# Patient Record
Sex: FEMALE | Race: BLACK OR AFRICAN AMERICAN | NOT HISPANIC OR LATINO | Employment: OTHER | URBAN - METROPOLITAN AREA
[De-identification: names, ages, dates, MRNs, and addresses within clinical notes are randomized per-mention and may not be internally consistent; named-entity substitution may affect disease eponyms.]

---

## 2017-01-02 ENCOUNTER — TELEPHONE (OUTPATIENT)
Dept: FAMILY MEDICINE | Facility: CLINIC | Age: 52
End: 2017-01-02

## 2017-01-12 ENCOUNTER — OFFICE VISIT (OUTPATIENT)
Dept: FAMILY MEDICINE | Facility: CLINIC | Age: 52
End: 2017-01-12
Payer: OTHER MISCELLANEOUS

## 2017-01-12 VITALS
WEIGHT: 203 LBS | SYSTOLIC BLOOD PRESSURE: 125 MMHG | DIASTOLIC BLOOD PRESSURE: 87 MMHG | BODY MASS INDEX: 34.03 KG/M2 | OXYGEN SATURATION: 98 % | HEART RATE: 62 BPM | TEMPERATURE: 97 F

## 2017-01-12 DIAGNOSIS — S49.92XA INJURY OF LEFT SHOULDER, INITIAL ENCOUNTER: Primary | ICD-10-CM

## 2017-01-12 DIAGNOSIS — M25.562 ACUTE PAIN OF LEFT KNEE: ICD-10-CM

## 2017-01-12 DIAGNOSIS — S93.492A SPRAIN OF OTHER LIGAMENT OF LEFT ANKLE, INITIAL ENCOUNTER: ICD-10-CM

## 2017-01-12 PROCEDURE — 99214 OFFICE O/P EST MOD 30 MIN: CPT | Performed by: FAMILY MEDICINE

## 2017-01-12 NOTE — PROGRESS NOTES
SUBJECTIVE:                                                    Marleen Mcfadden is a 51 year old female who presents to clinic today for the following health issues:      Pt comes in clinic today with c/o left sided pain, she states that she slipped on a puddle of water and fell at work yesterday, one day prior to presentation.  She needed 3 people to help her up.   She fell onto her back and left shoulder, twisting her left leg/knee and left ankle. The whole left side of body  hurts but the majority of the severe pain is coming from the left knee and the back and shoulder area. Knee pain worst when she's standing or walking. Pt denies hitting her head but does state that the back of her neck is swollen.  She was seen in the ER, prescribed Flexeril for pain. She had foot and knee xrays that were normal. She wasn't told to wrap. She has been icing the areas.  Pain is worse today, has some swelling, walking is painful.    Problem list and histories reviewed & adjusted, as indicated.  Additional history: as documented    Patient Active Problem List   Diagnosis     Autoimmune disease, not elsewhere classified     Primary cardiomyopathy (H)     Leiomyoma of uterus     Allergic rhinitis     Anemia     Sinus bradycardia     Health Care Home     Itching     CHF with cardiomyopathy (H)     TB lung, latent     Knee pain     Swelling of knee joint     Thyroid nodule     Pre-operative cardiovascular examination     Post-op pain     Pain in joint involving ankle and foot     Calcaneal spur     Plantar fasciitis     CARDIOVASCULAR SCREENING; LDL GOAL LESS THAN 160     Peroneus longus tendinitis     Morbid obesity (H)     Shaina's nodes     Familial cardiomyopathy (H)     Acute pain of right knee     Acute bilateral low back pain with right-sided sciatica     Degenerative disc disease at L5-S1 level     Midline low back pain with left-sided sciatica     Chronic bilateral low back pain with right-sided sciatica     Chronic  bilateral low back pain with left-sided sciatica     Elevated blood pressure reading without diagnosis of hypertension     Chronic diastolic congestive heart failure (H)     Injury of left shoulder, initial encounter     Acute pain of left knee     Sprain of other ligament of left ankle, initial encounter     Past Surgical History   Procedure Laterality Date     C excise excess skin tissue,abdomen       C repair cruciate ligament,knee       Right Knee     C breast surgery procedure unlisted       Breast Reduction     C excis uterine fibroid,abd apprch       C  delivery only  10/85,      , Low Cervicalx2     C ligate fallopian tube       Hysterectomy total abdominal       Thyroidectomy  2013     Procedure: THYROIDECTOMY;  LEFT THYROID LOBECTOMY.  (LIGASURE, RECURRENT LARYNGEAL NERVE MONITOR) ;  Surgeon: Uriah Camargo MD;  Location: [facility]       Social History   Substance Use Topics     Smoking status: Never Smoker      Smokeless tobacco: Never Used     Alcohol Use: Yes      Comment: rare, twice a year, she has a drink little wine 2 days ago     Family History   Problem Relation Age of Onset     Hypertension Father      dec     DIABETES Father      dec     HEART DISEASE Father      dec     Alcohol/Drug Father      Cardiovascular Father      HEART DISEASE Mother      dec     Alcohol/Drug Mother      Cardiovascular Mother      DIABETES Paternal Grandmother      dec     Hypertension Paternal Grandmother      dec     CEREBROVASCULAR DISEASE Paternal Grandmother      dec     CANCER Sister      Lupus     Cardiovascular Sister      cardiomyopathy     HEART DISEASE Sister      heart failure, and kidney failure     HEART DISEASE Daughter      Cardiomyopathy     Cardiovascular Daughter      cardiomyopathy     Cardiovascular Son      cardiomyopathy         Allergies   Allergen Reactions     Morphine      EMESIS     Sulfa Drugs Swelling     BP Readings from Last 3  Encounters:   01/12/17 125/87   12/19/16 133/85   10/28/16 126/83    Wt Readings from Last 3 Encounters:   01/12/17 203 lb (92.08 kg)   12/19/16 212 lb 8 oz (96.389 kg)   10/28/16 203 lb 12.8 oz (92.443 kg)             ROS:  Constitutional, HEENT, cardiovascular, pulmonary, gi and gu systems are negative, except as otherwise noted.    OBJECTIVE:                                                    /87 mmHg  Pulse 62  Temp(Src) 97  F (36.1  C) (Tympanic)  Wt 203 lb (92.08 kg)  SpO2 98%  LMP 10/19/2008  Body mass index is 34.03 kg/(m^2).  GENERAL: healthy, alert and no distress  EYES: Eyes grossly normal to inspection, PERRL and conjunctivae and sclerae normal  HENT: ear canals and TM's normal, nose and mouth without ulcers or lesions  NECK: no adenopathy, no asymmetry, masses, or scars and thyroid normal to palpation  MS: normal muscle tone   Shoulder exam - left: tenderness on palpation soft tissue left neck, posterior shoulder, no significant bruising, hematoma or spasm noted. Patient abducts arm to 90 degrees, reports pain and difficulty lifting > 90 degree. Right side normal,  full range of motion, no pain on motion, no tenderness or deformity noted.   Left knee: swelling, tenderness noted along medial aspect, no joint pain tenderness, no laxity noted but exam compromised by pain, normal patella. Patient is walking, very slight antalgic gait.  Left ankle/foot: tenderness on palpation top lateral foot with no specific bony tenderness. Ankle joint stable, no ankle tenderness noted on palpaton or with movement. Capillary refill brisk and equal in all toes.   SKIN: no suspicious lesions or rashes  NEURO: Normal strength and tone, mentation intact and speech normal  PSYCH: mentation appears normal, affect normal/bright    Diagnostic Test Results:  none      ASSESSMENT/PLAN:                                                    1. Injury of left shoulder, initial encounter  Recommend physical therapy to prevent  frozen shoulder, activity as tolerated  - [facility] PT, HAND, AND CHIROPRACTIC REFERRAL    2. Acute pain of left knee  Suspect acute sprain, no obvious joint laxity noted, but exam compromised by limited mobility due pain/swellng  Left knee sleeve fitted and provided to patient today  Refer to ortho for further evaluation, management if symptoms don't improve over next few days  - ORTHO  REFERRAL    3. Sprain of other ligament of left ankle, initial encounter  Please see patient instructions below.         Health Maintenance Due   Topic Date Due     URINE DRUG SCREEN Q1 YR  07/06/1979     ADVANCE DIRECTIVE PLANNING Q5 YRS (NO INBASKET)  07/06/1982     HEPATITIS C SCREENING  07/06/1982     LIPID MONITORING Q1 YEAR( NO INBASKET)  04/09/2015     HF ACTION PLAN Q3 YR (NO INBASKET MSG)  11/01/2015     INFLUENZA VACCINE (SYSTEM ASSIGNED)  09/01/2016      Danae Grimes MD  [facility]

## 2017-01-12 NOTE — Clinical Note
REPORT OF WORK ABILITY    [facility]   [address] [place] [address]  [phone]    PATIENT DATA    Employee Name: Marleen Mcfadden      : 1965     #: [id number]    Work related injury: Yes  Employed elsewhere? No      Today's date: 2017  Date of injury: 2017  Date of first visit: 2017     PROVIDER EVALUATION: Please fill in as needed.  Please give copy to employee for employer.    1. Diagnosis: left shoulder stain, left knee sprain, left ankle sprain    2. Treatment: physical therapy, ice, rest.  3. Medication: tylenol  NOTE: When ordering a medication, [place] Rules require Work Comp or WC on prescriptions.    4. No work from 2017 to Monday, 2017.  5. Return to work date: 2017   ** WITH RESTRICTIONS? No restrictions      RESTRICTIONS: Unlimited unless listed.  Restrictions apply to home and leisure also.  If work restrictions is not available, the employee is totally disabled.    Maximum Medical Improvement (Date): 2016  Any Permanent Partial Disability? 0%    Medical Examiner: Danae Grimes          License or registration: [id number]    Next appointment: 1 week    CC: Employer, Managed Care Plan/Payor, Patient

## 2017-01-12 NOTE — NURSING NOTE
"Chief Complaint   Patient presents with     Work Comp     Fall       Initial /87 mmHg  Pulse 62  Temp(Src) 97  F (36.1  C) (Tympanic)  Wt 203 lb (92.08 kg)  SpO2 98%  LMP 10/19/2008 Estimated body mass index is 34.03 kg/(m^2) as calculated from the following:    Height as of 12/19/16: 5' 4.75\" (1.645 m).    Weight as of this encounter: 203 lb (92.08 kg).  BP completed using cuff size: large.    Soco Choudhury MA    "

## 2017-01-12 NOTE — MR AVS SNAPSHOT
After Visit Summary   1/12/2017    Marleen Mcfadden    MRN: [id number]           Patient Information     Date Of Birth          1965        Visit Information        Provider Department      1/12/2017 1:00 PM Danae Grimes MD [facility]        Today's Diagnoses     Injury of left shoulder, initial encounter    -  1     Acute pain of left knee         Sprain of other ligament of left ankle, initial encounter           Care Instructions    Phase 1: ice, compression (aircast or wrapping), elevation, and antiinflammatories (ibuprofen, naprosyn, ketoprofen, mobic).      Starting 48 hours after injury, contrast baths done three times per day if possible help reduce swelling.    Contrast Bath  1. Soak area in cold water bath (cold water in a bucket with some ice cubes) for 30 seconds.  2. Switch to bucket of as hot as water as can be tolerated without burning (around 104 degrees/40 degrees celcius, the same as a hot hot tub) for 30 sec.  Continue to switch back and forth every 30 seconds for 5 minutes, finishing in the cold water bath.    Phase 2:  When you can bear weight without increasing pain or causing more swelling (generally in 2-4 weeks), continue to wear brace, but start strengthening and stretching exercises (alphabet, achilles stretching exercises).  You may ask for referral to Sports Medicine doctor or Physical Therapy for this phase.    Phase 3:  Conditioning (4-6 weeks after injury). Standing on injured foot with other foot raised and eyes closes: running in tight figure 8's.  Gradually reduce use of brace.  Continue to stretch well.                Follow-ups after your visit        Additional Services     [facility] PT, HAND, AND CHIROPRACTIC REFERRAL       **This order will print in the [facility] Scheduling Office**    Physical Therapy, Hand Therapy and Chiropractic Care are available through:    *[facility]  *[facility]  *[facility]    Call one number to schedule at any of the above locations: [phone].    Your provider has referred you to: As Indicated:     Indication/Reason for Referral: left shoulder injury; fell 1/112017  Onset of Illness: one day  Therapy Orders: Evaluate and Treat  Special Programs:   Special Request:     Dariana Westbrook      Additional Comments for the Therapist or Chiropractor:     Please be aware that coverage of these services is subject to the terms and limitations of your health insurance plan.  Call member services at your health plan with any benefit or coverage questions.      Please bring the following to your appointment:    *Your personal calendar for scheduling future appointments  *Comfortable clothing            ORTHO  REFERRAL       [facility] is referring you to the Orthopedic  Services at [facility].       The  Representative will assist you in the coordination of your Orthopedic and Musculoskeletal Care as prescribed by your physician.    The  Representative will call you within 1 business day to help schedule your appointment, or you may contact the  Representative at:    All areas ~ [phone]     Type of Referral : Non Surgical       Timeframe requested: Within 2 weeks    Coverage of these services is subject to the terms and limitations of your health insurance plan.  Please call member services at your health plan with any benefit or coverage questions.      If X-rays, CT or MRI's have been performed, please contact the facility where they were done to arrange for , prior to your scheduled appointment.  Please bring this referral request to your appointment and present it to your specialist.                  Your next 10 appointments already scheduled     Mar 08, 2017  8:00 AM   Lab with [facility] ([facility])    [address] [place] [address]  "[facility] [id number] [phone]            Mar 08, 2017  8:20 AM   (Arrive by 8:05 AM)   RETURN HEART FAILURE with Cassie Kolb MD   [facility] ([facility])    [address] [facility]  [address] [facility] [id number] [phone]              Who to contact     If you have questions or need follow up information about today's clinic visit or your schedule please contact [facility] directly at [phone].  Normal or non-critical lab and imaging results will be communicated to you by [employer], letter or phone within 4 business days after the clinic has received the results. If you do not hear from us within 7 days, please contact the clinic through [employer] or phone. If you have a critical or abnormal lab result, we will notify you by phone as soon as possible.  Submit refill requests through [employer] or call your pharmacy and they will forward the refill request to us. Please allow 3 business days for your refill to be completed.          Additional Information About Your Visit        MyChart Information     [employer] lets you send messages to your doctor, view your test results, renew your prescriptions, schedule appointments and more. To sign up, go to [email or web address] . Click on \"Log in\" on the left side of the screen, which will take you to the Welcome page. Then click on \"Sign up Now\" on the right side of the page.     You will be asked to enter the access code listed below, as well as some personal information. Please follow the directions to create your username and password.     Your access code is: [email or web address]  Expires: 2017 12:16 PM     Your access code will  in 90 days. If you need help or a new code, please call your [facility] or [phone].        Care EveryWhere ID     This is your Care EveryWhere ID. This could be used by other organizations to access your [facility] medical records  [id number]      "   Your Vitals Were     Pulse Temperature Pulse Oximetry Last Period          62 97  F (36.1  C) (Tympanic) 98% 10/19/2008         Blood Pressure from Last 3 Encounters:   01/12/17 125/87   12/19/16 133/85   10/28/16 126/83    Weight from Last 3 Encounters:   01/12/17 203 lb (92.08 kg)   12/19/16 212 lb 8 oz (96.389 kg)   10/28/16 203 lb 12.8 oz (92.443 kg)              We Performed the Following     [facility] PT, HAND, AND CHIROPRACTIC REFERRAL     ORTHO  REFERRAL        Primary Care Provider Office Phone # Fax #    Danae Grimes [phone]       [facility] [address]  [facility] [address]        Thank you!     Thank you for choosing [facility]  for your care. Our goal is always to provide you with excellent care. Hearing back from our patients is one way we can continue to improve our services. Please take a few minutes to complete the written survey that you may receive in the mail after your visit with us. Thank you!             Your Updated Medication List - Protect others around you: Learn how to safely use, store and throw away your medicines at www.disposemymeds.org.          This list is accurate as of: 1/12/17  1:45 PM.  Always use your most recent med list.                   Brand Name Dispense Instructions for use    cyclobenzaprine 10 MG tablet    FLEXERIL    30 tablet    Take 0.5-1 tablets (5-10 mg) by mouth 3 times daily as needed for muscle spasms       fluticasone 50 MCG/ACT spray    FLONASE    16 g    Spray 2 sprays into both nostrils daily       furosemide 20 MG tablet    LASIX    100 tablet    Take 2 tablets (40 mg) by mouth daily as needed       hydrochlorothiazide 12.5 MG capsule    MICROZIDE    30 capsule    Take 1 capsule (12.5 mg) by mouth daily       losartan 100 MG tablet    COZAAR    30 tablet    Take 1 tablet (100 mg) by mouth daily       TOPROL XL 50 MG 24 hr tablet   Generic drug:  metoprolol     90 tablet    Take 1 tablet  (50 mg) by mouth daily .  Patient will need to be seen in cardiology before any further refills will be authorized.       traMADol 50 MG tablet    ULTRAM    40 tablet    Take 1 tablet (50 mg) by mouth every 8 hours as needed for moderate pain       vitamin D 2000 UNITS tablet     100 tablet    Take 1 tablet by mouth as needed

## 2017-01-12 NOTE — PATIENT INSTRUCTIONS
Phase 1: ice, compression (aircast or wrapping), elevation, and antiinflammatories (ibuprofen, naprosyn, ketoprofen, mobic).      Starting 48 hours after injury, contrast baths done three times per day if possible help reduce swelling.    Contrast Bath  1. Soak area in cold water bath (cold water in a bucket with some ice cubes) for 30 seconds.  2. Switch to bucket of as hot as water as can be tolerated without burning (around 104 degrees/40 degrees celcius, the same as a hot hot tub) for 30 sec.  Continue to switch back and forth every 30 seconds for 5 minutes, finishing in the cold water bath.    Phase 2:  When you can bear weight without increasing pain or causing more swelling (generally in 2-4 weeks), continue to wear brace, but start strengthening and stretching exercises (alphabet, achilles stretching exercises).  You may ask for referral to Sports Medicine doctor or Physical Therapy for this phase.    Phase 3:  Conditioning (4-6 weeks after injury). Standing on injured foot with other foot raised and eyes closes: running in tight figure 8's.  Gradually reduce use of brace.  Continue to stretch well.

## 2017-01-16 ENCOUNTER — THERAPY VISIT (OUTPATIENT)
Dept: PHYSICAL THERAPY | Facility: CLINIC | Age: 52
End: 2017-01-16
Payer: OTHER MISCELLANEOUS

## 2017-01-16 DIAGNOSIS — M25.512 LEFT SHOULDER PAIN: Primary | ICD-10-CM

## 2017-01-16 DIAGNOSIS — S46.009A ROTATOR CUFF INJURY: ICD-10-CM

## 2017-01-16 PROCEDURE — 97035 APP MDLTY 1+ULTRASOUND EA 15: CPT | Mod: GP | Performed by: PHYSICAL THERAPIST

## 2017-01-16 PROCEDURE — 97110 THERAPEUTIC EXERCISES: CPT | Mod: GP | Performed by: PHYSICAL THERAPIST

## 2017-01-16 PROCEDURE — 97161 PT EVAL LOW COMPLEX 20 MIN: CPT | Mod: GP | Performed by: PHYSICAL THERAPIST

## 2017-01-16 NOTE — PROGRESS NOTES
Subjective:    Marleen Mcfadden is a 51 year old female with a left shoulder condition.  Condition occurred with:  A fall.  Condition occurred: at work.  This is a new condition  MD order 1/12/17. The patient slipped and fell on her back at work. She noticed increased pain in her left shoulder the next day. She has been using hot and cold pack. She was given muscle relaxants and was referred her to PT for further management. She has difficulty reaching shoulder and overhead, behind head and back. .    Patient reports pain:  Anterior.  Radiates to:  Shoulder.  Pain is described as aching and is constant and reported as 4/10.  Associated symptoms:  Painful arc, loss of strength, locking and loss of motion/stiffness. Pain is the same all the time.  Symptoms are exacerbated by using arm overhead, using arm at shoulder level, using arm behind back, lifting, carrying and lying on extremity and relieved by rest, muscle relaxants, analgesics and ice.  Since onset symptoms are gradually improving.        General health as reported by patient is good.  Pertinent medical history includes:  Heart problems.        Current occupation is Phlebotmoist.                                  Objective:    Standing Alignment:      Shoulder/UE:  Rounded shoulders                  Flexibility/Screens:     Upper Extremity:    Decreased left upper extremity flexibility at:  Pectoralis Major and Pectoralis Minor      Spine:  Decreased left spine flexibility:  Upper Trap and Levator                         Shoulder Evaluation:  ROM:  AROM:  normal (pain form 90 degree FF and Abd, IR L5 level.)                                  Strength:  : 3+/5 due to pain.                        Stability Testing:  normal        Palpation:    Left shoulder tenderness present at:  Supraspinatus; Infraspinatus; Teres Minor and Subscapularis                                       General     ROS    Assessment/Plan:      Patient is a 51 year old female with left  side shoulder complaints.    Patient has the following significant findings with corresponding treatment plan.                Diagnosis 1:  L shoulder injury- rotator cuff strain  Pain -  hot/cold therapy, US, electric stimulation, manual therapy, STS, splint/taping/bracing/orthotics, self management, education and home program  Decreased ROM/flexibility - manual therapy, therapeutic exercise, therapeutic activity and home program  Decreased strength - therapeutic exercise, therapeutic activities and home program  Inflammation - cold therapy, US, electric stimulation and self management/home program  Decreased function - therapeutic activities and home program  Impaired posture - neuro re-education, therapeutic activities and home program    Therapy Evaluation Codes:   1) History comprised of:   Personal factors that impact the plan of care:      Time since onset of symptoms.    Comorbidity factors that impact the plan of care are:      Rheumatoid arthritis, Sleep disorder/apnea and Smoking.     Medications impacting care: Pain and Steroids.  2) Examination of Body Systems comprised of:   Body structures and functions that impact the plan of care:      Shoulder.   Activity limitations that impact the plan of care are:      Driving, Dressing, Lifting and Sleeping.  3) Clinical presentation characteristics are:   Stable/Uncomplicated.  4) Decision-Making    Low complexity using standardized patient assessment instrument and/or measureable assessment of functional outcome.  Cumulative Therapy Evaluation is: Low complexity.    Previous and current functional limitations:  (See Goal Flow Sheet for this information)    Short term and Long term goals: (See Goal Flow Sheet for this information)     Communication ability:  Patient appears to be able to clearly communicate and understand verbal and written communication and follow directions correctly.  Treatment Explanation - The following has been discussed with the  patient:   RX ordered/plan of care  Anticipated outcomes  Possible risks and side effects  This patient would benefit from PT intervention to resume normal activities.   Rehab potential is good.    Frequency:  1 X week, once daily  Duration:  for 6 weeks  Discharge Plan:  Achieve all LTG.  Independent in home treatment program.  Reach maximal therapeutic benefit.    Please refer to the daily flowsheet for treatment today, total treatment time and time spent performing 1:1 timed codes.

## 2017-01-17 PROBLEM — S46.009A ROTATOR CUFF INJURY: Status: ACTIVE | Noted: 2017-01-17

## 2017-01-17 PROBLEM — M25.512 LEFT SHOULDER PAIN: Status: ACTIVE | Noted: 2017-01-17

## 2017-01-17 NOTE — PROGRESS NOTES
Subjective:                                       Pertinent medical history includes:  Heart problems.  Medical allergies: no.  Other surgeries include:  Orthopedic surgery and other.  Current medications:  Cardiac medication and muscle relaxants.  Current occupation is Phlebotomist  .  Patient is working in normal job without restrictions.  Primary job tasks include:  Prolonged standing and other.                              Objective:    System    Physical Exam    General     ROS    Assessment/Plan:

## 2017-01-19 ENCOUNTER — OFFICE VISIT (OUTPATIENT)
Dept: URGENT CARE | Facility: URGENT CARE | Age: 52
End: 2017-01-19
Payer: OTHER MISCELLANEOUS

## 2017-01-19 VITALS
BODY MASS INDEX: 33.69 KG/M2 | WEIGHT: 201 LBS | HEART RATE: 89 BPM | DIASTOLIC BLOOD PRESSURE: 70 MMHG | SYSTOLIC BLOOD PRESSURE: 126 MMHG | OXYGEN SATURATION: 96 % | TEMPERATURE: 98.7 F

## 2017-01-19 DIAGNOSIS — S86.912A STRAIN OF LEFT KNEE, INITIAL ENCOUNTER: Primary | ICD-10-CM

## 2017-01-19 PROCEDURE — 99213 OFFICE O/P EST LOW 20 MIN: CPT | Performed by: FAMILY MEDICINE

## 2017-01-19 NOTE — MR AVS SNAPSHOT
After Visit Summary   1/19/2017    Marleen Mcfadden    MRN: [id number]           Patient Information     Date Of Birth          1965        Visit Information        Provider Department      1/19/2017 11:35 AM Javier Gomez MD [facility]        Care Instructions      Knee Sprain    A sprain is an injury to the ligaments or capsule that holds a joint together. There are no broken bones. Most sprains take 3 to 6 weeks to heal. If it a severe sprain where the ligament is completely torn, it can take months to recover.  Most knee sprains are treated with a splint, knee immobilizer brace, or elastic wrap for support. Severe sprains may require surgery.  Home care    Stay off the injured leg as much as possible until you can walk on it without pain. If you have a lot of pain with walking, crutches or a walker may be prescribed. (These can be rented or purchased at many pharmacies and surgical or orthopedic supply stores). Follow your healthcare provider's advice about when to begin putting weight on that leg.    Keep your leg elevated to reduce pain and swelling. When sleeping, place a pillow under the injured leg. When sitting, support the injured leg so it is level with your waist. This is very important during the first 48 hours.    Apply an ice pack over the injured area for 15 to 20 minutes every 3 to 6 hours. You should do this for the first 24 to 48 hours. You can make an ice pack by filling a plastic bag that seals at the top with ice cubes and then wrapping it with a thin towel. Continue to use ice packs for relief of pain and swelling as needed. As the ice melts, be careful to avoid getting your wrap, splint, or cast wet. After 48 hours, apply heat (warm shower or warm bath) for 15 to 20 minutes several times a day, or alternate ice and heat. You can place the ice pack directly over the splint. If you have to wear a hook-and-loop knee brace, you can open it to  apply the ice pack, or heat, directly to the knee. Never put ice directly on the skin. Always wrap the ice in a towel or other type of cloth.    You may use over-the-counter pain medicine to control pain, unless another pain medicine was prescribed.If you have chronic liver or kidney disease or ever had a stomach ulcer or GI bleeding, talk with your healthcare provider before using these medicines.    If you were given a splint, keep it completely dry at all times. Bathe with your splint out of the water, protected with 2 large plastic bags, rubber-banded at the top end. If a fiberglass splint gets wet, you can dry it with a hair dryer. If you have a hook-and-loop knee brace, you can remove this to bathe, unless told otherwise.  Follow-up care  Follow up with your doctor as advised. Any X-rays you had today don t show any broken bones, breaks, or fractures. Sometimes fractures don t show up on the first X-ray. Bruises and sprains can sometimes hurt as much as a fracture. These injuries can take time to heal completely. If your symptoms don t improve or they get worse, talk with your doctor. You may need a repeat X-ray. If X-rays were taken, you will be told of any new findings that may affect your care.  Call 911  Call 911 if you have:     Shortness of breath     Chest pain  When to seek medical advice  Call your healthcare provider right away if any of these occur:    The splint or knee immobilizer brace becomes wet or soft    The fiberglass cast or splint remains wet for more than 24 hours    Pain or swelling increases    The injured leg or toes become cold, blue, numb, or tingly    9716-3491 [employer]. [address] [place] [address]122. All rights reserved. This information is not intended as a substitute for professional medical care. Always follow your healthcare professional's instructions.              Follow-ups after your visit        Your next 10 appointments already scheduled      Jan 23, 2017 12:40 PM   New Visit with [facility]rell [facility]    [address] [facility] [address]   [phone]            Jan 23, 2017  2:50 PM   TREVER Extremity with Henny Justinthy, PT   [facility] ([facility]  )    [address] [place] [id number] [phone]            Jan 30, 2017  2:50 PM   TREVER Extremity with Henny Manpreetapathy, PT   [facility] ([facility]  )    [address] [place] [id number] [phone]            Mar 08, 2017  8:00 AM   Lab with  LAB   [facility] ([facility])    [address]  1st Floor  [facility] [id number] [phone]            Mar 08, 2017  8:20 AM   (Arrive by 8:05 AM)   RETURN HEART FAILURE with Cassie Kolb MD   [facility] ([facility])    [address]  3rd Floor  [facility] [id number] [phone]              Who to contact     If you have questions or need follow up information about today's clinic visit or your schedule please contact [facility] directly at [phone].  Normal or non-critical lab and imaging results will be communicated to you by MyChart, letter or phone within 4 business days after the clinic has received the results. If you do not hear from us within 7 days, please contact the clinic through [employer] or phone. If you have a critical or abnormal lab result, we will notify you by phone as soon as possible.  Submit refill requests through [employer] or call your pharmacy and they will forward the refill request to us. Please allow 3 business days for your refill to be completed.          Additional Information About Your Visit        [employer] Information     [employer] lets you send messages to your doctor, view your test results, renew your prescriptions, schedule appointments and  "more. To sign up, go to [email or web address] . Click on \"Log in\" on the left side of the screen, which will take you to the Welcome page. Then click on \"Sign up Now\" on the right side of the page.     You will be asked to enter the access code listed below, as well as some personal information. Please follow the directions to create your username and password.     Your access code is: [email or web address]  Expires: 2017 12:16 PM     Your access code will  in 90 days. If you need help or a new code, please call your [place] clinic or [phone].        Care EveryWhere ID     This is your Care EveryWhere ID. This could be used by other organizations to access your [place] medical records  [id number]        Your Vitals Were     Pulse Temperature Pulse Oximetry Last Period Breastfeeding?       89 98.7  F (37.1  C) (Oral) 96% 10/19/2008 No        Blood Pressure from Last 3 Encounters:   17 126/70   17 125/87   16 133/85    Weight from Last 3 Encounters:   17 201 lb (91.173 kg)   17 203 lb (92.08 kg)   16 212 lb 8 oz (96.389 kg)              Today, you had the following     No orders found for display       Primary Care Provider Office Phone # Fax #    Danae Grimes [phone]       [facility] [address]  [facility] [address]        Thank you!     Thank you for choosing [facility]  for your care. Our goal is always to provide you with excellent care. Hearing back from our patients is one way we can continue to improve our services. Please take a few minutes to complete the written survey that you may receive in the mail after your visit with us. Thank you!             Your Updated Medication List - Protect others around you: Learn how to safely use, store and throw away your medicines at www.disposemymeds.org.          This list is accurate as of: 17 12:26 PM.  Always use your most recent med list.       "             Brand Name Dispense Instructions for use    cyclobenzaprine 10 MG tablet    FLEXERIL    30 tablet    Take 0.5-1 tablets (5-10 mg) by mouth 3 times daily as needed for muscle spasms       fluticasone 50 MCG/ACT spray    FLONASE    16 g    Spray 2 sprays into both nostrils daily       furosemide 20 MG tablet    LASIX    100 tablet    Take 2 tablets (40 mg) by mouth daily as needed       hydrochlorothiazide 12.5 MG capsule    MICROZIDE    30 capsule    Take 1 capsule (12.5 mg) by mouth daily       losartan 100 MG tablet    COZAAR    30 tablet    Take 1 tablet (100 mg) by mouth daily       TOPROL XL 50 MG 24 hr tablet   Generic drug:  metoprolol     90 tablet    Take 1 tablet (50 mg) by mouth daily .  Patient will need to be seen in cardiology before any further refills will be authorized.       traMADol 50 MG tablet    ULTRAM    40 tablet    Take 1 tablet (50 mg) by mouth every 8 hours as needed for moderate pain       vitamin D 2000 UNITS tablet     100 tablet    Take 1 tablet by mouth as needed

## 2017-01-19 NOTE — PATIENT INSTRUCTIONS
Knee Sprain    A sprain is an injury to the ligaments or capsule that holds a joint together. There are no broken bones. Most sprains take 3 to 6 weeks to heal. If it a severe sprain where the ligament is completely torn, it can take months to recover.  Most knee sprains are treated with a splint, knee immobilizer brace, or elastic wrap for support. Severe sprains may require surgery.  Home care    Stay off the injured leg as much as possible until you can walk on it without pain. If you have a lot of pain with walking, crutches or a walker may be prescribed. (These can be rented or purchased at many pharmacies and surgical or orthopedic supply stores). Follow your healthcare provider's advice about when to begin putting weight on that leg.    Keep your leg elevated to reduce pain and swelling. When sleeping, place a pillow under the injured leg. When sitting, support the injured leg so it is level with your waist. This is very important during the first 48 hours.    Apply an ice pack over the injured area for 15 to 20 minutes every 3 to 6 hours. You should do this for the first 24 to 48 hours. You can make an ice pack by filling a plastic bag that seals at the top with ice cubes and then wrapping it with a thin towel. Continue to use ice packs for relief of pain and swelling as needed. As the ice melts, be careful to avoid getting your wrap, splint, or cast wet. After 48 hours, apply heat (warm shower or warm bath) for 15 to 20 minutes several times a day, or alternate ice and heat. You can place the ice pack directly over the splint. If you have to wear a hook-and-loop knee brace, you can open it to apply the ice pack, or heat, directly to the knee. Never put ice directly on the skin. Always wrap the ice in a towel or other type of cloth.    You may use over-the-counter pain medicine to control pain, unless another pain medicine was prescribed.If you have chronic liver or kidney disease or ever had a stomach  ulcer or GI bleeding, talk with your healthcare provider before using these medicines.    If you were given a splint, keep it completely dry at all times. Bathe with your splint out of the water, protected with 2 large plastic bags, rubber-banded at the top end. If a fiberglass splint gets wet, you can dry it with a hair dryer. If you have a hook-and-loop knee brace, you can remove this to bathe, unless told otherwise.  Follow-up care  Follow up with your doctor as advised. Any X-rays you had today don t show any broken bones, breaks, or fractures. Sometimes fractures don t show up on the first X-ray. Bruises and sprains can sometimes hurt as much as a fracture. These injuries can take time to heal completely. If your symptoms don t improve or they get worse, talk with your doctor. You may need a repeat X-ray. If X-rays were taken, you will be told of any new findings that may affect your care.  Call 911  Call 911 if you have:     Shortness of breath     Chest pain  When to seek medical advice  Call your healthcare provider right away if any of these occur:    The splint or knee immobilizer brace becomes wet or soft    The fiberglass cast or splint remains wet for more than 24 hours    Pain or swelling increases    The injured leg or toes become cold, blue, numb, or tingly    1457-7866 [employer]. [address] [place] [address]. All rights reserved. This information is not intended as a substitute for professional medical care. Always follow your healthcare professional's instructions.

## 2017-01-19 NOTE — NURSING NOTE
"Chief Complaint   Patient presents with     Work Comp     Pt c/o left knee pain from slipping on water at work.        Initial /70 mmHg  Pulse 89  Temp(Src) 98.7  F (37.1  C) (Oral)  Wt 201 lb (91.173 kg)  SpO2 96%  LMP 10/19/2008  Breastfeeding? No Estimated body mass index is 33.69 kg/(m^2) as calculated from the following:    Height as of 12/19/16: 5' 4.75\" (1.645 m).    Weight as of this encounter: 201 lb (91.173 kg).  BP completed using cuff size:martine Lozano CMA (AAMA)      "

## 2017-01-19 NOTE — Clinical Note
[facility]  [address]  [facility] [address]  Phone: [phone]    January 19, 2017        Marleen Mcfadden  [address]   [facility] [address]          To whom it may concern:      RE: Marleen Mcfadden      Patient was seen and treated today at our clinic. 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 She is scheduled to follow-up with the orthopedist next week for further assessment.       She may continue to work, however, may need to avoid prolonged standing and walking if having pain. Should avoid all climbing of ladders and stairs when possible. She should limit this activity if not tolerable and until follow-up with the orthopedist and continue to wear her knee brace. Follow-up if symptoms persist or worsen.        Sincerely,          Javier Gomez MD

## 2017-01-19 NOTE — PROGRESS NOTES
"Some of this note was populated by a medical assistant.      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 Apparently when she fell her left knee twisted behind her and she need help getting up,  \"from 5 nurses\"    Was having left shoulder, knee and ankle pain at that time. Has been seeing the physical therapist for her shoulder pain which is improving. He ankle pain has also improved approximately \"70%\" since the fall. She is concerned as her knee continues to be painful with walking and \"buckled on me the other day.   Musculoskeletal problem/pain      Duration: 01/11/2017    Description  Location: left knee    Intensity:  7/10    Accompanying signs and symptoms: pulling pain in left knee, limping on knees    History  Previous similar problem: YES- pt states that she has a similar pain with her right knee when she tore her ACL.   Previous evaluation:  x-ray    Precipitating or alleviating factors:  Trauma or overuse: YES- pt states that she slipped on water and work and her leg went up from underneath her. Pt states that her foot came up by her face during the fall.   Aggravating factors include: sitting, standing, walking and climbing stairs    Therapies tried and outcome: rest/inactivity, ice, heat, compression, tylenol- little relief.     Slight fall at work a year. Steroid injection into the knee. Resolved completely.     Problem list and histories reviewed & adjusted, as indicated.  Additional history: as documented    Patient Active Problem List   Diagnosis     Autoimmune disease, not elsewhere classified     Primary cardiomyopathy (H)     Leiomyoma of uterus     Allergic rhinitis     Anemia     Sinus bradycardia     Health Care Home     Itching     CHF with cardiomyopathy (H)     TB lung, latent     Knee " pain     Swelling of knee joint     Thyroid nodule     Pre-operative cardiovascular examination     Post-op pain     Pain in joint involving ankle and foot     Calcaneal spur     Plantar fasciitis     CARDIOVASCULAR SCREENING; LDL GOAL LESS THAN 160     Peroneus longus tendinitis     Morbid obesity (H)     Shaina's nodes     Familial cardiomyopathy (H)     Acute pain of right knee     Acute bilateral low back pain with right-sided sciatica     Degenerative disc disease at L5-S1 level     Midline low back pain with left-sided sciatica     Chronic bilateral low back pain with right-sided sciatica     Chronic bilateral low back pain with left-sided sciatica     Elevated blood pressure reading without diagnosis of hypertension     Chronic diastolic congestive heart failure (H)     Injury of left shoulder, initial encounter     Acute pain of left knee     Sprain of other ligament of left ankle, initial encounter     Left shoulder pain     Rotator cuff injury     Past Surgical History   Procedure Laterality Date     C excise excess skin tissue,abdomen       C repair cruciate ligament,knee       Right Knee     C breast surgery procedure unlisted       Breast Reduction     C excis uterine fibroid,abd apprch       C  delivery only  10/85,      , Low Cervicalx2     C ligate fallopian tube       Hysterectomy total abdominal       Thyroidectomy  2013     Procedure: THYROIDECTOMY;  LEFT THYROID LOBECTOMY.  (LIGASURE, RECURRENT LARYNGEAL NERVE MONITOR) ;  Surgeon: Uriah Camargo MD;  Location: [facility]       Social History   Substance Use Topics     Smoking status: Never Smoker      Smokeless tobacco: Never Used     Alcohol Use: Yes      Comment: rare, twice a year, she has a drink little wine 2 days ago     Family History   Problem Relation Age of Onset     Hypertension Father      dec     DIABETES Father      dec     HEART DISEASE Father      dec     Alcohol/Drug Father       Cardiovascular Father      HEART DISEASE Mother      dec     Alcohol/Drug Mother      Cardiovascular Mother      DIABETES Paternal Grandmother      dec     Hypertension Paternal Grandmother      dec     CEREBROVASCULAR DISEASE Paternal Grandmother      dec     CANCER Sister      Lupus     Cardiovascular Sister      cardiomyopathy     HEART DISEASE Sister      heart failure, and kidney failure     HEART DISEASE Daughter      Cardiomyopathy     Cardiovascular Daughter      cardiomyopathy     Cardiovascular Son      cardiomyopathy         Current Outpatient Prescriptions   Medication Sig Dispense Refill     fluticasone (FLONASE) 50 MCG/ACT spray Spray 2 sprays into both nostrils daily 16 g 0     losartan (COZAAR) 100 MG tablet Take 1 tablet (100 mg) by mouth daily 30 tablet 3     hydrochlorothiazide (MICROZIDE) 12.5 MG capsule Take 1 capsule (12.5 mg) by mouth daily 30 capsule 3     traMADol (ULTRAM) 50 MG tablet Take 1 tablet (50 mg) by mouth every 8 hours as needed for moderate pain 40 tablet 0     TOPROL XL 50 MG 24 hr tablet Take 1 tablet (50 mg) by mouth daily .  Patient will need to be seen in cardiology before any further refills will be authorized. 90 tablet 0     cyclobenzaprine (FLEXERIL) 10 MG tablet Take 0.5-1 tablets (5-10 mg) by mouth 3 times daily as needed for muscle spasms 30 tablet 0     furosemide (LASIX) 20 MG tablet Take 2 tablets (40 mg) by mouth daily as needed 100 tablet 3     Cholecalciferol (VITAMIN D) 2000 UNIT tablet Take 1 tablet by mouth as needed  100 tablet 12     Allergies   Allergen Reactions     Morphine      EMESIS     Sulfa Drugs Swelling       ROS:  Constitutional, HEENT, cardiovascular, pulmonary, gi and gu systems are negative, except as otherwise noted.    OBJECTIVE:                                                    /70 mmHg  Pulse 89  Temp(Src) 98.7  F (37.1  C) (Oral)  Wt 201 lb (91.173 kg)  SpO2 96%  LMP 10/19/2008  Breastfeeding? No  Body mass index is 33.69  kg/(m^2).  GENERAL: healthy, alert and no distress  NECK: no adenopathy, no asymmetry, masses, or scars and thyroid normal to palpation  RESP: lungs clear to auscultation - no rales, rhonchi or wheezes  CV: regular rate and rhythm, normal S1 S2, no S3 or S4, no murmur, click or rub, no peripheral edema and peripheral pulses strong  ABDOMEN: soft, nontender, no hepatosplenomegaly, no masses and bowel sounds normal  MS:mild swelling noted with tenderness to patellar compression.  noted left knee tenderness along the medial joint line and MCL proximally and distally. ACL testing and meniscus testing noted to be painful. Ambulating fairly well however noted antalgic gait.   Noted flexion of knee actively to 90 degrees with moderate pain.       Diagnostic Test Results:  none      ASSESSMENT/PLAN:                                                        ICD-10-CM    1. Strain of left knee, initial encounter S86.912A     --consider MCL and/or mensicus injury.     PLAN  She does have orthopedic appt. Scheduled for next week.   Did provided rigid knee brace for support.   Work note provided for limitations while at work if painful.   Patient educational/instructional material provided including reasons for follow-up   The patient indicates understanding of these issues and agrees with the plan.  Javier Gomez MD        [facility]

## 2017-01-23 ENCOUNTER — OFFICE VISIT (OUTPATIENT)
Dept: ORTHOPEDICS | Facility: CLINIC | Age: 52
End: 2017-01-23
Payer: OTHER MISCELLANEOUS

## 2017-01-23 ENCOUNTER — THERAPY VISIT (OUTPATIENT)
Dept: PHYSICAL THERAPY | Facility: CLINIC | Age: 52
End: 2017-01-23
Payer: OTHER MISCELLANEOUS

## 2017-01-23 VITALS — HEART RATE: 72 BPM | SYSTOLIC BLOOD PRESSURE: 100 MMHG | DIASTOLIC BLOOD PRESSURE: 64 MMHG

## 2017-01-23 DIAGNOSIS — S89.92XA LEFT KNEE INJURY, INITIAL ENCOUNTER: ICD-10-CM

## 2017-01-23 DIAGNOSIS — S46.009A ROTATOR CUFF INJURY: ICD-10-CM

## 2017-01-23 DIAGNOSIS — M25.512 LEFT SHOULDER PAIN: Primary | ICD-10-CM

## 2017-01-23 DIAGNOSIS — M17.12 PATELLOFEMORAL ARTHRITIS OF LEFT KNEE: Primary | ICD-10-CM

## 2017-01-23 PROCEDURE — 97110 THERAPEUTIC EXERCISES: CPT | Mod: GP | Performed by: PHYSICAL THERAPIST

## 2017-01-23 PROCEDURE — 99213 OFFICE O/P EST LOW 20 MIN: CPT | Performed by: PREVENTIVE MEDICINE

## 2017-01-23 PROCEDURE — 97112 NEUROMUSCULAR REEDUCATION: CPT | Mod: GP | Performed by: PHYSICAL THERAPIST

## 2017-01-23 PROCEDURE — 97035 APP MDLTY 1+ULTRASOUND EA 15: CPT | Mod: GP | Performed by: PHYSICAL THERAPIST

## 2017-01-23 ASSESSMENT — PAIN SCALES - GENERAL: PAINLEVEL: SEVERE PAIN (6)

## 2017-01-23 NOTE — PATIENT INSTRUCTIONS
Thanks for coming today.  Ortho/Sports Medicine Clinic  [address] [place] [address]    To schedule future appointments in Ortho Clinic, you may call [phone].    To schedule ordered imaging by your Provider: Call [facility] at [phone]    [email or web address] available online at:   [email or web address]    Please call if any further questions or concerns [phone] and ask for the Orthopedic Department. Clinic hours 8 am to 5 pm.    Return to clinic if symptoms worsen.

## 2017-01-23 NOTE — NURSING NOTE
"Marleen Mcfadden's goals for this visit include:Evaluate left knee from fall at work on 1-11-17.   She requests these members of her care team be copied on today's visit information: no    PCP: Danae Grimes    Referring Provider:  No referring provider defined for this encounter.    Chief Complaint   Patient presents with     Musculoskeletal Problem     Left knee pain since fall at work on 1/11/17.        Initial /64 mmHg  Pulse 72  LMP 10/19/2008 Estimated body mass index is 33.69 kg/(m^2) as calculated from the following:    Height as of 12/19/16: 1.645 m (5' 4.75\").    Weight as of 1/19/17: 91.173 kg (201 lb).  BP completed using cuff size: regular    "

## 2017-01-23 NOTE — PROGRESS NOTES
HISTORY OF PRESENT ILLNESS  Ms. Mcfadden is a pleasant 51 year old year old female who presents to clinic today with left knee pain and injury.   Marleen explains that she injured her left knee and foot on 1/11 and 'slipped on water' in the surgery center at work and has had taken some tylenol   She was seen that day at work at [employer]? And evaluated and had xrays  Location: left knee and foot.  Quality:  achy pain    Severity: 4/10 at worst    Duration: since 1/11/17  Timing: occurs intermittently  Context: occurs while bending knee and standing  Modifying factors:  resting and non-use and tylenol makes it better, movement and use makes it worse  Associated signs & symptoms: some swelling  Previous similar pain: yes- has had pF pain and arthritis in left knee for some time  Exercise: lifting weights and cardiovascular on machine  Additional history: as documented    MEDICAL HISTORY  Patient Active Problem List   Diagnosis     Autoimmune disease, not elsewhere classified     Primary cardiomyopathy (H)     Leiomyoma of uterus     Allergic rhinitis     Anemia     Sinus bradycardia     Health Care Home     Itching     CHF with cardiomyopathy (H)     TB lung, latent     Knee pain     Swelling of knee joint     Thyroid nodule     Pre-operative cardiovascular examination     Post-op pain     Pain in joint involving ankle and foot     Calcaneal spur     Plantar fasciitis     CARDIOVASCULAR SCREENING; LDL GOAL LESS THAN 160     Peroneus longus tendinitis     Morbid obesity (H)     Shaina's nodes     Familial cardiomyopathy (H)     Acute pain of right knee     Acute bilateral low back pain with right-sided sciatica     Degenerative disc disease at L5-S1 level     Midline low back pain with left-sided sciatica     Chronic bilateral low back pain with right-sided sciatica     Chronic bilateral low back pain with left-sided sciatica     Elevated blood pressure reading without diagnosis of hypertension     Chronic diastolic  congestive heart failure (H)     Injury of left shoulder, initial encounter     Acute pain of left knee     Sprain of other ligament of left ankle, initial encounter     Left shoulder pain     Rotator cuff injury       Current Outpatient Prescriptions   Medication Sig Dispense Refill     order for DME Equipment being ordered:brace 1 Device 0     fluticasone (FLONASE) 50 MCG/ACT spray Spray 2 sprays into both nostrils daily 16 g 0     losartan (COZAAR) 100 MG tablet Take 1 tablet (100 mg) by mouth daily 30 tablet 3     hydrochlorothiazide (MICROZIDE) 12.5 MG capsule Take 1 capsule (12.5 mg) by mouth daily 30 capsule 3     traMADol (ULTRAM) 50 MG tablet Take 1 tablet (50 mg) by mouth every 8 hours as needed for moderate pain 40 tablet 0     TOPROL XL 50 MG 24 hr tablet Take 1 tablet (50 mg) by mouth daily .  Patient will need to be seen in cardiology before any further refills will be authorized. 90 tablet 0     cyclobenzaprine (FLEXERIL) 10 MG tablet Take 0.5-1 tablets (5-10 mg) by mouth 3 times daily as needed for muscle spasms 30 tablet 0     furosemide (LASIX) 20 MG tablet Take 2 tablets (40 mg) by mouth daily as needed 100 tablet 3     Cholecalciferol (VITAMIN D) 2000 UNIT tablet Take 1 tablet by mouth as needed  100 tablet 12       Allergies   Allergen Reactions     Morphine      EMESIS     Sulfa Drugs Swelling       Family History   Problem Relation Age of Onset     Hypertension Father      dec     DIABETES Father      dec     HEART DISEASE Father      dec     Alcohol/Drug Father      Cardiovascular Father      HEART DISEASE Mother      dec     Alcohol/Drug Mother      Cardiovascular Mother      DIABETES Paternal Grandmother      dec     Hypertension Paternal Grandmother      dec     CEREBROVASCULAR DISEASE Paternal Grandmother      dec     CANCER Sister      Lupus     Cardiovascular Sister      cardiomyopathy     HEART DISEASE Sister      heart failure, and kidney failure     HEART DISEASE Daughter       Cardiomyopathy     Cardiovascular Daughter      cardiomyopathy     Cardiovascular Son      cardiomyopathy       Additional medical/Social/Surgical histories reviewed in [facility] and updated as appropriate.     REVIEW OF SYSTEMS (1/23/2017)  10 point ROS of systems including Constitutional, Eyes, Respiratory, Cardiovascular, Gastroenterology, Genitourinary, Integumentary, Musculoskeletal, Psychiatric were all negative except for pertinent positives noted in my HPI.     PHYSICAL EXAM  Filed Vitals:    01/23/17 1247   BP: 100/64   Pulse: 72     Vital Signs: /64 mmHg  Pulse 72  LMP 10/19/2008 Patient declined being weighed. There is no weight on file to calculate BMI.    General  - normal appearance, in no obvious distress  CV  - normal popliteal pulse  Pulm  - normal respiratory pattern, non-labored  Musculoskeletal - left knee  - stance: normal gait without limp, no obvious leg length discrepancy, single-leg squat exhibits knee valgus, internal rotation of the hip, contralateral hip drop  - inspection: mild swelling of left knee joint , normal muscle tone, normal bone and joint alignment, no obvious deformity  - palpation: no joint line tenderness, patellar tendon non-tender, tender medial patellar facet and with patellar compression testing, and some mild pain to palpation over dorsum of foot and mid foot flexor tendons of foot, no bony tenderness of foot  - ROM: 135 degrees flexion, -5 degrees extension, mildly painful, crepitus with weight-bearing flexion  - strength: 5/5 in flexion, 5/5 in extension  - neuro: no sensory or motor deficit  - special tests:  (-) Lachman  (-) anterior drawer  (-) Bacilio  (-) Thessaly  (-) varus at 0 and 30 degrees flexion  (-) valgus at 0 and 30 degrees flexion  (+) Douglas s compression test  (+) patellar grind  (-) patellar apprehension  Neuro  - no sensory or motor deficit, grossly normal coordination, normal muscle tone  Skin  - no ecchymosis, erythema, warmth, or induration,  no obvious rash  Psych  - interactive, appropriate, normal mood and affect      ASSESSMENT & PLAN  52 yo female with left knee contusion and patellofemoral arthritis and left foot sprain, improved  -tylenol PRN  -patient did not want restrictions at work, encouraged to wear knee brace  -reviewed xrays of foot and knee are WNL  - did not want nsaids due to cardiomyopathy, and did not want injection today  -start PT for knee  -ice PRN  -RTC in 3 weeks will consider MRI at that time if not improving    Darrell Villar MD, CAQSM

## 2017-01-30 ENCOUNTER — THERAPY VISIT (OUTPATIENT)
Dept: PHYSICAL THERAPY | Facility: CLINIC | Age: 52
End: 2017-01-30
Payer: OTHER MISCELLANEOUS

## 2017-01-30 DIAGNOSIS — M25.562 LEFT KNEE PAIN: Primary | ICD-10-CM

## 2017-01-30 PROCEDURE — 97161 PT EVAL LOW COMPLEX 20 MIN: CPT | Mod: GP | Performed by: PHYSICAL THERAPIST

## 2017-01-30 PROCEDURE — 97035 APP MDLTY 1+ULTRASOUND EA 15: CPT | Mod: GP | Performed by: PHYSICAL THERAPIST

## 2017-01-30 PROCEDURE — 97110 THERAPEUTIC EXERCISES: CPT | Mod: GP | Performed by: PHYSICAL THERAPIST

## 2017-01-30 NOTE — PROGRESS NOTES
Subjective:    Marleen Mcfadden is a 51 year old female with a left knee condition.  Condition occurred with:  A fall/slip.  Condition occurred: at work.  This is a new condition  MD order 1/23/17. Patient slipped and fell on the floor at work and her knee was bent backwards. She was able to stand up with help of three people and was put in wheelchair and was taken to ER. X rays was taken and was clear of fracture. She was given pain medication to help with pain. MRI was not taken. Her knee is swollen since then. She states that the when she walks too long because the knee dalila. She was not able to WB the next day. She still limps on her L side. She was referred to PT for further management..    Patient reports pain:  Medial and in the joint.    Pain is described as aching (tightness) and is constant and reported as 4/10 and 9/10.  Associated symptoms:  Buckling/giving out, catching, loss of motion/stiffness, loss of strength and edema. Pain is the same all the time.  Symptoms are exacerbated by walking, ascending stairs, descending stairs, standing, weight bearing, bending/squatting, kneeling and transfers   Since onset symptoms are unchanged.  Special tests:  X-ray.      General health as reported by patient is good.  Pertinent medical history includes:  Heart problems.        Current occupation is Nurse.                                  Objective:      Gait:    Gait Type:  Antalgic         Flexibility/Screens:       Lower Extremity:  Decreased left lower extremity flexibility:Hamstrings                                                        Knee Evaluation:  ROM:  AROM: normal (tightness at end range flexion stated.)  Strength wnl knee: 4/5.              Special Tests:   Left knee positive for the following special tests:  Patellar Compression    Palpation:    Left knee tenderness present at:  Medial Joint Line; Patellar Medial and Patellar Lateral    Edema:  Edema of the knee: Swelling present around the  knee.            General     ROS    Assessment/Plan:      Patient is a 51 year old female with left side knee complaints.    Patient has the following significant findings with corresponding treatment plan.                Diagnosis 1:  L knee pain- patellofemoral arthritis  Pain -  hot/cold therapy, US, electric stimulation, manual therapy, STS, splint/taping/bracing/orthotics, self management, education and home program  Decreased strength - therapeutic exercise, therapeutic activities and home program  Impaired balance - neuro re-education, therapeutic activities and home program  Edema - cold therapy and self management/home program  Impaired gait - gait training and home program  Decreased function - therapeutic activities and home program    Therapy Evaluation Codes:   1) History comprised of:   Personal factors that impact the plan of care:      Time since onset of symptoms.    Comorbidity factors that impact the plan of care are:      Osteoarthritis, Overweight and Weakness.     Medications impacting care: Pain and Steroids.  2) Examination of Body Systems comprised of:   Body structures and functions that impact the plan of care:      Knee.   Activity limitations that impact the plan of care are:      Bending, Stairs, Standing and Walking.  3) Clinical presentation characteristics are:   Stable/Uncomplicated.  4) Decision-Making    Low complexity using standardized patient assessment instrument and/or measureable assessment of functional outcome.  Cumulative Therapy Evaluation is: Low complexity.    Previous and current functional limitations:  (See Goal Flow Sheet for this information)    Short term and Long term goals: (See Goal Flow Sheet for this information)     Communication ability:  Patient appears to be able to clearly communicate and understand verbal and written communication and follow directions correctly.  Treatment Explanation - The following has been discussed with the patient:   RX  ordered/plan of care  Anticipated outcomes  Possible risks and side effects  This patient would benefit from PT intervention to resume normal activities.   Rehab potential is good.    Frequency:  1 X week, once daily  Duration:  for 6 weeks  Discharge Plan:  Achieve all LTG.  Independent in home treatment program.  Reach maximal therapeutic benefit.    Please refer to the daily flowsheet for treatment today, total treatment time and time spent performing 1:1 timed codes.

## 2017-01-31 ENCOUNTER — OFFICE VISIT (OUTPATIENT)
Dept: URGENT CARE | Facility: URGENT CARE | Age: 52
End: 2017-01-31
Payer: OTHER MISCELLANEOUS

## 2017-01-31 VITALS
BODY MASS INDEX: 34.53 KG/M2 | TEMPERATURE: 98.3 F | SYSTOLIC BLOOD PRESSURE: 104 MMHG | WEIGHT: 206 LBS | OXYGEN SATURATION: 98 % | DIASTOLIC BLOOD PRESSURE: 66 MMHG | HEART RATE: 77 BPM

## 2017-01-31 DIAGNOSIS — M25.562 LEFT KNEE PAIN, UNSPECIFIED CHRONICITY: Primary | ICD-10-CM

## 2017-01-31 PROCEDURE — 99213 OFFICE O/P EST LOW 20 MIN: CPT | Performed by: PHYSICIAN ASSISTANT

## 2017-01-31 ASSESSMENT — ACTIVITIES OF DAILY LIVING (ADL)
GO UP STAIRS: ACTIVITY IS FAIRLY DIFFICULT
KNEE_ACTIVITY_OF_DAILY_LIVING_SCORE: 38.57
STIFFNESS: THE SYMPTOM AFFECTS MY ACTIVITY SEVERELY
SQUAT: ACTIVITY IS FAIRLY DIFFICULT
PAIN: THE SYMPTOM AFFECTS MY ACTIVITY MODERATELY
HOW_WOULD_YOU_RATE_THE_OVERALL_FUNCTION_OF_YOUR_KNEE_DURING_YOUR_USUAL_DAILY_ACTIVITIES?: ABNORMAL
HOW_WOULD_YOU_RATE_THE_CURRENT_FUNCTION_OF_YOUR_KNEE_DURING_YOUR_USUAL_DAILY_ACTIVITIES_ON_A_SCALE_FROM_0_TO_100_WITH_100_BEING_YOUR_LEVEL_OF_KNEE_FUNCTION_PRIOR_TO_YOUR_INJURY_AND_0_BEING_THE_INABILITY_TO_PERFORM_ANY_OF_YOUR_USUAL_DAILY_ACTIVITIES?: 60
RAW_SCORE: 27
WEAKNESS: THE SYMPTOM AFFECTS MY ACTIVITY MODERATELY
LIMPING: THE SYMPTOM AFFECTS MY ACTIVITY SEVERELY
SIT WITH YOUR KNEE BENT: ACTIVITY IS FAIRLY DIFFICULT
KNEE_ACTIVITY_OF_DAILY_LIVING_SUM: 27
RISE FROM A CHAIR: ACTIVITY IS SOMEWHAT DIFFICULT
KNEEL ON THE FRONT OF YOUR KNEE: ACTIVITY IS FAIRLY DIFFICULT
GO DOWN STAIRS: ACTIVITY IS FAIRLY DIFFICULT
SWELLING: THE SYMPTOM AFFECTS MY ACTIVITY MODERATELY
GIVING WAY, BUCKLING OR SHIFTING OF KNEE: THE SYMPTOM AFFECTS MY ACTIVITY SEVERELY
STAND: ACTIVITY IS SOMEWHAT DIFFICULT
WALK: ACTIVITY IS FAIRLY DIFFICULT
AS_A_RESULT_OF_YOUR_KNEE_INJURY,_HOW_WOULD_YOU_RATE_YOUR_CURRENT_LEVEL_OF_DAILY_ACTIVITY?: ABNORMAL

## 2017-01-31 NOTE — NURSING NOTE
"Chief Complaint   Patient presents with     Musculoskeletal Problem       Initial /66 mmHg  Pulse 77  Temp(Src) 98.3  F (36.8  C) (Oral)  Wt 206 lb (93.441 kg)  SpO2 98%  LMP 10/19/2008 Estimated body mass index is 34.53 kg/(m^2) as calculated from the following:    Height as of 12/19/16: 5' 4.75\" (1.645 m).    Weight as of this encounter: 206 lb (93.441 kg).  BP completed using cuff size: nasra Aguiar CMA      "

## 2017-01-31 NOTE — PROGRESS NOTES
Subjective:                                       Pertinent medical history includes:  Heart problems.  Medical allergies: yes.  Other surgeries include:  Orthopedic surgery and other.  Current medications:  Pain medication and anti-inflammatory.                                    Objective:    System    Physical Exam    General     ROS    Assessment/Plan:

## 2017-01-31 NOTE — PROGRESS NOTES
SUBJECTIVE:                                                    Marleen Mcfadden is a 51 year old female who presents to clinic today for the following health issues:      Joint Pain     Onset: 3 weeks ago      Description:   Location: left knee   Character: Dull ache    Intensity: moderate    Progression of Symptoms: worse    Accompanying Signs & Symptoms:  Other symptoms: numbness, swelling    History:   Previous similar pain: YES      Precipitating factors:   Trauma or overuse: YES- fell at work on the 11th     Alleviating factors:  Improved by: nothing  Needs work note due to missing work on Sunday     Therapies Tried and outcome: tylenol, brace               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      no longer an issue     PONV (postoperative nausea and vomiting)      UTERINE LEIOMYOMA NOS 10/25/2006     Calcaneal spur 10/21/2014     Morbid obesity (H) 5/5/2015     Familial cardiomyopathy (H) 10/21/2015         Current Outpatient Prescriptions on File Prior to Visit:  order for DME Equipment being ordered:brace   fluticasone (FLONASE) 50 MCG/ACT spray Spray 2 sprays into both nostrils daily   losartan (COZAAR) 100 MG tablet Take 1 tablet (100 mg) by mouth daily   hydrochlorothiazide (MICROZIDE) 12.5 MG capsule Take 1 capsule (12.5 mg) by mouth daily   traMADol (ULTRAM) 50 MG tablet Take 1 tablet (50 mg) by mouth every 8 hours as needed for moderate pain   TOPROL XL 50 MG 24 hr tablet Take 1 tablet (50 mg) by mouth daily .  Patient will need to be seen in cardiology before any further  refills will be authorized.   cyclobenzaprine (FLEXERIL) 10 MG tablet Take 0.5-1 tablets (5-10 mg) by mouth 3 times daily as needed for muscle spasms   furosemide (LASIX) 20 MG tablet Take 2 tablets (40 mg) by mouth daily as needed   Cholecalciferol (VITAMIN D) 2000 UNIT tablet Take 1 tablet by mouth as needed      No current facility-administered medications on file prior to visit.    Social History   Substance Use Topics     Smoking status: Never Smoker      Smokeless tobacco: Never Used     Alcohol Use: Yes      Comment: rare, twice a year, she has a drink little wine 2 days ago       ROS:  General: no fevers  Musculoskel: + as above  Skin: No erythema    OBJECTIVE:  /66 mmHg  Pulse 77  Temp(Src) 98.3  F (36.8  C) (Oral)  Wt 206 lb (93.441 kg)  SpO2 98%  LMP 10/19/2008   General:   awake, alert, and cooperative.  NAD.   Head: Normocephalic, atraumatic.  Eyes: Conjunctiva clear,   MS:  lft knee, ROM decreased, is in knee support,  non TTP,  no effusion,  no laxity,   Neuro: Alert and oriented - normal speech.    ASSESSMENT:    ICD-10-CM    1. Left knee pain, unspecified chronicity M25.562            PLAN:   Advised about symptoms which might herald more serious problems.

## 2017-01-31 NOTE — Clinical Note
[facility]  [address]  [facility] [address]  Phone: [phone]    January 31, 2017        Marleen Mcfadden  [address]   [facility] [address]          To whom it may concern:    RE: Marleen Mcfadden    Patient was seen and treated today at our clinic.  Patient excused from work 1/29/17.    Patient may return to work without restrictions after that.          Please contact me for questions or concerns.      Sincerely,        MATTHEW Negron

## 2017-02-07 ENCOUNTER — THERAPY VISIT (OUTPATIENT)
Dept: PHYSICAL THERAPY | Facility: CLINIC | Age: 52
End: 2017-02-07
Payer: OTHER MISCELLANEOUS

## 2017-02-07 DIAGNOSIS — S46.009A ROTATOR CUFF INJURY: ICD-10-CM

## 2017-02-07 DIAGNOSIS — M25.512 LEFT SHOULDER PAIN: Primary | ICD-10-CM

## 2017-02-07 DIAGNOSIS — M25.562 LEFT KNEE PAIN, UNSPECIFIED CHRONICITY: ICD-10-CM

## 2017-02-07 PROCEDURE — 97035 APP MDLTY 1+ULTRASOUND EA 15: CPT | Mod: GP | Performed by: PHYSICAL THERAPIST

## 2017-02-07 PROCEDURE — 97110 THERAPEUTIC EXERCISES: CPT | Mod: GP | Performed by: PHYSICAL THERAPIST

## 2017-02-07 PROCEDURE — 97112 NEUROMUSCULAR REEDUCATION: CPT | Mod: GP | Performed by: PHYSICAL THERAPIST

## 2017-02-09 ENCOUNTER — OFFICE VISIT (OUTPATIENT)
Dept: URGENT CARE | Facility: URGENT CARE | Age: 52
End: 2017-02-09
Payer: OTHER MISCELLANEOUS

## 2017-02-09 VITALS
SYSTOLIC BLOOD PRESSURE: 118 MMHG | TEMPERATURE: 98.3 F | OXYGEN SATURATION: 97 % | BODY MASS INDEX: 33.86 KG/M2 | DIASTOLIC BLOOD PRESSURE: 77 MMHG | HEART RATE: 79 BPM | WEIGHT: 202 LBS

## 2017-02-09 DIAGNOSIS — G89.29 CHRONIC PAIN OF LEFT KNEE: Primary | ICD-10-CM

## 2017-02-09 DIAGNOSIS — M25.562 CHRONIC PAIN OF LEFT KNEE: Primary | ICD-10-CM

## 2017-02-09 PROCEDURE — 99213 OFFICE O/P EST LOW 20 MIN: CPT | Performed by: NURSE PRACTITIONER

## 2017-02-09 RX ORDER — ACETAMINOPHEN 500 MG
500-1000 TABLET ORAL EVERY 6 HOURS PRN
Qty: 21 TABLET | Refills: 0 | Status: SHIPPED | OUTPATIENT
Start: 2017-02-09 | End: 2017-02-16

## 2017-02-09 NOTE — PATIENT INSTRUCTIONS
Reducing Knee Pain and Swelling    Many treatments can help reduce pain and swelling in your knee. Your healthcare provider or physical therapist may suggest one or more of the following treatments:    Icing your knee helps reduce swelling. You may be asked to ice your knee once a day or more. Apply ice for about 15 to 20 minutes at a time, with at least 40 minutes between sessions. Always keep a towel between the ice and your skin.     Keeping your leg raised above your heart helps excess fluid flow out of your knee joint. This reduces swelling.    Compression means wrapping an elastic bandage or neoprene sleeve snugly around your knees. This keeps fluid from collecting in your knee joint.    Electrical stimulation, done by a physical therapist or , can help reduce excess fluid in your knee joint.    Anti-inflammatory medicines may be prescribed by your healthcare provider. You may take pills or receive injections in your knee.    Isometric (austyn) exercises strengthen the muscles that support your knee joint. They also help reduce excess fluid in your knee.    Massage helps fluid drain away from your knee.    [id number] [employer]. [address] [place] [address], [place] [address]. All rights reserved. This information is not intended as a substitute for professional medical care. Always follow your healthcare professional's instructions.

## 2017-02-09 NOTE — PROGRESS NOTES
SUBJECTIVE:                                                    Marleen Mcfadden is a 51 year old female who presents to clinic today for the following health issues:      Musculoskeletal problem/pain      Duration: Ongoing since 1/11/2017 (WC)    Description  Location: left knee    Intensity:  7/10    Accompanying signs and symptoms: tingling, swelling has not stopped, pain is lingering. Pt has been unable to sleep.    History  Previous similar problem: YES  Previous evaluation:  x-ray    Precipitating or alleviating factors:  Trauma or overuse: YES  Aggravating factors include: sitting, standing, walking and climbing stairs    Therapies tried and outcome: ice, physical therapy, tylenol- no relief.          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      no longer an issue     PONV (postoperative nausea and vomiting)      UTERINE LEIOMYOMA NOS 10/25/2006     Calcaneal spur 10/21/2014     Morbid obesity (H) 5/5/2015     Familial cardiomyopathy (H) 10/21/2015         Current Outpatient Prescriptions on File Prior to Visit:  order for DME Equipment being ordered:brace   fluticasone (FLONASE) 50 MCG/ACT spray Spray 2 sprays into both nostrils daily   losartan (COZAAR) 100 MG tablet Take 1 tablet (100 mg) by mouth daily   hydrochlorothiazide (MICROZIDE) 12.5 MG capsule Take 1 capsule (12.5 mg) by mouth daily   traMADol (ULTRAM) 50 MG tablet Take 1 tablet (50 mg) by mouth every 8 hours as needed for moderate pain   TOPROL XL 50 MG 24 hr tablet Take 1 tablet (50 mg) by mouth daily .  Patient will need to  be seen in cardiology before any further refills will be authorized.   cyclobenzaprine (FLEXERIL) 10 MG tablet Take 0.5-1 tablets (5-10 mg) by mouth 3 times daily as needed for muscle spasms   furosemide (LASIX) 20 MG tablet Take 2 tablets (40 mg) by mouth daily as needed   Cholecalciferol (VITAMIN D) 2000 UNIT tablet Take 1 tablet by mouth as needed      No current facility-administered medications on file prior to visit.    Social History   Substance Use Topics     Smoking status: Never Smoker      Smokeless tobacco: Never Used     Alcohol Use: Yes      Comment: rare, twice a year, she has a drink little wine 2 days ago       ROS:  GEN no fevers  SKIN as above  Musculoskel: + as above    OBJECTIVE:  /77 mmHg  Pulse 79  Temp(Src) 98.3  F (36.8  C) (Oral)  Wt 202 lb (91.627 kg)  SpO2 97%  LMP 10/19/2008  Breastfeeding? No   General:   awake, alert, and cooperative.  NAD.   Head: Normocephalic, atraumatic.  Eyes: Conjunctiva clear,   MS:  mild swelling noted with tenderness to patellar compression.  noted left knee tenderness along the medial joint line and MCL proximally and distally. ACL testing and meniscus testing noted to be painful. Ambulating fairly well.  Neuro: Alert and oriented - normal speech.    ASSESSMENT:    ICD-10-CM    1. Chronic pain of left knee M25.562 acetaminophen (TYLENOL) 500 MG tablet    G89.29            PLAN:   Rest knee as much as possible.  Apply ice for 15-20 minutes intermittently as needed and especially after any offending activity. Daily stretching.  As pain recedes, begin normal activities slowly as tolerated.  Consider Physical Therapy if symptoms not better with symptomatic care.   Advised about symptoms which might herald more serious problems.    Teresa Cuello  [facility]  Family Nurse Practitoner

## 2017-02-09 NOTE — Clinical Note
[facility]  [address]  [facility] [address]  Phone: [phone]    February 9, 2017        Marleen Mcfadden  [address]   [facility] [address]          To whom it may concern:    RE: Marleen Mcfadden    Patient was seen and treated today at our clinic and missed work.  Patient may return to work 2/13/2017 with No restrictions    Please contact me for questions or concerns.      Sincerely,        Teresa Cuello NP

## 2017-02-09 NOTE — MR AVS SNAPSHOT
After Visit Summary   2/9/2017    Marleen Mcfadden    MRN: [id number]           Patient Information     Date Of Birth          1965        Visit Information        Provider Department      2/9/2017 11:00 AM Teresa Cuello NP [facility]        Today's Diagnoses     Chronic pain of left knee    -  1       Care Instructions      Reducing Knee Pain and Swelling    Many treatments can help reduce pain and swelling in your knee. Your healthcare provider or physical therapist may suggest one or more of the following treatments:    Icing your knee helps reduce swelling. You may be asked to ice your knee once a day or more. Apply ice for about 15 to 20 minutes at a time, with at least 40 minutes between sessions. Always keep a towel between the ice and your skin.     Keeping your leg raised above your heart helps excess fluid flow out of your knee joint. This reduces swelling.    Compression means wrapping an elastic bandage or neoprene sleeve snugly around your knees. This keeps fluid from collecting in your knee joint.    Electrical stimulation, done by a physical therapist or , can help reduce excess fluid in your knee joint.    Anti-inflammatory medicines may be prescribed by your healthcare provider. You may take pills or receive injections in your knee.    Isometric (austyn) exercises strengthen the muscles that support your knee joint. They also help reduce excess fluid in your knee.    Massage helps fluid drain away from your knee.    8463-4494 [employer]. [address] [place] [address]. All rights reserved. This information is not intended as a substitute for professional medical care. Always follow your healthcare professional's instructions.              Follow-ups after your visit        Your next 10 appointments already scheduled     Feb 09, 2017  2:40 PM   Office Visit with Ana Acosta PA-C   [facility]  [place] ([facility])    [address] [place] [id number] [phone]           Bring a current list of meds and any records pertaining to this visit.  For Physicals, please bring immunization records and any forms needing to be filled out.  Please arrive 10 minutes early to complete paperwork.            Feb 14, 2017  2:40 PM   TREVER Extremity with Henny Bruno, PT   [facility] ([facility]  )    [address] [place] [id number] [phone]            Feb 16, 2017  1:40 PM   Office Visit with Danae Grimes MD   [facility] ([facility])    [address] [place] [address] [phone]           Bring a current list of meds and any records pertaining to this visit.  For Physicals, please bring immunization records and any forms needing to be filled out.  Please arrive 10 minutes early to complete paperwork.            Feb 21, 2017  2:40 PM   TREVER Extremity with Henny Bruno, PT   [facility] ([facility]  )    [address] [place] [id number] [phone]            Mar 08, 2017  8:00 AM   Lab with  LAB   [facility] ([facility])    [address] [id number] [phone]            Mar 08, 2017  8:20 AM   (Arrive by 8:05 AM)   RETURN HEART FAILURE with Cassie Kolb MD   [facility] ([facility])    [address] [facility]  3rd Floor  [facility] [id number] [phone]              Who to contact     If you have questions or need follow up information about today's clinic visit or your schedule please contact [facility] directly at [phone].  Normal or non-critical lab and imaging results will be communicated to you by MyChart, letter or phone within 4 business days after the clinic has received the results.  "If you do not hear from us within 7 days, please contact the clinic through Catmoji or phone. If you have a critical or abnormal lab result, we will notify you by phone as soon as possible.  Submit refill requests through Catmoji or call your pharmacy and they will forward the refill request to us. Please allow 3 business days for your refill to be completed.          Additional Information About Your Visit        [employer] Information     Catmoji lets you send messages to your doctor, view your test results, renew your prescriptions, schedule appointments and more. To sign up, go to [email or web address] . Click on \"Log in\" on the left side of the screen, which will take you to the Welcome page. Then click on \"Sign up Now\" on the right side of the page.     You will be asked to enter the access code listed below, as well as some personal information. Please follow the directions to create your username and password.     Your access code is: [email or web address]  Expires: 5/10/2017 11:53 AM     Your access code will  in 90 days. If you need help or a new code, please call your [facility] clinic or [phone].        Care EveryWhere ID     This is your Care EveryWhere ID. This could be used by other organizations to access your [facility] medical records  [id number]        Your Vitals Were     Pulse Temperature Pulse Oximetry Last Period Breastfeeding?       79 98.3  F (36.8  C) (Oral) 97% 10/19/2008 No        Blood Pressure from Last 3 Encounters:   17 118/77   17 104/66   17 100/64    Weight from Last 3 Encounters:   17 202 lb (91.627 kg)   17 206 lb (93.441 kg)   17 201 lb (91.173 kg)              Today, you had the following     No orders found for display         Today's Medication Changes          These changes are accurate as of: 17 11:53 AM.  If you have any questions, ask your nurse or doctor.               Start taking these medicines.        Dose/Directions    " acetaminophen 500 MG tablet   Commonly known as:  TYLENOL   Used for:  Chronic pain of left knee   Started by:  Teresa Cuello NP        Dose:  500-1000 mg   Take 1-2 tablets (500-1,000 mg) by mouth every 6 hours as needed for mild pain   Quantity:  21 tablet   Refills:  0            Where to get your medicines      These medications were sent to [facility] - [place], [place] - [address] [place] [address]  [address], [facility] [address]     Phone:  [phone]    - acetaminophen 500 MG tablet             Primary Care Provider Office Phone # Fax #    Danae Grimes [phone]       [facility] [address]  [facility] [address]        Thank you!     Thank you for choosing [facility]  for your care. Our goal is always to provide you with excellent care. Hearing back from our patients is one way we can continue to improve our services. Please take a few minutes to complete the written survey that you may receive in the mail after your visit with us. Thank you!             Your Updated Medication List - Protect others around you: Learn how to safely use, store and throw away your medicines at www.disposemymeds.org.          This list is accurate as of: 2/9/17 11:53 AM.  Always use your most recent med list.                   Brand Name Dispense Instructions for use    acetaminophen 500 MG tablet    TYLENOL    21 tablet    Take 1-2 tablets (500-1,000 mg) by mouth every 6 hours as needed for mild pain       cyclobenzaprine 10 MG tablet    FLEXERIL    30 tablet    Take 0.5-1 tablets (5-10 mg) by mouth 3 times daily as needed for muscle spasms       fluticasone 50 MCG/ACT spray    FLONASE    16 g    Spray 2 sprays into both nostrils daily       furosemide 20 MG tablet    LASIX    100 tablet    Take 2 tablets (40 mg) by mouth daily as needed       hydrochlorothiazide 12.5 MG capsule    MICROZIDE    30 capsule    Take 1  capsule (12.5 mg) by mouth daily       losartan 100 MG tablet    COZAAR    30 tablet    Take 1 tablet (100 mg) by mouth daily       order for DME     1 Device    Equipment being ordered:brace       TOPROL XL 50 MG 24 hr tablet   Generic drug:  metoprolol     90 tablet    Take 1 tablet (50 mg) by mouth daily .  Patient will need to be seen in cardiology before any further refills will be authorized.       traMADol 50 MG tablet    ULTRAM    40 tablet    Take 1 tablet (50 mg) by mouth every 8 hours as needed for moderate pain       vitamin D 2000 UNITS tablet     100 tablet    Take 1 tablet by mouth as needed

## 2017-02-09 NOTE — NURSING NOTE
"Chief Complaint   Patient presents with     Knee Pain     Pt c/o knee pain in the left knee.       Initial /77 mmHg  Pulse 79  Temp(Src) 98.3  F (36.8  C) (Oral)  Wt 202 lb (91.627 kg)  SpO2 97%  LMP 10/19/2008  Breastfeeding? No Estimated body mass index is 33.86 kg/(m^2) as calculated from the following:    Height as of 12/19/16: 5' 4.75\" (1.645 m).    Weight as of this encounter: 202 lb (91.627 kg).  Medication Reconciliation: complete     Adri Lozano CMA (AAMA)      "

## 2017-02-14 ENCOUNTER — THERAPY VISIT (OUTPATIENT)
Dept: PHYSICAL THERAPY | Facility: CLINIC | Age: 52
End: 2017-02-14
Payer: OTHER MISCELLANEOUS

## 2017-02-14 DIAGNOSIS — M25.562 LEFT KNEE PAIN, UNSPECIFIED CHRONICITY: ICD-10-CM

## 2017-02-14 PROCEDURE — 97112 NEUROMUSCULAR REEDUCATION: CPT | Mod: GP | Performed by: PHYSICAL THERAPIST

## 2017-02-14 PROCEDURE — 97110 THERAPEUTIC EXERCISES: CPT | Mod: GP | Performed by: PHYSICAL THERAPIST

## 2017-02-14 PROCEDURE — 97035 APP MDLTY 1+ULTRASOUND EA 15: CPT | Mod: GP | Performed by: PHYSICAL THERAPIST

## 2017-02-16 ENCOUNTER — OFFICE VISIT (OUTPATIENT)
Dept: FAMILY MEDICINE | Facility: CLINIC | Age: 52
End: 2017-02-16
Payer: OTHER MISCELLANEOUS

## 2017-02-16 VITALS
SYSTOLIC BLOOD PRESSURE: 120 MMHG | HEIGHT: 64 IN | DIASTOLIC BLOOD PRESSURE: 70 MMHG | OXYGEN SATURATION: 99 % | WEIGHT: 202 LBS | BODY MASS INDEX: 34.49 KG/M2 | RESPIRATION RATE: 20 BRPM | TEMPERATURE: 98.3 F | HEART RATE: 88 BPM

## 2017-02-16 DIAGNOSIS — M25.562 ACUTE PAIN OF LEFT KNEE: Primary | ICD-10-CM

## 2017-02-16 DIAGNOSIS — M25.462 SWELLING OF KNEE JOINT, LEFT: ICD-10-CM

## 2017-02-16 PROCEDURE — 99213 OFFICE O/P EST LOW 20 MIN: CPT | Performed by: FAMILY MEDICINE

## 2017-02-16 NOTE — NURSING NOTE
"Chief Complaint   Patient presents with     Musculoskeletal Problem     left knee pain       Initial /70 (BP Location: Right arm, Cuff Size: Adult Large)  Pulse 88  Temp 98.3  F (36.8  C) (Oral)  Resp 20  Ht 5' 4\" (1.626 m)  Wt 202 lb (91.6 kg)  LMP 10/19/2008  SpO2 99%  Breastfeeding? No  BMI 34.67 kg/m2 Estimated body mass index is 34.67 kg/(m^2) as calculated from the following:    Height as of this encounter: 5' 4\" (1.626 m).    Weight as of this encounter: 202 lb (91.6 kg).  Medication Reconciliation: complete     Bethanie Rodriguez MA      "

## 2017-02-16 NOTE — MR AVS SNAPSHOT
After Visit Summary   2/16/2017    Marleen Mcfadden    MRN: [id number]           Patient Information     Date Of Birth          1965        Visit Information        Provider Department      2/16/2017 1:40 PM Danae Grimes MD [facility]        Today's Diagnoses     Acute pain of left knee    -  1    Swelling of knee joint, left           Follow-ups after your visit        Your next 10 appointments already scheduled     Feb 21, 2017  2:40 PM CST   TREVER Extremity with Henny Bruno, PT   [facility] ([facility]  )    [address] [place] [address]   [phone]            Mar 08, 2017  8:00 AM CST   Lab with  LAB   [facility])    [address] [place] [id number] [phone]            Mar 08, 2017  8:20 AM CST   (Arrive by 8:05 AM)   RETURN HEART FAILURE with Cassie Kolb MD   [facility] ([facility])    [address] [place] [id number] [phone]              Who to contact     If you have questions or need follow up information about today's clinic visit or your schedule please contact [facility] directly at [phone].  Normal or non-critical lab and imaging results will be communicated to you by MyChart, letter or phone within 4 business days after the clinic has received the results. If you do not hear from us within 7 days, please contact the clinic through [email or web address] or phone. If you have a critical or abnormal lab result, we will notify you by phone as soon as possible.  Submit refill requests through M[employer]Kit or call your pharmacy and they will forward the refill request to us. Please allow 3 business days for your refill to be completed.          Additional Information About Your Visit        [employer] Information     [employer] lets you send messages to your doctor, view  "your test results, renew your prescriptions, schedule appointments and more. To sign up, go to [email or web address] . Click on \"Log in\" on the left side of the screen, which will take you to the Welcome page. Then click on \"Sign up Now\" on the right side of the page.     You will be asked to enter the access code listed below, as well as some personal information. Please follow the directions to create your username and password.     Your access code is: [email or web address]  Expires: 5/10/2017 11:53 AM     Your access code will  in 90 days. If you need help or a new code, please call your [facility] clinic or [phone].        Care EveryWhere ID     This is your Care EveryWhere ID. This could be used by other organizations to access your [facility] medical records  [id number]        Your Vitals Were     Pulse Temperature Respirations Height Last Period Pulse Oximetry    88 98.3  F (36.8  C) (Oral) 20 5' 4\" (1.626 m) 10/19/2008 99%    Breastfeeding? BMI (Body Mass Index)                No 34.67 kg/m2           Blood Pressure from Last 3 Encounters:   17 120/70   17 118/77   17 104/66    Weight from Last 3 Encounters:   17 202 lb (91.6 kg)   17 202 lb (91.6 kg)   17 206 lb (93.4 kg)              Today, you had the following     No orders found for display       Primary Care Provider Office Phone # Fax #    Danae Grimes [phone]       [facility] [address]  [facility] [address]        Thank you!     Thank you for choosing [facility]  for your care. Our goal is always to provide you with excellent care. Hearing back from our patients is one way we can continue to improve our services. Please take a few minutes to complete the written survey that you may receive in the mail after your visit with us. Thank you!             Your Updated Medication List - Protect others around you: Learn how to safely use, store and throw " away your medicines at www.disposemymeds.org.          This list is accurate as of: 2/16/17  2:41 PM.  Always use your most recent med list.                   Brand Name Dispense Instructions for use    acetaminophen 500 MG tablet    TYLENOL    21 tablet    Take 1-2 tablets (500-1,000 mg) by mouth every 6 hours as needed for mild pain       cyclobenzaprine 10 MG tablet    FLEXERIL    30 tablet    Take 0.5-1 tablets (5-10 mg) by mouth 3 times daily as needed for muscle spasms       fluticasone 50 MCG/ACT spray    FLONASE    16 g    Spray 2 sprays into both nostrils daily       furosemide 20 MG tablet    LASIX    100 tablet    Take 2 tablets (40 mg) by mouth daily as needed       hydrochlorothiazide 12.5 MG capsule    MICROZIDE    30 capsule    Take 1 capsule (12.5 mg) by mouth daily       losartan 100 MG tablet    COZAAR    30 tablet    Take 1 tablet (100 mg) by mouth daily       order for DME     1 Device    Equipment being ordered:brace       TOPROL XL 50 MG 24 hr tablet   Generic drug:  metoprolol     90 tablet    Take 1 tablet (50 mg) by mouth daily .  Patient will need to be seen in cardiology before any further refills will be authorized.       traMADol 50 MG tablet    ULTRAM    40 tablet    Take 1 tablet (50 mg) by mouth every 8 hours as needed for moderate pain       vitamin D 2000 UNITS tablet     100 tablet    Take 1 tablet by mouth as needed

## 2017-02-16 NOTE — PROGRESS NOTES
SUBJECTIVE:                                                    Marleen Mcfadden is a 51 year old female who presents to clinic today for the following health issues:     Workers comp  Musculoskeletal problem/pain  Ongoing left knee pain      Duration: 01/11/17    Description  Location: Left knee, constant pain since injury, aggravated by walking and standing, associated with swelling    Intensity:  Moderate 6/10    Accompanying signs and symptoms: radiation of pain to foot     History  Previous similar problem: YES  Previous evaluation:  x-ray    Precipitating or alleviating factors:  Trauma or overuse: no, fell  Aggravating factors include: standing and walking    Therapies tried and outcome: ultrasound treatment helped a lot    physical Therapy is helping a lot, she's noticed that her knee is getting a lot stronger; she does wrap it to reduce the swelling for 2-3 hours at a time, helps support the knee     Prior Xray:  COMPARISON: 1/14/2015.         IMPRESSION:   Right knee: Prior anterior cruciate ligament reconstruction again  noted. No significant change in appearance of interference screws. No  acute bony abnormality or joint space effusion. Minimal degenerative  bony spurring in all 3 compartments without significant joint space  narrowing. No definite changes since the prior exam.     Left knee: Progressive medial joint space narrowing which is now at  least moderate. This is associated with some new mild degenerative  bony spurring. No acute bony abnormalities or joint space effusion.      Problem list and histories reviewed & adjusted, as indicated.  Additional history: as documented    Patient Active Problem List   Diagnosis     Autoimmune disease, not elsewhere classified     Primary cardiomyopathy (H)     Leiomyoma of uterus     Allergic rhinitis     Anemia     Sinus bradycardia     Health Care Home     Itching     CHF with cardiomyopathy (H)     TB lung, latent     Knee pain     Swelling of knee  joint     Thyroid nodule     Pre-operative cardiovascular examination     Post-op pain     Pain in joint involving ankle and foot     Calcaneal spur     Plantar fasciitis     CARDIOVASCULAR SCREENING; LDL GOAL LESS THAN 160     Peroneus longus tendinitis     Morbid obesity (H)     Shaina's nodes     Familial cardiomyopathy (H)     Acute pain of right knee     Acute bilateral low back pain with right-sided sciatica     Degenerative disc disease at L5-S1 level     Midline low back pain with left-sided sciatica     Chronic bilateral low back pain with right-sided sciatica     Chronic bilateral low back pain with left-sided sciatica     Elevated blood pressure reading without diagnosis of hypertension     Chronic diastolic congestive heart failure (H)     Injury of left shoulder, initial encounter     Acute pain of left knee     Sprain of other ligament of left ankle, initial encounter     Left shoulder pain     Rotator cuff injury     Left knee pain     Past Surgical History   Procedure Laterality Date     C excise excess skin tissue,abdomen       C repair cruciate ligament,knee       Right Knee     C breast surgery procedure unlisted       Breast Reduction     C excis uterine fibroid,abd apprch       C  delivery only  10/85,      , Low Cervicalx2     C ligate fallopian tube       Hysterectomy total abdominal       Thyroidectomy  2013     Procedure: THYROIDECTOMY;  LEFT THYROID LOBECTOMY.  (LIGASURE, RECURRENT LARYNGEAL NERVE MONITOR) ;  Surgeon: Uriah Camargo MD;  Location: [facility]       Social History   Substance Use Topics     Smoking status: Never Smoker     Smokeless tobacco: Never Used     Alcohol use Yes      Comment: rare, twice a year, she has a drink little wine 2 days ago     Family History   Problem Relation Age of Onset     Hypertension Father      dec     DIABETES Father      dec     HEART DISEASE Father      dec     Alcohol/Drug Father       "Cardiovascular Father      HEART DISEASE Mother      dec     Alcohol/Drug Mother      Cardiovascular Mother      HEART DISEASE Daughter      Cardiomyopathy     Cardiovascular Daughter      cardiomyopathy     Cardiovascular Son      cardiomyopathy     DIABETES Paternal Grandmother      dec     Hypertension Paternal Grandmother      dec     CEREBROVASCULAR DISEASE Paternal Grandmother      dec     CANCER Sister      Lupus     Cardiovascular Sister      cardiomyopathy     HEART DISEASE Sister      heart failure, and kidney failure         Allergies   Allergen Reactions     Morphine      EMESIS     Sulfa Drugs Swelling     BP Readings from Last 3 Encounters:   02/16/17 120/70   02/09/17 118/77   01/31/17 104/66    Wt Readings from Last 3 Encounters:   02/16/17 202 lb (91.6 kg)   02/09/17 202 lb (91.6 kg)   01/31/17 206 lb (93.4 kg)                    ROS:  Constitutional, HEENT, cardiovascular, pulmonary, gi and gu systems are negative, except as otherwise noted.    OBJECTIVE:                                                    /70 (BP Location: Right arm, Cuff Size: Adult Large)  Pulse 88  Temp 98.3  F (36.8  C) (Oral)  Resp 20  Ht 5' 4\" (1.626 m)  Wt 202 lb (91.6 kg)  LMP 10/19/2008  SpO2 99%  Breastfeeding? No  BMI 34.67 kg/m2  Body mass index is 34.67 kg/(m^2).  Gen: Patient is alert, cooperative, pleasant, in no acute distress.  Knee: Knees reveal full range of motion, tenderness with active extension, no masses, effusion noted, mala-patellar, without ligamentous instability.     Diagnostic Test Results:  none      ASSESSMENT/PLAN:                                                    1. Acute pain of left knee  with    2. Swelling of knee joint, left  Disability form completed for Marleen to be off work for 2 appts per week  Follow up with ortho given ongoing pain and symptoms           Danae Grimes MD  [facility]    "

## 2017-02-22 ENCOUNTER — THERAPY VISIT (OUTPATIENT)
Dept: PHYSICAL THERAPY | Facility: CLINIC | Age: 52
End: 2017-02-22
Payer: OTHER MISCELLANEOUS

## 2017-02-22 DIAGNOSIS — M25.562 ACUTE PAIN OF LEFT KNEE: Primary | ICD-10-CM

## 2017-02-22 PROCEDURE — 97112 NEUROMUSCULAR REEDUCATION: CPT | Mod: GP | Performed by: PHYSICAL THERAPIST

## 2017-02-22 PROCEDURE — 97035 APP MDLTY 1+ULTRASOUND EA 15: CPT | Mod: GP | Performed by: PHYSICAL THERAPIST

## 2017-02-22 PROCEDURE — 97110 THERAPEUTIC EXERCISES: CPT | Mod: GP | Performed by: PHYSICAL THERAPIST

## 2017-02-22 NOTE — MR AVS SNAPSHOT
"              After Visit Summary   2/22/2017    Marleen Mcfadden    MRN: [id number]           Patient Information     Date Of Birth          1965        Visit Information        Provider Department      2/22/2017 3:20 PM Henny Bruno, PT [facility]        Today's Diagnoses     Acute pain of left knee    -  1       Follow-ups after your visit        Your next 10 appointments already scheduled     Mar 08, 2017  8:00 AM CST   Lab with  LAB   [facility] ([facility])    [address] [place] [address]  1st Floor  [facility] [address] [phone]            Mar 08, 2017  8:20 AM CST   (Arrive by 8:05 AM)   RETURN HEART FAILURE with Cassie Kolb MD   [facility] ([facility])    [address] [place] [address]  3rd [facility] [address] [phone]              Who to contact     If you have questions or need follow up information about today's clinic visit or your schedule please contact [facility] directly at [phone].  Normal or non-critical lab and imaging results will be communicated to you by ActiveOhart, letter or phone within 4 business days after the clinic has received the results. If you do not hear from us within 7 days, please contact the clinic through Vimtyt or phone. If you have a critical or abnormal lab result, we will notify you by phone as soon as possible.  Submit refill requests through Vidient or call your pharmacy and they will forward the refill request to us. Please allow 3 business days for your refill to be completed.          Additional Information About Your Visit        ActiveOhart Information     Vidient lets you send messages to your doctor, view your test results, renew your prescriptions, schedule appointments and more. To sign up, go to [email or web address] . Click on \"Log in\" on the left side of the screen, which will take you to the Welcome page. Then click on \"Sign up Now\" " on the right side of the page.     You will be asked to enter the access code listed below, as well as some personal information. Please follow the directions to create your username and password.     Your access code is: [email or web address]  Expires: 5/10/2017 11:53 AM     Your access code will  in 90 days. If you need help or a new code, please call your [place] clinic or [phone].        Care EveryWhere ID     This is your Care EveryWhere ID. This could be used by other organizations to access your [place] medical records  [id number]        Your Vitals Were     Last Period                   10/19/2008            Blood Pressure from Last 3 Encounters:   17 136/74   17 120/70   17 118/77    Weight from Last 3 Encounters:   17 90.5 kg (199 lb 8 oz)   17 91.6 kg (202 lb)   17 91.6 kg (202 lb)              We Performed the Following     NEUROMUSCULAR RE-EDUCATION     THERAPEUTIC EXERCISES     ULTRASOUND THERAPY        Primary Care Provider Office Phone # Fax #    Danae Grimes [phone]       [facility] [address]  [facility] [address]        Thank you!     Thank you for choosing [facility]  for your care. Our goal is always to provide you with excellent care. Hearing back from our patients is one way we can continue to improve our services. Please take a few minutes to complete the written survey that you may receive in the mail after your visit with us. Thank you!             Your Updated Medication List - Protect others around you: Learn how to safely use, store and throw away your medicines at www.disposemymeds.org.          This list is accurate as of: 17 11:59 PM.  Always use your most recent med list.                   Brand Name Dispense Instructions for use    cyclobenzaprine 10 MG tablet    FLEXERIL    30 tablet    Take 0.5-1 tablets (5-10 mg) by mouth 3 times daily as needed for muscle spasms        fluticasone 50 MCG/ACT spray    FLONASE    16 g    Spray 2 sprays into both nostrils daily       furosemide 20 MG tablet    LASIX    100 tablet    Take 2 tablets (40 mg) by mouth daily as needed       hydrochlorothiazide 12.5 MG capsule    MICROZIDE    30 capsule    Take 1 capsule (12.5 mg) by mouth daily       losartan 100 MG tablet    COZAAR    30 tablet    Take 1 tablet (100 mg) by mouth daily       order for DME     1 Device    Equipment being ordered:brace       TOPROL XL 50 MG 24 hr tablet   Generic drug:  metoprolol     90 tablet    Take 1 tablet (50 mg) by mouth daily .  Patient will need to be seen in cardiology before any further refills will be authorized.       traMADol 50 MG tablet    ULTRAM    40 tablet    Take 1 tablet (50 mg) by mouth every 8 hours as needed for moderate pain       vitamin D 2000 UNITS tablet     100 tablet    Take 1 tablet by mouth as needed

## 2017-02-23 ENCOUNTER — TELEPHONE (OUTPATIENT)
Dept: PHYSICAL THERAPY | Facility: CLINIC | Age: 52
End: 2017-02-23

## 2017-02-23 ENCOUNTER — OFFICE VISIT (OUTPATIENT)
Dept: FAMILY MEDICINE | Facility: CLINIC | Age: 52
End: 2017-02-23
Payer: COMMERCIAL

## 2017-02-23 VITALS
DIASTOLIC BLOOD PRESSURE: 74 MMHG | BODY MASS INDEX: 34.24 KG/M2 | TEMPERATURE: 98.5 F | HEART RATE: 64 BPM | SYSTOLIC BLOOD PRESSURE: 136 MMHG | WEIGHT: 199.5 LBS | OXYGEN SATURATION: 98 % | RESPIRATION RATE: 19 BRPM

## 2017-02-23 DIAGNOSIS — R11.2 NON-INTRACTABLE VOMITING WITH NAUSEA, UNSPECIFIED VOMITING TYPE: ICD-10-CM

## 2017-02-23 DIAGNOSIS — A08.4 VIRAL GASTROENTERITIS: Primary | ICD-10-CM

## 2017-02-23 PROCEDURE — 99213 OFFICE O/P EST LOW 20 MIN: CPT | Performed by: PHYSICIAN ASSISTANT

## 2017-02-23 RX ORDER — ONDANSETRON 4 MG/1
4-8 TABLET, ORALLY DISINTEGRATING ORAL EVERY 8 HOURS PRN
Qty: 20 TABLET | Refills: 1 | Status: SHIPPED | OUTPATIENT
Start: 2017-02-23 | End: 2018-05-11

## 2017-02-23 NOTE — MR AVS SNAPSHOT
After Visit Summary   2/23/2017    Marleen Mcfadden    MRN: [id number]           Patient Information     Date Of Birth          1965        Visit Information        Provider Department      2/23/2017 9:20 AM Ana Acosta PA-C [facility]        Today's Diagnoses     Viral gastroenteritis    -  1      Care Instructions    The 'BRAT' diet is suggested: Bread, rice, applesauce, toast, ie, bland carbohydrates.  Avoid high protein, high fat (ie, meat, cheese, dairy products) fried foods and spicy foods until you are able to tolerate carbohydrates.  Frequent, small amounts; don't overdo, go slowly! Progress diet as tolerated as symptoms ashlee.   Drink plenty of fluids!!!  Over the counter cold remedies that have zinc and Vitamin C may help you feel better sooner.    Recommend trial of a daily probiotic agent; some common options include: Align, Culturelle, Digestive Advantage, Florastor, Activia yogurt, or Kefir.  Choose one agent (or a comparable generic OTC formulation) that is affordable/palatable and use it daily for at least 3-6 months to determine its baseline effect.  Encouraged 20 billion units per day for probiotic dosage.  May use over the counter peptobismal if needed, don't be alarmed if it turns your stool black.    Trial of zantac (ranitidine), Prilosec (omeprazole), Nexium (esomeprazole) or other acid reflex type medicines encouraged as well.    Call if bloody stools, persistent diarrhea, vomiting, fever or abdominal pain.         Follow-ups after your visit        Your next 10 appointments already scheduled     Mar 08, 2017  8:00 AM CST   Lab with  LAB   [facility])    [address] [place] [address] [phone]            Mar 08, 2017  8:20 AM CST   (Arrive by 8:05 AM)   RETURN HEART FAILURE with Cassie Kolb MD   [facility])    [facility]  "[address] [address] [phone]              Who to contact     If you have questions or need follow up information about today's clinic visit or your schedule please contact [facility] [place] directly at [phone].  Normal or non-critical lab and imaging results will be communicated to you by MyChart, letter or phone within 4 business days after the clinic has received the results. If you do not hear from us within 7 days, please contact the clinic through MyChart or phone. If you have a critical or abnormal lab result, we will notify you by phone as soon as possible.  Submit refill requests through PriceTag or call your pharmacy and they will forward the refill request to us. Please allow 3 business days for your refill to be completed.          Additional Information About Your Visit        MyChart Information     PriceTag lets you send messages to your doctor, view your test results, renew your prescriptions, schedule appointments and more. To sign up, go to [email or web address] . Click on \"Log in\" on the left side of the screen, which will take you to the Welcome page. Then click on \"Sign up Now\" on the right side of the page.     You will be asked to enter the access code listed below, as well as some personal information. Please follow the directions to create your username and password.     Your access code is: [email or web address]  Expires: 5/10/2017 11:53 AM     Your access code will  in 90 days. If you need help or a new code, please call your [place] clinic or [phone].        Care EveryWhere ID     This is your Care EveryWhere ID. This could be used by other organizations to access your [place] medical records  [id number]        Your Vitals Were     Pulse Temperature Respirations Last Period Pulse Oximetry BMI (Body Mass Index)    64 98.5  F (36.9  C) (Oral) 19 10/19/2008 98% 34.24 kg/m2       Blood Pressure from Last 3 Encounters:   17 " 136/74   02/16/17 120/70   02/09/17 118/77    Weight from Last 3 Encounters:   02/23/17 199 lb 8 oz (90.5 kg)   02/16/17 202 lb (91.6 kg)   02/09/17 202 lb (91.6 kg)              Today, you had the following     No orders found for display         Today's Medication Changes          These changes are accurate as of: 2/23/17  9:36 AM.  If you have any questions, ask your nurse or doctor.               Start taking these medicines.        Dose/Directions    ondansetron 4 MG ODT tab   Commonly known as:  ZOFRAN ODT   Used for:  Viral gastroenteritis   Started by:  Ana Acosta PA-C        Dose:  4-8 mg   Take 1-2 tablets (4-8 mg) by mouth every 8 hours as needed for nausea   Quantity:  20 tablet   Refills:  1            Where to get your medicines      These medications were sent to [facility] [address] [place], [place] [address] [facility] [place] [address]  [address] [facility] [address] [address] [facility] [address] [facility] [address]     Phone:  [phone]     ondansetron 4 MG ODT tab                Primary Care Provider Office Phone # Fax #    Danae Grimes [phone]       [facility] [address]  [facility] [address]        Thank you!     Thank you for choosing [facility]  for your care. Our goal is always to provide you with excellent care. Hearing back from our patients is one way we can continue to improve our services. Please take a few minutes to complete the written survey that you may receive in the mail after your visit with us. Thank you!             Your Updated Medication List - Protect others around you: Learn how to safely use, store and throw away your medicines at www.disposemymeds.org.          This list is accurate as of: 2/23/17  9:36 AM.  Always use your most recent med list.                   Brand Name Dispense Instructions for use    cyclobenzaprine 10 MG tablet    FLEXERIL    30 tablet    Take 0.5-1 tablets (5-10 mg) by mouth 3 times  daily as needed for muscle spasms       fluticasone 50 MCG/ACT spray    FLONASE    16 g    Spray 2 sprays into both nostrils daily       furosemide 20 MG tablet    LASIX    100 tablet    Take 2 tablets (40 mg) by mouth daily as needed       hydrochlorothiazide 12.5 MG capsule    MICROZIDE    30 capsule    Take 1 capsule (12.5 mg) by mouth daily       losartan 100 MG tablet    COZAAR    30 tablet    Take 1 tablet (100 mg) by mouth daily       ondansetron 4 MG ODT tab    ZOFRAN ODT    20 tablet    Take 1-2 tablets (4-8 mg) by mouth every 8 hours as needed for nausea       order for DME     1 Device    Equipment being ordered:brace       TOPROL XL 50 MG 24 hr tablet   Generic drug:  metoprolol     90 tablet    Take 1 tablet (50 mg) by mouth daily .  Patient will need to be seen in cardiology before any further refills will be authorized.       traMADol 50 MG tablet    ULTRAM    40 tablet    Take 1 tablet (50 mg) by mouth every 8 hours as needed for moderate pain       vitamin D 2000 UNITS tablet     100 tablet    Take 1 tablet by mouth as needed

## 2017-02-23 NOTE — PATIENT INSTRUCTIONS
The 'BRAT' diet is suggested: Bread, rice, applesauce, toast, ie, bland carbohydrates.  Avoid high protein, high fat (ie, meat, cheese, dairy products) fried foods and spicy foods until you are able to tolerate carbohydrates.  Frequent, small amounts; don't overdo, go slowly! Progress diet as tolerated as symptoms ashlee.   Drink plenty of fluids!!!  Over the counter cold remedies that have zinc and Vitamin C may help you feel better sooner.    Recommend trial of a daily probiotic agent; some common options include: Align, Culturelle, Digestive Advantage, Florastor, Activia yogurt, or Kefir.  Choose one agent (or a comparable generic OTC formulation) that is affordable/palatable and use it daily for at least 3-6 months to determine its baseline effect.  Encouraged 20 billion units per day for probiotic dosage.  May use over the counter peptobismal if needed, don't be alarmed if it turns your stool black.    Trial of zantac (ranitidine), Prilosec (omeprazole), Nexium (esomeprazole) or other acid reflex type medicines encouraged as well.    Call if bloody stools, persistent diarrhea, vomiting, fever or abdominal pain.

## 2017-02-23 NOTE — PROGRESS NOTES
"  SUBJECTIVE:                                                    Marleen Mcfadden is a 51 year old female who presents to clinic today for the following health issues:    RESPIRATORY SYMPTOMS      Duration: 4 days    Description  Fever 100.7, headache, fatigue/malaise and myalgias    Severity: moderate    Accompanying signs and symptoms: diarrhea, vomiting, stomach cramps, chills at night     History (predisposing factors):  none    Therapies tried and outcome:  Acetaminophen-helps        Sick contacts with kids, but they didn't have the diarrhea.  No known \"bad\" food, but ate at work cafeteria for first time on Tuesday.      Problem list and histories reviewed & adjusted, as indicated.  Additional history: as documented    Patient Active Problem List   Diagnosis     Autoimmune disease, not elsewhere classified     Primary cardiomyopathy (H)     Leiomyoma of uterus     Allergic rhinitis     Anemia     Sinus bradycardia     Health Care Home     Itching     CHF with cardiomyopathy (H)     TB lung, latent     Knee pain     Swelling of knee joint     Thyroid nodule     Pre-operative cardiovascular examination     Post-op pain     Pain in joint involving ankle and foot     Calcaneal spur     Plantar fasciitis     CARDIOVASCULAR SCREENING; LDL GOAL LESS THAN 160     Peroneus longus tendinitis     Morbid obesity (H)     Shaina's nodes     Familial cardiomyopathy (H)     Acute pain of right knee     Acute bilateral low back pain with right-sided sciatica     Degenerative disc disease at L5-S1 level     Midline low back pain with left-sided sciatica     Chronic bilateral low back pain with right-sided sciatica     Chronic bilateral low back pain with left-sided sciatica     Elevated blood pressure reading without diagnosis of hypertension     Chronic diastolic congestive heart failure (H)     Injury of left shoulder, initial encounter     Acute pain of left knee     Sprain of other ligament of left ankle, initial " encounter     Left shoulder pain     Rotator cuff injury     Left knee pain     Past Surgical History   Procedure Laterality Date     C excise excess skin tissue,abdomen       C repair cruciate ligament,knee       Right Knee     C breast surgery procedure unlisted       Breast Reduction     C excis uterine fibroid,abd apprch       C  delivery only  10/85,      , Low Cervicalx2     C ligate fallopian tube       Hysterectomy total abdominal  2006     Thyroidectomy  2013     Procedure: THYROIDECTOMY;  LEFT THYROID LOBECTOMY.  (LIGASURE, RECURRENT LARYNGEAL NERVE MONITOR) ;  Surgeon: Uriah Camargo MD;  Location: [facility]       Social History   Substance Use Topics     Smoking status: Never Smoker     Smokeless tobacco: Never Used     Alcohol use Yes      Comment: rare, twice a year, she has a drink little wine 2 days ago     Family History   Problem Relation Age of Onset     Hypertension Father      dec     DIABETES Father      dec     HEART DISEASE Father      dec     Alcohol/Drug Father      Cardiovascular Father      HEART DISEASE Mother      dec     Alcohol/Drug Mother      Cardiovascular Mother      HEART DISEASE Daughter      Cardiomyopathy     Cardiovascular Daughter      cardiomyopathy     Cardiovascular Son      cardiomyopathy     DIABETES Paternal Grandmother      dec     Hypertension Paternal Grandmother      dec     CEREBROVASCULAR DISEASE Paternal Grandmother      dec     CANCER Sister      Lupus     Cardiovascular Sister      cardiomyopathy     HEART DISEASE Sister      heart failure, and kidney failure         Current Outpatient Prescriptions   Medication Sig Dispense Refill     ondansetron (ZOFRAN ODT) 4 MG ODT tab Take 1-2 tablets (4-8 mg) by mouth every 8 hours as needed for nausea 20 tablet 1     order for DME Equipment being ordered:brace 1 Device 0     fluticasone (FLONASE) 50 MCG/ACT spray Spray 2 sprays into both nostrils daily 16 g 0     losartan  (COZAAR) 100 MG tablet Take 1 tablet (100 mg) by mouth daily 30 tablet 3     hydrochlorothiazide (MICROZIDE) 12.5 MG capsule Take 1 capsule (12.5 mg) by mouth daily 30 capsule 3     traMADol (ULTRAM) 50 MG tablet Take 1 tablet (50 mg) by mouth every 8 hours as needed for moderate pain 40 tablet 0     TOPROL XL 50 MG 24 hr tablet Take 1 tablet (50 mg) by mouth daily .  Patient will need to be seen in cardiology before any further refills will be authorized. 90 tablet 0     cyclobenzaprine (FLEXERIL) 10 MG tablet Take 0.5-1 tablets (5-10 mg) by mouth 3 times daily as needed for muscle spasms 30 tablet 0     furosemide (LASIX) 20 MG tablet Take 2 tablets (40 mg) by mouth daily as needed 100 tablet 3     Cholecalciferol (VITAMIN D) 2000 UNIT tablet Take 1 tablet by mouth as needed  100 tablet 12     Allergies   Allergen Reactions     Morphine      EMESIS     Sulfa Drugs Swelling       ROS:  Constitutional, HEENT, cardiovascular, pulmonary, gi and gu systems are negative, except as otherwise noted.    OBJECTIVE:                                                    /74 (BP Location: Left arm, Patient Position: Chair, Cuff Size: Adult Large)  Pulse 64  Temp 98.5  F (36.9  C) (Oral)  Resp 19  Wt 199 lb 8 oz (90.5 kg)  LMP 10/19/2008  SpO2 98%  BMI 34.24 kg/m2  Body mass index is 34.24 kg/(m^2).  GENERAL: healthy, alert and no distress  RESP: lungs clear to auscultation - no rales, rhonchi or wheezes  CV: regular rate and rhythm, normal S1 S2, no S3 or S4, no murmur, click or rub, no peripheral edema and peripheral pulses strong  ABDOMEN: soft, nontender, no hepatosplenomegaly, no masses and bowel sounds normal  MS: no gross musculoskeletal defects noted, no edema  PSYCH: mentation appears normal, affect normal/bright    Diagnostic Test Results:  none      ASSESSMENT/PLAN:                                                        ICD-10-CM    1. Viral gastroenteritis A08.4 ondansetron (ZOFRAN ODT) 4 MG ODT tab   2.  Non-intractable vomiting with nausea, unspecified vomiting type R11.2 ondansetron (ZOFRAN ODT) 4 MG ODT tab       Patient Instructions   The 'BRAT' diet is suggested: Bread, rice, applesauce, toast, ie, bland carbohydrates.  Avoid high protein, high fat (ie, meat, cheese, dairy products) fried foods and spicy foods until you are able to tolerate carbohydrates.  Frequent, small amounts; don't overdo, go slowly! Progress diet as tolerated as symptoms ashlee.   Drink plenty of fluids!!!  Over the counter cold remedies that have zinc and Vitamin C may help you feel better sooner.    Recommend trial of a daily probiotic agent; some common options include: Align, Culturelle, Digestive Advantage, Florastor, Activia yogurt, or Kefir.  Choose one agent (or a comparable generic OTC formulation) that is affordable/palatable and use it daily for at least 3-6 months to determine its baseline effect.  Encouraged 20 billion units per day for probiotic dosage.  May use over the counter peptobismal if needed, don't be alarmed if it turns your stool black.    Trial of zantac (ranitidine), Prilosec (omeprazole), Nexium (esomeprazole) or other acid reflex type medicines encouraged as well.    Call if bloody stools, persistent diarrhea, vomiting, fever or abdominal pain.       Ana Acosta PA-C  [facility]

## 2017-02-23 NOTE — PROGRESS NOTES
Subjective:    HPI                    Objective:    System    Physical Exam    General     ROS    Assessment/Plan:      DISCHARGE  REPORT- Patient did not make it to therapy so discharged from PT    Progress reporting period is from 1/31/17 to 2/23/17.       SUBJECTIVE  Subjective changes noted by patient:   Subjective: Kim met with PCP and was told she was progressing well and does not need MRI. She is a little frustrated and wants to see if she ask her ortho doctor to send her to take  MRI. She feels her knee is very unstable and the pain is worse while standing and walking at work    Current pain level is  Current Pain level: 4/10.     Previous pain level was    .   Changes in function:  Yes (See Goal flowsheet attached for changes in current functional level)  Adverse reaction to treatment or activity: None    OBJECTIVE  Changes noted in objective findings:  Yes,   Objective: Decreased swelling on the lateral side of her knee joint. Decreased tenderness upon palaption- medial and lateral joint line. Improved knee AROM and flexibility demonstrrated. Perfomring the exercises well.      ASSESSMENT/PLAN  Updated problem list and treatment plan: Diagnosis 1:  Left knee pain  Pain -  hot/cold therapy, US, electric stimulation, manual therapy, STS, splint/taping/bracing/orthotics, self management, education and home program  Decreased strength - therapeutic exercise, therapeutic activities and home program  Impaired balance - neuro re-education, therapeutic activities and home program  Inflammation - cold therapy, US and self management/home program  Decreased function - therapeutic activities and home program  STG/LTGs have been met or progress has been made towards goals:  Yes (See Goal flow sheet completed today.)  Assessment of Progress: The patient's condition is improving.  The patient's condition has potential to improve.  Self Management Plans:  Patient has been instructed in a home treatment  program.  Patient  has been instructed in self management of symptoms.  I have re-evaluated this patient and find that the nature, scope, duration and intensity of the therapy is appropriate for the medical condition of the patient.  Marleen continues to require the following intervention to meet STG and LTG's:  PT    Recommendations:  This patient would benefit from continued therapy.     Frequency:  1 X week, once daily  Duration:  for 6 weeks        Please refer to the daily flowsheet for treatment today, total treatment time and time spent performing 1:1 timed codes.

## 2017-02-23 NOTE — LETTER
"[facility]   [address] [place]   [address]  [phone]      February 23, 2017      RE: Marleen Mcfadden  [address]   [facility] [address]       To whom it may concern:    Marleen Mcfadden was seen in our clinic today. She may return to work on or about 2/27/2017.          Sincerely,    Ana \"Norberto\" Karla KU          "

## 2017-02-24 ENCOUNTER — TELEPHONE (OUTPATIENT)
Dept: PHYSICAL THERAPY | Facility: CLINIC | Age: 52
End: 2017-02-24

## 2017-03-01 DIAGNOSIS — M25.562 CHRONIC PAIN OF LEFT KNEE: Primary | ICD-10-CM

## 2017-03-01 DIAGNOSIS — G89.29 CHRONIC PAIN OF LEFT KNEE: Primary | ICD-10-CM

## 2017-03-02 ENCOUNTER — PRE VISIT (OUTPATIENT)
Dept: CARDIOLOGY | Facility: CLINIC | Age: 52
End: 2017-03-02

## 2017-03-02 DIAGNOSIS — I50.30 HEART FAILURE WITH PRESERVED EJECTION FRACTION, NYHA CLASS II (H): Primary | ICD-10-CM

## 2017-03-03 ENCOUNTER — CARE COORDINATION (OUTPATIENT)
Dept: CARDIOLOGY | Facility: CLINIC | Age: 52
End: 2017-03-03

## 2017-03-03 NOTE — PROGRESS NOTES
Pt call to triage line stating that she had a gynecology appointment today and stated that Marielle Solorio NP for gynecology requests cardiology clearance before starting pt on hormone replacement therapy.  Fax number of NP is [phone]    Patient has appointment with Dr. Kolb on 3/8 but stated that she needs this taken care of before then.     Pt best reached at [phone].

## 2017-03-08 ENCOUNTER — OFFICE VISIT (OUTPATIENT)
Dept: CARDIOLOGY | Facility: CLINIC | Age: 52
End: 2017-03-08
Attending: INTERNAL MEDICINE
Payer: COMMERCIAL

## 2017-03-08 VITALS
SYSTOLIC BLOOD PRESSURE: 118 MMHG | WEIGHT: 198 LBS | HEIGHT: 64 IN | HEART RATE: 66 BPM | OXYGEN SATURATION: 99 % | DIASTOLIC BLOOD PRESSURE: 68 MMHG | BODY MASS INDEX: 33.8 KG/M2

## 2017-03-08 DIAGNOSIS — I50.30 HEART FAILURE WITH PRESERVED EJECTION FRACTION, NYHA CLASS II (H): Primary | ICD-10-CM

## 2017-03-08 DIAGNOSIS — I42.9 FAMILIAL CARDIOMYOPATHY (H): ICD-10-CM

## 2017-03-08 DIAGNOSIS — I50.30 HEART FAILURE WITH PRESERVED EJECTION FRACTION, NYHA CLASS II (H): ICD-10-CM

## 2017-03-08 LAB
ANION GAP SERPL CALCULATED.3IONS-SCNC: 7 MMOL/L (ref 3–14)
BUN SERPL-MCNC: 16 MG/DL (ref 7–30)
CALCIUM SERPL-MCNC: 8.7 MG/DL (ref 8.5–10.1)
CHLORIDE SERPL-SCNC: 107 MMOL/L (ref 94–109)
CO2 SERPL-SCNC: 29 MMOL/L (ref 20–32)
CREAT SERPL-MCNC: 0.63 MG/DL (ref 0.52–1.04)
GFR SERPL CREATININE-BSD FRML MDRD: NORMAL ML/MIN/1.7M2
GLUCOSE SERPL-MCNC: 97 MG/DL (ref 70–99)
POTASSIUM SERPL-SCNC: 4 MMOL/L (ref 3.4–5.3)
SODIUM SERPL-SCNC: 143 MMOL/L (ref 133–144)

## 2017-03-08 PROCEDURE — 80048 BASIC METABOLIC PNL TOTAL CA: CPT | Performed by: INTERNAL MEDICINE

## 2017-03-08 PROCEDURE — 99212 OFFICE O/P EST SF 10 MIN: CPT | Mod: ZF

## 2017-03-08 PROCEDURE — 36415 COLL VENOUS BLD VENIPUNCTURE: CPT | Performed by: INTERNAL MEDICINE

## 2017-03-08 PROCEDURE — 99214 OFFICE O/P EST MOD 30 MIN: CPT | Mod: ZP | Performed by: INTERNAL MEDICINE

## 2017-03-08 RX ORDER — ERGOCALCIFEROL 1.25 MG/1
CAPSULE, LIQUID FILLED ORAL
COMMUNITY
Start: 2017-03-04 | End: 2017-03-08

## 2017-03-08 ASSESSMENT — PAIN SCALES - GENERAL: PAINLEVEL: NO PAIN (0)

## 2017-03-08 NOTE — PATIENT INSTRUCTIONS
You were seen today in the Cardiovascular Clinic at the [facility].     Cardiology Providers you saw during your visit: Dr Cassie Kolb    Diagnosis: Heart failure with preserved ejection fraction  Results: Dr Kolb reviewed the results of your labs with you in clinic    Recommendations:   1.  No changes to your medications at this time.  2.  We will fax a clearance letter to your OB today    Follow-up: Follow up with Dr Kolb in 6 months      For emergencies call 911.    For any scheduling needs or nursing related questions, please call [phone].    Thank you for your visit today! If you have questions or concerns about today's visit, please call me.      Aaron Jett, RN  RN Care Coordinator  [facility]  [phone]    Press option 1 for the [place] and then 3 for nursing related questions
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
## 2017-03-08 NOTE — MR AVS SNAPSHOT
After Visit Summary   3/8/2017    Marleen Mcfadden    MRN: [id number]           Patient Information     Date Of Birth          1965        Visit Information        Provider Department      3/8/2017 8:20 AM Cassie Kolb MD M [facility]        Care Instructions    You were seen today in the Cardiovascular Clinic at the [facility].     Cardiology Providers you saw during your visit: Dr Cassie Kolb    Diagnosis: Heart failure with preserved ejection fraction  Results: Dr Kolb reviewed the results of your labs with you in clinic    Recommendations:   1.  No changes to your medications at this time.  2.  We will fax a clearance letter to your OB today    Follow-up: Follow up with Dr Kolb in 6 months      For emergencies call 911.    For any scheduling needs or nursing related questions, please call [phone].    Thank you for your visit today! If you have questions or concerns about today's visit, please call me.      Aaron Jett RN  RN Care Coordinator  [facility]  [phone]    Press option 1 for the University and then 3 for nursing related questions                Follow-ups after your visit        Follow-up notes from your care team     Return in about 6 months (around 9/8/2017) for Dr Kolb dx. Heart failure with preserved EF.      Your next 10 appointments already scheduled     Mar 14, 2017  3:20 PM CDT   TREVER Extremity with Henny Venkatapathy, PT   [facility] ([facility]  )    [address] [place] [id number] [phone]            Mar 21, 2017  2:40 PM CDT   TREVER Extremity with Henny Venkatapathy, PT   [facility] ([facility]  )    [address] [place] [id number]   [phone]            Mar 28, 2017  2:40 PM CDT   TREVER Extremity with Henny Venkatapathy, PT   [facility] ([facility]  )    [address] [place] [address]  "[address]  [facility] [address]   [phone]            Apr 04, 2017  2:40 PM CDT   TREVER Extremity [facility]reetapathy, PT   [facility]    [address] [place] [id number]   [phone]            Apr 11, 2017  2:40 PM CDT   TREVER Extremity with Henny Riazkatapathy, PT   [facility]    [address] [place] [address]   [phone]            Sep 13, 2017  8:20 AM CDT   (Arrive by 8:05 AM)   RETURN HEART FAILURE with Cassie Kolb MD   [facility] ([facility])    [address] [place] [address] [phone]              Who to contact     If you have questions or need follow up information about today's clinic visit or your schedule please contact [facility] directly at [phone].  Normal or non-critical lab and imaging results will be communicated to you by [employer], letter or phone within 4 business days after the clinic has received the results. If you do not hear from us within 7 days, please contact the clinic through [employer] or phone. If you have a critical or abnormal lab result, we will notify you by phone as soon as possible.  Submit refill requests through [employer] or call your pharmacy and they will forward the refill request to us. Please allow 3 business days for your refill to be completed.          Additional Information About Your Visit        [employer] Information     [employer] lets you send messages to your doctor, view your test results, renew your prescriptions, schedule appointments and more. To sign up, go to [email or web address] . Click on \"Log in\" on the left side of the screen, which will take you to the Welcome page. Then click on \"Sign up Now\" on the right side of the page.     You will be asked to enter the access code listed below, as well as some personal information. Please follow the directions to " "create your username and password.     Your access code is: [email or web address]  Expires: 5/10/2017 11:53 AM     Your access code will  in 90 days. If you need help or a new code, please call your [place] clinic or [phone].        Care EveryWhere ID     This is your Care EveryWhere ID. This could be used by other organizations to access your [place] medical records  [id number]        Your Vitals Were     Pulse Height Last Period Pulse Oximetry BMI (Body Mass Index)       66 1.626 m (5' 4\") 10/19/2008 99% 33.99 kg/m2        Blood Pressure from Last 3 Encounters:   17 118/68   17 136/74   17 120/70    Weight from Last 3 Encounters:   17 89.8 kg (198 lb)   17 90.5 kg (199 lb 8 oz)   17 91.6 kg (202 lb)              Today, you had the following     No orders found for display         Today's Medication Changes          These changes are accurate as of: 3/8/17  8:55 AM.  If you have any questions, ask your nurse or doctor.               Stop taking these medicines if you haven't already. Please contact your care team if you have questions.     vitamin D 56483 UNIT capsule   Commonly known as:  ERGOCALCIFEROL   Stopped by:  Cassie Kolb MD                    Primary Care Provider Office Phone # Fax #    Danae Grimes [phone]       [address] [address]        Thank you!     Thank you for choosing [facility]  for your care. Our goal is always to provide you with excellent care. Hearing back from our patients is one way we can continue to improve our services. Please take a few minutes to complete the written survey that you may receive in the mail after your visit with us. Thank you!             Your Updated Medication List - Protect others around you: Learn how to safely use, store and throw away your medicines at www.disposemymeds.org.          This list is accurate as of: 3/8/17  8:55 AM.  " Always use your most recent med list.                   Brand Name Dispense Instructions for use    Collagen 500 MG Caps          cyclobenzaprine 10 MG tablet    FLEXERIL    30 tablet    Take 0.5-1 tablets (5-10 mg) by mouth 3 times daily as needed for muscle spasms       FLAXSEED (LINSEED) PO          fluticasone 50 MCG/ACT spray    FLONASE    16 g    Spray 2 sprays into both nostrils daily       furosemide 20 MG tablet    LASIX    100 tablet    Take 2 tablets (40 mg) by mouth daily as needed       hydrochlorothiazide 12.5 MG capsule    MICROZIDE    30 capsule    Take 1 capsule (12.5 mg) by mouth daily       losartan 100 MG tablet    COZAAR    30 tablet    Take 1 tablet (100 mg) by mouth daily       ondansetron 4 MG ODT tab    ZOFRAN ODT    20 tablet    Take 1-2 tablets (4-8 mg) by mouth every 8 hours as needed for nausea       order for DME     1 Device    Equipment being ordered:brace       progesterone 100 MG capsule    PROMETRIUM     Take 100 mg by mouth Reported on 3/8/2017       TOPROL XL 50 MG 24 hr tablet   Generic drug:  metoprolol     90 tablet    Take 1 tablet (50 mg) by mouth daily .  Patient will need to be seen in cardiology before any further refills will be authorized.       traMADol 50 MG tablet    ULTRAM    40 tablet    Take 1 tablet (50 mg) by mouth every 8 hours as needed for moderate pain       vitamin D 2000 UNITS tablet     100 tablet    Take 5,000 Units by mouth as needed Reported on 3/8/2017

## 2017-03-08 NOTE — NURSING NOTE
Chief Complaint   Patient presents with     Follow Up For     heart problem--51 year old female with history of chest pain, familial cardiomyopathy and heart failure with preserved EF presenting for follow up
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
## 2017-03-08 NOTE — LETTER
2017      RE: Marleen Mcfadden  [address]  [facility] [address]   1965         To Whom It May Concern,    Ms Marleen Mcfadden was recently evaluated in the cardiology clinic at [facility].  Based on my recent evaluation, Marleen is approved from a cardiology standpoint to start hormone replacement therapy.      If you have any further questions, please give my nurse Aaron a call at [phone], Option 1 and then Option 3.        Sincerely,      Cassie Kolb MD  Section Head - Advanced Heart Failure, Transplantation and Mechanical Circulatory Support  Co-Director - Adult Congenital and Cardiovascular Genetics Center  Associate Professor of Medicine, [facility]

## 2017-03-08 NOTE — NURSING NOTE
Med Reconcile: Reviewed and verified all current medications with the patient. The updated medication list was printed and given to the patient.    Return Appointment: Follow up with Dr Kolb in 6 months. Patient given instructions regarding scheduling next clinic visit. Patient demonstrated understanding of this information and agreed to call with further questions or concerns.    Patient stated she understood all health information given and agreed to call with further questions or concerns.    Aaron Jett RN  RN Care Coordinator  [facility]  [phone]
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
## 2017-03-21 ENCOUNTER — OFFICE VISIT (OUTPATIENT)
Dept: FAMILY MEDICINE | Facility: CLINIC | Age: 52
End: 2017-03-21
Payer: OTHER MISCELLANEOUS

## 2017-03-21 VITALS
HEART RATE: 78 BPM | SYSTOLIC BLOOD PRESSURE: 106 MMHG | DIASTOLIC BLOOD PRESSURE: 72 MMHG | TEMPERATURE: 98 F | RESPIRATION RATE: 16 BRPM | OXYGEN SATURATION: 98 % | BODY MASS INDEX: 33.99 KG/M2 | WEIGHT: 198 LBS

## 2017-03-21 DIAGNOSIS — M25.562 ACUTE PAIN OF LEFT KNEE: Primary | ICD-10-CM

## 2017-03-21 DIAGNOSIS — I42.9 FAMILIAL CARDIOMYOPATHY (H): ICD-10-CM

## 2017-03-21 PROCEDURE — 99214 OFFICE O/P EST MOD 30 MIN: CPT | Performed by: FAMILY MEDICINE

## 2017-03-21 RX ORDER — TESTOSTERONE 10 MG/.5G
1 GEL, METERED TOPICAL DAILY
COMMUNITY
Start: 2017-03-13 | End: 2018-05-11

## 2017-03-21 RX ORDER — ESTRADIOL 0.04 MG/D
1 PATCH, EXTENDED RELEASE TRANSDERMAL 3 TIMES DAILY PRN
COMMUNITY
Start: 2017-03-14 | End: 2018-12-12

## 2017-03-21 NOTE — PROGRESS NOTES
SUBJECTIVE:                                                    Marleen Mcfadden is a 51 year old female who presents to clinic today for the following health issues:      Musculoskeletal problem/pain      Duration: 01/01/2017; pt was at work and slipped on water on the floor and since then she has been experiencing left knee pain     Description  Location: Left knee    Intensity:  moderate    Accompanying signs and symptoms: buckling of the knee, knee clicking, swelling of the knee     History  Previous similar problem: previous injury last summer   Previous evaluation:  Ortho     Precipitating or alleviating factors:  Trauma or overuse: yes, see above   Aggravating factors include: overuse     Therapies tried and outcome: ice, tylenol - doesn't work, pt took left over prednisone for 3 days and pain has gotten better       Pt is currently doing physical therapy, once/week.  Pt has gotten back to work.   She works as a phlebotomist.   Pt is interested in MRI for further evaluation.   She wants work clearance note. She still has pain, but wants to be cleared for work.     Problem list and histories reviewed & adjusted, as indicated.  Additional history: as documented      Reviewed and updated as needed this visit by clinical staff       Reviewed and updated as needed this visit by Provider         ROS:  Constitutional, HEENT, cardiovascular, pulmonary, gi and gu systems are negative, except as otherwise noted.    OBJECTIVE:                                                    /72 (BP Location: Right arm, Patient Position: Chair, Cuff Size: Adult Regular)  Pulse 78  Temp 98  F (36.7  C) (Tympanic)  Resp 16  Wt 198 lb (89.8 kg)  LMP 10/19/2008  SpO2 98%  BMI 33.99 kg/m2  Body mass index is 33.99 kg/(m^2).  GENERAL: healthy, alert and no distress  EYES: Eyes grossly normal to inspection  HENT: nose and mouth without ulcers or lesions  MS: left knee: mild edema, limited flexion/extension due to the pain, negative  anterior/posterior drawer     Diagnostic Test Results:  none      ASSESSMENT/PLAN:                                                      ## Acute pain of left knee  - H/o familial cardiomyopathy and avoid NSAIDs, she has been taking left over prednisone which has improved with the pain. She ran out of prednisone. Due to continued pain, she is concerned and requesting an MRI of the knee. She also wants to be cleared for work with no restrictions. I have ordered MRI below. I advised her to use Tylenol PRN for the pain and continue physical therapy. Follow if symptoms worsen or fail to improve.  - MR Knee Left w/o Contrast; Future        Deqa Alix Matias MD  [facility]

## 2017-03-21 NOTE — LETTER
March 21, 2017      Marleen Mcfadden  [address]   [facility] [address]        To whom it may concern,     Marleen Mcfadden was seen in clinic today due to continued left knee pain. She continues to have left knee pain and will need further evaluation with a MRI. For now, she can return to work with no restrictions.     Please also excuse her from 03/20/17 until 03/21/17.    Please call me if you have any questions or concerns.     Sincerely,      Dr. Matias

## 2017-03-21 NOTE — MR AVS SNAPSHOT
After Visit Summary   3/21/2017    Marleen Mcfadden    MRN: [id number]           Patient Information     Date Of Birth          1965        Visit Information        Provider Department      3/21/2017 1:40 PM Yanna [facility]ias MD [facility]        Today's Diagnoses     Acute pain of left knee    -  1      Care Instructions    Please call [phone] to schedule imaging.         Follow-ups after your visit        Your next 10 appointments already scheduled     Mar 21, 2017  2:40 PM CDT   TREVER Extremity with Henny Venkatapathy, PT   [facility] ([facility]  )    [address] [place] [address]   [phone]            Mar 28, 2017  2:40 PM CDT   TREVER Extremity with Henny Venkatapathy, PT   [facility] ([facility]  )    [address] [place] [address]   [phone]            Apr 04, 2017  2:40 PM CDT   TREVER Extremity with Henny Venkatapathy, PT   [facility] ([facility] [place]  )    [address] [place] [id number] [phone]            Apr 11, 2017  2:40 PM CDT   TREVER Extremity with Henny Venkatapathy, PT   [facility] ([facility]  )    [address] [place] [address]   [phone]            Sep 13, 2017  8:20 AM CDT   (Arrive by 8:05 AM)   RETURN HEART FAILURE with Cassie Kolb MD   [facility] ([facility] [place])    [address] [place] [address]  3rd [facility] [address] [phone]              Future tests that were ordered for you today     Open Future Orders        Priority Expected Expires Ordered    MR Knee Left w/o Contrast Routine  3/21/2018 3/21/2017            Who to contact     If you have questions or need follow up information about today's clinic visit or your schedule please contact [facility] directly at [phone].  Normal or non-critical lab and  "imaging results will be communicated to you by MyChart, letter or phone within 4 business days after the clinic has received the results. If you do not hear from us within 7 days, please contact the clinic through [employer] or phone. If you have a critical or abnormal lab result, we will notify you by phone as soon as possible.  Submit refill requests through [facility] or call your pharmacy and they will forward the refill request to us. Please allow 3 business days for your refill to be completed.          Additional Information About Your Visit        [employer] Information     [facility] lets you send messages to your doctor, view your test results, renew your prescriptions, schedule appointments and more. To sign up, go to [email or web address] . Click on \"Log in\" on the left side of the screen, which will take you to the Welcome page. Then click on \"Sign up Now\" on the right side of the page.     You will be asked to enter the access code listed below, as well as some personal information. Please follow the directions to create your username and password.     Your access code is: [email or web address]  Expires: 5/10/2017 12:53 PM     Your access code will  in 90 days. If you need help or a new code, please call your [place] clinic or [phone].        Care EveryWhere ID     This is your Care EveryWhere ID. This could be used by other organizations to access your [place] medical records  [id number]        Your Vitals Were     Pulse Temperature Respirations Last Period Pulse Oximetry BMI (Body Mass Index)    78 98  F (36.7  C) (Tympanic) 16 10/19/2008 98% 33.99 kg/m2       Blood Pressure from Last 3 Encounters:   17 106/72   17 118/68   17 136/74    Weight from Last 3 Encounters:   17 198 lb (89.8 kg)   17 198 lb (89.8 kg)   17 199 lb 8 oz (90.5 kg)               Primary Care Provider Office Phone # Fax #    Danae Grimes [phone]       [facility]" [facility] [address]  [facility] [address]        Thank you!     Thank you for choosing [facility]  for your care. Our goal is always to provide you with excellent care. Hearing back from our patients is one way we can continue to improve our services. Please take a few minutes to complete the written survey that you may receive in the mail after your visit with us. Thank you!             Your Updated Medication List - Protect others around you: Learn how to safely use, store and throw away your medicines at www.disposemymeds.org.          This list is accurate as of: 3/21/17  2:27 PM.  Always use your most recent med list.                   Brand Name Dispense Instructions for use    Collagen 500 MG Caps          cyclobenzaprine 10 MG tablet    FLEXERIL    30 tablet    Take 0.5-1 tablets (5-10 mg) by mouth 3 times daily as needed for muscle spasms       estradiol 0.0375 MG/24HR BIW patch    VIVELLE-DOT     Apply 1 patch topically 3 times daily as needed       FLAXSEED (LINSEED) PO          fluticasone 50 MCG/ACT spray    FLONASE    16 g    Spray 2 sprays into both nostrils daily       furosemide 20 MG tablet    LASIX    100 tablet    Take 2 tablets (40 mg) by mouth daily as needed       hydrochlorothiazide 12.5 MG capsule    MICROZIDE    30 capsule    Take 1 capsule (12.5 mg) by mouth daily       losartan 100 MG tablet    COZAAR    30 tablet    Take 1 tablet (100 mg) by mouth daily       ondansetron 4 MG ODT tab    ZOFRAN ODT    20 tablet    Take 1-2 tablets (4-8 mg) by mouth every 8 hours as needed for nausea       order for DME     1 Device    Equipment being ordered:brace       progesterone 100 MG capsule    PROMETRIUM     Take 100 mg by mouth Reported on 3/8/2017       Testosterone 10 MG/ACT (2%) Gel      Apply 1 Application topically daily       TOPROL XL 50 MG 24 hr tablet   Generic drug:  metoprolol     90 tablet    Take 1 tablet (50 mg) by mouth daily .  Patient will need to be  seen in cardiology before any further refills will be authorized.       traMADol 50 MG tablet    ULTRAM    40 tablet    Take 1 tablet (50 mg) by mouth every 8 hours as needed for moderate pain       vitamin D 2000 UNITS tablet     100 tablet    Take 5,000 Units by mouth as needed Reported on 3/8/2017

## 2017-03-21 NOTE — NURSING NOTE
"Chief Complaint   Patient presents with     Musculoskeletal Problem     left knee pain       Initial /72 (BP Location: Right arm, Patient Position: Chair, Cuff Size: Adult Regular)  Pulse 78  Temp 98  F (36.7  C) (Tympanic)  Resp 16  Wt 198 lb (89.8 kg)  LMP 10/19/2008  SpO2 98%  BMI 33.99 kg/m2 Estimated body mass index is 33.99 kg/(m^2) as calculated from the following:    Height as of 3/8/17: 5' 4\" (1.626 m).    Weight as of this encounter: 198 lb (89.8 kg).  Medication Reconciliation: complete       Irene Caceres MA      "

## 2017-03-22 ASSESSMENT — ACTIVITIES OF DAILY LIVING (ADL)
GIVING WAY, BUCKLING OR SHIFTING OF KNEE: THE SYMPTOM AFFECTS MY ACTIVITY MODERATELY
KNEE_ACTIVITY_OF_DAILY_LIVING_SCORE: 55.71
STIFFNESS: THE SYMPTOM AFFECTS MY ACTIVITY MODERATELY
RISE FROM A CHAIR: ACTIVITY IS SOMEWHAT DIFFICULT
HOW_WOULD_YOU_RATE_THE_OVERALL_FUNCTION_OF_YOUR_KNEE_DURING_YOUR_USUAL_DAILY_ACTIVITIES?: ABNORMAL
PAIN: THE SYMPTOM AFFECTS MY ACTIVITY SLIGHTLY
GO DOWN STAIRS: ACTIVITY IS SOMEWHAT DIFFICULT
LIMPING: THE SYMPTOM AFFECTS MY ACTIVITY MODERATELY
SIT WITH YOUR KNEE BENT: ACTIVITY IS SOMEWHAT DIFFICULT
KNEE_ACTIVITY_OF_DAILY_LIVING_SUM: 39
RAW_SCORE: 39
SWELLING: THE SYMPTOM AFFECTS MY ACTIVITY SLIGHTLY
AS_A_RESULT_OF_YOUR_KNEE_INJURY,_HOW_WOULD_YOU_RATE_YOUR_CURRENT_LEVEL_OF_DAILY_ACTIVITY?: ABNORMAL
WEAKNESS: THE SYMPTOM AFFECTS MY ACTIVITY SLIGHTLY
HOW_WOULD_YOU_RATE_THE_CURRENT_FUNCTION_OF_YOUR_KNEE_DURING_YOUR_USUAL_DAILY_ACTIVITIES_ON_A_SCALE_FROM_0_TO_100_WITH_100_BEING_YOUR_LEVEL_OF_KNEE_FUNCTION_PRIOR_TO_YOUR_INJURY_AND_0_BEING_THE_INABILITY_TO_PERFORM_ANY_OF_YOUR_USUAL_DAILY_ACTIVITIES?: 70
GO UP STAIRS: ACTIVITY IS SOMEWHAT DIFFICULT
WALK: ACTIVITY IS SOMEWHAT DIFFICULT
SQUAT: ACTIVITY IS SOMEWHAT DIFFICULT
STAND: ACTIVITY IS SOMEWHAT DIFFICULT
KNEEL ON THE FRONT OF YOUR KNEE: ACTIVITY IS SOMEWHAT DIFFICULT

## 2017-03-27 ENCOUNTER — RADIANT APPOINTMENT (OUTPATIENT)
Dept: MRI IMAGING | Facility: CLINIC | Age: 52
End: 2017-03-27
Attending: FAMILY MEDICINE
Payer: COMMERCIAL

## 2017-03-27 DIAGNOSIS — M25.562 ACUTE PAIN OF LEFT KNEE: ICD-10-CM

## 2017-03-27 PROCEDURE — 73721 MRI JNT OF LWR EXTRE W/O DYE: CPT | Mod: LT | Performed by: RADIOLOGY

## 2017-03-27 NOTE — LETTER
[facility]   [address] [place]   [address]  [phone]    April 3, 2017      Marleen IRVIN Bogdan  [address]   [facility] [address]              Dear MsSuzie BeardenBogdan,    Here are your results for your recent MRI. Your MRI showed tearing of your left medial meniscus due to arthritis. It also showed overall arthritis in the knee along with fluid collection. Please follow up with the Orthopedic clinic for further evaluation and management. Please call or message me if you have questions or concerns.     Results for orders placed or performed in visit on 03/27/17   MR Knee Left w/o Contrast    Narrative    EXAMINATION: MRI of the left knee without contrast.    DATE:  3/27/2017.    HISTORY: Continued knee pain after fall.    TECHNIQUE: Multiplanar, multisequence MR imaging of the left knee was  obtained using standard sequences in 3 orthogonal planes without the  use of intravenous or intra-articular gadolinium contrast.    Compared:  Comparison radiographs dated 5/5/2016 were reviewed, which  demonstrated medial femorotibial joint space narrowing.    FINDINGS:    In the medial compartment, degenerative tearing of the posterior horn  of the medial meniscus with peripheral extrusion of the body with a  radial component. There are multifocal areas of full-thickness  cartilage loss in the medial femorotibial joint compartment with  subjacent subchondral edema.    In the lateral compartment, the lateral meniscus is intact. There is  no high-grade to full-thickness cartilage loss or subchondral edema.    In the patellofemoral compartment, there are multifocal areas of at  least moderate grade cartilage loss, along both the patellar and  trochlear surfaces with mild subchondral edema.    The anterior and posterior cruciate ligaments are intact.    The tibial collateral ligament is intact, however it is bowed due to  the peripheral extrusion of the medial meniscus. The anterior  iliotibial  band, fibular collateral ligament, biceps femoris tendon,  and popliteus tendons are intact.    There is a large joint effusion. No popliteal cyst. No joint bodies.    The extensor mechanism is intact and normal in appearance.       Impression    IMPRESSION:  1. Degenerative tearing of the body and posterior horn of the left  knee medial meniscus with a radial component as well as peripheral  extrusion of the body of the left knee medial meniscus.  2. Osteoarthrosis in the left knee, marked in the medial femorotibial  joint compartment, where there are multifocal areas of high-grade to  full-thickness cartilage loss with subchondral edema, and moderate  multifocal areas of cartilage loss in the patellofemoral joint  compartment.  3. Large left knee joint effusion.  4. The anterior and posterior cruciate ligaments, medial and lateral  supporting structures, and lateral meniscus are intact.    STEPHON GUZMÁN MD           Sincerely,    Yanna Matias MD/nr

## 2017-04-03 NOTE — PROGRESS NOTES
Please send the letter to the patient with the following.         Here are your results for your recent MRI. Your MRI showed tearing of your left medial meniscus due to arthritis. It also showed overall arthritis in the knee along with fluid collection. Please follow up with the Orthopedic clinic for further evaluation and management. Please call or message me if you have questions or concerns.

## 2017-04-21 ENCOUNTER — TELEPHONE (OUTPATIENT)
Dept: CARDIOLOGY | Facility: CLINIC | Age: 52
End: 2017-04-21

## 2017-04-21 NOTE — TELEPHONE ENCOUNTER
Patient was seen by an Ortho provider today and perscribed her Voltaren.  Patient wants to know if this medication is okay for her to take.  She stated that tylenol is not working and her knee is very swollen.      PH # [phone]

## 2017-04-27 NOTE — TELEPHONE ENCOUNTER
Discussed with Dr Kolb.  Patient should not take the oral form of Voltaren as it is in the same class as Ibuprofen.  She may use the gel form of the medication if she wishes to her knee.  Called patient and left voicemail with recommendations from Dr. Kolb with call back number to call with further questions.    Aaron Jett, RN  RN Care Coordinator  [facility]  [phone]

## 2017-05-29 ENCOUNTER — OFFICE VISIT (OUTPATIENT)
Dept: URGENT CARE | Facility: URGENT CARE | Age: 52
End: 2017-05-29
Payer: OTHER MISCELLANEOUS

## 2017-05-29 VITALS
WEIGHT: 202.2 LBS | HEART RATE: 58 BPM | TEMPERATURE: 97.4 F | SYSTOLIC BLOOD PRESSURE: 117 MMHG | OXYGEN SATURATION: 99 % | DIASTOLIC BLOOD PRESSURE: 85 MMHG | BODY MASS INDEX: 34.71 KG/M2

## 2017-05-29 DIAGNOSIS — M25.569 KNEE MENISCUS PAIN, UNSPECIFIED LATERALITY: Primary | ICD-10-CM

## 2017-05-29 PROCEDURE — 99213 OFFICE O/P EST LOW 20 MIN: CPT | Performed by: FAMILY MEDICINE

## 2017-05-29 RX ORDER — DICLOFENAC POTASSIUM 50 MG/1
50 TABLET, FILM COATED ORAL DAILY
COMMUNITY
End: 2018-05-11

## 2017-05-29 NOTE — NURSING NOTE
"Chief Complaint   Patient presents with     Work Comp     knee pain       Initial /85 (BP Location: Left arm, Patient Position: Chair, Cuff Size: Adult Large)  Pulse 58  Temp 97.4  F (36.3  C) (Oral)  Wt 202 lb 3.2 oz (91.7 kg)  LMP 10/19/2008  SpO2 99%  BMI 34.71 kg/m2 Estimated body mass index is 34.71 kg/(m^2) as calculated from the following:    Height as of 3/8/17: 5' 4\" (1.626 m).    Weight as of this encounter: 202 lb 3.2 oz (91.7 kg).  Medication Reconciliation: complete   Mariama Pardo CMA      "

## 2017-05-29 NOTE — PROGRESS NOTES
Some of this note was populated by a medical assistant.      SUBJECTIVE:                                                    Marleen Mcfadden is a 51 year old female who presents to clinic today for the following health issues:    Musculoskeletal problem/pain      Duration: 5/22/17    Description  Location: left Knee injury 1/11/17 and told she had a meniscus injury at that time.     Intensity:  moderate    Accompanying signs and symptoms: tingling and burning    History  Previous similar problem: YES- a long time ago  Previous evaluation:  none    Precipitating or alleviating factors:  Trauma or overuse: YES- Got injection on 05/22/17 and right after started feeling these symptoms was told it is normal  Aggravating factors include: putting knee brace on, turning, or standing for prolonged periods- makes it worse    Therapies tried and outcome: diclofenac- helped but not sure if she can take it because it has side effect of heart failure and she already has heart failure.   Had injection in knee and now has tingling and burning feeling    Problem list and histories reviewed & adjusted, as indicated.  Additional history: as documented    Patient Active Problem List   Diagnosis     Autoimmune disease, not elsewhere classified     Primary cardiomyopathy (H)     Leiomyoma of uterus     Allergic rhinitis     Anemia     Sinus bradycardia     Health Care Home     Itching     CHF with cardiomyopathy (H)     TB lung, latent     Knee pain     Swelling of knee joint     Thyroid nodule     Pre-operative cardiovascular examination     Post-op pain     Pain in joint involving ankle and foot     Calcaneal spur     Plantar fasciitis     CARDIOVASCULAR SCREENING; LDL GOAL LESS THAN 160     Peroneus longus tendinitis     Morbid obesity (H)     Shaina's nodes     Familial cardiomyopathy (H)     Acute pain of right knee     Acute bilateral low back pain with right-sided sciatica     Degenerative disc disease at L5-S1 level      Chronic bilateral low back pain with right-sided sciatica     Chronic bilateral low back pain with left-sided sciatica     Elevated blood pressure reading without diagnosis of hypertension     Chronic diastolic congestive heart failure (H)     Injury of left shoulder, initial encounter     Acute pain of left knee     Sprain of other ligament of left ankle, initial encounter     Left shoulder pain     Rotator cuff injury     Left knee pain     Past Surgical History:   Procedure Laterality Date     C BREAST SURGERY PROCEDURE UNLISTED      Breast Reduction     C  DELIVERY ONLY  10/85,     , Low Cervicalx2     C EXCIS UTERINE FIBROID,ABD APPRCH       C EXCISE EXCESS SKIN TISSUE,ABDOMEN       C LIGATE FALLOPIAN TUBE       C REPAIR CRUCIATE LIGAMENT,KNEE      Right Knee     HYSTERECTOMY TOTAL ABDOMINAL  2006     THYROIDECTOMY  2013    Procedure: THYROIDECTOMY;  LEFT THYROID LOBECTOMY.  (LIGASURE, RECURRENT LARYNGEAL NERVE MONITOR) ;  Surgeon: Uriah Camargo MD;  Location: [facility]       Social History   Substance Use Topics     Smoking status: Never Smoker     Smokeless tobacco: Never Used     Alcohol use Yes      Comment: rare, twice a year, she has a drink little wine 2 days ago     Family History   Problem Relation Age of Onset     Hypertension Father      dec     DIABETES Father      dec     HEART DISEASE Father      dec     Alcohol/Drug Father      Cardiovascular Father      HEART DISEASE Mother      dec     Alcohol/Drug Mother      Cardiovascular Mother      HEART DISEASE Daughter      Cardiomyopathy     Cardiovascular Daughter      cardiomyopathy     Cardiovascular Son      cardiomyopathy     DIABETES Paternal Grandmother      dec     Hypertension Paternal Grandmother      dec     CEREBROVASCULAR DISEASE Paternal Grandmother      dec     CANCER Sister      Lupus     Cardiovascular Sister      cardiomyopathy     HEART DISEASE Sister      heart failure, and kidney  failure         Current Outpatient Prescriptions   Medication Sig Dispense Refill     Diclofenac Potassium 50 MG TABS Take 50 mg by mouth daily       estradiol (VIVELLE-DOT) 0.0375 MG/24HR BIW patch Apply 1 patch topically 3 times daily as needed       Testosterone 10 MG/ACT (2%) GEL Apply 1 Application topically daily       FLAXSEED, LINSEED, PO        progesterone (PROMETRIUM) 100 MG capsule Take 100 mg by mouth Reported on 3/8/2017       losartan (COZAAR) 100 MG tablet Take 1 tablet (100 mg) by mouth daily 30 tablet 3     hydrochlorothiazide (MICROZIDE) 12.5 MG capsule Take 1 capsule (12.5 mg) by mouth daily 30 capsule 3     TOPROL XL 50 MG 24 hr tablet Take 1 tablet (50 mg) by mouth daily .  Patient will need to be seen in cardiology before any further refills will be authorized. 90 tablet 0     furosemide (LASIX) 20 MG tablet Take 2 tablets (40 mg) by mouth daily as needed 100 tablet 3     Collagen 500 MG CAPS        ondansetron (ZOFRAN ODT) 4 MG ODT tab Take 1-2 tablets (4-8 mg) by mouth every 8 hours as needed for nausea (Patient not taking: Reported on 5/29/2017) 20 tablet 1     order for DME Equipment being ordered:brace 1 Device 0     traMADol (ULTRAM) 50 MG tablet Take 1 tablet (50 mg) by mouth every 8 hours as needed for moderate pain (Patient not taking: Reported on 5/29/2017) 40 tablet 0     cyclobenzaprine (FLEXERIL) 10 MG tablet Take 0.5-1 tablets (5-10 mg) by mouth 3 times daily as needed for muscle spasms (Patient not taking: Reported on 5/29/2017) 30 tablet 0     Cholecalciferol (VITAMIN D) 2000 UNIT tablet Take 5,000 Units by mouth as needed Reported on 3/8/2017 100 tablet 12     Allergies   Allergen Reactions     Morphine      EMESIS     Sulfa Drugs Swelling       Reviewed and updated as needed this visit by clinical staff       Reviewed and updated as needed this visit by Provider         ROS:  Constitutional, HEENT, cardiovascular, pulmonary, gi and gu systems are negative, except as otherwise  noted.    OBJECTIVE:                                                    /85 (BP Location: Left arm, Patient Position: Chair, Cuff Size: Adult Large)  Pulse 58  Temp 97.4  F (36.3  C) (Oral)  Wt 202 lb 3.2 oz (91.7 kg)  LMP 10/19/2008  SpO2 99%  BMI 34.71 kg/m2  Body mass index is 34.71 kg/(m^2).  GENERAL: healthy, alert and no distress  NECK: no adenopathy, no asymmetry, masses, or scars and thyroid normal to palpation  RESP: lungs clear to auscultation - no rales, rhonchi or wheezes  CV: regular rate and rhythm, normal S1 S2, no S3 or S4, no murmur, click or rub, no peripheral edema and peripheral pulses strong  ABDOMEN: soft, nontender, no hepatosplenomegaly, no masses and bowel sounds normal  MS:  Skin overlying knee normal temperature, no gross musculoskeletal defects noted, minimal edema, gait largely unaffected   Special testing unremarkable.     KNEE AP STANDING BILATERAL, KNEE BILATERAL ONE/TWO VIEW(S) 5/5/2016  1:29 PM      HISTORY: Pain in right knee. Pain in left knee. Unspecified fall,  initial encounter.     COMPARISON: 1/14/2015.         IMPRESSION:   Right knee: Prior anterior cruciate ligament reconstruction again  noted. No significant change in appearance of interference screws. No  acute bony abnormality or joint space effusion. Minimal degenerative  bony spurring in all 3 compartments without significant joint space  narrowing. No definite changes since the prior exam.     Left knee: Progressive medial joint space narrowing which is now at  least moderate. This is associated with some new mild degenerative  bony spurring. No acute bony abnormalities or joint space effusion.      ASSESSMENT/PLAN:                                                        ICD-10-CM    1. Knee meniscus pain, unspecified laterality M25.569     hx of prior DJD as noted above on historical xray       PLAN  Continue pain medicine as prescribed.   Close follow-up orthopedics as recommended.   Noted hx of possible  post injection flare.   Patient educational/instructional material provided including reasons for follow-up   The patient indicates understanding of these issues and agrees with the plan.  Javier Gomez MD        [facility]

## 2017-05-29 NOTE — MR AVS SNAPSHOT
"              After Visit Summary   5/29/2017    Marleen Mcfadden    MRN: [id number]           Patient Information     Date Of Birth          1965        Visit Information        Provider Department      5/29/2017 10:45 AM Javier Gomez MD [facility]         Follow-ups after your visit        Your next 10 appointments already scheduled     Sep 13, 2017  8:20 AM CDT   (Arrive by 8:05 AM)   RETURN HEART FAILURE with Cassie Kolb MD   [facility])    [address] [place] [id number] [phone]              Who to contact     If you have questions or need follow up information about today's clinic visit or your schedule please contact [facility] directly at [phone].  Normal or non-critical lab and imaging results will be communicated to you by MyChart, letter or phone within 4 business days after the clinic has received the results. If you do not hear from us within 7 days, please contact the clinic through MyChart or phone. If you have a critical or abnormal lab result, we will notify you by phone as soon as possible.  Submit refill requests through [employer] or call your pharmacy and they will forward the refill request to us. Please allow 3 business days for your refill to be completed.          Additional Information About Your Visit        MyChart Information     [employer] lets you send messages to your doctor, view your test results, renew your prescriptions, schedule appointments and more. To sign up, go to [email or web address] . Click on \"Log in\" on the left side of the screen, which will take you to the Welcome page. Then click on \"Sign up Now\" on the right side of the page.     You will be asked to enter the access code listed below, as well as some personal information. Please follow the directions to create your username and password.     Your access code is: " [email or web address]  Expires: 2017 12:03 PM     Your access code will  in 90 days. If you need help or a new code, please call your [place] clinic or [phone].        Care EveryWhere ID     This is your Care EveryWhere ID. This could be used by other organizations to access your [place] medical records  [id number]        Your Vitals Were     Pulse Temperature Last Period Pulse Oximetry BMI (Body Mass Index)       58 97.4  F (36.3  C) (Oral) 10/19/2008 99% 34.71 kg/m2        Blood Pressure from Last 3 Encounters:   17 117/85   17 106/72   17 118/68    Weight from Last 3 Encounters:   17 202 lb 3.2 oz (91.7 kg)   17 198 lb (89.8 kg)   17 198 lb (89.8 kg)              Today, you had the following     No orders found for display       Primary Care Provider Office Phone # Fax #    Danae Grimes [phone]       [facility] [address]  [facility] [address]        Thank you!     Thank you for choosing [facility]  for your care. Our goal is always to provide you with excellent care. Hearing back from our patients is one way we can continue to improve our services. Please take a few minutes to complete the written survey that you may receive in the mail after your visit with us. Thank you!             Your Updated Medication List - Protect others around you: Learn how to safely use, store and throw away your medicines at www.disposemymeds.org.          This list is accurate as of: 17 12:03 PM.  Always use your most recent med list.                   Brand Name Dispense Instructions for use    Collagen 500 MG Caps          cyclobenzaprine 10 MG tablet    FLEXERIL    30 tablet    Take 0.5-1 tablets (5-10 mg) by mouth 3 times daily as needed for muscle spasms       Diclofenac Potassium 50 MG Tabs      Take 50 mg by mouth daily       estradiol 0.0375 MG/24HR BIW patch    VIVELLE-DOT     Apply 1 patch topically 3  times daily as needed       FLAXSEED (LINSEED) PO          furosemide 20 MG tablet    LASIX    100 tablet    Take 2 tablets (40 mg) by mouth daily as needed       hydrochlorothiazide 12.5 MG capsule    MICROZIDE    30 capsule    Take 1 capsule (12.5 mg) by mouth daily       losartan 100 MG tablet    COZAAR    30 tablet    Take 1 tablet (100 mg) by mouth daily       ondansetron 4 MG ODT tab    ZOFRAN ODT    20 tablet    Take 1-2 tablets (4-8 mg) by mouth every 8 hours as needed for nausea       order for DME     1 Device    Equipment being ordered:brace       progesterone 100 MG capsule    PROMETRIUM     Take 100 mg by mouth Reported on 3/8/2017       Testosterone 10 MG/ACT (2%) Gel      Apply 1 Application topically daily       TOPROL XL 50 MG 24 hr tablet   Generic drug:  metoprolol     90 tablet    Take 1 tablet (50 mg) by mouth daily .  Patient will need to be seen in cardiology before any further refills will be authorized.       traMADol 50 MG tablet    ULTRAM    40 tablet    Take 1 tablet (50 mg) by mouth every 8 hours as needed for moderate pain       vitamin D 2000 UNITS tablet     100 tablet    Take 5,000 Units by mouth as needed Reported on 3/8/2017

## 2017-06-19 PROBLEM — M25.562 LEFT KNEE PAIN: Status: RESOLVED | Noted: 2017-01-31 | Resolved: 2017-06-19

## 2017-07-31 ENCOUNTER — CARE COORDINATION (OUTPATIENT)
Dept: CARDIOLOGY | Facility: CLINIC | Age: 52
End: 2017-07-31

## 2017-07-31 DIAGNOSIS — Z79.899 ENCOUNTER FOR MEDICATION REVIEW: ICD-10-CM

## 2017-07-31 DIAGNOSIS — I42.9 CHF WITH CARDIOMYOPATHY (H): ICD-10-CM

## 2017-07-31 DIAGNOSIS — I50.9 CHF WITH CARDIOMYOPATHY (H): ICD-10-CM

## 2017-07-31 RX ORDER — METOPROLOL SUCCINATE 50 MG
50 TABLET, EXTENDED RELEASE 24 HR ORAL DAILY
Qty: 90 TABLET | Refills: 3 | Status: SHIPPED | OUTPATIENT
Start: 2017-07-31 | End: 2017-08-14

## 2017-07-31 RX ORDER — FUROSEMIDE 20 MG
40 TABLET ORAL DAILY PRN
Qty: 100 TABLET | Refills: 3 | Status: SHIPPED | OUTPATIENT
Start: 2017-07-31 | End: 2017-10-19

## 2017-08-14 DIAGNOSIS — I50.9 CHF WITH CARDIOMYOPATHY (H): ICD-10-CM

## 2017-08-14 DIAGNOSIS — Z79.899 ENCOUNTER FOR MEDICATION REVIEW: ICD-10-CM

## 2017-08-14 DIAGNOSIS — I42.9 CHF WITH CARDIOMYOPATHY (H): ICD-10-CM

## 2017-08-14 RX ORDER — METOPROLOL SUCCINATE 50 MG/1
50 TABLET, EXTENDED RELEASE ORAL DAILY
Qty: 90 TABLET | Refills: 3 | Status: SHIPPED | OUTPATIENT
Start: 2017-08-14 | End: 2017-09-13

## 2017-08-14 NOTE — TELEPHONE ENCOUNTER
LAST VISIT   NEXT VISIT  PLAN    Labs:Assessment and Plan:  51y female with familial cardiomyopathy presents for ongoing evaluation and management..  1. Chronic systolic heart failure secondary to familial cardiomyopathy.    Stage B  NYHA Class II  ACEi/ARB yes, continue losartan   BB yes, continue toprol   Aldosterone antagonist not indicated  SCD prophylaxis does not meet criteria for implant  % BiV pacing: N/A  Fluid status euvolemic  NSAID use: encouraged limited to no naproxen use and/or any nsaids  Encouraged patient to increase regular aerobic exercise to at least 20-30 minutes a day, 4-5 times per week and follow low-salt, heart healthy diet.     2. Chest pain  - atypical chest pain with stress MRI without evidence of ischemic and normal troponins during ER w/u for chest pain  - no further cardiac w/u indicated  - patient working on stress reduction  - no cardiac contraindications to upcoming procedure        Follow-up 6 months

## 2017-08-16 ENCOUNTER — CARE COORDINATION (OUTPATIENT)
Dept: CARDIOLOGY | Facility: CLINIC | Age: 52
End: 2017-08-16

## 2017-08-16 NOTE — PROGRESS NOTES
Received message below from Central Scheduling/Triage:      PT is scheduled for knee surgery on 10/3/17 at [facility].  She is scheduled for a pre-op on 9/15/17 and has an appt with Dr. Kolb on 9/13/17.  PT wants to have cardiac clearance with Dr. Kolb at that time.  Can she do that or does she need to schedule with gen/interventional?  Please call PT at [phone]     Thank You,   Kate     Called and left message for patient that Dr. Kolb can do her cardiac clearance for her knee surgery at her appt in September, but she may still need to see her primary as they (surgeon) often requires a complete pre-op, not just cardiac.

## 2017-09-11 ENCOUNTER — PRE VISIT (OUTPATIENT)
Dept: CARDIOLOGY | Facility: CLINIC | Age: 52
End: 2017-09-11

## 2017-09-11 ENCOUNTER — CARE COORDINATION (OUTPATIENT)
Dept: CARDIOLOGY | Facility: CLINIC | Age: 52
End: 2017-09-11

## 2017-09-11 DIAGNOSIS — I42.9 FAMILIAL CARDIOMYOPATHY (H): Primary | ICD-10-CM

## 2017-09-11 NOTE — PROGRESS NOTES
Received message on Friday that patient had called with questions re: her upcoming office visit with Dr. Kolb.  She was under the impression she needed to have another echocardiogram, but there was no mention of it in Dr. Kolb's note form 3/2017.  Left message with this information on patient's answering machine.

## 2017-09-13 ENCOUNTER — OFFICE VISIT (OUTPATIENT)
Dept: CARDIOLOGY | Facility: CLINIC | Age: 52
End: 2017-09-13
Attending: INTERNAL MEDICINE
Payer: COMMERCIAL

## 2017-09-13 VITALS
WEIGHT: 217.3 LBS | HEIGHT: 64 IN | HEART RATE: 53 BPM | DIASTOLIC BLOOD PRESSURE: 83 MMHG | OXYGEN SATURATION: 99 % | BODY MASS INDEX: 37.1 KG/M2 | SYSTOLIC BLOOD PRESSURE: 121 MMHG

## 2017-09-13 DIAGNOSIS — I50.9 CHF WITH CARDIOMYOPATHY (H): ICD-10-CM

## 2017-09-13 DIAGNOSIS — I42.9 FAMILIAL CARDIOMYOPATHY (H): ICD-10-CM

## 2017-09-13 DIAGNOSIS — I42.9 CHF WITH CARDIOMYOPATHY (H): ICD-10-CM

## 2017-09-13 DIAGNOSIS — Z79.899 ENCOUNTER FOR MEDICATION REVIEW: ICD-10-CM

## 2017-09-13 DIAGNOSIS — I42.9 FAMILIAL CARDIOMYOPATHY (H): Primary | ICD-10-CM

## 2017-09-13 LAB
ANION GAP SERPL CALCULATED.3IONS-SCNC: 5 MMOL/L (ref 3–14)
BUN SERPL-MCNC: 17 MG/DL (ref 7–30)
CALCIUM SERPL-MCNC: 8.3 MG/DL (ref 8.5–10.1)
CHLORIDE SERPL-SCNC: 107 MMOL/L (ref 94–109)
CO2 SERPL-SCNC: 30 MMOL/L (ref 20–32)
CREAT SERPL-MCNC: 0.71 MG/DL (ref 0.52–1.04)
GFR SERPL CREATININE-BSD FRML MDRD: 86 ML/MIN/1.7M2
GLUCOSE SERPL-MCNC: 104 MG/DL (ref 70–99)
POTASSIUM SERPL-SCNC: 3.9 MMOL/L (ref 3.4–5.3)
SODIUM SERPL-SCNC: 141 MMOL/L (ref 133–144)

## 2017-09-13 PROCEDURE — 36415 COLL VENOUS BLD VENIPUNCTURE: CPT | Performed by: INTERNAL MEDICINE

## 2017-09-13 PROCEDURE — 99212 OFFICE O/P EST SF 10 MIN: CPT | Mod: ZF

## 2017-09-13 PROCEDURE — 80048 BASIC METABOLIC PNL TOTAL CA: CPT | Performed by: INTERNAL MEDICINE

## 2017-09-13 PROCEDURE — 99214 OFFICE O/P EST MOD 30 MIN: CPT | Mod: GC | Performed by: INTERNAL MEDICINE

## 2017-09-13 RX ORDER — METOPROLOL SUCCINATE 50 MG/1
50 TABLET, EXTENDED RELEASE ORAL DAILY
Qty: 30 TABLET | Refills: 11 | Status: SHIPPED | OUTPATIENT
Start: 2017-09-13 | End: 2018-10-26

## 2017-09-13 ASSESSMENT — PAIN SCALES - GENERAL: PAINLEVEL: NO PAIN (0)
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
## 2017-09-13 NOTE — NURSING NOTE
Chief Complaint   Patient presents with     Follow Up For     heart problem--52 year old female with history of chest pain, familial cardiomyopathy and heart failure with preserved EF presenting for follow up     Vitals were taken and medications were reconciled.     HARRISON Jackson  8:26 AM
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
## 2017-09-13 NOTE — MR AVS SNAPSHOT
After Visit Summary   9/13/2017    Marleen Mcfadden    MRN: [id number]           Patient Information     Date Of Birth          1965        Visit Information        Provider Department      9/13/2017 8:20 AM Cassie Kolb MD [facility]        Today's Diagnoses     Familial cardiomyopathy (H)    -  1    Encounter for medication review        CHF with cardiomyopathy (H)          Care Instructions    You were seen today in the Cardiovascular Clinic at the [facility].     Cardiology Providers you saw during your visit: Dr Cassie Kolb    Diagnosis: History of familial cardiomyopathy    Results: Dr Kolb reviewed the results of your labs with you in clinic today    Recommendations:   1.  We will change your prescription for your Metoprolol to a genetic form  2.  We will schedule an echo  3.  Please take your Diclofenac rarely if possible to help protect your kidney function    Follow-up: Follow up with Dr Kolb in 5 months with labs.      For emergencies call 911.    For any scheduling needs or nursing related questions, please call [phone].    Thank you for your visit today! If you have questions or concerns about today's visit, please call me.      Aaron Jett RN  RN Care Coordinator  [facility]  [phone]    Press option 1 for the [place] and then 3 for nursing related questions                  Follow-ups after your visit        Follow-up notes from your care team     Return in about 5 months (around 2/13/2018) for Dr Kolb Follow up Dx: familial cardiomyopathy.      Your next 10 appointments already scheduled     Sep 15, 2017 10:40 AM CDT   Pre-Op physical with Danae Grimes MD   [facility]    [address] [place] [address]   [phone]            Sep 21, 2017 10:00 AM CDT   Ech Complete with UCECHCR2    Health Echo ([facility]  "[facility])    [address] [place] [id number] [phone]           1. Please bring or wear a comfortable two-piece outfit. 2. You may eat, drink and take your normal medicines. 3. For any questions that cannot be answered, please contact the ordering physician            Feb 14, 2018  8:00 AM CST   Lab with UC LAB   [facility] ([facility])    [address] [place] [id number] [phone]            Feb 14, 2018  8:20 AM CST   (Arrive by 8:05 AM)   RETURN HEART FAILURE with Cassie Kolb MD   [facility] ([facility])    [address] [place] [id number] [phone]              Future tests that were ordered for you today     Open Future Orders        Priority Expected Expires Ordered    Echocardiogram Routine  9/13/2019 9/13/2017            Who to contact     If you have questions or need follow up information about today's clinic visit or your schedule please contact [facility] directly at [phone].  Normal or non-critical lab and imaging results will be communicated to you by [employer], letter or phone within 4 business days after the clinic has received the results. If you do not hear from us within 7 days, please contact the clinic through [employer] or phone. If you have a critical or abnormal lab result, we will notify you by phone as soon as possible.  Submit refill requests through [employer] or call your pharmacy and they will forward the refill request to us. Please allow 3 business days for your refill to be completed.          Additional Information About Your Visit        [employer] Information     [employer] lets you send messages to your doctor, view your test results, renew your prescriptions, schedule appointments and more. To sign up, go to [email or web address] . Click on \"Log in\" on the left side of the screen, which will take you to the Welcome " "page. Then click on \"Sign up Now\" on the right side of the page.     You will be asked to enter the access code listed below, as well as some personal information. Please follow the directions to create your username and password.     Your access code is: [email or web address]  Expires: 2017  6:30 AM     Your access code will  in 90 days. If you need help or a new code, please call your [place] clinic or [phone].        Care EveryWhere ID     This is your Care EveryWhere ID. This could be used by other organizations to access your [facility] medical records  [id number]        Your Vitals Were     Pulse Height Last Period Pulse Oximetry BMI (Body Mass Index)       53 1.626 m (5' 4\") 10/19/2008 99% 37.3 kg/m2        Blood Pressure from Last 3 Encounters:   17 121/83   17 117/85   17 106/72    Weight from Last 3 Encounters:   17 98.6 kg (217 lb 4.8 oz)   17 91.7 kg (202 lb 3.2 oz)   17 89.8 kg (198 lb)                 Where to get your medicines      [facility] [id number] [place], [place] [address] [facility]  [address] [facility] [address]     Phone:  [phone]     metoprolol 50 MG 24 hr tablet          Primary Care Provider Office Phone # Fax #    Danae Grimes [phone] [address] [facility] [address]        Equal Access to Services     [facility] Center[facility]ATHANIEL : Hadii sage Vickers, wanidhida luqadaha, qaybta kaalmada stone abbasi. So [facility] [phone].    ATENCIÓN: Si habla español, tiene a escalera disposición servicios gratuitos de asistencia lingüística. Llame al [phone].    We comply with applicable federal civil rights laws and [place] laws. We do not discriminate on the basis of race, color, national origin, age, disability sex, sexual orientation or gender identity.            Thank you!     Thank you for " [facility]  for your care. Our goal is always to provide you with excellent care. Hearing back from our patients is one way we can continue to improve our services. Please take a few minutes to complete the written survey that you may receive in the mail after your visit with us. Thank you!             Your Updated Medication List - Protect others around you: Learn how to safely use, store and throw away your medicines at www.disposemymeds.org.          This list is accurate as of: 9/13/17  9:42 AM.  Always use your most recent med list.                   Brand Name Dispense Instructions for use Diagnosis    Collagen 500 MG Caps           cyclobenzaprine 10 MG tablet    FLEXERIL    30 tablet    Take 0.5-1 tablets (5-10 mg) by mouth 3 times daily as needed for muscle spasms    Neck tightness       Diclofenac Potassium 50 MG Tabs      Take 50 mg by mouth daily        estradiol 0.0375 MG/24HR BIW patch    VIVELLE-DOT     Apply 1 patch topically 3 times daily as needed        FLAXSEED (LINSEED) PO           furosemide 20 MG tablet    LASIX    100 tablet    Take 2 tablets (40 mg) by mouth daily as needed    CHF with cardiomyopathy (H)       hydrochlorothiazide 12.5 MG capsule    MICROZIDE    30 capsule    Take 1 capsule (12.5 mg) by mouth daily    Familial cardiomyopathy (H)       losartan 100 MG tablet    COZAAR    30 tablet    Take 1 tablet (100 mg) by mouth daily    Familial cardiomyopathy (H)       metoprolol 50 MG 24 hr tablet    TOPROL-XL    30 tablet    Take 1 tablet (50 mg) by mouth daily    Familial cardiomyopathy (H)       ondansetron 4 MG ODT tab    ZOFRAN ODT    20 tablet    Take 1-2 tablets (4-8 mg) by mouth every 8 hours as needed for nausea    Viral gastroenteritis, Non-intractable vomiting with nausea, unspecified vomiting type       order for DME     1 Device    Equipment being ordered:brace    Strain of left knee, initial encounter       progesterone 100 MG capsule    PROMETRIUM      Take 100 mg by mouth Reported on 3/8/2017        Testosterone 10 MG/ACT (2%) Gel      Apply 1 Application topically daily        traMADol 50 MG tablet    ULTRAM    40 tablet    Take 1 tablet (50 mg) by mouth every 8 hours as needed for moderate pain    Exacerbation of chronic back pain, Pain in both lower extremities       vitamin D 2000 UNITS tablet     100 tablet    Take 5,000 Units by mouth as needed Reported on 3/8/2017

## 2017-09-13 NOTE — PATIENT INSTRUCTIONS
You were seen today in the Cardiovascular Clinic at the [facility].     Cardiology Providers you saw during your visit: Dr Cassie Kolb    Diagnosis: History of familial cardiomyopathy    Results: Dr Kolb reviewed the results of your labs with you in clinic today    Recommendations:   1.  We will change your prescription for your Metoprolol to a genetic form  2.  We will schedule an echo  3.  Please take your Diclofenac rarely if possible to help protect your kidney function    Follow-up: Follow up with Dr Kolb in 5 months with labs.      For emergencies call 911.    For any scheduling needs or nursing related questions, please call [phone].    Thank you for your visit today! If you have questions or concerns about today's visit, please call me.      Aaron Jett RN  RN Care Coordinator  [facility]  [phone]    Press option 1 for the [place] and then 3 for nursing related questions

## 2017-09-13 NOTE — NURSING NOTE
Cardiac Testing: Patient given instructions regarding  echocardiogram . Discussed purpose, preparation, procedure and when to expect results reported back to the patient. Patient demonstrated understanding of this information and agreed to call with further questions or concerns.    Labs: Patient was given results of the laboratory testing obtained today. Patient was instructed to return for the next laboratory testing in 5 months . Patient demonstrated understanding of this information and agreed to call with further questions or concerns.     Med Reconcile: Reviewed and verified all current medications with the patient. The updated medication list was printed and given to the patient.    Return Appointment: Follow up with Dr Kolb in 5 months with labs. Patient given instructions regarding scheduling next clinic visit. Patient demonstrated understanding of this information and agreed to call with further questions or concerns.    Patient stated she understood all health information given and agreed to call with further questions or concerns.    Aaron Jett, RN  RN Care Coordinator  [facility]  [phone]

## 2017-09-15 ENCOUNTER — OFFICE VISIT (OUTPATIENT)
Dept: FAMILY MEDICINE | Facility: CLINIC | Age: 52
End: 2017-09-15
Payer: OTHER MISCELLANEOUS

## 2017-09-15 VITALS
DIASTOLIC BLOOD PRESSURE: 63 MMHG | HEART RATE: 55 BPM | WEIGHT: 216 LBS | SYSTOLIC BLOOD PRESSURE: 118 MMHG | OXYGEN SATURATION: 97 % | TEMPERATURE: 97.4 F | BODY MASS INDEX: 37.08 KG/M2

## 2017-09-15 DIAGNOSIS — Z01.818 PRE-OPERATIVE EXAMINATION: Primary | ICD-10-CM

## 2017-09-15 DIAGNOSIS — Z01.818 PREOP GENERAL PHYSICAL EXAM: ICD-10-CM

## 2017-09-15 DIAGNOSIS — M25.562 ACUTE PAIN OF LEFT KNEE: ICD-10-CM

## 2017-09-15 DIAGNOSIS — M17.12 PRIMARY OSTEOARTHRITIS OF LEFT KNEE: ICD-10-CM

## 2017-09-15 LAB
BASOPHILS # BLD AUTO: 0 10E9/L (ref 0–0.2)
BASOPHILS NFR BLD AUTO: 0.3 %
DIFFERENTIAL METHOD BLD: NORMAL
EOSINOPHIL # BLD AUTO: 0.2 10E9/L (ref 0–0.7)
EOSINOPHIL NFR BLD AUTO: 3.8 %
ERYTHROCYTE [DISTWIDTH] IN BLOOD BY AUTOMATED COUNT: 12.9 % (ref 10–15)
HCT VFR BLD AUTO: 37.8 % (ref 35–47)
HGB BLD-MCNC: 12.6 G/DL (ref 11.7–15.7)
LYMPHOCYTES # BLD AUTO: 2.5 10E9/L (ref 0.8–5.3)
LYMPHOCYTES NFR BLD AUTO: 43.4 %
MCH RBC QN AUTO: 31.2 PG (ref 26.5–33)
MCHC RBC AUTO-ENTMCNC: 33.3 G/DL (ref 31.5–36.5)
MCV RBC AUTO: 94 FL (ref 78–100)
MONOCYTES # BLD AUTO: 0.5 10E9/L (ref 0–1.3)
MONOCYTES NFR BLD AUTO: 7.7 %
NEUTROPHILS # BLD AUTO: 2.6 10E9/L (ref 1.6–8.3)
NEUTROPHILS NFR BLD AUTO: 44.8 %
PLATELET # BLD AUTO: 271 10E9/L (ref 150–450)
RBC # BLD AUTO: 4.04 10E12/L (ref 3.8–5.2)
WBC # BLD AUTO: 5.8 10E9/L (ref 4–11)

## 2017-09-15 PROCEDURE — 99214 OFFICE O/P EST MOD 30 MIN: CPT | Performed by: FAMILY MEDICINE

## 2017-09-15 PROCEDURE — 85025 COMPLETE CBC W/AUTO DIFF WBC: CPT | Performed by: FAMILY MEDICINE

## 2017-09-15 PROCEDURE — 93000 ELECTROCARDIOGRAM COMPLETE: CPT | Performed by: FAMILY MEDICINE

## 2017-09-15 PROCEDURE — 36415 COLL VENOUS BLD VENIPUNCTURE: CPT | Performed by: FAMILY MEDICINE

## 2017-09-15 NOTE — NURSING NOTE
"Chief Complaint   Patient presents with     Pre-Op Exam       Initial /63  Pulse 55  Temp 97.4  F (36.3  C) (Tympanic)  Wt 216 lb (98 kg)  LMP 10/19/2008  SpO2 97%  BMI 37.08 kg/m2 Estimated body mass index is 37.08 kg/(m^2) as calculated from the following:    Height as of 9/13/17: 5' 4\" (1.626 m).    Weight as of this encounter: 216 lb (98 kg).  Medication Reconciliation: complete     Soco Choudhury MA    "

## 2017-09-15 NOTE — PROGRESS NOTES
[facility]  [address] [place] [id number] [phone]  Dept: [phone]    PRE-OP EVALUATION:  Today's date: 9/15/2017    Marleen Mcfadden (: 1965) presents for pre-operative evaluation assessment as requested by Dr. Ravi Alva.  She requires evaluation and anesthesia risk assessment prior to undergoing surgery/procedure for treatment of left knee .  Proposed procedure: Total knee replacement     Date of Surgery/ Procedure: 10/03/2017  Time of Surgery/ Procedure: 9:20am  Hospital/Surgical Facility: [facility]  Fax number for surgical facility: [phone]  Primary Physician: Danae Grimes  Type of Anesthesia Anticipated: to be determined    Patient has a Health Care Directive or Living Will:  NO    Preop Questions 9/15/2017   1.  Do you have a history of heart attack, stroke, stent, bypass or surgery on an artery in the head, neck, heart or legs? No   2.  Do you ever have any pain or discomfort in your chest? No   3.  Do you have a history of  Heart Failure? YES - systolic heart failure, well controlled   4.   Are you troubled by shortness of breath when:  walking on a level surface, or up a slight hill, or at night? No   5.  Do you currently have a cold, bronchitis or other respiratory infection? No   6.  Do you have a cough, shortness of breath, or wheezing? No   7.  Do you sometimes get pains in the calves of your legs when you walk? NO   8. Do you or anyone in your family have previous history of blood clots? No   9.  Do you or does anyone in your family have a serious bleeding problem such as prolonged bleeding following surgeries or cuts? No   10. Have you ever had problems with anemia or been told to take iron pills? No   11. Have you had any abnormal blood loss such as black, tarry or bloody stools, or abnormal vaginal bleeding? No   12. Have you ever had a blood transfusion? YES -    13. Have you or any of your relatives ever had  problems with anesthesia? No   14. Do you have sleep apnea, excessive snoring or daytime drowsiness? No   15. Do you have any prosthetic heart valves? No   16. Do you have prosthetic joints? No   17. Is there any chance that you may be pregnant? No           HPI:                                                      Brief HPI related to upcoming procedure: chronic pain in both knees, worse on the left, not responding to home management, or knee injections.      CHF - Patient has a longstanding history of CHF of mild severity.. Current treatment regimen includes beta-blocker. The patient denies chest pain, edema, orthopnea, SOB or recent weight gain. Last 2D Echo scheduled for 9/21/2017, EKG bradycardia consistent with beta-blocker use.                                                                                                                                                                                                            .    MEDICAL HISTORY:                                                    Patient Active Problem List    Diagnosis Date Noted     Left shoulder pain 01/17/2017     Priority: Medium     Rotator cuff injury 01/17/2017     Priority: Medium     Injury of left shoulder, initial encounter 01/12/2017     Priority: Medium     Acute pain of left knee 01/12/2017     Priority: Medium     Sprain of other ligament of left ankle, initial encounter 01/12/2017     Priority: Medium     Elevated blood pressure reading without diagnosis of hypertension 10/27/2016     Priority: Medium     Chronic diastolic congestive heart failure (H) 10/27/2016     Priority: Medium     Chronic bilateral low back pain with right-sided sciatica 07/07/2016     Priority: Medium     Chronic bilateral low back pain with left-sided sciatica 07/07/2016     Priority: Medium     Acute bilateral low back pain with right-sided sciatica 06/02/2016     Priority: Medium     Degenerative disc disease at L5-S1 level 06/02/2016      Priority: Medium     Acute pain of right knee 05/17/2016     Priority: Medium     Familial cardiomyopathy (H) 10/21/2015     Priority: Medium     Shaina's nodes 06/16/2015     Priority: Medium     Morbid obesity (H) 05/05/2015     Priority: Medium     Peroneus longus tendinitis 01/02/2015     Priority: Medium     Plantar fasciitis 11/11/2014     Priority: Medium     CARDIOVASCULAR SCREENING; LDL GOAL LESS THAN 160 11/11/2014     Priority: Medium     Calcaneal spur 10/21/2014     Priority: Medium     Xray 10/17/14       Pain in joint involving ankle and foot 06/16/2014     Priority: Medium     Post-op pain 09/09/2013     Priority: Medium     Pre-operative cardiovascular examination 09/04/2013     Priority: Medium     Thyroid nodule 09/03/2013     Priority: Medium     Knee pain 12/20/2012     Priority: Medium     Swelling of knee joint 12/20/2012     Priority: Medium     TB lung, latent 06/18/2012     Priority: Medium     Negative quantiferon gold test 11/5/2012       CHF with cardiomyopathy (H)      Priority: Medium     Itching 08/12/2011     Priority: Medium     Health Care Home 05/24/2011     Priority: Medium     EMERGENCY CARE PLAN  Presenting Problem Signs and Symptoms Treatment Plan    Questions or conerns during clinic hours    I will call the clinic directly     Questions or conerns outside clinic hours    I will call the 24 hour nurse line at [phone]    Patient needs to schedule an appointment    I will call the 24 hour scheduling team at [phone] or clinic directly    Same day treatment     I will call the clinic first, nurse line if after hours, urgent care and express care if needed                            DX V65.8 REPLACED WITH 03274 HEALTH CARE HOME (04/08/2013)       Sinus bradycardia 03/28/2011     Priority: Medium     Anemia      Priority: Medium     Allergic rhinitis      Priority: Medium     Problem list name updated by automated process. Provider to review       Leiomyoma of uterus  10/25/2006     Priority: Medium     Problem list name updated by automated process. Provider to review       Autoimmune disease, not elsewhere classified 2004     Priority: Medium     Autoimmune disease- unknown/poss SLE  Problem list name updated by automated process. Provider to review and confirm       Primary cardiomyopathy (H) 2004     Priority: Medium     Cardiomyopathy- dx'd  famial idiopathic. Followed regularly by cardiologist Mayi.  Problem list name updated by automated process. Provider to review        Past Medical History:   Diagnosis Date     Allergic rhinitis, cause unspecified      Anemia      Autoimmune disease NEC     Autoimmune disease- unknown/poss SLE     Calcaneal spur 10/21/2014     CHF with cardiomyopathy (H)      Familial cardiomyopathy (H) 10/21/2015     Morbid obesity (H) 2015     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     no longer an issue     Other primary cardiomyopathies     Cardiomyopathy- dx'd  idiopathic     PONV (postoperative nausea and vomiting)      UTERINE LEIOMYOMA NOS 10/25/2006     Past Surgical History:   Procedure Laterality Date     C BREAST SURGERY PROCEDURE UNLISTED      Breast Reduction     C  DELIVERY ONLY  10/85,     , Low Cervicalx2     C EXCIS UTERINE FIBROID,ABD APPRCH       C EXCISE EXCESS SKIN TISSUE,ABDOMEN       C LIGATE FALLOPIAN TUBE       C REPAIR CRUCIATE LIGAMENT,KNEE      Right Knee     HYSTERECTOMY TOTAL ABDOMINAL  2006     THYROIDECTOMY  2013    Procedure: THYROIDECTOMY;  LEFT THYROID LOBECTOMY.  (LIGASURE, RECURRENT LARYNGEAL NERVE MONITOR) ;  Surgeon: Uriah Camargo MD;  Location: [facility]     Current Outpatient Prescriptions   Medication Sig Dispense Refill     metoprolol (TOPROL-XL) 50 MG 24 hr tablet Take 1 tablet (50 mg) by mouth daily 30 tablet 11     furosemide (LASIX) 20 MG tablet Take 2 tablets (40 mg) by mouth daily as  needed 100 tablet 3     Diclofenac Potassium 50 MG TABS Take 50 mg by mouth daily       estradiol (VIVELLE-DOT) 0.0375 MG/24HR BIW patch Apply 1 patch topically 3 times daily as needed       Testosterone 10 MG/ACT (2%) GEL Apply 1 Application topically daily       Collagen 500 MG CAPS        FLAXSEED, LINSEED, PO        progesterone (PROMETRIUM) 100 MG capsule Take 100 mg by mouth Reported on 3/8/2017       ondansetron (ZOFRAN ODT) 4 MG ODT tab Take 1-2 tablets (4-8 mg) by mouth every 8 hours as needed for nausea 20 tablet 1     order for DME Equipment being ordered:brace 1 Device 0     losartan (COZAAR) 100 MG tablet Take 1 tablet (100 mg) by mouth daily 30 tablet 3     hydrochlorothiazide (MICROZIDE) 12.5 MG capsule Take 1 capsule (12.5 mg) by mouth daily 30 capsule 3     traMADol (ULTRAM) 50 MG tablet Take 1 tablet (50 mg) by mouth every 8 hours as needed for moderate pain 40 tablet 0     cyclobenzaprine (FLEXERIL) 10 MG tablet Take 0.5-1 tablets (5-10 mg) by mouth 3 times daily as needed for muscle spasms 30 tablet 0     Cholecalciferol (VITAMIN D) 2000 UNIT tablet Take 5,000 Units by mouth as needed Reported on 3/8/2017 100 tablet 12     OTC products: diclofenac daily    Allergies   Allergen Reactions     Morphine      EMESIS     Sulfa Drugs Swelling      Latex Allergy: NO    Social History   Substance Use Topics     Smoking status: Never Smoker     Smokeless tobacco: Never Used     Alcohol use Yes      Comment: rare, twice a year, she has a drink little wine 2 days ago     History   Drug Use No       REVIEW OF SYSTEMS:                                                    C: NEGATIVE for fever, chills, change in weight  I: NEGATIVE for worrisome rashes, moles or lesions  E: NEGATIVE for vision changes or irritation  E/M: NEGATIVE for ear, mouth and throat problems  R: NEGATIVE for significant cough or SOB  B: NEGATIVE for masses, tenderness or discharge  CV: NEGATIVE for chest pain, palpitations or peripheral  edema  GI: NEGATIVE for nausea, abdominal pain, heartburn, or change in bowel habits  : NEGATIVE for frequency, dysuria, or hematuria  M: NEGATIVE for significant arthralgias or myalgia  N: NEGATIVE for weakness, dizziness or paresthesias  E: NEGATIVE for temperature intolerance, skin/hair changes  H: NEGATIVE for bleeding problems  P: NEGATIVE for changes in mood or affect    EXAM:                                                    /63  Pulse 55  Temp 97.4  F (36.3  C) (Tympanic)  Wt 216 lb (98 kg)  LMP 10/19/2008  SpO2 97%  BMI 37.08 kg/m2   /63  Pulse 55  Temp 97.4  F (36.3  C) (Tympanic)  Wt 216 lb (98 kg)  LMP 10/19/2008  SpO2 97%  BMI 37.08 kg/m2     GENERAL APPEARANCE: healthy, alert and no distress     EYES: EOMI, PERRL     NECK: no adenopathy, no asymmetry, masses, or scars and thyroid normal to palpation     RESP: lungs clear to auscultation - no rales, rhonchi or wheezes     CV: regular rates and rhythm, normal S1 S2, no S3 or S4 and no murmur, click or rub     ABDOMEN:  soft, nontender, no HSM or masses and bowel sounds normal     MS: extremities normal- no gross deformities noted, no evidence of inflammation in joints, FROM in all extremities.     SKIN: no suspicious lesions or rashes     NEURO: Normal strength and tone, sensory exam grossly normal, mentation intact and speech normal     PSYCH: mentation appears normal. and affect normal/bright     LYMPHATICS: No axillary, cervical, or supraclavicular nodes    DIAGNOSTICS:                                                      EKG: sinus bradycardia, t wave abnormality, unchanged from previous tracings (10/26/2016)  Labs Resulted Today:   Results for orders placed or performed in visit on 09/15/17   CBC with platelets and differential   Result Value Ref Range    WBC 5.8 4.0 - 11.0 10e9/L    RBC Count 4.04 3.8 - 5.2 10e12/L    Hemoglobin 12.6 11.7 - 15.7 g/dL    Hematocrit 37.8 35.0 - 47.0 %    MCV 94 78 - 100 fl    MCH 31.2 26.5  - 33.0 pg    MCHC 33.3 31.5 - 36.5 g/dL    RDW 12.9 10.0 - 15.0 %    Platelet Count 271 150 - 450 10e9/L    Diff Method Automated Method     % Neutrophils 44.8 %    % Lymphocytes 43.4 %    % Monocytes 7.7 %    % Eosinophils 3.8 %    % Basophils 0.3 %    Absolute Neutrophil 2.6 1.6 - 8.3 10e9/L    Absolute Lymphocytes 2.5 0.8 - 5.3 10e9/L    Absolute Monocytes 0.5 0.0 - 1.3 10e9/L    Absolute Eosinophils 0.2 0.0 - 0.7 10e9/L    Absolute Basophils 0.0 0.0 - 0.2 10e9/L       Recent Labs   Lab Test  09/13/17   0811  03/08/17   0806  10/26/16   1044  08/31/16   0816   12/02/14   1012   04/19/13   1016   HGB   --    --   12.1  11.8   < >  12.5   < >  12.3   PLT   --    --   228  238   < >  277   < >  265   INR   --    --    --    --    --   1.03   --   1.05   NA  141  143  145*  142   < >  140   < >  141   POTASSIUM  3.9  4.0  3.8  4.0   < >  3.9   < >  3.3*   CR  0.71  0.63  0.64  0.70   < >  0.67   < >  0.70    < > = values in this interval not displayed.        IMPRESSION:                                                    Reason for surgery/procedure: left knee OA  Diagnosis/reason for consult: pre-op    The proposed surgical procedure is considered INTERMEDIATE risk.    REVISED CARDIAC RISK INDEX  The patient has the following serious cardiovascular risks for perioperative complications such as (MI, PE, VFib and 3  AV Block):  No serious cardiac risks  INTERPRETATION: 0 risks: Class I (very low risk - 0.4% complication rate)    The patient has the following additional risks for perioperative complications:  No identified additional risks      ICD-10-CM    1. Pre-operative examination Z01.818 CBC with platelets and differential     EKG 12-lead complete w/read - Clinics   2. Acute pain of left knee M25.562 CBC with platelets and differential     EKG 12-lead complete w/read - Clinics   3. Primary osteoarthritis of left knee M17.12 CBC with platelets and differential     EKG 12-lead complete w/read - Clinics   4. Preop  general physical exam Z01.818        RECOMMENDATIONS:                                                      --Patient is to take all scheduled medications on the day of surgery.    APPROVAL GIVEN to proceed with proposed procedure, without further diagnostic evaluation       Signed Electronically by: Danae Grimes MD    Copy of this evaluation report is provided to requesting physician.    [place] Preop Guidelines

## 2017-09-15 NOTE — MR AVS SNAPSHOT
After Visit Summary   9/15/2017    Marleen Mcfadden    MRN: [id number]           Patient Information     Date Of Birth          1965        Visit Information        Provider Department      9/15/2017 10:40 AM Danae Grimes MD [facility]        Today's Diagnoses     Pre-operative examination    -  1    Acute pain of left knee        Primary osteoarthritis of left knee        Preop general physical exam          Care Instructions      Before Your Surgery      Call your surgeon if there is any change in your health. This includes signs of a cold or flu (such as a sore throat, runny nose, cough, rash or fever).    Do not smoke, drink alcohol or take over the counter medicine (unless your surgeon or primary care doctor tells you to) for the 24 hours before and after surgery.    If you take prescribed drugs: Follow your doctor s orders about which medicines to take and which to stop until after surgery.    Eating and drinking prior to surgery: follow the instructions from your surgeon    Take a shower or bath the night before surgery. Use the soap your surgeon gave you to gently clean your skin. If you do not have soap from your surgeon, use your regular soap. Do not shave or scrub the surgery site.  Wear clean pajamas and have clean sheets on your bed.           Follow-ups after your visit        Your next 10 appointments already scheduled     Sep 21, 2017 10:00 AM CDT   Ech Complete with [id number]    Health Echo ([facility])    [address] [place] [id number] [phone]           1. Please bring or wear a comfortable two-piece outfit. 2. You may eat, drink and take your normal medicines. 3. For any questions that cannot be answered, please contact the ordering physician            Feb 14, 2018  8:00 AM CST   Lab with  LAB    Health Lab ([facility])    [address] [place] [address]  "[facility] [address]   [phone]            2018  8:20 AM CST   (Arrive by 8:05 AM)   RETURN HEART FAILURE with Cassie Klob MD   [facility] ([facility])    [address] [facility]  [address] [facility] [address] [phone]              Who to contact     If you have questions or need follow up information about today's clinic visit or your schedule please contact [facility] directly at [phone].  Normal or non-critical lab and imaging results will be communicated to you by [employer], letter or phone within 4 business days after the clinic has received the results. If you do not hear from us within 7 days, please contact the clinic through [employer] or phone. If you have a critical or abnormal lab result, we will notify you by phone as soon as possible.  Submit refill requests through [employer] or call your pharmacy and they will forward the refill request to us. Please allow 3 business days for your refill to be completed.          Additional Information About Your Visit        MyChart Information     [employer] lets you send messages to your doctor, view your test results, renew your prescriptions, schedule appointments and more. To sign up, go to [email or web address] . Click on \"Log in\" on the left side of the screen, which will take you to the Welcome page. Then click on \"Sign up Now\" on the right side of the page.     You will be asked to enter the access code listed below, as well as some personal information. Please follow the directions to create your username and password.     Your access code is: [email or web address]  Expires: 2017  6:30 AM     Your access code will  in 90 days. If you need help or a new code, please call your [facility] or [phone].        Care EveryWhere ID     This is your Care EveryWhere ID. This could be used by other organizations to access your [place] medical records  [id number]   "      Your Vitals Were     Pulse Temperature Last Period Pulse Oximetry BMI (Body Mass Index)       55 97.4  F (36.3  C) (Tympanic) 10/19/2008 97% 37.08 kg/m2        Blood Pressure from Last 3 Encounters:   09/15/17 118/63   09/13/17 121/83   05/29/17 117/85    Weight from Last 3 Encounters:   09/15/17 216 lb (98 kg)   09/13/17 217 lb 4.8 oz (98.6 kg)   05/29/17 202 lb 3.2 oz (91.7 kg)              We Performed the Following     CBC with platelets and differential     EKG 12-lead complete w/read - Clinics        Primary Care Provider Office Phone # Fax #    Danae Grimes [phone] [address]  [facility] [address]        Equal Access to Services     PAOLA SAUNDERS : Hadii sage bernalo Sofalguni, waaxda luqadaha, qaybta kaalmada elroy, stone clarke . So [facility] [phone].    ATENCIÓN: Si habla español, tiene a escalera disposición servicios gratuitos de asistencia lingüística. Erma al [phone].    We comply with applicable federal civil rights laws and [place] laws. We do not discriminate on the basis of race, color, national origin, age, disability sex, sexual orientation or gender identity.            Thank you!     Thank you for choosing [facility]  for your care. Our goal is always to provide you with excellent care. Hearing back from our patients is one way we can continue to improve our services. Please take a few minutes to complete the written survey that you may receive in the mail after your visit with us. Thank you!             Your Updated Medication List - Protect others around you: Learn how to safely use, store and throw away your medicines at www.disposemymeds.org.          This list is accurate as of: 9/15/17 12:01 PM.  Always use your most recent med list.                   Brand Name Dispense Instructions for use Diagnosis    Collagen 500 MG Caps           cyclobenzaprine 10 MG tablet    FLEXERIL    30 tablet     Take 0.5-1 tablets (5-10 mg) by mouth 3 times daily as needed for muscle spasms    Neck tightness       Diclofenac Potassium 50 MG Tabs      Take 50 mg by mouth daily        estradiol 0.0375 MG/24HR BIW patch    VIVELLE-DOT     Apply 1 patch topically 3 times daily as needed        FLAXSEED (LINSEED) PO           furosemide 20 MG tablet    LASIX    100 tablet    Take 2 tablets (40 mg) by mouth daily as needed    CHF with cardiomyopathy (H)       hydrochlorothiazide 12.5 MG capsule    MICROZIDE    30 capsule    Take 1 capsule (12.5 mg) by mouth daily    Familial cardiomyopathy (H)       losartan 100 MG tablet    COZAAR    30 tablet    Take 1 tablet (100 mg) by mouth daily    Familial cardiomyopathy (H)       metoprolol 50 MG 24 hr tablet    TOPROL-XL    30 tablet    Take 1 tablet (50 mg) by mouth daily    Familial cardiomyopathy (H)       ondansetron 4 MG ODT tab    ZOFRAN ODT    20 tablet    Take 1-2 tablets (4-8 mg) by mouth every 8 hours as needed for nausea    Viral gastroenteritis, Non-intractable vomiting with nausea, unspecified vomiting type       order for DME     1 Device    Equipment being ordered:brace    Strain of left knee, initial encounter       progesterone 100 MG capsule    PROMETRIUM     Take 100 mg by mouth Reported on 3/8/2017        Testosterone 10 MG/ACT (2%) Gel      Apply 1 Application topically daily        traMADol 50 MG tablet    ULTRAM    40 tablet    Take 1 tablet (50 mg) by mouth every 8 hours as needed for moderate pain    Exacerbation of chronic back pain, Pain in both lower extremities       vitamin D 2000 UNITS tablet     100 tablet    Take 5,000 Units by mouth as needed Reported on 3/8/2017

## 2017-09-15 NOTE — PROGRESS NOTES
Patient was seen today in clinic.  I discussed results in clinic, please see clinic progress note.    Danae Grimes 9/15/2017

## 2017-09-21 ENCOUNTER — RADIANT APPOINTMENT (OUTPATIENT)
Dept: CARDIOLOGY | Facility: CLINIC | Age: 52
End: 2017-09-21

## 2017-09-21 DIAGNOSIS — I42.9 FAMILIAL CARDIOMYOPATHY (H): ICD-10-CM

## 2017-09-21 RX ADMIN — Medication 3 ML: at 10:45

## 2017-09-21 NOTE — NURSING NOTE
Patient presents today for resting echo ordered by MD.     Echo Tech provided patient education about resting echo. IV started in [facility].   IV start documented in  [employer].  Echo technician completed resting echo. Patient monitored according to Optison protocol. Data transferred to [facility] for final interpretation through [employer].     Optison medication:  Amount used 3ml Optison mixed with 6ml Saline - [id number].  6ml Wasted.   After completion of resting echo, IV removed.    Patient education provided about cardiology interpretation and primary provider will be notified of results.    Alla Banuelos RDCS

## 2017-09-25 ENCOUNTER — CARE COORDINATION (OUTPATIENT)
Dept: CARDIOLOGY | Facility: CLINIC | Age: 52
End: 2017-09-25

## 2017-09-28 ENCOUNTER — TELEPHONE (OUTPATIENT)
Dept: CARDIOLOGY | Facility: CLINIC | Age: 52
End: 2017-09-28

## 2017-09-28 NOTE — TELEPHONE ENCOUNTER
Patient calling for Aaron.  She would like to know if her pre op cardiac clearance paperwork was faxed to [facility]. Please call to discuss.  [phone].

## 2017-10-07 ENCOUNTER — TRANSFERRED RECORDS (OUTPATIENT)
Dept: HEALTH INFORMATION MANAGEMENT | Facility: CLINIC | Age: 52
End: 2017-10-07

## 2017-10-19 DIAGNOSIS — I50.9 CHF WITH CARDIOMYOPATHY (H): ICD-10-CM

## 2017-10-19 DIAGNOSIS — I42.9 CHF WITH CARDIOMYOPATHY (H): ICD-10-CM

## 2017-10-19 DIAGNOSIS — I42.9 FAMILIAL CARDIOMYOPATHY (H): ICD-10-CM

## 2017-10-19 RX ORDER — LOSARTAN POTASSIUM 100 MG/1
100 TABLET ORAL DAILY
Qty: 90 TABLET | Refills: 3 | Status: SHIPPED | OUTPATIENT
Start: 2017-10-19 | End: 2018-10-26

## 2017-10-19 RX ORDER — FUROSEMIDE 20 MG
40 TABLET ORAL DAILY PRN
Qty: 60 TABLET | Refills: 7 | Status: SHIPPED | OUTPATIENT
Start: 2017-10-19 | End: 2018-11-28

## 2017-10-19 RX ORDER — HYDROCHLOROTHIAZIDE 12.5 MG/1
12.5 CAPSULE ORAL DAILY
Qty: 90 CAPSULE | Refills: 3 | Status: SHIPPED | OUTPATIENT
Start: 2017-10-19 | End: 2018-10-26

## 2018-02-07 ENCOUNTER — TELEPHONE (OUTPATIENT)
Dept: FAMILY MEDICINE | Facility: CLINIC | Age: 53
End: 2018-02-07

## 2018-02-07 ENCOUNTER — RADIANT APPOINTMENT (OUTPATIENT)
Dept: GENERAL RADIOLOGY | Facility: CLINIC | Age: 53
End: 2018-02-07
Attending: FAMILY MEDICINE
Payer: COMMERCIAL

## 2018-02-07 ENCOUNTER — CARE COORDINATION (OUTPATIENT)
Dept: CARDIOLOGY | Facility: CLINIC | Age: 53
End: 2018-02-07

## 2018-02-07 ENCOUNTER — OFFICE VISIT (OUTPATIENT)
Dept: FAMILY MEDICINE | Facility: CLINIC | Age: 53
End: 2018-02-07
Payer: OTHER MISCELLANEOUS

## 2018-02-07 VITALS
RESPIRATION RATE: 16 BRPM | TEMPERATURE: 98 F | SYSTOLIC BLOOD PRESSURE: 123 MMHG | HEART RATE: 85 BPM | BODY MASS INDEX: 38.28 KG/M2 | WEIGHT: 223 LBS | OXYGEN SATURATION: 99 % | DIASTOLIC BLOOD PRESSURE: 89 MMHG

## 2018-02-07 DIAGNOSIS — Z96.652 STATUS POST TOTAL LEFT KNEE REPLACEMENT: ICD-10-CM

## 2018-02-07 DIAGNOSIS — M25.362 KNEE BUCKLING, LEFT: Primary | ICD-10-CM

## 2018-02-07 DIAGNOSIS — M25.462 SWOLLEN L KNEE: ICD-10-CM

## 2018-02-07 DIAGNOSIS — I50.32 CHRONIC DIASTOLIC CONGESTIVE HEART FAILURE (H): Primary | ICD-10-CM

## 2018-02-07 DIAGNOSIS — M25.362 KNEE BUCKLING, LEFT: ICD-10-CM

## 2018-02-07 PROCEDURE — 73562 X-RAY EXAM OF KNEE 3: CPT | Mod: LT

## 2018-02-07 PROCEDURE — 99214 OFFICE O/P EST MOD 30 MIN: CPT | Performed by: FAMILY MEDICINE

## 2018-02-07 NOTE — TELEPHONE ENCOUNTER
Per pt , letter needs to say she can be excused from work today as she was at appt for knee pain. It is just for today. No other additional information - just the reason for the visit.     I have started new letter, Please sign and have TC fax to number below.    Mishel Aguiar, RN, BSN

## 2018-02-07 NOTE — TELEPHONE ENCOUNTER
Patient called explaining she received a note for work today however, her work will not accept it    Patient is requesting the letter to state patient was off work and seen in clinic for her knees    Please fax to [phone]

## 2018-02-07 NOTE — LETTER
February 7, 2018      Marleen Mcfadden  [address]   [facility] [address]        To Whom It May Concern,        Marleen was seen in clinic.           Sincerely,        Yanna Matias MD

## 2018-02-07 NOTE — PROGRESS NOTES
SUBJECTIVE:   Marleen Mcfadden is a 52 year old female who presents to clinic today for the following health issues:      Since she had the surgery (total knee replacement) the knee buckling has decreased. On Saturday her knee buckled and she ended up falling backward. This occurred again on Sunday. She endorses swelling of the knee. She has been back to work for three weeks. She is phelobist. She uses cane for support. She goes to PT three times/week and goes to gym twice/week. She had a fever of 104 F, three weeks ago, with no other symptoms.     Problem list and histories reviewed & adjusted, as indicated.  Additional history: as documented      Reviewed and updated as needed this visit by clinical staff       Reviewed and updated as needed this visit by Provider         ROS:  Constitutional, HEENT, cardiovascular, pulmonary, gi and gu systems are negative, except as otherwise noted.    OBJECTIVE:     /89  Pulse 85  Temp 98  F (36.7  C) (Oral)  Resp 16  Wt 223 lb (101.2 kg)  LMP 10/19/2008  SpO2 99%  BMI 38.28 kg/m2  Body mass index is 38.28 kg/(m^2).  GENERAL: healthy, alert and no distress  EYES: Eyes grossly normal to inspection  HENT: nose and mouth without ulcers or lesions  MS/SKIN: left knee with healed scar, + mild edema and tenderness, normal ROM    Diagnostic Test Results:  none     ASSESSMENT/PLAN:     ## Knee buckling, left & swelling: status post total left knee replacement  - XR Knee Left 3 Views; Future  - US Extremity Non Vascular Left; Future  - likely due to deconditioning and pt needs continued strength training, continue PT  - ordered xray to ensure hardware is intact, patient and PT concerned about baker's cyst - ordered US for further evaluation   - recommended to f/u orthopedic surgeon     Yanna Matias MD  [facility]

## 2018-02-07 NOTE — LETTER
[place] [id number] [place] [address]  Phone: [phone]    February 7, 2018        Marleen Mcfadden  [address]   [facility] [address]          To whom it may concern:    RE: Marleen Mcfadden    Ms. Mcfadden needs to be excused from work today. She had an appointment at this clinic to be assessed for knee pain.     Please contact me for questions or concerns.      Sincerely,        Yanna Matias MD

## 2018-02-07 NOTE — LETTER
February 8, 2018      Marleen Mcfadden  [address]   [facility] [address]        Dear ,    We are writing to inform you of your test results.    Here are your results for your recent xray which was normal. Please call or message me if you have questions or concerns.     Resulted Orders   XR Knee Left 3 Views    Narrative    KNEE THREE VIEWS LEFT  2/7/2018 2:26 PM     HISTORY:  Knee buckling, left. Swollen left knee.    COMPARISON: 5/5/2016.    FINDINGS: Unremarkable left knee arthroplasty. No suprapatellar  effusion. There is no acute fracture. No dislocation. Sunrise views  appear aligned.  There are no worrisome bony lesions.      Impression    IMPRESSION:  No acute osseous abnormality demonstrated.    YARELI BERTRAND MD       If you have any questions or concerns, please call the clinic at the number listed above.       Sincerely,    Yanna Matias MD/nr

## 2018-02-07 NOTE — MR AVS SNAPSHOT
"              After Visit Summary   2/7/2018    Marleen Mcfadden    MRN: [id number]           Patient Information     Date Of Birth          1965        Visit Information        Provider Department      2/7/2018 12:40 PM Yanna Matias MD [facility]        Care Instructions    Please call [phone] to schedule imaging.           Follow-ups after your visit        Your next 10 appointments already scheduled     Feb 14, 2018  8:00 AM CST   Lab with [facility] LAB   [facility])    [address]  1st Floor  [facility] [id number] [phone]            Feb 14, 2018  8:20 AM CST   (Arrive by 8:05 AM)   RETURN HEART FAILURE with Cassie Kolb MD   [facility])    [address] [place] [address]  [address]  [facility] [address] [phone]              Who to contact     If you have questions or need follow up information about today's clinic visit or your schedule please contact [facility] directly at [phone].  Normal or non-critical lab and imaging results will be communicated to you by MyChart, letter or phone within 4 business days after the clinic has received the results. If you do not hear from us within 7 days, please contact the clinic through [facility] or phone. If you have a critical or abnormal lab result, we will notify you by phone as soon as possible.  Submit refill requests through [employer] or call your pharmacy and they will forward the refill request to us. Please allow 3 business days for your refill to be completed.          Additional Information About Your Visit        MyChart Information     [employer] lets you send messages to your doctor, view your test results, renew your prescriptions, schedule appointments and more. To sign up, go to [email or web address] . Click on \"Log in\" on the left side of the screen, which will take you to the Welcome page. Then click " "on \"Sign up Now\" on the right side of the page.     You will be asked to enter the access code listed below, as well as some personal information. Please follow the directions to create your username and password.     Your access code is: [email or web address]  Expires: 2018  6:31 AM     Your access code will  in 90 days. If you need help or a new code, please call your [place] clinic or [phone].        Care EveryWhere ID     This is your Care EveryWhere ID. This could be used by other organizations to access your [place] medical records  [id number]        Your Vitals Were     Pulse Temperature Respirations Last Period Pulse Oximetry BMI (Body Mass Index)    85 98  F (36.7  C) (Oral) 16 10/19/2008 99% 38.28 kg/m2       Blood Pressure from Last 3 Encounters:   18 123/89   09/15/17 118/63   17 121/83    Weight from Last 3 Encounters:   18 223 lb (101.2 kg)   09/15/17 216 lb (98 kg)   17 217 lb 4.8 oz (98.6 kg)              Today, you had the following     No orders found for display       Primary Care Provider Office Phone # Fax #    Danae Grimes [phone] [address] [facility] [address]        Equal Access to Services     DOREEN SAUNDERS : Hadii sage Vickers, waaxda luqadaha, qaybta kaaljose abbasi, stone clarke . So [facility] [phone].    ATENCIÓN: Si habla español, tiene a escalera disposición servicios gratuitos de asistencia lingüística. Erma al [phone].    We comply with applicable federal civil rights laws and [place] laws. We do not discriminate on the basis of race, color, national origin, age, disability, sex, sexual orientation, or gender identity.            Thank you!     Thank you for choosing [facility]  for your care. Our goal is always to provide you with excellent care. Hearing back from our patients is one way we can continue to improve our services. Please take a few " minutes to complete the written survey that you may receive in the mail after your visit with us. Thank you!             Your Updated Medication List - Protect others around you: Learn how to safely use, store and throw away your medicines at www.disposemymeds.org.          This list is accurate as of 2/7/18  1:09 PM.  Always use your most recent med list.                   Brand Name Dispense Instructions for use Diagnosis    Collagen 500 MG Caps           cyclobenzaprine 10 MG tablet    FLEXERIL    30 tablet    Take 0.5-1 tablets (5-10 mg) by mouth 3 times daily as needed for muscle spasms    Neck tightness       Diclofenac Potassium 50 MG Tabs      Take 50 mg by mouth daily        estradiol 0.0375 MG/24HR BIW patch    VIVELLE-DOT     Apply 1 patch topically 3 times daily as needed        FLAXSEED (LINSEED) PO           furosemide 20 MG tablet    LASIX    60 tablet    Take 2 tablets (40 mg) by mouth daily as needed    CHF with cardiomyopathy (H)       hydrochlorothiazide 12.5 MG capsule    MICROZIDE    90 capsule    Take 1 capsule (12.5 mg) by mouth daily    Familial cardiomyopathy (H)       losartan 100 MG tablet    COZAAR    90 tablet    Take 1 tablet (100 mg) by mouth daily    Familial cardiomyopathy (H)       metoprolol succinate 50 MG 24 hr tablet    TOPROL-XL    30 tablet    Take 1 tablet (50 mg) by mouth daily    Familial cardiomyopathy (H)       ondansetron 4 MG ODT tab    ZOFRAN ODT    20 tablet    Take 1-2 tablets (4-8 mg) by mouth every 8 hours as needed for nausea    Viral gastroenteritis, Non-intractable vomiting with nausea, unspecified vomiting type       order for DME     1 Device    Equipment being ordered:brace    Strain of left knee, initial encounter       progesterone 100 MG capsule    PROMETRIUM     Take 100 mg by mouth Reported on 3/8/2017        Testosterone 10 MG/ACT (2%) Gel      Apply 1 Application topically daily        traMADol 50 MG tablet    ULTRAM    40 tablet    Take 1 tablet (50  mg) by mouth every 8 hours as needed for moderate pain    Exacerbation of chronic back pain, Pain in both lower extremities       vitamin D 2000 UNITS tablet     100 tablet    Take 5,000 Units by mouth as needed Reported on 3/8/2017

## 2018-02-08 NOTE — TELEPHONE ENCOUNTER
Patient calling this morning regarding letter she was told that it was faxed but it looks like this has not been done yet, could you sign letter and then I can fax it for patient.   Thank you  Divine Brantley

## 2018-02-08 NOTE — TELEPHONE ENCOUNTER
Faxed letter to number [phone] and successfully went through called and let patient know that this has been done

## 2018-02-10 ENCOUNTER — RADIANT APPOINTMENT (OUTPATIENT)
Dept: ULTRASOUND IMAGING | Facility: CLINIC | Age: 53
End: 2018-02-10
Attending: FAMILY MEDICINE
Payer: OTHER MISCELLANEOUS

## 2018-02-10 DIAGNOSIS — I50.32 CHRONIC DIASTOLIC CONGESTIVE HEART FAILURE (H): ICD-10-CM

## 2018-02-10 DIAGNOSIS — M25.362 KNEE BUCKLING, LEFT: ICD-10-CM

## 2018-02-10 DIAGNOSIS — M25.462 SWOLLEN L KNEE: ICD-10-CM

## 2018-02-10 LAB
ANION GAP SERPL CALCULATED.3IONS-SCNC: 6 MMOL/L (ref 3–14)
BUN SERPL-MCNC: 15 MG/DL (ref 7–30)
CALCIUM SERPL-MCNC: 8.6 MG/DL (ref 8.5–10.1)
CHLORIDE SERPL-SCNC: 106 MMOL/L (ref 94–109)
CO2 SERPL-SCNC: 28 MMOL/L (ref 20–32)
CREAT SERPL-MCNC: 0.69 MG/DL (ref 0.52–1.04)
GFR SERPL CREATININE-BSD FRML MDRD: 89 ML/MIN/1.7M2
GLUCOSE SERPL-MCNC: 93 MG/DL (ref 70–99)
POTASSIUM SERPL-SCNC: 4.2 MMOL/L (ref 3.4–5.3)
SODIUM SERPL-SCNC: 140 MMOL/L (ref 133–144)

## 2018-02-10 NOTE — LETTER
February 13, 2018      Marleen Mcfadden  [address]   [facility] [address]        Dear ,    We are writing to inform you of your test results.    Here are your results for your recent knee ultrasound which was normal. Please call or message me if you have questions or concerns.     Resulted Orders   US Extremity Non Vascular Left    Narrative    Exam: Limited soft tissue ultrasound of the left lower extremity   2/10/2018 10:59 AM      History: Assess for Baker's cyst    Comparison: None    Findings: No Baker's cyst identified within the left popliteal fossa.  No soft tissue abnormalities within the patient's area of pain in the  left knee.      Impression    Impression: No Baker's cyst identified. No abnormal mass or lesion  within the soft tissues around the left knee.    I have personally reviewed the examination and initial interpretation  and I agree with the findings.    OLIVER JOHNSON MD       If you have any questions or concerns, please call the clinic at the number listed above.     Sincerely,  Yanna Matias MD/nr

## 2018-02-12 NOTE — PROGRESS NOTES
Please send the letter to the patient with the following.         Here are your results for your recent knee ultrasound which was normal. Please call or message me if you have questions or concerns.

## 2018-02-14 ENCOUNTER — OFFICE VISIT (OUTPATIENT)
Dept: CARDIOLOGY | Facility: CLINIC | Age: 53
End: 2018-02-14
Attending: INTERNAL MEDICINE
Payer: COMMERCIAL

## 2018-02-14 VITALS
DIASTOLIC BLOOD PRESSURE: 90 MMHG | OXYGEN SATURATION: 97 % | WEIGHT: 223 LBS | SYSTOLIC BLOOD PRESSURE: 131 MMHG | BODY MASS INDEX: 38.07 KG/M2 | HEART RATE: 77 BPM | RESPIRATION RATE: 18 BRPM | HEIGHT: 64 IN

## 2018-02-14 DIAGNOSIS — I50.9 CHF WITH CARDIOMYOPATHY (H): Primary | ICD-10-CM

## 2018-02-14 DIAGNOSIS — I42.9 CHF WITH CARDIOMYOPATHY (H): Primary | ICD-10-CM

## 2018-02-14 PROCEDURE — 99214 OFFICE O/P EST MOD 30 MIN: CPT | Mod: GC | Performed by: INTERNAL MEDICINE

## 2018-02-14 PROCEDURE — G0463 HOSPITAL OUTPT CLINIC VISIT: HCPCS | Mod: ZF

## 2018-02-14 ASSESSMENT — PAIN SCALES - GENERAL: PAINLEVEL: NO PAIN (0)

## 2018-02-14 NOTE — NURSING NOTE
Chief Complaint   Patient presents with     RECHECK     5 month follow up on Marleen and her heart failure     Mj Espino CMA at 8:04 AM on 2/14/2018
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
## 2018-02-14 NOTE — MR AVS SNAPSHOT
After Visit Summary   2/14/2018    Marleen Mcfadden    MRN: [id number]           Patient Information     Date Of Birth          1965        Visit Information        Provider Department      2/14/2018 8:20 AM Cassie Kolb MD [facility]        Today's Diagnoses     CHF with cardiomyopathy (H)    -  1      Care Instructions    Cardiology Providers you saw during your visit:  Dr. Kolb    Medication changes:    1-Please take your medications as prescribed. Please call if you are having issues with filling your medications.    Follow up:    1-Please return to clinic for follow-up:  With Dr. Kolb in 3 months with blood work prior      Please call if you have any questions or concerns.    Follow the American Heart Association Diet and Lifestyle recommendations:  Limit saturated fat, trans fat, sodium, red meat, sweets and sugar-sweetened beverages. If you choose to eat red meat, compare labels and select the leanest cuts available.  Aim for at least 150 minutes of moderate physical activity or 75 minutes of vigorous physical activity - or an equal combination of both - each week.    During business hours: [phone], press option # 1 to be routed to the [facility] then option # 3 for medical questions to speak with a nurse      After hours, weekends or holidays: On Call Cardiologist- [phone]   option #4 and ask to speak to the on-call Cardiologist. Inform them you are a CORE/heart failure patient at the [facility].      Stephanie Plummer, RN  Cardiology Nurse Care Coordinator    Keep up the good work!    Take Care!            Follow-ups after your visit        Additional Services     Follow-Up with Advanced Heart Failure Clinic       With labs prior                  Your next 10 appointments already scheduled     May 16, 2018  3:30 PM CDT   Lab with  LAB   [facility] ([facility])    [address] [place] [id number]  "  [phone]            May 16, 2018  4:00 PM CDT   (Arrive by 3:45 PM)   RETURN HEART FAILURE with Cassie Kolb MD   [facility] ([facility])    [address] [place] [id number] [phone]              Future tests that were ordered for you today     Open Future Orders        Priority Expected Expires Ordered    Basic metabolic panel Routine 2018    Follow-Up with Advanced Heart Failure Clinic Routine 2018            Who to contact     If you have questions or need follow up information about today's clinic visit or your schedule please contact [facility] directly at [phone].  Normal or non-critical lab and imaging results will be communicated to you by MyChart, letter or phone within 4 business days after the clinic has received the results. If you do not hear from us within 7 days, please contact the clinic through MyChart or phone. If you have a critical or abnormal lab result, we will notify you by phone as soon as possible.  Submit refill requests through [employer] or call your pharmacy and they will forward the refill request to us. Please allow 3 business days for your refill to be completed.          Additional Information About Your Visit        [employer] Information     [employer] lets you send messages to your doctor, view your test results, renew your prescriptions, schedule appointments and more. To sign up, go to [email or web address] . Click on \"Log in\" on the left side of the screen, which will take you to the Welcome page. Then click on \"Sign up Now\" on the right side of the page.     You will be asked to enter the access code listed below, as well as some personal information. Please follow the directions to create your username and password.     Your access code is: [email or web address]  Expires: 2018  6:31 AM     Your access code will  in 90 days. If you need help " "or a new code, please call your [facility] clinic or [phone].        Care EveryWhere ID     This is your Care EveryWhere ID. This could be used by other organizations to access your [facility] medical records  [id number]        Your Vitals Were     Pulse Respirations Height Last Period Pulse Oximetry BMI (Body Mass Index)    77 18 1.626 m (5' 4\") 10/19/2008 97% 38.28 kg/m2       Blood Pressure from Last 3 Encounters:   02/14/18 131/90   02/07/18 123/89   09/15/17 118/63    Weight from Last 3 Encounters:   02/14/18 101.2 kg (223 lb)   02/07/18 101.2 kg (223 lb)   09/15/17 98 kg (216 lb)               Primary Care Provider Office Phone # Fax #    Danae Grimes [phone] [address] [facility] [address]        Equal Access to Services     PAOLA SAUNDERS : Hadii aad ku hadasho Soomaali, waaxda luqadaha, qaybta kaalmada adeegyada, waxay mary annein haytrippn tahir clarke . So [facility] [phone].    ATENCIÓN: Si habla español, tiene a escalera disposición servicios gratuitos de asistencia lingüística. Llame al [phone].    We comply with applicable federal civil rights laws and [place] laws. We do not discriminate on the basis of race, color, national origin, age, disability, sex, sexual orientation, or gender identity.            Thank you!     Thank you for choosing [facility]  for your care. Our goal is always to provide you with excellent care. Hearing back from our patients is one way we can continue to improve our services. Please take a few minutes to complete the written survey that you may receive in the mail after your visit with us. Thank you!             Your Updated Medication List - Protect others around you: Learn how to safely use, store and throw away your medicines at www.disposemymeds.org.          This list is accurate as of 2/14/18  9:25 AM.  Always use your most recent med list.                   Brand Name Dispense Instructions for use Diagnosis    " Collagen 500 MG Caps           cyclobenzaprine 10 MG tablet    FLEXERIL    30 tablet    Take 0.5-1 tablets (5-10 mg) by mouth 3 times daily as needed for muscle spasms    Neck tightness       Diclofenac Potassium 50 MG Tabs      Take 50 mg by mouth daily        estradiol 0.0375 MG/24HR BIW patch    VIVELLE-DOT     Apply 1 patch topically 3 times daily as needed        FLAXSEED (LINSEED) PO           furosemide 20 MG tablet    LASIX    60 tablet    Take 2 tablets (40 mg) by mouth daily as needed    CHF with cardiomyopathy (H)       hydrochlorothiazide 12.5 MG capsule    MICROZIDE    90 capsule    Take 1 capsule (12.5 mg) by mouth daily    Familial cardiomyopathy (H)       losartan 100 MG tablet    COZAAR    90 tablet    Take 1 tablet (100 mg) by mouth daily    Familial cardiomyopathy (H)       metoprolol succinate 50 MG 24 hr tablet    TOPROL-XL    30 tablet    Take 1 tablet (50 mg) by mouth daily    Familial cardiomyopathy (H)       ondansetron 4 MG ODT tab    ZOFRAN ODT    20 tablet    Take 1-2 tablets (4-8 mg) by mouth every 8 hours as needed for nausea    Viral gastroenteritis, Non-intractable vomiting with nausea, unspecified vomiting type       order for DME     1 Device    Equipment being ordered:brace    Strain of left knee, initial encounter       progesterone 100 MG capsule    PROMETRIUM     Take 100 mg by mouth Reported on 3/8/2017        Testosterone 10 MG/ACT (2%) Gel      Apply 1 Application topically daily        traMADol 50 MG tablet    ULTRAM    40 tablet    Take 1 tablet (50 mg) by mouth every 8 hours as needed for moderate pain    Exacerbation of chronic back pain, Pain in both lower extremities       vitamin D 2000 UNITS tablet     100 tablet    Take 5,000 Units by mouth as needed Reported on 3/8/2017

## 2018-02-14 NOTE — PATIENT INSTRUCTIONS
Cardiology Providers you saw during your visit:  Dr. Kolb    Medication changes:    1-Please take your medications as prescribed. Please call if you are having issues with filling your medications.    Follow up:    1-Please return to clinic for follow-up:  With Dr. Kolb in 3 months with blood work prior      Please call if you have any questions or concerns.    Follow the American Heart Association Diet and Lifestyle recommendations:  Limit saturated fat, trans fat, sodium, red meat, sweets and sugar-sweetened beverages. If you choose to eat red meat, compare labels and select the leanest cuts available.  Aim for at least 150 minutes of moderate physical activity or 75 minutes of vigorous physical activity - or an equal combination of both - each week.    During business hours: [phone], press option # 1 to be routed to the [facility] then option # 3 for medical questions to speak with a nurse      After hours, weekends or holidays: On Call Cardiologist- [phone]   option #4 and ask to speak to the on-call Cardiologist. Inform them you are a CORE/heart failure patient at the [facility].      Stephanie Plummer RN  Cardiology Nurse Care Coordinator    Keep up the good work!    Take Care!

## 2018-02-14 NOTE — NURSING NOTE
Diet: Patient instructed regarding a heart healthy diet, including discussion of reduced fat and sodium intake. Patient demonstrated understanding of this information and agreed to call with further questions or concerns.  Labs: Patient was given results of the laboratory testing obtained today. Patient was instructed to return for the next laboratory testing in at next clinic visit . Patient demonstrated understanding of this information and agreed to call with further questions or concerns.   Return Appointment: Patient given instructions regarding scheduling next clinic visit. Patient demonstrated understanding of this information and agreed to call with further questions or concerns.  Patient stated she understood all health information given and agreed to call with further questions or concerns.

## 2018-02-15 ENCOUNTER — OFFICE VISIT (OUTPATIENT)
Dept: FAMILY MEDICINE | Facility: CLINIC | Age: 53
End: 2018-02-15
Payer: COMMERCIAL

## 2018-02-15 VITALS
HEART RATE: 72 BPM | SYSTOLIC BLOOD PRESSURE: 137 MMHG | WEIGHT: 223 LBS | OXYGEN SATURATION: 100 % | DIASTOLIC BLOOD PRESSURE: 89 MMHG | BODY MASS INDEX: 38.28 KG/M2 | RESPIRATION RATE: 16 BRPM | TEMPERATURE: 97.8 F

## 2018-02-15 DIAGNOSIS — Z13.6 CARDIOVASCULAR SCREENING; LDL GOAL LESS THAN 160: ICD-10-CM

## 2018-02-15 DIAGNOSIS — E04.1 THYROID NODULE: ICD-10-CM

## 2018-02-15 DIAGNOSIS — R07.0 THROAT PAIN: Primary | ICD-10-CM

## 2018-02-15 PROCEDURE — 84443 ASSAY THYROID STIM HORMONE: CPT | Performed by: FAMILY MEDICINE

## 2018-02-15 PROCEDURE — 84439 ASSAY OF FREE THYROXINE: CPT | Performed by: FAMILY MEDICINE

## 2018-02-15 PROCEDURE — 99214 OFFICE O/P EST MOD 30 MIN: CPT | Performed by: FAMILY MEDICINE

## 2018-02-15 PROCEDURE — 36415 COLL VENOUS BLD VENIPUNCTURE: CPT | Performed by: FAMILY MEDICINE

## 2018-02-15 PROCEDURE — 84460 ALANINE AMINO (ALT) (SGPT): CPT | Performed by: FAMILY MEDICINE

## 2018-02-15 ASSESSMENT — PATIENT HEALTH QUESTIONNAIRE - PHQ9
SUM OF ALL RESPONSES TO PHQ QUESTIONS 1-9: 0

## 2018-02-15 NOTE — PROGRESS NOTES
SUBJECTIVE:   Marleen Mcfadden is a 52 year old female who presents to clinic today for the following health issues:      Pain; neck/throat      Duration: x 3 months, off/on, Marleen describes sharp pain like a pencil in her her throat, first noticed when she was swallowing her pills, had pain with swallowing a large pill but was able to swallow it. She's been afraid to take any large pills since then.    Description (location/character/radiation): pain in neck/thyroid area    Intensity:  5/10    Accompanying signs and symptoms: pain with swallowing medication (a few days ago)    History (similar episodes/previous evaluation): hx of thyroid surgery    Precipitating or alleviating factors: pain with swallowing    Therapies tried and outcome: None        Family history of thyroid nodules       Problem list and histories reviewed & adjusted, as indicated.  Additional history: as documented    Patient Active Problem List   Diagnosis     Autoimmune disease, not elsewhere classified     Primary cardiomyopathy (H)     Leiomyoma of uterus     Allergic rhinitis     Anemia     Sinus bradycardia     Health Care Home     Itching     CHF with cardiomyopathy (H)     TB lung, latent     Knee pain     Swelling of knee joint     Thyroid nodule     Pre-operative cardiovascular examination     Post-op pain     Pain in joint involving ankle and foot     Calcaneal spur     Plantar fasciitis     CARDIOVASCULAR SCREENING; LDL GOAL LESS THAN 160     Peroneus longus tendinitis     Morbid obesity (H)     Shaina's nodes     Familial cardiomyopathy (H)     Acute pain of right knee     Acute bilateral low back pain with right-sided sciatica     Degenerative disc disease at L5-S1 level     Chronic bilateral low back pain with right-sided sciatica     Chronic bilateral low back pain with left-sided sciatica     Elevated blood pressure reading without diagnosis of hypertension     Chronic diastolic congestive heart failure (H)     Injury  of left shoulder, initial encounter     Acute pain of left knee     Sprain of other ligament of left ankle, initial encounter     Left shoulder pain     Rotator cuff injury     Past Surgical History:   Procedure Laterality Date     C BREAST SURGERY PROCEDURE UNLISTED      Breast Reduction     C  DELIVERY ONLY  10/85,     , Low Cervicalx2     C EXCIS UTERINE FIBROID,ABD APPRCH       C EXCISE EXCESS SKIN TISSUE,ABDOMEN       C LIGATE FALLOPIAN TUBE       C REPAIR CRUCIATE LIGAMENT,KNEE      Right Knee     HYSTERECTOMY TOTAL ABDOMINAL  2006     THYROIDECTOMY  2013    Procedure: THYROIDECTOMY;  LEFT THYROID LOBECTOMY.  (LIGASURE, RECURRENT LARYNGEAL NERVE MONITOR) ;  Surgeon: Uriah Camargo MD;  Location: [facility]       Social History   Substance Use Topics     Smoking status: Never Smoker     Smokeless tobacco: Never Used     Alcohol use Yes      Comment: rare, twice a year, she has a drink little wine 2 days ago     Family History   Problem Relation Age of Onset     Hypertension Father      dec     DIABETES Father      dec     HEART DISEASE Father      dec     Alcohol/Drug Father      Cardiovascular Father      HEART DISEASE Mother      dec     Alcohol/Drug Mother      Cardiovascular Mother      HEART DISEASE Daughter      Cardiomyopathy     Cardiovascular Daughter      cardiomyopathy     Colon Cancer Sister      Cardiovascular Son      cardiomyopathy     DIABETES Paternal Grandmother      dec     Hypertension Paternal Grandmother      dec     CEREBROVASCULAR DISEASE Paternal Grandmother      dec     CANCER Sister      Lupus     Cardiovascular Sister      cardiomyopathy     HEART DISEASE Sister      heart failure, and kidney failure         Current Outpatient Prescriptions   Medication Sig Dispense Refill     furosemide (LASIX) 20 MG tablet Take 2 tablets (40 mg) by mouth daily as needed 60 tablet 7     hydrochlorothiazide (MICROZIDE) 12.5 MG capsule Take 1 capsule  (12.5 mg) by mouth daily 90 capsule 3     losartan (COZAAR) 100 MG tablet Take 1 tablet (100 mg) by mouth daily 90 tablet 3     metoprolol (TOPROL-XL) 50 MG 24 hr tablet Take 1 tablet (50 mg) by mouth daily 30 tablet 11     Diclofenac Potassium 50 MG TABS Take 50 mg by mouth daily       estradiol (VIVELLE-DOT) 0.0375 MG/24HR BIW patch Apply 1 patch topically 3 times daily as needed       Testosterone 10 MG/ACT (2%) GEL Apply 1 Application topically daily       Collagen 500 MG CAPS        FLAXSEED, LINSEED, PO        progesterone (PROMETRIUM) 100 MG capsule Take 100 mg by mouth Reported on 3/8/2017       ondansetron (ZOFRAN ODT) 4 MG ODT tab Take 1-2 tablets (4-8 mg) by mouth every 8 hours as needed for nausea 20 tablet 1     order for DME Equipment being ordered:brace 1 Device 0     traMADol (ULTRAM) 50 MG tablet Take 1 tablet (50 mg) by mouth every 8 hours as needed for moderate pain 40 tablet 0     cyclobenzaprine (FLEXERIL) 10 MG tablet Take 0.5-1 tablets (5-10 mg) by mouth 3 times daily as needed for muscle spasms 30 tablet 0     Cholecalciferol (VITAMIN D) 2000 UNIT tablet Take 5,000 Units by mouth as needed Reported on 3/8/2017 100 tablet 12     Allergies   Allergen Reactions     Morphine      EMESIS     Sulfa Drugs Swelling     BP Readings from Last 3 Encounters:   02/15/18 137/89   02/14/18 131/90   02/07/18 123/89    Wt Readings from Last 3 Encounters:   02/15/18 223 lb (101.2 kg)   02/14/18 223 lb (101.2 kg)   02/07/18 223 lb (101.2 kg)                    Reviewed and updated as needed this visit by clinical staff  Tobacco  Allergies  Meds  Med Hx  Surg Hx  Fam Hx  Soc Hx      Reviewed and updated as needed this visit by Provider         ROS:  Constitutional, HEENT, cardiovascular, pulmonary, GI, , musculoskeletal, neuro, skin, endocrine and psych systems are negative, except as otherwise noted.    OBJECTIVE:     /89  Pulse 72  Temp 97.8  F (36.6  C) (Oral)  Resp 16  Wt 223 lb (101.2 kg)   LMP 10/19/2008  SpO2 100%  BMI 38.28 kg/m2  Body mass index is 38.28 kg/(m^2).  GENERAL: healthy, alert and no distress  NECK: no adenopathy, no asymmetry, masses, or scars and no thyroid nodules palpated, perhaps minor enlargement noted, normal swallow noted  RESP: lungs clear to auscultation - no rales, rhonchi or wheezes  CV: regular rate and rhythm, normal S1 S2, no S3 or S4, no murmur, click or rub, no peripheral edema and peripheral pulses strong  MS: uses cane for ambulation, wide based gait  PSYCH: mentation appears normal, affect normal/bright    ASSESSMENT/PLAN:       1. Throat pain  With hx of   2. Thyroid nodule  S/p partial thyroidectomy 9/9/2013  TSH   Date Value Ref Range Status   06/16/2015 2.82 0.40 - 4.00 mU/L Final   06/16/2014 2.17 0.4 - 5.0 mU/L Final   04/09/2014 2.65 0.4 - 5.0 mU/L Final   04/19/2013 1.20 0.4 - 5.0 mU/L Final   01/26/2009 1.98 0.4 - 5.0 mU/L Final   08/11/2006 1.49 0.4 - 5.0 mU/L Final   ]   Acute symptoms, etiology unclear, concerning for reoccurence of thyroid nodules  Will obtain thyroid labs and patient sent for thyroid ultrasound  Will fu as indicated.   - US Thyroid; Future  - TSH  - T4 free    3. CARDIOVASCULAR SCREENING; LDL GOAL LESS THAN 160  LDL Cholesterol Calculated   Date Value Ref Range Status   04/09/2014 96 0 - 129 mg/dL Final     Comment:     LDL Cholesterol is the primary guide to therapy: LDL-cholesterol goal in high   risk patients is <100 mg/dL and in very high risk patients is <70 mg/dL.   ] at goal, due for LFT testing  - ALT    Return to clinic for CPE, she does get PAP smear at outside clinic.    I discussed and recommend hep c testing, she declined today.    Reminded to schedule colonoscopy.    Danae Grimes MD  [facility]    Answers for HPI/ROS submitted by the patient on 2/15/2018   PHQ9 TOTAL SCORE: 0

## 2018-02-15 NOTE — PATIENT INSTRUCTIONS
Health Maintenance Due   Topic Date Due     URINE DRUG SCREEN Q1 YR  07/06/1979     HEPATITIS C SCREENING  07/06/1982     LIPID MONITORING Q1 YEAR  04/09/2015     HF ACTION PLAN Q3 YR  11/01/2015     ALT Q1 YR  08/31/2017     INFLUENZA VACCINE (SYSTEM ASSIGNED)  09/01/2017     COLON CANCER SCREEN (SYSTEM ASSIGNED)  11/07/2017     GREGORIO QUESTIONNAIRE 1 YEAR  12/19/2017     PHQ-9 Q1YR  12/19/2017     PAP Q3 YR  07/01/2018        Please return to clinic for a physical with PAP smear and fasting labs, thanks!    [facility]   [phone]: main number: let them know you want to schedule a radiological exam (ultrasound neck).    Or call [facility] at [phone] .    Locations:   [facility], [address] [place] [address]            Sincerely,  Dr. Danae Grimes MD  2/15/2018

## 2018-02-16 ENCOUNTER — HOSPITAL ENCOUNTER (OUTPATIENT)
Dept: ULTRASOUND IMAGING | Facility: CLINIC | Age: 53
Discharge: HOME | End: 2018-02-16
Attending: FAMILY MEDICINE | Admitting: FAMILY MEDICINE
Payer: COMMERCIAL

## 2018-02-16 DIAGNOSIS — E04.1 THYROID NODULE: ICD-10-CM

## 2018-02-16 DIAGNOSIS — R07.0 THROAT PAIN: ICD-10-CM

## 2018-02-16 LAB
ALT SERPL W P-5'-P-CCNC: 27 U/L (ref 0–50)
T4 FREE SERPL-MCNC: 0.94 NG/DL (ref 0.76–1.46)
TSH SERPL DL<=0.005 MIU/L-ACNC: 2.15 MU/L (ref 0.4–4)

## 2018-02-16 PROCEDURE — 76536 US EXAM OF HEAD AND NECK: CPT

## 2018-02-16 ASSESSMENT — PATIENT HEALTH QUESTIONNAIRE - PHQ9: SUM OF ALL RESPONSES TO PHQ QUESTIONS 1-9: 0

## 2018-02-16 NOTE — PROGRESS NOTES
That was fast!  Great news, the ultrasound looks normal, you have a small part of your thyroid remaining since the surgery with no cysts or goiters.    Sincerely,  Dr. Danae Grimes MD  2/16/2018

## 2018-02-16 NOTE — PROGRESS NOTES
Hello!  It was a pleasure to see you in clinic!  Thank you for getting labs done. Everything looks normal, which is good news.     Your thyroid labs were all normal and your liver function test was normal as well.      Sincerely,  Dr. Danae Grimes MD  2/16/2018

## 2018-03-07 ENCOUNTER — TELEPHONE (OUTPATIENT)
Dept: FAMILY MEDICINE | Facility: CLINIC | Age: 53
End: 2018-03-07

## 2018-03-07 DIAGNOSIS — R07.0 THROAT PAIN: Primary | ICD-10-CM

## 2018-03-07 NOTE — TELEPHONE ENCOUNTER
Reason for Call: Request for an order or referral:    Order or referral being requested: ENT Referral    Date needed: as soon as possible    Has the patient been seen by the PCP for this problem? YES    Additional comments: Gave pt US results & she still has pain in the throat. Patient would like a referral to see ENT. Please advise, thank you!    Phone number Patient can be reached at:  Home number on file [phone] (home)    Best Time:  anytime    Can we leave a detailed message on this number?  YES    Call taken on 3/7/2018 at 12:20 PM by Nery Oliver

## 2018-03-26 ENCOUNTER — OFFICE VISIT (OUTPATIENT)
Dept: FAMILY MEDICINE | Facility: CLINIC | Age: 53
End: 2018-03-26
Payer: OTHER MISCELLANEOUS

## 2018-03-26 VITALS
WEIGHT: 218 LBS | SYSTOLIC BLOOD PRESSURE: 136 MMHG | OXYGEN SATURATION: 99 % | DIASTOLIC BLOOD PRESSURE: 85 MMHG | TEMPERATURE: 98.1 F | HEIGHT: 64 IN | BODY MASS INDEX: 37.22 KG/M2 | HEART RATE: 74 BPM

## 2018-03-26 DIAGNOSIS — Z96.652 STATUS POST LEFT KNEE REPLACEMENT: Primary | ICD-10-CM

## 2018-03-26 DIAGNOSIS — S83.92XA SPRAIN OF LEFT KNEE/LEG, INITIAL ENCOUNTER: ICD-10-CM

## 2018-03-26 PROCEDURE — 99213 OFFICE O/P EST LOW 20 MIN: CPT | Performed by: PHYSICIAN ASSISTANT

## 2018-03-26 RX ORDER — CYCLOBENZAPRINE HCL 10 MG
5-10 TABLET ORAL 3 TIMES DAILY PRN
Qty: 60 TABLET | Refills: 1 | Status: SHIPPED | OUTPATIENT
Start: 2018-03-26 | End: 2018-10-22

## 2018-03-26 RX ORDER — DICLOFENAC SODIUM 75 MG/1
75 TABLET, DELAYED RELEASE ORAL 2 TIMES DAILY PRN
Qty: 40 TABLET | Refills: 1 | Status: SHIPPED | OUTPATIENT
Start: 2018-03-26 | End: 2018-10-22

## 2018-03-26 ASSESSMENT — ANXIETY QUESTIONNAIRES
5. BEING SO RESTLESS THAT IT IS HARD TO SIT STILL: NOT AT ALL
1. FEELING NERVOUS, ANXIOUS, OR ON EDGE: NOT AT ALL
2. NOT BEING ABLE TO STOP OR CONTROL WORRYING: NOT AT ALL
6. BECOMING EASILY ANNOYED OR IRRITABLE: NOT AT ALL
3. WORRYING TOO MUCH ABOUT DIFFERENT THINGS: NOT AT ALL
7. FEELING AFRAID AS IF SOMETHING AWFUL MIGHT HAPPEN: NOT AT ALL
GAD7 TOTAL SCORE: 0

## 2018-03-26 ASSESSMENT — PAIN SCALES - GENERAL: PAINLEVEL: SEVERE PAIN (6)

## 2018-03-26 ASSESSMENT — PATIENT HEALTH QUESTIONNAIRE - PHQ9: 5. POOR APPETITE OR OVEREATING: NOT AT ALL

## 2018-03-26 NOTE — LETTER
[address] [address]  Phone: [phone]    March 26, 2018        Marleen Mcfadden  [address]   [facility] [address]          To whom it may concern:    RE: Marleen Mcfadden    Patient was seen and treated today at our clinic.  Patient may return to work 3/26/18 with the following:  Limited walking, stair use for 1 week 3/26/18-4/2/18. Allow patient to sit and rest left knee for 15 min  every hour.     Please contact me for questions or concerns.      Sincerely,        Kellie Cohen PAC

## 2018-03-26 NOTE — MR AVS SNAPSHOT
"              After Visit Summary   3/26/2018    Marleen Mcfadden    MRN: [id number]           Patient Information     Date Of Birth          1965        Visit Information        Provider Department      3/26/2018 9:20 AM Katherine Cohen PA-C [facility]        Today's Diagnoses     Status post left knee replacement    -  1    Screen for colon cancer        Neck tightness           Follow-ups after your visit        Your next 10 appointments already scheduled     May 16, 2018  3:30 PM CDT   Lab with  LAB   [facility])    [address]  1st Floor  [facility] [id number] [phone]            May 16, 2018  4:00 PM CDT   (Arrive by 3:45 PM)   RETURN HEART FAILURE with Cassie Kolb MD   [facility])    [address] [place] [address]  [address]  [facility] [address] [phone]              Who to contact     If you have questions or need follow up information about today's clinic visit or your schedule please contact [facility] directly at [phone].  Normal or non-critical lab and imaging results will be communicated to you by MyChart, letter or phone within 4 business days after the clinic has received the results. If you do not hear from us within 7 days, please contact the clinic through MyChart or phone. If you have a critical or abnormal lab result, we will notify you by phone as soon as possible.  Submit refill requests through [employer] or call your pharmacy and they will forward the refill request to us. Please allow 3 business days for your refill to be completed.          Additional Information About Your Visit        MyChart Information     [employer] lets you send messages to your doctor, view your test results, renew your prescriptions, schedule appointments and more. To sign up, go to [email or web address] . Click on \"Log in\" on the left " "side of the screen, which will take you to the Welcome page. Then click on \"Sign up Now\" on the right side of the page.     You will be asked to enter the access code listed below, as well as some personal information. Please follow the directions to create your username and password.     Your access code is: [email or web address]  Expires: 2018  7:31 AM     Your access code will  in 90 days. If you need help or a new code, please call your [facility] or [phone].        Care EveryWhere ID     This is your Care EveryWhere ID. This could be used by other organizations to access your [facility] medical records  [id number]        Your Vitals Were     Pulse Temperature Height Last Period Pulse Oximetry BMI (Body Mass Index)    74 98.1  F (36.7  C) (Oral) 5' 4\" (1.626 m) 10/19/2008 99% 37.42 kg/m2       Blood Pressure from Last 3 Encounters:   18 136/85   02/15/18 137/89   18 131/90    Weight from Last 3 Encounters:   18 218 lb (98.9 kg)   02/15/18 223 lb (101.2 kg)   18 223 lb (101.2 kg)              Today, you had the following     No orders found for display         Today's Medication Changes          These changes are accurate as of 3/26/18  9:21 AM.  If you have any questions, ask your nurse or doctor.               Start taking these medicines.        Dose/Directions    diclofenac 75 MG EC tablet   Commonly known as:  VOLTAREN   Used for:  Status post left knee replacement   Started by:  Katherine Cohen PA-C        Dose:  75 mg   Take 1 tablet (75 mg) by mouth 2 times daily as needed for moderate pain   Quantity:  40 tablet   Refills:  1            Where to get your medicines      These medications were sent to [facility] [address] [facility] [address] AT [facility] [address]  [address], [facility] [address]     Phone:  [phone]     cyclobenzaprine 10 MG tablet    diclofenac 75 MG EC tablet                Primary Care " Provider Office Phone # Fax #    Danae Grimes [phone] [address]  [facility] [address]        Equal Access to Services     PAOLA SAUNDERS : Hadii sage sharma gabeo Soauryali, waaxda luqadaha, qaybta kaalmada adebrodyyada, stone wren laHomerojohnna berger. So [facility] [phone].    ATENCIÓN: Si habla español, tiene a escalera disposición servicios gratuitos de asistencia lingüística. Llame al [phone].    We comply with applicable federal civil rights laws and [place] laws. We do not discriminate on the basis of race, color, national origin, age, disability, sex, sexual orientation, or gender identity.            Thank you!     Thank you for choosing [facility]  for your care. Our goal is always to provide you with excellent care. Hearing back from our patients is one way we can continue to improve our services. Please take a few minutes to complete the written survey that you may receive in the mail after your visit with us. Thank you!             Your Updated Medication List - Protect others around you: Learn how to safely use, store and throw away your medicines at www.disposemymeds.org.          This list is accurate as of 3/26/18  9:21 AM.  Always use your most recent med list.                   Brand Name Dispense Instructions for use Diagnosis    Collagen 500 MG Caps           cyclobenzaprine 10 MG tablet    FLEXERIL    60 tablet    Take 0.5-1 tablets (5-10 mg) by mouth 3 times daily as needed for muscle spasms    Neck tightness       diclofenac 75 MG EC tablet    VOLTAREN    40 tablet    Take 1 tablet (75 mg) by mouth 2 times daily as needed for moderate pain    Status post left knee replacement       Diclofenac Potassium 50 MG Tabs      Take 50 mg by mouth daily        estradiol 0.0375 MG/24HR BIW patch    VIVELLE-DOT     Apply 1 patch topically 3 times daily as needed        FLAXSEED (LINSEED) PO           furosemide 20 MG tablet    LASIX    60  tablet    Take 2 tablets (40 mg) by mouth daily as needed    CHF with cardiomyopathy (H)       hydrochlorothiazide 12.5 MG capsule    MICROZIDE    90 capsule    Take 1 capsule (12.5 mg) by mouth daily    Familial cardiomyopathy (H)       losartan 100 MG tablet    COZAAR    90 tablet    Take 1 tablet (100 mg) by mouth daily    Familial cardiomyopathy (H)       metoprolol succinate 50 MG 24 hr tablet    TOPROL-XL    30 tablet    Take 1 tablet (50 mg) by mouth daily    Familial cardiomyopathy (H)       ondansetron 4 MG ODT tab    ZOFRAN ODT    20 tablet    Take 1-2 tablets (4-8 mg) by mouth every 8 hours as needed for nausea    Viral gastroenteritis, Non-intractable vomiting with nausea, unspecified vomiting type       order for DME     1 Device    Equipment being ordered:brace    Strain of left knee, initial encounter       progesterone 100 MG capsule    PROMETRIUM     Take 100 mg by mouth Reported on 3/8/2017        Testosterone 10 MG/ACT (2%) Gel      Apply 1 Application topically daily        traMADol 50 MG tablet    ULTRAM    40 tablet    Take 1 tablet (50 mg) by mouth every 8 hours as needed for moderate pain    Exacerbation of chronic back pain, Pain in both lower extremities       vitamin D 2000 UNITS tablet     100 tablet    Take 5,000 Units by mouth as needed Reported on 3/8/2017

## 2018-03-26 NOTE — PROGRESS NOTES
SUBJECTIVE:   Marleen Mcfadden is a 52 year old female who presents to clinic today for the following health issues:  Joint Pain    Onset: today    Description:   Location: whole leg muscles cramping and tightening  Character: muscle cramping    Intensity: 6/10, in the morning was 10/10    Progression of Symptoms: better since morning    Accompanying Signs & Symptoms:  Other symptoms: swelling, numbness since had surgery    History:   Previous similar pain: YES- had localized but not in whole leg      Precipitating factors:   Trauma or overuse: yes, patient was walking her dog yesterday and dog pulled on leash and caused mild twisting of the left knee. Patient also had knee surgery in October 2017    Alleviating factors:  Improved by: ice  Therapies Tried and outcome: ice, tylenol        Problem list and histories reviewed & adjusted, as indicated.  Additional history: patient reports that she had a recent ortho follow up and had xrays done that showed normal position of hardware. Patient does not want to get any xrays today.     Patient Active Problem List   Diagnosis     Autoimmune disease, not elsewhere classified     Primary cardiomyopathy (H)     Leiomyoma of uterus     Allergic rhinitis     Anemia     Sinus bradycardia     Health Care Home     Itching     CHF with cardiomyopathy (H)     TB lung, latent     Knee pain     Swelling of knee joint     Thyroid nodule     Pre-operative cardiovascular examination     Post-op pain     Pain in joint involving ankle and foot     Calcaneal spur     Plantar fasciitis     CARDIOVASCULAR SCREENING; LDL GOAL LESS THAN 160     Peroneus longus tendinitis     Morbid obesity (H)     Shaina's nodes     Familial cardiomyopathy (H)     Acute pain of right knee     Acute bilateral low back pain with right-sided sciatica     Degenerative disc disease at L5-S1 level     Chronic bilateral low back pain with right-sided sciatica     Chronic bilateral low back pain with left-sided  sciatica     Elevated blood pressure reading without diagnosis of hypertension     Chronic diastolic congestive heart failure (H)     Injury of left shoulder, initial encounter     Acute pain of left knee     Sprain of other ligament of left ankle, initial encounter     Left shoulder pain     Rotator cuff injury     Past Surgical History:   Procedure Laterality Date     C BREAST SURGERY PROCEDURE UNLISTED      Breast Reduction     C  DELIVERY ONLY  10/85,     , Low Cervicalx2     C EXCIS UTERINE FIBROID,ABD APPRCH       C EXCISE EXCESS SKIN TISSUE,ABDOMEN       C LIGATE FALLOPIAN TUBE       C REPAIR CRUCIATE LIGAMENT,KNEE      Right Knee     HYSTERECTOMY TOTAL ABDOMINAL  2006     THYROIDECTOMY  2013    Procedure: THYROIDECTOMY;  LEFT THYROID LOBECTOMY.  (LIGASURE, RECURRENT LARYNGEAL NERVE MONITOR) ;  Surgeon: Uriah Camargo MD;  Location: [facility]       Social History   Substance Use Topics     Smoking status: Never Smoker     Smokeless tobacco: Never Used     Alcohol use Yes      Comment: rare, twice a year, she has a drink little wine 2 days ago     Family History   Problem Relation Age of Onset     Hypertension Father      dec     DIABETES Father      dec     HEART DISEASE Father      dec     Alcohol/Drug Father      Cardiovascular Father      HEART DISEASE Mother      dec     Alcohol/Drug Mother      Cardiovascular Mother      HEART DISEASE Daughter      Cardiomyopathy     Cardiovascular Daughter      cardiomyopathy     Colon Cancer Sister      Cardiovascular Son      cardiomyopathy     DIABETES Paternal Grandmother      dec     Hypertension Paternal Grandmother      dec     CEREBROVASCULAR DISEASE Paternal Grandmother      dec     CANCER Sister      Lupus     Cardiovascular Sister      cardiomyopathy     HEART DISEASE Sister      heart failure, and kidney failure         Current Outpatient Prescriptions   Medication Sig Dispense Refill     diclofenac (VOLTAREN)  "75 MG EC tablet Take 1 tablet (75 mg) by mouth 2 times daily as needed for moderate pain 40 tablet 1     cyclobenzaprine (FLEXERIL) 10 MG tablet Take 0.5-1 tablets (5-10 mg) by mouth 3 times daily as needed for muscle spasms 60 tablet 1     furosemide (LASIX) 20 MG tablet Take 2 tablets (40 mg) by mouth daily as needed 60 tablet 7     hydrochlorothiazide (MICROZIDE) 12.5 MG capsule Take 1 capsule (12.5 mg) by mouth daily 90 capsule 3     losartan (COZAAR) 100 MG tablet Take 1 tablet (100 mg) by mouth daily 90 tablet 3     metoprolol (TOPROL-XL) 50 MG 24 hr tablet Take 1 tablet (50 mg) by mouth daily 30 tablet 11     Testosterone 10 MG/ACT (2%) GEL Apply 1 Application topically daily       Collagen 500 MG CAPS        FLAXSEED, LINSEED, PO        progesterone (PROMETRIUM) 100 MG capsule Take 100 mg by mouth Reported on 3/8/2017       order for DME Equipment being ordered:brace 1 Device 0     Diclofenac Potassium 50 MG TABS Take 50 mg by mouth daily       estradiol (VIVELLE-DOT) 0.0375 MG/24HR BIW patch Apply 1 patch topically 3 times daily as needed       ondansetron (ZOFRAN ODT) 4 MG ODT tab Take 1-2 tablets (4-8 mg) by mouth every 8 hours as needed for nausea (Patient not taking: Reported on 3/26/2018) 20 tablet 1     traMADol (ULTRAM) 50 MG tablet Take 1 tablet (50 mg) by mouth every 8 hours as needed for moderate pain (Patient not taking: Reported on 3/26/2018) 40 tablet 0     Cholecalciferol (VITAMIN D) 2000 UNIT tablet Take 5,000 Units by mouth as needed Reported on 3/8/2017 100 tablet 12     Allergies   Allergen Reactions     Morphine      EMESIS     Sulfa Drugs Swelling            ROS:  Constitutional, HEENT, cardiovascular, pulmonary, GI, , musculoskeletal, neuro, skin, endocrine and psych systems are negative, except as otherwise noted.    OBJECTIVE:     /85 (BP Location: Right arm, Patient Position: Chair, Cuff Size: Adult Large)  Pulse 74  Temp 98.1  F (36.7  C) (Oral)  Ht 5' 4\" (1.626 m)  Wt " 218 lb (98.9 kg)  [facility] 10/19/2008  SpO2 99%  BMI 37.42 kg/m2  Body mass index is 37.42 kg/(m^2).  GENERAL: healthy, alert and no distress  MS:   GAIT: antalgic  Dorsalis Pedis pulses intact bilaterally.  MUSCULOSKELETAL:  LEFT KNEE   Inspection: AP/lateral alignment normal, No quad atrophy, mild swelling of the joint  Tender: none  Non-tender: Jigar's test is negative   Active Range of Motion: pain with flexion, pain with extension  Strength: unable to test  Special tests: none  No warmth noted over bilateral knees.       Diagnostic Test Results:  Patient declined xrays today    ASSESSMENT/PLAN:       ICD-10-CM    1. Status post left knee replacement Z96.652 diclofenac (VOLTAREN) 75 MG EC tablet     cyclobenzaprine (FLEXERIL) 10 MG tablet   2. Sprain of left knee/leg, initial encounter S83.92XA diclofenac (VOLTAREN) 75 MG EC tablet     cyclobenzaprine (FLEXERIL) 10 MG tablet     Rest  Elevate  Ice  Diclofenac 75 mg twice a day as needed for pain  Flexeril 10 mg three times a day as needed for muscle tightness  Follow up in 1-2 weeks if not better       Katherine Cohen PA-C  [facility]

## 2018-03-26 NOTE — LETTER
[address] [address]  Phone: [phone]    March 26, 2018        Marleen Mcfadden  [address]   [facility] [address]          To whom it may concern:    Patient was seen and treated today at our clinic.  Patient may return to work 3/27/18 with the following:  Limited walking, stair use for 1 week 3/27/18-4/3/18. Please allow patient to sit and rest left knee for 15 min  every hour.      Please contact me for questions or concerns.        Sincerely,    Kellie Cohen PAC

## 2018-03-27 ASSESSMENT — ANXIETY QUESTIONNAIRES: GAD7 TOTAL SCORE: 0

## 2018-04-12 ENCOUNTER — OFFICE VISIT (OUTPATIENT)
Dept: FAMILY MEDICINE | Facility: CLINIC | Age: 53
End: 2018-04-12
Payer: COMMERCIAL

## 2018-04-12 VITALS
WEIGHT: 219 LBS | SYSTOLIC BLOOD PRESSURE: 131 MMHG | BODY MASS INDEX: 37.59 KG/M2 | OXYGEN SATURATION: 98 % | DIASTOLIC BLOOD PRESSURE: 83 MMHG | HEART RATE: 89 BPM

## 2018-04-12 DIAGNOSIS — R19.7 VOMITING AND DIARRHEA: Primary | ICD-10-CM

## 2018-04-12 DIAGNOSIS — R11.10 VOMITING AND DIARRHEA: Primary | ICD-10-CM

## 2018-04-12 LAB
BASOPHILS # BLD AUTO: 0 10E9/L (ref 0–0.2)
BASOPHILS NFR BLD AUTO: 0.5 %
DIFFERENTIAL METHOD BLD: NORMAL
EOSINOPHIL # BLD AUTO: 0.1 10E9/L (ref 0–0.7)
EOSINOPHIL NFR BLD AUTO: 2.4 %
ERYTHROCYTE [DISTWIDTH] IN BLOOD BY AUTOMATED COUNT: 13.9 % (ref 10–15)
HCT VFR BLD AUTO: 37.9 % (ref 35–47)
HGB BLD-MCNC: 12.5 G/DL (ref 11.7–15.7)
LYMPHOCYTES # BLD AUTO: 2.7 10E9/L (ref 0.8–5.3)
LYMPHOCYTES NFR BLD AUTO: 49.7 %
MCH RBC QN AUTO: 30.2 PG (ref 26.5–33)
MCHC RBC AUTO-ENTMCNC: 33 G/DL (ref 31.5–36.5)
MCV RBC AUTO: 92 FL (ref 78–100)
MONOCYTES # BLD AUTO: 0.4 10E9/L (ref 0–1.3)
MONOCYTES NFR BLD AUTO: 7.7 %
NEUTROPHILS # BLD AUTO: 2.2 10E9/L (ref 1.6–8.3)
NEUTROPHILS NFR BLD AUTO: 39.7 %
PLATELET # BLD AUTO: 298 10E9/L (ref 150–450)
RBC # BLD AUTO: 4.14 10E12/L (ref 3.8–5.2)
WBC # BLD AUTO: 5.5 10E9/L (ref 4–11)

## 2018-04-12 PROCEDURE — 80053 COMPREHEN METABOLIC PANEL: CPT | Performed by: FAMILY MEDICINE

## 2018-04-12 PROCEDURE — 87506 IADNA-DNA/RNA PROBE TQ 6-11: CPT | Performed by: FAMILY MEDICINE

## 2018-04-12 PROCEDURE — 36415 COLL VENOUS BLD VENIPUNCTURE: CPT | Performed by: FAMILY MEDICINE

## 2018-04-12 PROCEDURE — 99214 OFFICE O/P EST MOD 30 MIN: CPT | Performed by: FAMILY MEDICINE

## 2018-04-12 PROCEDURE — 85025 COMPLETE CBC W/AUTO DIFF WBC: CPT | Performed by: FAMILY MEDICINE

## 2018-04-12 NOTE — LETTER
April 16, 2018      Marleen Mcfadden  [address]   [facility] [address]        Dear ,    We are writing to inform you of your test results.    Hello!   Thank you for getting labs done.     Your cbc, or complete blood count, which measures red blood cells (to check for anemia and other vitamin deficiencies) and white blood cells (to check for infection and leukemia) is normal, which is great!  Your platelets, which reflect liver function and ability to clot, are normal as well.     If you have any questions, please contact the clinic or schedule an appointment with me, thank you!    Resulted Orders   CBC with platelets differential   Result Value Ref Range    WBC 5.5 4.0 - 11.0 10e9/L    RBC Count 4.14 3.8 - 5.2 10e12/L    Hemoglobin 12.5 11.7 - 15.7 g/dL    Hematocrit 37.9 35.0 - 47.0 %    MCV 92 78 - 100 fl    MCH 30.2 26.5 - 33.0 pg    MCHC 33.0 31.5 - 36.5 g/dL    RDW 13.9 10.0 - 15.0 %    Platelet Count 298 150 - 450 10e9/L    Diff Method Automated Method     % Neutrophils 39.7 %    % Lymphocytes 49.7 %    % Monocytes 7.7 %    % Eosinophils 2.4 %    % Basophils 0.5 %    Absolute Neutrophil 2.2 1.6 - 8.3 10e9/L    Absolute Lymphocytes 2.7 0.8 - 5.3 10e9/L    Absolute Monocytes 0.4 0.0 - 1.3 10e9/L    Absolute Eosinophils 0.1 0.0 - 0.7 10e9/L    Absolute Basophils 0.0 0.0 - 0.2 10e9/L       If you have any questions or concerns, please call the clinic at the number listed above.       Sincerely,        Danae Grimes MD/nr

## 2018-04-12 NOTE — LETTER
[facility]  [address] [place] [id number]  Phone: [phone]    April 12, 2018        Marleen Mcfadden  [address]   [place] [facility] [address]          To whom it may concern:    RE: Marleen Mcafdden    Patient was seen and treated today at our clinic and missed work.  Patient may return to work Monday 4/16/18  with the following:  No working or lifting restrictions    Please contact me for questions or concerns.      Sincerely,        Danae Grimes MD

## 2018-04-12 NOTE — PROGRESS NOTES
SUBJECTIVE:   Marleen Mcfadden is a 52 year old female who presents to clinic today for the following health issues:      Diarrhea      Duration: Started Tuesday night , 2 days ago, then started vomiting yesterday    She vomits and gets diarrhea if she eats or drinks anything    Description:       Consistency of stool: loose       Blood in stool: no        Number of loose stools past 24 hours: 7    Intensity:  moderate    Accompanying signs and symptoms:       Fever: YES- Tuesday evening       Nausea/vomitting: YES has vomited 15-20 times in the past 24 hours       Abdominal pain: YES       Weight loss: no     History (recent antibiotics or travel/ill contacts/med changes/testing done):                 her grandson has been sick in the hospital with a viral illness                 She had a buffet dinner Tuesday evening at a friend's house, wonders if a pork/beef jack was spoiled    Precipitating or alleviating factors: None    Therapies tried and outcome: None      Problem list and histories reviewed & adjusted, as indicated.  Additional history: as documented    Patient Active Problem List   Diagnosis     Autoimmune disease, not elsewhere classified     Primary cardiomyopathy (H)     Leiomyoma of uterus     Allergic rhinitis     Anemia     Sinus bradycardia     Health Care Home     Itching     CHF with cardiomyopathy (H)     TB lung, latent     Knee pain     Swelling of knee joint     Thyroid nodule     Pre-operative cardiovascular examination     Post-op pain     Pain in joint involving ankle and foot     Calcaneal spur     Plantar fasciitis     CARDIOVASCULAR SCREENING; LDL GOAL LESS THAN 160     Peroneus longus tendinitis     Morbid obesity (H)     Shaina's nodes     Familial cardiomyopathy (H)     Acute pain of right knee     Acute bilateral low back pain with right-sided sciatica     Degenerative disc disease at L5-S1 level     Chronic bilateral low back pain with right-sided sciatica     Chronic  bilateral low back pain with left-sided sciatica     Elevated blood pressure reading without diagnosis of hypertension     Chronic diastolic congestive heart failure (H)     Injury of left shoulder, initial encounter     Acute pain of left knee     Sprain of other ligament of left ankle, initial encounter     Left shoulder pain     Rotator cuff injury     Vomiting and diarrhea     Past Surgical History:   Procedure Laterality Date     C BREAST SURGERY PROCEDURE UNLISTED      Breast Reduction     C  DELIVERY ONLY  10/85,     , Low Cervicalx2     C EXCIS UTERINE FIBROID,ABD APPRCH       C EXCISE EXCESS SKIN TISSUE,ABDOMEN       C LIGATE FALLOPIAN TUBE       C REPAIR CRUCIATE LIGAMENT,KNEE      Right Knee     HYSTERECTOMY TOTAL ABDOMINAL  2006     THYROIDECTOMY  2013    Procedure: THYROIDECTOMY;  LEFT THYROID LOBECTOMY.  (LIGASURE, RECURRENT LARYNGEAL NERVE MONITOR) ;  Surgeon: Uriah Camargo MD;  Location: [facility]       Social History   Substance Use Topics     Smoking status: Never Smoker     Smokeless tobacco: Never Used     Alcohol use Yes      Comment: rare, twice a year, she has a drink little wine 2 days ago     Family History   Problem Relation Age of Onset     Hypertension Father      dec     DIABETES Father      dec     HEART DISEASE Father      dec     Alcohol/Drug Father      Cardiovascular Father      HEART DISEASE Mother      dec     Alcohol/Drug Mother      Cardiovascular Mother      HEART DISEASE Daughter      Cardiomyopathy     Cardiovascular Daughter      cardiomyopathy     Colon Cancer Sister      Cardiovascular Son      cardiomyopathy     DIABETES Paternal Grandmother      dec     Hypertension Paternal Grandmother      dec     CEREBROVASCULAR DISEASE Paternal Grandmother      dec     CANCER Sister      Lupus     Cardiovascular Sister      cardiomyopathy     HEART DISEASE Sister      heart failure, and kidney failure         Allergies   Allergen  Reactions     Morphine      EMESIS     Sulfa Drugs Swelling     BP Readings from Last 3 Encounters:   04/12/18 131/83   03/26/18 136/85   02/15/18 137/89    Wt Readings from Last 3 Encounters:   04/12/18 219 lb (99.3 kg)   03/26/18 218 lb (98.9 kg)   02/15/18 223 lb (101.2 kg)                    Reviewed and updated as needed this visit by clinical staff       Reviewed and updated as needed this visit by Provider         ROS:  Constitutional, HEENT, cardiovascular, pulmonary, GI, , musculoskeletal, neuro, skin, endocrine and psych systems are negative, except as otherwise noted.    OBJECTIVE:     /83  Pulse 89  Wt 219 lb (99.3 kg)  LMP 10/19/2008  SpO2 98%  BMI 37.59 kg/m2  Body mass index is 37.59 kg/(m^2).   Vitals are stable  GENERAL: alert, pale and tired appearing, nontoxic  NECK: no adenopathy, no asymmetry, masses, or scars and thyroid normal to palpation  RESP: lungs clear to auscultation - no rales, rhonchi or wheezes  CV: regular rate and rhythm, normal S1 S2, no S3 or S4, no murmur, click or rub, no peripheral edema and peripheral pulses strong  ABDOMEN: soft, nontender, no hepatosplenomegaly, no masses and bowel sounds normal  MS: no gross musculoskeletal defects noted, no edema    ASSESSMENT/PLAN:     1. Vomiting and diarrhea  New, previously undiagnosed problem with uncertain prognosis and additional work-up planned.  Food poisoning vs viral GE, will check labs  Concerned that she's dehydrated, will check cmp  If unable to start consuming fluids, may need to go to ER  - Enteric Bacteria and Virus Panel by QUIRINO Stool; Future  - CBC with platelets differential  - Comprehensive metabolic panel    Danae Grimes MD  [facility]

## 2018-04-12 NOTE — LETTER
April 18, 2018      Marleen Mcfadden  [address]   [facility] [address]        Dear ,    We are writing to inform you of your test results.    Hello!  It was a pleasure to see you in clinic!  Thank you for getting labs done.     The testing of your blood sugar, kidney function, liver function and electrolytes was normal.     If you have any questions, please contact the clinic or schedule an appointment with me, thank you!    Resulted Orders   CBC with platelets differential   Result Value Ref Range    WBC 5.5 4.0 - 11.0 10e9/L    RBC Count 4.14 3.8 - 5.2 10e12/L    Hemoglobin 12.5 11.7 - 15.7 g/dL    Hematocrit 37.9 35.0 - 47.0 %    MCV 92 78 - 100 fl    MCH 30.2 26.5 - 33.0 pg    MCHC 33.0 31.5 - 36.5 g/dL    RDW 13.9 10.0 - 15.0 %    Platelet Count 298 150 - 450 10e9/L    Diff Method Automated Method     % Neutrophils 39.7 %    % Lymphocytes 49.7 %    % Monocytes 7.7 %    % Eosinophils 2.4 %    % Basophils 0.5 %    Absolute Neutrophil 2.2 1.6 - 8.3 10e9/L    Absolute Lymphocytes 2.7 0.8 - 5.3 10e9/L    Absolute Monocytes 0.4 0.0 - 1.3 10e9/L    Absolute Eosinophils 0.1 0.0 - 0.7 10e9/L    Absolute Basophils 0.0 0.0 - 0.2 10e9/L   Comprehensive metabolic panel   Result Value Ref Range    Sodium 143 133 - 144 mmol/L    Potassium 3.8 3.4 - 5.3 mmol/L    Chloride 109 94 - 109 mmol/L    Carbon Dioxide 25 20 - 32 mmol/L    Anion Gap 9 3 - 14 mmol/L    Glucose 121 (H) 70 - 99 mg/dL      Comment:      Non Fasting    Urea Nitrogen 18 7 - 30 mg/dL    Creatinine 0.67 0.52 - 1.04 mg/dL    GFR Estimate >90 >60 mL/min/1.7m2      Comment:      Non  GFR Calc    GFR Estimate If Black >90 >60 mL/min/1.7m2      Comment:       GFR Calc    Calcium 8.9 8.5 - 10.1 mg/dL    Bilirubin Total 0.3 0.2 - 1.3 mg/dL    Albumin 3.7 3.4 - 5.0 g/dL    Protein Total 7.8 6.8 - 8.8 g/dL    Alkaline Phosphatase 80 40 - 150 U/L    ALT 28 0 - 50 U/L    AST 19 0 - 45 U/L       If you have any  questions or concerns, please call the clinic at the number listed above.       Sincerely,        Danae Grimes MD/nr

## 2018-04-12 NOTE — MR AVS SNAPSHOT
After Visit Summary   4/12/2018    Marleen Mcfadden    MRN: [id number]           Patient Information     Date Of Birth          1965        Visit Information        Provider Department      4/12/2018 3:20 PM Danae Grimes MD [facility]        Today's Diagnoses     Vomiting and diarrhea    -  1      Care Instructions    Plain water, flat ginger ale, pedialyte, broth, soup.    The 'BRAT' diet is suggested;   Bananas rice, applesauce, toast, ie, bland carbohydrates.  Avoid high protein, high fat (ie, meat, cheese, dairy products) until you are able to tolerate carbohydrates.  Frequent, small amounts; don't overdo, go slowly!    Drink plenty of fluids.  Over the counter cold remedies that have zinc and Vitamin C may help you feel better sooner.    Then progress to diet as tolerated as symptoms ashlee.     Call if bloody stools, persistent diarrhea, vomiting, fever or abdominal pain.             Follow-ups after your visit        Your next 10 appointments already scheduled     Apr 24, 2018  3:00 PM CDT   New Visit with Wilson Cam MD   [facility] ([facility])    [address] [place] [id number] [phone]            May 16, 2018  3:30 PM CDT   Lab with  LAB   [facility])    [address] [place] [address] [phone]            May 16, 2018  4:00 PM CDT   (Arrive by 3:45 PM)   RETURN HEART FAILURE with Cassie Kolb MD   [facility] ([facility])    [address] [place] [address]  [address] [place] [address] [phone]              Future tests that were ordered for you today     Open Future Orders        Priority Expected Expires Ordered    Enteric Bacteria and Virus Panel by QUIRINO Stool Routine  4/12/2019 4/12/2018            Who to contact     If you have questions or need follow up information  "about today's clinic visit or your schedule please contact [facility] directly at [phone].  Normal or non-critical lab and imaging results will be communicated to you by MyChart, letter or phone within 4 business days after the clinic has received the results. If you do not hear from us within 7 days, please contact the clinic through Lucid Colloidshart or phone. If you have a critical or abnormal lab result, we will notify you by phone as soon as possible.  Submit refill requests through Elecar or call your pharmacy and they will forward the refill request to us. Please allow 3 business days for your refill to be completed.          Additional Information About Your Visit        Lucid ColloidsharTruClinic Information     Elecar lets you send messages to your doctor, view your test results, renew your prescriptions, schedule appointments and more. To sign up, go to [email or web address] . Click on \"Log in\" on the left side of the screen, which will take you to the Welcome page. Then click on \"Sign up Now\" on the right side of the page.     You will be asked to enter the access code listed below, as well as some personal information. Please follow the directions to create your username and password.     Your access code is: [email or web address]  Expires: 2018  7:31 AM     Your access code will  in 90 days. If you need help or a new code, please call your [place] clinic or [phone].        Care EveryWhere ID     This is your Care EveryWhere ID. This could be used by other organizations to access your [place] medical records  [id number]        Your Vitals Were     Pulse Last Period Pulse Oximetry BMI (Body Mass Index)          89 10/19/2008 98% 37.59 kg/m2         Blood Pressure from Last 3 Encounters:   18 131/83   18 136/85   02/15/18 137/89    Weight from Last 3 Encounters:   18 219 lb (99.3 kg)   18 218 lb (98.9 kg)   02/15/18 223 lb (101.2 kg)              We Performed the Following     " CBC with platelets differential     Comprehensive metabolic panel        Primary Care Provider Office Phone # Fax #    Danae Grimes [phone] [address]  [facility] [address]        Equal Access to Services     PAOLA SAUNDERS : Hadmiryam sage sharma gabeo Soauyrali, waaxda luqadaha, qaybta kaalmada adeegyada, stone gatesn tahir wren tricia berger. So [facility] [phone].    ATENCIÓN: Si habla español, tiene a escalera disposición servicios gratuitos de asistencia lingüística. Llame al [phone].    We comply with applicable federal civil rights laws and [place] laws. We do not discriminate on the basis of race, color, national origin, age, disability, sex, sexual orientation, or gender identity.            Thank you!     Thank you for choosing [facility]  for your care. Our goal is always to provide you with excellent care. Hearing back from our patients is one way we can continue to improve our services. Please take a few minutes to complete the written survey that you may receive in the mail after your visit with us. Thank you!             Your Updated Medication List - Protect others around you: Learn how to safely use, store and throw away your medicines at www.disposemymeds.org.          This list is accurate as of 4/12/18  3:55 PM.  Always use your most recent med list.                   Brand Name Dispense Instructions for use Diagnosis    Collagen 500 MG Caps           cyclobenzaprine 10 MG tablet    FLEXERIL    60 tablet    Take 0.5-1 tablets (5-10 mg) by mouth 3 times daily as needed for muscle spasms    Status post left knee replacement, Sprain of left knee/leg, initial encounter       diclofenac 75 MG EC tablet    VOLTAREN    40 tablet    Take 1 tablet (75 mg) by mouth 2 times daily as needed for moderate pain    Status post left knee replacement, Sprain of left knee/leg, initial encounter       Diclofenac Potassium 50 MG Tabs      Take 50 mg by mouth daily         estradiol 0.0375 MG/24HR BIW patch    VIVELLE-DOT     Apply 1 patch topically 3 times daily as needed        FLAXSEED (LINSEED) PO           furosemide 20 MG tablet    LASIX    60 tablet    Take 2 tablets (40 mg) by mouth daily as needed    CHF with cardiomyopathy (H)       hydrochlorothiazide 12.5 MG capsule    MICROZIDE    90 capsule    Take 1 capsule (12.5 mg) by mouth daily    Familial cardiomyopathy (H)       losartan 100 MG tablet    COZAAR    90 tablet    Take 1 tablet (100 mg) by mouth daily    Familial cardiomyopathy (H)       metoprolol succinate 50 MG 24 hr tablet    TOPROL-XL    30 tablet    Take 1 tablet (50 mg) by mouth daily    Familial cardiomyopathy (H)       ondansetron 4 MG ODT tab    ZOFRAN ODT    20 tablet    Take 1-2 tablets (4-8 mg) by mouth every 8 hours as needed for nausea    Viral gastroenteritis, Non-intractable vomiting with nausea, unspecified vomiting type       order for DME     1 Device    Equipment being ordered:brace    Strain of left knee, initial encounter       progesterone 100 MG capsule    PROMETRIUM     Take 100 mg by mouth Reported on 3/8/2017        Testosterone 10 MG/ACT (2%) Gel      Apply 1 Application topically daily        traMADol 50 MG tablet    ULTRAM    40 tablet    Take 1 tablet (50 mg) by mouth every 8 hours as needed for moderate pain    Exacerbation of chronic back pain, Pain in both lower extremities       vitamin D 2000 units tablet     100 tablet    Take 5,000 Units by mouth as needed Reported on 3/8/2017

## 2018-04-12 NOTE — PATIENT INSTRUCTIONS
Plain water, flat ginger ale, pedialyte, broth, soup.    The 'BRAT' diet is suggested;   Bananas rice, applesauce, toast, ie, bland carbohydrates.  Avoid high protein, high fat (ie, meat, cheese, dairy products) until you are able to tolerate carbohydrates.  Frequent, small amounts; don't overdo, go slowly!    Drink plenty of fluids.  Over the counter cold remedies that have zinc and Vitamin C may help you feel better sooner.    Then progress to diet as tolerated as symptoms ashlee.     Call if bloody stools, persistent diarrhea, vomiting, fever or abdominal pain.

## 2018-04-13 DIAGNOSIS — R19.7 VOMITING AND DIARRHEA: ICD-10-CM

## 2018-04-13 DIAGNOSIS — R11.10 VOMITING AND DIARRHEA: ICD-10-CM

## 2018-04-13 LAB
ALBUMIN SERPL-MCNC: 3.7 G/DL (ref 3.4–5)
ALP SERPL-CCNC: 80 U/L (ref 40–150)
ALT SERPL W P-5'-P-CCNC: 28 U/L (ref 0–50)
ANION GAP SERPL CALCULATED.3IONS-SCNC: 9 MMOL/L (ref 3–14)
AST SERPL W P-5'-P-CCNC: 19 U/L (ref 0–45)
BILIRUB SERPL-MCNC: 0.3 MG/DL (ref 0.2–1.3)
BUN SERPL-MCNC: 18 MG/DL (ref 7–30)
C COLI+JEJUNI+LARI FUSA STL QL NAA+PROBE: NOT DETECTED
CALCIUM SERPL-MCNC: 8.9 MG/DL (ref 8.5–10.1)
CHLORIDE SERPL-SCNC: 109 MMOL/L (ref 94–109)
CO2 SERPL-SCNC: 25 MMOL/L (ref 20–32)
CREAT SERPL-MCNC: 0.67 MG/DL (ref 0.52–1.04)
EC STX1 GENE STL QL NAA+PROBE: NOT DETECTED
EC STX2 GENE STL QL NAA+PROBE: NOT DETECTED
ENTERIC PATHOGEN COMMENT: NORMAL
GFR SERPL CREATININE-BSD FRML MDRD: >90 ML/MIN/1.7M2
GLUCOSE SERPL-MCNC: 121 MG/DL (ref 70–99)
NOROV GI+II ORF1-ORF2 JNC STL QL NAA+PR: NOT DETECTED
POTASSIUM SERPL-SCNC: 3.8 MMOL/L (ref 3.4–5.3)
PROT SERPL-MCNC: 7.8 G/DL (ref 6.8–8.8)
RVA NSP5 STL QL NAA+PROBE: NOT DETECTED
SALMONELLA SP RPOD STL QL NAA+PROBE: NOT DETECTED
SHIGELLA SP+EIEC IPAH STL QL NAA+PROBE: NOT DETECTED
SODIUM SERPL-SCNC: 143 MMOL/L (ref 133–144)
V CHOL+PARA RFBL+TRKH+TNAA STL QL NAA+PR: NOT DETECTED
Y ENTERO RECN STL QL NAA+PROBE: NOT DETECTED

## 2018-04-13 NOTE — LETTER
April 18, 2018      Marleen Mcfadden  [address]   [facility] [address]        Dear ,    We are writing to inform you of your test results.    I hope you're feeling better!  You do NOT have food poisoning, which is good news, but your symptoms could still be related to a virus.  Slowly try to add food back to your diet and let me know if you're not improving!    Resulted Orders   Enteric Bacteria and Virus Panel by QUIRINO Stool   Result Value Ref Range    Campylobacter group by QUIRINO Not Detected NDET^Not Detected    Salmonella species by QUIRINO Not Detected NDET^Not Detected    Shigella species by QUIRINO Not Detected NDET^Not Detected    Vibrio group by QUIRINO Not Detected NDET^Not Detected    Rotavirus A by QUIRINO Not Detected NDET^Not Detected    Shiga toxin 1 gene by QUIRINO Not Detected NDET^Not Detected    Shiga toxin 2 gene by QUIRINO Not Detected NDET^Not Detected    Norovirus I and II by QUIRINO Not Detected NDET^Not Detected    Yersinia enterocolitica by QUIRINO Not Detected NDET^Not Detected    Enteric pathogen comment       Testing performed by multiplexed, qualitative PCR using the Nanosphere Nusirtigene Enteric   Pathogens Nucleic Acid Test. Results should not be used as the sole basis for diagnosis,   treatment, or other patient management decisions.        Comment:      Positive results do not rule out co-infection with other organisms that are   not detected by this test, and may not be the sole or definitive cause of   patient illness.   Negative results in the setting of clinical illness compatible with   gastroenteritis may be due to infection by pathogens that are not detected by   this test or non-infectious causes such as ulcerative colitis, irritable bowel   syndrome, or Crohn's disease.   Note: Shiga toxin producing E. coli (STEC) typically harbor one or both genes   that encode for Shiga toxins 1 and 2.         If you have any questions or concerns, please call the clinic at the number listed above.        Sincerely,    Danae Grimes MD/nr

## 2018-04-17 NOTE — PROGRESS NOTES
I hope you're feeling better!  You do NOT have food poisoning, which is good news, but your symptoms could still be related to a virus.  Slowly try to add food back to your diet and let me know if you're not improving!    Sincerely,  Dr. Danae Grimes MD  4/17/2018

## 2018-04-17 NOTE — PROGRESS NOTES
--->  Hello!  Please send letter with result note above, and copy of results, thank you! Please let me know if you have any questions!  NATE COVARRUBIAS MD 4/10/2013      Hello!  It was a pleasure to see you in clinic!  Thank you for getting labs done.     The testing of your blood sugar, kidney function, liver function and electrolytes was normal.     If you have any questions, please contact the clinic or schedule an appointment with me, thank you!    Sincerely,    NATE COVARRUBIAS MD   4/10/2013

## 2018-04-24 ENCOUNTER — OFFICE VISIT (OUTPATIENT)
Dept: OTOLARYNGOLOGY | Facility: CLINIC | Age: 53
End: 2018-04-24
Payer: COMMERCIAL

## 2018-04-24 VITALS
HEIGHT: 64 IN | SYSTOLIC BLOOD PRESSURE: 126 MMHG | HEART RATE: 74 BPM | DIASTOLIC BLOOD PRESSURE: 87 MMHG | WEIGHT: 220 LBS | OXYGEN SATURATION: 97 % | BODY MASS INDEX: 37.56 KG/M2 | RESPIRATION RATE: 12 BRPM

## 2018-04-24 DIAGNOSIS — K21.9 LPRD (LARYNGOPHARYNGEAL REFLUX DISEASE): ICD-10-CM

## 2018-04-24 DIAGNOSIS — J31.2 CHRONIC PHARYNGITIS: Primary | ICD-10-CM

## 2018-04-24 DIAGNOSIS — R13.12 OROPHARYNGEAL DYSPHAGIA: ICD-10-CM

## 2018-04-24 DIAGNOSIS — J01.41 ACUTE RECURRENT PANSINUSITIS: ICD-10-CM

## 2018-04-24 PROCEDURE — 31575 DIAGNOSTIC LARYNGOSCOPY: CPT | Performed by: OTOLARYNGOLOGY

## 2018-04-24 PROCEDURE — 99204 OFFICE O/P NEW MOD 45 MIN: CPT | Mod: 25 | Performed by: OTOLARYNGOLOGY

## 2018-04-24 RX ORDER — OMEPRAZOLE 40 MG/1
40 CAPSULE, DELAYED RELEASE ORAL DAILY
Qty: 90 CAPSULE | Refills: 1 | Status: SHIPPED | OUTPATIENT
Start: 2018-04-24 | End: 2019-12-04

## 2018-04-24 NOTE — MR AVS SNAPSHOT
After Visit Summary   4/24/2018    Marleen Mcfadden    MRN: [id number]           Patient Information     Date Of Birth          1965        Visit Information        Provider Department      4/24/2018 3:00 PM Wilson Cam MD [facility]        Today's Diagnoses     Chronic pharyngitis    -  1    Acute recurrent pansinusitis        Oropharyngeal dysphagia        LPRD (laryngopharyngeal reflux disease)          Care Instructions    Scheduling Information  To schedule your CT/MRI scan, please contact [facility] at [phone] OR [facility] at [phone]    To schedule your Surgery, please contact our Specialty Schedulers at [phone]      ENT Clinic Locations Clinic Hours Telephone Number     [place]  [address] [place] [address]   Monday:           1:00pm -- 5:00pm    Friday:              8:00am - 12:00pm   To schedule/reschedule an appointment with   Dr. Cam,   please contact our   Specialty Scheduling Department at:     [phone]       [facility]  [address] [place] [address] Tuesday:          8:00am -- 2:00pm         Urgent Care Locations Clinic Hours Telephone Numbers     [facility]  [address] [place] [address]     Monday-Friday:     11:00am - 9:00pm    Saturday-Sunday:  9:00am - 5:00pm   [phone]     [facility]  [address] [place] [address]     Monday-Friday:      5:00pm - 9:00pm     Saturday-Sunday:  9:00am - 5:00pm   [phone]       The remainder of today's visit was spent discussing non-medical measures that can help in the management of acid reflux.  Specifically, avoidance of eating before bed, elevating the head of the bed, avoiding large meals, minimizing fatty foods, minimal wine/beer/soda, and eating smaller meals spread out through the day.    Reflux medication will be started today with omeprazole 40mg daily, and I will see the  "patient back in 6 weeks for follow up.  I have instructed the patient that if the symptoms are not significantly improved, we may also need to refer for an upper endoscopy, as well as changing the reflux medication, or adding a secondary medication such as sucralfate.            Follow-ups after your visit        Your next 10 appointments already scheduled     May 16, 2018  3:30 PM CDT   Lab with UC LAB   [facility] ([facility])    [address]  1st Floor  [facility] [id number] [phone]            May 16, 2018  4:00 PM CDT   (Arrive by 3:45 PM)   RETURN HEART FAILURE with Cassie Kolb MD   [facility] ([facility])    [address]  [address]  [facility] [address] [phone]              Who to contact     If you have questions or need follow up information about today's clinic visit or your schedule please contact [facility] directly at [phone].  Normal or non-critical lab and imaging results will be communicated to you by EduRisehart, letter or phone within 4 business days after the clinic has received the results. If you do not hear from us within 7 days, please contact the clinic through EduRisehart or phone. If you have a critical or abnormal lab result, we will notify you by phone as soon as possible.  Submit refill requests through [email or web address] or call your pharmacy and they will forward the refill request to us. Please allow 3 business days for your refill to be completed.          Additional Information About Your Visit        [email or web address] Information     [email or web address] lets you send messages to your doctor, view your test results, renew your prescriptions, schedule appointments and more. To sign up, go to [email or web address] . Click on \"Log in\" on the left side of the screen, which will take you to the Welcome page. Then click on \"Sign up Now\" on the right side of the page.     You will be asked to " "enter the access code listed below, as well as some personal information. Please follow the directions to create your username and password.     Your access code is: [email or web address]  Expires: 2018  7:31 AM     Your access code will  in 90 days. If you need help or a new code, please call your [place] clinic or [phone].        Care EveryWhere ID     This is your Care EveryWhere ID. This could be used by other organizations to access your [place] medical records  [id number]        Your Vitals Were     Pulse Respirations Height Last Period Pulse Oximetry BMI (Body Mass Index)    74 12 1.626 m (5' 4\") 10/19/2008 97% 37.76 kg/m2       Blood Pressure from Last 3 Encounters:   18 126/87   18 131/83   18 136/85    Weight from Last 3 Encounters:   18 99.8 kg (220 lb)   18 99.3 kg (219 lb)   18 98.9 kg (218 lb)              We Performed the Following     Laryngoscopy, Fiber          Today's Medication Changes          These changes are accurate as of 18  3:16 PM.  If you have any questions, ask your nurse or doctor.               Start taking these medicines.        Dose/Directions    omeprazole 40 MG capsule   Commonly known as:  priLOSEC   Used for:  LPRD (laryngopharyngeal reflux disease), Oropharyngeal dysphagia, Chronic pharyngitis   Started by:  Wilson Cam MD        Dose:  40 mg   Take 1 capsule (40 mg) by mouth daily Take 30-60 minutes before a meal.   Quantity:  90 capsule   Refills:  1            Where to get your medicines      These medications were sent to [facility] [address] [place], [place] - [address] [facility] [address]  [address], [facility] [address]     Phone:  [phone]     omeprazole 40 MG capsule                Primary Care Provider Office Phone # Fax #    Danae Grimes [phone] [address]  [facility] [address]        Equal Access to Services     PAOLA SAUNDERS AH: " Hadii aad ku hadelidiao Soomaali, waaxda luqadaha, qaybta kaalmada adebon, stone mary annein hayaan denabrody elderelizabeth ab. So [facility] [phone].    ATENCIÓN: Si greyson jamison, tiene a escalera disposición servicios gratuitos de asistencia lingüística. Ezekielame al [phone].    We comply with applicable federal civil rights laws and [place] laws. We do not discriminate on the basis of race, color, national origin, age, disability, sex, sexual orientation, or gender identity.            Thank you!     Thank you for choosing [facility]  for your care. Our goal is always to provide you with excellent care. Hearing back from our patients is one way we can continue to improve our services. Please take a few minutes to complete the written survey that you may receive in the mail after your visit with us. Thank you!             Your Updated Medication List - Protect others around you: Learn how to safely use, store and throw away your medicines at www.disposemymeds.org.          This list is accurate as of 4/24/18  3:16 PM.  Always use your most recent med list.                   Brand Name Dispense Instructions for use Diagnosis    Collagen 500 MG Caps           cyclobenzaprine 10 MG tablet    FLEXERIL    60 tablet    Take 0.5-1 tablets (5-10 mg) by mouth 3 times daily as needed for muscle spasms    Status post left knee replacement, Sprain of left knee/leg, initial encounter       diclofenac 75 MG EC tablet    VOLTAREN    40 tablet    Take 1 tablet (75 mg) by mouth 2 times daily as needed for moderate pain    Status post left knee replacement, Sprain of left knee/leg, initial encounter       Diclofenac Potassium 50 MG Tabs      Take 50 mg by mouth daily        estradiol 0.0375 MG/24HR BIW patch    VIVELLE-DOT     Apply 1 patch topically 3 times daily as needed        FLAXSEED (LINSEED) PO           furosemide 20 MG tablet    LASIX    60 tablet    Take 2 tablets (40 mg) by mouth daily as needed    CHF with  cardiomyopathy (H)       hydrochlorothiazide 12.5 MG capsule    MICROZIDE    90 capsule    Take 1 capsule (12.5 mg) by mouth daily    Familial cardiomyopathy (H)       losartan 100 MG tablet    COZAAR    90 tablet    Take 1 tablet (100 mg) by mouth daily    Familial cardiomyopathy (H)       metoprolol succinate 50 MG 24 hr tablet    TOPROL-XL    30 tablet    Take 1 tablet (50 mg) by mouth daily    Familial cardiomyopathy (H)       omeprazole 40 MG capsule    priLOSEC    90 capsule    Take 1 capsule (40 mg) by mouth daily Take 30-60 minutes before a meal.    LPRD (laryngopharyngeal reflux disease), Oropharyngeal dysphagia, Chronic pharyngitis       ondansetron 4 MG ODT tab    ZOFRAN ODT    20 tablet    Take 1-2 tablets (4-8 mg) by mouth every 8 hours as needed for nausea    Viral gastroenteritis, Non-intractable vomiting with nausea, unspecified vomiting type       order for DME     1 Device    Equipment being ordered:brace    Strain of left knee, initial encounter       progesterone 100 MG capsule    PROMETRIUM     Take 100 mg by mouth Reported on 3/8/2017        Testosterone 10 MG/ACT (2%) Gel      Apply 1 Application topically daily        traMADol 50 MG tablet    ULTRAM    40 tablet    Take 1 tablet (50 mg) by mouth every 8 hours as needed for moderate pain    Exacerbation of chronic back pain, Pain in both lower extremities       vitamin D 2000 units tablet     100 tablet    Take 5,000 Units by mouth as needed Reported on 3/8/2017
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
## 2018-04-24 NOTE — PATIENT INSTRUCTIONS
Scheduling Information  To schedule your CT/MRI scan, please contact [facility] at [phone] OR [facility] at [phone]    To schedule your Surgery, please contact our Specialty Schedulers at [phone]      ENT Clinic Locations Clinic Hours Telephone Number     [facility]  [address] [facility] [address] [facility] [place] [address]   Monday:           1:00pm -- 5:00pm    Friday:              8:00am - 12:00pm   To schedule/reschedule an appointment with   Dr. Cam,   please contact our   Specialty Scheduling Department at:     [phone]       [facility]  [address] [place]  [place] [address] Tuesday:          8:00am -- 2:00pm         Urgent Care Locations Clinic Hours Telephone Numbers     [facility]  [address] [address]     Monday-Friday:     11:00am - 9:00pm    Saturday-Sunday:  9:00am - 5:00pm   [phone]     [facility]  [address] [place] [address]     Monday-Friday:      5:00pm - 9:00pm     Saturday-Sunday:  9:00am - 5:00pm   [phone]       The remainder of today's visit was spent discussing non-medical measures that can help in the management of acid reflux.  Specifically, avoidance of eating before bed, elevating the head of the bed, avoiding large meals, minimizing fatty foods, minimal wine/beer/soda, and eating smaller meals spread out through the day.    Reflux medication will be started today with omeprazole 40mg daily, and I will see the patient back in 6 weeks for follow up.  I have instructed the patient that if the symptoms are not significantly improved, we may also need to refer for an upper endoscopy, as well as changing the reflux medication, or adding a secondary medication such as sucralfate.
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
## 2018-05-02 ENCOUNTER — TRANSFERRED RECORDS (OUTPATIENT)
Dept: HEALTH INFORMATION MANAGEMENT | Facility: CLINIC | Age: 53
End: 2018-05-02

## 2018-05-02 DIAGNOSIS — Z96.659 KNEE JOINT REPLACEMENT STATUS: Primary | ICD-10-CM

## 2018-05-02 LAB
CRP SERPL-MCNC: <2.9 MG/L (ref 0–8)
ERYTHROCYTE [DISTWIDTH] IN BLOOD BY AUTOMATED COUNT: 13.3 % (ref 10–15)
ERYTHROCYTE [SEDIMENTATION RATE] IN BLOOD BY WESTERGREN METHOD: 26 MM/H (ref 0–30)
HCT VFR BLD AUTO: 36.2 % (ref 35–47)
HGB BLD-MCNC: 12 G/DL (ref 11.7–15.7)
MCH RBC QN AUTO: 30.2 PG (ref 26.5–33)
MCHC RBC AUTO-ENTMCNC: 33.1 G/DL (ref 31.5–36.5)
MCV RBC AUTO: 91 FL (ref 78–100)
PLATELET # BLD AUTO: 272 10E9/L (ref 150–450)
RBC # BLD AUTO: 3.98 10E12/L (ref 3.8–5.2)
WBC # BLD AUTO: 6.6 10E9/L (ref 4–11)

## 2018-05-02 PROCEDURE — 85027 COMPLETE CBC AUTOMATED: CPT | Performed by: ORTHOPAEDIC SURGERY

## 2018-05-02 PROCEDURE — 36415 COLL VENOUS BLD VENIPUNCTURE: CPT | Performed by: ORTHOPAEDIC SURGERY

## 2018-05-02 PROCEDURE — 85652 RBC SED RATE AUTOMATED: CPT | Performed by: ORTHOPAEDIC SURGERY

## 2018-05-02 PROCEDURE — 86140 C-REACTIVE PROTEIN: CPT | Performed by: ORTHOPAEDIC SURGERY

## 2018-05-04 ENCOUNTER — TELEPHONE (OUTPATIENT)
Dept: FAMILY MEDICINE | Facility: CLINIC | Age: 53
End: 2018-05-04

## 2018-05-04 NOTE — TELEPHONE ENCOUNTER
I have a disability form sent 4/13/2018 by [employer] requesting I complete a short term disability form. However, Marleen needs to sign if before I complete it and I'm not sure what it's referring to, so she needs to make an appointment for me to complete this form. Please call her and ask her to make an appointment with me if she still needs this disability form completed, thanks!    Sincerely,  Dr. Danae Grimes MD  5/4/2018

## 2018-05-07 NOTE — TELEPHONE ENCOUNTER
Spoke to patient and she said she does not need that form filled out so she said to disregard the form

## 2018-05-11 ENCOUNTER — OFFICE VISIT (OUTPATIENT)
Dept: FAMILY MEDICINE | Facility: CLINIC | Age: 53
End: 2018-05-11
Payer: OTHER MISCELLANEOUS

## 2018-05-11 VITALS
DIASTOLIC BLOOD PRESSURE: 75 MMHG | HEART RATE: 57 BPM | SYSTOLIC BLOOD PRESSURE: 113 MMHG | OXYGEN SATURATION: 98 % | TEMPERATURE: 98.4 F | BODY MASS INDEX: 36.73 KG/M2 | WEIGHT: 214 LBS

## 2018-05-11 DIAGNOSIS — M25.562 LEFT KNEE PAIN, UNSPECIFIED CHRONICITY: Primary | ICD-10-CM

## 2018-05-11 PROCEDURE — 99213 OFFICE O/P EST LOW 20 MIN: CPT | Performed by: NURSE PRACTITIONER

## 2018-05-11 ASSESSMENT — PAIN SCALES - GENERAL: PAINLEVEL: SEVERE PAIN (6)

## 2018-05-11 NOTE — PATIENT INSTRUCTIONS
At [facility], we strive to deliver an exceptional experience to you, every time we see you.  If you receive a survey in the mail, please send us back your thoughts. We really do value your feedback.    Based on your medical history, these are the current health maintenance/preventive care services that you are due for (some may have been done at this visit.)  Health Maintenance Due   Topic Date Due     URINE DRUG SCREEN Q1 YR  07/06/1979     HIV SCREEN (SYSTEM ASSIGNED)  07/06/1982     LIPID MONITORING Q1 YEAR  04/09/2015     HF ACTION PLAN Q3 YR  11/01/2015     COLON CANCER SCREEN (SYSTEM ASSIGNED)  11/07/2017         Suggested websites for health information:  Www.GlobeIn.org : Up to date and easily searchable information on multiple topics.  Www.medlineplus.gov : medication info, interactive tutorials, watch real surgeries online  Www.familydoctor.org : good info from the Academy of Family Physicians  Www.cdc.gov : public health info, travel advisories, epidemics (H1N1)  Www.aap.org : children's health info, normal development, vaccinations  Www.health.[facility].[place].us : [place] dept of health, public health issues in [place], N1N1    Your care team:                            Family Medicine Internal Medicine   MD Devin Mahmood MD Shantel Branch-Fleming, MD Katya Georgiev PA-C Nam Ho, MD Pediatrics   KINGS Keys, MD Jodi Amador CNP, MD Deborah Mielke, MD Kim Thein, APRN CNP      Clinic hours: Monday - Thursday 7 am-7 pm; Fridays 7 am-5 pm.   Urgent care: Monday - Friday 11 am-9 pm; Saturday and Sunday 9 am-5 pm.  Pharmacy : Monday -Thursday 8 am-8 pm; Friday 8 am-6 pm; Saturday and Sunday 9 am-5 pm.     Clinic: [phone]   Pharmacy: [phone]

## 2018-05-11 NOTE — PROGRESS NOTES
SUBJECTIVE:   Marleen Mcfadden is a 52 year old female who presents to clinic today for the following health issues:      Joint Pain-Work Comp    Onset: 01/11/17 when she fell while working    Description:   Location: left knee  Character: Dull ache    Intensity: moderate, severe    Progression of Symptoms: intermittent    Accompanying Signs & Symptoms:  Other symptoms: swelling    History:   Previous similar pain: no       Precipitating factors:   Trauma or overuse: YES- fall    Alleviating factors:  Improved by: ice and prescribed meds    Therapies Tried and outcome: Ice and prescribed meds      52 year old female presents with above concerns. She has been working with [facility]. Has been going to PT 3 days per week. Graduated from therapy. Knee still dalila. Waiting for approval from work comp for MRI. Works as phlebotomist. Pain has really been bothering her all week. Doesn't think she can safely walk around work due to pain. Here today because needs note for work because she called in. She saw Dr. Hare with [facility] on 5/2. She states she has the appropriate things she needs including medications, she just doesn't think she can work today.    This is a workmans comp visit.    Problem list and histories reviewed & adjusted, as indicated.  Additional history: as documented        Reviewed and updated as needed this visit by clinical staff  Tobacco  Allergies  Meds  Problems  Med Hx  Surg Hx  Fam Hx  Soc Hx        Reviewed and updated as needed this visit by Provider  Allergies  Meds  Problems         ROS:  Constitutional, HEENT, cardiovascular, pulmonary, gi and gu systems are negative, except as otherwise noted.    OBJECTIVE:     /75 (BP Location: Right arm, Patient Position: Chair, Cuff Size: Adult Large)  Pulse 57  Temp 98.4  F (36.9  C) (Oral)  Wt 214 lb (97.1 kg)  LMP 10/19/2008  SpO2 98%  Breastfeeding? No  BMI 36.73 kg/m2  Body mass index is 36.73  kg/(m^2).  GENERAL: healthy, alert and no distress  MS: no gross musculoskeletal defects noted, no edema. She is unable to extend left knee - can only get to approx 120 deg from 90 of flexion  SKIN: no suspicious lesions or rashes  PSYCH: mentation appears normal, affect normal/bright    Diagnostic Test Results:  none     ASSESSMENT/PLAN:     1. Left knee pain, unspecified chronicity  Note provided for work today. Further ortho care per Dr. Hare.       See Patient Instructions    Patient verbalizes understanding and agrees with plan of care. Patient stable for discharge.      PITO Lopez [facility]

## 2018-05-11 NOTE — MR AVS SNAPSHOT
After Visit Summary   5/11/2018    Marleen Mcfadden    MRN: [id number]           Patient Information     Date Of Birth          1965        Visit Information        Provider Department      5/11/2018 8:40 AM Erica Segal APRN CNP [facility]        Today's Diagnoses     Left knee pain, unspecified chronicity    -  1      Care Instructions    At [facility], we strive to deliver an exceptional experience to you, every time we see you.  If you receive a survey in the mail, please send us back your thoughts. We really do value your feedback.    Based on your medical history, these are the current health maintenance/preventive care services that you are due for (some may have been done at this visit.)  Health Maintenance Due   Topic Date Due     URINE DRUG SCREEN Q1 YR  07/06/1979     HIV SCREEN (SYSTEM ASSIGNED)  07/06/1982     LIPID MONITORING Q1 YEAR  04/09/2015     HF ACTION PLAN Q3 YR  11/01/2015     COLON CANCER SCREEN (SYSTEM ASSIGNED)  11/07/2017         Suggested websites for health information:  Www.[employer] : Up to date and easily searchable information on multiple topics.  Www.medlineplus.gov : medication info, interactive tutorials, watch real surgeries online  Www.familydoctor.org : good info from the Academy of Family Physicians  Www.cdc.gov : public health info, travel advisories, epidemics (H1N1)  Www.aap.org : children's health info, normal development, vaccinations  Www.health.state.[place].us : [place] dept of health, public health issues in [place], N1N1    Your care team:                            Family Medicine Internal Medicine   MD Devin Mahmood MD Shantel Branch-Fleming, MD Katya Georgiev PA-C Nam Ho, MD Pediatrics   KINGS Kesy, MD Jodi Amador CNP, MD Deborah Mielke, MD Kim Thein, APRN CNP      Clinic hours: Monday - Thursday 7 am-7 pm;  "Fridays 7 am-5 pm.   Urgent care: Monday - Friday 11 am-9 pm; Saturday and Sunday 9 am-5 pm.  Pharmacy : Monday -Thursday 8 am-8 pm; Friday 8 am-6 pm; Saturday and Sunday 9 am-5 pm.     Clinic: [phone]   Pharmacy: [phone]            Follow-ups after your visit        Your next 10 appointments already scheduled     May 16, 2018  3:30 PM CDT   Lab with  LAB   [facility] ([facility])    [address]  1st [address]  [facility] [place] [address] [phone]            May 16, 2018  4:00 PM CDT   (Arrive by 3:45 PM)   RETURN HEART FAILURE with Cassie Kolb MD   [facility] ([facility])    [address] [place] [address]  [address]  [facility] [place] [address] [phone]              Who to contact     If you have questions or need follow up information about today's clinic visit or your schedule please contact [facility] directly at [phone].  Normal or non-critical lab and imaging results will be communicated to you by MyChart, letter or phone within 4 business days after the clinic has received the results. If you do not hear from us within 7 days, please contact the clinic through MyChart or phone. If you have a critical or abnormal lab result, we will notify you by phone as soon as possible.  Submit refill requests through directworx or call your pharmacy and they will forward the refill request to us. Please allow 3 business days for your refill to be completed.          Additional Information About Your Visit        Adama Materialshart Information     directworx lets you send messages to your doctor, view your test results, renew your prescriptions, schedule appointments and more. To sign up, go to [email or web address] . Click on \"Log in\" on the left side of the screen, which will take you to the Welcome page. Then click on \"Sign up Now\" on the right side of the page.     You will be asked to enter the access code listed below, " as well as some personal information. Please follow the directions to create your username and password.     Your access code is: [email or web address]  Expires: 2018  6:30 AM     Your access code will  in 90 days. If you need help or a new code, please call your [place] clinic or [phone].        Care EveryWhere ID     This is your Care EveryWhere ID. This could be used by other organizations to access your [place] medical records  [id number]        Your Vitals Were     Pulse Temperature Last Period Pulse Oximetry Breastfeeding? BMI (Body Mass Index)    57 98.4  F (36.9  C) (Oral) 10/19/2008 98% No 36.73 kg/m2       Blood Pressure from Last 3 Encounters:   18 113/75   18 126/87   18 131/83    Weight from Last 3 Encounters:   18 214 lb (97.1 kg)   18 220 lb (99.8 kg)   18 219 lb (99.3 kg)              Today, you had the following     No orders found for display       Primary Care Provider Office Phone # Fax #    Danae Grimes [phone] [address] [place] [address]        Equal Access to Services     PAOLA SAUNDERS : Hadii sage bernalo Soauryali, waaxda luqadaha, qaybta kaalmada adeegyada, stone berger. So [facility] [phone].    ATENCIÓN: Si habla español, tiene a escalera disposición servicios gratuitos de asistencia lingüística. Llame al [phone].    We comply with applicable federal civil rights laws and [place] laws. We do not discriminate on the basis of race, color, national origin, age, disability, sex, sexual orientation, or gender identity.            Thank you!     Thank you for choosing [facility]  for your care. Our goal is always to provide you with excellent care. Hearing back from our patients is one way we can continue to improve our services. Please take a few minutes to complete the written survey that you may receive in the mail after your visit with us. Thank  you!             Your Updated Medication List - Protect others around you: Learn how to safely use, store and throw away your medicines at www.disposemymeds.org.          This list is accurate as of 5/11/18  9:29 AM.  Always use your most recent med list.                   Brand Name Dispense Instructions for use Diagnosis    Collagen 500 MG Caps           cyclobenzaprine 10 MG tablet    FLEXERIL    60 tablet    Take 0.5-1 tablets (5-10 mg) by mouth 3 times daily as needed for muscle spasms    Status post left knee replacement, Sprain of left knee/leg, initial encounter       diclofenac 75 MG EC tablet    VOLTAREN    40 tablet    Take 1 tablet (75 mg) by mouth 2 times daily as needed for moderate pain    Status post left knee replacement, Sprain of left knee/leg, initial encounter       estradiol 0.0375 MG/24HR BIW patch    VIVELLE-DOT     Apply 1 patch topically 3 times daily as needed        FLAXSEED (LINSEED) PO           furosemide 20 MG tablet    LASIX    60 tablet    Take 2 tablets (40 mg) by mouth daily as needed    CHF with cardiomyopathy (H)       hydrochlorothiazide 12.5 MG capsule    MICROZIDE    90 capsule    Take 1 capsule (12.5 mg) by mouth daily    Familial cardiomyopathy (H)       losartan 100 MG tablet    COZAAR    90 tablet    Take 1 tablet (100 mg) by mouth daily    Familial cardiomyopathy (H)       metoprolol succinate 50 MG 24 hr tablet    TOPROL-XL    30 tablet    Take 1 tablet (50 mg) by mouth daily    Familial cardiomyopathy (H)       omeprazole 40 MG capsule    priLOSEC    90 capsule    Take 1 capsule (40 mg) by mouth daily Take 30-60 minutes before a meal.    LPRD (laryngopharyngeal reflux disease), Oropharyngeal dysphagia, Chronic pharyngitis       order for DME     1 Device    Equipment being ordered:brace    Strain of left knee, initial encounter       progesterone 100 MG capsule    PROMETRIUM     Take 100 mg by mouth Reported on 3/8/2017        vitamin D 2000 units tablet     100 tablet     Take 5,000 Units by mouth as needed Reported on 3/8/2017

## 2018-05-14 ENCOUNTER — TRANSFERRED RECORDS (OUTPATIENT)
Dept: HEALTH INFORMATION MANAGEMENT | Facility: CLINIC | Age: 53
End: 2018-05-14

## 2018-05-14 ENCOUNTER — PRE VISIT (OUTPATIENT)
Dept: CARDIOLOGY | Facility: CLINIC | Age: 53
End: 2018-05-14

## 2018-05-24 ENCOUNTER — TRANSFERRED RECORDS (OUTPATIENT)
Dept: HEALTH INFORMATION MANAGEMENT | Facility: CLINIC | Age: 53
End: 2018-05-24

## 2018-06-05 ENCOUNTER — TRANSFERRED RECORDS (OUTPATIENT)
Dept: HEALTH INFORMATION MANAGEMENT | Facility: CLINIC | Age: 53
End: 2018-06-05

## 2018-06-13 ENCOUNTER — OFFICE VISIT (OUTPATIENT)
Dept: URGENT CARE | Facility: URGENT CARE | Age: 53
End: 2018-06-13
Payer: OTHER MISCELLANEOUS

## 2018-06-13 VITALS
WEIGHT: 219.7 LBS | DIASTOLIC BLOOD PRESSURE: 84 MMHG | HEART RATE: 73 BPM | SYSTOLIC BLOOD PRESSURE: 119 MMHG | BODY MASS INDEX: 37.71 KG/M2 | OXYGEN SATURATION: 98 % | TEMPERATURE: 98.3 F

## 2018-06-13 DIAGNOSIS — M25.562 CHRONIC PAIN OF LEFT KNEE: Primary | ICD-10-CM

## 2018-06-13 DIAGNOSIS — G89.29 CHRONIC PAIN OF LEFT KNEE: Primary | ICD-10-CM

## 2018-06-13 PROCEDURE — 99213 OFFICE O/P EST LOW 20 MIN: CPT | Performed by: NURSE PRACTITIONER

## 2018-06-13 ASSESSMENT — ENCOUNTER SYMPTOMS
VOMITING: 0
FEVER: 0
NAUSEA: 0
SORE THROAT: 0
DIARRHEA: 0
CHILLS: 0
SHORTNESS OF BREATH: 0
HEADACHES: 0
RHINORRHEA: 0
COUGH: 0

## 2018-06-13 NOTE — PROGRESS NOTES
SUBJECTIVE:   Marleen Mcfadden is a 52 year old female presenting with a chief complaint of   Chief Complaint   Patient presents with     Knee Pain     Patient complains of left knee pain        She is an established patient of [facility].    MS Injury/Pain    Onset of symptoms was more than 3 months  Location: left knee  Context: Ongoing knee pain after total knee replacement in 10/3/2017.      Course of symptoms is worsening.    Severity moderate  Current and Associated symptoms: Pain, Swelling and Decreased range of motion  Denies  Bruising, Warmth, Redness and Stiffness  Aggravating Factors: walking, weight-bearing, repetitive motion and flexion/extension  Therapies to improve symptoms include: prescription medication: NSAIDs, physical therapy  This is not the first time this type of problem has occurred for this patient.     Review of Systems   Constitutional: Negative for chills and fever.   HENT: Negative for congestion, ear pain, rhinorrhea and sore throat.    Respiratory: Negative for cough and shortness of breath.    Gastrointestinal: Negative for diarrhea, nausea and vomiting.   Musculoskeletal:        Positive for knee pain on the left   Neurological: Negative for headaches.       Past Medical History:   Diagnosis Date     Allergic rhinitis, cause unspecified      Anemia      Autoimmune disease NEC     Autoimmune disease- unknown/poss SLE     Calcaneal spur 10/21/2014     CHF with cardiomyopathy (H)      Familial cardiomyopathy (H) 10/21/2015     Morbid obesity (H) 5/5/2015     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     no longer an issue     Other primary cardiomyopathies     Cardiomyopathy- dx'd 1999 idiopathic     PONV (postoperative nausea and vomiting)      UTERINE LEIOMYOMA NOS 10/25/2006     Family History   Problem Relation Age of Onset     Hypertension Father      dec     DIABETES Father      dec     HEART DISEASE Father      dec     Alcohol/Drug Father       Cardiovascular Father      HEART DISEASE Mother      dec     Alcohol/Drug Mother      Cardiovascular Mother      HEART DISEASE Daughter      Cardiomyopathy     Cardiovascular Daughter      cardiomyopathy     Colon Cancer Sister      Cardiovascular Son      cardiomyopathy     DIABETES Paternal Grandmother      dec     Hypertension Paternal Grandmother      dec     CEREBROVASCULAR DISEASE Paternal Grandmother      dec     CANCER Sister      Lupus     Cardiovascular Sister      cardiomyopathy     HEART DISEASE Sister      heart failure, and kidney failure     Current Outpatient Prescriptions   Medication Sig Dispense Refill     Cholecalciferol (VITAMIN D) 2000 UNIT tablet Take 5,000 Units by mouth as needed Reported on 3/8/2017 100 tablet 12     Collagen 500 MG CAPS        cyclobenzaprine (FLEXERIL) 10 MG tablet Take 0.5-1 tablets (5-10 mg) by mouth 3 times daily as needed for muscle spasms 60 tablet 1     diclofenac (VOLTAREN) 75 MG EC tablet Take 1 tablet (75 mg) by mouth 2 times daily as needed for moderate pain 40 tablet 1     FLAXSEED, LINSEED, PO        furosemide (LASIX) 20 MG tablet Take 2 tablets (40 mg) by mouth daily as needed 60 tablet 7     hydrochlorothiazide (MICROZIDE) 12.5 MG capsule Take 1 capsule (12.5 mg) by mouth daily 90 capsule 3     losartan (COZAAR) 100 MG tablet Take 1 tablet (100 mg) by mouth daily 90 tablet 3     metoprolol (TOPROL-XL) 50 MG 24 hr tablet Take 1 tablet (50 mg) by mouth daily 30 tablet 11     omeprazole (PRILOSEC) 40 MG capsule Take 1 capsule (40 mg) by mouth daily Take 30-60 minutes before a meal. 90 capsule 1     order for DME Equipment being ordered:brace 1 Device 0     progesterone (PROMETRIUM) 100 MG capsule Take 100 mg by mouth Reported on 3/8/2017       estradiol (VIVELLE-DOT) 0.0375 MG/24HR BIW patch Apply 1 patch topically 3 times daily as needed       Social History   Substance Use Topics     Smoking status: Never Smoker     Smokeless tobacco: Never Used      Alcohol use Yes      Comment: rare, twice a year, she has a drink little wine 2 days ago       OBJECTIVE  /84 (BP Location: Left arm, Patient Position: Chair, Cuff Size: Adult Regular)  Pulse 73  Temp 98.3  F (36.8  C) (Oral)  Wt 219 lb 11.2 oz (99.7 kg)  LMP 10/19/2008  SpO2 98%  BMI 37.71 kg/m2    Physical Exam   Constitutional: She appears well-developed and well-nourished. No distress.   HENT:   Head: Atraumatic.   Right Ear: Tympanic membrane normal.   Left Ear: Tympanic membrane normal.   Pulmonary/Chest: Effort normal and breath sounds normal. No respiratory distress.   Musculoskeletal:   Tenderness and slight swelling on the left anterior knee. Unable to perform any tests tried in the exam room due to pain. Able to extend to 120 degrees and no further. Pulses and sensation on foot intact. .    Lymphadenopathy:     She has no cervical adenopathy.   Neurological: She is alert. No cranial nerve deficit.   Skin: She is not diaphoretic.   Nursing note and vitals reviewed.    ASSESSMENT:      ICD-10-CM    1. Chronic pain of left knee M25.562     G89.29         PLAN:  Patient is requesting a work restrictions note. A work note was also written on 5/11/2018 to excuse her from work. The patient has a letter written by Dr. Hare stating restrictions for work. She is requesting to stay home for 2 days. I explained to her that the letter written with  Restrictions can be used to allow her to work less. She has insisted to have a note to stay home for 2 days.   I strongly recommend follow up with her orthopedic doctor to discuss work restrictions further.     Patient Instructions     Reducing Knee Pain and Swelling    Many treatments can help reduce pain and swelling in your knee. Your healthcare provider or physical therapist may suggest one or more of the following treatments:    Icing your knee helps reduce swelling. You may be asked to ice your knee once a day or more. Apply ice for about 15 to 20  minutes at a time, with at least 40 minutes between sessions. Always keep a towel between the ice and your skin.     Keeping your leg raised above your heart helps excess fluid flow out of your knee joint. This reduces swelling.    Compression means wrapping an elastic bandage or neoprene sleeve snugly around your knees. This keeps fluid from collecting in your knee joint.    Electrical stimulation, done by a physical therapist or , can help reduce excess fluid in your knee joint.    Anti-inflammatory medicines may be prescribed by your healthcare provider. You may take pills or receive injections in your knee.    Isometric (austyn) exercises strengthen the muscles that support your knee joint. They also help reduce excess fluid in your knee.    Massage helps fluid drain away from your knee.  Date Last Reviewed: 10/13/2015    0637-6239 The [employer]. [address] [place] [address], [place] [address]. All rights reserved. This information is not intended as a substitute for professional medical care. Always follow your healthcare professional's instructions.

## 2018-06-13 NOTE — PATIENT INSTRUCTIONS
Reducing Knee Pain and Swelling    Many treatments can help reduce pain and swelling in your knee. Your healthcare provider or physical therapist may suggest one or more of the following treatments:    Icing your knee helps reduce swelling. You may be asked to ice your knee once a day or more. Apply ice for about 15 to 20 minutes at a time, with at least 40 minutes between sessions. Always keep a towel between the ice and your skin.     Keeping your leg raised above your heart helps excess fluid flow out of your knee joint. This reduces swelling.    Compression means wrapping an elastic bandage or neoprene sleeve snugly around your knees. This keeps fluid from collecting in your knee joint.    Electrical stimulation, done by a physical therapist or , can help reduce excess fluid in your knee joint.    Anti-inflammatory medicines may be prescribed by your healthcare provider. You may take pills or receive injections in your knee.    Isometric (austyn) exercises strengthen the muscles that support your knee joint. They also help reduce excess fluid in your knee.    Massage helps fluid drain away from your knee.  Date Last Reviewed: 10/13/2015    7173-0256 The [employer]. [address] [place] [address], [place] [address]. All rights reserved. This information is not intended as a substitute for professional medical care. Always follow your healthcare professional's instructions.

## 2018-06-13 NOTE — MR AVS SNAPSHOT
After Visit Summary   6/13/2018    Marleen Mcfadden    MRN: [id number]           Patient Information     Date Of Birth          1965        Visit Information        Provider Department      6/13/2018 11:55 AM [facility]        Care Instructions      Reducing Knee Pain and Swelling    Many treatments can help reduce pain and swelling in your knee. Your healthcare provider or physical therapist may suggest one or more of the following treatments:    Icing your knee helps reduce swelling. You may be asked to ice your knee once a day or more. Apply ice for about 15 to 20 minutes at a time, with at least 40 minutes between sessions. Always keep a towel between the ice and your skin.     Keeping your leg raised above your heart helps excess fluid flow out of your knee joint. This reduces swelling.    Compression means wrapping an elastic bandage or neoprene sleeve snugly around your knees. This keeps fluid from collecting in your knee joint.    Electrical stimulation, done by a physical therapist or , can help reduce excess fluid in your knee joint.    Anti-inflammatory medicines may be prescribed by your healthcare provider. You may take pills or receive injections in your knee.    Isometric (austyn) exercises strengthen the muscles that support your knee joint. They also help reduce excess fluid in your knee.    Massage helps fluid drain away from your knee.  Date Last Reviewed: 10/13/2015    9523-8038 [employer]. [address] [place] [address], [place] [address]. All rights reserved. This information is not intended as a substitute for professional medical care. Always follow your healthcare professional's instructions.                Follow-ups after your visit        Who to contact     If you have questions or need follow up information about today's clinic visit or your schedule please contact [facility] directly  "at [phone].  Normal or non-critical lab and imaging results will be communicated to you by MyChart, letter or phone within 4 business days after the clinic has received the results. If you do not hear from us within 7 days, please contact the clinic through [employer] or phone. If you have a critical or abnormal lab result, we will notify you by phone as soon as possible.  Submit refill requests through [employer] or call your pharmacy and they will forward the refill request to us. Please allow 3 business days for your refill to be completed.          Additional Information About Your Visit        [employer] Information     [employer] lets you send messages to your doctor, view your test results, renew your prescriptions, schedule appointments and more. To sign up, go to [email or web address] . Click on \"Log in\" on the left side of the screen, which will take you to the Welcome page. Then click on \"Sign up Now\" on the right side of the page.     You will be asked to enter the access code listed below, as well as some personal information. Please follow the directions to create your username and password.     Your access code is: [email or web address]  Expires: 2018  6:30 AM     Your access code will  in 90 days. If you need help or a new code, please call your [facility] clinic or [phone].        Care EveryWhere ID     This is your Care EveryWhere ID. This could be used by other organizations to access your [facility] medical records  [id number]        Your Vitals Were     Pulse Temperature Last Period Pulse Oximetry BMI (Body Mass Index)       73 98.3  F (36.8  C) (Oral) 10/19/2008 98% 37.71 kg/m2        Blood Pressure from Last 3 Encounters:   18 119/84   18 113/75   18 126/87    Weight from Last 3 Encounters:   18 219 lb 11.2 oz (99.7 kg)   18 214 lb (97.1 kg)   18 220 lb (99.8 kg)              Today, you had the following     No orders found for display       Primary Care " Provider Office Phone # Fax #    Danae Grimes [phone] [address]  [facility] [address]        Equal Access to Services     PAOLA SAUNDERS : Hadii sage ku hadelidiao Soauryali, waaxda luqadaha, qaybta kaalmada adeegyada, stone wren laHomerojohnna berger. So [facility] [phone].    ATENCIÓN: Si habla español, tiene a escalera disposición servicios gratuitos de asistencia lingüística. Llame al [phone].    We comply with applicable federal civil rights laws and [place] laws. We do not discriminate on the basis of race, color, national origin, age, disability, sex, sexual orientation, or gender identity.            Thank you!     Thank you for choosing [facility]  for your care. Our goal is always to provide you with excellent care. Hearing back from our patients is one way we can continue to improve our services. Please take a few minutes to complete the written survey that you may receive in the mail after your visit with us. Thank you!             Your Updated Medication List - Protect others around you: Learn how to safely use, store and throw away your medicines at www.disposemymeds.org.          This list is accurate as of 6/13/18 12:32 PM.  Always use your most recent med list.                   Brand Name Dispense Instructions for use Diagnosis    Collagen 500 MG Caps           cyclobenzaprine 10 MG tablet    FLEXERIL    60 tablet    Take 0.5-1 tablets (5-10 mg) by mouth 3 times daily as needed for muscle spasms    Status post left knee replacement, Sprain of left knee/leg, initial encounter       diclofenac 75 MG EC tablet    VOLTAREN    40 tablet    Take 1 tablet (75 mg) by mouth 2 times daily as needed for moderate pain    Status post left knee replacement, Sprain of left knee/leg, initial encounter       estradiol 0.0375 MG/24HR BIW patch    VIVELLE-DOT     Apply 1 patch topically 3 times daily as needed        FLAXSEED (LINSEED) PO            furosemide 20 MG tablet    LASIX    60 tablet    Take 2 tablets (40 mg) by mouth daily as needed    CHF with cardiomyopathy (H)       hydrochlorothiazide 12.5 MG capsule    MICROZIDE    90 capsule    Take 1 capsule (12.5 mg) by mouth daily    Familial cardiomyopathy (H)       losartan 100 MG tablet    COZAAR    90 tablet    Take 1 tablet (100 mg) by mouth daily    Familial cardiomyopathy (H)       metoprolol succinate 50 MG 24 hr tablet    TOPROL-XL    30 tablet    Take 1 tablet (50 mg) by mouth daily    Familial cardiomyopathy (H)       omeprazole 40 MG capsule    priLOSEC    90 capsule    Take 1 capsule (40 mg) by mouth daily Take 30-60 minutes before a meal.    LPRD (laryngopharyngeal reflux disease), Oropharyngeal dysphagia, Chronic pharyngitis       order for DME     1 Device    Equipment being ordered:brace    Strain of left knee, initial encounter       progesterone 100 MG capsule    PROMETRIUM     Take 100 mg by mouth Reported on 3/8/2017        vitamin D 2000 units tablet     100 tablet    Take 5,000 Units by mouth as needed Reported on 3/8/2017

## 2018-06-13 NOTE — LETTER
[facility]  [address]  [facility] [address]  Phone: [phone]    June 13, 2018        Marleen Mcfadden  [address]   [facility] [address]          To whom it may concern:    RE: Marleen Mcfadden    Patient was seen and treated today at our clinic and missed work. Marleen is on work restrictions given by Orthopedic doctor. Please allow her to rest home 6/14/2018 and 6/15/2018.  Patient may return to work with already placed restriction.    Please contact me for questions or concerns.      Sincerely,        Teresa Cuello NP

## 2018-06-21 ENCOUNTER — OFFICE VISIT (OUTPATIENT)
Dept: FAMILY MEDICINE | Facility: CLINIC | Age: 53
End: 2018-06-21
Payer: OTHER MISCELLANEOUS

## 2018-06-21 VITALS
TEMPERATURE: 98.4 F | SYSTOLIC BLOOD PRESSURE: 136 MMHG | DIASTOLIC BLOOD PRESSURE: 87 MMHG | RESPIRATION RATE: 14 BRPM | HEART RATE: 67 BPM | BODY MASS INDEX: 37.46 KG/M2 | OXYGEN SATURATION: 97 % | HEIGHT: 64 IN | WEIGHT: 219.4 LBS

## 2018-06-21 DIAGNOSIS — G89.18 POSTOPERATIVE PAIN: Primary | ICD-10-CM

## 2018-06-21 DIAGNOSIS — M66.252: ICD-10-CM

## 2018-06-21 DIAGNOSIS — Z96.652 STATUS POST LEFT KNEE REPLACEMENT: ICD-10-CM

## 2018-06-21 PROBLEM — M25.562 ACUTE PAIN OF LEFT KNEE: Status: RESOLVED | Noted: 2017-01-12 | Resolved: 2018-06-21

## 2018-06-21 PROCEDURE — 99214 OFFICE O/P EST MOD 30 MIN: CPT | Performed by: PHYSICIAN ASSISTANT

## 2018-06-21 RX ORDER — TRAMADOL HYDROCHLORIDE 50 MG/1
50 TABLET ORAL EVERY 8 HOURS PRN
Qty: 45 TABLET | Refills: 0 | Status: SHIPPED | OUTPATIENT
Start: 2018-06-21 | End: 2018-10-22

## 2018-06-21 RX ORDER — DIAZEPAM 10 MG
TABLET ORAL
COMMUNITY
Start: 2018-06-19 | End: 2019-02-01

## 2018-06-21 RX ORDER — ESTRADIOL 0.04 MG/D
PATCH, EXTENDED RELEASE TRANSDERMAL
Refills: 11 | COMMUNITY
Start: 2018-05-01 | End: 2021-06-17

## 2018-06-21 ASSESSMENT — PAIN SCALES - GENERAL: PAINLEVEL: SEVERE PAIN (7)

## 2018-06-21 NOTE — LETTER
[address] [address]  Phone: [phone]    June 21, 2018        Marleen Mcfadden  [address]   [facility] [address]          To whom it may concern:    RE: Marleen Mcfadden    Patient was seen and treated today at our clinic and missed work 6/21/18-6-7/2/18 due to severe postoperative pain exacerbation of the left knee. Patient will work light duty after that for 1 month until her orthopedist clears her for full duty with no restrictions.   Patient may return to work 7/2/18 with the following: seated duty and light duty-unable to lift more than 10 pounds, no kneeling, no bending, no stairs, no pushing weight more than 10 lbs. No walking greater than 10 min per hour- starting 7/2/18-8/2/18 until orthopedist clears patient for full time regular duty work. Patient is to follow up with Dr. Hare as soon as possible.     Please contact me for questions or concerns.      Sincerely,        Kellie Cohen PAC

## 2018-06-21 NOTE — PROGRESS NOTES
SUBJECTIVE:   Marleen Mcfadden is a 52 year old female who presents to clinic today for the following health issues:    Chronic Pain Follow-Up       Type / Location of Pain: Left knee pain post TLKR  Analgesia/pain control:       Recent changes:  worse      Overall control: Inadequate pain control  Activity level/function:      Daily activities:  Unable to perform most daily activities - chores, hobbies, social activities, driving    Work:  Unable to work  Adverse effects:  No  Adherance    Taking medication as directed?  Yes    Participating in other treatments: no - ENG  Risk Factors:    Sleep:  Poor    Mood/anxiety:  Same    Recent family or social stressors:  Job    Other aggravating factors: Walking  PHQ-9 SCORE 12/19/2016 2/14/2018 2/15/2018   Total Score MyChart - - 0   Total Score 2 0 0     GREGORIO-7 SCORE 12/19/2016 3/26/2018   Total Score 0 0     Encounter-Level CSA:     There are no encounter-level csa.          Amount of exercise or physical activity: Walking at work and the dog as much as possible     Problems taking medications regularly: No    Medication side effects: stomach ache     Diet: regular (no restrictions)      Patient has records of MRi that was done postoperatively that show partial quadriceps tendon tear, which was not present preoperatively.   Patient needs new limitations.     Problem list and histories reviewed & adjusted, as indicated.  Additional history: as documented    Patient Active Problem List   Diagnosis     Autoimmune disease, not elsewhere classified     Primary cardiomyopathy (H)     Leiomyoma of uterus     Allergic rhinitis     Anemia     Sinus bradycardia     Health Care Home     Itching     CHF with cardiomyopathy (H)     TB lung, latent     Knee pain     Swelling of knee joint     Thyroid nodule     Pre-operative cardiovascular examination     Postoperative pain     Pain in joint involving ankle and foot     Calcaneal spur     Plantar fasciitis     CARDIOVASCULAR  SCREENING; LDL GOAL LESS THAN 160     Peroneus longus tendinitis     Morbid obesity (H)     Shaina's nodes     Familial cardiomyopathy (H)     Acute pain of right knee     Acute bilateral low back pain with right-sided sciatica     Degenerative disc disease at L5-S1 level     Chronic bilateral low back pain with right-sided sciatica     Chronic bilateral low back pain with left-sided sciatica     Elevated blood pressure reading without diagnosis of hypertension     Chronic diastolic congestive heart failure (H)     Injury of left shoulder, initial encounter     Sprain of other ligament of left ankle, initial encounter     Left shoulder pain     Rotator cuff injury     Vomiting and diarrhea     Status post left knee replacement     Nontraumatic rupture of quadriceps tendon, left     Past Surgical History:   Procedure Laterality Date     C BREAST SURGERY PROCEDURE UNLISTED      Breast Reduction     C  DELIVERY ONLY  10/85,     , Low Cervicalx2     C EXCIS UTERINE FIBROID,ABD APPRCH       C EXCISE EXCESS SKIN TISSUE,ABDOMEN       C LIGATE FALLOPIAN TUBE       C REPAIR CRUCIATE LIGAMENT,KNEE      Right Knee     HYSTERECTOMY TOTAL ABDOMINAL  2006     THYROIDECTOMY  2013    Procedure: THYROIDECTOMY;  LEFT THYROID LOBECTOMY.  (LIGASURE, RECURRENT LARYNGEAL NERVE MONITOR) ;  Surgeon: Uriah Camargo MD;  Location: [facility]       Social History   Substance Use Topics     Smoking status: Never Smoker     Smokeless tobacco: Never Used     Alcohol use Yes      Comment: rare, twice a year, she has a drink little wine 2 days ago     Family History   Problem Relation Age of Onset     Hypertension Father      dec     Diabetes Father      dec     HEART DISEASE Father      dec     Alcohol/Drug Father      Cardiovascular Father      HEART DISEASE Mother      dec     Alcohol/Drug Mother      Cardiovascular Mother      HEART DISEASE Daughter      Cardiomyopathy     Cardiovascular  Daughter      cardiomyopathy     Colon Cancer Sister      Cardiovascular Son      cardiomyopathy     Diabetes Paternal Grandmother      dec     Hypertension Paternal Grandmother      dec     Cerebrovascular Disease Paternal Grandmother      dec     Cancer Sister      Lupus     Cardiovascular Sister      cardiomyopathy     HEART DISEASE Sister      heart failure, and kidney failure         Current Outpatient Prescriptions   Medication Sig Dispense Refill     Cholecalciferol (VITAMIN D) 2000 UNIT tablet Take 5,000 Units by mouth as needed Reported on 3/8/2017 100 tablet 12     Collagen 500 MG CAPS        cyclobenzaprine (FLEXERIL) 10 MG tablet Take 0.5-1 tablets (5-10 mg) by mouth 3 times daily as needed for muscle spasms 60 tablet 1     diazepam (VALIUM) 10 MG tablet        diclofenac (VOLTAREN) 75 MG EC tablet Take 1 tablet (75 mg) by mouth 2 times daily as needed for moderate pain 40 tablet 1     estradiol (VIVELLE-DOT) 0.0375 MG/24HR BIW patch IRISH 1 PATCH ON SKIN TWICE PER WEEK  11     FLAXSEED, LINSEED, PO        furosemide (LASIX) 20 MG tablet Take 2 tablets (40 mg) by mouth daily as needed 60 tablet 7     GENTLE LAXATIVE 5 MG EC tablet TK 2 TS PO PER COLONOSCOPY PREP INSTRUCTIONS.  0     hydrochlorothiazide (MICROZIDE) 12.5 MG capsule Take 1 capsule (12.5 mg) by mouth daily 90 capsule 3     losartan (COZAAR) 100 MG tablet Take 1 tablet (100 mg) by mouth daily 90 tablet 3     magnesium citrate solution DRINK A 10 OUNCE BOTTLE PER COLONOSCOPY PREP INSTRUCTIONS  0     metoprolol (TOPROL-XL) 50 MG 24 hr tablet Take 1 tablet (50 mg) by mouth daily 30 tablet 11     omeprazole (PRILOSEC) 40 MG capsule Take 1 capsule (40 mg) by mouth daily Take 30-60 minutes before a meal. 90 capsule 1     order for DME Equipment being ordered:brace 1 Device 0     progesterone (PROMETRIUM) 100 MG capsule Take 100 mg by mouth Reported on 3/8/2017       traMADol (ULTRAM) 50 MG tablet Take 1 tablet (50 mg) by mouth every 8 hours as needed  "for severe pain 45 tablet 0     Allergies   Allergen Reactions     Morphine      EMESIS     Sulfa Drugs Swelling       Reviewed and updated as needed this visit by clinical staff  Tobacco  Allergies  Meds  Problems  Med Hx  Surg Hx  Fam Hx  Soc Hx        Reviewed and updated as needed this visit by Provider  Allergies  Meds  Problems         ROS:  Constitutional, HEENT, cardiovascular, pulmonary, GI, , musculoskeletal, neuro, skin, endocrine and psych systems are negative, except as otherwise noted.    OBJECTIVE:     /87 (BP Location: Right arm, Patient Position: Sitting, Cuff Size: Adult Large)  Pulse 67  Temp 98.4  F (36.9  C) (Oral)  Resp 14  Ht 5' 4\" (1.626 m)  Wt 219 lb 6.4 oz (99.5 kg)  LMP 10/19/2008  SpO2 97%  BMI 37.66 kg/m2  Body mass index is 37.66 kg/(m^2).  GENERAL: healthy, alert and no distress  MS: LLE exam shows moderate swelling arounf knee, limited extension to 100-120 degrees     Diagnostic Test Results:  none     ASSESSMENT/PLAN:         ICD-10-CM    1. Postoperative pain G89.18 traMADol (ULTRAM) 50 MG tablet   2. Status post left knee replacement Z96.652 traMADol (ULTRAM) 50 MG tablet   3. Nontraumatic rupture of quadriceps tendon, left M66.252 traMADol (ULTRAM) 50 MG tablet     Tramadol for pain  Ice  Rest  Limitations were provided  Follow up with ortho as planned.       Katherine Cohen PA-C  [facility]  "

## 2018-06-21 NOTE — MR AVS SNAPSHOT
"              After Visit Summary   2018    Marleen Mcfadden    MRN: [id number]           Patient Information     Date Of Birth          1965        Visit Information        Provider Department      2018 10:20 AM Katherine Cohen PA-C [facility]        Today's Diagnoses     Postoperative pain    -  1    Status post left knee replacement        Nontraumatic rupture of quadriceps tendon, left           Follow-ups after your visit        Who to contact     If you have questions or need follow up information about today's clinic visit or your schedule please contact [facility] directly at [phone].  Normal or non-critical lab and imaging results will be communicated to you by Sribuhart, letter or phone within 4 business days after the clinic has received the results. If you do not hear from us within 7 days, please contact the clinic through MyChart or phone. If you have a critical or abnormal lab result, we will notify you by phone as soon as possible.  Submit refill requests through [employer] or call your pharmacy and they will forward the refill request to us. Please allow 3 business days for your refill to be completed.          Additional Information About Your Visit        MyChart Information     [employer] lets you send messages to your doctor, view your test results, renew your prescriptions, schedule appointments and more. To sign up, go to [email or web address] . Click on \"Log in\" on the left side of the screen, which will take you to the Welcome page. Then click on \"Sign up Now\" on the right side of the page.     You will be asked to enter the access code listed below, as well as some personal information. Please follow the directions to create your username and password.     Your access code is: [email or web address]  Expires: 2018  6:30 AM     Your access code will  in 90 days. If you need help or a new code, please call your [place] " "clinic or [phone].        Care EveryWhere ID     This is your Care EveryWhere ID. This could be used by other organizations to access your [place] medical records  [id number]        Your Vitals Were     Pulse Temperature Respirations Height Last Period Pulse Oximetry    67 98.4  F (36.9  C) (Oral) 14 5' 4\" (1.626 m) 10/19/2008 97%    BMI (Body Mass Index)                   37.66 kg/m2            Blood Pressure from Last 3 Encounters:   06/21/18 136/87   06/13/18 119/84   05/11/18 113/75    Weight from Last 3 Encounters:   06/21/18 219 lb 6.4 oz (99.5 kg)   06/13/18 219 lb 11.2 oz (99.7 kg)   05/11/18 214 lb (97.1 kg)              Today, you had the following     No orders found for display         Today's Medication Changes          These changes are accurate as of 6/21/18 11:59 PM.  If you have any questions, ask your nurse or doctor.               Start taking these medicines.        Dose/Directions    traMADol 50 MG tablet   Commonly known as:  ULTRAM   Used for:  Status post left knee replacement, Postoperative pain, Nontraumatic rupture of quadriceps tendon, left   Started by:  Katherine Cohen PA-C        Dose:  50 mg   Take 1 tablet (50 mg) by mouth every 8 hours as needed for severe pain   Quantity:  45 tablet   Refills:  0            Where to get your medicines      Some of these will need a paper prescription and others can be bought over the counter.  Ask your nurse if you have questions.     Bring a paper prescription for each of these medications     traMADol 50 MG tablet               Information about OPIOIDS     PRESCRIPTION OPIOIDS: WHAT YOU NEED TO KNOW   We gave you an opioid (narcotic) pain medicine. It is important to manage your pain, but opioids are not always the best choice. You should first try all the other options your care team gave you. Take this medicine for as short a time (and as few doses) as possible.     These medicines have risks:    DO NOT drive when on new " or higher doses of pain medicine. These medicines can affect your alertness and reaction times, and you could be arrested for driving under the influence (DUI). If you need to use opioids long-term, talk to your care team about driving.    DO NOT operate heave machinery    DO NOT do any other dangerous activities while taking these medicines.     DO NOT drink any alcohol while taking these medicines.      If the opioid prescribed includes acetaminophen, DO NOT take with any other medicines that contain acetaminophen. Read all labels carefully. Look for the word  acetaminophen  or  Tylenol.  Ask your pharmacist if you have questions or are unsure.    You can get addicted to pain medicines, especially if you have a history of addiction (chemical, alcohol or substance dependence). Talk to your care team about ways to reduce this risk.    Store your pills in a secure place, locked if possible. We will not replace any lost or stolen medicine. If you don t finish your medicine, please throw away (dispose) as directed by your pharmacist. The [place] [employer]ution [employer] has more information about safe disposal: https://www.pca.[facility].[place].us/living-green/managing-unwanted-medications.     All opioids tend to cause constipation. Drink plenty of water and eat foods that have a lot of fiber, such as fruits, vegetables, prune juice, apple juice and high-fiber cereal. Take a laxative (Miralax, milk of magnesia, Colace, Senna) if you don t move your bowels at least every other day.          Primary Care Provider Office Phone # Fax #    Danae Grimes [phone] [address]  [facility] [address]        Equal Access to Services     [facility] : Hadii sage bernalo Sofalguni, waaxda luqadaha, qaybta kaalmada adebon, stone clarke . So [facility] [phone].    ATENCIÓN: Si habla español, tiene a escalera disposición servicios gratuitos de asistencia lingüística. Llame al  [phone].    We comply with applicable federal civil rights laws and [place] laws. We do not discriminate on the basis of race, color, national origin, age, disability, sex, sexual orientation, or gender identity.            Thank you!     Thank you for choosing [facility]  for your care. Our goal is always to provide you with excellent care. Hearing back from our patients is one way we can continue to improve our services. Please take a few minutes to complete the written survey that you may receive in the mail after your visit with us. Thank you!             Your Updated Medication List - Protect others around you: Learn how to safely use, store and throw away your medicines at www.disposemymeds.org.          This list is accurate as of 6/21/18 11:59 PM.  Always use your most recent med list.                   Brand Name Dispense Instructions for use Diagnosis    Collagen 500 MG Caps           cyclobenzaprine 10 MG tablet    FLEXERIL    60 tablet    Take 0.5-1 tablets (5-10 mg) by mouth 3 times daily as needed for muscle spasms    Status post left knee replacement, Sprain of left knee/leg, initial encounter       diazepam 10 MG tablet    VALIUM          diclofenac 75 MG EC tablet    VOLTAREN    40 tablet    Take 1 tablet (75 mg) by mouth 2 times daily as needed for moderate pain    Status post left knee replacement, Sprain of left knee/leg, initial encounter       * estradiol 0.0375 MG/24HR BIW patch    VIVELLE-DOT     Apply 1 patch topically 3 times daily as needed        * estradiol 0.0375 MG/24HR BIW patch    VIVELLE-DOT     IRISH 1 PATCH ON SKIN TWICE PER WEEK        FLAXSEED (LINSEED) PO           furosemide 20 MG tablet    LASIX    60 tablet    Take 2 tablets (40 mg) by mouth daily as needed    CHF with cardiomyopathy (H)       GENTLE LAXATIVE 5 MG EC tablet   Generic drug:  bisacodyl      TK 2 TS PO PER COLONOSCOPY PREP INSTRUCTIONS.        hydrochlorothiazide 12.5 MG capsule     MICROZIDE    90 capsule    Take 1 capsule (12.5 mg) by mouth daily    Familial cardiomyopathy (H)       losartan 100 MG tablet    COZAAR    90 tablet    Take 1 tablet (100 mg) by mouth daily    Familial cardiomyopathy (H)       magnesium citrate solution      DRINK A 10 OUNCE BOTTLE PER COLONOSCOPY PREP INSTRUCTIONS        metoprolol succinate 50 MG 24 hr tablet    TOPROL-XL    30 tablet    Take 1 tablet (50 mg) by mouth daily    Familial cardiomyopathy (H)       omeprazole 40 MG capsule    priLOSEC    90 capsule    Take 1 capsule (40 mg) by mouth daily Take 30-60 minutes before a meal.    LPRD (laryngopharyngeal reflux disease), Oropharyngeal dysphagia, Chronic pharyngitis       order for DME     1 Device    Equipment being ordered:brace    Strain of left knee, initial encounter       progesterone 100 MG capsule    PROMETRIUM     Take 100 mg by mouth Reported on 3/8/2017        traMADol 50 MG tablet    ULTRAM    45 tablet    Take 1 tablet (50 mg) by mouth every 8 hours as needed for severe pain    Status post left knee replacement, Postoperative pain, Nontraumatic rupture of quadriceps tendon, left       vitamin D 2000 units tablet     100 tablet    Take 5,000 Units by mouth as needed Reported on 3/8/2017        * Notice:  This list has 2 medication(s) that are the same as other medications prescribed for you. Read the directions carefully, and ask your doctor or other care provider to review them with you.

## 2018-06-28 ENCOUNTER — TRANSFERRED RECORDS (OUTPATIENT)
Dept: HEALTH INFORMATION MANAGEMENT | Facility: CLINIC | Age: 53
End: 2018-06-28

## 2018-07-23 ENCOUNTER — TRANSFERRED RECORDS (OUTPATIENT)
Dept: HEALTH INFORMATION MANAGEMENT | Facility: CLINIC | Age: 53
End: 2018-07-23

## 2018-08-10 ENCOUNTER — TRANSFERRED RECORDS (OUTPATIENT)
Dept: HEALTH INFORMATION MANAGEMENT | Facility: CLINIC | Age: 53
End: 2018-08-10

## 2018-08-15 ENCOUNTER — TRANSFERRED RECORDS (OUTPATIENT)
Dept: HEALTH INFORMATION MANAGEMENT | Facility: CLINIC | Age: 53
End: 2018-08-15

## 2018-08-19 ENCOUNTER — TRANSFERRED RECORDS (OUTPATIENT)
Dept: HEALTH INFORMATION MANAGEMENT | Facility: CLINIC | Age: 53
End: 2018-08-19

## 2018-08-19 LAB
CREAT SERPL-MCNC: 0.71 MG/DL (ref 0.57–1.11)
GFR SERPL CREATININE-BSD FRML MDRD: >60 ML/MIN/1.73M2
GLUCOSE SERPL-MCNC: 97 MG/DL (ref 65–100)
POTASSIUM SERPL-SCNC: 3.7 MMOL/L (ref 3.5–5)

## 2018-08-22 ENCOUNTER — TRANSFERRED RECORDS (OUTPATIENT)
Dept: HEALTH INFORMATION MANAGEMENT | Facility: CLINIC | Age: 53
End: 2018-08-22

## 2018-08-30 ENCOUNTER — OFFICE VISIT (OUTPATIENT)
Dept: URGENT CARE | Facility: URGENT CARE | Age: 53
End: 2018-08-30
Payer: COMMERCIAL

## 2018-08-30 VITALS
HEART RATE: 60 BPM | SYSTOLIC BLOOD PRESSURE: 118 MMHG | OXYGEN SATURATION: 99 % | RESPIRATION RATE: 16 BRPM | BODY MASS INDEX: 38.48 KG/M2 | TEMPERATURE: 98.2 F | DIASTOLIC BLOOD PRESSURE: 78 MMHG | WEIGHT: 224.2 LBS

## 2018-08-30 DIAGNOSIS — R09.81 NASAL CONGESTION: ICD-10-CM

## 2018-08-30 DIAGNOSIS — J30.2 ACUTE SEASONAL ALLERGIC RHINITIS DUE TO OTHER ALLERGEN: Primary | ICD-10-CM

## 2018-08-30 PROCEDURE — 99213 OFFICE O/P EST LOW 20 MIN: CPT | Performed by: NURSE PRACTITIONER

## 2018-08-30 RX ORDER — CETIRIZINE HYDROCHLORIDE 10 MG/1
10 TABLET ORAL EVERY EVENING
Qty: 14 TABLET | Refills: 0 | Status: SHIPPED | OUTPATIENT
Start: 2018-08-30 | End: 2018-09-13

## 2018-08-30 RX ORDER — PSEUDOEPHEDRINE HCL 30 MG
30 TABLET ORAL EVERY 6 HOURS PRN
Qty: 35 TABLET | Refills: 0 | Status: SHIPPED | OUTPATIENT
Start: 2018-08-30 | End: 2019-02-01

## 2018-08-30 ASSESSMENT — ENCOUNTER SYMPTOMS
HEADACHES: 0
VOMITING: 0
FEVER: 0
SHORTNESS OF BREATH: 0
RHINORRHEA: 1
SORE THROAT: 0
CHILLS: 0
SINUS PAIN: 1
NAUSEA: 0
COUGH: 1
DIARRHEA: 0

## 2018-08-30 NOTE — MR AVS SNAPSHOT
After Visit Summary   8/30/2018    Marleen Mcfadden    MRN: [id number]           Patient Information     Date Of Birth          1965        Visit Information        Provider Department      8/30/2018 11:05 AM Teresa Cuello NP [facility]        Today's Diagnoses     Acute seasonal allergic rhinitis due to other allergen    -  1    Nasal congestion          Care Instructions      Allergic Rhinitis  Allergic rhinitis is an allergic reaction that affects the nose, and often the eyes. It s often known as nasal allergies. Nasal allergies are often due to things in the environment that are breathed in. Depending what you are sensitive to, nasal allergies may occur only during certain seasons. Or they may occur year round. Common indoor allergens include house dust mites, mold, cockroaches, and pet dander. Outdoor allergens include pollen from trees, grasses, and weeds.   Symptoms include a drippy, stuffy, and itchy nose. They also include sneezing and red and itchy eyes. You may feel tired more often. Severe allergies may also affect your breathing and trigger a condition called asthma.   Tests can be done to see what allergens are affecting you. You may be referred to an allergy specialist for testing and further evaluation.  Home care  Your healthcare provider may prescribe medicines to help relieve allergy symptoms. These may include oral medicines, nasal sprays, or eye drops.  Ask your provider for advice on how to avoid substances that you are allergic to. Below are a few tips for each type of allergen.  Pet dander:    Do not have pets with fur and feathers.    If you can't avoid having a pet, keep it out of your bedroom and off upholstered furniture.  Pollen:    When pollen counts are high, keep windows of your car and home closed. If possible, use an air conditioner instead.    Wear a filter mask when mowing or doing yard work.  House dust mites:    Wash bedding every week in  warm water and detergent and dry on a hot setting.    Cover the mattress, box spring, and pillows with allergy covers.     If possible, sleep in a room with no carpet, curtains, or upholstered furniture.  Cockroaches:    Store food in sealed containers.    Remove garbage from the home promptly.    Fix water leaks  Mold:    Keep humidity low by using a dehumidifier or air conditioner. Keep the dehumidifier and air conditioner clean and free of mold.    Clean moldy areas with bleach and water.  In general:    Vacuum once or twice a week. If possible, use a vacuum with a high-efficiency particulate air (HEPA) filter.    Do not smoke. Avoid cigarette smoke. Cigarette smoke is an irritant that can make symptoms worse.  Follow-up care  Follow up as advised by the healthcare provider or our staff. If you were referred to an allergy specialist, make this appointment promptly.  When to seek medical advice  Call your healthcare provider right away if the following occur:    Coughing or wheezing    Fever of 100.4 F (38 C) or higher, or as directed by your healthcare provider    Raised red bumps (hives)    Continuing symptoms, new symptoms, or worsening symptoms  Call 911 if you have:    Trouble breathing    Severe swelling of the face or severe itching of the eyes or mouth  Date Last Reviewed: 3/1/2017    2814-4276 [employer]. [address] [place], [place] [address]. All rights reserved. This information is not intended as a substitute for professional medical care. Always follow your healthcare professional's instructions.                Follow-ups after your visit        Who to contact     If you have questions or need follow up information about today's clinic visit or your schedule please contact [facility] directly at [phone].  Normal or non-critical lab and imaging results will be communicated to you by MyChart, letter or phone within 4 business days after the clinic has received  "the results. If you do not hear from us within 7 days, please contact the clinic through [employer] or phone. If you have a critical or abnormal lab result, we will notify you by phone as soon as possible.  Submit refill requests through [employer] or call your pharmacy and they will forward the refill request to us. Please allow 3 business days for your refill to be completed.          Additional Information About Your Visit        [employer] Information     [employer] lets you send messages to your doctor, view your test results, renew your prescriptions, schedule appointments and more. To sign up, go to [email or web address] . Click on \"Log in\" on the left side of the screen, which will take you to the Welcome page. Then click on \"Sign up Now\" on the right side of the page.     You will be asked to enter the access code listed below, as well as some personal information. Please follow the directions to create your username and password.     Your access code is: [email or web address]  Expires: 2018 12:08 PM     Your access code will  in 90 days. If you need help or a new code, please call your [place] clinic or [phone].        Care EveryWhere ID     This is your Care EveryWhere ID. This could be used by other organizations to access your [place] medical records  [id number]        Your Vitals Were     Pulse Temperature Respirations Last Period Pulse Oximetry BMI (Body Mass Index)    60 98.2  F (36.8  C) (Oral) 16 10/19/2008 99% 38.48 kg/m2       Blood Pressure from Last 3 Encounters:   18 118/78   18 136/87   18 119/84    Weight from Last 3 Encounters:   18 224 lb 3.2 oz (101.7 kg)   18 219 lb 6.4 oz (99.5 kg)   18 219 lb 11.2 oz (99.7 kg)              Today, you had the following     No orders found for display         Today's Medication Changes          These changes are accurate as of 18 12:08 PM.  If you have any questions, ask your nurse or doctor.               Start " taking these medicines.        Dose/Directions    cetirizine 10 MG tablet   Commonly known as:  zyrTEC   Used for:  Acute seasonal allergic rhinitis due to other allergen   Started by:  Teresa Cuello NP        Dose:  10 mg   Take 1 tablet (10 mg) by mouth every evening for 14 days   Quantity:  14 tablet   Refills:  0       fluticasone 50 MCG/BLIST Aepb   Commonly known as:  FLOVENT DISKUS   Used for:  Acute seasonal allergic rhinitis due to other allergen   Started by:  Teresa Cuello NP        Dose:  1 puff   Inhale 1 puff into the lungs 2 times daily for 7 days   Quantity:  1 Inhaler   Refills:  0       pseudoePHEDrine 30 MG tablet   Commonly known as:  SUDAFED   Used for:  Nasal congestion   Started by:  Teresa Cuello NP        Dose:  30 mg   Take 1 tablet (30 mg) by mouth every 6 hours as needed for congestion   Quantity:  35 tablet   Refills:  0            Where to get your medicines      These medications were sent to [facility] [address] [place], [place] [address] [facility] [place] [address]  [address] [facility] [address]     Phone:  [phone]     cetirizine 10 MG tablet    fluticasone 50 MCG/BLIST Aepb         Some of these will need a paper prescription and others can be bought over the counter.  Ask your nurse if you have questions.     Bring a paper prescription for each of these medications     pseudoePHEDrine 30 MG tablet                Primary Care Provider Office Phone # Fax #    Danae Grimes [phone] [address] [facility] [address]        Equal Access to Services     [facility] AH: Hadii aad ku hadasho Soomaali, waaxda luqadaha, qaybta kaalmada adeegyalen, stone berger. So [facility] [phone].    ATENCIÓN: Si habla español, tiene a escalera disposición servicios gratuitos de asistencia lingüística. Llame al [phone].    We comply with applicable federal civil rights laws and [place] laws. We do not  discriminate on the basis of race, color, national origin, age, disability, sex, sexual orientation, or gender identity.            Thank you!     Thank you for choosing [facility]  for your care. Our goal is always to provide you with excellent care. Hearing back from our patients is one way we can continue to improve our services. Please take a few minutes to complete the written survey that you may receive in the mail after your visit with us. Thank you!             Your Updated Medication List - Protect others around you: Learn how to safely use, store and throw away your medicines at www.disposemymeds.org.          This list is accurate as of 8/30/18 12:08 PM.  Always use your most recent med list.                   Brand Name Dispense Instructions for use Diagnosis    cetirizine 10 MG tablet    zyrTEC    14 tablet    Take 1 tablet (10 mg) by mouth every evening for 14 days    Acute seasonal allergic rhinitis due to other allergen       Collagen 500 MG Caps           cyclobenzaprine 10 MG tablet    FLEXERIL    60 tablet    Take 0.5-1 tablets (5-10 mg) by mouth 3 times daily as needed for muscle spasms    Status post left knee replacement, Sprain of left knee/leg, initial encounter       diazepam 10 MG tablet    VALIUM          diclofenac 75 MG EC tablet    VOLTAREN    40 tablet    Take 1 tablet (75 mg) by mouth 2 times daily as needed for moderate pain    Status post left knee replacement, Sprain of left knee/leg, initial encounter       estradiol 0.0375 MG/24HR BIW patch    VIVELLE-DOT     IRISH 1 PATCH ON SKIN TWICE PER WEEK        FLAXSEED (LINSEED) PO           fluticasone 50 MCG/BLIST Aepb    FLOVENT DISKUS    1 Inhaler    Inhale 1 puff into the lungs 2 times daily for 7 days    Acute seasonal allergic rhinitis due to other allergen       furosemide 20 MG tablet    LASIX    60 tablet    Take 2 tablets (40 mg) by mouth daily as needed    CHF with cardiomyopathy (H)       GENTLE LAXATIVE 5  MG EC tablet   Generic drug:  bisacodyl      TK 2 TS PO PER COLONOSCOPY PREP INSTRUCTIONS.        hydrochlorothiazide 12.5 MG capsule    MICROZIDE    90 capsule    Take 1 capsule (12.5 mg) by mouth daily    Familial cardiomyopathy (H)       losartan 100 MG tablet    COZAAR    90 tablet    Take 1 tablet (100 mg) by mouth daily    Familial cardiomyopathy (H)       magnesium citrate solution      DRINK A 10 OUNCE BOTTLE PER COLONOSCOPY PREP INSTRUCTIONS        metoprolol succinate 50 MG 24 hr tablet    TOPROL-XL    30 tablet    Take 1 tablet (50 mg) by mouth daily    Familial cardiomyopathy (H)       omeprazole 40 MG capsule    priLOSEC    90 capsule    Take 1 capsule (40 mg) by mouth daily Take 30-60 minutes before a meal.    LPRD (laryngopharyngeal reflux disease), Oropharyngeal dysphagia, Chronic pharyngitis       order for DME     1 Device    Equipment being ordered:brace    Strain of left knee, initial encounter       progesterone 100 MG capsule    PROMETRIUM     Take 100 mg by mouth Reported on 3/8/2017        pseudoePHEDrine 30 MG tablet    SUDAFED    35 tablet    Take 1 tablet (30 mg) by mouth every 6 hours as needed for congestion    Nasal congestion       traMADol 50 MG tablet    ULTRAM    45 tablet    Take 1 tablet (50 mg) by mouth every 8 hours as needed for severe pain    Status post left knee replacement, Postoperative pain, Nontraumatic rupture of quadriceps tendon, left       vitamin D 2000 units tablet     100 tablet    Take 5,000 Units by mouth as needed Reported on 3/8/2017

## 2018-08-30 NOTE — PROGRESS NOTES
SUBJECTIVE:   Marleen Mcfadden is a 53 year old female presenting with a chief complaint of   Chief Complaint   Patient presents with     Sinus Problem     Congestion and facial pain. x1 day       She is an established patient of [facility].    URI Adult    Onset of symptoms was 1 day(s) ago.  Course of illness is worsening.    Severity moderate  Current and Associated symptoms: runny nose, stuffy nose and cough - productive, facial pain  Treatment measures tried include None tried.  Predisposing factors include None.        Review of Systems   Constitutional: Negative for chills and fever.   HENT: Positive for congestion, rhinorrhea and sinus pain. Negative for ear pain and sore throat.    Respiratory: Positive for cough. Negative for shortness of breath.    Gastrointestinal: Negative for diarrhea, nausea and vomiting.   Neurological: Negative for headaches.   All other systems reviewed and are negative.      Past Medical History:   Diagnosis Date     Allergic rhinitis, cause unspecified      Anemia      Autoimmune disease NEC     Autoimmune disease- unknown/poss SLE     Calcaneal spur 10/21/2014     CHF with cardiomyopathy (H)      Familial cardiomyopathy (H) 10/21/2015     Morbid obesity (H) 5/5/2015     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     no longer an issue     Other primary cardiomyopathies     Cardiomyopathy- dx'd 1999 idiopathic     PONV (postoperative nausea and vomiting)      UTERINE LEIOMYOMA NOS 10/25/2006     Family History   Problem Relation Age of Onset     Hypertension Father      dec     Diabetes Father      dec     HEART DISEASE Father      dec     Alcohol/Drug Father      Cardiovascular Father      HEART DISEASE Mother      dec     Alcohol/Drug Mother      Cardiovascular Mother      HEART DISEASE Daughter      Cardiomyopathy     Cardiovascular Daughter      cardiomyopathy     Colon Cancer Sister      Cardiovascular Son      cardiomyopathy      Diabetes Paternal Grandmother      dec     Hypertension Paternal Grandmother      dec     Cerebrovascular Disease Paternal Grandmother      dec     Cancer Sister      Lupus     Cardiovascular Sister      cardiomyopathy     HEART DISEASE Sister      heart failure, and kidney failure     Current Outpatient Prescriptions   Medication Sig Dispense Refill     cetirizine (ZYRTEC) 10 MG tablet Take 1 tablet (10 mg) by mouth every evening for 14 days 14 tablet 0     Cholecalciferol (VITAMIN D) 2000 UNIT tablet Take 5,000 Units by mouth as needed Reported on 3/8/2017 100 tablet 12     Collagen 500 MG CAPS        cyclobenzaprine (FLEXERIL) 10 MG tablet Take 0.5-1 tablets (5-10 mg) by mouth 3 times daily as needed for muscle spasms 60 tablet 1     diazepam (VALIUM) 10 MG tablet        diclofenac (VOLTAREN) 75 MG EC tablet Take 1 tablet (75 mg) by mouth 2 times daily as needed for moderate pain 40 tablet 1     estradiol (VIVELLE-DOT) 0.0375 MG/24HR BIW patch IRISH 1 PATCH ON SKIN TWICE PER WEEK  11     FLAXSEED, LINSEED, PO        fluticasone (FLOVENT DISKUS) 50 MCG/BLIST AEPB Inhale 1 puff into the lungs 2 times daily for 7 days 1 Inhaler 0     furosemide (LASIX) 20 MG tablet Take 2 tablets (40 mg) by mouth daily as needed 60 tablet 7     GENTLE LAXATIVE 5 MG EC tablet TK 2 TS PO PER COLONOSCOPY PREP INSTRUCTIONS.  0     hydrochlorothiazide (MICROZIDE) 12.5 MG capsule Take 1 capsule (12.5 mg) by mouth daily 90 capsule 3     losartan (COZAAR) 100 MG tablet Take 1 tablet (100 mg) by mouth daily 90 tablet 3     magnesium citrate solution DRINK A 10 OUNCE BOTTLE PER COLONOSCOPY PREP INSTRUCTIONS  0     metoprolol (TOPROL-XL) 50 MG 24 hr tablet Take 1 tablet (50 mg) by mouth daily 30 tablet 11     omeprazole (PRILOSEC) 40 MG capsule Take 1 capsule (40 mg) by mouth daily Take 30-60 minutes before a meal. 90 capsule 1     order for DME Equipment being ordered:brace 1 Device 0     progesterone (PROMETRIUM) 100 MG capsule Take 100 mg by  mouth Reported on 3/8/2017       pseudoePHEDrine (SUDAFED) 30 MG tablet Take 1 tablet (30 mg) by mouth every 6 hours as needed for congestion 35 tablet 0     traMADol (ULTRAM) 50 MG tablet Take 1 tablet (50 mg) by mouth every 8 hours as needed for severe pain 45 tablet 0     Social History   Substance Use Topics     Smoking status: Never Smoker     Smokeless tobacco: Never Used     Alcohol use Yes      Comment: rare, twice a year, she has a drink little wine 2 days ago       OBJECTIVE  /78  Pulse 60  Temp 98.2  F (36.8  C) (Oral)  Resp 16  Wt 224 lb 3.2 oz (101.7 kg)  LMP 10/19/2008  SpO2 99%  BMI 38.48 kg/m2    Physical Exam   HENT:   Head: Normocephalic.   Right Ear: External ear normal.   Left Ear: External ear normal.   Nose: Mucosal edema and rhinorrhea present.   Mouth/Throat: Oropharynx is clear and moist.   Cardiovascular: Normal rate and normal heart sounds.    Pulmonary/Chest: Effort normal and breath sounds normal.     ASSESSMENT:      ICD-10-CM    1. Acute seasonal allergic rhinitis due to other allergen J30.2 fluticasone (FLOVENT DISKUS) 50 MCG/BLIST AEPB     cetirizine (ZYRTEC) 10 MG tablet   2. Nasal congestion R09.81 pseudoePHEDrine (SUDAFED) 30 MG tablet      PLAN:  Discussed symptoms due to allergies  Advised to avoid allergens if known  Increase hydration, rest  Antihistamine as advised  Side effects of medications discussed  OTC medication discussed  Follow up with PCP if symptoms are persisting in 3 days or sooner if getting worse.   Questions are answered and patient is in agreement with treatment plan.      There are no Patient Instructions on file for this visit.

## 2018-08-31 ENCOUNTER — TRANSFERRED RECORDS (OUTPATIENT)
Dept: HEALTH INFORMATION MANAGEMENT | Facility: CLINIC | Age: 53
End: 2018-08-31

## 2018-09-28 ENCOUNTER — TRANSFERRED RECORDS (OUTPATIENT)
Dept: HEALTH INFORMATION MANAGEMENT | Facility: CLINIC | Age: 53
End: 2018-09-28

## 2018-10-10 ENCOUNTER — OFFICE VISIT (OUTPATIENT)
Dept: FAMILY MEDICINE | Facility: CLINIC | Age: 53
End: 2018-10-10
Payer: COMMERCIAL

## 2018-10-10 VITALS
OXYGEN SATURATION: 100 % | WEIGHT: 233 LBS | DIASTOLIC BLOOD PRESSURE: 79 MMHG | TEMPERATURE: 98.3 F | SYSTOLIC BLOOD PRESSURE: 134 MMHG | BODY MASS INDEX: 39.99 KG/M2 | HEART RATE: 62 BPM

## 2018-10-10 DIAGNOSIS — G47.00 INSOMNIA, UNSPECIFIED TYPE: ICD-10-CM

## 2018-10-10 DIAGNOSIS — F43.22 ADJUSTMENT DISORDER WITH ANXIOUS MOOD: Primary | ICD-10-CM

## 2018-10-10 PROCEDURE — 99214 OFFICE O/P EST MOD 30 MIN: CPT | Performed by: FAMILY MEDICINE

## 2018-10-10 RX ORDER — HYDROXYZINE HYDROCHLORIDE 25 MG/1
50-100 TABLET, FILM COATED ORAL
Qty: 30 TABLET | Refills: 0 | Status: SHIPPED | OUTPATIENT
Start: 2018-10-10 | End: 2020-07-13

## 2018-10-10 ASSESSMENT — ANXIETY QUESTIONNAIRES
5. BEING SO RESTLESS THAT IT IS HARD TO SIT STILL: NOT AT ALL
GAD7 TOTAL SCORE: 5
1. FEELING NERVOUS, ANXIOUS, OR ON EDGE: SEVERAL DAYS
IF YOU CHECKED OFF ANY PROBLEMS ON THIS QUESTIONNAIRE, HOW DIFFICULT HAVE THESE PROBLEMS MADE IT FOR YOU TO DO YOUR WORK, TAKE CARE OF THINGS AT HOME, OR GET ALONG WITH OTHER PEOPLE: NOT DIFFICULT AT ALL
7. FEELING AFRAID AS IF SOMETHING AWFUL MIGHT HAPPEN: NOT AT ALL
2. NOT BEING ABLE TO STOP OR CONTROL WORRYING: SEVERAL DAYS
3. WORRYING TOO MUCH ABOUT DIFFERENT THINGS: SEVERAL DAYS
6. BECOMING EASILY ANNOYED OR IRRITABLE: NOT AT ALL

## 2018-10-10 ASSESSMENT — PATIENT HEALTH QUESTIONNAIRE - PHQ9: 5. POOR APPETITE OR OVEREATING: MORE THAN HALF THE DAYS

## 2018-10-10 NOTE — LETTER
October 10, 2018      Marleen Mcfadden  [address]   [facility] [facility] [address]        To Whom It May Concern,      Please excuse Marleen from work as she was seen in clinic.           Sincerely,        Yanna Matias MD

## 2018-10-10 NOTE — PROGRESS NOTES
SUBJECTIVE:   Marleen Mcfadden is a 53 year old female who presents to clinic today for the following health issues:  Insomnia  Onset: 4 days     Description:   Time to fall asleep (sleep latency): hours   Middle of night awakening:  no   Early morning awakening:  no     Progression of Symptoms:  worsening    Accompanying Signs & Symptoms:  Daytime sleepiness/napping: no   Excessive snoring/apnea: no   Restless legs: no   Frequent urination: no   Chronic pain:  YES legs    History:  Prior Insomnia: no     Precipitating factors:   New stressful situation: YES  Caffeine intake: YES  OTC decongestants: no   Any new medications: no     Alleviating factors:  Self medicating (alcohol, etc.):  No, increased Anxiety       She's had around 1 hour of sleep over the last three days.   She started a new job on Monday. She doesn't feel qualified for this position. She is afraid of making a mistake and going through that at night.   She has tried benadryl and flexeril - not successful.   She also feels claustrophobic due to being in a small space.   She has talked to her boss and was reassured that she'll be qualified in two weeks.   She is in the process of suing her company 2/2 fall.     Problem list and histories reviewed & adjusted, as indicated.  Additional history: as documented    Labs reviewed in EPIC    Reviewed and updated as needed this visit by clinical staff       Reviewed and updated as needed this visit by Provider         ROS:  Constitutional, HEENT, cardiovascular, pulmonary, gi and gu systems are negative, except as otherwise noted.    OBJECTIVE:     /79 (BP Location: Right arm, Patient Position: Sitting, Cuff Size: Adult Large)  Pulse 62  Temp 98.3  F (36.8  C) (Oral)  Wt 233 lb (105.7 kg)  LMP 10/19/2008  SpO2 100%  BMI 39.99 kg/m2  Body mass index is 39.99 kg/(m^2).  GENERAL: healthy, alert and no distress  EYES: Eyes grossly normal to inspection  HENT: nose and mouth without ulcers or  lesions  PSYCH: + anxious     Diagnostic Test Results:  none     ASSESSMENT/PLAN:     1. Adjustment disorder with anxious mood  - hydrOXYzine (ATARAX) 25 MG tablet; Take 2-4 tablets ( mg) by mouth nightly as needed for anxiety or other (sleep)  Dispense: 30 tablet; Refill: 0  - I would recommend talking to your boss or HR about changing positions   - hydrOXYzine (ATARAX) 25 MG tablet; Take 2-4 tablets ( mg) by mouth nightly as needed for anxiety or other (sleep)   - follow up in 2-3 days if symptoms are not improving     2. Insomnia, unspecified type  - hydrOXYzine (ATARAX) 25 MG tablet; Take 2-4 tablets ( mg) by mouth nightly as needed for anxiety or other (sleep)  Dispense: 30 tablet; Refill: 0      Yanna Matias MD  [facility]

## 2018-10-10 NOTE — PATIENT INSTRUCTIONS
- I would recommend talking to your boss or HR about changing positions   - hydrOXYzine (ATARAX) 25 MG tablet; Take 2-4 tablets ( mg) by mouth nightly as needed for anxiety or other (sleep)   - follow up in 2-3 days if symptoms are not improving

## 2018-10-10 NOTE — MR AVS SNAPSHOT
After Visit Summary   10/10/2018    Marleen Mcfadden    MRN: [id number]           Patient Information     Date Of Birth          1965        Visit Information        Provider Department      10/10/2018 11:40 AM Yanna Matias MD [facility]        Today's Diagnoses     Adjustment disorder with anxious mood    -  1    Insomnia, unspecified type          Care Instructions    - I would recommend talking to your boss or HR about changing positions   - hydrOXYzine (ATARAX) 25 MG tablet; Take 2-4 tablets ( mg) by mouth nightly as needed for anxiety or other (sleep)   - follow up in 2-3 days if symptoms are not improving           Follow-ups after your visit        Follow-up notes from your care team     Return in about 2 days (around 10/12/2018) for Routine Visit.      Who to contact     If you have questions or need follow up information about today's clinic visit or your schedule please contact [facility] directly at [phone].  Normal or non-critical lab and imaging results will be communicated to you by MyChart, letter or phone within 4 business days after the clinic has received the results. If you do not hear from us within 7 days, please contact the clinic through [employer] or phone. If you have a critical or abnormal lab result, we will notify you by phone as soon as possible.  Submit refill requests through [employer] or call your pharmacy and they will forward the refill request to us. Please allow 3 business days for your refill to be completed.          Additional Information About Your Visit        Care EveryWhere ID     This is your Care EveryWhere ID. This could be used by other organizations to access your [facility] medical records  [id number]        Your Vitals Were     Pulse Temperature Last Period Pulse Oximetry BMI (Body Mass Index)       62 98.3  F (36.8  C) (Oral) 10/19/2008 100% 39.99 kg/m2        Blood Pressure from Last 3 Encounters:    10/10/18 134/79   08/30/18 118/78   06/21/18 136/87    Weight from Last 3 Encounters:   10/10/18 233 lb (105.7 kg)   08/30/18 224 lb 3.2 oz (101.7 kg)   06/21/18 219 lb 6.4 oz (99.5 kg)              Today, you had the following     No orders found for display         Today's Medication Changes          These changes are accurate as of 10/10/18  1:10 PM.  If you have any questions, ask your nurse or doctor.               Start taking these medicines.        Dose/Directions    hydrOXYzine 25 MG tablet   Commonly known as:  ATARAX   Used for:  Insomnia, unspecified type, Adjustment disorder with anxious mood   Started by:  aYnna Matias MD        Dose:   mg   Take 2-4 tablets ( mg) by mouth nightly as needed for anxiety or other (sleep)   Quantity:  30 tablet   Refills:  0            Where to get your medicines      [facility] [address] [place] [address] [employer] [address] [place] [address]  [address] [facility] [address]     Phone:  [phone]     hydrOXYzine 25 MG tablet                Primary Care Provider Office Phone # Fax #    Danae Grimes [phone] [address]  [facility] [address]        Equal Access to Services     [facility] AH: Hadii sage sharma hadelidiao Sofalguni, waaxda luqadaha, qaybta kaalmada adebrodyyada, stone berger. So [facility] [phone].    ATENCIÓN: Si habla español, tiene a escalera disposición servicios gratuitos de asistencia lingüística. Erma rodrigez [phone].    We comply with applicable federal civil rights laws and [place] laws. We do not discriminate on the basis of race, color, national origin, age, disability, sex, sexual orientation, or gender identity.            Thank you!     Thank you for choosing [facility]  for your care. Our goal is always to provide you with excellent care. Hearing back from our patients is one way we can  continue to improve our services. Please take a few minutes to complete the written survey that you may receive in the mail after your visit with us. Thank you!             Your Updated Medication List - Protect others around you: Learn how to safely use, store and throw away your medicines at www.disposemymeds.org.          This list is accurate as of 10/10/18  1:10 PM.  Always use your most recent med list.                   Brand Name Dispense Instructions for use Diagnosis    Collagen 500 MG Caps           cyclobenzaprine 10 MG tablet    FLEXERIL    60 tablet    Take 0.5-1 tablets (5-10 mg) by mouth 3 times daily as needed for muscle spasms    Status post left knee replacement, Sprain of left knee/leg, initial encounter       diazepam 10 MG tablet    VALIUM          diclofenac 75 MG EC tablet    VOLTAREN    40 tablet    Take 1 tablet (75 mg) by mouth 2 times daily as needed for moderate pain    Status post left knee replacement, Sprain of left knee/leg, initial encounter       estradiol 0.0375 MG/24HR BIW patch    VIVELLE-DOT     IRISH 1 PATCH ON SKIN TWICE PER WEEK        FLAXSEED (LINSEED) PO           fluticasone 50 MCG/BLIST Aepb    FLOVENT DISKUS    1 Inhaler    Inhale 1 puff into the lungs 2 times daily for 7 days    Acute seasonal allergic rhinitis due to other allergen       furosemide 20 MG tablet    LASIX    60 tablet    Take 2 tablets (40 mg) by mouth daily as needed    CHF with cardiomyopathy (H)       GENTLE LAXATIVE 5 MG EC tablet   Generic drug:  bisacodyl      TK 2 TS PO PER COLONOSCOPY PREP INSTRUCTIONS.        hydrochlorothiazide 12.5 MG capsule    MICROZIDE    90 capsule    Take 1 capsule (12.5 mg) by mouth daily    Familial cardiomyopathy (H)       hydrOXYzine 25 MG tablet    ATARAX    30 tablet    Take 2-4 tablets ( mg) by mouth nightly as needed for anxiety or other (sleep)    Insomnia, unspecified type, Adjustment disorder with anxious mood       losartan 100 MG tablet    COZAAR    90  tablet    Take 1 tablet (100 mg) by mouth daily    Familial cardiomyopathy (H)       magnesium citrate solution      DRINK A 10 OUNCE BOTTLE PER COLONOSCOPY PREP INSTRUCTIONS        metoprolol succinate 50 MG 24 hr tablet    TOPROL-XL    30 tablet    Take 1 tablet (50 mg) by mouth daily    Familial cardiomyopathy (H)       omeprazole 40 MG capsule    priLOSEC    90 capsule    Take 1 capsule (40 mg) by mouth daily Take 30-60 minutes before a meal.    LPRD (laryngopharyngeal reflux disease), Oropharyngeal dysphagia, Chronic pharyngitis       order for DME     1 Device    Equipment being ordered:brace    Strain of left knee, initial encounter       progesterone 100 MG capsule    PROMETRIUM     Take 100 mg by mouth Reported on 3/8/2017        traMADol 50 MG tablet    ULTRAM    45 tablet    Take 1 tablet (50 mg) by mouth every 8 hours as needed for severe pain    Status post left knee replacement, Postoperative pain, Nontraumatic rupture of quadriceps tendon, left       vitamin D 2000 units tablet     100 tablet    Take 5,000 Units by mouth as needed Reported on 3/8/2017

## 2018-10-11 ASSESSMENT — ANXIETY QUESTIONNAIRES: GAD7 TOTAL SCORE: 5

## 2018-10-22 ENCOUNTER — OFFICE VISIT (OUTPATIENT)
Dept: FAMILY MEDICINE | Facility: CLINIC | Age: 53
End: 2018-10-22
Payer: OTHER MISCELLANEOUS

## 2018-10-22 ENCOUNTER — TELEPHONE (OUTPATIENT)
Dept: FAMILY MEDICINE | Facility: CLINIC | Age: 53
End: 2018-10-22

## 2018-10-22 VITALS
DIASTOLIC BLOOD PRESSURE: 84 MMHG | HEART RATE: 83 BPM | TEMPERATURE: 98.5 F | WEIGHT: 230.7 LBS | OXYGEN SATURATION: 99 % | SYSTOLIC BLOOD PRESSURE: 139 MMHG | BODY MASS INDEX: 39.38 KG/M2 | RESPIRATION RATE: 18 BRPM | HEIGHT: 64 IN

## 2018-10-22 DIAGNOSIS — Z96.652 STATUS POST LEFT KNEE REPLACEMENT: ICD-10-CM

## 2018-10-22 DIAGNOSIS — G89.29 CHRONIC PAIN OF RIGHT KNEE: ICD-10-CM

## 2018-10-22 DIAGNOSIS — Z02.6 ENCOUNTER RELATED TO WORKER'S COMPENSATION CLAIM: Primary | ICD-10-CM

## 2018-10-22 DIAGNOSIS — M25.561 CHRONIC PAIN OF RIGHT KNEE: ICD-10-CM

## 2018-10-22 PROCEDURE — 99214 OFFICE O/P EST MOD 30 MIN: CPT | Performed by: PHYSICIAN ASSISTANT

## 2018-10-22 RX ORDER — CYCLOBENZAPRINE HCL 10 MG
5-10 TABLET ORAL 3 TIMES DAILY PRN
Qty: 90 TABLET | Refills: 1 | Status: SHIPPED | OUTPATIENT
Start: 2018-10-22 | End: 2020-07-13

## 2018-10-22 RX ORDER — TRAMADOL HYDROCHLORIDE 50 MG/1
50 TABLET ORAL EVERY 8 HOURS PRN
Qty: 60 TABLET | Refills: 0 | Status: SHIPPED | OUTPATIENT
Start: 2018-10-22 | End: 2020-07-13

## 2018-10-22 RX ORDER — DICLOFENAC SODIUM 75 MG/1
75 TABLET, DELAYED RELEASE ORAL 2 TIMES DAILY PRN
Qty: 40 TABLET | Refills: 1 | Status: SHIPPED | OUTPATIENT
Start: 2018-10-22 | End: 2020-07-13

## 2018-10-22 ASSESSMENT — PAIN SCALES - GENERAL: PAINLEVEL: EXTREME PAIN (8)

## 2018-10-22 NOTE — LETTER
[address] [address]  Phone: [phone]    October 22, 2018        Marleen Mcfadden  [address]   [place] [address]          To whom it may concern:    RE: Marleen Mcfadden    Patient was seen and treated today at our clinic.     Patient was seen and treated today at our clinic and missed work 10/22/18-10/29/18 due to severe right knee pain. Patient will work light duty after that for 1 month until her orthopedist clears her for full duty with no restrictions.   Patient may return to work 10/29/18 with the following: seated duty and light duty-unable to lift more than 10 pounds, no kneeling, no bending, no stairs, no pushing weight more than 10 lbs, no repetitive getting up from a chair. No walking greater than 10 min per hour- starting 10/29/18 until orthopedist clears patient for full time regular duty work. Patient is to follow up with Dr. Hare as soon as possible.     Please contact me for questions or concerns.      Sincerely,        Kellie Cohen PAC

## 2018-10-22 NOTE — TELEPHONE ENCOUNTER
Patient was seen in walk in clinic at [facility] today  Provider gave her a prescription for a 20 day supply of Tramadol.  Patient insurance will only allow a 7 day supply without a PA.  Would you be willing to either do the PA or write for a 7 day supply?  Please advise.  Pharmacy loaded.  Dina Shaver RN

## 2018-10-22 NOTE — PROGRESS NOTES
"  SUBJECTIVE:   Marleen Mcfadden is a 53 year old female who presents to clinic today for the following health issues:    Chronic Pain Follow-Up       Type / Location of Pain: Left and right knee pain   Analgesia/pain control:       Recent changes:  worse      Overall control: Inadequate pain control  Activity level/function:      Daily activities:  Unable to perform most daily activities - chores, hobbies, social activities, driving    Work:  Able to work full time with limitations but doesn't honor them   Adverse effects:  No  Adherance    Taking medication as directed?  Yes    Participating in other treatments: no - got injections but not working   Risk Factors:    Sleep:  Poor been waking her up     Mood/anxiety:  Just got on anxiety medication but no changes     Recent family or social stressors:  job change/loss    Other aggravating factors: repetitive activities - going up and down from a seated postion to standing and walking   Patient has had WC injury in 2016 of right knee. She was still recovering from total left  knee replacement since 2011 . Patient has tried injections of the right knee which did not help. Patient was seen by ortho group at [place] and was advised to have right knee replacement and was fitted for left knee brace.   PHQ-9 SCORE 12/19/2016 2/14/2018 2/15/2018   Total Score MyChart - - 0   Total Score 2 0 0     GREGORIO-7 SCORE 12/19/2016 3/26/2018 10/10/2018   Total Score 0 0 5     Encounter-Level CSA:     There are no encounter-level csa.        Patient reports that current work limitations do not work for her because she gets up and down \"more than 50 times a day\".          Problem list and histories reviewed & adjusted, as indicated.  Additional history: as documented    Patient Active Problem List   Diagnosis     Autoimmune disease, not elsewhere classified     Primary cardiomyopathy (H)     Leiomyoma of uterus     Allergic rhinitis     Anemia     Sinus bradycardia     Health Care Home "     Itching     CHF with cardiomyopathy (H)     TB lung, latent     Knee pain     Swelling of knee joint     Thyroid nodule     Pre-operative cardiovascular examination     Postoperative pain     Pain in joint involving ankle and foot     Calcaneal spur     Plantar fasciitis     CARDIOVASCULAR SCREENING; LDL GOAL LESS THAN 160     Peroneus longus tendinitis     Morbid obesity (H)     Shaina's nodes     Familial cardiomyopathy (H)     Acute pain of right knee     Acute bilateral low back pain with right-sided sciatica     Degenerative disc disease at L5-S1 level     Chronic bilateral low back pain with right-sided sciatica     Chronic bilateral low back pain with left-sided sciatica     Elevated blood pressure reading without diagnosis of hypertension     Chronic diastolic congestive heart failure (H)     Injury of left shoulder, initial encounter     Sprain of other ligament of left ankle, initial encounter     Left shoulder pain     Rotator cuff injury     Vomiting and diarrhea     Status post left knee replacement     Nontraumatic rupture of quadriceps tendon, left     Past Surgical History:   Procedure Laterality Date     C BREAST SURGERY PROCEDURE UNLISTED      Breast Reduction     C  DELIVERY ONLY  10/85,     , Low Cervicalx2     C EXCIS UTERINE FIBROID,ABD APPRCH       C EXCISE EXCESS SKIN TISSUE,ABDOMEN       C LIGATE FALLOPIAN TUBE       C REPAIR CRUCIATE LIGAMENT,KNEE      Right Knee     HYSTERECTOMY TOTAL ABDOMINAL  2006     THYROIDECTOMY  2013    Procedure: THYROIDECTOMY;  LEFT THYROID LOBECTOMY.  (LIGASURE, RECURRENT LARYNGEAL NERVE MONITOR) ;  Surgeon: Uriah Camargo MD;  Location: [facility]       Social History   Substance Use Topics     Smoking status: Never Smoker     Smokeless tobacco: Never Used     Alcohol use Yes      Comment: rare, twice a year, she has a drink little wine 2 days ago     Family History   Problem Relation Age of Onset      Hypertension Father      dec     Diabetes Father      dec     HEART DISEASE Father      dec     Alcohol/Drug Father      Cardiovascular Father      HEART DISEASE Mother      dec     Alcohol/Drug Mother      Cardiovascular Mother      HEART DISEASE Daughter      Cardiomyopathy     Cardiovascular Daughter      cardiomyopathy     Colon Cancer Sister      Cardiovascular Son      cardiomyopathy     Diabetes Paternal Grandmother      dec     Hypertension Paternal Grandmother      dec     Cerebrovascular Disease Paternal Grandmother      dec     Cancer Sister      Lupus     Cardiovascular Sister      cardiomyopathy     HEART DISEASE Sister      heart failure, and kidney failure         Current Outpatient Prescriptions   Medication Sig Dispense Refill     Collagen 500 MG CAPS        cyclobenzaprine (FLEXERIL) 10 MG tablet Take 0.5-1 tablets (5-10 mg) by mouth 3 times daily as needed for muscle spasms 90 tablet 1     diazepam (VALIUM) 10 MG tablet        diclofenac (VOLTAREN) 75 MG EC tablet Take 1 tablet (75 mg) by mouth 2 times daily as needed for moderate pain 40 tablet 1     estradiol (VIVELLE-DOT) 0.0375 MG/24HR BIW patch IRISH 1 PATCH ON SKIN TWICE PER WEEK  11     FLAXSEED, LINSEED, PO        furosemide (LASIX) 20 MG tablet Take 2 tablets (40 mg) by mouth daily as needed 60 tablet 7     GENTLE LAXATIVE 5 MG EC tablet TK 2 TS PO PER COLONOSCOPY PREP INSTRUCTIONS.  0     hydrochlorothiazide (MICROZIDE) 12.5 MG capsule Take 1 capsule (12.5 mg) by mouth daily 90 capsule 3     hydrOXYzine (ATARAX) 25 MG tablet Take 2-4 tablets ( mg) by mouth nightly as needed for anxiety or other (sleep) 30 tablet 0     losartan (COZAAR) 100 MG tablet Take 1 tablet (100 mg) by mouth daily 90 tablet 3     magnesium citrate solution DRINK A 10 OUNCE BOTTLE PER COLONOSCOPY PREP INSTRUCTIONS  0     metoprolol (TOPROL-XL) 50 MG 24 hr tablet Take 1 tablet (50 mg) by mouth daily 30 tablet 11     order for DME Equipment being ordered:brace 1  "Device 0     progesterone (PROMETRIUM) 100 MG capsule Take 100 mg by mouth Reported on 3/8/2017       traMADol (ULTRAM) 50 MG tablet Take 1 tablet (50 mg) by mouth every 8 hours as needed for severe pain 60 tablet 0     Cholecalciferol (VITAMIN D) 2000 UNIT tablet Take 5,000 Units by mouth as needed Reported on 3/8/2017 100 tablet 12     omeprazole (PRILOSEC) 40 MG capsule Take 1 capsule (40 mg) by mouth daily Take 30-60 minutes before a meal. (Patient not taking: Reported on 10/10/2018) 90 capsule 1     [DISCONTINUED] diclofenac (VOLTAREN) 75 MG EC tablet Take 1 tablet (75 mg) by mouth 2 times daily as needed for moderate pain 40 tablet 1     Allergies   Allergen Reactions     Morphine      EMESIS     Sulfa Drugs Swelling       Reviewed and updated as needed this visit by clinical staff  Tobacco  Allergies  Meds  Problems  Med Hx  Surg Hx  Fam Hx  Soc Hx        Reviewed and updated as needed this visit by Provider  Allergies  Meds  Problems         ROS:  Constitutional, HEENT, cardiovascular, pulmonary, GI, , musculoskeletal, neuro, skin, endocrine and psych systems are negative, except as otherwise noted.    OBJECTIVE:     /84 (BP Location: Right arm, Patient Position: Sitting, Cuff Size: Adult Large)  Pulse 83  Temp 98.5  F (36.9  C) (Oral)  Resp 18  Ht 5' 4\" (1.626 m)  Wt 230 lb 11.2 oz (104.6 kg)  LMP 10/19/2008  SpO2 99%  BMI 39.6 kg/m2  Body mass index is 39.6 kg/(m^2).  GENERAL: healthy, alert and no distress  MS:   GAIT: antalgic  Dorsalis Pedis pulses intact bilaterally.  MUSCULOSKELETAL:  RIGHT KNEE   Inspection: small effusion  Tender: popliteal region  Non-tender: lateral patellar facet, medial patellar facet, quadriceps insertion, MCL, LCL, lateral joint line, medial joint line  Active Range of Motion: pain with flexion, pain with extension  Special tests: unable to perform due to discomfort  No warmth noted over bilateral knees.         Diagnostic Test Results:  none "     ASSESSMENT/PLAN:               ICD-10-CM    1. Encounter related to worker's compensation claim [email or web address]    2. Status post left knee replacement Z96.652 diclofenac (VOLTAREN) 75 MG EC tablet     cyclobenzaprine (FLEXERIL) 10 MG tablet   3. Chronic pain of right knee M25.561 diclofenac (VOLTAREN) 75 MG EC tablet    G89.29 traMADol (ULTRAM) 50 MG tablet     Diclofenac 75 mg twice a day  Flexeril 10 mg three times a day as needed  Tramadol 50 mg every 6 hours as needed for mor severe pain  Continue treatment with orthopedist   Work limitations were adjusted.       Katherine Cohen PA-C  [facility]

## 2018-10-22 NOTE — TELEPHONE ENCOUNTER
Reason for Call:  Other appointment and call back    Detailed comments: pt states she was seen un urgent care today in [place], she got a prescription for traMADol (ULTRAM) 50 MG tablet, and a couple others , but the pharmacist said she needs a prior authorization from her primary care provider to cover it, she was confused as she never had issues previously and would like to speak to someone. Please advise. Thanks    Phone Number Patient can be reached at: Cell number on file:    Telephone Information:   Mobile [phone]       Best Time: asap    Can we leave a detailed message on this number? NO    Call taken on 10/22/2018 at 3:28 PM by Mckenzie Copeland

## 2018-10-22 NOTE — MR AVS SNAPSHOT
"              After Visit Summary   10/22/2018    Marleen Mcfadden    MRN: [id number]           Patient Information     Date Of Birth          1965        Visit Information        Provider Department      10/22/2018 10:20 AM Katherine Cohen PA-C [facility]        Today's Diagnoses     Encounter related to worker's compensation claim    -  1    Status post left knee replacement        Chronic pain of right knee           Follow-ups after your visit        Who to contact     If you have questions or need follow up information about today's clinic visit or your schedule please contact [facility] directly at [phone].  Normal or non-critical lab and imaging results will be communicated to you by MyChart, letter or phone within 4 business days after the clinic has received the results. If you do not hear from us within 7 days, please contact the clinic through MyChart or phone. If you have a critical or abnormal lab result, we will notify you by phone as soon as possible.  Submit refill requests through [email or web address] or call your pharmacy and they will forward the refill request to us. Please allow 3 business days for your refill to be completed.          Additional Information About Your Visit        Care EveryWhere ID     This is your Care EveryWhere ID. This could be used by other organizations to access your [facility] medical records  [id number]        Your Vitals Were     Pulse Temperature Respirations Height Last Period Pulse Oximetry    83 98.5  F (36.9  C) (Oral) 18 5' 4\" (1.626 m) 10/19/2008 99%    BMI (Body Mass Index)                   39.6 kg/m2            Blood Pressure from Last 3 Encounters:   10/22/18 139/84   10/10/18 134/79   08/30/18 118/78    Weight from Last 3 Encounters:   10/22/18 230 lb 11.2 oz (104.6 kg)   10/10/18 233 lb (105.7 kg)   08/30/18 224 lb 3.2 oz (101.7 kg)              Today, you had the following     No orders found for " display         Where to get your medicines      These medications were sent to [facility] [address] [facility] [address] [facility] [address]   [address], [facility] [address]     Phone:  [phone]     cyclobenzaprine 10 MG tablet    diclofenac 75 MG EC tablet         Some of these will need a paper prescription and others can be bought over the counter.  Ask your nurse if you have questions.     Bring a paper prescription for each of these medications     traMADol 50 MG tablet         Information about OPIOIDS     PRESCRIPTION OPIOIDS: WHAT YOU NEED TO KNOW   We gave you an opioid (narcotic) pain medicine. It is important to manage your pain, but opioids are not always the best choice. You should first try all the other options your care team gave you. Take this medicine for as short a time (and as few doses) as possible.    Some activities can increase your pain, such as bandage changes or therapy sessions. It may help to take your pain medicine 30 to 60 minutes before these activities. Reduce your stress by getting enough sleep, working on hobbies you enjoy and practicing relaxation or meditation. Talk to your care team about ways to manage your pain beyond prescription opioids.    These medicines have risks:    DO NOT drive when on new or higher doses of pain medicine. These medicines can affect your alertness and reaction times, and you could be arrested for driving under the influence (DUI). If you need to use opioids long-term, talk to your care team about driving.    DO NOT operate heavy machinery    DO NOT do any other dangerous activities while taking these medicines.    DO NOT drink any alcohol while taking these medicines.     If the opioid prescribed includes acetaminophen, DO NOT take with any other medicines that contain acetaminophen. Read all labels carefully. Look for the word  acetaminophen  or  Tylenol.  Ask your pharmacist if you have questions or are  unsure.    You can get addicted to pain medicines, especially if you have a history of addiction (chemical, alcohol or substance dependence). Talk to your care team about ways to reduce this risk.    All opioids tend to cause constipation. Drink plenty of water and eat foods that have a lot of fiber, such as fruits, vegetables, prune juice, apple juice and high-fiber cereal. Take a laxative (Miralax, milk of magnesia, Colace, Senna) if you don t move your bowels at least every other day. Other side effects include upset stomach, sleepiness, dizziness, throwing up, tolerance (needing more of the medicine to have the same effect), physical dependence and slowed breathing.    Store your pills in a secure place, locked if possible. We will not replace any lost or stolen medicine. If you don t finish your medicine, please throw away (dispose) as directed by your pharmacist. The [place] Pollution Control Agency has more information about safe disposal: https://www.pca.[facility].[place]/living-green/managing-unwanted-medications         Primary Care Provider Office Phone # Fax #    Danae Grimes [phone] [address]  [facility] [address]        Equal Access to Services     PAOLA SAUNDERS : Anisha Vickers, gerardo ash, zora abbasi, stone berger. So [facility] [phone].    ATENCIÓN: Si habla español, tiene a escalera disposición servicios gratuitos de asistencia lingüística. Erma al [phone].    We comply with applicable federal civil rights laws and [place] laws. We do not discriminate on the basis of race, color, national origin, age, disability, sex, sexual orientation, or gender identity.            Thank you!     Thank you for choosing [facility]  for your care. Our goal is always to provide you with excellent care. Hearing back from our patients is one way we can continue to improve our services. Please  take a few minutes to complete the written survey that you may receive in the mail after your visit with us. Thank you!             Your Updated Medication List - Protect others around you: Learn how to safely use, store and throw away your medicines at www.disposemymeds.org.          This list is accurate as of 10/22/18  3:07 PM.  Always use your most recent med list.                   Brand Name Dispense Instructions for use Diagnosis    Collagen 500 MG Caps           cyclobenzaprine 10 MG tablet    FLEXERIL    90 tablet    Take 0.5-1 tablets (5-10 mg) by mouth 3 times daily as needed for muscle spasms    Status post left knee replacement       diazepam 10 MG tablet    VALIUM          diclofenac 75 MG EC tablet    VOLTAREN    40 tablet    Take 1 tablet (75 mg) by mouth 2 times daily as needed for moderate pain    Status post left knee replacement, Chronic pain of right knee       estradiol 0.0375 MG/24HR BIW patch    VIVELLE-DOT     IRISH 1 PATCH ON SKIN TWICE PER WEEK        FLAXSEED (LINSEED) PO           furosemide 20 MG tablet    LASIX    60 tablet    Take 2 tablets (40 mg) by mouth daily as needed    CHF with cardiomyopathy (H)       GENTLE LAXATIVE 5 MG EC tablet   Generic drug:  bisacodyl      TK 2 TS PO PER COLONOSCOPY PREP INSTRUCTIONS.        hydrochlorothiazide 12.5 MG capsule    MICROZIDE    90 capsule    Take 1 capsule (12.5 mg) by mouth daily    Familial cardiomyopathy (H)       hydrOXYzine 25 MG tablet    ATARAX    30 tablet    Take 2-4 tablets ( mg) by mouth nightly as needed for anxiety or other (sleep)    Insomnia, unspecified type, Adjustment disorder with anxious mood       losartan 100 MG tablet    COZAAR    90 tablet    Take 1 tablet (100 mg) by mouth daily    Familial cardiomyopathy (H)       magnesium citrate solution      DRINK A 10 OUNCE BOTTLE PER COLONOSCOPY PREP INSTRUCTIONS        metoprolol succinate 50 MG 24 hr tablet    TOPROL-XL    30 tablet    Take 1 tablet (50 mg) by mouth  daily    Familial cardiomyopathy (H)       omeprazole 40 MG capsule    priLOSEC    90 capsule    Take 1 capsule (40 mg) by mouth daily Take 30-60 minutes before a meal.    LPRD (laryngopharyngeal reflux disease), Oropharyngeal dysphagia, Chronic pharyngitis       order for DME     1 Device    Equipment being ordered:brace    Strain of left knee, initial encounter       progesterone 100 MG capsule    PROMETRIUM     Take 100 mg by mouth Reported on 3/8/2017        traMADol 50 MG tablet    ULTRAM    60 tablet    Take 1 tablet (50 mg) by mouth every 8 hours as needed for severe pain    Chronic pain of right knee       vitamin D 2000 units tablet     100 tablet    Take 5,000 Units by mouth as needed Reported on 3/8/2017

## 2018-10-23 NOTE — TELEPHONE ENCOUNTER
Writer called patient and reviewed message per Dr. Grimes.    Patient verbalized understanding and in agreement with plan.    Patient stated she has orthopedic appt on 10/25/18.    SADA BraswellN, RN

## 2018-10-23 NOTE — TELEPHONE ENCOUNTER
HI, I haven't seen her for this and I didn't prescribe the tramadol, I don't feel I should pursue the PA. I actually recommend she follow up with her orthopedic specialist so they can evaluate her and determine what treatment would be best, please ask her to follow up in the orthopedic office, thanks!  Sincerely,  Dr. Danae Grimes MD  10/23/2018

## 2018-10-25 ENCOUNTER — TRANSFERRED RECORDS (OUTPATIENT)
Dept: HEALTH INFORMATION MANAGEMENT | Facility: CLINIC | Age: 53
End: 2018-10-25

## 2018-10-26 ENCOUNTER — TELEPHONE (OUTPATIENT)
Dept: CARDIOLOGY | Facility: CLINIC | Age: 53
End: 2018-10-26

## 2018-10-26 DIAGNOSIS — I42.9 FAMILIAL CARDIOMYOPATHY (H): ICD-10-CM

## 2018-10-26 NOTE — TELEPHONE ENCOUNTER
Health Call Center    Phone Message    May a detailed message be left on voicemail: yes    Reason for Call: Other: Pt called in to let Dr. Kolb and her care team know to look out for her refill request coming from the [facility].     Action Taken: Message routed to:  [facility] ([facility]

## 2018-10-26 NOTE — TELEPHONE ENCOUNTER
Left a voicemail for patient that we will be looking for the refill request, will only authorize 30 days with no refills until she is seen in clinic.

## 2018-10-29 RX ORDER — METOPROLOL SUCCINATE 50 MG/1
50 TABLET, EXTENDED RELEASE ORAL DAILY
Qty: 30 TABLET | Refills: 0 | Status: SHIPPED | OUTPATIENT
Start: 2018-10-29 | End: 2018-11-28

## 2018-10-29 RX ORDER — LOSARTAN POTASSIUM 100 MG/1
100 TABLET ORAL DAILY
Qty: 30 TABLET | Refills: 0 | Status: SHIPPED | OUTPATIENT
Start: 2018-10-29 | End: 2018-11-28

## 2018-10-29 RX ORDER — HYDROCHLOROTHIAZIDE 12.5 MG/1
12.5 CAPSULE ORAL DAILY
Qty: 30 CAPSULE | Refills: 0 | Status: SHIPPED | OUTPATIENT
Start: 2018-10-29 | End: 2018-11-28

## 2018-11-29 ENCOUNTER — TRANSFERRED RECORDS (OUTPATIENT)
Dept: HEALTH INFORMATION MANAGEMENT | Facility: CLINIC | Age: 53
End: 2018-11-29

## 2018-12-06 ENCOUNTER — TRANSFERRED RECORDS (OUTPATIENT)
Dept: HEALTH INFORMATION MANAGEMENT | Facility: CLINIC | Age: 53
End: 2018-12-06

## 2018-12-10 ENCOUNTER — TRANSFERRED RECORDS (OUTPATIENT)
Dept: HEALTH INFORMATION MANAGEMENT | Facility: CLINIC | Age: 53
End: 2018-12-10

## 2018-12-12 ENCOUNTER — OFFICE VISIT (OUTPATIENT)
Dept: FAMILY MEDICINE | Facility: CLINIC | Age: 53
End: 2018-12-12
Payer: OTHER MISCELLANEOUS

## 2018-12-12 VITALS
DIASTOLIC BLOOD PRESSURE: 75 MMHG | WEIGHT: 237 LBS | HEART RATE: 59 BPM | HEIGHT: 64 IN | RESPIRATION RATE: 16 BRPM | TEMPERATURE: 98.4 F | BODY MASS INDEX: 40.46 KG/M2 | OXYGEN SATURATION: 98 % | SYSTOLIC BLOOD PRESSURE: 119 MMHG

## 2018-12-12 DIAGNOSIS — G89.29 CHRONIC PAIN OF LEFT KNEE: Primary | ICD-10-CM

## 2018-12-12 DIAGNOSIS — M25.562 CHRONIC PAIN OF LEFT KNEE: Primary | ICD-10-CM

## 2018-12-12 DIAGNOSIS — Z02.6 ENCOUNTER RELATED TO WORKER'S COMPENSATION CLAIM: ICD-10-CM

## 2018-12-12 PROCEDURE — 99213 OFFICE O/P EST LOW 20 MIN: CPT | Performed by: NURSE PRACTITIONER

## 2018-12-12 ASSESSMENT — MIFFLIN-ST. JEOR: SCORE: 1660.02

## 2018-12-12 ASSESSMENT — PAIN SCALES - GENERAL: PAINLEVEL: SEVERE PAIN (6)

## 2018-12-12 NOTE — LETTER
December 12, 2018      Marleen Mcfadden  [address]   [facility] [address]        To Whom It May Concern:    Marleen Mcfadden  was seen on 12/12/2018.  She may return at the time of her next regularly scheduled shift (Monday, December 17).      Sincerely,        PITO Lopez CNP

## 2018-12-12 NOTE — PROGRESS NOTES
"  SUBJECTIVE:   Marleen Mcfadden is a 53 year old female who presents to clinic today for the following health issues:      Joint Pain: Work Comp    Onset: ongoing    Description:   Location: left knee  Character: Unable to describe    Intensity: moderate, severe    Progression of Symptoms: same    Accompanying Signs & Symptoms:  Other symptoms: numbness on the side of left leg and tingling    History:   Previous similar pain: YES      Precipitating factors:   Trauma or overuse: YES- Work related    Alleviating factors:  Improved by: heat and ice    Therapies Tried and outcome: Heat, Ice and Voltaren: temporary relieves pain      Had total knee 10/3/2018. It's grossly unstable. Found out needs revision of total knee.  Went to the restroom last night and knee completely gave out. Fell backwards and ran into wall. No loss of consciousness. Didn't hit head. Here because she needs note for missing work today.    Problem list and histories reviewed & adjusted, as indicated.  Additional history: as documented    Reviewed and updated as needed this visit by clinical staff  Tobacco  Allergies  Meds  Problems  Med Hx  Surg Hx  Fam Hx  Soc Hx        Reviewed and updated as needed this visit by Provider  Tobacco  Allergies  Meds  Problems  Med Hx  Surg Hx  Fam Hx         ROS:  Constitutional, HEENT, cardiovascular, pulmonary, gi and gu systems are negative, except as otherwise noted.    OBJECTIVE:     /75 (BP Location: Right arm, Patient Position: Chair, Cuff Size: Adult Large)   Pulse 59   Temp 98.4  F (36.9  C) (Oral)   Resp 16   Ht 1.626 m (5' 4\")   Wt 107.5 kg (237 lb)   LMP 10/19/2008 (Approximate)   SpO2 98%   BMI 40.68 kg/m    Body mass index is 40.68 kg/m .  GENERAL: healthy, alert and no distress  MS: no gross musculoskeletal defects noted, no edema. Wearing left knee brace.   SKIN: no suspicious lesions or rashes  PSYCH: mentation appears normal, affect normal/bright    Diagnostic Test " Results:  none     ASSESSMENT/PLAN:       ICD-10-CM    1. Chronic pain of left knee M25.562     G89.29    2. Encounter related to worker's compensation claim Z02.6      Note written for work. Continue current plan of care regarding chronic knee pain. Follow up with orthopedics.    See Patient Instructions    The benefits, risks and potential side effects were discussed in detail. Black box warnings discussed as relevant. All patient questions were answered. The patient was instructed to follow up immediately if any adverse reactions develop.    Patient verbalizes understanding and agrees with plan of care. Patient stable for discharge.      PITO Lopez [facility]

## 2018-12-12 NOTE — PATIENT INSTRUCTIONS
At [facility], we strive to deliver an exceptional experience to you, every time we see you.  If you receive a survey in the mail, please send us back your thoughts. We really do value your feedback.    Based on your medical history, these are the current health maintenance/preventive care services that you are due for (some may have been done at this visit.)  Health Maintenance Due   Topic Date Due     URINE DRUG SCREEN Q1 YR  07/06/1979     HIV SCREEN (SYSTEM ASSIGNED)  07/06/1982     PNEUMOVAX IMMUNIZATION 19-64 HIGHEST RISK (1 of 3 - PCV13) 07/06/1983     DTAP/TDAP/TD IMMUNIZATION (2 - Td) 10/09/2009     ZOSTER IMMUNIZATION (1 of 2) 07/06/2014     LIPID MONITORING Q1 YEAR  04/09/2015     HF ACTION PLAN Q3 YR  11/01/2015     MAMMO SCREEN Q2 YR (SYSTEM ASSIGNED)  08/22/2018     INFLUENZA VACCINE (1) 09/01/2018     BMP Q6 MOS  10/12/2018         Suggested websites for health information:  Www.[facility].org : Up to date and easily searchable information on multiple topics.  Www.medlineplus.gov : medication info, interactive tutorials, watch real surgeries online  Www.familydoctor.org : good info from the Academy of Family Physicians  Www.cdc.gov : public health info, travel advisories, epidemics (H1N1)  Www.aap.org : children's health info, normal development, vaccinations  Www.health.state.[place].us : [place] dept of health, public health issues in [place], N1N1    Your care team:                            Family Medicine Internal Medicine   MD Devin Mahmood MD Shantel Branch-Fleming, MD Katya Georgiev PA-C Nam Ho, MD Pediatrics   KINGS Keys CNP Amelia Massimini APRN CNP Shaista Malik, MD Bethany Templen, MD Deborah Mielke, MD Kim Thein, APRN CNP      Clinic hours: Monday - Thursday 7 am-7 pm; Fridays 7 am-5 pm.   Urgent care: Monday - Friday 11 am-9 pm; Saturday and Sunday 9 am-5 pm.  Pharmacy : Monday -Thursday 8 am-8 pm; Friday 8 am-6 pm; Saturday  and Sunday 9 am-5 pm.     Clinic: [phone]   Pharmacy: [phone]

## 2018-12-12 NOTE — LETTER
December 12, 2018       Marleen Mcfadden  [address]   [facility] [address]        To Whom It May Concern:    Marleen Mcfadden was seen on 12/12/2018.  Please excuse her until 12/13/2018 due to injury. She may return 12/13/2018 as long as symptoms improving.        Sincerely,        PITO Lopez CNP

## 2018-12-17 ENCOUNTER — TELEPHONE (OUTPATIENT)
Dept: CARDIOLOGY | Facility: CLINIC | Age: 53
End: 2018-12-17

## 2018-12-17 NOTE — TELEPHONE ENCOUNTER
[facility] Call Center    Phone Message    May a detailed message be left on voicemail: yes    Reason for Call: Other: Patient called in to talk to Dr. Kolb's nurse. She is looking to get an appointment for cardiac clearance before her surgery at [facility] on [address]. Please call pt to schedule appt. Thank you!     Action Taken: Message routed to:  [facility] ([facility] Cardiology

## 2018-12-18 ENCOUNTER — TRANSFERRED RECORDS (OUTPATIENT)
Dept: HEALTH INFORMATION MANAGEMENT | Facility: CLINIC | Age: 53
End: 2018-12-18

## 2018-12-18 NOTE — TELEPHONE ENCOUNTER
[facility] Call Center    Phone Message    May a detailed message be left on voicemail: yes    Reason for Call: Other: Pt called to f/u on message sent over yesterday 12/17 in regards to a cardiac clearance appt. Pt is having surgery on both legs on 1/17     Action Taken: Message routed to:  [facility] ([facility] cardio

## 2018-12-18 NOTE — TELEPHONE ENCOUNTER
Called and spoke with patient. She is having bilateral knee surgery on January 17th and will need cardiac clearance. She has annual f/u appts with Dr. Kolb in March which we will move up to Jan 9th. Message sent to  to reschedule appts.
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
## 2019-01-03 ENCOUNTER — TRANSFERRED RECORDS (OUTPATIENT)
Dept: HEALTH INFORMATION MANAGEMENT | Facility: CLINIC | Age: 54
End: 2019-01-03

## 2019-01-03 ENCOUNTER — PATIENT OUTREACH (OUTPATIENT)
Dept: CARE COORDINATION | Facility: CLINIC | Age: 54
End: 2019-01-03

## 2019-01-03 DIAGNOSIS — I50.32 CHRONIC DIASTOLIC CONGESTIVE HEART FAILURE (H): Primary | ICD-10-CM

## 2019-01-08 ENCOUNTER — OFFICE VISIT (OUTPATIENT)
Dept: FAMILY MEDICINE | Facility: CLINIC | Age: 54
End: 2019-01-08
Payer: OTHER MISCELLANEOUS

## 2019-01-08 VITALS
TEMPERATURE: 97.2 F | DIASTOLIC BLOOD PRESSURE: 88 MMHG | BODY MASS INDEX: 40.68 KG/M2 | OXYGEN SATURATION: 97 % | HEART RATE: 88 BPM | WEIGHT: 237 LBS | SYSTOLIC BLOOD PRESSURE: 133 MMHG

## 2019-01-08 DIAGNOSIS — M25.562 CHRONIC PAIN OF BOTH KNEES: ICD-10-CM

## 2019-01-08 DIAGNOSIS — I50.32 CHRONIC DIASTOLIC CONGESTIVE HEART FAILURE (H): ICD-10-CM

## 2019-01-08 DIAGNOSIS — E66.01 MORBID OBESITY (H): ICD-10-CM

## 2019-01-08 DIAGNOSIS — Z01.818 PRE-OPERATIVE EXAMINATION: Primary | ICD-10-CM

## 2019-01-08 DIAGNOSIS — J06.9 VIRAL UPPER RESPIRATORY TRACT INFECTION: ICD-10-CM

## 2019-01-08 DIAGNOSIS — R05.9 COUGH: ICD-10-CM

## 2019-01-08 DIAGNOSIS — G89.29 CHRONIC PAIN OF BOTH KNEES: ICD-10-CM

## 2019-01-08 DIAGNOSIS — I42.9 PRIMARY CARDIOMYOPATHY (H): ICD-10-CM

## 2019-01-08 DIAGNOSIS — M25.561 CHRONIC PAIN OF BOTH KNEES: ICD-10-CM

## 2019-01-08 DIAGNOSIS — I42.9 CHF WITH CARDIOMYOPATHY (H): ICD-10-CM

## 2019-01-08 DIAGNOSIS — I50.9 CHF WITH CARDIOMYOPATHY (H): ICD-10-CM

## 2019-01-08 DIAGNOSIS — M17.0 PRIMARY OSTEOARTHRITIS OF BOTH KNEES: ICD-10-CM

## 2019-01-08 DIAGNOSIS — Z96.652 STATUS POST LEFT KNEE REPLACEMENT: ICD-10-CM

## 2019-01-08 PROBLEM — R19.7 VOMITING AND DIARRHEA: Status: RESOLVED | Noted: 2018-04-12 | Resolved: 2019-01-08

## 2019-01-08 PROBLEM — R11.10 VOMITING AND DIARRHEA: Status: RESOLVED | Noted: 2018-04-12 | Resolved: 2019-01-08

## 2019-01-08 LAB
BASOPHILS # BLD AUTO: 0 10E9/L (ref 0–0.2)
BASOPHILS NFR BLD AUTO: 0.4 %
DIFFERENTIAL METHOD BLD: NORMAL
EOSINOPHIL # BLD AUTO: 0.3 10E9/L (ref 0–0.7)
EOSINOPHIL NFR BLD AUTO: 5 %
ERYTHROCYTE [DISTWIDTH] IN BLOOD BY AUTOMATED COUNT: 13.2 % (ref 10–15)
HBA1C MFR BLD: 5.5 % (ref 0–5.6)
HCT VFR BLD AUTO: 37.7 % (ref 35–47)
HGB BLD-MCNC: 12.3 G/DL (ref 11.7–15.7)
LYMPHOCYTES # BLD AUTO: 2.5 10E9/L (ref 0.8–5.3)
LYMPHOCYTES NFR BLD AUTO: 48.4 %
MCH RBC QN AUTO: 30.4 PG (ref 26.5–33)
MCHC RBC AUTO-ENTMCNC: 32.6 G/DL (ref 31.5–36.5)
MCV RBC AUTO: 93 FL (ref 78–100)
MONOCYTES # BLD AUTO: 0.4 10E9/L (ref 0–1.3)
MONOCYTES NFR BLD AUTO: 8.3 %
NEUTROPHILS # BLD AUTO: 2 10E9/L (ref 1.6–8.3)
NEUTROPHILS NFR BLD AUTO: 37.9 %
PLATELET # BLD AUTO: 261 10E9/L (ref 150–450)
RBC # BLD AUTO: 4.04 10E12/L (ref 3.8–5.2)
WBC # BLD AUTO: 5.2 10E9/L (ref 4–11)

## 2019-01-08 PROCEDURE — 80061 LIPID PANEL: CPT | Performed by: FAMILY MEDICINE

## 2019-01-08 PROCEDURE — 36415 COLL VENOUS BLD VENIPUNCTURE: CPT | Performed by: FAMILY MEDICINE

## 2019-01-08 PROCEDURE — 80053 COMPREHEN METABOLIC PANEL: CPT | Performed by: FAMILY MEDICINE

## 2019-01-08 PROCEDURE — 85025 COMPLETE CBC W/AUTO DIFF WBC: CPT | Performed by: FAMILY MEDICINE

## 2019-01-08 PROCEDURE — 87389 HIV-1 AG W/HIV-1&-2 AB AG IA: CPT | Performed by: FAMILY MEDICINE

## 2019-01-08 PROCEDURE — 99215 OFFICE O/P EST HI 40 MIN: CPT | Performed by: FAMILY MEDICINE

## 2019-01-08 PROCEDURE — 93000 ELECTROCARDIOGRAM COMPLETE: CPT | Performed by: FAMILY MEDICINE

## 2019-01-08 PROCEDURE — 83036 HEMOGLOBIN GLYCOSYLATED A1C: CPT | Performed by: FAMILY MEDICINE

## 2019-01-08 RX ORDER — BENZONATATE 100 MG/1
100 CAPSULE ORAL 3 TIMES DAILY PRN
Qty: 30 CAPSULE | Refills: 1 | Status: SHIPPED | OUTPATIENT
Start: 2019-01-08 | End: 2019-02-27

## 2019-01-08 NOTE — PROGRESS NOTES
[facility]  [address] [place] [id number] [phone]  Dept: [phone]    PRE-OP EVALUATION:  Today's date: 2019    Marleen Mcfadden (: 1965) presents for pre-operative evaluation assessment as requested by Dr. Dr Dev Rocha.  She requires evaluation and anesthesia risk assessment prior to undergoing surgery/procedure for treatment of osteoarthritis with history of left TKR 10/2017, scheduled for left knee revision 2019 and total knee replacement 2019 .    Fax number for surgical facility: [facility]  Primary Physician: Danae Grimes  Type of Anesthesia Anticipated: unknown    Patient has a Health Care Directive or Living Will:  NO    Preop Questions 2019   Who is doing your surgery? Dr Dev Rocha   What are you having done? Knee revision   Date of Surgery/Procedure: 19 and second surgery 19   Facility or Hospital where procedure/surgery will be performed: [facility]   1.  Do you have a history of Heart attack, stroke, stent, coronary bypass surgery, or other heart surgery? No   2.  Do you ever have any pain or discomfort in your chest? YES - hx of cardiomyopathy   3.  Do you have a history of  Heart Failure? YES - cardiomyopathy with moderate diastolic HF   4.   Are you troubled by shortness of breath when:  walking on a level surface, or up a slight hill, or at night? No   5.  Do you currently have a cold, bronchitis or other respiratory infection? YES - URI symptoms right now   6.  Do you have a cough, shortness of breath, or wheezing? YES - runny nose, cough, wheezing   7.  Do you sometimes get pains in the calves of your legs when you walk? No   8. Do you or anyone in your family have previous history of blood clots? No   9.  Do you or does anyone in your family have a serious bleeding problem such as prolonged bleeding following surgeries or cuts? No   10. Have you ever had problems with anemia  or been told to take iron pills? YES -    11. Have you had any abnormal blood loss such as black, tarry or bloody stools, or abnormal vaginal bleeding? No   12. Have you ever had a blood transfusion? YES - many years ago ago after hysterectomy   13. Have you or any of your relatives ever had problems with anesthesia? No   14. Do you have sleep apnea, excessive snoring or daytime drowsiness? YES - excessive snoring with reported apnea   15. Do you have any prosthetic heart valves? No   16. Do you have prosthetic joints? YES - see HPI   17. Is there any chance that you may be pregnant? No       HPI:     HPI related to upcoming procedure: Marleen has known bilateral OA with long history of bilateral knee pain, prior hx included right ACL repair 1998 and left total knee replacement 10/3/2017 with chronic bilateral knee pain limiting walking, she requires cane to walk.    CHF - Patient has a longstanding history of moderate-severe CHF. Exacerbating conditions include hypertension and dystolic dysfunction. Currently the patient's condition is same. Current treatment regimen includes Angiotensin 2 receptor blocker, beta blocker and diuretic, with intermittent compliance with medication regime. The patient denies chest pain, edema, orthopnea, SOB or recent weight gain. Last Echocardiogram 9/21/2017 demonstrated mild systolic dysfunction with EF 40 - 45% and moderate (grade II) diastolic dysfunction, EKG NSR, no acute ischemic changes.                                                                                                                                                                               .  HYPERTENSION - Patient has longstanding history of HTN , currently denies any symptoms referable to elevated blood pressure. Specifically denies chest pain, palpitations, dyspnea, orthopnea, PND or peripheral edema. Blood pressure readings have been in normal range. Current medication regimen is as listed below.  Patient denies any side effects of medication.       BP Readings from Last 3 Encounters:   01/08/19 133/88   12/12/18 119/75   10/22/18 139/84                                                                                                                                                                                         .    MEDICAL HISTORY:     Patient Active Problem List    Diagnosis Date Noted     Primary osteoarthritis of both knees 01/08/2019     Priority: Medium     Status post left knee replacement 06/21/2018     Priority: Medium     Nontraumatic rupture of quadriceps tendon, left 06/21/2018     Priority: Medium     Left shoulder pain 01/17/2017     Priority: Medium     Rotator cuff injury 01/17/2017     Priority: Medium     Injury of left shoulder, initial encounter 01/12/2017     Priority: Medium     Sprain of other ligament of left ankle, initial encounter 01/12/2017     Priority: Medium     Elevated blood pressure reading without diagnosis of hypertension 10/27/2016     Priority: Medium     Chronic diastolic congestive heart failure (H) 10/27/2016     Priority: Medium     Chronic bilateral low back pain with right-sided sciatica 07/07/2016     Priority: Medium     Chronic bilateral low back pain with left-sided sciatica 07/07/2016     Priority: Medium     Acute bilateral low back pain with right-sided sciatica 06/02/2016     Priority: Medium     Degenerative disc disease at L5-S1 level 06/02/2016     Priority: Medium     Acute pain of right knee 05/17/2016     Priority: Medium     Familial cardiomyopathy (H) 10/21/2015     Priority: Medium     Shaina's nodes 06/16/2015     Priority: Medium     Morbid obesity (H) 05/05/2015     Priority: Medium     Peroneus longus tendinitis 01/02/2015     Priority: Medium     Plantar fasciitis 11/11/2014     Priority: Medium     CARDIOVASCULAR SCREENING; LDL GOAL LESS THAN 160 11/11/2014     Priority: Medium     Calcaneal spur 10/21/2014     Priority: Medium      Xray 10/17/14       Pain in joint involving ankle and foot 06/16/2014     Priority: Medium     Thyroid nodule 09/03/2013     Priority: Medium     Knee pain 12/20/2012     Priority: Medium     Swelling of knee joint 12/20/2012     Priority: Medium     TB lung, latent 06/18/2012     Priority: Medium     Negative quantiferon gold test 11/5/2012       CHF with cardiomyopathy (H)      Priority: Medium     Itching 08/12/2011     Priority: Medium     Health Care Home 05/24/2011     Priority: Medium     EMERGENCY CARE PLAN  Presenting Problem Signs and Symptoms Treatment Plan    Questions or conerns during clinic hours    I will call the clinic directly     Questions or conerns outside clinic hours    I will call the 24 hour nurse line at [phone]    Patient needs to schedule an appointment    I will call the 24 hour scheduling team at [phone] or clinic directly    Same day treatment     I will call the clinic first, nurse line if after hours, urgent care and express care if needed                            DX V65.8 REPLACED WITH [id number] HEALTH CARE HOME (04/08/2013)       Sinus bradycardia 03/28/2011     Priority: Medium     Anemia      Priority: Medium     Allergic rhinitis      Priority: Medium     Problem list name updated by automated process. Provider to review       Leiomyoma of uterus 10/25/2006     Priority: Medium     Problem list name updated by automated process. Provider to review       Autoimmune disease, not elsewhere classified 11/08/2004     Priority: Medium     Autoimmune disease- unknown/poss SLE  Problem list name updated by automated process. Provider to review and confirm       Primary cardiomyopathy (H) 11/08/2004     Priority: Medium     Cardiomyopathy- dx'd 1999 famial idiopathic. Followed regularly by cardiologist Mayi.  Problem list name updated by automated process. Provider to review        Past Medical History:   Diagnosis Date     Allergic rhinitis, cause unspecified      Anemia       Autoimmune disease NEC     Autoimmune disease- unknown/poss SLE     Calcaneal spur 10/21/2014     CHF with cardiomyopathy (H)      Familial cardiomyopathy (H) 10/21/2015     Morbid obesity (H) 2015     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     no longer an issue     Other primary cardiomyopathies     Cardiomyopathy- dx'd  idiopathic     PONV (postoperative nausea and vomiting)      UTERINE LEIOMYOMA NOS 10/25/2006     Past Surgical History:   Procedure Laterality Date     C BREAST SURGERY PROCEDURE UNLISTED      Breast Reduction     C  DELIVERY ONLY  10/85,     , Low Cervicalx2     C EXCIS UTERINE FIBROID,ABD APPRCH       C EXCISE EXCESS SKIN TISSUE,ABDOMEN       C LIGATE FALLOPIAN TUBE       C REPAIR CRUCIATE LIGAMENT,KNEE      Right Knee     C TOTAL KNEE ARTHROPLASTY Left 10/03/2017     HYSTERECTOMY TOTAL ABDOMINAL      for fibroids; reports having blood transfusion after surgery     THYROIDECTOMY  2013    Procedure: THYROIDECTOMY;  LEFT THYROID LOBECTOMY.  (LIGASURE, RECURRENT LARYNGEAL NERVE MONITOR) ;  Surgeon: Uriah Camargo MD;  Location: [facility]     Current Outpatient Medications   Medication Sig Dispense Refill     benzonatate (TESSALON) 100 MG capsule Take 1 capsule (100 mg) by mouth 3 times daily as needed for cough 30 capsule 1     Collagen 500 MG CAPS        cyclobenzaprine (FLEXERIL) 10 MG tablet Take 0.5-1 tablets (5-10 mg) by mouth 3 times daily as needed for muscle spasms 90 tablet 1     diclofenac (VOLTAREN) 75 MG EC tablet Take 1 tablet (75 mg) by mouth 2 times daily as needed for moderate pain 40 tablet 1     estradiol (VIVELLE-DOT) 0.0375 MG/24HR BIW patch IRISH 1 PATCH ON SKIN TWICE PER WEEK  11     FLAXSEED, LINSEED, PO        furosemide (LASIX) 20 MG tablet Take 2 tablets (40 mg) by mouth daily as needed 60 tablet 1     hydrochlorothiazide (MICROZIDE) 12.5 MG capsule Take 1 capsule (12.5  mg) by mouth daily 30 capsule 3     hydrOXYzine (ATARAX) 25 MG tablet Take 2-4 tablets ( mg) by mouth nightly as needed for anxiety or other (sleep) 30 tablet 0     losartan (COZAAR) 100 MG tablet Take 1 tablet (100 mg) by mouth daily 30 tablet 3     metoprolol succinate (TOPROL-XL) 50 MG 24 hr tablet Take 1 tablet (50 mg) by mouth daily 30 tablet 3     order for DME Equipment being ordered:brace 1 Device 0     progesterone (PROMETRIUM) 100 MG capsule Take 100 mg by mouth Reported on 3/8/2017       Cholecalciferol (VITAMIN D) 2000 UNIT tablet Take 5,000 Units by mouth as needed Reported on 3/8/2017 100 tablet 12     diazepam (VALIUM) 10 MG tablet        magnesium citrate solution DRINK A 10 OUNCE BOTTLE PER COLONOSCOPY PREP INSTRUCTIONS  0     omeprazole (PRILOSEC) 40 MG capsule Take 1 capsule (40 mg) by mouth daily Take 30-60 minutes before a meal. (Patient not taking: Reported on 10/10/2018) 90 capsule 1     traMADol (ULTRAM) 50 MG tablet Take 1 tablet (50 mg) by mouth every 8 hours as needed for severe pain (Patient not taking: Reported on 1/8/2019) 60 tablet 0     OTC products: None, except as noted above    Allergies   Allergen Reactions     Morphine      EMESIS     Sulfa Drugs Swelling      Latex Allergy: NO    Social History     Tobacco Use     Smoking status: Never Smoker     Smokeless tobacco: Never Used   Substance Use Topics     Alcohol use: Yes     Comment: rare, twice a year, she has a drink little wine 2 days ago     History   Drug Use No       REVIEW OF SYSTEMS:   CONSTITUTIONAL: NEGATIVE for fever, chills, change in weight  INTEGUMENTARY/SKIN: NEGATIVE for worrisome rashes, moles or lesions  EYES: NEGATIVE for vision changes or irritation  ENT/MOUTH: NEGATIVE for ear, mouth and throat problems  RESP: NEGATIVE for significant cough or SOB  BREAST: NEGATIVE for masses, tenderness or discharge  CV: NEGATIVE for chest pain, palpitations or peripheral edema  GI: NEGATIVE for nausea, abdominal pain,  heartburn, or change in bowel habits  : NEGATIVE for frequency, dysuria, or hematuria  MUSCULOSKELETAL: NEGATIVE for significant arthralgias or myalgia  NEURO: NEGATIVE for weakness, dizziness or paresthesias  ENDOCRINE: NEGATIVE for temperature intolerance, skin/hair changes  HEME: NEGATIVE for bleeding problems  PSYCHIATRIC: NEGATIVE for changes in mood or affect    EXAM:   /88   Pulse 88   Temp 97.2  F (36.2  C) (Oral)   Wt 107.5 kg (237 lb)   LMP 10/19/2008 (Approximate)   SpO2 97%   BMI 40.68 kg/m      GENERAL APPEARANCE: healthy, alert and no distress     EYES: EOMI, PERRL     HENT: ear canals and TM's normal and nose and mouth without ulcers or lesions     NECK: no adenopathy, no asymmetry, masses, or scars and thyroid normal to palpation     RESP: lungs clear to auscultation - no rales, rhonchi or wheezes     CV: regular rates and rhythm, normal S1 S2, no S3 or S4 and no murmur, click or rub     ABDOMEN:  soft, nontender, no HSM or masses and bowel sounds normal     MS: extremities normal- no gross deformities noted, no evidence of inflammation in joints, FROM in all extremities.  Scant nonpitting edema both LOWER EXTREMITY, stable from prior exams. Uses cane for ambulation, antalgic gait with genuvalgus noted bilaterally.     SKIN: no suspicious lesions or rashes     NEURO: Normal strength and tone, sensory exam grossly normal, mentation intact and speech normal     PSYCH: mentation appears normal. and affect normal/bright     LYMPHATICS: No cervical adenopathy    DIAGNOSTICS:     EKG: Normal Sinus Rhythm, no ischemic changes    Labs Resulted Today:   Results for orders placed or performed in visit on 01/08/19   CBC with platelets differential   Result Value Ref Range    WBC 5.2 4.0 - 11.0 10e9/L    RBC Count 4.04 3.8 - 5.2 10e12/L    Hemoglobin 12.3 11.7 - 15.7 g/dL    Hematocrit 37.7 35.0 - 47.0 %    MCV 93 78 - 100 fl    MCH 30.4 26.5 - 33.0 pg    MCHC 32.6 31.5 - 36.5 g/dL    RDW 13.2 10.0  - 15.0 %    Platelet Count 261 150 - 450 10e9/L    % Neutrophils 37.9 %    % Lymphocytes 48.4 %    % Monocytes 8.3 %    % Eosinophils 5.0 %    % Basophils 0.4 %    Absolute Neutrophil 2.0 1.6 - 8.3 10e9/L    Absolute Lymphocytes 2.5 0.8 - 5.3 10e9/L    Absolute Monocytes 0.4 0.0 - 1.3 10e9/L    Absolute Eosinophils 0.3 0.0 - 0.7 10e9/L    Absolute Basophils 0.0 0.0 - 0.2 10e9/L    Diff Method Automated Method    Hemoglobin A1c   Result Value Ref Range    Hemoglobin A1C 5.5 0 - 5.6 %       Recent Labs   Lab Test 08/19/18 05/02/18  1439 04/12/18  1600 02/10/18  1109  12/02/14  1012  04/19/13  1016   HGB  --  12.0 12.5  --    < > 12.5   < > 12.3   PLT  --  272 298  --    < > 277   < > 265   INR  --   --   --   --   --  1.03  --  1.05   NA  --   --  143 140   < > 140   < > 141   POTASSIUM 3.7  --  3.8 4.2   < > 3.9   < > 3.3*   CR 0.71  --  0.67 0.69   < > 0.67   < > 0.70    < > = values in this interval not displayed.        IMPRESSION:   Reason for surgery/procedure: left knee revision and right total knee replacement for known bilateral osteoarthritis with hx of left knee replacement with ongoing pain; right knee OA hx of ACL repair, has had multiple steroid injections no longer responding to outpatient treatments.  Diagnosis/reason for consult: pre-op  She does have URI symptoms today, minor, home cares, fluticasone inhaler as needed. Should resolve by time of surgery.    The proposed surgical procedure is considered INTERMEDIATE risk.    REVISED CARDIAC RISK INDEX  The patient has the following serious cardiovascular risks for perioperative complications such as (MI, PE, VFib and 3  AV Block):  No serious cardiac risks  INTERPRETATION: 0 risks: Class I (very low risk - 0.4% complication rate)    The patient has the following additional risks for perioperative complications:  History of snoring without apnea, at risk for possible KATIA  Morbid obesity      ICD-10-CM    1. Pre-operative examination Z01.818 CBC with  platelets differential     Comprehensive metabolic panel     EKG 12-lead complete w/read - Clinics     Lipid panel reflex to direct LDL Fasting     HIV Antigen Antibody Combo     Hemoglobin A1c   2. Primary osteoarthritis of both knees M17.0    3. Chronic pain of both knees M25.561 CBC with platelets differential    M25.562 Comprehensive metabolic panel    G89.29 EKG 12-lead complete w/read - Clinics     Lipid panel reflex to direct LDL Fasting     HIV Antigen Antibody Combo     Hemoglobin A1c   4. Status post left knee replacement Z96.652    5. Primary cardiomyopathy (H) I42.8    6. CHF with cardiomyopathy (H) I50.9     I42.9    7. Chronic diastolic congestive heart failure (H) I50.32    8. Morbid obesity (H) E66.01    9. Viral upper respiratory tract infection J06.9 benzonatate (TESSALON) 100 MG capsule   10. Cough R05 benzonatate (TESSALON) 100 MG capsule       RECOMMENDATIONS:       Cardiovascular Risk  Patient is already on a Beta Blocker. Continue Betablocker therapy after surgery, using Beta blocker order set as necessary for NPO status.      Pulmonary Risk  Incentive spirometry post op  Respiratory Therapy (Respiratory Care IP Consult)  post op  NG tube decompression if abdominal distension or significant vomiting       Obstructive Sleep Apnea (or suspected sleep apnea)  Hospital staff are advised to monitor for sleep related oxygen desaturations due to suspicion of KATIA      --Patient is to take all scheduled medications on the day of surgery EXCEPT for modifications listed below.    APPROVAL GIVEN to proceed with proposed procedure, without further diagnostic evaluation       Signed Electronically by: Danae Grimes MD    Copy of this evaluation report is provided to requesting physician.    [facility] Preop Guidelines    Revised Cardiac Risk Index

## 2019-01-08 NOTE — PATIENT INSTRUCTIONS
Results for orders placed or performed in visit on 08/19/18   Potassium   Result Value Ref Range    Potassium 3.7 3.5 - 5.0 mmol/L    Narrative    [facility]-ED Note  See Care Everywhere [facility] for full report    Creatinine   Result Value Ref Range    Creatinine 0.71 0.57 - 1.11 mg/dL    GFR Estimate >60 >60 ml/min/1.73m2    GFR Estimate If Black >60 >60 ml/min/1.73m2    Narrative    [facility]-ED Note  See Care Everywhere [facility] for full report    Glucose   Result Value Ref Range    Glucose 97 65 - 100 mg/dL    Narrative    [facility]-ED Note  See Care Everywhere [facility] for full report       Before Your Surgery      Call your surgeon if there is any change in your health. This includes signs of a cold or flu (such as a sore throat, runny nose, cough, rash or fever).    Do not smoke, drink alcohol or take over the counter medicine (unless your surgeon or primary care doctor tells you to) for the 24 hours before and after surgery.    If you take prescribed drugs: Follow your doctor s orders about which medicines to take and which to stop until after surgery.    Eating and drinking prior to surgery: follow the instructions from your surgeon    Take a shower or bath the night before surgery. Use the soap your surgeon gave you to gently clean your skin. If you do not have soap from your surgeon, use your regular soap. Do not shave or scrub the surgery site.  Wear clean pajamas and have clean sheets on your bed.

## 2019-01-08 NOTE — RESULT ENCOUNTER NOTE
Patient was seen today in clinic.  I discussed results in clinic, please see clinic progress note.    Danae Grimes 1/8/2019

## 2019-01-09 ENCOUNTER — OFFICE VISIT (OUTPATIENT)
Dept: CARDIOLOGY | Facility: CLINIC | Age: 54
End: 2019-01-09
Attending: INTERNAL MEDICINE
Payer: COMMERCIAL

## 2019-01-09 VITALS
WEIGHT: 237 LBS | DIASTOLIC BLOOD PRESSURE: 84 MMHG | BODY MASS INDEX: 40.46 KG/M2 | OXYGEN SATURATION: 98 % | SYSTOLIC BLOOD PRESSURE: 125 MMHG | HEART RATE: 69 BPM | HEIGHT: 64 IN

## 2019-01-09 DIAGNOSIS — I50.9 CHF WITH CARDIOMYOPATHY (H): ICD-10-CM

## 2019-01-09 DIAGNOSIS — I42.9 CHF WITH CARDIOMYOPATHY (H): ICD-10-CM

## 2019-01-09 DIAGNOSIS — I50.32 CHRONIC DIASTOLIC CONGESTIVE HEART FAILURE (H): Primary | ICD-10-CM

## 2019-01-09 DIAGNOSIS — I50.32 CHRONIC DIASTOLIC CONGESTIVE HEART FAILURE (H): ICD-10-CM

## 2019-01-09 LAB
ALBUMIN SERPL-MCNC: 3.2 G/DL (ref 3.4–5)
ALBUMIN SERPL-MCNC: 3.5 G/DL (ref 3.4–5)
ALP SERPL-CCNC: 83 U/L (ref 40–150)
ALP SERPL-CCNC: 92 U/L (ref 40–150)
ALT SERPL W P-5'-P-CCNC: 34 U/L (ref 0–50)
ALT SERPL W P-5'-P-CCNC: 35 U/L (ref 0–50)
ANION GAP SERPL CALCULATED.3IONS-SCNC: 6 MMOL/L (ref 3–14)
ANION GAP SERPL CALCULATED.3IONS-SCNC: 9 MMOL/L (ref 3–14)
AST SERPL W P-5'-P-CCNC: 18 U/L (ref 0–45)
AST SERPL W P-5'-P-CCNC: 19 U/L (ref 0–45)
BILIRUB SERPL-MCNC: 0.2 MG/DL (ref 0.2–1.3)
BILIRUB SERPL-MCNC: 0.2 MG/DL (ref 0.2–1.3)
BUN SERPL-MCNC: 14 MG/DL (ref 7–30)
BUN SERPL-MCNC: 14 MG/DL (ref 7–30)
CALCIUM SERPL-MCNC: 8.2 MG/DL (ref 8.5–10.1)
CALCIUM SERPL-MCNC: 8.7 MG/DL (ref 8.5–10.1)
CHLORIDE SERPL-SCNC: 107 MMOL/L (ref 94–109)
CHLORIDE SERPL-SCNC: 108 MMOL/L (ref 94–109)
CHOLEST SERPL-MCNC: 162 MG/DL
CHOLEST SERPL-MCNC: 172 MG/DL
CO2 SERPL-SCNC: 24 MMOL/L (ref 20–32)
CO2 SERPL-SCNC: 27 MMOL/L (ref 20–32)
CREAT SERPL-MCNC: 0.69 MG/DL (ref 0.52–1.04)
CREAT SERPL-MCNC: 0.77 MG/DL (ref 0.52–1.04)
GFR SERPL CREATININE-BSD FRML MDRD: 87 ML/MIN/{1.73_M2}
GFR SERPL CREATININE-BSD FRML MDRD: >90 ML/MIN/{1.73_M2}
GLUCOSE SERPL-MCNC: 86 MG/DL (ref 70–99)
GLUCOSE SERPL-MCNC: 92 MG/DL (ref 70–99)
HDLC SERPL-MCNC: 44 MG/DL
HDLC SERPL-MCNC: 44 MG/DL
HIV 1+2 AB+HIV1 P24 AG SERPL QL IA: NONREACTIVE
LDLC SERPL CALC-MCNC: 83 MG/DL
LDLC SERPL CALC-MCNC: 92 MG/DL
NONHDLC SERPL-MCNC: 118 MG/DL
NONHDLC SERPL-MCNC: 128 MG/DL
POTASSIUM SERPL-SCNC: 3.6 MMOL/L (ref 3.4–5.3)
POTASSIUM SERPL-SCNC: 3.9 MMOL/L (ref 3.4–5.3)
PROT SERPL-MCNC: 7.3 G/DL (ref 6.8–8.8)
PROT SERPL-MCNC: 7.5 G/DL (ref 6.8–8.8)
SODIUM SERPL-SCNC: 140 MMOL/L (ref 133–144)
SODIUM SERPL-SCNC: 140 MMOL/L (ref 133–144)
TRIGL SERPL-MCNC: 175 MG/DL
TRIGL SERPL-MCNC: 179 MG/DL

## 2019-01-09 PROCEDURE — 80061 LIPID PANEL: CPT | Performed by: INTERNAL MEDICINE

## 2019-01-09 PROCEDURE — G0463 HOSPITAL OUTPT CLINIC VISIT: HCPCS

## 2019-01-09 PROCEDURE — 80053 COMPREHEN METABOLIC PANEL: CPT | Performed by: INTERNAL MEDICINE

## 2019-01-09 PROCEDURE — 36415 COLL VENOUS BLD VENIPUNCTURE: CPT | Performed by: INTERNAL MEDICINE

## 2019-01-09 PROCEDURE — 99214 OFFICE O/P EST MOD 30 MIN: CPT | Mod: GC | Performed by: INTERNAL MEDICINE

## 2019-01-09 RX ADMIN — Medication 5 ML: at 08:30

## 2019-01-09 ASSESSMENT — PAIN SCALES - GENERAL: PAINLEVEL: MILD PAIN (3)

## 2019-01-09 ASSESSMENT — MIFFLIN-ST. JEOR: SCORE: 1660.02
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
## 2019-01-09 NOTE — NURSING NOTE
Chief Complaint   Patient presents with     Follow Up     Familial cardiomyopathy     Medications reviewed and vitals performed.  Sapna King CMA

## 2019-01-09 NOTE — NURSING NOTE
Diet: Patient instructed regarding a heart healthy diet, including discussion of reduced fat and sodium intake. Patient demonstrated understanding of this information and agreed to call with further questions or concerns.    Labs: Patient was given results of the laboratory testing obtained today. Patient demonstrated understanding of this information and agreed to call with further questions or concerns.     Med Reconcile: Reviewed and verified all current medications with the patient. The updated medication list was printed and given to the patient.    Return Appointment: Patient given instructions regarding scheduling next clinic visit. Patient demonstrated understanding of this information and agreed to call with further questions or concerns.    Medication Change: Patient was educated regarding prescribed medication change, including discussion of the indication, administration, side effects, and when to report to MD or RN. Patient demonstrated understanding of this information and agreed to call with further questions or concerns.    Patient stated she understood all health information given and agreed to call with further questions or concerns.

## 2019-01-09 NOTE — PATIENT INSTRUCTIONS
Cardiology Providers you saw during your visit:  Dr. Kolb    Medication changes:  Take Lasix daily from now up until surgery and for 5 days after surgery.    No contraindication for surgery from cardiac standpoint. Judicious fluid use in the post operative period.    Follow up:      Please return to clinic for follow-up:  With Dr. Kolb on January 30th at 8 am with labs prior    Called patient with ECHO results, per Dr. Kolb continue with plan as outlined above, lower EF is likely related to volume overload.  All questions answered.      Please call if you have:  1. Weight gain of more than 2 pounds in a day or 5 pounds in a week  2. Increased shortness of breath, swelling or bloating  3. Dizziness, lightheadedness   4. Any questions or concerns.     Follow the American Heart Association Diet and Lifestyle recommendations:  Limit saturated fat, trans fat, sodium, red meat, sweets and sugar-sweetened beverages. If you choose to eat red meat, compare labels and select the leanest cuts available.  Aim for at least 150 minutes of moderate physical activity or 75 minutes of vigorous physical activity - or an equal combination of both - each week.    During business hours: [phone], press option # 1 to be routed to the [facility] then option # 3 for medical questions to speak with a nurse     After hours, weekends or holidays: On Call Cardiologist- [phone]   option #4 and ask to speak to the on-call Cardiologist. Inform them you are a CORE/heart failure patient at the [facility].      Stephanie Plummer RN  Cardiology Nurse Care Coordinator    Keep up the good work!    Take Care!

## 2019-01-11 PROBLEM — E78.2 ELEVATED TRIGLYCERIDES WITH HIGH CHOLESTEROL: Status: ACTIVE | Noted: 2019-01-11

## 2019-01-11 NOTE — RESULT ENCOUNTER NOTE
Hello!  It was a pleasure to see you in clinic!  Thank you for getting labs done. Everything looks normal, which is good news.     Your cbc, or complete blood count, which measures red blood cells (to check for anemia and other vitamin deficiencies) and white blood cells (to check for infection and leukemia) is normal, which is great!  Your platelets, which reflect liver function and ability to clot, are normal as well.     The testing of your blood sugar, kidney function, liver function and electrolytes was normal.     Your hiv test was negative for infection, you do NOT have this disease.     Your cholesterol panel is good over all except the triglycerides are high. Lowering  the amount of sugar ,alcohol and sweets in the diet helps to control this, especially reducing sweets, starches, and other carbohydrates in your diet. .Exercise and weight loss helps.  They are not high enough to consider therapy with medicine.     If you have any questions, please contact the clinic or schedule an appointment with me, thank you!    Sincerely,  Dr. Danae Grimes MD  1/11/2019

## 2019-01-17 ENCOUNTER — SURGERY - HEALTHEAST (OUTPATIENT)
Dept: SURGERY | Facility: CLINIC | Age: 54
End: 2019-01-17

## 2019-01-17 ENCOUNTER — ANESTHESIA - HEALTHEAST (OUTPATIENT)
Dept: SURGERY | Facility: CLINIC | Age: 54
End: 2019-01-17

## 2019-01-17 ENCOUNTER — TRANSFERRED RECORDS (OUTPATIENT)
Dept: HEALTH INFORMATION MANAGEMENT | Facility: CLINIC | Age: 54
End: 2019-01-17

## 2019-01-17 ASSESSMENT — MIFFLIN-ST. JEOR: SCORE: 1624.51

## 2019-01-20 ENCOUNTER — HOME CARE/HOSPICE - HEALTHEAST (OUTPATIENT)
Dept: HOME HEALTH SERVICES | Facility: HOME HEALTH | Age: 54
End: 2019-01-20

## 2019-01-22 ENCOUNTER — HOME CARE/HOSPICE - HEALTHEAST (OUTPATIENT)
Dept: HOME HEALTH SERVICES | Facility: HOME HEALTH | Age: 54
End: 2019-01-22

## 2019-01-22 ENCOUNTER — TELEPHONE (OUTPATIENT)
Dept: FAMILY MEDICINE | Facility: CLINIC | Age: 54
End: 2019-01-22

## 2019-01-22 NOTE — TELEPHONE ENCOUNTER
Reason for Call:  Home Health Care    Isabel with [facility] called regarding (reason for call): orders    Orders are needed for this patient.     PT: Continued PT for 2 more times this week  - 3x a week for 1 week  - 2x a week for 1 week    OT: Eval    Home health aid: 1 more time this week  - 2x for 2 weeks      Phone Number Homecare Nurse can be reached at: [phone]    Can we leave a detailed message on this number? YES    Phone number patient can be reached at: Home number on file [phone] (home)    Best Time: anytime    Call taken on 1/22/2019 at 9:13 AM by Nery Oliver

## 2019-01-24 ENCOUNTER — HOME CARE/HOSPICE - HEALTHEAST (OUTPATIENT)
Dept: HOME HEALTH SERVICES | Facility: HOME HEALTH | Age: 54
End: 2019-01-24

## 2019-01-25 ENCOUNTER — HOME CARE/HOSPICE - HEALTHEAST (OUTPATIENT)
Dept: HOME HEALTH SERVICES | Facility: HOME HEALTH | Age: 54
End: 2019-01-25

## 2019-01-28 ENCOUNTER — HOME CARE/HOSPICE - HEALTHEAST (OUTPATIENT)
Dept: HOME HEALTH SERVICES | Facility: HOME HEALTH | Age: 54
End: 2019-01-28

## 2019-01-29 ENCOUNTER — HOME CARE/HOSPICE - HEALTHEAST (OUTPATIENT)
Dept: HOME HEALTH SERVICES | Facility: HOME HEALTH | Age: 54
End: 2019-01-29
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
## 2019-01-29 NOTE — PROGRESS NOTES
[facility]  [address] [place] [id number] [phone]  Dept: [phone]    PRE-OP EVALUATION:  Today's date: 2019    Marleen Mcfadden (: 1965) presents for pre-operative evaluation assessment as requested by Dr. Rocha.  She requires evaluation and anesthesia risk assessment prior to undergoing surgery/procedure for treatment of right knee arthritis .    Proposed Surgery/ Procedure: total right knee arthroplasty  Date of Surgery/ Procedure: 19  Time of Surgery/ Procedure: [facility]  Hospital/Surgical Facility: [facility]  Fax for hospital/ surgical facility: [phone]  Primary Physician: Danae Grimes  Type of Anesthesia Anticipated: to be determined    Patient has a Health Care Directive or Living Will:  NO    1. NO - Do you have a history of heart attack, stroke, stent, bypass or surgery on an artery in the head, neck, heart or legs?  2. NO - Do you ever have any pain or discomfort in your chest?  3. YES - DO YOU HAVE A HISTORY OF HEART FAILURE familial cardiomyopathy, see below   4. NO - Are you troubled by shortness of breath when: walking on the level, up a slight hill or at night?  5. NO - Do you currently have a cold, bronchitis or other respiratory infection?  6. YES - DO YOU HAVE A COUGH, SHORTNESS OF BREATH OR WHEEZING? Does have coughing and shortness of breath at baseline which she attributes to her HF.  Improved since increasing lasix  7. NO - Do you sometimes get pains in the calves of your legs when you walk?  8. NO - Do you or anyone in your family have previous history of blood clots?  9. NO - Do you or does anyone in your family have a serious bleeding problem such as prolonged bleeding following surgeries or cuts?  10. YES - HAVE YOU EVER HAD PROBLEMS WITH ANEMIA OR BEEN TOLD TO TAKE IRON PILLS? Remote hx of anemia, required blood transfusions related to that, resolved since hysterectomy   11. NO - Have you had any abnormal blood  "loss such as black, tarry or bloody stools, or abnormal vaginal bleeding?  12. YES - HAVE YOU EVER HAD A BLOOD TRANSFUSION? Related to anemia as above   13. NO - Have you or any of your relatives ever had problems with anesthesia?  14. YES - DO YOU HAVE SLEEP APNEA, EXCESSIVE SNORING OR DAYTIME DROWSINESS? No dx of KATIA, does snore excessively, has never been tested for sleep apnea.  Denies any issues with O2 desaturation following recent left TKA  15. NO - Do you have any prosthetic heart valves?  16. YES - DO YOU HAVE PROSTHETIC JOINTS? Yes left TKA as below  17. NO - Is there any chance that you may be pregnant?      HPI:     HPI related to upcoming procedure: Marleen has known bilateral OA with long history of bilateral knee pain, prior hx included right ACL repair 1998 and left total knee replacement 10/3/2017 with chronic bilateral knee pain limiting walking, she requires cane to walk.Pt has been dealing with ongoing persistent pain and quadriceps instability  since this surgery, had neg workup including spine workup and EMG.  Ultimately underwent revision of left TKA 1/17/19 by Dr. Leda gtz.  Acute blood loss anemia with hgb down to 9.9. No planing for right TKA.      familial cardiomyopathy- Last echo earlier this month with EF of 30-35%, diastolic dysfunction.  Follows with cardiology, last visit 2 days ago.  Plan: \"Fluid status slightly hypervolemic will have her take her lasix 20mg starting today to the day of surgery and at least 4 days post surgery and we will see her in clinic in between her left and right knee surgeries.\"  Swelling in legs is chronic and stable.  Shortness of breath and coughing is improved.  She is on losartan, hydrochlorothiazide, metoprolol, and lasix.  Did have EKG done at recent cardiology appointment 1/30/19 which she brings in with her today.    Patient clarifies today she does not have a history of hypertension and is on medication just for the cardiomyopathy.  "     During ROS patient notes several episodes of presyncope occurring since TKA revision.  These episodes have occurred with her going to the bathroom, and while laughing and walking.  She stops and take several breaths and symptoms resolve.  Symptoms describe as feeling like she is going to faint.  She has not fainted.  No associated chest pain or shortness of breath.  She mentioned this to her cardiologist who felt episodes were likely vasovagal and recommended monitoring, I do see this confirmed in cardiology note from 1/30/19.    Left arm pain intermittent x 1 month.  Feels like AC area tightens up, she is worried about a blood clot.  No swelling or erythema of arm.      GERD- on PPI, she takes it as needed, it is effective.      Chronic back pain, DDD    Morbid obesity      MEDICAL HISTORY:     Patient Active Problem List    Diagnosis Date Noted     Gastroesophageal reflux disease with esophagitis 01/30/2019     Priority: Medium     Elevated triglycerides with high cholesterol 01/11/2019     Priority: Medium     Primary osteoarthritis of both knees 01/08/2019     Priority: Medium     Status post left knee replacement 06/21/2018     Priority: Medium     Nontraumatic rupture of quadriceps tendon, left 06/21/2018     Priority: Medium     Left shoulder pain 01/17/2017     Priority: Medium     Rotator cuff injury 01/17/2017     Priority: Medium     Injury of left shoulder, initial encounter 01/12/2017     Priority: Medium     Sprain of other ligament of left ankle, initial encounter 01/12/2017     Priority: Medium     Elevated blood pressure reading without diagnosis of hypertension 10/27/2016     Priority: Medium     Chronic diastolic congestive heart failure (H) 10/27/2016     Priority: Medium     Chronic bilateral low back pain with right-sided sciatica 07/07/2016     Priority: Medium     Chronic bilateral low back pain with left-sided sciatica 07/07/2016     Priority: Medium     Acute bilateral low back pain  with right-sided sciatica 06/02/2016     Priority: Medium     Degenerative disc disease at L5-S1 level 06/02/2016     Priority: Medium     Acute pain of right knee 05/17/2016     Priority: Medium     Familial cardiomyopathy (H) 10/21/2015     Priority: Medium     Shaina's nodes 06/16/2015     Priority: Medium     Morbid obesity (H) 05/05/2015     Priority: Medium     Peroneus longus tendinitis 01/02/2015     Priority: Medium     Plantar fasciitis 11/11/2014     Priority: Medium     CARDIOVASCULAR SCREENING; LDL GOAL LESS THAN 160 11/11/2014     Priority: Medium     Calcaneal spur 10/21/2014     Priority: Medium     Xray 10/17/14       Pain in joint involving ankle and foot 06/16/2014     Priority: Medium     Thyroid nodule 09/03/2013     Priority: Medium     Knee pain 12/20/2012     Priority: Medium     Swelling of knee joint 12/20/2012     Priority: Medium     TB lung, latent 06/18/2012     Priority: Medium     Negative quantiferon gold test 11/5/2012       CHF with cardiomyopathy (H)      Priority: Medium     Itching 08/12/2011     Priority: Medium     Health Care Home 05/24/2011     Priority: Medium     EMERGENCY CARE PLAN  Presenting Problem Signs and Symptoms Treatment Plan    Questions or conerns during clinic hours    I will call the clinic directly     Questions or conerns outside clinic hours    I will call the 24 hour nurse line at [phone]    Patient needs to schedule an appointment    I will call the 24 hour scheduling team at [phone] or clinic directly    Same day treatment     I will call the clinic first, nurse line if after hours, urgent care and express care if needed                            DX V65.8 REPLACED WITH [id number] HEALTH CARE HOME (04/08/2013)       Sinus bradycardia 03/28/2011     Priority: Medium     Anemia      Priority: Medium     Allergic rhinitis      Priority: Medium     Problem list name updated by automated process. Provider to review       Leiomyoma of uterus 10/25/2006      Priority: Medium     Problem list name updated by automated process. Provider to review       Autoimmune disease, not elsewhere classified 2004     Priority: Medium     Autoimmune disease- unknown/poss SLE  Problem list name updated by automated process. Provider to review and confirm       Primary cardiomyopathy (H) 2004     Priority: Medium     Cardiomyopathy- dx'd  famial idiopathic. Followed regularly by cardiologist Mayi.  Problem list name updated by automated process. Provider to review        Past Medical History:   Diagnosis Date     Allergic rhinitis, cause unspecified      Anemia      Autoimmune disease NEC     Autoimmune disease- unknown/poss SLE     Calcaneal spur 10/21/2014     CHF with cardiomyopathy (H)      Familial cardiomyopathy (H) 10/21/2015     Morbid obesity (H) 2015     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     no longer an issue     Other primary cardiomyopathies     Cardiomyopathy- dx'd  idiopathic     PONV (postoperative nausea and vomiting)      UTERINE LEIOMYOMA NOS 10/25/2006     Past Surgical History:   Procedure Laterality Date     C BREAST SURGERY PROCEDURE UNLISTED      Breast Reduction     C  DELIVERY ONLY  10/85,     , Low Cervicalx2     C EXCIS UTERINE FIBROID,ABD APPRCH       C EXCISE EXCESS SKIN TISSUE,ABDOMEN       C LIGATE FALLOPIAN TUBE       C REPAIR CRUCIATE LIGAMENT,KNEE      Right Knee     C TOTAL KNEE ARTHROPLASTY Left 10/03/2017     HYSTERECTOMY TOTAL ABDOMINAL      for fibroids; reports having blood transfusion after surgery     THYROIDECTOMY  2013    Procedure: THYROIDECTOMY;  LEFT THYROID LOBECTOMY.  (LIGASURE, RECURRENT LARYNGEAL NERVE MONITOR) ;  Surgeon: Uriah Camargo MD;  Location: [facility]     Current Outpatient Medications   Medication Sig Dispense Refill     Cholecalciferol (VITAMIN D) 2000 UNIT tablet Take 5,000 Units by mouth as  needed Reported on 3/8/2017 100 tablet 12     Collagen 500 MG CAPS        cyclobenzaprine (FLEXERIL) 10 MG tablet Take 0.5-1 tablets (5-10 mg) by mouth 3 times daily as needed for muscle spasms 90 tablet 1     diclofenac (VOLTAREN) 75 MG EC tablet Take 1 tablet (75 mg) by mouth 2 times daily as needed for moderate pain 40 tablet 1     estradiol (VIVELLE-DOT) 0.0375 MG/24HR BIW patch IRISH 1 PATCH ON SKIN TWICE PER WEEK  11     FLAXSEED, LINSEED, PO        furosemide (LASIX) 20 MG tablet Take 2 tablets (40 mg) by mouth daily as needed 60 tablet 1     hydrOXYzine (ATARAX) 25 MG tablet Take 2-4 tablets ( mg) by mouth nightly as needed for anxiety or other (sleep) 30 tablet 0     losartan (COZAAR) 100 MG tablet Take 1 tablet (100 mg) by mouth daily 30 tablet 3     metoprolol succinate (TOPROL-XL) 50 MG 24 hr tablet Take 1 tablet (50 mg) by mouth daily 30 tablet 3     omeprazole (PRILOSEC) 40 MG capsule Take 1 capsule (40 mg) by mouth daily Take 30-60 minutes before a meal. 90 capsule 1     order for DME Equipment being ordered:brace 1 Device 0     progesterone (PROMETRIUM) 100 MG capsule Take 100 mg by mouth Reported on 3/8/2017       spironolactone (ALDACTONE) 25 MG tablet Take 1 tablet (25 mg) by mouth daily 90 tablet 3     traMADol (ULTRAM) 50 MG tablet Take 1 tablet (50 mg) by mouth every 8 hours as needed for severe pain 60 tablet 0     OTC products: no recent use of OTC ASA, NSAIDS or Steroids    Allergies   Allergen Reactions     Morphine      EMESIS     Sulfa Drugs Swelling      Latex Allergy: NO    Social History     Tobacco Use     Smoking status: Never Smoker     Smokeless tobacco: Never Used   Substance Use Topics     Alcohol use: Yes     Comment: rare, twice a year, she has a drink little wine 2 days ago     History   Drug Use No       REVIEW OF SYSTEMS:   Constitutional, HEENT, cardiovascular, pulmonary, gi and gu systems are negative, except as otherwise noted.    EXAM:   /64 (BP Location: Right  arm, Patient Position: Chair, Cuff Size: Adult Large)   Pulse 54   Temp 98.4  F (36.9  C) (Oral)   Resp 12   Wt 108 kg (238 lb)   LMP 10/19/2008 (Approximate)   SpO2 100%   BMI 40.85 kg/m      GENERAL APPEARANCE: healthy, alert and no distress     EYES: EOMI, PERRL     HENT: ear canals and TM's normal and nose and mouth without ulcers or lesions     NECK: no adenopathy, no asymmetry, masses, or scars and thyroid normal to palpation     RESP: lungs clear to auscultation - no rales, rhonchi or wheezes     CV: regular rates and rhythm, normal S1 S2, no S3 or S4 and no murmur, click or rub     ABDOMEN:  soft, nontender, no HSM or masses and bowel sounds normal     SKIN: no suspicious lesions or rashes     MSK: left AC area without any edema, or erythema.  No mass or induration with palpation.  Full ROM of left elbow.     NEURO: Normal strength and tone, sensory exam grossly normal, mentation intact and speech normal     PSYCH: mentation appears normal. and affect normal/bright     LYMPHATICS: No cervical adenopathy    DIAGNOSTICS:     EKG: patients brings EKG from cardiology visit 1/30/19 with her.  appears NSR, nonspecific ST and T wave abnormalities, unchanged from previous tracings  Labs Drawn and in Process:     Component      Latest Ref Rng & Units 1/30/2019   Sodium      133 - 144 mmol/L 141   Potassium      3.4 - 5.3 mmol/L 3.6   Chloride      94 - 109 mmol/L 104   Carbon Dioxide      20 - 32 mmol/L 30   Anion Gap      3 - 14 mmol/L 6   Glucose      70 - 99 mg/dL 106 (H)   Urea Nitrogen      7 - 30 mg/dL 23   Creatinine      0.52 - 1.04 mg/dL 0.75   GFR Estimate      >60 mL/min/1.73:m2 >90   GFR Estimate If Black      >60 mL/min/1.73:m2 >90   Calcium      8.5 - 10.1 mg/dL 8.8     Recent Labs   Lab Test 01/09/19  0740 01/08/19  1121  05/02/18  1439  12/02/14  1012  04/19/13  1016   HGB  --  12.3  --  12.0   < > 12.5   < > 12.3   PLT  --  261  --  272   < > 277   < > 265   INR  --   --   --   --   --  1.03   --  1.05    140  --   --    < > 140   < > 141   POTASSIUM 3.6 3.9   < >  --    < > 3.9   < > 3.3*   CR 0.69 0.77   < >  --    < > 0.67   < > 0.70   A1C  --  5.5  --   --   --   --   --   --     < > = values in this interval not displayed.        IMPRESSION:   Reason for surgery/procedure: right knee osteoarthritis    The proposed surgical procedure is considered INTERMEDIATE risk.    REVISED CARDIAC RISK INDEX  The patient has the following serious cardiovascular risks for perioperative complications such as (MI, PE, VFib and 3  AV Block):  No serious cardiac risks  INTERPRETATION: 0 risks: Class I (very low risk - 0.4% complication rate)    The patient has the following additional risks for perioperative complications:  Possible undiagnosed sleep apnea      ICD-10-CM    1. Preop general physical exam Z01.818    2. Primary osteoarthritis of right knee M17.11    3. Pre-syncope R55    4. Pain of left upper arm M79.622 US Extremity Non Vascular Left   5. Primary cardiomyopathy (H) I42.8    6. CHF with cardiomyopathy (H) I50.9     I42.9    7. Gastroesophageal reflux disease with esophagitis K21.0    8. Morbid obesity (H) E66.01    9. Acute bilateral low back pain with right-sided sciatica M54.41        RECOMMENDATIONS:     --Consult hospital rounder / IM to assist post-op medical management    Cardiovascular Risk  Patient is already on a Beta Blocker. Continue Betablocker therapy after surgery, using Beta blocker order set as necessary for NPO status.      Obstructive Sleep Apnea (or suspected sleep apnea)  Hospital staff are advised to monitor for sleep related oxygen desaturations due to suspicion of KATIA      --Patient is to take all scheduled medications on the day of surgery     APPROVAL GIVEN to proceed with proposed procedure, without further diagnostic evaluation     (Z01.818) Preop general physical exam  (primary encounter diagnosis)  (M17.11) Primary osteoarthritis of right knee  Comment:   Plan: approved  for surgery as above     (R55) Pre-syncope  Comment: evaluated by cards, felt to be vasovagal, recommended monitoring  Plan: pt will inform me if not resolving, discussed if she does suffer syncopal event she needs to be evaluated in ER, she agrees    (M79.092) Pain of left upper arm  Comment: no edema or erythema on exam to suggest VTE.  No palpable mass or abscess  Plan: US Extremity Non Vascular Left        Pt is requesting US to r/o VTE, discussed this is not a common place for DVT to occur and she does not have typical symptoms.  She would still like to pursue US and this was scheduled today    (I42.8) Primary cardiomyopathy (H)  (I50.9,  I42.9) CHF with cardiomyopathy (H)  Comment: was having some shortness of breath and coughing, this has improved with increasing lasix as recommended by cardiology  Plan: stable, ok to proceed with surgery     (K21.0) Gastroesophageal reflux disease with esophagitis  Comment: controlled  Plan: cont PPI    (E66.01) Morbid obesity (H)  Comment:   Plan:     (M54.41) Acute bilateral low back pain with right-sided sciatica  Comment:   Plan:         Signed Electronically by: PITO Kim CNP    Copy of this evaluation report is provided to requesting physician.    [facility] Preop Guidelines    Revised Cardiac Risk Index

## 2019-01-29 NOTE — PATIENT INSTRUCTIONS
1.  ultrasound to evaluate arm pain  2.  Monitor syncope closely, please let me know if not improving.  If you pass out you need to be seen in the ER  3.  Cleared for surgery    Before Your Surgery      Call your surgeon if there is any change in your health. This includes signs of a cold or flu (such as a sore throat, runny nose, cough, rash or fever).    Do not smoke, drink alcohol or take over the counter medicine (unless your surgeon or primary care doctor tells you to) for the 24 hours before and after surgery.    If you take prescribed drugs: Follow your doctor s orders about which medicines to take and which to stop until after surgery.    Eating and drinking prior to surgery: follow the instructions from your surgeon    Take a shower or bath the night before surgery. Use the soap your surgeon gave you to gently clean your skin. If you do not have soap from your surgeon, use your regular soap. Do not shave or scrub the surgery site.  Wear clean pajamas and have clean sheets on your bed.

## 2019-01-30 ENCOUNTER — OFFICE VISIT (OUTPATIENT)
Dept: CARDIOLOGY | Facility: CLINIC | Age: 54
End: 2019-01-30
Attending: INTERNAL MEDICINE
Payer: COMMERCIAL

## 2019-01-30 ENCOUNTER — RECORDS - HEALTHEAST (OUTPATIENT)
Dept: ADMINISTRATIVE | Facility: OTHER | Age: 54
End: 2019-01-30

## 2019-01-30 VITALS
SYSTOLIC BLOOD PRESSURE: 109 MMHG | WEIGHT: 238.4 LBS | DIASTOLIC BLOOD PRESSURE: 63 MMHG | HEIGHT: 64 IN | BODY MASS INDEX: 40.7 KG/M2 | HEART RATE: 65 BPM | OXYGEN SATURATION: 98 %

## 2019-01-30 DIAGNOSIS — I50.32 CHRONIC DIASTOLIC CONGESTIVE HEART FAILURE (H): ICD-10-CM

## 2019-01-30 PROBLEM — K21.00 GASTROESOPHAGEAL REFLUX DISEASE WITH ESOPHAGITIS: Status: ACTIVE | Noted: 2019-01-30

## 2019-01-30 LAB
ANION GAP SERPL CALCULATED.3IONS-SCNC: 6 MMOL/L (ref 3–14)
BUN SERPL-MCNC: 23 MG/DL (ref 7–30)
CALCIUM SERPL-MCNC: 8.8 MG/DL (ref 8.5–10.1)
CHLORIDE SERPL-SCNC: 104 MMOL/L (ref 94–109)
CO2 SERPL-SCNC: 30 MMOL/L (ref 20–32)
CREAT SERPL-MCNC: 0.75 MG/DL (ref 0.52–1.04)
GFR SERPL CREATININE-BSD FRML MDRD: >90 ML/MIN/{1.73_M2}
GLUCOSE SERPL-MCNC: 106 MG/DL (ref 70–99)
POTASSIUM SERPL-SCNC: 3.6 MMOL/L (ref 3.4–5.3)
SODIUM SERPL-SCNC: 141 MMOL/L (ref 133–144)

## 2019-01-30 PROCEDURE — G0463 HOSPITAL OUTPT CLINIC VISIT: HCPCS

## 2019-01-30 PROCEDURE — 80048 BASIC METABOLIC PNL TOTAL CA: CPT | Performed by: INTERNAL MEDICINE

## 2019-01-30 PROCEDURE — 99214 OFFICE O/P EST MOD 30 MIN: CPT | Mod: GC | Performed by: INTERNAL MEDICINE

## 2019-01-30 PROCEDURE — 93005 ELECTROCARDIOGRAM TRACING: CPT | Mod: ZF

## 2019-01-30 PROCEDURE — 36415 COLL VENOUS BLD VENIPUNCTURE: CPT | Performed by: INTERNAL MEDICINE

## 2019-01-30 PROCEDURE — 93010 ELECTROCARDIOGRAM REPORT: CPT | Mod: ZP | Performed by: INTERNAL MEDICINE

## 2019-01-30 RX ORDER — SPIRONOLACTONE 25 MG/1
25 TABLET ORAL DAILY
Qty: 90 TABLET | Refills: 3 | Status: SHIPPED | OUTPATIENT
Start: 2019-01-30 | End: 2019-05-22

## 2019-01-30 ASSESSMENT — PAIN SCALES - GENERAL: PAINLEVEL: NO PAIN (0)

## 2019-01-30 ASSESSMENT — MIFFLIN-ST. JEOR: SCORE: 1666.38

## 2019-01-30 NOTE — NURSING NOTE
Diet: Patient instructed regarding a heart healthy diet, including discussion of reduced fat and sodium intake. Patient demonstrated understanding of this information and agreed to call with further questions or concerns.    Labs: Patient was given results of the laboratory testing obtained today. Patient was instructed to return for the next laboratory testing in 2/7/19 . Patient demonstrated understanding of this information and agreed to call with further questions or concerns.     Med Reconcile: Reviewed and verified all current medications with the patient. The updated medication list was printed and given to the patient.    New Medication: Patient was educated regarding newly prescribed medication, including discussion of  the indication, administration, side effects, and when to report to MD or RN. Patient demonstrated understanding of this information and agreed to call with further questions or concerns.    Return Appointment: Patient given instructions regarding scheduling next clinic visit. Patient demonstrated understanding of this information and agreed to call with further questions or concerns.    Patient stated she understood all health information given and agreed to call with further questions or concerns.

## 2019-01-30 NOTE — PATIENT INSTRUCTIONS
Cardiology Providers you saw during your visit:  Dr. Kolb    Medication changes:  Please STOP taking Hydrochlorothiazide  Please START taking Spironolactone 25 mg by mouth once daily    Follow up:  1- Blood work on 2/7/19 the same day you are having your knee procedure.  Please return to clinic for follow-up:  With Dr. Kolb on 2/27/19 with labs prior      Please call if you have:  1. Weight gain of more than 2 pounds in a day or 5 pounds in a week  2. Increased shortness of breath, swelling or bloating  3. Dizziness, lightheadedness   4. Any questions or concerns.     Follow the American Heart Association Diet and Lifestyle recommendations:  Limit saturated fat, trans fat, sodium, red meat, sweets and sugar-sweetened beverages. If you choose to eat red meat, compare labels and select the leanest cuts available.  Aim for at least 150 minutes of moderate physical activity or 75 minutes of vigorous physical activity - or an equal combination of both - each week.    During business hours: [phone], press option # 1 to be routed to the [facility] then option # 3 for medical questions to speak with a nurse     After hours, weekends or holidays: On Call Cardiologist- [phone]   option #4 and ask to speak to the on-call Cardiologist. Inform them you are a CORE/heart failure patient at the [facility].      Stephanie Plummer RN  Cardiology Nurse Care Coordinator    Keep up the good work!    Take Care!
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
## 2019-01-30 NOTE — NURSING NOTE
Chief Complaint   Patient presents with     Follow Up     Familial cardiomyopathy     Vitals were taken and medications were reconciled.     Esther Landeros CMA    8:09 AM

## 2019-01-31 ENCOUNTER — HOME CARE/HOSPICE - HEALTHEAST (OUTPATIENT)
Dept: HOME HEALTH SERVICES | Facility: HOME HEALTH | Age: 54
End: 2019-01-31

## 2019-02-01 ENCOUNTER — HOME CARE/HOSPICE - HEALTHEAST (OUTPATIENT)
Dept: HOME HEALTH SERVICES | Facility: HOME HEALTH | Age: 54
End: 2019-02-01

## 2019-02-01 ENCOUNTER — TRANSFERRED RECORDS (OUTPATIENT)
Dept: HEALTH INFORMATION MANAGEMENT | Facility: CLINIC | Age: 54
End: 2019-02-01

## 2019-02-01 ENCOUNTER — OFFICE VISIT (OUTPATIENT)
Dept: FAMILY MEDICINE | Facility: CLINIC | Age: 54
End: 2019-02-01
Payer: COMMERCIAL

## 2019-02-01 VITALS
RESPIRATION RATE: 12 BRPM | OXYGEN SATURATION: 100 % | BODY MASS INDEX: 40.85 KG/M2 | HEART RATE: 54 BPM | TEMPERATURE: 98.4 F | DIASTOLIC BLOOD PRESSURE: 64 MMHG | WEIGHT: 238 LBS | SYSTOLIC BLOOD PRESSURE: 112 MMHG

## 2019-02-01 DIAGNOSIS — I42.9 PRIMARY CARDIOMYOPATHY (H): ICD-10-CM

## 2019-02-01 DIAGNOSIS — Z01.818 PREOP GENERAL PHYSICAL EXAM: Primary | ICD-10-CM

## 2019-02-01 DIAGNOSIS — M54.41 ACUTE BILATERAL LOW BACK PAIN WITH RIGHT-SIDED SCIATICA: ICD-10-CM

## 2019-02-01 DIAGNOSIS — K21.00 GASTROESOPHAGEAL REFLUX DISEASE WITH ESOPHAGITIS: ICD-10-CM

## 2019-02-01 DIAGNOSIS — E66.01 MORBID OBESITY (H): ICD-10-CM

## 2019-02-01 DIAGNOSIS — R55 PRE-SYNCOPE: ICD-10-CM

## 2019-02-01 DIAGNOSIS — I50.9 CHF WITH CARDIOMYOPATHY (H): ICD-10-CM

## 2019-02-01 DIAGNOSIS — M79.622 PAIN OF LEFT UPPER ARM: ICD-10-CM

## 2019-02-01 DIAGNOSIS — M17.11 PRIMARY OSTEOARTHRITIS OF RIGHT KNEE: ICD-10-CM

## 2019-02-01 DIAGNOSIS — I42.9 CHF WITH CARDIOMYOPATHY (H): ICD-10-CM

## 2019-02-01 PROCEDURE — 99214 OFFICE O/P EST MOD 30 MIN: CPT | Performed by: NURSE PRACTITIONER

## 2019-02-02 ENCOUNTER — HOME CARE/HOSPICE - HEALTHEAST (OUTPATIENT)
Dept: HOME HEALTH SERVICES | Facility: HOME HEALTH | Age: 54
End: 2019-02-02

## 2019-02-04 ENCOUNTER — HOME CARE/HOSPICE - HEALTHEAST (OUTPATIENT)
Dept: HOME HEALTH SERVICES | Facility: HOME HEALTH | Age: 54
End: 2019-02-04

## 2019-02-04 ASSESSMENT — MIFFLIN-ST. JEOR: SCORE: 1654.56

## 2019-02-06 ENCOUNTER — HOME CARE/HOSPICE - HEALTHEAST (OUTPATIENT)
Dept: HOME HEALTH SERVICES | Facility: HOME HEALTH | Age: 54
End: 2019-02-06

## 2019-02-07 ENCOUNTER — SURGERY - HEALTHEAST (OUTPATIENT)
Dept: SURGERY | Facility: CLINIC | Age: 54
End: 2019-02-07

## 2019-02-07 ENCOUNTER — TRANSFERRED RECORDS (OUTPATIENT)
Dept: HEALTH INFORMATION MANAGEMENT | Facility: CLINIC | Age: 54
End: 2019-02-07

## 2019-02-07 ENCOUNTER — ANESTHESIA - HEALTHEAST (OUTPATIENT)
Dept: SURGERY | Facility: CLINIC | Age: 54
End: 2019-02-07

## 2019-02-08 ENCOUNTER — TELEPHONE (OUTPATIENT)
Dept: FAMILY MEDICINE | Facility: CLINIC | Age: 54
End: 2019-02-08

## 2019-02-08 ENCOUNTER — TRANSFERRED RECORDS (OUTPATIENT)
Dept: HEALTH INFORMATION MANAGEMENT | Facility: CLINIC | Age: 54
End: 2019-02-08

## 2019-02-08 ENCOUNTER — TELEPHONE (OUTPATIENT)
Dept: CARDIOLOGY | Facility: CLINIC | Age: 54
End: 2019-02-08

## 2019-02-08 LAB
CREAT SERPL-MCNC: 1 MG/DL (ref 0.6–1.1)
GFR SERPL CREATININE-BSD FRML MDRD: 58 ML/MIN/1.73M2
GLUCOSE SERPL-MCNC: 173 MG/DL (ref 70–125)
POTASSIUM SERPL-SCNC: 4.2 MMOL/L (ref 3.5–5)

## 2019-02-08 ASSESSMENT — MIFFLIN-ST. JEOR: SCORE: 1663.63

## 2019-02-13 ENCOUNTER — CARE COORDINATION (OUTPATIENT)
Dept: CARDIOLOGY | Facility: CLINIC | Age: 54
End: 2019-02-13

## 2019-02-13 ENCOUNTER — TELEPHONE (OUTPATIENT)
Dept: CARDIOLOGY | Facility: CLINIC | Age: 54
End: 2019-02-13

## 2019-02-13 NOTE — PROGRESS NOTES
Called and spoke with patient. Patient had recent surgery, knee replacement, at outside facility. Post op patient was noted to have creatinine of 1.37. Her lasix was changed from 40 MG daily to 20 MG daily with 20 MG prn increased weight/swelling. Patient states on 2/7/19 she weighed 238 lbs. Yesterday she weighed 244 lbs, and today she weighs 251 lbs. She notes increase lower extremity and pedal edema. She denies shortness of breath. Called and spoke to [facility] where she is staying. Blood pressure today was 154/95 with a pulse of 78. Patient was given the extra dose of Lasix today and the near future till patient is back to her baseline weight. BMP will be drawn tomorrow and results faxed to Dr. Kolb. Will notify Dr. Kolb and patient and facility will be notified of any recommendations.

## 2019-02-13 NOTE — TELEPHONE ENCOUNTER
Asad Brown waiting on response 12:35p. Tried calling cell [employer] but no answer.Lync inactive 18hrs

## 2019-02-13 NOTE — TELEPHONE ENCOUNTER
[facility] Call Center    Phone Message    May a detailed message be left on voicemail: yes    Reason for Call: Other: Per call from PT is reporting weight gained from 238 to 251 from 2/7 to today. Per PT is concerned and is requesting for a call back to discuss plan ASAP     Action Taken: Message routed to:  [facility] ([facility]): Cardiology

## 2019-02-14 ENCOUNTER — MEDICAL CORRESPONDENCE (OUTPATIENT)
Dept: HEALTH INFORMATION MANAGEMENT | Facility: CLINIC | Age: 54
End: 2019-02-14

## 2019-02-14 NOTE — TELEPHONE ENCOUNTER
Patient has been weighed on the same scale, IV fluids were used judiciously in the post op period. Her Diuretic was decreased due a bump in Creatinine.  She has continued to follow a sodium restricted diet.  Discussed with Dr. Kolb, she recommends Furosemide 40 mg daily, BMP on 2/14 and weight check on 2/15.  Patient verbalized understanding and is agreeable with plan.  Spoke with Gregory Ni, she verbalized understanding. She will fax BMP results as soon as they are available.

## 2019-02-15 ENCOUNTER — TRANSFERRED RECORDS (OUTPATIENT)
Dept: HEALTH INFORMATION MANAGEMENT | Facility: CLINIC | Age: 54
End: 2019-02-15

## 2019-02-18 ENCOUNTER — TELEPHONE (OUTPATIENT)
Dept: CARDIOLOGY | Facility: CLINIC | Age: 54
End: 2019-02-18

## 2019-02-18 ENCOUNTER — MEDICAL CORRESPONDENCE (OUTPATIENT)
Dept: HEALTH INFORMATION MANAGEMENT | Facility: CLINIC | Age: 54
End: 2019-02-18

## 2019-02-18 ENCOUNTER — HOME CARE/HOSPICE - HEALTHEAST (OUTPATIENT)
Dept: HOME HEALTH SERVICES | Facility: HOME HEALTH | Age: 54
End: 2019-02-18

## 2019-02-18 DIAGNOSIS — I42.9 CHF WITH CARDIOMYOPATHY (H): ICD-10-CM

## 2019-02-18 DIAGNOSIS — I50.9 CHF WITH CARDIOMYOPATHY (H): ICD-10-CM

## 2019-02-18 LAB
ANION GAP SERPL CALCULATED.3IONS-SCNC: 7 MMOL/L
BUN SERPL-MCNC: 15 MG/DL
CALCIUM SERPL-MCNC: 8.8 MG/DL
CHLORIDE SERPLBLD-SCNC: 100 MMOL/L
CO2 SERPL-SCNC: 30 MMOL/L
CREAT SERPL-MCNC: 0.8 MG/DL
ERYTHROCYTE [DISTWIDTH] IN BLOOD BY AUTOMATED COUNT: 13.4 %
GFR SERPL CREATININE-BSD FRML MDRD: >60 ML/MIN/1.73M2
GLUCOSE SERPL-MCNC: 103 MG/DL (ref 70–99)
HCT VFR BLD AUTO: 30.6 %
HEMOGLOBIN: 9.7 G/DL (ref 11.7–15.7)
MCH RBC QN AUTO: 30 PG
MCHC RBC AUTO-ENTMCNC: 32 G/DL
MCV RBC AUTO: 93 FL
PLATELET # BLD AUTO: 376 10^9/L
POTASSIUM SERPL-SCNC: 4.1 MMOL/L
RBC # BLD AUTO: 3.28 10^12/L
SODIUM SERPL-SCNC: 137 MMOL/L
WBC # BLD AUTO: 8 10^9/L

## 2019-02-18 RX ORDER — FUROSEMIDE 20 MG
40 TABLET ORAL DAILY PRN
Qty: 60 TABLET | Refills: 1 | Status: SHIPPED | OUTPATIENT
Start: 2019-02-18 | End: 2019-08-21

## 2019-02-18 NOTE — TELEPHONE ENCOUNTER
[facility] Call Center    Phone Message    May a detailed message be left on voicemail: yes    Reason for Call: Medication Refill Request    Has the patient contacted the pharmacy for the refill? Yes   Name of medication being requested: furosemide (LASIX) 20 MG tablet  Provider who prescribed the medication: Dr Kolb  Pharmacy: [facility] [address] [place] [address] N AT [facility] & [address]  Date medication is needed: as soon as possible         Action Taken: Message routed to:  [facility] ([facility]): RONIT CArdiology

## 2019-02-21 ENCOUNTER — HOME CARE/HOSPICE - HEALTHEAST (OUTPATIENT)
Dept: HOME HEALTH SERVICES | Facility: HOME HEALTH | Age: 54
End: 2019-02-21

## 2019-02-21 ENCOUNTER — MEDICAL CORRESPONDENCE (OUTPATIENT)
Dept: HEALTH INFORMATION MANAGEMENT | Facility: CLINIC | Age: 54
End: 2019-02-21

## 2019-02-21 ENCOUNTER — TRANSFERRED RECORDS (OUTPATIENT)
Dept: HEALTH INFORMATION MANAGEMENT | Facility: CLINIC | Age: 54
End: 2019-02-21

## 2019-02-22 ENCOUNTER — HOME CARE/HOSPICE - HEALTHEAST (OUTPATIENT)
Dept: HOME HEALTH SERVICES | Facility: HOME HEALTH | Age: 54
End: 2019-02-22

## 2019-02-23 ENCOUNTER — HOME CARE/HOSPICE - HEALTHEAST (OUTPATIENT)
Dept: HOME HEALTH SERVICES | Facility: HOME HEALTH | Age: 54
End: 2019-02-23

## 2019-02-25 ENCOUNTER — HOME CARE/HOSPICE - HEALTHEAST (OUTPATIENT)
Dept: HOME HEALTH SERVICES | Facility: HOME HEALTH | Age: 54
End: 2019-02-25
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
## 2019-02-27 ENCOUNTER — OFFICE VISIT (OUTPATIENT)
Dept: CARDIOLOGY | Facility: CLINIC | Age: 54
End: 2019-02-27
Attending: INTERNAL MEDICINE
Payer: COMMERCIAL

## 2019-02-27 ENCOUNTER — TRANSFERRED RECORDS (OUTPATIENT)
Dept: HEALTH INFORMATION MANAGEMENT | Facility: CLINIC | Age: 54
End: 2019-02-27

## 2019-02-27 ENCOUNTER — HOME CARE/HOSPICE - HEALTHEAST (OUTPATIENT)
Dept: HOME HEALTH SERVICES | Facility: HOME HEALTH | Age: 54
End: 2019-02-27

## 2019-02-27 ENCOUNTER — ANCILLARY PROCEDURE (OUTPATIENT)
Dept: ULTRASOUND IMAGING | Facility: CLINIC | Age: 54
End: 2019-02-27
Attending: INTERNAL MEDICINE
Payer: COMMERCIAL

## 2019-02-27 VITALS
OXYGEN SATURATION: 99 % | SYSTOLIC BLOOD PRESSURE: 137 MMHG | BODY MASS INDEX: 40.29 KG/M2 | HEIGHT: 64 IN | WEIGHT: 236 LBS | HEART RATE: 83 BPM | DIASTOLIC BLOOD PRESSURE: 79 MMHG

## 2019-02-27 DIAGNOSIS — I50.32 CHRONIC DIASTOLIC CONGESTIVE HEART FAILURE (H): ICD-10-CM

## 2019-02-27 LAB
ANION GAP SERPL CALCULATED.3IONS-SCNC: 6 MMOL/L (ref 3–14)
BUN SERPL-MCNC: 17 MG/DL (ref 7–30)
CALCIUM SERPL-MCNC: 9.2 MG/DL (ref 8.5–10.1)
CHLORIDE SERPL-SCNC: 103 MMOL/L (ref 94–109)
CO2 SERPL-SCNC: 28 MMOL/L (ref 20–32)
CREAT SERPL-MCNC: 0.84 MG/DL (ref 0.52–1.04)
GFR SERPL CREATININE-BSD FRML MDRD: 78 ML/MIN/{1.73_M2}
GLUCOSE SERPL-MCNC: 105 MG/DL (ref 70–99)
POTASSIUM SERPL-SCNC: 3.9 MMOL/L (ref 3.4–5.3)
SODIUM SERPL-SCNC: 137 MMOL/L (ref 133–144)

## 2019-02-27 PROCEDURE — 99214 OFFICE O/P EST MOD 30 MIN: CPT | Mod: GC | Performed by: INTERNAL MEDICINE

## 2019-02-27 PROCEDURE — 80048 BASIC METABOLIC PNL TOTAL CA: CPT | Performed by: INTERNAL MEDICINE

## 2019-02-27 PROCEDURE — 36415 COLL VENOUS BLD VENIPUNCTURE: CPT | Performed by: INTERNAL MEDICINE

## 2019-02-27 PROCEDURE — G0463 HOSPITAL OUTPT CLINIC VISIT: HCPCS | Mod: ZF

## 2019-02-27 RX ORDER — OXYCODONE HYDROCHLORIDE 5 MG/1
5-10 TABLET ORAL
COMMUNITY
Start: 2019-02-11 | End: 2019-07-08

## 2019-02-27 ASSESSMENT — MIFFLIN-ST. JEOR: SCORE: 1655.49

## 2019-02-27 ASSESSMENT — PAIN SCALES - GENERAL: PAINLEVEL: NO PAIN (0)

## 2019-02-27 NOTE — PATIENT INSTRUCTIONS
Cardiology Providers you saw during your visit:  Dr. Kolb    Medication changes:  No medication changes.    Follow up:    Ultrasound today.  Please return to clinic for follow-up:  With Dr. Kolb in 3 months with labs prior      Please call if you have:  1. Weight gain of more than 2 pounds in a day or 5 pounds in a week  2. Increased shortness of breath, swelling or bloating  3. Dizziness, lightheadedness   4. Any questions or concerns.     Follow the American Heart Association Diet and Lifestyle recommendations:  Limit saturated fat, trans fat, sodium, red meat, sweets and sugar-sweetened beverages. If you choose to eat red meat, compare labels and select the leanest cuts available.  Aim for at least 150 minutes of moderate physical activity or 75 minutes of vigorous physical activity - or an equal combination of both - each week.    During business hours: [phone], press option # 1 to be routed to the [facility] then option # 3 for medical questions to speak with a nurse     After hours, weekends or holidays: On Call Cardiologist- [phone]   option #4 and ask to speak to the on-call Cardiologist. Inform them you are a CORE/heart failure patient at the [facility].      Stephanie Plummer, RN  Cardiology Nurse Care Coordinator    Keep up the good work!    Take Care!

## 2019-02-27 NOTE — NURSING NOTE
Chief Complaint   Patient presents with     Follow Up     Familial cardiomyopathy     Vitals were taken and medications were reconciled.     Maine Landeros RMA  9:38 AM

## 2019-02-28 ENCOUNTER — CARE COORDINATION (OUTPATIENT)
Dept: CARDIOLOGY | Facility: CLINIC | Age: 54
End: 2019-02-28

## 2019-02-28 ENCOUNTER — HOME CARE/HOSPICE - HEALTHEAST (OUTPATIENT)
Dept: HOME HEALTH SERVICES | Facility: HOME HEALTH | Age: 54
End: 2019-02-28

## 2019-03-01 ENCOUNTER — HOME CARE/HOSPICE - HEALTHEAST (OUTPATIENT)
Dept: HOME HEALTH SERVICES | Facility: HOME HEALTH | Age: 54
End: 2019-03-01

## 2019-03-01 ENCOUNTER — AMBULATORY - HEALTHEAST (OUTPATIENT)
Dept: SURGERY | Facility: CLINIC | Age: 54
End: 2019-03-01

## 2019-03-01 NOTE — PROGRESS NOTES
Called patient to communicate that US was negative. All questions answered.  Patient requested a copy of the results get mailed out to her. Copy mailed out to patient.

## 2019-03-04 ENCOUNTER — HOME CARE/HOSPICE - HEALTHEAST (OUTPATIENT)
Dept: HOME HEALTH SERVICES | Facility: HOME HEALTH | Age: 54
End: 2019-03-04

## 2019-03-06 ENCOUNTER — HOME CARE/HOSPICE - HEALTHEAST (OUTPATIENT)
Dept: HOME HEALTH SERVICES | Facility: HOME HEALTH | Age: 54
End: 2019-03-06

## 2019-03-07 ENCOUNTER — HOME CARE/HOSPICE - HEALTHEAST (OUTPATIENT)
Dept: HOME HEALTH SERVICES | Facility: HOME HEALTH | Age: 54
End: 2019-03-07

## 2019-03-08 ENCOUNTER — HOME CARE/HOSPICE - HEALTHEAST (OUTPATIENT)
Dept: HOME HEALTH SERVICES | Facility: HOME HEALTH | Age: 54
End: 2019-03-08

## 2019-03-09 ENCOUNTER — HOME CARE/HOSPICE - HEALTHEAST (OUTPATIENT)
Dept: HOME HEALTH SERVICES | Facility: HOME HEALTH | Age: 54
End: 2019-03-09

## 2019-03-21 ENCOUNTER — TRANSFERRED RECORDS (OUTPATIENT)
Dept: HEALTH INFORMATION MANAGEMENT | Facility: CLINIC | Age: 54
End: 2019-03-21

## 2019-03-24 ENCOUNTER — TELEPHONE (OUTPATIENT)
Dept: FAMILY MEDICINE | Facility: CLINIC | Age: 54
End: 2019-03-24

## 2019-03-24 NOTE — TELEPHONE ENCOUNTER
Panel Management Review   One phone call and send letter if unable to reach them or ID.met message and send letter if not read after 2 weeks (You will get a message to your inbasket)      12/12/2018    Fail List measure: Breast Cancer        Patient is due/failing the following:   MAMMOGRAM    Action needed:   Patient needs office visit for Mammogram.    Type of outreach:    Sent letter.    Questions for provider review:    None                                                                                                                                    Sruthi Oliver

## 2019-03-24 NOTE — LETTER
[address] [address]  [phone]  Dept: [phone]      March 24, 2019      Marleen Mcfadden  [address]   [facility] [address]    Dear Marleen Mcfadden,     At [facility] we care about your health and are committed to providing quality patient care.    Which includes staying current on preventive cancer screenings.  You can increase your chances of finding and treating cancers through regular screenings.      Our records indicate you may be due for the following preventive screening(s):    Mammogram    Mammogram for breast cancer   Recommended every 1-2 years for women age 50 and older  Mammograms help detect breast cancer, which is the most common cancer among women in the [place].  You may need to start having mammograms earlier and more often if you have had breast cancer, breast problems, or a family history of breast cancer.     To schedule an appointment or discuss this screening further, you may contact us by phone at the [facility] at [phone] or online through the patient portal/[employer] @ [email or web address]    If you have had any of the screenings listed above at another facility, please call us so that we may update your chart.      Your partners in health,      Quality Committee at [facility]

## 2019-03-31 NOTE — TELEPHONE ENCOUNTER
BRONCHOSPASM/BRONCHOCONSTRICTION     [x]         IMPROVE AERATION/BREATH SOUNDS  [x]   ADMINISTER BRONCHODILATOR THERAPY AS APPROPRIATE  [x]   ASSESS BREATH SOUNDS  [x]   IMPLEMENT AEROSOL/MDI PROTOCOL  [x]   PATIENT EDUCATION AS NEEDED      PROVIDE ADEQUATE OXYGENATION WITH ACCEPTABLE SP02/ABG'S    [x]  IDENTIFY APPROPRIATE OXYGEN THERAPY  [x]   MONITOR SP02/ABG'S AS NEEDED   [x]   PATIENT EDUCATION AS NEEDED Team coordinators-Please contact patient to review ENT referral.    Thank you!  SADA BraswellN, RN

## 2019-04-11 ENCOUNTER — MEDICAL CORRESPONDENCE (OUTPATIENT)
Dept: HEALTH INFORMATION MANAGEMENT | Facility: CLINIC | Age: 54
End: 2019-04-11

## 2019-04-22 ENCOUNTER — OFFICE VISIT (OUTPATIENT)
Dept: URGENT CARE | Facility: URGENT CARE | Age: 54
End: 2019-04-22
Payer: COMMERCIAL

## 2019-04-22 ENCOUNTER — ANCILLARY PROCEDURE (OUTPATIENT)
Dept: GENERAL RADIOLOGY | Facility: CLINIC | Age: 54
End: 2019-04-22
Payer: COMMERCIAL

## 2019-04-22 VITALS
TEMPERATURE: 98.2 F | OXYGEN SATURATION: 99 % | RESPIRATION RATE: 20 BRPM | HEART RATE: 71 BPM | DIASTOLIC BLOOD PRESSURE: 81 MMHG | SYSTOLIC BLOOD PRESSURE: 120 MMHG

## 2019-04-22 DIAGNOSIS — R10.32 ABDOMINAL DISCOMFORT IN LEFT LOWER QUADRANT: ICD-10-CM

## 2019-04-22 DIAGNOSIS — K59.00 CONSTIPATION, UNSPECIFIED CONSTIPATION TYPE: ICD-10-CM

## 2019-04-22 DIAGNOSIS — R10.32 ABDOMINAL DISCOMFORT IN LEFT LOWER QUADRANT: Primary | ICD-10-CM

## 2019-04-22 PROCEDURE — 74019 RADEX ABDOMEN 2 VIEWS: CPT

## 2019-04-22 PROCEDURE — 99214 OFFICE O/P EST MOD 30 MIN: CPT | Performed by: FAMILY MEDICINE

## 2019-04-22 RX ORDER — DICYCLOMINE HYDROCHLORIDE 10 MG/1
10 CAPSULE ORAL
Qty: 20 CAPSULE | Refills: 0 | Status: SHIPPED | OUTPATIENT
Start: 2019-04-22 | End: 2020-07-13

## 2019-04-22 RX ORDER — BISACODYL 10 MG
10 SUPPOSITORY, RECTAL RECTAL DAILY PRN
Qty: 5 SUPPOSITORY | Refills: 0 | Status: SHIPPED | OUTPATIENT
Start: 2019-04-22 | End: 2020-08-14

## 2019-04-22 NOTE — PATIENT INSTRUCTIONS
Drink 4-6 glasses of water a day    For cramping discomfort try bentyl every 6 hours as prescribed    Try suppository Dulcolax to stimulate a bowel movement     If pain gets worse or if you develop fevers then come in immediately to be seen      If no improvement over next 5 days make appointment to see your doctor

## 2019-04-22 NOTE — PROGRESS NOTES
Subjective:   Marleen Mcfadden is a 53 year old female who presents for   Chief Complaint   Patient presents with     Abdominal Pain     Left side squeezing pain comes and goes- started yesterday about an hour after having a large bowel movement. Felt better temporarily after eating      Patient had a bowel movement yesterday but still had tight/wringing motion of lower left abdomen that went on for 7 hours - she then ate some food later and the pain went away. In the middle of night the discomfort came back. She went to breakfast today and the pain went away but is now back.   She has not felt any abnormalities of her abdomen. NO bowel movements today but is passing gas. No vomiting/diarrhea. She denies fevers.  Previous hx of reflux which went away with dietary changes (intermittent fasting). Typical meal each day are 2 smoothies (typically spinach/flak seed/water) and one meal.   No chronic NSAID use.   Hx of 2 sections, fibroid removal, hysterectomy with one ovary removed, tummy tuck    She is accompanied by her son.     Patient Active Problem List    Diagnosis Date Noted     Gastroesophageal reflux disease with esophagitis 01/30/2019     Priority: Medium     Elevated triglycerides with high cholesterol 01/11/2019     Priority: Medium     Primary osteoarthritis of both knees 01/08/2019     Priority: Medium     Status post left knee replacement 06/21/2018     Priority: Medium     Nontraumatic rupture of quadriceps tendon, left 06/21/2018     Priority: Medium     Left shoulder pain 01/17/2017     Priority: Medium     Rotator cuff injury 01/17/2017     Priority: Medium     Injury of left shoulder, initial encounter 01/12/2017     Priority: Medium     Sprain of other ligament of left ankle, initial encounter 01/12/2017     Priority: Medium     Elevated blood pressure reading without diagnosis of hypertension 10/27/2016     Priority: Medium     Chronic diastolic congestive heart failure (H) 10/27/2016     Priority:  Medium     Chronic bilateral low back pain with right-sided sciatica 07/07/2016     Priority: Medium     Chronic bilateral low back pain with left-sided sciatica 07/07/2016     Priority: Medium     Acute bilateral low back pain with right-sided sciatica 06/02/2016     Priority: Medium     Degenerative disc disease at L5-S1 level 06/02/2016     Priority: Medium     Acute pain of right knee 05/17/2016     Priority: Medium     Familial cardiomyopathy (H) 10/21/2015     Priority: Medium     Shaina's nodes 06/16/2015     Priority: Medium     Morbid obesity (H) 05/05/2015     Priority: Medium     Peroneus longus tendinitis 01/02/2015     Priority: Medium     Plantar fasciitis 11/11/2014     Priority: Medium     CARDIOVASCULAR SCREENING; LDL GOAL LESS THAN 160 11/11/2014     Priority: Medium     Calcaneal spur 10/21/2014     Priority: Medium     Xray 10/17/14       Pain in joint involving ankle and foot 06/16/2014     Priority: Medium     Thyroid nodule 09/03/2013     Priority: Medium     Knee pain 12/20/2012     Priority: Medium     Swelling of knee joint 12/20/2012     Priority: Medium     TB lung, latent 06/18/2012     Priority: Medium     Negative quantiferon gold test 11/5/2012       CHF with cardiomyopathy (H)      Priority: Medium     Itching 08/12/2011     Priority: Medium     Health Care Home 05/24/2011     Priority: Medium     EMERGENCY CARE PLAN  Presenting Problem Signs and Symptoms Treatment Plan    Questions or conerns during clinic hours    I will call the clinic directly     Questions or conerns outside clinic hours    I will call the 24 hour nurse line at [phone]    Patient needs to schedule an appointment    I will call the 24 hour scheduling team at [phone] or clinic directly    Same day treatment     I will call the clinic first, nurse line if after hours, urgent care and express care if needed                            DX V65.8 REPLACED WITH [id number] HEALTH CARE HOME (04/08/2013)       Sinus  bradycardia 03/28/2011     Priority: Medium     Anemia      Priority: Medium     Allergic rhinitis      Priority: Medium     Problem list name updated by automated process. Provider to review       Leiomyoma of uterus 10/25/2006     Priority: Medium     Problem list name updated by automated process. Provider to review       Autoimmune disease, not elsewhere classified 11/08/2004     Priority: Medium     Autoimmune disease- unknown/poss SLE  Problem list name updated by automated process. Provider to review and confirm       Primary cardiomyopathy (H) 11/08/2004     Priority: Medium     Cardiomyopathy- dx'd 1999 famial idiopathic. Followed regularly by cardiologist Mayi.  Problem list name updated by automated process. Provider to review         Current Outpatient Medications   Medication     bisacodyl (DULCOLAX) 10 MG suppository     Cholecalciferol (VITAMIN D) 2000 UNIT tablet     Collagen 500 MG CAPS     diclofenac (VOLTAREN) 75 MG EC tablet     dicyclomine (BENTYL) 10 MG capsule     estradiol (VIVELLE-DOT) 0.0375 MG/24HR BIW patch     FLAXSEED, LINSEED, PO     furosemide (LASIX) 20 MG tablet     losartan (COZAAR) 100 MG tablet     metoprolol succinate (TOPROL-XL) 50 MG 24 hr tablet     progesterone (PROMETRIUM) 100 MG capsule     spironolactone (ALDACTONE) 25 MG tablet     cyclobenzaprine (FLEXERIL) 10 MG tablet     hydrOXYzine (ATARAX) 25 MG tablet     omeprazole (PRILOSEC) 40 MG capsule     order for DME     oxyCODONE (ROXICODONE) 5 MG tablet     traMADol (ULTRAM) 50 MG tablet     No current facility-administered medications for this visit.      ROS:  As above per HPI    Objective:   /81   Pulse 71   Temp 98.2  F (36.8  C) (Oral)   Resp 20   LMP 10/19/2008 (Approximate)   SpO2 99% , There is no height or weight on file to calculate BMI.  Gen:  NAD, well-nourished, sitting in chair comfortably  HEENT: EOMI, sclera anicteric, Head normocephalic, ; nares patent; moist mucous membranes, normal  oropharynx  Neck: trachea midline, no thyromegaly  CV:  Hemodynamically stable  Pulm:  no increased work of breathing , CTAB  ABD: soft, non-distended, no palpable masses, normoactive bowel sounds  Extrem: no cyanosis, edema or clubbing  Skin: no obvious rashes or abnormalities  Psych: Euthymic, linear thoughts, normal rate of speech    XR ABDOMEN 2 VW 4/22/2019 2:10 PM     COMPARISON: None.     HISTORY: LEFT lower quadrant abdominal pain for one day.                                                                      IMPRESSION: Nonobstructive bowel gas pattern with a moderate amount of  stool in the colon. No evidence of free air.     YUE FULLER MD      Assessment & Plan:   Marleen Mcfadden, 53 year old female who presents with:    Abdominal discomfort in left lower quadrant  Constipation, unspecified constipation type  Obstruction unlikely given hx and imaging. Ischemic colitis uncommon in this age. Acute presentation thus IBS unlikely. Pain atypical of appendicitis.  Diverticulitis possible given position of discomfort is left sided but would require CT imaging but will attempt other measures and consider this if no improvement in symptoms. Has moderate amount of stool as seen imaging. Treatment for constipation recommended. To help with intermittent discomfort of cramping I've provided bentyl. Close follow-up as needed .   - XR Abdomen 2 Views  - dicyclomine (BENTYL) 10 MG capsule  Dispense: 20 capsule; Refill: 0    Constipation, unspecified constipation type  - bisacodyl (DULCOLAX) 10 MG suppository  Dispense: 5 suppository; Refill: 0      Ryan Arriaga MD   [facility]    The use of Dragon/Fleecs dictation services may have been used to construct the content in this note; any grammatical or spelling errors are non-intentional. Please contact the author of this note directly if you are in need of any clarification.

## 2019-05-03 ENCOUNTER — TELEPHONE (OUTPATIENT)
Dept: FAMILY MEDICINE | Facility: CLINIC | Age: 54
End: 2019-05-03

## 2019-05-16 ENCOUNTER — TRANSFERRED RECORDS (OUTPATIENT)
Dept: HEALTH INFORMATION MANAGEMENT | Facility: CLINIC | Age: 54
End: 2019-05-16
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
## 2019-05-22 ENCOUNTER — OFFICE VISIT (OUTPATIENT)
Dept: CARDIOLOGY | Facility: CLINIC | Age: 54
End: 2019-05-22
Attending: INTERNAL MEDICINE
Payer: COMMERCIAL

## 2019-05-22 VITALS
OXYGEN SATURATION: 96 % | HEIGHT: 64 IN | HEART RATE: 64 BPM | BODY MASS INDEX: 38.87 KG/M2 | WEIGHT: 227.7 LBS | DIASTOLIC BLOOD PRESSURE: 68 MMHG | SYSTOLIC BLOOD PRESSURE: 111 MMHG

## 2019-05-22 DIAGNOSIS — I50.32 CHRONIC DIASTOLIC CONGESTIVE HEART FAILURE (H): ICD-10-CM

## 2019-05-22 LAB
ANION GAP SERPL CALCULATED.3IONS-SCNC: 9 MMOL/L (ref 3–14)
BUN SERPL-MCNC: 23 MG/DL (ref 7–30)
CALCIUM SERPL-MCNC: 9 MG/DL (ref 8.5–10.1)
CHLORIDE SERPL-SCNC: 108 MMOL/L (ref 94–109)
CO2 SERPL-SCNC: 25 MMOL/L (ref 20–32)
CREAT SERPL-MCNC: 0.75 MG/DL (ref 0.52–1.04)
GFR SERPL CREATININE-BSD FRML MDRD: 89 ML/MIN/{1.73_M2}
GLUCOSE SERPL-MCNC: 96 MG/DL (ref 70–99)
POTASSIUM SERPL-SCNC: 4 MMOL/L (ref 3.4–5.3)
SODIUM SERPL-SCNC: 141 MMOL/L (ref 133–144)

## 2019-05-22 PROCEDURE — 36415 COLL VENOUS BLD VENIPUNCTURE: CPT | Performed by: INTERNAL MEDICINE

## 2019-05-22 PROCEDURE — 80048 BASIC METABOLIC PNL TOTAL CA: CPT | Performed by: INTERNAL MEDICINE

## 2019-05-22 PROCEDURE — G0463 HOSPITAL OUTPT CLINIC VISIT: HCPCS | Mod: ZF

## 2019-05-22 PROCEDURE — 99214 OFFICE O/P EST MOD 30 MIN: CPT | Mod: GC | Performed by: INTERNAL MEDICINE

## 2019-05-22 RX ORDER — SPIRONOLACTONE 25 MG/1
50 TABLET ORAL DAILY
Qty: 180 TABLET | Refills: 3 | Status: SHIPPED | OUTPATIENT
Start: 2019-05-22 | End: 2019-08-21

## 2019-05-22 ASSESSMENT — PAIN SCALES - GENERAL: PAINLEVEL: NO PAIN (0)

## 2019-05-22 ASSESSMENT — MIFFLIN-ST. JEOR: SCORE: 1617.84

## 2019-05-22 NOTE — NURSING NOTE
Chief Complaint   Patient presents with     Follow Up     RTN HF: 53 year old female presents with history of Familial cardiomyopathy, systolic HF, EF 35% for follow up with labs prior     Vitals were taken and medications were reconciled.     Esther Landeros CMA    8:44 AM

## 2019-05-22 NOTE — PATIENT INSTRUCTIONS
Cardiology Providers you saw during your visit:  Dr. Kolb     Medication changes:  1- Increase spironolactone to 50mg daily.  We will need to recheck your labs in 10-14 days after this change. You can do this at any [facility]        Follow up:  1- Return to clinic in 3 months with an echocardiogram and labs prior.  2 - Continue working with your .       Results for BALTAZAR OREILLY (MRN [id number]) as of 5/22/2019 09:03   Ref. Range 5/22/2019 08:20   Sodium Latest Ref Range: 133 - 144 mmol/L 141   Potassium Latest Ref Range: 3.4 - 5.3 mmol/L 4.0   Chloride Latest Ref Range: 94 - 109 mmol/L 108   Carbon Dioxide Latest Ref Range: 20 - 32 mmol/L 25   Urea Nitrogen Latest Ref Range: 7 - 30 mg/dL 23   Creatinine Latest Ref Range: 0.52 - 1.04 mg/dL 0.75   GFR Estimate Latest Ref Range: >60 mL/min/1.73_m2 89   GFR Estimate If Black Latest Ref Range: >60 mL/min/1.73_m2 >90   Calcium Latest Ref Range: 8.5 - 10.1 mg/dL 9.0   Anion Gap Latest Ref Range: 3 - 14 mmol/L 9   Glucose Latest Ref Range: 70 - 99 mg/dL 96       Please call if you have:  1. Weight gain of more than 2 pounds in a day or 5 pounds in a week  2. Increased shortness of breath, swelling or bloating  3. Dizziness, lightheadedness   4. Any questions or concerns.      Follow the American Heart Association Diet and Lifestyle recommendations:  Limit saturated fat, trans fat, sodium, red meat, sweets and sugar-sweetened beverages. If you choose to eat red meat, compare labels and select the leanest cuts available.  Aim for at least 150 minutes of moderate physical activity or 75 minutes of vigorous physical activity - or an equal combination of both - each week.     During business hours: [phone], press option # 1 to be routed to the [facility] then option # 4 to send a message to your care team     After hours, weekends or holidays: On Call Cardiologist- [phone]   option #4 and ask to speak to the on-call Cardiologist. Inform them you  are a CORE/heart failure patient at the [facility].     Gwen Lowry RN BSN  Cardiology Nurse Care Coordinator     Keep up the good work!     Take Care!

## 2019-05-22 NOTE — NURSING NOTE
Diet: Patient instructed regarding a heart failure healthy diet, including discussion of reduced fat and 2000 mg daily sodium restriction, daily weights, medication purpose and compliance, fluid restrictions and resources for patient and family to access for assistance with heart failure management.       Labs: Patient was given results of the laboratory testing obtained today and patient was instructed about when to return for the next laboratory testing. Repeat BMP 10-14 days    Med Reconcile: Reviewed and verified all current medications with the patient. The updated medication list was printed and given to the patient. INCREASE spironolactone to 50mg daily.    Return Appointment: Patient given instructions regarding scheduling next clinic visit. RTC in 3 months with labs and echo prior    Patient stated she understood all health information given and agreed to call with further questions or concerns.       Gwen Lowry RN
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
## 2019-06-13 DIAGNOSIS — I50.32 CHRONIC DIASTOLIC CONGESTIVE HEART FAILURE (H): ICD-10-CM

## 2019-06-13 PROCEDURE — 36415 COLL VENOUS BLD VENIPUNCTURE: CPT | Performed by: INTERNAL MEDICINE

## 2019-06-13 PROCEDURE — 80048 BASIC METABOLIC PNL TOTAL CA: CPT | Performed by: INTERNAL MEDICINE

## 2019-06-14 LAB
ANION GAP SERPL CALCULATED.3IONS-SCNC: 9 MMOL/L (ref 3–14)
BUN SERPL-MCNC: 13 MG/DL (ref 7–30)
CALCIUM SERPL-MCNC: 8.9 MG/DL (ref 8.5–10.1)
CHLORIDE SERPL-SCNC: 108 MMOL/L (ref 94–109)
CO2 SERPL-SCNC: 25 MMOL/L (ref 20–32)
CREAT SERPL-MCNC: 0.78 MG/DL (ref 0.52–1.04)
GFR SERPL CREATININE-BSD FRML MDRD: 85 ML/MIN/{1.73_M2}
GLUCOSE SERPL-MCNC: 97 MG/DL (ref 70–99)
POTASSIUM SERPL-SCNC: 4 MMOL/L (ref 3.4–5.3)
SODIUM SERPL-SCNC: 142 MMOL/L (ref 133–144)

## 2019-06-25 ENCOUNTER — TRANSFERRED RECORDS (OUTPATIENT)
Dept: HEALTH INFORMATION MANAGEMENT | Facility: CLINIC | Age: 54
End: 2019-06-25

## 2019-07-08 ENCOUNTER — APPOINTMENT (OUTPATIENT)
Dept: CARDIOLOGY | Facility: CLINIC | Age: 54
End: 2019-07-08
Attending: EMERGENCY MEDICINE
Payer: MEDICAID

## 2019-07-08 ENCOUNTER — HOSPITAL ENCOUNTER (EMERGENCY)
Facility: CLINIC | Age: 54
Discharge: HOME | End: 2019-07-08
Attending: EMERGENCY MEDICINE | Admitting: EMERGENCY MEDICINE
Payer: MEDICAID

## 2019-07-08 ENCOUNTER — APPOINTMENT (OUTPATIENT)
Dept: GENERAL RADIOLOGY | Facility: CLINIC | Age: 54
End: 2019-07-08
Attending: EMERGENCY MEDICINE
Payer: MEDICAID

## 2019-07-08 ENCOUNTER — TELEPHONE (OUTPATIENT)
Dept: CARDIOLOGY | Facility: CLINIC | Age: 54
End: 2019-07-08

## 2019-07-08 VITALS
TEMPERATURE: 98.3 F | WEIGHT: 232.8 LBS | OXYGEN SATURATION: 99 % | RESPIRATION RATE: 17 BRPM | SYSTOLIC BLOOD PRESSURE: 120 MMHG | HEIGHT: 64 IN | BODY MASS INDEX: 39.75 KG/M2 | HEART RATE: 62 BPM | DIASTOLIC BLOOD PRESSURE: 64 MMHG

## 2019-07-08 DIAGNOSIS — I50.22 CHRONIC SYSTOLIC HEART FAILURE (H): ICD-10-CM

## 2019-07-08 LAB
ALBUMIN UR-MCNC: NEGATIVE MG/DL
ANION GAP SERPL CALCULATED.3IONS-SCNC: 3 MMOL/L (ref 3–14)
APPEARANCE UR: CLEAR
BACTERIA #/AREA URNS HPF: ABNORMAL /HPF
BASOPHILS # BLD AUTO: 0.1 10E9/L (ref 0–0.2)
BASOPHILS NFR BLD AUTO: 0.8 %
BILIRUB UR QL STRIP: NEGATIVE
BUN SERPL-MCNC: 17 MG/DL (ref 7–30)
CALCIUM SERPL-MCNC: 9 MG/DL (ref 8.5–10.1)
CHLORIDE SERPL-SCNC: 107 MMOL/L (ref 94–109)
CO2 SERPL-SCNC: 28 MMOL/L (ref 20–32)
COLOR UR AUTO: ABNORMAL
CREAT SERPL-MCNC: 0.63 MG/DL (ref 0.52–1.04)
DIFFERENTIAL METHOD BLD: ABNORMAL
EOSINOPHIL # BLD AUTO: 0.2 10E9/L (ref 0–0.7)
EOSINOPHIL NFR BLD AUTO: 2.7 %
ERYTHROCYTE [DISTWIDTH] IN BLOOD BY AUTOMATED COUNT: 14.4 % (ref 10–15)
GFR SERPL CREATININE-BSD FRML MDRD: >90 ML/MIN/{1.73_M2}
GLUCOSE SERPL-MCNC: 93 MG/DL (ref 70–99)
GLUCOSE UR STRIP-MCNC: NEGATIVE MG/DL
HCT VFR BLD AUTO: 42.2 % (ref 35–47)
HGB BLD-MCNC: 12.7 G/DL (ref 11.7–15.7)
HGB UR QL STRIP: NEGATIVE
IMM GRANULOCYTES # BLD: 0 10E9/L (ref 0–0.4)
IMM GRANULOCYTES NFR BLD: 0.2 %
INTERPRETATION ECG - MUSE: NORMAL
KETONES UR STRIP-MCNC: NEGATIVE MG/DL
LEUKOCYTE ESTERASE UR QL STRIP: NEGATIVE
LYMPHOCYTES # BLD AUTO: 2.5 10E9/L (ref 0.8–5.3)
LYMPHOCYTES NFR BLD AUTO: 41.9 %
MCH RBC QN AUTO: 28.5 PG (ref 26.5–33)
MCHC RBC AUTO-ENTMCNC: 30.1 G/DL (ref 31.5–36.5)
MCV RBC AUTO: 95 FL (ref 78–100)
MONOCYTES # BLD AUTO: 0.5 10E9/L (ref 0–1.3)
MONOCYTES NFR BLD AUTO: 8.2 %
MUCOUS THREADS #/AREA URNS LPF: PRESENT /LPF
NEUTROPHILS # BLD AUTO: 2.8 10E9/L (ref 1.6–8.3)
NEUTROPHILS NFR BLD AUTO: 46.2 %
NITRATE UR QL: NEGATIVE
NRBC # BLD AUTO: 0 10*3/UL
NRBC BLD AUTO-RTO: 0 /100
NT-PROBNP SERPL-MCNC: 284 PG/ML (ref 0–900)
PH UR STRIP: 5 PH (ref 5–7)
PLATELET # BLD AUTO: 299 10E9/L (ref 150–450)
POTASSIUM SERPL-SCNC: 3.9 MMOL/L (ref 3.4–5.3)
RBC # BLD AUTO: 4.46 10E12/L (ref 3.8–5.2)
RBC #/AREA URNS AUTO: <1 /HPF (ref 0–2)
SODIUM SERPL-SCNC: 138 MMOL/L (ref 133–144)
SOURCE: ABNORMAL
SP GR UR STRIP: 1.02 (ref 1–1.03)
SQUAMOUS #/AREA URNS AUTO: 3 /HPF (ref 0–1)
TROPONIN I SERPL-MCNC: <0.015 UG/L (ref 0–0.04)
UROBILINOGEN UR STRIP-MCNC: NORMAL MG/DL (ref 0–2)
WBC # BLD AUTO: 6 10E9/L (ref 4–11)
WBC #/AREA URNS AUTO: 0 /HPF (ref 0–5)

## 2019-07-08 PROCEDURE — 71046 X-RAY EXAM CHEST 2 VIEWS: CPT

## 2019-07-08 PROCEDURE — 93010 ELECTROCARDIOGRAM REPORT: CPT | Mod: Z6 | Performed by: EMERGENCY MEDICINE

## 2019-07-08 PROCEDURE — 85025 COMPLETE CBC W/AUTO DIFF WBC: CPT | Performed by: EMERGENCY MEDICINE

## 2019-07-08 PROCEDURE — 99285 EMERGENCY DEPT VISIT HI MDM: CPT | Mod: 25 | Performed by: EMERGENCY MEDICINE

## 2019-07-08 PROCEDURE — 40000264 ECHOCARDIOGRAM COMPLETE

## 2019-07-08 PROCEDURE — 83880 ASSAY OF NATRIURETIC PEPTIDE: CPT | Performed by: EMERGENCY MEDICINE

## 2019-07-08 PROCEDURE — 93005 ELECTROCARDIOGRAM TRACING: CPT | Performed by: EMERGENCY MEDICINE

## 2019-07-08 PROCEDURE — 93306 TTE W/DOPPLER COMPLETE: CPT | Mod: 26 | Performed by: INTERNAL MEDICINE

## 2019-07-08 PROCEDURE — 99284 EMERGENCY DEPT VISIT MOD MDM: CPT | Mod: 25 | Performed by: EMERGENCY MEDICINE

## 2019-07-08 PROCEDURE — 81001 URINALYSIS AUTO W/SCOPE: CPT | Performed by: EMERGENCY MEDICINE

## 2019-07-08 PROCEDURE — 84484 ASSAY OF TROPONIN QUANT: CPT | Performed by: EMERGENCY MEDICINE

## 2019-07-08 PROCEDURE — 80048 BASIC METABOLIC PNL TOTAL CA: CPT | Performed by: EMERGENCY MEDICINE

## 2019-07-08 ASSESSMENT — ENCOUNTER SYMPTOMS
COUGH: 1
SHORTNESS OF BREATH: 1
UNEXPECTED WEIGHT CHANGE: 1

## 2019-07-08 ASSESSMENT — MIFFLIN-ST. JEOR: SCORE: 1635.97

## 2019-07-08 NOTE — ED AVS SNAPSHOT
[facility], [place], Emergency Department  [address] [place] [id number]  Phone:  [phone]                                    Marleen Mcfadden   MRN: [id number]    Department:  [facility], Emergency Department   Date of Visit:  7/8/2019           After Visit Summary Signature Page    I have received my discharge instructions, and my questions have been answered. I have discussed any challenges I see with this plan with the nurse or doctor.    ..........................................................................................................................................  Patient/Patient Representative Signature      ..........................................................................................................................................  Patient Representative Print Name and Relationship to Patient    ..................................................               ................................................  Date                                   Time    ..........................................................................................................................................  Reviewed by Signature/Title    ...................................................              ..............................................  Date                                               Time          22EPIC Rev 08/18

## 2019-07-08 NOTE — TELEPHONE ENCOUNTER
Returned call to patient as she requested call back from nurse in her message. No answer. Left voicemail letting Marleen know we would follow along with her care at the hospital and I will call her when she goes home. Left phone number if she has questions in the interim.

## 2019-07-08 NOTE — ED TRIAGE NOTES
"Triage Assessment & Note:    /73   Pulse 69   Temp 98.3  F (36.8  C) (Oral)   Resp 12   Ht 1.626 m (5' 4\")   Wt 105.6 kg (232 lb 12.8 oz)   LMP 10/19/2008 (Approximate)   SpO2 99%   BMI 39.96 kg/m        Patient presents with: Cardiology pt comes to triage with reports of SOB, CP, and cough.  In addition, pt notes that she has a decrease EF, increase weight and swelling. No reports of fever.    Home Treatments/Remedies: Home medication    Febrile / Afebrile: afebrile    Duration of C/o: 2 weeks +    Faiza Ferguson RN  July 8, 2019        "

## 2019-07-08 NOTE — LETTER
July 8, 2019      To Whom It May Concern:      Marleen Mcfadden was seen in our Emergency Department today, 07/08/19.  I expect her condition to improve over the next few weeks. She may return to work/school on 7/11/19. Please excuse her from work today and the next 2 days.     Sincerely,        Diana Magana MD FACEP

## 2019-07-08 NOTE — TELEPHONE ENCOUNTER
Patient called reporting red-flag symptom: Shortness of breath that is feeling different, Pain in chest, and pockets of fluid. Pt was directed to the [place] ED. Pt agreed and asked for a call back from Nurse.     Per the [facility] Red-Flag symptom list, patient was: instructed to hang up and dial 911, due to report of life-threatening symptoms.

## 2019-07-08 NOTE — DISCHARGE INSTRUCTIONS
Thank you for coming to the [facility] Emergency Department.     Continue your usual activities as much as possible.     Return to the ER for any signs of fever or cough productive of mucus.     Expect a call from Dr. Kolb's nurse, Gwen, to check on you and discuss medication adjustment and follow up testing, like stress testing.

## 2019-07-08 NOTE — ED PROVIDER NOTES
[place] EMERGENCY DEPARTMENT ([facility])  7/08/19  11:28 AM  History     Chief Complaint   Patient presents with     Shortness of Breath     Chest Pain     The history is provided by the patient and medical records.     Marleen Mcfadden is a 54 year old female who presents with cough ongoing for months followed by chest pain and shortness of breath for the past 2 weeks.  She has a history of cardiomyopathy, chronic diastolic CHF with EF of 30% on spironolactone. Cough has been ongoing for months and worsens with laying down at night. Family states she has had chest pain and shortness of breath for some time but worsened over past 2 weeks. She has had increased leg swelling. Her dry weight is 225 lbs but she has had increased weight gain. She contacted clinic and was told to come to the ER directly for evaluation. She has change in exertional tolerance, is sleeping on 2 pillows at night. No improvement to her 2 pillow orthopnea, but no worse. She isn't used to being so tired with walking and has been more lethargic as well. Cough is frothy and white, especially at night.  She does not have any inhalers for this. She developed chest pain 2 weeks ago. No travel, no sick contacts. No change in allergies. No falls. She did take her regular medications including losartan, metoprolol, spironolactone, progesterone, estradiol patch and testosterone, last night. She notes increase in spironolactone a few months ago with no change in her CHF symptoms. She doesn't feel at her usual state of health. She also notes that sometimes her fingertips turn blue.      Epic records reviewed, last echo was on 1/9/19 with moderately reduced global LVEF of 30%.     I have reviewed the Medications, Allergies, Past Medical and Surgical History, and Social History in the Epic system.    Past Medical History:   Diagnosis Date     Allergic rhinitis, cause unspecified      Anemia      Autoimmune disease NEC     Autoimmune disease-  unknown/poss SLE     Calcaneal spur 10/21/2014     CHF with cardiomyopathy (H)      Familial cardiomyopathy (H) 10/21/2015     Morbid obesity (H) 2015     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     no longer an issue     Other primary cardiomyopathies     Cardiomyopathy- dx'd  idiopathic     PONV (postoperative nausea and vomiting)      UTERINE LEIOMYOMA NOS 10/25/2006       Past Surgical History:   Procedure Laterality Date     C BREAST SURGERY PROCEDURE UNLISTED      Breast Reduction     C  DELIVERY ONLY  10/85,     , Low Cervicalx2     C EXCIS UTERINE FIBROID,ABD APPRCH       C EXCISE EXCESS SKIN TISSUE,ABDOMEN       C LIGATE FALLOPIAN TUBE       C REPAIR CRUCIATE LIGAMENT,KNEE      Right Knee     C TOTAL KNEE ARTHROPLASTY Left 10/03/2017     HYSTERECTOMY TOTAL ABDOMINAL      for fibroids; reports having blood transfusion after surgery     THYROIDECTOMY  2013    Procedure: THYROIDECTOMY;  LEFT THYROID LOBECTOMY.  (LIGASURE, RECURRENT LARYNGEAL NERVE MONITOR) ;  Surgeon: Uriah Camargo MD;  Location: [facility]       Family History   Problem Relation Age of Onset     Hypertension Father         dec     Diabetes Father         dec     Heart Disease Father         dec     Alcohol/Drug Father      Cardiovascular Father      Heart Disease Mother         dec     Alcohol/Drug Mother      Cardiovascular Mother      Heart Disease Daughter         Cardiomyopathy     Cardiovascular Daughter         cardiomyopathy     Colon Cancer Sister      Cardiovascular Son         cardiomyopathy     Diabetes Paternal Grandmother         dec     Hypertension Paternal Grandmother         dec     Cerebrovascular Disease Paternal Grandmother         dec     Cancer Sister         Lupus     Cardiovascular Sister         cardiomyopathy     Heart Disease Sister         heart failure, and kidney failure       Social History     Tobacco Use      "Smoking status: Never Smoker     Smokeless tobacco: Never Used   Substance Use Topics     Alcohol use: Yes     Comment: rare, twice a year, she has a drink little wine 2 days ago         Review of Systems   Constitutional: Positive for unexpected weight change.   Respiratory: Positive for cough and shortness of breath.    Cardiovascular: Positive for chest pain.       Physical Exam   BP: 145/73  Pulse: 69  Temp: 98.3  F (36.8  C)  Resp: 12  Height: 162.6 cm (5' 4\")  Weight: 105.6 kg (232 lb 12.8 oz)  SpO2: 99 %      Physical Exam   Gen:A&Ox3, no acute distress. anxious  HEENT:PERRL, no facial tenderness or wounds, head atraumatic, oropharynx clear, mucous membranes moist, TMs clear bilaterally  Neck:no bony tenderness or step offs, + JVD, trachea midline  Back: no CVA tenderness, no midline bony tenderness  CV:RRR without murmurs  PULM:Clear to auscultation bilaterally. No wheezing.   Abd:soft, nontender, nondistended. Bowel sounds present and normal  UE:No traumatic injuries, skin normal  LE:no traumatic injuries, skin normal, no LE edema.   Neuro:CN II-XII intact, strength 5/5 throughout, gait stable, no SOB with walking noted while in the ED  Skin: no rashes or ecchymoses      ED Course        Procedures             EKG Interpretation:      Interpreted by Diana Magana  Time reviewed: 1158  Symptoms at time of EKG: dyspnea and chest pain   Rhythm: sinus bradycardia  Rate: 59  Axis: normal  Ectopy: none  Conduction: incomplete LBBB  ST Segments/ T Waves: No ST-T wave changes  Q Waves: none  Comparison to prior: Unchanged from 1/2019    Clinical Impression: sinus bradycardia with incomplete LBBB        Critical Care time:  none             Labs Ordered and Resulted from Time of ED Arrival Up to the Time of Departure from the ED   CBC WITH PLATELETS DIFFERENTIAL - Abnormal; Notable for the following components:       Result Value    MCHC 30.1 (*)     All other components within normal limits   ROUTINE UA WITH " MICROSCOPIC REFLEX TO CULTURE - Abnormal; Notable for the following components:    Bacteria Urine Few (*)     Squamous Epithelial /HPF Urine 3 (*)     Mucous Urine Present (*)     All other components within normal limits   BASIC METABOLIC PANEL   TROPONIN I   NT PROBNP INPATIENT   PERIPHERAL IV CATHETER            Assessments & Plan (with Medical Decision Making)   55 yo F with a history of non-ischemic cardiomyopathy presenting with several weeks of chest discomfort, fatigue, dyspnea and dry cough.   Vitals notable for bradycardia. O2 sat, RR and work of breathing normal.   EKG performed and unchanged from baseline.   IV access obtained and lab testing done.   DDx included pneumonia, ACS, worsening heart failure. PE considered and pt low risk. Did not pursue work up at this time.   CXR with cardiomegaly and minimal cephalization suggestive of mild edema.   Troponin negative.   BNP low.   Echocardiogram with minimal change from baseline EF 30%.   Pt's case was discussed with Dr. Cassie Kolb who will direct outpatient re-assessment and follow up with the heart failure clinic.   Discharged.       I have reviewed the nursing notes.    I have reviewed the findings, diagnosis, plan and need for follow up with the patient.    Final diagnoses:   Chronic systolic heart failure (H)   I, Hazel Mcdonnell, am serving as a trained medical scribe to document services personally performed by Diana Magana MD based on the provider's statements to me on July 8, 2019.  This document has been checked and approved by the attending provider.    IDiana MD, was physically present and have reviewed and verified the accuracy of this note documented by Hazel Mcdonnell, medical scribe.       7/8/2019   [facility], [place], EMERGENCY DEPARTMENT    MD MICHELA Keita Katrina Anne, MD  07/08/19 9924

## 2019-08-02 ENCOUNTER — TRANSFERRED RECORDS (OUTPATIENT)
Dept: HEALTH INFORMATION MANAGEMENT | Facility: CLINIC | Age: 54
End: 2019-08-02

## 2019-08-15 ENCOUNTER — TRANSFERRED RECORDS (OUTPATIENT)
Dept: HEALTH INFORMATION MANAGEMENT | Facility: CLINIC | Age: 54
End: 2019-08-15

## 2019-08-15 DIAGNOSIS — I42.9 FAMILIAL CARDIOMYOPATHY (H): Primary | ICD-10-CM

## 2019-08-21 ENCOUNTER — OFFICE VISIT (OUTPATIENT)
Dept: CARDIOLOGY | Facility: CLINIC | Age: 54
End: 2019-08-21
Attending: INTERNAL MEDICINE
Payer: MEDICAID

## 2019-08-21 ENCOUNTER — ANCILLARY PROCEDURE (OUTPATIENT)
Dept: CARDIOLOGY | Facility: CLINIC | Age: 54
End: 2019-08-21
Attending: INTERNAL MEDICINE

## 2019-08-21 VITALS
HEIGHT: 64 IN | OXYGEN SATURATION: 99 % | HEART RATE: 60 BPM | DIASTOLIC BLOOD PRESSURE: 78 MMHG | WEIGHT: 227 LBS | SYSTOLIC BLOOD PRESSURE: 108 MMHG | BODY MASS INDEX: 38.76 KG/M2

## 2019-08-21 DIAGNOSIS — I42.9 CHF WITH CARDIOMYOPATHY (H): ICD-10-CM

## 2019-08-21 DIAGNOSIS — I50.32 CHRONIC DIASTOLIC CONGESTIVE HEART FAILURE (H): ICD-10-CM

## 2019-08-21 DIAGNOSIS — I42.9 FAMILIAL CARDIOMYOPATHY (H): ICD-10-CM

## 2019-08-21 DIAGNOSIS — I50.9 CHF WITH CARDIOMYOPATHY (H): ICD-10-CM

## 2019-08-21 DIAGNOSIS — I42.9 FAMILIAL CARDIOMYOPATHY (H): Primary | ICD-10-CM

## 2019-08-21 LAB
ANION GAP SERPL CALCULATED.3IONS-SCNC: 5 MMOL/L (ref 3–14)
BUN SERPL-MCNC: 24 MG/DL (ref 7–30)
CALCIUM SERPL-MCNC: 8.5 MG/DL (ref 8.5–10.1)
CHLORIDE SERPL-SCNC: 109 MMOL/L (ref 94–109)
CO2 SERPL-SCNC: 27 MMOL/L (ref 20–32)
CREAT SERPL-MCNC: 0.72 MG/DL (ref 0.52–1.04)
GFR SERPL CREATININE-BSD FRML MDRD: >90 ML/MIN/{1.73_M2}
GLUCOSE SERPL-MCNC: 97 MG/DL (ref 70–99)
POTASSIUM SERPL-SCNC: 4.1 MMOL/L (ref 3.4–5.3)
SODIUM SERPL-SCNC: 142 MMOL/L (ref 133–144)

## 2019-08-21 PROCEDURE — 99214 OFFICE O/P EST MOD 30 MIN: CPT | Mod: GC | Performed by: INTERNAL MEDICINE

## 2019-08-21 PROCEDURE — G0463 HOSPITAL OUTPT CLINIC VISIT: HCPCS | Mod: ZF

## 2019-08-21 PROCEDURE — 80048 BASIC METABOLIC PNL TOTAL CA: CPT | Performed by: INTERNAL MEDICINE

## 2019-08-21 PROCEDURE — 36415 COLL VENOUS BLD VENIPUNCTURE: CPT | Performed by: INTERNAL MEDICINE

## 2019-08-21 RX ORDER — METOPROLOL SUCCINATE 50 MG/1
50 TABLET, EXTENDED RELEASE ORAL DAILY
Qty: 30 TABLET | Refills: 11 | Status: SHIPPED | OUTPATIENT
Start: 2019-08-21 | End: 2020-10-20

## 2019-08-21 RX ORDER — SPIRONOLACTONE 25 MG/1
50 TABLET ORAL DAILY
Qty: 60 TABLET | Refills: 11 | Status: SHIPPED | OUTPATIENT
Start: 2019-08-21 | End: 2020-09-11

## 2019-08-21 RX ORDER — FUROSEMIDE 20 MG
40 TABLET ORAL DAILY PRN
Qty: 60 TABLET | Refills: 1 | Status: SHIPPED | OUTPATIENT
Start: 2019-08-21 | End: 2019-12-10

## 2019-08-21 RX ORDER — LOSARTAN POTASSIUM 100 MG/1
100 TABLET ORAL DAILY
Qty: 30 TABLET | Refills: 11 | Status: SHIPPED | OUTPATIENT
Start: 2019-08-21 | End: 2020-01-16

## 2019-08-21 RX ADMIN — Medication 6 ML: at 11:30

## 2019-08-21 ASSESSMENT — PAIN SCALES - GENERAL: PAINLEVEL: NO PAIN (0)

## 2019-08-21 ASSESSMENT — MIFFLIN-ST. JEOR: SCORE: 1609.67

## 2019-08-21 NOTE — PROGRESS NOTES
[facility]       HPI:   Marleen Mcfadden is a 54 year old female with a history of familial cardiomyopathy, left total knee arthroplasty on 10/3/2017 and right knee replacement on 2/20/2019, and morbid obesity who presents for follow-up.  She was last seen in clinic on 5/22/2019.    During the last visit, spironolactone was increased since she was slightly hypovolemic and was having issues with urinary incontinence with Lasix.  Patient has not been using her PRN Lasix.  Since the last visit, she was seen in the ED on 7/8/19 with chest pain and shortness of breath for 2 weeks.  EKG, chest x-ray, and labs at that time were benign.  Plan was for patient to be evaluated as an outpatient.     Patient notes that 3 months ago, she started feeling more fatigued.  She had weeks of chest discomfort prior to presenting to the ED with the chest discomfort is now resolved.  The chest discomfort did not worsen with activity.  She notes chronic orthopnea and sleeps on 2-3 pillows.  Has had orthopnea over the last year.  Notes occasional swelling in her ankles.  Has dyspnea on exertion that is chronic.  Denies abdominal pain, diarrhea, constipation, hematochezia, melena, hematuria, and dysuria.  10 point review of systems otherwise negative.  She later noted during the visit to Dr. Kolb that she has had difficulty affording her medications and has not been taking all of her medications.       Past Medical History:   Diagnosis Date     Allergic rhinitis, cause unspecified      Anemia      Autoimmune disease NEC     Autoimmune disease- unknown/poss SLE     Calcaneal spur 10/21/2014     CHF with cardiomyopathy (H)      Familial cardiomyopathy (H) 10/21/2015     Morbid obesity (H) 5/5/2015     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     no longer an issue     Other primary cardiomyopathies     Cardiomyopathy- dx'd 1999 idiopathic     PONV  (postoperative nausea and vomiting)      UTERINE LEIOMYOMA NOS 10/25/2006       Past Surgical History:   Procedure Laterality Date     C BREAST SURGERY PROCEDURE UNLISTED      Breast Reduction     C  DELIVERY ONLY  10/85,     , Low Cervicalx2     C EXCIS UTERINE FIBROID,ABD APPRCH       C EXCISE EXCESS SKIN TISSUE,ABDOMEN       C LIGATE FALLOPIAN TUBE       C REPAIR CRUCIATE LIGAMENT,KNEE      Right Knee     C TOTAL KNEE ARTHROPLASTY Left 10/03/2017     HYSTERECTOMY TOTAL ABDOMINAL      for fibroids; reports having blood transfusion after surgery     THYROIDECTOMY  2013    Procedure: THYROIDECTOMY;  LEFT THYROID LOBECTOMY.  (LIGASURE, RECURRENT LARYNGEAL NERVE MONITOR) ;  Surgeon: Uriah Camargo MD;  Location: [facility]       Family History   Problem Relation Age of Onset     Hypertension Father         dec     Diabetes Father         dec     Heart Disease Father         dec     Alcohol/Drug Father      Cardiovascular Father      Heart Disease Mother         dec     Alcohol/Drug Mother      Cardiovascular Mother      Heart Disease Daughter         Cardiomyopathy     Cardiovascular Daughter         cardiomyopathy     Colon Cancer Sister      Cardiovascular Son         cardiomyopathy     Diabetes Paternal Grandmother         dec     Hypertension Paternal Grandmother         dec     Cerebrovascular Disease Paternal Grandmother         dec     Cancer Sister         Lupus     Cardiovascular Sister         cardiomyopathy     Heart Disease Sister         heart failure, and kidney failure       Social History     Tobacco Use     Smoking status: Never Smoker     Smokeless tobacco: Never Used   Substance Use Topics     Alcohol use: Yes     Comment: rare, twice a year, she has a drink little wine 2 days ago         Current Outpatient Medications   Medication Sig     Cholecalciferol (VITAMIN D) 2000 UNIT tablet Take 5,000 Units by mouth as needed Reported on 3/8/2017      "Collagen 500 MG CAPS      cyclobenzaprine (FLEXERIL) 10 MG tablet Take 0.5-1 tablets (5-10 mg) by mouth 3 times daily as needed for muscle spasms     diclofenac (VOLTAREN) 75 MG EC tablet Take 1 tablet (75 mg) by mouth 2 times daily as needed for moderate pain     estradiol (VIVELLE-DOT) 0.0375 MG/24HR BIW patch IRISH 1 PATCH ON SKIN TWICE PER WEEK     FLAXSEED, LINSEED, PO      furosemide (LASIX) 20 MG tablet Take 2 tablets (40 mg) by mouth daily as needed     hydrOXYzine (ATARAX) 25 MG tablet Take 2-4 tablets ( mg) by mouth nightly as needed for anxiety or other (sleep)     losartan (COZAAR) 100 MG tablet Take 1 tablet (100 mg) by mouth daily     metoprolol succinate (TOPROL-XL) 50 MG 24 hr tablet Take 1 tablet (50 mg) by mouth daily     progesterone (PROMETRIUM) 100 MG capsule Take 100 mg by mouth Reported on 3/8/2017     spironolactone (ALDACTONE) 25 MG tablet Take 2 tablets (50 mg) by mouth daily     traMADol (ULTRAM) 50 MG tablet Take 1 tablet (50 mg) by mouth every 8 hours as needed for severe pain     bisacodyl (DULCOLAX) 10 MG suppository Place 1 suppository (10 mg) rectally daily as needed for constipation (Patient not taking: Reported on 5/22/2019)     dicyclomine (BENTYL) 10 MG capsule Take 1 capsule (10 mg) by mouth 4 times daily (before meals and nightly) (Patient not taking: Reported on 8/21/2019)     omeprazole (PRILOSEC) 40 MG capsule Take 1 capsule (40 mg) by mouth daily Take 30-60 minutes before a meal. (Patient not taking: Reported on 4/22/2019)     order for DME Equipment being ordered:brace (Patient not taking: Reported on 8/21/2019)     No current facility-administered medications for this visit.          ROS:   10 point ROS negative other than what is discussed above    EXAM:   /78 (BP Location: Left arm, Patient Position: Chair, Cuff Size: Adult Large)   Pulse 60   Ht 1.626 m (5' 4\")   Wt 103 kg (227 lb)   LMP 10/19/2008 (Approximate)   SpO2 99%   BMI 38.96 kg/m     GENERAL: " Alert, oriented, NAD  HEENT:  NC/AT. EOMI.  Sclerae white.  MMM, no oral lesions  NECK: No adenopathy. JVP ~5 cm at 30 degrees  HEART: RRR, normal S1 and S2, no murmurs. 2+ bilateral peripheral pulses.  LUNGS:  Clear to auscultation bilaterally, no wheezes, rales, or rhonchi  ABDOMEN: Soft, obese, nontender, nondistended, bowel sounds present, no hepatojugular reflux, no ascites.  EXTREMITIES: No lower extremity edema.  PSYCH: Flat affect    Labs:    Recent Results (from the past 24 hour(s))   Basic metabolic panel    Collection Time: 08/21/19  8:57 AM   Result Value Ref Range    Sodium 142 133 - 144 mmol/L    Potassium 4.1 3.4 - 5.3 mmol/L    Chloride 109 94 - 109 mmol/L    Carbon Dioxide 27 20 - 32 mmol/L    Anion Gap 5 3 - 14 mmol/L    Glucose 97 70 - 99 mg/dL    Urea Nitrogen 24 7 - 30 mg/dL    Creatinine 0.72 0.52 - 1.04 mg/dL    GFR Estimate >90 >60 mL/min/[1.73_m2]    GFR Estimate If Black >90 >60 mL/min/[1.73_m2]    Calcium 8.5 8.5 - 10.1 mg/dL         Echocardiogram 8/21/19:  Interpretation Summary  Moderately (EF 30-35%) reduced left ventricular function is present.  Global right ventricular function is mildly reduced.  No significant valvular dysfunction present.  No pericardial effusion present.     This study was compared with the study from 7/8/2019 . There has been no  change.        Assessment and Plan:   Marleen Mcfadden is a 54 year old female with a history of familial cardiomyopathy, left total knee arthroplasty on 10/3/2017 and right knee replacement on 2/20/2019, and morbid obesity who presents for ongoing evaluation and management.     Chronic systolic heart failure secondary to familial cardiomyopathy.    TTE on 8/21/19 showed an EF of 30-35% with mildly reduced RV function.  Stage B, NYHA Class II. Patient is not currently taking her medications regularly due to cost. Will contact pharmacy to review medication costs with them and try to help patient find a pharmacy that is more cost  affordable.     ACEi/ARB yes, continue losartan 100mg daily  BB yes, continue metoprolol XL 50mg daily  Aldosterone antagonist - continue spironolactone to 50mg daily (has issues with incontinence with lasix)  SCD prophylaxis: Will plan for repeat echo during next visit. If EF is still reduced, will need to discuss ICD implantation  % BiV pacing: N/A  Fluid status euvolemic  NSAID use: advised to avoid NSAIDs  Encouraged patient to begin regular aerobic exercise aiming for at least 150 minutes of moderate physical activity or 75 minutes of vigorous physical activity - or an equal combination of both - each week. and follow low-salt, heart healthy diet.     RTC in 3 months with labs and TTE prior.    Patient seen and discussed with Dr. Kolb.      Karly Rao MD, PhD  Cardiology Fellow  [phone]      I have reviewed today's vital signs, notes, medications, labs and imaging. I have also seen and examined the patient and agree with the findings and plan as outlined above.    Cassie Kolb MD  Section Head - Advanced Heart Failure, Transplantation and Mechanical Circulatory Support  Director - Adult Congenital and Cardiovascular [facility]etics [facility]  Associate Professor of Medicine, [facility]  Patient Care Team:  Nate Grimes MD as PCP - General (Family Practice)  Danny Grace MD as MD (OB/Gyn)  Cassie Kolb MD as MD (Cardiology)  Dev Rocha MD as MD (Orthopaedic Surgery)  Gwen Swenson, CLARITZA as Specialty Care Coordinator (Cardiology)  Nate Grimes MD as Assigned PCP  NATE GRIMES       Yes

## 2019-08-21 NOTE — LETTER
8/21/2019      RE: Marleen Mcfadden  [address]  [facility] [address]       Dear Colleague,    Thank you for the opportunity to participate in the care of your patient, Marleen Mcfadden, at the [facility] HEART [facility]. Please see a copy of my visit note below.     Advanced Heart Failure and Transplant Clinic       HPI:   Marleen Mcfadden is a 54 year old female with a history of familial cardiomyopathy, left total knee arthroplasty on 10/3/2017 and right knee replacement on 2/20/2019, and morbid obesity who presents for follow-up.  She was last seen in clinic on 5/22/2019.    During the last visit, spironolactone was increased since she was slightly hypovolemic and was having issues with urinary incontinence with Lasix.  Patient has not been using her PRN Lasix.  Since the last visit, she was seen in the ED on 7/8/19 with chest pain and shortness of breath for 2 weeks.  EKG, chest x-ray, and labs at that time were benign.  Plan was for patient to be evaluated as an outpatient.     Patient notes that 3 months ago, she started feeling more fatigued.  She had weeks of chest discomfort prior to presenting to the ED with the chest discomfort is now resolved.  The chest discomfort did not worsen with activity.  She notes chronic orthopnea and sleeps on 2-3 pillows.  Has had orthopnea over the last year.  Notes occasional swelling in her ankles.  Has dyspnea on exertion that is chronic.  Denies abdominal pain, diarrhea, constipation, hematochezia, melena, hematuria, and dysuria.  10 point review of systems otherwise negative.  She later noted during the visit to Dr. Kolb that she has had difficulty affording her medications and has not been taking all of her medications.       Past Medical History:   Diagnosis Date     Allergic rhinitis, cause unspecified      Anemia      Autoimmune disease NEC     Autoimmune disease- unknown/poss SLE     Calcaneal spur  10/21/2014     CHF with cardiomyopathy (H)      Familial cardiomyopathy (H) 10/21/2015     Morbid obesity (H) 2015     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     no longer an issue     Other primary cardiomyopathies     Cardiomyopathy- dx'd  idiopathic     PONV (postoperative nausea and vomiting)      UTERINE LEIOMYOMA NOS 10/25/2006       Past Surgical History:   Procedure Laterality Date     C BREAST SURGERY PROCEDURE UNLISTED      Breast Reduction     C  DELIVERY ONLY  10/85,     , Low Cervicalx2     C EXCIS UTERINE FIBROID,ABD APPRCH       C EXCISE EXCESS SKIN TISSUE,ABDOMEN       C LIGATE FALLOPIAN TUBE       C REPAIR CRUCIATE LIGAMENT,KNEE      Right Knee     C TOTAL KNEE ARTHROPLASTY Left 10/03/2017     HYSTERECTOMY TOTAL ABDOMINAL      for fibroids; reports having blood transfusion after surgery     THYROIDECTOMY  2013    Procedure: THYROIDECTOMY;  LEFT THYROID LOBECTOMY.  (LIGASURE, RECURRENT LARYNGEAL NERVE MONITOR) ;  Surgeon: Uriah Camargo MD;  Location: [facility]       Family History   Problem Relation Age of Onset     Hypertension Father         dec     Diabetes Father         dec     Heart Disease Father         dec     Alcohol/Drug Father      Cardiovascular Father      Heart Disease Mother         dec     Alcohol/Drug Mother      Cardiovascular Mother      Heart Disease Daughter         Cardiomyopathy     Cardiovascular Daughter         cardiomyopathy     Colon Cancer Sister      Cardiovascular Son         cardiomyopathy     Diabetes Paternal Grandmother         dec     Hypertension Paternal Grandmother         dec     Cerebrovascular Disease Paternal Grandmother         dec     Cancer Sister         Lupus     Cardiovascular Sister         cardiomyopathy     Heart Disease Sister         heart failure, and kidney failure       Social History     Tobacco Use     Smoking status: Never Smoker      Smokeless tobacco: Never Used   Substance Use Topics     Alcohol use: Yes     Comment: rare, twice a year, she has a drink little wine 2 days ago         Current Outpatient Medications   Medication Sig     Cholecalciferol (VITAMIN D) 2000 UNIT tablet Take 5,000 Units by mouth as needed Reported on 3/8/2017     Collagen 500 MG CAPS      cyclobenzaprine (FLEXERIL) 10 MG tablet Take 0.5-1 tablets (5-10 mg) by mouth 3 times daily as needed for muscle spasms     diclofenac (VOLTAREN) 75 MG EC tablet Take 1 tablet (75 mg) by mouth 2 times daily as needed for moderate pain     estradiol (VIVELLE-DOT) 0.0375 MG/24HR BIW patch IRISH 1 PATCH ON SKIN TWICE PER WEEK     FLAXSEED, LINSEED, PO      furosemide (LASIX) 20 MG tablet Take 2 tablets (40 mg) by mouth daily as needed     hydrOXYzine (ATARAX) 25 MG tablet Take 2-4 tablets ( mg) by mouth nightly as needed for anxiety or other (sleep)     losartan (COZAAR) 100 MG tablet Take 1 tablet (100 mg) by mouth daily     metoprolol succinate (TOPROL-XL) 50 MG 24 hr tablet Take 1 tablet (50 mg) by mouth daily     progesterone (PROMETRIUM) 100 MG capsule Take 100 mg by mouth Reported on 3/8/2017     spironolactone (ALDACTONE) 25 MG tablet Take 2 tablets (50 mg) by mouth daily     traMADol (ULTRAM) 50 MG tablet Take 1 tablet (50 mg) by mouth every 8 hours as needed for severe pain     bisacodyl (DULCOLAX) 10 MG suppository Place 1 suppository (10 mg) rectally daily as needed for constipation (Patient not taking: Reported on 5/22/2019)     dicyclomine (BENTYL) 10 MG capsule Take 1 capsule (10 mg) by mouth 4 times daily (before meals and nightly) (Patient not taking: Reported on 8/21/2019)     omeprazole (PRILOSEC) 40 MG capsule Take 1 capsule (40 mg) by mouth daily Take 30-60 minutes before a meal. (Patient not taking: Reported on 4/22/2019)     order for DME Equipment being ordered:brace (Patient not taking: Reported on 8/21/2019)     No current facility-administered medications  "for this visit.          ROS:   10 point ROS negative other than what is discussed above    EXAM:   /78 (BP Location: Left arm, Patient Position: Chair, Cuff Size: Adult Large)   Pulse 60   Ht 1.626 m (5' 4\")   Wt 103 kg (227 lb)   LMP 10/19/2008 (Approximate)   SpO2 99%   BMI 38.96 kg/m      GENERAL: Alert, oriented, NAD  HEENT:  NC/AT. EOMI.  Sclerae white.  MMM, no oral lesions  NECK: No adenopathy. JVP ~5 cm at 30 degrees  HEART: RRR, normal S1 and S2, no murmurs. 2+ bilateral peripheral pulses.  LUNGS:  Clear to auscultation bilaterally, no wheezes, rales, or rhonchi  ABDOMEN: Soft, obese, nontender, nondistended, bowel sounds present, no hepatojugular reflux, no ascites.  EXTREMITIES: No lower extremity edema.  PSYCH: Flat affect    Labs:    Recent Results (from the past 24 hour(s))   Basic metabolic panel    Collection Time: 08/21/19  8:57 AM   Result Value Ref Range    Sodium 142 133 - 144 mmol/L    Potassium 4.1 3.4 - 5.3 mmol/L    Chloride 109 94 - 109 mmol/L    Carbon Dioxide 27 20 - 32 mmol/L    Anion Gap 5 3 - 14 mmol/L    Glucose 97 70 - 99 mg/dL    Urea Nitrogen 24 7 - 30 mg/dL    Creatinine 0.72 0.52 - 1.04 mg/dL    GFR Estimate >90 >60 mL/min/[1.73_m2]    GFR Estimate If Black >90 >60 mL/min/[1.73_m2]    Calcium 8.5 8.5 - 10.1 mg/dL     Echocardiogram 8/21/19:  Interpretation Summary  Moderately (EF 30-35%) reduced left ventricular function is present.  Global right ventricular function is mildly reduced.  No significant valvular dysfunction present.  No pericardial effusion present.     This study was compared with the study from 7/8/2019 . There has been no  change.    Assessment and Plan:   Marleen Mcfadden is a 54 year old female with a history of familial cardiomyopathy, left total knee arthroplasty on 10/3/2017 and right knee replacement on 2/20/2019, and morbid obesity who presents for ongoing evaluation and management.     Chronic systolic heart failure secondary to familial " cardiomyopathy.    TTE on 8/21/19 showed an EF of 30-35% with mildly reduced RV function.  Stage B, NYHA Class II. Patient is not currently taking her medications regularly due to cost. Will contact pharmacy to review medication costs with them and try to help patient find a pharmacy that is more cost affordable.     ACEi/ARB yes, continue losartan 100mg daily  BB yes, continue metoprolol XL 50mg daily  Aldosterone antagonist -  continue spironolactone to 50mg daily (has issues with incontinence with lasix)  SCD prophylaxis: Will plan for repeat echo during next visit. If EF is still reduced, will need to discuss ICD implantation  % BiV pacing: N/A  Fluid status euvolemi c  NSAID use: advised to avoid NSAIDs  Encouraged patient to begin regular aerobic exercise aiming for at least 150 minutes of moderate physical activity or 75 minutes of vigorous physical activity - or an equal combination of both - each week. and follow low-salt, heart healthy diet.     RTC in 3 months with labs and TTE prior.    Patient seen and discussed with Dr. Kolb.      Karly Rao MD, PhD  Cardiology Fellow  [phone]      I have reviewed today's vital signs, notes, medications, labs and imaging. I have also seen and examined the patient and agree with the findings and plan as outlined above.    Cassie Kolb MD  Section Head - Advanced Heart Failure, Transplantation and Mechanical Circulatory Support  Director - Adult Congenital and Cardiovascular Genetics Center  Associate Professor of Medicine, [facility]  Patient Care Team:  Nate Grimes MD as PCP - General (Family Practice)  Danny Grace MD as MD (OB/Gyn)  Cassie Kolb MD as MD (Cardiology)  Dev Rocha MD as MD (Orthopaedic Surgery)  Gwen Swenson RN as Specialty Care Coordinator (Cardiology)  Nate Grimes MD as Assigned PCP  NATE GRIMES

## 2019-08-21 NOTE — NURSING NOTE
Diet: Patient instructed regarding a heart failure healthy diet, including discussion of reduced fat and 2000 mg daily sodium restriction, daily weights, medication purpose and compliance, fluid restrictions and resources for patient and family to access for assistance with heart failure management.       Labs: Patient was given results of the laboratory testing obtained today and patient was instructed about when to return for the next laboratory testing.     Med Reconcile: Reviewed and verified all current medications with the patient. The updated medication list was printed and given to the patient.     Return Appointment: Patient given instructions regarding scheduling next clinic visit. RTC in 3 months with echo and labs prior    Patient stated she understood all health information given and agreed to call with further questions or concerns.     Gwen Swenson RN

## 2019-08-21 NOTE — PATIENT INSTRUCTIONS
Cardiology Providers you saw during your visit:  Dr. Kolb     Medication changes:  1- I will call your pharmacy to review medication costs with them and see what we can do to help make your medications affordable.        Follow up:  1- Return to clinic in 3 months to see Dr. Kolb with labs and echocardiogram prior.     Results for BALTAZAR OREILLY (MRN [id number]) as of 8/21/2019 10:13   Ref. Range 8/21/2019 08:57   Sodium Latest Ref Range: 133 - 144 mmol/L 142   Potassium Latest Ref Range: 3.4 - 5.3 mmol/L 4.1   Chloride Latest Ref Range: 94 - 109 mmol/L 109   Carbon Dioxide Latest Ref Range: 20 - 32 mmol/L 27   Urea Nitrogen Latest Ref Range: 7 - 30 mg/dL 24   Creatinine Latest Ref Range: 0.52 - 1.04 mg/dL 0.72   GFR Estimate Latest Ref Range: >60 mL/min/1.73_m2 >90   GFR Estimate If Black Latest Ref Range: >60 mL/min/1.73_m2 >90   Calcium Latest Ref Range: 8.5 - 10.1 mg/dL 8.5   Anion Gap Latest Ref Range: 3 - 14 mmol/L 5   Glucose Latest Ref Range: 70 - 99 mg/dL 97       Please call if you have:  1. Weight gain of more than 2 pounds in a day or 5 pounds in a week  2. Increased shortness of breath, swelling or bloating  3. Dizziness, lightheadedness   4. Any questions or concerns.      Follow the American Heart Association Diet and Lifestyle recommendations:  Limit saturated fat, trans fat, sodium, red meat, sweets and sugar-sweetened beverages. If you choose to eat red meat, compare labels and select the leanest cuts available.  Aim for at least 150 minutes of moderate physical activity or 75 minutes of vigorous physical activity - or an equal combination of both - each week.     During business hours: [phone], press option # 1 to be routed to the [employer] then option # 4 to send a message to your care team     After hours, weekends or holidays: On Call Cardiologist- [phone]   option #4 and ask to speak to the on-call Cardiologist. Inform them you are a CORE/heart failure patient at the  [facility].     Gwen Swenson, RN BSN  Cardiology Nurse Care Coordinator     Keep up the good work!     Take Care!

## 2019-08-21 NOTE — NURSING NOTE
Chief Complaint   Patient presents with     Follow Up      RTN HF: 54 year old female presents with history of Familial cardiomyopathy, systolic HF, EF 35% for follow up with labs and echo prior

## 2019-08-22 ENCOUNTER — PATIENT OUTREACH (OUTPATIENT)
Dept: CARE COORDINATION | Facility: CLINIC | Age: 54
End: 2019-08-22

## 2019-08-22 NOTE — PROGRESS NOTES
Social Work Intervention  [facility]    Data/Intervention:    Patient Name:  Marleen Mcfadden  /Age:  1965 (54 year old)    Visit Type: telephone  Referral Source: Gwen Swenson RN cardiology  Reason for Referral:  No insurance, needs medications    Collaborated With:    -Marleen    Patient Concerns/Issues:   Spoke with Pt re her insurance issues. Her hours at work were reduced so she lost insurance. She is aware she needs to apply for [place] and was going to go to the mnsure office to do a paper application with someone helping her.      Intervention/Education/Resources Provided:  Reviewed an option to call [employer] and go to their office in [facility] to do an online mnsure application. She would likely find out at that appt if [place]  [place] or if she has to sign up for a regular health plan with tax credits. Discussed that if it's a [place] that I can likely arrange for her to get her meds at the pharmacy and they can bill when it's active.     Assessment/Plan:  She plans to contact [employer] and contact me with results.     Provided patient/family with contact information and availability.    JAYESH Leon, [facility]    [facility]  [facility]  [phone]

## 2019-08-30 ENCOUNTER — PATIENT OUTREACH (OUTPATIENT)
Dept: CARE COORDINATION | Facility: CLINIC | Age: 54
End: 2019-08-30

## 2019-08-30 NOTE — PROGRESS NOTES
Social Work Follow-Up  [facility] [place]    Data/Intervention:  Patient Name:  Marleen Mcfadden  /Age:  1965 (54 year old)    Reason for Follow-Up:  Whether she was able to apply for mnsure and outcome    Collaborated With:    -Marleen    Intervention/Education/Resources Provided:  Marleen was able to apply for mnsure and was approved for [facility]. Her monthly premium is $44 and she is planning to go an apply for that today. She wondered if it would cover her visit this month because her premium she indicated was for Sept coverage. I advised her to contact [facility] to inquire about whether it will go back to August. She plans to call them today. She stated she would be able to pay that premium as well. Let her know that if she cannot get them to go back to August, she can contact me about the program we have that she can apply for to help with her medical bills.    Assessment/Plan:  Pt making progress on obtaining insurance. She will be covered starting .    Previously provided patient/family with writer's contact information and availability.       Yasmeen Carty, JAYESH, [facility]    [facility]  [phone]

## 2019-09-09 ENCOUNTER — TELEPHONE (OUTPATIENT)
Dept: CARDIOLOGY | Facility: CLINIC | Age: 54
End: 2019-09-09

## 2019-09-09 NOTE — LETTER
"  2019      TO: Marleen Mcfadden  [address]  [facility] [place] [address]         To whom it may concern:    Marleen is under my care at the [facility]. She has been diagnosed with cardiomyopathy and has a reduced ejection fraction of 30-35%. Her condition results in episodes with symptoms of fatigue, weakness and shortness of breath. Please allow her to rest as needed at work. Feel free to contact my nurse at the number below with any questions.      Thank you,      Cassie Kolb MD  Section Head - Advanced Heart Failure, Transplantation and Mechanical Circulatory Support  Director - Adult Congenital and Cardiovascular Genetics Center  Associate Professor of Medicine, [facility]        Gwen Swenson RN BSN   Cardiology Care Coordinator - [facility]   Questions and [phone]   First press #1 for the [employer] and then press # 4 to \"send a message to your care team\"     "

## 2019-09-09 NOTE — TELEPHONE ENCOUNTER
[facility] Call Center    Phone Message    May a detailed message be left on voicemail: yes    Reason for Call: Other: Pt wants to know if she can fly on an airplain. She plans to fly later this month and her ES? is at 30. Please call pt back to discuss.     Action Taken: Message routed to:  [facility] ([facility]): [facility] Cardiology

## 2019-09-10 NOTE — TELEPHONE ENCOUNTER
Discussed with Dr. Kolb. No concerns with patient flying.  Called Bora to discuss. She stated understanding.    She reports that she is 'slowing down 'at work and that her management is 'getting on her' about it. She reports she is feeling more fatigued and having more trouble keeping up. 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 She reports that she is limited by her fatigue and is not able to get to as many patients as she used to. She tells me she used to get to about 80 patients per day but is now only getting to about 30. She asks if we can write her a letter for her management to help better explain what is going on with her. Will route to provider and call her back if/when letter is completed.

## 2019-09-12 ENCOUNTER — OFFICE VISIT (OUTPATIENT)
Dept: FAMILY MEDICINE | Facility: CLINIC | Age: 54
End: 2019-09-12
Payer: MEDICAID

## 2019-09-12 VITALS
HEART RATE: 61 BPM | SYSTOLIC BLOOD PRESSURE: 120 MMHG | DIASTOLIC BLOOD PRESSURE: 80 MMHG | TEMPERATURE: 98.7 F | OXYGEN SATURATION: 98 %

## 2019-09-12 DIAGNOSIS — R25.2 MUSCLE CRAMP: ICD-10-CM

## 2019-09-12 DIAGNOSIS — M62.838 MUSCLE SPASM: Primary | ICD-10-CM

## 2019-09-12 LAB
BASOPHILS # BLD AUTO: 0 10E9/L (ref 0–0.2)
BASOPHILS NFR BLD AUTO: 0.7 %
DIFFERENTIAL METHOD BLD: NORMAL
EOSINOPHIL # BLD AUTO: 0.2 10E9/L (ref 0–0.7)
EOSINOPHIL NFR BLD AUTO: 2.7 %
ERYTHROCYTE [DISTWIDTH] IN BLOOD BY AUTOMATED COUNT: 14.1 % (ref 10–15)
HCT VFR BLD AUTO: 39.3 % (ref 35–47)
HGB BLD-MCNC: 12.7 G/DL (ref 11.7–15.7)
LYMPHOCYTES # BLD AUTO: 2.4 10E9/L (ref 0.8–5.3)
LYMPHOCYTES NFR BLD AUTO: 40.9 %
MCH RBC QN AUTO: 30.2 PG (ref 26.5–33)
MCHC RBC AUTO-ENTMCNC: 32.3 G/DL (ref 31.5–36.5)
MCV RBC AUTO: 94 FL (ref 78–100)
MONOCYTES # BLD AUTO: 0.5 10E9/L (ref 0–1.3)
MONOCYTES NFR BLD AUTO: 7.9 %
NEUTROPHILS # BLD AUTO: 2.9 10E9/L (ref 1.6–8.3)
NEUTROPHILS NFR BLD AUTO: 47.8 %
PLATELET # BLD AUTO: 292 10E9/L (ref 150–450)
RBC # BLD AUTO: 4.2 10E12/L (ref 3.8–5.2)
WBC # BLD AUTO: 6 10E9/L (ref 4–11)

## 2019-09-12 PROCEDURE — 85025 COMPLETE CBC W/AUTO DIFF WBC: CPT | Performed by: FAMILY MEDICINE

## 2019-09-12 PROCEDURE — 99213 OFFICE O/P EST LOW 20 MIN: CPT | Performed by: FAMILY MEDICINE

## 2019-09-12 PROCEDURE — 36415 COLL VENOUS BLD VENIPUNCTURE: CPT | Performed by: FAMILY MEDICINE

## 2019-09-12 PROCEDURE — 83735 ASSAY OF MAGNESIUM: CPT | Performed by: FAMILY MEDICINE

## 2019-09-12 PROCEDURE — 80053 COMPREHEN METABOLIC PANEL: CPT | Performed by: FAMILY MEDICINE

## 2019-09-12 NOTE — PROGRESS NOTES
Subjective     Marleen Mcfadden is a 54 year old female who presents to clinic today for the following health issues:    HPI   Body cramping, Headache, Diarrhea     Onset: x 1 day     Description: pt stated that she is having pain and cramps on her left side.   Location: left side    Character: Cramping    Intensity: severe    Progression of Symptoms: worse    Accompanying Signs & Symptoms: headache and diarrhea   Other symptoms: radiation of pain to back    History:   Previous similar pain: YES      Precipitating factors:   Trauma or overuse: no     Alleviating factors:  Improved by: nothing    Therapies Tried and outcome: None     Health Maintenance Due   Topic Date Due     URINE DRUG SCREEN  1965     PREVENTIVE CARE VISIT  09/15/2009     ZOSTER IMMUNIZATION (1 of 2) 07/06/2014     HF ACTION PLAN  11/01/2015     MAMMO SCREENING  08/22/2018     PHQ-9  02/15/2019     PAP  07/01/2019     INFLUENZA VACCINE (1) 09/01/2019     DTAP/TDAP/TD IMMUNIZATION (2 - Td) 09/11/2019        Patient Active Problem List   Diagnosis     Autoimmune disease, not elsewhere classified     Primary cardiomyopathy (H)     Leiomyoma of uterus     Allergic rhinitis     Anemia     Sinus bradycardia     Health Care Home     Itching     CHF with cardiomyopathy (H)     TB lung, latent     Knee pain     Swelling of knee joint     Thyroid nodule     Pain in joint involving ankle and foot     Calcaneal spur     Plantar fasciitis     CARDIOVASCULAR SCREENING; LDL GOAL LESS THAN 160     Peroneus longus tendinitis     Morbid obesity (H)     Shaina's nodes     Familial cardiomyopathy (H)     Acute pain of right knee     Acute bilateral low back pain with right-sided sciatica     Degenerative disc disease at L5-S1 level     Chronic bilateral low back pain with right-sided sciatica     Chronic bilateral low back pain with left-sided sciatica     Elevated blood pressure reading without diagnosis of hypertension     Chronic diastolic congestive  heart failure (H)     Injury of left shoulder, initial encounter     Sprain of other ligament of left ankle, initial encounter     Left shoulder pain     Rotator cuff injury     Status post left knee replacement     Nontraumatic rupture of quadriceps tendon, left     Primary osteoarthritis of both knees     Elevated triglycerides with high cholesterol     Gastroesophageal reflux disease with esophagitis     Past Surgical History:   Procedure Laterality Date     C BREAST SURGERY PROCEDURE UNLISTED      Breast Reduction     C  DELIVERY ONLY  10/85,     , Low Cervicalx2     C EXCIS UTERINE FIBROID,ABD APPRCH       C EXCISE EXCESS SKIN TISSUE,ABDOMEN       C LIGATE FALLOPIAN TUBE       C REPAIR CRUCIATE LIGAMENT,KNEE      Right Knee     C TOTAL KNEE ARTHROPLASTY Left 10/03/2017     HYSTERECTOMY TOTAL ABDOMINAL      for fibroids; reports having blood transfusion after surgery     THYROIDECTOMY  2013    Procedure: THYROIDECTOMY;  LEFT THYROID LOBECTOMY.  (LIGASURE, RECURRENT LARYNGEAL NERVE MONITOR) ;  Surgeon: Uriah Camargo MD;  Location: [facility]       Social History     Tobacco Use     Smoking status: Never Smoker     Smokeless tobacco: Never Used   Substance Use Topics     Alcohol use: Yes     Comment: rare, twice a year, she has a drink little wine 2 days ago     Family History   Problem Relation Age of Onset     Hypertension Father         dec     Diabetes Father         dec     Heart Disease Father         dec     Alcohol/Drug Father      Cardiovascular Father      Heart Disease Mother         dec     Alcohol/Drug Mother      Cardiovascular Mother      Heart Disease Daughter         Cardiomyopathy     Cardiovascular Daughter         cardiomyopathy     Colon Cancer Sister      Cardiovascular Son         cardiomyopathy     Diabetes Paternal Grandmother         dec     Hypertension Paternal Grandmother         dec     Cerebrovascular Disease Paternal Grandmother          dec     Cancer Sister         Lupus     Cardiovascular Sister         cardiomyopathy     Heart Disease Sister         heart failure, and kidney failure         Allergies   Allergen Reactions     Morphine      EMESIS     Nickel      Sulfa Drugs Swelling     Recent Labs   Lab Test 09/12/19  1619 08/21/19  0857  01/09/19  0740 01/08/19  1121  02/15/18  1450  06/16/15  1359  04/09/14  1033   A1C  --   --   --   --  5.5  --   --   --   --   --   --    LDL  --   --   --  83 92  --   --   --   --   --  96   HDL  --   --   --  44* 44*  --   --   --   --   --  60   TRIG  --   --   --  175* 179*  --   --   --   --   --  63   ALT 35  --   --  35 34   < > 27   < > 22   < > 39   CR 0.81 0.72   < > 0.69 0.77   < >  --    < > 0.67   < > 0.77   GFRESTIMATED 81 >90   < > >90 87   < >  --    < > >90  Non  GFR Calc     < > 80   GFRESTBLACK >90 >90   < > >90 >90   < >  --    < > >90   GFR Calc     < > >90   POTASSIUM 4.1 4.1   < > 3.6 3.9   < >  --    < > 4.1   < > 4.0   TSH  --   --   --   --   --   --  2.15  --  2.82   < > 2.65    < > = values in this interval not displayed.      BP Readings from Last 3 Encounters:   09/12/19 120/80   08/21/19 108/78   07/08/19 120/64    Wt Readings from Last 3 Encounters:   08/21/19 103 kg (227 lb)   07/08/19 105.6 kg (232 lb 12.8 oz)   05/22/19 103.3 kg (227 lb 11.2 oz)            Review of Systems   ROS COMP: Constitutional, HEENT, cardiovascular, pulmonary, GI, , musculoskeletal, neuro, skin, endocrine and psych systems are negative, except as otherwise noted.      Objective    /80   Pulse 61   Temp 98.7  F (37.1  C) (Oral)   LMP 10/19/2008 (Approximate)   SpO2 98%   There is no height or weight on file to calculate BMI.   Wt Readings from Last 4 Encounters:   08/21/19 103 kg (227 lb)   07/08/19 105.6 kg (232 lb 12.8 oz)   05/22/19 103.3 kg (227 lb 11.2 oz)   02/27/19 107 kg (236 lb)       Physical Exam   GENERAL: healthy, alert and no  distress  NECK: no adenopathy, no asymmetry, masses, or scars and thyroid normal to palpation  RESP: lungs clear to auscultation - no rales, rhonchi or wheezes  CV: regular rate and rhythm, normal S1 S2, no S3 or S4, no murmur, click or rub, no peripheral edema and peripheral pulses strong  ABDOMEN: soft, nontender, no hepatosplenomegaly, no masses and bowel sounds normal  MS: no gross musculoskeletal defects noted, no edema  SKIN: no suspicious lesions or rashes  NEURO: Normal strength and tone, mentation intact and speech normal  BACK: no CVA tenderness, no paralumbar tenderness    Diagnostic Test Results:  Labs reviewed in Epic  No results found for this or any previous visit (from the past 24 hour(s)).  Results for orders placed or performed in visit on 09/12/19   CBC with platelets differential   Result Value Ref Range    WBC 6.0 4.0 - 11.0 10e9/L    RBC Count 4.20 3.8 - 5.2 10e12/L    Hemoglobin 12.7 11.7 - 15.7 g/dL    Hematocrit 39.3 35.0 - 47.0 %    MCV 94 78 - 100 fl    MCH 30.2 26.5 - 33.0 pg    MCHC 32.3 31.5 - 36.5 g/dL    RDW 14.1 10.0 - 15.0 %    Platelet Count 292 150 - 450 10e9/L    % Neutrophils 47.8 %    % Lymphocytes 40.9 %    % Monocytes 7.9 %    % Eosinophils 2.7 %    % Basophils 0.7 %    Absolute Neutrophil 2.9 1.6 - 8.3 10e9/L    Absolute Lymphocytes 2.4 0.8 - 5.3 10e9/L    Absolute Monocytes 0.5 0.0 - 1.3 10e9/L    Absolute Eosinophils 0.2 0.0 - 0.7 10e9/L    Absolute Basophils 0.0 0.0 - 0.2 10e9/L    Diff Method Automated Method    Comprehensive metabolic panel   Result Value Ref Range    Sodium 139 133 - 144 mmol/L    Potassium 4.1 3.4 - 5.3 mmol/L    Chloride 106 94 - 109 mmol/L    Carbon Dioxide 25 20 - 32 mmol/L    Anion Gap 8 3 - 14 mmol/L    Glucose 95 70 - 99 mg/dL    Urea Nitrogen 19 7 - 30 mg/dL    Creatinine 0.81 0.52 - 1.04 mg/dL    GFR Estimate 81 >60 mL/min/[1.73_m2]    GFR Estimate If Black >90 >60 mL/min/[1.73_m2]    Calcium 9.2 8.5 - 10.1 mg/dL    Bilirubin Total 0.4 0.2 -  1.3 mg/dL    Albumin 4.0 3.4 - 5.0 g/dL    Protein Total 8.3 6.8 - 8.8 g/dL    Alkaline Phosphatase 88 40 - 150 U/L    ALT 35 0 - 50 U/L    AST 24 0 - 45 U/L   Magnesium   Result Value Ref Range    Magnesium 2.2 1.6 - 2.3 mg/dL           Assessment & Plan     1. Muscle spasm  And  2. Muscle cramp  New, previously undiagnosed problem with uncertain prognosis and additional work-up planned.   Evaluate for electrolyte abnormality, supportive cares  Note all normal labs  - CBC with platelets differential  - Comprehensive metabolic panel  - Magnesium     Return in about 2 weeks (around 9/26/2019) for Physical Exam.    Danae Grimes MD  [facility]

## 2019-09-12 NOTE — LETTER
[place] [id number] [place] [id number] [phone]      September 12, 2019    RE:  Marleen Mcfadden                                                                                                                                                       [id number]   [facility] [address]            To whom it may concern:    Marleen Mcfadden was seen in clinic today.   She  may return to work when symptoms have resolved.        Sincerely,        Danae Grimes MD

## 2019-09-13 ENCOUNTER — TELEPHONE (OUTPATIENT)
Dept: FAMILY MEDICINE | Facility: CLINIC | Age: 54
End: 2019-09-13

## 2019-09-13 LAB
ALBUMIN SERPL-MCNC: 4 G/DL (ref 3.4–5)
ALP SERPL-CCNC: 88 U/L (ref 40–150)
ALT SERPL W P-5'-P-CCNC: 35 U/L (ref 0–50)
ANION GAP SERPL CALCULATED.3IONS-SCNC: 8 MMOL/L (ref 3–14)
AST SERPL W P-5'-P-CCNC: 24 U/L (ref 0–45)
BILIRUB SERPL-MCNC: 0.4 MG/DL (ref 0.2–1.3)
BUN SERPL-MCNC: 19 MG/DL (ref 7–30)
CALCIUM SERPL-MCNC: 9.2 MG/DL (ref 8.5–10.1)
CHLORIDE SERPL-SCNC: 106 MMOL/L (ref 94–109)
CO2 SERPL-SCNC: 25 MMOL/L (ref 20–32)
CREAT SERPL-MCNC: 0.81 MG/DL (ref 0.52–1.04)
GFR SERPL CREATININE-BSD FRML MDRD: 81 ML/MIN/{1.73_M2}
GLUCOSE SERPL-MCNC: 95 MG/DL (ref 70–99)
MAGNESIUM SERPL-MCNC: 2.2 MG/DL (ref 1.6–2.3)
POTASSIUM SERPL-SCNC: 4.1 MMOL/L (ref 3.4–5.3)
PROT SERPL-MCNC: 8.3 G/DL (ref 6.8–8.8)
SODIUM SERPL-SCNC: 139 MMOL/L (ref 133–144)

## 2019-09-13 NOTE — LETTER
[facility]  [address] [place] [id number] [phone]          September 17, 2019    RE:  Marleen Mcfadden                                                                    [address]   [facility] [id number]                 To whom it may concern:     Marleen Mcfadden was seen in clinic on 09/12/2019.   She  may return to work on 09/17/2019.           Sincerely,           Danae Grimes MD

## 2019-09-13 NOTE — LETTER
[facility]  [address] [place] [id number] [phone]          September 17, 2019    RE:  Marleen Mcfadden                                                                    [address]   [facility] [address]                 To whom it may concern:     Marleen Mcfadden was seen in clinic on 09/12/2019.   She  may return to work on 09/19/2019.           Sincerely,           Danae Grimes MD

## 2019-09-13 NOTE — TELEPHONE ENCOUNTER
Reason for Call:  Other call back    Detailed comments: pt states she got a letter but would like one dated saying she can return Tuesday as that's when she plans to go and her work prefers a dated note. Please advise.    Phone Number Patient can be reached at: Home number on file [phone] (home)    Best Time: asap    Can we leave a detailed message on this number? YES    Call taken on 9/13/2019 at 12:43 PM by Mckenzie Copeland

## 2019-09-17 NOTE — TELEPHONE ENCOUNTER
Patient called to check on the status of this & writer advised patient that note was completed with a date of 9/17/19 to return to work.    Per patient, she was expecting this note by Monday the latest so she could pick it up & bring it to work on Tuesday morning.     Patient states she is unable to come in today to  the letter and can do it tomorrow but the date will need to say ok to return to work on 9/19/19 because her shift starts at 6 am. Please advise, thank you!    Pt requesting a call back asap so she can let her job know if PCP decides that 9/19 is ok for a return date. Thanks!    Nery Velazquez

## 2019-09-17 NOTE — TELEPHONE ENCOUNTER
Dr. Grimes-Please review and sign if agree.    Updated work excuse letter pended.    Thank you!  SADA BraswellN, RN

## 2019-09-17 NOTE — TELEPHONE ENCOUNTER
Signed forms placed in MA work box at [facility] 9/17/2019   Note routed to MA pool.  Danae Grimes MD   9/17/2019

## 2019-09-17 NOTE — TELEPHONE ENCOUNTER
Dr. Grimes-Please review and sign if agree.    Letter pended with return to work date of 9/19/19.    Thank you!  SADA BraswellN, RN

## 2019-09-18 ENCOUNTER — APPOINTMENT (OUTPATIENT)
Dept: GENERAL RADIOLOGY | Facility: CLINIC | Age: 54
End: 2019-09-18
Attending: EMERGENCY MEDICINE
Payer: OTHER MISCELLANEOUS

## 2019-09-18 ENCOUNTER — APPOINTMENT (OUTPATIENT)
Dept: CT IMAGING | Facility: CLINIC | Age: 54
End: 2019-09-18
Attending: EMERGENCY MEDICINE
Payer: OTHER MISCELLANEOUS

## 2019-09-18 ENCOUNTER — TELEPHONE (OUTPATIENT)
Dept: CARDIOLOGY | Facility: CLINIC | Age: 54
End: 2019-09-18

## 2019-09-18 ENCOUNTER — HOSPITAL ENCOUNTER (EMERGENCY)
Facility: CLINIC | Age: 54
Discharge: HOME | End: 2019-09-18
Attending: EMERGENCY MEDICINE | Admitting: EMERGENCY MEDICINE
Payer: OTHER MISCELLANEOUS

## 2019-09-18 VITALS
SYSTOLIC BLOOD PRESSURE: 115 MMHG | BODY MASS INDEX: 39.27 KG/M2 | DIASTOLIC BLOOD PRESSURE: 98 MMHG | TEMPERATURE: 98.5 F | OXYGEN SATURATION: 98 % | RESPIRATION RATE: 18 BRPM | HEIGHT: 64 IN | HEART RATE: 59 BPM | WEIGHT: 230 LBS

## 2019-09-18 DIAGNOSIS — M62.838 MUSCLE SPASM: ICD-10-CM

## 2019-09-18 LAB
ALBUMIN SERPL-MCNC: 3.7 G/DL (ref 3.4–5)
ALBUMIN UR-MCNC: 10 MG/DL
ALP SERPL-CCNC: 93 U/L (ref 40–150)
ALT SERPL W P-5'-P-CCNC: 28 U/L (ref 0–50)
ANION GAP SERPL CALCULATED.3IONS-SCNC: 5 MMOL/L (ref 3–14)
APPEARANCE UR: ABNORMAL
AST SERPL W P-5'-P-CCNC: 15 U/L (ref 0–45)
BACTERIA #/AREA URNS HPF: ABNORMAL /HPF
BASOPHILS # BLD AUTO: 0 10E9/L (ref 0–0.2)
BASOPHILS NFR BLD AUTO: 0.7 %
BILIRUB SERPL-MCNC: 0.3 MG/DL (ref 0.2–1.3)
BILIRUB UR QL STRIP: NEGATIVE
BUN SERPL-MCNC: 14 MG/DL (ref 7–30)
CALCIUM SERPL-MCNC: 8.6 MG/DL (ref 8.5–10.1)
CHLORIDE SERPL-SCNC: 106 MMOL/L (ref 94–109)
CO2 BLDCOV-SCNC: 24 MMOL/L (ref 21–28)
CO2 SERPL-SCNC: 28 MMOL/L (ref 20–32)
COLOR UR AUTO: YELLOW
CREAT SERPL-MCNC: 0.7 MG/DL (ref 0.52–1.04)
DIFFERENTIAL METHOD BLD: ABNORMAL
EOSINOPHIL # BLD AUTO: 0.2 10E9/L (ref 0–0.7)
EOSINOPHIL NFR BLD AUTO: 3.3 %
ERYTHROCYTE [DISTWIDTH] IN BLOOD BY AUTOMATED COUNT: 13.3 % (ref 10–15)
GFR SERPL CREATININE-BSD FRML MDRD: >90 ML/MIN/{1.73_M2}
GLUCOSE SERPL-MCNC: 109 MG/DL (ref 70–99)
GLUCOSE UR STRIP-MCNC: NEGATIVE MG/DL
HCT VFR BLD AUTO: 40.8 % (ref 35–47)
HGB BLD-MCNC: 12.6 G/DL (ref 11.7–15.7)
HGB UR QL STRIP: NEGATIVE
IMM GRANULOCYTES # BLD: 0 10E9/L (ref 0–0.4)
IMM GRANULOCYTES NFR BLD: 0.2 %
INR PPP: 0.97 (ref 0.86–1.14)
INTERPRETATION ECG - MUSE: NORMAL
KETONES UR STRIP-MCNC: NEGATIVE MG/DL
LACTATE BLD-SCNC: 1.2 MMOL/L (ref 0.7–2.1)
LEUKOCYTE ESTERASE UR QL STRIP: NEGATIVE
LYMPHOCYTES # BLD AUTO: 2.3 10E9/L (ref 0.8–5.3)
LYMPHOCYTES NFR BLD AUTO: 43.2 %
MAGNESIUM SERPL-MCNC: 1.8 MG/DL (ref 1.6–2.3)
MCH RBC QN AUTO: 28.8 PG (ref 26.5–33)
MCHC RBC AUTO-ENTMCNC: 30.9 G/DL (ref 31.5–36.5)
MCV RBC AUTO: 93 FL (ref 78–100)
MONOCYTES # BLD AUTO: 0.4 10E9/L (ref 0–1.3)
MONOCYTES NFR BLD AUTO: 6.8 %
MUCOUS THREADS #/AREA URNS LPF: PRESENT /LPF
NEUTROPHILS # BLD AUTO: 2.5 10E9/L (ref 1.6–8.3)
NEUTROPHILS NFR BLD AUTO: 45.8 %
NITRATE UR QL: NEGATIVE
NRBC # BLD AUTO: 0 10*3/UL
NRBC BLD AUTO-RTO: 0 /100
NT-PROBNP SERPL-MCNC: 212 PG/ML (ref 0–900)
PCO2 BLDV: 42 MM HG (ref 40–50)
PH BLDV: 7.37 PH (ref 7.32–7.43)
PH UR STRIP: 5.5 PH (ref 5–7)
PLATELET # BLD AUTO: 294 10E9/L (ref 150–450)
PO2 BLDV: 54 MM HG (ref 25–47)
POTASSIUM SERPL-SCNC: 3.9 MMOL/L (ref 3.4–5.3)
PROT SERPL-MCNC: 8.2 G/DL (ref 6.8–8.8)
RBC # BLD AUTO: 4.37 10E12/L (ref 3.8–5.2)
RBC #/AREA URNS AUTO: 1 /HPF (ref 0–2)
SAO2 % BLDV FROM PO2: 87 %
SODIUM SERPL-SCNC: 139 MMOL/L (ref 133–144)
SOURCE: ABNORMAL
SP GR UR STRIP: 1.03 (ref 1–1.03)
SQUAMOUS #/AREA URNS AUTO: 6 /HPF (ref 0–1)
TROPONIN I SERPL-MCNC: <0.015 UG/L (ref 0–0.04)
TSH SERPL DL<=0.005 MIU/L-ACNC: 2.84 MU/L (ref 0.4–4)
UROBILINOGEN UR STRIP-MCNC: NORMAL MG/DL (ref 0–2)
WBC # BLD AUTO: 5.4 10E9/L (ref 4–11)
WBC #/AREA URNS AUTO: 1 /HPF (ref 0–5)

## 2019-09-18 PROCEDURE — 83605 ASSAY OF LACTIC ACID: CPT

## 2019-09-18 PROCEDURE — 81001 URINALYSIS AUTO W/SCOPE: CPT | Performed by: EMERGENCY MEDICINE

## 2019-09-18 PROCEDURE — 83735 ASSAY OF MAGNESIUM: CPT | Performed by: EMERGENCY MEDICINE

## 2019-09-18 PROCEDURE — 84443 ASSAY THYROID STIM HORMONE: CPT | Performed by: EMERGENCY MEDICINE

## 2019-09-18 PROCEDURE — 83880 ASSAY OF NATRIURETIC PEPTIDE: CPT | Performed by: EMERGENCY MEDICINE

## 2019-09-18 PROCEDURE — 99285 EMERGENCY DEPT VISIT HI MDM: CPT | Mod: 25 | Performed by: EMERGENCY MEDICINE

## 2019-09-18 PROCEDURE — 93005 ELECTROCARDIOGRAM TRACING: CPT | Performed by: EMERGENCY MEDICINE

## 2019-09-18 PROCEDURE — 84484 ASSAY OF TROPONIN QUANT: CPT | Performed by: EMERGENCY MEDICINE

## 2019-09-18 PROCEDURE — 70450 CT HEAD/BRAIN W/O DYE: CPT

## 2019-09-18 PROCEDURE — 85610 PROTHROMBIN TIME: CPT | Performed by: EMERGENCY MEDICINE

## 2019-09-18 PROCEDURE — 93010 ELECTROCARDIOGRAM REPORT: CPT | Mod: Z6 | Performed by: EMERGENCY MEDICINE

## 2019-09-18 PROCEDURE — 85025 COMPLETE CBC W/AUTO DIFF WBC: CPT | Performed by: EMERGENCY MEDICINE

## 2019-09-18 PROCEDURE — 71046 X-RAY EXAM CHEST 2 VIEWS: CPT

## 2019-09-18 PROCEDURE — 82803 BLOOD GASES ANY COMBINATION: CPT

## 2019-09-18 PROCEDURE — 80053 COMPREHEN METABOLIC PANEL: CPT | Performed by: EMERGENCY MEDICINE

## 2019-09-18 ASSESSMENT — ENCOUNTER SYMPTOMS
CONFUSION: 1
COUGH: 1
SHORTNESS OF BREATH: 1

## 2019-09-18 ASSESSMENT — MIFFLIN-ST. JEOR: SCORE: 1623.27

## 2019-09-18 NOTE — TELEPHONE ENCOUNTER
Patient needs the letter today to return back to work by Friday. Can DOD sign? Please advise, thank you!    Nery Velazquez

## 2019-09-18 NOTE — TELEPHONE ENCOUNTER
[facility] Call Center    Phone Message    May a detailed message be left on voicemail: yes    Reason for Call: Symptoms or Concerns     If patient has red-flag symptoms, warm transfer to triage line    Current symptom or concern: Pt expirencing full body cramping starts at feet and moves up the body. Per pt Cramps so bad in feet and calfs that she can barely walk and swelling in legs is getting worse because of it. Per pt the cramps are causing pain. Per pt its mostly starts at night and continues into the day. This is making it difficult to be a work. Pt is very concerned that this is happening and is asking for a nurse call back as soon as possible.     Symptoms have been present for:  2 week(s)    Has patient previously been seen for this? Yes    By : Dr. Kolb    Date: last apt    Are there any new or worsening symptoms? Yes: Pain and cramping getting worse by the hour. Please return call.       Action Taken: Message routed to:  [facility]

## 2019-09-18 NOTE — TELEPHONE ENCOUNTER
[facility] Call Center    Phone Message    May a detailed message be left on voicemail: yes    Reason for Call: Other: Pt called to follow up with Mayte regarding the cramping that she has been experiencing. She said that she did go in to the ED as was recommended, but they were unable to figure out what the cause of the cramping was despire the fact that she was actively experiencing it while she was there. Please give her a call back to discuss what her next steps should be.     Action Taken: Message routed to:  [facility] Cardiology

## 2019-09-18 NOTE — TELEPHONE ENCOUNTER
HW Reception: please call patient and inform her letter has been signed and determine what she would like done with letter    Thank You!  Brianna Swift, CLARITZA  Triage Nurse

## 2019-09-18 NOTE — ED PROVIDER NOTES
[facility] DE[facility]MENT ([facility])  9/18/19    History     Chief Complaint   Patient presents with     Spasms     History was provided by the patient and medical records.       Marleen Mcfadden is a 54 year old female with a history of familial cardiomyopathy who presents to the Emergency Department for an evaluation of muscle spasms. Patient reports muscle cramps primarily in her legs as well as her right chest, finger and arms that has been occurring for approximately one month. She reports increased confusion as she has been getting lost while driving. She also complains of an increased productive, foamy cough over the past week. Patient reports one episode of diarrhea last week. Additionally, she reports dyspnea with exertion. Patient was seen by her PCP on 9/12/2019, 6 days ago that showed unremarkable labs.  Patient called her nurse line because she was somewhat concerned about the symptoms and wanted further advice.  The nurse line recommended she come to the emergency department for evaluation.  Patient does note that she has talked to her primary care provider about these various symptoms.  They have been unable to ascertain what is causing them.    This part of the document was transcribed by Simone Hugginsibcarlos alberto.    Past Medical History:   Diagnosis Date     Allergic rhinitis, cause unspecified      Anemia      Autoimmune disease NEC     Autoimmune disease- unknown/poss SLE     Calcaneal spur 10/21/2014     CHF with cardiomyopathy (H)      Familial cardiomyopathy (H) 10/21/2015     Morbid obesity (H) 5/5/2015     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     no longer an issue     Other primary cardiomyopathies     Cardiomyopathy- dx'd 1999 idiopathic     PONV (postoperative nausea and vomiting)      UTERINE LEIOMYOMA NOS 10/25/2006       Past Surgical History:   Procedure Laterality Date     C BREAST SURGERY PROCEDURE UNLISTED  2001     Breast Reduction     C  DELIVERY ONLY  10/85,     , Low Cervicalx2     C EXCIS UTERINE FIBROID,ABD APPRCH       C EXCISE EXCESS SKIN TISSUE,ABDOMEN       C LIGATE FALLOPIAN TUBE       C REPAIR CRUCIATE LIGAMENT,KNEE      Right Knee     C TOTAL KNEE ARTHROPLASTY Left 10/03/2017     HYSTERECTOMY TOTAL ABDOMINAL      for fibroids; reports having blood transfusion after surgery     THYROIDECTOMY  2013    Procedure: THYROIDECTOMY;  LEFT THYROID LOBECTOMY.  (LIGASURE, RECURRENT LARYNGEAL NERVE MONITOR) ;  Surgeon: Uriah Camargo MD;  Location: [facility]       Family History   Problem Relation Age of Onset     Hypertension Father         dec     Diabetes Father         dec     Heart Disease Father         dec     Alcohol/Drug Father      Cardiovascular Father      Heart Disease Mother         dec     Alcohol/Drug Mother      Cardiovascular Mother      Heart Disease Daughter         Cardiomyopathy     Cardiovascular Daughter         cardiomyopathy     Colon Cancer Sister      Cardiovascular Son         cardiomyopathy     Diabetes Paternal Grandmother         dec     Hypertension Paternal Grandmother         dec     Cerebrovascular Disease Paternal Grandmother         dec     Cancer Sister         Lupus     Cardiovascular Sister         cardiomyopathy     Heart Disease Sister         heart failure, and kidney failure       Social History     Tobacco Use     Smoking status: Never Smoker     Smokeless tobacco: Never Used   Substance Use Topics     Alcohol use: Yes     Comment: rare, twice a year, she has a drink little wine 2 days ago        Allergies   Allergen Reactions     Morphine      EMESIS     Nickel      Sulfa Drugs Swelling     No current facility-administered medications for this encounter.      Current Outpatient Medications   Medication     bisacodyl (DULCOLAX) 10 MG suppository     Cholecalciferol (VITAMIN D) 2000 UNIT tablet     Collagen 500 MG CAPS      "cyclobenzaprine (FLEXERIL) 10 MG tablet     diclofenac (VOLTAREN) 75 MG EC tablet     dicyclomine (BENTYL) 10 MG capsule     estradiol (VIVELLE-DOT) 0.0375 MG/24HR BIW patch     FLAXSEED, LINSEED, PO     furosemide (LASIX) 20 MG tablet     hydrOXYzine (ATARAX) 25 MG tablet     losartan (COZAAR) 100 MG tablet     metoprolol succinate ER (TOPROL-XL) 50 MG 24 hr tablet     omeprazole (PRILOSEC) 40 MG capsule     order for DME     progesterone (PROMETRIUM) 100 MG capsule     spironolactone (ALDACTONE) 25 MG tablet     traMADol (ULTRAM) 50 MG tablet     Facility-Administered Medications Ordered in Other Encounters   Medication     sodium chloride (PF) 0.9% PF flush 20 mL     I have reviewed the Medications, Allergies, Past Medical and Surgical History, and Social History in the Epic system.    Review of Systems   Respiratory: Positive for cough and shortness of breath.    Psychiatric/Behavioral: Positive for confusion.   All other systems reviewed and are negative.      Physical Exam   BP: 104/72  Pulse: 70  Temp: 98.5  F (36.9  C)  Resp: 18  Height: 162.6 cm (5' 4\")  Weight: 104.3 kg (230 lb)  SpO2: 97 %      Physical Exam   Constitutional: No distress.   HENT:   Head: Atraumatic.   Mouth/Throat: Oropharynx is clear and moist. No oropharyngeal exudate.   Eyes: EOM are normal. No scleral icterus.   Neck: Neck supple.   Cardiovascular: Normal rate, regular rhythm and normal heart sounds.   Pulmonary/Chest: Breath sounds normal. No respiratory distress.   Abdominal: Soft. She exhibits no distension. There is no tenderness.   Musculoskeletal: She exhibits no edema or deformity.   Neurological: She is alert.   Skin: Skin is warm and dry. She is not diaphoretic.   Psychiatric: She has a normal mood and affect. Her behavior is normal.       ED Course        Procedures             EKG Interpretation:      Interpreted by Monioc Jones DO  Time reviewed: 1240  Symptoms at time of EKG: Confusion rhythm: norm   Rate: 49 " Axis: normal  Ectopy: none  Conduction: normal  ST Segments/ T Waves: No ST-T wave changes  Q Waves: none  Comparison to prior: Unchanged    Clinical Impression: normal EKG           Labs Ordered and Resulted from Time of ED Arrival Up to the Time of Departure from the ED   CBC WITH PLATELETS DIFFERENTIAL - Abnormal; Notable for the following components:       Result Value    MCHC 30.9 (*)     All other components within normal limits   COMPREHENSIVE METABOLIC PANEL - Abnormal; Notable for the following components:    Glucose 109 (*)     All other components within normal limits   UA MACROSCOPIC WITH REFLEX TO MICRO AND CULTURE - Abnormal; Notable for the following components:    Protein Albumin Urine 10 (*)     Bacteria Urine Few (*)     Squamous Epithelial /HPF Urine 6 (*)     Mucous Urine Present (*)     All other components within normal limits   ISTAT  GASES LACTATE ANASTASIIA POCT - Abnormal; Notable for the following components:    PO2 Venous 54 (*)     All other components within normal limits   INR   NT PROBNP INPATIENT   TROPONIN I   MAGNESIUM   TSH WITH FREE T4 REFLEX   PERIPHERAL IV CATHETER   ISTAT CG4 GASES LACTATE ANASTASIIA NURSING POCT           Results for orders placed or performed during the hospital encounter of 09/18/19   CT Head w/o Contrast    Narrative    CT HEAD W/O CONTRAST 9/18/2019 1:06 PM    Provided History: confusion  ICD-10:    Comparison: MR 12/2/2014.    Technique: Using multidetector thin collimation helical acquisition  technique, axial, coronal and sagittal CT images from the skull base  to the vertex were obtained without intravenous contrast.     Findings:    No intracranial hemorrhage, mass effect, or midline shift. The  ventricles are proportionate to the cerebral sulci. The gray to white  matter differentiation of the cerebral hemispheres is preserved. The  basal cisterns are patent.    The visualized paranasal sinuses are clear. The mastoid air cells are  clear.       Impression     Impression: No acute intracranial pathology.    I have personally reviewed the examination and initial interpretation  and I agree with the findings.    GARY GONZALEZ MD   XR Chest 2 Views    Narrative    XR CHEST 2 VW  9/18/2019 12:48 PM      HISTORY: cough, orthopnea    COMPARISON: 7/8/2019    FINDINGS: PA and lateral chest. Trach is midline, heart size is  enlarged. Questionably increased right basilar streaky opacities. No  pneumothorax or pleural effusion. Mild emphysematous changes of lungs.  No acute osseous or upper abdominal abnormality.      Impression    IMPRESSION:  1. Questionable right basilar streaky opacity, favored to represent  atelectasis over developing infection in the setting of normal WBC.  2. Cardiomegaly   CBC with platelets differential   Result Value Ref Range    WBC 5.4 4.0 - 11.0 10e9/L    RBC Count 4.37 3.8 - 5.2 10e12/L    Hemoglobin 12.6 11.7 - 15.7 g/dL    Hematocrit 40.8 35.0 - 47.0 %    MCV 93 78 - 100 fl    MCH 28.8 26.5 - 33.0 pg    MCHC 30.9 (L) 31.5 - 36.5 g/dL    RDW 13.3 10.0 - 15.0 %    Platelet Count 294 150 - 450 10e9/L    Diff Method Automated Method     % Neutrophils 45.8 %    % Lymphocytes 43.2 %    % Monocytes 6.8 %    % Eosinophils 3.3 %    % Basophils 0.7 %    % Immature Granulocytes 0.2 %    Nucleated RBCs 0 0 /100    Absolute Neutrophil 2.5 1.6 - 8.3 10e9/L    Absolute Lymphocytes 2.3 0.8 - 5.3 10e9/L    Absolute Monocytes 0.4 0.0 - 1.3 10e9/L    Absolute Eosinophils 0.2 0.0 - 0.7 10e9/L    Absolute Basophils 0.0 0.0 - 0.2 10e9/L    Abs Immature Granulocytes 0.0 0 - 0.4 10e9/L    Absolute Nucleated RBC 0.0    Comprehensive metabolic panel   Result Value Ref Range    Sodium 139 133 - 144 mmol/L    Potassium 3.9 3.4 - 5.3 mmol/L    Chloride 106 94 - 109 mmol/L    Carbon Dioxide 28 20 - 32 mmol/L    Anion Gap 5 3 - 14 mmol/L    Glucose 109 (H) 70 - 99 mg/dL    Urea Nitrogen 14 7 - 30 mg/dL    Creatinine 0.70 0.52 - 1.04 mg/dL    GFR Estimate >90 >60 mL/min/[1.73_m2]     GFR Estimate If Black >90 >60 mL/min/[1.73_m2]    Calcium 8.6 8.5 - 10.1 mg/dL    Bilirubin Total 0.3 0.2 - 1.3 mg/dL    Albumin 3.7 3.4 - 5.0 g/dL    Protein Total 8.2 6.8 - 8.8 g/dL    Alkaline Phosphatase 93 40 - 150 U/L    ALT 28 0 - 50 U/L    AST 15 0 - 45 U/L   INR   Result Value Ref Range    INR 0.97 0.86 - 1.14   Nt probnp inpatient (BNP)   Result Value Ref Range    N-Terminal Pro BNP Inpatient 212 0 - 900 pg/mL   Troponin I   Result Value Ref Range    Troponin I ES <0.015 0.000 - 0.045 ug/L   UA reflex to Microscopic and Culture   Result Value Ref Range    Color Urine Yellow     Appearance Urine Slightly Cloudy     Glucose Urine Negative NEG^Negative mg/dL    Bilirubin Urine Negative NEG^Negative    Ketones Urine Negative NEG^Negative mg/dL    Specific Gravity Urine 1.028 1.003 - 1.035    Blood Urine Negative NEG^Negative    pH Urine 5.5 5.0 - 7.0 pH    Protein Albumin Urine 10 (A) NEG^Negative mg/dL    Urobilinogen mg/dL Normal 0.0 - 2.0 mg/dL    Nitrite Urine Negative NEG^Negative    Leukocyte Esterase Urine Negative NEG^Negative    Source Clean catch urine     RBC Urine 1 0 - 2 /HPF    WBC Urine 1 0 - 5 /HPF    Bacteria Urine Few (A) NEG^Negative /HPF    Squamous Epithelial /HPF Urine 6 (H) 0 - 1 /HPF    Mucous Urine Present (A) NEG^Negative /LPF   Magnesium   Result Value Ref Range    Magnesium 1.8 1.6 - 2.3 mg/dL   TSH with free T4 reflex   Result Value Ref Range    TSH 2.84 0.40 - 4.00 mU/L   EKG 12-lead, tracing only   Result Value Ref Range    Interpretation ECG Click View Image link to view waveform and result    ISTAT gases lactate shashi POCT   Result Value Ref Range    Ph Venous 7.37 7.32 - 7.43 pH    PCO2 Venous 42 40 - 50 mm Hg    PO2 Venous 54 (H) 25 - 47 mm Hg    Bicarbonate Venous 24 21 - 28 mmol/L    O2 Sat Venous 87 %    Lactic Acid 1.2 0.7 - 2.1 mmol/L         Assessments & Plan (with Medical Decision Making)   54-year-old female presents to us with a chief complaint of intermittent muscle  spasms as well as intermittent confusion.  Differential includes but not limited to electrolyte imbalance, fluid retention, anemia, hypothyroid.  Patient's electrolytes are normal today.  She has normal kidney function as well.  Chest x-ray is clear.  Troponin and BNP and EKG are all normal.  TSH is normal as well.  Patient is not anemic.  Urine is clear.  CT head as well as chest x-ray were unremarkable.  Do not have an obvious cause for the patient's intermittent muscle spasms.  She states they have been moving initially the order feet now they are in the hands she is also felt numb up into her chest.  I do not feel there is a likely ischemic cardiac source of this based on her symptoms and work-up.  At this point she is not experiencing any muscle spasms and has no chest pain shortness of breath or other concerning signs.  She is eager to be discharged and will follow up with her primary care provider.    I have reviewed the nursing notes.    I have reviewed the findings, diagnosis, plan and need for follow up with the patient.    New Prescriptions    No medications on file       Final diagnoses:   Muscle spasm       9/18/2019   [facility], [place], EMERGENCY DEPARTMENT     Monico Jones,   09/18/19 [id number]

## 2019-09-18 NOTE — DISCHARGE INSTRUCTIONS
Follow-up with your primary care provider.  Return to the emergency department as needed for any new or worsening symptoms.

## 2019-09-18 NOTE — ED AVS SNAPSHOT
[facility], [place], Emergency Department  [address] [place] [id number]  Phone:  [phone]                                    Marleen Mcfadden   MRN: [id number]    Department:  [facility], Emergency Department   Date of Visit:  9/18/2019           After Visit Summary Signature Page    I have received my discharge instructions, and my questions have been answered. I have discussed any challenges I see with this plan with the nurse or doctor.    ..........................................................................................................................................  Patient/Patient Representative Signature      ..........................................................................................................................................  Patient Representative Print Name and Relationship to Patient    ..................................................               ................................................  Date                                   Time    ..........................................................................................................................................  Reviewed by Signature/Title    ...................................................              ..............................................  Date                                               Time          22EPIC Rev 08/18

## 2019-09-18 NOTE — LETTER
September 18, 2019      To Whom It May Concern:      Marleen Mcfdaden was seen in our Emergency Department today, 09/18/19.  I expect her condition to improve over the next 1 days.  She may return to work/school when improved.    Sincerely,        Monico Jones, DO

## 2019-09-18 NOTE — TELEPHONE ENCOUNTER
Returned call to pt. States she is still havign significant pain and cramping in her legs. Pt's potassium normal 6 days ago. However pt has been having significant diarrhea for the past 3 days. Advised pt to go to ED for evaluation. Mayte Fu RN CORE Care Coordinator

## 2019-09-19 ENCOUNTER — PATIENT OUTREACH (OUTPATIENT)
Dept: CARE COORDINATION | Facility: CLINIC | Age: 54
End: 2019-09-19

## 2019-09-19 DIAGNOSIS — Z71.89 OTHER SPECIFIED COUNSELING: ICD-10-CM

## 2019-09-19 ASSESSMENT — ACTIVITIES OF DAILY LIVING (ADL)
DEPENDENT_IADLS:: INDEPENDENT
DEPENDENT_IADLS:: INDEPENDENT

## 2019-09-19 NOTE — TELEPHONE ENCOUNTER
Called Marleen. She reports she is still having cramping in her leges, pain in her neck and a numb sensation in her face. She reports that her arms will even cramp up from her holding the phone. She tells me the cramps are so severe that they will involuntarily turn her fingers and toes up and down. She tells me that the cramps also wrap around her chest into her arm.She tells me she has 'constant heart burn' but then when asked if she has taken anything to help with the heart burn she tells me 'it feels different than heart burn' and she doesn't know why she would have heart burn since her 'diet is mostly clean.' She now tells me that these symptoms have been going on for 'about a year.' She is reminded of before she was diagnosed with cardiomyopathy when she knew something was wrong with her body and she had to advocate very hard for it to be discovered. She is in a lot of pain from the cramping. I asked if she has tried anything to help with the pain and she reports that she 'tried the sauna' at her local gym but it didn't help. She reports no new medications. Only change she notes is that she is now taking lasix 40mg daily (she reports she is trying to take it daily) and spironolactone 50mg daily (this was increased by Dr. Kolb). She tells me that even with taking her lasix daily, she has gained 5lb since she last saw cardiology. She doesn't feel she is peeing as much and reports her pee is yellow and darker than it used to be. She proceeds to tell me that the 'confusion is a bit much.' I asked her to tell me more about this and she reports that she loses her train of thought and that this has been going on 'for a while.' She reports one episode where yesterday she drove to a store, went into the store and couldn't find her keys. She returned to her car to find that she had left the car running without even putting the car into park. She is concerned by this.   We discussed that I don't have an answer or  solution to her symptoms at this time. I encouraged her to schedule an appointment with her primary care for follow up in the near future. I let her know I would work on getting her in with Dr. Kolb but that I couldn't say whether her symptoms are related to her heart; reviewed that her testing from emergency room yesterday is reassuring although frustrating to not have an answer or clear cause. Recommended that if she experiences chest pain she should return to the emergency room for evaluation.   Marleen stated understanding. I provided reassurance and support. She expressed being frustrated and exhausted from dealing with this pain and not having any answers. She was agreeable to plan, stating 'it's the only plan we have.' She will call for PCP appointment. Will route to provider for any further recommendations.

## 2019-09-19 NOTE — PROGRESS NOTES
Clinic Care Coordination Contact    Clinic Care Coordination Contact  OUTREACH    Referral Information:  Referral Source: ED Follow-Up    Primary Diagnosis: CHF    Chief Complaint   Patient presents with     Clinic Care Coordination - Initial     SW        Universal Utilization: Clinic Utilization  Difficulty keeping appointments:: No  Compliance Concerns: No  No-Show Concerns: No  No PCP office visit in Past Year: No  Utilization    Last refreshed: 9/19/2019  2:35 PM:  Hospital Admissions 0           Last refreshed: 9/19/2019  2:35 PM:  ED Visits 2           Last refreshed: 9/19/2019  2:35 PM:  No Show Count (past year) 2              Current as of: 9/19/2019  2:35 PM            Clinical Concerns:  Current Medical/Behavioral Concerns:    Patient Active Problem List   Diagnosis     Autoimmune disease, not elsewhere classified     Primary cardiomyopathy (H)     Leiomyoma of uterus     Allergic rhinitis     Anemia     Sinus bradycardia     Health Care Home     Itching     CHF with cardiomyopathy (H)     TB lung, latent     Knee pain     Swelling of knee joint     Thyroid nodule     Pain in joint involving ankle and foot     Calcaneal spur     Plantar fasciitis     CARDIOVASCULAR SCREENING; LDL GOAL LESS THAN 160     Peroneus longus tendinitis     Morbid obesity (H)     Shaina's nodes     Familial cardiomyopathy (H)     Acute pain of right knee     Acute bilateral low back pain with right-sided sciatica     Degenerative disc disease at L5-S1 level     Chronic bilateral low back pain with right-sided sciatica     Chronic bilateral low back pain with left-sided sciatica     Elevated blood pressure reading without diagnosis of hypertension     Chronic diastolic congestive heart failure (H)     Injury of left shoulder, initial encounter     Sprain of other ligament of left ankle, initial encounter     Left shoulder pain     Rotator cuff injury     Status post left knee replacement     Nontraumatic rupture of quadriceps  "tendon, left     Primary osteoarthritis of both knees     Elevated triglycerides with high cholesterol     Gastroesophageal reflux disease with esophagitis     Education Provided to patient: RNCC requesting Covering SW to outreach to patient for questions regarding FMLA and SSD. Per chart review of conversation with RNCC: Patient does not believe she can go to work tomorrow. She has concerns about being fired from her job.  Patient believes she has used all her FMLA- Patient thinks she has used 12 weeks this year due to knee surgeries I n January 2019. Patient was denied for SSD; patient is working with a  on an appeal. SW outreaches to patient to follow up. Patient confirms she believes she may have used up all of her FMLA. SW provided brief overview of FMLA however advised she contact her absent management department at her employer to confirm and assess eligibility as this is considered on a case by case basis under the Americans with Disabilities Act. SW indicates she \"may\" qualify but would need to confirm with her employers absent management department. Patient will contact her employer. Patient had questions about [employer] as she has paid monthly premiums but has been informed its not active. DUSTIN advised contacting [facility] or [facility] on back of insurance card; patient verbalized understanding. Patient reports being denied from SSD; receiving denial letter on 9/6/19. SW provided education on SSD denials and appeals process and patient has a call out to a  to move forward with an appeal; no additional resources needed/provided. Patient requested contact information for RNCC; number provided.     Pain  Pain (GOAL):: No  Health Maintenance Reviewed: - To be reviewed with patient at next PCP office visit.   Clinical Pathway: None    Functional Status:  Dependent ADLs:: Independent  Dependent IADLs:: Independent  Bed or wheelchair confined:: No  Mobility Status: Independent    Living " Situation:  Current living arrangement:: I live in a private home  Type of residence:: Apartment(home)    Diet/Exercise/Sleep:  Diet:: No added salt  Inadequate nutrition (GOAL):: No  Food Insecurity: No  Tube Feeding: No  Exercise:: Yes  Inadequate activity/exercise (GOAL):: No  Significant changes in sleep pattern (GOAL): No    Transportation:  Transportation concerns (GOAL):: No     Psychosocial:  Sikhism or spiritual beliefs that impact treatment:: No  Mental health DX:: No  Mental health management concern (GOAL):: No  Informal Support system:: Significant other     Financial/Insurance:   Financial/Insurance concerns (GOAL):: Yes     Resources and Interventions:  Current Resources:   Community Resources: None  Supplies used at home:: None  Equipment Currently Used at Home: none    Advance Care Plan/Directive  Advanced Care Plans/Directives on file:: No  Advanced Care Plan/Directive Status: Not Applicable    Referrals Placed: Other     Goals:   Goals        General    Medication 1 (pt-stated)     Notes - Note created  9/19/2019 12:45 PM by Lakisha Deutsch RN    Goal Statement: I will take my medications as directed.   Measure of Success: completed  Supportive Steps to Achieve: patient verbalizes understanding  Barriers: na  Strengths: desire for good health  Date to Achieve By: 1/1/20  Patient expressed understanding of goal: yes          Monitoring (pt-stated)     Notes - Note created  9/19/2019 12:53 PM by Lakisha Deutsch RN    Goal Statement: I will weight myself daily. I will report a weight gain of 2 pounds in one day or 5 pounds in a week to the [facility] clinic.   Measure of Success: completed  Supportive Steps to Achieve: patient verbalizes understanding  Barriers: na  Strengths: desire for good health  Date to Achieve By: 1/1/20  Patient expressed understanding of goal: yes               Patient/Caregiver understanding: Patient had a good understanding of our conversation related to next steps.        Future Appointments              In 2 months  LAB  Health Lab, [facility]    In 2 months [id number] Cardiac Services, [facility]    In 2 months Cassie Kolb MD [facility] Heart Care, [facility]          Plan:   Patient will contact employer re: [place].  Patient will contact [facility] re: [employer] insurance questions.   Patient will work on appeal with disability .   RNCC to continue to follow. No further outreaches by  at this time.     MANINDER Coleman  Covering   [facility]   Care Coordination  [phone]

## 2019-09-19 NOTE — TELEPHONE ENCOUNTER
Health Call Center    Phone Message    May a detailed message be left on voicemail: yes    Reason for Call: Other: Pt called to follow up regarding her previous conversation with Gwen regarding her symptoms. She wanted to let Gwen know that Dr. Grimes is not able to get her in until October 1st. Pt is wondering if Dr. Kolb would be able to have her take time off work. She said that she has long term disability coverage, so she thinks that she would be able to take time off without problem. Please give her a call back.     Action Taken: Message routed to:  [facility] ([facility] Cardiology

## 2019-09-19 NOTE — PROGRESS NOTES
Clinic Care Coordination Contact    Clinic Care Coordination Contact  OUTREACH    Referral Information:  Referral Source: ED Follow-Up    Primary Diagnosis: CHF       [place] Utilization:   Clinic Utilization  Difficulty keeping appointments:: No  Compliance Concerns: No  No-Show Concerns: No  No PCP office visit in Past Year: No  Utilization    Last refreshed: 9/19/2019 12:48 PM:  Hospital Admissions 0           Last refreshed: 9/19/2019 12:48 PM:  ED Visits 2           Last refreshed: 9/19/2019 12:48 PM:  No Show Count (past year) 2              Current as of: 9/19/2019 12:48 PM              Clinical Concerns:  Current Medical Concerns:  Patient was seen at [facility] Emergency Department 9/18/19 for muscle cramps.   Per Emergency Department note:   Patient's electrolytes are normal today.  She has normal kidney function as well.  Chest x-ray is clear.  Troponin and BNP and EKG are all normal.  TSH is normal as well.  Patient is not anemic.  Urine is clear.  CT head as well as chest x-ray were unremarkable.  Do not have an obvious cause for the patient's intermittent muscle spasms.     Clinic Care Coordinator RN spoke with patient today.  Patient reports muscle cramps are unchanged. Patient reports cramps start in her legs, patient then has pain and numbness in her toes, fingers, in her chest and neck.   Patient reports she has been taking her Lasix as directed.  Patient drinks an electrolyte drink.   Patient reports her weight is up 6 pounds in the last 3 weeks.   Current wt 230.  Patient reports wt was 224 3 weeks ago. Patient denies sob,  Patient has edema in feet, ankles and legs bilaterally.     Clinic Care Coordinator RN called CORE clinic at [facility].  Clinic Care Coordinator RN spoke with Gwen JERRY for Dr. Kolb.  She agreed to notify Dr. Kolb.   Clinic Care Coordinator RN will route to PCP to advise.     Patient reports she was given a letter from the Emergency Department excusing her from work for  one day (today) .  Patient does not believe she can go to work tomorrow.    Clinic Care Coordinator RN will route to PCP.   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  She has concerns about being fired from her job.  Patient believes she has used all her FMLA- Patient thinks she has used 12 weeks this year due to knee surgeries   In 2019 Patient had left total knee revision . In 2019 patient had right knee revision   Patient was denies SSD.  Patient is working with a  on an [employer].   Clinic Care Coordinator RN will ask Clinic Care Coordinator  to call patient         Current Behavioral Concerns:  Na   Education Provided to patient: CHF teaching    Pain  Pain (GOAL):: No  Health Maintenance Reviewed: Not assessed  Clinical Pathway: Clinic Care Coordination CHF Assessment    Discharge:    CHF:    Home scale available:  Yes  Home scale weight this mornin    Symptom Review:   Heart Failure Symptoms  Shortness of breath:: No  Wheezing or noisy breathing?: No  Cough: No  Increased sputum: No  Fever: No  Chest pain: : Yes  Patient complains of: : chest pain  Frequency:: intermittent  Associated symptoms: : none  Relieved by:: none  Checking weight daily? : Yes  Weight?: Gain  Today's Weight?: 104.3 kg (230 lb)  Weight increase more than 2 lbs in 24 hours?: No  Weight Increase more than 5 lbs in 1 week? : Yes  Diet:: No added salt  Appetite:: Normal  Swelling: : Feet, Ankles, Lower legs  Weakness (Heaviness in limbs):: Yes  What Heart Failure zone are you currently in?: Yellow      Functional Status:  Dependent ADLs:: Independent  Dependent IADLs:: Independent  Bed or wheelchair confined:: No  Mobility Status: Independent  Fallen 2 or more times in the past year?: No  Any fall with injury in the past year?: Yes    Living Situation:  Current living arrangement:: I live in a private home  Type of residence:: (home)    Diet/Exercise/Sleep:  Diet:: No  added salt  Inadequate nutrition (GOAL):: No  Food Insecurity: No  Tube Feeding: No  Exercise:: Yes  How intense was your typical exercise? : Light (like stretching or slow walking)  Inadequate activity/exercise (GOAL):: No  Significant changes in sleep pattern (GOAL): No    Transportation:  Transportation concerns (GOAL):: No        Psychosocial:  Gnosticism or spiritual beliefs that impact treatment:: No  Mental health DX:: No  Mental health management concern (GOAL):: No  Informal Support system:: Significant other     Financial/Insurance:   Financial/Insurance concerns (GOAL):: Yes       Resources and Interventions:  Current Resources:    ;   Community Resources: None  Supplies used at home:: None  Equipment Currently Used at Home: none    Advance Care Plan/Directive  Advanced Care Plans/Directives on file:: No          Goals:   Goals        General    Medication 1 (pt-stated)     Notes - Note created  9/19/2019 12:45 PM by Lakisha Deutsch RN    Goal Statement: I will take my medications as directed.   Measure of Success: completed  Supportive Steps to Achieve: patient verbalizes understanding  Barriers: na  Strengths: desire for good health  Date to Achieve By: 1/1/20  Patient expressed understanding of goal: yes          Monitoring (pt-stated)     Notes - Note created  9/19/2019 12:53 PM by Lakisha Deutsch RN    Goal Statement: I will weight myself daily. I will report a weight gain of 2 pounds in one day or 5 pounds in a week to the [facility] clinic.   Measure of Success: completed  Supportive Steps to Achieve: patient verbalizes understanding  Barriers: na  Strengths: desire for good health  Date to Achieve By: 1/1/20  Patient expressed understanding of goal: yes                   Patient/Caregiver understanding: yes       Future Appointments              In 2 months  LAB  Health Lab, [facility]    In 2 months [facility] Cardiac Services, [facility]    In 2 months Cassie Kolb MD [facility] Heart  Care, [facility]          Plan: Clinic Care Coordinator RN will route to PCP  Clinic Care Coordinator RN will ask Clinic Care Coordinator  to call patient.   Clinic Care Coordinator RN will follow up in 5-7 days.

## 2019-09-19 NOTE — TELEPHONE ENCOUNTER
[facility] Call [facility]    Phone Message    May a detailed message be left on voicemail: yes    Reason for Call: Other: Patient is calling back, states they are still experiencing the same symptoms today. Patient reports they have not had a call back yet. Please call patient to discuss symptoms.     Action Taken: Message routed to:  [facility] uc cardio

## 2019-09-19 NOTE — LETTER
[facility]  Complex Care Plan  About Me:    Patient Name:  Marleen Mcfadden    YOB: 1965  Age:         54 year old   Yogi MRN:    [id number] Telephone Information:  Home Phone [phone]   Mobile [phone]       Address:  [address]  [place] [address] Email address:  [email or web address]      Emergency Contact(s)    Name Relationship Lgl Grd Work Phone Home Phone Mobile Phone   1. RADHA MCFADDEN Daughter No   [phone]   2. NICKI CORTES Friend    [phone]   3. SCHUYLER MCFADDEN Son    [phone]           Primary language:  English     needed? No   [employer]:  [phone] op. 1  Other communication barriers: None  Preferred Method of Communication:  Mail  Current living arrangement: I live in a private home  Mobility Status/ Medical Equipment: Independent    Health Maintenance  Health Maintenance Reviewed: Not assessed    My Access Plan  Medical Emergency 911   Primary Clinic Line [facility] [phone]   24 Hour Appointment Line [phone] or  [phone] ([phone]) (toll-free)   24 Hour Nurse Line [phone] (toll-free)   Preferred Urgent Care     [facility]  [phone]   Preferred Pharmacy [id number] [address] AT [facility] [address]     Behavioral Health Crisis Line The National Suicide Prevention Lifeline at 1-658.367.3882 or 911             My Care Team Members  Patient Care Team       Relationship Specialty Notifications Start End    Danae Grimes MD PCP - General Family Practice  10/27/16     Phone: [phone] Fax: [phone] [address] [facility] [address]    Danny Grace MD MD OB/Gyn  4/9/14     Phone: [phone] Fax: [phone]         [facility] [address] [address]    Cassie Kolb  MD [facility]  9/19/14     Phone: [phone] Fax: [phone]         [address]  [facility] [address]    Dev Rocha MD MD Orthopaedic Surgery  1/9/19     Josefina team fax [phone]    Phone: [phone] Fax: [phone]         [facility] [address] [place] [address] [facility] [address]    Gwen Swenson, RN Specialty Care Coordinator Cardiology Admissions 5/16/19     Phone: [phone]         Danae Grimes MD Assigned PCP   6/23/19     Phone: [phone] Fax: [phone]         [address] [facility] [address]    Lakisha Deutsch, RN Lead Care Coordinator Primary Care - CC Admissions 9/19/19     Phone: [phone] Fax: [phone]                My Care Plans  Self Management and Treatment Plan  Goals and (Comments)  Goals        General    Medication 1 (pt-stated)     Notes - Note created  9/19/2019 12:45 PM by Lakisha Deutsch RN    Goal Statement: I will take my medications as directed.   Measure of Success: completed  Supportive Steps to Achieve: patient verbalizes understanding  Barriers: na  Strengths: desire for good health  Date to Achieve By: 1/1/20  Patient expressed understanding of goal: yes          Monitoring (pt-stated)     Notes - Note created  9/19/2019 12:53 PM by Lakisha Deutsch RN    Goal Statement: I will weight myself daily. I will report a weight gain of 2 pounds in one day or 5 pounds in a week to the [facility] clinic.   Measure of Success: completed  Supportive Steps to Achieve: patient verbalizes understanding  Barriers: na  Strengths: desire for good health  Date to Achieve By: 1/1/20  Patient expressed understanding of goal: yes                  Action Plans on File:                       Advance Care Plans/Directives Type:        My Medical and Care Information  Problem List   Patient Active Problem List   Diagnosis     Autoimmune disease, not elsewhere classified     Primary cardiomyopathy (H)     Leiomyoma of uterus      Allergic rhinitis     Anemia     Sinus bradycardia     Health Care Home     Itching     CHF with cardiomyopathy (H)     TB lung, latent     Knee pain     Swelling of knee joint     Thyroid nodule     Pain in joint involving ankle and foot     Calcaneal spur     Plantar fasciitis     CARDIOVASCULAR SCREENING; LDL GOAL LESS THAN 160     Peroneus longus tendinitis     Morbid obesity (H)     Shaina's nodes     Familial cardiomyopathy (H)     Acute pain of right knee     Acute bilateral low back pain with right-sided sciatica     Degenerative disc disease at L5-S1 level     Chronic bilateral low back pain with right-sided sciatica     Chronic bilateral low back pain with left-sided sciatica     Elevated blood pressure reading without diagnosis of hypertension     Chronic diastolic congestive heart failure (H)     Injury of left shoulder, initial encounter     Sprain of other ligament of left ankle, initial encounter     Left shoulder pain     Rotator cuff injury     Status post left knee replacement     Nontraumatic rupture of quadriceps tendon, left     Primary osteoarthritis of both knees     Elevated triglycerides with high cholesterol     Gastroesophageal reflux disease with esophagitis      Current Medications and Allergies:  See printed Medication Report.    Care Coordination Start Date: 9/19/2019   Frequency of Care Coordination:     Form Last Updated: 09/19/2019

## 2019-09-20 ENCOUNTER — OFFICE VISIT (OUTPATIENT)
Dept: FAMILY MEDICINE | Facility: CLINIC | Age: 54
End: 2019-09-20
Payer: OTHER MISCELLANEOUS

## 2019-09-20 VITALS
SYSTOLIC BLOOD PRESSURE: 124 MMHG | RESPIRATION RATE: 16 BRPM | DIASTOLIC BLOOD PRESSURE: 80 MMHG | OXYGEN SATURATION: 100 % | BODY MASS INDEX: 38.79 KG/M2 | WEIGHT: 226 LBS | TEMPERATURE: 98.6 F | HEART RATE: 58 BPM

## 2019-09-20 DIAGNOSIS — Z78.9 ODD DETRIMENTAL HEALTH BELIEFS: ICD-10-CM

## 2019-09-20 DIAGNOSIS — Z96.652 STATUS POST LEFT KNEE REPLACEMENT: ICD-10-CM

## 2019-09-20 DIAGNOSIS — M79.2 NERVE PAIN: ICD-10-CM

## 2019-09-20 DIAGNOSIS — M62.838 MUSCLE SPASM: Primary | ICD-10-CM

## 2019-09-20 DIAGNOSIS — F32.1 MODERATE MAJOR DEPRESSION (H): ICD-10-CM

## 2019-09-20 PROCEDURE — 99215 OFFICE O/P EST HI 40 MIN: CPT | Performed by: FAMILY MEDICINE

## 2019-09-20 RX ORDER — GABAPENTIN 300 MG/1
300 CAPSULE ORAL 3 TIMES DAILY
Qty: 90 CAPSULE | Refills: 1 | Status: SHIPPED | OUTPATIENT
Start: 2019-09-20 | End: 2019-09-20

## 2019-09-20 RX ORDER — SERTRALINE HYDROCHLORIDE 25 MG/1
50 TABLET, FILM COATED ORAL DAILY
Qty: 30 TABLET | Refills: 1 | Status: SHIPPED | OUTPATIENT
Start: 2019-09-20 | End: 2019-09-20 | Stop reason: DRUGHIGH

## 2019-09-20 RX ORDER — GABAPENTIN 300 MG/1
300 CAPSULE ORAL 3 TIMES DAILY
Qty: 90 CAPSULE | Refills: 1 | Status: SHIPPED | OUTPATIENT
Start: 2019-09-20 | End: 2019-10-01

## 2019-09-20 ASSESSMENT — ANXIETY QUESTIONNAIRES
3. WORRYING TOO MUCH ABOUT DIFFERENT THINGS: MORE THAN HALF THE DAYS
4. TROUBLE RELAXING: SEVERAL DAYS
7. FEELING AFRAID AS IF SOMETHING AWFUL MIGHT HAPPEN: SEVERAL DAYS
5. BEING SO RESTLESS THAT IT IS HARD TO SIT STILL: NOT AT ALL
GAD7 TOTAL SCORE: 9
7. FEELING AFRAID AS IF SOMETHING AWFUL MIGHT HAPPEN: SEVERAL DAYS
GAD7 TOTAL SCORE: 9
GAD7 TOTAL SCORE: 9
2. NOT BEING ABLE TO STOP OR CONTROL WORRYING: MORE THAN HALF THE DAYS
1. FEELING NERVOUS, ANXIOUS, OR ON EDGE: MORE THAN HALF THE DAYS
6. BECOMING EASILY ANNOYED OR IRRITABLE: SEVERAL DAYS

## 2019-09-20 ASSESSMENT — PATIENT HEALTH QUESTIONNAIRE - PHQ9
10. IF YOU CHECKED OFF ANY PROBLEMS, HOW DIFFICULT HAVE THESE PROBLEMS MADE IT FOR YOU TO DO YOUR WORK, TAKE CARE OF THINGS AT HOME, OR GET ALONG WITH OTHER PEOPLE: SOMEWHAT DIFFICULT
SUM OF ALL RESPONSES TO PHQ QUESTIONS 1-9: 12
SUM OF ALL RESPONSES TO PHQ QUESTIONS 1-9: 12

## 2019-09-20 NOTE — TELEPHONE ENCOUNTER
Returned call to Marleen. She states she will now see Dr. Grimes later today. She again asks about time off work; encouraged her to discuss this at her appointment today. She stated she might not need FMLA since she has LTD through her employer.

## 2019-09-20 NOTE — PROGRESS NOTES
Subjective     Marleen Mcfadden is a 54 year old female who presents to clinic today for the following health issues:    Depression and Anxiety Follow-Up    New diagnosis; Marleen states she has been crying all the time, feels hopeless and depressed, aggravated by prolonged healing time and multiple surgeries for left knee OA. She is wondering about starting mediation. She doesn't want to be on medication but does want to feel better.    Are you having other symptoms that might be associated with depression or anxiety? Yes:  poor sleep    Have you had a significant life event? OTHER: ongong medical issues     Do you have any concerns with your use of alcohol or other drugs? No    Social History     Tobacco Use     Smoking status: Never Smoker     Smokeless tobacco: Never Used   Substance Use Topics     Alcohol use: Yes     Comment: rare, twice a year, she has a drink little wine 2 days ago     Drug use: No     PHQ 2/14/2018 2/15/2018 9/20/2019   PHQ-9 Total Score 0 0 12   Q9: Thoughts of better off dead/self-harm past 2 weeks Not at all Not at all Not at all     GREGORIO-7 SCORE 3/26/2018 10/10/2018 9/20/2019   Total Score - - 9 (mild anxiety)   Total Score 0 5 9       PHQ-9 SCORE 2/14/2018 2/15/2018 9/20/2019   PHQ-9 Total Score MyChart - 0 12 (Moderate depression)   PHQ-9 Total Score 0 0 12         HPI   ED/UC Followup: muscle spasms    Facility:  [facility]  Date of visit: 09/18/2019  Reason for visit: nerve pain of left upper lateral calf that  Is associated with spasms of left leg, calf, then other leg starts to spasm and spreads to right leg,  right chest, fingers and arms  Aggravated by weight bearing (walking), relieved with self massage  Current Status: still experiencing tremors   She's had similar symptoms in the past which she reports was treated with steroid burst and steroid injection into her leg.  She feels symptoms started after her first TKR left knee 10/2017; she's had 2  "surgeries since for knee revision, most recently 2019, she report that her thigh muscle \"never kicked in\".  She's had an EMG  A year ago and was told that her leg nerves were normal  She reports that she's had standing MRI and at least 2 other MRIs that have been normal, per patient     MRI lumbar spine 2016: DDD L5-S1 with mild bilateral neural foraminal narrowing      Allergies   Allergen Reactions     Morphine      EMESIS     Nickel      Sulfa Drugs Swelling     Recent Labs   Lab Test 19  1201 19  1619  19  0740 19  1121  02/15/18  1450  14  1033   A1C  --   --   --   --  5.5  --   --   --   --    LDL  --   --   --  83 92  --   --   --  96   HDL  --   --   --  44* 44*  --   --   --  60   TRIG  --   --   --  175* 179*  --   --   --  63   ALT 28 35  --  35 34   < > 27   < > 39   CR 0.70 0.81   < > 0.69 0.77   < >  --    < > 0.77   GFRESTIMATED >90 81   < > >90 87   < >  --    < > 80   GFRESTBLACK >90 >90   < > >90 >90   < >  --    < > >90   POTASSIUM 3.9 4.1   < > 3.6 3.9   < >  --    < > 4.0   TSH 2.84  --   --   --   --   --  2.15   < > 2.65    < > = values in this interval not displayed.      BP Readings from Last 3 Encounters:   19 124/80   19 (!) 115/98   19 120/80    Wt Readings from Last 3 Encounters:   19 102.5 kg (226 lb)   19 104.3 kg (230 lb)   19 103 kg (227 lb)            Past Surgical History:   Procedure Laterality Date     C BREAST SURGERY PROCEDURE UNLISTED      Breast Reduction     C  DELIVERY ONLY  10/85,     , Low Cervicalx2     C EXCIS UTERINE FIBROID,ABD APPRCH       C EXCISE EXCESS SKIN TISSUE,ABDOMEN       C LIGATE FALLOPIAN TUBE       C REPAIR CRUCIATE LIGAMENT,KNEE      Right Knee     C TOTAL KNEE ARTHROPLASTY Left 10/03/2017     HYSTERECTOMY TOTAL ABDOMINAL      for fibroids; reports having blood transfusion after surgery     THYROIDECTOMY  2013    Procedure: THYROIDECTOMY; "  LEFT THYROID LOBECTOMY.  (LIGASURE, RECURRENT LARYNGEAL NERVE MONITOR) ;  Surgeon: Uriah Camargo MD;  Location: [facility]        Past Medical History:   Diagnosis Date     Allergic rhinitis, cause unspecified      Anemia      Autoimmune disease NEC     Autoimmune disease- unknown/poss SLE     Calcaneal spur 10/21/2014     CHF with cardiomyopathy (H)      Familial cardiomyopathy (H) 10/21/2015     Morbid obesity (H) 5/5/2015     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     no longer an issue     Other primary cardiomyopathies     Cardiomyopathy- dx'd 1999 idiopathic     PONV (postoperative nausea and vomiting)      UTERINE LEIOMYOMA NOS 10/25/2006      Reviewed and updated as needed this visit by Provider         Review of Systems   ROS COMP: Constitutional, HEENT, cardiovascular, pulmonary, GI, , musculoskeletal, neuro, skin, endocrine and psych systems are negative, except as otherwise noted.      Objective    /80 (BP Location: Left arm, Patient Position: Sitting, Cuff Size: Adult Large)   Pulse 58   Temp 98.6  F (37  C) (Tympanic)   Resp 16   Wt 102.5 kg (226 lb)   LMP 10/19/2008 (Approximate)   SpO2 100%   Breastfeeding? No   BMI 38.79 kg/m    Body mass index is 38.79 kg/m .  Physical Exam   GENERAL: healthy, alert and obese  RESP: lungs clear to auscultation - no rales, rhonchi or wheezes  CV: regular rate and rhythm, normal S1 S2, no S3 or S4, no murmur, click or rub, no peripheral edema and peripheral pulses strong  MS: well healed surgical incision right knee. Left knee; she reports decreased light touch sensation of left lateral upper leg. No skin changes noted. No edema noted. Full rom noted.   Foot exam - both sides normal; no swelling, tenderness or skin or vascular lesions. Color and temperature is normal. Sensation is intact. Peripheral pulses are palpable and Symmetrical bilaterally.  Comprehensive back pain exam:  No tenderness, Range of  motion not limited by pain, Lower extremity strength functional and equal on both sides, Lower extremity reflexes within normal limits bilaterally, Lower extremity sensation normal and equal on both sides and sensation diminished left leg upper lateral lower leg and Straight leg raise negative bilaterally  PSYCH: tearful and anxious  Significant ambivalence noted with difficult decision making; she tells me she's very depressed and want to feel better but does not want to take medications, takes some going back and forth to decide on medication, and then she has some difficulty deciding on dosage, making multiple changes, conflicted about starting on lowest dose possible vs, starting at what is most likely to be an effective dose.  LYMPH: normal ant/post cervical, supraclavicular nodes    Assessment & Plan     1. Muscle spasm  2. Nerve pain  With some numbness noted, consistent with neuropathic pain.  She feels strongly that all symptoms are related to complications of knee surgery, and that initial knee problems probably related to fall at work. On reviewing symptoms, prior hx, prior imaging and prior treatments (steroid burst) I do feel that DDD could be contributing, but she gets very upset when I discuss this, refuses imaging or work up for this etiology.   I do worry about secondary gain for her, she's been on short term disability for work related knee injury due to fall, and is currently on long term disability, although she does continue to work part time. I'm not comfortable prescribing a steroid burst unless I have imaging to back up diagnosis (ie, sciatica related to DDD), but she feels she's responded best to medrol dose pack and no other treatments are working.  It took considerable discussion, but we finally arrived at a mutually satisfying decision to start gabapentin as noted below.  - gabapentin (NEURONTIN) 300 MG capsule; Take 1 capsule (300 mg) by mouth 3 times daily  Dispense: 90 capsule; Refill:  1    3. Status post left knee replacement  See above. I reviewed orthopedic notes, which document as expected healing and recovery.    4. Moderate major depression (H)  New diagnosis today, moderate depression, start dosage as below, return to clinic 2 weeks for follow up.  - sertraline (ZOLOFT) 50 MG tablet; Take 1 tablet (50 mg) by mouth daily  Dispense: 30 tablet; Refill: 1     5. Fixed beliefs impacting medical care  Conversations are prolonged, confusing, ambivalent and contradictory (see above). Possible Axis II vs acute exaccerbation MMD with significant stress re: health.    Return in about 2 weeks (around 10/4/2019) for follow up if not improving.    Danae Grimes MD  [facility] [place]    More than 50% of this 45 minute visit was spent counseling patient and coordinating care as noted above.        Answers for HPI/ROS submitted by the patient on 9/20/2019   If you checked off any problems, how difficult have these problems made it for you to do your work, take care of things at home, or get along with other people?: Somewhat difficult  PHQ9 TOTAL SCORE: 12  GREGORIO 7 TOTAL SCORE: 9

## 2019-09-20 NOTE — LETTER
9/20/2019     RE: Marleen Mcfadden  [address]   [facility] [address]      September 12, 2019     RE:  Marleen Mcfadden                                                                    [address]   [facility] [place] [address]                 To whom it may concern:     Marleen Mcfadden was seen in clinic on 09/12/2019.   She  may return to work on 09/19/2019.  She was seen for:  1. Muscle spasm  And  2. Muscle cramp  New, previously undiagnosed problem with uncertain prognosis and additional work-up planned.   Evaluate for electrolyte abnormality, supportive cares           Sincerely,         Danae Grimes MD    [address] [address]  Phone: [phone]

## 2019-09-20 NOTE — LETTER
[address] [address]    [phone]- Telephone  [phone]- Fax  2019    Marleen Mcfadden  [address]   [facility] [address]  [phone] (home)     : 1965          To Whom it May Concern:    Marleen Mcfadden was seen in our clinic today, 2019 for nerve pain and muscle spasm. She may return to work when symptoms have improved.     Please contact me for questions or concerns.    Sincerely,      Sincerely,  Dr. Danae Grimes MD  2019

## 2019-09-21 ASSESSMENT — ANXIETY QUESTIONNAIRES: GAD7 TOTAL SCORE: 9

## 2019-09-23 ENCOUNTER — TELEPHONE (OUTPATIENT)
Dept: FAMILY MEDICINE | Facility: CLINIC | Age: 54
End: 2019-09-23

## 2019-09-23 ENCOUNTER — DOCUMENTATION ONLY (OUTPATIENT)
Dept: FAMILY MEDICINE | Facility: CLINIC | Age: 54
End: 2019-09-23

## 2019-09-23 NOTE — RESULT ENCOUNTER NOTE
Patient was seen today in clinic.  I discussed results in clinic, please see clinic progress note.    Danae Grimes MD 9/23/2019

## 2019-09-25 ENCOUNTER — TELEPHONE (OUTPATIENT)
Dept: FAMILY MEDICINE | Facility: CLINIC | Age: 54
End: 2019-09-25

## 2019-09-25 NOTE — TELEPHONE ENCOUNTER
"Dr. Grimes-Please review and sign if agree.    Letter pended.    Writer called patient who stated:  1. Letter from 9/20/19 is not sufficient for patient's employer  2. Addition needs to be made to that letter that patient will be seen by Dr. Grimes on 10/1/19 for follow up and expected return to work date will be after that visit.   A. Letter to be faxed to [employer]: [phone]   B. Patient's employer is currently considering her a No call/No show for work  3. Has not been able to start taking Sertraline nor Gabapentin medications because they are \"tied up\" in work comp    Patient informed Dr. Grimes not in clinic today but will be in clinic tomorrow and Friday.    Clarified with patient which letter from 9/20/19 needs revision.    Thank you!  LESA White, SADAN, RN      "

## 2019-09-25 NOTE — LETTER
[place] [id number] [place] [address]     [phone]- Telephone  [phone]- Fax  2019     Marleen Mcfadden  [address]   [facility] [address]  [phone] (home)      : 1965              To Whom it May Concern:     Marleen Mcfadden was seen in our clinic on 2019 for nerve pain and muscle spasm. She has been off work for those symptoms and reports not being able to work due to her symptoms. She has a follow up visit with me on 10/2/19 and her return to work date will be determine at that visit.     Please contact me for questions or concerns.        Sincerely,      Dr. Danae Grimes MD  2019

## 2019-09-25 NOTE — TELEPHONE ENCOUNTER
Patient requesting a call from Dr Grimes or her nurse.  Needs to discuss note for work comp that they are needing.  Please call today.  OK to BRODERICK on VM.

## 2019-09-26 NOTE — TELEPHONE ENCOUNTER
Signed forms placed in MA work box at [employer] 9/26/2019   Note routed to MA pool.  Danae Grimes MD   9/26/2019

## 2019-10-01 ENCOUNTER — OFFICE VISIT (OUTPATIENT)
Dept: FAMILY MEDICINE | Facility: CLINIC | Age: 54
End: 2019-10-01
Payer: OTHER MISCELLANEOUS

## 2019-10-01 ENCOUNTER — PATIENT OUTREACH (OUTPATIENT)
Dept: CARE COORDINATION | Facility: CLINIC | Age: 54
End: 2019-10-01

## 2019-10-01 VITALS
OXYGEN SATURATION: 99 % | RESPIRATION RATE: 20 BRPM | HEART RATE: 53 BPM | TEMPERATURE: 98.1 F | DIASTOLIC BLOOD PRESSURE: 74 MMHG | BODY MASS INDEX: 39.65 KG/M2 | WEIGHT: 231 LBS | SYSTOLIC BLOOD PRESSURE: 118 MMHG

## 2019-10-01 DIAGNOSIS — M79.2 NERVE PAIN: ICD-10-CM

## 2019-10-01 DIAGNOSIS — M62.838 MUSCLE SPASM: ICD-10-CM

## 2019-10-01 DIAGNOSIS — F32.1 MODERATE MAJOR DEPRESSION (H): ICD-10-CM

## 2019-10-01 PROCEDURE — 99213 OFFICE O/P EST LOW 20 MIN: CPT | Performed by: FAMILY MEDICINE

## 2019-10-01 RX ORDER — GABAPENTIN 300 MG/1
300 CAPSULE ORAL 3 TIMES DAILY
Qty: 90 CAPSULE | Refills: 1 | Status: SHIPPED | OUTPATIENT
Start: 2019-10-01 | End: 2019-10-10 | Stop reason: SINTOL

## 2019-10-01 NOTE — PROGRESS NOTES
"Subjective     Marleen Mcfadden is a 54 year old female who presents to clinic today for the following health issues:    HPI   Musculoskeletal problem/pain; left knee pain with spasm    Marleen continues to have ongoing nerve pain of left upper lateral calf that  Is associated with spasms of left leg, and calf. Marleen reports that once the left leg starts to spasm then her right leg will too, and the spasms spread to right chest, both hands, fingers and arms.  Aggravated by weight bearing (walking), relieved with self massage  Current Status: still experiencing tremors   She's had similar symptoms in the past which she reports was treated with steroid burst and steroid injection into her leg.  She feels symptoms started after her first TKR left knee 10/2017; she's had 2 surgeries since for knee revision, most recently 2/2019, she report that her thigh muscle \"never kicked in\".  She reports that she had an EMG a year ago and was told that her leg nerves were normal. I don't have access to this report.    She's had trouble with her worker's compensation paying for the medications I prescribed at her last visit (gabapentin and sertraline) and she is here with her worker's comp advocate, Francesca to review notes and work notes.    Patient Active Problem List   Diagnosis     Autoimmune disease, not elsewhere classified     Primary cardiomyopathy (H)     Leiomyoma of uterus     Allergic rhinitis     Anemia     Sinus bradycardia     Health Care Home     Itching     CHF with cardiomyopathy (H)     TB lung, latent     Knee pain     Swelling of knee joint     Thyroid nodule     Pain in joint involving ankle and foot     Calcaneal spur     Plantar fasciitis     CARDIOVASCULAR SCREENING; LDL GOAL LESS THAN 160     Peroneus longus tendinitis     Morbid obesity (H)     Shaina's nodes     Familial cardiomyopathy (H)     Acute pain of right knee     Acute bilateral low back pain with right-sided sciatica     Degenerative " disc disease at L5-S1 level     Chronic bilateral low back pain with right-sided sciatica     Chronic bilateral low back pain with left-sided sciatica     Elevated blood pressure reading without diagnosis of hypertension     Chronic diastolic congestive heart failure (H)     Injury of left shoulder, initial encounter     Sprain of other ligament of left ankle, initial encounter     Left shoulder pain     Rotator cuff injury     Status post left knee replacement     Nontraumatic rupture of quadriceps tendon, left     Primary osteoarthritis of both knees     Elevated triglycerides with high cholesterol     Gastroesophageal reflux disease with esophagitis     Muscle spasm     Odd detrimental health beliefs     Moderate major depression (H)     Past Surgical History:   Procedure Laterality Date     C BREAST SURGERY PROCEDURE UNLISTED      Breast Reduction     C  DELIVERY ONLY  10/85,     , Low Cervicalx2     C EXCIS UTERINE FIBROID,ABD APPRCH       C EXCISE EXCESS SKIN TISSUE,ABDOMEN       C LIGATE FALLOPIAN TUBE       C REPAIR CRUCIATE LIGAMENT,KNEE      Right Knee     C TOTAL KNEE ARTHROPLASTY Left 10/03/2017     HYSTERECTOMY TOTAL ABDOMINAL      for fibroids; reports having blood transfusion after surgery     THYROIDECTOMY  2013    Procedure: THYROIDECTOMY;  LEFT THYROID LOBECTOMY.  (LIGASURE, RECURRENT LARYNGEAL NERVE MONITOR) ;  Surgeon: Uriah Camargo MD;  Location: [facility]       Social History     Tobacco Use     Smoking status: Never Smoker     Smokeless tobacco: Never Used   Substance Use Topics     Alcohol use: Yes     Comment: rare, twice a year, she has a drink little wine 2 days ago     Family History   Problem Relation Age of Onset     Hypertension Father         dec     Diabetes Father         dec     Heart Disease Father         dec     Alcohol/Drug Father      Cardiovascular Father      Heart Disease Mother         dec     Alcohol/Drug Mother       Cardiovascular Mother      Heart Disease Daughter         Cardiomyopathy     Cardiovascular Daughter         cardiomyopathy     Colon Cancer Sister      Cardiovascular Son         cardiomyopathy     Diabetes Paternal Grandmother         dec     Hypertension Paternal Grandmother         dec     Cerebrovascular Disease Paternal Grandmother         dec     Cancer Sister         Lupus     Cardiovascular Sister         cardiomyopathy     Heart Disease Sister         heart failure, and kidney failure         Allergies   Allergen Reactions     Morphine      EMESIS     Nickel      Sulfa Drugs Swelling     BP Readings from Last 3 Encounters:   10/01/19 118/74   09/20/19 124/80   09/18/19 (!) 115/98    Wt Readings from Last 3 Encounters:   10/01/19 104.8 kg (231 lb)   09/20/19 102.5 kg (226 lb)   09/18/19 104.3 kg (230 lb)                    Reviewed and updated as needed this visit by Provider         Review of Systems   ROS COMP: Constitutional, HEENT, cardiovascular, pulmonary, GI, , musculoskeletal, neuro, skin, endocrine and psych systems are negative, except as otherwise noted.      Objective    /74 (BP Location: Right arm, Patient Position: Sitting, Cuff Size: Adult Large)   Pulse 53   Temp 98.1  F (36.7  C)   Resp 20   Wt 104.8 kg (231 lb)   LMP 10/19/2008 (Approximate)   SpO2 99%   BMI 39.65 kg/m    Body mass index is 39.65 kg/m .  Physical Exam   GENERAL: healthy, alert and no distress  PSYCH: anxious        Assessment & Plan     1. Moderate major depression (H)  PHQ-9 score:    PHQ-9 SCORE 9/20/2019   PHQ-9 Total Score MyChart 12 (Moderate depression)   PHQ-9 Total Score 12       Prescription re-written, possible side effects discussed, need to take daily for months for effect discussed.  The patient indicates understanding of these issues and agrees with the plan.   - sertraline (ZOLOFT) 50 MG tablet; Take 1 tablet (50 mg) by mouth daily  Dispense: 30 tablet; Refill: 1    2. Muscle spasm  Patient  reports this is related to chronic knee pain since knee replacement, see HPI  See discussion from 9/20/19 visit.  See orders.  Return to clinic 1 week for follow up to see if medication is helping symptoms.  - gabapentin (NEURONTIN) 300 MG capsule; Take 1 capsule (300 mg) by mouth 3 times daily  Dispense: 90 capsule; Refill: 1    3. Nerve pain  See above  - gabapentin (NEURONTIN) 300 MG capsule; Take 1 capsule (300 mg) by mouth 3 times daily  Dispense: 90 capsule; Refill: 1    Work letters completed with specific language as per worker's compensation advocate Francesca today, see letter.    More than 50% of this 20 minute visit was spent counseling pt and coordinating care as noted above.         Return in about 1 week (around 10/8/2019) for follow up.    Danae Grimes MD  [facility]

## 2019-10-01 NOTE — LETTER
"        [place] [id number] [place] [address] [phone]- Telephone  [phone]- Fax  2019    Marleen Mcfadden  [address]   [facility] [address]  [phone] (home)     : 1965          To Whom it May Concern:    I\"m writing to clarify work note form . Marleen Mcfadden was seen in our clinic on 2019  for left leg nerve pain and left calf muscle spasm. She was advised she could return to work when symptoms have improved.      Please contact me for questions or concerns.     Sincerely,        Sincerely,  Dr. Danae Grimes MD  10/1/2019   "

## 2019-10-01 NOTE — LETTER
[place] [address] [place] [address]    [phone]- Telephone  [phone]- Fax    2019    Marleen Mcfadden  [address]   [place] [address]  [phone] (home)     : 1965          To Whom it May Concern:    Marleen Mcfadden was seen in our clinic today, 2019 for left leg nerve pain and depression related to work injury. I expect her condition to improve over the next 14 days.  She may return to work October 15, 2019 with restrictions as per her orthopedic specialist Dr. Rocha.     If not improved during the above expected time period,  then I have encouraged her to be rechecked by her orthopedic specialist.    Please contact me for questions or concerns.    Sincerely,  Dr. Danae Grimes MD  10/1/2019

## 2019-10-01 NOTE — LETTER
[address] [address]  Phone: [phone]      REPORT OF WORK ABILITY      Date: 10/1/2019                     Employee Name: Marleen Mcfadden         YOB: 1965  Medical Record Number: [id number]   Soc.Sec.No: [id number]    To whom it may concern:    Marleen Mcfadden was seen in our clinic today, 10/1/2019 for left leg nerve pain and depression related to work injury. I expect her condition to improve over the next 14 days. She may return to work October 15, 2019 with work restrictions as per her orthopedic specialist Dr. Rocha.    If not improved during the above expected time period, then I have encouraged her to be rechecked by her orthopedic specialist.    Please contact me for questions or concerns.    Sincerely,  Dr. Danae Grimes MD  10/1/2019

## 2019-10-01 NOTE — PROGRESS NOTES
Clinic Care Coordination Contact  Care Team Conversations    Patient is seeing PCP today.   Clinic Care Coordinator RN will review chart and will attempt to follow up with patient later this week.

## 2019-10-02 ENCOUNTER — TELEPHONE (OUTPATIENT)
Dept: FAMILY MEDICINE | Facility: CLINIC | Age: 54
End: 2019-10-02

## 2019-10-02 NOTE — TELEPHONE ENCOUNTER
Prior Authorization Retail Medication Request    Medication/Dose: sertraline (ZOLOFT) 50 MG tablet  ICD code (if different than what is on RX):  Previously Tried and Failed:  Rationale:    Insurance Name: unknown  Insurance ID: [id number]    Pharmacy Information (if different than what is on RX)  Name: [facility]  Phone: [phone]    Please include previous medications tried and failed.  Please ask insurance for medications on formulary.

## 2019-10-04 NOTE — TELEPHONE ENCOUNTER
PA Initiation    Medication: sertraline (ZOLOFT) 50 MG tablet  Insurance Company: [employer] - Phone [phone] Fax [phone]  Pharmacy Filling the Rx: [id number] [place] [address] [address] [facility] [address]  Filling Pharmacy Phone: [phone]  Filling Pharmacy Fax:    Start Date: 10/4/2019    Central Prior Authorization Team   Phone: [phone]        Awaiting additional questions via CMM

## 2019-10-07 NOTE — TELEPHONE ENCOUNTER
Prior Authorization Not Needed per Insurance    Medication: sertraline (ZOLOFT) 50 MG tablet  Insurance Company: [employer] - Phone [phone] Fax [phone]  Pharmacy Filling the Rx: [id number] [place] [address] AT [facility] & [address]   Pharmacy Notified: Yes Called pharmacy and relayed response back from insurance that pa is not needed, and that if after calling their help desk number they are still having problems to call me back

## 2019-10-09 ENCOUNTER — PATIENT OUTREACH (OUTPATIENT)
Dept: CARE COORDINATION | Facility: CLINIC | Age: 54
End: 2019-10-09

## 2019-10-09 NOTE — PROGRESS NOTES
Clinic Care Coordination Contact  [facility]/Voicemail       Clinical Data: Care Coordinator Outreach  Outreach attempted x 1.  Left message on patient's voicemail with call back information and requested return call.  Plan:  Care Coordinator will try to reach patient again in 10 business days.

## 2019-10-10 ENCOUNTER — OFFICE VISIT (OUTPATIENT)
Dept: FAMILY MEDICINE | Facility: CLINIC | Age: 54
End: 2019-10-10
Payer: COMMERCIAL

## 2019-10-10 VITALS
HEIGHT: 64 IN | WEIGHT: 236 LBS | TEMPERATURE: 98.6 F | DIASTOLIC BLOOD PRESSURE: 80 MMHG | SYSTOLIC BLOOD PRESSURE: 120 MMHG | HEART RATE: 51 BPM | OXYGEN SATURATION: 99 % | BODY MASS INDEX: 40.29 KG/M2

## 2019-10-10 DIAGNOSIS — Z96.652 STATUS POST LEFT KNEE REPLACEMENT: Primary | ICD-10-CM

## 2019-10-10 DIAGNOSIS — R25.2 MUSCLE CRAMP: ICD-10-CM

## 2019-10-10 DIAGNOSIS — M25.562 LEFT KNEE PAIN, UNSPECIFIED CHRONICITY: ICD-10-CM

## 2019-10-10 DIAGNOSIS — M79.2 NERVE PAIN: ICD-10-CM

## 2019-10-10 PROCEDURE — 99214 OFFICE O/P EST MOD 30 MIN: CPT | Performed by: FAMILY MEDICINE

## 2019-10-10 ASSESSMENT — MIFFLIN-ST. JEOR: SCORE: 1650.49

## 2019-10-10 NOTE — PROGRESS NOTES
"Subjective     Marleen Mcfadden is a 54 year old female who presents to clinic today for the following health issues:    HPI       Musculoskeletal problem/pain; left knee pain with spasm    Marleen continues to have ongoing nerve pain of left upper lateral calf that  Is associated with spasms of left leg, and calf. Marleen reports that once the left leg starts to spasm then her right leg will too, and the spasms spread to right chest, both hands, fingers and arms.  She does feel that gabapentin helped reduce the nerve pain but has caused feeling \"not herself\", diarrhea, fatigue reporting sleeping up to 13 hours per day and weight gain. She still has tingling.   She feels depressed as well.  Aggravated by weight bearing (walking), relieved with self massage  Current Status: still experiencing tremors   She's had similar symptoms in the past which she reports was treated with steroid burst and steroid injection into her leg.  She feels symptoms started after her first TKR left knee 10/2017; she's had 2 surgeries since for knee revision, most recently 1/2019, she report that her thigh muscle \"never kicked in\" after the first TKR 10/2017 because the spacer was wrong.    She is here with her worker's comp advocate, Francesca to review notes and work notes.    Patient Active Problem List   Diagnosis     Autoimmune disease, not elsewhere classified     Primary cardiomyopathy (H)     Leiomyoma of uterus     Allergic rhinitis     Anemia     Sinus bradycardia     Health Care Home     Itching     CHF with cardiomyopathy (H)     TB lung, latent     Knee pain     Swelling of knee joint     Thyroid nodule     Pain in joint involving ankle and foot     Calcaneal spur     Plantar fasciitis     CARDIOVASCULAR SCREENING; LDL GOAL LESS THAN 160     Peroneus longus tendinitis     Morbid obesity (H)     Shaina's nodes     Familial cardiomyopathy (H)     Acute pain of right knee     Acute bilateral low back pain with right-sided " sciatica     Degenerative disc disease at L5-S1 level     Chronic bilateral low back pain with right-sided sciatica     Chronic bilateral low back pain with left-sided sciatica     Elevated blood pressure reading without diagnosis of hypertension     Chronic diastolic congestive heart failure (H)     Injury of left shoulder, initial encounter     Sprain of other ligament of left ankle, initial encounter     Left shoulder pain     Rotator cuff injury     Status post left knee replacement     Nontraumatic rupture of quadriceps tendon, left     Primary osteoarthritis of both knees     Elevated triglycerides with high cholesterol     Gastroesophageal reflux disease with esophagitis     Muscle spasm     Odd detrimental health beliefs     Moderate major depression (H)     Past Surgical History:   Procedure Laterality Date     C BREAST SURGERY PROCEDURE UNLISTED      Breast Reduction     C  DELIVERY ONLY  10/85,     , Low Cervicalx2     C EXCIS UTERINE FIBROID,ABD APPRCH       C EXCISE EXCESS SKIN TISSUE,ABDOMEN       C LIGATE FALLOPIAN TUBE       C REPAIR CRUCIATE LIGAMENT,KNEE      Right Knee     C TOTAL KNEE ARTHROPLASTY Left 10/03/2017     HYSTERECTOMY TOTAL ABDOMINAL      for fibroids; reports having blood transfusion after surgery     THYROIDECTOMY  2013    Procedure: THYROIDECTOMY;  LEFT THYROID LOBECTOMY.  (LIGASURE, RECURRENT LARYNGEAL NERVE MONITOR) ;  Surgeon: Uriah Camargo MD;  Location: [facility]       Social History     Tobacco Use     Smoking status: Never Smoker     Smokeless tobacco: Never Used   Substance Use Topics     Alcohol use: Yes     Comment: rare, twice a year, she has a drink little wine 2 days ago     Family History   Problem Relation Age of Onset     Hypertension Father         dec     Diabetes Father         dec     Heart Disease Father         dec     Alcohol/Drug Father      Cardiovascular Father      Heart Disease Mother         dec      Alcohol/Drug Mother      Cardiovascular Mother      Heart Disease Daughter         Cardiomyopathy     Cardiovascular Daughter         cardiomyopathy     Colon Cancer Sister      Cardiovascular Son         cardiomyopathy     Diabetes Paternal Grandmother         dec     Hypertension Paternal Grandmother         dec     Cerebrovascular Disease Paternal Grandmother         dec     Cancer Sister         Lupus     Cardiovascular Sister         cardiomyopathy     Heart Disease Sister         heart failure, and kidney failure         Allergies   Allergen Reactions     Morphine      EMESIS     Nickel      Sulfa Drugs Swelling     BP Readings from Last 3 Encounters:   10/01/19 118/74   09/20/19 124/80   09/18/19 (!) 115/98    Wt Readings from Last 3 Encounters:   10/01/19 104.8 kg (231 lb)   09/20/19 102.5 kg (226 lb)   09/18/19 104.3 kg (230 lb)                    Reviewed and updated as needed this visit by Provider         Review of Systems   ROS COMP: Constitutional, HEENT, cardiovascular, pulmonary, GI, , musculoskeletal, neuro, skin, endocrine and psych systems are negative, except as otherwise noted.      Objective    /74 (BP Location: Right arm, Patient Position: Sitting, Cuff Size: Adult Large)   Pulse 53   Temp 98.1  F (36.7  C)   Resp 20   Wt 104.8 kg (231 lb)   LMP 10/19/2008 (Approximate)   SpO2 99%   BMI 39.65 kg/m    Body mass index is 39.65 kg/m .  Physical Exam   GENERAL: healthy, alert and no distress  KNEES: Knees reveal full range of motion, well healed surgical incisions bilaterally, tenderness on palpation left upper thigh just below knee, without masses, effusion or ligamentous instability.   No edema noted of ankles. Normal foot and ankle exam.  PSYCH: anxious        Assessment & Plan     ASSESSMENT / PLAN:  (Z96.652) Status post left knee replacement  (primary encounter diagnosis)  With ongoing  (M25.562) Left knee pain, unspecified chronicity  Associated with   (M79.2) Nerve  pain  And  (R25.2) Muscle cramp  Comment: side effects on gabapentin, stop this medication.  Start amitriptyline as below, and referred to pain clinic, she may elect to go to [facility] for their pain service.  Plan: amitriptyline (ELAVIL) 25 MG tablet,   [facility]        Continue sertraline.      There are no Patient Instructions on file for this visit.

## 2019-10-10 NOTE — LETTER
[place] [id number] [place] [address]  Phone: [phone]    October 10, 2019        Marleen Mcfadden  [address]   [facility] [address]          To whom it may concern:    RE: Marleen Mcfadden    REPORT OF WORK ABILITY        Date: 10/1/2019                     Employee Name: Marleen Mcfadden         YOB: 1965  Medical Record Number: [id number]   Soc.Sec.No: [id number]     To whom it may concern:     Marleen Mcfadden was seen in our clinic today, 10/10/2019  for left leg nerve pain and depression related to work injury. We are continuing to adjust medications for related nerve pain. She is to remain off work for additional 3 weeks until 10/31/2019 or as per evaluation at [facility] pain clinic (appointment is still pending).     She was referred to pain clinic today.      Please contact me for questions or concerns.     Sincerely,  Dr. Danae Grimes MD  10/10/2019

## 2019-10-11 ENCOUNTER — PATIENT OUTREACH (OUTPATIENT)
Dept: CARDIOLOGY | Facility: CLINIC | Age: 54
End: 2019-10-11

## 2019-10-11 NOTE — PROGRESS NOTES
Called Marleen to check in. She reports she is doing 'okay.' She is working with her primary care physician to find new medications to help with her pain and cramping. She had tried gabapentin but it started to make her retain fluid so she has now switched to a new medication.  She confirms that her issues do not seem to be related to her heart and she does not think she needs to be seen by dr. Kolb at this time. COnfirmed her follow up appointments for 12/4/19. Encouraged Marleen to call us if she needs anything in the meantime.  Marleen and her family are planning a vacation to [facility] [place]. She is wondering if she can go on this trip as it is a much longer flight than her last one to [place]. She reports she would be traveling about 24 hours to get there. Will check with Dr. Kolb and call her back with any recommendations.

## 2019-10-14 NOTE — PROGRESS NOTES
Called Marleen back with travel recommendations. Patient ok to travel to [place] per DR. Kolb. Encouraged patient to avoid high sodium food on airplane. Bring back up medication supply and keep them in two separate bags so that if one bag is lost, still has supply of medication. Encouraged to get up and move during airplane ride.   Marleen stated understanding and reports no further questions at this time.

## 2019-10-23 ENCOUNTER — PATIENT OUTREACH (OUTPATIENT)
Dept: CARE COORDINATION | Facility: CLINIC | Age: 54
End: 2019-10-23

## 2019-10-23 NOTE — PROGRESS NOTES
"Clinic Care Coordination Contact    Follow Up Progress Note      Assessment: Clinic Care Coordinator RN spoke with patient today.    Patient states \"I'm the same\"   Patient reports she has re applied for SSD with a .  Patient has a note from PCP excusing her from work through 10/31/19. Patient is on long term disability but is concerned it will end.   Patient reports she lives in a home where the rent is paid for. She may need assistance with other living expenses. Clinic Care Coordinator RN will ask Clinic Care Coordinator  to contact patient.     Patient is scheduled to follow up with Cardiology in December 2019.  Patient will have an echo at visit.  Patient is concerned if her EF has decreased she will need a heart transplant.       Goals addressed this encounter:   Goals Addressed                 This Visit's Progress      Medication 1 (pt-stated)   On track     Goal Statement: I will take my medications as directed.   Measure of Success: completed  Supportive Steps to Achieve: patient verbalizes understanding  Barriers: na  Strengths: desire for good health  Date to Achieve By: 1/1/20  Patient expressed understanding of goal: yes            Monitoring (pt-stated)   On track     Goal Statement: I will weight myself daily. I will report a weight gain of 2 pounds in one day or 5 pounds in a week to the [facility] clinic.   Measure of Success: completed  Supportive Steps to Achieve: patient verbalizes understanding  Barriers: na  Strengths: desire for good health  Date to Achieve By: 1/1/20  Patient expressed understanding of goal: yes                  Intervention/Education provided during outreach: Patient will continue to wt herself daily.  CHF is managed by cardiology.           Plan:   Clinic Care Coordinator RN will ask Clinic Care Coordinator  to contact patient.   Care Coordinator will follow up next month.   "

## 2019-10-25 ENCOUNTER — PATIENT OUTREACH (OUTPATIENT)
Dept: CARE COORDINATION | Facility: CLINIC | Age: 54
End: 2019-10-25

## 2019-10-25 NOTE — PROGRESS NOTES
Clinic Care Coordination Contact    Follow Up Progress Note      CC DUSTIN contacted pt on request of CC RN.  Pt currently on long-term disability through employer.  She has doctor's visit on 10/31/19 and she is hoping her leave will be extended.  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  Pt says she is working with [employer] [facility] [employer] on SSDI appeal.  Pt utilizes [employer].  SW discussed pt applying for Energy Assistance.  Pt says she has done this in the past and will contact [employer] in [place] re: Energy Assistance.          Intervention/Education provided during outreach: Energy Assistance- [employer]          Plan:   Care Coordinator will follow up in 2 weeks on status of pts work and disability status

## 2019-10-31 ENCOUNTER — TRANSFERRED RECORDS (OUTPATIENT)
Dept: HEALTH INFORMATION MANAGEMENT | Facility: CLINIC | Age: 54
End: 2019-10-31

## 2019-11-11 ENCOUNTER — PATIENT OUTREACH (OUTPATIENT)
Dept: CARE COORDINATION | Facility: CLINIC | Age: 54
End: 2019-11-11

## 2019-11-11 NOTE — PROGRESS NOTES
Clinic Care Coordination Contact    Clinic Care Coordination Contact  OUTREACH    SW followed up with pt on status of her disability.  Pt says disability through her employer was not continued.  She is supposed to be working 6 hours a day, but she has only been able to work 2 hours.  She is continuing to work with her  on SSDI application.  She does not need any additional resources from SW at this time.         Primary Diagnosis: CHF    Chief Complaint   Patient presents with     Clinic Care Coordination - Follow-up        [place] Utilization:   Clinic Utilization  Difficulty keeping appointments:: No  Compliance Concerns: No  No-Show Concerns: No  No PCP office visit in Past Year: No  Utilization    Last refreshed: 10/25/2019 12:03 PM:  Hospital Admissions 0           Last refreshed: 10/25/2019 12:03 PM:  ED Visits 2           Last refreshed: 10/25/2019 12:03 PM:  No Show Count (past year) 2              Current as of: 10/25/2019 12:03 PM                  Goals:   Goals        General    Medication 1 (pt-stated)     Notes - Note created  9/19/2019 12:45 PM by Lakisha Deutsch RN    Goal Statement: I will take my medications as directed.   Measure of Success: completed  Supportive Steps to Achieve: patient verbalizes understanding  Barriers: na  Strengths: desire for good health  Date to Achieve By: 1/1/20  Patient expressed understanding of goal: yes          Monitoring (pt-stated)     Notes - Note created  9/19/2019 12:53 PM by Lakisha Deutsch RN    Goal Statement: I will weight myself daily. I will report a weight gain of 2 pounds in one day or 5 pounds in a week to the [facility] clinic.   Measure of Success: completed  Supportive Steps to Achieve: patient verbalizes understanding  Barriers: na  Strengths: desire for good health  Date to Achieve By: 1/1/20  Patient expressed understanding of goal: yes                   Patient/Caregiver understanding: Yes       Future Appointments              In 3 weeks   LAB  Health Lab, [facility]    In 3 weeks [id number], [facility]    In 3 weeks Cassie Kolb MD [facility], [facility]          Plan: CC SW to transfer future outreach to CC RN as pt remains currently enrolled in care coordination and has medical goals.

## 2019-11-12 NOTE — PROGRESS NOTES
Called pt and she does not need this anymore, she received return to work form from her orthopedic.       Sarai Howard, CMA

## 2019-11-27 DIAGNOSIS — I42.9 FAMILIAL CARDIOMYOPATHY (H): Primary | ICD-10-CM

## 2019-12-04 ENCOUNTER — ANCILLARY PROCEDURE (OUTPATIENT)
Dept: CARDIOLOGY | Facility: CLINIC | Age: 54
End: 2019-12-04
Attending: INTERNAL MEDICINE
Payer: COMMERCIAL

## 2019-12-04 VITALS
SYSTOLIC BLOOD PRESSURE: 114 MMHG | OXYGEN SATURATION: 97 % | DIASTOLIC BLOOD PRESSURE: 71 MMHG | BODY MASS INDEX: 40.2 KG/M2 | HEART RATE: 60 BPM | WEIGHT: 235.5 LBS | HEIGHT: 64 IN

## 2019-12-04 DIAGNOSIS — I42.9 FAMILIAL CARDIOMYOPATHY (H): ICD-10-CM

## 2019-12-04 DIAGNOSIS — F40.240 CLAUSTROPHOBIA: ICD-10-CM

## 2019-12-04 DIAGNOSIS — I50.32 CHRONIC DIASTOLIC CONGESTIVE HEART FAILURE (H): ICD-10-CM

## 2019-12-04 DIAGNOSIS — I42.9 FAMILIAL CARDIOMYOPATHY (H): Primary | ICD-10-CM

## 2019-12-04 LAB
ANION GAP SERPL CALCULATED.3IONS-SCNC: 5 MMOL/L (ref 3–14)
BUN SERPL-MCNC: 16 MG/DL (ref 7–30)
CALCIUM SERPL-MCNC: 8.6 MG/DL (ref 8.5–10.1)
CHLORIDE SERPL-SCNC: 106 MMOL/L (ref 94–109)
CO2 SERPL-SCNC: 27 MMOL/L (ref 20–32)
CREAT SERPL-MCNC: 0.71 MG/DL (ref 0.52–1.04)
GFR SERPL CREATININE-BSD FRML MDRD: >90 ML/MIN/{1.73_M2}
GLUCOSE SERPL-MCNC: 102 MG/DL (ref 70–99)
POTASSIUM SERPL-SCNC: 3.9 MMOL/L (ref 3.4–5.3)
SODIUM SERPL-SCNC: 138 MMOL/L (ref 133–144)

## 2019-12-04 PROCEDURE — 80048 BASIC METABOLIC PNL TOTAL CA: CPT | Performed by: INTERNAL MEDICINE

## 2019-12-04 PROCEDURE — G0463 HOSPITAL OUTPT CLINIC VISIT: HCPCS

## 2019-12-04 PROCEDURE — 36415 COLL VENOUS BLD VENIPUNCTURE: CPT | Performed by: INTERNAL MEDICINE

## 2019-12-04 PROCEDURE — 99213 OFFICE O/P EST LOW 20 MIN: CPT | Mod: ZP | Performed by: INTERNAL MEDICINE

## 2019-12-04 RX ORDER — DIAZEPAM 5 MG
5 TABLET ORAL ONCE
Qty: 1 TABLET | Refills: 0 | Status: SHIPPED | OUTPATIENT
Start: 2019-12-04 | End: 2020-05-21

## 2019-12-04 RX ADMIN — Medication 5 ML: at 09:30

## 2019-12-04 ASSESSMENT — PAIN SCALES - GENERAL: PAINLEVEL: NO PAIN (0)

## 2019-12-04 ASSESSMENT — MIFFLIN-ST. JEOR: SCORE: 1648.22

## 2019-12-04 NOTE — LETTER
12/4/2019    RE: Marleen BRYAN Bogdan  [address]  [place] [address]     Dear Colleague,    Thank you for the opportunity to participate in the care of your patient, Marleen Mcfadden, at the [facility]. Please see a copy of my visit note below.    No notes on file    Please do not hesitate to contact me if you have any questions/concerns.     Sincerely,     Cassie Kolb MD

## 2019-12-04 NOTE — PROGRESS NOTES
Advanced Heart Failure and Transplant Clinic       HPI:   Marleen Mcfadden is a 54 year old female with a history of familial cardiomyopathy, left total knee arthroplasty on 10/3/2017 and right knee replacement on 2019, and morbid obesity who presents for follow-up.     Was last seen in clinic on 19. During this visit, she noted that she had difficulty affording her medications. ***    ***    Past Medical History:   Diagnosis Date     Allergic rhinitis, cause unspecified      Anemia      Autoimmune disease NEC     Autoimmune disease- unknown/poss SLE     Calcaneal spur 10/21/2014     CHF with cardiomyopathy (H)      Familial cardiomyopathy (H) 10/21/2015     Morbid obesity (H) 2015     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     no longer an issue     Other primary cardiomyopathies     Cardiomyopathy- dx'd  idiopathic     PONV (postoperative nausea and vomiting)      UTERINE LEIOMYOMA NOS 10/25/2006       Past Surgical History:   Procedure Laterality Date     C BREAST SURGERY PROCEDURE UNLISTED      Breast Reduction     C  DELIVERY ONLY  10/85,     , Low Cervicalx2     C EXCIS UTERINE FIBROID,ABD APPRCH       C EXCISE EXCESS SKIN TISSUE,ABDOMEN       C LIGATE FALLOPIAN TUBE       C REPAIR CRUCIATE LIGAMENT,KNEE      Right Knee     C TOTAL KNEE ARTHROPLASTY Left 10/03/2017     HYSTERECTOMY TOTAL ABDOMINAL      for fibroids; reports having blood transfusion after surgery     THYROIDECTOMY  2013    Procedure: THYROIDECTOMY;  LEFT THYROID LOBECTOMY.  (LIGASURE, RECURRENT LARYNGEAL NERVE MONITOR) ;  Surgeon: Uriah Camargo MD;  Location: [facility]       Family History   Problem Relation Age of Onset     Hypertension Father         dec     Diabetes Father         dec     Heart Disease Father         dec     Alcohol/Drug Father      Cardiovascular Father      Heart Disease Mother         dec      Alcohol/Drug Mother      Cardiovascular Mother      Heart Disease Daughter         Cardiomyopathy     Cardiovascular Daughter         cardiomyopathy     Colon Cancer Sister      Cardiovascular Son         cardiomyopathy     Diabetes Paternal Grandmother         dec     Hypertension Paternal Grandmother         dec     Cerebrovascular Disease Paternal Grandmother         dec     Cancer Sister         Lupus     Cardiovascular Sister         cardiomyopathy     Heart Disease Sister         heart failure, and kidney failure       Social History     Tobacco Use     Smoking status: Never Smoker     Smokeless tobacco: Never Used   Substance Use Topics     Alcohol use: Yes     Comment: rare, twice a year, she has a drink little wine 2 days ago         Current Outpatient Medications   Medication Sig     amitriptyline (ELAVIL) 25 MG tablet Take 1 tablet (25 mg) by mouth At Bedtime     bisacodyl (DULCOLAX) 10 MG suppository Place 1 suppository (10 mg) rectally daily as needed for constipation     Cholecalciferol (VITAMIN D) 2000 UNIT tablet Take 5,000 Units by mouth as needed Reported on 3/8/2017     Collagen 500 MG CAPS      cyclobenzaprine (FLEXERIL) 10 MG tablet Take 0.5-1 tablets (5-10 mg) by mouth 3 times daily as needed for muscle spasms     diclofenac (VOLTAREN) 75 MG EC tablet Take 1 tablet (75 mg) by mouth 2 times daily as needed for moderate pain     dicyclomine (BENTYL) 10 MG capsule Take 1 capsule (10 mg) by mouth 4 times daily (before meals and nightly)     estradiol (VIVELLE-DOT) 0.0375 MG/24HR BIW patch IRISH 1 PATCH ON SKIN TWICE PER WEEK     FLAXSEED, LINSEED, PO      furosemide (LASIX) 20 MG tablet Take 2 tablets (40 mg) by mouth daily as needed (as needed for weight gain/edema)     hydrOXYzine (ATARAX) 25 MG tablet Take 2-4 tablets ( mg) by mouth nightly as needed for anxiety or other (sleep)     losartan (COZAAR) 100 MG tablet Take 1 tablet (100 mg) by mouth daily     metoprolol succinate ER (TOPROL-XL)  "50 MG 24 hr tablet Take 1 tablet (50 mg) by mouth daily     progesterone (PROMETRIUM) 100 MG capsule Take 100 mg by mouth Reported on 3/8/2017     sertraline (ZOLOFT) 50 MG tablet Take 1 tablet (50 mg) by mouth daily     spironolactone (ALDACTONE) 25 MG tablet Take 2 tablets (50 mg) by mouth daily     traMADol (ULTRAM) 50 MG tablet Take 1 tablet (50 mg) by mouth every 8 hours as needed for severe pain     No current facility-administered medications for this visit.      Facility-Administered Medications Ordered in Other Visits   Medication     sodium chloride (PF) 0.9% PF flush 10 mL     sodium chloride (PF) 0.9% PF flush 20 mL         ROS:   10 point ROS negative other than what is discussed above.    EXAM:   /71   Pulse 60   Ht 1.626 m (5' 4\")   Wt 106.8 kg (235 lb 8 oz)   LMP 10/19/2008 (Approximate)   SpO2 97%   BMI 40.42 kg/m     GENERAL: Alert, oriented, NAD  HEENT:  NC/AT. PERRLA.  EOMI.  Sclerae white.  MMM, no oral lesions  NECK: No adenopathy. No JVD   HEART: RRR, normal S1 and S2, no murmurs. 2+ bilateral peripheral pulses.  LUNGS:  Clear to auscultation bilaterally, no wheezes, rales, or rhonchi  ABDOMEN: Soft, nontender, nondistended, bowel sounds present, no hepatojugular reflux, no ascites.  EXTREMITIES: No lower extremity edema.    Labs:    Recent Results (from the past 24 hour(s))   Basic metabolic panel    Collection Time: 12/04/19  8:32 AM   Result Value Ref Range    Sodium 138 133 - 144 mmol/L    Potassium 3.9 3.4 - 5.3 mmol/L    Chloride 106 94 - 109 mmol/L    Carbon Dioxide 27 20 - 32 mmol/L    Anion Gap 5 3 - 14 mmol/L    Glucose 102 (H) 70 - 99 mg/dL    Urea Nitrogen 16 7 - 30 mg/dL    Creatinine 0.71 0.52 - 1.04 mg/dL    GFR Estimate >90 >60 mL/min/[1.73_m2]    GFR Estimate If Black >90 >60 mL/min/[1.73_m2]    Calcium 8.6 8.5 - 10.1 mg/dL         Electrocardiogram:          Echocardiogram:                Assessment and Plan: ***        Patient seen and discussed with  " [place].      Karly Rao MD, PhD  Cardiology Fellow  [phone]      Patient Care Team:  Nate Grimes MD as PCP - General (Family Practice)  Danny Grace MD as MD (OB/Gyn)  Cassie Kolb MD as MD (Cardiology)  Dev Rocha MD as MD (Orthopaedic Surgery)  Gwen Swenson RN as Specialty Care Coordinator (Cardiology)  Nate Grimes MD as Assigned PCP  Mala Soni RN as Lead Care Coordinator  NATE GRIMES

## 2019-12-04 NOTE — PATIENT INSTRUCTIONS
Cardiology Providers you saw during your visit:  Dr. Kolb     Medication changes:  1- No medication changes today.        Follow up:  1- We will schedule you for a cardiac MRI. We may make changes pending the results of this and we will follow up with you once they are received.  2 - Return to see Dr. Kolb in 4 months.      Results for BALTAZAR OREILLY (MRN [id number]) as of 12/4/2019 10:22   Ref. Range 12/4/2019 08:32   Sodium Latest Ref Range: 133 - 144 mmol/L 138   Potassium Latest Ref Range: 3.4 - 5.3 mmol/L 3.9   Chloride Latest Ref Range: 94 - 109 mmol/L 106   Carbon Dioxide Latest Ref Range: 20 - 32 mmol/L 27   Urea Nitrogen Latest Ref Range: 7 - 30 mg/dL 16   Creatinine Latest Ref Range: 0.52 - 1.04 mg/dL 0.71   GFR Estimate Latest Ref Range: >60 mL/min/1.73_m2 >90   GFR Estimate If Black Latest Ref Range: >60 mL/min/1.73_m2 >90   Calcium Latest Ref Range: 8.5 - 10.1 mg/dL 8.6   Anion Gap Latest Ref Range: 3 - 14 mmol/L 5   Glucose Latest Ref Range: 70 - 99 mg/dL 102 (H)       Please call if you have:  1. Weight gain of more than 2 pounds in a day or 5 pounds in a week  2. Increased shortness of breath, swelling or bloating  3. Dizziness, lightheadedness   4. Any questions or concerns.      Follow the American Heart Association Diet and Lifestyle recommendations:  Limit saturated fat, trans fat, sodium, red meat, sweets and sugar-sweetened beverages. If you choose to eat red meat, compare labels and select the leanest cuts available.  Aim for at least 150 minutes of moderate physical activity or 75 minutes of vigorous physical activity - or an equal combination of both - each week.     During business hours: [phone], press option # 1 to be routed to the [employer] then option # 4 to send a message to your care team     After hours, weekends or holidays: On Call Cardiologist- [phone]   option #4 and ask to speak to the on-call Cardiologist. Inform them you are a CORE/heart failure patient at  the [facility].     Gwen Swenson RN BSN  Cardiology Nurse Care Coordinator     Keep up the good work!     Take Care!

## 2019-12-04 NOTE — LETTER
Date:December 30, 2019      Patient was self referred, no letter generated. Do not send.        [facility] Health Information

## 2019-12-04 NOTE — NURSING NOTE
Chief Complaint   Patient presents with     Follow Up     3mo HF     Vitals were taken and medications were reconciled.     HARRISON Desouza

## 2019-12-04 NOTE — NURSING NOTE
Diet: Patient instructed regarding a heart failure healthy diet, including discussion of reduced fat and 2000 mg daily sodium restriction, daily weights, medication purpose and compliance, fluid restrictions and resources for patient and family to access for assistance with heart failure management.       Labs: Patient was given results of the laboratory testing obtained today and patient was instructed about when to return for the next laboratory testing.     Med Reconcile: Reviewed and verified all current medications with the patient. The updated medication list was printed and given to the patient. NO CHANGES.    Return Appointment: Patient given instructions regarding scheduling next clinic visit. RTC to see DR. Kolb in 4 months.  *Cardiac MRI with contrast ordered.     *Additional follow up pending MRI results. If EF less than 35%, will refer to EP to discuss ICD placement. Plan to switch to entresto if EF in 30s. If EF 40 or over, no changes continue current plan.    Patient stated she understood all health information given and agreed to call with further questions or concerns.     Gwen Swenson RN

## 2019-12-09 DIAGNOSIS — I42.9 CHF WITH CARDIOMYOPATHY (H): ICD-10-CM

## 2019-12-09 DIAGNOSIS — I50.9 CHF WITH CARDIOMYOPATHY (H): ICD-10-CM

## 2019-12-10 RX ORDER — FUROSEMIDE 20 MG
40 TABLET ORAL DAILY PRN
Qty: 60 TABLET | Refills: 11 | Status: SHIPPED | OUTPATIENT
Start: 2019-12-10 | End: 2021-01-07

## 2019-12-13 ENCOUNTER — PATIENT OUTREACH (OUTPATIENT)
Dept: CARE COORDINATION | Facility: CLINIC | Age: 54
End: 2019-12-13

## 2019-12-13 NOTE — PROGRESS NOTES
Clinic Care Coordination Contact    Situation: Patient chart reviewed by care coordinator.    Background: Patient enrolled to [facility] 9/19/2019.      Assessment: Writer spoke with patient on the phone regarding 2 remaining goals.  Patient stating she takes her medications daily.  -Goal completed.  She was not aware of the goal related to daily weights.  She stated she was not aware of 'supposed to weighing herself daily'.  Writer instructed for her to weigh herself daily.  If her weight were to increase 3 lbs in a day or 5 lbs in a week for her to notify her Cardiology office for next steps.  Patient stated an understanding.    Plan/Recommendations:  Marleen agreed to CCC RN to telephone outreach in 1-2 months.    Goals       Monitoring (pt-stated)      Goal Statement: I will weight myself daily. I will report a weight gain of 2 pounds in one day or 5 pounds in a week to the [facility]. -CONTINUOUS  Measure of Success: completed  Supportive Steps to Achieve: patient verbalizes understanding  Barriers: na  Strengths: desire for good health  Date to Achieve By: 1/1/20  Patient expressed understanding of goal: yes     GOAL UPDATED-12/13

## 2019-12-19 ENCOUNTER — HOSPITAL ENCOUNTER (OUTPATIENT)
Dept: MAMMOGRAPHY | Facility: CLINIC | Age: 54
Discharge: HOME | End: 2019-12-19
Attending: FAMILY MEDICINE | Admitting: FAMILY MEDICINE
Payer: COMMERCIAL

## 2019-12-19 DIAGNOSIS — Z00.00 PREVENTATIVE HEALTH CARE: ICD-10-CM

## 2019-12-19 PROCEDURE — 77063 BREAST TOMOSYNTHESIS BI: CPT

## 2020-01-03 ENCOUNTER — HOSPITAL ENCOUNTER (OUTPATIENT)
Dept: CARDIOLOGY | Facility: CLINIC | Age: 55
Discharge: HOME | End: 2020-01-03
Attending: INTERNAL MEDICINE | Admitting: INTERNAL MEDICINE
Payer: COMMERCIAL

## 2020-01-03 DIAGNOSIS — I42.9 FAMILIAL CARDIOMYOPATHY (H): ICD-10-CM

## 2020-01-03 PROCEDURE — 25500064 ZZH RX 255 OP 636: Performed by: INTERNAL MEDICINE

## 2020-01-03 PROCEDURE — 25000132 ZZH RX MED GY IP 250 OP 250 PS 637: Performed by: INTERNAL MEDICINE

## 2020-01-03 PROCEDURE — A9585 GADOBUTROL INJECTION: HCPCS | Performed by: INTERNAL MEDICINE

## 2020-01-03 PROCEDURE — 25800030 ZZH RX IP 258 OP 636: Performed by: INTERNAL MEDICINE

## 2020-01-03 PROCEDURE — 75561 CARDIAC MRI FOR MORPH W/DYE: CPT | Mod: 26 | Performed by: INTERNAL MEDICINE

## 2020-01-03 PROCEDURE — 75561 CARDIAC MRI FOR MORPH W/DYE: CPT

## 2020-01-03 RX ORDER — AMINOPHYLLINE 25 MG/ML
100 INJECTION, SOLUTION INTRAVENOUS ONCE
Status: DISCONTINUED | OUTPATIENT
Start: 2020-01-03 | End: 2020-01-04 | Stop reason: HOSPADM

## 2020-01-03 RX ORDER — METHYLPREDNISOLONE SODIUM SUCCINATE 125 MG/2ML
125 INJECTION, POWDER, LYOPHILIZED, FOR SOLUTION INTRAMUSCULAR; INTRAVENOUS
Status: DISCONTINUED | OUTPATIENT
Start: 2020-01-03 | End: 2020-01-04 | Stop reason: HOSPADM

## 2020-01-03 RX ORDER — DIAZEPAM 5 MG
5 TABLET ORAL EVERY 30 MIN PRN
Status: DISCONTINUED | OUTPATIENT
Start: 2020-01-03 | End: 2020-01-04 | Stop reason: HOSPADM

## 2020-01-03 RX ORDER — ACYCLOVIR 200 MG/1
0-1 CAPSULE ORAL
Status: DISCONTINUED | OUTPATIENT
Start: 2020-01-03 | End: 2020-01-04 | Stop reason: HOSPADM

## 2020-01-03 RX ORDER — ONDANSETRON 2 MG/ML
4 INJECTION INTRAMUSCULAR; INTRAVENOUS
Status: DISCONTINUED | OUTPATIENT
Start: 2020-01-03 | End: 2020-01-04 | Stop reason: HOSPADM

## 2020-01-03 RX ORDER — DIPHENHYDRAMINE HYDROCHLORIDE 50 MG/ML
25-50 INJECTION INTRAMUSCULAR; INTRAVENOUS
Status: DISCONTINUED | OUTPATIENT
Start: 2020-01-03 | End: 2020-01-04 | Stop reason: HOSPADM

## 2020-01-03 RX ORDER — GADOBUTROL 604.72 MG/ML
5-65 INJECTION INTRAVENOUS ONCE
Status: COMPLETED | OUTPATIENT
Start: 2020-01-03 | End: 2020-01-03

## 2020-01-03 RX ORDER — REGADENOSON 0.08 MG/ML
0.4 INJECTION, SOLUTION INTRAVENOUS ONCE
Status: DISCONTINUED | OUTPATIENT
Start: 2020-01-03 | End: 2020-01-04 | Stop reason: HOSPADM

## 2020-01-03 RX ORDER — DIPHENHYDRAMINE HCL 25 MG
25 CAPSULE ORAL
Status: DISCONTINUED | OUTPATIENT
Start: 2020-01-03 | End: 2020-01-04 | Stop reason: HOSPADM

## 2020-01-03 RX ORDER — ALBUTEROL SULFATE 90 UG/1
2 AEROSOL, METERED RESPIRATORY (INHALATION) EVERY 5 MIN PRN
Status: DISCONTINUED | OUTPATIENT
Start: 2020-01-03 | End: 2020-01-04 | Stop reason: HOSPADM

## 2020-01-03 RX ADMIN — SODIUM CHLORIDE 40 ML: 9 INJECTION, SOLUTION INTRAVENOUS at 09:56

## 2020-01-03 RX ADMIN — GADOBUTROL 14 ML: 604.72 INJECTION INTRAVENOUS at 09:55

## 2020-01-03 RX ADMIN — DIAZEPAM 5 MG: 5 TABLET ORAL at 08:10

## 2020-01-06 ENCOUNTER — TELEPHONE (OUTPATIENT)
Dept: CARDIOLOGY | Facility: CLINIC | Age: 55
End: 2020-01-06

## 2020-01-06 ENCOUNTER — PATIENT OUTREACH (OUTPATIENT)
Dept: CARDIOLOGY | Facility: CLINIC | Age: 55
End: 2020-01-06

## 2020-01-06 DIAGNOSIS — I42.9 FAMILIAL CARDIOMYOPATHY (H): Primary | ICD-10-CM

## 2020-01-06 DIAGNOSIS — I50.22 CHRONIC SYSTOLIC HEART FAILURE (H): ICD-10-CM

## 2020-01-06 NOTE — PROGRESS NOTES
Called Marleen to review results of cardiac MRI.     Date: 1/6/2020    Time of Call: 10:58 AM     Diagnosis:  Heart failure     [ VORB ] Ordering provider: Cassie ruffin MD    Order: STOP losartan. START entresto 49-51mg BID. After 2 weeks on this dose, increase to 1.5 tablets BID. After 2 weeks on this dose, check BMP. If stable, increase to 97-103mg BID dose.      Order received by: Gwen Swenson RN     Follow-up/additional notes: orders placed. Discussed with Marleen. Discussed medication and potential side effects. Asked marleen to continue losartan until she receives entresto. Once she calls, will discuss starting entresto, stopping losartan and schedule her for lab appointment. Can also discuss uptitration schedule.

## 2020-01-06 NOTE — PROGRESS NOTES
Advanced Heart Failure [facility] Transplant Clinic       HPI:   Marleen Mcfadden is a 55 year old female with a history of familial cardiomyopathy, left total knee arthroplasty on 10/3/2017 and right knee replacement on 2019 presents for ongoing evaluation and management.  Pt reports that since last clinic visit she has been feeling fairly well.  She feels her exercise capacity and issues with volume retention have improved.  She denies any problems with her medications and reports compliance with all meds except her diuretics which she admits she is still not taking consistently as prescribed.         Past Medical History:   Diagnosis Date     Allergic rhinitis, cause unspecified      Anemia      Autoimmune disease NEC     Autoimmune disease- unknown/poss SLE     Calcaneal spur 10/21/2014     CHF with cardiomyopathy (H)      Familial cardiomyopathy (H) 10/21/2015     Morbid obesity (H) 2015     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     no longer an issue     Other primary cardiomyopathies     Cardiomyopathy- dx'd  idiopathic     PONV (postoperative nausea and vomiting)      UTERINE LEIOMYOMA NOS 10/25/2006       Past Surgical History:   Procedure Laterality Date     C BREAST SURGERY PROCEDURE UNLISTED      Breast Reduction     C  DELIVERY ONLY  10/85,     , Low Cervicalx2     C EXCIS UTERINE FIBROID,ABD APPRCH       C EXCISE EXCESS SKIN TISSUE,ABDOMEN       C LIGATE FALLOPIAN TUBE       C REPAIR CRUCIATE LIGAMENT,KNEE      Right Knee     C TOTAL KNEE ARTHROPLASTY Left 10/03/2017     HYSTERECTOMY TOTAL ABDOMINAL      for fibroids; reports having blood transfusion after surgery     THYROIDECTOMY  2013    Procedure: THYROIDECTOMY;  LEFT THYROID LOBECTOMY.  (LIGASURE, RECURRENT LARYNGEAL NERVE MONITOR) ;  Surgeon: Uriah Camargo MD;  Location: [facility]       Family History   Problem Relation Age of Onset      Hypertension Father         dec     Diabetes Father         dec     Heart Disease Father         dec     Alcohol/Drug Father      Cardiovascular Father      Heart Disease Mother         dec     Alcohol/Drug Mother      Cardiovascular Mother      Heart Disease Daughter         Cardiomyopathy     Cardiovascular Daughter         cardiomyopathy     Colon Cancer Sister      Cardiovascular Son         cardiomyopathy     Diabetes Paternal Grandmother         dec     Hypertension Paternal Grandmother         dec     Cerebrovascular Disease Paternal Grandmother         dec     Cancer Sister         Lupus     Cardiovascular Sister         cardiomyopathy     Heart Disease Sister         heart failure, and kidney failure       Social History     Tobacco Use     Smoking status: Never Smoker     Smokeless tobacco: Never Used   Substance Use Topics     Alcohol use: Yes     Comment: rare, twice a year, she has a drink little wine 2 days ago         Current Outpatient Medications   Medication Sig     amitriptyline (ELAVIL) 25 MG tablet Take 1 tablet (25 mg) by mouth At Bedtime     bisacodyl (DULCOLAX) 10 MG suppository Place 1 suppository (10 mg) rectally daily as needed for constipation     Cholecalciferol (VITAMIN D) 2000 UNIT tablet Take 5,000 Units by mouth as needed Reported on 3/8/2017     Collagen 500 MG CAPS      cyclobenzaprine (FLEXERIL) 10 MG tablet Take 0.5-1 tablets (5-10 mg) by mouth 3 times daily as needed for muscle spasms     diclofenac (VOLTAREN) 75 MG EC tablet Take 1 tablet (75 mg) by mouth 2 times daily as needed for moderate pain     dicyclomine (BENTYL) 10 MG capsule Take 1 capsule (10 mg) by mouth 4 times daily (before meals and nightly)     estradiol (VIVELLE-DOT) 0.0375 MG/24HR BIW patch IRISH 1 PATCH ON SKIN TWICE PER WEEK     FLAXSEED, LINSEED, PO      hydrOXYzine (ATARAX) 25 MG tablet Take 2-4 tablets ( mg) by mouth nightly as needed for anxiety or other (sleep)     losartan (COZAAR) 100 MG tablet  "Take 1 tablet (100 mg) by mouth daily     metoprolol succinate ER (TOPROL-XL) 50 MG 24 hr tablet Take 1 tablet (50 mg) by mouth daily     progesterone (PROMETRIUM) 100 MG capsule Take 100 mg by mouth Reported on 3/8/2017     sertraline (ZOLOFT) 50 MG tablet Take 1 tablet (50 mg) by mouth daily     spironolactone (ALDACTONE) 25 MG tablet Take 2 tablets (50 mg) by mouth daily     traMADol (ULTRAM) 50 MG tablet Take 1 tablet (50 mg) by mouth every 8 hours as needed for severe pain     furosemide (LASIX) 20 MG tablet Take 2 tablets (40 mg) by mouth daily as needed (as needed for weight gain/edema)     No current facility-administered medications for this visit.      Facility-Administered Medications Ordered in Other Visits   Medication     sodium chloride (PF) 0.9% PF flush 10 mL     sodium chloride (PF) 0.9% PF flush 20 mL         ROS:   10 point ROS negative other than what is discussed above    EXAM:   /71   Pulse 60   Ht 1.626 m (5' 4\")   Wt 106.8 kg (235 lb 8 oz)   LMP 10/19/2008 (Approximate)   SpO2 97%   BMI 40.42 kg/m     GENERAL: Alert, oriented, NAD  HEENT:  NC/AT. EOMI.  Sclerae white.  MMM, no oral lesions  NECK: No adenopathy. JVP 7-8cm  HEART: RRR, normal S1 and S2, no murmurs. 2+ bilateral peripheral pulses.  LUNGS:  Clear to auscultation bilaterally, no wheezes, rales, or rhonchi  ABDOMEN: Soft, obese, nontender, nondistended, bowel sounds present, no hepatojugular reflux, no ascites.  EXTREMITIES: No lower extremity edema.  PSYCH: Flat affect    Labs:  Orders Only on 12/04/2019   Component Date Value Ref Range Status     Sodium 12/04/2019 138  133 - 144 mmol/L Final     Potassium 12/04/2019 3.9  3.4 - 5.3 mmol/L Final     Chloride 12/04/2019 106  94 - 109 mmol/L Final     Carbon Dioxide 12/04/2019 27  20 - 32 mmol/L Final     Anion Gap 12/04/2019 5  3 - 14 mmol/L Final     Glucose 12/04/2019 102* 70 - 99 mg/dL Final     Urea Nitrogen 12/04/2019 16  7 - 30 mg/dL Final     Creatinine " 12/04/2019 0.71  0.52 - 1.04 mg/dL Final     GFR Estimate 12/04/2019 >90  >60 mL/min/[1.73_m2] Final    Comment: Non  GFR Calc  Starting 12/18/2018, serum creatinine based estimated GFR (eGFR) will be   calculated using the Chronic Kidney Disease Epidemiology Collaboration   (CKD-EPI) equation.       GFR Estimate If Black 12/04/2019 >90  >60 mL/min/[1.73_m2] Final    Comment:  GFR Calc  Starting 12/18/2018, serum creatinine based estimated GFR (eGFR) will be   calculated using the Chronic Kidney Disease Epidemiology Collaboration   (CKD-EPI) equation.       Calcium 12/04/2019 8.6  8.5 - 10.1 mg/dL Final         Echocardiogram 8/21/19:  Interpretation Summary  Moderately (EF 30-35%) reduced left ventricular function is present.  Global right ventricular function is mildly reduced.  No significant valvular dysfunction present.  No pericardial effusion present.  This study was compared with the study from 7/8/2019 . There has been no  change.    Echo today:  Interpretation Summary  Moderately (EF 35-40%) reduced left ventricular function is present. Moderate  diffuse hypokinesis  Mildly reduced right ventricular function.  No hemodynamically significatn valve abnormalities.  This study was compared with the study from 8/21/19. LV function is similar to  very mildly improved.      Assessment and Plan: 55 year old female with a history of familial cardiomyopathy, left total knee arthroplasty on 10/3/2017 and right knee replacement on 2/20/2019 presents for ongoing evaluation and management.     Chronic systolic heart failure secondary to familial cardiomyopathy.    Stage B, NYHA Class II.  ACEi/ARB yes, continue losartan 100mg daily  BB yes, continue metoprolol XL 50mg daily  Aldosterone antagonist - continue spironolactone to 50mg daily (has issues with incontinence with lasix)  SCD prophylaxis: Echo today reveals improved LVEF.  Will obtain cardiac MRI to confirm LVEF > 35%, if not will  discuss ICD implantation  % BiV pacing: N/A  Fluid status euvolemic  NSAID use: advised to avoid NSAIDs  Encouraged patient to begin regular aerobic exercise aiming for at least 150 minutes of moderate physical activity or 75 minutes of vigorous physical activity - or an equal combination of both - each week. and follow low-salt, heart healthy diet.     RTC:  Cardiac MRI in next few weeks and RTC in 4 months.  Will be happy to see sooner if change in clinical status or new questions/concerns arise.    Cassie Kolb MD  Section Head - Advanced Heart Failure, Transplantation and Mechanical Circulatory Support  Director - Adult Congenital and Cardiovascular Genetics Center  Associate Professor of Medicine, [facility]

## 2020-01-06 NOTE — TELEPHONE ENCOUNTER
Health Call Center    Phone Message    May a detailed message be left on voicemail: yes    Reason for Call: Medication Question or concern regarding medication   Prescription Clarification  Name of Medication: sacubitril-valsartan (ENTRESTO) 49-51 MG per tablet  Prescribing Provider: Dr. Kolb   Pharmacy: [facility] #[id number] [address] AT [facility] [address]   What on the order needs clarification? Pt called to speak with Dr. Kolb's nurse regarding this medication. She was reading about it online, and found out that Entresto may not be effective for -American patients. Please give her a call back to discuss.          Action Taken: Message routed to:  [facility] ([facility] Cardiology

## 2020-01-08 ENCOUNTER — TELEPHONE (OUTPATIENT)
Dept: CARDIOLOGY | Facility: CLINIC | Age: 55
End: 2020-01-08

## 2020-01-08 NOTE — TELEPHONE ENCOUNTER
Discussed with Dr. Kolb. There are some questions raised if  americans benefit from entresto as much as  people. The effect may be similar to ACe or ARB but would not be worse than patient's current therapy. However, we do not have all the data on how race effects effectiveness of medication.  Called Marleen back to discuss. Discussed Dr. Kolb's recommendations. Discussed risk of angioedema and hyperkalemia. Discussed that we would monitor her closely. Marleen stated understanding and reported she would be willing to try entresto. She wants to make sure that if she does not tolerate entresto or feel well on entresto, that she can go back to losartan. Discussed that this is a possibility. She wonders if entresto would cause issues in relation to surgery as she is planning to have her breasts lifted later this year. Discussed that she should still be able to have that surgery even on entresto. NO further questions at this time.

## 2020-01-08 NOTE — TELEPHONE ENCOUNTER
Prior Authorization Retail Medication Request    Medication/Dose: Entresto 49-51 mg Tablets  ICD code (if different than what is on RX):    Previously Tried and Failed:    Rationale:      Insurance Name:  [facility]  Insurance ID:  [id number]      Pharmacy Information (if different than what is on RX)  Name:  [facility]  Phone:  [phone] Fax: [phone]

## 2020-01-09 NOTE — TELEPHONE ENCOUNTER
PA Initiation    Medication: Entresto 49-51 mg Tablets -   Insurance Company: [employer] - Phone [phone] Fax [phone]  Pharmacy Filling the Rx: [id number] #[id number] - [place] [address] [facility] [address]  Filling Pharmacy Phone: [phone]  Filling Pharmacy Fax: [phone]  Start Date: 1/9/2020

## 2020-01-09 NOTE — TELEPHONE ENCOUNTER
Prior Authorization Approval    Medication: Entresto 49-51 mg Tablets - APPROVED was approved on 1/9/2020  Effective: 12/9/2019 to 1/9/2021  Reference #:    Approved Dose/Quantity:   Insurance Company: [employer] - Phone [phone] Fax [phone]  Expected CoPay:    Pharmacy Filling the Rx: [facility] [id number] [address] [facility]  Pharmacy Notified: Yes  Patient Notified: Comment:  **Instructed pharmacy to notify patient when script is ready to /ship.**

## 2020-01-16 ENCOUNTER — TRANSFERRED RECORDS (OUTPATIENT)
Dept: HEALTH INFORMATION MANAGEMENT | Facility: CLINIC | Age: 55
End: 2020-01-16

## 2020-01-16 ENCOUNTER — PATIENT OUTREACH (OUTPATIENT)
Dept: CARDIOLOGY | Facility: CLINIC | Age: 55
End: 2020-01-16

## 2020-01-16 NOTE — TELEPHONE ENCOUNTER
Called Marleen to see if she had obtained her entresto yet. SHe reports she did pick it up and started it on Monday. Confirmed she has stopped her losartan, will update med list.   Marleen reports that her stomach feels bloated since she has been taking entresto. She reports she has been constipated and she is going to take a laxative to see if that helps. She asks about her lasix which is ordered as PRN and she hasn't been taking. Confirmed she can take a dose of lasix 20mg if laxative does not help.   Confirmed labs next week, she will have them drawn at her [place] clinic. Will follow up on results once received. Encouraged her to call with further questions or concerns.   Will route to provider as well to keep team updated.

## 2020-01-24 ENCOUNTER — PATIENT OUTREACH (OUTPATIENT)
Dept: CARE COORDINATION | Facility: CLINIC | Age: 55
End: 2020-01-24

## 2020-01-27 DIAGNOSIS — I50.22 CHRONIC SYSTOLIC HEART FAILURE (H): ICD-10-CM

## 2020-01-27 PROCEDURE — 80048 BASIC METABOLIC PNL TOTAL CA: CPT | Performed by: INTERNAL MEDICINE

## 2020-01-27 PROCEDURE — 36415 COLL VENOUS BLD VENIPUNCTURE: CPT | Performed by: INTERNAL MEDICINE

## 2020-01-28 ENCOUNTER — TELEPHONE (OUTPATIENT)
Dept: FAMILY MEDICINE | Facility: CLINIC | Age: 55
End: 2020-01-28

## 2020-01-28 LAB
ANION GAP SERPL CALCULATED.3IONS-SCNC: 5 MMOL/L (ref 3–14)
BUN SERPL-MCNC: 16 MG/DL (ref 7–30)
CALCIUM SERPL-MCNC: 9.3 MG/DL (ref 8.5–10.1)
CHLORIDE SERPL-SCNC: 107 MMOL/L (ref 94–109)
CO2 SERPL-SCNC: 29 MMOL/L (ref 20–32)
CREAT SERPL-MCNC: 1.18 MG/DL (ref 0.52–1.04)
GFR SERPL CREATININE-BSD FRML MDRD: 52 ML/MIN/{1.73_M2}
GLUCOSE SERPL-MCNC: 93 MG/DL (ref 70–99)
POTASSIUM SERPL-SCNC: 4.6 MMOL/L (ref 3.4–5.3)
SODIUM SERPL-SCNC: 141 MMOL/L (ref 133–144)

## 2020-01-28 NOTE — TELEPHONE ENCOUNTER
Panel Management Review      Patient has the following on her problem list:     Depression / Dysthymia review    Measure:  Needs PHQ-9 score of 4 or less during index window.  Administer PHQ-9 and if score is 5 or more, send encounter to provider for next steps.    5 - 7 month window range:     PHQ-9 SCORE 2/14/2018 2/15/2018 9/20/2019   PHQ-9 Total Score MyChart - 0 12 (Moderate depression)   PHQ-9 Total Score 0 0 12       If PHQ-9 recheck is 5 or more, route to provider for next steps.    Patient is due for:  PHQ9      Composite cancer screening  Chart review shows that this patient is due/due soon for the following None  Summary:    Patient is due/failing the following:   PAP, PHQ9 and PHYSICAL    Action needed:   Patient needs to do PHQ9.    Type of outreach:    Phone, left message for patient to call back.     Questions for provider review:    None                                                                                                                                    Irene Caceres MA       Chart routed to Care Team .

## 2020-02-03 ENCOUNTER — PATIENT OUTREACH (OUTPATIENT)
Dept: CARDIOLOGY | Facility: CLINIC | Age: 55
End: 2020-02-03

## 2020-02-03 DIAGNOSIS — I50.32 CHRONIC DIASTOLIC CONGESTIVE HEART FAILURE (H): Primary | ICD-10-CM

## 2020-02-03 NOTE — PROGRESS NOTES
Called Marleen to review labs. She reports she has been feeling well on the entresto. She is back to doing her kickboxing and is in a 10 week challenge with this. She reports she has not been weighing herself since she started working out but that she has more energy and that her clothes are fitting better. She reports no bloating or swelling and states she hasn't needed to use lasix recently.  Date: 2/3/2020    Time of Call: 10:58 AM     Diagnosis:  Heart failure     [ VORB ] Ordering provider: Cassie ruffin MD    Order: Repeat BMP in 2 weeks.      Order received by: Gwen Swenson Rn     Follow-up/additional notes: Called Marleen and discussed plan. She is in agreement. She will get labs done in 2 weeks. WIll follow up with her once these results received.

## 2020-02-18 ENCOUNTER — PATIENT OUTREACH (OUTPATIENT)
Dept: CARDIOLOGY | Facility: CLINIC | Age: 55
End: 2020-02-18

## 2020-02-18 NOTE — PROGRESS NOTES
Called Bora to remind her to please get her labs drawn. Left message. Will follow up once results received.

## 2020-02-19 DIAGNOSIS — I50.22 CHRONIC SYSTOLIC HEART FAILURE (H): ICD-10-CM

## 2020-02-19 DIAGNOSIS — I50.32 CHRONIC DIASTOLIC CONGESTIVE HEART FAILURE (H): Primary | ICD-10-CM

## 2020-02-19 NOTE — PROGRESS NOTES
Called Marleen. She reports she hasn't been able to get her labs drawn yet but she is currently at [facility] with a family member and will see if she can get them drawn there since she will be there through the night.   Marleen reports that yesterday she felt that 'her chest was full of congestion' and that she coughed up a lot of phlegm. She felt very fatigued and was concerned by this episode. She reports she has no other cold symptoms and doesn't feel that she is sick. She denies feeling that she is holding onto fluid and reports her baseline swelling in her legs. She reports some swelling in her right wrist and wonders if it could be angioedema. Discussed that angioedema would usually present around her mouth (lips, tongue, etc). She wonders why she was feeling so poorly yesterday. She has been continuing her kickboxing and work out program and wonders if this could be bad for her heart. Discussed that we recommend exercise and that as long as she is able to rest as needed, she can continue to exercise.   Marleen is wondering if she should have an echo at her follow up appointment in April. This would be about 3 months since her cardiac MRI.  Discussed that we received her disability paperwork and that we would like her to complete the cardiopulmonary stress test to better assess her limitations from her heart. She is in agreement to complete this and will get this scheduled in the near future.

## 2020-02-20 ENCOUNTER — TELEPHONE (OUTPATIENT)
Dept: CARDIOLOGY | Facility: CLINIC | Age: 55
End: 2020-02-20

## 2020-02-20 DIAGNOSIS — D64.9 ANEMIA: Primary | ICD-10-CM

## 2020-02-20 NOTE — TELEPHONE ENCOUNTER
[facility] Call Center    Phone Message    May a detailed message be left on voicemail: yes     Reason for Call: Other: Pt calling in wondering if it is possible when she has labs scheduled if it is possible to add in iron and hemoglobin tests please call back or place orders  pt stated her arms and legs are very cold     Action Taken: Message routed to:  [facility] cardio     Travel Screening: Not Applicable

## 2020-02-21 ENCOUNTER — TELEPHONE (OUTPATIENT)
Dept: FAMILY MEDICINE | Facility: CLINIC | Age: 55
End: 2020-02-21

## 2020-02-21 ASSESSMENT — PATIENT HEALTH QUESTIONNAIRE - PHQ9
SUM OF ALL RESPONSES TO PHQ QUESTIONS 1-9: 10
SUM OF ALL RESPONSES TO PHQ QUESTIONS 1-9: 12

## 2020-02-21 NOTE — TELEPHONE ENCOUNTER
Date: 2/21/2020    Time of Call: 2:47 PM     Diagnosis:  Systolic heart failure     [ VORB ] Ordering provider: Cassie Kolb MD    Order: hemoglobin. Iron studies.      Order received by: Gwen Pozo RN     Follow-up/additional notes: Orders placed. Will call Marleen back and let her know.     Additionally, received notification from dr. Kolb that patient does not need another echocardiogram at her follow up appointment in April. Will notify Marleen.    Left voicemail for Marleen with the above information. Asked for her to please get labs drawn and we will follow up with the results. Will also follow along for CPX results late next week as well. Let phone number for her to call back with any questions.

## 2020-02-21 NOTE — TELEPHONE ENCOUNTER
Panel Management Review      Patient has the following on her problem list:     Depression / Dysthymia review    Measure:  Needs PHQ-9 score of 4 or less during index window.  Administer PHQ-9 and if score is 5 or more, send encounter to provider for next steps.    5 - 7 month window range:     PHQ-9 SCORE 2/14/2018 2/15/2018 9/20/2019   PHQ-9 Total Score MyChart - 0 12 (Moderate depression)   PHQ-9 Total Score 0 0 12       If PHQ-9 recheck is 5 or more, route to provider for next steps.    Patient is due for:  PHQ9      Composite cancer screening  Chart review shows that this patient is due/due soon for the following Pap Smear  Summary:    Patient is due/failing the following:   PAP, PHQ9 and PHYSICAL    Action needed:   Patient needs to do PHQ9.    Type of outreach:    Phone, spoke to patient.  Completed PHQ9 and score 10    Questions for provider review:    None                                                                                                                                    Irene Caceres MA       Chart routed to Provider .

## 2020-02-21 NOTE — TELEPHONE ENCOUNTER
Health Maintenance Due   Topic Date Due     URINE DRUG SCREEN  1965     PNEUMOCOCCAL IMMUNIZATION 19-64 MEDIUM RISK (1 of 1 - PPSV23) 07/06/1983     PREVENTIVE CARE VISIT  09/15/2009     ZOSTER IMMUNIZATION (1 of 2) 07/06/2014     HF ACTION PLAN  11/01/2015     PAP  07/01/2019     INFLUENZA VACCINE (1) 09/01/2019     DTAP/TDAP/TD IMMUNIZATION (2 - Td) 09/11/2019     LIPID  01/09/2020      Yes, please ask her to schedule a physical with pap smear and fasting labs, and I can go over depression as well, thanks!  Sincerely,  Dr. Danae Grimes MD  2/21/2020

## 2020-02-27 ENCOUNTER — HOSPITAL ENCOUNTER (OUTPATIENT)
Dept: CARDIOLOGY | Facility: CLINIC | Age: 55
Discharge: HOME | End: 2020-02-27
Attending: INTERNAL MEDICINE | Admitting: INTERNAL MEDICINE
Payer: COMMERCIAL

## 2020-02-27 DIAGNOSIS — I50.22 CHRONIC SYSTOLIC HEART FAILURE (H): ICD-10-CM

## 2020-02-27 DIAGNOSIS — D64.9 ANEMIA: ICD-10-CM

## 2020-02-27 DIAGNOSIS — I50.32 CHRONIC DIASTOLIC CONGESTIVE HEART FAILURE (H): ICD-10-CM

## 2020-02-27 LAB
ANION GAP SERPL CALCULATED.3IONS-SCNC: 3 MMOL/L (ref 3–14)
BUN SERPL-MCNC: 18 MG/DL (ref 7–30)
CALCIUM SERPL-MCNC: 8.7 MG/DL (ref 8.5–10.1)
CHLORIDE SERPL-SCNC: 111 MMOL/L (ref 94–109)
CO2 SERPL-SCNC: 28 MMOL/L (ref 20–32)
CREAT SERPL-MCNC: 0.74 MG/DL (ref 0.52–1.04)
GFR SERPL CREATININE-BSD FRML MDRD: >90 ML/MIN/{1.73_M2}
GLUCOSE SERPL-MCNC: 92 MG/DL (ref 70–99)
HGB BLD-MCNC: 11 G/DL (ref 11.7–15.7)
IRON SATN MFR SERPL: 26 % (ref 15–46)
IRON SERPL-MCNC: 82 UG/DL (ref 35–180)
POTASSIUM SERPL-SCNC: 3.7 MMOL/L (ref 3.4–5.3)
SODIUM SERPL-SCNC: 142 MMOL/L (ref 133–144)
TIBC SERPL-MCNC: 314 UG/DL (ref 240–430)

## 2020-02-27 PROCEDURE — 94621 CARDIOPULM EXERCISE TESTING: CPT | Mod: 26 | Performed by: INTERNAL MEDICINE

## 2020-02-27 PROCEDURE — 85018 HEMOGLOBIN: CPT | Performed by: INTERNAL MEDICINE

## 2020-02-27 PROCEDURE — 80048 BASIC METABOLIC PNL TOTAL CA: CPT | Performed by: INTERNAL MEDICINE

## 2020-02-27 PROCEDURE — 36415 COLL VENOUS BLD VENIPUNCTURE: CPT | Performed by: INTERNAL MEDICINE

## 2020-02-27 PROCEDURE — 83550 IRON BINDING TEST: CPT | Performed by: INTERNAL MEDICINE

## 2020-02-27 PROCEDURE — 94621 CARDIOPULM EXERCISE TESTING: CPT

## 2020-02-27 PROCEDURE — 83540 ASSAY OF IRON: CPT | Performed by: INTERNAL MEDICINE

## 2020-02-28 ENCOUNTER — PATIENT OUTREACH (OUTPATIENT)
Dept: CARDIOLOGY | Facility: CLINIC | Age: 55
End: 2020-02-28

## 2020-02-28 NOTE — PROGRESS NOTES
Called Marleen to review her lab results which are stable. Discussed slightly low hemoglobin of  11 which she states 'is not low for her.'  Discussed our plan to increase entresto. She is adamant she does not want to increase at this time as she feels she is taking too many medications already. We discussed that we may see more benefit from higher doses, she wants to wait until we see full results of her CPX and have evidence that we would need to increase.   Marleen reports that since she has started taking entresto around 1/13, she has felt cold. She reports she feels like she 'is outside in the winter' and cannot get warm. When people touch her, she feels cold to the touch. She also reports she has been getting headaches for the last two weeks, almost every day. She is continuing to exercise and work on her diet. She reports that she did an experiment and stopped the entresto for 3 days and her symptoms resolved. SHe has since resumed the entresto and symptoms have returned. She is worried that this means that she is not getting enough blood throughout her body.   Will route to provider for further review.

## 2020-03-05 NOTE — PROGRESS NOTES
Discussed with Dr. Kolb. Discussed that increased heat loss may be sign of increased vasodilation and may be evidence that entresto is doing what we want it to by reducing stress on the heart.  Called Marleen to discuss. No answer. Left voicemail with this information and asked for call back if she has further questions.  LEt her know I would call her once full results of her CPX are available as well.

## 2020-03-12 ENCOUNTER — TRANSFERRED RECORDS (OUTPATIENT)
Dept: HEALTH INFORMATION MANAGEMENT | Facility: CLINIC | Age: 55
End: 2020-03-12

## 2020-03-13 DIAGNOSIS — M25.561 RIGHT KNEE PAIN: Primary | ICD-10-CM

## 2020-03-13 LAB
CRP SERPL-MCNC: <2.9 MG/L (ref 0–8)
ERYTHROCYTE [DISTWIDTH] IN BLOOD BY AUTOMATED COUNT: 13.4 % (ref 10–15)
ERYTHROCYTE [SEDIMENTATION RATE] IN BLOOD BY WESTERGREN METHOD: 30 MM/H (ref 0–30)
HCT VFR BLD AUTO: 36.4 % (ref 35–47)
HGB BLD-MCNC: 11.9 G/DL (ref 11.7–15.7)
MCH RBC QN AUTO: 30.3 PG (ref 26.5–33)
MCHC RBC AUTO-ENTMCNC: 32.7 G/DL (ref 31.5–36.5)
MCV RBC AUTO: 93 FL (ref 78–100)
PLATELET # BLD AUTO: 287 10E9/L (ref 150–450)
RBC # BLD AUTO: 3.93 10E12/L (ref 3.8–5.2)
WBC # BLD AUTO: 5.4 10E9/L (ref 4–11)

## 2020-03-13 PROCEDURE — 36415 COLL VENOUS BLD VENIPUNCTURE: CPT | Performed by: FAMILY MEDICINE

## 2020-03-13 PROCEDURE — 86431 RHEUMATOID FACTOR QUANT: CPT | Performed by: FAMILY MEDICINE

## 2020-03-13 PROCEDURE — 86039 ANTINUCLEAR ANTIBODIES (ANA): CPT | Performed by: FAMILY MEDICINE

## 2020-03-13 PROCEDURE — 86038 ANTINUCLEAR ANTIBODIES: CPT | Performed by: FAMILY MEDICINE

## 2020-03-13 PROCEDURE — 85652 RBC SED RATE AUTOMATED: CPT | Performed by: FAMILY MEDICINE

## 2020-03-13 PROCEDURE — 86140 C-REACTIVE PROTEIN: CPT | Performed by: FAMILY MEDICINE

## 2020-03-13 PROCEDURE — 85027 COMPLETE CBC AUTOMATED: CPT | Performed by: FAMILY MEDICINE

## 2020-03-16 LAB
ANA PAT SER IF-IMP: ABNORMAL
ANA SER QL IF: ABNORMAL
ANA TITR SER IF: ABNORMAL {TITER}
RHEUMATOID FACT SER NEPH-ACNC: <7 IU/ML (ref 0–20)

## 2020-03-17 ENCOUNTER — RECORDS - HEALTHEAST (OUTPATIENT)
Dept: LAB | Facility: CLINIC | Age: 55
End: 2020-03-17

## 2020-03-17 ENCOUNTER — TRANSFERRED RECORDS (OUTPATIENT)
Dept: HEALTH INFORMATION MANAGEMENT | Facility: CLINIC | Age: 55
End: 2020-03-17

## 2020-03-17 LAB
APPEARANCE FLD: ABNORMAL
COLOR FLD: ABNORMAL
CRYSTALS SNV MICRO: NORMAL
EOSINOPHIL NFR FLD MANUAL: 1 %
LYMPHOCYTES NFR FLD MANUAL: 41 %
MACROPHAGE % - HISTORICAL: 37 %
MESOTHELIALS, FLUID: ABNORMAL
MONOCYTE % - HISTORICAL: 14 %
NEUTS BAND NFR FLD MANUAL: 4 %
OTHER CELLS FLD MANUAL: 4 %
RBC FLUID - HISTORICAL: ABNORMAL /UL
WBC # FLD AUTO: 376 /UL (ref 0–99)

## 2020-03-20 LAB
B BURGDOR DNA SPEC QL NAA+PROBE: NOT DETECTED
SPECIMEN SOURCE: NORMAL

## 2020-03-24 ENCOUNTER — TRANSFERRED RECORDS (OUTPATIENT)
Dept: HEALTH INFORMATION MANAGEMENT | Facility: CLINIC | Age: 55
End: 2020-03-24

## 2020-04-01 LAB
BACTERIA SPEC CULT: NO GROWTH
BACTERIA SPEC CULT: NORMAL
GRAM STAIN RESULT: NORMAL
GRAM STAIN RESULT: NORMAL

## 2020-04-15 ENCOUNTER — VIRTUAL VISIT (OUTPATIENT)
Dept: CARDIOLOGY | Facility: CLINIC | Age: 55
End: 2020-04-15
Attending: INTERNAL MEDICINE
Payer: COMMERCIAL

## 2020-04-15 DIAGNOSIS — I42.9 FAMILIAL CARDIOMYOPATHY (H): ICD-10-CM

## 2020-04-15 PROCEDURE — 99214 OFFICE O/P EST MOD 30 MIN: CPT | Mod: GT | Performed by: INTERNAL MEDICINE

## 2020-04-15 NOTE — NURSING NOTE
Chief Complaint   Patient presents with     Follow Up     4 months follow up     Allergies and medications reconciled.     Michael Deleon CMA  8:27 AM

## 2020-04-15 NOTE — PROGRESS NOTES
"Marleen Mcfadden is a 54 year old female who is being evaluated via a billable video visit.      The patient has been notified of following:     \"This video visit will be conducted via a call between you and your physician/provider. We have found that certain health care needs can be provided without the need for an in-person physical exam.  This service lets us provide the care you need with a video conversation.  If a prescription is necessary we can send it directly to your pharmacy.  If lab work is needed we can place an order for that and you can then stop by our lab to have the test done at a later time.    Video visits are billed at different rates depending on your insurance coverage.  Please reach out to your insurance provider with any questions.    If during the course of the call the physician/provider feels a video visit is not appropriate, you will not be charged for this service.\"    Patient has given verbal consent for Video visit? Yes    How would you like to obtain your AVS? Denisse    Patient would like the video invitation sent by: Send to e-mail at: [email or web address]               HPI: 54 year old female with a history of familial cardiomyopathy, left total knee arthroplasty on 10/3/2017 and right knee replacement on 2/20/2019 presents for ongoing evaluation and management.  Pt reports that since last clinic visit she has been feeling well.  She denies any chest pain or pressure, orthopnea, pnd, palpitations, syncope/presyncope or change in sob/lea or yamilet.  She denies any problems with her medications and reports compliance with all meds except her diuretics which she admits she is only taking as needed.         Past Medical History:   Diagnosis Date     Allergic rhinitis, cause unspecified      Anemia      Autoimmune disease NEC     Autoimmune disease- unknown/poss SLE     Calcaneal spur 10/21/2014     CHF with cardiomyopathy (H)      Familial cardiomyopathy (H) 10/21/2015     " Morbid obesity (H) 2015     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     no longer an issue     Other primary cardiomyopathies     Cardiomyopathy- dx'd  idiopathic     PONV (postoperative nausea and vomiting)      UTERINE LEIOMYOMA NOS 10/25/2006       Past Surgical History:   Procedure Laterality Date     C BREAST SURGERY PROCEDURE UNLISTED      Breast Reduction     C  DELIVERY ONLY  10/85,     , Low Cervicalx2     C EXCIS UTERINE FIBROID,ABD APPRCH       C EXCISE EXCESS SKIN TISSUE,ABDOMEN       C LIGATE FALLOPIAN TUBE       C REPAIR CRUCIATE LIGAMENT,KNEE      Right Knee     C TOTAL KNEE ARTHROPLASTY Left 10/03/2017     HYSTERECTOMY TOTAL ABDOMINAL      for fibroids; reports having blood transfusion after surgery     THYROIDECTOMY  2013    Procedure: THYROIDECTOMY;  LEFT THYROID LOBECTOMY.  (LIGASURE, RECURRENT LARYNGEAL NERVE MONITOR) ;  Surgeon: Uriah Camargo MD;  Location: [facility]       Family History   Problem Relation Age of Onset     Hypertension Father         dec     Diabetes Father         dec     Heart Disease Father         dec     Alcohol/Drug Father      Cardiovascular Father      Heart Disease Mother         dec     Alcohol/Drug Mother      Cardiovascular Mother      Heart Disease Daughter         Cardiomyopathy     Cardiovascular Daughter         cardiomyopathy     Colon Cancer Sister      Cardiovascular Son         cardiomyopathy     Diabetes Paternal Grandmother         dec     Hypertension Paternal Grandmother         dec     Cerebrovascular Disease Paternal Grandmother         dec     Cancer Sister         Lupus     Cardiovascular Sister         cardiomyopathy     Heart Disease Sister         heart failure, and kidney failure       Social History     Tobacco Use     Smoking status: Never Smoker     Smokeless tobacco: Never Used   Substance Use Topics     Alcohol use: Yes     Comment: rare,  twice a year, she has a drink little wine 2 days ago         Current Outpatient Medications   Medication Sig     amitriptyline (ELAVIL) 25 MG tablet Take 1 tablet (25 mg) by mouth At Bedtime     Collagen 500 MG CAPS      diclofenac (VOLTAREN) 75 MG EC tablet Take 1 tablet (75 mg) by mouth 2 times daily as needed for moderate pain     estradiol (VIVELLE-DOT) 0.0375 MG/24HR BIW patch IRISH 1 PATCH ON SKIN TWICE PER WEEK     FLAXSEED, LINSEED, PO      furosemide (LASIX) 20 MG tablet Take 2 tablets (40 mg) by mouth daily as needed (as needed for weight gain/edema)     hydrOXYzine (ATARAX) 25 MG tablet Take 2-4 tablets ( mg) by mouth nightly as needed for anxiety or other (sleep)     metoprolol succinate ER (TOPROL-XL) 50 MG 24 hr tablet Take 1 tablet (50 mg) by mouth daily     progesterone (PROMETRIUM) 100 MG capsule Take 100 mg by mouth Reported on 3/8/2017     sacubitril-valsartan (ENTRESTO) 49-51 MG per tablet Take 1 tablet by mouth 2 times daily     sertraline (ZOLOFT) 50 MG tablet Take 1 tablet (50 mg) by mouth daily     spironolactone (ALDACTONE) 25 MG tablet Take 2 tablets (50 mg) by mouth daily     traMADol (ULTRAM) 50 MG tablet Take 1 tablet (50 mg) by mouth every 8 hours as needed for severe pain     bisacodyl (DULCOLAX) 10 MG suppository Place 1 suppository (10 mg) rectally daily as needed for constipation (Patient not taking: Reported on 4/15/2020)     Cholecalciferol (VITAMIN D) 2000 UNIT tablet Take 5,000 Units by mouth as needed Reported on 3/8/2017     cyclobenzaprine (FLEXERIL) 10 MG tablet Take 0.5-1 tablets (5-10 mg) by mouth 3 times daily as needed for muscle spasms (Patient not taking: Reported on 4/15/2020)     dicyclomine (BENTYL) 10 MG capsule Take 1 capsule (10 mg) by mouth 4 times daily (before meals and nightly) (Patient not taking: Reported on 4/15/2020)     No current facility-administered medications for this visit.      Facility-Administered Medications Ordered in Other Visits    Medication     sodium chloride (PF) 0.9% PF flush 10 mL     sodium chloride (PF) 0.9% PF flush 20 mL         ROS:   10 point ROS negative other than what is discussed above    EXAM:   There were no vitals taken for this visit. given virtual visit  Constitutional - WDWN, no acute distress   Eyes - no redness or discharge, nonicteric sclera  Respiratory - nonlabored breathing, able to speak in full sentences without difficulty  CV: no visible edema of visualized upper extremities.  Neurological - alert and oriented, speech fluent/appropriate  PSYCH: cooperative, affect appropriate  DERM: no rashes on visualized face/neck/upper extremities     The rest of a comprehensive physical examination is deferred due to PHE (public health emergency) video visit restrictions      CMR Report  01--  Clinical history: 53 yo woman with a familial cardiomyopathy to have repeat CMRI.  Comparison CMR: 10/6/2016.  1.  The global systolic function is moderately decreased and the LVEF is 38%. The RV is moderately dilated while wall thickness is normal. There is moderate global hypokinesis.  2. The RV is the upper limit of normal in cavity size. The global systolic function is mildly decreased and  the RVEF is 52%.   3. Both atria are mildly dilated.  4. There is no significant valvular disease.   5. Late gadolinium enhancement imaging shows no MI, fibrosis or infiltrative disease.   CONCLUSIONS:   The global systolic function is moderately decreased and the LVEF is 38%. The RV is moderately dilated  while wall thickness is normal. There is moderate global hypokinesis.  The RV is the upper limit of normal in cavity size. The global systolic function is mildly decreased and  the RVEF is 52%.   Compared to the prior study of 2016, LV systolic performance is minimally decreased and RV systolic  performance is similar.  Both LV and RV dilation are mildly increased.             Assessment and Plan: 55 year old female with a history of  familial cardiomyopathy, left total knee arthroplasty on 10/3/2017 and right knee replacement on 2/20/2019 presents for ongoing evaluation and management.     Chronic systolic heart failure secondary to familial cardiomyopathy.    Stage B, NYHA Class IIb.  ACEi/ARB?ARNI yes, continue sacubitril-valsartan 49/51mg bid.  Will consider uptitrating in future in symptoms/cardiac function worsen but at present patient unwilling to increase  BB yes, continue metoprolol XL 50mg daily  Aldosterone antagonist - continue spironolactone to 50mg daily   SCD prophylaxis: Recent MRI confirms LVEF > 35%, thus ICD not indicated at present  % BiV pacing: N/A  Fluid status euvolemic  NSAID use: advised to avoid NSAIDs  Encouraged patient to begin regular aerobic exercise aiming for at least 150 minutes of moderate physical activity or 75 minutes of vigorous physical activity - or an equal combination of both - each week. and follow low-salt, heart healthy diet.     RTC:  Return visit in July/August with labs including TSH/FT4 and echo.  Will be happy to see sooner if change in clinical status or new questions/concerns arise.    Cassie Kolb MD  Section Head - Advanced Heart Failure, Transplantation and Mechanical Circulatory Support  Director - Adult [facility]  Associate Professor of Medicine, [facility]            Video-Visit Details  Type of service:  Video Visit  Video Start Time: 8:35  Video End Time (time video stopped): 8:54  Originating Location (pt. Location): Home  Distant Location (provider location):  [facility]   Mode of Communication:  Video Conference via [place]

## 2020-04-15 NOTE — NURSING NOTE
Diet: Patient instructed regarding a heart failure healthy diet, including discussion of reduced fat and 2000 mg daily sodium restriction, daily weights, medication purpose and compliance, fluid restrictions and resources for patient and family to access for assistance with heart failure management.       Med Reconcile: Reviewed and verified all current medications with the patient. The updated medication list was printed and given to the patient. NO CHANGES.    Return Appointment: Patient given instructions regarding scheduling next clinic visit. Return to clinic in July or August to see Dr. Kolb with echocardiogram and labs (including TSH and free t4) prior.    Patient stated she understood all health information given and agreed to call with further questions or concerns.     Gwen Swenson RN

## 2020-04-15 NOTE — PATIENT INSTRUCTIONS
Cardiology Providers you saw during your visit:  Dr. Kolb     Medication changes:  1- No medication changes today.     Follow up:  1- Return to clinic in July or August to see Dr. Kolb with an echocardiogram and labs prior.     Please call if you have:  1. Weight gain of more than 2 pounds in a day or 5 pounds in a week  2. Increased shortness of breath, swelling or bloating  3. Dizziness, lightheadedness   4. Any questions or concerns.      Follow the American Heart Association Diet and Lifestyle recommendations:  Limit saturated fat, trans fat, sodium, red meat, sweets and sugar-sweetened beverages. If you choose to eat red meat, compare labels and select the leanest cuts available.  Aim for at least 150 minutes of moderate physical activity or 75 minutes of vigorous physical activity - or an equal combination of both - each week.     During business hours: [phone], press option # 1 to be routed to the [facility] then option # 4 to send a message to your care team     After hours, weekends or holidays: On Call Cardiologist- [phone]   option #4 and ask to speak to the on-call Cardiologist. Inform them you are a CORE/heart failure patient at the [place] [facility].     Gwen Swenson, RN BSN  Cardiology Nurse Care Coordinator     Keep up the good work!     Take Care!

## 2020-05-21 ENCOUNTER — VIRTUAL VISIT (OUTPATIENT)
Dept: FAMILY MEDICINE | Facility: CLINIC | Age: 55
End: 2020-05-21
Payer: COMMERCIAL

## 2020-05-21 VITALS — HEIGHT: 64 IN | WEIGHT: 238 LBS | BODY MASS INDEX: 40.63 KG/M2

## 2020-05-21 DIAGNOSIS — Z13.6 CARDIOVASCULAR SCREENING; LDL GOAL LESS THAN 160: ICD-10-CM

## 2020-05-21 DIAGNOSIS — R31.21 ASYMPTOMATIC MICROSCOPIC HEMATURIA: Primary | ICD-10-CM

## 2020-05-21 PROCEDURE — 99214 OFFICE O/P EST MOD 30 MIN: CPT | Mod: GT | Performed by: FAMILY MEDICINE

## 2020-05-21 ASSESSMENT — MIFFLIN-ST. JEOR: SCORE: 1651.62

## 2020-05-21 NOTE — PATIENT INSTRUCTIONS
I recommend getting a tetanus booster, pneumonia vaccine PCV 23) and a shingles vaccine.    Shingles vaccine  I strongly recommend getting the shingles vaccine (Shingrix) if you are over age 50, but please check with your insurance to see how much you might have to pay for this vaccine! If you are on most insurance plans including Medicare, it's covered if you get it at a pharmacy but not in a clinic.    This shot will prevent shingles, a painful skin rash that you are at risk for getting if you have ever had chicken pox. Sometimes the pain will last the rest of your life, even after the rash has healed, and there is not much that we can do to relieve the pain. The vaccine is 98% effective at preventing shingles.    Please consider getting this vaccine! You'll need #2 vaccines 2-4 months apart to be protected.      Please schedule a lab appointment at [facility], thank you!    Address: [address], [place] [address]    Phone: [phone]      Patient Education     Blood in the Urine    Blood in the urine (hematuria) has many possible causes. If it occurs after an injury (such as a car accident or fall), it is most often a sign of bruising to the kidney or bladder. Common causes of blood in the urine include urinary tract infections, kidney stones, inflammation, tumors, or certain other diseases of the kidney or bladder. Menstruation can cause blood to appear in the urine sample, although it is not coming from the urinary tract.  If only a trace amount of blood is present, it will show up on the urine test, even though the urine may be yellow and not pink or red. This may occur with any of the above conditions, as well as heavy exercise or high fever. In this case, your doctor may want to repeat the urine test on another day. This will show if the blood is still present. If it is, then other tests can be done to find out the cause.  Home care  Follow these home care guidelines:    If your urine  does not appear bloody (pink, brown or red) then you do not need to restrict your activity in any way.    If you can see blood in your urine, rest and avoid heavy exertion until your next exam. Do not use aspirin, blood thinners, or anti-platelet or anti-inflammatory medicines. These include ibuprofen and naproxen. These thin the blood and may increase bleeding.  Follow-up care  Follow up with your healthcare provider, or as advised. If you were injured and had blood in your urine, you should have a repeat urine test in 1 to 2 days. Contact your doctor for this test.  A radiologist will review any X-rays that were taken. You will be told of any new findings that may affect your care.  When to seek medical advice  Call your healthcare provider right away if any of these occur:    Bright red blood or blood clots in the urine (if you did not have this before)    Weakness, dizziness or fainting    New groin, abdominal, or back pain    Fever of 100.4 F (38 C) or higher, or as directed by your healthcare provider    Repeated vomiting    Bleeding from the nose or gums or easy bruising  Date Last Reviewed: 9/1/2016 2000-2019 [employer]. [address] [place] [address], [place] [address]. All rights reserved. This information is not intended as a substitute for professional medical care. Always follow your healthcare professional's instructions.

## 2020-05-21 NOTE — PROGRESS NOTES
"Marleen Mcfadden is a 54 year old female who is being evaluated via a billable video visit.      The patient has been notified of following:     \"This video visit will be conducted via a call between you and your physician/provider. We have found that certain health care needs can be provided without the need for an in-person physical exam.  This service lets us provide the care you need with a video conversation.  If a prescription is necessary we can send it directly to your pharmacy.  If lab work is needed we can place an order for that and you can then stop by our lab to have the test done at a later time.    Video visits are billed at different rates depending on your insurance coverage.  Please reach out to your insurance provider with any questions.    If during the course of the call the physician/provider feels a video visit is not appropriate, you will not be charged for this service.\"    Patient has given verbal consent for Video visit? Yes    How would you like to obtain your AVS? Mail a copy    Patient would like the video invitation sent by: Send to e-mail at: [email or web address]    Will anyone else be joining your video visit? No    Subjective     Marleen Mcfadden is a 54 year old female who presents today via video visit for the following health issues:    HPI  URINARY TRACT SYMPTOMS      Duration: x 1 day; chronic urinary incontinence    She was seen by the urologist today, Dr. Gabriel, [place] [facility] and was told she had blood in her urine, she was being evaluated for incontinence    She's not having any urinary symptoms     Description  Hematuria    Intensity:  moderate    Accompanying signs and symptoms:  Fever/chills: no   Flank pain no   Nausea and vomiting: no   Vaginal symptoms: itching  Abdominal/Pelvic Pain: no     History  History of frequent UTI's: no   History of kidney stones: no   Sexually Active: no   Possibility of pregnancy: No  She had  Prior hx of hematuria work up " several years ago    Precipitating or alleviating factors: None    Therapies tried and outcome: none   Outcome: n/a    Video Start Time: 1:45 PM    Health Maintenance Due   Topic Date Due     URINE DRUG SCREEN  1965     PNEUMOCOCCAL IMMUNIZATION 19-64 MEDIUM RISK (1 of 1 - PPSV23) 07/06/1983     PREVENTIVE CARE VISIT  09/15/2009     ZOSTER IMMUNIZATION (1 of 2) 07/06/2014     HF ACTION PLAN  11/01/2015     PAP  07/01/2019     DTAP/TDAP/TD IMMUNIZATION (2 - Td) 09/11/2019     LIPID  01/09/2020      Patient Active Problem List   Diagnosis     Autoimmune disease, not elsewhere classified     Primary cardiomyopathy (H)     Leiomyoma of uterus     Allergic rhinitis     Anemia     Sinus bradycardia     Health Care Home     Itching     CHF with cardiomyopathy (H)     TB lung, latent     Knee pain     Swelling of knee joint     Thyroid nodule     Pain in joint involving ankle and foot     Calcaneal spur     Plantar fasciitis     CARDIOVASCULAR SCREENING; LDL GOAL LESS THAN 160     Peroneus longus tendinitis     Morbid obesity (H)     Shaina's nodes     Familial cardiomyopathy (H)     Acute pain of right knee     Acute bilateral low back pain with right-sided sciatica     Degenerative disc disease at L5-S1 level     Chronic bilateral low back pain with right-sided sciatica     Chronic bilateral low back pain with left-sided sciatica     Elevated blood pressure reading without diagnosis of hypertension     Chronic diastolic congestive heart failure (H)     Injury of left shoulder, initial encounter     Sprain of other ligament of left ankle, initial encounter     Left shoulder pain     Rotator cuff injury     Status post left knee replacement     Nontraumatic rupture of quadriceps tendon, left     Primary osteoarthritis of both knees     Elevated triglycerides with high cholesterol     Gastroesophageal reflux disease with esophagitis     Muscle spasm     Odd detrimental health beliefs     Moderate major depression (H)      Past Surgical History:   Procedure Laterality Date     C BREAST SURGERY PROCEDURE UNLISTED      Breast Reduction     C  DELIVERY ONLY  10/85,     , Low Cervicalx2     C EXCIS UTERINE FIBROID,ABD APPRCH       C EXCISE EXCESS SKIN TISSUE,ABDOMEN       C LIGATE FALLOPIAN TUBE       C REPAIR CRUCIATE LIGAMENT,KNEE      Right Knee     C TOTAL KNEE ARTHROPLASTY Left 10/03/2017     HYSTERECTOMY TOTAL ABDOMINAL      for fibroids; reports having blood transfusion after surgery     THYROIDECTOMY  2013    Procedure: THYROIDECTOMY;  LEFT THYROID LOBECTOMY.  (LIGASURE, RECURRENT LARYNGEAL NERVE MONITOR) ;  Surgeon: Uriah Camargo MD;  Location: [facility]       Social History     Tobacco Use     Smoking status: Never Smoker     Smokeless tobacco: Never Used   Substance Use Topics     Alcohol use: Yes     Comment: rare, twice a year, she has a drink little wine 2 days ago     Family History   Problem Relation Age of Onset     Hypertension Father         dec     Diabetes Father         dec     Heart Disease Father         dec     Alcohol/Drug Father      Cardiovascular Father      Heart Disease Mother         dec     Alcohol/Drug Mother      Cardiovascular Mother      Heart Disease Daughter         Cardiomyopathy     Cardiovascular Daughter         cardiomyopathy     Colon Cancer Sister      Cardiovascular Son         cardiomyopathy     Diabetes Paternal Grandmother         dec     Hypertension Paternal Grandmother         dec     Cerebrovascular Disease Paternal Grandmother         dec     Cancer Sister         Lupus     Cardiovascular Sister         cardiomyopathy     Heart Disease Sister         heart failure, and kidney failure         Current Outpatient Medications   Medication Sig Dispense Refill     amitriptyline (ELAVIL) 25 MG tablet Take 1 tablet (25 mg) by mouth At Bedtime 90 tablet 1     Cholecalciferol (VITAMIN D) 2000 UNIT tablet Take 5,000 Units by mouth as  needed Reported on 3/8/2017 100 tablet 12     Collagen 500 MG CAPS        diclofenac (VOLTAREN) 75 MG EC tablet Take 1 tablet (75 mg) by mouth 2 times daily as needed for moderate pain 40 tablet 1     dicyclomine (BENTYL) 10 MG capsule Take 1 capsule (10 mg) by mouth 4 times daily (before meals and nightly) 20 capsule 0     estradiol (VIVELLE-DOT) 0.0375 MG/24HR BIW patch IRISH 1 PATCH ON SKIN TWICE PER WEEK  11     FLAXSEED, LINSEED, PO        furosemide (LASIX) 20 MG tablet Take 2 tablets (40 mg) by mouth daily as needed (as needed for weight gain/edema) 60 tablet 11     hydrOXYzine (ATARAX) 25 MG tablet Take 2-4 tablets ( mg) by mouth nightly as needed for anxiety or other (sleep) 30 tablet 0     metoprolol succinate ER (TOPROL-XL) 50 MG 24 hr tablet Take 1 tablet (50 mg) by mouth daily 30 tablet 11     progesterone (PROMETRIUM) 100 MG capsule Take 100 mg by mouth Reported on 3/8/2017       sacubitril-valsartan (ENTRESTO) 49-51 MG per tablet Take 1 tablet by mouth 2 times daily 90 tablet 0     sertraline (ZOLOFT) 50 MG tablet Take 1 tablet (50 mg) by mouth daily 30 tablet 1     spironolactone (ALDACTONE) 25 MG tablet Take 2 tablets (50 mg) by mouth daily 60 tablet 11     traMADol (ULTRAM) 50 MG tablet Take 1 tablet (50 mg) by mouth every 8 hours as needed for severe pain 60 tablet 0     bisacodyl (DULCOLAX) 10 MG suppository Place 1 suppository (10 mg) rectally daily as needed for constipation (Patient not taking: Reported on 4/15/2020) 5 suppository 0     cyclobenzaprine (FLEXERIL) 10 MG tablet Take 0.5-1 tablets (5-10 mg) by mouth 3 times daily as needed for muscle spasms (Patient not taking: Reported on 4/15/2020) 90 tablet 1     Allergies   Allergen Reactions     Morphine      EMESIS     Nickel      Sulfa Drugs Swelling     Recent Labs   Lab Test 02/27/20  1033 01/27/20  1508  09/18/19  1201 09/12/19  1619  01/09/19  0740 01/08/19  1121  02/15/18  1450  04/09/14  1033   A1C  --   --   --   --   --   --    "--  5.5  --   --   --   --    LDL  --   --   --   --   --   --  83 92  --   --   --  96   HDL  --   --   --   --   --   --  44* 44*  --   --   --  60   TRIG  --   --   --   --   --   --  175* 179*  --   --   --  63   ALT  --   --   --  28 35  --  35 34   < > 27   < > 39   CR 0.74 1.18*   < > 0.70 0.81   < > 0.69 0.77   < >  --    < > 0.77   GFRESTIMATED >90 52*   < > >90 81   < > >90 87   < >  --    < > 80   GFRESTBLACK >90 60*   < > >90 >90   < > >90 >90   < >  --    < > >90   POTASSIUM 3.7 4.6   < > 3.9 4.1   < > 3.6 3.9   < >  --    < > 4.0   TSH  --   --   --  2.84  --   --   --   --   --  2.15   < > 2.65    < > = values in this interval not displayed.      BP Readings from Last 3 Encounters:   12/04/19 114/71   10/10/19 120/80   10/01/19 118/74    Wt Readings from Last 3 Encounters:   05/21/20 108 kg (238 lb)   12/04/19 106.8 kg (235 lb 8 oz)   10/10/19 107 kg (236 lb)                    Reviewed and updated as needed this visit by Provider  Problems         Review of Systems   Constitutional, HEENT, cardiovascular, pulmonary, GI, , musculoskeletal, neuro, skin, endocrine and psych systems are negative, except as otherwise noted.      Objective    Ht 1.613 m (5' 3.5\")   Wt 108 kg (238 lb)   LMP 10/19/2008 (Approximate)   Breastfeeding No   BMI 41.50 kg/m    Estimated body mass index is 41.5 kg/m  as calculated from the following:    Height as of this encounter: 1.613 m (5' 3.5\").    Weight as of this encounter: 108 kg (238 lb).  Physical Exam     GENERAL: Healthy, alert and no distress  EYES: Eyes grossly normal to inspection.  No discharge or erythema, or obvious scleral/conjunctival abnormalities.  RESP: No audible wheeze, cough, or visible cyanosis.  No visible retractions or increased work of breathing.    SKIN: Visible skin clear. No significant rash, abnormal pigmentation or lesions.  NEURO: Cranial nerves grossly intact.  Mentation and speech appropriate for age.  PSYCH: Mentation appears normal, " affect normal/bright, judgement and insight intact, normal speech and appearance well-groomed.      Diagnostic Test Results:  Labs reviewed in Epic  Results from specialists are not available on epic or care everywhere        Assessment & Plan     1. Asymptomatic microscopic hematuria  New problem, although per patient may have had prior franklin for similar findings. Unfortunately I don't have the urine results, and she can't remember for instance how many rbcs were seen, and also reports that she had 2 separate samples, one voided and one cath, doesn't know which one had the blood.  I discussed diagnosis of hematuria (2 of 3 + ), and she doesn't have risk factors for bladder cancer  Will recheck UA and Will fu as indicated.   - UA with Microscopic reflex to Culture; Future  - **CBC with platelets FUTURE anytime; Future  - **Basic metabolic panel FUTURE anytime; Future  - Albumin Random Urine Quantitative with Creat Ratio; Future    2. CARDIOVASCULAR SCREENING; LDL GOAL LESS THAN 160  LDL Cholesterol Calculated   Date Value Ref Range Status   01/09/2019 83 <100 mg/dL Final     Comment:     Desirable:       <100 mg/dl      Return to clinic for fasting labs  - Lipid panel reflex to direct LDL Fasting; Future       Return in about 1 day (around 5/22/2020) for fasting labs.    Danae Grimes MD  [facility]      Video-Visit Details    Type of service:  Video Visit    Video End Time:2:01 PM    Originating Location (pt. Location): Home    Distant Location (provider location):  [facility]     Platform used for Video Visit: AmWell    Return in about 1 day (around 5/22/2020) for fasting labs.       Danae Grimes MD

## 2020-06-03 ENCOUNTER — OFFICE VISIT (OUTPATIENT)
Dept: URGENT CARE | Facility: URGENT CARE | Age: 55
End: 2020-06-03
Payer: MEDICAID

## 2020-06-03 VITALS
WEIGHT: 237.2 LBS | RESPIRATION RATE: 16 BRPM | SYSTOLIC BLOOD PRESSURE: 121 MMHG | HEART RATE: 69 BPM | BODY MASS INDEX: 41.36 KG/M2 | DIASTOLIC BLOOD PRESSURE: 73 MMHG | TEMPERATURE: 98.9 F | OXYGEN SATURATION: 97 %

## 2020-06-03 DIAGNOSIS — R31.21 ASYMPTOMATIC MICROSCOPIC HEMATURIA: ICD-10-CM

## 2020-06-03 DIAGNOSIS — H66.001 NON-RECURRENT ACUTE SUPPURATIVE OTITIS MEDIA OF RIGHT EAR WITHOUT SPONTANEOUS RUPTURE OF TYMPANIC MEMBRANE: Primary | ICD-10-CM

## 2020-06-03 DIAGNOSIS — I42.9 FAMILIAL CARDIOMYOPATHY (H): ICD-10-CM

## 2020-06-03 DIAGNOSIS — Z13.6 CARDIOVASCULAR SCREENING; LDL GOAL LESS THAN 160: ICD-10-CM

## 2020-06-03 DIAGNOSIS — B37.31 YEAST INFECTION OF THE VAGINA: ICD-10-CM

## 2020-06-03 LAB
ALBUMIN SERPL-MCNC: 3.7 G/DL (ref 3.4–5)
ALBUMIN UR-MCNC: ABNORMAL MG/DL
ALP SERPL-CCNC: 85 U/L (ref 40–150)
ALT SERPL W P-5'-P-CCNC: 27 U/L (ref 0–50)
AMORPH CRY #/AREA URNS HPF: ABNORMAL /HPF
ANION GAP SERPL CALCULATED.3IONS-SCNC: 5 MMOL/L (ref 3–14)
APPEARANCE UR: ABNORMAL
AST SERPL W P-5'-P-CCNC: 20 U/L (ref 0–45)
BACTERIA #/AREA URNS HPF: ABNORMAL /HPF
BILIRUB SERPL-MCNC: 0.2 MG/DL (ref 0.2–1.3)
BILIRUB UR QL STRIP: NEGATIVE
BUN SERPL-MCNC: 17 MG/DL (ref 7–30)
CALCIUM SERPL-MCNC: 8.6 MG/DL (ref 8.5–10.1)
CHLORIDE SERPL-SCNC: 106 MMOL/L (ref 94–109)
CO2 SERPL-SCNC: 28 MMOL/L (ref 20–32)
COLOR UR AUTO: YELLOW
CREAT SERPL-MCNC: 0.87 MG/DL (ref 0.52–1.04)
ERYTHROCYTE [DISTWIDTH] IN BLOOD BY AUTOMATED COUNT: 13 % (ref 10–15)
GFR SERPL CREATININE-BSD FRML MDRD: 74 ML/MIN/{1.73_M2}
GLUCOSE SERPL-MCNC: 92 MG/DL (ref 70–99)
GLUCOSE UR STRIP-MCNC: NEGATIVE MG/DL
HCT VFR BLD AUTO: 39.1 % (ref 35–47)
HGB BLD-MCNC: 12.7 G/DL (ref 11.7–15.7)
HGB UR QL STRIP: ABNORMAL
KETONES UR STRIP-MCNC: ABNORMAL MG/DL
LEUKOCYTE ESTERASE UR QL STRIP: NEGATIVE
MCH RBC QN AUTO: 30.2 PG (ref 26.5–33)
MCHC RBC AUTO-ENTMCNC: 32.5 G/DL (ref 31.5–36.5)
MCV RBC AUTO: 93 FL (ref 78–100)
NITRATE UR QL: NEGATIVE
NON-SQ EPI CELLS #/AREA URNS LPF: ABNORMAL /LPF
PH UR STRIP: 5.5 PH (ref 5–7)
PLATELET # BLD AUTO: 310 10E9/L (ref 150–450)
POTASSIUM SERPL-SCNC: 3.8 MMOL/L (ref 3.4–5.3)
PROT SERPL-MCNC: 7.9 G/DL (ref 6.8–8.8)
RBC # BLD AUTO: 4.21 10E12/L (ref 3.8–5.2)
RBC #/AREA URNS AUTO: ABNORMAL /HPF
SODIUM SERPL-SCNC: 139 MMOL/L (ref 133–144)
SOURCE: ABNORMAL
SP GR UR STRIP: >1.03 (ref 1–1.03)
T4 FREE SERPL-MCNC: 1.04 NG/DL (ref 0.76–1.46)
TSH SERPL DL<=0.005 MIU/L-ACNC: 2.1 MU/L (ref 0.4–4)
UROBILINOGEN UR STRIP-ACNC: 0.2 EU/DL (ref 0.2–1)
WBC # BLD AUTO: 6.4 10E9/L (ref 4–11)
WBC #/AREA URNS AUTO: ABNORMAL /HPF

## 2020-06-03 PROCEDURE — 99213 OFFICE O/P EST LOW 20 MIN: CPT | Performed by: FAMILY MEDICINE

## 2020-06-03 PROCEDURE — 84443 ASSAY THYROID STIM HORMONE: CPT | Performed by: INTERNAL MEDICINE

## 2020-06-03 PROCEDURE — 36415 COLL VENOUS BLD VENIPUNCTURE: CPT | Performed by: FAMILY MEDICINE

## 2020-06-03 PROCEDURE — 85027 COMPLETE CBC AUTOMATED: CPT | Performed by: FAMILY MEDICINE

## 2020-06-03 PROCEDURE — 81001 URINALYSIS AUTO W/SCOPE: CPT | Performed by: FAMILY MEDICINE

## 2020-06-03 PROCEDURE — 82043 UR ALBUMIN QUANTITATIVE: CPT | Performed by: FAMILY MEDICINE

## 2020-06-03 PROCEDURE — 80053 COMPREHEN METABOLIC PANEL: CPT | Performed by: INTERNAL MEDICINE

## 2020-06-03 PROCEDURE — 84439 ASSAY OF FREE THYROXINE: CPT | Performed by: INTERNAL MEDICINE

## 2020-06-03 RX ORDER — AMOXICILLIN 875 MG
875 TABLET ORAL 2 TIMES DAILY
Qty: 14 TABLET | Refills: 0 | Status: SHIPPED | OUTPATIENT
Start: 2020-06-03 | End: 2020-07-13

## 2020-06-03 RX ORDER — FLUCONAZOLE 150 MG/1
150 TABLET ORAL ONCE
Qty: 1 TABLET | Refills: 0 | Status: SHIPPED | OUTPATIENT
Start: 2020-06-03 | End: 2020-07-13

## 2020-06-03 ASSESSMENT — ENCOUNTER SYMPTOMS
CHILLS: 0
SHORTNESS OF BREATH: 0
TROUBLE SWALLOWING: 0
SORE THROAT: 0
HEADACHES: 0
SINUS PRESSURE: 0
VOICE CHANGE: 0
WOUND: 0
VOMITING: 0
NAUSEA: 0
COUGH: 0
RHINORRHEA: 0
DIARRHEA: 0
FEVER: 0

## 2020-06-03 ASSESSMENT — PAIN SCALES - GENERAL: PAINLEVEL: SEVERE PAIN (7)

## 2020-06-03 NOTE — PROGRESS NOTES
"SUBJECTIVE:   Marleen Mcfadden is a 54 year old female presenting with a chief complaint of   Chief Complaint   Patient presents with     Ear Problem     both ears pain x a month, mainly right ear but left ear start to bothering, it started with a clog in the ear and feel better when turn head to the side-did order Debrox drop from [employer] and did self ear irrigation but nothing came out       She is an established patient of [facility].      Toyin is a 54-year-old female presenting today with concern of right ear fullness or fluid sensation in her right ear over the past 2 to 3 days she is been having ear pain.  She is noticed decreased hearing acuity over the past couple months and most recently her pain is upwards of 7/10 she does seem to have decreased hearing acuity but denies dizziness or vertigo.  She denies any previous ear nose or throat procedures or ear infections as an adult.     Past medical history is significant for cardiomyopathy she sees a cardiologist every 6 months.        Thyroid nodule removed in 2014 -- \"benign\"  Knee arthroplasty bilaterally last year.     Phlebotomy work       Review of Systems   Constitutional: Negative for chills and fever.   HENT: Positive for ear pain. Negative for congestion, rhinorrhea, sinus pressure, sore throat, tinnitus, trouble swallowing and voice change.    Respiratory: Negative for cough and shortness of breath.    Gastrointestinal: Negative for diarrhea, nausea and vomiting.   Skin: Negative for rash and wound.   Neurological: Negative for headaches.       Past Medical History:   Diagnosis Date     Allergic rhinitis, cause unspecified      Anemia      Autoimmune disease NEC     Autoimmune disease- unknown/poss SLE     Calcaneal spur 10/21/2014     CHF with cardiomyopathy (H)      Familial cardiomyopathy (H) 10/21/2015     Morbid obesity (H) 5/5/2015     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     no longer an issue     " Other primary cardiomyopathies     Cardiomyopathy- dx'd 1999 idiopathic     PONV (postoperative nausea and vomiting)      UTERINE LEIOMYOMA NOS 10/25/2006     Family History   Problem Relation Age of Onset     Hypertension Father         dec     Diabetes Father         dec     Heart Disease Father         dec     Alcohol/Drug Father      Cardiovascular Father      Heart Disease Mother         dec     Alcohol/Drug Mother      Cardiovascular Mother      Heart Disease Daughter         Cardiomyopathy     Cardiovascular Daughter         cardiomyopathy     Colon Cancer Sister      Cardiovascular Son         cardiomyopathy     Diabetes Paternal Grandmother         dec     Hypertension Paternal Grandmother         dec     Cerebrovascular Disease Paternal Grandmother         dec     Cancer Sister         Lupus     Cardiovascular Sister         cardiomyopathy     Heart Disease Sister         heart failure, and kidney failure     Current Outpatient Medications   Medication Sig Dispense Refill     amitriptyline (ELAVIL) 25 MG tablet Take 1 tablet (25 mg) by mouth At Bedtime 90 tablet 1     Cholecalciferol (VITAMIN D) 2000 UNIT tablet Take 5,000 Units by mouth as needed Reported on 3/8/2017 100 tablet 12     diclofenac (VOLTAREN) 75 MG EC tablet Take 1 tablet (75 mg) by mouth 2 times daily as needed for moderate pain 40 tablet 1     dicyclomine (BENTYL) 10 MG capsule Take 1 capsule (10 mg) by mouth 4 times daily (before meals and nightly) 20 capsule 0     estradiol (VIVELLE-DOT) 0.0375 MG/24HR BIW patch IRISH 1 PATCH ON SKIN TWICE PER WEEK  11     FLAXSEED, LINSEED, PO        furosemide (LASIX) 20 MG tablet Take 2 tablets (40 mg) by mouth daily as needed (as needed for weight gain/edema) 60 tablet 11     hydrOXYzine (ATARAX) 25 MG tablet Take 2-4 tablets ( mg) by mouth nightly as needed for anxiety or other (sleep) 30 tablet 0     metoprolol succinate ER (TOPROL-XL) 50 MG 24 hr tablet Take 1 tablet (50 mg) by mouth daily 30  tablet 11     progesterone (PROMETRIUM) 100 MG capsule Take 100 mg by mouth Reported on 3/8/2017       sacubitril-valsartan (ENTRESTO) 49-51 MG per tablet Take 1 tablet by mouth 2 times daily 90 tablet 0     sertraline (ZOLOFT) 50 MG tablet Take 1 tablet (50 mg) by mouth daily 30 tablet 1     spironolactone (ALDACTONE) 25 MG tablet Take 2 tablets (50 mg) by mouth daily 60 tablet 11     bisacodyl (DULCOLAX) 10 MG suppository Place 1 suppository (10 mg) rectally daily as needed for constipation (Patient not taking: Reported on 4/15/2020) 5 suppository 0     Collagen 500 MG CAPS        cyclobenzaprine (FLEXERIL) 10 MG tablet Take 0.5-1 tablets (5-10 mg) by mouth 3 times daily as needed for muscle spasms (Patient not taking: Reported on 4/15/2020) 90 tablet 1     traMADol (ULTRAM) 50 MG tablet Take 1 tablet (50 mg) by mouth every 8 hours as needed for severe pain (Patient not taking: Reported on 6/3/2020) 60 tablet 0     Social History     Tobacco Use     Smoking status: Never Smoker     Smokeless tobacco: Never Used   Substance Use Topics     Alcohol use: Yes     Comment: rare, twice a year, she has a drink little wine 2 days ago       OBJECTIVE  /73 (BP Location: Left arm, Patient Position: Sitting, Cuff Size: Adult Large)   Pulse 69   Temp 98.9  F (37.2  C) (Oral)   Resp 16   Wt 107.6 kg (237 lb 3.2 oz)   LMP 10/19/2008 (Approximate)   SpO2 97%   BMI 41.36 kg/m      Physical Exam  HENT:      Right Ear: No swelling or tenderness. A middle ear effusion is present. There is no impacted cerumen. Tympanic membrane is bulging. Tympanic membrane is not retracted. Tympanic membrane has normal mobility.      Left Ear: Tympanic membrane normal. No swelling or tenderness.  No middle ear effusion. Tympanic membrane is not bulging.   Neck:      Musculoskeletal: Normal range of motion.   Cardiovascular:      Rate and Rhythm: Normal rate.      Pulses: Normal pulses.   Pulmonary:      Effort: Pulmonary effort is normal.    Musculoskeletal: Normal range of motion.   Neurological:      Mental Status: She is alert.             ASSESSMENT:    ICD-10-CM    1. Non-recurrent acute suppurative otitis media of right ear without spontaneous rupture of tympanic membrane  H66.001 amoxicillin (AMOXIL) 875 MG tablet   2. Yeast infection of the vagina  B37.3 fluconazole (DIFLUCAN) 150 MG tablet   3. Asymptomatic microscopic hematuria  R31.21 UA with Microscopic reflex to Culture     **CBC with platelets FUTURE anytime     **Basic metabolic panel FUTURE anytime     Albumin Random Urine Quantitative with Creat Ratio   4. CARDIOVASCULAR SCREENING; LDL GOAL LESS THAN 160  Z13.6 Lipid panel reflex to direct LDL Fasting      She requested diflucan and historically has vaginal yeast infections after abx.   I did release future lab orders as ordered per primary care and she will obtain these today.  Described the mechanism of ear pressure as it pertains to her otitis media also encouraged her to try antihistamine such as Claritin or Zyrtec.  She will avoid the decongestant with her history of cardiomyopathy per primary care.  Pain control including Tylenol or ibuprofen were described  The patient indicates understanding of these issues and agrees with the plan.   Javier Gomez MD

## 2020-06-04 LAB
CREAT UR-MCNC: 430 MG/DL
MICROALBUMIN UR-MCNC: 36 MG/L
MICROALBUMIN/CREAT UR: 8.47 MG/G CR (ref 0–25)

## 2020-06-04 NOTE — RESULT ENCOUNTER NOTE
"Rick Hawley  Your attached labs are overall within normal limits, BUT there is still blood in your urine.  I would encourage you to follow up with urology (again?) regarding this.  Please let me know if you need an updated referral.    Please contact the office with any questions or concerns.    Ana Barrera \"Norberto\" KINGS Acosta for Dr. Grimes while she is out of the office.    "

## 2020-06-05 DIAGNOSIS — F32.1 MODERATE MAJOR DEPRESSION (H): ICD-10-CM

## 2020-06-05 NOTE — TELEPHONE ENCOUNTER
Routing refill request to provider for review/approval because:  PHQ-9 score:    PHQ 2/21/2020   PHQ-9 Total Score 10   Q9: Thoughts of better off dead/self-harm past 2 weeks Not at all

## 2020-06-11 ENCOUNTER — TELEPHONE (OUTPATIENT)
Dept: CARDIOLOGY | Facility: CLINIC | Age: 55
End: 2020-06-11

## 2020-06-11 NOTE — TELEPHONE ENCOUNTER
PA Initiation    Medication: Entresto 49-51 MG tablet -   Insurance Company: [place] ([facility] [place] - Phone [phone] Fax [phone]  Pharmacy Filling the Rx: [id number] [place] [address] [address] [facility] [address]  Filling Pharmacy Phone: [phone]  Filling Pharmacy Fax: [phone]  Start Date: 6/11/2020

## 2020-06-12 NOTE — TELEPHONE ENCOUNTER
Prior Authorization Approval    Medication: Entresto 49-51 MG tablet - APPROVED was approved on 6/11/2020  Effective: 6/5/2020 to 5/30/2021  Reference #: [id number]  Approved Dose/Quantity:   Insurance Company: [place] Medicaid ([facility]) - Phone [phone] Fax [phone]  Expected CoPay:    Pharmacy Filling the Rx: [facility] [id number] [place] [address] [address] [facility] [address]   Pharmacy Notified: Yes  Patient Notified: Comment:  **Instructed pharmacy to notify patient when script is ready to /ship.**

## 2020-06-19 ENCOUNTER — OFFICE VISIT (OUTPATIENT)
Dept: AUDIOLOGY | Facility: CLINIC | Age: 55
End: 2020-06-19
Payer: MEDICAID

## 2020-06-19 ENCOUNTER — OFFICE VISIT (OUTPATIENT)
Dept: OTOLARYNGOLOGY | Facility: CLINIC | Age: 55
End: 2020-06-19
Payer: MEDICAID

## 2020-06-19 VITALS
DIASTOLIC BLOOD PRESSURE: 86 MMHG | SYSTOLIC BLOOD PRESSURE: 127 MMHG | HEART RATE: 60 BPM | TEMPERATURE: 97.5 F | BODY MASS INDEX: 41.85 KG/M2 | WEIGHT: 240 LBS

## 2020-06-19 DIAGNOSIS — H69.93 DYSFUNCTION OF BOTH EUSTACHIAN TUBES: ICD-10-CM

## 2020-06-19 DIAGNOSIS — H92.13 OTORRHEA, BILATERAL: ICD-10-CM

## 2020-06-19 DIAGNOSIS — H92.09 OTALGIA, UNSPECIFIED LATERALITY: Primary | ICD-10-CM

## 2020-06-19 DIAGNOSIS — J31.0 CHRONIC RHINITIS: ICD-10-CM

## 2020-06-19 DIAGNOSIS — H69.91 DYSFUNCTION OF RIGHT EUSTACHIAN TUBE: Primary | ICD-10-CM

## 2020-06-19 PROCEDURE — 92550 TYMPANOMETRY & REFLEX THRESH: CPT | Performed by: AUDIOLOGIST

## 2020-06-19 PROCEDURE — 99207 ZZC NO CHARGE LOS: CPT | Performed by: AUDIOLOGIST

## 2020-06-19 PROCEDURE — 92557 COMPREHENSIVE HEARING TEST: CPT | Performed by: AUDIOLOGIST

## 2020-06-19 PROCEDURE — 99213 OFFICE O/P EST LOW 20 MIN: CPT | Performed by: OTOLARYNGOLOGY

## 2020-06-19 RX ORDER — FLUTICASONE PROPIONATE 50 MCG
2 SPRAY, SUSPENSION (ML) NASAL AT BEDTIME
Qty: 15.8 ML | Refills: 4 | Status: SHIPPED | OUTPATIENT
Start: 2020-06-19 | End: 2023-03-14

## 2020-06-19 RX ORDER — LORATADINE 10 MG/1
10 TABLET ORAL DAILY
Qty: 90 TABLET | Refills: 3 | Status: SHIPPED | OUTPATIENT
Start: 2020-06-19

## 2020-06-19 RX ORDER — PREDNISONE 10 MG/1
10 TABLET ORAL 2 TIMES DAILY
Qty: 20 TABLET | Refills: 2 | Status: SHIPPED | OUTPATIENT
Start: 2020-06-19 | End: 2020-07-13

## 2020-06-19 NOTE — PROGRESS NOTES
AUDIOLOGY REPORT     SUMMARY: Audiology visit completed. See audiogram for results.     RECOMMENDATIONS: Follow-up with ENT    Devon Sykes Licensed Audiologist #[id number]
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
## 2020-06-25 ENCOUNTER — TELEPHONE (OUTPATIENT)
Dept: OTOLARYNGOLOGY | Facility: CLINIC | Age: 55
End: 2020-06-25

## 2020-06-25 NOTE — TELEPHONE ENCOUNTER
Reason for call:  Symptom   Symptom or request: Plugged ears (both)    Duration (how long have symptoms been present):  3 months  Have you been treated for this before? Yes    Additional comments: Says the medication is not working    Phone number to reach patient:  Home number on file [phone] (home)    Best Time:  any    Can we leave a detailed message on this number?  YES    Travel screening: Not Applicable

## 2020-06-26 NOTE — TELEPHONE ENCOUNTER
Returned call to pt, for clarification and documentation purposes I had her confirm it was still her R ear she has c/o and it indeed is. I scheduled her an appt and let her know she can take the extra tablets. She will plan on coming in next Friday, July 3rd for possible tube placement.    Yasmeen Ndiaye RN Specialty Triage 6/26/2020 10:16 AM

## 2020-06-26 NOTE — TELEPHONE ENCOUNTER
Returned call to pt. Her therapy was initiated on the 19th. She calls with c/o ear still being plugged with no improvement. She was given 20tabs of prednisone vs 14tabs as the directions read. She is wondering if she can take the extra tabs. I let her know I would ask the Md and call her back. I did reassure her that the medications will take time to work.    Yasmeen Ndiaye RN Specialty Triage 6/26/2020 9:17 AM

## 2020-07-01 ENCOUNTER — AMBULATORY - HEALTHEAST (OUTPATIENT)
Dept: SURGERY | Facility: CLINIC | Age: 55
End: 2020-07-01

## 2020-07-01 DIAGNOSIS — Z11.59 ENCOUNTER FOR SCREENING FOR OTHER VIRAL DISEASES: ICD-10-CM

## 2020-07-02 ENCOUNTER — TRANSFERRED RECORDS (OUTPATIENT)
Dept: HEALTH INFORMATION MANAGEMENT | Facility: CLINIC | Age: 55
End: 2020-07-02

## 2020-07-03 ENCOUNTER — OFFICE VISIT (OUTPATIENT)
Dept: OTOLARYNGOLOGY | Facility: CLINIC | Age: 55
End: 2020-07-03
Payer: MEDICAID

## 2020-07-03 VITALS
HEART RATE: 72 BPM | BODY MASS INDEX: 42.75 KG/M2 | SYSTOLIC BLOOD PRESSURE: 128 MMHG | DIASTOLIC BLOOD PRESSURE: 82 MMHG | TEMPERATURE: 98 F | OXYGEN SATURATION: 94 % | WEIGHT: 245.2 LBS

## 2020-07-03 DIAGNOSIS — H69.93 DYSFUNCTION OF BOTH EUSTACHIAN TUBES: Primary | ICD-10-CM

## 2020-07-03 DIAGNOSIS — H65.21 RIGHT CHRONIC SEROUS OTITIS MEDIA: ICD-10-CM

## 2020-07-03 DIAGNOSIS — J31.0 CHRONIC RHINITIS: ICD-10-CM

## 2020-07-03 PROCEDURE — 99213 OFFICE O/P EST LOW 20 MIN: CPT | Mod: 25 | Performed by: OTOLARYNGOLOGY

## 2020-07-03 PROCEDURE — 69420 INCISION OF EARDRUM: CPT | Mod: RT | Performed by: OTOLARYNGOLOGY
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
## 2020-07-10 ENCOUNTER — AMBULATORY - HEALTHEAST (OUTPATIENT)
Dept: SURGERY | Facility: AMBULATORY SURGERY CENTER | Age: 55
End: 2020-07-10

## 2020-07-10 DIAGNOSIS — Z11.59 ENCOUNTER FOR SCREENING FOR OTHER VIRAL DISEASES: ICD-10-CM

## 2020-07-30 DIAGNOSIS — I50.32 CHRONIC DIASTOLIC CONGESTIVE HEART FAILURE (H): Primary | ICD-10-CM

## 2020-07-30 DIAGNOSIS — I50.22 CHRONIC SYSTOLIC HEART FAILURE (H): ICD-10-CM

## 2020-08-04 ENCOUNTER — TELEPHONE (OUTPATIENT)
Dept: CARDIOLOGY | Facility: CLINIC | Age: 55
End: 2020-08-04

## 2020-08-04 NOTE — TELEPHONE ENCOUNTER
[facility] Call Center    Phone Message    May a detailed message be left on voicemail: yes     Reason for Call: Other: Kim calling to ask Dr. Kolb's care team about her upcoming appointment on 8/12/20. Marleen has surgeries coming up on 8/18/20 and 9/11/20, and she needs cardiac clearances for both of these. Marleen would like to know if it is possible to get the clearance at this 8/12/20 appointment. Please give Marleen a call back at your earliest convenience to discuss.     Action Taken: Message routed to:  [facility] ([facility]):  Cardio    Travel Screening: Not Applicable

## 2020-08-04 NOTE — TELEPHONE ENCOUNTER
Returned call to Marleen. She is scheduled for labs, echo and to see dr. Kolb next week. She reports she will be having surgery to get a bladder sling and then she will also be having her right knee redone.     Discussed that should be able to provide cardiac clearance (if cleared by physician) based on her appointments next week.    Marleen also reports she is coughing up a 'lot of phlegm' while on the entresto. She reports she just coughs and coughs and coughs up 'mucous.' She states she did an experiment where she stopped the entresto for a week and only took the losartan for that week. She felt better on the losartan where she didn't cough. She is now back on the entresto and the cough has returned. REports cough is so severe she will cough until she throws up. Wants to know what other options there are aside from entresto. Discussed that there are other options, but that I would discuss with Dr. Kolb and call her back.

## 2020-08-11 DIAGNOSIS — F32.1 MODERATE MAJOR DEPRESSION (H): ICD-10-CM

## 2020-08-11 DIAGNOSIS — R25.2 MUSCLE CRAMP: ICD-10-CM

## 2020-08-11 DIAGNOSIS — M79.2 NERVE PAIN: ICD-10-CM

## 2020-08-11 DIAGNOSIS — Z96.652 STATUS POST LEFT KNEE REPLACEMENT: ICD-10-CM

## 2020-08-11 DIAGNOSIS — M25.562 LEFT KNEE PAIN, UNSPECIFIED CHRONICITY: ICD-10-CM

## 2020-08-12 ENCOUNTER — ANCILLARY PROCEDURE (OUTPATIENT)
Dept: CARDIOLOGY | Facility: CLINIC | Age: 55
End: 2020-08-12
Attending: INTERNAL MEDICINE
Payer: MEDICAID

## 2020-08-12 VITALS
SYSTOLIC BLOOD PRESSURE: 115 MMHG | HEIGHT: 64 IN | WEIGHT: 251 LBS | DIASTOLIC BLOOD PRESSURE: 79 MMHG | BODY MASS INDEX: 42.85 KG/M2 | HEART RATE: 59 BPM | OXYGEN SATURATION: 98 %

## 2020-08-12 DIAGNOSIS — R05.9 COUGH: Primary | ICD-10-CM

## 2020-08-12 DIAGNOSIS — I50.32 CHRONIC DIASTOLIC CONGESTIVE HEART FAILURE (H): ICD-10-CM

## 2020-08-12 DIAGNOSIS — I42.9 FAMILIAL CARDIOMYOPATHY (H): ICD-10-CM

## 2020-08-12 DIAGNOSIS — K21.9 GASTROESOPHAGEAL REFLUX DISEASE WITHOUT ESOPHAGITIS: ICD-10-CM

## 2020-08-12 LAB
ALBUMIN SERPL-MCNC: 3.4 G/DL (ref 3.4–5)
ALP SERPL-CCNC: 70 U/L (ref 40–150)
ALT SERPL W P-5'-P-CCNC: 35 U/L (ref 0–50)
ANION GAP SERPL CALCULATED.3IONS-SCNC: 7 MMOL/L (ref 3–14)
AST SERPL W P-5'-P-CCNC: 20 U/L (ref 0–45)
BILIRUB SERPL-MCNC: 0.3 MG/DL (ref 0.2–1.3)
BUN SERPL-MCNC: 15 MG/DL (ref 7–30)
CALCIUM SERPL-MCNC: 8.3 MG/DL (ref 8.5–10.1)
CHLORIDE SERPL-SCNC: 108 MMOL/L (ref 94–109)
CO2 SERPL-SCNC: 25 MMOL/L (ref 20–32)
CREAT SERPL-MCNC: 0.83 MG/DL (ref 0.52–1.04)
GFR SERPL CREATININE-BSD FRML MDRD: 78 ML/MIN/{1.73_M2}
GLUCOSE SERPL-MCNC: 112 MG/DL (ref 70–99)
HBA1C MFR BLD: 5.7 % (ref 0–5.6)
POTASSIUM SERPL-SCNC: 3.9 MMOL/L (ref 3.4–5.3)
PROT SERPL-MCNC: 7.5 G/DL (ref 6.8–8.8)
SODIUM SERPL-SCNC: 140 MMOL/L (ref 133–144)

## 2020-08-12 PROCEDURE — 99214 OFFICE O/P EST MOD 30 MIN: CPT | Mod: 25 | Performed by: INTERNAL MEDICINE

## 2020-08-12 PROCEDURE — 93010 ELECTROCARDIOGRAM REPORT: CPT | Mod: ZP | Performed by: INTERNAL MEDICINE

## 2020-08-12 PROCEDURE — 80053 COMPREHEN METABOLIC PANEL: CPT | Performed by: INTERNAL MEDICINE

## 2020-08-12 PROCEDURE — 36415 COLL VENOUS BLD VENIPUNCTURE: CPT | Performed by: INTERNAL MEDICINE

## 2020-08-12 PROCEDURE — 93005 ELECTROCARDIOGRAM TRACING: CPT | Mod: ZF

## 2020-08-12 PROCEDURE — G0463 HOSPITAL OUTPT CLINIC VISIT: HCPCS | Mod: 25,ZF

## 2020-08-12 PROCEDURE — 83036 HEMOGLOBIN GLYCOSYLATED A1C: CPT

## 2020-08-12 RX ORDER — OMEPRAZOLE 20 MG/1
20 TABLET, DELAYED RELEASE ORAL DAILY
Qty: 90 TABLET | Refills: 1 | Status: SHIPPED | OUTPATIENT
Start: 2020-08-12 | End: 2020-12-14 | Stop reason: DRUGHIGH

## 2020-08-12 RX ADMIN — Medication 9 ML: at 10:00

## 2020-08-12 ASSESSMENT — MIFFLIN-ST. JEOR: SCORE: 1713.53

## 2020-08-12 ASSESSMENT — PAIN SCALES - GENERAL: PAINLEVEL: NO PAIN (0)

## 2020-08-12 NOTE — PROGRESS NOTES
HPI: 55 year old female with a history of familial cardiomyopathy, left total knee arthroplasty on 10/3/2017 and right knee replacement on 2019 presents for ongoing evaluation and management.  Pt reports that since last clinic visit from a cardiac standpoint she has been feeling well.  She denies any chest pain or pressure, orthopnea, pnd, palpitations, syncope/presyncope or change in sob/lea or yamilet.  She does report ongoing cough.  At first she felt the cough was 2/2 her entresto but now she thinks it may be related to GERD.  She started OTC GERD medication a few days ago and feels as though the cough is somewhat better.  She is scheduled to have bladder sling surgery next week.       Past Medical History:   Diagnosis Date     Allergic rhinitis, cause unspecified      Anemia      Autoimmune disease NEC     Autoimmune disease- unknown/poss SLE     Calcaneal spur 10/21/2014     CHF with cardiomyopathy (H)      Familial cardiomyopathy (H) 10/21/2015     Morbid obesity (H) 2015     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     no longer an issue     Other primary cardiomyopathies     Cardiomyopathy- dx'd  idiopathic     PONV (postoperative nausea and vomiting)      UTERINE LEIOMYOMA NOS 10/25/2006       Past Surgical History:   Procedure Laterality Date     C BREAST SURGERY PROCEDURE UNLISTED      Breast Reduction     C  DELIVERY ONLY  10/85,     , Low Cervicalx2     C EXCIS UTERINE FIBROID,ABD APPRCH       C EXCISE EXCESS SKIN TISSUE,ABDOMEN       C LIGATE FALLOPIAN TUBE       C REPAIR CRUCIATE LIGAMENT,KNEE      Right Knee     C TOTAL KNEE ARTHROPLASTY Left 10/03/2017     HYSTERECTOMY TOTAL ABDOMINAL      for fibroids; reports having blood transfusion after surgery     THYROIDECTOMY  2013    Procedure: THYROIDECTOMY;  LEFT THYROID LOBECTOMY.  (LIGASURE, RECURRENT LARYNGEAL NERVE MONITOR) ;  Surgeon: Uriah Camargo  MD Rajendra;  Location: [facility]       Family History   Problem Relation Age of Onset     Hypertension Father         dec     Diabetes Father         dec     Heart Disease Father         dec     Alcohol/Drug Father      Cardiovascular Father      Heart Disease Mother         dec     Alcohol/Drug Mother      Cardiovascular Mother      Heart Disease Daughter         Cardiomyopathy     Cardiovascular Daughter         cardiomyopathy     Colon Cancer Sister      Cardiovascular Son         cardiomyopathy     Diabetes Paternal Grandmother         dec     Hypertension Paternal Grandmother         dec     Cerebrovascular Disease Paternal Grandmother         dec     Cancer Sister         Lupus     Cardiovascular Sister         cardiomyopathy     Heart Disease Sister         heart failure, and kidney failure       Social History     Tobacco Use     Smoking status: Never Smoker     Smokeless tobacco: Never Used   Substance Use Topics     Alcohol use: Yes     Comment: rare, twice a year, she has a drink little wine 2 days ago         Current Outpatient Medications   Medication Sig     Cholecalciferol (VITAMIN D) 2000 UNIT tablet Take 5,000 Units by mouth as needed Reported on 3/8/2017     Collagen 500 MG CAPS      estradiol (VIVELLE-DOT) 0.0375 MG/24HR BIW patch IRISH 1 PATCH ON SKIN TWICE PER WEEK     FLAXSEED, LINSEED, PO      fluticasone (FLONASE) 50 MCG/ACT nasal spray Spray 2 sprays into both nostrils At Bedtime     furosemide (LASIX) 20 MG tablet Take 2 tablets (40 mg) by mouth daily as needed (as needed for weight gain/edema)     loratadine (CLARITIN) 10 MG tablet Take 1 tablet (10 mg) by mouth daily     metoprolol succinate ER (TOPROL-XL) 50 MG 24 hr tablet Take 1 tablet (50 mg) by mouth daily     progesterone (PROMETRIUM) 100 MG capsule Take 100 mg by mouth Reported on 3/8/2017     sacubitril-valsartan (ENTRESTO) 49-51 MG per tablet Take 1 tablet by mouth 2 times daily     spironolactone (ALDACTONE) 25 MG tablet Take 2  "tablets (50 mg) by mouth daily     amitriptyline (ELAVIL) 25 MG tablet Take 1 tablet (25 mg) by mouth At Bedtime (Patient not taking: Reported on 8/12/2020)     bisacodyl (DULCOLAX) 10 MG suppository Place 1 suppository (10 mg) rectally daily as needed for constipation (Patient not taking: Reported on 8/12/2020)     sertraline (ZOLOFT) 50 MG tablet TAKE 1 TABLET BY MOUTH ONCE DAILY (Patient not taking: Reported on 8/12/2020)     No current facility-administered medications for this visit.          ROS:   10 point ROS negative other than what is discussed above    EXAM:   /79 (BP Location: Left arm, Patient Position: Chair, Cuff Size: Adult Large)   Pulse 59   Ht 1.626 m (5' 4\")   Wt 113.9 kg (251 lb)   LMP 10/19/2008 (Approximate)   SpO2 98%   BMI 43.08 kg/m    GENERAL: Alert, oriented, NAD  HEENT:  NC/AT.  Sclerae white.  MMM,   NECK: No adenopathy. 2+ carotids,   HEART: RRR,+ S1 and S2, no murmurs or gallops.  JVP 6-7 cm without HJR  LUNGS:  Clear to auscultation bilaterally, no wheezes, rales, or rhonchi  ABDOMEN: Soft, obese, nontender, nondistended, bowel sounds present, no hepatomeagly appreciated although exam limited by body habitus  EXTREMITIES: No lower extremity edema.  2+ bilateral peripheral pulses.  PSYCH: Normal affect     LABS  Orders Only on 08/12/2020   Component Date Value Ref Range Status     Sodium 08/12/2020 140  133 - 144 mmol/L Final     Potassium 08/12/2020 3.9  3.4 - 5.3 mmol/L Final     Chloride 08/12/2020 108  94 - 109 mmol/L Final     Carbon Dioxide 08/12/2020 25  20 - 32 mmol/L Final     Anion Gap 08/12/2020 7  3 - 14 mmol/L Final     Glucose 08/12/2020 112* 70 - 99 mg/dL Final     Urea Nitrogen 08/12/2020 15  7 - 30 mg/dL Final     Creatinine 08/12/2020 0.83  0.52 - 1.04 mg/dL Final     GFR Estimate 08/12/2020 78  >60 mL/min/[1.73_m2] Final    Comment: Non  GFR Calc  Starting 12/18/2018, serum creatinine based estimated GFR (eGFR) will be   calculated using " the Chronic Kidney Disease Epidemiology Collaboration   (CKD-EPI) equation.       GFR Estimate If Black 08/12/2020 >90  >60 mL/min/[1.73_m2] Final    Comment:  GFR Calc  Starting 12/18/2018, serum creatinine based estimated GFR (eGFR) will be   calculated using the Chronic Kidney Disease Epidemiology Collaboration   (CKD-EPI) equation.       Calcium 08/12/2020 8.3* 8.5 - 10.1 mg/dL Final     Bilirubin Total 08/12/2020 0.3  0.2 - 1.3 mg/dL Final     Albumin 08/12/2020 3.4  3.4 - 5.0 g/dL Final     Protein Total 08/12/2020 7.5  6.8 - 8.8 g/dL Final     Alkaline Phosphatase 08/12/2020 70  40 - 150 U/L Final     ALT 08/12/2020 35  0 - 50 U/L Final     AST 08/12/2020 20  0 - 45 U/L Final         CMR Report  01-  Clinical history: 55 yo woman with a familial cardiomyopathy to have repeat CMRI.  Comparison CMR: 10/6/2016.  1.  The global systolic function is moderately decreased and the LVEF is 38%. The RV is moderately dilated while wall thickness is normal. There is moderate global hypokinesis.  2. The RV is the upper limit of normal in cavity size. The global systolic function is mildly decreased and  the RVEF is 52%.   3. Both atria are mildly dilated.  4. There is no significant valvular disease.   5. Late gadolinium enhancement imaging shows no MI, fibrosis or infiltrative disease.   CONCLUSIONS:   The global systolic function is moderately decreased and the LVEF is 38%. The RV is moderately dilated  while wall thickness is normal. There is moderate global hypokinesis.  The RV is the upper limit of normal in cavity size. The global systolic function is mildly decreased and  the RVEF is 52%.   Compared to the prior study of 2016, LV systolic performance is minimally decreased and RV systolic  performance is similar.  Both LV and RV dilation are mildly increased.             Echo 8/12/2020  Interpretation Summary  LVIDd 68mm.  The Ejection Fraction is estimated at 40-45%.  Right ventricular  function, chamber size, wall motion, and thickness are normal.  Pulmonary artery systolic pressure cannot be assessed.  The inferior vena cava is normal.  No pericardial effusion is present.  Mild improvement in LV function since previous study.        Assessment and Plan: 55 year old female with a history of familial cardiomyopathy, left total knee arthroplasty on 10/3/2017 and right knee replacement on 2/20/2019 presents for ongoing evaluation and management.       Chronic systolic heart failure secondary to familial cardiomyopathy.    Stage B, NYHA Class IIb.  MVO2 reduced on cardiopulmonary stress test earlier this year however patient did not meet anerobic threshold therefore MVO2 not a accurate measurement of cardiac exercise capacity.  VE/VCO2 slope normal and good BP response thus no evidence of significant cardiac limitation to exercise.  Echo today reveals slight improvement in LV function  ACEi/ARB/ARNI yes, continue sacubitril-valsartan 49/51mg bid.  Could consider uptitrating in future if symptoms/cardiac function worsen but at present no indication given LVEF > 40%  BB yes, continue metoprolol XL 50mg daily  Aldosterone antagonist - continue spironolactone to 50mg daily   SCD prophylaxis: Recent echo confirms LVEF > 40%, thus ICD not indicated at present  % BiV pacing: N/A  Fluid status euvolemic  NSAID use: advised to avoid NSAIDs  Encouraged patient to begin regular aerobic exercise aiming for at least 150 minutes of moderate physical activity or 75 minutes of vigorous physical activity - or an equal combination of both - each week. and follow low-salt, heart healthy diet.    2.  Chronic cough:  Feel unlikely to be cardiac in etiology as patient appears euvolemic on exam which is supported by today's echo findings.  Seems to be improving with GERD treatment.  Will have patient begin omeprazole 20mg daily.  If cough persists, will refer to pulmonary for further evaluation.    3.  Upcoming surgery:   There is no cardiac contraindication for her upcoming surgery; she is euvolemic and well compensated. Would recommend judicious perioperative fluid management, good BP control and continuation of heart failure medications.  Would have patient hold entresto on morning of surgery to reduce risk of hypotension but would have patient continue regular metoprolol xl dosing..  .         RTC: In 6 months with labs and EKG.  Will be happy to see sooner if change in clinical status or new questions/concerns arise.    Cassie Kolb MD  Section Head - Advanced Heart Failure, Transplantation and Mechanical Circulatory Support  Director - [facility]  Associate Professor of Medicine, [facility]

## 2020-08-12 NOTE — NURSING NOTE
Diet: Patient instructed regarding a heart failure healthy diet, including discussion of reduced fat and 2000 mg daily sodium restriction, daily weights, medication purpose and compliance, fluid restrictions and resources for patient and family to access for assistance with heart failure management.       Labs: Patient was given results of the laboratory testing obtained today and patient was instructed about when to return for the next laboratory testing. *patient will return to lab for hga1c    Med Reconcile: Reviewed and verified all current medications with the patient. The updated medication list was printed and given to the patient. Start omeprazole 20mg once daily.    Return Appointment: Patient given instructions regarding scheduling next clinic visit. RTC in 6 months with labs prior.  *Referral to pulmonary for cough.    Patient stated she understood all health information given and agreed to call with further questions or concerns.     Gwen Swenson RN

## 2020-08-12 NOTE — NURSING NOTE
Chief Complaint   Patient presents with     Follow Up      RTN HF: 55 year old female presents with history of Familial cardiomyopathy, systolic HF, EF 35% for follow up with labs and echo prior     Vitals were taken and medications were reconciled. EKG was performed.    Manisha De Luna  10:03 AM

## 2020-08-12 NOTE — PATIENT INSTRUCTIONS
Cardiology Providers you saw during your visit:  Dr. Kolb     Medication changes:  1- Start omeprazole 20mg once per day.        Follow up:  1- return to lab to check your HgbA1c.  2 - Referral to pulmonary clinic for your cough.   3 - Return to clinic to see Dr. Kolb in 6 months with labs prior.      Please call if you have:  1. Weight gain of more than 2 pounds in a day or 5 pounds in a week  2. Increased shortness of breath, swelling or bloating  3. Dizziness, lightheadedness   4. Any questions or concerns.      Follow the American Heart Association Diet and Lifestyle recommendations:  Limit saturated fat, trans fat, sodium, red meat, sweets and sugar-sweetened beverages. If you choose to eat red meat, compare labels and select the leanest cuts available.  Aim for at least 150 minutes of moderate physical activity or 75 minutes of vigorous physical activity - or an equal combination of both - each week.     During business hours: [phone], press option # 1 to be routed to the [facility] then option # 4 to send a message to your care team     After hours, weekends or holidays: On Call Cardiologist- [phone]   option #4 and ask to speak to the on-call Cardiologist. Inform them you are a CORE/heart failure patient at the [facility].     Gwen Swenson, RN BSN  Cardiology Nurse Care Coordinator     Keep up the good work!     Take Care!

## 2020-08-12 NOTE — LETTER
2020      RE: Marleen Mcfadden  [address]  [facility] [address]       Dear Colleague,    Thank you for the opportunity to participate in the care of your patient, Marleen Mcfadden, at the [facility] at [facility]. Please see a copy of my visit note below.    HPI: 55 year old female with a history of familial cardiomyopathy, left total knee arthroplasty on 10/3/2017 and right knee replacement on 2019 presents for ongoing evaluation and management.  Pt reports that since last clinic visit from a cardiac standpoint she has been feeling well.  She denies any chest pain or pressure, orthopnea, pnd, palpitations, syncope/presyncope or change in sob/lea or yamilet.  She does report ongoing cough.  At first she felt the cough was 2/2 her entresto but now she thinks it may be related to GERD.  She started OTC GERD medication a few days ago and feels as though the cough is somewhat better.  She is scheduled to have bladder sling surgery next week.       Past Medical History:   Diagnosis Date     Allergic rhinitis, cause unspecified      Anemia      Autoimmune disease NEC     Autoimmune disease- unknown/poss SLE     Calcaneal spur 10/21/2014     CHF with cardiomyopathy (H)      Familial cardiomyopathy (H) 10/21/2015     Morbid obesity (H) 2015     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     no longer an issue     Other primary cardiomyopathies     Cardiomyopathy- dx'd  idiopathic     PONV (postoperative nausea and vomiting)      UTERINE LEIOMYOMA NOS 10/25/2006       Past Surgical History:   Procedure Laterality Date     C BREAST SURGERY PROCEDURE UNLISTED      Breast Reduction     C  DELIVERY ONLY  10/85,     , Low Cervicalx2     C EXCIS UTERINE FIBROID,ABD APPRCH       C EXCISE EXCESS SKIN TISSUE,ABDOMEN       C LIGATE FALLOPIAN TUBE       C REPAIR CRUCIATE LIGAMENT,KNEE   1998    Right Knee     C TOTAL KNEE ARTHROPLASTY Left 10/03/2017     HYSTERECTOMY TOTAL ABDOMINAL  2006    for fibroids; reports having blood transfusion after surgery     THYROIDECTOMY  9/9/2013    Procedure: THYROIDECTOMY;  LEFT THYROID LOBECTOMY.  (LIGASURE, RECURRENT LARYNGEAL NERVE MONITOR) ;  Surgeon: Uriah Camargo MD;  Location: [facility]       Family History   Problem Relation Age of Onset     Hypertension Father         dec     Diabetes Father         dec     Heart Disease Father         dec     Alcohol/Drug Father      Cardiovascular Father      Heart Disease Mother         dec     Alcohol/Drug Mother      Cardiovascular Mother      Heart Disease Daughter         Cardiomyopathy     Cardiovascular Daughter         cardiomyopathy     Colon Cancer Sister      Cardiovascular Son         cardiomyopathy     Diabetes Paternal Grandmother         dec     Hypertension Paternal Grandmother         dec     Cerebrovascular Disease Paternal Grandmother         dec     Cancer Sister         Lupus     Cardiovascular Sister         cardiomyopathy     Heart Disease Sister         heart failure, and kidney failure       Social History     Tobacco Use     Smoking status: Never Smoker     Smokeless tobacco: Never Used   Substance Use Topics     Alcohol use: Yes     Comment: rare, twice a year, she has a drink little wine 2 days ago         Current Outpatient Medications   Medication Sig     Cholecalciferol (VITAMIN D) 2000 UNIT tablet Take 5,000 Units by mouth as needed Reported on 3/8/2017     Collagen 500 MG CAPS      estradiol (VIVELLE-DOT) 0.0375 MG/24HR BIW patch IRISH 1 PATCH ON SKIN TWICE PER WEEK     FLAXSEED, LINSEED, PO      fluticasone (FLONASE) 50 MCG/ACT nasal spray Spray 2 sprays into both nostrils At Bedtime     furosemide (LASIX) 20 MG tablet Take 2 tablets (40 mg) by mouth daily as needed (as needed for weight gain/edema)     loratadine (CLARITIN) 10 MG tablet Take 1 tablet (10 mg) by mouth daily      "metoprolol succinate ER (TOPROL-XL) 50 MG 24 hr tablet Take 1 tablet (50 mg) by mouth daily     progesterone (PROMETRIUM) 100 MG capsule Take 100 mg by mouth Reported on 3/8/2017     sacubitril-valsartan (ENTRESTO) 49-51 MG per tablet Take 1 tablet by mouth 2 times daily     spironolactone (ALDACTONE) 25 MG tablet Take 2 tablets (50 mg) by mouth daily     amitriptyline (ELAVIL) 25 MG tablet Take 1 tablet (25 mg) by mouth At Bedtime (Patient not taking: Reported on 8/12/2020)     bisacodyl (DULCOLAX) 10 MG suppository Place 1 suppository (10 mg) rectally daily as needed for constipation (Patient not taking: Reported on 8/12/2020)     sertraline (ZOLOFT) 50 MG tablet TAKE 1 TABLET BY MOUTH ONCE DAILY (Patient not taking: Reported on 8/12/2020)     No current facility-administered medications for this visit.          ROS:   10 point ROS negative other than what is discussed above    EXAM:   /79 (BP Location: Left arm, Patient Position: Chair, Cuff Size: Adult Large)   Pulse 59   Ht 1.626 m (5' 4\")   Wt 113.9 kg (251 lb)   LMP 10/19/2008 (Approximate)   SpO2 98%   BMI 43.08 kg/m    GENERAL: Alert, oriented, NAD  HEENT:  NC/AT.  Sclerae white.  MMM,   NECK: No adenopathy. 2+ carotids,   HEART: RRR,+ S1 and S2, no murmurs or gallops.  JVP 6-7 cm without HJR  LUNGS:  Clear to auscultation bilaterally, no wheezes, rales, or rhonchi  ABDOMEN: Soft, obese, nontender, nondistended, bowel sounds present, no hepatomeagly appreciated although exam limited by body habitus  EXTREMITIES: No lower extremity edema.  2+ bilateral peripheral pulses.  PSYCH: Normal affect     LABS  Orders Only on 08/12/2020   Component Date Value Ref Range Status     Sodium 08/12/2020 140  133 - 144 mmol/L Final     Potassium 08/12/2020 3.9  3.4 - 5.3 mmol/L Final     Chloride 08/12/2020 108  94 - 109 mmol/L Final     Carbon Dioxide 08/12/2020 25  20 - 32 mmol/L Final     Anion Gap 08/12/2020 7  3 - 14 mmol/L Final     Glucose 08/12/2020 112* " 70 - 99 mg/dL Final     Urea Nitrogen 08/12/2020 15  7 - 30 mg/dL Final     Creatinine 08/12/2020 0.83  0.52 - 1.04 mg/dL Final     GFR Estimate 08/12/2020 78  >60 mL/min/[1.73_m2] Final    Comment: Non  GFR Calc  Starting 12/18/2018, serum creatinine based estimated GFR (eGFR) will be   calculated using the Chronic Kidney Disease Epidemiology Collaboration   (CKD-EPI) equation.       GFR Estimate If Black 08/12/2020 >90  >60 mL/min/[1.73_m2] Final    Comment:  GFR Calc  Starting 12/18/2018, serum creatinine based estimated GFR (eGFR) will be   calculated using the Chronic Kidney Disease Epidemiology Collaboration   (CKD-EPI) equation.       Calcium 08/12/2020 8.3* 8.5 - 10.1 mg/dL Final     Bilirubin Total 08/12/2020 0.3  0.2 - 1.3 mg/dL Final     Albumin 08/12/2020 3.4  3.4 - 5.0 g/dL Final     Protein Total 08/12/2020 7.5  6.8 - 8.8 g/dL Final     Alkaline Phosphatase 08/12/2020 70  40 - 150 U/L Final     ALT 08/12/2020 35  0 - 50 U/L Final     AST 08/12/2020 20  0 - 45 U/L Final         CMR Report  01-  Clinical history: 55 yo woman with a familial cardiomyopathy to have repeat CMRI.  Comparison CMR: 10/6/2016.  1.  The global systolic function is moderately decreased and the LVEF is 38%. The RV is moderately dilated while wall thickness is normal. There is moderate global hypokinesis.  2. The RV is the upper limit of normal in cavity size. The global systolic function is mildly decreased and  the RVEF is 52%.   3. Both atria are mildly dilated.  4. There is no significant valvular disease.   5. Late gadolinium enhancement imaging shows no MI, fibrosis or infiltrative disease.   CONCLUSIONS:   The global systolic function is moderately decreased and the LVEF is 38%. The RV is moderately dilated  while wall thickness is normal. There is moderate global hypokinesis.  The RV is the upper limit of normal in cavity size. The global systolic function is mildly decreased and  the  RVEF is 52%.   Compared to the prior study of 2016, LV systolic performance is minimally decreased and RV systolic  performance is similar.  Both LV and RV dilation are mildly increased.             Echo 8/12/2020  Interpretation Summary  LVIDd 68mm.  The Ejection Fraction is estimated at 40-45%.  Right ventricular function, chamber size, wall motion, and thickness are normal.  Pulmonary artery systolic pressure cannot be assessed.  The inferior vena cava is normal.  No pericardial effusion is present.  Mild improvement in LV function since previous study.        Assessment and Plan: 55 year old female with a history of familial cardiomyopathy, left total knee arthroplasty on 10/3/2017 and right knee replacement on 2/20/2019 presents for ongoing evaluation and management.       Chronic systolic heart failure secondary to familial cardiomyopathy.    Stage B, NYHA Class IIb.  MVO2 reduced on cardiopulmonary stress test earlier this year however patient did not meet anerobic threshold therefore MVO2 not a accurate measurement of cardiac exercise capacity.  VE/VCO2 slope normal and good BP response thus no evidence of significant cardiac limitation to exercise.  Echo today reveals slight improvement in LV function  ACEi/ARB/ARNI yes, continue sacubitril-valsartan 49/51mg bid.  Could consider uptitrating in future if symptoms/cardiac function worsen but at present no indication given LVEF > 40%  BB yes, continue metoprolol XL 50mg daily  Aldosterone antagonist - continue spironolactone to 50mg daily   SCD prophylaxis: Recent echo confirms LVEF > 40%, thus ICD not indicated at present  % BiV pacing: N/A  Fluid status euvolemic  NSAID use: advised to avoid NSAIDs  Encouraged patient to begin regular aerobic exercise aiming for at least 150 minutes of moderate physical activity or 75 minutes of vigorous physical activity - or an equal combination of both - each week. and follow low-salt, heart healthy diet.    2.  Chronic  cough:  Feel unlikely to be cardiac in etiology as patient appears euvolemic on exam which is supported by today's echo findings.  Seems to be improving with GERD treatment.  Will have patient begin omeprazole 20mg daily.  If cough persists, will refer to pulmonary for further evaluation.    3.  Upcoming surgery:  There is no cardiac contraindication for her upcoming surgery; she is euvolemic and well compensated. Would recommend judicious perioperative fluid management, good BP control and continuation of heart failure medications.  Would have patient hold entresto on morning of surgery to reduce risk of hypotension but would have patient continue regular metoprolol xl dosing..  .         RTC: In 6 months with labs and EKG.  Will be happy to see sooner if change in clinical status or new questions/concerns arise.    Cassie Kolb MD  Section Head - Advanced Heart Failure, Transplantation and Mechanical Circulatory Support  Director - Adult Congenital and Cardiovascular [facility]etics Center  Associate Professor of Medicine, [facility]        Please do not hesitate to contact me if you have any questions/concerns.     Sincerely,     Cassie Kolb MD

## 2020-08-13 ENCOUNTER — TELEPHONE (OUTPATIENT)
Dept: CARDIOLOGY | Facility: CLINIC | Age: 55
End: 2020-08-13

## 2020-08-13 DIAGNOSIS — R05.9 COUGH: Primary | ICD-10-CM

## 2020-08-13 ASSESSMENT — MIFFLIN-ST. JEOR: SCORE: 1713.53

## 2020-08-13 NOTE — TELEPHONE ENCOUNTER
Routing refill request to provider for review/approval because:  --Last order sent 7/13/2020 by  appears to be a mirtha refill with reminder to make a virtual  appointment with  for further refills.    --Last PHQ-9 >4 and due soon.      --Last visit was virtual with Sydney: 5/21/2020     --Future Office Visit:    Next 5 appointments (look out 90 days)    Aug 14, 2020 11:00 AM CDT  [facility] [address] [place] [address]  [phone]            PHQ 2/15/2018 9/20/2019 2/21/2020   PHQ-9 Total Score 0 12 10   Q9: Thoughts of better off dead/self-harm past 2 weeks Not at all Not at all Not at all

## 2020-08-13 NOTE — TELEPHONE ENCOUNTER
RECORDS RECEIVED FROM: internal    DATE RECEIVED: 8.25.20    NOTES STATUS DETAILS   OFFICE NOTE from referring provider Eben valdovinos    OFFICE NOTE from other specialist na    DISCHARGE SUMMARY from hospital na    DISCHARGE REPORT from the ER na    MEDICATION LIST Internal     IMAGING  (NEED IMAGES AND REPORTS)     CT SCAN na na   CHEST XRAY (CXR) In process / internal  Scheduled 8.24.20   Int- 9.18.19. 7.8.19,    TESTS     PULMONARY FUNCTION TESTING (PFT) In process  Scheduled 8.24.20

## 2020-08-13 NOTE — TELEPHONE ENCOUNTER
[facility] Call Center    Phone Message    May a detailed message be left on voicemail: no     Reason for Call: Other: Pt called to say that the nurse at [facility] does not have access to the pts cardiac clearance information from her appt 8/12 with Dr. Kolb. Pt said that the information needs to be released into EPIC for them to see it. Pt appears to have Care Everywhere. Please finalize notes and release as requested. Otherwise [facility] may need to be called to send the information. Please call pt back for any questions or concerns.     Action Taken: Message routed to:  [facility]    Travel Screening: Not Applicable

## 2020-08-14 ENCOUNTER — OFFICE VISIT (OUTPATIENT)
Dept: FAMILY MEDICINE | Facility: CLINIC | Age: 55
End: 2020-08-14
Payer: MEDICAID

## 2020-08-14 ENCOUNTER — ANCILLARY PROCEDURE (OUTPATIENT)
Dept: GENERAL RADIOLOGY | Facility: CLINIC | Age: 55
End: 2020-08-14
Attending: FAMILY MEDICINE
Payer: MEDICAID

## 2020-08-14 VITALS
SYSTOLIC BLOOD PRESSURE: 128 MMHG | RESPIRATION RATE: 16 BRPM | TEMPERATURE: 97.5 F | HEIGHT: 64 IN | WEIGHT: 248 LBS | DIASTOLIC BLOOD PRESSURE: 70 MMHG | OXYGEN SATURATION: 99 % | HEART RATE: 63 BPM | BODY MASS INDEX: 42.34 KG/M2

## 2020-08-14 DIAGNOSIS — Z01.818 PREOP GENERAL PHYSICAL EXAM: Primary | ICD-10-CM

## 2020-08-14 DIAGNOSIS — I42.9 CHF WITH CARDIOMYOPATHY (H): ICD-10-CM

## 2020-08-14 DIAGNOSIS — I50.32 CHRONIC DIASTOLIC CONGESTIVE HEART FAILURE (H): ICD-10-CM

## 2020-08-14 DIAGNOSIS — K21.9 GASTROESOPHAGEAL REFLUX DISEASE WITHOUT ESOPHAGITIS: ICD-10-CM

## 2020-08-14 DIAGNOSIS — I50.9 CHF WITH CARDIOMYOPATHY (H): ICD-10-CM

## 2020-08-14 DIAGNOSIS — Z11.59 ENCOUNTER FOR SCREENING FOR OTHER VIRAL DISEASES: Primary | ICD-10-CM

## 2020-08-14 DIAGNOSIS — R32 URINARY INCONTINENCE, UNSPECIFIED TYPE: ICD-10-CM

## 2020-08-14 DIAGNOSIS — Z23 NEED FOR VACCINATION: ICD-10-CM

## 2020-08-14 DIAGNOSIS — R05.9 COUGH: ICD-10-CM

## 2020-08-14 DIAGNOSIS — R06.83 SNORING: ICD-10-CM

## 2020-08-14 DIAGNOSIS — Z11.59 ENCOUNTER FOR SCREENING FOR OTHER VIRAL DISEASES: ICD-10-CM

## 2020-08-14 DIAGNOSIS — F32.1 MODERATE MAJOR DEPRESSION (H): ICD-10-CM

## 2020-08-14 LAB
SARS-COV-2 RNA SPEC QL NAA+PROBE: NOT DETECTED
SPECIMEN SOURCE: NORMAL

## 2020-08-14 PROCEDURE — U0003 INFECTIOUS AGENT DETECTION BY NUCLEIC ACID (DNA OR RNA); SEVERE ACUTE RESPIRATORY SYNDROME CORONAVIRUS 2 (SARS-COV-2) (CORONAVIRUS DISEASE [COVID-19]), AMPLIFIED PROBE TECHNIQUE, MAKING USE OF HIGH THROUGHPUT TECHNOLOGIES AS DESCRIBED BY CMS-2020-01-R: HCPCS | Performed by: OBSTETRICS & GYNECOLOGY

## 2020-08-14 PROCEDURE — 90471 IMMUNIZATION ADMIN: CPT | Performed by: FAMILY MEDICINE

## 2020-08-14 PROCEDURE — 90715 TDAP VACCINE 7 YRS/> IM: CPT | Performed by: FAMILY MEDICINE

## 2020-08-14 PROCEDURE — 99215 OFFICE O/P EST HI 40 MIN: CPT | Mod: 25 | Performed by: FAMILY MEDICINE

## 2020-08-14 PROCEDURE — 71046 X-RAY EXAM CHEST 2 VIEWS: CPT

## 2020-08-14 ASSESSMENT — MIFFLIN-ST. JEOR: SCORE: 1699.92

## 2020-08-14 NOTE — PROGRESS NOTES
[facility] [address] [place] [address]  [phone]  Dept: [phone]    PRE-OP EVALUATION:  Today's date: 2020    Marleen Mcfadden (: 1965) presents for pre-operative evaluation assessment as requested by Dr. Gabriel.  She requires evaluation and anesthesia risk assessment prior to undergoing surgery/procedure for treatment of Bladder sling .    Fax number for surgical facility: able to see in [facility]   Primary Physician: Danae Grimes  Type of Anesthesia Anticipated: to be determined    Preop Questionnnaire:  Pre-op Questionnaire 2019   Surgery Location: [facility]   Surgeon: Cherelle Gabriel   Surgery/Procedure: Bladder Sling   Surgery Date: 2020   Time of Surgery: 8am   Have you ever had a heart attack or stroke? No   Do you have chest pain with activity? No   Do you have a history of  heart failure? YES - congested heart failure   Do you currently have a cold, bronchitis or symptoms of other infection? No   Do you have a cough, shortness of breath, or wheezing? YES - cough - Intermittent for one year. She thought it was due to CHF and took extra dose of lasix which is not helpful. She recently started PPI, which hasn't helped. She feels that she has tickle in throat which causes her to have cough. She is taking claritin and flonase for 1.5 months. No fever, chills, rhinorrhea, nasal congestion, shortness of breath or chest pain. She is going to change lifestyle to help with GERD.   Do you or anyone in your family have previous history of blood clots? Yes- grandma and cousin   Do you or does anyone in your family have a serious bleeding problem such as prolonged bleeding following surgeries or cuts? No   Have you ever had problems with anemia or been told to take iron pills? YES - history anemia    Have you had any abnormal blood loss such as black, tarry or bloody stools, or abnormal vaginal bleeding? No   Have you ever had a blood  transfusion? YES - anemia   Have you or any of your relatives ever had problems with anesthesia? No   Do you have sleep apnea, excessive snoring or daytime drowsiness? YES - excessive snoring with possible apnea   Do you have any artifical heart valves or other implanted medical devices like a pacemaker, defibrillator, or continuous glucose monitor? No   Do you have artificial joints? YES - both knees   Is there any chance that you may be pregnant? No         HPI:     HPI related to upcoming procedure:   Urinary incontinence - due to coughing and laughing   MDD - stable  GERD - working on controlling symptoms   CHF - stable     See problem list for active medical problems.  Problems all longstanding and stable, except as noted/documented.  See ROS for pertinent symptoms related to these conditions.      MEDICAL HISTORY:     Patient Active Problem List    Diagnosis Date Noted     Dysfunction of both eustachian tubes 06/19/2020     Priority: Medium     Chronic rhinitis 06/19/2020     Priority: Medium     Muscle spasm 09/20/2019     Priority: Medium     Odd detrimental health beliefs 09/20/2019     Priority: Medium     Moderate major depression (H) 09/20/2019     Priority: Medium     Gastroesophageal reflux disease with esophagitis 01/30/2019     Priority: Medium     Elevated triglycerides with high cholesterol 01/11/2019     Priority: Medium     Primary osteoarthritis of both knees 01/08/2019     Priority: Medium     Status post left knee replacement 06/21/2018     Priority: Medium     Nontraumatic rupture of quadriceps tendon, left 06/21/2018     Priority: Medium     Left shoulder pain 01/17/2017     Priority: Medium     Rotator cuff injury 01/17/2017     Priority: Medium     Injury of left shoulder, initial encounter 01/12/2017     Priority: Medium     Sprain of other ligament of left ankle, initial encounter 01/12/2017     Priority: Medium     Elevated blood pressure reading without diagnosis of hypertension  10/27/2016     Priority: Medium     Chronic diastolic congestive heart failure (H) 10/27/2016     Priority: Medium     Chronic bilateral low back pain with right-sided sciatica 07/07/2016     Priority: Medium     Chronic bilateral low back pain with left-sided sciatica 07/07/2016     Priority: Medium     Acute bilateral low back pain with right-sided sciatica 06/02/2016     Priority: Medium     Degenerative disc disease at L5-S1 level 06/02/2016     Priority: Medium     Acute pain of right knee 05/17/2016     Priority: Medium     Familial cardiomyopathy (H) 10/21/2015     Priority: Medium     Shaina's nodes 06/16/2015     Priority: Medium     Morbid obesity (H) 05/05/2015     Priority: Medium     Peroneus longus tendinitis 01/02/2015     Priority: Medium     Plantar fasciitis 11/11/2014     Priority: Medium     CARDIOVASCULAR SCREENING; LDL GOAL LESS THAN 160 11/11/2014     Priority: Medium     Calcaneal spur 10/21/2014     Priority: Medium     Xray 10/17/14       Pain in joint involving ankle and foot 06/16/2014     Priority: Medium     Thyroid nodule 09/03/2013     Priority: Medium     Knee pain 12/20/2012     Priority: Medium     Swelling of knee joint 12/20/2012     Priority: Medium     TB lung, latent 06/18/2012     Priority: Medium     Negative quantiferon gold test 11/5/2012       CHF with cardiomyopathy (H)      Priority: Medium     Itching 08/12/2011     Priority: Medium     Health Care Home 05/24/2011     Priority: Medium     EMERGENCY CARE PLAN  Presenting Problem Signs and Symptoms Treatment Plan    Questions or conerns during clinic hours    I will call the clinic directly     Questions or conerns outside clinic hours    I will call the 24 hour nurse line at [phone]    Patient needs to schedule an appointment    I will call the 24 hour scheduling team at [phone] or clinic directly    Same day treatment     I will call the clinic first, nurse line if after hours, urgent care and express care  if needed                            DX V65.8 REPLACED WITH [id number] HEALTH CARE HOME (2013)       Sinus bradycardia 2011     Priority: Medium     Anemia      Priority: Medium     Allergic rhinitis      Priority: Medium     Problem list name updated by automated process. Provider to review       Leiomyoma of uterus 10/25/2006     Priority: Medium     Problem list name updated by automated process. Provider to review       Autoimmune disease, not elsewhere classified 2004     Priority: Medium     Autoimmune disease- unknown/poss SLE  Problem list name updated by automated process. Provider to review and confirm       Primary cardiomyopathy (H) 2004     Priority: Medium     Cardiomyopathy- dx'd  famial idiopathic. Followed regularly by cardiologist aMyi.  Problem list name updated by automated process. Provider to review        Past Medical History:   Diagnosis Date     Allergic rhinitis, cause unspecified      Anemia      Autoimmune disease NEC     Autoimmune disease- unknown/poss SLE     Calcaneal spur 10/21/2014     CHF with cardiomyopathy (H)      Familial cardiomyopathy (H) 10/21/2015     Morbid obesity (H) 2015     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     no longer an issue     Other primary cardiomyopathies     Cardiomyopathy- dx'd  idiopathic     PONV (postoperative nausea and vomiting)      UTERINE LEIOMYOMA NOS 10/25/2006     Past Surgical History:   Procedure Laterality Date     C BREAST SURGERY PROCEDURE UNLISTED      Breast Reduction     C  DELIVERY ONLY  10/85,     , Low Cervicalx2     C EXCIS UTERINE FIBROID,ABD APPRCH       C EXCISE EXCESS SKIN TISSUE,ABDOMEN       C LIGATE FALLOPIAN TUBE       C REPAIR CRUCIATE LIGAMENT,KNEE      Right Knee     C TOTAL KNEE ARTHROPLASTY Left 10/03/2017     HYSTERECTOMY TOTAL ABDOMINAL      for fibroids; reports having blood transfusion after  surgery     THYROIDECTOMY  9/9/2013    Procedure: THYROIDECTOMY;  LEFT THYROID LOBECTOMY.  (LIGASURE, RECURRENT LARYNGEAL NERVE MONITOR) ;  Surgeon: Uriah Camargo MD;  Location: [facility]     Current Outpatient Medications   Medication     amitriptyline (ELAVIL) 25 MG tablet     Cholecalciferol (VITAMIN D) 2000 UNIT tablet     Collagen 500 MG CAPS     estradiol (VIVELLE-DOT) 0.0375 MG/24HR BIW patch     FLAXSEED, LINSEED, PO     fluticasone (FLONASE) 50 MCG/ACT nasal spray     furosemide (LASIX) 20 MG tablet     loratadine (CLARITIN) 10 MG tablet     metoprolol succinate ER (TOPROL-XL) 50 MG 24 hr tablet     omeprazole (PRILOSEC OTC) 20 MG EC tablet     progesterone (PROMETRIUM) 100 MG capsule     sacubitril-valsartan (ENTRESTO) 49-51 MG per tablet     sertraline (ZOLOFT) 50 MG tablet     spironolactone (ALDACTONE) 25 MG tablet     No current facility-administered medications for this visit.      OTC products: collagen     Allergies   Allergen Reactions     Morphine      EMESIS     Nickel      Sulfa Drugs Swelling      Latex Allergy: NO    Social History     Tobacco Use     Smoking status: Never Smoker     Smokeless tobacco: Never Used   Substance Use Topics     Alcohol use: Yes     Comment: rare, twice a year, she has a drink little wine 2 days ago     History   Drug Use No       REVIEW OF SYSTEMS:   CONSTITUTIONAL: NEGATIVE for fever, chills, change in weight  INTEGUMENTARY/SKIN: NEGATIVE for worrisome rashes, moles or lesions  EYES: NEGATIVE for vision changes or irritation  ENT/MOUTH: NEGATIVE for ear, mouth and throat problems  RESP: see above   BREAST: NEGATIVE for masses, tenderness or discharge  CV: NEGATIVE for chest pain, palpitations or peripheral edema  GI: NEGATIVE for nausea, abdominal pain, heartburn, or change in bowel habits  : see above   MUSCULOSKELETAL: NEGATIVE for significant arthralgias or myalgia  NEURO: NEGATIVE for weakness, dizziness or paresthesias  ENDOCRINE: NEGATIVE for  "temperature intolerance, skin/hair changes  HEME: NEGATIVE for bleeding problems  PSYCHIATRIC: NEGATIVE for changes in mood or affect    EXAM:   /70 (BP Location: Left arm, Patient Position: Sitting, Cuff Size: Adult Large)   Pulse 63   Temp 97.5  F (36.4  C) (Tympanic)   Resp 16   Ht 1.626 m (5' 4\")   Wt 112.5 kg (248 lb)   LMP 10/19/2008 (Approximate)   SpO2 99%   BMI 42.57 kg/m      GENERAL APPEARANCE: healthy, alert and no distress     EYES:PERRL     HENT: ear canals and TM's normal and nose and mouth without ulcers or lesions     NECK: no adenopathy, no asymmetry, masses, or scars and thyroid normal to palpation     RESP: lungs clear to auscultation - no rales, rhonchi or wheezes     CV: regular rates and rhythm, normal S1 S2     ABDOMEN:  soft, nontender, no HSM or masses and bowel sounds normal     MS: extremities normal- no gross deformities noted     SKIN: no suspicious lesions or rashes     NEURO:  mentation intact and speech normal     PSYCH: mentation appears normal. and affect normal/bright     LYMPHATICS: No cervical adenopathy    DIAGNOSTICS:       Echo 08/12/2020  Interpretation Summary  LVIDd 68mm.  The Ejection Fraction is estimated at 40-45%.  Right ventricular function, chamber size, wall motion, and thickness are  normal.  Pulmonary artery systolic pressure cannot be assessed.  The inferior vena cava is normal.  No pericardial effusion is present.  Mild improvement in LV function since previous study.    XR CHEST 2 VW  8/14/2020 12:42 PM       INDICATION: Cough  COMPARISON: 9/18/2019                                                                       IMPRESSION: Curvature of the thoracic spine. Otherwise negative chest.  The lungs are clear.    Recent Labs   Lab Test 08/12/20  1102 08/12/20  0847 06/03/20  1759 06/03/20  1757 03/13/20  1406  09/18/19  1201  01/08/19  1121  12/02/14  1012   HGB  --   --   --  12.7 11.9   < > 12.6   < > 12.3   < > 12.5   PLT  --   --   --  310 287  " --  294   < > 261   < > 277   INR  --   --   --   --   --   --  0.97  --   --   --  1.03   NA  --  140 139  --   --    < > 139   < > 140   < > 140   POTASSIUM  --  3.9 3.8  --   --    < > 3.9   < > 3.9   < > 3.9   CR  --  0.83 0.87  --   --    < > 0.70   < > 0.77   < > 0.67   A1C 5.7*  --   --   --   --   --   --   --  5.5  --   --     < > = values in this interval not displayed.        IMPRESSION:   Reason for surgery/procedure: urinary incontinence    Diagnosis/reason for consult: pre-op     The proposed surgical procedure is considered INTERMEDIATE risk.    REVISED CARDIAC RISK INDEX  The patient has the following serious cardiovascular risks for perioperative complications such as (MI, PE, VFib and 3  AV Block):  Congestive Heart Failure   INTERPRETATION: 1 risks: Class II (low risk - 0.9% complication rate)    The patient has the following additional risks for perioperative complications:  Morbid obesity      ICD-10-CM    1. Preop general physical exam  Z01.818      2. Urinary incontinence, unspecified type  3. BMI 40.0-44.9, adult (H)  4. Moderate major depression (H)  5. Cough  - ordered CXR for further evaluation  - likely due to PND and uncontrolled GERD  - advised below  Flonase increase to maximum dose: 2 sprays per nostril twice daily   Limit fatty foods, spicy foods, citrus foods, caffeinated beverages, chocolate and not eating three hours before going to bed.   - XR Chest 2 Views    6. CHF with cardiomyopathy (H)  - cleared by cardiology, refer to cardiology note  - pt will discuss with cardiology if any medication needs to be held on the morning of the surgery.   7. Chronic diastolic congestive heart failure (H)  8. Snoring  - SLEEP EVALUATION & MANAGEMENT REFERRAL - ADULT -[facility] [phone]  (Age 18 and up), [facility] [phone] (Age 15 and up); Future    9. Gastroesophageal reflux disease without esophagitis  - see above     RECOMMENDATIONS:      --Consult hospital rounder / IM to assist post-op medical management if needed     Obstructive Sleep Apnea (or suspected sleep apnea)  Hospital staff are advised to monitor for sleep related oxygen desaturations due to suspicion of KATIA      --Patient is to take all scheduled medications on the day of surgery EXCEPT for modifications listed below.  Will discuss with cardiologist about holding Entresto on the morning of the surgery.   No Advil or Aleve 3 days before surgery.  No Aspirin 7 days before surgery.    APPROVAL GIVEN to proceed with proposed procedure, without further diagnostic evaluation       Signed Electronically by: Yanna Matias MD    Copy of this evaluation report is provided to requesting physician.    [place] Preop Guidelines    Revised Cardiac Risk Index

## 2020-08-14 NOTE — PATIENT INSTRUCTIONS
"Discuss with cardiology if any medication needs to be held on the morning of the surgery.   Flonase increase to maximum dose: 2 sprays per nostril twice daily   Limit fatty foods, spicy foods, citrus foods, caffeinated beverages, chocolate and not eating three hours before going to bed.   Ok to take rest of the medications with small sip of water on the morning of the surgery.   No Advil or Aleve 3 days before surgery.  No Aspirin 7 days before surgery.          Preparing for Your Surgery  Getting started  A surgery nurse will call you to review your health history and instructions. They will give you an arrival time based on your scheduled surgery time.  Please be ready to share the following:    Your doctor's clinic name and phone number    Your medical, surgical and anesthesia history    A list of allergies and sensitivities    A list of medicines, including herbal treatments and over-the-counter drugs    Whether the patient has a legal guardian (ask how to send us the papers in advance)  If your child is having surgery, please ask for a copy of Preparing for Your Child's Surgery.    Preparing for surgery    Within 30 days of surgery: Have an exam at your family clinic (primary care clinic), or go to a pre-operative clinic. This exam is called a \"History and Physical,\" or H&P.    At your H&P exam, talk to your care team about all medicines you take. If you need to stop any medicines before surgery, ask when to start taking them again.  ? We do this for your safety. Many medicines can make you bleed too much during surgery. Some change how well surgery (anesthesia) drugs work.    Call your insurance company to see what it will and won't pay for. Ask if they need to pre-approve the surgery. (If no insurance, call [phone].)    Call your surgeon's clinic if there's any change in your health. This includes signs of a cold or flu (sore throat, runny nose, cough, rash, fever). It also includes a scrape or scratch " near the surgery site.    If you have questions on the day of surgery, call your surgery center.  Eating and drinking guidelines  For your safety: Unless your surgeon tells you otherwise, follow the guidelines below.    Eat and drink as usual until 8 hours before surgery. After that, no food or milk.    Drink clear liquids until 2 hours before surgery. These are liquids you can see through, like water, Gatorade and Propel Water. You may also have black coffee and tea (no cream or milk).    Nothing by mouth within 2 hours of surgery. This includes gum, candy and breath mints.    Stop alcohol the midnight before surgery.    If your family clinic tells you to take medicine on the morning of surgery, it's okay to take it with a sip of water.  Preventing infection    Shower or bathe the night before and morning of your surgery. Follow the instructions your clinic gave you. (If no instructions, use regular soap.)    Don't shave or clip hair near your surgery site. This can lead to skin infection.    Don't smoke the morning of surgery. Smoking increases the risk of infection. You may chew nicotine gum up to 2 hours before surgery. A nicotine patch is okay.  ? Note: Some surgeries require you to completely quit smoking and nicotine. Check with your surgeon.    Your care team will make every effort to keep you safe from infection. We will:  ? Clean our hands often with soap and water (or an alcohol-based hand rub).  ? Clean the skin at your surgery site with a special soap that kills germs. We'll also remove hair from the site as needed.  ? Wear special hair covers, masks, gowns and gloves during surgery.  ? Give antibiotic medicine, if prescribed. Not all surgeries need antibiotics.  What to bring on the day of surgery    Photo ID and insurance card    Copy of your health care directive, if you have one    Glasses and hearing aides (bring cases)  ? You can't wear contacts during surgery    Inhaler and eye drops, if you use  them (tell us about these when you arrive)    CPAP machine or breathing device, if you use them    A few personal items, if spending the night    If you have . . .  ? A pacemaker or ICD (cardiac defibrillator): Bring the ID card.  ? An implanted stimulator: Bring the remote control.  ? A legal guardian: Bring a copy of the certified (court-stamped) guardianship papers.  Please remove any jewelry, including body piercings. Leave jewelry and other valuables at home.  If you're going home the day of surgery  Important: If you don't follow the rules below, we must cancel your surgery.     Arrange for someone to drive you home after surgery. You may not drive, take a taxi or take public transportation by yourself (unless you'll have local anesthesia only).    Arrange for a responsible adult to stay with you overnight. If you don't, we may keep you in the hospital overnight, and you may need to pay the costs yourself.  Questions?   If you have any questions for your care team, list them here: _________________________________________________________________________________________________________________________________________________________________________________________________________________________________________________________________________________________________________________________  For informational purposes only. Not to replace the advice of your health care provider. Copyright   7494-9609 [employer] [facility]. All rights reserved. Clinically reviewed by Angie Whitfield MD. [employer] [id number] - REV 07/19.    Preparing for Your Surgery  Getting started  A surgery nurse will call you to review your health history and instructions. They will give you an arrival time based on your scheduled surgery time.  Please be ready to share the following:    Your doctor's clinic name and phone number    Your medical, surgical and anesthesia history    A list of allergies and sensitivities    A list of medicines,  "including herbal treatments and over-the-counter drugs    Whether the patient has a legal guardian (ask how to send us the papers in advance)  If your child is having surgery, please ask for a copy of Preparing for Your Child's Surgery.    Preparing for surgery    Within 30 days of surgery: Have an exam at your family clinic (primary care clinic), or go to a pre-operative clinic. This exam is called a \"History and Physical,\" or H&P.    At your H&P exam, talk to your care team about all medicines you take. If you need to stop any medicines before surgery, ask when to start taking them again.  ? We do this for your safety. Many medicines can make you bleed too much during surgery. Some change how well surgery (anesthesia) drugs work.    Call your insurance company to see what it will and won't pay for. Ask if they need to pre-approve the surgery. (If no insurance, call [phone].)    Call your surgeon's clinic if there's any change in your health. This includes signs of a cold or flu (sore throat, runny nose, cough, rash, fever). It also includes a scrape or scratch near the surgery site.    If you have questions on the day of surgery, call your surgery center.  Eating and drinking guidelines  For your safety: Unless your surgeon tells you otherwise, follow the guidelines below.    Eat and drink as usual until 8 hours before surgery. After that, no food or milk.    Drink clear liquids until 2 hours before surgery. These are liquids you can see through, like water, Gatorade and Propel Water. You may also have black coffee and tea (no cream or milk).    Nothing by mouth within 2 hours of surgery. This includes gum, candy and breath mints.    Stop alcohol the midnight before surgery.    If your family clinic tells you to take medicine on the morning of surgery, it's okay to take it with a sip of water.  Preventing infection    Shower or bathe the night before and morning of your surgery. Follow the instructions your " clinic gave you. (If no instructions, use regular soap.)    Don't shave or clip hair near your surgery site. This can lead to skin infection.    Don't smoke the morning of surgery. Smoking increases the risk of infection. You may chew nicotine gum up to 2 hours before surgery. A nicotine patch is okay.  ? Note: Some surgeries require you to completely quit smoking and nicotine. Check with your surgeon.    Your care team will make every effort to keep you safe from infection. We will:  ? Clean our hands often with soap and water (or an alcohol-based hand rub).  ? Clean the skin at your surgery site with a special soap that kills germs. We'll also remove hair from the site as needed.  ? Wear special hair covers, masks, gowns and gloves during surgery.  ? Give antibiotic medicine, if prescribed. Not all surgeries need antibiotics.  What to bring on the day of surgery    Photo ID and insurance card    Copy of your health care directive, if you have one    Glasses and hearing aides (bring cases)  ? You can't wear contacts during surgery    Inhaler and eye drops, if you use them (tell us about these when you arrive)    CPAP machine or breathing device, if you use them    A few personal items, if spending the night    If you have . . .  ? A pacemaker or ICD (cardiac defibrillator): Bring the ID card.  ? An implanted stimulator: Bring the remote control.  ? A legal guardian: Bring a copy of the certified (court-stamped) guardianship papers.  Please remove any jewelry, including body piercings. Leave jewelry and other valuables at home.  If you're going home the day of surgery  Important: If you don't follow the rules below, we must cancel your surgery.     Arrange for someone to drive you home after surgery. You may not drive, take a taxi or take public transportation by yourself (unless you'll have local anesthesia only).    Arrange for a responsible adult to stay with you overnight. If you don't, we may keep you in the  hospital overnight, and you may need to pay the costs yourself.  Questions?   If you have any questions for your care team, list them here: _________________________________________________________________________________________________________________________________________________________________________________________________________________________________________________________________________________________________________________________  For informational purposes only. Not to replace the advice of your health care provider. Copyright   3834-5069 [facility]. All rights reserved. Clinically reviewed by Angie Whitfield MD. [employer] [id number] - REV 07/19.

## 2020-08-17 ENCOUNTER — ANESTHESIA - HEALTHEAST (OUTPATIENT)
Dept: SURGERY | Facility: AMBULATORY SURGERY CENTER | Age: 55
End: 2020-08-17

## 2020-08-18 ENCOUNTER — SURGERY - HEALTHEAST (OUTPATIENT)
Dept: SURGERY | Facility: AMBULATORY SURGERY CENTER | Age: 55
End: 2020-08-18

## 2020-08-18 ENCOUNTER — TRANSFERRED RECORDS (OUTPATIENT)
Dept: HEALTH INFORMATION MANAGEMENT | Facility: CLINIC | Age: 55
End: 2020-08-18

## 2020-08-18 ASSESSMENT — MIFFLIN-ST. JEOR: SCORE: 1713.53

## 2020-08-19 ENCOUNTER — TELEPHONE (OUTPATIENT)
Dept: FAMILY MEDICINE | Facility: CLINIC | Age: 55
End: 2020-08-19

## 2020-08-19 DIAGNOSIS — I50.22 CHRONIC SYSTOLIC HEART FAILURE (H): ICD-10-CM

## 2020-08-19 LAB — INTERPRETATION ECG - MUSE: NORMAL

## 2020-08-19 NOTE — TELEPHONE ENCOUNTER
Reason for Call:  Other call back    Detailed comments: Virgie from [facility] is requesting a call back. States she is faxing over some information for the patient for PCP and wants to know if there's anything additional / specific Dr. Grimes wants to receive about mutual patient. Please assist. Thanks!    Phone Number Patient can be reached at: [phone]    Best Time: Any    Can we leave a detailed message on this number? YES    Call taken on 8/19/2020 at 11:13 AM by Carina Tellez

## 2020-08-19 NOTE — TELEPHONE ENCOUNTER
Will route to Dr Grimes as FYI and she can add if she needs anything else once she receives fax  and reviews info.     Mishel Aguiar RN, BSN

## 2020-08-24 ENCOUNTER — ANCILLARY PROCEDURE (OUTPATIENT)
Dept: GENERAL RADIOLOGY | Facility: CLINIC | Age: 55
End: 2020-08-24
Payer: MEDICAID

## 2020-08-24 DIAGNOSIS — R05.9 COUGH: ICD-10-CM

## 2020-08-25 ENCOUNTER — PRE VISIT (OUTPATIENT)
Dept: PULMONOLOGY | Facility: CLINIC | Age: 55
End: 2020-08-25

## 2020-08-25 ENCOUNTER — OFFICE VISIT (OUTPATIENT)
Dept: PULMONOLOGY | Facility: CLINIC | Age: 55
End: 2020-08-25
Attending: INTERNAL MEDICINE
Payer: MEDICAID

## 2020-08-25 VITALS
DIASTOLIC BLOOD PRESSURE: 87 MMHG | HEART RATE: 71 BPM | OXYGEN SATURATION: 98 % | SYSTOLIC BLOOD PRESSURE: 143 MMHG | BODY MASS INDEX: 42.34 KG/M2 | HEIGHT: 64 IN | RESPIRATION RATE: 17 BRPM | WEIGHT: 248 LBS

## 2020-08-25 DIAGNOSIS — K21.9 GASTROESOPHAGEAL REFLUX DISEASE WITHOUT ESOPHAGITIS: ICD-10-CM

## 2020-08-25 DIAGNOSIS — J45.991 COUGH VARIANT ASTHMA: ICD-10-CM

## 2020-08-25 DIAGNOSIS — J32.8 OTHER CHRONIC SINUSITIS: ICD-10-CM

## 2020-08-25 DIAGNOSIS — B37.31 YEAST INFECTION OF THE VAGINA: ICD-10-CM

## 2020-08-25 DIAGNOSIS — J45.991 COUGH VARIANT ASTHMA: Primary | ICD-10-CM

## 2020-08-25 DIAGNOSIS — R05.9 COUGH: ICD-10-CM

## 2020-08-25 DIAGNOSIS — Z13.6 CARDIOVASCULAR SCREENING; LDL GOAL LESS THAN 160: ICD-10-CM

## 2020-08-25 LAB
ANION GAP SERPL CALCULATED.3IONS-SCNC: 4 MMOL/L (ref 3–14)
BUN SERPL-MCNC: 15 MG/DL (ref 7–30)
CALCIUM SERPL-MCNC: 9 MG/DL (ref 8.5–10.1)
CHLORIDE SERPL-SCNC: 104 MMOL/L (ref 94–109)
CHOLEST SERPL-MCNC: 172 MG/DL
CO2 SERPL-SCNC: 29 MMOL/L (ref 20–32)
CREAT SERPL-MCNC: 0.73 MG/DL (ref 0.52–1.04)
DLCOUNC-%PRED-PRE: 86 %
DLCOUNC-PRE: 17.49 ML/MIN/MMHG
DLCOUNC-PRED: 20.2 ML/MIN/MMHG
ERV-%PRED-PRE: 52 %
ERV-PRE: 0.13 L
ERV-PRED: 0.25 L
EXPTIME-PRE: 9.03 SEC
FEF2575-%PRED-PRE: 49 %
FEF2575-PRE: 1.13 L/SEC
FEF2575-PRED: 2.3 L/SEC
FEFMAX-%PRED-PRE: 93 %
FEFMAX-PRE: 6 L/SEC
FEFMAX-PRED: 6.42 L/SEC
FEV1-%PRED-PRE: 72 %
FEV1-PRE: 1.73 L
FEV1FEV6-PRE: 76 %
FEV1FEV6-PRED: 81 %
FEV1FVC-PRE: 76 %
FEV1FVC-PRED: 81 %
FEV1SVC-PRE: 77 %
FEV1SVC-PRED: 73 %
FIFMAX-PRE: 3.94 L/SEC
FRCPLETH-%PRED-PRE: 60 %
FRCPLETH-PRE: 1.6 L
FRCPLETH-PRED: 2.67 L
FVC-%PRED-PRE: 76 %
FVC-PRE: 2.27 L
FVC-PRED: 2.97 L
GFR SERPL CREATININE-BSD FRML MDRD: >90 ML/MIN/{1.73_M2}
GLUCOSE SERPL-MCNC: 106 MG/DL (ref 70–99)
HDLC SERPL-MCNC: 54 MG/DL
IC-%PRED-PRE: 69 %
IC-PRE: 2.12 L
IC-PRED: 3.04 L
LDLC SERPL CALC-MCNC: 95 MG/DL
NONHDLC SERPL-MCNC: 118 MG/DL
POTASSIUM SERPL-SCNC: 4 MMOL/L (ref 3.4–5.3)
RVPLETH-%PRED-PRE: 81 %
RVPLETH-PRE: 1.47 L
RVPLETH-PRED: 1.82 L
SODIUM SERPL-SCNC: 136 MMOL/L (ref 133–144)
TLCPLETH-%PRED-PRE: 76 %
TLCPLETH-PRE: 3.72 L
TLCPLETH-PRED: 4.86 L
TRIGL SERPL-MCNC: 113 MG/DL
VA-%PRED-PRE: 75 %
VA-PRE: 3.59 L
VC-%PRED-PRE: 68 %
VC-PRE: 2.25 L
VC-PRED: 3.29 L

## 2020-08-25 PROCEDURE — 80048 BASIC METABOLIC PNL TOTAL CA: CPT | Performed by: INTERNAL MEDICINE

## 2020-08-25 PROCEDURE — 36415 COLL VENOUS BLD VENIPUNCTURE: CPT | Performed by: INTERNAL MEDICINE

## 2020-08-25 PROCEDURE — 86003 ALLG SPEC IGE CRUDE XTRC EA: CPT | Performed by: INTERNAL MEDICINE

## 2020-08-25 PROCEDURE — 82785 ASSAY OF IGE: CPT | Mod: 59 | Performed by: INTERNAL MEDICINE

## 2020-08-25 PROCEDURE — G0463 HOSPITAL OUTPT CLINIC VISIT: HCPCS | Mod: ZF

## 2020-08-25 PROCEDURE — 80061 LIPID PANEL: CPT | Performed by: INTERNAL MEDICINE

## 2020-08-25 PROCEDURE — 82785 ASSAY OF IGE: CPT | Performed by: INTERNAL MEDICINE

## 2020-08-25 RX ORDER — PREDNISONE 50 MG/1
50 TABLET ORAL DAILY
Qty: 5 TABLET | Refills: 0 | Status: SHIPPED | OUTPATIENT
Start: 2020-08-25 | End: 2020-09-08

## 2020-08-25 RX ORDER — AMOXICILLIN AND CLAVULANATE POTASSIUM 500; 125 MG/1; MG/1
1 TABLET, FILM COATED ORAL 3 TIMES DAILY
Qty: 21 TABLET | Refills: 0 | Status: SHIPPED | OUTPATIENT
Start: 2020-08-25 | End: 2020-09-08

## 2020-08-25 ASSESSMENT — PAIN SCALES - GENERAL: PAINLEVEL: NO PAIN (0)

## 2020-08-25 ASSESSMENT — MIFFLIN-ST. JEOR: SCORE: 1699.92
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
## 2020-08-25 NOTE — NURSING NOTE
Chief Complaint   Patient presents with     Consult     Cough    Medications reviewed and vital signs taken.   Dariel Ho CMA
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
## 2020-08-26 ENCOUNTER — MYC MEDICAL ADVICE (OUTPATIENT)
Dept: FAMILY MEDICINE | Facility: CLINIC | Age: 55
End: 2020-08-26

## 2020-08-26 NOTE — TELEPHONE ENCOUNTER
Pt call back and we clarified that she got the TDAp. MA is entering the shot.      Irene Caceres MA

## 2020-08-26 NOTE — NURSING NOTE
Prior to immunization administration, verified patients identity using patient s name and date of birth. Please see Immunization Activity for additional information.     Screening Questionnaire for Adult Immunization    Are you sick today?   No   Do you have allergies to medications, food, a vaccine component or latex?   No   Have you ever had a serious reaction after receiving a vaccination?   No   Do you have a long-term health problem with heart, lung, kidney, or metabolic disease (e.g., diabetes), asthma, a blood disorder, no spleen, complement component deficiency, a cochlear implant, or a spinal fluid leak?  Are you on long-term aspirin therapy?   No   Do you have cancer, leukemia, HIV/AIDS, or any other immune system problem?   No   Do you have a parent, brother, or sister with an immune system problem?   No   In the past 3 months, have you taken medications that affect  your immune system, such as prednisone, other steroids, or anticancer drugs; drugs for the treatment of rheumatoid arthritis, Crohn s disease, or psoriasis; or have you had radiation treatments?   No   Have you had a seizure, or a brain or other nervous system problem?   No   During the past year, have you received a transfusion of blood or blood    products, or been given immune (gamma) globulin or antiviral drug?   No   For women: Are you pregnant or is there a chance you could become       pregnant during the next month?   No   Have you received any vaccinations in the past 4 weeks?   No     Immunization questionnaire answers were all negative.        Per orders of Dr. Matias, injection of Adacel given by Mariann Jimenez MA. Patient instructed to remain in clinic for 15 minutes afterwards, and to report any adverse reaction to me immediately.       Screening performed by Mariann Jimenez MA on 8/14/2020 at 3:09 PM.

## 2020-08-26 NOTE — TELEPHONE ENCOUNTER
LVM  that we did not give her the flu shot. Does not look like we have anything on record that she got the flu shot this year.    rIene Caceres MA'

## 2020-08-27 ENCOUNTER — MYC MEDICAL ADVICE (OUTPATIENT)
Dept: FAMILY MEDICINE | Facility: CLINIC | Age: 55
End: 2020-08-27

## 2020-08-27 PROBLEM — T84.018A FAILED TOTAL KNEE ARTHROPLASTY, INITIAL ENCOUNTER (H): Status: ACTIVE | Noted: 2019-01-17

## 2020-08-27 PROBLEM — I10 ESSENTIAL HYPERTENSION: Status: ACTIVE | Noted: 2020-08-27

## 2020-08-27 PROBLEM — Z96.652 STATUS POST LEFT KNEE REPLACEMENT: Status: RESOLVED | Noted: 2018-06-21 | Resolved: 2020-08-27

## 2020-08-27 PROBLEM — M62.838 MUSCLE SPASM: Status: RESOLVED | Noted: 2019-09-20 | Resolved: 2020-08-27

## 2020-08-27 PROBLEM — F32.1 MODERATE MAJOR DEPRESSION (H): Chronic | Status: ACTIVE | Noted: 2019-09-20

## 2020-08-27 PROBLEM — M66.252: Status: RESOLVED | Noted: 2018-06-21 | Resolved: 2020-08-27

## 2020-08-27 PROBLEM — Z96.659 FAILED TOTAL KNEE ARTHROPLASTY, INITIAL ENCOUNTER (H): Status: ACTIVE | Noted: 2019-01-17

## 2020-08-27 LAB
A ALTERNATA IGE QN: <0.1 KU(A)/L
A FUMIGATUS IGE QN: <0.1 KU(A)/L
BERMUDA GRASS IGE QN: <0.1 KU(A)/L
C HERBARUM IGE QN: <0.1 KU(A)/L
CAT DANDER IGG QN: 0.93 KU(A)/L
CEDAR IGE QN: 0.43 KU(A)/L
COMMON RAGWEED IGE QN: 5.79 KU(A)/L
COTTONWOOD IGE QN: <0.1 KU(A)/L
D FARINAE IGE QN: <0.1 KU(A)/L
D PTERONYSS IGE QN: <0.1 KU(A)/L
DOG DANDER+EPITH IGE QN: 0.29 KU(A)/L
IGE SERPL-ACNC: 68 KIU/L (ref 0–114)
IGE SERPL-ACNC: 72 KIU/L (ref 0–114)
MAPLE IGE QN: 0.33 KU(A)/L
MARSH ELDER IGE QN: 0.67 KU(A)/L
MOUSE URINE PROT IGE QN: <0.1 KU(A)/L
NETTLE IGE QN: <0.1 KU(A)/L
P NOTATUM IGE QN: <0.1 KU(A)/L
ROACH IGE QN: <0.1 KU(A)/L
SALTWORT IGE QN: <0.1 KU(A)/L
SILVER BIRCH IGE QN: <0.1 KU(A)/L
TIMOTHY IGE QN: 0.21 KU(A)/L
WHITE ASH IGE QN: <0.1 KU(A)/L
WHITE ELM IGE QN: <0.1 KU(A)/L
WHITE MULBERRY IGE QN: <0.1 KU(A)/L
WHITE OAK IGE QN: <0.1 KU(A)/L
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
## 2020-08-28 ENCOUNTER — VIRTUAL VISIT (OUTPATIENT)
Dept: SLEEP MEDICINE | Facility: CLINIC | Age: 55
End: 2020-08-28
Payer: MEDICAID

## 2020-08-28 VITALS — WEIGHT: 248 LBS | HEIGHT: 64 IN | BODY MASS INDEX: 42.34 KG/M2

## 2020-08-28 DIAGNOSIS — I10 ESSENTIAL HYPERTENSION: ICD-10-CM

## 2020-08-28 DIAGNOSIS — R53.83 MALAISE AND FATIGUE: ICD-10-CM

## 2020-08-28 DIAGNOSIS — R06.89 DYSPNEA AND RESPIRATORY ABNORMALITY: Primary | ICD-10-CM

## 2020-08-28 DIAGNOSIS — E66.01 CLASS 3 SEVERE OBESITY DUE TO EXCESS CALORIES WITH SERIOUS COMORBIDITY AND BODY MASS INDEX (BMI) OF 40.0 TO 44.9 IN ADULT (H): ICD-10-CM

## 2020-08-28 DIAGNOSIS — Z72.820 LACK OF ADEQUATE SLEEP: ICD-10-CM

## 2020-08-28 DIAGNOSIS — G47.30 SLEEP APNEA, UNSPECIFIED TYPE: ICD-10-CM

## 2020-08-28 DIAGNOSIS — E66.813 CLASS 3 SEVERE OBESITY DUE TO EXCESS CALORIES WITH SERIOUS COMORBIDITY AND BODY MASS INDEX (BMI) OF 40.0 TO 44.9 IN ADULT (H): ICD-10-CM

## 2020-08-28 DIAGNOSIS — R06.00 DYSPNEA AND RESPIRATORY ABNORMALITY: Primary | ICD-10-CM

## 2020-08-28 DIAGNOSIS — R53.81 MALAISE AND FATIGUE: ICD-10-CM

## 2020-08-28 DIAGNOSIS — R06.83 SNORING: ICD-10-CM

## 2020-08-28 PROCEDURE — 99203 OFFICE O/P NEW LOW 30 MIN: CPT | Mod: GT | Performed by: PHYSICIAN ASSISTANT

## 2020-08-28 RX ORDER — ZOLPIDEM TARTRATE 5 MG/1
TABLET ORAL
Qty: 1 TABLET | Refills: 0 | Status: SHIPPED | OUTPATIENT
Start: 2020-08-28 | End: 2022-04-22

## 2020-08-28 ASSESSMENT — MIFFLIN-ST. JEOR: SCORE: 1699.92

## 2020-08-28 NOTE — PATIENT INSTRUCTIONS

## 2020-09-01 ENCOUNTER — MYC MEDICAL ADVICE (OUTPATIENT)
Dept: FAMILY MEDICINE | Facility: CLINIC | Age: 55
End: 2020-09-01

## 2020-09-03 ASSESSMENT — MIFFLIN-ST. JEOR: SCORE: 1663.63

## 2020-09-04 DIAGNOSIS — Z11.59 ENCOUNTER FOR SCREENING FOR OTHER VIRAL DISEASES: Primary | ICD-10-CM

## 2020-09-04 NOTE — TELEPHONE ENCOUNTER
See 9/1/2020 [employer] message.    HI, I'm really sorry but I don't have any forms for you. Could your psychologist resend the forms? Or you could drop off blank copies of you have any at the [place] location, since that's where I'm physically located when I'm doing face to face office visits. Once I've seen the form, I can let you know if I can complete them or if I'll need to have you come in for a visit.    [facility]  Address: [address], [place] [address]  Phone: [phone]      Sincerely,  Dr. Danae Grimes MD  9/3/2020

## 2020-09-04 NOTE — TELEPHONE ENCOUNTER
I sent the following my chart message. Nothing further to do. Note closed.  Sincerely,    Danae Grimes MD   9/3/2020      HI, I'm really sorry but I don't have any forms for you. Could your psychologist resend the forms? Or you could drop off blank copies of you have any at the [place] location, since that's where I'm physically located when I'm doing face to face office visits. Once I've seen the form, I can let you know if I can complete them or if I'll need to have you come in for a visit.    [facility]  Address: [address], [place] [address]  Phone: [phone]

## 2020-09-06 DIAGNOSIS — F32.1 MODERATE MAJOR DEPRESSION (H): ICD-10-CM

## 2020-09-08 ENCOUNTER — OFFICE VISIT (OUTPATIENT)
Dept: FAMILY MEDICINE | Facility: CLINIC | Age: 55
End: 2020-09-08
Payer: MEDICAID

## 2020-09-08 VITALS
HEIGHT: 64 IN | WEIGHT: 248 LBS | TEMPERATURE: 96.8 F | DIASTOLIC BLOOD PRESSURE: 85 MMHG | SYSTOLIC BLOOD PRESSURE: 128 MMHG | HEART RATE: 88 BPM | BODY MASS INDEX: 42.34 KG/M2 | OXYGEN SATURATION: 99 %

## 2020-09-08 DIAGNOSIS — M17.11 OSTEOARTHRITIS OF RIGHT KNEE, UNSPECIFIED OSTEOARTHRITIS TYPE: ICD-10-CM

## 2020-09-08 DIAGNOSIS — I50.32 CHRONIC DIASTOLIC CONGESTIVE HEART FAILURE (H): ICD-10-CM

## 2020-09-08 DIAGNOSIS — G89.29 CHRONIC PAIN OF RIGHT KNEE: ICD-10-CM

## 2020-09-08 DIAGNOSIS — M25.561 CHRONIC PAIN OF RIGHT KNEE: ICD-10-CM

## 2020-09-08 DIAGNOSIS — Z11.59 ENCOUNTER FOR SCREENING FOR OTHER VIRAL DISEASES: ICD-10-CM

## 2020-09-08 DIAGNOSIS — Z01.818 PREOP GENERAL PHYSICAL EXAM: Primary | ICD-10-CM

## 2020-09-08 DIAGNOSIS — I42.9 FAMILIAL CARDIOMYOPATHY (H): ICD-10-CM

## 2020-09-08 DIAGNOSIS — Z11.59 SCREENING FOR VIRAL DISEASE: Primary | ICD-10-CM

## 2020-09-08 LAB
BASOPHILS # BLD AUTO: 0.1 10E9/L (ref 0–0.2)
BASOPHILS NFR BLD AUTO: 0.8 %
DIFFERENTIAL METHOD BLD: NORMAL
EOSINOPHIL # BLD AUTO: 0.2 10E9/L (ref 0–0.7)
EOSINOPHIL NFR BLD AUTO: 3.7 %
ERYTHROCYTE [DISTWIDTH] IN BLOOD BY AUTOMATED COUNT: 13.2 % (ref 10–15)
HCT VFR BLD AUTO: 38.8 % (ref 35–47)
HGB BLD-MCNC: 12.5 G/DL (ref 11.7–15.7)
LYMPHOCYTES # BLD AUTO: 2.7 10E9/L (ref 0.8–5.3)
LYMPHOCYTES NFR BLD AUTO: 40.7 %
MCH RBC QN AUTO: 30.3 PG (ref 26.5–33)
MCHC RBC AUTO-ENTMCNC: 32.2 G/DL (ref 31.5–36.5)
MCV RBC AUTO: 94 FL (ref 78–100)
MONOCYTES # BLD AUTO: 0.5 10E9/L (ref 0–1.3)
MONOCYTES NFR BLD AUTO: 7.2 %
NEUTROPHILS # BLD AUTO: 3.1 10E9/L (ref 1.6–8.3)
NEUTROPHILS NFR BLD AUTO: 47.6 %
PLATELET # BLD AUTO: 276 10E9/L (ref 150–450)
RBC # BLD AUTO: 4.13 10E12/L (ref 3.8–5.2)
SARS-COV-2 RNA SPEC QL NAA+PROBE: NOT DETECTED
SPECIMEN SOURCE: NORMAL
WBC # BLD AUTO: 6.5 10E9/L (ref 4–11)

## 2020-09-08 PROCEDURE — 85025 COMPLETE CBC W/AUTO DIFF WBC: CPT | Performed by: PHYSICIAN ASSISTANT

## 2020-09-08 PROCEDURE — U0003 INFECTIOUS AGENT DETECTION BY NUCLEIC ACID (DNA OR RNA); SEVERE ACUTE RESPIRATORY SYNDROME CORONAVIRUS 2 (SARS-COV-2) (CORONAVIRUS DISEASE [COVID-19]), AMPLIFIED PROBE TECHNIQUE, MAKING USE OF HIGH THROUGHPUT TECHNOLOGIES AS DESCRIBED BY CMS-2020-01-R: HCPCS | Performed by: ORTHOPAEDIC SURGERY

## 2020-09-08 PROCEDURE — 99214 OFFICE O/P EST MOD 30 MIN: CPT | Performed by: PHYSICIAN ASSISTANT

## 2020-09-08 PROCEDURE — 36415 COLL VENOUS BLD VENIPUNCTURE: CPT | Performed by: PHYSICIAN ASSISTANT

## 2020-09-08 ASSESSMENT — MIFFLIN-ST. JEOR: SCORE: 1699.92

## 2020-09-08 ASSESSMENT — PAIN SCALES - GENERAL: PAINLEVEL: NO PAIN (0)

## 2020-09-08 NOTE — RESULT ENCOUNTER NOTE
Rick Hawley  Your hemoglobin and blood counts were normal.   Please call or MyChart my office with any questions or concerns.    Divine Thompson, PAC

## 2020-09-08 NOTE — PATIENT INSTRUCTIONS
Hold entresto 24 hours before your surgery  I will notify you of lab results via MyChart    Schedule mammogram 12/20/2020     Patient Education    At [facility], we strive to deliver an exceptional experience to you, every time we see you. If you receive a survey, please complete it as we do value your feedback.  If you have MyChart, you can expect to receive results automatically within 24 hours of their completion.  Your provider will send a note interpreting your results as well.   If you do not have MyChart, you should receive your results in about a week by mail.    Your care team:                            Family Medicine Internal Medicine   MD Devin Mahmood, MD Lucille Schultz, MD Darius Hassan, MD Kellie Cohen PA-C  Erica Segal, APRN CNP    Dallin Dickerson, MD Pediatrics   Antwan Julian, PA-C  Mikayla Barrientos, CNP Clary Galo, MD Belle Lentz APRN CNP   MD Jodi Hammer, MD Columba Streeter, MD Irais Monroy, APRN CNP  Divine Sheldon, PA-C  Cherelle Diez, CNP  Matt Wilkerson, MD Jojo Ge MD      Clinic hours: Monday - Thursday 7 am-7 pm; Fridays 7 am-5 pm.   Urgent care: Monday - Friday 11 am-9 pm; Saturday and Sunday 9 am-5 pm.    Clinic: [phone]       [facility]: Monday - Thursday 8 am - 7 pm; Friday 8 am - 6 pm  [facility]: [phone]     Use [email or web address] for 24/7 diagnosis and treatment of dozens of conditions.    Preparing for Your Surgery  Getting started  A surgery nurse will call you to review your health history and instructions. They will give you an arrival time based on your scheduled surgery time.  Please be ready to share the following:    Your doctor's clinic name and phone number    Your medical, surgical and anesthesia history    A list of allergies and sensitivities    A list of medicines, including herbal  "treatments and over-the-counter drugs    Whether the patient has a legal guardian (ask how to send us the papers in advance)  If your child is having surgery, please ask for a copy of Preparing for Your Child's Surgery.    Preparing for surgery    Within 30 days of surgery: Have an exam at your family clinic (primary care clinic), or go to a pre-operative clinic. This exam is called a \"History and Physical,\" or H&P.    At your H&P exam, talk to your care team about all medicines you take. If you need to stop any medicines before surgery, ask when to start taking them again.  ? We do this for your safety. Many medicines can make you bleed too much during surgery. Some change how well surgery (anesthesia) drugs work.    Call your insurance company to see what it will and won't pay for. Ask if they need to pre-approve the surgery. (If no insurance, call [phone].)    Call your surgeon's clinic if there's any change in your health. This includes signs of a cold or flu (sore throat, runny nose, cough, rash, fever). It also includes a scrape or scratch near the surgery site.    If you have questions on the day of surgery, call your surgery center.  Eating and drinking guidelines  For your safety: Unless your surgeon tells you otherwise, follow the guidelines below.    Eat and drink as usual until 8 hours before surgery. After that, no food or milk.    Drink clear liquids until 2 hours before surgery. These are liquids you can see through, like water, Gatorade and Propel Water. You may also have black coffee and tea (no cream or milk).    Nothing by mouth within 2 hours of surgery. This includes gum, candy and breath mints.    Stop alcohol the midnight before surgery.    If your family clinic tells you to take medicine on the morning of surgery, it's okay to take it with a sip of water.  Preventing infection    Shower or bathe the night before and morning of your surgery. Follow the instructions your clinic gave you. " (If no instructions, use regular soap.)    Don't shave or clip hair near your surgery site. This can lead to skin infection.    Don't smoke the morning of surgery. Smoking increases the risk of infection. You may chew nicotine gum up to 2 hours before surgery. A nicotine patch is okay.  ? Note: Some surgeries require you to completely quit smoking and nicotine. Check with your surgeon.    Your care team will make every effort to keep you safe from infection. We will:  ? Clean our hands often with soap and water (or an alcohol-based hand rub).  ? Clean the skin at your surgery site with a special soap that kills germs. We'll also remove hair from the site as needed.  ? Wear special hair covers, masks, gowns and gloves during surgery.  ? Give antibiotic medicine, if prescribed. Not all surgeries need antibiotics.  What to bring on the day of surgery    Photo ID and insurance card    Copy of your health care directive, if you have one    Glasses and hearing aides (bring cases)  ? You can't wear contacts during surgery    Inhaler and eye drops, if you use them (tell us about these when you arrive)    CPAP machine or breathing device, if you use them    A few personal items, if spending the night    If you have . . .  ? A pacemaker or ICD (cardiac defibrillator): Bring the ID card.  ? An implanted stimulator: Bring the remote control.  ? A legal guardian: Bring a copy of the certified (court-stamped) guardianship papers.  Please remove any jewelry, including body piercings. Leave jewelry and other valuables at home.  If you're going home the day of surgery  Important: If you don't follow the rules below, we must cancel your surgery.     Arrange for someone to drive you home after surgery. You may not drive, take a taxi or take public transportation by yourself (unless you'll have local anesthesia only).    Arrange for a responsible adult to stay with you overnight. If you don't, we may keep you in the hospital overnight,  and you may need to pay the costs yourself.  Questions?   If you have any questions for your care team, list them here: _________________________________________________________________________________________________________________________________________________________________________________________________________________________________________________________________________________________________________________________  For informational purposes only. Not to replace the advice of your health care provider. Copyright   4465-6831 [facility]. All rights reserved. Clinically reviewed by Angie Whitfield MD. SMARTworks 461190 - REV 07/19.

## 2020-09-08 NOTE — PROGRESS NOTES
[address] [address]  Phone: [phone]  Primary Provider: Tj Thompson  Pre-op Performing Provider: TJ THOMPSON    PREOPERATIVE EVALUATION:  Today's date: 9/8/2020    Marleen Mcfadden is a 55 year old female who presents for a preoperative evaluation.    Surgical Information:  Surgery Details 9/8/2020   Surgery/Procedure: Knee Revision   Surgery Location: [facility]   Surgeon: Dr. Dev Mota   Surgery Date: 9/11/20   Time of Surgery: 8am   Where patient plans to recover: At home with family     Fax number for surgical facility: [facility]  Type of Anesthesia Anticipated: to be determined    Subjective     HPI related to upcoming procedure:   Preop Questions 9/8/2020   1. Have you ever had a heart attack or stroke? No   2. Have you ever had surgery on your heart or blood vessels, such as a stent placement, a coronary artery bypass, or surgery on an artery in your head, neck, heart, or legs? No   3. Do you have chest pain with activity? No   4. Do you have a history of  heart failure? YES - asymptomatic - cardiac clearance on the 8/12/2020 and ekg at that time    5. Do you currently have a cold, bronchitis or symptoms of other infection? No   6. Do you have a cough, shortness of breath, or wheezing? No   7. Do you or anyone in your family have previous history of blood clots? No   8. Do you or does anyone in your family have a serious bleeding problem such as prolonged bleeding following surgeries or cuts? No   9. Have you ever had problems with anemia or been told to take iron pills? YES - had hysterectomy to correct it   Had hemoglobin 6.0   10. Have you had any abnormal blood loss such as black, tarry or bloody stools, or abnormal vaginal bleeding? No   11. Have you ever had a blood transfusion? YES - 2 transfusions prior to hysterectomy    11a. Have you ever had a transfusion reaction? No   Are you willing to have a  blood transfusion if it is medically needed before, during, or after your surgery? Yes   13. Have you or any of your relatives ever had problems with anesthesia? No   14. Do you have sleep apnea, excessive snoring or daytime drowsiness? No sleep apnea but excessive snoring   15. Do you have any artifical heart valves or other implanted medical devices like a pacemaker, defibrillator, or continuous glucose monitor? No   16. Do you have artificial joints? YES - both knees   17. Are you allergic to latex? No   18. Is there any chance that you may be pregnant? No     Patient does not have a Health Care Directive or Living Will: Discussed advance care planning with patient; information given to patient to review.  Right knee pain since may- history of left knee replacement with revision   Plans to get on rowing machine and work on exercise for weight loss   RX monitoring program ([place] reviewed:  not reviewed/not due     CHF - Patient has a longstanding history of moderate-severe CHF. Exacerbating conditions include none. Currently the patient's condition is asymptomatic. Current treatment regimen includes Angiotensin 2 receptor blocker, beta blocker, diuretic and spirolactone. The patient denies chest pain, edema, orthopnea, SOB or recent weight gain. Last Echocardiogram 8/12/2020, EKG 8/12/2020.     DEPRESSION - Patient has a long history of Depression of moderate severity requiring medication for control with recent symptoms being stable..Current symptoms of depression include none.     HYPERTENSION - Patient has longstanding history of HTN , currently denies any symptoms referable to elevated blood pressure. Specifically denies chest pain, palpitations, dyspnea, orthopnea, PND or peripheral edema. Blood pressure readings have been in normal range. Current medication regimen is as listed below. Patient denies any side effects of medication.     Right knee pain for 3 months - history of total knee replacement  2/20/2019 - having revision  Snap shot states history of latent tb- has had reactive mantoux but (quanterferon) blood test negative and negative chest xray- never treated    Review of Systems  CONSTITUTIONAL:has had weight gain with pandemic and lack of exercise   INTEGUMENTARY/SKIN: NEGATIVE for worrisome rashes, moles or lesions  EYES: NEGATIVE for vision changes or irritation  ENT/MOUTH: NEGATIVE for ear, mouth and throat problems  RESP: NEGATIVE for significant cough or SOB  BREAST: NEGATIVE for masses, tenderness or discharge  CV: NEGATIVE for chest pain, palpitations or peripheral edema  GI: NEGATIVE for nausea, abdominal pain, heartburn, or change in bowel habits  : NEGATIVE for frequency, dysuria, or hematuria  MUSCULOSKELETAL:right knee pain requiring replacement- history of left knee rplacement  NEURO: NEGATIVE for weakness, dizziness or paresthesias  ENDOCRINE: NEGATIVE for temperature intolerance, skin/hair changes  HEME: NEGATIVE for bleeding problems  PSYCHIATRIC: NEGATIVE for changes in mood or affect    Patient Active Problem List    Diagnosis Date Noted     Essential hypertension 08/27/2020     Priority: Medium     Dysfunction of both eustachian tubes 06/19/2020     Priority: Medium     Chronic rhinitis 06/19/2020     Priority: Medium     Odd detrimental health beliefs 09/20/2019     Priority: Medium     Moderate major depression (H) 09/20/2019     Priority: Medium     Gastroesophageal reflux disease with esophagitis 01/30/2019     Priority: Medium     Failed total knee arthroplasty, 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     Priority: Medium     Elevated triglycerides with high cholesterol 01/11/2019     Priority: Medium     Primary osteoarthritis of both knees 01/08/2019     Priority: Medium     Left shoulder pain 01/17/2017     Priority: Medium     Injury of left shoulder, initial encounter 01/12/2017     Priority: Medium     Elevated blood pressure reading without diagnosis of hypertension  10/27/2016     Priority: Medium     Chronic systolic congestive heart failure (H) 10/27/2016     Priority: Medium     ECHO 4/2020:  Interpretation Summary  LVIDd 68mm.  The Ejection Fraction is estimated at 40-45%.  Right ventricular function, chamber size, wall motion, and thickness are  normal.  Pulmonary artery systolic pressure cannot be assessed.  The inferior vena cava is normal.  No pericardial effusion is present.  Mild improvement in LV function since previous study.       Chronic bilateral low back pain with right-sided sciatica 07/07/2016     Priority: Medium     Chronic bilateral low back pain with left-sided sciatica 07/07/2016     Priority: Medium     Acute bilateral low back pain with right-sided sciatica 06/02/2016     Priority: Medium     Degenerative disc disease at L5-S1 level 06/02/2016     Priority: Medium     Familial cardiomyopathy (H) 10/21/2015     Priority: Medium     Shaina's nodes 06/16/2015     Priority: Medium     Morbid obesity (H) 05/05/2015     Priority: Medium     Peroneus longus tendinitis 01/02/2015     Priority: Medium     Plantar fasciitis 11/11/2014     Priority: Medium     CARDIOVASCULAR SCREENING; LDL GOAL LESS THAN 160 11/11/2014     Priority: Medium     Pain in joint involving ankle and foot 06/16/2014     Priority: Medium     Thyroid nodule 09/03/2013     Priority: Medium     Knee pain 12/20/2012     Priority: Medium     TB lung, latent 06/18/2012     Priority: Medium     Negative quantiferon gold test 11/5/2012       Itching 08/12/2011     Priority: Medium     Health Care Home 05/24/2011     Priority: Medium     EMERGENCY CARE PLAN  Presenting Problem Signs and Symptoms Treatment Plan    Questions or conerns during clinic hours    I will call the clinic directly     Questions or conerns outside clinic hours    I will call the 24 hour nurse line at [phone]    Patient needs to schedule an appointment    I will call the 24 hour scheduling team at [phone] or  clinic directly    Same day treatment     I will call the clinic first, nurse line if after hours, urgent care and express care if needed                            DX V65.8 REPLACED WITH 01305 [facility] CARE HOME (2013)       Sinus bradycardia 2011     Priority: Medium     Allergic rhinitis      Priority: Medium     Problem list name updated by automated process. Provider to review       Leiomyoma of uterus 10/25/2006     Priority: Medium     Problem list name updated by automated process. Provider to review       Autoimmune disease, not elsewhere classified 2004     Priority: Medium     Autoimmune disease- unknown/poss SLE  Problem list name updated by automated process. 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 Followed regularly by cardiologist Mayi.  Problem list name updated by automated process. Provider to review     Rotator cuff injury 2017     Sprain of other ligament of left ankle, initial encounter 2017     Status post left knee replacement 2018     UTERINE LEIOMYOMA NOS 10/25/2006     Past Surgical History:   Procedure Laterality Date     C BREAST SURGERY PROCEDURE UNLISTED      Breast Reduction     C  DELIVERY ONLY   10/85,     , Low Cervicalx2     [facility]       [facility] EXCISE EXCESS SKIN TISSUE,ABDOMEN       C LIGATE FALLOPIAN TUBE       C REPAIR CRUCIATE LIGAMENT,KNEE      Right Knee     C TOTAL KNEE ARTHROPLASTY Left 10/03/2017     HYSTERECTOMY TOTAL ABDOMINAL  2006    for fibroids; reports having blood transfusion after surgery     THYROIDECTOMY  2013    Procedure: THYROIDECTOMY;  LEFT THYROID LOBECTOMY.  (LIGASURE, RECURRENT LARYNGEAL NERVE MONITOR) ;  Surgeon: Uriah Camargo MD;  Location: [facility]     Current Outpatient Medications   Medication Sig Dispense Refill     amitriptyline (ELAVIL) 25 MG tablet TAKE 1 TABLET(25 MG) BY MOUTH AT BEDTIME 90 tablet 1     amoxicillin-clavulanate (AUGMENTIN) 500-125 MG tablet Take 1 tablet by mouth 3 times daily 21 tablet 0     Cholecalciferol (VITAMIN D) 2000 UNIT tablet Take 5,000 Units by mouth as needed Reported on 3/8/2017 100 tablet 12     Collagen 500 MG CAPS        estradiol (VIVELLE-DOT) 0.0375 MG/24HR BIW patch IRISH 1 PATCH ON SKIN TWICE PER WEEK  11     FLAXSEED, LINSEED, PO        fluticasone (FLONASE) 50 MCG/ACT nasal spray Spray 2 sprays into both nostrils At Bedtime 15.8 mL 4     fluticasone-salmeterol (ADVAIR) 500-50 MCG/DOSE inhaler Inhale 1 puff into the lungs every 12 hours 1 Inhaler 3     furosemide (LASIX) 20 MG tablet Take 2 tablets (40 mg) by mouth daily as needed (as needed for weight gain/edema) 60 tablet 11     loratadine (CLARITIN) 10 MG tablet Take 1 tablet (10 mg) by mouth daily 90 tablet 3     metoprolol succinate ER (TOPROL-XL) 50 MG 24 hr tablet Take 1 tablet (50 mg) by mouth daily 30 tablet 11     omeprazole (PRILOSEC OTC) 20 MG EC tablet Take 1 tablet (20 mg) by mouth daily 90 tablet 1     predniSONE (DELTASONE) 50 MG tablet Take 1 tablet (50 mg) by mouth daily 5 tablet 0     progesterone (PROMETRIUM) 100 MG capsule Take 100 mg by mouth Reported on 3/8/2017       sacubitril-valsartan (ENTRESTO)  "49-51 MG per tablet Take 1 tablet by mouth 2 times daily 180 tablet 1     sertraline (ZOLOFT) 50 MG tablet TAKE 1 TABLET BY MOUTH EVERY DAY 30 tablet 0     spironolactone (ALDACTONE) 25 MG tablet Take 2 tablets (50 mg) by mouth daily 60 tablet 11     zolpidem (AMBIEN) 5 MG tablet Take tablet by mouth 15 minutes prior to sleep, for Sleep Study 1 tablet 0       Allergies   Allergen Reactions     Morphine      EMESIS     Nickel      Sulfa Drugs Swelling        Social History     Tobacco Use     Smoking status: Never Smoker     Smokeless tobacco: Never Used   Substance Use Topics     Alcohol use: Yes     Comment: rare, twice a year, she has a drink little wine 2 days ago     Family History   Problem Relation Age of Onset     Hypertension Father         dec     Diabetes Father         dec     Heart Disease Father         dec     Alcohol/Drug Father      Cardiovascular Father      Heart Disease Mother         dec     Alcohol/Drug Mother      Cardiovascular Mother      Heart Disease Daughter         Cardiomyopathy     Cardiovascular Daughter         cardiomyopathy     Colon Cancer Sister      Cardiovascular Son         cardiomyopathy     Diabetes Paternal Grandmother         dec     Hypertension Paternal Grandmother         dec     Cerebrovascular Disease Paternal Grandmother         dec     Cancer Sister         Lupus     Cardiovascular Sister         cardiomyopathy     Heart Disease Sister         heart failure, and kidney failure     History   Drug Use No            Objective   /85   Pulse 88   Temp 96.8  F (36  C) (Oral)   Ht 1.626 m (5' 4\")   Wt 112.5 kg (248 lb)   LMP 10/19/2008 (Approximate)   SpO2 99%   BMI 42.57 kg/m    Physical Exam    GENERAL APPEARANCE: alert, no distress and obese     EYES: EOMI, PERRL     HENT: ear canals and TM's normal and nose and mouth without ulcers or lesions     NECK: no adenopathy, no asymmetry, masses, or scars and thyroid normal to palpation     RESP: lungs clear to " auscultation - no rales, rhonchi or wheezes     CV: regular rates and rhythm, normal S1 S2, no S3 or S4 and no murmur, click or rub     ABDOMEN:  soft, nontender, no HSM or masses and bowel sounds normal     MS: deferred to ortho, no peripheral edema     SKIN: no suspicious lesions or rashes     NEURO: Normal strength and tone, sensory exam grossly normal, mentation intact and speech normal     PSYCH: mentation appears normal. and affect normal/bright     LYMPHATICS: No cervical adenopathy    Recent Labs   Lab Test 08/25/20  0942 08/12/20  1102 08/12/20  0847  06/03/20  1757 03/13/20  1406  09/18/19  1201  01/08/19  1121   HGB  --   --   --   --  12.7 11.9   < > 12.6   < > 12.3   PLT  --   --   --   --  310 287  --  294   < > 261   INR  --   --   --   --   --   --   --  0.97  --   --      --  140   < >  --   --    < > 139   < > 140   POTASSIUM 4.0  --  3.9   < >  --   --    < > 3.9   < > 3.9   CR 0.73  --  0.83   < >  --   --    < > 0.70   < > 0.77   A1C  --  5.7*  --   --   --   --   --   --   --  5.5    < > = values in this interval not displayed.        PRE-OP Diagnostics:  Recent Results (from the past 24 hour(s))   CBC with platelets and differential    Collection Time: 09/08/20  3:06 PM   Result Value Ref Range    WBC 6.5 4.0 - 11.0 10e9/L    RBC Count 4.13 3.8 - 5.2 10e12/L    Hemoglobin 12.5 11.7 - 15.7 g/dL    Hematocrit 38.8 35.0 - 47.0 %    MCV 94 78 - 100 fl    MCH 30.3 26.5 - 33.0 pg    MCHC 32.2 31.5 - 36.5 g/dL    RDW 13.2 10.0 - 15.0 %    Platelet Count 276 150 - 450 10e9/L    % Neutrophils 47.6 %    % Lymphocytes 40.7 %    % Monocytes 7.2 %    % Eosinophils 3.7 %    % Basophils 0.8 %    Absolute Neutrophil 3.1 1.6 - 8.3 10e9/L    Absolute Lymphocytes 2.7 0.8 - 5.3 10e9/L    Absolute Monocytes 0.5 0.0 - 1.3 10e9/L    Absolute Eosinophils 0.2 0.0 - 0.7 10e9/L    Absolute Basophils 0.1 0.0 - 0.2 10e9/L    Diff Method Automated Method      No EKG this visit, completed in the last 90 days.          Assessment & Plan   The proposed surgical procedure is considered INTERMEDIATE risk.    REVISED CARDIAC RISK INDEX  The patient has the following serious cardiovascular risks for perioperative complications:  Congestive Heart Failure (pulmonary edema, PND, s3 franki, CXR with pulmonary congestion, basilar rales) = 1 point    INTERPRETATION: 1 point: Class II (low risk - 0.9% complication rate)       1. Preop general physical exam  Normal hemoglobin and blood counts- ok to proceed with surgery as scheduled   Cardiac clearance with echo and ekg 8/12/2020   - CBC with platelets and differential    2. Chronic pain of right knee      3. Osteoarthritis of right knee, unspecified osteoarthritis type        The patient has the following additional risks and recommendations for perioperative complications:     - Morbid obesity (BMI >40)    CARDIOVASCULAR:   - Cardiology consulted 8/12/2020     MEDICATION INSTRUCTIONS:    CARDIAC Medications:   - ACE/ARB: HOLD due to exceptional risk of hypotension during surgery.     RECOMMENDATION:  APPROVAL GIVEN to proceed with proposed procedure, without further diagnostic evaluation.    No follow-ups on file.    Signed Electronically by: Divine Thompson PA-C    Copy of this evaluation report is provided to requesting physician.    Preop [facility] Preop Guidelines    Revised Cardiac Risk Index    Reviewed chart  Agree with above assessment and plan.   Jojo Hu MD

## 2020-09-09 ENCOUNTER — TELEPHONE (OUTPATIENT)
Dept: FAMILY MEDICINE | Facility: CLINIC | Age: 55
End: 2020-09-09

## 2020-09-09 NOTE — TELEPHONE ENCOUNTER
.Reason for call:  Other   Patient called regarding (reason for call): fax  Additional comments: hospital called to get pt's pre-op information faxed to the facility for surgery tomorrow @ [phone]    Phone number to reach patient:  Home number on file [phone] (home)    Best Time:  anytime    Can we leave a detailed message on this number?  YES    Travel screening: Negative

## 2020-09-10 ASSESSMENT — ASTHMA QUESTIONNAIRES: ACT_TOTALSCORE: 24

## 2020-09-11 ENCOUNTER — ANESTHESIA - HEALTHEAST (OUTPATIENT)
Dept: SURGERY | Facility: CLINIC | Age: 55
End: 2020-09-11

## 2020-09-11 ENCOUNTER — SURGERY - HEALTHEAST (OUTPATIENT)
Dept: SURGERY | Facility: CLINIC | Age: 55
End: 2020-09-11

## 2020-09-11 ENCOUNTER — TRANSFERRED RECORDS (OUTPATIENT)
Dept: HEALTH INFORMATION MANAGEMENT | Facility: CLINIC | Age: 55
End: 2020-09-11

## 2020-09-11 RX ORDER — LOSARTAN POTASSIUM 100 MG/1
100 TABLET ORAL DAILY
Status: CANCELLED | OUTPATIENT
Start: 2020-09-11

## 2020-09-11 RX ORDER — SPIRONOLACTONE 25 MG/1
50 TABLET ORAL DAILY
Qty: 180 TABLET | Refills: 3 | Status: SHIPPED | OUTPATIENT
Start: 2020-09-11 | End: 2021-10-22

## 2020-09-11 ASSESSMENT — MIFFLIN-ST. JEOR: SCORE: 1691.3

## 2020-09-11 NOTE — TELEPHONE ENCOUNTER
losartan (COZAAR) 100 MG tablet   Last Written Prescription Date:  ?  Last Fill Quantity: ?,   # refills: ?  Last Office Visit : 8/12/2020  Future Office visit: 2/17/2021  Routing refill request to provider for review/approval because:  Drug not active on patient's medication list    spironolactone (ALDACTONE) 25 MG tablet      Last Written Prescription Date: 8/21/2019  Last Fill Quantity: 60,   # refills: 11  Last Office Visit : 8/12/2020  Future Office visit:  2/17/2021  Routing refill request to provider for review/approval because:  High Drug Alert    Spironolactone-Regina Barajas RN  Central Triage Red Flags/Med Refills

## 2020-09-19 ENCOUNTER — TRANSFERRED RECORDS (OUTPATIENT)
Dept: HEALTH INFORMATION MANAGEMENT | Facility: CLINIC | Age: 55
End: 2020-09-19

## 2020-09-22 ENCOUNTER — OFFICE VISIT (OUTPATIENT)
Dept: FAMILY MEDICINE | Facility: CLINIC | Age: 55
End: 2020-09-22
Payer: MEDICAID

## 2020-09-22 VITALS
DIASTOLIC BLOOD PRESSURE: 79 MMHG | TEMPERATURE: 98 F | OXYGEN SATURATION: 97 % | SYSTOLIC BLOOD PRESSURE: 120 MMHG | BODY MASS INDEX: 42.47 KG/M2 | WEIGHT: 248.8 LBS | RESPIRATION RATE: 18 BRPM | HEART RATE: 69 BPM | HEIGHT: 64 IN

## 2020-09-22 DIAGNOSIS — Z53.9 NO SHOW: Primary | ICD-10-CM

## 2020-09-22 ASSESSMENT — MIFFLIN-ST. JEOR: SCORE: 1703.55

## 2020-09-24 ENCOUNTER — TRANSFERRED RECORDS (OUTPATIENT)
Dept: HEALTH INFORMATION MANAGEMENT | Facility: CLINIC | Age: 55
End: 2020-09-24

## 2020-09-27 ENCOUNTER — OFFICE VISIT (OUTPATIENT)
Dept: URGENT CARE | Facility: URGENT CARE | Age: 55
End: 2020-09-27
Payer: MEDICAID

## 2020-09-27 VITALS
HEART RATE: 87 BPM | OXYGEN SATURATION: 96 % | BODY MASS INDEX: 42.08 KG/M2 | DIASTOLIC BLOOD PRESSURE: 88 MMHG | TEMPERATURE: 98.5 F | SYSTOLIC BLOOD PRESSURE: 132 MMHG | WEIGHT: 245.13 LBS | RESPIRATION RATE: 16 BRPM

## 2020-09-27 DIAGNOSIS — L03.115 CELLULITIS OF KNEE, RIGHT: Primary | ICD-10-CM

## 2020-09-27 DIAGNOSIS — L76.82 OTHER POSTOPERATIVE COMPLICATION OF SKIN: ICD-10-CM

## 2020-09-27 PROCEDURE — 99214 OFFICE O/P EST MOD 30 MIN: CPT | Performed by: PHYSICIAN ASSISTANT

## 2020-09-27 RX ORDER — DOXYCYCLINE 100 MG/1
100 TABLET ORAL DAILY
Qty: 10 TABLET | Refills: 0 | Status: SHIPPED | OUTPATIENT
Start: 2020-09-27 | End: 2020-10-07

## 2020-09-27 ASSESSMENT — ENCOUNTER SYMPTOMS: COLOR CHANGE: 1

## 2020-09-27 NOTE — PROGRESS NOTES
SUBJECTIVE:   Marleen Mcfadden is a 55 year old female presenting with a chief complaint of   Chief Complaint   Patient presents with     Derm Problem     Had right knee surgery on 9/11, now area has fluid-filled blisters and burning around the incision, has had previous knee surgeries without these same problems               She is an established patient of [facility].  Right RTK on 9/11.  Patient is presenting for blisters that are oozing.  Patient called ortho, but no answer.  No Fever.  States burning sensation x 3 days.  Not taking any pain medication for pain.   Patient states POD#4, 101.4 temp.  None since then.   Next appointment not yet set up but should be 1 month PO.  No hx of MRSA infection. Borderline DM    Review of Systems   Skin: Positive for color change.        Blisters around incision   All other systems reviewed and are negative.      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 Followed regularly by cardiologist Mayi.  Problem list name updated by automated process. Provider to review     Rotator cuff injury 1/17/2017     Sprain of other ligament of left ankle, initial encounter 1/12/2017     Status post left knee replacement 6/21/2018     UTERINE LEIOMYOMA NOS 10/25/2006     Family History    Problem Relation Age of Onset     Hypertension Father         dec     Diabetes Father         dec     Heart Disease Father         dec     Alcohol/Drug Father      Cardiovascular Father      Heart Disease Mother         dec     Alcohol/Drug Mother      Cardiovascular Mother      Heart Disease Daughter         Cardiomyopathy     Cardiovascular Daughter         cardiomyopathy     Colon Cancer Sister      Cardiovascular Son         cardiomyopathy     Diabetes Paternal Grandmother         dec     Hypertension Paternal Grandmother         dec     Cerebrovascular Disease Paternal Grandmother         dec     Cancer Sister         Lupus     Cardiovascular Sister         cardiomyopathy     Heart Disease Sister         heart failure, and kidney failure     Current Outpatient Medications   Medication Sig Dispense Refill     fluticasone (FLONASE) 50 MCG/ACT nasal spray Spray 2 sprays into both nostrils At Bedtime 15.8 mL 4     fluticasone-salmeterol (ADVAIR) 500-50 MCG/DOSE inhaler Inhale 1 puff into the lungs every 12 hours 1 Inhaler 3     furosemide (LASIX) 20 MG tablet Take 2 tablets (40 mg) by mouth daily as needed (as needed for weight gain/edema) 60 tablet 11     loratadine (CLARITIN) 10 MG tablet Take 1 tablet (10 mg) by mouth daily 90 tablet 3     metoprolol succinate ER (TOPROL-XL) 50 MG 24 hr tablet Take 1 tablet (50 mg) by mouth daily 30 tablet 11     omeprazole (PRILOSEC OTC) 20 MG EC tablet Take 1 tablet (20 mg) by mouth daily 90 tablet 1     sacubitril-valsartan (ENTRESTO) 49-51 MG per tablet Take 1 tablet by mouth 2 times daily 180 tablet 1     spironolactone (ALDACTONE) 25 MG tablet Take 2 tablets (50 mg) by mouth daily 180 tablet 3     amitriptyline (ELAVIL) 25 MG tablet TAKE 1 TABLET(25 MG) BY MOUTH AT BEDTIME (Patient not taking: Reported on 9/27/2020) 90 tablet 1     Cholecalciferol (VITAMIN D) 2000 UNIT tablet Take 5,000 Units by mouth as needed Reported on 3/8/2017 100 tablet 12     Collagen 500 MG CAPS         estradiol (VIVELLE-DOT) 0.0375 MG/24HR BIW patch IRISH 1 PATCH ON SKIN TWICE PER WEEK  11     FLAXSEED, LINSEED, PO        progesterone (PROMETRIUM) 100 MG capsule Take 100 mg by mouth Reported on 3/8/2017       sertraline (ZOLOFT) 50 MG tablet TAKE 1 TABLET BY MOUTH EVERY DAY (Patient not taking: Reported on 9/27/2020) 90 tablet 0     zolpidem (AMBIEN) 5 MG tablet Take tablet by mouth 15 minutes prior to sleep, for Sleep Study (Patient not taking: Reported on 9/27/2020) 1 tablet 0     Social History     Tobacco Use     Smoking status: Never Smoker     Smokeless tobacco: Never Used   Substance Use Topics     Alcohol use: Yes     Comment: rare, twice a year, she has a drink little wine 2 days ago       OBJECTIVE  /88 (BP Location: Left arm, Patient Position: Chair, Cuff Size: Adult Large)   Pulse 87   Temp 98.5  F (36.9  C) (Tympanic)   Resp 16   Wt 111.2 kg (245 lb 2 oz)   LMP 10/19/2008 (Approximate)   SpO2 96%   Breastfeeding No   BMI 42.08 kg/m      Physical Exam  Vitals signs and nursing note reviewed.   Constitutional:       Appearance: Normal appearance. She is obese.   Eyes:      Extraocular Movements: Extraocular movements intact.      Conjunctiva/sclera: Conjunctivae normal.   Cardiovascular:      Rate and Rhythm: Normal rate.   Skin:     Capillary Refill: Capillary refill takes less than 2 seconds.      Comments: Right knee:  Post surgical blisters noted.  Some erythema and warmth to most distal portion of incision.  Small amount of clear drainage (from a recently opened blister).   Neurological:      General: No focal deficit present.      Mental Status: She is alert.   Psychiatric:         Mood and Affect: Mood normal.         Labs:  No results found for this or any previous visit (from the past 24 hour(s)).    X-Ray was not done.    ASSESSMENT:      ICD-10-CM    1. Other postoperative complication of skin  L76.82         Medical Decision Making:    Differential Diagnosis:  Cellulitis,  post surgical blisters    Serious Comorbid Conditions:  Adult:  as above    PLAN:    Doxycycline.  Discussed reasons to seek immediate medical attention.  Explained that post surgical blisters were a complication of surgery.      Followup:    If not improving or if condition worsens, follow up with your Primary Care Provider, If not improving or if conditions worsens over the next 12-24 hours, go to the Emergency Department    There are no Patient Instructions on file for this visit.

## 2020-09-27 NOTE — PATIENT INSTRUCTIONS
Patient Education     Cellulitis  Cellulitis is an infection of the deep layers of skin. A break in the skin, such as a cut or scratch, can let bacteria under the skin. If the bacteria get to deep layers of the skin, it can be serious. If not treated, cellulitis can get into the bloodstream and lymph nodes. The infection can then spread throughout the body. This causes serious illness.  Cellulitis causes the affected skin to become red, swollen, warm, and sore. The reddened areas have a visible border. An open sore may leak fluid (pus). You may have a fever, chills, and pain.  Cellulitis is treated with antibiotics taken for 7 to 10 days. An open sore may be cleaned and covered with cool wet gauze. Symptoms should get better 1 to 2 days after treatment is started. Make sure to take all the antibiotics for the full number of days until they are gone. Keep taking the medicine even if your symptoms go away.  Home care  Follow these tips:    Limit the use of the part of your body with cellulitis.     If the infection is on your leg, keep your leg raised while sitting. This will help to reduce swelling.    Take all of the antibiotic medicine exactly as directed until it is gone. Do not miss any doses, especially during the first 7 days. Don t stop taking the medicine when your symptoms get better.    Keep the affected area clean and dry.    Wash your hands with soap and warm water before and after touching your skin. Anyone else who touches your skin should also wash his or her hands. Don't share towels.  Follow-up care  Follow up with your healthcare provider, or as advised. If your infection does not go away on the first antibiotic, your healthcare provider will prescribe a different one.  When to seek medical advice  Call your healthcare provider right away if any of these occur:    Red areas that spread    Swelling or pain that gets worse    Fluid leaking from the skin (pus)    Fever higher of 100.4  F (38.0  C) or  higher after 2 days on antibiotics  Date Last Reviewed: 9/1/2016 2000-2019 [employer]. [address] [place] [address], [place] [address]. All rights reserved. This information is not intended as a substitute for professional medical care. Always follow your healthcare professional's instructions.

## 2020-09-30 ENCOUNTER — TRANSFERRED RECORDS (OUTPATIENT)
Dept: HEALTH INFORMATION MANAGEMENT | Facility: CLINIC | Age: 55
End: 2020-09-30

## 2020-10-08 ENCOUNTER — TRANSFERRED RECORDS (OUTPATIENT)
Dept: HEALTH INFORMATION MANAGEMENT | Facility: CLINIC | Age: 55
End: 2020-10-08

## 2020-10-19 ENCOUNTER — MYC MEDICAL ADVICE (OUTPATIENT)
Dept: CARDIOLOGY | Facility: CLINIC | Age: 55
End: 2020-10-19

## 2020-10-19 ENCOUNTER — MYC MEDICAL ADVICE (OUTPATIENT)
Dept: SLEEP MEDICINE | Facility: CLINIC | Age: 55
End: 2020-10-19

## 2020-10-19 DIAGNOSIS — I42.9 FAMILIAL CARDIOMYOPATHY (H): ICD-10-CM

## 2020-10-20 RX ORDER — METOPROLOL SUCCINATE 50 MG/1
50 TABLET, EXTENDED RELEASE ORAL DAILY
Qty: 90 TABLET | Refills: 3 | Status: SHIPPED | OUTPATIENT
Start: 2020-10-20 | End: 2021-10-17
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
## 2020-12-14 ENCOUNTER — OFFICE VISIT (OUTPATIENT)
Dept: OTOLARYNGOLOGY | Facility: CLINIC | Age: 55
End: 2020-12-14
Payer: MEDICAID

## 2020-12-14 VITALS
SYSTOLIC BLOOD PRESSURE: 127 MMHG | RESPIRATION RATE: 16 BRPM | OXYGEN SATURATION: 98 % | HEART RATE: 82 BPM | DIASTOLIC BLOOD PRESSURE: 62 MMHG

## 2020-12-14 DIAGNOSIS — H69.93 DYSFUNCTION OF BOTH EUSTACHIAN TUBES: Primary | ICD-10-CM

## 2020-12-14 DIAGNOSIS — K21.9 GASTROESOPHAGEAL REFLUX DISEASE, UNSPECIFIED WHETHER ESOPHAGITIS PRESENT: ICD-10-CM

## 2020-12-14 DIAGNOSIS — Z98.890 HISTORY OF MYRINGOTOMY: ICD-10-CM

## 2020-12-14 DIAGNOSIS — H93.8X3 SENSATION OF FULLNESS IN BOTH EARS: ICD-10-CM

## 2020-12-14 PROCEDURE — 99214 OFFICE O/P EST MOD 30 MIN: CPT | Performed by: OTOLARYNGOLOGY

## 2020-12-14 RX ORDER — FAMOTIDINE 40 MG/1
40 TABLET, FILM COATED ORAL DAILY
Qty: 90 TABLET | Refills: 3 | Status: SHIPPED | OUTPATIENT
Start: 2020-12-14 | End: 2022-08-03

## 2020-12-14 RX ORDER — OMEPRAZOLE 40 MG/1
40 CAPSULE, DELAYED RELEASE ORAL DAILY
Qty: 90 CAPSULE | Refills: 3 | Status: SHIPPED | OUTPATIENT
Start: 2020-12-14 | End: 2022-08-03

## 2020-12-14 ASSESSMENT — PAIN SCALES - GENERAL: PAINLEVEL: NO PAIN (0)

## 2020-12-14 NOTE — PATIENT INSTRUCTIONS
Scheduling Information  To schedule your CT/MRI scan, please contact [facility] at [phone] OR [facility] at [phone]    To schedule your Surgery, please contact our Specialty Schedulers at [phone]      ENT Clinic Locations Clinic Hours Telephone Number     [facility]  [address] [place] [address]   Monday:           1:00pm -- 5:00pm    Friday:              8:00am - 12:00pm   To schedule/reschedule an appointment with   Dr. Cam,   please contact our   Specialty Scheduling Department at:     [phone]       [facility]  [address] [address] Tuesday:          8:00am -- 2:00pm         Urgent Care Locations Clinic Hours Telephone Numbers     [facility]  [address] [place] [address]     Monday-Friday:     11:00am - 9:00pm    Saturday-Sunday:  9:00am - 5:00pm   [phone]     [facility]  [address] [place] [address]     Monday-Friday:      5:00pm - 9:00pm     Saturday-Sunday:  9:00am - 5:00pm   [phone]
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
## 2021-01-04 DIAGNOSIS — I42.9 CHF WITH CARDIOMYOPATHY (H): ICD-10-CM

## 2021-01-04 DIAGNOSIS — I50.9 CHF WITH CARDIOMYOPATHY (H): ICD-10-CM

## 2021-01-07 RX ORDER — FUROSEMIDE 20 MG
40 TABLET ORAL DAILY PRN
Qty: 180 TABLET | Refills: 3 | Status: SHIPPED | OUTPATIENT
Start: 2021-01-07 | End: 2022-01-14

## 2021-01-13 ENCOUNTER — OFFICE VISIT (OUTPATIENT)
Dept: FAMILY MEDICINE | Facility: CLINIC | Age: 56
End: 2021-01-13
Payer: MEDICAID

## 2021-01-13 VITALS
DIASTOLIC BLOOD PRESSURE: 75 MMHG | RESPIRATION RATE: 16 BRPM | HEIGHT: 64 IN | OXYGEN SATURATION: 97 % | TEMPERATURE: 97.5 F | WEIGHT: 248 LBS | SYSTOLIC BLOOD PRESSURE: 120 MMHG | HEART RATE: 68 BPM | BODY MASS INDEX: 42.34 KG/M2

## 2021-01-13 DIAGNOSIS — R15.9 INCONTINENCE OF FECES WITH FECAL URGENCY: Primary | ICD-10-CM

## 2021-01-13 DIAGNOSIS — R15.2 INCONTINENCE OF FECES WITH FECAL URGENCY: Primary | ICD-10-CM

## 2021-01-13 DIAGNOSIS — K21.00 GASTROESOPHAGEAL REFLUX DISEASE WITH ESOPHAGITIS WITHOUT HEMORRHAGE: Chronic | ICD-10-CM

## 2021-01-13 DIAGNOSIS — I10 ESSENTIAL HYPERTENSION: Chronic | ICD-10-CM

## 2021-01-13 PROCEDURE — 99214 OFFICE O/P EST MOD 30 MIN: CPT | Performed by: PHYSICIAN ASSISTANT

## 2021-01-13 ASSESSMENT — MIFFLIN-ST. JEOR: SCORE: 1699.92

## 2021-01-13 NOTE — PROGRESS NOTES
"  Assessment & Plan   Problem List Items Addressed This Visit        Digestive    Gastroesophageal reflux disease with esophagitis (Chronic)       Circulatory    Essential hypertension (Chronic)      Other Visit Diagnoses     Incontinence of feces with fecal urgency    -  Primary    Relevant Orders    Helicobacter pylori Antigen Stool    Enteric Bacteria and Virus Panel by QUIRINO Stool    Ova and Parasite Exam Routine    Pinworm exam    GASTROENTEROLOGY ADULT REF CONSULT ONLY         infection stool test are ordered  Patient with make appointment with GI to have sphincter tested and scope if needed     BP is stable  Continue current medications  Patient declined refill today      25 minutes spent on the date of the encounter doing patient visit and documentation          Return in about 1 week (around 1/20/2021) for GI.    Alix Gardner PA-C  [facility] FLIP Hawley is a 55 year old who presents to clinic today for the following health issues     HPI       Concern - stool leakage  Onset: persistent for 1 month, occasional since last September  Description: every time patient coughs or sneezes she has stool leakage  Intensity: moderate  Progression of Symptoms:  same  Accompanying Signs & Symptoms: none  Previous history of similar problem: no  Precipitating factors:        Worsened by: none  Alleviating factors:        Improved by: none  Therapies tried and outcome:  none     Patient reports that she has normal formed BM three times a day. Denies diarrhea or constipation.  Last week she had an incident of fecal urgency and was not able to keep it in and had an accident of soiling herself.  no anal itching, no rectal bleeding or melena.  Patient  reports sometimes after BM when she wipes she feels \"something \" in the anal are that she though was a hemorrhoid.  Patient also has GERD and she is on Pepcid and Omeprazole, but she only takes Pepcid at bedtime     Review of " "Systems   Constitutional, HEENT, cardiovascular, pulmonary, gi and gu systems are negative, except as otherwise noted.      Objective    /75 (BP Location: Left arm, Patient Position: Sitting, Cuff Size: Adult Large)   Pulse 68   Temp 97.5  F (36.4  C) (Tympanic)   Resp 16   Ht 1.626 m (5' 4\")   Wt 112.5 kg (248 lb)   LMP 10/19/2008 (Approximate)   SpO2 97%   BMI 42.57 kg/m    Body mass index is 42.57 kg/m .     Physical Exam   GENERAL: healthy, alert and no distress  NECK: no adenopathy, no asymmetry, masses, or scars and thyroid normal to palpation  RESP: lungs clear to auscultation - no rales, rhonchi or wheezes  CV: regular rate and rhythm, normal S1 S2, no S3 or S4, no murmur, click or rub, no peripheral edema and peripheral pulses strong  ABDOMEN: soft, nontender, no hepatosplenomegaly, no masses and bowel sounds normal  RECTAL (female): normal sphincter tone, no rectal masses  MS: no gross musculoskeletal defects noted, no edema         "

## 2021-01-13 NOTE — PATIENT INSTRUCTIONS
At [facility], we strive to deliver an exceptional experience to you, every time we see you. If you receive a survey, please complete it as we do value your feedback.  If you have MyChart, you can expect to receive results automatically within 24 hours of their completion.  Your provider will send a note interpreting your results as well.   If you do not have MyChart, you should receive your results in about a week by mail.    Your care team:                            Family Medicine Internal Medicine   MD Devin Mahmood MD Shantel Branch-Fleming, MD Srinivasa Vaka, MD Katya Georgiev PA-C Megan Hill, APRN CNP    Dallin Dickerson, MD Pediatrics   Antwan Julian, PADorisC  Mikayla Barrientos, CNP MD Belle Christianson APRN CNP   MD Jodi Hammer MD Deborah Mielke, MD Irais Monroy, APRN CNP  Divine Sheldon, PADorisC  Cherelle Diez, CNP  MD Marianela Lemus MD Angela Wermerskirchen, MD      Clinic hours: Monday - Thursday 7 am-7 pm; Fridays 7 am-5 pm.   Urgent care: Monday - Friday 11 am-9 pm; Saturday and Sunday 9 am-5 pm.    Clinic: [phone]       [facility]: Monday - Thursday 8 am - 7 pm; Friday 8 am - 6 pm  [facility]: [phone]     Use [email or web address] for 24/7 diagnosis and treatment of dozens of conditions.

## 2021-01-15 ENCOUNTER — HEALTH MAINTENANCE LETTER (OUTPATIENT)
Age: 56
End: 2021-01-15

## 2021-01-21 DIAGNOSIS — R15.9 INCONTINENCE OF FECES WITH FECAL URGENCY: ICD-10-CM

## 2021-01-21 DIAGNOSIS — R15.2 INCONTINENCE OF FECES WITH FECAL URGENCY: ICD-10-CM

## 2021-01-21 LAB
C COLI+JEJUNI+LARI FUSA STL QL NAA+PROBE: NOT DETECTED
E VERMICULARIS SPEC QL PINWORM EXAM: NORMAL
EC STX1 GENE STL QL NAA+PROBE: NOT DETECTED
EC STX2 GENE STL QL NAA+PROBE: NOT DETECTED
ENTERIC PATHOGEN COMMENT: NORMAL
NOROV GI+II ORF1-ORF2 JNC STL QL NAA+PR: NOT DETECTED
RVA NSP5 STL QL NAA+PROBE: NOT DETECTED
SALMONELLA SP RPOD STL QL NAA+PROBE: NOT DETECTED
SHIGELLA SP+EIEC IPAH STL QL NAA+PROBE: NOT DETECTED
SPECIMEN SOURCE: NORMAL
V CHOL+PARA RFBL+TRKH+TNAA STL QL NAA+PR: NOT DETECTED
Y ENTERO RECN STL QL NAA+PROBE: NOT DETECTED

## 2021-01-21 PROCEDURE — 87338 HPYLORI STOOL AG IA: CPT | Performed by: PHYSICIAN ASSISTANT

## 2021-01-21 PROCEDURE — 87172 PINWORM EXAM: CPT | Performed by: PHYSICIAN ASSISTANT

## 2021-01-21 PROCEDURE — 87209 SMEAR COMPLEX STAIN: CPT | Performed by: PHYSICIAN ASSISTANT

## 2021-01-21 PROCEDURE — 87506 IADNA-DNA/RNA PROBE TQ 6-11: CPT | Performed by: PHYSICIAN ASSISTANT

## 2021-01-21 PROCEDURE — 87177 OVA AND PARASITES SMEARS: CPT | Performed by: PHYSICIAN ASSISTANT

## 2021-01-22 LAB
H PYLORI AG STL QL IA: NEGATIVE
O+P STL MICRO: NORMAL
O+P STL MICRO: NORMAL
SPECIMEN SOURCE: NORMAL

## 2021-01-22 NOTE — PATIENT INSTRUCTIONS
Scheduling Information  To schedule your CT/MRI scan, please contact [facility] at [phone] OR [facility] at [phone]    To schedule your Surgery, please contact our Specialty Schedulers at [phone]      ENT Clinic Locations Clinic Hours Telephone Number     [facility]  [address] [place] [address]   Monday:           1:00pm -- 5:00pm    Friday:              8:00am - 12:00pm   To schedule/reschedule an appointment with   Dr. Cam,   please contact our   Specialty Scheduling Department at:     [phone]       [facility]  [address] [address] Tuesday:          8:00am -- 2:00pm         Urgent Care Locations Clinic Hours Telephone Numbers     [facility]  [address] [place] [address]     Monday-Friday:     11:00am - 9:00pm    Saturday-Sunday:  9:00am - 5:00pm   [phone]     [facility]  [address] [place] [address]     Monday-Friday:      5:00pm - 9:00pm     Saturday-Sunday:  9:00am - 5:00pm   [phone]
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
## 2021-01-25 ENCOUNTER — OFFICE VISIT (OUTPATIENT)
Dept: OTOLARYNGOLOGY | Facility: CLINIC | Age: 56
End: 2021-01-25
Payer: MEDICAID

## 2021-01-25 VITALS
RESPIRATION RATE: 17 BRPM | SYSTOLIC BLOOD PRESSURE: 120 MMHG | DIASTOLIC BLOOD PRESSURE: 67 MMHG | HEIGHT: 64 IN | OXYGEN SATURATION: 97 % | WEIGHT: 248 LBS | BODY MASS INDEX: 42.34 KG/M2

## 2021-01-25 DIAGNOSIS — H69.93 DYSFUNCTION OF BOTH EUSTACHIAN TUBES: Primary | ICD-10-CM

## 2021-01-25 DIAGNOSIS — K21.9 GASTROESOPHAGEAL REFLUX DISEASE, UNSPECIFIED WHETHER ESOPHAGITIS PRESENT: ICD-10-CM

## 2021-01-25 DIAGNOSIS — H65.21 RIGHT CHRONIC SEROUS OTITIS MEDIA: ICD-10-CM

## 2021-01-25 PROCEDURE — 99214 OFFICE O/P EST MOD 30 MIN: CPT | Mod: 25 | Performed by: OTOLARYNGOLOGY

## 2021-01-25 PROCEDURE — 69433 CREATE EARDRUM OPENING: CPT | Mod: RT | Performed by: OTOLARYNGOLOGY

## 2021-01-25 ASSESSMENT — PAIN SCALES - GENERAL: PAINLEVEL: MODERATE PAIN (4)

## 2021-01-25 ASSESSMENT — MIFFLIN-ST. JEOR: SCORE: 1699.92
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
## 2021-02-06 ENCOUNTER — ANCILLARY PROCEDURE (OUTPATIENT)
Dept: MAMMOGRAPHY | Facility: CLINIC | Age: 56
End: 2021-02-06
Payer: MEDICAID

## 2021-02-06 DIAGNOSIS — Z12.31 VISIT FOR SCREENING MAMMOGRAM: ICD-10-CM

## 2021-02-06 PROCEDURE — 77067 SCR MAMMO BI INCL CAD: CPT | Mod: TC | Performed by: RADIOLOGY

## 2021-02-06 PROCEDURE — 77063 BREAST TOMOSYNTHESIS BI: CPT | Mod: TC | Performed by: RADIOLOGY

## 2021-02-09 ENCOUNTER — ANCILLARY PROCEDURE (OUTPATIENT)
Dept: CT IMAGING | Facility: CLINIC | Age: 56
End: 2021-02-09
Attending: OTOLARYNGOLOGY
Payer: MEDICAID

## 2021-02-09 ENCOUNTER — VIRTUAL VISIT (OUTPATIENT)
Dept: GASTROENTEROLOGY | Facility: CLINIC | Age: 56
End: 2021-02-09
Attending: PHYSICIAN ASSISTANT
Payer: MEDICAID

## 2021-02-09 DIAGNOSIS — H69.93 DYSFUNCTION OF BOTH EUSTACHIAN TUBES: ICD-10-CM

## 2021-02-09 DIAGNOSIS — R15.2 INCONTINENCE OF FECES WITH FECAL URGENCY: ICD-10-CM

## 2021-02-09 DIAGNOSIS — R15.9 INCONTINENCE OF FECES WITH FECAL URGENCY: ICD-10-CM

## 2021-02-09 DIAGNOSIS — H65.21 RIGHT CHRONIC SEROUS OTITIS MEDIA: ICD-10-CM

## 2021-02-09 DIAGNOSIS — Z80.0 FAMILY HISTORY OF COLON CANCER: Primary | ICD-10-CM

## 2021-02-09 PROCEDURE — 99204 OFFICE O/P NEW MOD 45 MIN: CPT | Mod: GT | Performed by: PHYSICIAN ASSISTANT

## 2021-02-09 PROCEDURE — 70486 CT MAXILLOFACIAL W/O DYE: CPT | Performed by: RADIOLOGY

## 2021-02-09 PROCEDURE — 70480 CT ORBIT/EAR/FOSSA W/O DYE: CPT | Performed by: RADIOLOGY

## 2021-02-09 NOTE — PROGRESS NOTES
Marleen Mcfadden is a 55 year old who is being evaluated via a billable video visit.      How would you like to obtain your AVS? MyChart  If the video visit is dropped, the invitation should be resent by: Text to cell phone: [phone]  Will anyone else be joining your video visit? No     Marry Jackson LPN on 2/9/21 at 8:39 AM        Video Start Time: 9:18 AM        Dinorah Hawley is a 55 year old who presents via video visit today for concerns with fecal incontinence.     She started having fecal incontinence last summer about 8 months ago now. She mostly notes this when she coughs or sneezes. There is no need to defecate when this occurs. She will later note stool near perianal area with wiping. She denies any diarrhea or constipation. She has 2-3 well formed BM's a day. There is no straining. No rectal bleeding. She eats well and has a good amount of fiber in her diet.     She did feel a small bump previously with wiping which she thought could be a hemorrhoid. However during a recent office visit with PCP no external hemorrhoid was found.     She does have history of urinary incontinence which was treated with a bladder sling. She has gained weight over the past 3 years and wonders if this could play a role. She is working on weight loss and notes her current weight to be ~242 lbs.     She had a normal colonoscopy in 2018. At that time advised for repeat in 5 years.     She does have a family hx of colon cancer in her sister which was diagnosed in the last 1-2 years. Her sister does have hx of kidney transplant and is on immunosuppressant (anti rejection) medication.         Objective           Vitals:  No vitals were obtained today due to virtual visit.    Physical Exam   GENERAL: Healthy, alert and no distress  EYES: Eyes grossly normal to inspection.  No discharge or erythema, or obvious scleral/conjunctival abnormalities.  RESP: No audible wheeze, cough, or visible cyanosis.  No visible  retractions or increased work of breathing.    SKIN: Visible skin clear. No significant rash, abnormal pigmentation or lesions.  NEURO: Cranial nerves grossly intact.  Mentation and speech appropriate for age.  PSYCH: Mentation appears normal, affect normal/bright, judgement and insight intact, normal speech and appearance well-groomed.    Assessment & Plan     Incontinence of feces with fecal urgency  Family history of colon cancer    Orders Placed This Encounter   Procedures     GASTROENTEROLOGY ADULT REF PROCEDURE ONLY       Marleen Mcfadden is a pleasant 55 year old female who presents for concerns with fecal incontinence onset several months ago. Mainly occurs with coughing or sneezing which suggests a weakness in her anal sphincter. She does however feel a small bump with wiping. Although nothing was found on rectal exam with PCP, this could possibly represent a protruding internal hemorrhoid or polyp. Recommended colonoscopy for further evaluation. Should also pursue colonoscopy given her recent family hx of colon cancer.     If colonoscopy is unremarkable, will refer to pelvic floor center for further testing such as anorectal manometry.       50 minutes spent on the date of the encounter doing chart review, history and exam, documentation and further activities as noted above      Alex Sanchez PA-C  [facility]    Video-Visit Details    Type of service:  Video Visit    Video End Time:9:38 AM    Originating Location (pt. Location): Home    Distant Location (provider location):  [facility]     Platform used for Video Visit: Jess

## 2021-02-09 NOTE — PATIENT INSTRUCTIONS
Please schedule your colonoscopy. If you have not heard from the scheduling office within 2 business days, please call [phone].

## 2021-02-10 DIAGNOSIS — Z11.59 ENCOUNTER FOR SCREENING FOR OTHER VIRAL DISEASES: ICD-10-CM

## 2021-02-10 DIAGNOSIS — I50.22 CHRONIC SYSTOLIC CONGESTIVE HEART FAILURE (H): Primary | Chronic | ICD-10-CM

## 2021-02-11 DIAGNOSIS — Z98.890 HISTORY OF MYRINGOTOMY: ICD-10-CM

## 2021-02-11 DIAGNOSIS — H92.11 CHRONIC OTORRHEA OF RIGHT EAR: Primary | ICD-10-CM

## 2021-02-11 DIAGNOSIS — Z98.890 HISTORY OF MYRINGOPLASTY: ICD-10-CM

## 2021-02-11 RX ORDER — CIPROFLOXACIN AND DEXAMETHASONE 3; 1 MG/ML; MG/ML
6 SUSPENSION/ DROPS AURICULAR (OTIC) 2 TIMES DAILY
Qty: 7.5 ML | Refills: 3 | Status: SHIPPED | OUTPATIENT
Start: 2021-02-11 | End: 2021-02-21

## 2021-02-11 NOTE — PROGRESS NOTES
Called and spoke with Marleen.  The CT scan of the sinuses is totally clear.  The CT of the temporal bone shows the tube in good position, middle ear and otic capsule structures are normal.  There is still some fluid in the mastoid air cells.  No convincing or large tegmen defect on the RIGHT side.    She reports to me that even now over a week later the RIGHT ear is still draining.    I am going to place her on ciprodex for 10 days and then see her back in follow up.    If still draining, I will try to get a sample for Beta 2 Transferrin assay, and culture the fluid.

## 2021-02-16 RX ORDER — BISACODYL 5 MG/1
15 TABLET, DELAYED RELEASE ORAL SEE ADMIN INSTRUCTIONS
Qty: 3 TABLET | Refills: 0 | Status: SHIPPED | OUTPATIENT
Start: 2021-02-16 | End: 2021-06-17

## 2021-02-16 RX ORDER — SODIUM, POTASSIUM,MAG SULFATES 17.5-3.13G
1 SOLUTION, RECONSTITUTED, ORAL ORAL SEE ADMIN INSTRUCTIONS
Qty: 2 BOTTLE | Refills: 0 | Status: SHIPPED | OUTPATIENT
Start: 2021-02-16 | End: 2021-06-17

## 2021-02-17 ENCOUNTER — OFFICE VISIT (OUTPATIENT)
Dept: CARDIOLOGY | Facility: CLINIC | Age: 56
End: 2021-02-17
Attending: INTERNAL MEDICINE
Payer: MEDICAID

## 2021-02-17 VITALS
WEIGHT: 236.6 LBS | SYSTOLIC BLOOD PRESSURE: 114 MMHG | BODY MASS INDEX: 40.39 KG/M2 | DIASTOLIC BLOOD PRESSURE: 72 MMHG | OXYGEN SATURATION: 98 % | HEART RATE: 57 BPM | HEIGHT: 64 IN

## 2021-02-17 DIAGNOSIS — I50.22 CHRONIC SYSTOLIC CONGESTIVE HEART FAILURE (H): Chronic | ICD-10-CM

## 2021-02-17 DIAGNOSIS — I42.9 FAMILIAL CARDIOMYOPATHY (H): ICD-10-CM

## 2021-02-17 LAB
ANION GAP SERPL CALCULATED.3IONS-SCNC: 5 MMOL/L (ref 3–14)
BUN SERPL-MCNC: 19 MG/DL (ref 7–30)
CALCIUM SERPL-MCNC: 9.1 MG/DL (ref 8.5–10.1)
CHLORIDE SERPL-SCNC: 108 MMOL/L (ref 94–109)
CO2 SERPL-SCNC: 27 MMOL/L (ref 20–32)
CREAT SERPL-MCNC: 0.88 MG/DL (ref 0.52–1.04)
GFR SERPL CREATININE-BSD FRML MDRD: 73 ML/MIN/{1.73_M2}
GLUCOSE SERPL-MCNC: 54 MG/DL (ref 70–99)
POTASSIUM SERPL-SCNC: 4 MMOL/L (ref 3.4–5.3)
SODIUM SERPL-SCNC: 140 MMOL/L (ref 133–144)

## 2021-02-17 PROCEDURE — 80048 BASIC METABOLIC PNL TOTAL CA: CPT | Performed by: PATHOLOGY

## 2021-02-17 PROCEDURE — 99214 OFFICE O/P EST MOD 30 MIN: CPT | Performed by: INTERNAL MEDICINE

## 2021-02-17 PROCEDURE — 36415 COLL VENOUS BLD VENIPUNCTURE: CPT | Performed by: PATHOLOGY

## 2021-02-17 PROCEDURE — G0463 HOSPITAL OUTPT CLINIC VISIT: HCPCS

## 2021-02-17 RX ORDER — LOSARTAN POTASSIUM 100 MG/1
100 TABLET ORAL DAILY
Qty: 90 TABLET | Refills: 1 | Status: SHIPPED | OUTPATIENT
Start: 2021-02-17 | End: 2021-07-19

## 2021-02-17 ASSESSMENT — MIFFLIN-ST. JEOR: SCORE: 1648.21

## 2021-02-17 ASSESSMENT — PAIN SCALES - GENERAL: PAINLEVEL: NO PAIN (0)

## 2021-02-17 NOTE — NURSING NOTE
Chief Complaint   Patient presents with     Follow Up     RTN HF: 55 year old female presents with history of Familial cardiomyopathy, systolic HF, EF 35% for follow up with labs prior      Vitals were taken and medications reconciled.     Michael Deleon CMA  10:58 AM

## 2021-02-17 NOTE — NURSING NOTE
Chief Complaint   Patient presents with     Follow Up     RTN HF: 55 year old female presents with history of Familial cardiomyopathy, systolic HF, EF 35% for follow up with labs prior      Vitals were taken and medications reconciled.     Michael Deleon CMA  10:55 AM

## 2021-02-17 NOTE — LETTER
2/17/2021      RE: Marleen Mcfadden  [address]  [facility] [address]       Dear Colleague,    Thank you for the opportunity to participate in the care of your patient, Malreen Mcfadden, at the [facility] [place] [facility]. Please see a copy of my visit note below.    Heart Failure Clinic Follow up:  February 17, 2021    HPI: 55 year old female with a history of familial cardiomyopathy, left total knee arthroplasty on 10/3/2017 and right knee replacement on 2/20/2019 presents for ongoing evaluation and management.  Pt reports that overall she is feeling ok.    She denies any chest pain or pressure, orthopnea, pnd, palpitations, syncope/presyncope or change in sob/lea.  Pt is requesting to change back to losartan from entresto.       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 Followed regularly by cardiologist Mayi.  Problem list name updated by automated process. Provider to review     Rotator cuff injury 1/17/2017     Sprain of other ligament of left ankle, initial encounter 1/12/2017     Status post left knee replacement 6/21/2018     UTERINE LEIOMYOMA NOS 10/25/2006       Past Surgical History:   Procedure Laterality Date     C  BREAST SURGERY PROCEDURE UNLISTED      Breast Reduction     C  DELIVERY ONLY  10/85,     , Low Cervicalx2     C EXCIS UTERINE FIBROID,ABD APPRCH       C EXCISE EXCESS SKIN TISSUE,ABDOMEN       C LIGATE FALLOPIAN TUBE       C REPAIR CRUCIATE LIGAMENT,KNEE      Right Knee     C TOTAL KNEE ARTHROPLASTY Left 10/03/2017     DAVINCI SACROCOLPOPEXY, MIDURETHRAL SLING, CYSTOSCOPY       HYSTERECTOMY TOTAL ABDOMINAL      for fibroids; reports having blood transfusion after surgery     THYROIDECTOMY  2013    Procedure: THYROIDECTOMY;  LEFT THYROID LOBECTOMY.  (LIGASURE, RECURRENT LARYNGEAL NERVE MONITOR) ;  Surgeon: Uriah Camargo MD;  Location: [facility]       Family History   Problem Relation Age of Onset     Hypertension Father         dec     Diabetes Father         dec     Heart Disease Father         dec     Alcohol/Drug Father      Cardiovascular Father      Heart Disease Mother         dec     Alcohol/Drug Mother      Cardiovascular Mother      Heart Disease Daughter         Cardiomyopathy     Cardiovascular Daughter         cardiomyopathy     Colon Cancer Sister      Cardiovascular Son         cardiomyopathy     Diabetes Paternal Grandmother         dec     Hypertension Paternal Grandmother         dec     Cerebrovascular Disease Paternal Grandmother         dec     Cancer Sister         Lupus     Cardiovascular Sister         cardiomyopathy     Heart Disease Sister         heart failure, and kidney failure       Social History     Tobacco Use     Smoking status: Never Smoker     Smokeless tobacco: Never Used   Substance Use Topics     Alcohol use: Yes     Comment: rare, twice a year, she has a drink little wine 2 days ago         Current Outpatient Medications   Medication Sig     amitriptyline (ELAVIL) 25 MG tablet TAKE 1 TABLET(25 MG) BY MOUTH AT BEDTIME     bisacodyl (DULCOLAX) 5 MG EC tablet Take 3 tablets (15 mg) by mouth See Admin Instructions --Take at 5 PM  day prior to procedure     Cholecalciferol (VITAMIN D) 2000 UNIT tablet Take 5,000 Units by mouth as needed Reported on 3/8/2017     ciprofloxacin-dexamethasone (CIPRODEX) 0.3-0.1 % otic suspension Place 6 drops into the right ear 2 times daily for 10 days     Collagen 500 MG CAPS      estradiol (VIVELLE-DOT) 0.0375 MG/24HR BIW patch IRISH 1 PATCH ON SKIN TWICE PER WEEK     famotidine (PEPCID) 40 MG tablet Take 1 tablet (40 mg) by mouth daily     FLAXSEED, LINSEED, PO      fluticasone (FLONASE) 50 MCG/ACT nasal spray Spray 2 sprays into both nostrils At Bedtime     fluticasone-salmeterol (ADVAIR) 500-50 MCG/DOSE inhaler Inhale 1 puff into the lungs every 12 hours     furosemide (LASIX) 20 MG tablet Take 2 tablets (40 mg) by mouth daily as needed (as needed for weight gain/edema)     loratadine (CLARITIN) 10 MG tablet Take 1 tablet (10 mg) by mouth daily     metoprolol succinate ER (TOPROL-XL) 50 MG 24 hr tablet Take 1 tablet (50 mg) by mouth daily     Na Sulfate-K Sulfate-Mg Sulf (SUPREP BOWEL PREP KIT) solution Take 177 mLs (1 Bottle) by mouth See Admin Instructions -Split dose 2 day Regimen: The evening before your procedure: dilute one bottle with water to a total volume of 16oz (up to the fill line).  At 6pm,  drink the entire amount.  Drink 32 oz of water over the next hour. The morning of your procedure repeat both steps above using the second bottle.  Start 5 hours before your procedure and complete the prep at least 3 hours before you arrive.     omeprazole (PRILOSEC) 40 MG DR capsule Take 1 capsule (40 mg) by mouth daily     polyethylene glycol (GOLYTELY) 236 g suspension Take 4,000 mLs by mouth See Admin Instructions --Drink half gallon (64oz) at 6pm on evening prior to procedure and second half on the morning of procedure (4 hours prior to procedure time)     progesterone (PROMETRIUM) 100 MG capsule Take 100 mg by mouth Reported on 3/8/2017     sacubitril-valsartan (ENTRESTO) 49-51 MG per tablet Take 1  "tablet by mouth 2 times daily     sertraline (ZOLOFT) 50 MG tablet TAKE 1 TABLET BY MOUTH EVERY DAY     Simethicone 125 MG TABS Take 125 mg by mouth See Admin Instructions --Take tablet after finishing second half of Golytely with half a glass of water.     Simethicone 125 MG TABS Take 125 mg by mouth See Admin Instructions --Take tablet after finishing second half of Suprep with half a glass of water.     spironolactone (ALDACTONE) 25 MG tablet Take 2 tablets (50 mg) by mouth daily     zolpidem (AMBIEN) 5 MG tablet Take tablet by mouth 15 minutes prior to sleep, for Sleep Study     No current facility-administered medications for this visit.          ROS:   10 point ROS negative other than what is discussed above    EXAM:   /72 (BP Location: Right arm, Patient Position: Chair, Cuff Size: Adult Large)   Pulse 57   Ht 1.626 m (5' 4\")   Wt 107.3 kg (236 lb 9.6 oz)   LMP 10/19/2008 (Approximate)   SpO2 98%   BMI 40.61 kg/m    GENERAL: Alert, oriented, NAD  HEENT:  NC/AT.  Sclerae white.  MMM,   NECK: No adenopathy. 2+ carotids,   HEART: RRR,+ S1 and S2, no murmurs or gallops.  JVP 6-7 cm without HJR  LUNGS:  Clear to auscultation bilaterally, no wheezes, rales, or rhonchi  ABDOMEN: Soft, obese, nontender, nondistended, bowel sounds present, no hepatomeagly appreciated although exam limited by body habitus  EXTREMITIES: No lower extremity edema.  2+ bilateral peripheral pulses.  PSYCH: Normal affect     LABS  Orders Only on 02/17/2021   Component Date Value Ref Range Status     Sodium 02/17/2021 140  133 - 144 mmol/L Final     Potassium 02/17/2021 4.0  3.4 - 5.3 mmol/L Final     Chloride 02/17/2021 108  94 - 109 mmol/L Final     Carbon Dioxide 02/17/2021 27  20 - 32 mmol/L Final     Anion Gap 02/17/2021 5  3 - 14 mmol/L Final     Glucose 02/17/2021 54* 70 - 99 mg/dL Final     Urea Nitrogen 02/17/2021 19  7 - 30 mg/dL Final     Creatinine 02/17/2021 0.88  0.52 - 1.04 mg/dL Final     GFR Estimate 02/17/2021 73 "  >60 mL/min/[1.73_m2] Final    Comment: Non  GFR Calc  Starting 12/18/2018, serum creatinine based estimated GFR (eGFR) will be   calculated using the Chronic Kidney Disease Epidemiology Collaboration   (CKD-EPI) equation.       GFR Estimate If Black 02/17/2021 85  >60 mL/min/[1.73_m2] Final    Comment:  GFR Calc  Starting 12/18/2018, serum creatinine based estimated GFR (eGFR) will be   calculated using the Chronic Kidney Disease Epidemiology Collaboration   (CKD-EPI) equation.       Calcium 02/17/2021 9.1  8.5 - 10.1 mg/dL Final       CMR Report  01-  Clinical history: 55 yo woman with a familial cardiomyopathy to have repeat CMRI.  Comparison CMR: 10/6/2016.  1.  The global systolic function is moderately decreased and the LVEF is 38%. The RV is moderately dilated while wall thickness is normal. There is moderate global hypokinesis.  2. The RV is the upper limit of normal in cavity size. The global systolic function is mildly decreased and  the RVEF is 52%.   3. Both atria are mildly dilated.  4. There is no significant valvular disease.   5. Late gadolinium enhancement imaging shows no MI, fibrosis or infiltrative disease.   CONCLUSIONS:   The global systolic function is moderately decreased and the LVEF is 38%. The RV is moderately dilated  while wall thickness is normal. There is moderate global hypokinesis.  The RV is the upper limit of normal in cavity size. The global systolic function is mildly decreased and  the RVEF is 52%.   Compared to the prior study of 2016, LV systolic performance is minimally decreased and RV systolic  performance is similar.  Both LV and RV dilation are mildly increased.             Echo 8/12/2020  Interpretation Summary  LVIDd 68mm.   The Ejection Fraction is estimated at 40-45%.  Right ventricular function, chamber size, wall motion, and thickness are normal.  Pulmonary artery systolic pressure cannot be assessed.  The inferior vena cava is  normal.  No pericardial effusion is present.  Mild improvement in LV function since previous study.      Assessment and Plan: 55 year old female with a history of familial cardiomyopathy, left total knee arthroplasty on 10/3/2017 and right knee replacement on 2/20/2019 presents for ongoing evaluation and management.      # Chronic systolic heart failure secondary to familial cardiomyopathy.    Stage B, NYHA Class IIb.  MVO2 reduced on cardiopulmonary stress test in 2020 however patient did not meet anerobic threshold therefore MVO2 not a accurate measurement of cardiac exercise capacity.  VE/VCO2 slope normal and good BP response thus no evidence of significant cardiac limitation to exercise.    Echo Aug 2020 revealed slight improvement in LV function  ACEi/ARB/ARNI yes, due to patient concerns with tolerance have not been able to increase sacubitril-valsartan more than 49/51mg bid.  Per patient request will change entresto back to losartan 100mg daily.  Could consider resuming entresto in future if symptoms/cardiac function worsen   BB yes, continue metoprolol XL 50mg daily  Aldosterone antagonist - continue spironolactone to 50mg daily   SCD prophylaxis: Recent echo confirms LVEF > 40%, thus ICD not indicated at present  % BiV pacing: N/A  Fluid status euvolemic  NSAID use: advised to avoid NSAIDs  Encouraged patient to begin regular aerobic exercise aiming for at least 150 minutes of moderate physical activity or 75 minutes of vigorous physical activity - or an equal combination of both - each week. and follow low-salt, heart healthy diet.      RTC: In August 2021 with labs, echo and EKG .  Will be happy to see sooner if change in clinical status or new questions/concerns arise.    Cassie Kolb MD  Section Head - Advanced Heart Failure, Transplantation and Mechanical Circulatory Support  Director - Adult Congenital and Cardiovascular Genetics Center  Associate Professor of Medicine, [facility]  [place]    I spent 30 minutes in care of the patient today including reviewing today's labs, examination and discussion of testing results and care recommendations with patient and documentation.

## 2021-02-17 NOTE — PROGRESS NOTES
Heart Failure Clinic Follow up:  2021    HPI: 55 year old female with a history of familial cardiomyopathy, left total knee arthroplasty on 10/3/2017 and right knee replacement on 2019 presents for ongoing evaluation and management.  Pt reports that overall she is feeling ok.    She denies any chest pain or pressure, orthopnea, pnd, palpitations, syncope/presyncope or change in sob/lea.  Pt is requesting to change back to losartan from entresto.       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 Followed regularly by cardiologist Mayi.  Problem list name updated by automated process. Provider to review     Rotator cuff injury 2017     Sprain of other ligament of left ankle, initial encounter 2017     Status post left knee replacement 2018     UTERINE LEIOMYOMA NOS 10/25/2006       Past Surgical History:   Procedure Laterality Date     C BREAST SURGERY PROCEDURE UNLISTED      Breast Reduction     C  DELIVERY ONLY  10/85,     , Low Cervicalx2     C EXCIS UTERINE FIBROID,ABD APPRCH       C EXCISE EXCESS SKIN TISSUE,ABDOMEN       C LIGATE FALLOPIAN TUBE       C REPAIR CRUCIATE LIGAMENT,KNEE      Right Knee     C TOTAL KNEE ARTHROPLASTY Left  10/03/2017     DAVINCI SACROCOLPOPEXY, MIDURETHRAL SLING, CYSTOSCOPY       HYSTERECTOMY TOTAL ABDOMINAL  2006    for fibroids; reports having blood transfusion after surgery     THYROIDECTOMY  9/9/2013    Procedure: THYROIDECTOMY;  LEFT THYROID LOBECTOMY.  (LIGASURE, RECURRENT LARYNGEAL NERVE MONITOR) ;  Surgeon: Uriah Camargo MD;  Location: [facility]       Family History   Problem Relation Age of Onset     Hypertension Father         dec     Diabetes Father         dec     Heart Disease Father         dec     Alcohol/Drug Father      Cardiovascular Father      Heart Disease Mother         dec     Alcohol/Drug Mother      Cardiovascular Mother      Heart Disease Daughter         Cardiomyopathy     Cardiovascular Daughter         cardiomyopathy     Colon Cancer Sister      Cardiovascular Son         cardiomyopathy     Diabetes Paternal Grandmother         dec     Hypertension Paternal Grandmother         dec     Cerebrovascular Disease Paternal Grandmother         dec     Cancer Sister         Lupus     Cardiovascular Sister         cardiomyopathy     Heart Disease Sister         heart failure, and kidney failure       Social History     Tobacco Use     Smoking status: Never Smoker     Smokeless tobacco: Never Used   Substance Use Topics     Alcohol use: Yes     Comment: rare, twice a year, she has a drink little wine 2 days ago         Current Outpatient Medications   Medication Sig     amitriptyline (ELAVIL) 25 MG tablet TAKE 1 TABLET(25 MG) BY MOUTH AT BEDTIME     bisacodyl (DULCOLAX) 5 MG EC tablet Take 3 tablets (15 mg) by mouth See Admin Instructions --Take at 5 PM day prior to procedure     Cholecalciferol (VITAMIN D) 2000 UNIT tablet Take 5,000 Units by mouth as needed Reported on 3/8/2017     ciprofloxacin-dexamethasone (CIPRODEX) 0.3-0.1 % otic suspension Place 6 drops into the right ear 2 times daily for 10 days     Collagen 500 MG CAPS      estradiol (VIVELLE-DOT) 0.0375 MG/24HR BIW patch IRISH 1 PATCH  ON SKIN TWICE PER WEEK     famotidine (PEPCID) 40 MG tablet Take 1 tablet (40 mg) by mouth daily     FLAXSEED, LINSEED, PO      fluticasone (FLONASE) 50 MCG/ACT nasal spray Spray 2 sprays into both nostrils At Bedtime     fluticasone-salmeterol (ADVAIR) 500-50 MCG/DOSE inhaler Inhale 1 puff into the lungs every 12 hours     furosemide (LASIX) 20 MG tablet Take 2 tablets (40 mg) by mouth daily as needed (as needed for weight gain/edema)     loratadine (CLARITIN) 10 MG tablet Take 1 tablet (10 mg) by mouth daily     metoprolol succinate ER (TOPROL-XL) 50 MG 24 hr tablet Take 1 tablet (50 mg) by mouth daily     Na Sulfate-K Sulfate-Mg Sulf (SUPREP BOWEL PREP KIT) solution Take 177 mLs (1 Bottle) by mouth See Admin Instructions -Split dose 2 day Regimen: The evening before your procedure: dilute one bottle with water to a total volume of 16oz (up to the fill line).  At 6pm,  drink the entire amount.  Drink 32 oz of water over the next hour. The morning of your procedure repeat both steps above using the second bottle.  Start 5 hours before your procedure and complete the prep at least 3 hours before you arrive.     omeprazole (PRILOSEC) 40 MG DR capsule Take 1 capsule (40 mg) by mouth daily     polyethylene glycol (GOLYTELY) 236 g suspension Take 4,000 mLs by mouth See Admin Instructions --Drink half gallon (64oz) at 6pm on evening prior to procedure and second half on the morning of procedure (4 hours prior to procedure time)     progesterone (PROMETRIUM) 100 MG capsule Take 100 mg by mouth Reported on 3/8/2017     sacubitril-valsartan (ENTRESTO) 49-51 MG per tablet Take 1 tablet by mouth 2 times daily     sertraline (ZOLOFT) 50 MG tablet TAKE 1 TABLET BY MOUTH EVERY DAY     Simethicone 125 MG TABS Take 125 mg by mouth See Admin Instructions --Take tablet after finishing second half of Golytely with half a glass of water.     Simethicone 125 MG TABS Take 125 mg by mouth See Admin Instructions --Take tablet after  "finishing second half of Suprep with half a glass of water.     spironolactone (ALDACTONE) 25 MG tablet Take 2 tablets (50 mg) by mouth daily     zolpidem (AMBIEN) 5 MG tablet Take tablet by mouth 15 minutes prior to sleep, for Sleep Study     No current facility-administered medications for this visit.          ROS:   10 point ROS negative other than what is discussed above    EXAM:   /72 (BP Location: Right arm, Patient Position: Chair, Cuff Size: Adult Large)   Pulse 57   Ht 1.626 m (5' 4\")   Wt 107.3 kg (236 lb 9.6 oz)   LMP 10/19/2008 (Approximate)   SpO2 98%   BMI 40.61 kg/m    GENERAL: Alert, oriented, NAD  HEENT:  NC/AT.  Sclerae white.  MMM,   NECK: No adenopathy. 2+ carotids,   HEART: RRR,+ S1 and S2, no murmurs or gallops.  JVP 6-7 cm without HJR  LUNGS:  Clear to auscultation bilaterally, no wheezes, rales, or rhonchi  ABDOMEN: Soft, obese, nontender, nondistended, bowel sounds present, no hepatomeagly appreciated although exam limited by body habitus  EXTREMITIES: No lower extremity edema.  2+ bilateral peripheral pulses.  PSYCH: Normal affect     LABS  Orders Only on 02/17/2021   Component Date Value Ref Range Status     Sodium 02/17/2021 140  133 - 144 mmol/L Final     Potassium 02/17/2021 4.0  3.4 - 5.3 mmol/L Final     Chloride 02/17/2021 108  94 - 109 mmol/L Final     Carbon Dioxide 02/17/2021 27  20 - 32 mmol/L Final     Anion Gap 02/17/2021 5  3 - 14 mmol/L Final     Glucose 02/17/2021 54* 70 - 99 mg/dL Final     Urea Nitrogen 02/17/2021 19  7 - 30 mg/dL Final     Creatinine 02/17/2021 0.88  0.52 - 1.04 mg/dL Final     GFR Estimate 02/17/2021 73  >60 mL/min/[1.73_m2] Final    Comment: Non  GFR Calc  Starting 12/18/2018, serum creatinine based estimated GFR (eGFR) will be   calculated using the Chronic Kidney Disease Epidemiology Collaboration   (CKD-EPI) equation.       GFR Estimate If Black 02/17/2021 85  >60 mL/min/[1.73_m2] Final    Comment:  GFR " Calc  Starting 12/18/2018, serum creatinine based estimated GFR (eGFR) will be   calculated using the Chronic Kidney Disease Epidemiology Collaboration   (CKD-EPI) equation.       Calcium 02/17/2021 9.1  8.5 - 10.1 mg/dL Final       CMR Report  01-  Clinical history: 55 yo woman with a familial cardiomyopathy to have repeat CMRI.  Comparison CMR: 10/6/2016.  1.  The global systolic function is moderately decreased and the LVEF is 38%. The RV is moderately dilated while wall thickness is normal. There is moderate global hypokinesis.  2. The RV is the upper limit of normal in cavity size. The global systolic function is mildly decreased and  the RVEF is 52%.   3. Both atria are mildly dilated.  4. There is no significant valvular disease.   5. Late gadolinium enhancement imaging shows no MI, fibrosis or infiltrative disease.   CONCLUSIONS:   The global systolic function is moderately decreased and the LVEF is 38%. The RV is moderately dilated  while wall thickness is normal. There is moderate global hypokinesis.  The RV is the upper limit of normal in cavity size. The global systolic function is mildly decreased and  the RVEF is 52%.   Compared to the prior study of 2016, LV systolic performance is minimally decreased and RV systolic  performance is similar.  Both LV and RV dilation are mildly increased.             Echo 8/12/2020  Interpretation Summary  LVIDd 68mm.   The Ejection Fraction is estimated at 40-45%.  Right ventricular function, chamber size, wall motion, and thickness are normal.  Pulmonary artery systolic pressure cannot be assessed.  The inferior vena cava is normal.  No pericardial effusion is present.  Mild improvement in LV function since previous study.      Assessment and Plan: 55 year old female with a history of familial cardiomyopathy, left total knee arthroplasty on 10/3/2017 and right knee replacement on 2/20/2019 presents for ongoing evaluation and management.      # Chronic systolic  heart failure secondary to familial cardiomyopathy.    Stage B, NYHA Class IIb.  MVO2 reduced on cardiopulmonary stress test in 2020 however patient did not meet anerobic threshold therefore MVO2 not a accurate measurement of cardiac exercise capacity.  VE/VCO2 slope normal and good BP response thus no evidence of significant cardiac limitation to exercise.    Echo Aug 2020 revealed slight improvement in LV function  ACEi/ARB/ARNI yes, due to patient concerns with tolerance have not been able to increase sacubitril-valsartan more than 49/51mg bid.  Per patient request will change entresto back to losartan 100mg daily.  Could consider resuming entresto in future if symptoms/cardiac function worsen   BB yes, continue metoprolol XL 50mg daily  Aldosterone antagonist - continue spironolactone to 50mg daily   SCD prophylaxis: Recent echo confirms LVEF > 40%, thus ICD not indicated at present  % BiV pacing: N/A  Fluid status euvolemic  NSAID use: advised to avoid NSAIDs  Encouraged patient to begin regular aerobic exercise aiming for at least 150 minutes of moderate physical activity or 75 minutes of vigorous physical activity - or an equal combination of both - each week. and follow low-salt, heart healthy diet.      RTC: In August 2021 with labs, echo and EKG .  Will be happy to see sooner if change in clinical status or new questions/concerns arise.    Cassie Kolb MD  Section Head - Advanced Heart Failure, Transplantation and Mechanical Circulatory Support  Director - Adult Congenital and Cardiovascular Genetics Center  Associate Professor of Medicine, [employer] [facility]    I spent 30 minutes in care of the patient today including reviewing today's labs, examination and discussion of testing results and care recommendations with patient and documentation.

## 2021-02-17 NOTE — NURSING NOTE
Diet: Patient instructed regarding a heart failure healthy diet, including discussion of reduced fat and 2000 mg daily sodium restriction, daily weights, medication purpose and compliance, fluid restrictions and resources for patient and family to access for assistance with heart failure management.       Labs: Patient was given results of the laboratory testing obtained today and patient was instructed about when to return for the next laboratory testing.     Med Reconcile: Reviewed and verified all current medications with the patient. The updated medication list was printed and given to the patient. STOP entresto. START losartan 100mg daily.    Return Appointment: Patient given instructions regarding scheduling next clinic visit. RTC in August 2021 with labs, ekg and echo prior to Dr. Kolb.    Patient stated she understood all health information given and agreed to call with further questions or concerns.     Gwen Swenson RN

## 2021-02-17 NOTE — PATIENT INSTRUCTIONS
Cardiology Providers you saw during your visit:  Dr. Kolb     Medication changes:  1-  STOP entresto.  2 - START losartan 100mg once daily.     Follow up:  1- Return to clinic in August 2021 with labs, echo and EKG prior to seeing Dr. Kolb     Results for BALTAZAR OREILLY (MRN [id number]) as of 2/17/2021 11:17   Ref. Range 2/17/2021 10:24   Sodium Latest Ref Range: 133 - 144 mmol/L 140   Potassium Latest Ref Range: 3.4 - 5.3 mmol/L 4.0   Chloride Latest Ref Range: 94 - 109 mmol/L 108   Carbon Dioxide Latest Ref Range: 20 - 32 mmol/L 27   Urea Nitrogen Latest Ref Range: 7 - 30 mg/dL 19   Creatinine Latest Ref Range: 0.52 - 1.04 mg/dL 0.88   GFR Estimate Latest Ref Range: >60 mL/min/1.73_m2 73   GFR Estimate If Black Latest Ref Range: >60 mL/min/1.73_m2 85   Calcium Latest Ref Range: 8.5 - 10.1 mg/dL 9.1   Anion Gap Latest Ref Range: 3 - 14 mmol/L 5   Glucose Latest Ref Range: 70 - 99 mg/dL 54 (L)       Please call if you have:  1. Weight gain of more than 2 pounds in a day or 5 pounds in a week  2. Increased shortness of breath, swelling or bloating  3. Dizziness, lightheadedness   4. Any questions or concerns.      Follow the American Heart Association Diet and Lifestyle recommendations:  Limit saturated fat, trans fat, sodium, red meat, sweets and sugar-sweetened beverages. If you choose to eat red meat, compare labels and select the leanest cuts available.  Aim for at least 150 minutes of moderate physical activity or 75 minutes of vigorous physical activity - or an equal combination of both - each week.     During business hours: [phone], press option # 1 to be routed to the [facility] then option # 4 to send a message to your care team     After hours, weekends or holidays: On Call Cardiologist- [phone]   option #4 and ask to speak to the on-call Cardiologist. Inform them you are a CORE/heart failure patient at the [facility].     Gwen Swenson RN BSN  Cardiology Nurse Care Coordinator     Keep  up the good work!     Take Care!

## 2021-02-19 DIAGNOSIS — Z11.59 ENCOUNTER FOR SCREENING FOR OTHER VIRAL DISEASES: ICD-10-CM

## 2021-02-19 LAB
SARS-COV-2 RNA RESP QL NAA+PROBE: NORMAL
SPECIMEN SOURCE: NORMAL

## 2021-02-19 PROCEDURE — U0005 INFEC AGEN DETEC AMPLI PROBE: HCPCS | Performed by: INTERNAL MEDICINE

## 2021-02-19 PROCEDURE — U0003 INFECTIOUS AGENT DETECTION BY NUCLEIC ACID (DNA OR RNA); SEVERE ACUTE RESPIRATORY SYNDROME CORONAVIRUS 2 (SARS-COV-2) (CORONAVIRUS DISEASE [COVID-19]), AMPLIFIED PROBE TECHNIQUE, MAKING USE OF HIGH THROUGHPUT TECHNOLOGIES AS DESCRIBED BY CMS-2020-01-R: HCPCS | Performed by: INTERNAL MEDICINE

## 2021-02-20 LAB
LABORATORY COMMENT REPORT: NORMAL
SARS-COV-2 RNA RESP QL NAA+PROBE: NEGATIVE
SPECIMEN SOURCE: NORMAL

## 2021-02-23 ENCOUNTER — HOSPITAL ENCOUNTER (OUTPATIENT)
Facility: AMBULATORY SURGERY CENTER | Age: 56
Discharge: HOME | End: 2021-02-23
Attending: INTERNAL MEDICINE | Admitting: INTERNAL MEDICINE
Payer: MEDICAID

## 2021-02-23 VITALS
RESPIRATION RATE: 16 BRPM | TEMPERATURE: 96.9 F | SYSTOLIC BLOOD PRESSURE: 138 MMHG | HEART RATE: 53 BPM | DIASTOLIC BLOOD PRESSURE: 81 MMHG | OXYGEN SATURATION: 100 %

## 2021-02-23 DIAGNOSIS — Z12.11 SCREEN FOR COLON CANCER: Primary | ICD-10-CM

## 2021-02-23 LAB — COLONOSCOPY: NORMAL

## 2021-02-23 PROCEDURE — G8918 PT W/O PREOP ORDER IV AB PRO: HCPCS

## 2021-02-23 PROCEDURE — 45378 DIAGNOSTIC COLONOSCOPY: CPT

## 2021-02-23 PROCEDURE — G8907 PT DOC NO EVENTS ON DISCHARG: HCPCS

## 2021-02-23 RX ORDER — NALOXONE HYDROCHLORIDE 0.4 MG/ML
0.2 INJECTION, SOLUTION INTRAMUSCULAR; INTRAVENOUS; SUBCUTANEOUS
Status: DISCONTINUED | OUTPATIENT
Start: 2021-02-23 | End: 2021-02-24 | Stop reason: HOSPADM

## 2021-02-23 RX ORDER — PROCHLORPERAZINE MALEATE 10 MG
10 TABLET ORAL EVERY 6 HOURS PRN
Status: DISCONTINUED | OUTPATIENT
Start: 2021-02-23 | End: 2021-02-24 | Stop reason: HOSPADM

## 2021-02-23 RX ORDER — ONDANSETRON 4 MG/1
4 TABLET, ORALLY DISINTEGRATING ORAL EVERY 6 HOURS PRN
Status: DISCONTINUED | OUTPATIENT
Start: 2021-02-23 | End: 2021-02-24 | Stop reason: HOSPADM

## 2021-02-23 RX ORDER — FLUMAZENIL 0.1 MG/ML
0.2 INJECTION, SOLUTION INTRAVENOUS
Status: DISCONTINUED | OUTPATIENT
Start: 2021-02-23 | End: 2021-02-24 | Stop reason: HOSPADM

## 2021-02-23 RX ORDER — DIPHENHYDRAMINE HYDROCHLORIDE 50 MG/ML
INJECTION INTRAMUSCULAR; INTRAVENOUS PRN
Status: DISCONTINUED | OUTPATIENT
Start: 2021-02-23 | End: 2021-02-23 | Stop reason: HOSPADM

## 2021-02-23 RX ORDER — ONDANSETRON 2 MG/ML
4 INJECTION INTRAMUSCULAR; INTRAVENOUS
Status: DISCONTINUED | OUTPATIENT
Start: 2021-02-23 | End: 2021-02-24 | Stop reason: HOSPADM

## 2021-02-23 RX ORDER — ONDANSETRON 2 MG/ML
4 INJECTION INTRAMUSCULAR; INTRAVENOUS EVERY 6 HOURS PRN
Status: DISCONTINUED | OUTPATIENT
Start: 2021-02-23 | End: 2021-02-24 | Stop reason: HOSPADM

## 2021-02-23 RX ORDER — LIDOCAINE 40 MG/G
CREAM TOPICAL
Status: DISCONTINUED | OUTPATIENT
Start: 2021-02-23 | End: 2021-02-24 | Stop reason: HOSPADM

## 2021-02-23 RX ORDER — NALOXONE HYDROCHLORIDE 0.4 MG/ML
0.4 INJECTION, SOLUTION INTRAMUSCULAR; INTRAVENOUS; SUBCUTANEOUS
Status: DISCONTINUED | OUTPATIENT
Start: 2021-02-23 | End: 2021-02-24 | Stop reason: HOSPADM

## 2021-02-23 RX ORDER — FENTANYL CITRATE 50 UG/ML
INJECTION, SOLUTION INTRAMUSCULAR; INTRAVENOUS PRN
Status: DISCONTINUED | OUTPATIENT
Start: 2021-02-23 | End: 2021-02-23 | Stop reason: HOSPADM

## 2021-02-25 ENCOUNTER — TELEPHONE (OUTPATIENT)
Dept: GASTROENTEROLOGY | Facility: CLINIC | Age: 56
End: 2021-02-25

## 2021-02-25 NOTE — TELEPHONE ENCOUNTER
Please let the patient know that I have reviewed her colonoscopy. Hemorrhoids were noted but otherwise unremarkable colonoscopy. I would like to refer her to the pelvic floor center for the fecal incontinence.     Alex Sanchez PA-C  Gastroenterology  [facility]

## 2021-02-26 NOTE — TELEPHONE ENCOUNTER
"LPN returned call to patient and informed her of colonoscopy results and recommendations. Patient stated \"I went to [facility] for physical therapy in [place] for urinary symptoms. Can I go back there for this?\" LPN informed her that a message would be sent to Alex Sanchez PA-C to advise on this and place a referral order if in agreement. Patient verbalized understanding and had no further questions.    Marry Jackson LPN    "

## 2021-02-26 NOTE — TELEPHONE ENCOUNTER
LPN left message for patient requesting a return call to discuss message below from provider.     Marry Jackson LPN

## 2021-02-26 NOTE — TELEPHONE ENCOUNTER
Recommend starting with pelvic floor center as they have specific testing of the pelvic floor. Thereafter if physical therapy is recommended she could go to [facility] for physical therapy.     Alex Sanchez PA-C  Gastroenterology  [facility]

## 2021-02-26 NOTE — TELEPHONE ENCOUNTER
Patient contacted, agrees to provider advice.  Advised that referral will be sent to [facility] Floor Center.      Charo Garcia RN

## 2021-03-01 ENCOUNTER — TELEPHONE (OUTPATIENT)
Dept: OTOLARYNGOLOGY | Facility: CLINIC | Age: 56
End: 2021-03-01

## 2021-03-01 NOTE — TELEPHONE ENCOUNTER
Reason for Call:  Other call back    Detailed comments: pt has appt on 3-15.  Pt would like to get in sooner.  Can we accommodate this request.  Call and discuss.  Caller informed that calls received after 3pm may not be returned same day.      Phone Number Patient can be reached at: Home number on file [phone] (home)    Best Time: any    Can we leave a detailed message on this number? YES    Call taken on 3/1/2021 at 3:08 PM by Clarita Elizalde
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
## 2021-03-08 ENCOUNTER — OFFICE VISIT (OUTPATIENT)
Dept: OTOLARYNGOLOGY | Facility: CLINIC | Age: 56
End: 2021-03-08
Payer: MEDICAID

## 2021-03-08 VITALS
HEIGHT: 64 IN | OXYGEN SATURATION: 97 % | WEIGHT: 242 LBS | HEART RATE: 69 BPM | SYSTOLIC BLOOD PRESSURE: 121 MMHG | BODY MASS INDEX: 41.32 KG/M2 | DIASTOLIC BLOOD PRESSURE: 78 MMHG | RESPIRATION RATE: 16 BRPM

## 2021-03-08 DIAGNOSIS — H92.11 CHRONIC OTORRHEA OF RIGHT EAR: Primary | ICD-10-CM

## 2021-03-08 PROCEDURE — 99213 OFFICE O/P EST LOW 20 MIN: CPT | Performed by: OTOLARYNGOLOGY

## 2021-03-08 ASSESSMENT — MIFFLIN-ST. JEOR: SCORE: 1672.7

## 2021-03-08 ASSESSMENT — PAIN SCALES - GENERAL: PAINLEVEL: NO PAIN (1)
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
## 2021-03-11 ENCOUNTER — TELEPHONE (OUTPATIENT)
Dept: OTOLARYNGOLOGY | Facility: CLINIC | Age: 56
End: 2021-03-11

## 2021-03-11 NOTE — TELEPHONE ENCOUNTER
Reason for call:  Other   Patient called regarding (reason for call): call back  Additional comments: patient ear is draining and she would like a call to advise  When she can come back in    Phone number to reach patient:  Home number on file [phone] (home)    Best Time:  any    Can we leave a detailed message on this number?  YES    Travel screening: Not Applicable

## 2021-03-11 NOTE — TELEPHONE ENCOUNTER
Called to and she will come in at 845 on Monday.    Yasmeen OLSEN RN Specialty Triage 3/11/2021 1:18 PM

## 2021-03-15 ENCOUNTER — OFFICE VISIT (OUTPATIENT)
Dept: OTOLARYNGOLOGY | Facility: CLINIC | Age: 56
End: 2021-03-15
Payer: MEDICAID

## 2021-03-15 VITALS
DIASTOLIC BLOOD PRESSURE: 82 MMHG | BODY MASS INDEX: 41.18 KG/M2 | SYSTOLIC BLOOD PRESSURE: 124 MMHG | HEART RATE: 83 BPM | OXYGEN SATURATION: 98 % | HEIGHT: 64 IN | WEIGHT: 241.2 LBS

## 2021-03-15 DIAGNOSIS — Z98.890 HISTORY OF MYRINGOTOMY: ICD-10-CM

## 2021-03-15 DIAGNOSIS — H92.11 CHRONIC OTORRHEA OF RIGHT EAR: Primary | ICD-10-CM

## 2021-03-15 DIAGNOSIS — Z98.890 HISTORY OF MYRINGOPLASTY: ICD-10-CM

## 2021-03-15 PROCEDURE — 99213 OFFICE O/P EST LOW 20 MIN: CPT | Performed by: OTOLARYNGOLOGY

## 2021-03-15 ASSESSMENT — MIFFLIN-ST. JEOR: SCORE: 1669.08
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
## 2021-03-21 ENCOUNTER — MYC MEDICAL ADVICE (OUTPATIENT)
Dept: GASTROENTEROLOGY | Facility: CLINIC | Age: 56
End: 2021-03-21

## 2021-03-30 ENCOUNTER — IMMUNIZATION (OUTPATIENT)
Dept: NURSING | Facility: CLINIC | Age: 56
End: 2021-03-30
Payer: MEDICAID

## 2021-03-30 PROCEDURE — 0001A PR COVID VAC PFIZER DIL RECON 30 MCG/0.3 ML IM: CPT

## 2021-03-30 PROCEDURE — 91300 PR COVID VAC PFIZER DIL RECON 30 MCG/0.3 ML IM: CPT

## 2021-04-15 ENCOUNTER — TELEPHONE (OUTPATIENT)
Dept: OTOLARYNGOLOGY | Facility: CLINIC | Age: 56
End: 2021-04-15

## 2021-04-15 NOTE — TELEPHONE ENCOUNTER
Reason for Call:  Other appointment    Detailed comments: Patient wants to know if she can get an appointment for today due to right ear drainage and difficulty hearing?     Phone Number Patient can be reached at: Home number on file [phone] (home)    Best Time: any    Can we leave a detailed message on this number? YES    Call taken on 4/15/2021 at 9:15 AM by Val Camargo

## 2021-04-15 NOTE — TELEPHONE ENCOUNTER
Pt will come in tomorrow AM. Put pt in slot that was put on hold for her.    Yasmeen OLSEN RN Specialty Triage 4/15/2021 3:09 PM

## 2021-04-15 NOTE — TELEPHONE ENCOUNTER
Called pt and left vm to return call. Slot tomorrow on hold for her with [facility].    Yasmeen OLSEN RN Specialty Triage 4/15/2021 10:15 AM

## 2021-04-16 ENCOUNTER — OFFICE VISIT (OUTPATIENT)
Dept: OTOLARYNGOLOGY | Facility: CLINIC | Age: 56
End: 2021-04-16
Payer: MEDICARE

## 2021-04-16 VITALS
HEART RATE: 64 BPM | WEIGHT: 241 LBS | DIASTOLIC BLOOD PRESSURE: 84 MMHG | OXYGEN SATURATION: 97 % | BODY MASS INDEX: 41.37 KG/M2 | RESPIRATION RATE: 16 BRPM | SYSTOLIC BLOOD PRESSURE: 131 MMHG

## 2021-04-16 DIAGNOSIS — Z98.890 HISTORY OF MYRINGOPLASTY: ICD-10-CM

## 2021-04-16 DIAGNOSIS — H92.11 CHRONIC OTORRHEA OF RIGHT EAR: Primary | ICD-10-CM

## 2021-04-16 DIAGNOSIS — Z98.890 HISTORY OF MYRINGOTOMY: ICD-10-CM

## 2021-04-16 PROCEDURE — 99213 OFFICE O/P EST LOW 20 MIN: CPT | Performed by: OTOLARYNGOLOGY

## 2021-04-16 PROCEDURE — 86334 IMMUNOFIX E-PHORESIS SERUM: CPT | Performed by: OTOLARYNGOLOGY

## 2021-04-16 PROCEDURE — 87075 CULTR BACTERIA EXCEPT BLOOD: CPT | Performed by: OTOLARYNGOLOGY

## 2021-04-16 PROCEDURE — 87070 CULTURE OTHR SPECIMN AEROBIC: CPT | Performed by: OTOLARYNGOLOGY

## 2021-04-16 PROCEDURE — 86334 IMMUNOFIX E-PHORESIS SERUM: CPT | Performed by: PATHOLOGY

## 2021-04-16 PROCEDURE — 36415 COLL VENOUS BLD VENIPUNCTURE: CPT | Performed by: OTOLARYNGOLOGY

## 2021-04-16 ASSESSMENT — PAIN SCALES - GENERAL: PAINLEVEL: NO PAIN (0)

## 2021-04-16 NOTE — PATIENT INSTRUCTIONS
Scheduling Information  To schedule your CT/MRI scan, please contact [facility] at [phone] OR [facility] at [phone]    To schedule your Surgery, please contact our Specialty Schedulers at [phone]      ENT Clinic Locations Clinic Hours Telephone Number     [facility]  [address] [place] [address]   Monday:           1:00pm -- 5:00pm    Friday:              8:00am - 12:00pm   To schedule/reschedule an appointment with   Dr. Cam,   please contact our   Specialty Scheduling Department at:     [phone]       [facility]  [address] [place] [address] Tuesday:          8:00am -- 2:00pm         Urgent Care Locations Clinic Hours Telephone Numbers     [facility]  [address] [place] [address]     Monday-Friday:     11:00am - 9:00pm    Saturday-Sunday:  9:00am - 5:00pm   [phone]     [facility]  [address] [place] [address]     Monday-Friday:      5:00pm - 9:00pm     Saturday-Sunday:  9:00am - 5:00pm   [phone]
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
## 2021-04-18 LAB
BACTERIA SPEC CULT: NORMAL
BACTERIA SPEC CULT: NORMAL
Lab: NORMAL
SPECIMEN SOURCE: NORMAL

## 2021-04-19 DIAGNOSIS — Q16.5 TEGMEN DEFECT OF BASE OF SKULL: ICD-10-CM

## 2021-04-19 DIAGNOSIS — H92.11 CHRONIC OTORRHEA OF RIGHT EAR: Primary | ICD-10-CM

## 2021-04-19 LAB — B2 TRANSFERRIN FLD QL: NORMAL

## 2021-04-19 RX ORDER — CIPROFLOXACIN 500 MG/1
500 TABLET, FILM COATED ORAL 2 TIMES DAILY
Qty: 42 TABLET | Refills: 1 | Status: SHIPPED | OUTPATIENT
Start: 2021-04-19 | End: 2021-05-10

## 2021-04-20 ENCOUNTER — TELEPHONE (OUTPATIENT)
Dept: OTOLARYNGOLOGY | Facility: CLINIC | Age: 56
End: 2021-04-20

## 2021-04-20 ENCOUNTER — IMMUNIZATION (OUTPATIENT)
Dept: NURSING | Facility: CLINIC | Age: 56
End: 2021-04-20
Attending: FAMILY MEDICINE
Payer: MEDICAID

## 2021-04-20 PROCEDURE — 91300 PR COVID VAC PFIZER DIL RECON 30 MCG/0.3 ML IM: CPT

## 2021-04-20 PROCEDURE — 0002A PR COVID VAC PFIZER DIL RECON 30 MCG/0.3 ML IM: CPT

## 2021-04-20 NOTE — TELEPHONE ENCOUNTER
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

## 2021-04-21 NOTE — TELEPHONE ENCOUNTER
FUTURE VISIT INFORMATION      FUTURE VISIT INFORMATION:    Date: 4/22/2021    Time: 10:30AM    Location: [facility] [place]  REFERRAL INFORMATION:    Referring provider:  Wilson Cam MD    Referring providers clinic:  [facility]     Reason for visit/diagnosis  Tegmen defect of base of skull - Referred by Dr. Cam. CT scans in PACs.    RECORDS REQUESTED FROM:       Clinic name Comments Records Status Imaging Status   [facility]  4/16/2021 note from Wilson Cam MD  6/19/2020 audiogram  EPIC    Imaging 2/9/2021 CT Temporal Bone and CT Sinus  9/18/2019 CT Head EPIC PACS

## 2021-04-22 ENCOUNTER — OFFICE VISIT (OUTPATIENT)
Dept: OTOLARYNGOLOGY | Facility: CLINIC | Age: 56
End: 2021-04-22
Payer: MEDICARE

## 2021-04-22 ENCOUNTER — APPOINTMENT (OUTPATIENT)
Dept: OTOLARYNGOLOGY | Facility: CLINIC | Age: 56
End: 2021-04-22
Payer: MEDICARE

## 2021-04-22 ENCOUNTER — PRE VISIT (OUTPATIENT)
Dept: OTOLARYNGOLOGY | Facility: CLINIC | Age: 56
End: 2021-04-22

## 2021-04-22 VITALS — HEIGHT: 64 IN | WEIGHT: 241 LBS | BODY MASS INDEX: 41.15 KG/M2

## 2021-04-22 DIAGNOSIS — G96.01 CSF OTORRHEA: Primary | ICD-10-CM

## 2021-04-22 DIAGNOSIS — E66.813 CLASS 3 SEVERE OBESITY DUE TO EXCESS CALORIES WITH SERIOUS COMORBIDITY AND BODY MASS INDEX (BMI) OF 40.0 TO 44.9 IN ADULT (H): ICD-10-CM

## 2021-04-22 DIAGNOSIS — I42.8 OTHER CARDIOMYOPATHY (H): ICD-10-CM

## 2021-04-22 DIAGNOSIS — F32.1 MODERATE MAJOR DEPRESSION (H): ICD-10-CM

## 2021-04-22 DIAGNOSIS — E66.01 CLASS 3 SEVERE OBESITY DUE TO EXCESS CALORIES WITH SERIOUS COMORBIDITY AND BODY MASS INDEX (BMI) OF 40.0 TO 44.9 IN ADULT (H): ICD-10-CM

## 2021-04-22 PROCEDURE — 92504 EAR MICROSCOPY EXAMINATION: CPT | Mod: GC | Performed by: OTOLARYNGOLOGY

## 2021-04-22 PROCEDURE — 99214 OFFICE O/P EST MOD 30 MIN: CPT | Mod: 25 | Performed by: OTOLARYNGOLOGY

## 2021-04-22 RX ORDER — CEFAZOLIN SODIUM 2 G/50ML
2 SOLUTION INTRAVENOUS
Status: CANCELLED | OUTPATIENT
Start: 2021-04-22

## 2021-04-22 RX ORDER — CEFAZOLIN SODIUM 2 G/50ML
2 SOLUTION INTRAVENOUS SEE ADMIN INSTRUCTIONS
Status: CANCELLED | OUTPATIENT
Start: 2021-04-22

## 2021-04-22 RX ORDER — DEXAMETHASONE SODIUM PHOSPHATE 10 MG/ML
10 INJECTION, SOLUTION INTRAMUSCULAR; INTRAVENOUS ONCE
Status: CANCELLED | OUTPATIENT
Start: 2021-04-22 | End: 2021-04-22

## 2021-04-22 ASSESSMENT — PAIN SCALES - GENERAL: PAINLEVEL: EXTREME PAIN (8)

## 2021-04-22 ASSESSMENT — MIFFLIN-ST. JEOR: SCORE: 1668.17

## 2021-04-22 NOTE — LETTER
"4/22/2021       RE: Marleen Mcfadden  [address]  [place] [address]     Dear Colleague,    Thank you for referring your patient, Marleen Mcfadden, to the [facility] [place] [facility]. Please see a copy of my visit note below.    [facility] LATERAL SKULLBASE - NEUROTOLOGY CLINIC    Marleen Mcfadden is seen in consultation from Dr. Cam.  She is a 55 year old female being seen for right ear otorrhea with positive beta-2 transferrin consistent with CSF leak.    Pt says that she has had headaches for a very long time. She says that her headaches would continue to worsen throughout the day until something in her head would \"pop\" and she would taste something salty/metallic, and she would get some relief from her headache.    Progress note from Dr. Mateo Cam from was independently reviewed. From this review I learned that Dr. Cam began seeing the patient approximately 3 years prior for right otalgia and aural pressure. As the time of this evaluation a right middle ear effusion was noted. The patient failed to completely resolve this effusion with medical therapy, and she underwent a right myringotomy on 7/3/2020 with recurrence of symptoms. She ultimately underwent right myringotomy with PE tube placement on 1/25/2021. The patient experienced persistent right sided otorrhea following PE tube placement. Middle ear fluid was obtained during a follow up visit on 3/15/2021 which was subsequently found to be Beta 2 transferrin positive.    She says that since the PE tube was placed she has been having constant drainage. She uses a cotton ball for this and says that it has been itchy. However, since the tube was placed, the severe headaches have resolved. She says she has a different kind of headache which is more frontal and not as severe.     She denies any symptoms of meningitis. She does endorse some blurry vision and " diplopia. She says that her glasses prescription was adjusted about 9 months ago and is already out of date.    A culture of the right EAC was positive for normal reynaldo. She says she started an antibiotic yesterday as prescribed by Dr. Cam.    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 Followed regularly by cardiologist Mayi.  Problem list name updated by automated process. Provider to review     Rotator cuff injury 2017     Sprain of other ligament of left ankle, initial encounter 2017     Status post left knee replacement 2018     UTERINE LEIOMYOMA NOS 10/25/2006       Past Surgical History:   Procedure Laterality Date     C BREAST SURGERY PROCEDURE UNLISTED      Breast Reduction     C  DELIVERY ONLY  10/85,     , Low Cervicalx2     C EXCIS UTERINE FIBROID,ABD APPRCH       C EXCISE EXCESS SKIN TISSUE,ABDOMEN       C LIGATE FALLOPIAN TUBE       C REPAIR CRUCIATE LIGAMENT,KNEE      Right Knee     C TOTAL KNEE ARTHROPLASTY Left 10/03/2017     COLONOSCOPY WITH CO2 INSUFFLATION N/A 2021    Procedure: COLONOSCOPY, WITH CO2 INSUFFLATION;  Surgeon: Angela Harrington DO;  Location: [facility] OR     DAVINCI SACROCOLPOPEXY, MIDURETHRAL SLING, CYSTOSCOPY        HYSTERECTOMY TOTAL ABDOMINAL  2006    for fibroids; reports having blood transfusion after surgery     THYROIDECTOMY  9/9/2013    Procedure: THYROIDECTOMY;  LEFT THYROID LOBECTOMY.  (LIGASURE, RECURRENT LARYNGEAL NERVE MONITOR) ;  Surgeon: Uriah Camargo MD;  Location: [facility]       Family History   Problem Relation Age of Onset     Hypertension Father         dec     Diabetes Father         dec     Heart Disease Father         dec     Alcohol/Drug Father      Cardiovascular Father      Heart Disease Mother         dec     Alcohol/Drug Mother      Cardiovascular Mother      Heart Disease Daughter         Cardiomyopathy     Cardiovascular Daughter         cardiomyopathy     Colon Cancer Sister      Cardiovascular Son         cardiomyopathy     Diabetes Paternal Grandmother         dec     Hypertension Paternal Grandmother         dec     Cerebrovascular Disease Paternal Grandmother         dec     Cancer Sister         Lupus     Cardiovascular Sister         cardiomyopathy     Heart Disease Sister         heart failure, and kidney failure       Social History     Tobacco Use     Smoking status: Never Smoker     Smokeless tobacco: Never Used   Substance Use Topics     Alcohol use: Yes     Comment: rare, twice a year, she has a drink little wine 2 days ago     Drug use: No     Physical examination:  Constitutional:  In no acute distress, appears stated age  Eyes:  Extraocular movements intact, no spontaneous nystagmus  Respiratory:  No increased work of breathing, wheezing or stridor  Musculoskeletal:  Good upper extremity strength  Skin:  No rashes on the head and neck  Neurologic:  House Brackman 1/6 bilaterally, ambulating normally  Psychiatric:  Alert, normal affect, answering questions appropriately  Ears:  Auricles are normal bilaterally. Tenderness in the preauricular regions as well as along the temporalis muscles.  Oral cavity: Tenderness with bimanual palpation against the glenoid fossae  bilaterally    Otomicroscopic Exam:  Right Ear: squamous wet debris in canal, suctioned away. The TM is thickened and PE tube is in place. There is no pulsatile otorrhea coming through the PE tube. The PE tube was removed today.  Left Ear: EAC clear, middle ear aerated    Audiograms, imaging, and lab data independently reviewed and interpreted.  Audiogram: Outside audiogram 6/19/2020     Right ear: Normal sloping to severe high frequency SNHL.   Left ear: Normal sloping to moderate high frequency SNHL.   SRT right: 15 dB left: 10 dB   WR right: 100% at 55 dB left: 100% at 50 dB   Acoustic Reflexes: Right ipsi and contra, left ipsi present. Left contra absent.  Tympanograms: type As right, type A left     Imaging:  CT TEMPORAL WO CONTRAST 2/9/2021  Impression:    1. Opacification of the right mastoid air cells and mild debris within  the epitympanum and tympanic cavity are suggestive of otomastoiditis.  2. Thinning and questionable dehiscence of the right tegmen  mastoideum.  3. Normal left temporal bone.    Independently reviewed. There is a right tegmen defect over the tegmen mastoideum but there are some other areas of the tegmen that appear thin, some opacification of the mastoid and middle ear.    Lab Data  Component 6d ago   Beta-2-Transferrin Beta-2-transferrin observed on immunoblotting.  This result suggests CSF is present in the    fluid submitted.    Comment: DUNIA Becker M.D., Ph.D   Pathologist ([phone])    Resulting Agency [facility]         Specimen Collected: 04/16/21  1:28 PM        IMPRESSION AND PLAN:  1.  right tegmen defect with CSF otorrhea  2.  High frequency HL in both ears    Marleen Mcfadden is a 55F with right CSF otorrhea from likely tegmen defect possibly related to idiopathic intracranial hypertension. Her headaches are suspicious for pseudotumor. She does endorse some blurry vision and diplopia. We would like her to see ophthalmology to assess for  papilledema. We discussed that it is hard to test for IH with a lumbar puncture because she already has a leak that is decreasing the pressure. We discussed that she should complete the course of antibiotics prescribed with Dr. Cam, as well as the ear drops he had prescribed. We discussed that keeping her ear dry is important and that she should no longer use a cotton ball and that she should use a vaseline coated cotton ball when showering. We also discussed that, once her TM heals, which hopefully it will, she will likely experience right aural fullness and hearing loss again and her severe headaches may return.    Her facial pain appears to be TMJ-related which we discussed with her.    - MRI  - Ophthalmology evaluation  - Pneumococcal vaccine  - MCF repair    We had a discussion regarding middle fossa approach for repair. Risks include continued CSF leak with need for further surgery, seizures, hearing loss, bleeding and stroke. All risks are rare. Postoperative restrictions were discussed. Need for pneumococcal vaccination emphasized. The main risk is the risk of continued leak if she does have pseudotumor. The potential for  shunt placement was discussed although that would be necessary only if she were to fail repair.    The patient asked insightful questions and demonstrated understanding. She has multiple trips planned and I informed her that she will need to cancel at least one or multiple of them as the leak should be repaired in a timely fashion. The patient is in agreement with this treatment plan. All questions were answered. We will have her see PAC due to her cardiomyopathy. We will also have her talk to Dr. Noe to answer any questions regarding his portion of the procedure.    Thank you for allowing me to participate in the care of your patient.    Tayo Castellanos MD PGY2 ENT Resident    Pt seen with Dr. Julio Cesar AMAYA, Jocelyne Harrell MD, saw this patient with the resident/fellow and agree with  the resident/fellow s findings and plan of care as documented in the resident s/fellow s note. I was present for the entire procedure.    Jocelyne Harrell MD

## 2021-04-22 NOTE — NURSING NOTE
"Chief Complaint   Patient presents with     Consult     Tegmen defect of skull base         Height 1.626 m (5' 4\"), weight 109.3 kg (241 lb), last menstrual period 10/19/2008, not currently breastfeeding.    Cherelle Jett, EMT    "

## 2021-04-22 NOTE — PATIENT INSTRUCTIONS
1. You were seen in the ENT Clinic today by Dr. Harrell.  If you have any questions or concerns after your appointment, please call   - Option 1: ENT Clinic: [phone]   - Option 2: Marlene (Dr. Harrell's Nurse): [phone]    1. Prevnar 13 and Pneumovax 23 vaccinations    2. TMJ dentist    3. Opthalmology appt- pressure in heard/eyes          Surgery Teaching      1.You must have a physical exam (called  history and physical ) within 30 days of surgery. You can do this at the PAC clinic or your family clinic.     2.For same-day surgery, you must arrange for an adult to take you home from the Center. An adult must stay with you for the first 24 hours after surgery. You cannot drive for 24 hours.     3. Ask your doctor what medicines are safe before surgery.     4. Stop drinking alcohol at least 24 hours before surgery.     5. Stop or at least cut down on smoking 24 hours before surgery.    6.Take a bath or shower the night before and the morning of surgery (as told by your surgeon). Use an antiseptic soap. If your doctor does not give you special soap, buy Hibiclens or Darby-Stat at the drug store or ask the pharmacist to suggest a brand.  Do not put on lotion, powder, perfume, deodorant or make-up after bathing.    7. You can eat a normal meal the night before surgery. Do not eat any solid foods or drink any milk products for 8 hours before surgery.     8. You may drink clear liquids until 2 hours before surgery. Clear liquids include water, Gatorade, apple juice and liquids you can read through.    9. NO MOTRIN, IBUPROFEN, ASPIRIN, ALEVE, GARLIC SUPPLEMENTS or FISH OIL x 7 days prior to surgery ( to prevent excess bleeding and bruising at time of surgery)      Marlene Funez LPN  [facility] - Otolaryngology

## 2021-04-22 NOTE — PROGRESS NOTES
[facility]    Marleen Mcfadden is seen in consultation from Dr. Cam.  She is a 55 year old female being seen for right ear otorrhea with positive beta-2 transferrin consistent with CSF leak.    Pt says that she has had headaches for a very long time. She says that her headaches would continue to worsen throughout the day until something in her head would \"pop\" and she would taste something salty/metallic, and she would get some relief from her headache.    Progress note from Dr. Mateo Cam from was independently reviewed. From this review I learned that Dr. Cam began seeing the patient approximately 3 years prior for right otalgia and aural pressure. As the time of this evaluation a right middle ear effusion was noted. The patient failed to completely resolve this effusion with medical therapy, and she underwent a right myringotomy on 7/3/2020 with recurrence of symptoms. She ultimately underwent right myringotomy with PE tube placement on 1/25/2021. The patient experienced persistent right sided otorrhea following PE tube placement. Middle ear fluid was obtained during a follow up visit on 3/15/2021 which was subsequently found to be Beta 2 transferrin positive.    She says that since the PE tube was placed she has been having constant drainage. She uses a cotton ball for this and says that it has been itchy. However, since the tube was placed, the severe headaches have resolved. She says she has a different kind of headache which is more frontal and not as severe.     She denies any symptoms of meningitis. She does endorse some blurry vision and diplopia. She says that her glasses prescription was adjusted about 9 months ago and is already out of date.    A culture of the right EAC was positive for normal reynaldo. She says she started an antibiotic yesterday as prescribed by Dr. Cam.    Past Medical History:   Diagnosis Date     Allergic rhinitis, cause unspecified      Anemia      " Autoimmune disease NEC     Autoimmune disease- unknown/poss SLE     Calcaneal spur 10/21/2014     Calcaneal spur 10/21/2014    Xray 10/17/14      CHF with cardiomyopathy (H)      Familial cardiomyopathy (H) 10/21/2015     Morbid obesity (H) 2015     Muscle spasm 2019     Nontraumatic rupture of quadriceps tendon, left 2018     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     no longer an issue     Other primary cardiomyopathies     Cardiomyopathy- dx'd  idiopathic     PONV (postoperative nausea and vomiting)      Primary cardiomyopathy (H) 2004    Cardiomyopathy- dx'd  famial idiopathic. Followed regularly by cardiologist Mayi.  Problem list name updated by automated process. Provider to review     Rotator cuff injury 2017     Sprain of other ligament of left ankle, initial encounter 2017     Status post left knee replacement 2018     UTERINE LEIOMYOMA NOS 10/25/2006       Past Surgical History:   Procedure Laterality Date     C BREAST SURGERY PROCEDURE UNLISTED      Breast Reduction     C  DELIVERY ONLY  10/85,     , Low Cervicalx2     C EXCIS UTERINE FIBROID,ABD APPRCH       C EXCISE EXCESS SKIN TISSUE,ABDOMEN       C LIGATE FALLOPIAN TUBE       C REPAIR CRUCIATE LIGAMENT,KNEE      Right Knee     C TOTAL KNEE ARTHROPLASTY Left 10/03/2017     COLONOSCOPY WITH CO2 INSUFFLATION N/A 2021    Procedure: COLONOSCOPY, WITH CO2 INSUFFLATION;  Surgeon: Angela Harrington DO;  Location:  OR     DAVINCI SACROCOLPOPEXY, MIDURETHRAL SLING, CYSTOSCOPY       HYSTERECTOMY TOTAL ABDOMINAL      for fibroids; reports having blood transfusion after surgery     THYROIDECTOMY  2013    Procedure: THYROIDECTOMY;  LEFT THYROID LOBECTOMY.  (LIGASURE, RECURRENT LARYNGEAL NERVE MONITOR) ;  Surgeon: Uriah Camargo MD;  Location: [facility]       Family History   Problem Relation Age of Onset      Hypertension Father         dec     Diabetes Father         dec     Heart Disease Father         dec     Alcohol/Drug Father      Cardiovascular Father      Heart Disease Mother         dec     Alcohol/Drug Mother      Cardiovascular Mother      Heart Disease Daughter         Cardiomyopathy     Cardiovascular Daughter         cardiomyopathy     Colon Cancer Sister      Cardiovascular Son         cardiomyopathy     Diabetes Paternal Grandmother         dec     Hypertension Paternal Grandmother         dec     Cerebrovascular Disease Paternal Grandmother         dec     Cancer Sister         Lupus     Cardiovascular Sister         cardiomyopathy     Heart Disease Sister         heart failure, and kidney failure       Social History     Tobacco Use     Smoking status: Never Smoker     Smokeless tobacco: Never Used   Substance Use Topics     Alcohol use: Yes     Comment: rare, twice a year, she has a drink little wine 2 days ago     Drug use: No     Physical examination:  Constitutional:  In no acute distress, appears stated age  Eyes:  Extraocular movements intact, no spontaneous nystagmus  Respiratory:  No increased work of breathing, wheezing or stridor  Musculoskeletal:  Good upper extremity strength  Skin:  No rashes on the head and neck  Neurologic:  House Brackman 1/6 bilaterally, ambulating normally  Psychiatric:  Alert, normal affect, answering questions appropriately  Ears:  Auricles are normal bilaterally. Tenderness in the preauricular regions as well as along the temporalis muscles.  Oral cavity: Tenderness with bimanual palpation against the glenoid fossae bilaterally    Otomicroscopic Exam:  Right Ear: squamous wet debris in canal, suctioned away. The TM is thickened and PE tube is in place. There is no pulsatile otorrhea coming through the PE tube. The PE tube was removed today.  Left Ear: EAC clear, middle ear aerated    Audiograms, imaging, and lab data independently reviewed and  interpreted.  Audiogram: Outside audiogram 6/19/2020     Right ear: Normal sloping to severe high frequency SNHL.   Left ear: Normal sloping to moderate high frequency SNHL.   SRT right: 15 dB left: 10 dB   WR right: 100% at 55 dB left: 100% at 50 dB   Acoustic Reflexes: Right ipsi and contra, left ipsi present. Left contra absent.  Tympanograms: type As right, type A left     Imaging:  CT TEMPORAL WO CONTRAST 2/9/2021  Impression:    1. Opacification of the right mastoid air cells and mild debris within  the epitympanum and tympanic cavity are suggestive of otomastoiditis.  2. Thinning and questionable dehiscence of the right tegmen  mastoideum.  3. Normal left temporal bone.    Independently reviewed. There is a right tegmen defect over the tegmen mastoideum but there are some other areas of the tegmen that appear thin, some opacification of the mastoid and middle ear.    Lab Data  Component 6d ago   Beta-2-Transferrin Beta-2-transferrin observed on immunoblotting.  This result suggests CSF is present in the    fluid submitted.    Comment: DUNIA Becker M.D., Ph.D   Pathologist ([phone])    Resulting [facility]         Specimen Collected: 04/16/21  1:28 PM        IMPRESSION AND PLAN:  1.  right tegmen defect with CSF otorrhea  2.  High frequency HL in both ears    Marleen Mcfadden is a 55F with right CSF otorrhea from likely tegmen defect possibly related to idiopathic intracranial hypertension. Her headaches are suspicious for pseudotumor. She does endorse some blurry vision and diplopia. We would like her to see ophthalmology to assess for papilledema. We discussed that it is hard to test for IH with a lumbar puncture because she already has a leak that is decreasing the pressure. We discussed that she should complete the course of antibiotics prescribed with Dr. Cam, as well as the ear drops he had prescribed. We discussed that keeping her ear dry is important and  that she should no longer use a cotton ball and that she should use a vaseline coated cotton ball when showering. We also discussed that, once her TM heals, which hopefully it will, she will likely experience right aural fullness and hearing loss again and her severe headaches may return.    Her facial pain appears to be TMJ-related which we discussed with her.    - MRI  - Ophthalmology evaluation  - Pneumococcal vaccine  - MCF repair    We had a discussion regarding middle fossa approach for repair. Risks include continued CSF leak with need for further surgery, seizures, hearing loss, bleeding and stroke. All risks are rare. Postoperative restrictions were discussed. Need for pneumococcal vaccination emphasized. The main risk is the risk of continued leak if she does have pseudotumor. The potential for  shunt placement was discussed although that would be necessary only if she were to fail repair.    The patient asked insightful questions and demonstrated understanding. She has multiple trips planned and I informed her that she will need to cancel at least one or multiple of them as the leak should be repaired in a timely fashion. The patient is in agreement with this treatment plan. All questions were answered. We will have her see PAC due to her cardiomyopathy. We will also have her talk to Dr. Noe to answer any questions regarding his portion of the procedure.    Thank you for allowing me to participate in the care of your patient.    Tayo Castellanos MD PGY2 ENT Resident    Pt seen with Dr. Harrell    I, Jocelyne Harrell MD, saw this patient with the resident/fellow and agree with the resident/fellow s findings and plan of care as documented in the resident s/fellow s note. I was present for the entire procedure.    Jocelyne Harrell MD

## 2021-04-23 LAB
BACTERIA SPEC CULT: NORMAL
Lab: NORMAL
SPECIMEN SOURCE: NORMAL

## 2021-04-26 PROBLEM — G96.01 CSF OTORRHEA: Status: ACTIVE | Noted: 2021-04-26

## 2021-04-27 ENCOUNTER — TELEPHONE (OUTPATIENT)
Dept: CARDIOLOGY | Facility: CLINIC | Age: 56
End: 2021-04-27

## 2021-04-27 NOTE — TELEPHONE ENCOUNTER
Returned call to Marleen. She states she needs pre op clearance prior to her surgery on 6/7. Scheduled for 5/19 at 4pm.

## 2021-04-27 NOTE — TELEPHONE ENCOUNTER
[facility] Call Center    Phone Message    May a detailed message be left on voicemail: yes     Reason for Call: Other: Patient submitted mychart request for History and physical -  having Surgery June 7 2021 for CSF LEAK  - Dr. Kolb is unavailable in clinic until 6/9. Can patient be seen sooner?     Action Taken: Message routed to:  [facility] ([facility] Cardio    Travel Screening: Not Applicable

## 2021-04-28 ENCOUNTER — TELEPHONE (OUTPATIENT)
Dept: OTOLARYNGOLOGY | Facility: CLINIC | Age: 56
End: 2021-04-28

## 2021-04-28 NOTE — TELEPHONE ENCOUNTER
Pt has questions about her surgery and wants to talk to Dr. Van before she moves forward. She questions the shunt. She also asked about her ear not draining anymore. Dr. Harrell says the hole is closed up and it is probably draining inside her instead. Will forward with surgery once we hear back from Dr. Rivera or Dr. Harrell. Also asked about putting it off to go to [place].    Marlene Funez LPN

## 2021-04-29 ENCOUNTER — TELEPHONE (OUTPATIENT)
Dept: OTOLARYNGOLOGY | Facility: CLINIC | Age: 56
End: 2021-04-29

## 2021-04-29 NOTE — TELEPHONE ENCOUNTER
Called patient to confirm surgery date of July 5th or July 19th.   Patient would desire 7/5/2021    Surgeon: Dr. Harrell and Dr. Noe   Location of surgery: [place] OR- approximate arrival time given.   Pre-Op H&P: PAC appt scheduled for patient. Address given   Post-Op Appt Date: 10 days    Imaging needed:  No   Discussed COVID-19 testing:  Yes- scheduled for patient in  at her request.   Pre-cert/Authorization completed:  Not Applicable  Packet sent out: Yes     Patient would like to see Dr. Harrell and Dr. Noe and discuss surgery. When scheduling, pt declined video visit and would like to review in clinic. Patient was scheduled for 5/27/2021 at her request in clinic appt.  Informed patient to call with any questions or concerns in the meantime.

## 2021-04-29 NOTE — TELEPHONE ENCOUNTER
Called patient to offer her a sooner surgery date with Dr. Harrell and Dr. Noe.   Patient was offered surgery date of 5/17/2021. Patient states she has a trip to [place] on 5/20/2021. Patient would cancel that trip, but would like to go to [place] on 6/24-6/28.   Informed patient writer will talk to Dr. Harrell for recommendations on flying after surgery.   Per Dr. Harrell, ok to fly to [place] 5.5 weeks after surgery.   Informed patient, per Dr. Harrell patient can wait until she returns from [place] as well for surgery. Pt would like to have surgery sooner rather than later due to her headaches.     Patient has not received a call from Dr. Noe to discuss surgery.     Patient is planning on calling her aunt to discuss and will call writer back at direct number.       Yamilet Jett on 4/29/2021 at 10:29 AM

## 2021-04-29 NOTE — TELEPHONE ENCOUNTER
Patient called back wanting to know the length of stay for surgery. Informed pt she should expect a hospital stay of 4-7 days.   Pt has no further questions.

## 2021-04-29 NOTE — TELEPHONE ENCOUNTER
Patient called back regarding scheduling surgery after she returns from her vacations. Informed patient that per Dr. Harrell she is okay to wait until after she returns from vacation. Informed patient that Dr. Harrell and Dr. Noe operate together on Monday's.     She would like to proceed after she comes back from vacations. Informed patient writer will check with Dr. Noe's team to see availability in July. And will contact patient back with confirmation.   Patient is okay with this plan.       Yamilet Jett on 4/29/2021 at 10:41 AM

## 2021-04-30 ENCOUNTER — TELEPHONE (OUTPATIENT)
Dept: FAMILY MEDICINE | Facility: CLINIC | Age: 56
End: 2021-04-30
Payer: MEDICARE

## 2021-04-30 NOTE — TELEPHONE ENCOUNTER
FUTURE VISIT INFORMATION      SURGERY INFORMATION:    Date: 21    Location: [facility] or    Surgeon:  Jocelyne Harrell MD Tummala, Ramachandra Prasad, MD    Anesthesia Type:  general    Procedure: Right CRANIOTOMY, MIDDLE FOSSA APPROACH, FOR REPAIR OF ENCEPHALOCELE    Consult: ov     RECORDS REQUESTED FROM:       Primary Care Provider: Danae Grimes MD- [facility]    Pertinent Medical History: Familial Cardiomyopathy, congestive heart failure, hypertension    Most recent EKG+ Tracin21    Most recent ECHO: 21    Most recent Cardiac Stress Test: 20    Most recent PFT's: 20

## 2021-04-30 NOTE — TELEPHONE ENCOUNTER
Marleen needs vaccines ordered for upcoming procedure on 07/05/2021 per Dr. Harrell to be ordered by primary Dr. Alix Gardner   1. Provera #13  2. Pneumovax #23     Call Marleen back at [phone] to confirm she can schedule    Cristin Jacinto RN, BSN, CMSRN  [facility]

## 2021-05-05 PROCEDURE — 90670 PCV13 VACCINE IM: CPT | Performed by: PHYSICIAN ASSISTANT

## 2021-05-05 PROCEDURE — 90471 IMMUNIZATION ADMIN: CPT | Performed by: PHYSICIAN ASSISTANT

## 2021-05-05 NOTE — TELEPHONE ENCOUNTER
Called patient informed message below. Ancillary appointments scheduled for 5/6/21 at 9:30am and 7/2/21 at 11am.    TRAE Dickson MA

## 2021-05-06 ENCOUNTER — TELEPHONE (OUTPATIENT)
Dept: OTOLARYNGOLOGY | Facility: CLINIC | Age: 56
End: 2021-05-06

## 2021-05-06 ENCOUNTER — ALLIED HEALTH/NURSE VISIT (OUTPATIENT)
Dept: NURSING | Facility: CLINIC | Age: 56
End: 2021-05-06
Payer: MEDICARE

## 2021-05-06 DIAGNOSIS — Z23 ENCOUNTER FOR IMMUNIZATION: Primary | ICD-10-CM

## 2021-05-06 PROCEDURE — 90471 IMMUNIZATION ADMIN: CPT

## 2021-05-06 PROCEDURE — 90670 PCV13 VACCINE IM: CPT

## 2021-05-06 PROCEDURE — 99207 PR NO CHARGE NURSE ONLY: CPT

## 2021-05-06 NOTE — TELEPHONE ENCOUNTER
Patient came in for her first injection of Prevnar 13, scheduled to get to pneumococcal 7/2/2021. Patient is concern that the injection is going to be given too close to her surgery date, and wondering if this will be ok. Patient is requesting for Surgeon's team to give her a call regarding this.    Send to provider to advise.    Chen Bailey MA

## 2021-05-27 ENCOUNTER — OFFICE VISIT (OUTPATIENT)
Dept: OTOLARYNGOLOGY | Facility: CLINIC | Age: 56
End: 2021-05-27
Payer: MEDICAID

## 2021-05-27 VITALS
OXYGEN SATURATION: 99 % | BODY MASS INDEX: 41.4 KG/M2 | HEIGHT: 64 IN | TEMPERATURE: 96.5 F | WEIGHT: 242.51 LBS | HEART RATE: 55 BPM

## 2021-05-27 DIAGNOSIS — G47.30 SLEEP APNEA, UNSPECIFIED TYPE: Primary | ICD-10-CM

## 2021-05-27 DIAGNOSIS — Q01.8 TEMPORAL ENCEPHALOCELE (H): ICD-10-CM

## 2021-05-27 PROCEDURE — 99207 PR NO DOCUMENTATION ON VISIT: CPT | Performed by: NEUROLOGICAL SURGERY

## 2021-05-27 ASSESSMENT — PAIN SCALES - GENERAL: PAINLEVEL: NO PAIN (0)

## 2021-05-27 ASSESSMENT — MIFFLIN-ST. JEOR: SCORE: 1675

## 2021-05-27 NOTE — NURSING NOTE
"Chief Complaint   Patient presents with     RECHECK     surgery discussion       Pulse 55, temperature 96.5  F (35.8  C), temperature source Temporal, height 1.626 m (5' 4\"), weight 110 kg (242 lb 8.1 oz), last menstrual period 10/19/2008, SpO2 99 %, not currently breastfeeding.    Carina Be, EMT    "

## 2021-05-27 NOTE — LETTER
5/27/2021       RE: Marleen Mcfadden  [address]  [place] [address]     Dear Colleague,    Thank you for referring your patient, Marleen Mcfadden, to the [facility] EAR NOSE AND THROAT CLINIC [place] [facility]. Please see a copy of my visit note below.    See dictated note.  GILMER Noe MD      Again, thank you for allowing me to participate in the care of your patient.      Sincerely,    Jocelyn Noe MD

## 2021-05-27 NOTE — LETTER
5/27/2021       RE: Marleen Mcfadden  [address]  [place] [address]     Dear Colleague,    Thank you for referring your patient, Marleen Mcfadden, to the [facility] EAR NOSE AND THROAT CLINIC [place] [facility]. Please see a copy of my visit note below.    See dictated note. Clinic visit 45 minutes.  GILMER Noe MD      Again, thank you for allowing me to participate in the care of your patient.      Sincerely,    Jocelyn Noe MD

## 2021-05-28 DIAGNOSIS — M79.2 NERVE PAIN: ICD-10-CM

## 2021-05-28 DIAGNOSIS — R25.2 MUSCLE CRAMP: ICD-10-CM

## 2021-05-28 DIAGNOSIS — M25.562 LEFT KNEE PAIN, UNSPECIFIED CHRONICITY: ICD-10-CM

## 2021-05-28 DIAGNOSIS — Z96.652 STATUS POST LEFT KNEE REPLACEMENT: ICD-10-CM

## 2021-06-01 DIAGNOSIS — Z11.59 ENCOUNTER FOR SCREENING FOR OTHER VIRAL DISEASES: ICD-10-CM

## 2021-06-02 VITALS — WEIGHT: 231.38 LBS | BODY MASS INDEX: 39.5 KG/M2 | HEIGHT: 64 IN

## 2021-06-02 VITALS — WEIGHT: 240 LBS | HEIGHT: 64 IN | BODY MASS INDEX: 40.97 KG/M2

## 2021-06-04 VITALS — HEIGHT: 64 IN | BODY MASS INDEX: 42.85 KG/M2 | WEIGHT: 251 LBS

## 2021-06-05 VITALS — BODY MASS INDEX: 42.02 KG/M2 | HEIGHT: 64 IN | WEIGHT: 246.1 LBS

## 2021-06-07 ENCOUNTER — MYC MEDICAL ADVICE (OUTPATIENT)
Dept: FAMILY MEDICINE | Facility: CLINIC | Age: 56
End: 2021-06-07

## 2021-06-08 NOTE — TELEPHONE ENCOUNTER
Patient needs a COVID test and a written lette once results are in to go to [place].  Leaves 6/20/21.  Dina Shaver RN  [facility]

## 2021-06-10 NOTE — ANESTHESIA CARE TRANSFER NOTE
Last vitals:   Vitals:    08/18/20 0912   BP: (P) 100/56   Pulse: (P) 96   Resp: (P) 20   Temp: (P) 35.9  C (96.6  F)   SpO2: (P) 99%     Patient's level of consciousness is drowsy  Spontaneous respirations: yes  Maintains airway independently: yes  Dentition unchanged: yes  Oropharynx: oropharynx clear of all foreign objects    Spontaneous respirations prior to LMA removal, airway patent and spont respirations after LMA removed.   QCDR Measures:  ASA# 20 - Surgical Safety Checklist: WHO surgical safety checklist completed prior to induction    PQRS# 430 - Adult PONV Prevention: 4558F - Pt received => 2 anti-emetic agents (different classes) preop & intraop  ASA# 8 - Peds PONV Prevention: NA - Not pediatric patient, not GA or 2 or more risk factors NOT present  PQRS# 424 - Jayne-op Temp Management: 4559F - At least one body temp DOCUMENTED => 35.5C or 95.9F within required timeframe  PQRS# 426 - PACU Transfer Protocol: - Transfer of care checklist used  ASA# 14 - Acute Post-op Pain: ASA14B - Patient did NOT experience pain >= 7 out of 10

## 2021-06-10 NOTE — ANESTHESIA POSTPROCEDURE EVALUATION
Patient: Marleen Mcfadden  Procedure(s):  MIDURETHRAL SLING, CYSTOSCOPY  CYSTOSCOPY  Anesthesia type: general    Patient location: Phase II Recovery  Last vitals:   Vitals Value Taken Time   /64 8/18/2020  9:30 AM   Temp 35.9  C (96.6  F) 8/18/2020  9:12 AM   Pulse 86 8/18/2020  9:41 AM   Resp 20 8/18/2020  9:30 AM   SpO2 96 % 8/18/2020  9:41 AM   Vitals shown include unvalidated device data.  Post vital signs: stable  Level of consciousness: awake and responds to simple questions  Post-anesthesia pain: pain controlled  Post-anesthesia nausea and vomiting: no  Pulmonary: unassisted, return to baseline  Cardiovascular: stable and blood pressure at baseline  Hydration: adequate  Anesthetic events: no    QCDR Measures:  ASA# 11 - Jayne-op Cardiac Arrest: ASA11B - Patient did NOT experience unanticipated cardiac arrest  ASA# 12 - Jayne-op Mortality Rate: ASA12B - Patient did NOT die  ASA# 13 - PACU Re-Intubation Rate: ASA13B - Patient did NOT require a new airway mgmt  ASA# 10 - Composite Anes Safety: ASA10A - No serious adverse event    Additional Notes: doing well. Needed general with LMA due to coughing while sedated. 600ml IVF total throughout periop period.

## 2021-06-11 NOTE — ANESTHESIA POSTPROCEDURE EVALUATION
Patient: Marleen Mcfadden  Procedure(s):  RIGHT REVISION TOTAL KNEE ARTHROPLASTY (Right)  Anesthesia type: spinal    Patient location: PACU  Last vitals:   Vitals Value Taken Time   /59 9/11/2020  1:30 PM   Temp 36.1  C (97  F) 9/11/2020  1:10 PM   Pulse 68 9/11/2020  1:34 PM   Resp 29 9/11/2020  1:34 PM   SpO2 100 % 9/11/2020  1:32 PM   Vitals shown include unvalidated device data.  Post vital signs: stable  Level of consciousness: awake and responds to simple questions  Post-anesthesia pain: pain controlled  Post-anesthesia nausea and vomiting: no  Pulmonary: unassisted, return to baseline  Cardiovascular: stable and blood pressure at baseline  Hydration: adequate  Anesthetic events: no    QCDR Measures:  ASA# 11 - Jayne-op Cardiac Arrest: ASA11B - Patient did NOT experience unanticipated cardiac arrest  ASA# 12 - Jayne-op Mortality Rate: ASA12B - Patient did NOT die  ASA# 13 - PACU Re-Intubation Rate: NA - No ETT / LMA used for case  ASA# 10 - Composite Anes Safety: ASA10A - No serious adverse event    Additional Notes:

## 2021-06-11 NOTE — ANESTHESIA CARE TRANSFER NOTE
Last vitals:   Vitals:    09/11/20 1252   BP: (P) 94/54   Pulse: (P) 68   Resp: (P) 16   Temp: (P) 36.9  C (98.5  F)   SpO2: (P) 100%     Patient's level of consciousness is drowsy  Spontaneous respirations: yes  Maintains airway independently: yes  Dentition unchanged: yes  Oropharynx: oropharynx clear of all foreign objects    QCDR Measures:  ASA# 20 - Surgical Safety Checklist: WHO surgical safety checklist completed prior to induction    PQRS# 430 - Adult PONV Prevention: 4558F - Pt received => 2 anti-emetic agents (different classes) preop & intraop  ASA# 8 - Peds PONV Prevention: 4558F - Pt received => 2 anti-emetic agents (different classes) preop & intraop  PQRS# 424 - Jayne-op Temp Management: 4559F - At least one body temp DOCUMENTED => 35.5C or 95.9F within required timeframe  PQRS# 426 - PACU Transfer Protocol: - Transfer of care checklist used  ASA# 14 - Acute Post-op Pain: ASA14B - Patient did NOT experience pain >= 7 out of 10

## 2021-06-11 NOTE — ANESTHESIA PROCEDURE NOTES
Peripheral Block    Patient location during procedure: pre-op  Start time: 9/11/2020 11:00 AM  End time: 9/11/2020 11:05 AM  post-op analgesia per surgeon order as noted in medical record  Staffing:  Performing  Anesthesiologist: Kem Pryor MD  Preanesthetic Checklist  Completed: patient identified, site marked, risks, benefits, and alternatives discussed, timeout performed, consent obtained, at patient's request, airway assessed, oxygen available, suction available, emergency drugs available and hand hygiene performed  Peripheral Block  Block type: saphenous, adductor canal block  Prep: ChloraPrep  Patient position: prone  Patient monitoring: blood pressure, heart rate, continuous pulse oximetry and cardiac monitor  Laterality: right  Injection technique: ultrasound guided    Ultrasound used to visualize needle placement in proximity to nerve being blocked: yes   US used to visualize anesthetic spread  Visualized anatomic structures normal  No Pathological Findings  Permanent ultrasound image captured for medical record  Sterile gel and probe cover used for ultrasound.  Needle  Needle type: Stimuplex   Needle gauge: 20G  Needle length: 4 in  no peripheral nerve catheter placed  Assessment  Injection assessment: no difficulty with injection, negative aspiration for heme, no paresthesia on injection and incremental injection

## 2021-06-11 NOTE — ANESTHESIA PROCEDURE NOTES
Spinal Block    Patient location during procedure: OR  Start time: 9/11/2020 11:53 AM  End time: 9/11/2020 11:54 AM  Reason for block: primary anesthetic    Staffing:  Performing  Anesthesiologist: Kem Pryor MD    Preanesthetic Checklist  Completed: patient identified, risks, benefits, and alternatives discussed, timeout performed, consent obtained, at patient's request, airway assessed, oxygen available, suction available, emergency drugs available and hand hygiene performed  Spinal Block  Patient position: sitting  Prep: ChloraPrep  Patient monitoring: heart rate, cardiac monitor, continuous pulse ox and blood pressure  Location: L3-4  Injection technique: single-shot  Needle type: pencil-tip   Needle gauge: 24 G

## 2021-06-14 DIAGNOSIS — I50.22 CHRONIC SYSTOLIC CONGESTIVE HEART FAILURE (H): Primary | Chronic | ICD-10-CM

## 2021-06-15 NOTE — PROGRESS NOTES
[facility] Follow up:  June 16, 2021    HPI:   Ms Mcfadden is a 55 year old female with a history of familial cardiomyopathy (LVEF 40-45%) and bilateral total knee arthroplasties (2017 & 2019) who presents for ongoing evaluation and management of her cardiomyopathy.     The patient was last seen in clinic in Feb 2021. Since then, she reports no ER visits or hospitalizations. During today's visit, the patient reports feeling largely well. She denies any chest pain or pressure, orthopnea, PND, palpitations, syncope/presyncope or change in SOB or CHAVARRIA. She did complain of mild fatigue and slightly poorer exercise capacity after being transitioned from Entresto to Losartan last visit.      Her current cardiac medication regimen includes Losartan 100 mg daily (switched from Entresto to Losartan last visit per patient request), Toprol XL 50 mg daily and Aldactone 50 mg daily. She reports good compliance with all her medications.     EKG in office today revealed sinus bradycardia, no concerning, ST segment, T wave changes.       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 Followed regularly by cardiologist Mayi.  Problem list name updated by automated process. Provider to review     Rotator cuff injury  2017     Sprain of other ligament of left ankle, initial encounter 2017     Status post left knee replacement 2018     UTERINE LEIOMYOMA NOS 10/25/2006       Past Surgical History:   Procedure Laterality Date     C BREAST SURGERY PROCEDURE UNLISTED      Breast Reduction     C  DELIVERY ONLY  10/85,     , Low Cervicalx2     C EXCIS UTERINE FIBROID,ABD APPRCH       C EXCISE EXCESS SKIN TISSUE,ABDOMEN       C LIGATE FALLOPIAN TUBE       C REPAIR CRUCIATE LIGAMENT,KNEE      Right Knee     C TOTAL KNEE ARTHROPLASTY Left 10/03/2017     COLONOSCOPY WITH CO2 INSUFFLATION N/A 2021    Procedure: COLONOSCOPY, WITH CO2 INSUFFLATION;  Surgeon: Angela Harrington DO;  Location: [facility] OR     DAVINCI SACROCOLPOPEXY, MIDURETHRAL SLING, CYSTOSCOPY       HYSTERECTOMY TOTAL ABDOMINAL      for fibroids; reports having blood transfusion after surgery     THYROIDECTOMY  2013    Procedure: THYROIDECTOMY;  LEFT THYROID LOBECTOMY.  (LIGASURE, RECURRENT LARYNGEAL NERVE MONITOR) ;  Surgeon: Uriah Camargo MD;  Location: [facility]       Family History   Problem Relation Age of Onset     Hypertension Father         dec     Diabetes Father         dec     Heart Disease Father         dec     Alcohol/Drug Father      Cardiovascular Father      Heart Disease Mother         dec     Alcohol/Drug Mother      Cardiovascular Mother      Heart Disease Daughter         Cardiomyopathy     Cardiovascular Daughter         cardiomyopathy     Colon Cancer Sister      Cardiovascular Son         cardiomyopathy     Diabetes Paternal Grandmother         dec     Hypertension Paternal Grandmother         dec     Cerebrovascular Disease Paternal Grandmother         dec     Cancer Sister         Lupus     Cardiovascular Sister         cardiomyopathy     Heart Disease Sister         heart failure, and kidney failure       Social History     Tobacco Use     Smoking status: Never Smoker     Smokeless  tobacco: Never Used   Substance Use Topics     Alcohol use: Yes     Comment: rare, twice a year, she has a drink little wine 2 days ago         Current Outpatient Medications   Medication Sig     amitriptyline (ELAVIL) 25 MG tablet TAKE 1 TABLET(25 MG) BY MOUTH AT BEDTIME     Cholecalciferol (VITAMIN D) 2000 UNIT tablet Take 5,000 Units by mouth as needed Reported on 3/8/2017     Collagen 500 MG CAPS      estradiol (VIVELLE-DOT) 0.0375 MG/24HR BIW patch IRISH 1 PATCH ON SKIN TWICE PER WEEK     famotidine (PEPCID) 40 MG tablet Take 1 tablet (40 mg) by mouth daily     FLAXSEED, LINSEED, PO      fluticasone (FLONASE) 50 MCG/ACT nasal spray Spray 2 sprays into both nostrils At Bedtime     furosemide (LASIX) 20 MG tablet Take 2 tablets (40 mg) by mouth daily as needed (as needed for weight gain/edema)     loratadine (CLARITIN) 10 MG tablet Take 1 tablet (10 mg) by mouth daily     losartan (COZAAR) 100 MG tablet Take 1 tablet (100 mg) by mouth daily     metoprolol succinate ER (TOPROL-XL) 50 MG 24 hr tablet Take 1 tablet (50 mg) by mouth daily     omeprazole (PRILOSEC) 40 MG DR capsule Take 1 capsule (40 mg) by mouth daily     progesterone (PROMETRIUM) 100 MG capsule Take 100 mg by mouth Reported on 3/8/2017     spironolactone (ALDACTONE) 25 MG tablet Take 2 tablets (50 mg) by mouth daily     zolpidem (AMBIEN) 5 MG tablet Take tablet by mouth 15 minutes prior to sleep, for Sleep Study     bisacodyl (DULCOLAX) 5 MG EC tablet Take 3 tablets (15 mg) by mouth See Admin Instructions --Take at 5 PM day prior to procedure (Patient not taking: Reported on 6/16/2021)     fluticasone-salmeterol (ADVAIR) 500-50 MCG/DOSE inhaler Inhale 1 puff into the lungs every 12 hours (Patient not taking: Reported on 6/16/2021)     Na Sulfate-K Sulfate-Mg Sulf (SUPREP BOWEL PREP KIT) solution Take 177 mLs (1 Bottle) by mouth See Admin Instructions -Split dose 2 day Regimen: The evening before your procedure: dilute one bottle with water to a total  volume of 16oz (up to the fill line).  At 6pm,  drink the entire amount.  Drink 32 oz of water over the next hour. The morning of your procedure repeat both steps above using the second bottle.  Start 5 hours before your procedure and complete the prep at least 3 hours before you arrive. (Patient not taking: Reported on 6/16/2021)     polyethylene glycol (GOLYTELY) 236 g suspension Take 4,000 mLs by mouth See Admin Instructions --Drink half gallon (64oz) at 6pm on evening prior to procedure and second half on the morning of procedure (4 hours prior to procedure time) (Patient not taking: Reported on 6/16/2021)     sertraline (ZOLOFT) 50 MG tablet TAKE 1 TABLET BY MOUTH EVERY DAY (Patient not taking: Reported on 6/16/2021)     Simethicone 125 MG TABS Take 125 mg by mouth See Admin Instructions --Take tablet after finishing second half of Golytely with half a glass of water. (Patient not taking: Reported on 6/16/2021)     Simethicone 125 MG TABS Take 125 mg by mouth See Admin Instructions --Take tablet after finishing second half of Suprep with half a glass of water. (Patient not taking: Reported on 6/16/2021)     No current facility-administered medications for this visit.      Facility-Administered Medications Ordered in Other Visits   Medication     sodium chloride (PF) 0.9% PF flush 10 mL         ROS:   10 point ROS negative other than what is discussed above    EXAM:   /72 (BP Location: Right arm, Patient Position: Chair, Cuff Size: Adult Large)   Pulse (!) 45   Wt 110.7 kg (244 lb)   LMP 10/19/2008 (Approximate)   SpO2 95%   BMI 41.88 kg/m       GENERAL: Alert, oriented, NAD  HEENT:  NC/AT.  Sclerae white.  MMM,   NECK: No adenopathy. 2+ carotids,   HEART: RRR,+ S1 and S2, no murmurs or gallops.  JVP 6-7 cm without HJR  LUNGS:  Clear to auscultation bilaterally, no wheezes, rales, or rhonchi  ABDOMEN: Soft, obese, nontender, nondistended, bowel sounds present, no hepatomeagly appreciated although exam  limited by body habitus  EXTREMITIES: No lower extremity edema.  2+ bilateral peripheral pulses.  PSYCH: Normal affect     LABS  Orders Only on 02/17/2021   Component Date Value Ref Range Status     Sodium 02/17/2021 140  133 - 144 mmol/L Final     Potassium 02/17/2021 4.0  3.4 - 5.3 mmol/L Final     Chloride 02/17/2021 108  94 - 109 mmol/L Final     Carbon Dioxide 02/17/2021 27  20 - 32 mmol/L Final     Anion Gap 02/17/2021 5  3 - 14 mmol/L Final     Glucose 02/17/2021 54* 70 - 99 mg/dL Final     Urea Nitrogen 02/17/2021 19  7 - 30 mg/dL Final     Creatinine 02/17/2021 0.88  0.52 - 1.04 mg/dL Final     GFR Estimate 02/17/2021 73  >60 mL/min/[1.73_m2] Final    Comment: Non  GFR Calc  Starting 12/18/2018, serum creatinine based estimated GFR (eGFR) will be   calculated using the Chronic Kidney Disease Epidemiology Collaboration   (CKD-EPI) equation.       GFR Estimate If Black 02/17/2021 85  >60 mL/min/[1.73_m2] Final    Comment:  GFR Calc  Starting 12/18/2018, serum creatinine based estimated GFR (eGFR) will be   calculated using the Chronic Kidney Disease Epidemiology Collaboration   (CKD-EPI) equation.       Calcium 02/17/2021 9.1  8.5 - 10.1 mg/dL Final       CMR Report  01-  Clinical history: 53 yo woman with a familial cardiomyopathy to have repeat CMRI.  Comparison CMR: 10/6/2016.  1.  The global systolic function is moderately decreased and the LVEF is 38%. The RV is moderately dilated while wall thickness is normal. There is moderate global hypokinesis.  2. The RV is the upper limit of normal in cavity size. The global systolic function is mildly decreased and  the RVEF is 52%.   3. Both atria are mildly dilated.  4. There is no significant valvular disease.   5. Late gadolinium enhancement imaging shows no MI, fibrosis or infiltrative disease.   CONCLUSIONS:   The global systolic function is moderately decreased and the LVEF is 38%. The RV is moderately dilated  while  wall thickness is normal. There is moderate global hypokinesis.  The RV is the upper limit of normal in cavity size. The global systolic function is mildly decreased and  the RVEF is 52%.   Compared to the prior study of 2016, LV systolic performance is minimally decreased and RV systolic  performance is similar.  Both LV and RV dilation are mildly increased.             Echo 8/12/2020  Interpretation Summary  LVIDd 68mm.   The Ejection Fraction is estimated at 40-45%.  Right ventricular function, chamber size, wall motion, and thickness are normal.  Pulmonary artery systolic pressure cannot be assessed.  The inferior vena cava is normal.  No pericardial effusion is present.  Mild improvement in LV function since previous study.      Assessment and Plan: 55 year old female with a history of familial cardiomyopathy, left total knee arthroplasty on 10/3/2017 and right knee replacement on 2/20/2019 presents for ongoing evaluation and management.      # Chronic systolic heart failure secondary to familial cardiomyopathy.    LVEF 40-45%  Stage B, NYHA Class IIb.  MVO2 reduced on cardiopulmonary stress test in 2020 however patient did not meet anerobic threshold therefore MVO2 not a accurate measurement of cardiac exercise capacity.  VE/VCO2 slope normal and good BP response thus no evidence of significant cardiac limitation to exercise.    On assessment, patient compensated from a volume perspective, dry and warm profile. Echo today revealed stable LV function. Labs revealed stable renal function, normal lytes.     ACEi/ARB/ARNI: yes, changed Entresto to Losartan 100mg daily last visit. However, patient requesting transition back to Entresto due to experiencing fatigue/malaise on Losartan. Will switch ARB to ARNI after surgery, and check BMP 7-10 days after transition.      BB: Yes, continue metoprolol XL 50mg daily  Aldosterone antagonist - Continue spironolactone 50mg daily   SCD prophylaxis: Recent echo confirms LVEF  > 40%, thus ICD not indicated at present  % BiV pacing: N/A  Fluid status euvolemic  NSAID use: advised to avoid NSAIDs  Encouraged patient to begin regular aerobic exercise aiming for at least 150 minutes of moderate physical activity or 75 minutes of vigorous physical activity - or an equal combination of both - each week. and follow low-salt, heart healthy diet.    # Pre-operative clearance:   The patient has right CSF otorrhea from likely tegmen defect possibly related to idiopathic intracranial hypertension. She's scheduled to undergo a repair for the CSF leak on 5th July 2020. Cardiology requested for preoperative clearance for the same.    As mentioned above, patient symptomatically and hemodynamically well compensated, euvolemic profile. EKG w/ sinus bradycardia, no ST segment changes. Echo with stable biventricular function.     - Revised cardiac risk index 6%, class II risk of mala-operative MACE events.   - Crow's risk 0.9% risk of MACE in mala-operative period.  - ASA class III, systemic disease with functional limitations.    - No further cardiac work up required before proceeding for surgery.     RTC: In 6 months with labs. Will be happy to see sooner if change in clinical status or new questions/concerns arise.    Assessment and plan discussed with Dr Kolb, who's in agreement with the plan outlined above.     Maliha Barnard MD,   Cardiovascular Disease Fellow  Pager [phone]      I have reviewed today's vital signs, notes, medications, labs and imaging. I have also seen and examined the patient and agree with the findings and plan as outlined above    Cassie Kolb MD  Section Head - Advanced Heart Failure, Transplantation and Mechanical Circulatory Support  Director - [facility]  Associate Professor of Medicine, [facility] [facility]    I spent 30 minutes in care of the patient today including reviewing patient's history, test  results, today's examination, discussion of testing results and care recommendations with patient and documentation

## 2021-06-16 ENCOUNTER — ANCILLARY PROCEDURE (OUTPATIENT)
Dept: CARDIOLOGY | Facility: CLINIC | Age: 56
End: 2021-06-16
Attending: INTERNAL MEDICINE
Payer: MEDICARE

## 2021-06-16 VITALS
OXYGEN SATURATION: 95 % | HEART RATE: 45 BPM | DIASTOLIC BLOOD PRESSURE: 72 MMHG | SYSTOLIC BLOOD PRESSURE: 116 MMHG | BODY MASS INDEX: 41.88 KG/M2 | WEIGHT: 244 LBS

## 2021-06-16 DIAGNOSIS — I42.9 FAMILIAL CARDIOMYOPATHY (H): ICD-10-CM

## 2021-06-16 DIAGNOSIS — R00.1 BRADYCARDIA: Primary | ICD-10-CM

## 2021-06-16 DIAGNOSIS — I50.22 CHRONIC SYSTOLIC CONGESTIVE HEART FAILURE (H): Chronic | ICD-10-CM

## 2021-06-16 LAB
ANION GAP SERPL CALCULATED.3IONS-SCNC: 3 MMOL/L (ref 3–14)
BUN SERPL-MCNC: 14 MG/DL (ref 7–30)
CALCIUM SERPL-MCNC: 9 MG/DL (ref 8.5–10.1)
CHLORIDE SERPL-SCNC: 104 MMOL/L (ref 94–109)
CO2 SERPL-SCNC: 28 MMOL/L (ref 20–32)
CREAT SERPL-MCNC: 0.86 MG/DL (ref 0.52–1.04)
GFR SERPL CREATININE-BSD FRML MDRD: 75 ML/MIN/{1.73_M2}
GLUCOSE SERPL-MCNC: 107 MG/DL (ref 70–99)
INTERPRETATION ECG - MUSE: NORMAL
POTASSIUM SERPL-SCNC: 4.1 MMOL/L (ref 3.4–5.3)
SODIUM SERPL-SCNC: 134 MMOL/L (ref 133–144)

## 2021-06-16 PROCEDURE — 93005 ELECTROCARDIOGRAM TRACING: CPT

## 2021-06-16 PROCEDURE — 93306 TTE W/DOPPLER COMPLETE: CPT | Mod: GC | Performed by: INTERNAL MEDICINE

## 2021-06-16 PROCEDURE — G0463 HOSPITAL OUTPT CLINIC VISIT: HCPCS | Mod: 25

## 2021-06-16 PROCEDURE — 99207 PR STATISTIC IV PUSH SINGLE INITIAL SUBSTANCE: CPT | Performed by: INTERNAL MEDICINE

## 2021-06-16 PROCEDURE — 99214 OFFICE O/P EST MOD 30 MIN: CPT | Mod: GC | Performed by: INTERNAL MEDICINE

## 2021-06-16 PROCEDURE — 80048 BASIC METABOLIC PNL TOTAL CA: CPT | Performed by: PATHOLOGY

## 2021-06-16 PROCEDURE — 36415 COLL VENOUS BLD VENIPUNCTURE: CPT | Performed by: PATHOLOGY

## 2021-06-16 RX ADMIN — Medication 4 ML: at 08:49

## 2021-06-16 ASSESSMENT — PAIN SCALES - GENERAL: PAINLEVEL: NO PAIN (0)

## 2021-06-16 NOTE — NURSING NOTE
Diet: Patient instructed regarding a heart failure healthy diet, including discussion of reduced fat and 2000 mg daily sodium restriction, daily weights, medication purpose and compliance, fluid restrictions and resources for patient and family to access for assistance with heart failure management.       Labs: Patient was given results of the laboratory testing obtained today and patient was instructed about when to return for the next laboratory testing.     Med Reconcile: Reviewed and verified all current medications with the patient. The updated medication list was printed and given to the patient. NO CHANGES.    *patient advised to call us after she recovers from surgery to discuss switching back to entresto.    Return Appointment: Patient given instructions regarding scheduling next clinic visit. RTC to see Dr. Kolb in 6 months    Patient stated she understood all health information given and agreed to call with further questions or concerns.     Gwen Swenson RN

## 2021-06-16 NOTE — LETTER
6/16/2021      RE: Marleen Mcfadden  [address]  [facility] [address]       Dear Colleague,    Thank you for the opportunity to participate in the care of your patient, Marleen Mcfadden, at the [facility] [place] [facility]. Please see a copy of my visit note below.    Advanced Heart Failure Clinic Follow up:  June 16, 2021    HPI:   Ms Mcfadden is a 55 year old female with a history of familial cardiomyopathy (LVEF 40-45%) and bilateral total knee arthroplasties (2017 & 2019) who presents for ongoing evaluation and management of her cardiomyopathy.     The patient was last seen in clinic in Feb 2021. Since then, she reports no ER visits or hospitalizations. During today's visit, the patient reports feeling largely well. She denies any chest pain or pressure, orthopnea, PND, palpitations, syncope/presyncope or change in SOB or CHAVARRIA. She did complain of mild fatigue and slightly poorer exercise capacity after being transitioned from Entresto to Losartan last visit.      Her current cardiac medication regimen includes Losartan 100 mg daily (switched from Entresto to Losartan last visit per patient request), Toprol XL 50 mg daily and Aldactone 50 mg daily. She reports good compliance with all her medications.     EKG in office today revealed sinus bradycardia, no concerning, ST segment, T wave changes.       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 Followed regularly by cardiologist Mayi.  Problem list name updated by automated process. Provider to review     Rotator cuff injury 2017     Sprain of other ligament of left ankle, initial encounter 2017     Status post left knee replacement 2018     UTERINE LEIOMYOMA NOS 10/25/2006       Past Surgical History:   Procedure Laterality Date     C BREAST SURGERY PROCEDURE UNLISTED      Breast Reduction     C  DELIVERY ONLY  10/85,     , Low Cervicalx2     C EXCIS UTERINE FIBROID,ABD APPRCH       C EXCISE EXCESS SKIN TISSUE,ABDOMEN       C LIGATE FALLOPIAN TUBE       C REPAIR CRUCIATE LIGAMENT,KNEE      Right Knee     C TOTAL KNEE ARTHROPLASTY Left 10/03/2017     COLONOSCOPY WITH CO2 INSUFFLATION N/A 2021    Procedure: COLONOSCOPY, WITH CO2 INSUFFLATION;  Surgeon: Angela Harrington DO;  Location:  OR     DAVINCI SACROCOLPOPEXY, MIDURETHRAL SLING, CYSTOSCOPY       HYSTERECTOMY TOTAL ABDOMINAL      for fibroids; reports having blood transfusion after surgery     THYROIDECTOMY  2013    Procedure: THYROIDECTOMY;  LEFT THYROID LOBECTOMY.  (LIGASURE, RECURRENT LARYNGEAL NERVE MONITOR) ;  Surgeon: Uriah Camargo MD;  Location: [facility]       Family History   Problem Relation Age of Onset     Hypertension Father         dec     Diabetes Father         dec     Heart Disease Father         dec     Alcohol/Drug Father      Cardiovascular Father      Heart Disease Mother         dec     Alcohol/Drug Mother      Cardiovascular Mother      Heart Disease Daughter         Cardiomyopathy     Cardiovascular Daughter         cardiomyopathy     Colon Cancer Sister      Cardiovascular Son         cardiomyopathy     Diabetes Paternal Grandmother          dec     Hypertension Paternal Grandmother         dec     Cerebrovascular Disease Paternal Grandmother         dec     Cancer Sister         Lupus     Cardiovascular Sister         cardiomyopathy     Heart Disease Sister         heart failure, and kidney failure       Social History     Tobacco Use     Smoking status: Never Smoker     Smokeless tobacco: Never Used   Substance Use Topics     Alcohol use: Yes     Comment: rare, twice a year, she has a drink little wine 2 days ago         Current Outpatient Medications   Medication Sig     amitriptyline (ELAVIL) 25 MG tablet TAKE 1 TABLET(25 MG) BY MOUTH AT BEDTIME     Cholecalciferol (VITAMIN D) 2000 UNIT tablet Take 5,000 Units by mouth as needed Reported on 3/8/2017     Collagen 500 MG CAPS      estradiol (VIVELLE-DOT) 0.0375 MG/24HR BIW patch IRISH 1 PATCH ON SKIN TWICE PER WEEK     famotidine (PEPCID) 40 MG tablet Take 1 tablet (40 mg) by mouth daily     FLAXSEED, LINSEED, PO      fluticasone (FLONASE) 50 MCG/ACT nasal spray Spray 2 sprays into both nostrils At Bedtime     furosemide (LASIX) 20 MG tablet Take 2 tablets (40 mg) by mouth daily as needed (as needed for weight gain/edema)     loratadine (CLARITIN) 10 MG tablet Take 1 tablet (10 mg) by mouth daily     losartan (COZAAR) 100 MG tablet Take 1 tablet (100 mg) by mouth daily     metoprolol succinate ER (TOPROL-XL) 50 MG 24 hr tablet Take 1 tablet (50 mg) by mouth daily     omeprazole (PRILOSEC) 40 MG DR capsule Take 1 capsule (40 mg) by mouth daily     progesterone (PROMETRIUM) 100 MG capsule Take 100 mg by mouth Reported on 3/8/2017     spironolactone (ALDACTONE) 25 MG tablet Take 2 tablets (50 mg) by mouth daily     zolpidem (AMBIEN) 5 MG tablet Take tablet by mouth 15 minutes prior to sleep, for Sleep Study     bisacodyl (DULCOLAX) 5 MG EC tablet Take 3 tablets (15 mg) by mouth See Admin Instructions --Take at 5 PM day prior to procedure (Patient not taking: Reported on 6/16/2021)      fluticasone-salmeterol (ADVAIR) 500-50 MCG/DOSE inhaler Inhale 1 puff into the lungs every 12 hours (Patient not taking: Reported on 6/16/2021)     Na Sulfate-K Sulfate-Mg Sulf (SUPREP BOWEL PREP KIT) solution Take 177 mLs (1 Bottle) by mouth See Admin Instructions -Split dose 2 day Regimen: The evening before your procedure: dilute one bottle with water to a total volume of 16oz (up to the fill line).  At 6pm,  drink the entire amount.  Drink 32 oz of water over the next hour. The morning of your procedure repeat both steps above using the second bottle.  Start 5 hours before your procedure and complete the prep at least 3 hours before you arrive. (Patient not taking: Reported on 6/16/2021)     polyethylene glycol (GOLYTELY) 236 g suspension Take 4,000 mLs by mouth See Admin Instructions --Drink half gallon (64oz) at 6pm on evening prior to procedure and second half on the morning of procedure (4 hours prior to procedure time) (Patient not taking: Reported on 6/16/2021)     sertraline (ZOLOFT) 50 MG tablet TAKE 1 TABLET BY MOUTH EVERY DAY (Patient not taking: Reported on 6/16/2021)     Simethicone 125 MG TABS Take 125 mg by mouth See Admin Instructions --Take tablet after finishing second half of Golytely with half a glass of water. (Patient not taking: Reported on 6/16/2021)     Simethicone 125 MG TABS Take 125 mg by mouth See Admin Instructions --Take tablet after finishing second half of Suprep with half a glass of water. (Patient not taking: Reported on 6/16/2021)     No current facility-administered medications for this visit.      Facility-Administered Medications Ordered in Other Visits   Medication     sodium chloride (PF) 0.9% PF flush 10 mL         ROS:   10 point ROS negative other than what is discussed above    EXAM:   /72 (BP Location: Right arm, Patient Position: Chair, Cuff Size: Adult Large)   Pulse (!) 45   Wt 110.7 kg (244 lb)   LMP 10/19/2008 (Approximate)   SpO2 95%   BMI 41.88 kg/m        GENERAL: Alert, oriented, NAD  HEENT:  NC/AT.  Sclerae white.  MMM,   NECK: No adenopathy. 2+ carotids,   HEART: RRR,+ S1 and S2, no murmurs or gallops.  JVP 6-7 cm without HJR  LUNGS:  Clear to auscultation bilaterally, no wheezes, rales, or rhonchi  ABDOMEN: Soft, obese, nontender, nondistended, bowel sounds present, no hepatomeagly appreciated although exam limited by body habitus  EXTREMITIES: No lower extremity edema.  2+ bilateral peripheral pulses.  PSYCH: Normal affect     LABS  Orders Only on 02/17/2021   Component Date Value Ref Range Status     Sodium 02/17/2021 140  133 - 144 mmol/L Final     Potassium 02/17/2021 4.0  3.4 - 5.3 mmol/L Final     Chloride 02/17/2021 108  94 - 109 mmol/L Final     Carbon Dioxide 02/17/2021 27  20 - 32 mmol/L Final     Anion Gap 02/17/2021 5  3 - 14 mmol/L Final     Glucose 02/17/2021 54* 70 - 99 mg/dL Final     Urea Nitrogen 02/17/2021 19  7 - 30 mg/dL Final     Creatinine 02/17/2021 0.88  0.52 - 1.04 mg/dL Final     GFR Estimate 02/17/2021 73  >60 mL/min/[1.73_m2] Final    Comment: Non  GFR Calc  Starting 12/18/2018, serum creatinine based estimated GFR (eGFR) will be   calculated using the Chronic Kidney Disease Epidemiology Collaboration   (CKD-EPI) equation.       GFR Estimate If Black 02/17/2021 85  >60 mL/min/[1.73_m2] Final    Comment:  GFR Calc  Starting 12/18/2018, serum creatinine based estimated GFR (eGFR) will be   calculated using the Chronic Kidney Disease Epidemiology Collaboration   (CKD-EPI) equation.       Calcium 02/17/2021 9.1  8.5 - 10.1 mg/dL Final       CMR Report  01-  Clinical history: 53 yo woman with a familial cardiomyopathy to have repeat CMRI.  Comparison CMR: 10/6/2016.  1.  The global systolic function is moderately decreased and the LVEF is 38%. The RV is moderately dilated while wall thickness is normal. There is moderate global hypokinesis.  2. The RV is the upper limit of normal in cavity size.  The global systolic function is mildly decreased and  the RVEF is 52%.   3. Both atria are mildly dilated.  4. There is no significant valvular disease.   5. Late gadolinium enhancement imaging shows no MI, fibrosis or infiltrative disease.   CONCLUSIONS:   The global systolic function is moderately decreased and the LVEF is 38%. The RV is moderately dilated  while wall thickness is normal. There is moderate global hypokinesis.  The RV is the upper limit of normal in cavity size. The global systolic function is mildly decreased and  the RVEF is 52%.   Compared to the prior study of 2016, LV systolic performance is minimally decreased and RV systolic  performance is similar.  Both LV and RV dilation are mildly increased.             Echo 8/12/2020  Interpretation Summary  LVIDd 68mm.   The Ejection Fraction is estimated at 40-45%.  Right ventricular function, chamber size, wall motion, and thickness are normal.  Pulmonary artery systolic pressure cannot be assessed.  The inferior vena cava is normal.  No pericardial effusion is present.  Mild improvement in LV function since previous study.      Assessment and Plan: 55 year old female with a history of familial cardiomyopathy, left total knee arthroplasty on 10/3/2017 and right knee replacement on 2/20/2019 presents for ongoing evaluation and management.      # Chronic systolic heart failure secondary to familial cardiomyopathy.    LVEF 40-45%  Stage B, NYHA Class IIb.  MVO2 reduced on cardiopulmonary stress test in 2020 however patient did not meet anerobic threshold therefore MVO2 not a accurate measurement of cardiac exercise capacity.  VE/VCO2 slope normal and good BP response thus no evidence of significant cardiac limitation to exercise.    On assessment, patient compensated from a volume perspective, dry and warm profile. Echo today revealed stable LV function. Labs revealed stable renal function, normal lytes.     ACEi/ARB/ARNI: yes, changed Entresto to  Losartan 100mg daily last visit. However, patient requesting transition back to Entresto due to experiencing fatigue/malaise on Losartan. Will switch ARB to ARNI after surgery, and check BMP 7-10 days after transition.      BB: Yes, continue metoprolol XL 50mg daily  Aldosterone antagonist - Continue spironolactone 50mg daily   SCD prophylaxis: Recent echo confirms LVEF > 40%, thus ICD not indicated at present  % BiV pacing: N/A  Fluid status euvolemic  NSAID use: advised to avoid NSAIDs  Encouraged patient to begin regular aerobic exercise aiming for at least 150 minutes of moderate physical activity or 75 minutes of vigorous physical activity - or an equal combination of both - each week. and follow low-salt, heart healthy diet.    # Pre-operative clearance:   The patient has right CSF otorrhea from likely tegmen defect possibly related to idiopathic intracranial hypertension. She's scheduled to undergo a repair for the CSF leak on 5th July 2020. Cardiology requested for preoperative clearance for the same.    As mentioned above, patient symptomatically and hemodynamically well compensated, euvolemic profile. EKG w/ sinus bradycardia, no ST segment changes. Echo with stable biventricular function.     - Revised cardiac risk index 6%, class II risk of mala-operative MACE events.   - Crow's risk 0.9% risk of MACE in mala-operative period.  - ASA class III, systemic disease with functional limitations.    - No further cardiac work up required before proceeding for surgery.     RTC: In 6 months with labs. Will be happy to see sooner if change in clinical status or new questions/concerns arise.    Assessment and plan discussed with Dr Kolb, who's in agreement with the plan outlined above.     Maliha Barnard MD,   Cardiovascular Disease Fellow  Pager [phone]      I have reviewed today's vital signs, notes, medications, labs and imaging. I have also seen and examined the patient and agree with the findings and  plan as outlined above    Cassie Kolb MD  Section Head - Advanced Heart Failure, Transplantation and Mechanical Circulatory Support  Director - Adult Congenital [facility] Cardiovascular [facility]etics [facility]  Associate Professor of Medicine, [facility]    I spent 30 minutes in care of the patient today including reviewing patient's history, test results, today's examination, discussion of testing results and care recommendations with patient and documentation            Please do not hesitate to contact me if you have any questions/concerns.     Sincerely,     Cassie Kolb MD

## 2021-06-16 NOTE — NURSING NOTE
Chief Complaint   Patient presents with     Follow Up     RTN HF: 55 year old female presents with history of Familial cardiomyopathy, systolic HF, EF 35% for follow up and cardiac clearance with labs and echo prior      Vitals were taken and medications reconciled.    Moy Lemon, EMT  9:00 AM

## 2021-06-16 NOTE — PATIENT INSTRUCTIONS
Cardiology Providers you saw during your visit:  Dr. Kolb     Medication changes:  1- No changes.    *Hold losartan the night before surgery.  *Call us after you are recovered from surgery so we can work on getting you back on the entresto.        Follow up:  1- Return to clinic in 6 months     Please call if you have:  1. Weight gain of more than 2 pounds in a day or 5 pounds in a week  2. Increased shortness of breath, swelling or bloating  3. Dizziness, lightheadedness   4. Any questions or concerns.      Follow the American Heart Association Diet and Lifestyle recommendations:  Limit saturated fat, trans fat, sodium, red meat, sweets and sugar-sweetened beverages. If you choose to eat red meat, compare labels and select the leanest cuts available.  Aim for at least 150 minutes of moderate physical activity or 75 minutes of vigorous physical activity - or an equal combination of both - each week.     During business hours: [phone], press option # 1 to be routed to the [facility] then option # 4 to send a message to your care team     After hours, weekends or holidays: On Call Cardiologist- [phone]   option #4 and ask to speak to the on-call Cardiologist. Inform them you are a CORE/heart failure patient at the [facility].     Gwen Swenson, RN BSN  Cardiology Nurse Care Coordinator     Keep up the good work!     Take Care!

## 2021-06-17 ENCOUNTER — OFFICE VISIT (OUTPATIENT)
Dept: FAMILY MEDICINE | Facility: CLINIC | Age: 56
End: 2021-06-17
Payer: MEDICARE

## 2021-06-17 VITALS
HEART RATE: 72 BPM | OXYGEN SATURATION: 97 % | BODY MASS INDEX: 42.05 KG/M2 | SYSTOLIC BLOOD PRESSURE: 115 MMHG | TEMPERATURE: 98.3 F | DIASTOLIC BLOOD PRESSURE: 77 MMHG | WEIGHT: 245 LBS

## 2021-06-17 DIAGNOSIS — Z20.822 ENCOUNTER FOR LABORATORY TESTING FOR COVID-19 VIRUS: Primary | ICD-10-CM

## 2021-06-17 PROCEDURE — 99212 OFFICE O/P EST SF 10 MIN: CPT | Performed by: FAMILY MEDICINE

## 2021-06-17 RX ORDER — SOLIFENACIN SUCCINATE 10 MG/1
10 TABLET, FILM COATED ORAL DAILY
COMMUNITY
End: 2022-08-03

## 2021-06-17 NOTE — PHARMACY - PREOPERATIVE ASSESSMENT CENTER
Preoperative Assessment Center Medication History Note    Medication history completed on June 17, 2021 by this writer. See Epic admission navigator for prior to admission medications. Operating room staff will still need to confirm medications and last dose information on day of surgery.     Medication history interview sources:  Patient Interview    Changes made to PTA medication list (reason)  Added:   -- estradiol injection - patient states she received an injection in her hip back in May    Deleted:   -- bisacodyl = for colonoscopy prep  -- golytely = colonoscopy prep  -- simethicone = colonoscopy prep    Changed:   -- famotidine changed to as needed     Additional medication history information (including reliability of information, actions taken by pharmacist):    -- No recent (within 30 days) course of antibiotics  -- No recent (within 30 days) course of systemic steroids  -- Patient declines being on any other prescription or over-the-counter medications  -- patient reports not taking amitriptyline currently   -- Patient finds herself taking lasix about twice a week  -- Ambien is a one time dose for her sleep study coming up   -- patient currently on losartan but will be transitioning back to entresto after surgery     Prior to Admission medications    Medication Sig Last Dose Taking? Auth Provider   Cholecalciferol (VITAMIN D) 2000 UNIT tablet Take 5,000 Units by mouth as needed Reported on 3/8/2017 Taking Yes Jojo Maurer MD   Collagen 500 MG CAPS Take 1 capsule by mouth daily  Taking Yes Reported, Patient   estradiol cypionate (DEPO-ESTRADIOL) 5 MG/ML injection Inject into the muscle every 28 days Taking Yes Unknown, Entered By History   famotidine (PEPCID) 40 MG tablet Take 1 tablet (40 mg) by mouth daily  Patient taking differently: Take 40 mg by mouth daily as needed  Taking Yes Wilson Cam MD   FLAXSEED, LINSEED, PO Take 1 capsule by mouth daily  Taking Yes Reported, Patient    fluticasone (FLONASE) 50 MCG/ACT nasal spray Spray 2 sprays into both nostrils At Bedtime Taking Yes Wilson Cam MD   furosemide (LASIX) 20 MG tablet Take 2 tablets (40 mg) by mouth daily as needed (as needed for weight gain/edema) Taking Yes Cassie Kolb MD   loratadine (CLARITIN) 10 MG tablet Take 1 tablet (10 mg) by mouth daily Taking Yes Wilson Cam MD   losartan (COZAAR) 100 MG tablet Take 1 tablet (100 mg) by mouth daily Taking Yes Cassie Kolb MD   metoprolol succinate ER (TOPROL-XL) 50 MG 24 hr tablet Take 1 tablet (50 mg) by mouth daily Taking Yes Cassie Kolb MD   omeprazole (PRILOSEC) 40 MG DR capsule Take 1 capsule (40 mg) by mouth daily Taking Yes Wilson Cam MD   progesterone (PROMETRIUM) 100 MG capsule Take 100 mg by mouth At Bedtime  Taking Yes Reported, Patient   sertraline (ZOLOFT) 50 MG tablet TAKE 1 TABLET BY MOUTH EVERY DAY Taking Yes Danae Grimes MD   solifenacin (VESICARE) 10 MG tablet Take 10 mg by mouth daily Taking Yes Unknown, Entered By History   spironolactone (ALDACTONE) 25 MG tablet Take 2 tablets (50 mg) by mouth daily Taking Yes Cassie Kolb MD   amitriptyline (ELAVIL) 25 MG tablet TAKE 1 TABLET(25 MG) BY MOUTH AT BEDTIME  Patient not taking: Reported on 6/17/2021 Not Taking  Alix Gardner PA-C   zolpidem (AMBIEN) 5 MG tablet Take tablet by mouth 15 minutes prior to sleep, for Sleep Study  Patient not taking: Reported on 6/17/2021 Not Taking  Candace Ingram PA       Medication history completed by: Osman Banegas RPH

## 2021-06-17 NOTE — PROGRESS NOTES
Assessment & Plan     1. Encounter for laboratory testing for COVID-19 virus  - Asymptomatic COVID-19 Virus (Coronavirus) by PCR; Future        Deqa Alix Matias MD  [facility]    Dinorah Hawley is a 55 year old who presents for the following health issues     HPI     Pt stated need order Covid-19 test  for travel. Travelling 6/24-28.   No current symptoms.     Review of Systems         Objective    LMP 10/19/2008 (Approximate)   There is no height or weight on file to calculate BMI.  Physical Exam

## 2021-06-21 ENCOUNTER — PRE VISIT (OUTPATIENT)
Dept: SURGERY | Facility: CLINIC | Age: 56
End: 2021-06-21

## 2021-06-21 ENCOUNTER — ANESTHESIA EVENT (OUTPATIENT)
Dept: SURGERY | Facility: CLINIC | Age: 56
End: 2021-06-21

## 2021-06-21 ENCOUNTER — OFFICE VISIT (OUTPATIENT)
Dept: SURGERY | Facility: CLINIC | Age: 56
End: 2021-06-21
Payer: MEDICARE

## 2021-06-21 VITALS
WEIGHT: 253 LBS | BODY MASS INDEX: 43.19 KG/M2 | HEIGHT: 64 IN | HEART RATE: 72 BPM | SYSTOLIC BLOOD PRESSURE: 132 MMHG | DIASTOLIC BLOOD PRESSURE: 79 MMHG | OXYGEN SATURATION: 98 % | RESPIRATION RATE: 20 BRPM | TEMPERATURE: 97.9 F

## 2021-06-21 DIAGNOSIS — Z01.818 PREOP EXAMINATION: Primary | ICD-10-CM

## 2021-06-21 DIAGNOSIS — Z01.818 PREOP EXAMINATION: ICD-10-CM

## 2021-06-21 DIAGNOSIS — Z20.822 ENCOUNTER FOR LABORATORY TESTING FOR COVID-19 VIRUS: ICD-10-CM

## 2021-06-21 LAB
ERYTHROCYTE [DISTWIDTH] IN BLOOD BY AUTOMATED COUNT: 13.4 % (ref 10–15)
HBA1C MFR BLD: 5.5 % (ref 0–5.6)
HCT VFR BLD AUTO: 37.6 % (ref 35–47)
HGB BLD-MCNC: 12.4 G/DL (ref 11.7–15.7)
LABORATORY COMMENT REPORT: NORMAL
MCH RBC QN AUTO: 31.4 PG (ref 26.5–33)
MCHC RBC AUTO-ENTMCNC: 33 G/DL (ref 31.5–36.5)
MCV RBC AUTO: 95 FL (ref 78–100)
PLATELET # BLD AUTO: 253 10E9/L (ref 150–450)
RBC # BLD AUTO: 3.95 10E12/L (ref 3.8–5.2)
SARS-COV-2 RNA RESP QL NAA+PROBE: NEGATIVE
SARS-COV-2 RNA RESP QL NAA+PROBE: NORMAL
SPECIMEN SOURCE: NORMAL
SPECIMEN SOURCE: NORMAL
WBC # BLD AUTO: 7.5 10E9/L (ref 4–11)

## 2021-06-21 PROCEDURE — 86901 BLOOD TYPING SEROLOGIC RH(D): CPT | Performed by: PATHOLOGY

## 2021-06-21 PROCEDURE — U0003 INFECTIOUS AGENT DETECTION BY NUCLEIC ACID (DNA OR RNA); SEVERE ACUTE RESPIRATORY SYNDROME CORONAVIRUS 2 (SARS-COV-2) (CORONAVIRUS DISEASE [COVID-19]), AMPLIFIED PROBE TECHNIQUE, MAKING USE OF HIGH THROUGHPUT TECHNOLOGIES AS DESCRIBED BY CMS-2020-01-R: HCPCS | Performed by: FAMILY MEDICINE

## 2021-06-21 PROCEDURE — 85027 COMPLETE CBC AUTOMATED: CPT | Performed by: PATHOLOGY

## 2021-06-21 PROCEDURE — 83036 HEMOGLOBIN GLYCOSYLATED A1C: CPT | Performed by: PATHOLOGY

## 2021-06-21 PROCEDURE — 86900 BLOOD TYPING SEROLOGIC ABO: CPT | Performed by: PATHOLOGY

## 2021-06-21 PROCEDURE — U0005 INFEC AGEN DETEC AMPLI PROBE: HCPCS | Performed by: FAMILY MEDICINE

## 2021-06-21 PROCEDURE — 86850 RBC ANTIBODY SCREEN: CPT | Performed by: PATHOLOGY

## 2021-06-21 PROCEDURE — 99204 OFFICE O/P NEW MOD 45 MIN: CPT | Performed by: PHYSICIAN ASSISTANT

## 2021-06-21 PROCEDURE — 36415 COLL VENOUS BLD VENIPUNCTURE: CPT | Performed by: PATHOLOGY

## 2021-06-21 ASSESSMENT — PAIN SCALES - GENERAL: PAINLEVEL: NO PAIN (0)

## 2021-06-21 ASSESSMENT — MIFFLIN-ST. JEOR: SCORE: 1722.6

## 2021-06-21 ASSESSMENT — LIFESTYLE VARIABLES: TOBACCO_USE: 0

## 2021-06-21 NOTE — ANESTHESIA PREPROCEDURE EVALUATION
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 Followed regularly by cardiologist Mayi.  Problem list name updated by automated process. Provider to review     Rotator cuff injury 2017     Sprain of other ligament of left ankle, initial encounter 2017     Status post left knee replacement 2018     UTERINE LEIOMYOMA NOS 10/25/2006      Past Surgical History:   Procedure Laterality Date     C BREAST SURGERY PROCEDURE UNLISTED      Breast Reduction     C  DELIVERY ONLY  10/85,     , Low Cervicalx2     C EXCIS UTERINE FIBROID,ABD APPRCH       C EXCISE EXCESS SKIN TISSUE,ABDOMEN       C LIGATE FALLOPIAN TUBE       C REPAIR CRUCIATE LIGAMENT,KNEE      Right Knee     C TOTAL KNEE ARTHROPLASTY Left 10/03/2017     COLONOSCOPY WITH CO2 INSUFFLATION N/A 2021    Procedure: COLONOSCOPY, WITH CO2 INSUFFLATION;  Surgeon: Angela Harrington DO;  Location: [facility] OR     DAVINCI SACROCOLPOPEXY, MIDURETHRAL SLING, CYSTOSCOPY       HYSTERECTOMY TOTAL ABDOMINAL      for fibroids; reports having blood transfusion  after surgery     THYROIDECTOMY  9/9/2013    Procedure: THYROIDECTOMY;  LEFT THYROID LOBECTOMY.  (LIGASURE, RECURRENT LARYNGEAL NERVE MONITOR) ;  Surgeon: Uriah Camargo MD;  Location: [facility]      Allergies   Allergen Reactions     Morphine      EMESIS     Nickel      Sulfa Drugs Swelling      Social History     Tobacco Use     Smoking status: Never Smoker     Smokeless tobacco: Never Used   Substance Use Topics     Alcohol use: Yes     Comment: rare, twice a year, she has a drink little wine 2 days ago      Wt Readings from Last 1 Encounters:   06/17/21 111.1 kg (245 lb)        Anesthesia Evaluation   Pt has had prior anesthetic.     History of anesthetic complications  - PONV.  coughed while sedated with MAC, converted to GA with LMA 8/18/2020.    ROS/MED HX  ENT/Pulmonary: Comment: S/p right myringotomy with PE tube 7/3/2020    (+) KATIA risk factors, snores loudly, hypertension, obese, observed stopped breathing,  (-) tobacco use   Neurologic:  - neg neurologic ROS     Cardiovascular:     (+) hypertension-----CHF etiology: familial cardiomyopathy Last EF: 40% date: 6/16/21 Previous cardiac testing   Echo: Date: 6/16/21 Results:    Stress Test: Date: Results:    ECG Reviewed: Date: 6/16/21 Results:    Cath: Date: Results:      METS/Exercise Tolerance: >4 METS    Hematologic:     (+) history of blood transfusion, no previous transfusion reaction,  (-) history of blood clots   Musculoskeletal: Comment: S/p Right TKA with revision 9/2020  Left TKA and revision      GI/Hepatic: Comment: Bowel incontinence, in pelvic floor therapy    (+) GERD, Symptomatic,  (-) liver disease   Renal/Genitourinary: Comment: S/p midurethral sling 8/18/20      Endo: Comment: H/o thyroid nodule, s/p partial thyroidectomy 2013    (+) Obesity,     Psychiatric/Substance Use:     (+) psychiatric history anxiety and depression     Infectious Disease: Comment: Latent TB, s/p treatment 2012      Malignancy:  - neg malignancy ROS     Other:             Physical Exam    Airway        Mallampati: III   TM distance: > 3 FB   Neck ROM: full   Mouth opening: > 3 cm    Respiratory Devices and Support         Dental  no notable dental history         Cardiovascular   cardiovascular exam normal       Rhythm and rate: regular and normal     Pulmonary   pulmonary exam normal        breath sounds clear to auscultation           OUTSIDE LABS:  CBC:   Lab Results   Component Value Date    WBC 6.5 09/08/2020    WBC 6.4 06/03/2020    HGB 12.5 09/08/2020    HGB 12.7 06/03/2020    HCT 38.8 09/08/2020    HCT 39.1 06/03/2020     09/08/2020     06/03/2020     BMP:   Lab Results   Component Value Date     06/16/2021     02/17/2021    POTASSIUM 4.1 06/16/2021    POTASSIUM 4.0 02/17/2021    CHLORIDE 104 06/16/2021    CHLORIDE 108 02/17/2021    CO2 28 06/16/2021    CO2 27 02/17/2021    BUN 14 06/16/2021    BUN 19 02/17/2021    CR 0.86 06/16/2021    CR 0.88 02/17/2021     (H) 06/16/2021    GLC 54 (L) 02/17/2021     COAGS:   Lab Results   Component Value Date    PTT 29 04/19/2013    INR 0.97 09/18/2019     POC:   Lab Results   Component Value Date     (H) 09/10/2013    HCG Negative 06/15/2009     HEPATIC:   Lab Results   Component Value Date    ALBUMIN 3.4 08/12/2020    PROTTOTAL 7.5 08/12/2020    ALT 35 08/12/2020    AST 20 08/12/2020    GGT <10 08/04/2011    ALKPHOS 70 08/12/2020    BILITOTAL 0.3 08/12/2020    CHARLIE 2 (L) 11/30/2005     OTHER:   Lab Results   Component Value Date    LACT 1.2 09/18/2019    A1C 5.7 (H) 08/12/2020    CARMEN 9.0 06/16/2021    MAG 1.8 09/18/2019    LIPASE 173 10/14/2006    AMYLASE 82 10/14/2006    TSH 2.10 06/03/2020    T4 1.04 06/03/2020    CRP <2.9 03/13/2020    SED 30 03/13/2020             PAC Discussion and Assessment    ASA Classification: 3  Case is suitable for: Dille  Anesthetic techniques and relevant risks discussed: GA  Invasive monitoring and risk discussed: No    Possibility and Risk of blood  "transfusion discussed: Yes            PAC Resident/NP Anesthesia Assessment: Marleen Mcfadden is a 55 year old female scheduled for Right CRANIOTOMY, MIDDLE FOSSA APPROACH, FOR REPAIR OF ENCEPHALOCELE on 7/5/21 by Dr. Harrell and Dr. Noe in treatment of CSF otorrhea.  PAC referral for risk assessment and optimization for anesthesia:    Pre-operative considerations:  1.  Cardiac:  Functional status- METS >4.  Intermediate risk surgery with 0.9% (RCRI #) risk of major adverse cardiac event.  Pt denies chest pain, SOB, CHAVARRIA, palpitations.  Endorses LE edema.  --familial cardiomyopathy, followed by Dr. Kolb.  Echo 6/16/21: EF 40%, severe LV dilation, severe diffuse hypokinesis.  EKG 6/16/21: SB ~ 49bpm  --per cardiology note 6/16/21 \"No further cardiac work up required before proceeding for surgery\"  --HTN, 132/81 today  --hold losartan DOS, continue metoprolol, lasix, spironolactone    2.  Pulm:  Airway feasible.  KATIA risk: High, has upcoming sleep study for evaluation.  States she has been told she has stopped breathing briefly when she sleeps.  --never smoker  --pt has chronic cough, told it was due to GERD  --latent TB treated 2012  --PFTs 8/24/2020: FEV1 1.73L, 72%, DLCO 86%  --pt does not use any inhaler therapies.  Continue Flonase and Claritin.    3.  GI:  Risk of PONV score = 4.  If > 2, anti-emetic intervention recommended.  Pt has hx of PONV  --GERD, continue Pepcid and omeprazole  --some fecal incontinence, participating in pelvic floor PT    4.  Endo:  --hx of thyroid nodules s/p partial thyroidectomy 2013    5.  Psych:  --anxiety and depression, continue zoloft  --Elavil and Ambien at HS    6.  :  --h/o stress incontinence, s/p miduretheral sling 8/18/2020  --continue Vesicare    7.  Anesthesia:  --PONV  --pt converted from MAC to GA with LMA during mid-urethral sling procedure 8/18/2020 for coughing while sedated  --pt reports 'slow to wake'  --pt reports she woke during a knee surgery    VTE risk: " 0.5%    Patient is optimized and is acceptable candidate for the proposed procedure.  No further diagnostic evaluation is needed.         **For further details of assessment, testing, and physical exam please see H and P completed on same date.          Lisa Lunsford PA-C, [facility]    Reviewed and Signed by PAC Mid-Level Provider/Resident  Mid-Level Provider/Resident: Lisa Lunsford  Date: 6/21/21      Attending Anesthesiologist Anesthesia Assessment: Agree  Reviewed and Signed by PAC Anesthesiologist  Anesthesiologist: davi  Date: 6/21/21                     Lisa Lunsford PA-C

## 2021-06-21 NOTE — PATIENT INSTRUCTIONS
Preparing for Your Surgery      Name:  Marleen Mcfadden   MRN:  [id number]   :  1965   Today's Date:  2021       Arriving for surgery:  Surgery date:  21  Arrival time:  5:30AM    Restrictions due to COVID 19:       One visitor is allowed in the Pre Op area. When you go into surgery, one visitor is allowed to wait in the Surgery Waiting Room       (provided there is enough space to social distance).   After surgery- Two visitors are allowed at a time if you have a private room and one visitor is allowed for those in a semi-private room.   Every 4 days the visitor(s) can rotate. During the 4 day period, the visitor(s) must be consistent. No visitors under the age of 18 years old.   Visiting Hours: 8 am - 8:30 pm   No ill visitors.   All visitors must wear face mask.    [employer] parking is available for anyone with mobility limitations or disabilities.  ([place]  24 hours/ 7 days a week; [facility]  7 am- 3:30 pm, Mon- Fri)    Please come to:     [facility] [address]  [address] [place] [address]    When entering the hospital you will be asked COVID screening questions, you will then be directed to Security and registration.  Registration will direct you to the correct lobby to wait until escorted to the preop area. Preop number- [phone]?     What can I eat or drink?  -  You may eat and drink normally for up to 8 hours before your surgery. (Until 21, 11:30PM)  -  You may have clear liquids until 2 hours before surgery. (Until 21, 5:30AM)    Examples of clear liquids:  Water  Clear broth  Juices (apple, white grape, white cranberry  and cider) without pulp  Noncarbonated, powder based beverages  (lemonade and Niels-Aid)  Sodas (Sprite, 7-Up, ginger ale and seltzer)  Coffee or tea (without milk or cream)  Gatorade    -  No Alcohol for at least 24 hours before surgery     Which medicines can I take?    Hold Aspirin for 7  days before surgery.   Hold Multivitamins for 7 days before surgery.  Hold Supplements, Collagen and Flax seed for 7 days before surgery.  Hold Ibuprofen (Advil, Motrin) for 1 day before surgery--unless otherwise directed by surgeon.  Hold Naproxen (Aleve) for 4 days before surgery.    -  DO NOT take these medications the day of surgery:    Vitamin D       Losartan(Cozaar)       -  PLEASE TAKE these medications the day of surgery:    Loratadine(Claritin)   Metoprolol    Omeprazole(Prilosec)  Sertraline(Zoloft)    Solifenacin(Vesicare)   Famotidine(Pepcid) as needed    Spironolactone(Aldactone)  Furosemide(Lasix)    How do I prepare myself?  - Please take 2 showers before surgery using Scrubcare or Hibiclens soap.    Use this soap only from the neck to your toes.     Leave the soap on your skin for one minute--then rinse thoroughly.      You may use your own shampoo and conditioner; no other hair products.   - Please remove all jewelry and body piercings.  - No lotions, deodorants or fragrance.  - No makeup or fingernail polish.   - Bring your ID and insurance card.    - All patients are required to have a Covid-19 test within 4 days of surgery/procedure.      -Patients will be contacted by the [facility] scheduling team within 1 week of surgery to make an appointment.      - Patients may call the Scheduling team at [phone] if they have not been scheduled within 4 days of  surgery.        Questions or Concerns:    - For any questions regarding the day of surgery or your hospital stay, please contact the Pre Admission Nursing Office at [phone].       - If you have health changes between today and your surgery please call your surgeon.       For questions after surgery please call your surgeons office.
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
## 2021-06-23 ENCOUNTER — NURSE TRIAGE (OUTPATIENT)
Dept: NURSING | Facility: CLINIC | Age: 56
End: 2021-06-23

## 2021-06-23 NOTE — ANESTHESIA PROCEDURE NOTES
Peripheral Block    Patient location during procedure: pre-op  Start time: 1/17/2019 12:53 PM  End time: 1/17/2019 12:55 PM  post-op analgesia per surgeon order as noted in medical record  Staffing:  Performing  Anesthesiologist: Mac Hung MD  Preanesthetic Checklist  Completed: patient identified, site marked, risks, benefits, and alternatives discussed, timeout performed, consent obtained, airway assessed, oxygen available, suction available, emergency drugs available and hand hygiene performed  Peripheral Block  Block type: saphenous, adductor canal block  Prep: ChloraPrep  Patient position: supine  Patient monitoring: cardiac monitor, continuous pulse oximetry, heart rate and blood pressure  Laterality: left  Injection technique: ultrasound guided    Ultrasound used to visualize needle placement in proximity to nerve being blocked: yes   Permanent ultrasound image captured for medical record  Sterile gel and probe cover used for ultrasound.    Needle  Needle type: Stimuplex   Needle gauge: 20G  Needle length: 6 in  no peripheral nerve catheter placed  Assessment  Injection assessment: no difficulty with injection, negative aspiration for heme, no paresthesia on injection and incremental injection

## 2021-06-23 NOTE — ANESTHESIA POSTPROCEDURE EVALUATION
Patient: Marleen Mcfadden  RIGHT TOTAL KNEE ARTHROPLASTY  Anesthesia type: spinal    Patient location: PACU  Last vitals:   Vitals:    02/07/19 1920   BP: 111/71   Pulse: 61   Resp: 16   Temp: 36.7  C (98  F)   SpO2: 95%     Post vital signs: stable  Level of consciousness: awake and responds to simple questions  Post-anesthesia pain: pain controlled  Post-anesthesia nausea and vomiting: no  Pulmonary: unassisted, return to baseline  Cardiovascular: stable and blood pressure at baseline  Hydration: adequate  Anesthetic events: no    QCDR Measures:  ASA# 11 - Jayne-op Cardiac Arrest: ASA11B - Patient did NOT experience unanticipated cardiac arrest  ASA# 12 - Ajyne-op Mortality Rate: ASA12B - Patient did NOT die  ASA# 13 - PACU Re-Intubation Rate: ASA13B - Patient did NOT require a new airway mgmt  ASA# 10 - Composite Anes Safety: ASA10A - No serious adverse event    Additional Notes:

## 2021-06-23 NOTE — ANESTHESIA POSTPROCEDURE EVALUATION
Patient: Marleen BRYAN Bogdan  REVISION, LEFT TOTAL KNEE  ARTHROPLASTY  Anesthesia type: spinal    Patient location: PACU  Last vitals:   Vitals:    01/17/19 1610   BP: 113/57   Pulse: 82   Resp: 11   Temp: 36.6  C (97.9  F)   SpO2: 99%     Post vital signs: stable  Level of consciousness: awake and responds to simple questions  Post-anesthesia pain: pain controlled  Post-anesthesia nausea and vomiting: no  Pulmonary: unassisted, return to baseline  Cardiovascular: stable and blood pressure at baseline  Hydration: adequate  Anesthetic events: no    QCDR Measures:  ASA# 11 - Jayne-op Cardiac Arrest: ASA11B - Patient did NOT experience unanticipated cardiac arrest  ASA# 12 - Jayne-op Mortality Rate: ASA12B - Patient did NOT die  ASA# 13 - PACU Re-Intubation Rate: NA - No ETT / LMA used for case  ASA# 10 - Composite Anes Safety: ASA10A - No serious adverse event    Additional Notes: doing well, comfortable, no nausea

## 2021-06-23 NOTE — ANESTHESIA CARE TRANSFER NOTE
Last vitals:   Vitals:    01/17/19 1537   BP: 98/51   Pulse: (!) 102   Resp: 16   Temp: 36.8  C (98.2  F)   SpO2: 98%     Patient's level of consciousness is awake  Spontaneous respirations: yes  Maintains airway independently: yes  Dentition unchanged: yes  Oropharynx: oropharynx clear of all foreign objects    QCDR Measures:  ASA# 20 - Surgical Safety Checklist: WHO surgical safety checklist completed prior to induction    PQRS# 430 - Adult PONV Prevention: 4558F - Pt received => 2 anti-emetic agents (different classes) preop & intraop  ASA# 8 - Peds PONV Prevention: NA - Not pediatric patient, not GA or 2 or more risk factors NOT present  PQRS# 424 - Jayne-op Temp Management: 4559F - At least one body temp DOCUMENTED => 35.5C or 95.9F within required timeframe  PQRS# 426 - PACU Transfer Protocol: - Transfer of care checklist used  ASA# 14 - Acute Post-op Pain: ASA14B - Patient did NOT experience pain >= 7 out of 10

## 2021-06-23 NOTE — TELEPHONE ENCOUNTER
Patient asking if writer could send her negative COVID-19 letter via email. Advised cannot do that. Advised she can print it if she uses [employer] on a laptop.    Patient verbalized understanding.    Additional Information    Negative: RN needs further essential information from caller in order to complete triage    Negative: Requesting regular office appointment    Negative: [1] Caller requesting NON-URGENT health information AND [2] PCP's office is the best resource    General information question, no triage required and triager able to answer question    Negative: Health Information question, no triage required and triager able to answer question    Protocols used: INFORMATION ONLY CALL-A-

## 2021-06-23 NOTE — ANESTHESIA PROCEDURE NOTES
Spinal Block    Patient location during procedure: OR  Start time: 2/7/2019 3:43 PM  End time: 2/7/2019 3:48 PM  Reason for block: primary anesthetic    Staffing:  Performing  Anesthesiologist: Erica Rice MD    Preanesthetic Checklist  Completed: patient identified, risks, benefits, and alternatives discussed, timeout performed, consent obtained, airway assessed, oxygen available, suction available, emergency drugs available and hand hygiene performed  Spinal Block  Patient position: sitting  Prep: ChloraPrep and site prepped and draped  Patient monitoring: heart rate, continuous pulse ox and blood pressure  Approach: midline  Location: L2-3  Injection technique: single-shot  Needle gauge: 24 G

## 2021-06-23 NOTE — ANESTHESIA PROCEDURE NOTES
Peripheral Block    Patient location during procedure: pre-op  Start time: 1/17/2019 12:56 PM  End time: 1/17/2019 1:59 PM  post-op analgesia per surgeon order as noted in medical record  Staffing:  Performing  Anesthesiologist: Mac Hung MD  Preanesthetic Checklist  Completed: patient identified, site marked, risks, benefits, and alternatives discussed, timeout performed, consent obtained, airway assessed, oxygen available, suction available, emergency drugs available and hand hygiene performed  Peripheral Block  Block type: other, tibial  Prep: ChloraPrep  Patient position: supine  Patient monitoring: cardiac monitor, continuous pulse oximetry, heart rate and blood pressure  Laterality: left  Injection technique: ultrasound guided    Ultrasound used to visualize needle placement in proximity to nerve being blocked: yes   Permanent ultrasound image captured for medical record  Sterile gel and probe cover used for ultrasound.    Needle  Needle type: Stimuplex   Needle gauge: 20G  Needle length: 6 in  no peripheral nerve catheter placed  Assessment  Injection assessment: no difficulty with injection, negative aspiration for heme, no paresthesia on injection and incremental injection

## 2021-06-23 NOTE — ANESTHESIA PREPROCEDURE EVALUATION
Anesthesia Evaluation      Patient summary reviewed   History of anesthetic complications (ponv)     Airway   Mallampati: II  Neck ROM: full   Pulmonary - normal exam   (+) shortness of breath (at baseline),     ROS comment: Latent TB - negative quantiferon gold test 2012                         Cardiovascular - normal exam  Exercise tolerance: > or = 4 METS  (+) hypertension, CHF (currently well compensated), cardiomyopathy (familial CM - recent EF 30-35%),     ECG reviewed        Neuro/Psych      Endo/Other    (+) arthritis, obesity (bmi 41),      GI/Hepatic/Renal    (+) GERD well controlled,             Dental - normal exam                        Anesthesia Plan  Planned anesthetic: spinal and peripheral nerve block  SAB with pop/tibial blocks  Scop/decadron/zofran  Limit IV fluids, run phenylephrine infusion as needed to maintain BP.   ASA 3     Anesthetic plan and risks discussed with: patient  Anesthesia plan special considerations: antiemetics,   Post-op plan: routine recovery

## 2021-06-23 NOTE — ANESTHESIA PREPROCEDURE EVALUATION
Anesthesia Evaluation      Patient summary reviewed   History of anesthetic complications (PONV)     Airway   Mallampati: II  Neck ROM: full   Pulmonary - negative ROS and normal exam                          Cardiovascular - negative ROS and normal exam  (+) hypertension well controlled, CHF (EF 30%), ,      Neuro/Psych - negative ROS   (+) chronic pain    Endo/Other - negative ROS   (+) obesity,      GI/Hepatic/Renal - negative ROS           Dental - normal exam                        Anesthesia Plan  Planned anesthetic: spinal and peripheral nerve block  Peripheral nerve block for post-op analgesia as ordered by surgeon.  Saphenous nerve block.  ASA 3     Anesthetic plan and risks discussed with: patient    Post-op plan: routine recovery

## 2021-06-23 NOTE — ANESTHESIA PROCEDURE NOTES
Peripheral Block    Patient location during procedure: pre-op  Start time: 2/7/2019 2:52 PM  End time: 2/7/2019 2:57 PM  post-op analgesia per surgeon order as noted in medical record  Staffing:  Performing  Anesthesiologist: Juan C Hurst MD  Preanesthetic Checklist  Completed: patient identified, site marked, risks, benefits, and alternatives discussed, timeout performed, consent obtained, airway assessed, oxygen available, suction available, emergency drugs available and hand hygiene performed  Peripheral Block  Block type: saphenous, adductor canal block  Prep: ChloraPrep  Patient position: supine  Patient monitoring: blood pressure, heart rate, continuous pulse oximetry and cardiac monitor  Laterality: right  Injection technique: ultrasound guided    Ultrasound used to visualize needle placement in proximity to nerve being blocked: yes   Permanent ultrasound image captured for medical record  Sterile gel and probe cover used for ultrasound.    Needle  Needle type: Stimuplex   Needle gauge: 20G  Needle length: 6 in  no peripheral nerve catheter placed  Assessment  Injection assessment: negative aspiration for heme, no paresthesia on injection, incremental injection and no difficulty with injection  Additional Notes  15ml 0.5% bupivacaine w/ epi 1:200K injected for nerve block.

## 2021-06-23 NOTE — ANESTHESIA CARE TRANSFER NOTE
Last vitals:   Vitals:    02/07/19 1716   BP: 126/56   Pulse: 75   Resp: 14   Temp: 36.6  C (97.8  F)   SpO2: 98%     Patient's level of consciousness is awake  Spontaneous respirations: yes  Maintains airway independently: yes  Dentition unchanged: yes  Oropharynx: oropharynx clear of all foreign objects    QCDR Measures:  ASA# 20 - Surgical Safety Checklist: WHO surgical safety checklist completed prior to induction    PQRS# 430 - Adult PONV Prevention: 4558F - Pt received => 2 anti-emetic agents (different classes) preop & intraop  ASA# 8 - Peds PONV Prevention: NA - Not pediatric patient, not GA or 2 or more risk factors NOT present  PQRS# 424 - Jayne-op Temp Management: 4559F - At least one body temp DOCUMENTED => 35.5C or 95.9F within required timeframe  PQRS# 426 - PACU Transfer Protocol: - Transfer of care checklist used  ASA# 14 - Acute Post-op Pain: ASA14B - Patient did NOT experience pain >= 7 out of 10

## 2021-07-02 ENCOUNTER — ANESTHESIA EVENT (OUTPATIENT)
Dept: SURGERY | Facility: CLINIC | Age: 56
DRG: 026 | End: 2021-07-02
Payer: MEDICARE

## 2021-07-02 ENCOUNTER — ALLIED HEALTH/NURSE VISIT (OUTPATIENT)
Dept: NURSING | Facility: CLINIC | Age: 56
End: 2021-07-02
Payer: MEDICARE

## 2021-07-02 DIAGNOSIS — Z11.59 ENCOUNTER FOR SCREENING FOR OTHER VIRAL DISEASES: ICD-10-CM

## 2021-07-02 DIAGNOSIS — Z23 IMMUNIZATION DUE: Primary | ICD-10-CM

## 2021-07-02 LAB
LABORATORY COMMENT REPORT: NORMAL
SARS-COV-2 RNA RESP QL NAA+PROBE: NEGATIVE
SARS-COV-2 RNA RESP QL NAA+PROBE: NORMAL
SPECIMEN SOURCE: NORMAL
SPECIMEN SOURCE: NORMAL

## 2021-07-02 PROCEDURE — U0005 INFEC AGEN DETEC AMPLI PROBE: HCPCS | Performed by: OTOLARYNGOLOGY

## 2021-07-02 PROCEDURE — U0003 INFECTIOUS AGENT DETECTION BY NUCLEIC ACID (DNA OR RNA); SEVERE ACUTE RESPIRATORY SYNDROME CORONAVIRUS 2 (SARS-COV-2) (CORONAVIRUS DISEASE [COVID-19]), AMPLIFIED PROBE TECHNIQUE, MAKING USE OF HIGH THROUGHPUT TECHNOLOGIES AS DESCRIBED BY CMS-2020-01-R: HCPCS | Performed by: OTOLARYNGOLOGY

## 2021-07-02 PROCEDURE — 90732 PPSV23 VACC 2 YRS+ SUBQ/IM: CPT

## 2021-07-02 PROCEDURE — 90471 IMMUNIZATION ADMIN: CPT

## 2021-07-02 ASSESSMENT — LIFESTYLE VARIABLES: TOBACCO_USE: 0

## 2021-07-02 NOTE — ANESTHESIA PREPROCEDURE EVALUATION
Anesthesia Pre-Procedure Evaluation    Patient: Marleen Mcfadden   MRN: [id number] : 1965        Preoperative Diagnosis: CSF otorrhea [G96.01]   Procedure : Procedure(s):  Right CRANIOTOMY, MIDDLE FOSSA APPROACH, FOR REPAIR OF ENCEPHALOCELE     Past Medical History:   Diagnosis Date     Allergic rhinitis, cause unspecified      Anemia      Anemia      Arthritis      Autoimmune disease (H)      Autoimmune disease NEC     Autoimmune disease- unknown/poss SLE     Shaina's node      Calcaneal spur 10/21/2014     Calcaneal spur 10/21/2014    Xray 10/17/14      CHF (congestive heart failure) (H)      CHF with cardiomyopathy (H)      Chronic low back pain      DDD (degenerative disc disease), lumbar      Depression      Familial cardiomyopathy (H) 10/21/2015     Familial cardiomyopathy (H)      GERD without esophagitis 2019     History of transfusion      Hormone replacement therapy 2019     Hypertension      Morbid obesity (H) 2015     Muscle spasm 2019     Nontraumatic rupture of quadriceps tendon, left 2018     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 Followed regularly by cardiologist Mayi.  Problem list name updated by automated process. Provider to review     Rotator cuff injury 2017     Sinus bradycardia      Sprain of other ligament of left ankle, initial encounter 2017     Status post left knee replacement 2018     TB lung, latent     negative quantiferon gold test  12     UTERINE LEIOMYOMA NOS 10/25/2006      Past Surgical History:   Procedure Laterality Date     ARTHROPLASTY KNEE Left      ARTHROPLASTY REVISION KNEE Left  2019    Procedure: REVISION, LEFT TOTAL KNEE  ARTHROPLASTY;  Surgeon: Dev Rocha MD;  Location: [facility] OR;  Service: Orthopedics     ARTHROPLASTY REVISION KNEE Right 2020    Procedure: RIGHT REVISION TOTAL KNEE ARTHROPLASTY;  Surgeon: Dev Rocha MD;  Location: [facility];  Service: Orthopedics     ARTHROSCOPIC REPAIR ACL       BREAST SURGERY       C BREAST SURGERY PROCEDURE UNLISTED      Breast Reduction     C  DELIVERY ONLY  10/85,     , Low Cervicalx2     C EXCIS UTERINE FIBROID,ABD APPRCH       C EXCISE EXCESS SKIN TISSUE,ABDOMEN       C LIGATE FALLOPIAN TUBE       C REPAIR CRUCIATE LIGAMENT,KNEE      Right Knee     C TOTAL KNEE ARTHROPLASTY Left 10/03/2017     C TOTAL KNEE ARTHROPLASTY Right 2019    Procedure: RIGHT TOTAL KNEE ARTHROPLASTY;  Surgeon: Dev Rocha MD;  Location: [facility];  Service: Orthopedics      SECTION        SECTION       COLONOSCOPY WITH CO2 INSUFFLATION N/A 2021    Procedure: COLONOSCOPY, WITH CO2 INSUFFLATION;  Surgeon: Angela Harrington DO;  Location:  OR     CYSTOURETHROSCOPY N/A 2020    Procedure: CYSTOSCOPY;  Surgeon: Cherelle Willams MD;  Location: [facility];  Service: Gynecology     DAVINCI SACROCOLPOPEXY, MIDURETHRAL SLING, CYSTOSCOPY       HC SLING OPERATION FOR STRESS INCONTINENCE N/A 2020    Procedure: MIDURETHRAL SLING, CYSTOSCOPY;  Surgeon: Cherelle Willams MD;  Location: [facility];  Service: Gynecology     HYSTERECTOMY       HYSTERECTOMY TOTAL ABDOMINAL      for fibroids; reports having blood transfusion after surgery     JOINT REPLACEMENT Left     knee     THYROIDECTOMY  2013    Procedure: THYROIDECTOMY;  LEFT THYROID LOBECTOMY.  (LIGASURE, RECURRENT LARYNGEAL NERVE MONITOR) ;  Surgeon: Uriah Camargo MD;  Location: [facility]     THYROIDECTOMY       TUBAL LIGATION        Allergies    Allergen Reactions     Morphine      EMESIS     Nickel      Sulfa Drugs Swelling      Social History     Tobacco Use     Smoking status: Never Smoker     Smokeless tobacco: Never Used   Substance Use Topics     Alcohol use: Yes     Comment: rare, twice a year, she has a drink little wine 2 days ago      Wt Readings from Last 1 Encounters:   06/21/21 114.8 kg (253 lb)        Anesthesia Evaluation   Pt has had prior anesthetic.     History of anesthetic complications  - PONV.  coughed while sedated with MAC, converted to GA with LMA 8/18/2020.    ROS/MED HX  ENT/Pulmonary: Comment: S/p right myringotomy with PE tube 7/3/2020    (+) KATIA risk factors, snores loudly, hypertension, obese, observed stopped breathing,  (-) tobacco use   Neurologic:  - neg neurologic ROS     Cardiovascular:     (+) hypertension-----CHF etiology: familial cardiomyopathy Last EF: 40% date: 6/16/21 Previous cardiac testing   Echo: Date: 6/16/21 Results:    Stress Test: Date: Results:    ECG Reviewed: Date: 6/16/21 Results:    Cath: Date: Results:      METS/Exercise Tolerance: >4 METS    Hematologic:     (+) history of blood transfusion, no previous transfusion reaction,  (-) history of blood clots   Musculoskeletal: Comment: S/p Right TKA with revision 9/2020  Left TKA and revision      GI/Hepatic: Comment: Bowel incontinence, in pelvic floor therapy    (+) GERD, Symptomatic,  (-) liver disease   Renal/Genitourinary: Comment: S/p midurethral sling 8/18/20      Endo: Comment: H/o thyroid nodule, s/p partial thyroidectomy 2013    (+) Obesity,     Psychiatric/Substance Use:     (+) psychiatric history anxiety and depression     Infectious Disease: Comment: Latent TB, s/p treatment 2012      Malignancy:  - neg malignancy ROS     Other:            Physical Exam    Airway  airway exam normal      Mallampati: II   TM distance: > 3 FB   Neck ROM: full   Mouth opening: > 3 cm    Respiratory Devices and Support         Dental  no notable dental history          Cardiovascular          Rhythm and rate: regular and normal     Pulmonary   pulmonary exam normal                OUTSIDE LABS:  CBC:   Lab Results   Component Value Date    WBC 7.5 06/21/2021    WBC 6.5 09/08/2020    HGB 12.4 06/21/2021    HGB 12.5 09/08/2020    HCT 37.6 06/21/2021    HCT 38.8 09/08/2020     06/21/2021     09/08/2020     BMP:   Lab Results   Component Value Date     06/16/2021     02/17/2021    POTASSIUM 4.1 06/16/2021    POTASSIUM 4.0 02/17/2021    CHLORIDE 104 06/16/2021    CHLORIDE 108 02/17/2021    CO2 28 06/16/2021    CO2 27 02/17/2021    BUN 14 06/16/2021    BUN 19 02/17/2021    CR 0.86 06/16/2021    CR 0.88 02/17/2021     (H) 06/16/2021    GLC 54 (L) 02/17/2021     COAGS:   Lab Results   Component Value Date    PTT 29 04/19/2013    INR 0.97 09/18/2019     POC:   Lab Results   Component Value Date     (H) 09/10/2013    HCG Negative 06/15/2009     HEPATIC:   Lab Results   Component Value Date    ALBUMIN 3.4 08/12/2020    PROTTOTAL 7.5 08/12/2020    ALT 35 08/12/2020    AST 20 08/12/2020    GGT <10 08/04/2011    ALKPHOS 70 08/12/2020    BILITOTAL 0.3 08/12/2020    CHARLIE 2 (L) 11/30/2005     OTHER:   Lab Results   Component Value Date    LACT 1.2 09/18/2019    A1C 5.5 06/21/2021    CARMEN 9.0 06/16/2021    MAG 1.8 09/18/2019    LIPASE 173 10/14/2006    AMYLASE 82 10/14/2006    TSH 2.10 06/03/2020    T4 1.04 06/03/2020    CRP <2.9 03/13/2020    SED 30 03/13/2020       Anesthesia Plan    ASA Status:  3      Anesthesia Type: General.     - Airway: ETT   Induction: RSI.   Maintenance: Balanced.   Techniques and Equipment:     - Lines/Monitors: 2nd IV, Arterial Line     Consents    Anesthesia Plan(s) and associated risks, benefits, and realistic alternatives discussed. Questions answered and patient/representative(s) expressed understanding.     - Discussed with:  Patient      - Extended Intubation/Ventilatory Support Discussed: No.      - Patient is  DNR/DNI Status: No    Use of blood products discussed: No .     Postoperative Care    Pain management: IV analgesics.   PONV prophylaxis: Ondansetron (or other 5HT-3), Dexamethasone or Solumedrol     Comments:                Justen Lundberg MD

## 2021-07-02 NOTE — NURSING NOTE
Prior to immunization administration, verified patients identity using patient s name and date of birth. Please see Immunization Activity for additional information.     Screening Questionnaire for Adult Immunization    Are you sick today?   No   Do you have allergies to medications, food, a vaccine component or latex?   No   Have you ever had a serious reaction after receiving a vaccination?   No   Do you have a long-term health problem with heart, lung, kidney, or metabolic disease (e.g., diabetes), asthma, a blood disorder, no spleen, complement component deficiency, a cochlear implant, or a spinal fluid leak?  Are you on long-term aspirin therapy?   No   Do you have cancer, leukemia, HIV/AIDS, or any other immune system problem?   No   Do you have a parent, brother, or sister with an immune system problem?   No   In the past 3 months, have you taken medications that affect  your immune system, such as prednisone, other steroids, or anticancer drugs; drugs for the treatment of rheumatoid arthritis, Crohn s disease, or psoriasis; or have you had radiation treatments?   No   Have you had a seizure, or a brain or other nervous system problem?   No   During the past year, have you received a transfusion of blood or blood    products, or been given immune (gamma) globulin or antiviral drug?   No   For women: Are you pregnant or is there a chance you could become       pregnant during the next month?   No   Have you received any vaccinations in the past 4 weeks?   No     Immunization questionnaire answers were all negative.        Per orders of Leticia Gardner, injection of PPSV23 given by Virgie Francis. Patient instructed to remain in clinic for 15 minutes afterwards, and to report any adverse reaction to me immediately.       Screening performed by Virgie Francis on 7/2/2021 at 11:18 AM.

## 2021-07-03 NOTE — ANESTHESIA PREPROCEDURE EVALUATION
Anesthesia Preprocedure Evaluation by Mac Hung MD at 8/18/2020  7:44 AM     Author: Mac Hung MD Service: -- Author Type: Physician    Filed: 8/18/2020  7:48 AM Date of Service: 8/18/2020  7:44 AM Status: Signed    : Mac Hung MD (Physician)       Anesthesia Evaluation      Patient summary reviewed   History of anesthetic complications (ponv)     Airway   Mallampati: III  Neck ROM: full   Pulmonary      ROS comment: Chronic cough  Latent TB negative quantiferon gold 2012  PE comment: distant                         Cardiovascular - normal exam  Exercise tolerance: > or = 4 METS  (+) hypertension well controlled, CHF, cardiomyopathy (familial CM),     ECG reviewed        Neuro/Psych - negative ROS     Endo/Other    (+) arthritis, obesity (bmi 43),      Comments: Autoimmune dz ?SLE?    GI/Hepatic/Renal    (+) GERD well controlled,             Dental    (+) chipped                           Anesthesia Plan  Planned anesthetic: MAC  Versed/fentanyl/propofol  Ketamine PRN  Decadron/zofran    ASA 3   Induction: intravenous   Anesthetic plan and risks discussed with: patient  Anesthesia plan special considerations: antiemetics,   Post-op plan: routine recovery    covid pcr negative 8/14/2020      Echo 08/12/2020  Interpretation Summary  LVIDd 68mm.  The Ejection Fraction is estimated at 40-45%.  Right ventricular function, chamber size, wall motion, and thickness are  normal.  Pulmonary artery systolic pressure cannot be assessed.  The inferior vena cava is normal.  No pericardial effusion is present.  Mild improvement in LV function since previous study.

## 2021-07-03 NOTE — ANESTHESIA PREPROCEDURE EVALUATION
Anesthesia Preprocedure Evaluation by Kem Pryor MD at 9/11/2020  9:10 AM     Author: Kem Pryor MD Service: -- Author Type: Physician    Filed: 9/11/2020 10:16 AM Date of Service: 9/11/2020  9:10 AM Status: Addendum    : Kem Pryor MD (Physician)    Related Notes: Original Note by Kem Pryor MD (Physician) filed at 9/11/2020  9:12 AM       Anesthesia Evaluation      Patient summary reviewed   History of anesthetic complications (ponv)     Airway   Mallampati: III  Neck ROM: full   Pulmonary      ROS comment: Chronic cough  Latent TB negative quantiferon gold 2012  PE comment: distant                         Cardiovascular - normal exam  Exercise tolerance: > or = 4 METS  (+) hypertension, CHF, cardiomyopathy (familial CM),     ECG reviewed     ROS comment: EF 40%     Neuro/Psych - negative ROS   (+) chronic pain    Endo/Other    (+) arthritis, obesity (bmi 43),      Comments: Autoimmune dz ?SLE?    GI/Hepatic/Renal    (+) GERD,             Dental    (+) chipped                             Anesthesia Plan  Planned anesthetic: spinal  SAB with adductor canal block/ pt states she coughs in her sleep. If that occurs we will increase the propofol rate and place an LMA    Discussed rare risks of bleeding, infection, nerve damage, failure and LAST. We discussed risks of headache, failure and low blood pressure. Pt wishes to proceed  Plan for right AC nerve block PSR for POPC. Pt understands risks benefits and alternatives and wishes to proceed.   ASA 3   Induction: intravenous   Anesthetic plan and risks discussed with: patient  Anesthesia plan special considerations: antiemetics,   Post-op plan: routine recovery    covid pcr negative 8/14/2020      Echo 08/12/2020  Interpretation Summary  LVIDd 68mm.  The Ejection Fraction is estimated at 40-45%.  Right ventricular function, chamber size, wall motion, and thickness are  normal.  Pulmonary artery systolic pressure cannot be  assessed.  The inferior vena cava is normal.  No pericardial effusion is present.  Mild improvement in LV function since previous study.

## 2021-07-05 ENCOUNTER — HOSPITAL ENCOUNTER (INPATIENT)
Facility: CLINIC | Age: 56
LOS: 9 days | Discharge: HOME | DRG: 026 | End: 2021-07-14
Attending: OTOLARYNGOLOGY | Admitting: NEUROLOGICAL SURGERY
Payer: MEDICARE

## 2021-07-05 ENCOUNTER — ANESTHESIA (OUTPATIENT)
Dept: SURGERY | Facility: CLINIC | Age: 56
DRG: 026 | End: 2021-07-05
Payer: MEDICARE

## 2021-07-05 ENCOUNTER — APPOINTMENT (OUTPATIENT)
Dept: GENERAL RADIOLOGY | Facility: CLINIC | Age: 56
DRG: 026 | End: 2021-07-05
Attending: NEUROLOGICAL SURGERY
Payer: MEDICARE

## 2021-07-05 DIAGNOSIS — T84.018A FAILED TOTAL KNEE ARTHROPLASTY, INITIAL ENCOUNTER (H): Primary | ICD-10-CM

## 2021-07-05 DIAGNOSIS — G96.01 CSF OTORRHEA: ICD-10-CM

## 2021-07-05 DIAGNOSIS — Z96.659 FAILED TOTAL KNEE ARTHROPLASTY, INITIAL ENCOUNTER (H): Primary | ICD-10-CM

## 2021-07-05 LAB
ABO + RH BLD: NORMAL
ABO + RH BLD: NORMAL
BASE DEFICIT BLDA-SCNC: 1.5 MMOL/L
BLD GP AB SCN SERPL QL: NORMAL
BLOOD BANK CMNT PATIENT-IMP: NORMAL
BLOOD BANK CMNT PATIENT-IMP: NORMAL
CA-I BLD-MCNC: 4.4 MG/DL (ref 4.4–5.2)
GLUCOSE BLD-MCNC: 122 MG/DL (ref 70–99)
GLUCOSE BLDC GLUCOMTR-MCNC: 110 MG/DL (ref 70–99)
GLUCOSE BLDC GLUCOMTR-MCNC: 114 MG/DL (ref 70–99)
HCO3 BLD-SCNC: 23 MMOL/L (ref 21–28)
HGB BLD-MCNC: 12.1 G/DL (ref 11.7–15.7)
LACTATE BLD-SCNC: 1.8 MMOL/L (ref 0.7–2)
MAGNESIUM SERPL-MCNC: 1.8 MG/DL (ref 1.6–2.3)
O2/TOTAL GAS SETTING VFR VENT: 50 %
OXYHGB MFR BLD: 98 % (ref 92–100)
PCO2 BLD: 35 MM HG (ref 35–45)
PH BLD: 7.42 PH (ref 7.35–7.45)
PHOSPHATE SERPL-MCNC: 3.2 MG/DL (ref 2.5–4.5)
PO2 BLD: 144 MM HG (ref 80–105)
POTASSIUM BLD-SCNC: 4.4 MMOL/L (ref 3.4–5.3)
POTASSIUM SERPL-SCNC: 4.4 MMOL/L (ref 3.4–5.3)
SODIUM BLD-SCNC: 132 MMOL/L (ref 133–144)
SPECIMEN EXP DATE BLD: NORMAL

## 2021-07-05 PROCEDURE — 00B20ZZ EXCISION OF DURA MATER, OPEN APPROACH: ICD-10-PCS | Performed by: NEUROLOGICAL SURGERY

## 2021-07-05 PROCEDURE — 83605 ASSAY OF LACTIC ACID: CPT

## 2021-07-05 PROCEDURE — 009Y30Z DRAINAGE OF LUMBAR SPINAL CORD WITH DRAINAGE DEVICE, PERCUTANEOUS APPROACH: ICD-10-PCS | Performed by: NEUROLOGICAL SURGERY

## 2021-07-05 PROCEDURE — 250N000009 HC RX 250: Performed by: NURSE ANESTHETIST, CERTIFIED REGISTERED

## 2021-07-05 PROCEDURE — 83735 ASSAY OF MAGNESIUM: CPT | Performed by: STUDENT IN AN ORGANIZED HEALTH CARE EDUCATION/TRAINING PROGRAM

## 2021-07-05 PROCEDURE — 710N000010 HC RECOVERY PHASE 1, LEVEL 2, PER MIN: Performed by: OTOLARYNGOLOGY

## 2021-07-05 PROCEDURE — 258N000003 HC RX IP 258 OP 636: Performed by: NURSE ANESTHETIST, CERTIFIED REGISTERED

## 2021-07-05 PROCEDURE — 999N000141 HC STATISTIC PRE-PROCEDURE NURSING ASSESSMENT: Performed by: OTOLARYNGOLOGY

## 2021-07-05 PROCEDURE — 84132 ASSAY OF SERUM POTASSIUM: CPT

## 2021-07-05 PROCEDURE — 0KB00ZZ EXCISION OF HEAD MUSCLE, OPEN APPROACH: ICD-10-PCS | Performed by: NEUROLOGICAL SURGERY

## 2021-07-05 PROCEDURE — 250N000013 HC RX MED GY IP 250 OP 250 PS 637: Performed by: STUDENT IN AN ORGANIZED HEALTH CARE EDUCATION/TRAINING PROGRAM

## 2021-07-05 PROCEDURE — 258N000003 HC RX IP 258 OP 636: Performed by: STUDENT IN AN ORGANIZED HEALTH CARE EDUCATION/TRAINING PROGRAM

## 2021-07-05 PROCEDURE — 00U207Z SUPPLEMENT DURA MATER WITH AUTOLOGOUS TISSUE SUBSTITUTE, OPEN APPROACH: ICD-10-PCS | Performed by: NEUROLOGICAL SURGERY

## 2021-07-05 PROCEDURE — 250N000024 HC ISOFLURANE, PER MIN: Performed by: OTOLARYNGOLOGY

## 2021-07-05 PROCEDURE — 250N000011 HC RX IP 250 OP 636: Performed by: STUDENT IN AN ORGANIZED HEALTH CARE EDUCATION/TRAINING PROGRAM

## 2021-07-05 PROCEDURE — 370N000017 HC ANESTHESIA TECHNICAL FEE, PER MIN: Performed by: OTOLARYNGOLOGY

## 2021-07-05 PROCEDURE — 82810 BLOOD GASES O2 SAT ONLY: CPT

## 2021-07-05 PROCEDURE — 84132 ASSAY OF SERUM POTASSIUM: CPT | Performed by: STUDENT IN AN ORGANIZED HEALTH CARE EDUCATION/TRAINING PROGRAM

## 2021-07-05 PROCEDURE — 250N000013 HC RX MED GY IP 250 OP 250 PS 637: Performed by: ANESTHESIOLOGY

## 2021-07-05 PROCEDURE — 82947 ASSAY GLUCOSE BLOOD QUANT: CPT

## 2021-07-05 PROCEDURE — 250N000011 HC RX IP 250 OP 636: Performed by: OTOLARYNGOLOGY

## 2021-07-05 PROCEDURE — 999N000015 HC STATISTIC ARTERIAL MONITORING DAILY

## 2021-07-05 PROCEDURE — 999N000157 HC STATISTIC RCP TIME EA 10 MIN

## 2021-07-05 PROCEDURE — 360N000079 HC SURGERY LEVEL 6, PER MIN: Performed by: OTOLARYNGOLOGY

## 2021-07-05 PROCEDURE — 84100 ASSAY OF PHOSPHORUS: CPT | Performed by: STUDENT IN AN ORGANIZED HEALTH CARE EDUCATION/TRAINING PROGRAM

## 2021-07-05 PROCEDURE — 84295 ASSAY OF SERUM SODIUM: CPT

## 2021-07-05 PROCEDURE — 272N000001 HC OR GENERAL SUPPLY STERILE: Performed by: OTOLARYNGOLOGY

## 2021-07-05 PROCEDURE — 999N000179 XR SURGERY CARM FLUORO LESS THAN 5 MIN W STILLS: Mod: TC

## 2021-07-05 PROCEDURE — C1763 CONN TISS, NON-HUMAN: HCPCS | Performed by: OTOLARYNGOLOGY

## 2021-07-05 PROCEDURE — 62272 THER SPI PNXR DRG CSF: CPT | Mod: 51 | Performed by: NEUROLOGICAL SURGERY

## 2021-07-05 PROCEDURE — 00U20JZ SUPPLEMENT DURA MATER WITH SYNTHETIC SUBSTITUTE, OPEN APPROACH: ICD-10-PCS | Performed by: NEUROLOGICAL SURGERY

## 2021-07-05 PROCEDURE — 250N000011 HC RX IP 250 OP 636: Performed by: NURSE ANESTHETIST, CERTIFIED REGISTERED

## 2021-07-05 PROCEDURE — 4A10X2Z MONITORING OF CENTRAL NERVOUS CONDUCTIVITY, EXTERNAL APPROACH: ICD-10-PCS | Performed by: OTOLARYNGOLOGY

## 2021-07-05 PROCEDURE — 82803 BLOOD GASES ANY COMBINATION: CPT

## 2021-07-05 PROCEDURE — 250N000009 HC RX 250: Performed by: OTOLARYNGOLOGY

## 2021-07-05 PROCEDURE — 250N000009 HC RX 250: Performed by: STUDENT IN AN ORGANIZED HEALTH CARE EDUCATION/TRAINING PROGRAM

## 2021-07-05 PROCEDURE — 999N001017 HC STATISTIC GLUCOSE BY METER IP

## 2021-07-05 PROCEDURE — 200N000002 HC R&B ICU UMMC

## 2021-07-05 PROCEDURE — 278N000051 HC OR IMPLANT GENERAL: Performed by: OTOLARYNGOLOGY

## 2021-07-05 PROCEDURE — 62121 INCISE SKULL REPAIR: CPT | Mod: 62 | Performed by: NEUROLOGICAL SURGERY

## 2021-07-05 PROCEDURE — 00Q20ZZ REPAIR DURA MATER, OPEN APPROACH: ICD-10-PCS | Performed by: NEUROLOGICAL SURGERY

## 2021-07-05 PROCEDURE — 82330 ASSAY OF CALCIUM: CPT

## 2021-07-05 PROCEDURE — C1713 ANCHOR/SCREW BN/BN,TIS/BN: HCPCS | Performed by: OTOLARYNGOLOGY

## 2021-07-05 DEVICE — IMP BUR HOLE COVER 17MM LOW PROFILE TI 421.527: Type: IMPLANTABLE DEVICE | Site: CRANIAL | Status: FUNCTIONAL

## 2021-07-05 DEVICE — GRAFT DURAGEN 2X2" ID220: Type: IMPLANTABLE DEVICE | Site: CRANIAL | Status: FUNCTIONAL

## 2021-07-05 DEVICE — IMP PLATE SYN STR DOG BONE LOW PROFILE 2H TI 421.502: Type: IMPLANTABLE DEVICE | Site: CRANIAL | Status: FUNCTIONAL

## 2021-07-05 DEVICE — IMP PLATE MESH SYN CONTOUR 1.5X100X100MM MAL TI 446.017: Type: IMPLANTABLE DEVICE | Site: CRANIAL | Status: FUNCTIONAL

## 2021-07-05 DEVICE — IMP SCR SYN MATRIX LOW PRO 1.5X03MM SELF DRILL 04.503.103.01: Type: IMPLANTABLE DEVICE | Site: CRANIAL | Status: FUNCTIONAL

## 2021-07-05 RX ORDER — OXYCODONE HYDROCHLORIDE 10 MG/1
10 TABLET ORAL EVERY 4 HOURS PRN
Status: DISCONTINUED | OUTPATIENT
Start: 2021-07-05 | End: 2021-07-10

## 2021-07-05 RX ORDER — FENTANYL CITRATE 50 UG/ML
INJECTION, SOLUTION INTRAMUSCULAR; INTRAVENOUS PRN
Status: DISCONTINUED | OUTPATIENT
Start: 2021-07-05 | End: 2021-07-05

## 2021-07-05 RX ORDER — PROCHLORPERAZINE MALEATE 10 MG
10 TABLET ORAL EVERY 6 HOURS PRN
Status: DISCONTINUED | OUTPATIENT
Start: 2021-07-05 | End: 2021-07-05

## 2021-07-05 RX ORDER — NALOXONE HYDROCHLORIDE 0.4 MG/ML
0.2 INJECTION, SOLUTION INTRAMUSCULAR; INTRAVENOUS; SUBCUTANEOUS
Status: DISCONTINUED | OUTPATIENT
Start: 2021-07-05 | End: 2021-07-14 | Stop reason: HOSPADM

## 2021-07-05 RX ORDER — ONDANSETRON 2 MG/ML
4 INJECTION INTRAMUSCULAR; INTRAVENOUS EVERY 6 HOURS PRN
Status: DISCONTINUED | OUTPATIENT
Start: 2021-07-05 | End: 2021-07-14 | Stop reason: HOSPADM

## 2021-07-05 RX ORDER — NALOXONE HYDROCHLORIDE 0.4 MG/ML
0.4 INJECTION, SOLUTION INTRAMUSCULAR; INTRAVENOUS; SUBCUTANEOUS
Status: DISCONTINUED | OUTPATIENT
Start: 2021-07-05 | End: 2021-07-05 | Stop reason: HOSPADM

## 2021-07-05 RX ORDER — ACETAMINOPHEN 325 MG/1
975 TABLET ORAL EVERY 8 HOURS
Status: COMPLETED | OUTPATIENT
Start: 2021-07-05 | End: 2021-07-08

## 2021-07-05 RX ORDER — PROGESTERONE 100 MG/1
100 CAPSULE ORAL AT BEDTIME
Status: DISCONTINUED | OUTPATIENT
Start: 2021-07-05 | End: 2021-07-14 | Stop reason: HOSPADM

## 2021-07-05 RX ORDER — LORATADINE 10 MG/1
10 TABLET ORAL DAILY
Status: DISCONTINUED | OUTPATIENT
Start: 2021-07-06 | End: 2021-07-14 | Stop reason: HOSPADM

## 2021-07-05 RX ORDER — AMOXICILLIN 250 MG
1 CAPSULE ORAL 2 TIMES DAILY
Status: DISCONTINUED | OUTPATIENT
Start: 2021-07-05 | End: 2021-07-09

## 2021-07-05 RX ORDER — SODIUM CHLORIDE, SODIUM LACTATE, POTASSIUM CHLORIDE, CALCIUM CHLORIDE 600; 310; 30; 20 MG/100ML; MG/100ML; MG/100ML; MG/100ML
INJECTION, SOLUTION INTRAVENOUS CONTINUOUS
Status: DISCONTINUED | OUTPATIENT
Start: 2021-07-05 | End: 2021-07-05 | Stop reason: HOSPADM

## 2021-07-05 RX ORDER — GUAIFENESIN 600 MG/1
600 TABLET, EXTENDED RELEASE ORAL 2 TIMES DAILY
Status: DISCONTINUED | OUTPATIENT
Start: 2021-07-05 | End: 2021-07-06

## 2021-07-05 RX ORDER — PROCHLORPERAZINE 25 MG
25 SUPPOSITORY, RECTAL RECTAL EVERY 12 HOURS PRN
Status: DISCONTINUED | OUTPATIENT
Start: 2021-07-05 | End: 2021-07-05

## 2021-07-05 RX ORDER — SODIUM CHLORIDE 9 MG/ML
INJECTION, SOLUTION INTRAVENOUS CONTINUOUS
Status: ACTIVE | OUTPATIENT
Start: 2021-07-05 | End: 2021-07-05

## 2021-07-05 RX ORDER — SPIRONOLACTONE 50 MG/1
50 TABLET, FILM COATED ORAL DAILY
Status: DISCONTINUED | OUTPATIENT
Start: 2021-07-06 | End: 2021-07-14 | Stop reason: HOSPADM

## 2021-07-05 RX ORDER — BISACODYL 10 MG
10 SUPPOSITORY, RECTAL RECTAL DAILY PRN
Status: DISCONTINUED | OUTPATIENT
Start: 2021-07-05 | End: 2021-07-14 | Stop reason: HOSPADM

## 2021-07-05 RX ORDER — POLYETHYLENE GLYCOL 3350 17 G/17G
17 POWDER, FOR SOLUTION ORAL DAILY
Status: DISCONTINUED | OUTPATIENT
Start: 2021-07-06 | End: 2021-07-05

## 2021-07-05 RX ORDER — TOLTERODINE 4 MG/1
4 CAPSULE, EXTENDED RELEASE ORAL DAILY
Status: DISCONTINUED | OUTPATIENT
Start: 2021-07-06 | End: 2021-07-14 | Stop reason: HOSPADM

## 2021-07-05 RX ORDER — DEXMEDETOMIDINE HYDROCHLORIDE 4 UG/ML
0.2-0.7 INJECTION, SOLUTION INTRAVENOUS CONTINUOUS
Status: DISCONTINUED | OUTPATIENT
Start: 2021-07-05 | End: 2021-07-05

## 2021-07-05 RX ORDER — PROPOFOL 10 MG/ML
INJECTION, EMULSION INTRAVENOUS CONTINUOUS PRN
Status: DISCONTINUED | OUTPATIENT
Start: 2021-07-05 | End: 2021-07-05

## 2021-07-05 RX ORDER — DEXAMETHASONE SODIUM PHOSPHATE 10 MG/ML
10 INJECTION, SOLUTION INTRAMUSCULAR; INTRAVENOUS ONCE
Status: DISCONTINUED | OUTPATIENT
Start: 2021-07-05 | End: 2021-07-05 | Stop reason: HOSPADM

## 2021-07-05 RX ORDER — VITAMIN B COMPLEX
125 TABLET ORAL DAILY
Status: DISCONTINUED | OUTPATIENT
Start: 2021-07-06 | End: 2021-07-14 | Stop reason: HOSPADM

## 2021-07-05 RX ORDER — CODEINE SULFATE 15 MG/1
15 TABLET ORAL EVERY 6 HOURS PRN
Status: DISCONTINUED | OUTPATIENT
Start: 2021-07-05 | End: 2021-07-05

## 2021-07-05 RX ORDER — FENTANYL CITRATE 50 UG/ML
25-50 INJECTION, SOLUTION INTRAMUSCULAR; INTRAVENOUS
Status: DISCONTINUED | OUTPATIENT
Start: 2021-07-05 | End: 2021-07-05 | Stop reason: HOSPADM

## 2021-07-05 RX ORDER — FUROSEMIDE 20 MG
40 TABLET ORAL DAILY PRN
Status: DISCONTINUED | OUTPATIENT
Start: 2021-07-05 | End: 2021-07-06

## 2021-07-05 RX ORDER — PROPOFOL 10 MG/ML
INJECTION, EMULSION INTRAVENOUS PRN
Status: DISCONTINUED | OUTPATIENT
Start: 2021-07-05 | End: 2021-07-05

## 2021-07-05 RX ORDER — ONDANSETRON 2 MG/ML
4 INJECTION INTRAMUSCULAR; INTRAVENOUS EVERY 6 HOURS PRN
Status: DISCONTINUED | OUTPATIENT
Start: 2021-07-05 | End: 2021-07-05

## 2021-07-05 RX ORDER — LOSARTAN POTASSIUM 50 MG/1
100 TABLET ORAL DAILY
Status: DISCONTINUED | OUTPATIENT
Start: 2021-07-06 | End: 2021-07-14 | Stop reason: HOSPADM

## 2021-07-05 RX ORDER — LIDOCAINE HYDROCHLORIDE 20 MG/ML
INJECTION, SOLUTION INFILTRATION; PERINEURAL PRN
Status: DISCONTINUED | OUTPATIENT
Start: 2021-07-05 | End: 2021-07-05

## 2021-07-05 RX ORDER — PHENYLEPHRINE HCL IN 0.9% NACL 50MG/250ML
.1-1 PLASTIC BAG, INJECTION (ML) INTRAVENOUS CONTINUOUS
Status: DISCONTINUED | OUTPATIENT
Start: 2021-07-05 | End: 2021-07-05 | Stop reason: HOSPADM

## 2021-07-05 RX ORDER — SODIUM CHLORIDE, SODIUM LACTATE, POTASSIUM CHLORIDE, CALCIUM CHLORIDE 600; 310; 30; 20 MG/100ML; MG/100ML; MG/100ML; MG/100ML
INJECTION, SOLUTION INTRAVENOUS CONTINUOUS PRN
Status: DISCONTINUED | OUTPATIENT
Start: 2021-07-05 | End: 2021-07-05

## 2021-07-05 RX ORDER — ONDANSETRON 4 MG/1
4 TABLET, ORALLY DISINTEGRATING ORAL EVERY 6 HOURS PRN
Status: DISCONTINUED | OUTPATIENT
Start: 2021-07-05 | End: 2021-07-14 | Stop reason: HOSPADM

## 2021-07-05 RX ORDER — HYDRALAZINE HYDROCHLORIDE 20 MG/ML
10-20 INJECTION INTRAMUSCULAR; INTRAVENOUS EVERY 30 MIN PRN
Status: DISCONTINUED | OUTPATIENT
Start: 2021-07-05 | End: 2021-07-14 | Stop reason: HOSPADM

## 2021-07-05 RX ORDER — ONDANSETRON 4 MG/1
4 TABLET, ORALLY DISINTEGRATING ORAL EVERY 6 HOURS PRN
Status: DISCONTINUED | OUTPATIENT
Start: 2021-07-05 | End: 2021-07-05

## 2021-07-05 RX ORDER — POLYETHYLENE GLYCOL 3350 17 G/17G
17 POWDER, FOR SOLUTION ORAL 3 TIMES DAILY
Status: DISCONTINUED | OUTPATIENT
Start: 2021-07-05 | End: 2021-07-14 | Stop reason: HOSPADM

## 2021-07-05 RX ORDER — FLUTICASONE PROPIONATE 50 MCG
2 SPRAY, SUSPENSION (ML) NASAL AT BEDTIME
Status: DISCONTINUED | OUTPATIENT
Start: 2021-07-05 | End: 2021-07-14 | Stop reason: HOSPADM

## 2021-07-05 RX ORDER — OXYCODONE HYDROCHLORIDE 5 MG/1
5 TABLET ORAL EVERY 4 HOURS PRN
Status: DISCONTINUED | OUTPATIENT
Start: 2021-07-05 | End: 2021-07-10

## 2021-07-05 RX ORDER — ONDANSETRON 2 MG/ML
4 INJECTION INTRAMUSCULAR; INTRAVENOUS EVERY 30 MIN PRN
Status: DISCONTINUED | OUTPATIENT
Start: 2021-07-05 | End: 2021-07-05 | Stop reason: HOSPADM

## 2021-07-05 RX ORDER — LABETALOL HYDROCHLORIDE 5 MG/ML
10-40 INJECTION, SOLUTION INTRAVENOUS EVERY 10 MIN PRN
Status: DISCONTINUED | OUTPATIENT
Start: 2021-07-05 | End: 2021-07-14 | Stop reason: HOSPADM

## 2021-07-05 RX ORDER — NALOXONE HYDROCHLORIDE 0.4 MG/ML
0.2 INJECTION, SOLUTION INTRAMUSCULAR; INTRAVENOUS; SUBCUTANEOUS
Status: DISCONTINUED | OUTPATIENT
Start: 2021-07-05 | End: 2021-07-05 | Stop reason: HOSPADM

## 2021-07-05 RX ORDER — CEFAZOLIN SODIUM 2 G/100ML
2 INJECTION, SOLUTION INTRAVENOUS SEE ADMIN INSTRUCTIONS
Status: DISCONTINUED | OUTPATIENT
Start: 2021-07-05 | End: 2021-07-05 | Stop reason: HOSPADM

## 2021-07-05 RX ORDER — ACETAMINOPHEN 325 MG/1
650 TABLET ORAL EVERY 4 HOURS PRN
Status: DISCONTINUED | OUTPATIENT
Start: 2021-07-08 | End: 2021-07-14 | Stop reason: HOSPADM

## 2021-07-05 RX ORDER — ONDANSETRON 2 MG/ML
INJECTION INTRAMUSCULAR; INTRAVENOUS PRN
Status: DISCONTINUED | OUTPATIENT
Start: 2021-07-05 | End: 2021-07-05

## 2021-07-05 RX ORDER — LIDOCAINE HYDROCHLORIDE ANHYDROUS AND DEXTROSE MONOHYDRATE .8; 5 G/100ML; G/100ML
1 INJECTION, SOLUTION INTRAVENOUS CONTINUOUS
Status: DISCONTINUED | OUTPATIENT
Start: 2021-07-05 | End: 2021-07-06

## 2021-07-05 RX ORDER — NALOXONE HYDROCHLORIDE 0.4 MG/ML
0.4 INJECTION, SOLUTION INTRAMUSCULAR; INTRAVENOUS; SUBCUTANEOUS
Status: DISCONTINUED | OUTPATIENT
Start: 2021-07-05 | End: 2021-07-14 | Stop reason: HOSPADM

## 2021-07-05 RX ORDER — ACETAMINOPHEN 500 MG
1000 TABLET ORAL ONCE
Status: COMPLETED | OUTPATIENT
Start: 2021-07-05 | End: 2021-07-05

## 2021-07-05 RX ORDER — HYDROMORPHONE HYDROCHLORIDE 1 MG/ML
.3-.5 INJECTION, SOLUTION INTRAMUSCULAR; INTRAVENOUS; SUBCUTANEOUS EVERY 5 MIN PRN
Status: DISCONTINUED | OUTPATIENT
Start: 2021-07-05 | End: 2021-07-05 | Stop reason: HOSPADM

## 2021-07-05 RX ORDER — MANNITOL 20 G/100ML
INJECTION, SOLUTION INTRAVENOUS PRN
Status: DISCONTINUED | OUTPATIENT
Start: 2021-07-05 | End: 2021-07-05

## 2021-07-05 RX ORDER — LIDOCAINE HYDROCHLORIDE AND EPINEPHRINE 10; 10 MG/ML; UG/ML
INJECTION, SOLUTION INFILTRATION; PERINEURAL PRN
Status: DISCONTINUED | OUTPATIENT
Start: 2021-07-05 | End: 2021-07-05 | Stop reason: HOSPADM

## 2021-07-05 RX ORDER — CEFAZOLIN SODIUM 2 G/100ML
2 INJECTION, SOLUTION INTRAVENOUS
Status: COMPLETED | OUTPATIENT
Start: 2021-07-05 | End: 2021-07-05

## 2021-07-05 RX ORDER — PROCHLORPERAZINE MALEATE 10 MG
10 TABLET ORAL EVERY 6 HOURS PRN
Status: DISCONTINUED | OUTPATIENT
Start: 2021-07-05 | End: 2021-07-14 | Stop reason: HOSPADM

## 2021-07-05 RX ORDER — METOPROLOL SUCCINATE 25 MG/1
50 TABLET, EXTENDED RELEASE ORAL DAILY
Status: DISCONTINUED | OUTPATIENT
Start: 2021-07-06 | End: 2021-07-14 | Stop reason: HOSPADM

## 2021-07-05 RX ORDER — ONDANSETRON 4 MG/1
4 TABLET, ORALLY DISINTEGRATING ORAL EVERY 30 MIN PRN
Status: DISCONTINUED | OUTPATIENT
Start: 2021-07-05 | End: 2021-07-05 | Stop reason: HOSPADM

## 2021-07-05 RX ORDER — LIDOCAINE 40 MG/G
CREAM TOPICAL
Status: DISCONTINUED | OUTPATIENT
Start: 2021-07-05 | End: 2021-07-14 | Stop reason: HOSPADM

## 2021-07-05 RX ORDER — DEXAMETHASONE SODIUM PHOSPHATE 4 MG/ML
INJECTION, SOLUTION INTRA-ARTICULAR; INTRALESIONAL; INTRAMUSCULAR; INTRAVENOUS; SOFT TISSUE PRN
Status: DISCONTINUED | OUTPATIENT
Start: 2021-07-05 | End: 2021-07-05

## 2021-07-05 RX ORDER — LIDOCAINE 40 MG/G
CREAM TOPICAL
Status: DISCONTINUED | OUTPATIENT
Start: 2021-07-05 | End: 2021-07-05 | Stop reason: HOSPADM

## 2021-07-05 RX ORDER — DOCUSATE SODIUM 100 MG/1
100 CAPSULE, LIQUID FILLED ORAL 2 TIMES DAILY
Status: DISCONTINUED | OUTPATIENT
Start: 2021-07-05 | End: 2021-07-14 | Stop reason: HOSPADM

## 2021-07-05 RX ADMIN — MANNITOL 100 G: 20 INJECTION, SOLUTION INTRAVENOUS at 10:42

## 2021-07-05 RX ADMIN — PROPOFOL 150 MG: 10 INJECTION, EMULSION INTRAVENOUS at 08:20

## 2021-07-05 RX ADMIN — Medication 1 G: at 12:55

## 2021-07-05 RX ADMIN — MIDAZOLAM 1 MG: 1 INJECTION INTRAMUSCULAR; INTRAVENOUS at 07:40

## 2021-07-05 RX ADMIN — SODIUM CHLORIDE, POTASSIUM CHLORIDE, SODIUM LACTATE AND CALCIUM CHLORIDE: 600; 310; 30; 20 INJECTION, SOLUTION INTRAVENOUS at 08:08

## 2021-07-05 RX ADMIN — ACETAMINOPHEN 975 MG: 325 TABLET, FILM COATED ORAL at 18:38

## 2021-07-05 RX ADMIN — FENTANYL CITRATE 100 MCG: 50 INJECTION, SOLUTION INTRAMUSCULAR; INTRAVENOUS at 08:20

## 2021-07-05 RX ADMIN — ONDANSETRON 4 MG: 2 INJECTION INTRAMUSCULAR; INTRAVENOUS at 14:55

## 2021-07-05 RX ADMIN — DEXMEDETOMIDINE 0.3 MCG/KG/HR: 100 INJECTION, SOLUTION, CONCENTRATE INTRAVENOUS at 15:00

## 2021-07-05 RX ADMIN — ROCURONIUM BROMIDE 20 MG: 10 INJECTION INTRAVENOUS at 08:35

## 2021-07-05 RX ADMIN — ROCURONIUM BROMIDE 50 MG: 10 INJECTION INTRAVENOUS at 14:55

## 2021-07-05 RX ADMIN — LIDOCAINE HYDROCHLORIDE 2 MG/MIN: 8 INJECTION, SOLUTION INTRAVENOUS at 09:41

## 2021-07-05 RX ADMIN — SODIUM CHLORIDE: 9 INJECTION, SOLUTION INTRAVENOUS at 17:25

## 2021-07-05 RX ADMIN — SUGAMMADEX 200 MG: 100 INJECTION, SOLUTION INTRAVENOUS at 16:38

## 2021-07-05 RX ADMIN — HYDROMORPHONE HYDROCHLORIDE 0.25 MG: 1 INJECTION, SOLUTION INTRAMUSCULAR; INTRAVENOUS; SUBCUTANEOUS at 15:57

## 2021-07-05 RX ADMIN — ACETAMINOPHEN 1000 MG: 500 TABLET, FILM COATED ORAL at 07:25

## 2021-07-05 RX ADMIN — Medication 2 G: at 08:55

## 2021-07-05 RX ADMIN — PROPOFOL 40 MG: 10 INJECTION, EMULSION INTRAVENOUS at 09:53

## 2021-07-05 RX ADMIN — DEXAMETHASONE SODIUM PHOSPHATE 4 MG: 4 INJECTION, SOLUTION INTRA-ARTICULAR; INTRALESIONAL; INTRAMUSCULAR; INTRAVENOUS; SOFT TISSUE at 09:04

## 2021-07-05 RX ADMIN — HYDROMORPHONE HYDROCHLORIDE 0.5 MG: 1 INJECTION, SOLUTION INTRAMUSCULAR; INTRAVENOUS; SUBCUTANEOUS at 13:56

## 2021-07-05 RX ADMIN — LIDOCAINE HYDROCHLORIDE 100 MG: 20 INJECTION, SOLUTION INFILTRATION; PERINEURAL at 08:20

## 2021-07-05 RX ADMIN — ONDANSETRON 4 MG: 2 INJECTION INTRAMUSCULAR; INTRAVENOUS at 16:42

## 2021-07-05 RX ADMIN — PROPOFOL 25 MCG/KG/MIN: 10 INJECTION, EMULSION INTRAVENOUS at 15:55

## 2021-07-05 RX ADMIN — SUFENTANIL CITRATE 0.3 MCG/KG/HR: 50 INJECTION EPIDURAL; INTRAVENOUS at 09:28

## 2021-07-05 RX ADMIN — PROPOFOL 50 MG: 10 INJECTION, EMULSION INTRAVENOUS at 09:14

## 2021-07-05 RX ADMIN — SUFENTANIL CITRATE: 50 INJECTION EPIDURAL; INTRAVENOUS at 12:41

## 2021-07-05 RX ADMIN — PROGESTERONE 100 MG: 100 CAPSULE ORAL at 21:48

## 2021-07-05 RX ADMIN — Medication 140 MG: at 08:20

## 2021-07-05 ASSESSMENT — MIFFLIN-ST. JEOR: SCORE: 1717

## 2021-07-05 ASSESSMENT — VISUAL ACUITY
OU: NORMAL ACUITY

## 2021-07-05 ASSESSMENT — ACTIVITIES OF DAILY LIVING (ADL): ADLS_ACUITY_SCORE: 16

## 2021-07-05 NOTE — ANESTHESIA POSTPROCEDURE EVALUATION
Patient: Marleen Mcfadden    Procedure(s):  Right CRANIOTOMY, MIDDLE FOSSA APPROACH, FOR REPAIR OF ENCEPHALOCELE  Insert drain lumbar    Diagnosis:CSF otorrhea [G96.01]  Diagnosis Additional Information: No value filed.    Anesthesia Type:  General    Note:  Disposition: ICU            Patient Location Title: NSGY service.   Postop Pain Control: Uneventful            Sign Out: Well controlled pain   PONV: No   Neuro/Psych: Uneventful            Sign Out: Acceptable/Baseline neuro status   Airway/Respiratory: Uneventful            Sign Out: Acceptable/Baseline resp. status   CV/Hemodynamics: Uneventful            Sign Out: Acceptable CV status; No obvious hypovolemia; No obvious fluid overload   Other NRE: NONE   DID A NON-ROUTINE EVENT OCCUR?            Last vitals:  Vitals:    07/05/21 1730 07/05/21 1745 07/05/21 1800   BP: 130/88 124/88 125/82   Pulse: 69 64 71   Resp: 9 11    Temp:      SpO2: 98% 99% 100%       Last vitals prior to Anesthesia Care Transfer:  CRNA VITALS  7/5/2021 1628 - 7/5/2021 1728      7/5/2021             EKG:  Sinus rhythm          Electronically Signed By: Freddy Stephen MD  July 5, 2021  6:32 PM

## 2021-07-05 NOTE — OP NOTE
Date of service:  7/05/21    Preoperative diagnosis:  Right encephalocele and CSF leak    Postoperative diagnosis:  Right encephalocele and CSF leak    Procedure:  1.  Right middle fossa approach for repair of encephalocele and CSF leak  2.  Facial nerve monitoring x 3 hours    Surgeon:  Jocelyne Harrell MD and Ramana Noe MD    Fellow:  Riya Pollard MD    Resident:  Whit Chaparro MD and Eron Sosa MD    Anesthesia:  General endotracheal    EBL:  50 cc    Specimens:  None    Drains:  Deluna and lumbar drain    Complications:  None    Findings:  Dura very thin with multiple small holes along the tegmen mastoideum and tegmen tympani segments with several small encephaloceles going into the mastoid air cells, no dehiscences medial to the arcuate eminence.    Indications:  Marleen Mcfadden is a 55 year old female who was found to have a right temporal bone encephalocele and CSF leak.  Risks and benefits of a middle fossa approach for resection were had with the patient and informed consent was obtained in the clinic.  This was confirmed in the preoperative area.    Procedure:  The patient was brought into the operating room and placed supine on the operating room table.  A brief had been performed already.  General anesthesia was induced and endotracheal intubation was performed.  The patient was turned 180 degrees and a deluna catheter and arterial line was placed.  The patient was then positioned for lumbar drain placement which was done by Neurosurgery.  The patient was then positioned in a lateral position for the procedure.  1% lidocaine with 1:100,000 epinephrine was injected into the planned scalp incision.  Facial nerve monitor electrodes were placed on the right face and connected to the nerve monitor.  Impedances were checked and a tap test was performed.  The monitor was used for the entirety of the case.  CECILAI monitoring was also performed for the entirety of the case by an outside provider.  The patient was  then prepped and draped in standard surgical fashion.  Time Out was performed with identification of the patient, side of the procedure and procedures to be done.    Neurosurgery then opened the incision and performed a craniotomy.  Please see their dictation for this portion of the procedure.    Neurotology was then called into the case.  The operating microscope was brought into position and used for the entirety of the internal auditory canal portion of the case.  The dura was elevated off the floor of the middle fossa in a posterior to anterior fashion.  As the dura was elevated, one small dural hole was noted just under the cortex posteriorly with a small encephalocele protruding into the mastoid. This was removed and the hole marked with the bipolar. The dura itself is quite thin and the entire tegmen is very thin with multiple areas of thinning such that the bone is mobile over the areas. Another dehiscence was noted in the mid mastoid area slightly medial to the first one. A small encephalocele was noted and resected. Even more medially and anterior, there was another encephalocele noted which was the largest of the three. The encephalocele was resected and there was noted to be 3 dura dehiscences in that area, one of which was actively leaking CSF. The dura was elevated further medially and the arcuate eminence was identified. The posterior face of the petrous was identified. There were no further dehiscences noted medially.    The patient tolerated Neurotology's portion of the procedure well and remained in stable condition.  Neurosurgery then proceeded with closure of the dura defect, tegmen defect and incision. Please see their dictation for their portion of the procedure.

## 2021-07-05 NOTE — ANESTHESIA CARE TRANSFER NOTE
Patient: Marleen Mcfadden    Procedure(s):  Right CRANIOTOMY, MIDDLE FOSSA APPROACH, FOR REPAIR OF ENCEPHALOCELE  Insert drain lumbar    Diagnosis: CSF otorrhea [G96.01]  Diagnosis Additional Information: No value filed.    Anesthesia Type:   General     Note:    Oropharynx: oropharynx clear of all foreign objects and spontaneously breathing  Level of Consciousness: drowsy  Oxygen Supplementation: face mask  Level of Supplemental Oxygen (L/min / FiO2): 8  Independent Airway: airway patency satisfactory and stable  Dentition: dentition unchanged  Vital Signs Stable: post-procedure vital signs reviewed and stable  Report to RN Given: handoff report given  Patient transferred to: PACU  Comments: Pt extubated in the OR without incident or complications. Pt VSS upon arrival to the PACU. Pt has no c/o pain/N/V. Pt care report given to receiving RN. All questions answered.   Handoff Report: Identifed the Patient, Identified the Reponsible Provider, Reviewed the pertinent medical history, Discussed the surgical course, Reviewed Intra-OP anesthesia mangement and issues during anesthesia, Set expectations for post-procedure period and Allowed opportunity for questions and acknowledgement of understanding      Vitals: (Last set prior to Anesthesia Care Transfer)  CRNA VITALS  7/5/2021 1628 - 7/5/2021 1706      7/5/2021             Pulse:  71    ART BP:  142/82    ART Mean:  102    Ht Rate:  70    SpO2:  97 %        Electronically Signed By: PITO Coronado CRNA  July 5, 2021  5:06 PM

## 2021-07-05 NOTE — ANESTHESIA PROCEDURE NOTES
Airway       Patient location during procedure: OR  Staff -        Anesthesiologist:  Justen Lundberg MD       Resident/Fellow: Shane Hardin MD       Performed By: residentIndications and Patient Condition       Indications for airway management: mala-procedural       Induction type:RSI       Mask difficulty assessment: 0 - not attempted    Final Airway Details       Final airway type: endotracheal airway       Successful airway: ETT - single  Endotracheal Airway Details        ETT size (mm): 7.5       Cuffed: yes       Successful intubation technique: direct laryngoscopy       DL Blade Type: MAC 3       Grade View of Cords: 2       Adjucts: stylet       Position: Left       Measured from: gums/teeth       Secured at (cm): 23       Bite block used: Soft    Post intubation assessment        Placement verified by: capnometry and equal breath sounds        Number of attempts at approach: 1       Number of other approaches attempted: 0       Secured with: pink tape       Ease of procedure: easy       Dentition: Intact    Medication(s) Administered   Medication Administration Time: 7/5/2021 8:21 AM

## 2021-07-05 NOTE — BRIEF OP NOTE
"[facility]    Brief Operative Note    Pre-operative diagnosis: CSF otorrhea [G96.01]  Post-operative diagnosis Same as pre-operative diagnosis    Procedure: Procedure(s):  Right CRANIOTOMY, MIDDLE FOSSA APPROACH, FOR REPAIR OF ENCEPHALOCELE  Insert drain lumbar  Surgeon: Surgeon(s) and Role:  Panel 1:     * Jocelyne Harrell MD - Primary     * Jocelyn Noe MD - Assisting     * Amish Chaparro MD - Resident - Assisting     * Eron Sosa MD - Resident - Assisting  Panel 2:     * Jocelyn Noe MD - Primary  Anesthesia: General   Estimated blood loss: 50cc  Drains: Lumbar drain  Specimens: * No specimens in log *  Findings:   Several dural and bony defects, most pronounced on anterior margins, repaired with temporalis muscle, fibrin glue, mesh.  Complications: None.  Implants:   Implant Name Type Inv. Item Serial No.  Lot No. LRB No. Used Action   IMP PLATE MESH SYN CONTOUR [id number] MAL .017 - [id number] Metallic Hardware/[place] IMP PLATE MESH SYN CONTOUR [id number] MAL .017  [employer]AT[employer] NONE Right 1 Implanted   IMP SCR SYN MATRIX LOW PRO 1.5X03MM SELF DRILL 04.503.103.01 - [id number] Metallic Hardware/[place] IMP SCR SYN MATRIX LOW PRO 1.5X03MM SELF DRILL 04.503.103.01  SYN[employer]ES[employer]ATEC NONE Right 2 Implanted   IMP SCR SYN MATRIX LOW PRO 1.5X04MM SELF DRILL 04.503.104.01 - [id number] Metallic Hardware/[place] IMP SCR SYN MATRIX LOW PRO 1.5X04MM SELF DRILL 04.503.104.01  SYNTHES[employer]STRATEC NONE Right 8 Implanted   GRAFT DURAGEN 2X2\"  - [id number] Bone/Tissue/Biologic GRAFT DURAGEN 2X2\"   [employer] [id number] N/A 1 Implanted   IMP PLATE SYN STR DOG BONE LOW PROFILE 2H .502 - SNA Metallic Hardware/[place] IMP PLATE SYN STR DOG BONE LOW PROFILE 2H .502 NA [employer] NA N/A 2 Implanted   IMP BUR HOLE COVER 17MM LOW PROFILE .527 - SNA Metallic Hardware/[place] IMP BUR HOLE " COVER 17MM LOW PROFILE .527 NA [employer] NA N/A 1 Implanted       Closed with 3-0 prolene

## 2021-07-05 NOTE — OR NURSING
HUMAIRA Nicole called 1745: Patient appears stable and awake. VSSSuzie GERARDO does want to see patient prior to transfer to ICU.     Neurosurgery Dr. Maxwell George paged: Difficulty draining lumbar drain, can you come evaluate? Thx

## 2021-07-05 NOTE — ANESTHESIA PROCEDURE NOTES
Arterial Line Procedure Note  Pre-Procedure   Staff -        Anesthesiologist:  Justen Lundberg MD       Resident/Fellow: Shane Hardin MD       Performed By: resident       Location: OR       Pre-Anesthestic Checklist: patient identified, IV checked, risks and benefits discussed, informed consent, monitors and equipment checked, pre-op evaluation and at physician/surgeon's request  Timeout:       Correct Patient: Yes        Correct Procedure: Yes        Correct Site: Yes        Correct Position: Yes   Procedure   Procedure: arterial line       Laterality: left       Insertion Site: radial.  Sterile Prep        Standard elements of sterile barrier followed       Skin prep: Chloraprep  Insertion/Injection        Catheter Type/Size: 20 G, 1.75 in/4.5 cm quick cath (integral wire)  Narrative         Secured by: anchor securement device       Tegaderm dressing used.       Complications: None apparent,        Arterial waveform: Yes        IBP within 10% of NIBP: Yes

## 2021-07-05 NOTE — PROGRESS NOTES
"Service Date: 2021    Tammy Matias MD   [id number] [place] [address]     RE:       Marleen Mcfadden    MRN:   [id number]    :    1965    Dear Dr. Matias:    We saw Ms. Mcfadden in the [facility] on 2021 for further discussion regarding management of her right-sided CSF otorrhea and temporal lobe encephalocele.  She saw my Skull Base Surgery partner from Neurotology, Dr. Harrell, previously.  She has agreed to surgical repair of the CSF fistula.  She would like to defer surgery until she completes some travels.    In summary, she is a 55-year-old left-handed woman (she writes with her left and does everything else with her right) whose pertinent history began about 4 years ago.  She developed headaches at the vertex and then developed a popping sensation in the right ear followed by postnasal drip.  Each time she felt the \"pop,\" her headache would improve.  The postnasal drainage was causing a cough and led to evaluation by Dr. Cam.  She saw no improvement with treatment for GERD.  She was also evaluated asthma.  She developed right oral fullness and some hearing loss.  Her right middle ear effusion did not resolve spontaneously and she underwent myringotomy in 2020, followed by PE tube placement in 2021.  Her symptoms improved, but she developed CSF otorrhea consequently.  The fluid was tested and was positive for beta-2 transferrin.  Now that the PE tube is out, she has some recurrence of the previous symptoms.  She has not had any fevers or chills.  She is no longer having otorrhea.  She is still having postnasal drainage.    Past medical history is notable for:    1.  Hysterectomy.  2.  Breast reduction.  3.   x2.    4.  Bilateral total knee arthroplasty and bilateral revisions.  5.  Thyroidectomy in .  6.  Familial cardiomyopathy.  7.  Morbid obesity.    She is self-employed and owns a hair salon.  She is "  and has 2 children.  She does not smoke.  She rarely drinks alcohol.  She recently came back from a trip out of [facility] and she is scheduled to [place] in June.  This is the main reason why she would like to defer surgery until July.  She has gained about 60 pounds over the past 3 years.  She reports some blurred vision.  She has had eye exams and is very clear that no one has used the term papilledema or optic nerve swelling with her.    On exam, her general appearance is notable for morbid obesity.  BMI 43.4.  She appears comfortable. Her gross neurological examination is normal.  There is no active drainage from the right ear.    We showed her the CT scan of the temporal bone from 02/2021.  This shows a right tegmen mastoideum defect and opacification of the right mastoid and middle ear.    Since she is not leaking CSF externally, we believe that deferring surgery until her travels are completed is reasonable.  The majority of this 45-minute visit was spent reviewing the diagnosis and surgical management.  We went over the risks of a temporal craniotomy and the middle fossa approach including brain injury, stroke, seizures, failure to close the fistula, infection, hemorrhage, and the need for reoperation.    We also discussed the possibility of idiopathic intracranial hypertension.  She has never been formally diagnosed with this condition and a lumbar puncture at this point is unlikely to yield high pressures since she seems to have an outlet for her CSF.  We discussed that by repairing the CSF fistula, we may create a closed system in which her CSF pressures can rise.  She may have been manifest symptoms of and signs of IIH.  One additional manifestation would be breakthrough of the repair and recurrence of the CSF fistula.  We discussed management of IIH including permanent CSF diversion with shunt placement.  We also went over the role of her weight in the CSF fistula and potential IIH.  She seemed to  have a good understanding of all of this and agrees to proceed with the operation.      Finally, we stressed the role of intraoperative and postoperative lumbar drainage.  We will keep you informed of her progress.  She is tentatively scheduled for surgery on 2001.  Please do not hesitate to contact us with questions.    Sincerely,     Jocelyn Noe MD        D: 2021   T: 2021   MT: gray    Name:     BALTAZAR OREILLYSuzie  MRN:      [id number]        Account:      [id number]   :      1965           Service Date: 2021       Document: [id number]

## 2021-07-06 ENCOUNTER — APPOINTMENT (OUTPATIENT)
Dept: OCCUPATIONAL THERAPY | Facility: CLINIC | Age: 56
DRG: 026 | End: 2021-07-06
Attending: STUDENT IN AN ORGANIZED HEALTH CARE EDUCATION/TRAINING PROGRAM
Payer: MEDICARE

## 2021-07-06 LAB
ANION GAP SERPL CALCULATED.3IONS-SCNC: 5 MMOL/L (ref 3–14)
BUN SERPL-MCNC: 10 MG/DL (ref 7–30)
CALCIUM SERPL-MCNC: 7.8 MG/DL (ref 8.5–10.1)
CHLORIDE SERPL-SCNC: 107 MMOL/L (ref 94–109)
CO2 SERPL-SCNC: 26 MMOL/L (ref 20–32)
CREAT SERPL-MCNC: 0.68 MG/DL (ref 0.52–1.04)
ERYTHROCYTE [DISTWIDTH] IN BLOOD BY AUTOMATED COUNT: 13.6 % (ref 10–15)
GFR SERPL CREATININE-BSD FRML MDRD: >90 ML/MIN/{1.73_M2}
GLUCOSE BLDC GLUCOMTR-MCNC: 106 MG/DL (ref 70–99)
GLUCOSE BLDC GLUCOMTR-MCNC: 115 MG/DL (ref 70–99)
GLUCOSE BLDC GLUCOMTR-MCNC: 123 MG/DL (ref 70–99)
GLUCOSE SERPL-MCNC: 111 MG/DL (ref 70–99)
HCT VFR BLD AUTO: 33.6 % (ref 35–47)
HGB BLD-MCNC: 11 G/DL (ref 11.7–15.7)
MAGNESIUM SERPL-MCNC: 1.8 MG/DL (ref 1.6–2.3)
MCH RBC QN AUTO: 30.6 PG (ref 26.5–33)
MCHC RBC AUTO-ENTMCNC: 32.7 G/DL (ref 31.5–36.5)
MCV RBC AUTO: 93 FL (ref 78–100)
PHOSPHATE SERPL-MCNC: 3.1 MG/DL (ref 2.5–4.5)
PLATELET # BLD AUTO: 230 10E9/L (ref 150–450)
POTASSIUM SERPL-SCNC: 3.8 MMOL/L (ref 3.4–5.3)
RBC # BLD AUTO: 3.6 10E12/L (ref 3.8–5.2)
SODIUM SERPL-SCNC: 138 MMOL/L (ref 133–144)
WBC # BLD AUTO: 10.7 10E9/L (ref 4–11)

## 2021-07-06 PROCEDURE — 250N000013 HC RX MED GY IP 250 OP 250 PS 637: Performed by: STUDENT IN AN ORGANIZED HEALTH CARE EDUCATION/TRAINING PROGRAM

## 2021-07-06 PROCEDURE — 97165 OT EVAL LOW COMPLEX 30 MIN: CPT | Mod: GO | Performed by: OCCUPATIONAL THERAPIST

## 2021-07-06 PROCEDURE — 250N000011 HC RX IP 250 OP 636: Performed by: STUDENT IN AN ORGANIZED HEALTH CARE EDUCATION/TRAINING PROGRAM

## 2021-07-06 PROCEDURE — 97535 SELF CARE MNGMENT TRAINING: CPT | Mod: GO | Performed by: OCCUPATIONAL THERAPIST

## 2021-07-06 PROCEDURE — 200N000002 HC R&B ICU UMMC

## 2021-07-06 PROCEDURE — 97110 THERAPEUTIC EXERCISES: CPT | Mod: GO | Performed by: OCCUPATIONAL THERAPIST

## 2021-07-06 PROCEDURE — 80048 BASIC METABOLIC PNL TOTAL CA: CPT | Performed by: STUDENT IN AN ORGANIZED HEALTH CARE EDUCATION/TRAINING PROGRAM

## 2021-07-06 PROCEDURE — 85027 COMPLETE CBC AUTOMATED: CPT | Performed by: STUDENT IN AN ORGANIZED HEALTH CARE EDUCATION/TRAINING PROGRAM

## 2021-07-06 PROCEDURE — 83735 ASSAY OF MAGNESIUM: CPT | Performed by: STUDENT IN AN ORGANIZED HEALTH CARE EDUCATION/TRAINING PROGRAM

## 2021-07-06 PROCEDURE — 999N001017 HC STATISTIC GLUCOSE BY METER IP

## 2021-07-06 PROCEDURE — 97530 THERAPEUTIC ACTIVITIES: CPT | Mod: GO | Performed by: OCCUPATIONAL THERAPIST

## 2021-07-06 PROCEDURE — 84100 ASSAY OF PHOSPHORUS: CPT | Performed by: STUDENT IN AN ORGANIZED HEALTH CARE EDUCATION/TRAINING PROGRAM

## 2021-07-06 PROCEDURE — 250N000013 HC RX MED GY IP 250 OP 250 PS 637: Performed by: NEUROLOGICAL SURGERY

## 2021-07-06 PROCEDURE — 999N000127 HC STATISTIC PERIPHERAL IV START W US GUIDANCE

## 2021-07-06 RX ORDER — HYDROMORPHONE HYDROCHLORIDE 1 MG/ML
.3-.5 INJECTION, SOLUTION INTRAMUSCULAR; INTRAVENOUS; SUBCUTANEOUS
Status: DISCONTINUED | OUTPATIENT
Start: 2021-07-06 | End: 2021-07-06

## 2021-07-06 RX ORDER — GUAIFENESIN AND DEXTROMETHORPHAN HYDROBROMIDE 600; 30 MG/1; MG/1
1 TABLET, EXTENDED RELEASE ORAL 2 TIMES DAILY
Status: DISCONTINUED | OUTPATIENT
Start: 2021-07-06 | End: 2021-07-14 | Stop reason: HOSPADM

## 2021-07-06 RX ORDER — GLYCOPYRROLATE 1 MG/1
1 TABLET ORAL 3 TIMES DAILY
Status: DISCONTINUED | OUTPATIENT
Start: 2021-07-06 | End: 2021-07-14 | Stop reason: HOSPADM

## 2021-07-06 RX ORDER — MAGNESIUM OXIDE 400 MG/1
400 TABLET ORAL 2 TIMES DAILY
Status: COMPLETED | OUTPATIENT
Start: 2021-07-06 | End: 2021-07-07

## 2021-07-06 RX ORDER — POTASSIUM CHLORIDE 750 MG/1
20 TABLET, EXTENDED RELEASE ORAL ONCE
Status: COMPLETED | OUTPATIENT
Start: 2021-07-06 | End: 2021-07-06

## 2021-07-06 RX ORDER — GLYCOPYRROLATE 1 MG/1
1 TABLET ORAL 3 TIMES DAILY
Status: DISCONTINUED | OUTPATIENT
Start: 2021-07-06 | End: 2021-07-06

## 2021-07-06 RX ADMIN — PROGESTERONE 100 MG: 100 CAPSULE ORAL at 21:15

## 2021-07-06 RX ADMIN — LORATADINE 10 MG: 10 TABLET ORAL at 09:16

## 2021-07-06 RX ADMIN — Medication 125 MCG: at 16:15

## 2021-07-06 RX ADMIN — TOLTERODINE 4 MG: 4 CAPSULE, EXTENDED RELEASE ORAL at 10:06

## 2021-07-06 RX ADMIN — GUAIFENESIN AND DEXTROMETHORPHAN HYDROBROMIDE 1 TABLET: 600; 30 TABLET, EXTENDED RELEASE ORAL at 20:54

## 2021-07-06 RX ADMIN — GLYCOPYRROLATE 1 MG: 1 TABLET ORAL at 10:25

## 2021-07-06 RX ADMIN — FLUTICASONE PROPIONATE 2 SPRAY: 50 SPRAY, METERED NASAL at 21:13

## 2021-07-06 RX ADMIN — OXYCODONE HYDROCHLORIDE 5 MG: 5 TABLET ORAL at 11:34

## 2021-07-06 RX ADMIN — DOCUSATE SODIUM 50 MG AND SENNOSIDES 8.6 MG 1 TABLET: 8.6; 5 TABLET, FILM COATED ORAL at 20:47

## 2021-07-06 RX ADMIN — DOCUSATE SODIUM 100 MG: 100 CAPSULE, LIQUID FILLED ORAL at 20:47

## 2021-07-06 RX ADMIN — POTASSIUM CHLORIDE 20 MEQ: 750 TABLET, EXTENDED RELEASE ORAL at 06:03

## 2021-07-06 RX ADMIN — ACETAMINOPHEN 975 MG: 325 TABLET, FILM COATED ORAL at 20:46

## 2021-07-06 RX ADMIN — HYDROMORPHONE HYDROCHLORIDE 0.5 MG: 1 INJECTION, SOLUTION INTRAMUSCULAR; INTRAVENOUS; SUBCUTANEOUS at 02:36

## 2021-07-06 RX ADMIN — OMEPRAZOLE 40 MG: 20 CAPSULE, DELAYED RELEASE ORAL at 09:18

## 2021-07-06 RX ADMIN — ACETAMINOPHEN 975 MG: 325 TABLET, FILM COATED ORAL at 11:34

## 2021-07-06 RX ADMIN — METOPROLOL SUCCINATE 50 MG: 25 TABLET, EXTENDED RELEASE ORAL at 09:17

## 2021-07-06 RX ADMIN — DOCUSATE SODIUM 50 MG AND SENNOSIDES 8.6 MG 1 TABLET: 8.6; 5 TABLET, FILM COATED ORAL at 09:19

## 2021-07-06 RX ADMIN — OXYCODONE HYDROCHLORIDE 10 MG: 10 TABLET ORAL at 05:12

## 2021-07-06 RX ADMIN — ACETAMINOPHEN 975 MG: 325 TABLET, FILM COATED ORAL at 04:18

## 2021-07-06 RX ADMIN — DOCUSATE SODIUM 100 MG: 100 CAPSULE, LIQUID FILLED ORAL at 09:14

## 2021-07-06 RX ADMIN — GUAIFENESIN AND DEXTROMETHORPHAN HYDROBROMIDE 1 TABLET: 600; 30 TABLET, EXTENDED RELEASE ORAL at 10:27

## 2021-07-06 RX ADMIN — SPIRONOLACTONE 50 MG: 25 TABLET ORAL at 09:19

## 2021-07-06 RX ADMIN — MAGNESIUM OXIDE TAB 400 MG (241.3 MG ELEMENTAL MG) 400 MG: 400 (241.3 MG) TAB at 20:47

## 2021-07-06 RX ADMIN — OXYCODONE HYDROCHLORIDE 5 MG: 5 TABLET ORAL at 16:14

## 2021-07-06 RX ADMIN — OXYCODONE HYDROCHLORIDE 10 MG: 10 TABLET ORAL at 20:53

## 2021-07-06 RX ADMIN — GLYCOPYRROLATE 1 MG: 1 TABLET ORAL at 20:53

## 2021-07-06 RX ADMIN — POLYETHYLENE GLYCOL 3350 17 G: 17 POWDER, FOR SOLUTION ORAL at 20:47

## 2021-07-06 RX ADMIN — LOSARTAN POTASSIUM 100 MG: 50 TABLET, FILM COATED ORAL at 09:17

## 2021-07-06 RX ADMIN — GUAIFENESIN 600 MG: 600 TABLET ORAL at 01:51

## 2021-07-06 RX ADMIN — GLYCOPYRROLATE 1 MG: 1 TABLET ORAL at 14:41

## 2021-07-06 RX ADMIN — MAGNESIUM OXIDE TAB 400 MG (241.3 MG ELEMENTAL MG) 400 MG: 400 (241.3 MG) TAB at 09:17

## 2021-07-06 ASSESSMENT — VISUAL ACUITY
OU: NORMAL ACUITY

## 2021-07-06 ASSESSMENT — ACTIVITIES OF DAILY LIVING (ADL)
ADLS_ACUITY_SCORE: 15
ADLS_ACUITY_SCORE: 18
ADLS_ACUITY_SCORE: 18
ADLS_ACUITY_SCORE: 15
ADLS_ACUITY_SCORE: 18
ADLS_ACUITY_SCORE: 18

## 2021-07-06 NOTE — PLAN OF CARE
Pt arrived from PACU at around 1950.    Major Shift Events:  A&Ox4. Pupils 2 E/R. Lumbar drain, clear output, drained hourly. Dressing CDI. R ear dressing dried drainage, serosanguinous. Above R eyebrow pinhole site with some clear drainage, MD aware. Sinus bradycardia-NSR (40-80 HR), afebrile. On RA. Awad good UOP. No BM. Pain of R ear managed with oxycodone, tylenol, and dilaudid. L radial art line intact. L PIV SL. Will replace magnesium and potassium today.     Plan: Therapy. Frequent neuro checks. Pain management.     For vital signs and complete assessments, please see documentation flowsheets.

## 2021-07-06 NOTE — PROVIDER NOTIFICATION
Notified Resident at 0200 AM regarding change in condition.      Spoke with: N/A. Paged Dr. Watson.    Orders were obtained.    Comments: Notified resident of pt's newly increased pressure in R ear an diminished hearing in   R ear. New neuro sign. Resulted after coughing.      Pt came to assess pt. Ordered additional pain meds.

## 2021-07-06 NOTE — PROVIDER NOTIFICATION
Admitted/transferred from: PACU at around 1950  Reason for admission/transfer: Postop care with frequent neuros and lumbar drain.  2 RN skin assessment: completed by Mar GERONIMO RN and Leo THOMAS RN.   Result of skin assessment and interventions/actions: Intact other than surgical sites (above R eyebrow, behind/above R ear, and lumbar site.  Height, weight, drug calc weight: Done  Patient belongings (see Flowsheet)  MDRO education added to care plan: N/A  ?

## 2021-07-06 NOTE — PROGRESS NOTES
07/06/21 0900   Quick Adds   Type of Visit Initial Occupational Therapy Evaluation   Living Environment   People in home alone   Current Living Arrangements apartment   Home Accessibility no concerns   Transportation Anticipated car, drives self;family or friend will provide   Self-Care   Usual Activity Tolerance good   Current Activity Tolerance moderate   Regular Exercise Yes   Activity/Exercise Type   (OP for LE stremgth)   Equipment Currently Used at Home   (n)   Disability/Function   Hearing Difficulty or Deaf no   Wear Glasses or Blind yes   Vision Management glasses   Concentrating, Remembering or Making Decisions Difficulty no   Difficulty Communicating no   Difficulty Eating/Swallowing no   Walking or Climbing Stairs Difficulty no   Dressing/Bathing Difficulty no   Toileting issues no   Doing Errands Independently Difficulty (such as shopping) no   Fall history within last six months no   Change in Functional Status Since Onset of Current Illness/Injury no   General Information   Onset of Illness/Injury or Date of Surgery 07/05/21   Referring Physician Eron Sosa MD   Additional Occupational Profile Info/Pertinent History of Current Problem Marleen Mcfadden is a 55 year old female s/p R craniotomy for middle fossa CSF leak repair and LD placement (7/5); she has been clinically stable.   Existing Precautions/Restrictions   (crani, lumbar drain, sinus per Activity orders from MD)   Left Upper Extremity (Weight-bearing Status)   (10# lifting restriction)   Right Upper Extremity (Weight-bearing Status)   (10# lifting restriction)   Cognitive Status Examination   Cognitive Status Comments OT: No concerns at this time, OT will continue to assess prn   Visual Perception   Impact of Vision Impairment on Function (Vision) OT: No concerns at this time, OT will continue to assess prn   Range of Motion Comprehensive   Comment, General Range of Motion OT: MARY ANN WFL   Strength Comprehensive (MMT)   Comment, General  Manual Muscle Testing (MMT) Assessment OT: NT formally 2/2 precuations   Bed Mobility   Comment (Bed Mobility) OT: SBA   Transfers   Transfer Comments OT: SBA-CGA   Balance   Balance Comments OT: SBA pushing IV pole and SBA pivot transfers w/o UE support   Instrumental Activities of Daily Living (IADL)   IADL Comments OT: Pt was ind, 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    Clinical Impression   Criteria for Skilled Therapeutic Interventions Met (OT) yes   OT Diagnosis decreased ind in I/ALDS   OT Problem List-Impairments impacting ADL problems related to;activity tolerance impaired;strength;post-surgical precautions   ADL comments/analysis decreased ind in I/ALDS   Assessment of Occupational Performance 1-3 Performance Deficits   Planned Therapy Interventions (OT) ADL retraining;IADL retraining;bed mobility training;strengthening;transfer training;home program guidelines;progressive activity/exercise;risk factor education   Clinical Decision Making Complexity (OT) low complexity   Therapy Frequency (OT) Daily   Predicted Duration of Therapy 7/23/21   Risk & Benefits of therapy have been explained evaluation/treatment results reviewed;care plan/treatment goals reviewed;patient   OT Discharge Planning    OT Discharge Recommendation (DC Rec) Home with assist   OT Rationale for DC Rec Home w/ A prn for heavy lifting to adhere to precuations and resumption of OP PT for LE strength   Total Evaluation Time (Minutes)   Total Evaluation Time (Minutes) 4

## 2021-07-06 NOTE — PROGRESS NOTES
[facility], [place]   07/06/2021  Neurosurgery Progress Note:    Assessment:  Marleen Mcfadden is a 55 year old woman s/p R temporal craniotomy for middle fossa CSF leak repair and LD placement (7/5); she has been clinically stable.    Plan:  - Serial neuro exams  - LD 10/hr  - Pain control  - HOB > 30 degrees  - Advance diet as tolerated  - Bowel regimen  - PRN antiemetics  - IVF until taking adequate PO  - PT/OT  - SCDs for DVT proph  - TTF    -----------------------------------  Kalin Watson MD, PhD  Neurosurgery Resident, PGY-3    Please contact neurosurgery resident on call with questions.    Dial * * [phone], enter [phone] when prompted.   -----------------------------------    Interval History: experienced new R ear fullness after coughing x1; no auricular discharge noted    Objective:   Temp:  [97.9  F (36.6  C)-98.4  F (36.9  C)] 98  F (36.7  C)  Pulse:  [56-86] 74  Resp:  [8-20] 18  BP: (118-130)/(69-88) 129/71  MAP:  [83 mmHg-110 mmHg] 93 mmHg  Arterial Line BP: (126-154)/(46-84) 134/70  SpO2:  [98 %-100 %] 98 %  I/O last 3 completed shifts:  In: 3066 [P.O.:980; I.V.:2086]  Out: 3706 [Urine:3550; Drains:111; Other:45]    Gen: Appears comfortable, NAD  Wound: covered by primapore  Neurologic:  - Alert & Oriented to person, place, time, and situation  - Follows commands briskly  - Speech fluent, spontaneous. No aphasia or dysarthria.  - No gaze preference. No apparent hemineglect.  - PERRL, EOMI  - Face symmetric with sensation intact to light touch  - Palate elevates symmetrically, uvula midline, tongue protrudes midline  - Trapezii muscles 5/5 bilaterally  - No pronator drift     Del Tr Bi WE WF Gr   R 5 5 5 5 5 5   L 5 5 5 5 5 5    HF KE KF DF PF EHL   R 5 5 5 5 5 5   L 5 5 5 5 5 5     Reflexes 2+ throughout    Sensation intact and symmetric to light touch throughout    LABS:  Recent Labs   Lab 07/06/21  0422 07/05/21  1725 07/05/21  1232     --  132*   POTASSIUM 3.8  4.4 4.4   CHLORIDE 107  --   --    CO2 26  --   --    ANIONGAP 5  --   --    *  --  122*   BUN 10  --   --    CR 0.68  --   --    CARMEN 7.8*  --   --        Recent Labs   Lab 07/06/21  0422   WBC 10.7   RBC 3.60*   HGB 11.0*   HCT 33.6*   MCV 93   MCH 30.6   MCHC 32.7   RDW 13.6          IMAGING:  Recent Results (from the past 24 hour(s))   XR Surgery TI Fluoro L/T 5 Min w Stills    Narrative    This exam was marked as non-reportable because it will not be read by a   radiologist or a [place] non-radiologist provider.             I have reviewed the history above and agree with the resident's assessment and plan. Episode of right otorrhea. Unclear if CSF.  GILMER Noe MD

## 2021-07-06 NOTE — PLAN OF CARE
PT4A: PT EVAL CX AND DEFER- per discussion with OT, pt up SBA with IV support ambulating in halls with no acute balance concerns. No acute PT needs indicated. OT will follow for mobility and ADL/precaution training. PT will complete orders.

## 2021-07-06 NOTE — PLAN OF CARE
"Major Shift Events:    Neuro: Pt alert, oriented, intact, perrla. Headache frontal forehead, and side of head and ear. Improved with PRN oxy 5mg. Complains of \"ocean sound\" in R ear with diminished hearing entire shift, no change. Lumbar drain opened q1hr for 3 mins with 10mL clear CSF drainage. Increasingly swollen on right side of face, MD aware and bedside to assess at about 5pm and okay with swelling.   CV: HR high 50's - 80's with activity. Up and walking with OT. Complains of slight dizziness. Art line removed. MAPs within range with no intervention.   Resp: Room air, denies SOB, clear lungs, denies difficulty breathing.   GI/: Ate 3 meals, 50-75%, good appetite. Drinking large amounts of water. Large urine output with Awad catheter in place with order to remove POD2. Pt states she has bladder spasms baseline - meds given.   Plan: Continue plan of care.   For vital signs and complete assessments, please see documentation flowsheets.   Angela Bacon RN on 7/6/2021 at 6:46 PM      "

## 2021-07-06 NOTE — PHARMACY-ADMISSION MEDICATION HISTORY
Admission Medication History Completed by Pharmacy    See [facility] Admission Navigator for allergy information, preferred outpatient pharmacy, prior to admission medications and immunization status.     Medication history completed in preoperative assessment center on 6/17, please see note dated 6/21 for further details.    Date completed: 07/06/21    Medication history completed by: Juan C Rice RP

## 2021-07-06 NOTE — PROGRESS NOTES
"Otolaryngology Progress Note  July 6, 2021    S: Doing well post-op. In great spirits this morning for her birthday. Reported right ear fullness and decreased hearing from the right ear.    O: /68   Pulse 74   Temp 98.4  F (36.9  C)   Resp 18   Ht 1.626 m (5' 4\")   Wt 114.2 kg (251 lb 12.3 oz)   LMP 10/19/2008 (Approximate)   SpO2 100%   BMI 43.22 kg/m     General: Alert and oriented x 3, No acute distress.   HEENT: EOMI. HB 1/6. Cranial nerves V1-V3 intact and equal bilaterally. Right post-auricular incision clean, dry and intact without incisional fluctuance.   Pulmonary: Breathing non-labored, no stridor, no accessory muscle use.     Drains: Lumbar drain in place draining 10ml/hr      Intake/Output Summary (Last 24 hours) at 7/6/2021 0519  Last data filed at 7/6/2021 0500  Gross per 24 hour   Intake 3065 ml   Output 3336 ml   Net -271 ml       LABS:  ROUTINE IP LABS (Last four results)  BMP  Recent Labs   Lab 07/06/21  0422 07/05/21  1725 07/05/21  1232     --  132*   POTASSIUM 3.8 4.4 4.4   CHLORIDE 107  --   --    CARMEN 7.8*  --   --    CO2 26  --   --    BUN 10  --   --    CR 0.68  --   --    *  --  122*     CBC  Recent Labs   Lab 07/06/21  0422 07/05/21  1232   WBC 10.7  --    RBC 3.60*  --    HGB 11.0* 12.1   HCT 33.6*  --    MCV 93  --    MCH 30.6  --    MCHC 32.7  --    RDW 13.6  --      --        A/P: Marleen Mcfadden is a 56 year old female with a past medical history of CHF, cardiomyopathy, possible SLE, and HTN, now s/p MCF approach to an encephalocele repair on 7/5. She is doing well post-op.    - Pain control per neurosurgery  - refer to neurosurgery for management of lumbar drain  - sinonasal precautions  - bowel regimen  - antinausea, per neurosurgery    -- Patient and above plan discussed with Dr. Julio Cesar Way  Medical Student    Kay Daniel MD   Otolaryngology-Head & Neck Surgery PGY1  Please contact ENT with questions by dialing * * [phone] " and entering job [phone] when prompted.

## 2021-07-07 ENCOUNTER — APPOINTMENT (OUTPATIENT)
Dept: OCCUPATIONAL THERAPY | Facility: CLINIC | Age: 56
DRG: 026 | End: 2021-07-07
Attending: OTOLARYNGOLOGY
Payer: MEDICARE

## 2021-07-07 LAB
ANION GAP SERPL CALCULATED.3IONS-SCNC: 3 MMOL/L (ref 3–14)
BUN SERPL-MCNC: 11 MG/DL (ref 7–30)
CALCIUM SERPL-MCNC: 7.7 MG/DL (ref 8.5–10.1)
CHLORIDE SERPL-SCNC: 103 MMOL/L (ref 94–109)
CO2 SERPL-SCNC: 29 MMOL/L (ref 20–32)
CREAT SERPL-MCNC: 0.85 MG/DL (ref 0.52–1.04)
ERYTHROCYTE [DISTWIDTH] IN BLOOD BY AUTOMATED COUNT: 13.6 % (ref 10–15)
GFR SERPL CREATININE-BSD FRML MDRD: 76 ML/MIN/{1.73_M2}
GLUCOSE BLDC GLUCOMTR-MCNC: 122 MG/DL (ref 70–99)
GLUCOSE BLDC GLUCOMTR-MCNC: 123 MG/DL (ref 70–99)
GLUCOSE BLDC GLUCOMTR-MCNC: 79 MG/DL (ref 70–99)
GLUCOSE SERPL-MCNC: 88 MG/DL (ref 70–99)
HCT VFR BLD AUTO: 31.6 % (ref 35–47)
HGB BLD-MCNC: 10.2 G/DL (ref 11.7–15.7)
MAGNESIUM SERPL-MCNC: 2 MG/DL (ref 1.6–2.3)
MCH RBC QN AUTO: 30.7 PG (ref 26.5–33)
MCHC RBC AUTO-ENTMCNC: 32.3 G/DL (ref 31.5–36.5)
MCV RBC AUTO: 95 FL (ref 78–100)
PHOSPHATE SERPL-MCNC: 3 MG/DL (ref 2.5–4.5)
PLATELET # BLD AUTO: 203 10E9/L (ref 150–450)
POTASSIUM SERPL-SCNC: 4.1 MMOL/L (ref 3.4–5.3)
RBC # BLD AUTO: 3.32 10E12/L (ref 3.8–5.2)
SODIUM SERPL-SCNC: 136 MMOL/L (ref 133–144)
WBC # BLD AUTO: 8.6 10E9/L (ref 4–11)

## 2021-07-07 PROCEDURE — 250N000011 HC RX IP 250 OP 636: Performed by: STUDENT IN AN ORGANIZED HEALTH CARE EDUCATION/TRAINING PROGRAM

## 2021-07-07 PROCEDURE — 250N000011 HC RX IP 250 OP 636: Performed by: NURSE PRACTITIONER

## 2021-07-07 PROCEDURE — 250N000013 HC RX MED GY IP 250 OP 250 PS 637: Performed by: NEUROLOGICAL SURGERY

## 2021-07-07 PROCEDURE — 250N000013 HC RX MED GY IP 250 OP 250 PS 637: Performed by: STUDENT IN AN ORGANIZED HEALTH CARE EDUCATION/TRAINING PROGRAM

## 2021-07-07 PROCEDURE — 250N000011 HC RX IP 250 OP 636

## 2021-07-07 PROCEDURE — 97530 THERAPEUTIC ACTIVITIES: CPT | Mod: GO

## 2021-07-07 PROCEDURE — 80048 BASIC METABOLIC PNL TOTAL CA: CPT | Performed by: NEUROLOGICAL SURGERY

## 2021-07-07 PROCEDURE — 258N000003 HC RX IP 258 OP 636

## 2021-07-07 PROCEDURE — 84132 ASSAY OF SERUM POTASSIUM: CPT | Performed by: NEUROLOGICAL SURGERY

## 2021-07-07 PROCEDURE — 84100 ASSAY OF PHOSPHORUS: CPT | Performed by: NEUROLOGICAL SURGERY

## 2021-07-07 PROCEDURE — 83735 ASSAY OF MAGNESIUM: CPT | Performed by: NEUROLOGICAL SURGERY

## 2021-07-07 PROCEDURE — 85027 COMPLETE CBC AUTOMATED: CPT | Performed by: NEUROLOGICAL SURGERY

## 2021-07-07 PROCEDURE — 999N001017 HC STATISTIC GLUCOSE BY METER IP

## 2021-07-07 PROCEDURE — 200N000002 HC R&B ICU UMMC

## 2021-07-07 PROCEDURE — 250N000013 HC RX MED GY IP 250 OP 250 PS 637: Performed by: NURSE PRACTITIONER

## 2021-07-07 PROCEDURE — 36415 COLL VENOUS BLD VENIPUNCTURE: CPT | Performed by: NEUROLOGICAL SURGERY

## 2021-07-07 RX ORDER — MAGNESIUM OXIDE 400 MG/1
400 TABLET ORAL 2 TIMES DAILY
Status: DISCONTINUED | OUTPATIENT
Start: 2021-07-07 | End: 2021-07-07

## 2021-07-07 RX ORDER — HYDROMORPHONE HYDROCHLORIDE 1 MG/ML
.3-.5 INJECTION, SOLUTION INTRAMUSCULAR; INTRAVENOUS; SUBCUTANEOUS
Status: COMPLETED | OUTPATIENT
Start: 2021-07-07 | End: 2021-07-08

## 2021-07-07 RX ORDER — DIPHENHYDRAMINE HCL 25 MG
25 CAPSULE ORAL EVERY 6 HOURS PRN
Status: COMPLETED | OUTPATIENT
Start: 2021-07-07 | End: 2021-07-07

## 2021-07-07 RX ORDER — DIPHENHYDRAMINE HCL 25 MG
25 CAPSULE ORAL EVERY 6 HOURS PRN
Status: DISCONTINUED | OUTPATIENT
Start: 2021-07-07 | End: 2021-07-14 | Stop reason: HOSPADM

## 2021-07-07 RX ADMIN — OXYCODONE HYDROCHLORIDE 10 MG: 10 TABLET ORAL at 01:31

## 2021-07-07 RX ADMIN — LORATADINE 10 MG: 10 TABLET ORAL at 08:22

## 2021-07-07 RX ADMIN — GUAIFENESIN AND DEXTROMETHORPHAN HYDROBROMIDE 1 TABLET: 600; 30 TABLET, EXTENDED RELEASE ORAL at 20:02

## 2021-07-07 RX ADMIN — DIPHENHYDRAMINE HYDROCHLORIDE 25 MG: 25 CAPSULE ORAL at 12:27

## 2021-07-07 RX ADMIN — DOCUSATE SODIUM 100 MG: 100 CAPSULE, LIQUID FILLED ORAL at 08:21

## 2021-07-07 RX ADMIN — GLYCOPYRROLATE 1 MG: 1 TABLET ORAL at 20:02

## 2021-07-07 RX ADMIN — PROGESTERONE 100 MG: 100 CAPSULE ORAL at 21:32

## 2021-07-07 RX ADMIN — GUAIFENESIN AND DEXTROMETHORPHAN HYDROBROMIDE 1 TABLET: 600; 30 TABLET, EXTENDED RELEASE ORAL at 08:20

## 2021-07-07 RX ADMIN — CEFEPIME HYDROCHLORIDE 2 G: 2 INJECTION, POWDER, FOR SOLUTION INTRAVENOUS at 06:40

## 2021-07-07 RX ADMIN — OXYCODONE HYDROCHLORIDE 10 MG: 10 TABLET ORAL at 21:32

## 2021-07-07 RX ADMIN — ACETAMINOPHEN 975 MG: 325 TABLET, FILM COATED ORAL at 04:27

## 2021-07-07 RX ADMIN — OMEPRAZOLE 40 MG: 20 CAPSULE, DELAYED RELEASE ORAL at 08:24

## 2021-07-07 RX ADMIN — HYDROMORPHONE HYDROCHLORIDE 0.5 MG: 1 INJECTION, SOLUTION INTRAMUSCULAR; INTRAVENOUS; SUBCUTANEOUS at 21:49

## 2021-07-07 RX ADMIN — OXYCODONE HYDROCHLORIDE 10 MG: 10 TABLET ORAL at 08:24

## 2021-07-07 RX ADMIN — ACETAMINOPHEN 975 MG: 325 TABLET, FILM COATED ORAL at 20:01

## 2021-07-07 RX ADMIN — MAGNESIUM OXIDE TAB 400 MG (241.3 MG ELEMENTAL MG) 400 MG: 400 (241.3 MG) TAB at 08:22

## 2021-07-07 RX ADMIN — GLYCOPYRROLATE 1 MG: 1 TABLET ORAL at 13:55

## 2021-07-07 RX ADMIN — OXYCODONE HYDROCHLORIDE 10 MG: 10 TABLET ORAL at 12:27

## 2021-07-07 RX ADMIN — CEFEPIME HYDROCHLORIDE 2 G: 2 INJECTION, POWDER, FOR SOLUTION INTRAVENOUS at 13:46

## 2021-07-07 RX ADMIN — DOCUSATE SODIUM 100 MG: 100 CAPSULE, LIQUID FILLED ORAL at 20:01

## 2021-07-07 RX ADMIN — SPIRONOLACTONE 50 MG: 25 TABLET ORAL at 08:25

## 2021-07-07 RX ADMIN — POLYETHYLENE GLYCOL 3350 17 G: 17 POWDER, FOR SOLUTION ORAL at 08:25

## 2021-07-07 RX ADMIN — VANCOMYCIN HYDROCHLORIDE 1500 MG: 100 INJECTION, POWDER, LYOPHILIZED, FOR SOLUTION INTRAVENOUS at 07:30

## 2021-07-07 RX ADMIN — Medication 125 MCG: at 08:26

## 2021-07-07 RX ADMIN — METOPROLOL SUCCINATE 50 MG: 25 TABLET, EXTENDED RELEASE ORAL at 08:23

## 2021-07-07 RX ADMIN — DOCUSATE SODIUM 50 MG AND SENNOSIDES 8.6 MG 1 TABLET: 8.6; 5 TABLET, FILM COATED ORAL at 08:25

## 2021-07-07 RX ADMIN — POLYETHYLENE GLYCOL 3350 17 G: 17 POWDER, FOR SOLUTION ORAL at 13:55

## 2021-07-07 RX ADMIN — FLUTICASONE PROPIONATE 2 SPRAY: 50 SPRAY, METERED NASAL at 21:32

## 2021-07-07 RX ADMIN — TOLTERODINE 4 MG: 4 CAPSULE, EXTENDED RELEASE ORAL at 08:26

## 2021-07-07 RX ADMIN — ACETAMINOPHEN 975 MG: 325 TABLET, FILM COATED ORAL at 12:26

## 2021-07-07 RX ADMIN — LOSARTAN POTASSIUM 100 MG: 50 TABLET, FILM COATED ORAL at 08:22

## 2021-07-07 RX ADMIN — GLYCOPYRROLATE 1 MG: 1 TABLET ORAL at 08:22

## 2021-07-07 RX ADMIN — MAGNESIUM OXIDE TAB 400 MG (241.3 MG ELEMENTAL MG) 400 MG: 400 (241.3 MG) TAB at 20:01

## 2021-07-07 RX ADMIN — DIPHENHYDRAMINE HYDROCHLORIDE 25 MG: 25 CAPSULE ORAL at 04:27

## 2021-07-07 RX ADMIN — DOCUSATE SODIUM 50 MG AND SENNOSIDES 8.6 MG 1 TABLET: 8.6; 5 TABLET, FILM COATED ORAL at 20:01

## 2021-07-07 ASSESSMENT — VISUAL ACUITY
OU: NORMAL ACUITY

## 2021-07-07 ASSESSMENT — ACTIVITIES OF DAILY LIVING (ADL)
ADLS_ACUITY_SCORE: 15

## 2021-07-07 ASSESSMENT — MIFFLIN-ST. JEOR: SCORE: 1705

## 2021-07-07 NOTE — PROGRESS NOTES
[facility], [place]   07/08/2021  Neurosurgery Progress Note:    Assessment:  Marleen Mcfadden is a 55 year old woman s/p R craniotomy for middle fossa CSF leak repair and LD placement (7/5); she has been clinically stable.    Plan:  - Serial neuro exams  - LD 15/hr (increased 7/7)  - Pain control  - HOB > 30 degrees  - Regular diet  - Bowel regimen  - PRN antiemetics  - IVF until taking adequate PO  - PT/OT  - SCDs for DVT proph  - TTF    -----------------------------------  Kalin Watson MD, PhD  Neurosurgery Resident, PGY-3    Please contact neurosurgery resident on call with questions.    Dial * * [phone], enter [phone] when prompted.   -----------------------------------    Interval History: no clear fluid leaking from right ear anymore; some mild bloody discharge; positional h/a overnight, responded well to IV dilaudid x1      Objective:   Temp:  [97.4  F (36.3  C)-99.3  F (37.4  C)] 97.4  F (36.3  C)  Pulse:  [54-80] 61  Resp:  [8-37] 12  BP: ()/() 131/78  SpO2:  [95 %-100 %] 96 %  I/O last 3 completed shifts:  In: 2270 [P.O.:1920; I.V.:350]  Out: 4030 [Urine:3710; Drains:320]    Gen: Appears comfortable, NAD  Wound: covered by primapore; right ear with clear drainage  Neurologic:  - Alert & Oriented to person, place, time, and situation  - Follows commands briskly  - Speech fluent, spontaneous. No aphasia or dysarthria.  - No gaze preference. No apparent hemineglect.  - PERRL, EOMI  - Face symmetric with sensation intact to light touch  - Palate elevates symmetrically, uvula midline, tongue protrudes midline  - Trapezii muscles 5/5 bilaterally  - No pronator drift     Del Tr Bi WE WF Gr   R 5 5 5 5 5 5   L 5 5 5 5 5 5    HF KE KF DF PF EHL   R 5 5 5 5 5 5   L 5 5 5 5 5 5     Reflexes 2+ throughout    Sensation intact and symmetric to light touch throughout    LABS:  Recent Labs   Lab 07/08/21  0548 07/07/21  0529 07/06/21  0422    136 138   POTASSIUM 4.1 4.1  3.8   CHLORIDE 100 103 107   CO2 30 29 26   ANIONGAP 2* 3 5   GLC 91 88 111*   BUN 9 11 10   CR 0.87 0.85 0.68   CARMEN 8.0* 7.7* 7.8*       Recent Labs   Lab 07/08/21  0548   WBC 8.6   RBC 3.49*   HGB 10.6*   HCT 33.3*   MCV 95   MCH 30.4   MCHC 31.8   RDW 13.6          IMAGING:  No results found for this or any previous visit (from the past 24 hour(s)).    I have reviewed the history. I have seen and examined the patient myself and agree with the assessment and plan above. Will take back to OR today for re-repair of encephalocele/CSF fistula.  GILMER Noe MD

## 2021-07-07 NOTE — PROGRESS NOTES
[facility], [place]   07/07/2021  Neurosurgery Progress Note:    Assessment:  Marleen Mcfadden is a 55 year old woman s/p R craniotomy for middle fossa CSF leak repair and LD placement (7/5); she has been clinically stable.    Plan:  - Serial neuro exams  - LD 10/hr; tentatively increase hourly volume  - Pain control  - HOB > 30 degrees  - Regular diet  - Bowel regimen  - PRN antiemetics  - IVF until taking adequate PO  - PT/OT  - SCDs for DVT proph  - TTF    -----------------------------------  Kalin Watson MD, PhD  Neurosurgery Resident, PGY-3    Please contact neurosurgery resident on call with questions.    Dial * * [phone], enter [phone] when prompted.   -----------------------------------    Interval History: started to have new clear right ear drainage and right frontal headache      Objective:   Temp:  [97.6  F (36.4  C)-99.2  F (37.3  C)] 98.2  F (36.8  C)  Pulse:  [59-80] 65  Resp:  [11-18] 11  BP: ()/(58-92) 102/59  MAP:  [99 mmHg] 99 mmHg  Arterial Line BP: (145)/(77) 145/77  SpO2:  [95 %-100 %] 100 %  I/O last 3 completed shifts:  In: 3723 [P.O.:3720; I.V.:3]  Out: 3774 [Urine:3555; Drains:219]    Gen: Appears comfortable, NAD  Wound: covered by primapore; right ear with clear drainage  Neurologic:  - Alert & Oriented to person, place, time, and situation  - Follows commands briskly  - Speech fluent, spontaneous. No aphasia or dysarthria.  - No gaze preference. No apparent hemineglect.  - PERRL, EOMI  - Face symmetric with sensation intact to light touch  - Palate elevates symmetrically, uvula midline, tongue protrudes midline  - Trapezii muscles 5/5 bilaterally  - No pronator drift     Del Tr Bi WE WF Gr   R 5 5 5 5 5 5   L 5 5 5 5 5 5    HF KE KF DF PF EHL   R 5 5 5 5 5 5   L 5 5 5 5 5 5     Reflexes 2+ throughout    Sensation intact and symmetric to light touch throughout    LABS:  Recent Labs   Lab 07/07/21  0529 07/06/21  0422 07/05/21  1725 07/05/21  1232   NA  136 138  --  132*   POTASSIUM 4.1 3.8 4.4 4.4   CHLORIDE 103 107  --   --    CO2 29 26  --   --    ANIONGAP 3 5  --   --    GLC 88 111*  --  122*   BUN 11 10  --   --    CR 0.85 0.68  --   --    CARMEN 7.7* 7.8*  --   --        Recent Labs   Lab 07/07/21  0529   WBC 8.6   RBC 3.32*   HGB 10.2*   HCT 31.6*   MCV 95   MCH 30.7   MCHC 32.3   RDW 13.6          IMAGING:  No results found for this or any previous visit (from the past 24 hour(s)).    I have reviewed the history. I have seen and examined the patient myself and agree with the assessment and plan above. Given episode of otorrhea and tenuous repair of encephalocele, we are leaning towards early reoperation.  GILMER Noe MD

## 2021-07-07 NOTE — PHARMACY-VANCOMYCIN DOSING SERVICE
Pharmacy Empiric Dose Change Per Policy  Original Dose Ordered: Vancomycin 750 mg q12h  Dose Changed To: Vancomycin 1000 mg q12h    Change from AUC goal to trough with goal of 15-20 mg/L with concern for CSF leak.    This dose change was based on the pharmacist's assessment of this patient's age, weight, concurrent drug therapy, treatment goals, whether patient's creatinine clearance adequately indicates renal function (factoring in age, muscle mass, fluid and clinical status), and, if applicable, prior pharmacokinetic data.    Creatinine Clearance= Estimated Creatinine Clearance: 90.1 mL/min (based on SCr of 0.85 mg/dL).     Will continue to follow and modify dosage according to levels, organ function and clinical condition

## 2021-07-07 NOTE — PROGRESS NOTES
"Otolaryngology Progress Note  July 6, 2021    S: Marleen reported a headache this morning. She is also experiencing right ear drainage and says she can hear an \"ocean sound\" in her right ear. She denies metallic or salty taste.    O: /64 (BP Location: Right arm)   Pulse 74   Temp 98.4  F (36.9  C) (Oral)   Resp 14   Ht 1.626 m (5' 4\")   Wt 113.5 kg (250 lb 3.6 oz)   LMP 10/19/2008 (Approximate)   SpO2 98%   BMI 42.95 kg/m     General: Alert and oriented x 3, No acute distress.   HEENT: EOMI. HB 1/6. Cranial nerves V1-V3 intact and equal bilaterally. Right post-auricular incision clean, dry and intact without incisional fluctuance. Right EAM is wet with clear fluid but no pooling or active drainage visualized. Moderate swelling to right zygoma and periorbita. No hematoma or fluid collection.   Pulmonary: Breathing non-labored, no stridor, no accessory muscle use.   Drains: Lumbar drain in place draining 10ml/hr    I/O:   Gross per 24 hour   Intake 3065 ml   Output 3336 ml   Net -271 ml     LABS:  BMP  Recent Labs   Lab 07/06/21  0422 07/05/21  1725 07/05/21  1232     --  132*   POTASSIUM 3.8 4.4 4.4   CHLORIDE 107  --   --    CARMEN 7.8*  --   --    CO2 26  --   --    BUN 10  --   --    CR 0.68  --   --    *  --  122*     CBC  Recent Labs   Lab 07/06/21  0422 07/05/21  1232   WBC 10.7  --    RBC 3.60*  --    HGB 11.0* 12.1   HCT 33.6*  --    MCV 93  --    MCH 30.6  --    MCHC 32.7  --    RDW 13.6  --      --        A/P: Marleen Mcfadden is a 56 year old female with a past medical history of CHF, cardiomyopathy, possible SLE, and HTN, now s/p MCF approach to an encephalocele repair on 7/5. She is doing well post-op; clear otorrhea 7/7, pending work-up.     - Pain control and lumbar drain per neurosurgery  - Will evaluate right EAC today and attempt to collect fluid for beta-2 via suction trap. Will touch base with NSGY re: plan for lumbar drain and activity   - Continue sinonasal " precautions  - Bowel regimen      -- Patient and above plan discussed with Dr. Julio Cesar Way  Medical Student    Connie Chaparro MD PGY2

## 2021-07-07 NOTE — PHARMACY-VANCOMYCIN DOSING SERVICE
Pharmacy Vancomycin Initial Note  Date of Service 2021  Patient's  1965  56 year old, female    Indication: Ear drainage, concern for CSF leak    Current estimated CrCl = Estimated Creatinine Clearance: 90.1 mL/min (based on SCr of 0.85 mg/dL).    Creatinine for last 3 days  2021:  4:22 AM Creatinine 0.68 mg/dL  2021:  5:29 AM Creatinine 0.85 mg/dL    Recent Vancomycin Level(s) for last 3 days  No results found for requested labs within last 72 hours.      Vancomycin IV Administrations (past 72 hours)      No vancomycin orders with administrations in past 72 hours.                Nephrotoxins and other renal medications (From now, onward)    None          Contrast Orders - past 72 hours (72h ago, onward)    None        Loading dose: 1500 mg X1  Regimen: 750 mg IV every 12 hours.  Start time: 07:00 on 2021  Exposure target: AUC24 (range)400-600 mg/L.hr   AUC24,ss: 438 mg/L.hr  Probability of AUC24 > 400: 57 %  Ctrough,ss: 13.3 mg/L  Probability of Ctrough,ss > 20: 27 %  Probability of nephrotoxicity (Lodise JYOTI ): 8 %          Plan:  1. Start vancomycin  1500 mg IV X1 followed by 750 mg IV q12h.   2. Vancomycin monitoring method: AUC  3. Vancomycin therapeutic monitoring goal: 400-600 mg*h/L  4. Pharmacy will check vancomycin levels as appropriate in 1-3 Days.    5. Serum creatinine levels will be ordered daily for the first week of therapy and at least twice weekly for subsequent weeks.      Leopoldo Reina, [facility]

## 2021-07-07 NOTE — PLAN OF CARE
"Major Shift Events:  Pt A&Ox4, PERRLA intact. Unchanged \"ocean sound\" in right ear. Headache managed with PRN oxycodone 10 mg. Able to move all extremities. Lumbar drain open q1 hour for 10 mL clear CSF drainage. HR high 50s-60s throughout night. RA SaO2 >95%. Deluna in place, good UO. Right facial swelling increasing overnight, MD aware.     Plan: Remove deluna. Continue to monitor neuros q2. Transfer orders to .    For vital signs and complete assessments, please see documentation flowsheets.       0600: MD paged for pink, translucent drainage from right ear and headache pt describes as increasing pressure. Neuro surg team saw patient. I attempted to collect sample, able to collect scant amount and sent to lab. Lab called stated we need .25 mL to test, unable to collect enough fluid. Will continue to monitor, and inform oncoming shift.    "

## 2021-07-08 ENCOUNTER — ANESTHESIA EVENT (OUTPATIENT)
Dept: SURGERY | Facility: CLINIC | Age: 56
DRG: 026 | End: 2021-07-08
Payer: MEDICARE

## 2021-07-08 ENCOUNTER — ANESTHESIA (OUTPATIENT)
Dept: SURGERY | Facility: CLINIC | Age: 56
DRG: 026 | End: 2021-07-08
Payer: MEDICARE

## 2021-07-08 LAB
ANION GAP SERPL CALCULATED.3IONS-SCNC: 2 MMOL/L (ref 3–14)
BUN SERPL-MCNC: 9 MG/DL (ref 7–30)
CALCIUM SERPL-MCNC: 8 MG/DL (ref 8.5–10.1)
CHLORIDE SERPL-SCNC: 100 MMOL/L (ref 94–109)
CO2 SERPL-SCNC: 30 MMOL/L (ref 20–32)
CREAT SERPL-MCNC: 0.87 MG/DL (ref 0.52–1.04)
ERYTHROCYTE [DISTWIDTH] IN BLOOD BY AUTOMATED COUNT: 13.6 % (ref 10–15)
GFR SERPL CREATININE-BSD FRML MDRD: 74 ML/MIN/{1.73_M2}
GLUCOSE SERPL-MCNC: 91 MG/DL (ref 70–99)
HCT VFR BLD AUTO: 33.3 % (ref 35–47)
HGB BLD-MCNC: 10.6 G/DL (ref 11.7–15.7)
MAGNESIUM SERPL-MCNC: 2 MG/DL (ref 1.6–2.3)
MCH RBC QN AUTO: 30.4 PG (ref 26.5–33)
MCHC RBC AUTO-ENTMCNC: 31.8 G/DL (ref 31.5–36.5)
MCV RBC AUTO: 95 FL (ref 78–100)
PHOSPHATE SERPL-MCNC: 3 MG/DL (ref 2.5–4.5)
PLATELET # BLD AUTO: 243 10E9/L (ref 150–450)
POTASSIUM SERPL-SCNC: 4.1 MMOL/L (ref 3.4–5.3)
RBC # BLD AUTO: 3.49 10E12/L (ref 3.8–5.2)
SODIUM SERPL-SCNC: 133 MMOL/L (ref 133–144)
WBC # BLD AUTO: 8.6 10E9/L (ref 4–11)

## 2021-07-08 PROCEDURE — 200N000002 HC R&B ICU UMMC

## 2021-07-08 PROCEDURE — C1713 ANCHOR/SCREW BN/BN,TIS/BN: HCPCS | Performed by: NEUROLOGICAL SURGERY

## 2021-07-08 PROCEDURE — 250N000011 HC RX IP 250 OP 636: Performed by: STUDENT IN AN ORGANIZED HEALTH CARE EDUCATION/TRAINING PROGRAM

## 2021-07-08 PROCEDURE — 250N000011 HC RX IP 250 OP 636: Performed by: NURSE PRACTITIONER

## 2021-07-08 PROCEDURE — 360N000078 HC SURGERY LEVEL 5, PER MIN: Performed by: NEUROLOGICAL SURGERY

## 2021-07-08 PROCEDURE — 84100 ASSAY OF PHOSPHORUS: CPT | Performed by: NEUROLOGICAL SURGERY

## 2021-07-08 PROCEDURE — 250N000013 HC RX MED GY IP 250 OP 250 PS 637: Performed by: STUDENT IN AN ORGANIZED HEALTH CARE EDUCATION/TRAINING PROGRAM

## 2021-07-08 PROCEDURE — 999N000015 HC STATISTIC ARTERIAL MONITORING DAILY

## 2021-07-08 PROCEDURE — 250N000009 HC RX 250: Performed by: STUDENT IN AN ORGANIZED HEALTH CARE EDUCATION/TRAINING PROGRAM

## 2021-07-08 PROCEDURE — 999N000157 HC STATISTIC RCP TIME EA 10 MIN

## 2021-07-08 PROCEDURE — 272N000001 HC OR GENERAL SUPPLY STERILE: Performed by: NEUROLOGICAL SURGERY

## 2021-07-08 PROCEDURE — 250N000013 HC RX MED GY IP 250 OP 250 PS 637: Performed by: NURSE PRACTITIONER

## 2021-07-08 PROCEDURE — 258N000003 HC RX IP 258 OP 636: Performed by: STUDENT IN AN ORGANIZED HEALTH CARE EDUCATION/TRAINING PROGRAM

## 2021-07-08 PROCEDURE — 370N000017 HC ANESTHESIA TECHNICAL FEE, PER MIN: Performed by: NEUROLOGICAL SURGERY

## 2021-07-08 PROCEDURE — 80048 BASIC METABOLIC PNL TOTAL CA: CPT | Performed by: NEUROLOGICAL SURGERY

## 2021-07-08 PROCEDURE — 250N000009 HC RX 250: Performed by: NEUROLOGICAL SURGERY

## 2021-07-08 PROCEDURE — 710N000011 HC RECOVERY PHASE 1, LEVEL 3, PER MIN: Performed by: NEUROLOGICAL SURGERY

## 2021-07-08 PROCEDURE — 85027 COMPLETE CBC AUTOMATED: CPT | Performed by: NEUROLOGICAL SURGERY

## 2021-07-08 PROCEDURE — 00U20KZ SUPPLEMENT DURA MATER WITH NONAUTOLOGOUS TISSUE SUBSTITUTE, OPEN APPROACH: ICD-10-PCS | Performed by: NEUROLOGICAL SURGERY

## 2021-07-08 PROCEDURE — 83735 ASSAY OF MAGNESIUM: CPT | Performed by: NEUROLOGICAL SURGERY

## 2021-07-08 PROCEDURE — C1762 CONN TISS, HUMAN(INC FASCIA): HCPCS | Performed by: NEUROLOGICAL SURGERY

## 2021-07-08 PROCEDURE — 999N000141 HC STATISTIC PRE-PROCEDURE NURSING ASSESSMENT: Performed by: NEUROLOGICAL SURGERY

## 2021-07-08 PROCEDURE — 36415 COLL VENOUS BLD VENIPUNCTURE: CPT | Performed by: NEUROLOGICAL SURGERY

## 2021-07-08 PROCEDURE — 250N000025 HC SEVOFLURANE, PER MIN: Performed by: NEUROLOGICAL SURGERY

## 2021-07-08 DEVICE — IMP SCR SYN MATRIX LOW PRO 1.5X04MM SELF DRILL 04.503.104.01: Type: IMPLANTABLE DEVICE | Site: CRANIAL | Status: FUNCTIONAL

## 2021-07-08 DEVICE — GRAFT DUREPAIR DURA MATRIX 2X2" 62100: Type: IMPLANTABLE DEVICE | Site: BRAIN | Status: FUNCTIONAL

## 2021-07-08 RX ORDER — LIDOCAINE HYDROCHLORIDE 20 MG/ML
INJECTION, SOLUTION INFILTRATION; PERINEURAL PRN
Status: DISCONTINUED | OUTPATIENT
Start: 2021-07-08 | End: 2021-07-08

## 2021-07-08 RX ORDER — MANNITOL 20 G/100ML
INJECTION, SOLUTION INTRAVENOUS PRN
Status: DISCONTINUED | OUTPATIENT
Start: 2021-07-08 | End: 2021-07-08

## 2021-07-08 RX ORDER — HYDROMORPHONE HYDROCHLORIDE 1 MG/ML
.3-.5 INJECTION, SOLUTION INTRAMUSCULAR; INTRAVENOUS; SUBCUTANEOUS EVERY 5 MIN PRN
Status: DISCONTINUED | OUTPATIENT
Start: 2021-07-08 | End: 2021-07-09 | Stop reason: HOSPADM

## 2021-07-08 RX ORDER — CEFAZOLIN SODIUM 2 G/100ML
2 INJECTION, SOLUTION INTRAVENOUS SEE ADMIN INSTRUCTIONS
Status: DISCONTINUED | OUTPATIENT
Start: 2021-07-08 | End: 2021-07-09 | Stop reason: HOSPADM

## 2021-07-08 RX ORDER — ONDANSETRON 4 MG/1
4 TABLET, ORALLY DISINTEGRATING ORAL EVERY 30 MIN PRN
Status: DISCONTINUED | OUTPATIENT
Start: 2021-07-08 | End: 2021-07-09 | Stop reason: HOSPADM

## 2021-07-08 RX ORDER — LABETALOL HYDROCHLORIDE 5 MG/ML
10 INJECTION, SOLUTION INTRAVENOUS
Status: DISCONTINUED | OUTPATIENT
Start: 2021-07-08 | End: 2021-07-09 | Stop reason: HOSPADM

## 2021-07-08 RX ORDER — SODIUM CHLORIDE 9 MG/ML
INJECTION, SOLUTION INTRAVENOUS CONTINUOUS
Status: ACTIVE | OUTPATIENT
Start: 2021-07-09 | End: 2021-07-09

## 2021-07-08 RX ORDER — SODIUM CHLORIDE, SODIUM LACTATE, POTASSIUM CHLORIDE, CALCIUM CHLORIDE 600; 310; 30; 20 MG/100ML; MG/100ML; MG/100ML; MG/100ML
INJECTION, SOLUTION INTRAVENOUS CONTINUOUS PRN
Status: DISCONTINUED | OUTPATIENT
Start: 2021-07-08 | End: 2021-07-08

## 2021-07-08 RX ORDER — FENTANYL CITRATE 50 UG/ML
INJECTION, SOLUTION INTRAMUSCULAR; INTRAVENOUS PRN
Status: DISCONTINUED | OUTPATIENT
Start: 2021-07-08 | End: 2021-07-08

## 2021-07-08 RX ORDER — PROPOFOL 10 MG/ML
INJECTION, EMULSION INTRAVENOUS PRN
Status: DISCONTINUED | OUTPATIENT
Start: 2021-07-08 | End: 2021-07-08

## 2021-07-08 RX ORDER — ONDANSETRON 2 MG/ML
4 INJECTION INTRAMUSCULAR; INTRAVENOUS EVERY 30 MIN PRN
Status: DISCONTINUED | OUTPATIENT
Start: 2021-07-08 | End: 2021-07-09 | Stop reason: HOSPADM

## 2021-07-08 RX ORDER — SODIUM CHLORIDE, SODIUM LACTATE, POTASSIUM CHLORIDE, CALCIUM CHLORIDE 600; 310; 30; 20 MG/100ML; MG/100ML; MG/100ML; MG/100ML
INJECTION, SOLUTION INTRAVENOUS CONTINUOUS
Status: DISCONTINUED | OUTPATIENT
Start: 2021-07-09 | End: 2021-07-09 | Stop reason: HOSPADM

## 2021-07-08 RX ORDER — FENTANYL CITRATE 50 UG/ML
25-50 INJECTION, SOLUTION INTRAMUSCULAR; INTRAVENOUS
Status: DISCONTINUED | OUTPATIENT
Start: 2021-07-08 | End: 2021-07-09 | Stop reason: HOSPADM

## 2021-07-08 RX ORDER — HYDROMORPHONE HYDROCHLORIDE 1 MG/ML
0.3 INJECTION, SOLUTION INTRAMUSCULAR; INTRAVENOUS; SUBCUTANEOUS
Status: DISCONTINUED | OUTPATIENT
Start: 2021-07-08 | End: 2021-07-09

## 2021-07-08 RX ORDER — CEFAZOLIN SODIUM 2 G/100ML
2 INJECTION, SOLUTION INTRAVENOUS
Status: DISCONTINUED | OUTPATIENT
Start: 2021-07-08 | End: 2021-07-09 | Stop reason: HOSPADM

## 2021-07-08 RX ORDER — DEXAMETHASONE SODIUM PHOSPHATE 10 MG/ML
INJECTION, SOLUTION INTRAMUSCULAR; INTRAVENOUS PRN
Status: DISCONTINUED | OUTPATIENT
Start: 2021-07-08 | End: 2021-07-08

## 2021-07-08 RX ORDER — CEFAZOLIN SODIUM 1 G/3ML
INJECTION, POWDER, FOR SOLUTION INTRAMUSCULAR; INTRAVENOUS PRN
Status: DISCONTINUED | OUTPATIENT
Start: 2021-07-08 | End: 2021-07-08

## 2021-07-08 RX ADMIN — GLYCOPYRROLATE 1 MG: 1 TABLET ORAL at 14:31

## 2021-07-08 RX ADMIN — PROPOFOL 30 MG: 10 INJECTION, EMULSION INTRAVENOUS at 18:41

## 2021-07-08 RX ADMIN — DIPHENHYDRAMINE HYDROCHLORIDE 25 MG: 25 CAPSULE ORAL at 12:41

## 2021-07-08 RX ADMIN — PROPOFOL 50 MG: 10 INJECTION, EMULSION INTRAVENOUS at 18:38

## 2021-07-08 RX ADMIN — DEXAMETHASONE SODIUM PHOSPHATE 6 MG: 10 INJECTION, SOLUTION INTRAMUSCULAR; INTRAVENOUS at 19:30

## 2021-07-08 RX ADMIN — GUAIFENESIN AND DEXTROMETHORPHAN HYDROBROMIDE 1 TABLET: 600; 30 TABLET, EXTENDED RELEASE ORAL at 08:37

## 2021-07-08 RX ADMIN — OXYCODONE HYDROCHLORIDE 10 MG: 10 TABLET ORAL at 05:17

## 2021-07-08 RX ADMIN — ROCURONIUM BROMIDE 10 MG: 10 INJECTION INTRAVENOUS at 20:05

## 2021-07-08 RX ADMIN — DIPHENHYDRAMINE HYDROCHLORIDE 25 MG: 25 CAPSULE ORAL at 05:40

## 2021-07-08 RX ADMIN — METOPROLOL SUCCINATE 50 MG: 25 TABLET, EXTENDED RELEASE ORAL at 08:34

## 2021-07-08 RX ADMIN — ROCURONIUM BROMIDE 10 MG: 10 INJECTION INTRAVENOUS at 21:04

## 2021-07-08 RX ADMIN — TOLTERODINE 4 MG: 4 CAPSULE, EXTENDED RELEASE ORAL at 08:35

## 2021-07-08 RX ADMIN — CEFAZOLIN 2 G: 1 INJECTION, POWDER, FOR SOLUTION INTRAMUSCULAR; INTRAVENOUS at 19:14

## 2021-07-08 RX ADMIN — HYDROMORPHONE HYDROCHLORIDE 0.5 MG: 1 INJECTION, SOLUTION INTRAMUSCULAR; INTRAVENOUS; SUBCUTANEOUS at 01:17

## 2021-07-08 RX ADMIN — FENTANYL CITRATE 100 MCG: 50 INJECTION, SOLUTION INTRAMUSCULAR; INTRAVENOUS at 18:21

## 2021-07-08 RX ADMIN — ROCURONIUM BROMIDE 20 MG: 10 INJECTION INTRAVENOUS at 21:56

## 2021-07-08 RX ADMIN — MANNITOL 100 G: 20 INJECTION, SOLUTION INTRAVENOUS at 19:13

## 2021-07-08 RX ADMIN — POLYETHYLENE GLYCOL 3350 17 G: 17 POWDER, FOR SOLUTION ORAL at 08:35

## 2021-07-08 RX ADMIN — SODIUM CHLORIDE, POTASSIUM CHLORIDE, SODIUM LACTATE AND CALCIUM CHLORIDE: 600; 310; 30; 20 INJECTION, SOLUTION INTRAVENOUS at 18:21

## 2021-07-08 RX ADMIN — ROCURONIUM BROMIDE 10 MG: 10 INJECTION INTRAVENOUS at 19:27

## 2021-07-08 RX ADMIN — LOSARTAN POTASSIUM 100 MG: 50 TABLET, FILM COATED ORAL at 08:34

## 2021-07-08 RX ADMIN — ACETAMINOPHEN 975 MG: 325 TABLET, FILM COATED ORAL at 05:17

## 2021-07-08 RX ADMIN — PROPOFOL 150 MG: 10 INJECTION, EMULSION INTRAVENOUS at 18:21

## 2021-07-08 RX ADMIN — SODIUM CHLORIDE: 9 INJECTION, SOLUTION INTRAVENOUS at 23:46

## 2021-07-08 RX ADMIN — SODIUM CHLORIDE, POTASSIUM CHLORIDE, SODIUM LACTATE AND CALCIUM CHLORIDE: 600; 310; 30; 20 INJECTION, SOLUTION INTRAVENOUS at 23:16

## 2021-07-08 RX ADMIN — DOCUSATE SODIUM 50 MG AND SENNOSIDES 8.6 MG 1 TABLET: 8.6; 5 TABLET, FILM COATED ORAL at 08:35

## 2021-07-08 RX ADMIN — FENTANYL CITRATE 50 MCG: 50 INJECTION, SOLUTION INTRAMUSCULAR; INTRAVENOUS at 23:29

## 2021-07-08 RX ADMIN — ACETAMINOPHEN 975 MG: 325 TABLET, FILM COATED ORAL at 12:03

## 2021-07-08 RX ADMIN — HYDROMORPHONE HYDROCHLORIDE 0.3 MG: 1 INJECTION, SOLUTION INTRAMUSCULAR; INTRAVENOUS; SUBCUTANEOUS at 12:41

## 2021-07-08 RX ADMIN — DIPHENHYDRAMINE HYDROCHLORIDE 25 MG: 25 CAPSULE ORAL at 01:21

## 2021-07-08 RX ADMIN — FENTANYL CITRATE 25 MCG: 50 INJECTION, SOLUTION INTRAMUSCULAR; INTRAVENOUS at 23:45

## 2021-07-08 RX ADMIN — SPIRONOLACTONE 50 MG: 25 TABLET ORAL at 08:34

## 2021-07-08 RX ADMIN — ROCURONIUM BROMIDE 20 MG: 10 INJECTION INTRAVENOUS at 20:17

## 2021-07-08 RX ADMIN — FENTANYL CITRATE 150 MCG: 50 INJECTION, SOLUTION INTRAMUSCULAR; INTRAVENOUS at 18:38

## 2021-07-08 RX ADMIN — PROPOFOL 50 MG: 10 INJECTION, EMULSION INTRAVENOUS at 18:25

## 2021-07-08 RX ADMIN — OMEPRAZOLE 40 MG: 20 CAPSULE, DELAYED RELEASE ORAL at 08:34

## 2021-07-08 RX ADMIN — LORATADINE 10 MG: 10 TABLET ORAL at 08:34

## 2021-07-08 RX ADMIN — PHENYLEPHRINE HYDROCHLORIDE 100 MCG: 10 INJECTION INTRAVENOUS at 21:06

## 2021-07-08 RX ADMIN — GLYCOPYRROLATE 1 MG: 1 TABLET ORAL at 08:37

## 2021-07-08 RX ADMIN — ROCURONIUM BROMIDE 50 MG: 10 INJECTION INTRAVENOUS at 18:25

## 2021-07-08 RX ADMIN — OXYCODONE HYDROCHLORIDE 10 MG: 10 TABLET ORAL at 09:52

## 2021-07-08 RX ADMIN — DOCUSATE SODIUM 100 MG: 100 CAPSULE, LIQUID FILLED ORAL at 08:35

## 2021-07-08 RX ADMIN — SODIUM CHLORIDE, POTASSIUM CHLORIDE, SODIUM LACTATE AND CALCIUM CHLORIDE: 600; 310; 30; 20 INJECTION, SOLUTION INTRAVENOUS at 20:04

## 2021-07-08 RX ADMIN — Medication 125 MCG: at 08:34

## 2021-07-08 RX ADMIN — LIDOCAINE HYDROCHLORIDE 100 MG: 20 INJECTION, SOLUTION INFILTRATION; PERINEURAL at 18:21

## 2021-07-08 RX ADMIN — SODIUM CHLORIDE, POTASSIUM CHLORIDE, SODIUM LACTATE AND CALCIUM CHLORIDE: 600; 310; 30; 20 INJECTION, SOLUTION INTRAVENOUS at 18:27

## 2021-07-08 ASSESSMENT — ACTIVITIES OF DAILY LIVING (ADL)
ADLS_ACUITY_SCORE: 15

## 2021-07-08 ASSESSMENT — MIFFLIN-ST. JEOR: SCORE: 1709

## 2021-07-08 ASSESSMENT — LIFESTYLE VARIABLES: TOBACCO_USE: 0

## 2021-07-08 NOTE — PLAN OF CARE
Major Shift Events:  Back OR this afternoon, brought to pre-op around 17:00. Lumbar drain in place, draining 15 mL/hour per order. Neuros intact. C/o headache, managed with prn oxycodone, dilaudid, and scheduled tylenol. Still having some drainage from R ear. Voiding in commode. No BM despite bowel meds. Walking on unit with sba.   Plan: Back to OR. Close monitoring neuro status after surgery.   For vital signs and complete assessments, please see documentation flowsheets.

## 2021-07-08 NOTE — PROGRESS NOTES
Patient developed some right sided otorrhea yesterday that has improved with increased lumbar CSF drainage. We are concerned that she may re-develop a CSF leak as we attempt to remove the lumbar drain. Therefore, we have determined that taking her back for re-exploration and repair of the temporal dural defects is best. She understands the risks as before and agrees to proceed.  GILMER Noe MD

## 2021-07-08 NOTE — PLAN OF CARE
"Major Shift Events:    Neuro: Pt alert, oriented, intact, perrla. Headache frontal forehead, and side of head and ear. Improved with PRN oxy 5mg. Complains of \"ocean sound\" in R ear with diminished hearing, improving intermittently. Lumbar drain opened q1hr for min of 5 min each with 15mL clear CSF drainage. Swelling on right side of face MD aware. ENT and Neurosurg bedside to assess drainage out of R ear which remained relatively constant during shift, small amount of clear/red-tinged output. ENT brought patient cotton ball to in ear if it makes it more comfortable.   CV: HR high 50's - 80's with activity. Up and walking with OT. Complains of slight dizziness. MAPs within range with no intervention.   Resp: Room air, denies SOB, clear lungs, denies difficulty breathing.   GI/: Ate 3 meals, 50-75%, good appetite. Drinking large amounts of water. Large urine output with Awad catheter in place with order to remove POD2. Pt states she has bladder spasms baseline - meds given.   Attempted BM with no success. Bowel meds given, pt feels \"constipated\".   IP: Cefepime and vanco given.   Plan: Continue plan of care.   For vital signs and complete assessments, please see documentation flowsheets.   Angela Bacon RN on 7/7/2021 at 7:41 PM      "

## 2021-07-08 NOTE — PLAN OF CARE
"1900-2330: Pt A&Ox4, PERRLA intact. Denies \"ocean sound\" in right ear. Able to move all extremities. Headache managed with PRN oxycodone 10 mg and one time dose of IV dilaudid. Lumbar drain open q1 hour for 15 mL clear CSF drainage. Scant amount of serosanguinous drainage from right ear. HR high 60s-70s throughout night. RA SaO2 >95%. Awad in place, good UO. Right facial swelling, MD aware.      Plan: Continue POC. Report given to oncoming RN.     For vital signs and complete assessments, please see documentation flowsheets.   "

## 2021-07-08 NOTE — PLAN OF CARE
Major Shift Events:  Patient anxious at shift change, this RN made plan for the night with patient. Patient much calmer on checks. Remains neuro intact but does complain of headache with draining LD on hour. PRN dilaudid, benadryl and oxycodone given. No CSF noted from ear overnight. Swelling on right eye much improved. Awad pulled and patient voiding using commode. Patient attempted BM but only gas overnight.   Plan: Continue to monitor and drain hourly.   For vital signs and complete assessments, please see documentation flowsheets.

## 2021-07-08 NOTE — PROGRESS NOTES
"Otolaryngology Progress Note  July 8, 2021    S: Feeling well post-op, in great spirits this morning. She denies \"ocean\" sound in her ear and her headache is improved. She is still having some drainage from the right ear. She claims it was rather sanguinous last night and has transitioned to more serosanguinous this morning.     O: BP (!) 140/88   Pulse 80   Temp 97.4  F (36.3  C) (Oral)   Resp 29   Ht 1.626 m (5' 4\")   Wt 113.9 kg (251 lb 1.7 oz)   LMP 10/19/2008 (Approximate)   SpO2 96%   BMI 43.10 kg/m     General: Alert and oriented x 3, No acute distress.   HEENT: EOMI. HB 1/6. Cranial nerves V1-V3 intact and equal bilaterally. Right post-auricular incision clean, dry and intact with some temporal flap swelling, but is not boggy. Right facial swelling has improved from yesterday.   Pulmonary: Breathing non-labored, no stridor, no accessory muscle use.     Drains: Lumbar drain in place draining 15ml/hr      Intake/Output Summary (Last 24 hours) at 7/8/2021 0737  Last data filed at 7/8/2021 0700  Gross per 24 hour   Intake 2510 ml   Output 3755 ml   Net -1346 ml       LABS:  ROUTINE IP LABS (Last four results)  BMP  Recent Labs   Lab 07/08/21  0548 07/07/21  0529 07/06/21  0422 07/05/21  1725 07/05/21  1232    136 138  --  132*   POTASSIUM 4.1 4.1 3.8 4.4 4.4   CHLORIDE 100 103 107  --   --    CARMEN 8.0* 7.7* 7.8*  --   --    CO2 30 29 26  --   --    BUN 9 11 10  --   --    CR 0.87 0.85 0.68  --   --    GLC 91 88 111*  --  122*     CBC  Recent Labs   Lab 07/08/21  0548 07/07/21  0529 07/06/21  0422 07/05/21  1232   WBC 8.6 8.6 10.7  --    RBC 3.49* 3.32* 3.60*  --    HGB 10.6* 10.2* 11.0* 12.1   HCT 33.3* 31.6* 33.6*  --    MCV 95 95 93  --    MCH 30.4 30.7 30.6  --    MCHC 31.8 32.3 32.7  --    RDW 13.6 13.6 13.6  --     203 230  --        A/P: Marleen Mcfadden is a 56 year old female with a past medical history of CHF, cardiomyopathy, possible SLE, and HTN, now s/p MCF approach to an " encephalocele repair on 7/5. She is experiencing some right ear drainage which is concerning for a possible CSF leak.    - Pain control per neurosurgery  - Refer to neurosurgery for management of lumbar drain  - Sinonasal precautions  - Scheduled mucinex and robinul  - Collect any increased right ear drainage with the tymp trap in the room  - Bowel regimen, consider increasing in setting of constipation  - Antinausea, per neurosurgery    -- Patient and above plan discussed with Dr. Julio Cesar Way  Medical Student    Kay Daniel MD   Otolaryngology-Head & Neck Surgery PGY1  Please contact ENT with questions by dialing [phone] and entering job [phone] when prompted.

## 2021-07-08 NOTE — ANESTHESIA PREPROCEDURE EVALUATION
Anesthesia Pre-Procedure Evaluation    Patient: Marleen Mcfadden   MRN: [id number] : 1965        Preoperative Diagnosis: Cerebrospinal fluid leak [G96.00]   Procedure : Procedure(s):  CRANIECTOMY, RIGHT MIDDLE FOSSA APPROACH, REPAIR OF ENCEPHALOCELE           Past Medical History:   Diagnosis Date     Allergic rhinitis, cause unspecified      Anemia      Anemia      Arthritis      Autoimmune disease (H)      Autoimmune disease NEC     Autoimmune disease- unknown/poss SLE     Shaina's node      Calcaneal spur 10/21/2014     Calcaneal spur 10/21/2014    Xray 10/17/14      CHF (congestive heart failure) (H)      CHF with cardiomyopathy (H)      Chronic low back pain      DDD (degenerative disc disease), lumbar      Depression      Familial cardiomyopathy (H) 10/21/2015     Familial cardiomyopathy (H)      GERD without esophagitis 2019     History of transfusion      Hormone replacement therapy 2019     Hypertension      Morbid obesity (H) 2015     Muscle spasm 2019     Nontraumatic rupture of quadriceps tendon, left 2018     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 Followed regularly by cardiologist Mayi.  Problem list name updated by automated process. Provider to review     Rotator cuff injury 2017     Sinus bradycardia      Sprain of other ligament of left ankle, initial encounter 2017     Status post left knee replacement 2018     TB lung, latent     negative quantiferon gold test  12     UTERINE LEIOMYOMA NOS 10/25/2006      Past Surgical History:   Procedure Laterality Date     ARTHROPLASTY KNEE Left      ARTHROPLASTY  REVISION KNEE Left 2019    Procedure: REVISION, LEFT TOTAL KNEE  ARTHROPLASTY;  Surgeon: Dev Rocha MD;  Location: [facility] OR;  Service: Orthopedics     ARTHROPLASTY REVISION KNEE Right 2020    Procedure: RIGHT REVISION TOTAL KNEE ARTHROPLASTY;  Surgeon: Dev Rocha MD;  Location: [facility] OR;  Service: Orthopedics     ARTHROSCOPIC REPAIR ACL       BREAST SURGERY       C BREAST SURGERY PROCEDURE UNLISTED      Breast Reduction     C  DELIVERY ONLY  10/85,     , Low Cervicalx2     C EXCIS UTERINE FIBROID,ABD APPRCH       C EXCISE EXCESS SKIN TISSUE,ABDOMEN       C LIGATE FALLOPIAN TUBE       C REPAIR CRUCIATE LIGAMENT,KNEE      Right Knee     C TOTAL KNEE ARTHROPLASTY Left 10/03/2017     C TOTAL KNEE ARTHROPLASTY Right 2019    Procedure: RIGHT TOTAL KNEE ARTHROPLASTY;  Surgeon: Dev Rocha MD;  Location: [facility] OR;  Service: Orthopedics      SECTION        SECTION       COLONOSCOPY WITH CO2 INSUFFLATION N/A 2021    Procedure: COLONOSCOPY, WITH CO2 INSUFFLATION;  Surgeon: Angela Harrington DO;  Location: [facility] OR     CRANIOTOMY MIDDLE FOSSA, EXCISE ACOUSTIC NEUROMA, COMBINED N/A 2021    Procedure: Right CRANIOTOMY, MIDDLE FOSSA APPROACH, FOR REPAIR OF ENCEPHALOCELE;  Surgeon: Jocelyne Harrell MD;  Location: [facility] OR     CYSTOURETHROSCOPY N/A 2020    Procedure: CYSTOSCOPY;  Surgeon: Cherelle Willams MD;  Location: [facility] OR;  Service: Gynecology     DAVINCI SACROCOLPOPEXY, MIDURETHRAL SLING, CYSTOSCOPY       HC SLING OPERATION FOR STRESS INCONTINENCE N/A 2020    Procedure: MIDURETHRAL SLING, CYSTOSCOPY;  Surgeon: Cherelle Willams MD;  Location: [facility] OR;  Service: Gynecology     HYSTERECTOMY       HYSTERECTOMY TOTAL ABDOMINAL  2006    for fibroids; reports having blood transfusion after surgery     INSERT DRAIN LUMBAR N/A 2021    Procedure:  Insert drain lumbar;  Surgeon: Jocelyn Noe MD;  Location: [facility] OR     JOINT REPLACEMENT Left     knee     THYROIDECTOMY  9/9/2013    Procedure: THYROIDECTOMY;  LEFT THYROID LOBECTOMY.  (LIGASURE, RECURRENT LARYNGEAL NERVE MONITOR) ;  Surgeon: Uriah Camargo MD;  Location:  [facility]     THYROIDECTOMY       TUBAL LIGATION        Allergies   Allergen Reactions     Morphine      EMESIS     Nickel      Sulfa Drugs Swelling      Social History     Tobacco Use     Smoking status: Never Smoker     Smokeless tobacco: Never Used   Substance Use Topics     Alcohol use: Yes     Comment: rare, twice a year, she has a drink little wine 2 days ago      Wt Readings from Last 1 Encounters:   07/08/21 113.9 kg (251 lb 1.7 oz)        Anesthesia Evaluation   Pt has had prior anesthetic.     History of anesthetic complications  - PONV.  coughed while sedated with MAC, converted to GA with LMA 8/18/2020.    ROS/MED HX  ENT/Pulmonary: Comment: S/p right myringotomy with PE tube 7/3/2020    (+) KATIA risk factors, snores loudly, hypertension, obese, observed stopped breathing,  (-) tobacco use   Neurologic:  - neg neurologic ROS     Cardiovascular:     (+) hypertension-----CHF etiology: familial cardiomyopathy Last EF: 40% date: 6/16/21 Previous cardiac testing   Echo: Date: 6/16/21 Results:  Severe left ventricular dilation is present. LVIDd 7.1cm.  Severe diffuse hypokinesis is present.  LVEF 40% based on biplane 2D tracing.  Right ventricular function, chamber size, wall motion, and thickness are normal.  IVC diameter and respiratory changes fall into an intermediate range suggesting an RA pressure of 8 mmHg.  Stress Test: Date: Results:    ECG Reviewed: Date: 6/16/21 Results:    Cath: Date: Results:      METS/Exercise Tolerance: >4 METS    Hematologic:     (+) history of blood transfusion, no previous transfusion reaction,  (-) history of blood clots   Musculoskeletal: Comment: S/p Right TKA with revision 9/2020  Left  TKA and revision      GI/Hepatic: Comment: Bowel incontinence, in pelvic floor therapy    (+) GERD, Symptomatic,  (-) liver disease   Renal/Genitourinary: Comment: S/p midurethral sling 8/18/20      Endo: Comment: H/o thyroid nodule, s/p partial thyroidectomy 2013    (+) Obesity,     Psychiatric/Substance Use:     (+) psychiatric history anxiety and depression     Infectious Disease: Comment: Latent TB, s/p treatment 2012      Malignancy:  - neg malignancy ROS     Other:            Physical Exam    Airway  airway exam normal      Mallampati: II   TM distance: > 3 FB   Neck ROM: full   Mouth opening: > 3 cm    Respiratory Devices and Support         Dental  no notable dental history         Cardiovascular          Rhythm and rate: regular and normal     Pulmonary   pulmonary exam normal                OUTSIDE LABS:  CBC:   Lab Results   Component Value Date    WBC 8.6 07/08/2021    WBC 8.6 07/07/2021    HGB 10.6 (L) 07/08/2021    HGB 10.2 (L) 07/07/2021    HCT 33.3 (L) 07/08/2021    HCT 31.6 (L) 07/07/2021     07/08/2021     07/07/2021     BMP:   Lab Results   Component Value Date     07/08/2021     07/07/2021    POTASSIUM 4.1 07/08/2021    POTASSIUM 4.1 07/07/2021    CHLORIDE 100 07/08/2021    CHLORIDE 103 07/07/2021    CO2 30 07/08/2021    CO2 29 07/07/2021    BUN 9 07/08/2021    BUN 11 07/07/2021    CR 0.87 07/08/2021    CR 0.85 07/07/2021    GLC 91 07/08/2021    GLC 88 07/07/2021     COAGS:   Lab Results   Component Value Date    PTT 29 04/19/2013    INR 0.97 09/18/2019     POC:   Lab Results   Component Value Date     (H) 07/07/2021    HCG Negative 06/15/2009     HEPATIC:   Lab Results   Component Value Date    ALBUMIN 3.4 08/12/2020    PROTTOTAL 7.5 08/12/2020    ALT 35 08/12/2020    AST 20 08/12/2020    GGT <10 08/04/2011    ALKPHOS 70 08/12/2020    BILITOTAL 0.3 08/12/2020    CHARLIE 2 (L) 11/30/2005     OTHER:   Lab Results   Component Value Date    PH 7.42 07/05/2021    LACT  1.8 07/05/2021    A1C 5.5 06/21/2021    CARMEN 8.0 (L) 07/08/2021    PHOS 3.0 07/08/2021    MAG 2.0 07/08/2021    LIPASE 173 10/14/2006    AMYLASE 82 10/14/2006    TSH 2.10 06/03/2020    T4 1.04 06/03/2020    CRP <2.9 03/13/2020    SED 30 03/13/2020       Anesthesia Plan    ASA Status:  3      Anesthesia Type: General.     - Airway: ETT   Induction: RSI.   Maintenance: Balanced.   Techniques and Equipment:     - Lines/Monitors: 2nd IV, Arterial Line     Consents    Anesthesia Plan(s) and associated risks, benefits, and realistic alternatives discussed. Questions answered and patient/representative(s) expressed understanding.     - Discussed with:  Patient      - Extended Intubation/Ventilatory Support Discussed: No.      - Patient is DNR/DNI Status: No    Use of blood products discussed: No .     Postoperative Care    Pain management: IV analgesics.   PONV prophylaxis: Ondansetron (or other 5HT-3), Dexamethasone or Solumedrol     Comments:                    Navin Leroy MD

## 2021-07-08 NOTE — PLAN OF CARE
OT/4A: Cancel. Pt reporting she had already amb x 2 today and also stated she was back to OR this PM. Will reschedule.

## 2021-07-09 ENCOUNTER — MYC MEDICAL ADVICE (OUTPATIENT)
Dept: OTOLARYNGOLOGY | Facility: CLINIC | Age: 56
End: 2021-07-09

## 2021-07-09 ENCOUNTER — APPOINTMENT (OUTPATIENT)
Dept: CT IMAGING | Facility: CLINIC | Age: 56
DRG: 026 | End: 2021-07-09
Attending: STUDENT IN AN ORGANIZED HEALTH CARE EDUCATION/TRAINING PROGRAM
Payer: MEDICARE

## 2021-07-09 ENCOUNTER — APPOINTMENT (OUTPATIENT)
Dept: MRI IMAGING | Facility: CLINIC | Age: 56
DRG: 026 | End: 2021-07-09
Attending: STUDENT IN AN ORGANIZED HEALTH CARE EDUCATION/TRAINING PROGRAM
Payer: MEDICARE

## 2021-07-09 LAB
ANION GAP SERPL CALCULATED.3IONS-SCNC: 5 MMOL/L (ref 3–14)
BUN SERPL-MCNC: 8 MG/DL (ref 7–30)
CALCIUM SERPL-MCNC: 8.4 MG/DL (ref 8.5–10.1)
CHLORIDE SERPL-SCNC: 101 MMOL/L (ref 94–109)
CO2 SERPL-SCNC: 27 MMOL/L (ref 20–32)
CREAT SERPL-MCNC: 0.7 MG/DL (ref 0.52–1.04)
ERYTHROCYTE [DISTWIDTH] IN BLOOD BY AUTOMATED COUNT: 13.4 % (ref 10–15)
GFR SERPL CREATININE-BSD FRML MDRD: >90 ML/MIN/{1.73_M2}
GLUCOSE BLDC GLUCOMTR-MCNC: 113 MG/DL (ref 70–99)
GLUCOSE BLDC GLUCOMTR-MCNC: 119 MG/DL (ref 70–99)
GLUCOSE SERPL-MCNC: 130 MG/DL (ref 70–99)
HCT VFR BLD AUTO: 34.3 % (ref 35–47)
HGB BLD-MCNC: 11.2 G/DL (ref 11.7–15.7)
MCH RBC QN AUTO: 30.8 PG (ref 26.5–33)
MCHC RBC AUTO-ENTMCNC: 32.7 G/DL (ref 31.5–36.5)
MCV RBC AUTO: 94 FL (ref 78–100)
PLATELET # BLD AUTO: 269 10E9/L (ref 150–450)
POTASSIUM SERPL-SCNC: 4.6 MMOL/L (ref 3.4–5.3)
RBC # BLD AUTO: 3.64 10E12/L (ref 3.8–5.2)
SODIUM SERPL-SCNC: 133 MMOL/L (ref 133–144)
WBC # BLD AUTO: 8.3 10E9/L (ref 4–11)

## 2021-07-09 PROCEDURE — 200N000002 HC R&B ICU UMMC

## 2021-07-09 PROCEDURE — 70496 CT ANGIOGRAPHY HEAD: CPT | Mod: 26 | Performed by: STUDENT IN AN ORGANIZED HEALTH CARE EDUCATION/TRAINING PROGRAM

## 2021-07-09 PROCEDURE — 250N000013 HC RX MED GY IP 250 OP 250 PS 637: Performed by: PHYSICIAN ASSISTANT

## 2021-07-09 PROCEDURE — 250N000013 HC RX MED GY IP 250 OP 250 PS 637: Performed by: STUDENT IN AN ORGANIZED HEALTH CARE EDUCATION/TRAINING PROGRAM

## 2021-07-09 PROCEDURE — 70498 CT ANGIOGRAPHY NECK: CPT | Mod: 26 | Performed by: STUDENT IN AN ORGANIZED HEALTH CARE EDUCATION/TRAINING PROGRAM

## 2021-07-09 PROCEDURE — 250N000011 HC RX IP 250 OP 636

## 2021-07-09 PROCEDURE — 999N000127 HC STATISTIC PERIPHERAL IV START W US GUIDANCE

## 2021-07-09 PROCEDURE — 999N001017 HC STATISTIC GLUCOSE BY METER IP

## 2021-07-09 PROCEDURE — 250N000011 HC RX IP 250 OP 636: Performed by: STUDENT IN AN ORGANIZED HEALTH CARE EDUCATION/TRAINING PROGRAM

## 2021-07-09 PROCEDURE — 258N000003 HC RX IP 258 OP 636: Performed by: STUDENT IN AN ORGANIZED HEALTH CARE EDUCATION/TRAINING PROGRAM

## 2021-07-09 PROCEDURE — 70551 MRI BRAIN STEM W/O DYE: CPT

## 2021-07-09 PROCEDURE — 70551 MRI BRAIN STEM W/O DYE: CPT | Mod: 26 | Performed by: STUDENT IN AN ORGANIZED HEALTH CARE EDUCATION/TRAINING PROGRAM

## 2021-07-09 PROCEDURE — 99222 1ST HOSP IP/OBS MODERATE 55: CPT | Mod: GC | Performed by: PSYCHIATRY & NEUROLOGY

## 2021-07-09 PROCEDURE — 70450 CT HEAD/BRAIN W/O DYE: CPT | Mod: 26 | Performed by: STUDENT IN AN ORGANIZED HEALTH CARE EDUCATION/TRAINING PROGRAM

## 2021-07-09 PROCEDURE — 70450 CT HEAD/BRAIN W/O DYE: CPT

## 2021-07-09 PROCEDURE — 999N000175 HC STATISTIC STROKE CODE W/ ACCESS

## 2021-07-09 PROCEDURE — 85027 COMPLETE CBC AUTOMATED: CPT | Performed by: STUDENT IN AN ORGANIZED HEALTH CARE EDUCATION/TRAINING PROGRAM

## 2021-07-09 PROCEDURE — 70496 CT ANGIOGRAPHY HEAD: CPT

## 2021-07-09 PROCEDURE — 80048 BASIC METABOLIC PNL TOTAL CA: CPT | Performed by: STUDENT IN AN ORGANIZED HEALTH CARE EDUCATION/TRAINING PROGRAM

## 2021-07-09 RX ORDER — IOPAMIDOL 755 MG/ML
75 INJECTION, SOLUTION INTRAVASCULAR ONCE
Status: COMPLETED | OUTPATIENT
Start: 2021-07-09 | End: 2021-07-09

## 2021-07-09 RX ORDER — HYDROMORPHONE HYDROCHLORIDE 1 MG/ML
.3-.5 INJECTION, SOLUTION INTRAMUSCULAR; INTRAVENOUS; SUBCUTANEOUS
Status: COMPLETED | OUTPATIENT
Start: 2021-07-09 | End: 2021-07-10

## 2021-07-09 RX ORDER — AMOXICILLIN 250 MG
3 CAPSULE ORAL 2 TIMES DAILY
Status: DISCONTINUED | OUTPATIENT
Start: 2021-07-09 | End: 2021-07-14 | Stop reason: HOSPADM

## 2021-07-09 RX ORDER — LORAZEPAM 0.5 MG/1
0.5 TABLET ORAL ONCE
Status: COMPLETED | OUTPATIENT
Start: 2021-07-09 | End: 2021-07-09

## 2021-07-09 RX ORDER — MAGNESIUM SULFATE HEPTAHYDRATE 40 MG/ML
2 INJECTION, SOLUTION INTRAVENOUS ONCE
Status: COMPLETED | OUTPATIENT
Start: 2021-07-09 | End: 2021-07-09

## 2021-07-09 RX ADMIN — GLYCOPYRROLATE 1 MG: 1 TABLET ORAL at 13:52

## 2021-07-09 RX ADMIN — DIPHENHYDRAMINE HYDROCHLORIDE 25 MG: 25 CAPSULE ORAL at 20:45

## 2021-07-09 RX ADMIN — POLYETHYLENE GLYCOL 3350 17 G: 17 POWDER, FOR SOLUTION ORAL at 10:06

## 2021-07-09 RX ADMIN — IOPAMIDOL 75 ML: 755 INJECTION, SOLUTION INTRAVENOUS at 08:45

## 2021-07-09 RX ADMIN — DOCUSATE SODIUM 100 MG: 100 CAPSULE, LIQUID FILLED ORAL at 20:50

## 2021-07-09 RX ADMIN — HYDROMORPHONE HYDROCHLORIDE 0.5 MG: 1 INJECTION, SOLUTION INTRAMUSCULAR; INTRAVENOUS; SUBCUTANEOUS at 02:42

## 2021-07-09 RX ADMIN — DOCUSATE SODIUM 100 MG: 100 CAPSULE, LIQUID FILLED ORAL at 10:05

## 2021-07-09 RX ADMIN — LEVETIRACETAM 750 MG: 100 INJECTION, SOLUTION, CONCENTRATE INTRAVENOUS at 16:02

## 2021-07-09 RX ADMIN — TOLTERODINE 4 MG: 4 CAPSULE, EXTENDED RELEASE ORAL at 10:07

## 2021-07-09 RX ADMIN — LORATADINE 10 MG: 10 TABLET ORAL at 10:05

## 2021-07-09 RX ADMIN — FLUTICASONE PROPIONATE 2 SPRAY: 50 SPRAY, METERED NASAL at 21:03

## 2021-07-09 RX ADMIN — GLYCOPYRROLATE 1 MG: 1 TABLET ORAL at 20:44

## 2021-07-09 RX ADMIN — OMEPRAZOLE 40 MG: 20 CAPSULE, DELAYED RELEASE ORAL at 10:01

## 2021-07-09 RX ADMIN — ACETAMINOPHEN 650 MG: 325 TABLET, FILM COATED ORAL at 21:09

## 2021-07-09 RX ADMIN — OXYCODONE HYDROCHLORIDE 10 MG: 10 TABLET ORAL at 20:45

## 2021-07-09 RX ADMIN — HYDROMORPHONE HYDROCHLORIDE 0.5 MG: 1 INJECTION, SOLUTION INTRAMUSCULAR; INTRAVENOUS; SUBCUTANEOUS at 00:10

## 2021-07-09 RX ADMIN — Medication 125 MCG: at 10:07

## 2021-07-09 RX ADMIN — DOCUSATE SODIUM 50 MG AND SENNOSIDES 8.6 MG 3 TABLET: 8.6; 5 TABLET, FILM COATED ORAL at 10:03

## 2021-07-09 RX ADMIN — MAGNESIUM HYDROXIDE 30 ML: 400 SUSPENSION ORAL at 10:12

## 2021-07-09 RX ADMIN — OXYCODONE HYDROCHLORIDE 10 MG: 10 TABLET ORAL at 15:50

## 2021-07-09 RX ADMIN — MAGNESIUM SULFATE IN WATER 2 G: 40 INJECTION, SOLUTION INTRAVENOUS at 10:05

## 2021-07-09 RX ADMIN — LOSARTAN POTASSIUM 100 MG: 50 TABLET, FILM COATED ORAL at 10:03

## 2021-07-09 RX ADMIN — LORAZEPAM 0.5 MG: 0.5 TABLET ORAL at 12:02

## 2021-07-09 RX ADMIN — HYDROMORPHONE HYDROCHLORIDE 0.5 MG: 1 INJECTION, SOLUTION INTRAMUSCULAR; INTRAVENOUS; SUBCUTANEOUS at 13:20

## 2021-07-09 RX ADMIN — HYDROMORPHONE HYDROCHLORIDE 0.5 MG: 1 INJECTION, SOLUTION INTRAMUSCULAR; INTRAVENOUS; SUBCUTANEOUS at 06:29

## 2021-07-09 RX ADMIN — LEVETIRACETAM 750 MG: 100 INJECTION, SOLUTION, CONCENTRATE INTRAVENOUS at 01:53

## 2021-07-09 RX ADMIN — PROGESTERONE 100 MG: 100 CAPSULE ORAL at 20:50

## 2021-07-09 RX ADMIN — HYDROMORPHONE HYDROCHLORIDE 0.5 MG: 1 INJECTION, SOLUTION INTRAMUSCULAR; INTRAVENOUS; SUBCUTANEOUS at 18:43

## 2021-07-09 RX ADMIN — DOCUSATE SODIUM 50 MG AND SENNOSIDES 8.6 MG 3 TABLET: 8.6; 5 TABLET, FILM COATED ORAL at 20:44

## 2021-07-09 RX ADMIN — METOPROLOL SUCCINATE 50 MG: 25 TABLET, EXTENDED RELEASE ORAL at 10:02

## 2021-07-09 RX ADMIN — SPIRONOLACTONE 50 MG: 25 TABLET ORAL at 09:59

## 2021-07-09 RX ADMIN — GUAIFENESIN AND DEXTROMETHORPHAN HYDROBROMIDE 1 TABLET: 600; 30 TABLET, EXTENDED RELEASE ORAL at 20:50

## 2021-07-09 ASSESSMENT — ACTIVITIES OF DAILY LIVING (ADL)
ADLS_ACUITY_SCORE: 15

## 2021-07-09 ASSESSMENT — MIFFLIN-ST. JEOR: SCORE: 1708

## 2021-07-09 NOTE — OR NURSING
"Pt to Pacu from the OR. Pt sedated and groggy but able to follow commands and answer questions appropriately. Pt reports having abdominal cramps and a burning sensation at the \"crown\" of her head. Pt also reported feeling fluid leaking from her right ear. A small amount of a mustard yellow colored frliud noted coming from ear. At this time the amount of fluid has decreased but is present. The following message sent to Dr. Watson - Neuro Surg. Resident\"    Marleen Mcfadden is complaining of a burning sensation over the top of her head. Also a small amount of yellow fluid leaked out from her right ear. This fluid has decreased in amount but is still present.  "

## 2021-07-09 NOTE — ANESTHESIA PROCEDURE NOTES
Arterial Line Procedure Note  Pre-Procedure   Staff -        Anesthesiologist:  Irene Hillman MD       Resident/Fellow: Navin Leroy MD       Performed By: resident       Location: OR       Pre-Anesthestic Checklist: patient identified, IV checked, risks and benefits discussed, informed consent, monitors and equipment checked, pre-op evaluation and at physician/surgeon's request  Timeout:       Correct Patient: Yes        Correct Procedure: Yes        Correct Site: Yes        Correct Position: Yes   Procedure   Procedure: arterial line       Laterality: right       Insertion Site: radial.  Sterile Prep        Standard elements of sterile barrier followed       Skin prep: Chloraprep  Insertion/Injection        Technique: ultrasound guided        1. Ultrasound was used to evaluate the access site.       2. Artery evaluated via ultrasound for patency/adequacy.       3. Using real-time ultrasound the needle/catheter was observed entering the artery/vein.       5. The visualized structures were anatomically normal.       6. There were no apparent abnormal pathologic findings.       Catheter Type/Size: 20 G, 12 cm  Narrative        Tegaderm dressing used.       Complications: None apparent,        Arterial waveform: Yes        IBP within 10% of NIBP: Yes

## 2021-07-09 NOTE — ANESTHESIA PROCEDURE NOTES
Airway       Patient location during procedure: OR  Staff -        Anesthesiologist:  Irene Hillman MD       Performed By: anesthesiologistIndications and Patient Condition       Indications for airway management: mala-procedural       Induction type:inhalational       Mask difficulty assessment: 1 - vent by mask    Final Airway Details       Final airway type: endotracheal airway       Successful airway: ETT - single  Endotracheal Airway Details        ETT size (mm): 7.5       Successful intubation technique: direct laryngoscopy       DL Blade Type: Mosley 2       Grade View of Cords: 1       Adjucts: stylet       Position: Right       Measured from: gums/teeth       Secured at (cm): 23       Bite block used: None    Post intubation assessment        Placement verified by: capnometry and equal breath sounds        Number of attempts at approach: 2       Number of other approaches attempted: 1       Secured with: pink tape       Ease of procedure: easy       Dentition: Intact and Unchanged    Medication(s) Administered   Medication Administration Time: 7/8/2021 6:27 PM

## 2021-07-09 NOTE — PROGRESS NOTES
Admitted/transferred from: PACU  Reason for admission/transfer: Monitoring  Patient status upon admission/transfer: stable  Interventions:  Set up in room   Plan: monitor  2 RN skin assessment: completed by CLARITZA Garduno and CLARITZA Morgan  Result of skin assessment and interventions/actions: No interventions  Height, weight, drug calc weight: done  Patient belongings: in safe  MDRO education (if applicable):

## 2021-07-09 NOTE — PROVIDER NOTIFICATION
D: 0800 neuro assessment noted new R sided motor weakness of arm/leg.  C/O headache. Speech clear, face symmetric, oriented denies numbness or tingling.    I:  NSG/NCC notified. Stroke code called.  , HRR 80s, BPs 143  New PIV placed.  CT/CT angio done.   A:P:  Continue to monitor and notify MDs of concerns.

## 2021-07-09 NOTE — OR NURSING
Dr. Watson called and stated the drainage is the iodine solution used and is not a concern. Also the burning is expected due to the incisions.

## 2021-07-09 NOTE — PROGRESS NOTES
Stroke Code    Code called at 0829, with this resource float RN arriving at 0832. Transport to CT delayed due to lack of appropriately large PIV gauge. Eventually transported at 0840 by two MDs and a 4A RN who took over transport procedure of pt. De-escalation announced at 0903.

## 2021-07-09 NOTE — CONSULTS
[facility]    Stroke Consult Note    Reason for Consult: Stroke Code     Chief Complaint: CSF Leak    HPI  Marleen Mcfadden is a 56 year old female with history of HTN, cardiomyopathy, latent TB, GERD, and R middle fossa CSF leak repair and lumbar drain placement (7/5) who presents for redo CSF leak repair (7/8). A stroke code was called on the patient today at 0829 for sudden onset of R hemibody weakness with no other associated symptoms. The last known well was at ~0800 this AM when the patient was tested by her nurse. Prior to this the patient was neurologically intact. The patient was not on any anti-coagulants or anti-platelet agents. She reports that the symptoms came on suddenly while lying in bed. She denies numbness/sensory changes, visual changes, or coordination difficultly. She reports that she does have a frontal headache that was associated with the onset of the hemibody weakness.     Thrombolytic Treatment   Not given due to surgery/trauma within the past 14 days.    Endovascular Treatment  Not initiated due to absence of proximal vessel occlusion    Impression   56 year old with history of R middle fossa CSF leak repair and lumbar drain placement (7/5) and underwent CSF leak repair (7/8) who had sudden onset of R hemibody weakness today prompting a stroke code. CT/CTA without acute findings. Following the stroke code the patient had a exam that was noted to be improved with distraction.     Plan:  Neuro  #R hemibody weakness  Patient with significant anxiety at time of stroke code and had improving weakness with distraction following. Possible that this weakness represents a conversion response given the recent stressors associated with hospitalization.   - MRI Brain with and without contrast  - Keppra 750 mg BID  - Further stroke workup indicated if this show abnormalities    #CSF Leak s/p repair and lumbar drain placement 7/5 with revision 7/8  -  Q1H neurochecks per NSG  - Stable following surgery  - NSG primary  - HOB>30    CV  #HTN  - SBP goal <140  - Cont PTA loasartan  - Cont PTA metoprolol  - Cont PTA spironolactone    #Cardiomyopathy  - Metoprolol    Pulm  Currently on RA  - O2 PRN    Renal  Electrolytes WNL  Not currently on IVF    GI  On regular diet  On PRN bowel regimen    #GERD  - PPI    Endo  Glucose WNL    Heme  #Anemia  Hgb currently stable at 11.2    FEN: Regular Diet  PPX: DVT - SCDs; GI - Omeprazole   Code: Full code  Dispo: ICU    The patient was discussed with the Stroke Staff is Dr. Gonzalez.    Rajendra Hoover MD  PGY-2 Neurology Resident  ASCOM *[phone]  ______________________________________________________    Risk Factors Present on Admission                  Past Medical History   Past Medical History:   Diagnosis Date     Allergic rhinitis, cause unspecified      Anemia      Anemia      Arthritis      Autoimmune disease (H)      Autoimmune disease NEC     Autoimmune disease- unknown/poss SLE     Shaina's node      Calcaneal spur 10/21/2014     Calcaneal spur 10/21/2014    Xray 10/17/14      CHF (congestive heart failure) (H)      CHF with cardiomyopathy (H)      Chronic low back pain      DDD (degenerative disc disease), lumbar      Depression      Familial cardiomyopathy (H) 10/21/2015     Familial cardiomyopathy (H)      GERD without esophagitis 2/7/2019     History of transfusion      Hormone replacement therapy 2/7/2019     Hypertension      Morbid obesity (H) 5/5/2015     Muscle spasm 9/20/2019     Nontraumatic rupture of quadriceps tendon, left 6/21/2018     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 Followed regularly by cardiologist Mayi.  Problem list name updated by automated process. Provider to review     Rotator cuff injury 2017     Sinus bradycardia      Sprain of other ligament of left ankle, initial encounter 2017     Status post left knee replacement 2018     TB lung, latent     negative quantiferon gold test  12     UTERINE LEIOMYOMA NOS 10/25/2006     Past Surgical History   Past Surgical History:   Procedure Laterality Date     ARTHROPLASTY KNEE Left      ARTHROPLASTY REVISION KNEE Left 2019    Procedure: REVISION, LEFT TOTAL KNEE  ARTHROPLASTY;  Surgeon: Dev Rocha MD;  Location: [facility];  Service: Orthopedics     ARTHROPLASTY REVISION KNEE Right 2020    Procedure: RIGHT REVISION TOTAL KNEE ARTHROPLASTY;  Surgeon: Dev Rocha MD;  Location: [facility];  Service: Orthopedics     ARTHROSCOPIC REPAIR ACL       BREAST SURGERY       C BREAST SURGERY PROCEDURE UNLISTED      Breast Reduction     C  DELIVERY ONLY  10/85,     , Low Cervicalx2     C EXCIS UTERINE FIBROID,ABD APPRCH       C EXCISE EXCESS SKIN TISSUE,ABDOMEN       C LIGATE FALLOPIAN TUBE       C REPAIR CRUCIATE LIGAMENT,KNEE      Right Knee     C TOTAL KNEE ARTHROPLASTY Left 10/03/2017     C TOTAL KNEE ARTHROPLASTY Right 2019    Procedure: RIGHT TOTAL KNEE ARTHROPLASTY;  Surgeon: Dev Rocha MD;  Location: [facility];  Service: Orthopedics      SECTION        SECTION       COLONOSCOPY WITH CO2 INSUFFLATION N/A 2021    Procedure: COLONOSCOPY, WITH CO2 INSUFFLATION;  Surgeon: Angela Harrington DO;  Location: [facility] OR     CRANIECTOMY Right 2021    Procedure: RIGHT MIDDLE FOSSA APPROACH, CSF LEAK REPAIR;  Surgeon: Jocelyn Noe MD;  Location: [facility] OR     CRANIOTOMY MIDDLE FOSSA, EXCISE ACOUSTIC NEUROMA, COMBINED N/A 2021    Procedure: Right CRANIOTOMY, MIDDLE FOSSA  APPROACH, FOR REPAIR OF ENCEPHALOCELE;  Surgeon: Jocelyne Harrell MD;  Location: [facility] OR     CYSTOURETHROSCOPY N/A 8/18/2020    Procedure: CYSTOSCOPY;  Surgeon: Cherelle Willams MD;  Location: [facility];  Service: Gynecology     DAVINCI SACROCOLPOPEXY, MIDURETHRAL SLING, CYSTOSCOPY       HC SLING OPERATION FOR STRESS INCONTINENCE N/A 8/18/2020    Procedure: MIDURETHRAL SLING, CYSTOSCOPY;  Surgeon: Cherelle Willams MD;  Location: [facility];  Service: Gynecology     HYSTERECTOMY       HYSTERECTOMY TOTAL ABDOMINAL  2006    for fibroids; reports having blood transfusion after surgery     INSERT DRAIN LUMBAR N/A 7/5/2021    Procedure: Insert drain lumbar;  Surgeon: Jocelyn Noe MD;  Location: [facility] OR     JOINT REPLACEMENT Left     knee     THYROIDECTOMY  9/9/2013    Procedure: THYROIDECTOMY;  LEFT THYROID LOBECTOMY.  (LIGASURE, RECURRENT LARYNGEAL NERVE MONITOR) ;  Surgeon: Uriah Camargo MD;  Location: [facility]     THYROIDECTOMY       TUBAL LIGATION       Medications   Home Meds  Prior to Admission medications    Medication Sig Start Date End Date Taking? Authorizing Provider   Cholecalciferol (VITAMIN D) 2000 UNIT tablet Take 5,000 Units by mouth as needed Reported on 3/8/2017 10/21/10  Yes Jojo Maurer MD   Collagen 500 MG CAPS Take 1 capsule by mouth daily    Yes Reported, Patient   famotidine (PEPCID) 40 MG tablet Take 1 tablet (40 mg) by mouth daily  Patient taking differently: Take 40 mg by mouth daily as needed  12/14/20  Yes Wilson Cam MD   FLAXSEED, LINSEED, PO Take 1 capsule by mouth daily    Yes Reported, Patient   fluticasone (FLONASE) 50 MCG/ACT nasal spray Spray 2 sprays into both nostrils At Bedtime 6/19/20  Yes Wilson Cam MD   furosemide (LASIX) 20 MG tablet Take 2 tablets (40 mg) by mouth daily as needed (as needed for weight gain/edema) 1/7/21  Yes Cassie Kolb MD   loratadine (CLARITIN) 10 MG tablet Take 1 tablet  (10 mg) by mouth daily 6/19/20  Yes Wilson Cam MD   metoprolol succinate ER (TOPROL-XL) 50 MG 24 hr tablet Take 1 tablet (50 mg) by mouth daily 10/20/20  Yes Cassie Kolb MD   omeprazole (PRILOSEC) 40 MG DR capsule Take 1 capsule (40 mg) by mouth daily 12/14/20  Yes Wilson Cam MD   progesterone (PROMETRIUM) 100 MG capsule Take 100 mg by mouth At Bedtime  3/3/17  Yes Reported, Patient   spironolactone (ALDACTONE) 25 MG tablet Take 2 tablets (50 mg) by mouth daily 9/11/20  Yes Cassie Kolb MD   amitriptyline (ELAVIL) 25 MG tablet TAKE 1 TABLET(25 MG) BY MOUTH AT BEDTIME 6/3/21   Alix Garnder PA-C   estradiol cypionate (DEPO-ESTRADIOL) 5 MG/ML injection Inject into the muscle every 28 days    Unknown, Entered By History   losartan (COZAAR) 100 MG tablet Take 1 tablet (100 mg) by mouth daily 2/17/21   Cassie Kolb MD   sertraline (ZOLOFT) 50 MG tablet TAKE 1 TABLET BY MOUTH EVERY DAY 9/10/20   Danae Grimes MD   solifenacin (VESICARE) 10 MG tablet Take 10 mg by mouth daily    Unknown, Entered By History   zolpidem (AMBIEN) 5 MG tablet Take tablet by mouth 15 minutes prior to sleep, for Sleep Study 8/28/20   Candace Ingram PA       Scheduled Meds    amitriptyline  25 mg Oral At Bedtime     dextromethorphan-guaiFENesin  1 tablet Oral BID     docusate sodium  100 mg Oral BID     fluticasone  2 spray Both Nostrils At Bedtime     glycopyrrolate  1 mg Oral TID     levETIRAcetam  750 mg Intravenous Q12H     loratadine  10 mg Oral Daily     losartan  100 mg Oral Daily     metoprolol succinate ER  50 mg Oral Daily     omeprazole  40 mg Oral Daily     polyethylene glycol  17 g Oral TID     progesterone  100 mg Oral At Bedtime     senna-docusate  3 tablet Oral BID     sertraline  50 mg Oral Daily     spironolactone  50 mg Oral Daily     tolterodine ER  4 mg Oral Daily     Vitamin D3  125 mcg Oral Daily       Infusion Meds    sodium chloride 100 mL/hr at 07/09/21  0630       PRN Meds  acetaminophen, sore throat lozenge, bisacodyl, diphenhydrAMINE, hydrALAZINE, HYDROmorphone, labetalol, lidocaine 4%, lidocaine (buffered or not buffered), magnesium hydroxide, naloxone **OR** naloxone **OR** naloxone **OR** naloxone, ondansetron **OR** ondansetron, oxyCODONE **OR** oxyCODONE, prochlorperazine **OR** prochlorperazine, sodium chloride (PF), sodium chloride (PF), sodium phosphate    Allergies   Allergies   Allergen Reactions     Morphine      EMESIS     Nickel      Sulfa Drugs Swelling     Family History   Family History   Problem Relation Age of Onset     Hypertension Father         dec     Diabetes Father         dec     Heart Disease Father         dec     Alcohol/Drug Father      Cardiovascular Father      Heart Disease Mother         dec     Alcohol/Drug Mother      Cardiovascular Mother      Heart Disease Daughter         Cardiomyopathy     Cardiovascular Daughter         cardiomyopathy     Colon Cancer Sister      Cardiovascular Son         cardiomyopathy     Diabetes Paternal Grandmother         dec     Hypertension Paternal Grandmother         dec     Cerebrovascular Disease Paternal Grandmother         dec     Cancer Sister         Lupus     Cardiovascular Sister         cardiomyopathy     Heart Disease Sister         heart failure, and kidney failure     Social History   Social History     Tobacco Use     Smoking status: Never Smoker     Smokeless tobacco: Never Used   Substance Use Topics     Alcohol use: Yes     Comment: rare, twice a year, she has a drink little wine 2 days ago     Drug use: No       Review of Systems   The 10 point Review of Systems is negative other than noted in the HPI or here.        PHYSICAL EXAMINATION  Temp:  [97.8  F (36.6  C)-99.9  F (37.7  C)] 99.1  F (37.3  C)  Pulse:  [54-82] 75  Resp:  [9-20] 14  BP: ()/(55-93) 143/80  MAP:  [87 mmHg-110 mmHg] 89 mmHg  Arterial Line BP: (118-152)/(68-84) 128/68  SpO2:  [95 %-100 %] 99 %   General:  Awake and alert, Sitting in bed, tearful, fully cooperative  HEENT: Normocephalic, atraumatic, no epistaxis, no oral lesions  Resp: Breathing comfortably on room air  Cardio: RRR, S1/S2 appropriate, no m/r/g  Abdomen: +BS, Soft, non-distended, non-tender  Extremities: Warm and well perfused, peripheral pulses present, no edema  Skin: Not jaundiced, no rash, no ecchymoses  Psych: Tearful and anxious appearing     Neuro:  Mental status: Awake, alert, attentive; oriented to person, place, and time  Speech: Fluent, comprehension and repetition intact; No dysarthria, no paraphasic errors noted  Cranial nerves: Visual fields intact, Eyes conjugate, PERRL, EOMI w/ normal and smooth pursuit, face expression symmetric, facial sensation intact to light touch and symmetric, hearing intact to conversation, shoulder shrug strong, palate rise symmetric, tongue/uvula midline.  Motor: Tone normal. LUE is 5/5, RUE is 4-/5, LLE is 3/5, RLE is 5/5. No abnormal movements noted.   Reflexes: Deferred  Sensory: Intact to light touch in all 4 extremities; No lateral extinction on sensory exam   Coordination: FNF intact bilaterally with no dysmetria; Normal heel-shin test bilaterally with no dysmetria noted  Gait: Deferred    Dysphagia Screen  Passed screening, no dysarthria - Regular Diet with thin liquids  07/09/2021     Stroke Scales    NIHSS  Interval baseline (07/09/21 0855)   Interval Comments     1a. Level of Consciousness 0-->Alert, keenly responsive   1b. LOC Questions 0-->Answers both questions correctly   1c. LOC Commands 0-->Performs both tasks correctly   2.   Best Gaze 0-->Normal   3.   Visual 0-->No visual loss   4.   Facial Palsy 0-->Normal symmetrical movements   5a. Motor Arm, Left 0-->No drift, limb holds 90 (or 45) degrees for full 10 secs   5b. Motor Arm, Right 1-->Drift, limb holds 90 (or 45) degrees, but drifts down before full 10 secs, does not hit bed or other support   6a. Motor Leg, Left 0-->No drift, leg holds 30  degree position for full 5 secs   6b. Motor Leg, right 2-->Some effort against gravity, leg falls to bed by 5 secs, but has some effort against gravity   7.   Limb Ataxia 1-->Present in one limb   8.   Sensory 0-->Normal, no sensory loss   9.   Best Language 0-->No aphasia, normal   10. Dysarthria 0-->Normal   11. Extinction and Inattention  0-->No abnormality   Total 4 (07/09/21 0855)       Modified Mely Score (Pre-morbid)  0-No symptoms    Imaging  I personally reviewed all imaging; relevant findings per HPI.     Lab Results Data   CBC  Recent Labs   Lab 07/09/21 0415 07/08/21  0548 07/07/21  0529   WBC 8.3 8.6 8.6   RBC 3.64* 3.49* 3.32*   HGB 11.2* 10.6* 10.2*   HCT 34.3* 33.3* 31.6*    243 203     Basic Metabolic Panel    Recent Labs   Lab 07/09/21  0415 07/08/21  0548 07/07/21  0529    133 136   POTASSIUM 4.6 4.1 4.1   CHLORIDE 101 100 103   CO2 27 30 29   BUN 8 9 11   CR 0.70 0.87 0.85   * 91 88   CARMEN 8.4* 8.0* 7.7*     Liver Panel  No results for input(s): PROTTOTAL, ALBUMIN, BILITOTAL, ALKPHOS, AST, ALT, BILIDIRECT in the last 168 hours.  INR    Recent Labs   Lab Test 09/18/19  1201 12/02/14  1012   INR 0.97 1.03      Lipid Profile    Recent Labs   Lab Test 08/25/20  0942 01/09/19  0740 01/08/19  1121 04/09/14  1033   CHOL 172 162 172 169   HDL 54 44* 44* 60   LDL 95 83 92 96   TRIG 113 175* 179* 63   CHOLHDLRATIO  --   --   --  2.8     A1C    Recent Labs   Lab Test 06/21/21  1330 08/12/20  1102 01/08/19  1121   A1C 5.5 5.7* 5.5     Troponin I  No results for input(s): TROPI in the last 168 hours.       Stroke Code / Stroke Consult Data Data   Stroke Code Data  (for stroke code without tele)  Stroke code activated 07/09/21   0829   First stroke provider response 07/09/21   0830   Last known normal 07/09/21   0730   Time of discovery   (or onset of symptoms) 07/09/21   0800   Head CT read by Stroke Neuro Dr/Provider 07/09/21   0850   Was stroke code de-escalated? Yes 07/09/21  0902  symptoms not likely caused by stroke

## 2021-07-09 NOTE — ANESTHESIA CARE TRANSFER NOTE
Patient: Marleen Mcfadden    Procedure(s):  RIGHT MIDDLE FOSSA APPROACH, CSF LEAK REPAIR    Diagnosis: Cerebrospinal fluid leak [G96.00]  Diagnosis Additional Information: No value filed.    Anesthesia Type:   General     Note:    Oropharynx: oropharynx clear of all foreign objects  Level of Consciousness: awake and drowsy  Oxygen Supplementation: nasal cannula  Level of Supplemental Oxygen (L/min / FiO2): 4  Independent Airway: airway patency satisfactory and stable  Dentition: dentition unchanged  Vital Signs Stable: post-procedure vital signs reviewed and stable  Report to RN Given: handoff report given  Patient transferred to: PACU    Handoff Report: Identifed the Patient, Identified the Reponsible Provider, Reviewed the pertinent medical history, Discussed the surgical course, Reviewed Intra-OP anesthesia mangement and issues during anesthesia, Set expectations for post-procedure period and Allowed opportunity for questions and acknowledgement of understanding      Vitals: (Last set prior to Anesthesia Care Transfer)  CRNA VITALS  7/8/2021 2244 - 7/8/2021 2333      7/8/2021             Resp Rate (observed):  (!) 1        Electronically Signed By: Navin Leroy MD  July 8, 2021  11:33 PM

## 2021-07-09 NOTE — PLAN OF CARE
"OT/4A: Cancel. Pt w/ stroke code called in AM w/ R sideded weakness w/ MRI report stating \"small late subacute cortical/subcortical hemorrhage\". Will cancel today's session and await confirmation regarding safe pt mobility after MRI results.    "

## 2021-07-09 NOTE — PROGRESS NOTES
[facility], [place]   07/09/2021  Neurosurgery Progress Note:    Assessment:  Marleen Mcfadden is a 55 year old woman s/p R craniotomy for middle fossa CSF leak repair and LD placement (7/5). Now, s/p redo middle fossa approach for CSF leak repair (7/8).    Plan:  - Serial neuro exams  - LD 15/hr   - Pain control  - HOB > 30 degrees  - Regular diet  - Bowel regimen  - PRN antiemetics  - IVF until taking adequate PO  - PT/OT  - SCDs for DVT proph      -----------------------------------  Kalin Watson MD, PhD  Neurosurgery Resident, PGY-3    Please contact neurosurgery resident on call with questions.    Dial * * [phone], enter [phone] when prompted.   -----------------------------------    Interval History: OR yesterday; NAEO      Objective:   Temp:  [97.8  F (36.6  C)-99.9  F (37.7  C)] 98.6  F (37  C)  Pulse:  [54-82] 75  Resp:  [9-31] 16  BP: ()/(55-93) 115/72  MAP:  [87 mmHg-110 mmHg] 89 mmHg  Arterial Line BP: (118-152)/(68-84) 128/68  SpO2:  [95 %-100 %] 99 %  I/O last 3 completed shifts:  In: 1781.67 [P.O.:360; I.V.:1421.67]  Out: 5805 [Urine:5450; Drains:255; Blood:100]    Gen: Appears comfortable, NAD  Wound: covered by primapore; right ear with clear drainage  Neurologic:  - Alert & Oriented to person, place, time, and situation  - Follows commands briskly  - Speech fluent, spontaneous. No aphasia or dysarthria.  - No gaze preference. No apparent hemineglect.  - PERRL, EOMI  - Face symmetric with sensation intact to light touch  - Palate elevates symmetrically, uvula midline, tongue protrudes midline  - Trapezii muscles 5/5 bilaterally  - No pronator drift     Del Tr Bi WE WF Gr   R 5 5 5 5 5 5   L 5 5 5 5 5 5    HF KE KF DF PF EHL   R 5 5 5 5 5 5   L 5 5 5 5 5 5     Reflexes 2+ throughout    Sensation intact and symmetric to light touch throughout    LABS:  Recent Labs   Lab 07/09/21  0415 07/08/21  0548 07/07/21  0529    133 136   POTASSIUM 4.6 4.1 4.1    CHLORIDE 101 100 103   CO2 27 30 29   ANIONGAP 5 2* 3   * 91 88   BUN 8 9 11   CR 0.70 0.87 0.85   CARMEN 8.4* 8.0* 7.7*       Recent Labs   Lab 07/09/21  0415   WBC 8.3   RBC 3.64*   HGB 11.2*   HCT 34.3*   MCV 94   MCH 30.8   MCHC 32.7   RDW 13.4          IMAGING:  No results found for this or any previous visit (from the past 24 hour(s)).    I have reviewed the history above and agree with the resident's assessment and plan.  GILMER Noe MD

## 2021-07-09 NOTE — BRIEF OP NOTE
"[facility]    Brief Operative Note    Pre-operative diagnosis: Cerebrospinal fluid leak [G96.00]  Post-operative diagnosis Same as pre-operative diagnosis    Procedure: Procedure(s):  RIGHT MIDDLE FOSSA APPROACH, CSF LEAK REPAIR  Surgeon: Surgeon(s) and Role:     * Jocelyn Noe MD - Primary     * Jocelyne Harrell MD - Assisting     * Darrell Carnes MD - Resident - Assisting     * Eron Sosa MD - Resident - Assisting  Anesthesia: General   Estimated blood loss: 100 cc  Drains: None  Specimens: * No specimens in log *  Findings:   None.  Complications: None.  Implants:   Implant Name Type Inv. Item Serial No.  Lot No. LRB No. Used Action   IMP SCR SYN MATRIX LOW PRO 1.5X04MM SELF DRILL [id number] - [id number] Metallic Hardware/Bath IMP SCR SYN MATRIX LOW PRO 1.5X04MM SELF DRILL [id number]  [employer] NONE Right 4 Explanted   GRAFT DUREPAIR DURA MATRIX 2X2\" [id number] - [id number] Bone/Tissue/Biologic GRAFT DUREPAIR DURA MATRIX 2X2\" [id number]  [employer]-NEURO [id number] Right 1 Implanted   IMP SCR SYN MATRIX LOW PRO 1.5X04MM SELF DRILL [id number] - [id number] Metallic Hardware/Bath IMP SCR SYN MATRIX LOW PRO 1.5X04MM SELF DRILL [id number]  [employer] N/A Right 4 Implanted           "

## 2021-07-09 NOTE — PROGRESS NOTES
Major Shift Events:  AAOx4. Good strength all extremities. Follows commands. Neuro checks Q1 hr. Lumbar drain 15cc Q1 hr.SR on tele. <140 systolic goal. Room Air. Regular diet. No BM. X1 PIV. Right radial A-line. NS @100. Ear incision. Lumbar drain on back.    Plan: Monitor/Floor?   For vital signs and complete assessments, please see documentation flowsheets.

## 2021-07-09 NOTE — PLAN OF CARE
"D/I: Patient on unit 4A Neuro ICU post craniotomy x2 for CSF leak.  Neuro-See am note for neuro change. This pm continues to have slight right sided weakness (arm/leg) but improving as day goes on.  No other neuro deficits.  C/O headache and \"whoshing\" sound in ear.  Dr. Lyn aware.  Crani dressing intact with mild swelling around surgery area. Tolerating reg diet.  LD intact and draining 5cc off q 1 hr.   CV- WNL     Pulm- WNL    GI- Large liquid  BM today.      - Voids without difficulty   Gtts-none  Skin-Intact.  Crani incision CDI.  Pain-Roxicodone given for HA with moderate relief.   See flow sheets for further interventions and assessments.  A: Stable.  Get OOB to commode with 1 assist.    P: Continue to monitor pt closely. Notify MD of significant changes.    "

## 2021-07-09 NOTE — OR NURSING
Pt is stable and neuro status is intact. Vitals are WNL's. Pt reports a decrease in pain/discomfort but still has burning at crown of head and abdominal cramping. Pt still has a scant amount of drainage from the right ear canal. The color is yellow/brown. The 4A RN was informed of this and Dr. Watson's opinion of what it is. Pt meets transfer criteria for [facility] and was brought to SICU.

## 2021-07-09 NOTE — PROGRESS NOTES
"tolaryngology Progress Note  July 9, 2021    Subjective and Interval Events: Patient had no acute events overnight. She denies otorrhea and salty/metallic taste. She is in high spirits.    O: BP (!) 143/80 (BP Location: Left arm)   Pulse 75   Temp 99.1  F (37.3  C) (Oral)   Resp 14   Ht 1.626 m (5' 4\")   Wt 113.8 kg (250 lb 14.1 oz)   LMP 10/19/2008 (Approximate)   SpO2 99%   BMI 43.06 kg/m     General: resting comfortably and not in acute distress   Neuro: patient alert, oriented, and answering questions appropriately; face symmetric and H-B I/VI bilaterally; no spontaneous or gaze-evoked nystagmus   HEENT: no active otorrhea; MCF incision soft, mildly edematous; and well-approximated   Pulmonary: breathing comfortably on room air without stridor or stertor   Drains: Lumbar drain in place draining 15ml/hr      Assessment & Plan: Marleen Mcfadden is a 56-year-old female with a past medical history of CHF, cardiomyopathy, possible SLE, HTN, and right encephalocele with CSF leak who is s/p right MCF approach to repair on 7/5/2021. Patient returned to the operating room on 7/8 for revision of repair. She has a lumbar drain in place that is currently draining.    - Continue sinonasal precautions  - Continue Robinol and Mucinex  - Increase bowel regimen  - Lumbar drain per NSG  - Contact otolaryngology on-call with questions or concerns    -- Patient and above plan discussed with Dr. Julio Cesar Wood MD  Otolaryngology- Head and Neck Surgery  Please contact ENT with questions by dialing * [phone] and entering job code [phone] when prompted.      ADDENDUM: Otolaryngology team notified that a stroke code was called earlier on this date. Imaging work-up is negative thus far. Work-up per NSG. Will continue to follow.  "

## 2021-07-10 LAB
ANION GAP SERPL CALCULATED.3IONS-SCNC: 2 MMOL/L (ref 3–14)
BUN SERPL-MCNC: 12 MG/DL (ref 7–30)
CALCIUM SERPL-MCNC: 7.9 MG/DL (ref 8.5–10.1)
CHLORIDE SERPL-SCNC: 101 MMOL/L (ref 94–109)
CO2 SERPL-SCNC: 31 MMOL/L (ref 20–32)
CREAT SERPL-MCNC: 0.89 MG/DL (ref 0.52–1.04)
ERYTHROCYTE [DISTWIDTH] IN BLOOD BY AUTOMATED COUNT: 13.5 % (ref 10–15)
GFR SERPL CREATININE-BSD FRML MDRD: 72 ML/MIN/{1.73_M2}
GLUCOSE SERPL-MCNC: 103 MG/DL (ref 70–99)
HCT VFR BLD AUTO: 30.3 % (ref 35–47)
HGB BLD-MCNC: 10.7 G/DL (ref 11.7–15.7)
HGB BLD-MCNC: 9.9 G/DL (ref 11.7–15.7)
MAGNESIUM SERPL-MCNC: 2.3 MG/DL (ref 1.6–2.3)
MCH RBC QN AUTO: 31.3 PG (ref 26.5–33)
MCHC RBC AUTO-ENTMCNC: 32.7 G/DL (ref 31.5–36.5)
MCV RBC AUTO: 96 FL (ref 78–100)
PHOSPHATE SERPL-MCNC: 4 MG/DL (ref 2.5–4.5)
PLATELET # BLD AUTO: 260 10E9/L (ref 150–450)
POTASSIUM SERPL-SCNC: 4.2 MMOL/L (ref 3.4–5.3)
RBC # BLD AUTO: 3.16 10E12/L (ref 3.8–5.2)
SODIUM SERPL-SCNC: 134 MMOL/L (ref 133–144)
WBC # BLD AUTO: 8.9 10E9/L (ref 4–11)

## 2021-07-10 PROCEDURE — 120N000002 HC R&B MED SURG/OB UMMC

## 2021-07-10 PROCEDURE — 80048 BASIC METABOLIC PNL TOTAL CA: CPT | Performed by: STUDENT IN AN ORGANIZED HEALTH CARE EDUCATION/TRAINING PROGRAM

## 2021-07-10 PROCEDURE — 83735 ASSAY OF MAGNESIUM: CPT | Performed by: NEUROLOGICAL SURGERY

## 2021-07-10 PROCEDURE — 250N000013 HC RX MED GY IP 250 OP 250 PS 637: Performed by: STUDENT IN AN ORGANIZED HEALTH CARE EDUCATION/TRAINING PROGRAM

## 2021-07-10 PROCEDURE — 85027 COMPLETE CBC AUTOMATED: CPT | Performed by: STUDENT IN AN ORGANIZED HEALTH CARE EDUCATION/TRAINING PROGRAM

## 2021-07-10 PROCEDURE — 250N000011 HC RX IP 250 OP 636: Performed by: STUDENT IN AN ORGANIZED HEALTH CARE EDUCATION/TRAINING PROGRAM

## 2021-07-10 PROCEDURE — 36415 COLL VENOUS BLD VENIPUNCTURE: CPT | Performed by: STUDENT IN AN ORGANIZED HEALTH CARE EDUCATION/TRAINING PROGRAM

## 2021-07-10 PROCEDURE — 84100 ASSAY OF PHOSPHORUS: CPT | Performed by: NEUROLOGICAL SURGERY

## 2021-07-10 PROCEDURE — 85018 HEMOGLOBIN: CPT | Performed by: STUDENT IN AN ORGANIZED HEALTH CARE EDUCATION/TRAINING PROGRAM

## 2021-07-10 PROCEDURE — 258N000003 HC RX IP 258 OP 636: Performed by: STUDENT IN AN ORGANIZED HEALTH CARE EDUCATION/TRAINING PROGRAM

## 2021-07-10 RX ORDER — HYDROMORPHONE HYDROCHLORIDE 2 MG/1
2 TABLET ORAL EVERY 4 HOURS PRN
Status: DISCONTINUED | OUTPATIENT
Start: 2021-07-10 | End: 2021-07-14 | Stop reason: HOSPADM

## 2021-07-10 RX ORDER — LEVETIRACETAM 500 MG/1
1000 TABLET ORAL EVERY 12 HOURS
Status: DISCONTINUED | OUTPATIENT
Start: 2021-07-10 | End: 2021-07-11

## 2021-07-10 RX ORDER — LEVETIRACETAM 10 MG/ML
1000 INJECTION INTRAVASCULAR EVERY 12 HOURS
Status: DISCONTINUED | OUTPATIENT
Start: 2021-07-10 | End: 2021-07-10

## 2021-07-10 RX ADMIN — GLYCOPYRROLATE 1 MG: 1 TABLET ORAL at 14:17

## 2021-07-10 RX ADMIN — OXYCODONE HYDROCHLORIDE 10 MG: 10 TABLET ORAL at 06:48

## 2021-07-10 RX ADMIN — METOPROLOL SUCCINATE 50 MG: 25 TABLET, EXTENDED RELEASE ORAL at 08:14

## 2021-07-10 RX ADMIN — LORATADINE 10 MG: 10 TABLET ORAL at 08:14

## 2021-07-10 RX ADMIN — ACETAMINOPHEN 650 MG: 325 TABLET, FILM COATED ORAL at 15:19

## 2021-07-10 RX ADMIN — SERTRALINE HYDROCHLORIDE 50 MG: 50 TABLET ORAL at 08:14

## 2021-07-10 RX ADMIN — PROGESTERONE 100 MG: 100 CAPSULE ORAL at 21:09

## 2021-07-10 RX ADMIN — LOSARTAN POTASSIUM 100 MG: 50 TABLET, FILM COATED ORAL at 08:24

## 2021-07-10 RX ADMIN — TOLTERODINE 4 MG: 4 CAPSULE, EXTENDED RELEASE ORAL at 08:14

## 2021-07-10 RX ADMIN — POLYETHYLENE GLYCOL 3350 17 G: 17 POWDER, FOR SOLUTION ORAL at 08:15

## 2021-07-10 RX ADMIN — LEVETIRACETAM 1000 MG: 500 TABLET, FILM COATED ORAL at 15:19

## 2021-07-10 RX ADMIN — BISACODYL 10 MG: 10 SUPPOSITORY RECTAL at 08:24

## 2021-07-10 RX ADMIN — DIPHENHYDRAMINE HYDROCHLORIDE 25 MG: 25 CAPSULE ORAL at 06:48

## 2021-07-10 RX ADMIN — LEVETIRACETAM 750 MG: 100 INJECTION, SOLUTION, CONCENTRATE INTRAVENOUS at 04:16

## 2021-07-10 RX ADMIN — DIPHENHYDRAMINE HYDROCHLORIDE 25 MG: 25 CAPSULE ORAL at 21:08

## 2021-07-10 RX ADMIN — OXYCODONE HYDROCHLORIDE 5 MG: 5 TABLET ORAL at 01:21

## 2021-07-10 RX ADMIN — DOCUSATE SODIUM 100 MG: 100 CAPSULE, LIQUID FILLED ORAL at 19:46

## 2021-07-10 RX ADMIN — DOCUSATE SODIUM 100 MG: 100 CAPSULE, LIQUID FILLED ORAL at 08:13

## 2021-07-10 RX ADMIN — GUAIFENESIN AND DEXTROMETHORPHAN HYDROBROMIDE 1 TABLET: 600; 30 TABLET, EXTENDED RELEASE ORAL at 19:52

## 2021-07-10 RX ADMIN — DOCUSATE SODIUM 50 MG AND SENNOSIDES 8.6 MG 3 TABLET: 8.6; 5 TABLET, FILM COATED ORAL at 08:14

## 2021-07-10 RX ADMIN — GUAIFENESIN AND DEXTROMETHORPHAN HYDROBROMIDE 1 TABLET: 600; 30 TABLET, EXTENDED RELEASE ORAL at 08:17

## 2021-07-10 RX ADMIN — SPIRONOLACTONE 50 MG: 25 TABLET ORAL at 08:13

## 2021-07-10 RX ADMIN — OXYCODONE HYDROCHLORIDE 10 MG: 10 TABLET ORAL at 15:19

## 2021-07-10 RX ADMIN — ACETAMINOPHEN 650 MG: 325 TABLET, FILM COATED ORAL at 21:08

## 2021-07-10 RX ADMIN — HYDROMORPHONE HYDROCHLORIDE 2 MG: 2 TABLET ORAL at 23:51

## 2021-07-10 RX ADMIN — GLYCOPYRROLATE 1 MG: 1 TABLET ORAL at 19:46

## 2021-07-10 RX ADMIN — DIPHENHYDRAMINE HYDROCHLORIDE 25 MG: 25 CAPSULE ORAL at 15:19

## 2021-07-10 RX ADMIN — OXYCODONE HYDROCHLORIDE 10 MG: 10 TABLET ORAL at 21:08

## 2021-07-10 RX ADMIN — FLUTICASONE PROPIONATE 2 SPRAY: 50 SPRAY, METERED NASAL at 21:09

## 2021-07-10 RX ADMIN — ACETAMINOPHEN 650 MG: 325 TABLET, FILM COATED ORAL at 06:48

## 2021-07-10 RX ADMIN — HYDROMORPHONE HYDROCHLORIDE 0.5 MG: 1 INJECTION, SOLUTION INTRAMUSCULAR; INTRAVENOUS; SUBCUTANEOUS at 12:09

## 2021-07-10 RX ADMIN — Medication 125 MCG: at 08:15

## 2021-07-10 RX ADMIN — GLYCOPYRROLATE 1 MG: 1 TABLET ORAL at 08:14

## 2021-07-10 RX ADMIN — OMEPRAZOLE 40 MG: 20 CAPSULE, DELAYED RELEASE ORAL at 08:14

## 2021-07-10 RX ADMIN — DOCUSATE SODIUM 50 MG AND SENNOSIDES 8.6 MG 3 TABLET: 8.6; 5 TABLET, FILM COATED ORAL at 19:46

## 2021-07-10 ASSESSMENT — ACTIVITIES OF DAILY LIVING (ADL)
ADLS_ACUITY_SCORE: 15
ADLS_ACUITY_SCORE: 15
ADLS_ACUITY_SCORE: 13
ADLS_ACUITY_SCORE: 15
ADLS_ACUITY_SCORE: 13
ADLS_ACUITY_SCORE: 13

## 2021-07-10 ASSESSMENT — VISUAL ACUITY
OU: OTHER (SEE COMMENT)
OU: OTHER (SEE COMMENT)
OU: BASELINE
OU: BASELINE

## 2021-07-10 ASSESSMENT — MIFFLIN-ST. JEOR: SCORE: 1693

## 2021-07-10 NOTE — PROGRESS NOTES
Brief Stroke Progress Note 07/10/21  Patient originally seen for stroke code 07/10/21 after developing acute onset of R sided weakness. Patient weakness substantially improved after stroke code. Exam unremarkable this AM. CTH, CTA, and MRI unremarkable without evidence of acute stroke. No further treatment of acute stroke is warranted at this point. The Stroke Service will sign off at this point. Please contact us if further concerns or questions arise.    Rajendra Hoover MD  PGY-2 Neurology Resident  [facility] *[phone]

## 2021-07-10 NOTE — PLAN OF CARE
"Major Shift Events:    Patient had large bowel movement after suppository  This RN offered to walk with patient but she decline each time offered.  Lumbar drain orders now to continuous draining. Output has 10-20 ml per hour since noon. Patient educated on new activity restrictions but this RN noted need to reinforce this many times this shift.   Patient states \"you have not offered me any meds\" This RN stated that the patient had not informed her that she was in pain. Patient complains of headache, PRN dilaudid and oxycodone given this shift.  Patient son at bedside, very upset that this RN could not guarantee a private room for his mother when she transferred, states he has  if needed. This RN assured patient and son that we could only do our best to accommodate this request.  Plan: Transfer to  when bed available. Continue to monitor and assess Q4.  For vital signs and complete assessments, please see documentation flowsheets.     "

## 2021-07-10 NOTE — PROGRESS NOTES
[facility], [place]   07/10/2021  Neurosurgery Progress Note:    Assessment:  Marleen Mcfadden is a 55 year old female s/p R craniotomy for middle fossa CSF leak repair and LD placement (7/5). Now, s/p redo middle fossa approach for CSF leak repair (7/8).    Plan:  - Serial neuro exams  - continues drainage from lumbar drain leveled at shoulder   - Pain control  - HOB > 30 degrees  - Regular diet  - Bowel regimen  - PRN antiemetics  - PT/OT  - SCDs for DVT proph      -----------------------------------  Kalin Watson MD, PhD  Neurosurgery Resident, PGY-3    Please contact neurosurgery resident on call with questions.    Dial * * [phone], enter [phone] when prompted.   -----------------------------------    Interval History: Yesterday AM new onset R hemibody weakness; stroke code with negative imaging; MRI negative for stroke as well; exam improving; endorses incisional and frontal positional headache; no leakage from the right ear      Objective:   Temp:  [96.9  F (36.1  C)-100.2  F (37.9  C)] 98  F (36.7  C)  Pulse:  [57-84] 65  Resp:  [8-16] 8  BP: ()/(50-87) 93/58  MAP:  [88 mmHg-119 mmHg] 100 mmHg  SpO2:  [95 %-100 %] 100 %  I/O last 3 completed shifts:  In: 1961.67 [P.O.:1060; I.V.:901.67]  Out: 1190 [Urine:1050; Drains:140]    Gen: Appears comfortable, NAD  Wound: covered by primapore; right ear with clear drainage  Neurologic:  - Alert & Oriented to person, place, time, and situation  - Follows commands briskly  - Speech fluent, spontaneous. No aphasia or dysarthria.  - No gaze preference. No apparent hemineglect.  - PERRL, EOMI  - Face symmetric with sensation intact to light touch  - Palate elevates symmetrically, uvula midline, tongue protrudes midline  - Trapezii muscles 5/5 bilaterally  - Mild right pronator drift     Del Tr Bi WE WF Gr   R 5 5 5 5 5 5   L 5 4+ 4+ 5 5 5    HF KE KF DF PF EHL   R 4+ 5 5 5 5 5   L 5 5 5 5 5 5     Reflexes 2+ throughout    Sensation  intact and symmetric to light touch throughout    LABS:  Recent Labs   Lab 07/10/21  0419 07/09/21  0415 07/08/21  0548    133 133   POTASSIUM 4.2 4.6 4.1   CHLORIDE 101 101 100   CO2 31 27 30   ANIONGAP 2* 5 2*   * 130* 91   BUN 12 8 9   CR 0.89 0.70 0.87   CARMEN 7.9* 8.4* 8.0*       Recent Labs   Lab 07/10/21  0419   WBC 8.9   RBC 3.16*   HGB 9.9*   HCT 30.3*   MCV 96   MCH 31.3   MCHC 32.7   RDW 13.5          IMAGING:  Recent Results (from the past 24 hour(s))   CT Head w/o Contrast    Addendum: 7/9/2021    Partial opacification of right mastoid air cells and middle ear  cavity.    WILL WOODS MD         SYSTEM ID:  [id number]      Narrative    Exam: CT HEAD W/O CONTRAST  7/9/2021 8:58 AM    History: right sided weakness s/p redo middle fossa encephalocele and  CSF leak repair.    Comparison:  CT 2/9/2021.    Technique: Using multidetector thin collimation helical acquisition  technique, axial, coronal and sagittal CT images from the skull base  to the vertex were obtained without intravenous contrast.     Findings:    Postoperative changes of right temporoparietal craniotomy and  encephalocele repair with mesh placement in the tegmen  tympani/mastoideum. Trace hemorrhage and pneumocephalus in the lateral  right temporal lobe. Small new hypoattenuating extra-axial fluid  collection along the anterior right convexity with mass effect and  trace midline shift of 2-3 mm.    The ventricles are proportionate to the cerebral sulci. The gray to  white matter differentiation of the cerebral hemispheres is preserved.  The basal cisterns are patent.     The visualized paranasal sinuses are clear. Small right mastoid  effusion. No acute abnormality of the orbits.    Swelling and 5 x 1.5 cm subcutaneous fluid collection overlying the  craniotomy defect.      Impression    Impression:   1.  New thin 2-3 mm right subdural collection over the anterior right  convexity. Expected postoperative changes of right  temporoparietal  craniotomy with encephalocele repair. Minimal hemorrhage and  pneumocephalus at the repair site. No CT evidence for acute stroke.  2.  Subcutaneous hematoma versus seroma overlying the craniotomy  defect.    I have personally reviewed the examination and initial interpretation  and I agree with the findings.    WILL WOODS MD         SYSTEM ID:  [id number]   CTA Head Neck with Contrast    Narrative    CTA  HEAD NECK WITH CONTRAST 7/9/2021 8:59 AM    CT angiogram of the neck   CT angiogram of the base of the brain with contrast  Reconstruction by the Radiologist on the 3D workstation    Provided History:  Right-sided weakness following encephalocele  repair..    Comparison:  Noncontrast CT head from same day..      Technique:  HEAD and NECK CTA: During rapid bolus intravenous injection of  nonionic contrast material, axial images were obtained using thin  collimation multidetector helical technique from the base of the upper  aortic arch through the [place]. This CT angiogram data was  reconstructed at thin intervals with mild overlap. Images were sent to  the [employer] workstation, and 3D reconstructions were obtained. The  axial source images, multiplanar reformations, 3D reconstructions in  both maximum intensity projection display and volume rendered models  were reviewed, with reconstructions performed by the technologist and  the radiologist.    Contrast: Isovue 370.    Findings:    Head CTA demonstrates no aneurysm or stenosis of the major  intracranial arteries. Hypoplastic A1 segment of the left anterior  cerebral artery with right A1 segment providing supply to both  anterior cerebral arteries. The anterior communicating artery is  patent. Hypoplastic P1 segment of the left posterior cerebral artery  with prominent left posterior communicating artery. Diminutive right  vertebral artery.     Thin subdural collection over the anterior right cerebral convexity is  again seen. Additional  postsurgical changes are discussed in  separately dictated CT of the head. The right scalp demonstrates  subcutaneous seroma/hematoma and postoperative pneumocephalus. No  evidence of active hemorrhage within the collection.    Neck CTA demonstrates no stenosis of the major cervical arteries.  Variant arch anatomy with left common carotid artery arising from  brachiocephalic trunk. The origins of the great vessels are patent.  The normal distal right internal carotid artery measures 4.7 mm with  medial course in the midportion, a normal variant. The normal distal  left internal carotid artery measures 4.1 mm.     Asymmetric mildly enlarged right cervical chain nodes, likely  reactive. Left thyroid resection. No discrete mass in the remaining  cervical soft tissues or lung apices. Main pulmonary artery is  enlarged up to roughly 32 mm. Spinal canal is grossly patent.      Impression    Impression:   1. No acute large vessel occlusion.  2. Head CTA demonstrates no aneurysm or stenosis of the major  intracranial arteries. Thin subdural collection over the anterior  right cerebral convexity is again seen.  3. Neck CTA demonstrates no stenosis of the major cervical arteries.   4. Borderline enlargement pulmonary artery, which can be seen with  pulmonary artery hypertension.    I have personally reviewed the examination and initial interpretation  and I agree with the findings.    WILL WOODS MD         SYSTEM ID:  [id number]   MR Brain w/o Contrast    Narrative    MR BRAIN W/O CONTRAST 7/9/2021 12:52 PM    Provided History: Neuro deficit, acute, stroke suspected; POD 4 and 1  R middle fossa approach for encephalocele repair, sudden onset R sided  weakness    Comparison:  CT and CTA from same day     Technique: Sagittal T1-weighted and axial T2-weighted, turboFLAIR and  diffusion-weighted with ADC map images of the brain were obtained  without intravenous contrast.    Findings: No restricted diffusion in the brain  parenchyma to suggest  an ischemia or infarct. Right temporoparietal craniotomy for repair of  CSF leak in the tegmen mastoideum. Mild T2 hyperintense T1  hyperintense late subacute small hemorrhagic focus in the inferior  aspect of the right temporal lobe overlying the tegmen mastoideum  (series 3 image 13). In addition, there is small encephalomalacic area  adjacent to surgical repair region (series 3 image 16)    No hydrocephalus. Stable size of thin subdural clear mildly  hyperintense, T1-T2 isointense collection over the anterior right  cerebral convexity measuring approximately 3 mm in maximum thickness.  Subtle susceptibility artifact on SWI in the anterior aspect of this  collection (series 8 image 46). This collection likely contains  proteinaceous or minimally hemorrhagic content. No mass effect.    Postoperative subcutaneous hemorrhage in the right scalp overlying the  craniotomy. Bilateral mastoid effusion      Impression    Impression: In this patient with right middle cranial fossa CSF leak  repair earlier today,  1.  No diffusion abnormalities to suggest an acute infarct.  2.  Stable 3 mm right subdural collection, likely containing  proteinaceous or small amount of hemorrhagic content.  3.  Small late subacute cortical/subcortical hemorrhage in the  inferior lateral aspect of right temporal lobe and small area of  encephalomalacia adjacent to CSF repair site.  4.  Edema and subcutaneous hematoma overlying the craniotomy defect.    I have personally reviewed the examination and initial interpretation  and I agree with the findings.    WILL WOODS MD         SYSTEM ID:  [id number]

## 2021-07-10 NOTE — PROGRESS NOTES
"Otolaryngology Progress Note  July 10, 2021    Subjective and Interval Events: Stroke code called yesterday for right sided weakness, now s/p negative CT and MRI imaging. Exam has been improving. She has a mild headache which improves with pain medication. No drainage from her ear, incision. Nor any salty or metallic taste.     O: BP 93/58 (BP Location: Left arm)   Pulse 65   Temp 98  F (36.7  C) (Oral)   Resp 8   Ht 1.626 m (5' 4\")   Wt 112.3 kg (247 lb 9.2 oz)   LMP 10/19/2008 (Approximate)   SpO2 100%   BMI 42.50 kg/m     General: resting comfortably and not in acute distress   Neuro: patient alert, oriented, and answering questions appropriately; face symmetric and H-B I/VI bilaterally; no spontaneous or gaze-evoked nystagmus   HEENT: no active otorrhea; MCF incision soft, mildly edematous; and well-approximated   Pulmonary: breathing comfortably on room air without stridor or stertor   Drains: Lumbar drain in place draining 15ml/hr      Assessment & Plan: Marleen Mcfadden is a 56-year-old female with a past medical history of CHF, cardiomyopathy, possible SLE, HTN, and right encephalocele with CSF leak who is s/p right MCF approach to repair on 7/5/2021. Patient returned to the operating room on 7/8 for revision of repair. She has a lumbar drain in place that is currently draining.    - Continue sinonasal precautions  - Continue Robinol and Mucinex  - continue bowel regimen  - Lumbar drain per NSG  - Contact otolaryngology on-call with questions or concerns    -- Patient and above plan discussed with Dr. Julio Cesar Daniel MD   Otolaryngology-Head & Neck Surgery PGY1  Please contact ENT with questions by dialing * [phone] and entering job code [phone] when prompted.    "

## 2021-07-10 NOTE — PLAN OF CARE
D/I:      Pulm: On room air/ 1L by nasal cannula due to sleep apnea (patient stated she is to be evaluated for apnea after recovery from surgery. Lungs clear. No I/S due to restrictions (no sucking in motion).       CV:  SR 60-70's. Occasional PVC's and PAC's. MAPs 60-80's. Mild BLE edema.       Heme: 9.9 from 11.2, No signs of bleeding. Plt 260 from 269      GI: BM times one. Good appetite. Denied nausea. Active Bowel sounds.      : Voiding without issue post deluna removal.       Neuro: Initially weaker right versus left (4 and 5). Getting stronger as night progressed. Endorsed one episode of seeing double on phone (saw two 5's) this morning when taking to NS resident. No other episodes of double vision. Sensation intact. Mouth slightly assymetrical at rest but even smile. Denied numbness or tingling except around parts of right ear. Hearing diminished and unchanged in right ear. Draining 10 ml's clear CSF fluid every hour without issue. Patient c/o headache which is moderately relieved with oxycodone and tylenol.       Skin: Right crani incision with op dressing. Minimal output. Lumbar drain site reinforced secondary to patient itching.       Pain: Pain in crown of head and front when patient gets up.       Activity: Up to BSC with SBA. Patient talking to family on phone during the evening.   A: No acute issues. Patient likes diphenhydramine with oxycodone due to itching, but does get very somnolent after administration.   P: Likely transfer off unit today if bed available.

## 2021-07-11 LAB
ANION GAP SERPL CALCULATED.3IONS-SCNC: 2 MMOL/L (ref 3–14)
BUN SERPL-MCNC: 14 MG/DL (ref 7–30)
CALCIUM SERPL-MCNC: 8.2 MG/DL (ref 8.5–10.1)
CHLORIDE SERPL-SCNC: 102 MMOL/L (ref 94–109)
CO2 SERPL-SCNC: 30 MMOL/L (ref 20–32)
CREAT SERPL-MCNC: 0.79 MG/DL (ref 0.52–1.04)
ERYTHROCYTE [DISTWIDTH] IN BLOOD BY AUTOMATED COUNT: 13.4 % (ref 10–15)
GFR SERPL CREATININE-BSD FRML MDRD: 83 ML/MIN/{1.73_M2}
GLUCOSE SERPL-MCNC: 87 MG/DL (ref 70–99)
HCT VFR BLD AUTO: 32 % (ref 35–47)
HGB BLD-MCNC: 10.2 G/DL (ref 11.7–15.7)
MAGNESIUM SERPL-MCNC: 1.9 MG/DL (ref 1.6–2.3)
MCH RBC QN AUTO: 30.7 PG (ref 26.5–33)
MCHC RBC AUTO-ENTMCNC: 31.9 G/DL (ref 31.5–36.5)
MCV RBC AUTO: 96 FL (ref 78–100)
PHOSPHATE SERPL-MCNC: 3.5 MG/DL (ref 2.5–4.5)
PLATELET # BLD AUTO: 265 10E9/L (ref 150–450)
POTASSIUM SERPL-SCNC: 4.2 MMOL/L (ref 3.4–5.3)
RBC # BLD AUTO: 3.32 10E12/L (ref 3.8–5.2)
SODIUM SERPL-SCNC: 134 MMOL/L (ref 133–144)
WBC # BLD AUTO: 8.2 10E9/L (ref 4–11)

## 2021-07-11 PROCEDURE — 250N000009 HC RX 250: Performed by: STUDENT IN AN ORGANIZED HEALTH CARE EDUCATION/TRAINING PROGRAM

## 2021-07-11 PROCEDURE — 84100 ASSAY OF PHOSPHORUS: CPT | Performed by: STUDENT IN AN ORGANIZED HEALTH CARE EDUCATION/TRAINING PROGRAM

## 2021-07-11 PROCEDURE — 83735 ASSAY OF MAGNESIUM: CPT | Performed by: STUDENT IN AN ORGANIZED HEALTH CARE EDUCATION/TRAINING PROGRAM

## 2021-07-11 PROCEDURE — 120N000002 HC R&B MED SURG/OB UMMC

## 2021-07-11 PROCEDURE — 258N000003 HC RX IP 258 OP 636: Performed by: STUDENT IN AN ORGANIZED HEALTH CARE EDUCATION/TRAINING PROGRAM

## 2021-07-11 PROCEDURE — 250N000013 HC RX MED GY IP 250 OP 250 PS 637: Performed by: STUDENT IN AN ORGANIZED HEALTH CARE EDUCATION/TRAINING PROGRAM

## 2021-07-11 PROCEDURE — 80048 BASIC METABOLIC PNL TOTAL CA: CPT | Performed by: STUDENT IN AN ORGANIZED HEALTH CARE EDUCATION/TRAINING PROGRAM

## 2021-07-11 PROCEDURE — 36415 COLL VENOUS BLD VENIPUNCTURE: CPT | Performed by: STUDENT IN AN ORGANIZED HEALTH CARE EDUCATION/TRAINING PROGRAM

## 2021-07-11 PROCEDURE — 85027 COMPLETE CBC AUTOMATED: CPT | Performed by: STUDENT IN AN ORGANIZED HEALTH CARE EDUCATION/TRAINING PROGRAM

## 2021-07-11 PROCEDURE — 999N000127 HC STATISTIC PERIPHERAL IV START W US GUIDANCE

## 2021-07-11 PROCEDURE — 250N000013 HC RX MED GY IP 250 OP 250 PS 637: Performed by: NEUROLOGICAL SURGERY

## 2021-07-11 RX ORDER — MAGNESIUM OXIDE 400 MG/1
400 TABLET ORAL 2 TIMES DAILY
Status: COMPLETED | OUTPATIENT
Start: 2021-07-11 | End: 2021-07-12

## 2021-07-11 RX ORDER — OXYCODONE HYDROCHLORIDE 5 MG/1
5 TABLET ORAL
Status: DISCONTINUED | OUTPATIENT
Start: 2021-07-11 | End: 2021-07-14 | Stop reason: HOSPADM

## 2021-07-11 RX ORDER — LEVETIRACETAM 500 MG/1
500 TABLET ORAL 2 TIMES DAILY
Status: DISCONTINUED | OUTPATIENT
Start: 2021-07-11 | End: 2021-07-11

## 2021-07-11 RX ORDER — LEVETIRACETAM 500 MG/1
1000 TABLET ORAL 2 TIMES DAILY
Status: DISCONTINUED | OUTPATIENT
Start: 2021-07-11 | End: 2021-07-14 | Stop reason: HOSPADM

## 2021-07-11 RX ADMIN — HYDROMORPHONE HYDROCHLORIDE 2 MG: 2 TABLET ORAL at 14:10

## 2021-07-11 RX ADMIN — DOCUSATE SODIUM 100 MG: 100 CAPSULE, LIQUID FILLED ORAL at 20:05

## 2021-07-11 RX ADMIN — LEVETIRACETAM 1000 MG: 500 TABLET, FILM COATED ORAL at 20:08

## 2021-07-11 RX ADMIN — SPIRONOLACTONE 50 MG: 25 TABLET ORAL at 07:33

## 2021-07-11 RX ADMIN — METOPROLOL SUCCINATE 50 MG: 25 TABLET, EXTENDED RELEASE ORAL at 07:33

## 2021-07-11 RX ADMIN — GLYCOPYRROLATE 1 MG: 1 TABLET ORAL at 14:10

## 2021-07-11 RX ADMIN — DOCUSATE SODIUM 50 MG AND SENNOSIDES 8.6 MG 3 TABLET: 8.6; 5 TABLET, FILM COATED ORAL at 20:05

## 2021-07-11 RX ADMIN — Medication 400 MG: at 20:09

## 2021-07-11 RX ADMIN — OXYCODONE HYDROCHLORIDE 5 MG: 5 TABLET ORAL at 19:30

## 2021-07-11 RX ADMIN — DIPHENHYDRAMINE HYDROCHLORIDE 25 MG: 25 CAPSULE ORAL at 07:34

## 2021-07-11 RX ADMIN — DIPHENHYDRAMINE HYDROCHLORIDE 25 MG: 25 CAPSULE ORAL at 19:30

## 2021-07-11 RX ADMIN — DOCUSATE SODIUM 50 MG AND SENNOSIDES 8.6 MG 3 TABLET: 8.6; 5 TABLET, FILM COATED ORAL at 07:34

## 2021-07-11 RX ADMIN — ACETAMINOPHEN 650 MG: 325 TABLET, FILM COATED ORAL at 22:07

## 2021-07-11 RX ADMIN — LORATADINE 10 MG: 10 TABLET ORAL at 07:34

## 2021-07-11 RX ADMIN — LEVETIRACETAM 1000 MG: 500 TABLET, FILM COATED ORAL at 04:00

## 2021-07-11 RX ADMIN — HYDROMORPHONE HYDROCHLORIDE 2 MG: 2 TABLET ORAL at 22:07

## 2021-07-11 RX ADMIN — GUAIFENESIN AND DEXTROMETHORPHAN HYDROBROMIDE 1 TABLET: 600; 30 TABLET, EXTENDED RELEASE ORAL at 20:06

## 2021-07-11 RX ADMIN — GLYCOPYRROLATE 1 MG: 1 TABLET ORAL at 20:05

## 2021-07-11 RX ADMIN — PROGESTERONE 100 MG: 100 CAPSULE ORAL at 22:07

## 2021-07-11 RX ADMIN — ACETAMINOPHEN 650 MG: 325 TABLET, FILM COATED ORAL at 07:34

## 2021-07-11 RX ADMIN — OXYCODONE HYDROCHLORIDE 5 MG: 5 TABLET ORAL at 11:14

## 2021-07-11 RX ADMIN — TOLTERODINE 4 MG: 4 CAPSULE, EXTENDED RELEASE ORAL at 07:36

## 2021-07-11 RX ADMIN — HYDROMORPHONE HYDROCHLORIDE 2 MG: 2 TABLET ORAL at 07:34

## 2021-07-11 RX ADMIN — Medication 400 MG: at 07:34

## 2021-07-11 RX ADMIN — HYDROMORPHONE HYDROCHLORIDE 2 MG: 2 TABLET ORAL at 04:00

## 2021-07-11 RX ADMIN — Medication 125 MCG: at 07:33

## 2021-07-11 RX ADMIN — GLYCOPYRROLATE 1 MG: 1 TABLET ORAL at 07:34

## 2021-07-11 RX ADMIN — DOCUSATE SODIUM 100 MG: 100 CAPSULE, LIQUID FILLED ORAL at 07:34

## 2021-07-11 RX ADMIN — CAFFEINE AND SODIUM BENZOATE 500 MG: 125 INJECTION, SOLUTION INTRAMUSCULAR; INTRAVENOUS at 09:53

## 2021-07-11 RX ADMIN — GUAIFENESIN AND DEXTROMETHORPHAN HYDROBROMIDE 1 TABLET: 600; 30 TABLET, EXTENDED RELEASE ORAL at 07:36

## 2021-07-11 RX ADMIN — OMEPRAZOLE 40 MG: 20 CAPSULE, DELAYED RELEASE ORAL at 07:33

## 2021-07-11 RX ADMIN — LOSARTAN POTASSIUM 100 MG: 50 TABLET, FILM COATED ORAL at 07:34

## 2021-07-11 RX ADMIN — FLUTICASONE PROPIONATE 2 SPRAY: 50 SPRAY, METERED NASAL at 22:10

## 2021-07-11 ASSESSMENT — ACTIVITIES OF DAILY LIVING (ADL)
ADLS_ACUITY_SCORE: 13

## 2021-07-11 ASSESSMENT — MIFFLIN-ST. JEOR: SCORE: 1699

## 2021-07-11 NOTE — PLAN OF CARE
Status: Pt here s/p crani for middle fossa CSF leak repair and LD placement. LD continuously open at shoulder  VS: VSS  Neuros: Vitals intact  GI: Tolerates regular diet. No BM since arriving to , but is on bowel regimen.  : Voids spontaneously.  IV: PIV SL  Activity: Up with SBA for LD management  Pain: C/o headache, receives PO dilaudid with some relief  Respiratory: LS clear, equal throughout  Drains/lines/skin: Skin intact  Social: Significant other at bedside  Plan of care: Keep LD open at shoulder. Continue to monitor and follow POC.

## 2021-07-11 NOTE — PROGRESS NOTES
[facility], [place]   07/10/2021  Neurosurgery Progress Note:    Assessment:  Marleen Mcfadden is a 55 year old female s/p R craniotomy for middle fossa CSF leak repair and LD placement (7/5). Now, s/p redo middle fossa approach for CSF leak repair (7/8).    Plan:  - Serial neuro exams  - continues drainage from lumbar drain leveled at shoulder  - Discontinue Keppra per patient request   - Pain control  - HOB > 30 degrees  - Regular diet  - Bowel regimen  - PRN antiemetics  - PT/OT  - SCDs for DVT proph      -----------------------------------  Greta George MD  Neurosurgery PGY2    Please contact neurosurgery resident on call with questions.    Dial * * [phone], enter [phone] when prompted.   -----------------------------------    Interval History: Patient endorses altered mood, which she attributes to starting keppra. She understands that stopping keppra might put her at risk of a new seizure. She also endorses 6/10 frontal headache, although this is not positional. She is amenable to try caffeine IV.       Objective:   Temp:  [96.9  F (36.1  C)-99.3  F (37.4  C)] 98.9  F (37.2  C)  Pulse:  [61-78] 78  Resp:  [8-16] 16  BP: ()/(50-84) 103/84  SpO2:  [97 %-100 %] 98 %  I/O last 3 completed shifts:  In: 960 [P.O.:860; I.V.:100]  Out: 226 [Drains:226]    Gen: Appears comfortable, NAD  Wound: covered by primapore; right ear with no drainage  Neurologic:  - Alert & Oriented to person, place, time, and situation  - Follows commands briskly  - Speech fluent, spontaneous. No aphasia or dysarthria.  - No gaze preference. No apparent hemineglect.  - PERRL, EOMI  - Face symmetric with sensation intact to light touch  - Palate elevates symmetrically, uvula midline, tongue protrudes midline  - Trapezii muscles 5/5 bilaterally  - Mild right pronator drift     Del Tr Bi WE WF Gr   R 5 5 5 5 5 5   L 5 5 5 5 5 5    HF KE KF DF PF EHL   R 5 5 5 5 5 5   L 5 5 5 5 5 5     Reflexes 2+  throughout    Sensation intact and symmetric to light touch throughout    LABS:  Recent Labs   Lab 07/10/21  0419 07/09/21  0415 07/08/21  0548    133 133   POTASSIUM 4.2 4.6 4.1   CHLORIDE 101 101 100   CO2 31 27 30   ANIONGAP 2* 5 2*   * 130* 91   BUN 12 8 9   CR 0.89 0.70 0.87   CARMEN 7.9* 8.4* 8.0*       Recent Labs   Lab 07/10/21  1439 07/10/21  0419   WBC  --  8.9   RBC  --  3.16*   HGB 10.7* 9.9*   HCT  --  30.3*   MCV  --  96   MCH  --  31.3   MCHC  --  32.7   RDW  --  13.5   PLT  --  260       IMAGING:  No results found for this or any previous visit (from the past 24 hour(s)).

## 2021-07-11 NOTE — PLAN OF CARE
Major Shift Events:  Lumbar drain in place. Level at shoulder. Open continuously. Output: 5-25 mL/hr. Complaints of intermittent spinal headaches.   Plan: Transfer to  when available. Continue POC.  For vital signs and complete assessments, please see documentation flowsheets.

## 2021-07-11 NOTE — PLAN OF CARE
Status: Pt transferred to  at around 1130 this shift. S/p crani for middle fossa CSF leak repair and LD placement. LD continuously open at shoulder  VS: VSS  Neuros: Vitals intact  GI: Tolerates regular diet. No BM since arriving to , but is on bowel regimen.  : Voids spontaneously.  IV: PIV SL  Activity: Up with SBA for LD management  Pain: C/o headache, receives PO dilaudid with some relief  Respiratory: LS clear, equal throughout  Drains/lines/skin: Skin intact  Labs/Tests: No new results since arriving to   New this shift: Transfer to   Social: Significant other at bedside  Plan of care: Keep LD open at shoulder    Arrived from:  4A  Belongings/meds:  with patient   2 RN Skin Assessment Completed by:  Nina JERRY + Jeremiah RN  Non-intact findings documented (yes/no/NA): LD site to back covered with transparent dressing

## 2021-07-11 NOTE — PLAN OF CARE
"Major Shift Events:   Patient very upset that pain medication orders changed. Complaining of \"worse headache of her life\". PRN Dilaudid given and Mds informed when rounding. Mds also informed that patient has been refusing her Zoloft every morning.   Patient complains that she has not had a walk in two days, this RN offered but patient on phone/sleeping when offered.  IV caffeine ordered, patient states that headache totally gone. However, IV infiltrated. New IV ordered and PRN oxycodone given in the meantime.   Patient Transferred to [facility] on 6A. [facility] Neuro done at bedside.   All belongings and medications sent with patient.  For vital signs and complete assessments, please see documentation flowsheets.     "

## 2021-07-12 LAB
ANION GAP SERPL CALCULATED.3IONS-SCNC: 5 MMOL/L (ref 3–14)
BUN SERPL-MCNC: 13 MG/DL (ref 7–30)
CALCIUM SERPL-MCNC: 8.2 MG/DL (ref 8.5–10.1)
CHLORIDE BLD-SCNC: 103 MMOL/L (ref 94–109)
CO2 SERPL-SCNC: 26 MMOL/L (ref 20–32)
CREAT SERPL-MCNC: 0.83 MG/DL (ref 0.52–1.04)
ERYTHROCYTE [DISTWIDTH] IN BLOOD BY AUTOMATED COUNT: 13.2 % (ref 10–15)
GFR SERPL CREATININE-BSD FRML MDRD: 79 ML/MIN/1.73M2
GLUCOSE BLD-MCNC: 104 MG/DL (ref 70–99)
HCT VFR BLD AUTO: 30.6 % (ref 35–47)
HGB BLD-MCNC: 9.8 G/DL (ref 11.7–15.7)
MCH RBC QN AUTO: 30.6 PG (ref 26.5–33)
MCHC RBC AUTO-ENTMCNC: 32 G/DL (ref 31.5–36.5)
MCV RBC AUTO: 96 FL (ref 78–100)
PLATELET # BLD AUTO: 313 10E3/UL (ref 150–450)
POTASSIUM BLD-SCNC: 4 MMOL/L (ref 3.4–5.3)
RBC # BLD AUTO: 3.2 10E6/UL (ref 3.8–5.2)
SODIUM SERPL-SCNC: 134 MMOL/L (ref 133–144)
WBC # BLD AUTO: 7 10E3/UL (ref 4–11)

## 2021-07-12 PROCEDURE — 250N000013 HC RX MED GY IP 250 OP 250 PS 637: Performed by: STUDENT IN AN ORGANIZED HEALTH CARE EDUCATION/TRAINING PROGRAM

## 2021-07-12 PROCEDURE — U0003 INFECTIOUS AGENT DETECTION BY NUCLEIC ACID (DNA OR RNA); SEVERE ACUTE RESPIRATORY SYNDROME CORONAVIRUS 2 (SARS-COV-2) (CORONAVIRUS DISEASE [COVID-19]), AMPLIFIED PROBE TECHNIQUE, MAKING USE OF HIGH THROUGHPUT TECHNOLOGIES AS DESCRIBED BY CMS-2020-01-R: HCPCS | Performed by: STUDENT IN AN ORGANIZED HEALTH CARE EDUCATION/TRAINING PROGRAM

## 2021-07-12 PROCEDURE — 120N000002 HC R&B MED SURG/OB UMMC

## 2021-07-12 PROCEDURE — 80048 BASIC METABOLIC PNL TOTAL CA: CPT | Performed by: STUDENT IN AN ORGANIZED HEALTH CARE EDUCATION/TRAINING PROGRAM

## 2021-07-12 PROCEDURE — 36415 COLL VENOUS BLD VENIPUNCTURE: CPT | Performed by: STUDENT IN AN ORGANIZED HEALTH CARE EDUCATION/TRAINING PROGRAM

## 2021-07-12 PROCEDURE — 250N000013 HC RX MED GY IP 250 OP 250 PS 637: Performed by: NEUROLOGICAL SURGERY

## 2021-07-12 PROCEDURE — 85027 COMPLETE CBC AUTOMATED: CPT | Performed by: STUDENT IN AN ORGANIZED HEALTH CARE EDUCATION/TRAINING PROGRAM

## 2021-07-12 RX ORDER — CAFFEINE 200 MG
200 TABLET ORAL DAILY PRN
Status: DISCONTINUED | OUTPATIENT
Start: 2021-07-12 | End: 2021-07-14 | Stop reason: HOSPADM

## 2021-07-12 RX ADMIN — Medication 125 MCG: at 07:57

## 2021-07-12 RX ADMIN — OXYCODONE HYDROCHLORIDE 5 MG: 5 TABLET ORAL at 14:52

## 2021-07-12 RX ADMIN — DIPHENHYDRAMINE HYDROCHLORIDE 25 MG: 25 CAPSULE ORAL at 14:52

## 2021-07-12 RX ADMIN — DOCUSATE SODIUM 50 MG AND SENNOSIDES 8.6 MG 3 TABLET: 8.6; 5 TABLET, FILM COATED ORAL at 19:21

## 2021-07-12 RX ADMIN — DIPHENHYDRAMINE HYDROCHLORIDE 25 MG: 25 CAPSULE ORAL at 04:44

## 2021-07-12 RX ADMIN — ACETAMINOPHEN 650 MG: 325 TABLET, FILM COATED ORAL at 14:52

## 2021-07-12 RX ADMIN — ACETAMINOPHEN 650 MG: 325 TABLET, FILM COATED ORAL at 22:17

## 2021-07-12 RX ADMIN — OMEPRAZOLE 40 MG: 20 CAPSULE, DELAYED RELEASE ORAL at 07:56

## 2021-07-12 RX ADMIN — DOCUSATE SODIUM 50 MG AND SENNOSIDES 8.6 MG 3 TABLET: 8.6; 5 TABLET, FILM COATED ORAL at 07:57

## 2021-07-12 RX ADMIN — OXYCODONE HYDROCHLORIDE 5 MG: 5 TABLET ORAL at 04:44

## 2021-07-12 RX ADMIN — SPIRONOLACTONE 50 MG: 25 TABLET ORAL at 07:58

## 2021-07-12 RX ADMIN — GLYCOPYRROLATE 1 MG: 1 TABLET ORAL at 07:56

## 2021-07-12 RX ADMIN — TOLTERODINE 4 MG: 4 CAPSULE, EXTENDED RELEASE ORAL at 07:58

## 2021-07-12 RX ADMIN — OXYCODONE HYDROCHLORIDE 5 MG: 5 TABLET ORAL at 22:17

## 2021-07-12 RX ADMIN — DOCUSATE SODIUM 100 MG: 100 CAPSULE, LIQUID FILLED ORAL at 07:58

## 2021-07-12 RX ADMIN — LORATADINE 10 MG: 10 TABLET ORAL at 07:58

## 2021-07-12 RX ADMIN — METOPROLOL SUCCINATE 50 MG: 25 TABLET, EXTENDED RELEASE ORAL at 07:57

## 2021-07-12 RX ADMIN — DIPHENHYDRAMINE HYDROCHLORIDE 25 MG: 25 CAPSULE ORAL at 22:17

## 2021-07-12 RX ADMIN — Medication 400 MG: at 19:21

## 2021-07-12 RX ADMIN — GUAIFENESIN AND DEXTROMETHORPHAN HYDROBROMIDE 1 TABLET: 600; 30 TABLET, EXTENDED RELEASE ORAL at 07:57

## 2021-07-12 RX ADMIN — HYDROMORPHONE HYDROCHLORIDE 2 MG: 2 TABLET ORAL at 07:58

## 2021-07-12 RX ADMIN — HYDROMORPHONE HYDROCHLORIDE 2 MG: 2 TABLET ORAL at 13:03

## 2021-07-12 RX ADMIN — Medication 400 MG: at 07:58

## 2021-07-12 RX ADMIN — DOCUSATE SODIUM 100 MG: 100 CAPSULE, LIQUID FILLED ORAL at 19:21

## 2021-07-12 RX ADMIN — GLYCOPYRROLATE 1 MG: 1 TABLET ORAL at 19:21

## 2021-07-12 RX ADMIN — LEVETIRACETAM 1000 MG: 500 TABLET, FILM COATED ORAL at 19:21

## 2021-07-12 RX ADMIN — PROGESTERONE 100 MG: 100 CAPSULE ORAL at 22:11

## 2021-07-12 RX ADMIN — CAFFEINE 200 MG: 200 TABLET ORAL at 16:41

## 2021-07-12 RX ADMIN — LEVETIRACETAM 1000 MG: 500 TABLET, FILM COATED ORAL at 07:56

## 2021-07-12 RX ADMIN — GUAIFENESIN AND DEXTROMETHORPHAN HYDROBROMIDE 1 TABLET: 600; 30 TABLET, EXTENDED RELEASE ORAL at 19:21

## 2021-07-12 RX ADMIN — ACETAMINOPHEN 650 MG: 325 TABLET, FILM COATED ORAL at 04:44

## 2021-07-12 RX ADMIN — GLYCOPYRROLATE 1 MG: 1 TABLET ORAL at 13:03

## 2021-07-12 RX ADMIN — FLUTICASONE PROPIONATE 2 SPRAY: 50 SPRAY, METERED NASAL at 22:11

## 2021-07-12 RX ADMIN — LOSARTAN POTASSIUM 100 MG: 50 TABLET, FILM COATED ORAL at 07:57

## 2021-07-12 ASSESSMENT — ACTIVITIES OF DAILY LIVING (ADL)
DEPENDENT_IADLS:: INDEPENDENT
ADLS_ACUITY_SCORE: 13

## 2021-07-12 NOTE — PLAN OF CARE
Status: Pt had R craniotomy for middle fossa CSF leak repair and LD placement 7/5, redo middle fossa approach for CSF leak repair 7/8  Vitals: VSS  Neuros: A&Ox4, PERRLA, strengths 5/5  IV: PIV SL  Labs/Electrolytes: WNL  Resp/trach: Clear LS, on RA  Diet: Regular diet, good intake   Bowel status: BM this shift   : Voiding without difficulty  Skin: R craniotomy incision RAMONA, swollen  Pain: Head pain controlled with PRN dilaudid, oxycodone and tylenol  Activity: SBA for LD management  Plan: Continue to record LD output every hour, pain mgmt

## 2021-07-12 NOTE — PROVIDER NOTIFICATION
At shift change is was noted that the pt's Lumbar drain was draining but not clamped to collect measurable output approximately between 2480-2016. NSG notified

## 2021-07-12 NOTE — PROGRESS NOTES
[facility], [place]   07/12/2021  Neurosurgery Progress Note:    Assessment:  Marleen Mcfadden is a 55 year old woman s/p R craniotomy for middle fossa approach for CSF leak repair and LD placement (7/5). Now, s/p redo middle fossa approach for CSF leak repair (7/8).    Plan:  - Serial neuro exams  - continues drainage from lumbar drain leveled at shoulder  - Discontinue Keppra per patient request   - Pain control  - HOB > 30 degrees  - Regular diet  - Bowel regimen  - PRN antiemetics  - PT/OT  - SCDs for DVT proph      -----------------------------------  Kalin Watson MD, PhD  Neurosurgery Resident, PGY-3    Please contact neurosurgery resident on call with questions.    Dial * * [phone], enter [phone] when prompted.   -----------------------------------    Interval History: dressing removed, continues to experience ear fullness R>L, no leakage      Objective:   Temp:  [97  F (36.1  C)-98.7  F (37.1  C)] 98.7  F (37.1  C)  Pulse:  [57-73] 61  Resp:  [16-20] 16  BP: (101-123)/(44-87) 122/87  SpO2:  [97 %-100 %] 100 %  I/O last 3 completed shifts:  In: -   Out: 268 [Drains:268]    Gen: Appears comfortable, NAD  Wound: c/d/i, right ear with no drainage  Neurologic:  - Alert & Oriented to person, place, time, and situation  - Follows commands briskly  - Speech fluent, spontaneous. No aphasia or dysarthria.  - No gaze preference. No apparent hemineglect.  - PERRL, EOMI  - Face symmetric with sensation intact to light touch  - Palate elevates symmetrically, uvula midline, tongue protrudes midline  - Trapezii muscles 5/5 bilaterally  - Mild right pronator drift     Del Tr Bi WE WF Gr   R 5 5 5 5 5 5   L 5 5 5 5 5 5    HF KE KF DF PF EHL   R 5 5 5 5 5 5   L 5 5 5 5 5 5     Reflexes 2+ throughout    Sensation intact and symmetric to light touch throughout    LABS:  Recent Labs   Lab 07/11/21  0453 07/10/21  0419 07/09/21  0415    134 133   POTASSIUM 4.2 4.2 4.6   CHLORIDE 102 101 101    CO2 30 31 27   ANIONGAP 2* 2* 5   GLC 87 103* 130*   BUN 14 12 8   CR 0.79 0.89 0.70   CARMEN 8.2* 7.9* 8.4*       Recent Labs   Lab 07/11/21  0453   WBC 8.2   RBC 3.32*   HGB 10.2*   HCT 32.0*   MCV 96   MCH 30.7   MCHC 31.9   RDW 13.4          IMAGING:  No results found for this or any previous visit (from the past 24 hour(s)).    I have reviewed the history above and agree with the resident's assessment and plan.  GILMER Noe MD

## 2021-07-12 NOTE — ANESTHESIA POSTPROCEDURE EVALUATION
Patient: Marleen Mcfadden    Procedure(s):  RIGHT MIDDLE FOSSA APPROACH, CSF LEAK REPAIR    Diagnosis:Cerebrospinal fluid leak [G96.00]  Diagnosis Additional Information: No value filed.    Anesthesia Type:  General    Note:  Disposition: Admission   Postop Pain Control: Uneventful            Sign Out: Well controlled pain   PONV:    Neuro/Psych: Uneventful            Sign Out: Acceptable/Baseline neuro status   Airway/Respiratory: Uneventful            Sign Out: Acceptable/Baseline resp. status   CV/Hemodynamics: Uneventful            Sign Out: Acceptable CV status; No obvious hypovolemia; No obvious fluid overload   Other NRE:    DID A NON-ROUTINE EVENT OCCUR?            Last vitals:  Vitals:    07/11/21 1537 07/11/21 2200 07/12/21 0809   BP: 123/62 101/44 122/87   Pulse: 72 73 61   Resp: 20 16 16   Temp: 36.7  C (98  F) 37.1  C (98.7  F) 37.1  C (98.7  F)   SpO2: 97% 100% 100%       Last vitals prior to Anesthesia Care Transfer:  CRNA VITALS  7/8/2021 2244 - 7/8/2021 2344      7/8/2021             Resp Rate (observed):  (!) 1          Electronically Signed By: Irene Hillman MD  July 12, 2021  9:06 AM

## 2021-07-12 NOTE — PLAN OF CARE
Status: Pt had R craniotomy for middle fossa CSF leak repair and LD placement 7/5, redo middle fossa approach for CSF leak repair 7/8  Vitals: VSS  Neuros: Intact   IV: PIV SL  Labs/Electrolytes: WNL  Resp/trach: Clear LS, on RA  Diet: Regular   Bowel status: BM this shift   : Voiding without difficulty  Skin: R craniotomy incision RAMONA, swollen, LD site dressing reinforced  Pain: Head pain controlled with PRN dilaudid, oxycodone and tylenol  Activity: SBA for LD management  Plan: LD open continuously at shoulder ora with adequate clear output. Continue to monitor and follow POC

## 2021-07-12 NOTE — PLAN OF CARE
Status: Pt had R craniotomy for middle fossa CSF leak repair and LD placement 7/5, redo middle fossa approach for CSF leak repair 7/8  Vitals: VSS on RA  Neuros: Intact   IV: PIV SL  Labs/Electrolytes: WNL  Resp/trach: Clear LS, on RA  Diet: Regular   Bowel status: BM this shift   : Voiding without difficulty  Skin: R craniotomy incision RAMONA, swollen, LD site dressing reinforced  Pain: Head pain controlled with PRN dilaudid, oxycodone and tylenol  Activity: SBA for LD management  Plan: LD open continuously at shoulder ora with adequate clear output. Continue to monitor and follow POC

## 2021-07-12 NOTE — CONSULTS
Care Management Initial Consult    General Information  Assessment completed with: Patient, Patient  Type of CM/SW Visit: Initial Assessment    Primary Care Provider verified and updated as needed: Yes   Readmission within the last 30 days: no previous admission in last 30 days      Reason for Consult: discharge planning  Advance Care Planning:            Communication Assessment  Patient's communication style: spoken language (English or Bilingual)    Hearing Difficulty or Deaf: no   Wear Glasses or Blind: yes    Cognitive  Cognitive/Neuro/Behavioral: WDL  Level of Consciousness: alert  Arousal Level: opens eyes spontaneously  Orientation: oriented x 4  Mood/Behavior: calm, cooperative  Best Language: 0 - No aphasia  Speech: clear, spontaneous, logical    Living Environment:   People in home: alone     Current living Arrangements: apartment      Able to return to prior arrangements: yes       Family/Social Support:  Care provided by: self  Provides care for: no one     Current Resources:   Patient receiving home care services: No     Community Resources: None  Equipment currently used at home: none  Supplies currently used at home: None    Employment/Financial:  Employment Status: disabled          Lifestyle & Psychosocial Needs:  Social Determinants of Health     Tobacco Use: Low Risk      Smoking Tobacco Use: Never Smoker     Smokeless Tobacco Use: Never Used   Alcohol Use:      Frequency of Alcohol Consumption:      Average Number of Drinks:      Frequency of Binge Drinking:    Financial Resource Strain:      Difficulty of Paying Living Expenses:    Food Insecurity:      Worried About Running Out of Food in the Last Year:      Ran Out of Food in the Last Year:    Transportation Needs:      Lack of Transportation (Medical):      Lack of Transportation (Non-Medical):    Physical Activity:      Days of Exercise per Week:      Minutes of Exercise per Session:    Stress:      Feeling of Stress :    Social  Connections:      Frequency of Communication with Friends and Family:      Frequency of Social Gatherings with Friends and Family:      Attends Congregational Services:      Active Member of Clubs or Organizations:      Attends Club or Organization Meetings:      Marital Status:    Intimate Partner Violence:      Fear of Current or Ex-Partner:      Emotionally Abused:      Physically Abused:      Sexually Abused:    Depression: Not at risk     PHQ-2 Score: 0   Housing Stability:      Unable to Pay for Housing in the Last Year:      Number of Places Lived in the Last Year:      Unstable Housing in the Last Year:        Functional Status:  Prior to admission patient needed assistance: Independent             Additional Information:  Met with patient to introduce self/role and complete initial assessment. Her friend Neptali (spelling?) was present. Patient lives in an apartment alone and is independent at baseline. She declined using any DME or services. Therapy recommending home with assist as of 7/7/21. Patient verbalized that she doesn't anticipate needing home care. Her goal is to discharge before 7/18/21 due to Neptali returning home to [place]. Care management will continue to monitor progression of care, review team recommendations and provide discharge planning assist as needed.       Laura Liang, RNCC  [phone]

## 2021-07-12 NOTE — PROGRESS NOTES
"Otolaryngology Progress Note    Subjective and Interval Events: Feeling great. Had a debilitating headache yesterday morning that got better with caffeine. Some head pressure overnight but improved with waking. No ear drainage, salty or metallic taste.     O: /44 (BP Location: Left arm)   Pulse 73   Temp 98.7  F (37.1  C) (Oral)   Resp 16   Ht 1.626 m (5' 4\")   Wt 112.9 kg (248 lb 14.4 oz)   LMP 10/19/2008 (Approximate)   SpO2 100%   BMI 42.72 kg/m     General: resting comfortably and not in acute distress   Neuro: patient alert, oriented, and answering questions appropriately; HB I/VI; no spontaneous or gaze-evoked nystagmus; CN V1-V3 intact and equal bilaterally   HEENT: no active otorrhea, EAM is dry; MCF incision soft, mildly edematous; and well-approximated   Pulmonary: breathing comfortably on room air without stridor or stertor   Drains: Lumbar drain open at shoulder height       Assessment & Plan: Marleen Mcfadden is a 56-year-old female with a past medical history of CHF, cardiomyopathy, possible SLE, HTN, and right encephalocele with CSF leak who is s/p right MCF approach to repair on 7/5/2021. Patient returned to the operating room on 7/8 for revision of repair. She has a lumbar drain in place that is currently draining continuously.    - Continue sinonasal precautions, Robinol and Mucinex  - Continue bowel regimen  - Lumbar drain per NSG  - Contact otolaryngology on-call with questions or concerns    -- Patient and above plan to be discussed with Dr. Julio Cesar Way, MS4    Connie Chaparro MD   Otolaryngology-Head & Neck Surgery PGY2  Please contact ENT with questions by dialing * [phone] and entering job [phone] when prompted.    "

## 2021-07-13 ENCOUNTER — APPOINTMENT (OUTPATIENT)
Dept: OCCUPATIONAL THERAPY | Facility: CLINIC | Age: 56
DRG: 026 | End: 2021-07-13
Attending: OTOLARYNGOLOGY
Payer: MEDICARE

## 2021-07-13 LAB
ANION GAP SERPL CALCULATED.3IONS-SCNC: 4 MMOL/L (ref 3–14)
BUN SERPL-MCNC: 11 MG/DL (ref 7–30)
CALCIUM SERPL-MCNC: 8.2 MG/DL (ref 8.5–10.1)
CHLORIDE BLD-SCNC: 104 MMOL/L (ref 94–109)
CO2 SERPL-SCNC: 27 MMOL/L (ref 20–32)
CREAT SERPL-MCNC: 0.87 MG/DL (ref 0.52–1.04)
ERYTHROCYTE [DISTWIDTH] IN BLOOD BY AUTOMATED COUNT: 13.1 % (ref 10–15)
GFR SERPL CREATININE-BSD FRML MDRD: 74 ML/MIN/1.73M2
GLUCOSE BLD-MCNC: 90 MG/DL (ref 70–99)
HCT VFR BLD AUTO: 30.6 % (ref 35–47)
HGB BLD-MCNC: 9.8 G/DL (ref 11.7–15.7)
MCH RBC QN AUTO: 30.7 PG (ref 26.5–33)
MCHC RBC AUTO-ENTMCNC: 32 G/DL (ref 31.5–36.5)
MCV RBC AUTO: 96 FL (ref 78–100)
PLATELET # BLD AUTO: 317 10E3/UL (ref 150–450)
POTASSIUM BLD-SCNC: 4.1 MMOL/L (ref 3.4–5.3)
RBC # BLD AUTO: 3.19 10E6/UL (ref 3.8–5.2)
SARS-COV-2 RNA RESP QL NAA+PROBE: NEGATIVE
SODIUM SERPL-SCNC: 135 MMOL/L (ref 133–144)
WBC # BLD AUTO: 7.9 10E3/UL (ref 4–11)

## 2021-07-13 PROCEDURE — 97530 THERAPEUTIC ACTIVITIES: CPT | Mod: GO

## 2021-07-13 PROCEDURE — 250N000013 HC RX MED GY IP 250 OP 250 PS 637: Performed by: STUDENT IN AN ORGANIZED HEALTH CARE EDUCATION/TRAINING PROGRAM

## 2021-07-13 PROCEDURE — 00PUX0Z REMOVAL OF DRAINAGE DEVICE FROM SPINAL CANAL, EXTERNAL APPROACH: ICD-10-PCS | Performed by: NEUROLOGICAL SURGERY

## 2021-07-13 PROCEDURE — 97535 SELF CARE MNGMENT TRAINING: CPT | Mod: GO

## 2021-07-13 PROCEDURE — 36415 COLL VENOUS BLD VENIPUNCTURE: CPT | Performed by: STUDENT IN AN ORGANIZED HEALTH CARE EDUCATION/TRAINING PROGRAM

## 2021-07-13 PROCEDURE — 120N000002 HC R&B MED SURG/OB UMMC

## 2021-07-13 PROCEDURE — 85027 COMPLETE CBC AUTOMATED: CPT | Performed by: STUDENT IN AN ORGANIZED HEALTH CARE EDUCATION/TRAINING PROGRAM

## 2021-07-13 PROCEDURE — 80048 BASIC METABOLIC PNL TOTAL CA: CPT | Performed by: STUDENT IN AN ORGANIZED HEALTH CARE EDUCATION/TRAINING PROGRAM

## 2021-07-13 RX ORDER — LIDOCAINE HYDROCHLORIDE 10 MG/ML
5 INJECTION, SOLUTION EPIDURAL; INFILTRATION; INTRACAUDAL; PERINEURAL ONCE
Status: DISCONTINUED | OUTPATIENT
Start: 2021-07-13 | End: 2021-07-14 | Stop reason: HOSPADM

## 2021-07-13 RX ORDER — LEVETIRACETAM 500 MG/1
1000 TABLET ORAL 2 TIMES DAILY
Status: DISCONTINUED | OUTPATIENT
Start: 2021-07-13 | End: 2021-07-13

## 2021-07-13 RX ORDER — AMOXICILLIN 250 MG
3 CAPSULE ORAL 2 TIMES DAILY
Qty: 180 TABLET | Refills: 0 | Status: SHIPPED | OUTPATIENT
Start: 2021-07-13 | End: 2021-08-12

## 2021-07-13 RX ORDER — LEVETIRACETAM 1000 MG/1
1000 TABLET ORAL 2 TIMES DAILY
Qty: 30 TABLET | Refills: 1 | Status: SHIPPED | OUTPATIENT
Start: 2021-07-13 | End: 2021-07-14

## 2021-07-13 RX ORDER — OXYCODONE HYDROCHLORIDE 5 MG/1
5 TABLET ORAL
Qty: 30 TABLET | Refills: 0 | Status: SHIPPED | OUTPATIENT
Start: 2021-07-13 | End: 2022-03-03

## 2021-07-13 RX ORDER — CAFFEINE 200 MG
200 TABLET ORAL ONCE
Status: COMPLETED | OUTPATIENT
Start: 2021-07-13 | End: 2021-07-13

## 2021-07-13 RX ORDER — POLYETHYLENE GLYCOL 3350 17 G/17G
17 POWDER, FOR SOLUTION ORAL 3 TIMES DAILY
Qty: 90 PACKET | Refills: 0 | Status: SHIPPED | OUTPATIENT
Start: 2021-07-13 | End: 2021-08-12

## 2021-07-13 RX ADMIN — LOSARTAN POTASSIUM 100 MG: 50 TABLET, FILM COATED ORAL at 08:11

## 2021-07-13 RX ADMIN — GLYCOPYRROLATE 1 MG: 1 TABLET ORAL at 20:26

## 2021-07-13 RX ADMIN — GUAIFENESIN AND DEXTROMETHORPHAN HYDROBROMIDE 1 TABLET: 600; 30 TABLET, EXTENDED RELEASE ORAL at 20:27

## 2021-07-13 RX ADMIN — OXYCODONE HYDROCHLORIDE 5 MG: 5 TABLET ORAL at 23:54

## 2021-07-13 RX ADMIN — POLYETHYLENE GLYCOL 3350 17 G: 17 POWDER, FOR SOLUTION ORAL at 13:08

## 2021-07-13 RX ADMIN — TOLTERODINE 4 MG: 4 CAPSULE, EXTENDED RELEASE ORAL at 08:11

## 2021-07-13 RX ADMIN — GLYCOPYRROLATE 1 MG: 1 TABLET ORAL at 08:11

## 2021-07-13 RX ADMIN — SPIRONOLACTONE 50 MG: 25 TABLET ORAL at 08:11

## 2021-07-13 RX ADMIN — METOPROLOL SUCCINATE 50 MG: 25 TABLET, EXTENDED RELEASE ORAL at 08:19

## 2021-07-13 RX ADMIN — DIPHENHYDRAMINE HYDROCHLORIDE 25 MG: 25 CAPSULE ORAL at 14:32

## 2021-07-13 RX ADMIN — CAFFEINE 200 MG: 200 TABLET ORAL at 05:34

## 2021-07-13 RX ADMIN — PROGESTERONE 100 MG: 100 CAPSULE ORAL at 21:59

## 2021-07-13 RX ADMIN — FLUTICASONE PROPIONATE 2 SPRAY: 50 SPRAY, METERED NASAL at 22:02

## 2021-07-13 RX ADMIN — Medication 125 MCG: at 08:11

## 2021-07-13 RX ADMIN — LORATADINE 10 MG: 10 TABLET ORAL at 08:11

## 2021-07-13 RX ADMIN — DOCUSATE SODIUM 100 MG: 100 CAPSULE, LIQUID FILLED ORAL at 08:11

## 2021-07-13 RX ADMIN — LEVETIRACETAM 1000 MG: 500 TABLET, FILM COATED ORAL at 08:11

## 2021-07-13 RX ADMIN — DOCUSATE SODIUM 50 MG AND SENNOSIDES 8.6 MG 3 TABLET: 8.6; 5 TABLET, FILM COATED ORAL at 20:26

## 2021-07-13 RX ADMIN — LEVETIRACETAM 1000 MG: 500 TABLET, FILM COATED ORAL at 20:26

## 2021-07-13 RX ADMIN — ACETAMINOPHEN 650 MG: 325 TABLET, FILM COATED ORAL at 03:21

## 2021-07-13 RX ADMIN — OXYCODONE HYDROCHLORIDE 5 MG: 5 TABLET ORAL at 14:32

## 2021-07-13 RX ADMIN — ACETAMINOPHEN 650 MG: 325 TABLET, FILM COATED ORAL at 23:54

## 2021-07-13 RX ADMIN — OXYCODONE HYDROCHLORIDE 5 MG: 5 TABLET ORAL at 03:21

## 2021-07-13 RX ADMIN — HYDROMORPHONE HYDROCHLORIDE 2 MG: 2 TABLET ORAL at 02:03

## 2021-07-13 RX ADMIN — OMEPRAZOLE 40 MG: 20 CAPSULE, DELAYED RELEASE ORAL at 08:11

## 2021-07-13 RX ADMIN — GLYCOPYRROLATE 1 MG: 1 TABLET ORAL at 13:08

## 2021-07-13 RX ADMIN — DIPHENHYDRAMINE HYDROCHLORIDE 25 MG: 25 CAPSULE ORAL at 03:21

## 2021-07-13 RX ADMIN — DOCUSATE SODIUM 100 MG: 100 CAPSULE, LIQUID FILLED ORAL at 20:26

## 2021-07-13 RX ADMIN — GUAIFENESIN AND DEXTROMETHORPHAN HYDROBROMIDE 1 TABLET: 600; 30 TABLET, EXTENDED RELEASE ORAL at 08:11

## 2021-07-13 RX ADMIN — DOCUSATE SODIUM 50 MG AND SENNOSIDES 8.6 MG 3 TABLET: 8.6; 5 TABLET, FILM COATED ORAL at 08:10

## 2021-07-13 RX ADMIN — DIPHENHYDRAMINE HYDROCHLORIDE 25 MG: 25 CAPSULE ORAL at 23:53

## 2021-07-13 RX ADMIN — ACETAMINOPHEN 650 MG: 325 TABLET, FILM COATED ORAL at 14:33

## 2021-07-13 ASSESSMENT — ACTIVITIES OF DAILY LIVING (ADL)
ADLS_ACUITY_SCORE: 13
ADLS_ACUITY_SCORE: 15
ADLS_ACUITY_SCORE: 13
ADLS_ACUITY_SCORE: 13

## 2021-07-13 NOTE — PLAN OF CARE
Status: Pt on 6A s/p R crani for middle fossa CSF leak repair and LD placement 7/5, with redo middle fossa approach for CSF leak repair 7/8.  Vitals: VSS   Neuros: A/Ox4. LD clamped since 0930 this morning.   IV: PIV SL  Diet: Regular, poor appetite  Bowel status: BM x1  : Voids spontaneously   Skin: R crani incision RAMONA with staples, LD site, CDI  Pain: HA well controlled today. Given benadryl, oxy and tylenol x1  Activity: up ad fred  Plan: Continue to follow POC

## 2021-07-13 NOTE — PROGRESS NOTES
[facility], [place]   07/13/2021  Neurosurgery Progress Note:    Assessment:  Marleen Mcfadden is a 55 year old woman s/p R craniotomy for middle fossa CSF leak repair and LD placement (7/5). Now, s/p redo middle fossa approach for CSF leak repair (7/8).    Plan:  - Serial neuro exams  - LD: clamp today  - Keppra  - Pain control  - HOB > 30 degrees  - Regular diet  - Bowel regimen  - PRN antiemetics  - PT/OT  - SCDs for DVT proph      -----------------------------------  Kalin Watson MD, PhD  Neurosurgery Resident, PGY-3    Please contact neurosurgery resident on call with questions.    Dial * * [phone], enter [phone] when prompted.   -----------------------------------    Interval History: h/a in the AM; otherwise doing well, ear dry, no leakage; LD clamped today      Objective:   Temp:  [96.2  F (35.7  C)-97.7  F (36.5  C)] 96.2  F (35.7  C)  Pulse:  [51-77] 77  Resp:  [16-18] 18  BP: (109-130)/(55-73) 123/70  SpO2:  [97 %-100 %] 100 %  I/O last 3 completed shifts:  In: 200 [P.O.:200]  Out: 321 [Drains:321]    Gen: Appears comfortable, NAD  Wound: c/d/i, right ear with no drainage  Neurologic:  - Alert & Oriented to person, place, time, and situation  - Follows commands briskly  - Speech fluent, spontaneous. No aphasia or dysarthria.  - No gaze preference. No apparent hemineglect.  - PERRL, EOMI  - Face symmetric with sensation intact to light touch  - Palate elevates symmetrically, uvula midline, tongue protrudes midline  - Trapezii muscles 5/5 bilaterally  - Mild right pronator drift     Del Tr Bi WE WF Gr   R 5 5 5 5 5 5   L 5 5 5 5 5 5    HF KE KF DF PF EHL   R 5 5 5 5 5 5   L 5 5 5 5 5 5     Reflexes 2+ throughout    Sensation intact and symmetric to light touch throughout    LABS:  Recent Labs   Lab 07/13/21  0606 07/12/21  1156 07/11/21  0453    134 134   POTASSIUM 4.1 4.0 4.2   CHLORIDE 104 103 102   CO2 27 26 30   ANIONGAP 4 5 2*   GLC 90 104* 87   BUN 11 13 14   CR  0.87 0.83 0.79   CARMEN 8.2* 8.2* 8.2*       Recent Labs   Lab 07/13/21  0606   WBC 7.9   RBC 3.19*   HGB 9.8*   HCT 30.6*   MCV 96   MCH 30.7   MCHC 32.0   RDW 13.1          IMAGING:  No results found for this or any previous visit (from the past 24 hour(s)).    I have reviewed the history. I have seen and examined the patient myself and agree with the assessment and plan above. Clamp lumbar drain tomorrow morning and remove tomorrow evening.  GILMER Noe MD

## 2021-07-13 NOTE — DISCHARGE SUMMARY
[facility] Discharge Summary and Instructions    Marleen Mcfadden MRN# [id number]   Age: 56 year old YOB: 1965     Date of Admission:  7/5/2021  Date of Discharge::  7/14/2021  Admitting Physician:  Jocelyn Noe MD  Discharge Physician:  Jocelyn Noe MD          Admission Diagnoses:   CSF otorrhea [G96.01]          Discharge Diagnosis:     CSF otorrhea [G96.01]            Procedures:   7/5/21 Right middle fossa approach for repair of encephalocele and CSF leak; lumbar drain placement    7/8/21 Redo right middle fossa approach for repair of encephalocele and CSF leak           Brief History of Illness:   Marleen Mcfadden is a 55 year old woman who was found to have a right temporal bone encephalocele and CSF leak in the setting of a 4 year history of headaches at the vertex and then developed a popping sensation in the right ear followed by postnasal drip which would relieve the symptoms. After previous discussion of risks and benefits during clinic visit, the patient elected to undergo above-mentioned procedure.           Hospital Course:   Patient underwent the first procedure on 7/5/2021. Following surgery, the patient was admitted to the surgical ICU for routine post operative cares. However, the patient developed right sided otorrhea that improved with increased lumbar CSF drainage, raising the concern that she may re-develop a CSF leak with removal of lumbar drain. Therefore, she was taken back to the OR 7/8/21 for re-exploration and further repair of the temporal dural defects. She was again transferred to the ICU for post-operative monitoring and lumbar draining of CSF.   On post-operative day 1, 7/9/21, she was noted to have sudden onset R hemibody weakness and stroke code was called. Fortunately, CT/CTA was without acute findings and MRI brain with no acute infarct. Her exam improved throughout the day. She was started on keppra due to concern that this may have been  a seizure.   On post operative day 3 she was doing well and transferred to the floor.   Lumbar drain was clamped on 7/8 and patient tolerated this well without signs of headache or CSF leak. The drain was removed 7/13/21.   Patient was assessed by PT and OT who felt patient was near baseline and safe to discharge home with family   On post operative day 6, she was ambulating, voiding without a deluna, eating a regular diet, passing flatus/bowel movements, pain was well controlled and therefore she was discharged home.  Patient remained medically stable throughout course.    Patient was neurologically intact upon discharge.   Patient will follow up with Neurosurgery in 2 weeks for wound check.   Patient will [facility] to [facility] until re-evaluation with neurology in 1 month.    Exam on discharge:   Gen: Appears comfortable, NAD  Wound: c/d/i, right ear with no drainage  Neurologic:  - Alert & Oriented to person, place, time, and situation  - Follows commands briskly  - Speech fluent, spontaneous. No aphasia or dysarthria.  - No gaze preference. No apparent hemineglect.  - PERRL, EOMI  - Face symmetric with sensation intact to light touch  - Palate elevates symmetrically, uvula midline, tongue protrudes midline  - Trapezii muscles 5/5 bilaterally  - Mild right pronator drift       Del Tr Bi WE WF Gr   R 5 5 5 5 5 5   L 5 5 5 5 5 5     HF KE KF DF PF EHL   R 5 5 5 5 5 5   L 5 5 5 5 5 5      Reflexes 2+ throughout     Sensation intact and symmetric to light touch throughout           Discharge Medications:     Current Discharge Medication List      START taking these medications    Details   levETIRAcetam (KEPPRA) 1000 MG tablet Take 1 tablet (1,000 mg) by mouth 2 times daily  Qty: 30 tablet, Refills: 1    Associated Diagnoses: CSF otorrhea      oxyCODONE (ROXICODONE) 5 MG tablet Take 1 tablet (5 mg) by mouth every 3 hours as needed for moderate to severe pain  Qty: 30 tablet, Refills: 0    Associated Diagnoses: CSF  otorrhea      polyethylene glycol (MIRALAX) 17 g packet Take 17 g by mouth 3 times daily  Qty: 90 packet, Refills: 0    Associated Diagnoses: CSF otorrhea      senna-docusate (SENOKOT-S/PERICOLACE) 8.6-50 MG tablet Take 3 tablets by mouth 2 times daily  Qty: 180 tablet, Refills: 0    Associated Diagnoses: CSF otorrhea         CONTINUE these medications which have NOT CHANGED    Details   Cholecalciferol (VITAMIN D) 2000 UNIT tablet Take 5,000 Units by mouth as needed Reported on 3/8/2017  Qty: 100 tablet, Refills: 12      Collagen 500 MG CAPS Take 1 capsule by mouth daily       famotidine (PEPCID) 40 MG tablet Take 1 tablet (40 mg) by mouth daily  Qty: 90 tablet, Refills: 3    Associated Diagnoses: Gastroesophageal reflux disease, unspecified whether esophagitis present      FLAXSEED, LINSEED, PO Take 1 capsule by mouth daily       fluticasone (FLONASE) 50 MCG/ACT nasal spray Spray 2 sprays into both nostrils At Bedtime  Qty: 15.8 mL, Refills: 4    Associated Diagnoses: Otalgia, unspecified laterality; Otorrhea, bilateral; Dysfunction of both eustachian tubes; Chronic rhinitis      furosemide (LASIX) 20 MG tablet Take 2 tablets (40 mg) by mouth daily as needed (as needed for weight gain/edema)  Qty: 180 tablet, Refills: 3    Associated Diagnoses: CHF with cardiomyopathy (H)      loratadine (CLARITIN) 10 MG tablet Take 1 tablet (10 mg) by mouth daily  Qty: 90 tablet, Refills: 3    Associated Diagnoses: Otalgia, unspecified laterality; Otorrhea, bilateral; Dysfunction of both eustachian tubes; Chronic rhinitis      metoprolol succinate ER (TOPROL-XL) 50 MG 24 hr tablet Take 1 tablet (50 mg) by mouth daily  Qty: 90 tablet, Refills: 3    Associated Diagnoses: Familial cardiomyopathy (H)      omeprazole (PRILOSEC) 40 MG DR capsule Take 1 capsule (40 mg) by mouth daily  Qty: 90 capsule, Refills: 3    Associated Diagnoses: Gastroesophageal reflux disease, unspecified whether esophagitis present      progesterone  (PROMETRIUM) 100 MG capsule Take 100 mg by mouth At Bedtime       spironolactone (ALDACTONE) 25 MG tablet Take 2 tablets (50 mg) by mouth daily  Qty: 180 tablet, Refills: 3    Associated Diagnoses: Chronic diastolic congestive heart failure (H)      amitriptyline (ELAVIL) 25 MG tablet TAKE 1 TABLET(25 MG) BY MOUTH AT BEDTIME  Qty: 90 tablet, Refills: 1    Associated Diagnoses: Status post left knee replacement; Left knee pain, unspecified chronicity; Nerve pain; Muscle cramp      estradiol cypionate (DEPO-ESTRADIOL) 5 MG/ML injection Inject into the muscle every 28 days      losartan (COZAAR) 100 MG tablet Take 1 tablet (100 mg) by mouth daily  Qty: 90 tablet, Refills: 1    Associated Diagnoses: Familial cardiomyopathy (H)      sertraline (ZOLOFT) 50 MG tablet TAKE 1 TABLET BY MOUTH EVERY DAY  Qty: 90 tablet, Refills: 0    Associated Diagnoses: Moderate major depression (H)      solifenacin (VESICARE) 10 MG tablet Take 10 mg by mouth daily      zolpidem (AMBIEN) 5 MG tablet Take tablet by mouth 15 minutes prior to sleep, for Sleep Study  Qty: 1 tablet, Refills: 0                     Discharge Instructions and Follow-Up:   Discharge Diet  Regular    Discharge Activity  You may advance activity as tolerated. No strenuous exercise or heay lifting greater than 10 lbs for 4 weeks or until seen and cleared in clinic.    Discharge Follow-up  Routine, Please follow-up with Dr. Noe and Dr. Harrell in [facility] on 7/19/2021: will be scheduled for you, please call [phone] if you have not heard from our office regarding your appointment Follow up in 1 week for staple removal- you will be contacted bu the team. Appointments on [place] and/or [facility] (with [facility] or [facility] provider or service). Call [phone] if you haven't heard regarding these appointments within 7 days of discharge.      Follow-up Dr. Jocelyn Noe MD and Dr. Harrell on 7/19/2021, all additional follow-up visits to be  determined by Dr. Jocelyn Noe MD.    Follow up in 1 week for staple removal (will be arranged for you).    Wound Care:  Ok to shower,however no scrubbing of the wound and no soaking of the wound, meaning no bathtubs or swimming pools. Pat dry only. Leave wound open to air.  Patient to have wound checked 2 weeks following surgery.    Wound location: right posterior ear  Closure technique: staples  Dressing needs: As above  Post-op care at follow-up: staples must be removed in clinic 2 weeks after surgery    Please call if you have:  1. increased pain, redness, drainage, swelling at your incision  2. fevers > 101.5 F degrees  3. with any questions or concerns.  You may reach the Neurosurgery clinic at [phone] during regular work hours. ER at [phone].    and ask for the Neurosurgery Resident on call at [phone], during off hours or weekends.         Discharge Disposition:   Discharged to home    Ivan Carson, MS4 1:28 PM 7/13/2021  Department of Neurosurgery  Pager: [phone]    Aden Hai  Resident    I have reviewed the history above and agree with the medical student's and resident's findings above.  GILMER Noe MD

## 2021-07-13 NOTE — PLAN OF CARE
Status: Pt on 6A s/p R crani for middle fossa CSF leak repair and LD placement 7/5, with redo middle fossa approach for CSF leak repair 7/8.  Vitals: VSS on RA.  Neuros: A&O x4. Denies N/T. Strengths 5/5. LD open continuously at shoulder height with adequate output.   IV: PIV SL.   Labs/Electrolytes: WDL.  Resp/trach: LS clear. Denies SOB.   Diet: Regular, good intake.   Bowel status: BS+. LBM today.   : Voids spontaneously without difficulty.   Skin: R crani incision RAMONA, swollen. LD site CDI.   Pain: C/o HA pain. PRN daily caffeine tab ordered and given, effective. PRN oxy, Tylenol, and Benadryl also given.  Activity: SBA for LD management. Pt steady. Went for long walk around unit tonight.  Social: Friends at bedside this shift.   Plan: Clamp LD tomorrow for trial. Continue to monitor and follow plan of care.

## 2021-07-13 NOTE — PLAN OF CARE
Status: Pt on 6A s/p R crani for middle fossa CSF leak repair and LD placement 7/5, with redo middle fossa approach for CSF leak repair 7/8.  Vitals: VSS on RA.  Neuros: A&Ox4. Denies N/T. Strengths 5/5. LD open continuously at shoulder height with adequate output.   IV: PIV SL.   Labs/Electrolytes: WDL.  Resp/trach: LS clear. Denies SOB.   Diet: Regular  Bowel status: No BM this shift, LBM 7/12  : Voids spontaneously without difficulty in bathroom.   Skin: R crani incision RAMONA with staples and swollen. LD site CDI.   Pain: C/o HA pain managed with PRN PO dilaudid, oxy+benadryl, tylenol, hot/cold packs, and caffeine pill   Activity: SBA for LD management. Pt steady.   Plan: Clamp LD today for trial. Continue to monitor and follow plan of care.

## 2021-07-14 ENCOUNTER — TELEPHONE (OUTPATIENT)
Dept: OTOLARYNGOLOGY | Facility: CLINIC | Age: 56
End: 2021-07-14

## 2021-07-14 ENCOUNTER — APPOINTMENT (OUTPATIENT)
Dept: OCCUPATIONAL THERAPY | Facility: CLINIC | Age: 56
DRG: 026 | End: 2021-07-14
Attending: OTOLARYNGOLOGY
Payer: MEDICARE

## 2021-07-14 VITALS
HEIGHT: 64 IN | WEIGHT: 248.9 LBS | TEMPERATURE: 96.8 F | HEART RATE: 54 BPM | BODY MASS INDEX: 42.49 KG/M2 | DIASTOLIC BLOOD PRESSURE: 71 MMHG | OXYGEN SATURATION: 98 % | RESPIRATION RATE: 16 BRPM | SYSTOLIC BLOOD PRESSURE: 145 MMHG

## 2021-07-14 LAB
ANION GAP SERPL CALCULATED.3IONS-SCNC: 5 MMOL/L (ref 3–14)
BUN SERPL-MCNC: 12 MG/DL (ref 7–30)
CALCIUM SERPL-MCNC: 8.8 MG/DL (ref 8.5–10.1)
CHLORIDE BLD-SCNC: 104 MMOL/L (ref 94–109)
CO2 SERPL-SCNC: 29 MMOL/L (ref 20–32)
CREAT SERPL-MCNC: 0.81 MG/DL (ref 0.52–1.04)
ERYTHROCYTE [DISTWIDTH] IN BLOOD BY AUTOMATED COUNT: 13.2 % (ref 10–15)
GFR SERPL CREATININE-BSD FRML MDRD: 81 ML/MIN/1.73M2
GLUCOSE BLD-MCNC: 90 MG/DL (ref 70–99)
HCT VFR BLD AUTO: 31.9 % (ref 35–47)
HGB BLD-MCNC: 10.2 G/DL (ref 11.7–15.7)
MCH RBC QN AUTO: 31 PG (ref 26.5–33)
MCHC RBC AUTO-ENTMCNC: 32 G/DL (ref 31.5–36.5)
MCV RBC AUTO: 97 FL (ref 78–100)
PLATELET # BLD AUTO: 353 10E3/UL (ref 150–450)
POTASSIUM BLD-SCNC: 4 MMOL/L (ref 3.4–5.3)
RBC # BLD AUTO: 3.29 10E6/UL (ref 3.8–5.2)
SODIUM SERPL-SCNC: 138 MMOL/L (ref 133–144)
WBC # BLD AUTO: 7.4 10E3/UL (ref 4–11)

## 2021-07-14 PROCEDURE — 85027 COMPLETE CBC AUTOMATED: CPT | Performed by: STUDENT IN AN ORGANIZED HEALTH CARE EDUCATION/TRAINING PROGRAM

## 2021-07-14 PROCEDURE — 97530 THERAPEUTIC ACTIVITIES: CPT | Mod: GO

## 2021-07-14 PROCEDURE — 250N000013 HC RX MED GY IP 250 OP 250 PS 637: Performed by: STUDENT IN AN ORGANIZED HEALTH CARE EDUCATION/TRAINING PROGRAM

## 2021-07-14 PROCEDURE — 36415 COLL VENOUS BLD VENIPUNCTURE: CPT | Performed by: STUDENT IN AN ORGANIZED HEALTH CARE EDUCATION/TRAINING PROGRAM

## 2021-07-14 PROCEDURE — 97535 SELF CARE MNGMENT TRAINING: CPT | Mod: GO

## 2021-07-14 PROCEDURE — 80048 BASIC METABOLIC PNL TOTAL CA: CPT | Performed by: STUDENT IN AN ORGANIZED HEALTH CARE EDUCATION/TRAINING PROGRAM

## 2021-07-14 RX ADMIN — LEVETIRACETAM 1000 MG: 500 TABLET, FILM COATED ORAL at 08:19

## 2021-07-14 RX ADMIN — GUAIFENESIN AND DEXTROMETHORPHAN HYDROBROMIDE 1 TABLET: 600; 30 TABLET, EXTENDED RELEASE ORAL at 08:18

## 2021-07-14 RX ADMIN — OXYCODONE HYDROCHLORIDE 5 MG: 5 TABLET ORAL at 08:18

## 2021-07-14 RX ADMIN — DOCUSATE SODIUM 100 MG: 100 CAPSULE, LIQUID FILLED ORAL at 08:19

## 2021-07-14 RX ADMIN — Medication 125 MCG: at 08:18

## 2021-07-14 RX ADMIN — SPIRONOLACTONE 50 MG: 25 TABLET ORAL at 08:19

## 2021-07-14 RX ADMIN — DOCUSATE SODIUM 50 MG AND SENNOSIDES 8.6 MG 3 TABLET: 8.6; 5 TABLET, FILM COATED ORAL at 08:19

## 2021-07-14 RX ADMIN — GLYCOPYRROLATE 1 MG: 1 TABLET ORAL at 08:18

## 2021-07-14 RX ADMIN — OMEPRAZOLE 40 MG: 20 CAPSULE, DELAYED RELEASE ORAL at 08:18

## 2021-07-14 RX ADMIN — LOSARTAN POTASSIUM 100 MG: 50 TABLET, FILM COATED ORAL at 08:19

## 2021-07-14 RX ADMIN — METOPROLOL SUCCINATE 50 MG: 25 TABLET, EXTENDED RELEASE ORAL at 08:18

## 2021-07-14 RX ADMIN — TOLTERODINE 4 MG: 4 CAPSULE, EXTENDED RELEASE ORAL at 08:19

## 2021-07-14 RX ADMIN — LORATADINE 10 MG: 10 TABLET ORAL at 08:19

## 2021-07-14 ASSESSMENT — ACTIVITIES OF DAILY LIVING (ADL)
ADLS_ACUITY_SCORE: 13

## 2021-07-14 NOTE — PLAN OF CARE
Pt VSS and neuros unchanged and is adequate for discharge. Discharge instructions have been discussed and all questions answered prior to discharge. Significant other is here to take patient home. Will continue to monitor.

## 2021-07-14 NOTE — TELEPHONE ENCOUNTER
Called per pt MD's request. Explained to patient that the MD's wanted to cancel her 7/15 appointment, and have her come to clinic for a nurse-only visit for staple removal. Pt states she is not happy with this plan, and is requesting to have the MD present at her first post-op appointment for staple removal. Writer states understanding of her concern, and that I will followup to clarify the plan for her appointments and call her back with an update.

## 2021-07-14 NOTE — PROCEDURES
Neurosurgery Brief Progress Note    Lumbar drain removal    Drain not deemed to be necessary after successful clamp trial. Drain site and drain retaining stitches were prepped in usual sterile fashion with chloraprep. The drain was clamped. The sutures securing the drain were cut and the site was re-prepped. The drain was removed. The lumbar drain catheter tip was evaluated for completeness. A single figure of 8 stitch with 3-0 monocryl was placed with adequate hemostasis and the site was re-prepped with chloraprep and a dressing was applied. No immediate complication was observed and the patient tolerated the procedure well and was placed on two hour bed rest.    Kalin Watson MD, PhD  Neurosurgery Resident, PGY-3    Please contact neurosurgery resident on call with questions.    Dial * * [phone], enter [phone] when prompted.

## 2021-07-14 NOTE — PLAN OF CARE
Occupational Therapy Discharge Summary    Reason for therapy discharge:    All goals and outcomes met, no further needs identified.    Progress towards therapy goal(s). See goals on Care Plan in [facility] electronic health record for goal details.  Goals met    Therapy recommendation(s):    Continue home exercise program. And resumption of OP PT to further address higher level balance deficits.

## 2021-07-14 NOTE — PLAN OF CARE
Status: Patient s/p R craniotomy for middle fossa CSF leak repair and LD placement (7/5). Now, s/p redo middle fossa approach for CSF leak repair (7/8).  Vitals: VSS on RA  Neuros: Intact  IV: PIV SL  Resp/trach: WNL  Diet: Regular, poor appetite per report   Bowel status: Had BM 7/13  : Voiding   Skin: LD removed overnight, covered with small dressing. R crani site with staples   Pain: Pain at LD site after removal, controlled well with oxycodone and tylenol  Activity: Independent   Plan: Home when ready, pt hopreful that is today. Continue with POC  Updates this shift: LD removed overnight, laid flat for 2 hours with no issues
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
## 2021-07-15 DIAGNOSIS — I42.9 FAMILIAL CARDIOMYOPATHY (H): ICD-10-CM

## 2021-07-15 NOTE — TELEPHONE ENCOUNTER
Spoke with patient about updated plan. Pt is OK with the plan to see Karin CASTRO in the neurosurg department. She is also OK with an appointment on Friday, 7/23 at either 11 or 11:30am appointment. Plan made for neurosurg  to call her to confirm an appointment time slot, pt states agreement to the plan. Encouraged her to call back with any other questions in the meantime.

## 2021-07-16 ENCOUNTER — PATIENT OUTREACH (OUTPATIENT)
Dept: CARE COORDINATION | Facility: CLINIC | Age: 56
End: 2021-07-16

## 2021-07-16 ENCOUNTER — TELEPHONE (OUTPATIENT)
Dept: NEUROSURGERY | Facility: CLINIC | Age: 56
End: 2021-07-16

## 2021-07-16 ASSESSMENT — ACTIVITIES OF DAILY LIVING (ADL): DEPENDENT_IADLS:: INDEPENDENT

## 2021-07-16 NOTE — PROGRESS NOTES
Clinic Care Coordination Contact  [facility]/Voicemail    Referral Source: IP Handoff    Clinical Data: Care Coordinator Outreach    Outreach attempted x 1.  Left message on patient's voicemail with call back information and request for call back.     Plan:Care Coordinator will try to reach patient again in 1-2 business days.    Jolanta Velazquez, RN Care Coordinator     [facility] Ambulatory Care Management  [facility] and   [email or web address]    Office: [phone]

## 2021-07-17 NOTE — CONFIDENTIAL NOTE
"losartan (COZAAR) 100 MG tablet  Last Written Prescription Date:  2/17/21  Last Fill Quantity: 90,   # refills: 1  Last Office Visit : 6/16/21  Future Office visit:  12/8/21    bp > 140/90  htn addressed in note.    Routed because;  per note  \"  yes, changed Entresto to Losartan 100mg daily last visit. However, patient requesting transition back to Entresto due to experiencing fatigue/malaise on Losartan. Will switch ARB to ARNI after surgery, and check BMP 7-10 days after transition\"    rf?    "

## 2021-07-19 ENCOUNTER — PATIENT OUTREACH (OUTPATIENT)
Dept: NURSING | Facility: CLINIC | Age: 56
End: 2021-07-19
Payer: MEDICARE

## 2021-07-19 RX ORDER — LOSARTAN POTASSIUM 100 MG/1
100 TABLET ORAL DAILY
Qty: 30 TABLET | Refills: 0 | Status: SHIPPED | OUTPATIENT
Start: 2021-07-19 | End: 2021-09-07

## 2021-07-19 ASSESSMENT — ACTIVITIES OF DAILY LIVING (ADL): DEPENDENT_IADLS:: INDEPENDENT

## 2021-07-19 NOTE — OP NOTE
Procedure Date: 07/05/2021    PREOPERATIVE DIAGNOSES:     1.  Right temporal lobe encephalocele.  2.  Right cerebrospinal fluid otorrhea secondary to #1.  3.  Morbid obesity.    POSTOPERATIVE DIAGNOSES:    1.  Right temporal lobe encephalocele.  2.  Right cerebrospinal fluid otorrhea secondary to #1.  3.  Morbid obesity.    PROCEDURE:    1.  Fluoroscopic-guided lumbar drain placement.  2.  Right temporal craniotomy.  3.  Right middle fossa approach for repair of temporal lobe encephalocele.  4.  Intraoperative use of microscope.    ATTENDING SURGEON:  Jocelyn Noe MD    RESIDENT SURGEON:  Eron Sosa MD    ANESTHESIA:  GETA.    HISTORY OF PRESENT ILLNESS:  Ms. Mcfadden is a 55-year-old left-handed woman with CSF otorrhea on the right side.  Since the PE tube has been removed, her CSF otorrhea has resolved, but she continues to have postnasal drainage.  Imaging studies show multiple defects along the tegmen mastoideum and tegmen tympani. We have determined that a repair of the temporal lobe encephalocele and CSF fistula is indicated in order to reduce her risk of developing a CNS infection.  She presents for the above-stated procedure.    DESCRIPTION OF PROCEDURE:  Upon intubation and induction of general anesthesia, the patient was placed in the lateral position with the right side up.  Her lower back was prepared and draped in the usual sterile fashion.  The C-arm was draped and brought into the field.  We localized the L3-L4 interspinous space.  We advanced a 14-gauge Tuohy needle in the midline and then continued to advance in the midline under AP and then lateral views on fluoroscopy.  We obtained brisk return of CSF and advanced the lumbar drain catheter over a wire without resistance.  The fluoroscopic image was saved in the electronic record.  The catheter was secured to the skin and connected to a closed drainage system.  Sterile dressing was applied.  The patient was kept in the lateral  position.    The Feldman headholder was applied, and her neck was maintained in a neutral rotation and laterally flexed towards the floor.  The dependent arm was supported on an arm board.  After securing her head in this position, we planned an inverted U-shaped incision with the vertical limbs anterior and posterior to the auricle and the horizontal limb along the superior temporal line.  Her scalp was prepared and draped in the usual sterile fashion.  The planned incision was infiltrated with 0.25% Marcaine with epinephrine.  A timeout was performed.    The incision was carried down to the temporalis fascia.  The temporalis muscle and fascia were then divided with monopolar cautery.  The myocutaneous flap was elevated and retracted inferiorly with fishhooks.  We made multiple small lucinda holes using the high-speed 5 mm cutting lucinda.  The temporal craniotomy was completed using the high-speed cutting drill.  The bone flap was elevated and set aside.  We removed the remainder of the squamous temporal bone until we reached the floor of the middle fossa.  This bone was removed using the high-speed cutting drill and rongeurs.  Sufficient brain relaxation was achieved with osmodiuresis, hyperventilation, and CSF release from the lumbar drain.  Please see Dr. Harrell's operative report regarding details of the middle fossa approach to the multiple defects and encephalocele resection.  Once these were completed by the Neuro-otology service, we returned.  The operative microscope was already draped and in the field.    There were multiple dural defects that corresponded with the defect along the floor of the middle fossa.  Most of the dural defects were anterior where the floor of the middle fossa with sloping anteriorly.  The inferior temporal dura was thin and friable.  We attempted to repair the discrete defects with a 6-0 Prolene, but the defects increased in size with suture placement.  We reinforced several  figure-of-eight sutures with temporalis muscle pledgets.  We then applied a thin layer of Vistaseal.  Next, we sized a Synthes titanium mesh to cover the middle fossa defect.  Most of the defects were lateral.  The was secured using Synthes mini screws.  We decided to reinforce the dural repair with a vascularized temporalis muscle graft.  We dissected the inner 2-3 mm of the temporalis muscle, leaving a vascularized pedicle inferiorly.  However, most of the dural defects were anterior, and swinging the muscle flap anteriorly was difficult.  We mobilized the pedicle as much as possible without compromising blood supply to the flap.  We then applied the temporalis muscle flap along the inferior temporal dural surface.  Another thin layer of Vistaseal was applied to the muscle flap.    The temporal bone flap was then replaced and secured with Synthes mini plates and screws.  The bone flap was not constricting the temporalis muscle flap.  The remainder of the temporalis muscle and fascia was reapproximated with interrupted 2-0 Vicryl.  The scalp was then closed in 2 layers.  Sterile dressing was applied.  The head dos santos was removed, and the patient was returned to the supine position.  Blood loss was approximately 50 mL.  Sponge and needle counts were correct at the end of the case.  I was present for the entire neurosurgical portion and immediately available for the entire operation.    Jocelyn Noe MD        D: 2021   T: 2021   MT: NATALYA    Name:     BALTAZAR OREILLY  MRN:      [id number]        Account:        [id number]   :      1965           Procedure Date: 2021     Document: [id number]

## 2021-07-19 NOTE — PROGRESS NOTES
Clinic Care Coordination Contact    Clinic Care Coordination Contact  OUTREACH    Referral Information:  Referral Source: IP Handoff    Date of Admission:                  7/5/2021  Date of Discharge::                 7/14/2021    Diagnosis: CSF otorrhea     Chief Complaint   Patient presents with     Clinic Care Coordination - Initial     RNCC initial assessment        Universal Utilization:   Clinic Utilization  Difficulty keeping appointments:: Yes  Compliance Concerns: No  No-Show Concerns: Yes  No PCP office visit in Past Year: No  Utilization    Hospital Admissions  3             ED Visits  0             No Show Count (past year)  5                Current as of: 7/19/2021  8:52 AM              Clinical Concerns:  Current Medical Concerns: Headaches  Current Behavioral Concerns: None identified      Education Provided to patient:      Education provided regarding pain relief measures    Education provided regarding signs and symptoms to report to care team    Education provided regarding importance of healthy diet and exercise    Education provided regarding safe ambulation    Education provided regarding role of care coordination    RNCC reviewed discharge instructions with patient    RNCC confirmed patient has knowledge of upcoming appointments    Pain  Pain (GOAL):: Yes  Type: Acute (<3mo)  Location of chronic pain:: headache  Radiating: No  Progression: Unchanged  Description of pain: Aching  Chronic pain severity:: 6  Limitation of routine activities due to chronic pain:: No  Alleviating Factors: Rest  Health Maintenance Reviewed: Up to date  Clinical Pathway: None    Medication Management:  Medication review status: Medications reviewed.  Changes noted per patient report. Patient denies questions or concerns regarding medications and reports taking medications as prescribed.      Functional Status:  Dependent ADLs:: Independent  Dependent IADLs:: Independent  Bed or wheelchair confined:: No  Mobility  Status: Independent  Fallen 2 or more times in the past year?: No  Any fall with injury in the past year?: No    Living Situation:  Current living arrangement:: I live alone  Type of residence:: Apartment    Lifestyle & Psychosocial Needs:    Social Determinants of Health     Tobacco Use: Low Risk      Smoking Tobacco Use: Never Smoker     Smokeless Tobacco Use: Never Used   Alcohol Use:      Frequency of Alcohol Consumption:      Average Number of Drinks:      Frequency of Binge Drinking:    Financial Resource Strain:      Difficulty of Paying Living Expenses:    Food Insecurity:      Worried About Running Out of Food in the Last Year:      Ran Out of Food in the Last Year:    Transportation Needs:      Lack of Transportation (Medical):      Lack of Transportation (Non-Medical):    Physical Activity:      Days of Exercise per Week:      Minutes of Exercise per Session:    Stress:      Feeling of Stress :    Social Connections:      Frequency of Communication with Friends and Family:      Frequency of Social Gatherings with Friends and Family:      Attends [employer] Services:      Active Member of Clubs or Organizations:      Attends Club or Organization Meetings:      Marital Status:    Intimate Partner Violence:      Fear of Current or Ex-Partner:      Emotionally Abused:      Physically Abused:      Sexually Abused:    Depression: Not at risk     PHQ-2 Score: 0   Housing Stability:      Unable to Pay for Housing in the Last Year:      Number of Places Lived in the Last Year:      Unstable Housing in the Last Year:      Diet::  (portion control)  Inadequate nutrition (GOAL):: No  Tube Feeding: No  Inadequate activity/exercise (GOAL):: No  Significant changes in sleep pattern (GOAL): No  Transportation means:: Friend     Mormon or spiritual beliefs that impact treatment:: No  Chemical Dependency Status: No Current Concerns  Informal Support system:: Family, Friends      Resources and Interventions:  Current  Resources:      Community Resources: None  Supplies used at home:: None  Equipment Currently Used at Home: none  Employment Status: disabled     Advance Care Plan/Directive  Advanced Care Plans/Directives on file:: No    Referrals Placed: None     Goals:   Goals        General     Financial Wellbeing (pt-stated)      Notes - Note created  7/19/2021  1:49 PM by Jolanta Velazquez RN     Goal generated via best practices. Patient declines support or resources related to financial stress. RNCC will continue to monitor at follow up calls.        Pain Management (pt-stated)      Notes - Note created  7/19/2021  1:52 PM by Jolanta Velazquez RN     Goal Statement: I will reduce the frequency of my headaches in the next 3 months.     Date goal set: 7/19/2021    Measure of Success: Patient reported account of decreased frequency in headaches    Barriers: None identified  Strengths: Highly motivated, positive attitude    Date to Achieve By: 10/19/2021    Patient expressed understanding of goal: Yes    Action steps to achieve this goal:    1. I will attend all follow up appointments as scheduled  2. I will take my medications as directed  3. I will reach out to my care team for any concerning symptoms or questions related to my health             Patient/Caregiver understanding:Patient verbalizes understanding of discharge instructions, goals, and medication regimen.    Outreach Frequency: 2 weeks  Future Appointments              In 4 days Karin Elizalde PA-C [facility]    In 1 week Jocelyne Harrell MD [facility]    In 3 weeks Lisa White PT [facility] MG    In 1 month BK BED 2 [facility] SLEEP    In 1 month Candace Ingram PA [facility]    In 4 months UC LAB [facility]     In 4 months Cassie Kolb MD [facility]          Plan:     Patient will attend follow up appointments as scheduled    Patient will take all medications as prescribed    Patient will reach out to care team for any questions or concerning symptoms    RNCC will follow up in 2 weeks.     Jolanta Velazquez RN Care Coordinator     [facility] Ambulatory Care Management  [facility] and   [email or web address]    Office: [phone]

## 2021-07-19 NOTE — OP NOTE
Procedure Date: 07/08/2021    PREOPERATIVE DIAGNOSES:    1.  Recurrent right-sided CSF otorrhea.  2.  Morbid obesity    POSTOPERATIVE DIAGNOSES:   1.  Recurrent right-sided CSF otorrhea.  2.  Morbid obesity    PROCEDURE:     1.  Redo right temporal craniotomy.  2.  Redo right middle fossa approach for repair of a right temporal lobe encephalocele.  3.  Intraoperative use of microscope.    ANESTHESIA:  GETA.    SURGEON:  Jocelyn Noe MD     RESIDENT SURGEON:  Darrell Carnes MD    HISTORY OF PRESENT ILLNESS:  Ms. Mcfadden is a 56-year-old woman who underwent a repair of multiple right temporal lobe encephaloceles 3 days ago.  She had an episode of CSF otorrhea yesterday on postoperative day #2.  The otorrhea resolved with increasing the output from the lumbar drain.  Based on her body habitus and our high suspicion that she will redevelop a CSF fistula, we determined that early reoperation is best and with additional repair of the multiple dural defects along the middle fossa floor.  She returns for the above-stated procedure.    DESCRIPTION OF PROCEDURE:  Upon intubation and induction of general anesthesia, the patient was placed in the lateral position with the right side up.  The Feldman headholder was applied and her neck was maintained in neutral rotation and laterally flexed towards the floor.  The dependent arm was supported on an arm board.  After securing her head in this position, her scalp was prepared and draped in the usual sterile fashion.  The sutures along the previous incision were removed.  A timeout was performed.    The previous inverted U incision was reopened and the myocutaneous flap was retracted inferiorly using fishhooks.  Synthes mini screws were removed and the bone flap was elevated and set aside.  Adequate brain relaxation was achieved with osmodiuresis and CSF release from the lumbar drain.  The operative microscope was draped and brought into the field.  We retrieved the  vascularized temporalis muscle flap from the floor of the middle fossa.  We identified the multiple temporalis muscle pledgets and interrupted Prolene sutures along the inferior temporal surface.  We could see active CSF leaking from these repair sites.  We removed all the sutures and the muscle pledgets.  We then converted all of the small durotomies into a single large durotomy using the microscissors.  We then repaired this single discrete dural defect using a Durepair graft and interrupted 6-0 Prolenes.  Her native dura was friable and we placed a layer of DuraGen as an inlay graft before suturing the dural repair graft.  We then applied a thin layer of Vistaseal to the repair.     We were concerned that the existing temporalis muscle flap was not rotated anteriorly enough to cover the inferior temporal surface.  Furthermore, we were concerned that the muscle flap was being bunched and too thick in some locations.  We removed the previous temporalis muscle flap.  We harvested a thinner more anteriorly-based flap.  We were able to apply this vascularized muscle flap along the entire inferior temporal dural repair site.  We applied another thin layer of Vistaseal once the new muscle flap was applied.  We replaced the squamous temporal bone flap, leaving the Synthes miniplates and screws.  We confirmed that the temporalis muscle flap was not constricted.  The remainder of the temporalis muscle and fascia were reapproximated with interrupted 2-0 Vicryl.     The scalp was then closed in 2 layers.  A sterile dressing was applied.  The head dos santos was removed.  The patient was returned to the supine position.  She was extubated and transported to the recovery room without incident.  Blood loss was approximately 100 mL. Sponge and needle counts were correct at the end of the case.     I was present for the key portions and immediately available for the entire operation.    Jocelyn Noe MD        D: 07/19/2021    T: 2021   MT: [id number]    Name:     BALTAZAR OREILLY  MRN:      [id number]        Account:        [id number]   :      1965           Procedure Date: 2021     Document: [id number]

## 2021-07-19 NOTE — LETTER
[facility] CARE COORDINATION  [address]  [facility] [place] [facility] [address]  July 19, 2021    Marleen Mcfadden  [address]   [place] [address]      Dear Marleen,    I am a clinic care coordinator who works with Yanna Matias MD at [facility]. I wanted to thank you for spending the time to talk with me.  Below is a description of clinic care coordination and how I can further assist you.      The clinic care coordination team is made up of a registered nurse,  and community health worker who understand the health care system. The goal of clinic care coordination is to help you manage your health and improve access to the health care system in the most efficient manner. The team can assist you in meeting your health care goals by providing education, coordinating services, strengthening the communication among your providers and supporting you with any resource needs.    Please feel free to contact me at [phone] with any questions or concerns. We are focused on providing you with the highest-quality healthcare experience possible and that all starts with you.     Sincerely,     Jolanta Velazquez, RN Care Coordinator     [facility] Ambulatory Care Management  [facility] Family and   [email or web address]    Office: [phone]

## 2021-07-19 NOTE — LETTER
[facility]  Complex Care Plan  About Me:    Patient Name:  Marleen Mcfadden    YOB: 1965  Age:         56 year old   Yogi MRN:    [id number] Telephone Information:  Home Phone [phone]   Mobile [phone]       Address:  [address]  [place] [address] Email address:  [email or web address]      Emergency Contact(s)    Name Relationship Lgl Grd Work Phone Home Phone Mobile Phone   1. SCHUYLER MCFADDEN Son    [phone]   2. AFIA MILLS* Other   [phone]   3. RADHA MCFADDEN Other               Primary language:  English     needed? No   [employer]:  [phone] op. 1  Other communication barriers: None  Preferred Method of Communication:  Mail  Current living arrangement: I live alone  Mobility Status/ Medical Equipment: Independent      My Access Plan  Medical Emergency 911   Primary Clinic Line [facility] [phone]   24 Hour Appointment Line [phone] or  [phone]) (toll-free)   24 Hour Nurse Line [phone] (toll-free)   Preferred Urgent Care Other   Preferred Hospital [facility]  [phone]   Preferred Pharmacy [id number] [place] [address] AT [facility] [address] [facility] [address]     Behavioral Health Crisis Line The National Suicide Prevention Lifeline at 1-393.717.9598 or 911             My Care Team Members  Patient Care Team       Relationship Specialty Notifications Start End    Yanna Matias MD PCP - General Family Practice  9/15/20     Phone: [phone] Fax: [phone]         [address] [facility] [address]    Danny Grace MD MD OB/Gyn  4/9/14     Phone: [phone] Fax: [phone]         [place] [address] [address] [facility] [address] [facility] [address]    Cassie Kolb MD MD Cardiology  9/19/14     Phone: [phone] Fax: [phone]          [address] [facility] [address] [facility] [address]    Dev Rocha MD MD Orthopaedic Surgery  1/9/19     [place] team fax [phone]    Phone: [phone] Fax: [phone]         [facility] [address] [facility] [address] [facility] [address]    Gwen Swenson, RN Specialty Care Coordinator Cardiology Admissions 5/16/19     Phone: [phone]         Yanna Matias MD Assigned PCP   10/11/20     Phone: [phone] Fax: [phone]         [facility] [address] [address]    Wilson Cam MD Assigned Surgical Provider   10/23/20     Phone: [phone] Fax: [phone] [address] [facility] [address]    Cassie Kolb MD Assigned Heart and Vascular Provider   10/23/20     Phone: [phone] Fax: [phone]         [address] [facility] [address] [facility] [address]    Todd Banerjee MD Assigned Pulmonology Provider   10/23/20     Phone: [phone] Fax: [phone]         [address] [facility] [address]    Jocelyn Noe MD Assigned Neuroscience Provider   6/27/21     Phone: [phone] Fax: [phone] [address] [facility] OFFICE [facility] [address] [facility] [address]    Jolanta Velazquez, RN Lead Care Coordinator  Admissions 7/16/21             My Care Plans  Self Management and Treatment Plan  Goals   Goals                     Pain Management (pt-stated)       Goal Statement: I will reduce the frequency of my headaches in the next 3 months.     Date goal set: 7/19/2021    Measure of Success: Patient reported account of decreased frequency in headaches    Barriers: None identified  Strengths: Highly motivated, positive attitude    Date to Achieve By: 10/19/2021    Patient expressed understanding of goal: Yes    Action steps to achieve this goal:    1. I will attend all follow up appointments as scheduled  2. I will take my medications as directed  3. I will reach out to my care team for any concerning symptoms or questions  related to my health                Advance Care Plans/Directives Type:        My Medical and Care Information  Problem List   Patient Active Problem List   Diagnosis     Autoimmune disease, not elsewhere classified     Leiomyoma of uterus     Allergic rhinitis     Sinus bradycardia     Health Care Home     Itching     Knee pain     Thyroid nodule     Pain in joint involving ankle and foot     Plantar fasciitis     CARDIOVASCULAR SCREENING; LDL GOAL LESS THAN 160     Peroneus longus tendinitis     Morbid obesity (H)     Shaina's nodes     Familial cardiomyopathy (H)     Acute bilateral low back pain with right-sided sciatica     Degenerative disc disease at L5-S1 level     Chronic bilateral low back pain with right-sided sciatica     Chronic bilateral low back pain with left-sided sciatica     Elevated blood pressure reading without diagnosis of hypertension     Chronic systolic congestive heart failure (H)     Injury of left shoulder, initial encounter     Left shoulder pain     Primary osteoarthritis of both knees     Elevated triglycerides with high cholesterol     Gastroesophageal reflux disease with esophagitis     Odd detrimental health beliefs     Moderate major depression (H)     Dysfunction of both eustachian tubes     Chronic rhinitis     Essential hypertension     Failed total knee arthroplasty, initial encounter (H)     CSF otorrhea      Current Medications and Allergies:  See printed Medication Report.  Current Outpatient Medications   Medication     amitriptyline (ELAVIL) 25 MG tablet     Cholecalciferol (VITAMIN D) 2000 UNIT tablet     Collagen 500 MG CAPS     estradiol cypionate (DEPO-ESTRADIOL) 5 MG/ML injection     famotidine (PEPCID) 40 MG tablet     FLAXSEED, LINSEED, PO     fluticasone (FLONASE) 50 MCG/ACT nasal spray     loratadine (CLARITIN) 10 MG tablet     metoprolol succinate ER (TOPROL-XL) 50 MG 24 hr tablet     omeprazole (PRILOSEC) 40 MG DR capsule     oxyCODONE (ROXICODONE) 5 MG  tablet     polyethylene glycol (MIRALAX) 17 g packet     progesterone (PROMETRIUM) 100 MG capsule     senna-docusate (SENOKOT-S/PERICOLACE) 8.6-50 MG tablet     sertraline (ZOLOFT) 50 MG tablet     solifenacin (VESICARE) 10 MG tablet     spironolactone (ALDACTONE) 25 MG tablet     furosemide (LASIX) 20 MG tablet     losartan (COZAAR) 100 MG tablet     zolpidem (AMBIEN) 5 MG tablet     No current facility-administered medications for this visit.     Facility-Administered Medications Ordered in Other Visits   Medication     sodium chloride (PF) 0.9% PF flush 10 mL       Care Coordination Start Date: 7/19/2021   Frequency of Care Coordination: 2 weeks   Form Last Updated: 07/19/2021     Jolanta Velazquez RN Care Coordinator     [facility] Ambulatory Care Management  [facility] Family and   [email or web address]    Office: [phone]

## 2021-07-22 ENCOUNTER — OFFICE VISIT (OUTPATIENT)
Dept: OTOLARYNGOLOGY | Facility: CLINIC | Age: 56
End: 2021-07-22
Payer: MEDICARE

## 2021-07-22 ENCOUNTER — APPOINTMENT (OUTPATIENT)
Dept: OTOLARYNGOLOGY | Facility: CLINIC | Age: 56
End: 2021-07-22
Payer: MEDICARE

## 2021-07-22 VITALS
BODY MASS INDEX: 42.15 KG/M2 | HEIGHT: 64 IN | WEIGHT: 246.91 LBS | HEART RATE: 67 BPM | OXYGEN SATURATION: 99 % | TEMPERATURE: 97.3 F

## 2021-07-22 DIAGNOSIS — Q01.8 TEMPORAL ENCEPHALOCELE (H): Primary | ICD-10-CM

## 2021-07-22 PROCEDURE — 99024 POSTOP FOLLOW-UP VISIT: CPT | Mod: GC | Performed by: OTOLARYNGOLOGY

## 2021-07-22 ASSESSMENT — PAIN SCALES - GENERAL: PAINLEVEL: NO PAIN (0)

## 2021-07-22 ASSESSMENT — MIFFLIN-ST. JEOR: SCORE: 1690

## 2021-07-22 NOTE — PROGRESS NOTES
HPI: Marleen Mcfadden is a 56-year-old female with a past medical history of CHF, cardiomyopathy, possible SLE, HTN, and right encephalocele with CSF leak who is s/p right MCF approach to repair on 7/5/2021. Patient returned to the operating room on 7/8 for revision of repair due to a persistent CSF leak with no further rhinorrhea. Discharged in stable condition on 7/14/21.     She returns to clinic today for first postop exam. She has been doing well at home. Endorses one time episode of right nose clear drainage 3 days after discharge but no additional drainage since. Denies otorrhea or facial numbness or weakness. No vision changes. She does report diffuse head pressure with leaning forward that is mild in comparison to what she has experienced with the active CSF leak. Reports stable right side ear fullness.     Past Medical History:   Diagnosis Date     Allergic rhinitis, cause unspecified      Anemia      Anemia      Arthritis      Autoimmune disease (H)      Autoimmune disease NEC     Autoimmune disease- unknown/poss SLE     Shaina's node      Calcaneal spur 10/21/2014     Calcaneal spur 10/21/2014    Xray 10/17/14      CHF (congestive heart failure) (H)      CHF with cardiomyopathy (H)      Chronic low back pain      DDD (degenerative disc disease), lumbar      Depression      Familial cardiomyopathy (H) 10/21/2015     Familial cardiomyopathy (H)      GERD without esophagitis 2/7/2019     History of transfusion      Hormone replacement therapy 2/7/2019     Hypertension      Morbid obesity (H) 5/5/2015     Muscle spasm 9/20/2019     Nontraumatic rupture of quadriceps tendon, left 6/21/2018     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     no longer an issue     Other primary cardiomyopathies     Cardiomyopathy- dx'd 1999 idiopathic     Plantar fasciitis      PONV (postoperative nausea and vomiting)      PONV (postoperative nausea and vomiting)      Primary  cardiomyopathy (H) 2004    Cardiomyopathy- dx'd  famial idiopathic. Followed regularly by cardiologist Mayi.  Problem list name updated by automated process. Provider to review     Rotator cuff injury 2017     Sinus bradycardia      Sprain of other ligament of left ankle, initial encounter 2017     Status post left knee replacement 2018     TB lung, latent     negative quantiferon gold test  12     UTERINE LEIOMYOMA NOS 10/25/2006       Past Surgical History:   Procedure Laterality Date     ARTHROPLASTY KNEE Left      ARTHROPLASTY REVISION KNEE Left 2019    Procedure: REVISION, LEFT TOTAL KNEE  ARTHROPLASTY;  Surgeon: Dev Rocha MD;  Location: [facility] OR;  Service: Orthopedics     ARTHROPLASTY REVISION KNEE Right 2020    Procedure: RIGHT REVISION TOTAL KNEE ARTHROPLASTY;  Surgeon: Dev Rocha MD;  Location: [facility];  Service: Orthopedics     ARTHROSCOPIC REPAIR ACL       BREAST SURGERY       C BREAST SURGERY PROCEDURE UNLISTED      Breast Reduction     C  DELIVERY ONLY  10/85,     , Low Cervicalx2     C EXCIS UTERINE FIBROID,ABD APPRCH       C EXCISE EXCESS SKIN TISSUE,ABDOMEN       C LIGATE FALLOPIAN TUBE       C REPAIR CRUCIATE LIGAMENT,KNEE      Right Knee     C TOTAL KNEE ARTHROPLASTY Left 10/03/2017     C TOTAL KNEE ARTHROPLASTY Right 2019    Procedure: RIGHT TOTAL KNEE ARTHROPLASTY;  Surgeon: Dev Rocha MD;  Location: [facility];  Service: Orthopedics      SECTION        SECTION       COLONOSCOPY WITH CO2 INSUFFLATION N/A 2021    Procedure: COLONOSCOPY, WITH CO2 INSUFFLATION;  Surgeon: Angela Harrington DO;  Location: [facility] OR     CRANIECTOMY Right 2021    Procedure: RIGHT MIDDLE FOSSA APPROACH, CSF LEAK REPAIR;  Surgeon: Jocelyn Noe MD;  Location: [facility] OR     CRANIOTOMY MIDDLE FOSSA, EXCISE ACOUSTIC NEUROMA, COMBINED N/A 2021     Procedure: Right CRANIOTOMY, MIDDLE FOSSA APPROACH, FOR REPAIR OF ENCEPHALOCELE;  Surgeon: Jocelyne Harrell MD;  Location: [facility] OR     CYSTOURETHROSCOPY N/A 8/18/2020    Procedure: CYSTOSCOPY;  Surgeon: Cherelle Willams MD;  Location: [facility];  Service: Gynecology     DAVINCI SACROCOLPOPEXY, MIDURETHRAL SLING, CYSTOSCOPY       HC SLING OPERATION FOR STRESS INCONTINENCE N/A 8/18/2020    Procedure: MIDURETHRAL SLING, CYSTOSCOPY;  Surgeon: Cherelle Willams MD;  Location: [facility];  Service: Gynecology     HYSTERECTOMY       HYSTERECTOMY TOTAL ABDOMINAL  2006    for fibroids; reports having blood transfusion after surgery     INSERT DRAIN LUMBAR N/A 7/5/2021    Procedure: Insert drain lumbar;  Surgeon: Jocelyn Noe MD;  Location: [facility] OR     JOINT REPLACEMENT Left     knee     THYROIDECTOMY  9/9/2013    Procedure: THYROIDECTOMY;  LEFT THYROID LOBECTOMY.  (LIGASURE, RECURRENT LARYNGEAL NERVE MONITOR) ;  Surgeon: Uriah Camargo MD;  Location: [facility]     THYROIDECTOMY       TUBAL LIGATION         Family History   Problem Relation Age of Onset     Hypertension Father         dec     Diabetes Father         dec     Heart Disease Father         dec     Alcohol/Drug Father      Cardiovascular Father      Heart Disease Mother         dec     Alcohol/Drug Mother      Cardiovascular Mother      Heart Disease Daughter         Cardiomyopathy     Cardiovascular Daughter         cardiomyopathy     Colon Cancer Sister      Cardiovascular Son         cardiomyopathy     Diabetes Paternal Grandmother         dec     Hypertension Paternal Grandmother         dec     Cerebrovascular Disease Paternal Grandmother         dec     Cancer Sister         Lupus     Cardiovascular Sister         cardiomyopathy     Heart Disease Sister         heart failure, and kidney failure       Social History     Tobacco Use     Smoking status: Never Smoker     Smokeless tobacco: Never Used   Substance  Use Topics     Alcohol use: Yes     Comment: rare, twice a year, she has a drink little wine 2 days ago     Drug use: No       Patient Supplied Answers to Review of Systems  The remainder of the 10 point review of systems is otherwise negative.    Physical examination:  Constitutional:  In no acute distress, appears stated age. Right U-shaped crani incision healing well. Staples all removed. No leakage or dehiscence. Anterior mild swelling now resolved. No fluctuance. Back LD suture removed.   Eyes:  Extraocular movements intact, no spontaneous nystagmus  Ears:  Both ears examined under the microscope.   - Right ear clear without drainage. TM intact without perforation. Diffuse thickening. Middle ear appears normal without effusion.  - Left ear clear. TM intact without effusion.   Respiratory:  No increased work of breathing, wheezing or stridor  Musculoskeletal:  Good upper extremity strength  Skin:  No rashes on the head and neck  Neurologic:  House Brackman 1/6 bilaterally, ambulating normally  Psychiatric:  Alert, normal affect, answering questions appropriately    Mcmanus to left  Rinne AC>BC bilaterally     Assessment and plan:  Marleen Mcfadden is a 56-year-old female with a past medical history of CHF, cardiomyopathy, possible SLE, HTN, and right encephalocele with CSF leak who is s/p right MCF approach to repair on 7/5/2021 and revision 7/8. Doing well without any concerns for recurrent CSF leak. All incisions healing well. Discussed that If she experiences headache or visions changes concerning for papilledema, will need work-up for pseudotumor cerebri. If she starts having CSF leak again, she will likely need a  shunt placement. Plan for routine post-op follow-up given no symptoms or leakage at this time. Follow up with audiogram in 6 weeks.     Riya Pollard MD MPH   Fellow Physician  Otology & Neurotology  [facility]       Scribe Preparation Attestation:  Lisa AMAYA a scribe,  prepared the chart for today's encounter.       The documentation recorded by the scribe accurately reflects the services I personally performed and the decisions made by me.    I, Jocelyne Harrell MD, saw this patient with the resident/fellow and agree with the resident/fellow s findings and plan of care as documented in the resident s/fellow s note. I was present for the entire procedure.    Jocelyne Harrell MD

## 2021-07-22 NOTE — Clinical Note
7/22/2021       RE: Marleen Mcfadden  [address]  [facility] [address]     Dear Colleague,    Thank you for referring your patient, Marleen Mcfadden, to the [facility] EAR NOSE AND THROAT CLINIC [place] at [facility]. Please see a copy of my visit note below.    HPI: Marleen Mcfadden is a 56-year-old female with a past medical history of CHF, cardiomyopathy, possible SLE, HTN, and right encephalocele with CSF leak who is s/p right MCF approach to repair on 7/5/2021. Patient returned to the operating room on 7/8 for revision of repair. Discharged on stable condition on 7/14/21.     She returns to clinic today for first postop exam. She has been doing well at home. Endorses one time episode of right nose clear drainage 3 days after discharge but no additional drainage since. Denies otorrhea or facial numbness or weakness. No vision changes. She does report diffuse head pressure with leaning forward that is mild in comparison to what she has experienced with active CSF leak in the past. Reports stable right side ear fullness.     Past Medical History:   Diagnosis Date     Allergic rhinitis, cause unspecified      Anemia      Anemia      Arthritis      Autoimmune disease (H)      Autoimmune disease NEC     Autoimmune disease- unknown/poss SLE     Shaina's node      Calcaneal spur 10/21/2014     Calcaneal spur 10/21/2014    Xray 10/17/14      CHF (congestive heart failure) (H)      CHF with cardiomyopathy (H)      Chronic low back pain      DDD (degenerative disc disease), lumbar      Depression      Familial cardiomyopathy (H) 10/21/2015     Familial cardiomyopathy (H)      GERD without esophagitis 2/7/2019     History of transfusion      Hormone replacement therapy 2/7/2019     Hypertension      Morbid obesity (H) 5/5/2015     Muscle spasm 9/20/2019     Nontraumatic rupture of quadriceps tendon, left 6/21/2018     Other acute glomerulonephritis  with 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     no longer an issue     Other primary cardiomyopathies     Cardiomyopathy- dx'd  idiopathic     Plantar fasciitis      PONV (postoperative nausea and vomiting)      PONV (postoperative nausea and vomiting)      Primary cardiomyopathy (H) 2004    Cardiomyopathy- dx'd  famial idiopathic. Followed regularly by cardiologist Mayi.  Problem list name updated by automated process. Provider to review     Rotator cuff injury 2017     Sinus bradycardia      Sprain of other ligament of left ankle, initial encounter 2017     Status post left knee replacement 2018     TB lung, latent     negative quantiferon gold test  12     UTERINE LEIOMYOMA NOS 10/25/2006       Past Surgical History:   Procedure Laterality Date     ARTHROPLASTY KNEE Left      ARTHROPLASTY REVISION KNEE Left 2019    Procedure: REVISION, LEFT TOTAL KNEE  ARTHROPLASTY;  Surgeon: Dev Rocha MD;  Location: [facility] Main OR;  Service: Orthopedics     ARTHROPLASTY REVISION KNEE Right 2020    Procedure: RIGHT REVISION TOTAL KNEE ARTHROPLASTY;  Surgeon: Dev Rocha MD;  Location: [facility] Main OR;  Service: Orthopedics     ARTHROSCOPIC REPAIR ACL       BREAST SURGERY       C BREAST SURGERY PROCEDURE UNLISTED      Breast Reduction     C  DELIVERY ONLY  10/85,     , Low Cervicalx2     C EXCIS UTERINE FIBROID,ABD APPRCH       C EXCISE EXCESS SKIN TISSUE,ABDOMEN       C LIGATE FALLOPIAN TUBE       C REPAIR CRUCIATE LIGAMENT,KNEE      Right Knee     C TOTAL KNEE ARTHROPLASTY Left 10/03/2017     C TOTAL KNEE ARTHROPLASTY Right 2019    Procedure: RIGHT TOTAL KNEE ARTHROPLASTY;  Surgeon: Dev Rocha MD;  Location: [facility] OR;  Service: Orthopedics      SECTION        SECTION       COLONOSCOPY WITH CO2 INSUFFLATION N/A 2021    Procedure: COLONOSCOPY, WITH CO2 INSUFFLATION;   Surgeon: Angela Harrington DO;  Location: [facility] OR     CRANIECTOMY Right 7/8/2021    Procedure: RIGHT MIDDLE FOSSA APPROACH, CSF LEAK REPAIR;  Surgeon: Jocelyn Noe MD;  [facility] OR     CRANIOTOMY MIDDLE FOSSA, EXCISE ACOUSTIC NEUROMA, COMBINED N/A 7/5/2021    Procedure: Right CRANIOTOMY, MIDDLE FOSSA APPROACH, FOR REPAIR OF ENCEPHALOCELE;  Surgeon: Jocelyne Harrell MD;  Location: [facility] OR     CYSTOURETHROSCOPY N/A 8/18/2020    Procedure: CYSTOSCOPY;  Surgeon: Cherelle Willams MD;  Location: [facility];  Service: Gynecology     DAVINCI SACROCOLPOPEXY, MIDURETHRAL SLING, CYSTOSCOPY       HC SLING OPERATION FOR STRESS INCONTINENCE N/A 8/18/2020    Procedure: MIDURETHRAL SLING, CYSTOSCOPY;  Surgeon: Cherelle Willams MD;  Location: [facility];  Service: Gynecology     HYSTERECTOMY       HYSTERECTOMY TOTAL ABDOMINAL  2006    for fibroids; reports having blood transfusion after surgery     INSERT DRAIN LUMBAR N/A 7/5/2021    Procedure: Insert drain lumbar;  Surgeon: Jocelyn Noe MD;  Location: [facility] OR     JOINT REPLACEMENT Left     knee     THYROIDECTOMY  9/9/2013    Procedure: THYROIDECTOMY;  LEFT THYROID LOBECTOMY.  (LIGASURE, RECURRENT LARYNGEAL NERVE MONITOR) ;  Surgeon: Uriah Camargo MD;  [facility]     THYROIDECTOMY       TUBAL LIGATION         Family History   Problem Relation Age of Onset     Hypertension Father         dec     Diabetes Father         dec     Heart Disease Father         dec     Alcohol/Drug Father      Cardiovascular Father      Heart Disease Mother         dec     Alcohol/Drug Mother      Cardiovascular Mother      Heart Disease Daughter         Cardiomyopathy     Cardiovascular Daughter         cardiomyopathy     Colon Cancer Sister      Cardiovascular Son         cardiomyopathy     Diabetes Paternal Grandmother         dec     Hypertension Paternal Grandmother         dec     Cerebrovascular Disease Paternal  Grandmother         dec     Cancer Sister         Lupus     Cardiovascular Sister         cardiomyopathy     Heart Disease Sister         heart failure, and kidney failure       Social History     Tobacco Use     Smoking status: Never Smoker     Smokeless tobacco: Never Used   Substance Use Topics     Alcohol use: Yes     Comment: rare, twice a year, she has a drink little wine 2 days ago     Drug use: No       Patient Supplied Answers to Review of Systems      The remainder of the 10 point review of systems is otherwise negative.    Physical examination:  Constitutional:  In no acute distress, appears stated age. Right U-shaped crani incision healing well. Staples all removed. No leakage or dehiscence. Anterior mild swelling now resolved. No fluctuance. Back LD suture removed.   Eyes:  Extraocular movements intact, no spontaneous nystagmus  Ears:  Both ears examined under the microscope.   - Right ear clear without drainage. TM intact without perforation. Diffuse thickening. Middle ear appears normal without effusion.  - Left ear clear. TM intact without effusion.   Respiratory:  No increased work of breathing, wheezing or stridor  Musculoskeletal:  Good upper extremity strength  Skin:  No rashes on the head and neck  Neurologic:  House Brackman 1/6 bilaterally, ambulating normally  Psychiatric:  Alert, normal affect, answering questions appropriately    Mcmanus to left  Rinne AC>BC bilaterally     Assessment and plan:  Marleen Mcfadden is a 56-year-old female with a past medical history of CHF, cardiomyopathy, possible SLE, HTN, and right encephalocele with CSF leak who is s/p right MCF approach to repair on 7/5/2021 and revision 7/8 for revision of repair. Doing well without any concerns for recurrent CSF leak. All incisions healing well. Discussed that If she experiences headache or visions changes concerning for papilledema, will need work-up for pseudotumor cerebri. If she starts having CSF leak again, she  will likely need a  shunt placement. Plan for routine post-op follow-up given no symptoms or leakage at this time. Follow up with audiogram in 6 weeks.       Riya Pollard MD MPH   Fellow Physician  Otology & Neurotology  [facility]       Scribe Preparation Attestation:  I, Lisa Davis, a scribe, prepared the chart for today's encounter.         Again, thank you for allowing me to participate in the care of your patient.      Sincerely,    Jocelyne Harrell MD

## 2021-07-22 NOTE — LETTER
7/22/2021      RE: Marleen Mcfadden  [address]  [facility] [address]       HPI: Marleen Mcfadden is a 56-year-old female with a past medical history of CHF, cardiomyopathy, possible SLE, HTN, and right encephalocele with CSF leak who is s/p right MCF approach to repair on 7/5/2021. Patient returned to the operating room on 7/8 for revision of repair due to a persistent CSF leak with no further rhinorrhea. Discharged in stable condition on 7/14/21.     She returns to clinic today for first postop exam. She has been doing well at home. Endorses one time episode of right nose clear drainage 3 days after discharge but no additional drainage since. Denies otorrhea or facial numbness or weakness. No vision changes. She does report diffuse head pressure with leaning forward that is mild in comparison to what she has experienced with the active CSF leak. Reports stable right side ear fullness.     Past Medical History:   Diagnosis Date     Allergic rhinitis, cause unspecified      Anemia      Anemia      Arthritis      Autoimmune disease (H)      Autoimmune disease NEC     Autoimmune disease- unknown/poss SLE     Shaina's node      Calcaneal spur 10/21/2014     Calcaneal spur 10/21/2014    Xray 10/17/14      CHF (congestive heart failure) (H)      CHF with cardiomyopathy (H)      Chronic low back pain      DDD (degenerative disc disease), lumbar      Depression      Familial cardiomyopathy (H) 10/21/2015     Familial cardiomyopathy (H)      GERD without esophagitis 2/7/2019     History of transfusion      Hormone replacement therapy 2/7/2019     Hypertension      Morbid obesity (H) 5/5/2015     Muscle spasm 9/20/2019     Nontraumatic rupture of quadriceps tendon, left 6/21/2018     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     no longer an issue     Other primary cardiomyopathies     Cardiomyopathy- dx'd 1999 idiopathic     Plantar fasciitis      PONV (postoperative  nausea and vomiting)      PONV (postoperative nausea and vomiting)      Primary cardiomyopathy (H) 2004    Cardiomyopathy- dx'd  famial idiopathic. Followed regularly by cardiologist Mayi.  Problem list name updated by automated process. Provider to review     Rotator cuff injury 2017     Sinus bradycardia      Sprain of other ligament of left ankle, initial encounter 2017     Status post left knee replacement 2018     TB lung, latent     negative quantiferon gold test  12     UTERINE LEIOMYOMA NOS 10/25/2006       Past Surgical History:   Procedure Laterality Date     ARTHROPLASTY KNEE Left      ARTHROPLASTY REVISION KNEE Left 2019    Procedure: REVISION, LEFT TOTAL KNEE  ARTHROPLASTY;  Surgeon: Dev Rocha MD;  Location: [facility] OR;  Service: Orthopedics     ARTHROPLASTY REVISION KNEE Right 2020    Procedure: RIGHT REVISION TOTAL KNEE ARTHROPLASTY;  Surgeon: Dev Rocha MD;  Location: [facility];  Service: Orthopedics     ARTHROSCOPIC REPAIR ACL       BREAST SURGERY       C BREAST SURGERY PROCEDURE UNLISTED      Breast Reduction     C  DELIVERY ONLY  10/85,     , Low Cervicalx2     C EXCIS UTERINE FIBROID,ABD APPRCH       C EXCISE EXCESS SKIN TISSUE,ABDOMEN       C LIGATE FALLOPIAN TUBE       C REPAIR CRUCIATE LIGAMENT,KNEE      Right Knee     C TOTAL KNEE ARTHROPLASTY Left 10/03/2017     C TOTAL KNEE ARTHROPLASTY Right 2019    Procedure: RIGHT TOTAL KNEE ARTHROPLASTY;  Surgeon: Dev Rocha MD;  Location: [facility];  Service: Orthopedics      SECTION        SECTION       COLONOSCOPY WITH CO2 INSUFFLATION N/A 2021    Procedure: COLONOSCOPY, WITH CO2 INSUFFLATION;  Surgeon: Angela Harrington DO;  Location: [facility] OR     CRANIECTOMY Right 2021    Procedure: RIGHT MIDDLE FOSSA APPROACH, CSF LEAK REPAIR;  Surgeon: Jocelyn Noe MD;  Location: [facility] OR      CRANIOTOMY MIDDLE FOSSA, EXCISE ACOUSTIC NEUROMA, COMBINED N/A 7/5/2021    Procedure: Right CRANIOTOMY, MIDDLE FOSSA APPROACH, FOR REPAIR OF ENCEPHALOCELE;  Surgeon: Jocelyne Harrell MD;  Location: [facility] OR     CYSTOURETHROSCOPY N/A 8/18/2020    Procedure: CYSTOSCOPY;  Surgeon: Cherelle Willams MD;  Location: [facility];  Service: Gynecology     DAVINCI SACROCOLPOPEXY, MIDURETHRAL SLING, CYSTOSCOPY       HC SLING OPERATION FOR STRESS INCONTINENCE N/A 8/18/2020    Procedure: MIDURETHRAL SLING, CYSTOSCOPY;  Surgeon: Cherelle Willams MD;  Location: [facility];  Service: Gynecology     HYSTERECTOMY       HYSTERECTOMY TOTAL ABDOMINAL  2006    for fibroids; reports having blood transfusion after surgery     INSERT DRAIN LUMBAR N/A 7/5/2021    Procedure: Insert drain lumbar;  Surgeon: Jocelyn Noe MD;  Location: [facility] OR     JOINT REPLACEMENT Left     knee     THYROIDECTOMY  9/9/2013    Procedure: THYROIDECTOMY;  LEFT THYROID LOBECTOMY.  (LIGASURE, RECURRENT LARYNGEAL NERVE MONITOR) ;  Surgeon: Uriah Camargo MD;  [facility]: [facility]     THYROIDECTOMY       TUBAL LIGATION         Family History   Problem Relation Age of Onset     Hypertension Father         dec     Diabetes Father         dec     Heart Disease Father         dec     Alcohol/Drug Father      Cardiovascular Father      Heart Disease Mother         dec     Alcohol/Drug Mother      Cardiovascular Mother      Heart Disease Daughter         Cardiomyopathy     Cardiovascular Daughter         cardiomyopathy     Colon Cancer Sister      Cardiovascular Son         cardiomyopathy     Diabetes Paternal Grandmother         dec     Hypertension Paternal Grandmother         dec     Cerebrovascular Disease Paternal Grandmother         dec     Cancer Sister         Lupus     Cardiovascular Sister         cardiomyopathy     Heart Disease Sister         heart failure, and kidney failure       Social History     Tobacco Use      Smoking status: Never Smoker     Smokeless tobacco: Never Used   Substance Use Topics     Alcohol use: Yes     Comment: rare, twice a year, she has a drink little wine 2 days ago     Drug use: No       Patient Supplied Answers to Review of Systems  The remainder of the 10 point review of systems is otherwise negative.    Physical examination:  Constitutional:  In no acute distress, appears stated age. Right U-shaped crani incision healing well. Staples all removed. No leakage or dehiscence. Anterior mild swelling now resolved. No fluctuance. Back LD suture removed.   Eyes:  Extraocular movements intact, no spontaneous nystagmus  Ears:  Both ears examined under the microscope.   - Right ear clear without drainage. TM intact without perforation. Diffuse thickening. Middle ear appears normal without effusion.  - Left ear clear. TM intact without effusion.   Respiratory:  No increased work of breathing, wheezing or stridor  Musculoskeletal:  Good upper extremity strength  Skin:  No rashes on the head and neck  Neurologic:  House Brackman 1/6 bilaterally, ambulating normally  Psychiatric:  Alert, normal affect, answering questions appropriately    Mcmanus to left  Rinne AC>BC bilaterally     Assessment and plan:  Marleen Mcfadden is a 56-year-old female with a past medical history of CHF, cardiomyopathy, possible SLE, HTN, and right encephalocele with CSF leak who is s/p right MCF approach to repair on 7/5/2021 and revision 7/8. Doing well without any concerns for recurrent CSF leak. All incisions healing well. Discussed that If she experiences headache or visions changes concerning for papilledema, will need work-up for pseudotumor cerebri. If she starts having CSF leak again, she will likely need a  shunt placement. Plan for routine post-op follow-up given no symptoms or leakage at this time. Follow up with audiogram in 6 weeks.     Riya Pollard MD MPH   Fellow Physician  Otology & Neurotology  [facility]  [place]       Scribe Preparation Attestation:  I, Lisa Davis, a scribe, prepared the chart for today's encounter.       The documentation recorded by the scribe accurately reflects the services I personally performed and the decisions made by me.    I, Jocelyne Harrell MD, saw this patient with the resident/fellow and agree with the resident/fellow s findings and plan of care as documented in the resident s/fellow s note. I was present for the entire procedure.    Jocelyne Harrell MD

## 2021-07-22 NOTE — PATIENT INSTRUCTIONS
1. You were seen in the ENT Clinic today by Dr. Harrell.  If you have any questions or concerns after your appointment, please call   - Option 1: ENT Clinic: [phone]   - Option 2: Marlene (Dr. Harrell's Nurse): [phone]                   Erica(Dr. Harrell's Nurse): [phone]    2.   Plan to return to clinic in 6 weeks with hearing test    Marlene Funez LPN  [facility] - Otolaryngology

## 2021-08-02 ENCOUNTER — PATIENT OUTREACH (OUTPATIENT)
Dept: NURSING | Facility: CLINIC | Age: 56
End: 2021-08-02
Payer: MEDICARE

## 2021-08-02 ASSESSMENT — ACTIVITIES OF DAILY LIVING (ADL): DEPENDENT_IADLS:: INDEPENDENT

## 2021-08-02 NOTE — PROGRESS NOTES
Clinic Care Coordination Contact    Follow Up Progress Note      Assessment:     Patient reports she is doing well and denies questions or concerns at this time.     Patient denies vision changes, CSF leakage, facial numbness, nausea, vomiting, constipation or fever.     Patient reports she continues to experience headaches that are not relieved with tylenol. Patient states her provider told her to go to ED if the headache becomes unbearable or she experiences pain in her entire head.      Goals addressed this encounter:   Goals Addressed                    This Visit's Progress       Pain Management (pt-stated)   20%      Goal Statement: I will reduce the frequency of my headaches in the next 3 months.     Date goal set: 7/19/2021    Measure of Success: Patient reported account of decreased frequency in headaches    Barriers: None identified  Strengths: Highly motivated, positive attitude    Date to Achieve By: 10/19/2021    Patient expressed understanding of goal: Yes    Action steps to achieve this goal:    1. I will attend all follow up appointments as scheduled  2. I will take my medications as directed  3. I will reach out to my care team for any concerning symptoms or questions related to my health              Intervention/Education provided during outreach:    Education provided on signs and symptoms to report to MD.     Education provided on importance of healthy diet and exercise to promote healing and mental wellness.     Writer confirmed patient is knowledgeable of upcoming appointments.      Outreach Frequency: monthly    Plan:     Patient will continue to work towards goals as outlined above    Care Coordinator will follow up in 1 month.    Jolanta Velazquez RN Care Coordinator     [facility] Ambulatory Care Management  [facility] and   [email or web address]    Office: [phone]

## 2021-08-11 ENCOUNTER — HOSPITAL ENCOUNTER (OUTPATIENT)
Dept: PHYSICAL THERAPY | Facility: CLINIC | Age: 56
Setting detail: THERAPIES SERIES
End: 2021-08-11
Attending: NURSE PRACTITIONER
Payer: MEDICARE

## 2021-08-11 DIAGNOSIS — G96.01 CSF OTORRHEA: ICD-10-CM

## 2021-08-11 PROCEDURE — 97161 PT EVAL LOW COMPLEX 20 MIN: CPT | Mod: GP | Performed by: PHYSICAL THERAPIST

## 2021-08-11 NOTE — PROGRESS NOTES
08/11/21 1300   Quick Adds   Quick Adds Certification   Type of Visit Initial OP PT Evaluation   General Information   Start of Care Date 08/11/21   Referring Physician Esther Bonilla, APRN, CNP   Orders Evaluate and Treat as Indicated   Order Date 07/13/21   Medical Diagnosis CSF otorrhea    Onset of illness/injury or Date of Surgery 07/05/21   Precautions/Limitations other (see comments)   Special Instructions Cannot lift >10 lbs   Surgical/Medical history reviewed Yes   Pertinent history of current problem (include personal factors and/or comorbidities that impact the POC) Marleen Mcfadden is a 55 year old woman who was found to have a right temporal bone encephalocele and CSF leak in the setting of a 4 year history of headaches at the vertex and then developed a popping sensation in the right ear followed by postnasal drip which would relieve the symptoms. After previous discussion of risks and benefits during clinic visit, the patient elected to undergo above-mentioned procedure.  PT was order prior to surgery.  She was seen by IP PT and determined independent to return home.  She denies dizziness of impaired balance.  B TKAs with revisions.  She is following OP PT for instability and will start back up with these sessions once liftign restrictions have been lifted.   Patient role/Employment history Employed   Living environment Apartment/condo   Fall Risk Screen   Fall screen completed by PT   Have you fallen 2 or more times in the past year? No   Have you fallen and had an injury in the past year? No   Timed Up and Go score (seconds) 9.2   Is patient a fall risk? No   Abuse Screen (yes response referral indicated)   Feels Unsafe at Home or Work/School no   Feels Threatened by Someone no   Does Anyone Try to Keep You From Having Contact with Others or Doing Things Outside Your Home? no   Physical Signs of Abuse Present no   Pain   Patient currently in pain Yes   Pain location Bilateral knees    Pain rating 4/10   Cognitive Status Examination   Orientation orientation to person, place and time   Follows Commands and Answers Questions 100% of the time;able to follow multistep instructions   Memory impaired   Cognitive Comment She does report short term memory problems since surgery.  She is monitoring.   Posture   Posture Normal   Posture Comments Mild knee hyperextension.   Range of Motion (ROM)   ROM Comment [facility]   Strength   Strength Comments Bilateral knees 5/5 but mild pain with testing   Transfer Skills   Transfer Comments Independent   Gait   Gait Gait Analysis;Stairs   Gait Analysis   Gait Pattern Used swing-through gait   Stairs   Self Performance Modified independent   Rails 2 rails   Indicate number of stairs 4   Gait Special Tests   Gait Special Tests FUNCTIONAL GAIT ASSESSMENT   Gait Special Tests Functional Gait Assessment Score out of 30   Score out of 30 27   Balance   Balance other (describe)   Balance Comments She reports she is at baseline, but does have instability due to her knees.  She is able to demonstrate transitioning on and off the floor with no assistance.   Balance Special Tests   Balance Special Tests Modified CTSIB Conditions   Balance Special Tests Modified CTSIB Conditions   Condition 1, seconds 30 Seconds   Condition 2, seconds 30 Seconds   Condition 4, seconds 30 Seconds   Condition 5, seconds 30 Seconds   Sensory Examination   Sensory Perception no deficits were identified   Clinical Impression   Criteria for Skilled Therapeutic Interventions Met current level of function same as previous level of function   Influenced by the following impairments knee instability   Clinical Impression Comments Marleen is at baseline for balance and is experiencing no dizziness.  Her HAs are improving.  She will benefit from continued OP PT for knee instability once her lifting restrictions have .  She does not require additional skilled PT following her craniotomy at this time.   She was instructed to monitor for any balance changes or new onset of dizziness.   Total Evaluation Time   PT Eval, Low Complexity Minutes (64353) 30   Therapy Certification   Certification date from 08/11/21   Certification date to 08/11/21   Medical Diagnosis CSF otorrhea; s/p craniotomy   Certification I certify the need for these services furnished under this plan of treatment and while under my care.  (Physician co-signature of this document indicates review and certification of the therapy plan).

## 2021-08-13 ENCOUNTER — PATIENT OUTREACH (OUTPATIENT)
Dept: CARDIOLOGY | Facility: CLINIC | Age: 56
End: 2021-08-13

## 2021-08-13 NOTE — TELEPHONE ENCOUNTER
Called Marleen as we had planned to switch back to entresto once recovered from surgery. She states she would like to wait until she is fully cleared at [place] beginning of September. WIll plan to call her again in September to discuss switching back. She states understanding, reports feeling well overall.

## 2021-08-23 ENCOUNTER — THERAPY VISIT (OUTPATIENT)
Dept: SLEEP MEDICINE | Facility: CLINIC | Age: 56
End: 2021-08-23
Payer: MEDICARE

## 2021-08-23 DIAGNOSIS — R06.89 DYSPNEA AND RESPIRATORY ABNORMALITY: ICD-10-CM

## 2021-08-23 DIAGNOSIS — Z72.820 LACK OF ADEQUATE SLEEP: ICD-10-CM

## 2021-08-23 DIAGNOSIS — E66.01 CLASS 3 SEVERE OBESITY DUE TO EXCESS CALORIES WITH SERIOUS COMORBIDITY AND BODY MASS INDEX (BMI) OF 40.0 TO 44.9 IN ADULT (H): ICD-10-CM

## 2021-08-23 DIAGNOSIS — I10 ESSENTIAL HYPERTENSION: ICD-10-CM

## 2021-08-23 DIAGNOSIS — G47.30 SLEEP APNEA, UNSPECIFIED TYPE: ICD-10-CM

## 2021-08-23 DIAGNOSIS — R53.83 MALAISE AND FATIGUE: ICD-10-CM

## 2021-08-23 DIAGNOSIS — R06.00 DYSPNEA AND RESPIRATORY ABNORMALITY: ICD-10-CM

## 2021-08-23 DIAGNOSIS — R06.83 SNORING: ICD-10-CM

## 2021-08-23 DIAGNOSIS — R53.81 MALAISE AND FATIGUE: ICD-10-CM

## 2021-08-23 DIAGNOSIS — E66.813 CLASS 3 SEVERE OBESITY DUE TO EXCESS CALORIES WITH SERIOUS COMORBIDITY AND BODY MASS INDEX (BMI) OF 40.0 TO 44.9 IN ADULT (H): ICD-10-CM

## 2021-08-23 PROCEDURE — 95810 POLYSOM 6/> YRS 4/> PARAM: CPT | Performed by: INTERNAL MEDICINE

## 2021-08-25 ENCOUNTER — MYC MEDICAL ADVICE (OUTPATIENT)
Dept: OTOLARYNGOLOGY | Facility: CLINIC | Age: 56
End: 2021-08-25

## 2021-08-26 PROBLEM — K21.00 GASTROESOPHAGEAL REFLUX DISEASE WITH ESOPHAGITIS: Chronic | Status: ACTIVE | Noted: 2019-01-30

## 2021-08-26 PROBLEM — Z96.659 FAILED TOTAL KNEE ARTHROPLASTY, INITIAL ENCOUNTER (H): Status: RESOLVED | Noted: 2019-01-17 | Resolved: 2021-08-26

## 2021-08-26 PROBLEM — S49.92XA INJURY OF LEFT SHOULDER, INITIAL ENCOUNTER: Status: RESOLVED | Noted: 2017-01-12 | Resolved: 2021-08-26

## 2021-08-26 PROBLEM — T84.018A FAILED TOTAL KNEE ARTHROPLASTY, INITIAL ENCOUNTER (H): Status: RESOLVED | Noted: 2019-01-17 | Resolved: 2021-08-26

## 2021-08-26 PROBLEM — G47.33 OSA (OBSTRUCTIVE SLEEP APNEA): Chronic | Status: ACTIVE | Noted: 2021-08-26

## 2021-08-26 PROBLEM — M17.0 PRIMARY OSTEOARTHRITIS OF BOTH KNEES: Chronic | Status: ACTIVE | Noted: 2019-01-08

## 2021-08-26 NOTE — PROCEDURES
" SLEEP STUDY INTERPRETATION  DIAGNOSTIC POLYSOMNOGRAPHY REPORT      Patient: BALTAZAR OREILLY  YOB: 1965  Study Date: 8/23/2021  MRN: [id number]  Referring Provider: Yanna Matias MD  Ordering Provider: LUBNA Billings    Indications for Polysomnography: The patient is a 56 year old Female who is 5' 4\" and weighs 246.0 lbs. Her BMI is 42.5, [place] sleepiness scale 3 and neck circumference is 44 cm. A diagnostic polysomnogram was performed to evaluate for probable obstructive sleep apnea and possible Cheyne-Jarrell breathing, possible hypoventilation based on BMI of 42, elevated bicarbonate (29), headaches, loud snoring, witnessed apnea, non-refreshing sleep, daytime fatigue and comorbid HTN and cardiomyopathy.      Polysomnogram Data: A full night polysomnogram recorded the standard physiologic parameters including EEG, EOG, EMG, ECG, nasal and oral airflow. Respiratory parameters of chest and abdominal movements were recorded with respiratory inductance plethysmography. Oxygen saturation was recorded by pulse oximetry. Hypopnea scoring rule used: 1B 4%.    Sleep Architecture:   The total recording time of the polysomnogram was 474.5 minutes. The total sleep time was 442.0 minutes. Sleep latency was decreased at 0.1 minutes with the use of a sleep aid (melatonin). REM latency was 54.5 minutes. Arousal index was 24.0 arousals per hour. Sleep efficiency was normal at 93.2%. Wake after sleep onset was 31.0 minutes. The patient spent 5.4% of total sleep time in Stage N1, 48.8% in Stage N2, 27.8% in Stage N3, and 18.0% in REM. Time in REM supine was 7.0 minutes.      Respiration:   Events ? The polysomnogram revealed a presence of 89 obstructive, 2 central, and 1 mixed apneas resulting in an apnea index of 12.5 events per hour. There were 125 obstructive hypopneas and 0 central hypopneas resulting in an obstructive hypopnea index of 17.0 and central hypopnea index of 0 events per hour. The combined " apnea/hypopnea index was 29.5 events per hour (central apnea/hypopnea index was 0.3 events per hour). The REM AHI was 60.4 events per hour. The supine AHI was 21.0 events per hour. The RERA index was 2.6 events per hour.  The RDI was 32.0 events per hour.    Snoring - was reported as loud.    Respiratory rate and pattern - was notable for normal rate and pattern.    Sustained Sleep Associated Hypoventilation - Transcutaneous carbon dioxide monitoring was used, however significant hypoventilation was not suggested with a maximum change from 40 to 47 mmHg and 0 minutes at or greater than 55 mmHg. A venous blood gas was ordered but not completed.     Sleep Associated Hypoxemia - (Greater than 5 minutes O2 sat at or below 88%) was present. Baseline oxygen saturation was 93.6%. Lowest oxygen saturation was 70.3%. Time spent less than or equal to 88% was 25.4 minutes. Time spent less than or equal to 89% was 30.7 minutes.    Movement Activity:     Periodic Limb Activity - There were 0 PLMs during the entire study.     REM EMG Activity - Excessive transient/sustained muscle activity was not present.    Nocturnal Behavior - Abnormal sleep related behaviors were not noted     Bruxism - None apparent.      Cardiac Summary:   The average pulse rate was 55.9 bpm. The minimum pulse rate was 43.0 bpm while the maximum pulse rate was 81.8 bpm.  Arrhythmias were not noted.      Assessment:     Moderate-severe obstructive sleep apnea with hypoxemia    Recommendations:    Recommend repeat polysomnography with full night titration of positive airway pressure therapy for the control of sleep disordered breathing.    Based on the presence of covid-19 restrictions and [employer]ar[employer]iron[employer] recall, treatment could be empirically initiated with Auto?titrating PAP therapy with a range of 5 to 15 cmH2O. Recommend clinical follow up with sleep management team. Recommend discussion with ENT and neurosurgery prior to PAP due to history of CSF  leak.     Patient may be a candidate for dental appliance through referral to Sleep Dentistry for the treatment of obstructive sleep apnea and/or socially disruptive snoring.    If devices are not acceptable or effective, patient may benefit from evaluation of possible surgical options. If she is interested, would recommend referral to specialized ENT-Sleep provider.    Advice regarding the risks of drowsy driving.    Suggest optimizing sleep schedule and avoiding sleep deprivation.    Weight management (if BMI > 30).    Diagnostic Codes:   Obstructive Sleep Apnea G47.33  Sleep Hypoxemia/Hypoventilation G47.36         _____________________________________   Electronically Signed By: Aditya Tapia MD 8/26/21           Range(%) Time in range (min)   0.0 - 89.0 30.7   0.0 - 88.0 25.4         Stage Min(mm Hg) Max(mm Hg)   Wake 37.2 45.4   NREM(1+2+3) 33.2 45.5   REM 41.2 47.2       Range(mmHg) Time in range (min)   55.0 - 100.0 -   Excluded data <20.0 & >65.0 11.0

## 2021-08-27 LAB — SLPCOMP: NORMAL

## 2021-08-30 DIAGNOSIS — I42.9 FAMILIAL CARDIOMYOPATHY (H): ICD-10-CM

## 2021-09-01 ENCOUNTER — PATIENT OUTREACH (OUTPATIENT)
Dept: NURSING | Facility: CLINIC | Age: 56
End: 2021-09-01
Payer: MEDICARE

## 2021-09-01 ASSESSMENT — ACTIVITIES OF DAILY LIVING (ADL): DEPENDENT_IADLS:: INDEPENDENT

## 2021-09-01 NOTE — PROGRESS NOTES
Clinic Care Coordination Contact    Follow Up Progress Note      Assessment:    Patient reports that she is feeling well overall but her headaches have returned following a flight she took to visit her sister who is dying. Her headaches are partially relieved with tylenol.     Patient denies dizziness, blurry vision, CSF leakage, falls, or signs and symptoms of infection.     Patient has follow up appointments with neuro this week where she will discuss her headaches further.     Patient reports she has a follow up virtual appointment with the sleep study clinic to discuss the results of her recent sleep study. The appointment is on 9/7/2021. Patient is asking writer to try to get the appointment moved up sooner so she can return to visit her sister. Writer called the sleep clinic and was able to get the appointment moved up to 9/3/2021 at 10am. Patient verbalizes understanding    Care Gaps:    Health Maintenance Due   Topic Date Due     URINE DRUG SCREEN  Never done     ASTHMA ACTION PLAN  Never done     MEDICARE ANNUAL WELLNESS VISIT  09/15/2009     ZOSTER IMMUNIZATION (1 of 2) Never done     HF ACTION PLAN  11/01/2015     PAP  07/01/2019     ASTHMA CONTROL TEST  03/08/2021     ALT  08/12/2021     LIPID  08/25/2021     INFLUENZA VACCINE (1) Never done     PHQ-9  09/10/2021         Goals addressed this encounter:   Goals Addressed                    This Visit's Progress       Pain Management (pt-stated)   40%      Goal Statement: I will reduce the frequency of my headaches in the next 3 months.     Date goal set: 7/19/2021    Measure of Success: Patient reported account of decreased frequency in headaches    Barriers: None identified  Strengths: Highly motivated, positive attitude    Date to Achieve By: 10/19/2021    Patient expressed understanding of goal: Yes    Action steps to achieve this goal:    1. I will attend all follow up appointments as scheduled  2. I will take my medications as directed  3. I will  reach out to my care team for any concerning symptoms or questions related to my health             Intervention/Education provided during outreach:    Writer offerred support and empathy as patient discussed her dying sister.     Writer informed patient of appointment that was moved from 9/7/2021 to 9/3/2021.     Education provided on signs and symptoms to report to care team.       Outreach Frequency: monthly    Plan:     Patient will attend all appointments as scheduled.     Patient will work towards goals as outlined above.     Care Coordinator will follow up in 1 month.     Jolanta Velazquez RN Care Coordinator     [facility]  [facility]  [email or web address]    Office: [phone]

## 2021-09-02 ENCOUNTER — OFFICE VISIT (OUTPATIENT)
Dept: OTOLARYNGOLOGY | Facility: CLINIC | Age: 56
End: 2021-09-02
Payer: MEDICARE

## 2021-09-02 ENCOUNTER — OFFICE VISIT (OUTPATIENT)
Dept: AUDIOLOGY | Facility: CLINIC | Age: 56
End: 2021-09-02
Attending: OTOLARYNGOLOGY
Payer: MEDICARE

## 2021-09-02 VITALS
TEMPERATURE: 97 F | HEART RATE: 58 BPM | BODY MASS INDEX: 41.78 KG/M2 | HEIGHT: 64 IN | WEIGHT: 244.71 LBS | OXYGEN SATURATION: 95 %

## 2021-09-02 DIAGNOSIS — Z98.890 S/P CRANIOTOMY: ICD-10-CM

## 2021-09-02 DIAGNOSIS — H90.3 BILATERAL SENSORINEURAL HEARING LOSS: Primary | ICD-10-CM

## 2021-09-02 DIAGNOSIS — Q01.8 TEMPORAL ENCEPHALOCELE (H): ICD-10-CM

## 2021-09-02 DIAGNOSIS — Q01.8 TEMPORAL ENCEPHALOCELE (H): Primary | ICD-10-CM

## 2021-09-02 DIAGNOSIS — H90.6 MIXED CONDUCTIVE AND SENSORINEURAL HEARING LOSS OF BOTH EARS: ICD-10-CM

## 2021-09-02 PROCEDURE — 99024 POSTOP FOLLOW-UP VISIT: CPT | Performed by: OTOLARYNGOLOGY

## 2021-09-02 PROCEDURE — 99024 POSTOP FOLLOW-UP VISIT: CPT | Performed by: NEUROLOGICAL SURGERY

## 2021-09-02 PROCEDURE — 92557 COMPREHENSIVE HEARING TEST: CPT | Performed by: AUDIOLOGIST

## 2021-09-02 PROCEDURE — 92550 TYMPANOMETRY & REFLEX THRESH: CPT | Performed by: AUDIOLOGIST

## 2021-09-02 ASSESSMENT — MIFFLIN-ST. JEOR: SCORE: 1680

## 2021-09-02 ASSESSMENT — PAIN SCALES - GENERAL: PAINLEVEL: NO PAIN (0)

## 2021-09-02 NOTE — PROGRESS NOTES
"  [facility]      Name: Marleen Mcfadden  : 1965  Referring provider: Referred Self  2021      Reason for visit: Follow up visit    Dear Dr. Matias,     We saw Mrs. Mcfadden back in the [facility] today in follow up. I saw her with my skull base surgery partner from neuro-otology, Dr. Harrell.We performed a right temporal craniotomy, right middle fossa approach for repair of temporal lobe encephalocele, and lumbar drain placement 2 months ago (2021). We reoperated her two days later due to concerns of early recurrent CSF leak.      Overall, she feels well. She has lost some weight since surgery despite our activity restrictions. She is not having any major headaches. She is also not having the \"popping\" sensation that she experienced preoperatively. She denies any drainage from the wound, ear, or nose. A recent sleep study confirmed sleep apnea and CPAP has been recommended.     Physical Exam:   She appears well and comfortable. She remains morbidly obese but she has clearly lost weight. Her gross neurological examination remains normal. Her incision has healed well. Speech and language are normal. She moves all extremities with good strength and coordination. Dr. Harrell confirmed that the right ear is dry, and no fluid was seen behind the tympanic membrane.    Audiogram:          AUDIOGRAM: She underwent an audiogram today.      RIGHT: Mild rising to borderline normal hearing sloping to severe high frequency SNHL. LEFT: Borderline normal-normal hearing sloping to mild rising to normal sloping to moderately severe SNHL at 8kHz. Re audio 2020:10-30dB decrease 250-3k&6kHz in RE and 5-20dB decrease 250-4k Hz & 25dB decrease at 8kHz in LE. Tymps: Normal bilaterally. Reflexes: present at normal levels for L/R ipsi, L contra and elevated for R contra.     Right: Speech reception threshold is 30 dB with 92% word recognition at 70 dB. Tympanogram A type   Left: Speech " reception threshold is 25 dB with 100% word recognition at 55 dB. Tympanogram A type                                           Plan:   1.   Proceed with CPAP per sleep medicine.  2.   Follow up in 6 months with repeat audiogram  3. She can gradually increase her activity levels to weight lifting < 40-50 lbs.    Please do not hesitate to contact us with questions.    Jocelyn Noe MD  Department of Neurosurgery  [facility]       Scribe Disclosure:  I, Lisa Davis, am serving as a scribe to document services personally performed by Jocelyn Noe MD at this visit, based upon the provider's statements to me. All documentation has been reviewed by the aforementioned provider prior to being entered into the official medical record.

## 2021-09-02 NOTE — LETTER
"2021      RE: Marleen Mcfadden  [address]  [facility] [address]         [facility]      Name: Marleen Mcfadden  : 1965  Referring provider: Referred Self  2021      Reason for visit: Follow up visit    Dear Dr. Matias,     We saw Mrs. Mcfadden back in the [facility] today in follow up. I saw her with my skull base surgery partner from neuro-otology, Dr. Harrell.We performed a right temporal craniotomy, right middle fossa approach for repair of temporal lobe encephalocele, and lumbar drain placement 2 months ago (2021). We reoperated her two days later due to concerns of early recurrent CSF leak.      Overall, she feels well. She has lost some weight since surgery despite our activity restrictions. She is not having any major headaches. She is also not having the \"popping\" sensation that she experienced preoperatively. She denies any drainage from the wound, ear, or nose. A recent sleep study confirmed sleep apnea and CPAP has been recommended.     Physical Exam:   She appears well and comfortable. She remains morbidly obese but she has clearly lost weight. Her gross neurological examination remains normal. Her incision has healed well. Speech and language are normal. She moves all extremities with good strength and coordination. Dr. Harrell confirmed that the right ear is dry, and no fluid was seen behind the tympanic membrane.    Audiogram:          AUDIOGRAM: She underwent an audiogram today.      RIGHT: Mild rising to borderline normal hearing sloping to severe high frequency SNHL. LEFT: Borderline normal-normal hearing sloping to mild rising to normal sloping to moderately severe SNHL at 8kHz. Re audio 2020:10-30dB decrease 250-3k&6kHz in RE and 5-20dB decrease 250-4k Hz & 25dB decrease at 8kHz in LE. Tymps: Normal bilaterally. Reflexes: present at normal levels for L/R ipsi, L contra and elevated for R contra.     Right: Speech reception threshold " is 30 dB with 92% word recognition at 70 dB. Tympanogram A type   Left: Speech reception threshold is 25 dB with 100% word recognition at 55 dB. Tympanogram A type                                           Plan:   1.   Proceed with CPAP per sleep medicine.  2.   Follow up in 6 months with repeat audiogram  3. She can gradually increase her activity levels to weight lifting < 40-50 lbs.    Please do not hesitate to contact us with questions.    Jocelyn Noe MD  Department of Neurosurgery  [facility]       Scribe Disclosure:  I, Lisa Davis, am serving as a scribe to document services personally performed by Jocelyn Noe MD at this visit, based upon the provider's statements to me. All documentation has been reviewed by the aforementioned provider prior to being entered into the official medical record.         Jocelyn Noe MD

## 2021-09-02 NOTE — PROGRESS NOTES
AUDIOLOGY REPORT    SUMMARY: Audiology visit completed. See audiogram for results.      RECOMMENDATIONS: Follow-up with ENT.    Lobo Qureshi. CCC-A  Licensed Audiologist   [place] #[id number]

## 2021-09-02 NOTE — LETTER
9/2/2021      RE: Marleen Mcfadden  [address]  [place] [address]       Marleen Mcfadden is seen in the [facility] lateral skullbase clinic. She is a 56 year old female s/p right temporal craniotomy, right middle fossa approach for repair of temporal lobe encephalocele, and lumbar drain placement 2 months ago (07/14/2021).     Today, she reports that she had one headache while flying recently, but otherwise, has no concerns or complaints. No drainage from her ear or her right nostril. She likes to exercise with a rowing machine, and would like to resume this activity.    Physical examination:  Constitutional:  In no acute distress, appears stated age  Eyes:  Extraocular movements intact, no spontaneous nystagmus  Ears:  Right preauricular incision healing well. Right ear examined. Ear canal clear, TM intact with an aerated middle ear, no effusion noted.  Respiratory:  No increased work of breathing, wheezing or stridor  Musculoskeletal:  Good upper extremity strength  Skin:  No rashes on the head and neck  Neurologic:  House Brackman 1/6 bilaterally, ambulating normally  Psychiatric:  Alert, normal affect, answering questions appropriately    Audiogram: Audiogram was independently reviewed and compared to preop.    Right mild/moderate upsloping to mild downsloping to severe conductive hearing loss and left mild downsloping to sevdere conductive hearing loss.  Right: Speech reception threshold is 30 dB with 92% word recognition at 70 dB. Tympanogram A type   Left: Speech reception threshold is 25 dB with 100% word recognition at 55 dB. Tympanogram A type   Both ears have a worsened conductive component as they were both in the normal to normal/mild range before dropping to severe on the right and moderate on the left.    Assessment and plan:    Dr. Noe and I reviewed the patient's post surgery MRI and audiogram with the patient. We discussed that we should follow up in 6 months with  a repeat audiogram. We assured her that she can get back into her rowing machine with caution. She knows that she can return sooner for any new or worsening symptoms.    She reports that her sister is starting going into hospice for cancer. We assured her that she is safe to fly to [place] to visit her as she needs.    Scribe Disclosure:  I, Lisa Davis, am serving as a scribe to document services personally performed by Jocelyne Harrell MD at this visit, based upon the provider's statements to me. All documentation has been reviewed by the aforementioned provider prior to being entered into the official medical record.      The documentation recorded by the scribe accurately reflects the services I personally performed and the decisions made by me.        Jocelyne Harrell MD

## 2021-09-02 NOTE — PROGRESS NOTES
Marleen Mcfadden is seen in the [facility] lateral skullbase clinic. She is a 56 year old female s/p right temporal craniotomy, right middle fossa approach for repair of temporal lobe encephalocele, and lumbar drain placement 2 months ago (07/14/2021).     Today, she reports that she had one headache while flying recently, but otherwise, has no concerns or complaints. No drainage from her ear or her right nostril. She likes to exercise with a rowing machine, and would like to resume this activity.    Physical examination:  Constitutional:  In no acute distress, appears stated age  Eyes:  Extraocular movements intact, no spontaneous nystagmus  Ears:  Right preauricular incision healing well. Right ear examined. Ear canal clear, TM intact with an aerated middle ear, no effusion noted.  Respiratory:  No increased work of breathing, wheezing or stridor  Musculoskeletal:  Good upper extremity strength  Skin:  No rashes on the head and neck  Neurologic:  House Brackman 1/6 bilaterally, ambulating normally  Psychiatric:  Alert, normal affect, answering questions appropriately    Audiogram: Audiogram was independently reviewed and compared to preop.    Right mild/moderate upsloping to mild downsloping to severe conductive hearing loss and left mild downsloping to sevdere conductive hearing loss.  Right: Speech reception threshold is 30 dB with 92% word recognition at 70 dB. Tympanogram A type   Left: Speech reception threshold is 25 dB with 100% word recognition at 55 dB. Tympanogram A type   Both ears have a worsened conductive component as they were both in the normal to normal/mild range before dropping to severe on the right and moderate on the left.    Assessment and plan:    Dr. Noe and I reviewed the patient's post surgery MRI and audiogram with the patient. We discussed that we should follow up in 6 months with a repeat audiogram. We assured her that she can get back into her rowing machine with  caution. She knows that she can return sooner for any new or worsening symptoms.    She reports that her sister is starting going into hospice for cancer. We assured her that she is safe to fly to [place] to visit her as she needs.    Scribe Disclosure:  I, Lisa Davis, am serving as a scribe to document services personally performed by Jocelyne Harrell MD at this visit, based upon the provider's statements to me. All documentation has been reviewed by the aforementioned provider prior to being entered into the official medical record.      The documentation recorded by the scribe accurately reflects the services I personally performed and the decisions made by me.

## 2021-09-02 NOTE — TELEPHONE ENCOUNTER
LOSARTAN 100MG TABLETS      Last Written Prescription Date:  7-19-21  Last Fill Quantity: 30,   # refills: 0  Last Office Visit : 6-16-21  Future Office visit:  12-8-21    Last clinic note:ACEi/ARB/ARNI: yes, changed Entresto to Losartan 100mg daily last visit. However, patient requesting transition back to Entresto due to experiencing fatigue/malaise on Losartan. Will switch ARB to ARNI after surgery, and check BMP 7-10 days after transition.    8-13-21 Pt out reach note;Called Marleen as we had planned to switch back to entresto once recovered from surgery. She states she would like to wait until she is fully cleared at [facility] beginning of September. WIll plan to call her again in September to discuss switching back. She states understanding, reports feeling well overall.    Routing refill request to provider for review/approval because:  Last fill 30 day: 0,   See above notes, ? RF

## 2021-09-02 NOTE — PATIENT INSTRUCTIONS
Thank you for choosing [facility]. You were seen in the [facility] Clinic today with Dr. Harrell and Dr. Noe.    Next steps:  1. We will see you back in 6 months with an audiogram (hearing test) prior.      Please don't hesitate to reach out via MyChart or telephone if you have any questions after your visit.    Nina Osorio MA, RN, PHN, [facility]  RN Care Coordinator, Skull Base Surgery    Direct: [phone]  ENT Clinic: [phone]  Neurosurgery Clinic: [phone]

## 2021-09-02 NOTE — LETTER
"2021       RE: Marleen Mcfadden  [address]  [place] [address]     Dear Colleague,    Thank you for referring your patient, Marleen Mcfadden, to the [facility] E[facility]SE [facility]AT [facility]IC [place] [facility]. Please see a copy of my visit note below.      [facility]      Name: Marleen Mcfadden  : 1965  Referring provider: Referred Self  2021      Reason for visit: Follow up visit    Dear Dr. Matias,     We saw Mrs. Mcfadden back in the [facility] Clinic today in follow up. I saw her with my skull base surgery partner from neuro-otology, Dr. Harrell.We performed a right temporal craniotomy, right middle fossa approach for repair of temporal lobe encephalocele, and lumbar drain placement 2 months ago (2021). We reoperated her two days later due to concerns of early recurrent CSF leak.      Overall, she feels well. She has lost some weight since surgery despite our activity restrictions. She is not having any major headaches. She is also not having the \"popping\" sensation that she experienced preoperatively. She denies any drainage from the wound, ear, or nose. A recent sleep study confirmed sleep apnea and CPAP has been recommended.     Physical Exam:   She appears well and comfortable. She remains morbidly obese but she has clearly lost weight. Her gross neurological examination remains normal. Her incision has healed well. Speech and language are normal. She moves all extremities with good strength and coordination. Dr. Harrell confirmed that the right ear is dry, and no fluid was seen behind the tympanic membrane.    Audiogram:          AUDIOGRAM: She underwent an audiogram today.      RIGHT: Mild rising to borderline normal hearing sloping to severe high frequency SNHL. LEFT: Borderline normal-normal hearing sloping to mild rising to normal sloping to moderately severe SNHL at 8kHz. Re audio " 6/19/2020:10-30dB decrease 250-3k&6kHz in RE and 5-20dB decrease 250-4k Hz & 25dB decrease at 8kHz in LE. Tymps: Normal bilaterally. Reflexes: present at normal levels for L/R ipsi, L contra and elevated for R contra.     Right: Speech reception threshold is 30 dB with 92% word recognition at 70 dB. Tympanogram A type   Left: Speech reception threshold is 25 dB with 100% word recognition at 55 dB. Tympanogram A type                                           Plan:   1.   Proceed with CPAP per sleep medicine.  2.   Follow up in 6 months with repeat audiogram  3. She can gradually increase her activity levels to weight lifting < 40-50 lbs.    Please do not hesitate to contact us with questions.    Jocelyn Noe MD  Department of Neurosurgery  [facility]       Scribe Disclosure:  I, Lisa Davis, am serving as a scribe to document services personally performed by Jocelyn Noe MD at this visit, based upon the provider's statements to me. All documentation has been reviewed by the aforementioned provider prior to being entered into the official medical record.

## 2021-09-02 NOTE — NURSING NOTE
"Chief Complaint   Patient presents with     RECHECK     6 week follow up with audio       Pulse 58, temperature 97  F (36.1  C), temperature source Temporal, height 1.626 m (5' 4\"), weight 111 kg (244 lb 11.4 oz), last menstrual period 10/19/2008, SpO2 95 %, not currently breastfeeding.    Carina Be, EMT    "

## 2021-09-03 ENCOUNTER — VIRTUAL VISIT (OUTPATIENT)
Dept: SLEEP MEDICINE | Facility: CLINIC | Age: 56
End: 2021-09-03
Payer: MEDICARE

## 2021-09-03 DIAGNOSIS — G47.33 OBSTRUCTIVE SLEEP APNEA: Primary | ICD-10-CM

## 2021-09-03 PROCEDURE — 99213 OFFICE O/P EST LOW 20 MIN: CPT | Mod: 95 | Performed by: PHYSICIAN ASSISTANT

## 2021-09-03 ASSESSMENT — SLEEP AND FATIGUE QUESTIONNAIRES
HOW LIKELY ARE YOU TO NOD OFF OR FALL ASLEEP WHILE SITTING QUIETLY AFTER LUNCH WITHOUT ALCOHOL: WOULD NEVER DOZE
HOW LIKELY ARE YOU TO NOD OFF OR FALL ASLEEP WHILE SITTING AND TALKING TO SOMEONE: WOULD NEVER DOZE
HOW LIKELY ARE YOU TO NOD OFF OR FALL ASLEEP WHILE SITTING AND READING: WOULD NEVER DOZE
HOW LIKELY ARE YOU TO NOD OFF OR FALL ASLEEP IN A CAR, WHILE STOPPED FOR A FEW MINUTES IN TRAFFIC: WOULD NEVER DOZE
HOW LIKELY ARE YOU TO NOD OFF OR FALL ASLEEP WHILE SITTING INACTIVE IN A PUBLIC PLACE: WOULD NEVER DOZE
HOW LIKELY ARE YOU TO NOD OFF OR FALL ASLEEP WHILE WATCHING TV: SLIGHT CHANCE OF DOZING
HOW LIKELY ARE YOU TO NOD OFF OR FALL ASLEEP WHILE LYING DOWN TO REST IN THE AFTERNOON WHEN CIRCUMSTANCES PERMIT: WOULD NEVER DOZE
HOW LIKELY ARE YOU TO NOD OFF OR FALL ASLEEP WHEN YOU ARE A PASSENGER IN A CAR FOR AN HOUR WITHOUT A BREAK: SLIGHT CHANCE OF DOZING

## 2021-09-04 ENCOUNTER — HEALTH MAINTENANCE LETTER (OUTPATIENT)
Age: 56
End: 2021-09-04

## 2021-09-07 RX ORDER — LOSARTAN POTASSIUM 100 MG/1
TABLET ORAL
Qty: 90 TABLET | Refills: 0 | Status: SHIPPED | OUTPATIENT
Start: 2021-09-07 | End: 2021-10-22

## 2021-09-12 ENCOUNTER — PRE VISIT (OUTPATIENT)
Dept: NEUROLOGY | Facility: CLINIC | Age: 56
End: 2021-09-12

## 2021-09-12 NOTE — TELEPHONE ENCOUNTER
FUTURE VISIT INFORMATION      FUTURE VISIT INFORMATION:    Date: 10/11/2021    Time: 11am    [place] [facility] [place]:    Referring provider:      Referring providers clinic:      Reason for visit/diagnosis      RECORDS REQUESTED FROM:       Clinic name Comments Records Status Imaging Status   Internal Admission-7/5/2021    Dr. Harrell-7/22/2021, 9/2/2021    Dr. Noe-9/2/2021    MR Brain-7/9/2021    CT Head-7/9/2021, 9/18/2019    CTA Head/Neck-7/9/2021 Epic PACS

## 2021-09-13 ENCOUNTER — PATIENT OUTREACH (OUTPATIENT)
Dept: CARDIOLOGY | Facility: CLINIC | Age: 56
End: 2021-09-13

## 2021-09-13 NOTE — TELEPHONE ENCOUNTER
Called Marleen to check in and talk about switching back to entresto. She states she is out of town and her sister  yesterday so she asks for a call back in a few weeks when she is home. Confirms she does want to get back on entresto - asks for a few weeks to deal with her sisters death. I will call her in a few weeks to check in.

## 2021-09-28 ENCOUNTER — PATIENT OUTREACH (OUTPATIENT)
Dept: CARE COORDINATION | Facility: CLINIC | Age: 56
End: 2021-09-28

## 2021-09-28 ASSESSMENT — ACTIVITIES OF DAILY LIVING (ADL): DEPENDENT_IADLS:: INDEPENDENT

## 2021-09-28 NOTE — PROGRESS NOTES
Clinic Care Coordination Contact  [facility]/Voicemail    Referral Source: IP Handoff  Clinical Data: Care Coordinator Outreach  Outreach attempted x 1.  Left message on patient's voicemail with call back information and requested return call.  Plan: Care Coordinator will try to reach patient again in 10 business days.    Jolanta Velazquez, RN Care Coordinator     [facility] Ambulatory Care Management  [facility] Family and   [email or web address]    Office: [phone]

## 2021-09-29 ENCOUNTER — PATIENT OUTREACH (OUTPATIENT)
Dept: NURSING | Facility: CLINIC | Age: 56
End: 2021-09-29
Payer: MEDICARE

## 2021-09-29 ASSESSMENT — ACTIVITIES OF DAILY LIVING (ADL): DEPENDENT_IADLS:: INDEPENDENT

## 2021-09-29 NOTE — PROGRESS NOTES
Clinic Care Coordination Contact    Follow Up Progress Note      Assessment:     Patient shares that her sister recently passed away and much of the conversation was centered around offering support and empathy to her.     Patient shares that she continues to experience headaches and feels like these may be due to her grief due to her sister's passing. Patient also reports that she has gained some weight since her sister's passing and will work to lose the weight.     Patient shares that she had an appointment with sleep medicine and she is scheduled to get a CPAP machine in October.     Care Gaps:    Health Maintenance Due   Topic Date Due     URINE DRUG SCREEN  Never done     ASTHMA ACTION PLAN  Never done     MEDICARE ANNUAL WELLNESS VISIT  09/15/2009     ZOSTER IMMUNIZATION (1 of 2) Never done     HF ACTION PLAN  11/01/2015     PAP  07/01/2019     ASTHMA CONTROL TEST  03/08/2021     ALT  08/12/2021     LIPID  08/25/2021     INFLUENZA VACCINE (1) Never done     PHQ-9  09/10/2021           Goals addressed this encounter:   Goals Addressed                    This Visit's Progress       Pain Management (pt-stated)   50%      Goal Statement: I will reduce the frequency of my headaches in the next 3 months.     Date goal set: 7/19/2021    Measure of Success: Patient reported account of decreased frequency in headaches    Barriers: None identified  Strengths: Highly motivated, positive attitude    Date to Achieve By: 10/19/2021    Patient expressed understanding of goal: Yes    Action steps to achieve this goal:    1. I will attend all follow up appointments as scheduled  2. I will take my medications as directed  3. I will reach out to my care team for any concerning symptoms or questions related to my health             Intervention/Education provided during outreach: Writer offered support and empathy to patient regarding her sister's passing. Writer also reviewed upcoming appointments with patient. Education  provided on signs and symptoms to report to care team and nonpharmacologic methods to reduce head aches.      Outreach Frequency: monthly    Plan: Patient will attend follow up appointments as scheduled.    Care Coordinator will follow up in 1 month.     Jolanta Velazquez, RN Care Coordinator     [facility] Ambulatory Care Management  [facility] Family and OB  [email or web address]    Office: [phone]

## 2021-10-04 ENCOUNTER — DOCUMENTATION ONLY (OUTPATIENT)
Dept: SLEEP MEDICINE | Facility: CLINIC | Age: 56
End: 2021-10-04

## 2021-10-04 DIAGNOSIS — G47.33 OSA (OBSTRUCTIVE SLEEP APNEA): ICD-10-CM

## 2021-10-04 NOTE — PROGRESS NOTES
Patient was offered choice of vendor and chose [facility].  Patient Marleen Mcfadden was set up at [place] on October 4, 2021. Patient received a Resmed Airsense 10 Pressures were set at 6-12 cm H2O.   Patient s ramp is 6 cm H2O for Auto and FLEX/EPR is EPR, 2.  Patient received a Resmed Mask name: AIRFIT P10  Pillow mask size Medium, heated tubing and heated humidifier.  Patient does need to meet compliance. Patient has a follow up on TBD with Candace Ingram PA-C.    Aubrey Linares

## 2021-10-05 ENCOUNTER — MYC MEDICAL ADVICE (OUTPATIENT)
Dept: FAMILY MEDICINE | Facility: CLINIC | Age: 56
End: 2021-10-05

## 2021-10-06 ENCOUNTER — MYC MEDICAL ADVICE (OUTPATIENT)
Dept: SLEEP MEDICINE | Facility: CLINIC | Age: 56
End: 2021-10-06

## 2021-10-07 ENCOUNTER — DOCUMENTATION ONLY (OUTPATIENT)
Dept: SLEEP MEDICINE | Facility: CLINIC | Age: 56
End: 2021-10-07

## 2021-10-07 DIAGNOSIS — G47.33 OSA (OBSTRUCTIVE SLEEP APNEA): ICD-10-CM

## 2021-10-07 NOTE — PROGRESS NOTES
3 day Sleep therapy management telephone visit    Diagnostic AHI: 29.5    PSG    Confirmed with patient at time of call- Yes Patient is still interested in STM service       Subjective measures:  Patient reports she is struggling with pressure.  She feels like she is suffocating because it is too much air.          Objective data     Order Settings for PAP  CPAP min 6    CPAP max 12     Assessment: Nightly usage over four hours      Action plan: Patient to have 14 day STM visit. Patient has a follow up visit scheduled:   No  Turned ramp on at 4.0 cm H20 for 45 minutes     Replacement device: No  STM ordered by provider: Yes     Total time spent on accessing and  interpreting remote patient PAP therapy data  11 minutes    Total time spent counseling, coaching  and reviewing PAP therapy data with patient  4 minutes    [id number] no

## 2021-10-11 ENCOUNTER — VIRTUAL VISIT (OUTPATIENT)
Dept: NEUROLOGY | Facility: CLINIC | Age: 56
End: 2021-10-11
Attending: STUDENT IN AN ORGANIZED HEALTH CARE EDUCATION/TRAINING PROGRAM
Payer: MEDICARE

## 2021-10-11 DIAGNOSIS — G96.01 CSF OTORRHEA: ICD-10-CM

## 2021-10-11 PROCEDURE — 99443 PR PHYSICIAN TELEPHONE EVALUATION 21-30 MIN: CPT | Performed by: PSYCHIATRY & NEUROLOGY

## 2021-10-11 NOTE — LETTER
10/11/2021       RE: Marleen Mcfadden  [address]  [place] [address]     Dear Colleague,    Thank you for referring your patient, Marleen Mcfadden, to the [facility] [place] at [facility]. Please see a copy of my visit note below.    Marleen is a 56 year old who is being evaluated via a billable tel visit.  Consent obtained. 25 min visit.  fiol        Chief Complaint   Patient presents with     Consult     VIDEO VISIT JESUS      Prince Mckinney      Service Date: 10/11/2021    Yanna Matias MD  [facility]  [address] [place]  [address]    RE:  Marleen Mcfadden  MRN:  [id number]  :  1965    Dear Dr. Matias:    We had the privilege of doing an evaluation via a telephone visit lasting 25 minutes on this very pleasant 56-year-old woman who had a recent right temporal encephalocele operated by Dr. Noe with a myel leak and needing to be redone.    She has done great after the encephalocele was removed which has given her for years problems of leakage in ear, hearing loss and headaches and this was corrected.  However, the Neurology consult was requested because during her hospitalization, she did have some transient paralysis on the right side in her recovery time when she fell because she was too tired and she had a full workup of possible stroke code at that time.  There is not much information actually from the hospital notes, but according to some of the reports, she did develop some right sided weakness.  She is not clear which side it was, but the notes on the imaging order said it was the right side.  This completely resolved after a short period of time.  She did have a series of studies in the hospital including CT angiogram, MRI and the whole works and there was no blocked vessel, but they did see some right sided mild subdural expected after the procedure.  Since discharge and the  surgery, she has done well.  She is active and she has not had any more such problems and this was in July that she was admitted and had this surgery done and the recovery.  She is doing great.  She is followed by ENT and Dr. Noe.  She does have a pretty significant cardiac problem with severe enlargement of the ventricles and an ischemic or hereditary cardiomyopathy, which affects many members of her family, but she says she can exercise and do things without any problems.  She has no chest pain.  There is no history of atrial fib when she had the episode subsequent to that.    She has done relatively well since then.  The one episode of a blank spot in the left eye, lasting 20 seconds with driving a month or so ago, but this has not recurred.  She is not taking any aspirin.  I told her it would be a good thing to start on one.  She is followed by Cardiology, Dr. Kolb, has had echos of the heart and MRI of the heart in January.    Her past medical history includes the diagnosis of CSF otorrhea , moderate major depression, left shoulder pain, bilateral low back pain, osteoarthritis in knees, essential hypertension, and morbid obesity, thyroid nodule leiomyoma of the uterus, history of autoimmune disease reported, tendinitis of the leg, knee arthropathy, rupture of the quadriceps tendon, hypertension, morbid obesity and so forth.    ALLERGIES:  Morphine, nickel and sulfa drugs.    Her medications were reviewed with her and are the same as listed on the chart.    Her family history indicates cardiomyopathy without stroke.  Her exam and review of systems otherwise unremarkable.  Blood pressure 145/71.  His speech is fluent.  She says she can walk well without difficulty and even does some exercises in the gym.    In summary, these episodes of right sided weakness are strange.  She had surgery on the right side and it could have been something that cleared or I am not sure. she has had no further problems.  She is  recovering well from the encephalocele surgery.  She does have severe cardiomyopathy, which is familiar hereditary and ischemic and we will start her on aspirin 1 a day.  We will see her as needed in the future.  She is a most pleasant patient.    Sincerely,    Emiliano Rice MD        D: 10/11/2021   T: 10/11/2021   MT: francisco    Name:     BALTAZAR OREILLY  MRN:      [id number]        Account:      [id number]   :      1965           Service Date: 10/11/2021       Document: [id number]

## 2021-10-11 NOTE — PROGRESS NOTES
Marleen is a 56 year old who is being evaluated via a billable tel visit.  Consent obtained. 25 min visit.  david        Chief Complaint   Patient presents with     Consult     VIDEO VISIT JESUS Mckinney

## 2021-10-11 NOTE — PROGRESS NOTES
Service Date: 10/11/2021    Yanna Matias MD  [facility]  [address], [place]  [address]    RE:  Marleen Mcfadden  MRN:  [id number]  :  1965    Dear Dr. Matias:    We had the privilege of doing an evaluation via a telephone visit lasting 25 minutes on this very pleasant 56-year-old woman who had a recent right temporal encephalocele operated by Dr. Noe with a myel leak and needing to be redone.    She has done great after the encephalocele was removed which has given her for years problems of leakage in ear, hearing loss and headaches and this was corrected.  However, the Neurology consult was requested because during her hospitalization, she did have some transient paralysis on the right side in her recovery time when she fell because she was too tired and she had a full workup of possible stroke code at that time.  There is not much information actually from the hospital notes, but according to some of the reports, she did develop some right sided weakness.  She is not clear which side it was, but the notes on the imaging order said it was the right side.  This completely resolved after a short period of time.  She did have a series of studies in the hospital including CT angiogram, MRI and the whole works and there was no blocked vessel, but they did see some right sided mild subdural expected after the procedure.  Since discharge and the surgery, she has done well.  She is active and she has not had any more such problems and this was in July that she was admitted and had this surgery done and the recovery.  She is doing great.  She is followed by ENT and Dr. Noe.  She does have a pretty significant cardiac problem with severe enlargement of the ventricles and an ischemic or hereditary cardiomyopathy, which affects many members of her family, but she says she can exercise and do things without any problems.  She has no chest pain.  There is no history of atrial fib  when she had the episode subsequent to that.    She has done relatively well since then.  The one episode of a blank spot in the left eye, lasting 20 seconds with driving a month or so ago, but this has not recurred.  She is not taking any aspirin.  I told her it would be a good thing to start on one.  She is followed by Cardiology, Dr. Kolb, has had echos of the heart and MRI of the heart in January.    Her past medical history includes the diagnosis of CSF otorrhea , moderate major depression, left shoulder pain, bilateral low back pain, osteoarthritis in knees, essential hypertension, and morbid obesity, thyroid nodule leiomyoma of the uterus, history of autoimmune disease reported, tendinitis of the leg, knee arthropathy, rupture of the quadriceps tendon, hypertension, morbid obesity and so forth.    ALLERGIES:  Morphine, nickel and sulfa drugs.    Her medications were reviewed with her and are the same as listed on the chart.    Her family history indicates cardiomyopathy without stroke.  Her exam and review of systems otherwise unremarkable.  Blood pressure 145/71.  His speech is fluent.  She says she can walk well without difficulty and even does some exercises in the gym.    In summary, these episodes of right sided weakness are strange.  She had surgery on the right side and it could have been something that cleared or I am not sure. she has had no further problems.  She is recovering well from the encephalocele surgery.  She does have severe cardiomyopathy, which is familiar hereditary and ischemic and we will start her on aspirin 1 a day.  We will see her as needed in the future.  She is a most pleasant patient.    Sincerely,    Emiliano Rice MD        D: 10/11/2021   T: 10/11/2021   MT: francisco    Name:     BALTAZAR OREILLY  MRN:      [id number]        Account:      [id number]   :      1965           Service Date: 10/11/2021       Document: [id number]

## 2021-10-12 ENCOUNTER — HOSPITAL ENCOUNTER (OUTPATIENT)
Dept: MAMMOGRAPHY | Facility: CLINIC | Age: 56
End: 2021-10-12
Attending: OBSTETRICS & GYNECOLOGY
Payer: MEDICARE

## 2021-10-12 DIAGNOSIS — N63.31 LUMP OF AXILLARY TAIL OF RIGHT BREAST: ICD-10-CM

## 2021-10-12 DIAGNOSIS — N63.0 BREAST LUMP: ICD-10-CM

## 2021-10-12 DIAGNOSIS — N64.9 BREAST DISORDER: ICD-10-CM

## 2021-10-12 PROCEDURE — 76642 ULTRASOUND BREAST LIMITED: CPT | Mod: RT

## 2021-10-12 PROCEDURE — 77062 BREAST TOMOSYNTHESIS BI: CPT

## 2021-10-13 DIAGNOSIS — I42.9 FAMILIAL CARDIOMYOPATHY (H): ICD-10-CM

## 2021-10-17 RX ORDER — METOPROLOL SUCCINATE 50 MG/1
50 TABLET, EXTENDED RELEASE ORAL DAILY
Qty: 90 TABLET | Refills: 0 | Status: SHIPPED | OUTPATIENT
Start: 2021-10-17 | End: 2022-01-14

## 2021-10-17 NOTE — TELEPHONE ENCOUNTER
"  metoprolol succinate ER (TOPROL-XL) 50 MG 24 hr tablet  Last Written Prescription Date:  10/20/20  Last Fill Quantity: 90,   # refills: 3  Last Office Visit : 6/16/21  Future Office visit:  12/8/21     \" Yes, continue metoprolol XL 50mg daily\".    Routing refill request to provider for review/approval because: bp > 140 /90  "

## 2021-10-19 ENCOUNTER — PATIENT OUTREACH (OUTPATIENT)
Dept: CARDIOLOGY | Facility: CLINIC | Age: 56
End: 2021-10-19

## 2021-10-19 ENCOUNTER — DOCUMENTATION ONLY (OUTPATIENT)
Dept: SLEEP MEDICINE | Facility: CLINIC | Age: 56
End: 2021-10-19

## 2021-10-19 DIAGNOSIS — I50.32 CHRONIC DIASTOLIC CONGESTIVE HEART FAILURE (H): ICD-10-CM

## 2021-10-19 DIAGNOSIS — G47.33 OSA (OBSTRUCTIVE SLEEP APNEA): ICD-10-CM

## 2021-10-19 DIAGNOSIS — I50.22 CHRONIC SYSTOLIC CONGESTIVE HEART FAILURE (H): Primary | ICD-10-CM

## 2021-10-19 PROBLEM — F32.9 MAJOR DEPRESSION: Chronic | Status: ACTIVE | Noted: 2019-09-20

## 2021-10-19 NOTE — PROGRESS NOTES
14  DAY STM VISIT    Diagnostic AHI: 29.5  PSG    Message left for patient to return call     Assessment: Pt meeting objective benchmarks.       Action plan: waiting for patient to return call.       Device type: Auto-CPAP    PAP settings: CPAP min 6.0 cm  H20       CPAP max 12.0 cm  H20      95th% pressure 11.9 cm  H20        RESMED EPR level Setting: TWO    RESMED Soft response setting:  OFF    Mask type:  Nasal Pillows    Objective measures: 14 day rolling measures      Compliance  78 %      Leak  23.49  lpm  last  upload      AHI 4.7   last  upload      Average number of minutes 290      Objective measure goal  Compliance   Goal >70%  Leak   Goal < 24 lpm  AHI  Goal < 5  Usage  Goal >240        Total time spent on accessing and interpreting remote patient PAP therapy data  2 minutes    Total time spent counseling, coaching  and reviewing PAP therapy data with patient  0 minutes    [id number]  [id number]  no (3 day STM)

## 2021-10-19 NOTE — TELEPHONE ENCOUNTER
Called bc to check in about restarting her entresto. No answer. Left voicemail asking her to call back when able otherwise I will try again later this week.

## 2021-10-22 RX ORDER — SACUBITRIL AND VALSARTAN 49; 51 MG/1; MG/1
1 TABLET, FILM COATED ORAL 2 TIMES DAILY
Qty: 180 TABLET | Refills: 3 | Status: SHIPPED | OUTPATIENT
Start: 2021-10-22 | End: 2022-06-17

## 2021-10-22 RX ORDER — SPIRONOLACTONE 25 MG/1
50 TABLET ORAL DAILY
Qty: 180 TABLET | Refills: 3 | Status: SHIPPED | OUTPATIENT
Start: 2021-10-22 | End: 2022-10-20

## 2021-10-22 NOTE — TELEPHONE ENCOUNTER
Called bc to check in about switching back to entresto. She is out of town but will be back this weekend and would like to proceed with switching to entresto. I will send rx to her pharmacy per her request. Reviewed dosing, reviewed instructions to stop losartan. She also needs refill of spironolactone, will send for her as well.     Date: 10/22/2021    Time of Call: 1:46 PM     Diagnosis:  Heart failure     [ VORB ] Ordering provider: Cassie ruffin MD    Order: stop losartan. Start entresto 49-51mg BID. Repeat BMp 7-10 days after switch     Order received by: Gwen Swenson RN     Follow-up/additional notes: order placed.

## 2021-11-03 ENCOUNTER — PATIENT OUTREACH (OUTPATIENT)
Dept: CARE COORDINATION | Facility: CLINIC | Age: 56
End: 2021-11-03

## 2021-11-03 ASSESSMENT — ACTIVITIES OF DAILY LIVING (ADL): DEPENDENT_IADLS:: INDEPENDENT

## 2021-11-04 ENCOUNTER — DOCUMENTATION ONLY (OUTPATIENT)
Dept: SLEEP MEDICINE | Facility: CLINIC | Age: 56
End: 2021-11-04
Payer: MEDICARE

## 2021-11-04 DIAGNOSIS — G47.33 OSA (OBSTRUCTIVE SLEEP APNEA): ICD-10-CM

## 2021-11-04 NOTE — PROGRESS NOTES
30 DAY STM VISIT    Diagnostic AHI: 29.5  PSG    Subjective measures:   Patient sleeping better feeling better with CPAP use.      Assessment: Pt not meeting objective benchmarks for compliance     Action plan: 2 week STM recheck appt scheduled  Patient has scheduled a follow up visit with Candace Ingram PA-C on 1/12/2022.   Device type: Auto-CPAP  PAP settings: CPAP min 6.0 cm  H20     CPAP max 12.0 cm  H20    95th% pressure 11.5 cm  H20      RESMED EPR level Setting: TWO    RESMED Soft response setting:  OFF  Mask type:  Nasal Pillows  Objective measures: 14 day rolling measures      Compliance  64 %      Leak  13.46 lpm  last  upload      AHI 2.98   last  upload      Average number of minutes 281      Objective measure goal  Compliance   Goal >70%  Leak   Goal < 24 lpm  AHI  Goal < 5  Usage  Goal >240        Total time spent on accessing and interpreting remote patient PAP therapy data  14 minutes    Total time spent counseling, coaching  and reviewing PAP therapy data with patient  9 minutes     [id number] this call  [id number] no  at 3 or 14 day [facility]

## 2021-11-10 ENCOUNTER — PATIENT OUTREACH (OUTPATIENT)
Dept: CARE COORDINATION | Facility: CLINIC | Age: 56
End: 2021-11-10
Payer: MEDICARE

## 2021-11-10 ASSESSMENT — ACTIVITIES OF DAILY LIVING (ADL): DEPENDENT_IADLS:: INDEPENDENT

## 2021-11-10 NOTE — PROGRESS NOTES
Clinic Care Coordination Contact  [facility]/Voicemail    Referral Source: IP Handoff  Clinical Data: Care Coordinator Outreach  Outreach attempted x 3.  Left message on patient's voicemail with call back information and requested return call.  Plan: Care Coordinator will try to reach patient again in 1 month.    Jolanta Velazquez, RN Care Coordinator     [facility] Ambulatory Care Management  [facility] Family and OB

## 2021-11-12 ENCOUNTER — LAB (OUTPATIENT)
Dept: LAB | Facility: CLINIC | Age: 56
End: 2021-11-12
Payer: MEDICARE

## 2021-11-12 DIAGNOSIS — I50.22 CHRONIC SYSTOLIC CONGESTIVE HEART FAILURE (H): ICD-10-CM

## 2021-11-12 LAB
ANION GAP SERPL CALCULATED.3IONS-SCNC: 3 MMOL/L (ref 3–14)
BUN SERPL-MCNC: 14 MG/DL (ref 7–30)
CALCIUM SERPL-MCNC: 8.4 MG/DL (ref 8.5–10.1)
CHLORIDE BLD-SCNC: 107 MMOL/L (ref 94–109)
CO2 SERPL-SCNC: 28 MMOL/L (ref 20–32)
CREAT SERPL-MCNC: 0.79 MG/DL (ref 0.52–1.04)
GFR SERPL CREATININE-BSD FRML MDRD: 83 ML/MIN/1.73M2
GLUCOSE BLD-MCNC: 112 MG/DL (ref 70–99)
POTASSIUM BLD-SCNC: 3.9 MMOL/L (ref 3.4–5.3)
SODIUM SERPL-SCNC: 138 MMOL/L (ref 133–144)

## 2021-11-12 PROCEDURE — 36415 COLL VENOUS BLD VENIPUNCTURE: CPT

## 2021-11-12 PROCEDURE — 80048 BASIC METABOLIC PNL TOTAL CA: CPT

## 2021-11-18 ENCOUNTER — DOCUMENTATION ONLY (OUTPATIENT)
Dept: SLEEP MEDICINE | Facility: CLINIC | Age: 56
End: 2021-11-18
Payer: MEDICARE

## 2021-11-18 DIAGNOSIS — G47.33 OSA (OBSTRUCTIVE SLEEP APNEA): ICD-10-CM

## 2021-11-18 NOTE — PROGRESS NOTES
STM Recheck:  Patient doing good sleeping better with the CPAP aware of compliance will recheck in two weeks.

## 2021-12-02 ENCOUNTER — DOCUMENTATION ONLY (OUTPATIENT)
Dept: SLEEP MEDICINE | Facility: CLINIC | Age: 56
End: 2021-12-02
Payer: MEDICARE

## 2021-12-02 NOTE — PROGRESS NOTES
STM Recheck: Patient stated things are fine with CPAP spoke with her about compliance again she said she understood.

## 2021-12-06 DIAGNOSIS — I50.22 CHRONIC SYSTOLIC CONGESTIVE HEART FAILURE (H): Primary | ICD-10-CM

## 2021-12-06 NOTE — PROGRESS NOTES
Advanced Heart Failure Clinic Follow up:  2021    HPI:   Ms Mcfadden is a 56 year old female with a history of familial cardiomyopathy (LVEF 40-45%) and bilateral total knee arthroplasties ( & ) who presents for ongoing evaluation and management of her cardiomyopathy.     The patient was last seen in clinic in 2021. Since then, she reports no CHF ER visits or hospitalizations. She underwent repair of encephalocele and CSF leak on 21.    During today's visit, the patient reports feeling largely well. She denies any chest pain or pressure, orthopnea, PND, palpitations, syncope/presyncope or change in SOB or CHAVARRIA. She does complain of mild fatigue and slightly poorer exercise capacity after being transitioned from Losartan to Entresto, but had similar complaints while on losartan.     Her current cardiac medication regimen includes Entresto 49-51mg BID, Toprol XL 50 mg daily and Aldactone 50 mg daily. She reports good compliance with all her medications.     Family history:  Her maternal grandmother  in her 30's from a brain aneurysm. Marleen's father  at 55 years after a massive heart attack and CABG procedure. Marleen's paternal grandmother's mother  suddenly in her 70's while sitting at a bus stop.   Father and mother both had heart failure and heart disease. Mother  suddenly at 37 yo  Sister had heart failure/DCM and had a heart transplant  Both son and daughter have cardiomyopathy diagnoses  Half sister on mother's side does NOT have cardiomyopathy/HF    Past Medical History:   Diagnosis Date     Acute bilateral low back pain with right-sided sciatica 2016     Allergic rhinitis, cause unspecified      Arthritis      Autoimmune disease (H)      Autoimmune disease NEC     Autoimmune disease- unknown/poss SLE     Calcaneal spur 10/21/2014    Xray 10/17/14      CHF with cardiomyopathy (H)      Chronic low back pain      DDD (degenerative disc disease), lumbar       Depression      Failed total knee arthroplasty, initial encounter (H) 2019     Familial cardiomyopathy (H)      GERD without esophagitis      History of transfusion      Hypertension      Injury of left shoulder, initial encounter 2017     Morbid obesity (H)      Nontraumatic rupture of quadriceps tendon, left 2018     KATIA (obstructive sleep apnea)- moderate-severe (AHI 29) 2021     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     no longer an issue     Peroneus longus tendinitis 2015     Plantar fasciitis 2014     PONV (postoperative nausea and vomiting)      Rotator cuff injury 2017     Sprain of other ligament of left ankle, initial encounter 2017     Status post left knee replacement 2018     TB lung, latent     negative quantiferon gold test  12       Past Surgical History:   Procedure Laterality Date     ARTHROPLASTY REVISION KNEE Left 2019    Procedure: REVISION, LEFT TOTAL KNEE  ARTHROPLASTY;  Surgeon: Dev Rocha MD;  Location: [facility];  Service: Orthopedics     ARTHROPLASTY REVISION KNEE Right 2020    Procedure: RIGHT REVISION TOTAL KNEE ARTHROPLASTY;  Surgeon: Dev Rocha MD;  Location: [facility] OR;  Service: Orthopedics     BREAST SURGERY      Breast Reduction     C EXCIS UTERINE FIBROID,ABD APPRCH       C EXCISE EXCESS SKIN TISSUE,ABDOMEN        SECTION  1989      SECTION  10/1985     COLONOSCOPY WITH CO2 INSUFFLATION N/A 2021    Procedure: COLONOSCOPY, WITH CO2 INSUFFLATION;  Surgeon: Angela Harrington DO;  Location: [facility] OR     CRANIECTOMY Right 2021    Procedure: RIGHT MIDDLE FOSSA APPROACH, CSF LEAK REPAIR;  Surgeon: Jocelyn Noe MD;  Location: [facility] OR     CRANIOTOMY MIDDLE FOSSA, EXCISE ACOUSTIC NEUROMA, COMBINED N/A 2021    Procedure: Right CRANIOTOMY, MIDDLE FOSSA APPROACH, FOR REPAIR OF  ENCEPHALOCELE;  Surgeon: Jocelyne Harrell MD;  Location: [facility] OR     CYSTOURETHROSCOPY N/A 08/18/2020    Procedure: CYSTOSCOPY;  Surgeon: Cherelle Willams MD;  Location: [facility];  Service: Gynecology     GYN SURGERY N/A 08/18/2020    Procedure: MIDURETHRAL SLING, CYSTOSCOPY;  Surgeon: Cherelle Willams MD;  Location: [facility];  Service: Gynecology     HYSTERECTOMY TOTAL ABDOMINAL  2006    for fibroids; reports having blood transfusion after surgery     INSERT DRAIN LUMBAR N/A 07/05/2021    Procedure: Insert drain lumbar;  Surgeon: Jocelyn Noe MD;  Location:  OR     ORTHOPEDIC SURGERY  1998    Right Knee ACL repair     THYROIDECTOMY  09/09/2013    Procedure: THYROIDECTOMY;  LEFT THYROID LOBECTOMY.  (LIGASURE, RECURRENT LARYNGEAL NERVE MONITOR) ;  Surgeon: Uriah Camargo MD;  Location: [facility]     THYROIDECTOMY       TOTAL KNEE ARTHROPLASTY Left 10/03/2017     TOTAL KNEE ARTHROPLASTY Right 02/07/2019    Procedure: RIGHT TOTAL KNEE ARTHROPLASTY;  Surgeon: Dev Rocha MD;  Location: [facility];  Service: Orthopedics     TUBAL LIGATION  1989       Family History   Problem Relation Age of Onset     Hypertension Father         dec     Diabetes Father         dec     Heart Disease Father         dec     Alcohol/Drug Father      Cardiovascular Father      Heart Disease Mother         dec     Alcohol/Drug Mother      Cardiovascular Mother      Heart Disease Daughter         Cardiomyopathy     Cardiovascular Daughter         cardiomyopathy     Colon Cancer Sister      Cardiovascular Son         cardiomyopathy     Diabetes Paternal Grandmother         dec     Hypertension Paternal Grandmother         dec     Cerebrovascular Disease Paternal Grandmother         dec     Cancer Sister         Lupus     Cardiovascular Sister         cardiomyopathy     Heart Disease Sister         heart failure, and kidney failure       Social History     Tobacco Use      Smoking status: Never Smoker     Smokeless tobacco: Never Used   Substance Use Topics     Alcohol use: Yes     Comment: rare, twice a year, she has a drink little wine 2 days ago         Current Outpatient Medications   Medication Sig     amitriptyline (ELAVIL) 25 MG tablet TAKE 1 TABLET(25 MG) BY MOUTH AT BEDTIME     Cholecalciferol (VITAMIN D) 2000 UNIT tablet Take 5,000 Units by mouth as needed Reported on 3/8/2017     Collagen 500 MG CAPS Take 1 capsule by mouth daily      estradiol cypionate (DEPO-ESTRADIOL) 5 MG/ML injection Inject into the muscle every 28 days     famotidine (PEPCID) 40 MG tablet Take 1 tablet (40 mg) by mouth daily (Patient taking differently: Take 40 mg by mouth daily as needed )     FLAXSEED, LINSEED, PO Take 1 capsule by mouth daily      fluticasone (FLONASE) 50 MCG/ACT nasal spray Spray 2 sprays into both nostrils At Bedtime     furosemide (LASIX) 20 MG tablet Take 2 tablets (40 mg) by mouth daily as needed (as needed for weight gain/edema)     loratadine (CLARITIN) 10 MG tablet Take 1 tablet (10 mg) by mouth daily     metoprolol succinate ER (TOPROL-XL) 50 MG 24 hr tablet Take 1 tablet (50 mg) by mouth daily     omeprazole (PRILOSEC) 40 MG DR capsule Take 1 capsule (40 mg) by mouth daily     progesterone (PROMETRIUM) 100 MG capsule Take 100 mg by mouth At Bedtime      sacubitril-valsartan (ENTRESTO) 49-51 MG per tablet Take 1 tablet by mouth 2 times daily     solifenacin (VESICARE) 10 MG tablet Take 10 mg by mouth daily     spironolactone (ALDACTONE) 25 MG tablet Take 2 tablets (50 mg) by mouth daily     oxyCODONE (ROXICODONE) 5 MG tablet Take 1 tablet (5 mg) by mouth every 3 hours as needed for moderate to severe pain (Patient not taking: Reported on 10/11/2021)     sertraline (ZOLOFT) 50 MG tablet TAKE 1 TABLET BY MOUTH EVERY DAY (Patient not taking: Reported on 12/8/2021)     zolpidem (AMBIEN) 5 MG tablet Take tablet by mouth 15 minutes prior to sleep, for Sleep Study (Patient not  taking: Reported on 12/8/2021)     No current facility-administered medications for this visit.     Facility-Administered Medications Ordered in Other Visits   Medication     sodium chloride (PF) 0.9% PF flush 10 mL         ROS:   10 point ROS negative other than what is discussed above    EXAM:   LMP 10/19/2008 (Approximate)      GENERAL: Alert, oriented, NAD  HEENT:  NC/AT.  Sclerae white.  MMM,   NECK: No adenopathy. 2+ carotids,   HEART: RRR,+ S1 and S2, no murmurs or gallops.  JVP 6-7 cm without HJR  LUNGS:  Clear to auscultation bilaterally, no wheezes, rales, or rhonchi  ABDOMEN: Soft, obese, nontender, nondistended, bowel sounds present, no hepatomeagly appreciated although exam limited by body habitus  EXTREMITIES: No lower extremity edema.  2+ bilateral peripheral pulses.  PSYCH: Normal affect     LABS  Last Comprehensive Metabolic Panel:  Sodium   Date Value Ref Range Status   12/08/2021 138 133 - 144 mmol/L Final   07/11/2021 134 133 - 144 mmol/L Final     Potassium   Date Value Ref Range Status   12/08/2021 4.1 3.4 - 5.3 mmol/L Final   07/11/2021 4.2 3.4 - 5.3 mmol/L Final     Chloride   Date Value Ref Range Status   12/08/2021 104 94 - 109 mmol/L Final   07/11/2021 102 94 - 109 mmol/L Final     Carbon Dioxide   Date Value Ref Range Status   07/11/2021 30 20 - 32 mmol/L Final     Carbon Dioxide (CO2)   Date Value Ref Range Status   12/08/2021 28 20 - 32 mmol/L Final     Anion Gap   Date Value Ref Range Status   12/08/2021 6 3 - 14 mmol/L Final   07/11/2021 2 (L) 3 - 14 mmol/L Final     Glucose   Date Value Ref Range Status   12/08/2021 93 70 - 99 mg/dL Final   07/11/2021 87 70 - 99 mg/dL Final     Urea Nitrogen   Date Value Ref Range Status   12/08/2021 15 7 - 30 mg/dL Final   07/11/2021 14 7 - 30 mg/dL Final     Creatinine   Date Value Ref Range Status   12/08/2021 0.90 0.52 - 1.04 mg/dL Final   07/11/2021 0.79 0.52 - 1.04 mg/dL Final     GFR Estimate   Date Value Ref Range Status   12/08/2021 71 >60  mL/min/1.73m2 Final     Comment:     As of July 11, 2021, eGFR is calculated by the CKD-EPI creatinine equation, without race adjustment. eGFR can be influenced by muscle mass, exercise, and diet. The reported eGFR is an estimation only and is only applicable if the renal function is stable.   07/11/2021 83 >60 mL/min/[1.73_m2] Final     Comment:     Non  GFR Calc  Starting 12/18/2018, serum creatinine based estimated GFR (eGFR) will be   calculated using the Chronic Kidney Disease Epidemiology Collaboration   (CKD-EPI) equation.       Calcium   Date Value Ref Range Status   12/08/2021 8.8 8.5 - 10.1 mg/dL Final   07/11/2021 8.2 (L) 8.5 - 10.1 mg/dL Final       CMR Report  01-  Clinical history: 53 yo woman with a familial cardiomyopathy to have repeat CMRI.  Comparison CMR: 10/6/2016.  1.  The global systolic function is moderately decreased and the LVEF is 38%. The RV is moderately dilated while wall thickness is normal. There is moderate global hypokinesis.  2. The RV is the upper limit of normal in cavity size. The global systolic function is mildly decreased and  the RVEF is 52%.   3. Both atria are mildly dilated.  4. There is no significant valvular disease.   5. Late gadolinium enhancement imaging shows no MI, fibrosis or infiltrative disease.   CONCLUSIONS:   The global systolic function is moderately decreased and the LVEF is 38%. The RV is moderately dilated  while wall thickness is normal. There is moderate global hypokinesis.  The RV is the upper limit of normal in cavity size. The global systolic function is mildly decreased and  the RVEF is 52%.   Compared to the prior study of 2016, LV systolic performance is minimally decreased and RV systolic  performance is similar.  Both LV and RV dilation are mildly increased.             Echo 6/16/2021  Interpretation Summary  Severe left ventricular dilation is present. LVIDd 7.1cm.  Severe diffuse hypokinesis is present.  LVEF 40% based  on biplane 2D tracing.  Right ventricular function, chamber size, wall motion, and thickness are  normal.  IVC diameter and respiratory changes fall into an intermediate range  suggesting an RA pressure of 8 mmHg.  This study was compared with the study from 8/12/2020.  No significant changes noted.      Assessment and Plan: 56 year old female with a history of familial cardiomyopathy, left total knee arthroplasty on 10/3/2017 and right knee replacement on 2/20/2019, and repair of encephalocele and CSF leak on 7/5/21 who presents for ongoing evaluation and management.      # Chronic systolic heart failure secondary to familial cardiomyopathy.    LVEF 40-45%  Stage B, NYHA Class IIb-IIIa (difficult to assess d/t orthopedic limitations)  MVO2 reduced on cardiopulmonary stress test in 2020 however patient did not meet anerobic threshold therefore MVO2 not a accurate measurement of cardiac exercise capacity.  VE/VCO2 slope normal and good BP response thus no evidence of significant cardiac limitation to exercise.    On assessment, patient compensated from a volume perspective, dry and warm profile. Labs revealed stable renal function, normal lytes. No med changes today. We will refer her to [facility] for genetic counseling and possible testing given her strong family history. Ok for virtual appointment.    ACEI/ARB/ARNI: yes, entresto 49-51 mg BID  BB: Yes, continue metoprolol XL 50mg daily  Aldosterone antagonist - Continue spironolactone 50mg daily   SCD prophylaxis: Recent echo confirms LVEF 40%, thus ICD not indicated at present  % BiV pacing: N/A  Fluid status euvolemic  NSAID use: advised to avoid NSAIDs  Encouraged patient to begin regular aerobic exercise aiming for at least 150 minutes of moderate physical activity or 75 minutes of vigorous physical activity - or an equal combination of both - each week. and follow low-salt, heart healthy diet.    RTC: In 6 months with echo and labs. Will be happy to see  sooner if change in clinical status or new questions/concerns arise.    Assessment and plan discussed with Dr Kolb, who's in agreement with the plan outlined above.     Anjali Albert MD  Cardiology Fellow      I have reviewed today's vital signs, notes, medications, labs and imaging. I have also seen and examined the patient and agree with the findings and plan as outlined above.    Cassie Kolb MD  Section Head - Advanced Heart Failure, Transplantation and Mechanical Circulatory Support  Director - Adult Congenital and Cardiovascular Genetics Center  Associate Professor of Medicine, [facility]    I spent a total of 30 minutes today with the patient personally reviewing recent cardiac testing and/or laboratory results, today's history and examination, and discussion and counseling with the patient.

## 2021-12-08 ENCOUNTER — OFFICE VISIT (OUTPATIENT)
Dept: CARDIOLOGY | Facility: CLINIC | Age: 56
End: 2021-12-08
Attending: INTERNAL MEDICINE
Payer: MEDICARE

## 2021-12-08 ENCOUNTER — LAB (OUTPATIENT)
Dept: LAB | Facility: CLINIC | Age: 56
End: 2021-12-08
Payer: MEDICARE

## 2021-12-08 VITALS
BODY MASS INDEX: 41.65 KG/M2 | WEIGHT: 250 LBS | SYSTOLIC BLOOD PRESSURE: 115 MMHG | DIASTOLIC BLOOD PRESSURE: 75 MMHG | HEART RATE: 54 BPM | OXYGEN SATURATION: 98 % | HEIGHT: 65 IN

## 2021-12-08 DIAGNOSIS — R73.03 PRE-DIABETES: Primary | ICD-10-CM

## 2021-12-08 DIAGNOSIS — I42.9 FAMILIAL CARDIOMYOPATHY (H): ICD-10-CM

## 2021-12-08 DIAGNOSIS — R00.1 BRADYCARDIA: ICD-10-CM

## 2021-12-08 DIAGNOSIS — R73.03 PRE-DIABETES: ICD-10-CM

## 2021-12-08 DIAGNOSIS — I50.22 CHRONIC SYSTOLIC CONGESTIVE HEART FAILURE (H): ICD-10-CM

## 2021-12-08 LAB
ALBUMIN SERPL-MCNC: 3.3 G/DL (ref 3.4–5)
ALP SERPL-CCNC: 75 U/L (ref 40–150)
ALT SERPL W P-5'-P-CCNC: 28 U/L (ref 0–50)
ANION GAP SERPL CALCULATED.3IONS-SCNC: 6 MMOL/L (ref 3–14)
AST SERPL W P-5'-P-CCNC: 14 U/L (ref 0–45)
BILIRUB SERPL-MCNC: 0.2 MG/DL (ref 0.2–1.3)
BUN SERPL-MCNC: 15 MG/DL (ref 7–30)
CALCIUM SERPL-MCNC: 8.8 MG/DL (ref 8.5–10.1)
CHLORIDE BLD-SCNC: 104 MMOL/L (ref 94–109)
CO2 SERPL-SCNC: 28 MMOL/L (ref 20–32)
CREAT SERPL-MCNC: 0.9 MG/DL (ref 0.52–1.04)
GFR SERPL CREATININE-BSD FRML MDRD: 71 ML/MIN/1.73M2
GLUCOSE BLD-MCNC: 93 MG/DL (ref 70–99)
HBA1C MFR BLD: 5.4 % (ref 0–5.6)
HOLD SPECIMEN: NORMAL
MAGNESIUM SERPL-MCNC: 2.1 MG/DL (ref 1.6–2.3)
POTASSIUM BLD-SCNC: 4.1 MMOL/L (ref 3.4–5.3)
PROT SERPL-MCNC: 7.4 G/DL (ref 6.8–8.8)
SODIUM SERPL-SCNC: 138 MMOL/L (ref 133–144)

## 2021-12-08 PROCEDURE — 80053 COMPREHEN METABOLIC PANEL: CPT | Performed by: PATHOLOGY

## 2021-12-08 PROCEDURE — 99214 OFFICE O/P EST MOD 30 MIN: CPT | Mod: GC | Performed by: INTERNAL MEDICINE

## 2021-12-08 PROCEDURE — 36415 COLL VENOUS BLD VENIPUNCTURE: CPT | Performed by: PATHOLOGY

## 2021-12-08 PROCEDURE — 83735 ASSAY OF MAGNESIUM: CPT | Performed by: PATHOLOGY

## 2021-12-08 PROCEDURE — 83036 HEMOGLOBIN GLYCOSYLATED A1C: CPT | Performed by: PATHOLOGY

## 2021-12-08 PROCEDURE — G0463 HOSPITAL OUTPT CLINIC VISIT: HCPCS

## 2021-12-08 ASSESSMENT — MIFFLIN-ST. JEOR: SCORE: 1717.99

## 2021-12-08 ASSESSMENT — PAIN SCALES - GENERAL: PAINLEVEL: NO PAIN (0)

## 2021-12-08 NOTE — NURSING NOTE
Chief Complaint   Patient presents with     Follow Up     RTN HF: 56 year old female presents with history of Familial cardiomyopathy, systolic HF, EF 35% for follow up with labs prior     Vitals were taken and medications reconciled.    Bebeto Rosas, EMT  8:52 AM

## 2021-12-08 NOTE — PATIENT INSTRUCTIONS
Cardiology Providers you saw during your visit:  Dr. Kolb     Medication changes:  1- No changes        Follow up  1- Return to see Dr. Kolb in 6 months with an echocardiogram and labs prior  2 - Referral to see Esther Bunch for genetic counseling       Please call if you have:  1. Weight gain of more than 2 pounds in a day or 5 pounds in a week  2. Increased shortness of breath, swelling or bloating  3. Dizziness, lightheadedness   4. Any questions or concerns.      Follow the American Heart Association Diet and Lifestyle recommendations:  Limit saturated fat, trans fat, sodium, red meat, sweets and sugar-sweetened beverages. If you choose to eat red meat, compare labels and select the leanest cuts available.  Aim for at least 150 minutes of moderate physical activity or 75 minutes of vigorous physical activity - or an equal combination of both - each week.     During business hours: [phone], press option # 1 to be routed to the [facility] then option # 4 to send a message to your care team     After hours, weekends or holidays: On Call Cardiologist- [phone]   option #4 and ask to speak to the on-call Cardiologist. Inform them you are a CORE/heart failure patient at the [facility].     Gwen Swenson, RN BSN  Cardiology Nurse Care Coordinator     Keep up the good work!     Take Care!

## 2021-12-08 NOTE — NURSING NOTE
Diet: Patient instructed regarding a heart failure healthy diet, including discussion of reduced fat and 2000 mg daily sodium restriction, daily weights, medication purpose and compliance, fluid restrictions and resources for patient and family to access for assistance with heart failure management.       Labs: Patient was given results of the laboratory testing obtained today and patient was instructed about when to return for the next laboratory testing. [id number] added on    Med Reconcile: Reviewed and verified all current medications with the patient. The updated medication list was printed and given to the patient. No changes    Return Appointment: Patient given instructions regarding scheduling next clinic visit. RTC to see Dr. Kolb with labs and echo prior in 6 months. Referred to genetic counselor. Handicap parking permit application signed for patient.     Patient stated she understood all health information given and agreed to call with further questions or concerns.     Gwen Swenson RN

## 2021-12-13 ENCOUNTER — LAB (OUTPATIENT)
Dept: LAB | Facility: CLINIC | Age: 56
End: 2021-12-13
Payer: MEDICARE

## 2021-12-13 DIAGNOSIS — N95.1 SYMPTOMATIC MENOPAUSAL OR FEMALE CLIMACTERIC STATES: ICD-10-CM

## 2021-12-13 LAB
ESTRADIOL SERPL-MCNC: 113 PG/ML
FSH SERPL-ACNC: 4.3 IU/L

## 2021-12-13 PROCEDURE — 36415 COLL VENOUS BLD VENIPUNCTURE: CPT

## 2021-12-13 PROCEDURE — 84403 ASSAY OF TOTAL TESTOSTERONE: CPT

## 2021-12-13 PROCEDURE — 83001 ASSAY OF GONADOTROPIN (FSH): CPT

## 2021-12-13 PROCEDURE — 82670 ASSAY OF TOTAL ESTRADIOL: CPT

## 2021-12-15 ENCOUNTER — PATIENT OUTREACH (OUTPATIENT)
Dept: NURSING | Facility: CLINIC | Age: 56
End: 2021-12-15
Payer: MEDICARE

## 2021-12-15 ASSESSMENT — ACTIVITIES OF DAILY LIVING (ADL): DEPENDENT_IADLS:: INDEPENDENT

## 2021-12-15 NOTE — PROGRESS NOTES
"Clinic Care Coordination Contact    Follow Up Progress Note      Assessment:     Patient shares she is \"doing ok\" but continues to experience headaches.     Patient shares she has been dealing with some fecal incontinence. She shares that she had delt with this in the past and was seen by [facility]. [facility] recommended therapy, although patient could not recall exactly what type of therapy, which she completed and reported it helped reduce the incontinence.    Patient shares that if she sneezes or laughs, stool will come out. She also reports intense urgency with her bowel movements and feels she needs to be in close proximity to a toilet at all times.     Patient shares she has a bladder sling in place.     Care Gaps:    Health Maintenance Due   Topic Date Due     URINE DRUG SCREEN  Never done     ASTHMA ACTION PLAN  Never done     MEDICARE ANNUAL WELLNESS VISIT  09/15/2009     ZOSTER IMMUNIZATION (1 of 2) Never done     HF ACTION PLAN  11/01/2015     PAP  07/01/2019     ASTHMA CONTROL TEST  03/08/2021     LIPID  08/25/2021     INFLUENZA VACCINE (1) Never done     PHQ-9  09/10/2021     COVID-19 Vaccine (3 - Booster for Pfizer series) 10/20/2021       Patient declined to discuss this visit.    Goals addressed this encounter:   Goals Addressed                    This Visit's Progress       Pain Management (pt-stated)   60%      Goal Statement: I will reduce the frequency of my headaches in the next 3 months.     Date goal set: 7/19/2021    Measure of Success: Patient reported account of decreased frequency in headaches    Barriers: None identified  Strengths: Highly motivated, positive attitude    Date to Achieve By: 10/19/2021    Patient expressed understanding of goal: Yes    Action steps to achieve this goal:    1. I will attend all follow up appointments as scheduled  2. I will take my medications as directed  3. I will reach out to my care team for any concerning symptoms or questions related to my health       "       Intervention/Education provided during outreach:     Writer provided patient with number for [facility] to schedule a follow up visit.      Outreach Frequency: monthly    Plan: Patient will call [facility] to schedule follow up visit. If a referral is needed, patient will reach out to writer and writer will request referral from her PCP.     Care Coordinator will follow up in 2 weeks.     Jolanta Velazquez RN Care Coordinator     [facility]  [facility] and   [email or web address]    Office: [phone]

## 2021-12-16 LAB — TESTOST SERPL-MCNC: 130 NG/DL (ref 8–60)

## 2021-12-21 ENCOUNTER — VIRTUAL VISIT (OUTPATIENT)
Dept: CARDIOLOGY | Facility: CLINIC | Age: 56
End: 2021-12-21
Attending: GENETIC COUNSELOR, MS
Payer: MEDICARE

## 2021-12-21 DIAGNOSIS — I42.9 FAMILIAL CARDIOMYOPATHY (H): ICD-10-CM

## 2021-12-21 DIAGNOSIS — Z84.89 FAMILY HISTORY OF SUDDEN DEATH IN MOTHER: ICD-10-CM

## 2021-12-21 DIAGNOSIS — I42.0 DILATED CARDIOMYOPATHY (H): Primary | ICD-10-CM

## 2021-12-21 PROCEDURE — 96040 HC GENETIC COUNSELING, EACH 30 MINUTES: CPT | Mod: GT | Performed by: GENETIC COUNSELOR, MS

## 2021-12-21 NOTE — LETTER
2021      RE: Marleen Mcfadden  [address]  [facility] [address]       Dear Colleague,    Thank you for the opportunity to participate in the care of your patient, Marleen Mcfadden, at the [facility] [place] [facility]. Please see a copy of my visit note below.    Marleen is a 56 year old who is being evaluated via a billable video visit.      How would you like to obtain your AVS? MyChart  If the video visit is dropped, the invitation should be resent by: Text to cell phone: [phone]  Will anyone else be joining your video visit? Petrona Lane, Virtual Facilitator/LPN    PROVIDER APPOINTMENT  Here is a copy of the progress note from your recent genetic counseling visit through the [facility] on Date: 2021.  Due to Covid-19 pandemic, this appointment was moved to a virtual visit.    PROGRESS NOTE:Marleen was referred by Cassie Kolb MD for genetic counseling due to her history of dilated cardiomyopathy (DCM).  I had the opportunity to talk with Marleen today to discuss the genetic component of DCM and testing options available to her .     MEDICAL HISTORY:Marleen was diagnosed with dilated cardiomyopathy in her 30's.  However, she reports that she did not feel well for at least 5 years prior to that time, primarily with fatigue.  Cardiac imaging showed reduced heart function (LVEF 40-45%) and dilated LV.  She reports history of fainting but nothing recently.    She also has a recent history of repair of encephalocele and CSF leak on 21 and bilateral total knee arthroplasties (2017 & 2019).    FAMILY HISTORY:A detailed family history was obtained during today's consult.  Family history was significant for the following cardiac history:    Full sister with DCM. She  in September after developing colon cancer.  Her history was also remarkable for IVÁN  kidney disease and ultimate transplant.  She did not have an ICD or much care for her DCM.    Marleen's son and daughter also have DCM.      A maternal half sister has hypertension, epilepsy and reported dizziness and fainting. She does not have a cardiac issue to Marleen's knowledge.    Mother  suddenly at 38 yrs of age.  It was thought to be the result of a cardiac problem. However, she also had a long history of alcoholism and had a colostomy. Her two siblings have no known heart issues.    Maternal grandmother  in her 30's from a brain aneurysm. Nothing is known about maternal grandfather.    Marleen's father  at 55 years after a massive heart attack and CABG procedure.  His death occurred one day after the surgery reportedly from a clot.  One of his sisters  from a stroke.    Paternal grandmother  in her 70's from a stroke.  Her mother (Marleen's great grandmother)  suddenly in her 70's while sitting at a bus stop.Nothing is known about grandfather.    Two paternal half sisters in good health.  There is no additional history of cardiomyopathy, arrhythmias, heart attacks, fainting, sudden cardiac death, genetic conditions, or birth defects. (A copy of pedigree may be found under media tab).    DISCUSSION: Non-ischemic cardiomyopathy results from causes other than loss of blood flow to the heart.  Cardiomyopathy can be caused by both acquired and genetic causes.  Acquired causes include exposure to toxins, injury related to coronary artery disease (ischemic), and chronic high blood pressure. When familial, it often results in dilated cardiomyopathy (DCM), where the left ventricle gets enlarged or stretched out.  There is a genetic basis found in 20-50% of DCM cases.      Reviewed autosomal dominant (AD) inheritance pattern most commonly associated with DCM, including the 50% risk for recurrence. Briefly reviewed autosomal recessive and X-linked inheritance. Explained that DCM gene  mutations are associated with reduced penetrance and variable expressivity, meaning that individuals who carry a gene mutation may or may not get the disease and onset and severity can vary from one family member to the next. This explains why you may not see cardiomyopathy in each generation of a family.     Currently over 40 genes have been found to be related to DCM. Genetic testing currently identifies mutations in about 40% of idiopathic cases. Reviewed capabilities, limitations, and logistics of testing.  DNA sample via saliva or blood is collected and sent to testing lab for evaluation of selected genes. The results could directly impact care and treatment.      Explained three possible outcomes of genetic testing including: positive identification of a mutation, no mutation identified, and identification of a variant of unknown significance (VUS). If a mutation is identified, presymptomatic testing would be available to at risk family members. If no mutation is identified, it does not rule out the possibility of a genetic component to this disease. Family members could still be at risk for developing the same condition. If a VUS is identified, it is unclear if the mutation is disease causing or just a normal variation. It may take time and possibly additional testing to determine the meaning of a VUS result.     Test results take approximately 2-4 weeks on average. Discussed cost of testing through commercial labs. Explained that the lab works with insurance to determine coverage and will contact patient if out of pocket costs are expected to exceed $100. Also discussed option of Sponsored testing through [facility].  Patient qualifies for DETECT program which offers testing for Comprehensive Cardiomyopathy and Arrhythmia panel at no charge.    Discussed pros and cons of genetic testing. Explained that results could determine the cause for dilated cardiomyopathy. If a mutation is identified, presymptomatic  testing is available to all at risk relatives. Reviewed possible issues associated with presymptomatic testing including genetic discrimination, current laws to prevent discrimination (ie. EMILIANO), insurance issues, and emotional and psychosocial outcomes of testing.     Explained that clinical evaluation is recommended for all first degree relatives (parents, siblings, and children) of an affected individual regardless of decision to pursue genetic testing. Based on the Heart Failure Society of [place] Practice Guidelines (Rosalino et al, 2018), clinical evaluation should be performed at least every 3-5 years beginning and childhood and should include history, cardiac exam, ECHO, and EKG.    All questions answered at this time.     PLAN:Marleen elected to proceed with genetic testing.  Requisition and consent forms were completed and signed.  DNA will be collected via cheek swab sample and sent to [facility]  laboratory. I will contact patient when results are available.    TOTAL TIME SPENT IN COUNSELIN Minutes    Esther uBnch MS, [facility] [place] [facility]  Licensed, Certified Genetic Counselor  [facility]

## 2021-12-21 NOTE — PROGRESS NOTES
Marleen is a 56 year old who is being evaluated via a billable video visit.      How would you like to obtain your AVS? MyChart  If the video visit is dropped, the invitation should be resent by: Text to cell phone: [phone]  Will anyone else be joining your video visit? Petrona Lane, Virtual Facilitator/LPN    PROVIDER APPOINTMENT  Here is a copy of the progress note from your recent genetic counseling visit through the [facility] on Date: 2021.  Due to Covid-19 pandemic, this appointment was moved to a virtual visit.    PROGRESS NOTE:Marleen was referred by Cassie Kolb MD for genetic counseling due to her history of dilated cardiomyopathy (DCM).  I had the opportunity to talk with Marleen today to discuss the genetic component of DCM and testing options available to her .     MEDICAL HISTORY:Marleen was diagnosed with dilated cardiomyopathy in her 30's.  However, she reports that she did not feel well for at least 5 years prior to that time, primarily with fatigue.  Cardiac imaging showed reduced heart function (LVEF 40-45%) and dilated LV.  She reports history of fainting but nothing recently.    She also has a recent history of repair of encephalocele and CSF leak on 21 and bilateral total knee arthroplasties (2017 & 2019).    FAMILY HISTORY:A detailed family history was obtained during today's consult.  Family history was significant for the following cardiac history:    Full sister with DCM. She  in September after developing colon cancer.  Her history was also remarkable for MERSA, kidney disease and ultimate transplant.  She did not have an ICD or much care for her DCM.    Marleen's son and daughter also have DCM.      A maternal half sister has hypertension, epilepsy and reported dizziness and fainting. She does not have a cardiac issue to Marleen's knowledge.    Mother  suddenly at 38 yrs of age.  It was thought to be  the result of a cardiac problem. However, she also had a long history of alcoholism and had a colostomy. Her two siblings have no known heart issues.    Maternal grandmother  in her 30's from a brain aneurysm. Nothing is known about maternal grandfather.    Marleen's father  at 55 years after a massive heart attack and CABG procedure.  His death occurred one day after the surgery reportedly from a clot.  One of his sisters  from a stroke.    Paternal grandmother  in her 70's from a stroke.  Her mother (Marleen's great grandmother)  suddenly in her 70's while sitting at a bus stop.Nothing is known about grandfather.    Two paternal half sisters in good health.  There is no additional history of cardiomyopathy, arrhythmias, heart attacks, fainting, sudden cardiac death, genetic conditions, or birth defects. (A copy of pedigree may be found under media tab).    DISCUSSION: Non-ischemic cardiomyopathy results from causes other than loss of blood flow to the heart.  Cardiomyopathy can be caused by both acquired and genetic causes.  Acquired causes include exposure to toxins, injury related to coronary artery disease (ischemic), and chronic high blood pressure. When familial, it often results in dilated cardiomyopathy (DCM), where the left ventricle gets enlarged or stretched out.  There is a genetic basis found in 20-50% of DCM cases.      Reviewed autosomal dominant (AD) inheritance pattern most commonly associated with DCM, including the 50% risk for recurrence. Briefly reviewed autosomal recessive and X-linked inheritance. Explained that DCM gene mutations are associated with reduced penetrance and variable expressivity, meaning that individuals who carry a gene mutation may or may not get the disease and onset and severity can vary from one family member to the next. This explains why you may not see cardiomyopathy in each generation of a family.     Currently over 40 genes have been found to  be related to DCM. Genetic testing currently identifies mutations in about 40% of idiopathic cases. Reviewed capabilities, limitations, and logistics of testing.  DNA sample via saliva or blood is collected and sent to testing lab for evaluation of selected genes. The results could directly impact care and treatment.      Explained three possible outcomes of genetic testing including: positive identification of a mutation, no mutation identified, and identification of a variant of unknown significance (VUS). If a mutation is identified, presymptomatic testing would be available to at risk family members. If no mutation is identified, it does not rule out the possibility of a genetic component to this disease. Family members could still be at risk for developing the same condition. If a VUS is identified, it is unclear if the mutation is disease causing or just a normal variation. It may take time and possibly additional testing to determine the meaning of a VUS result.     Test results take approximately 2-4 weeks on average. Discussed cost of testing through commercial labs. Explained that the lab works with insurance to determine coverage and will contact patient if out of pocket costs are expected to exceed $100. Also discussed option of Sponsored testing through [facility].  Patient qualifies for DETECT program which offers testing for Comprehensive Cardiomyopathy and Arrhythmia panel at no charge.    Discussed pros and cons of genetic testing. Explained that results could determine the cause for dilated cardiomyopathy. If a mutation is identified, presymptomatic testing is available to all at risk relatives. Reviewed possible issues associated with presymptomatic testing including genetic discrimination, current laws to prevent discrimination (ie. EMILIANO), insurance issues, and emotional and psychosocial outcomes of testing.     Explained that clinical evaluation is recommended for all first degree relatives  (parents, siblings, and children) of an affected individual regardless of decision to pursue genetic testing. Based on the Heart Failure Society of [place] Practice Guidelines (Rosalino et al, 2018), clinical evaluation should be performed at least every 3-5 years beginning and childhood and should include history, cardiac exam, ECHO, and EKG.    All questions answered at this time.     PLAN:Marleen elected to proceed with genetic testing.  Requisition and consent forms were completed and signed.  DNA will be collected via cheek swab sample and sent to [facility]  laboratory. I will contact patient when results are available.    TOTAL TIME SPENT IN COUNSELIN Minutes    Esther Bunch MS, [facility]  Licensed, Certified Genetic Counselor  [facility]

## 2021-12-21 NOTE — PATIENT INSTRUCTIONS
"Indication for Genetic Counseling:     Non-ischemic cardiomyopathy results from causes other than loss of blood flow to the heart.  Cardiomyopathy can be caused by both acquired and genetic causes.  Acquired causes include exposure to toxins, injury related to coronary artery disease (ischemic),  infections and inflammation of the heart (myocarditis), alcohol/drug abuse, stress, and chronic high blood pressure.  When familial, it often results in dilated cardiomyopathy (DCM), where the left ventricle gets enlarged or stretched out.  Dilated cardiomyopathy (DCM) is a condition that causes the heart to lose it's elasticity, which leads to stretching and dilation.  It is usually diagnosed by an echocardiogram (ECHO) or cardiac magnetic resonance imaging (MRI).  When the heart becomes dilated, it cannot pump blood as effectively, which can lead to symptoms such as congestive heart failure, fluid accumulation in the body (edema), shortness of breath, fatigue, irregular heartbeat, fainting, stroke, cardiac arrest, and sudden cardiac death (SCD).   There are at least 40 genes known to be associated with DCM.    Inheritance:   Humans have over 20,000 genes that instruct our bodies how to function.  We have two copies of each gene because we inherit one from our mother and one from our father.  In most cardiac cases with a genetic component, the condition is inherited in an autosomal dominant (AD) pattern.  This means that in order to have the condition, a person needs to inherit a mutation on one copy of a particular gene.  This mutation or pathogenic variant dominates the \"normal\" working copy of the gene.  When an affected individual has children, they can either pass on the \"normal\" copy of the gene or the mutation.  Therefore, children have a 50% chance of inheriting the mutation.  Other family members also have an increased risk but the specific risk depends on the degree of relationship.  Additional inheritance " patterns can occur within families and may alter the risk of recurrence.     Testing Options:   Genetic testing is available to assess a panel of genes known to cause this condition.  This test reads through the DNA (sequencing) of these genes to look for spelling mistakes or mutations that could cause the condition.      There are three types of results you could receive from this test.     -Positive result (mutation/pathogenic variant identified) - confirms diagnosis and provides an answer to why this happened.  In addition, identifying a mutation allows family members to have testing to determine their risk.     -Negative result (mutation not identified) - no genetic changes were identified.  This does not rule out a genetic cause for the condition as the genetic testing only identifies 40-50% of genetic causes for this condition.    -Variant of uncertain significance (VUS) - a genetic change was identified, but there is not enough information to determine whether it is disease-causing or normal human genetic variation.     Although genetic testing may identify a mutation, it cannot provide information about the severity of symptoms or the progression of disease.  We cannot predict age of onset or severity of symptoms due to reduced penetrance and variable expressivity.    Logistics:   Genetic test involves test a sample of DNA, thru blood, saliva, or cheek cells.  The sample will be sent to a laboratory to extract the DNA and sequence the genes for mutations.  The laboratory will work with your insurance company to determine the out of pocket (OOP) cost and will notify you if the OOP cost is greater than $100.  Remember to ask the lab about financial assistance pricing and self pay options as well.  Sometimes those are much lower than insurance pricing.  When testing is initiated, results take about 2-4 weeks to return. I will contact you over the phone when results are available.     Genetic Information and  Nondiscrimination Act:  The Genetic Information and Nondiscrimination Act of 2008 (EMILIANO) is a federal law that protects individuals from genetic discrimination in health insurance and employment. Genetic discrimination is defined as the misuse of genetic information. This law does not address potential discrimination regarding life insurance or disability insurance.      This is especially relevant for at risk individuals who are considering presymptomatic testing.    Screening Recommendations:  Explained that clinical evaluation is recommended for all first degree relatives (parents, siblings, and children) of an affected individual regardless of decision to pursue genetic testing. Based on the Heart Failure Society of [place] Practice Guidelines (Rosalino et al, 2009), clinical evaluation should be performed at least every 3-5 years beginning and childhood and should include history, cardiac exam, ECHO, and EKG.    Resources:  Cardiomyopathy  Hypertrophic Cardiomyopathy Association - 4hcm.org  Children's Cardiomyopathy Foundation - childrenscardiomyopathy.org  [place] Cardiomyopathy Association - cardiomyopathy.org  Dilated Cardiomyopathy Foundation - DCMfoundation.org    Arrhythmia  Heart Rhythm Society - HRSonline.org    General   American Heart Association - americanheart.org  Genetics Home Reference - ghr.nlm.nih.gov  Genetic Information and Nondiscrimination Act - ginahelp.org    Contact Information:  Esther Bunch MS  Licensed Genetic Counselor  [facility]  [facility]    Office:  [phone]  Appointments:  [phone]  Fax: [phone]  Email: [email or web address]

## 2021-12-23 ENCOUNTER — IMMUNIZATION (OUTPATIENT)
Dept: NURSING | Facility: CLINIC | Age: 56
End: 2021-12-23
Payer: MEDICARE

## 2021-12-23 PROCEDURE — 0004A PR COVID VAC PFIZER DIL RECON 30 MCG/0.3 ML IM: CPT

## 2021-12-23 PROCEDURE — 91300 PR COVID VAC PFIZER DIL RECON 30 MCG/0.3 ML IM: CPT

## 2021-12-30 ENCOUNTER — LAB REQUISITION (OUTPATIENT)
Dept: LAB | Facility: CLINIC | Age: 56
End: 2021-12-30

## 2021-12-30 PROCEDURE — 86481 TB AG RESPONSE T-CELL SUSP: CPT | Performed by: INTERNAL MEDICINE

## 2021-12-30 PROCEDURE — 86787 VARICELLA-ZOSTER ANTIBODY: CPT | Performed by: INTERNAL MEDICINE

## 2021-12-31 LAB
GAMMA INTERFERON BACKGROUND BLD IA-ACNC: 0.01 IU/ML
M TB IFN-G BLD-IMP: NEGATIVE
M TB IFN-G CD4+ BCKGRND COR BLD-ACNC: 9.99 IU/ML
MITOGEN IGNF BCKGRD COR BLD-ACNC: -0.01 IU/ML
MITOGEN IGNF BCKGRD COR BLD-ACNC: 0.23 IU/ML
QUANTIFERON MITOGEN: 10 IU/ML
QUANTIFERON NIL TUBE: 0.01 IU/ML
QUANTIFERON TB1 TUBE: 0 IU/ML
QUANTIFERON TB2 TUBE: 0.24
VZV IGG SER QL IA: 729.6 INDEX
VZV IGG SER QL IA: POSITIVE

## 2022-01-04 ENCOUNTER — IMMUNIZATION (OUTPATIENT)
Dept: FAMILY MEDICINE | Facility: CLINIC | Age: 57
End: 2022-01-04
Payer: MEDICARE

## 2022-01-04 DIAGNOSIS — Z23 NEED FOR PROPHYLACTIC VACCINATION AND INOCULATION AGAINST INFLUENZA: Primary | ICD-10-CM

## 2022-01-04 PROCEDURE — 90682 RIV4 VACC RECOMBINANT DNA IM: CPT

## 2022-01-04 PROCEDURE — G0008 ADMIN INFLUENZA VIRUS VAC: HCPCS

## 2022-01-04 PROCEDURE — 99207 PR NO CHARGE NURSE ONLY: CPT

## 2022-01-06 ENCOUNTER — PATIENT OUTREACH (OUTPATIENT)
Dept: NURSING | Facility: CLINIC | Age: 57
End: 2022-01-06
Payer: MEDICARE

## 2022-01-06 ASSESSMENT — ACTIVITIES OF DAILY LIVING (ADL): DEPENDENT_IADLS:: INDEPENDENT

## 2022-01-06 NOTE — PROGRESS NOTES
Clinic Care Coordination Contact    Follow Up Progress Note      Assessment:     Patient shares she is doing well but continues to experience fecal incontinence. She shares she has an appointment with the [facility] on 1/31/22.     Patient also shares she continue to follow up with the sleep clinic.     Patient shares that she got a new job and starts on 1/10/22.     Patient shares she feels capable of navigating her appointments from here and denies need for further outreaches from RN Care Coordinator.     Care Gaps:    Health Maintenance Due   Topic Date Due     URINE DRUG SCREEN  Never done     ASTHMA ACTION PLAN  Never done     MEDICARE ANNUAL WELLNESS VISIT  09/15/2009     ZOSTER IMMUNIZATION (1 of 2) Never done     HF ACTION PLAN  11/01/2015     PAP  07/01/2019     ASTHMA CONTROL TEST  03/08/2021     LIPID  08/25/2021     PHQ-9  09/10/2021       Patient accepted scheduling phone number for PCP  to schedule independently     Goals addressed this encounter:   Goals Addressed                    This Visit's Progress       COMPLETED: Pain Management (pt-stated)         Goal Statement: I will reduce the frequency of my headaches in the next 3 months.     Date goal set: 7/19/2021    Measure of Success: Patient reported account of decreased frequency in headaches    Barriers: None identified  Strengths: Highly motivated, positive attitude    Date to Achieve By: 10/19/2021    Patient expressed understanding of goal: Yes    Action steps to achieve this goal:    1. I will attend all follow up appointments as scheduled  2. I will take my medications as directed  3. I will reach out to my care team for any concerning symptoms or questions related to my health             Intervention/Education provided during outreach:     Writer confirmed patient has writer's contact information so she may reach out in the future for any needs she may have.           Plan: Patient will be graduated from care coordination.  Writer will remain open to care coordination referrals in the future.     Jolanta Velazquez, RN Care Coordinator     [facility]  [facility]   [email or web address]    Office: [phone]

## 2022-01-11 DIAGNOSIS — I42.9 CHF WITH CARDIOMYOPATHY (H): ICD-10-CM

## 2022-01-11 DIAGNOSIS — I50.9 CHF WITH CARDIOMYOPATHY (H): ICD-10-CM

## 2022-01-11 DIAGNOSIS — I42.9 FAMILIAL CARDIOMYOPATHY (H): Primary | ICD-10-CM

## 2022-01-14 RX ORDER — FUROSEMIDE 20 MG
TABLET ORAL
Qty: 180 TABLET | Refills: 3 | Status: SHIPPED | OUTPATIENT
Start: 2022-01-14 | End: 2022-10-21

## 2022-01-14 RX ORDER — METOPROLOL SUCCINATE 50 MG/1
50 TABLET, EXTENDED RELEASE ORAL DAILY
Qty: 90 TABLET | Refills: 3 | Status: SHIPPED | OUTPATIENT
Start: 2022-01-14 | End: 2022-10-21

## 2022-01-24 ENCOUNTER — LAB (OUTPATIENT)
Dept: LAB | Facility: CLINIC | Age: 57
End: 2022-01-24
Payer: MEDICARE

## 2022-01-24 DIAGNOSIS — N95.1 SYMPTOMATIC MENOPAUSAL OR FEMALE CLIMACTERIC STATES: ICD-10-CM

## 2022-01-24 LAB
ESTRADIOL SERPL-MCNC: 82 PG/ML
FSH SERPL-ACNC: 5.3 IU/L

## 2022-01-24 PROCEDURE — 83001 ASSAY OF GONADOTROPIN (FSH): CPT

## 2022-01-24 PROCEDURE — 36415 COLL VENOUS BLD VENIPUNCTURE: CPT

## 2022-01-24 PROCEDURE — 82670 ASSAY OF TOTAL ESTRADIOL: CPT

## 2022-01-24 PROCEDURE — 84403 ASSAY OF TOTAL TESTOSTERONE: CPT

## 2022-01-27 LAB — TESTOST SERPL-MCNC: 37 NG/DL (ref 8–60)

## 2022-01-31 ENCOUNTER — VIRTUAL VISIT (OUTPATIENT)
Dept: GASTROENTEROLOGY | Facility: CLINIC | Age: 57
End: 2022-01-31
Payer: MEDICARE

## 2022-01-31 DIAGNOSIS — R15.9 INCONTINENCE OF FECES WITH FECAL URGENCY: Primary | ICD-10-CM

## 2022-01-31 DIAGNOSIS — Z80.0 FAMILY HISTORY OF COLON CANCER: ICD-10-CM

## 2022-01-31 DIAGNOSIS — R15.2 INCONTINENCE OF FECES WITH FECAL URGENCY: Primary | ICD-10-CM

## 2022-01-31 PROCEDURE — 99443 PR PHYSICIAN TELEPHONE EVALUATION 21-30 MIN: CPT | Mod: 95 | Performed by: PHYSICIAN ASSISTANT

## 2022-01-31 ASSESSMENT — ENCOUNTER SYMPTOMS
RECTAL PAIN: 0
ABDOMINAL PAIN: 1
VOMITING: 0
BLOATING: 1
BOWEL INCONTINENCE: 1
DIARRHEA: 1
HEARTBURN: 0
NAUSEA: 0
JAUNDICE: 0
BLOOD IN STOOL: 0
CONSTIPATION: 0

## 2022-01-31 NOTE — PROGRESS NOTES
Marleen is a 56 year old who is being evaluated via a billable video visit.      How would you like to obtain your AVS? MyChart  If the video visit is dropped, the invitation should be resent by: Text to cell phone: [phone]  Will anyone else be joining your video visit? No        GASTROENTEROLOG FOLLOW UP VIRTUAL VISIT     Switched to telephone visit due to technical difficulties with audio/visual.       CC/REFERRING MD:    Yanna Matias      REASON FOR VISIT:  Video Visit    HISTORY OF PRESENT ILLNESS:    Marleen Mcfadden is 56 year old female who presents for follow up. We first visited last February 2021 for similar concern of fecal incontinence. This mostly occurs with coughing or sneezing. We evaluated with a colonoscopy in February 2021 which was overall unremarkable. We recommended pelvic floor center afterwards for further evaluation. She reports being evaluated and starting exercises but lost follow up after needing a surgery in July 2021 (repair of encephalocele with ENT).      She returns today with same concerns of fecal incontinence. She reports she continues to have fecal incontinence mainly coughing or sneezing. It does not occur all the time however. She has about 4 BM's a day. Stools are never formed. She describes stools as soft, small crumbs. She does have straining at times. She feels that she is never completely emptying out. At times she feels like stools are sitting in her rectum.       She feels that she gets plenty of fiber during the day. She has a shake in the morning that consists of 3 handfuls of spinach, flax seed and protein powder. She additionally eats a salad for lunch and has veggies at dinner time.     Her family hx is significant for colon cancer in her sister, who unfortunately passed away this past September 2021.       PREVIOUS ENDOSCOPY     COLONOSOCOPY 2/23/2021  Impression:       - Hemorrhoids found on perianal exam.                             - Diverticulosis  in the sigmoid colon, in the                             transverse colon and at the hepatic flexure.                             - Internal hemorrhoids.                             - No specimens collected.     Marleen  has a past medical history of Acute bilateral low back pain with right-sided sciatica (2016), Allergic rhinitis, cause unspecified, Arthritis, Autoimmune disease (H), Autoimmune disease NEC, Calcaneal spur (10/21/2014), CHF with cardiomyopathy (H), Chronic low back pain, DDD (degenerative disc disease), lumbar, Depression, Failed total knee arthroplasty, initial encounter (H) (2019), Familial cardiomyopathy (H), GERD without esophagitis, History of transfusion, Hypertension, Injury of left shoulder, initial encounter (2017), Morbid obesity (H), Nontraumatic rupture of quadriceps tendon, left (2018), KATIA (obstructive sleep apnea)- moderate-severe (AHI 29) (2021), 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    She  has a past surgical history that includes EXCISE EXCESS SKIN TISSUE,ABDOMEN (); orthopedic surgery (); Breast surgery (); EXCIS UTERINE FIBROID,ABD APPRCH ();  section (1989); tubal ligation (); Hysterectomy total abdominal (); Thyroidectomy (2013); Total Knee Arthroplasty (Left, 10/03/2017); Colonoscopy with CO2 insufflation (N/A, 2021);  Section (10/1985); Thyroidectomy; Arthroplasty revision knee (Left, 2019); Total Knee Arthroplasty (Right, 2019); GYN surgery (N/A, 2020); cystourethroscopy (N/A, 2020); Arthroplasty revision knee (Right, 2020);  Craniotomy middle fossa, excise acoustic neuroma, combined (N/A, 07/05/2021); Insert drain lumbar (N/A, 07/05/2021); and CRANIECTOMY (Right, 07/08/2021).    She  reports that she has never smoked. She has never used smokeless tobacco. She reports current alcohol use. She reports that she does not use drugs.    Her family history includes Alcohol/Drug in her father and mother; Cancer in her sister; Cardiovascular in her daughter, father, mother, sister, and son; Cerebrovascular Disease in her paternal grandmother; Colon Cancer in her sister; Diabetes in her father and paternal grandmother; Heart Disease in her daughter, father, mother, and sister; Hypertension in her father and paternal grandmother.    ALLERGIES:  Morphine, Nickel, and Sulfa drugs      PERTINENT MEDICATIONS:    Current Outpatient Medications:      amitriptyline (ELAVIL) 25 MG tablet, TAKE 1 TABLET(25 MG) BY MOUTH AT BEDTIME, Disp: 90 tablet, Rfl: 1     Cholecalciferol (VITAMIN D) 2000 UNIT tablet, Take 5,000 Units by mouth as needed Reported on 3/8/2017, Disp: 100 tablet, Rfl: 12     Collagen 500 MG CAPS, Take 1 capsule by mouth daily , Disp: , Rfl:      estradiol cypionate (DEPO-ESTRADIOL) 5 MG/ML injection, Inject into the muscle every 28 days, Disp: , Rfl:      famotidine (PEPCID) 40 MG tablet, Take 1 tablet (40 mg) by mouth daily (Patient taking differently: Take 40 mg by mouth daily as needed ), Disp: 90 tablet, Rfl: 3     FLAXSEED, LINSEED, PO, Take 1 capsule by mouth daily , Disp: , Rfl:      fluticasone (FLONASE) 50 MCG/ACT nasal spray, Spray 2 sprays into both nostrils At Bedtime, Disp: 15.8 mL, Rfl: 4     furosemide (LASIX) 20 MG tablet, Take two tabs daily as needed, Disp: 180 tablet, Rfl: 3     loratadine (CLARITIN) 10 MG tablet, Take 1 tablet (10 mg) by mouth daily, Disp: 90 tablet, Rfl: 3     metoprolol succinate ER (TOPROL-XL) 50 MG 24 hr tablet, Take 1 tablet (50 mg) by mouth daily, Disp: 90 tablet, Rfl: 3     omeprazole (PRILOSEC) 40  MG DR capsule, Take 1 capsule (40 mg) by mouth daily, Disp: 90 capsule, Rfl: 3     progesterone (PROMETRIUM) 100 MG capsule, Take 100 mg by mouth At Bedtime , Disp: , Rfl:      sacubitril-valsartan (ENTRESTO) 49-51 MG per tablet, Take 1 tablet by mouth 2 times daily, Disp: 180 tablet, Rfl: 3     solifenacin (VESICARE) 10 MG tablet, Take 10 mg by mouth daily, Disp: , Rfl:      spironolactone (ALDACTONE) 25 MG tablet, Take 2 tablets (50 mg) by mouth daily, Disp: 180 tablet, Rfl: 3     oxyCODONE (ROXICODONE) 5 MG tablet, Take 1 tablet (5 mg) by mouth every 3 hours as needed for moderate to severe pain (Patient not taking: Reported on 10/11/2021), Disp: 30 tablet, Rfl: 0     sertraline (ZOLOFT) 50 MG tablet, TAKE 1 TABLET BY MOUTH EVERY DAY (Patient not taking: Reported on 12/21/2021), Disp: 90 tablet, Rfl: 0     zolpidem (AMBIEN) 5 MG tablet, Take tablet by mouth 15 minutes prior to sleep, for Sleep Study (Patient not taking: Reported on 12/21/2021), Disp: 1 tablet, Rfl: 0  No current facility-administered medications for this visit.    Facility-Administered Medications Ordered in Other Visits:      sodium chloride (PF) 0.9% PF flush 10 mL, 10 mL, Intravenous, Once, Julio Leroy MD        PHYSICAL EXAMINATION:    healthy, alert and no distress  PSYCH: Alert and oriented times 3; coherent speech, normal   rate and volume, able to articulate logical thoughts, able   to abstract reason, no tangential thoughts, no hallucinations   or delusions, her affect is normal  RESP: No cough, no audible wheezing, able to talk in full sentences  Remainder of exam unable to be completed due to telephone visits        ASSESSMENT/PLAN:    1. Incontinence of feces with fecal urgency  - XR Abdomen 2 Views; Future  2. Family history of colon cancer      Marleen Mcfadden is a 56 year old female who presents for follow up.     She continues to have concerns with fecal incontinence. Initially evaluated for this concern in February  2021. Her work up at that time included colonoscopy which was overall unrevealing besides hemorrhoids and diverticulosis. She was thereafter referred to the pelvic floor center. She did initiate pelvic exercises however reports losing follow up after an unrelated surgery in July 2021. She returns today with the same concerns. She continues to have occassional fecal incontinence with coughing and sneezing. She denies constipation however reports having smaller stools, occ straining and incomplete evacuation. Advised that she may benefit from 1 capful of miralax daily to improve evacuation and therefore improve her fecal incontinence. She is apprehensive given that she does not think she is constipated. Recommended checking an abdominal xray so we can evaluate for stool burden. Also recommended decreasing her dietary fiber to see if this will make a difference as she does have a high fiber diet. If symptoms persist despite this and/or x-ray is notable for stool burden then should reconsider miralax daily. She agrees with this plan.     Also advised to return to pelvic floor center to restart pelvic floor exercises.     She does have a family hx of colon cancer. Her sister was diagnosed with colon cancer in the last few years and unfortunately passed away this past September 2021. Patient is up to date on colonoscopy which was completed in February 2021. She was advised for repeat colonoscopy in 5 years. Advised that we would repeat this sooner if any alarming symptoms/concerns develop such as rectal bleeding and/or unintentional weight loss.       Thank you for this consultation.  It was a pleasure to participate in the care of this patient; please contact us with any further questions.        This note was created with voice recognition software, and while reviewed for accuracy, typos may remain.       Alex Sanchez PA-C  Gastroenterology  [facility]     Phone call duration: 21 minutes

## 2022-01-31 NOTE — PATIENT INSTRUCTIONS
It was a pleasure visiting with you today.     Please complete the abdominal x-ray at any [facility] at your earliest convenience. You should be able to have a walk-in appointment, however you can call [phone] to schedule if you prefer.     Try reducing the amount of fiber in your morning shakes. If this does not work then start 1 capful of miralax daily to help you evacuate your bowels better.     Please follow up with the Pelvic Floor Center; [phone].     Alex Sanchez PA-C  Gastroenterology  [facility]

## 2022-02-04 ENCOUNTER — ANCILLARY PROCEDURE (OUTPATIENT)
Dept: GENERAL RADIOLOGY | Facility: CLINIC | Age: 57
End: 2022-02-04
Attending: PHYSICIAN ASSISTANT
Payer: MEDICARE

## 2022-02-04 DIAGNOSIS — R15.9 INCONTINENCE OF FECES WITH FECAL URGENCY: ICD-10-CM

## 2022-02-04 DIAGNOSIS — R15.2 INCONTINENCE OF FECES WITH FECAL URGENCY: ICD-10-CM

## 2022-02-04 PROCEDURE — 74019 RADEX ABDOMEN 2 VIEWS: CPT | Performed by: RADIOLOGY

## 2022-02-10 ENCOUNTER — LAB REQUISITION (OUTPATIENT)
Dept: LAB | Facility: CLINIC | Age: 57
End: 2022-02-10

## 2022-02-10 LAB — SARS-COV-2 RNA RESP QL NAA+PROBE: NEGATIVE

## 2022-02-10 PROCEDURE — U0003 INFECTIOUS AGENT DETECTION BY NUCLEIC ACID (DNA OR RNA); SEVERE ACUTE RESPIRATORY SYNDROME CORONAVIRUS 2 (SARS-COV-2) (CORONAVIRUS DISEASE [COVID-19]), AMPLIFIED PROBE TECHNIQUE, MAKING USE OF HIGH THROUGHPUT TECHNOLOGIES AS DESCRIBED BY CMS-2020-01-R: HCPCS | Performed by: INTERNAL MEDICINE

## 2022-02-17 ENCOUNTER — MYC MEDICAL ADVICE (OUTPATIENT)
Dept: CARDIOLOGY | Facility: CLINIC | Age: 57
End: 2022-02-17
Payer: MEDICARE

## 2022-02-17 DIAGNOSIS — I50.22 CHRONIC SYSTOLIC CONGESTIVE HEART FAILURE (H): Primary | ICD-10-CM

## 2022-02-17 NOTE — TELEPHONE ENCOUNTER
Discussed with dr ruffin. Recommend clinic visit for assessment of labs and fluid status tomorrow.  Marleen is agreeable to coming in for appointment. Appointments scheduled.

## 2022-02-17 NOTE — TELEPHONE ENCOUNTER
Returned call to Marleen. She states she has been taking lasix 40mg every day for 5 days now. Was previously doing it about every other day but with her symptoms she tried to increase it to daily to see if it would help. She reports symptoms continued with increased dosing. She states her legs are swollen and they hurt when she walks. She reports her abdomen is swollen. She has not checked her weight today- she tells me she weighed 241lb last week but no weight since then. She is wearing compression stockings during the day - reports there are indentations in her legs from the swelling when she takes these off. Reports most of her  Leg swelling is improved by morning but abdominal bloating continues. She has recently started a new job at the [employer] where she is walking a lot. She reports her breathing does feel mostly at her baseline - she reports some SOB when she is walking up the steep inclines, but reports breathing 'fine' when she is on flat ground. She tells me she is drinking 'a ton of water, way more than 64 ounces' daily. She is worried about her kidneys and wonders what she should do next. 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 WIll update provider and call Marleen back with a plan.

## 2022-02-18 ENCOUNTER — TELEPHONE (OUTPATIENT)
Dept: SLEEP MEDICINE | Facility: CLINIC | Age: 57
End: 2022-02-18
Payer: MEDICARE

## 2022-02-18 ENCOUNTER — OFFICE VISIT (OUTPATIENT)
Dept: CARDIOLOGY | Facility: CLINIC | Age: 57
End: 2022-02-18
Attending: INTERNAL MEDICINE
Payer: COMMERCIAL

## 2022-02-18 ENCOUNTER — LAB (OUTPATIENT)
Dept: LAB | Facility: CLINIC | Age: 57
End: 2022-02-18
Attending: INTERNAL MEDICINE
Payer: MEDICARE

## 2022-02-18 VITALS
DIASTOLIC BLOOD PRESSURE: 80 MMHG | HEIGHT: 64 IN | SYSTOLIC BLOOD PRESSURE: 129 MMHG | BODY MASS INDEX: 40.9 KG/M2 | HEART RATE: 63 BPM | OXYGEN SATURATION: 100 % | WEIGHT: 239.6 LBS

## 2022-02-18 DIAGNOSIS — I50.22 CHRONIC SYSTOLIC CONGESTIVE HEART FAILURE (H): ICD-10-CM

## 2022-02-18 DIAGNOSIS — I50.22 CHRONIC SYSTOLIC CONGESTIVE HEART FAILURE (H): Primary | ICD-10-CM

## 2022-02-18 DIAGNOSIS — G47.33 OSA (OBSTRUCTIVE SLEEP APNEA): ICD-10-CM

## 2022-02-18 LAB
ALBUMIN SERPL-MCNC: 3.3 G/DL (ref 3.4–5)
ALP SERPL-CCNC: 63 U/L (ref 40–150)
ALT SERPL W P-5'-P-CCNC: 21 U/L (ref 0–50)
ANION GAP SERPL CALCULATED.3IONS-SCNC: 6 MMOL/L (ref 3–14)
AST SERPL W P-5'-P-CCNC: 14 U/L (ref 0–45)
BILIRUB SERPL-MCNC: 0.4 MG/DL (ref 0.2–1.3)
BUN SERPL-MCNC: 18 MG/DL (ref 7–30)
CALCIUM SERPL-MCNC: 8.5 MG/DL (ref 8.5–10.1)
CHLORIDE BLD-SCNC: 105 MMOL/L (ref 94–109)
CO2 SERPL-SCNC: 27 MMOL/L (ref 20–32)
CREAT SERPL-MCNC: 0.79 MG/DL (ref 0.52–1.04)
GFR SERPL CREATININE-BSD FRML MDRD: 87 ML/MIN/1.73M2
GLUCOSE BLD-MCNC: 84 MG/DL (ref 70–99)
POTASSIUM BLD-SCNC: 4 MMOL/L (ref 3.4–5.3)
PROT SERPL-MCNC: 7.2 G/DL (ref 6.8–8.8)
SODIUM SERPL-SCNC: 138 MMOL/L (ref 133–144)

## 2022-02-18 PROCEDURE — 36415 COLL VENOUS BLD VENIPUNCTURE: CPT | Performed by: PATHOLOGY

## 2022-02-18 PROCEDURE — G0463 HOSPITAL OUTPT CLINIC VISIT: HCPCS

## 2022-02-18 PROCEDURE — 99214 OFFICE O/P EST MOD 30 MIN: CPT | Performed by: INTERNAL MEDICINE

## 2022-02-18 PROCEDURE — 80053 COMPREHEN METABOLIC PANEL: CPT | Performed by: PATHOLOGY

## 2022-02-18 ASSESSMENT — PAIN SCALES - GENERAL: PAINLEVEL: NO PAIN (0)

## 2022-02-18 NOTE — PROGRESS NOTES
Advanced Heart Failure Clinic Follow up:  02/18/2022    HPI:  56 year old female with a history of familial cardiomyopathy (LVEF 40-45%) and bilateral total knee arthroplasties (2017 & 2019) who presents for ongoing evaluation and management.  Pt reports that over the past few days she has noted increased abdominal distension and lower extremity edema.  She reports that she notices her edema at the end of the day but it is largely gone in the morning.  She does note that she has started a new job and has been on her feet walking more.  She denies any chest pain or pressure, sob, orthopnea, pnd, palpitations or syncope/presyncope.  She denies any sob at rest but reports some mild lea when walking up steep incline but she does not feel this has worsened from her baseline.  She has been taking lasix for the past few days.      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     Familial cardiomyopathy (H)      GERD without esophagitis      History of transfusion      Hypertension      Injury of left shoulder, initial encounter 01/12/2017     Morbid obesity (H)      Nontraumatic rupture of quadriceps tendon, left 06/21/2018     KATIA (obstructive sleep apnea)- moderate-severe (AHI 29) 8/26/2021     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     no longer an issue     Peroneus longus tendinitis 01/02/2015     Plantar fasciitis 11/11/2014     PONV (postoperative nausea and vomiting)      Rotator cuff injury 01/17/2017     Sprain of other  ligament of left ankle, initial encounter 2017     Status post left knee replacement 2018     TB lung, latent     negative quantiferon gold test  12       Past Surgical History:   Procedure Laterality Date     ARTHROPLASTY REVISION KNEE Left 2019    Procedure: REVISION, LEFT TOTAL KNEE  ARTHROPLASTY;  Surgeon: Dev Rocha MD;  Location: [facility];  Service: Orthopedics     ARTHROPLASTY REVISION KNEE Right 2020    Procedure: RIGHT REVISION TOTAL KNEE ARTHROPLASTY;  Surgeon: Dev Rocha MD;  Location: [facility] OR;  Service: Orthopedics     BREAST SURGERY      Breast Reduction     C EXCISE EXCESS SKIN TISSUE,ABDOMEN        SECTION  1989      SECTION  10/1985     COLONOSCOPY WITH CO2 INSUFFLATION N/A 2021    Procedure: COLONOSCOPY, WITH CO2 INSUFFLATION;  Surgeon: Angela Harrington DO;  Location:  OR     CRANIECTOMY Right 2021    Procedure: RIGHT MIDDLE FOSSA APPROACH, CSF LEAK REPAIR;  Surgeon: Jocelyn Noe MD;  Location: [facility] OR     CRANIOTOMY MIDDLE FOSSA, EXCISE ACOUSTIC NEUROMA, COMBINED N/A 2021    Procedure: Right CRANIOTOMY, MIDDLE FOSSA APPROACH, FOR REPAIR OF ENCEPHALOCELE;  Surgeon: Jocelyne Harrell MD;  Location: [facility] OR     CYSTOURETHROSCOPY N/A 2020    Procedure: CYSTOSCOPY;  Surgeon: Cherelle Willams MD;  Location: [facility];  Service: Gynecology     GYN SURGERY N/A 2020    Procedure: MIDURETHRAL SLING, CYSTOSCOPY;  Surgeon: Cherelle Willams MD;  Location: [facility] OR;  Service: Gynecology     HYSTERECTOMY TOTAL ABDOMINAL      for fibroids; reports having blood transfusion after surgery     INSERT DRAIN LUMBAR N/A 2021    Procedure: Insert drain lumbar;  Surgeon: Jocelyn Noe MD;  Location: [facility] OR     ORTHOPEDIC SURGERY      Right Knee ACL repair     THYROIDECTOMY  2013    Procedure: THYROIDECTOMY;  LEFT  THYROID LOBECTOMY.  (LIGASURE, RECURRENT LARYNGEAL NERVE MONITOR) ;  Surgeon: Uriah Camargo MD;  Location: [facility]     THYROIDECTOMY       TOTAL KNEE ARTHROPLASTY Left 10/03/2017     TOTAL KNEE ARTHROPLASTY Right 2019    Procedure: RIGHT TOTAL KNEE ARTHROPLASTY;  Surgeon: Dev Rocha MD;  Location: [facility];  Service: Orthopedics     TUBAL LIGATION       ZZC EXCIS UTERINE FIBROID,ABD APPRCH         Family History   Problem Relation Age of Onset     Hypertension Father         dec     Diabetes Father         dec     Heart Disease Father         dec     Alcohol/Drug Father      Cardiovascular Father      Heart Disease Mother         dec     Alcohol/Drug Mother      Cardiovascular Mother      Heart Disease Daughter         Cardiomyopathy     Cardiovascular Daughter         cardiomyopathy     Colon Cancer Sister      Cardiovascular Son         cardiomyopathy     Diabetes Paternal Grandmother         dec     Hypertension Paternal Grandmother         dec     Cerebrovascular Disease Paternal Grandmother         dec     Cancer Sister         Lupus     Cardiovascular Sister         cardiomyopathy     Heart Disease Sister         heart failure, and kidney failure   Her maternal grandmother  in her 30's from a brain aneurysm. Marleen's father  at 55 years after a massive heart attack and CABG procedure. Marleen's paternal grandmother's mother  suddenly in her 70's while sitting at a bus stop.   Father and mother both had heart failure and heart disease. Mother  suddenly at 37 yo  Sister had heart failure/DCM and had a heart transplant  Both son and daughter have cardiomyopathy diagnoses  Half sister on mother's side does NOT have cardiomyopathy/HF      Social History     Tobacco Use     Smoking status: Never Smoker     Smokeless tobacco: Never Used   Substance Use Topics     Alcohol use: Yes     Comment: rare, twice a year, she has a drink little wine 2 days ago          Current Outpatient Medications   Medication Sig     Cholecalciferol (VITAMIN D) 2000 UNIT tablet Take 5,000 Units by mouth as needed Reported on 3/8/2017     Collagen 500 MG CAPS Take 1 capsule by mouth daily      estradiol cypionate (DEPO-ESTRADIOL) 5 MG/ML injection Inject into the muscle every 28 days     FLAXSEED, LINSEED, PO Take 1 capsule by mouth daily      fluticasone (FLONASE) 50 MCG/ACT nasal spray Spray 2 sprays into both nostrils At Bedtime     furosemide (LASIX) 20 MG tablet Take two tabs daily as needed     loratadine (CLARITIN) 10 MG tablet Take 1 tablet (10 mg) by mouth daily     metoprolol succinate ER (TOPROL-XL) 50 MG 24 hr tablet Take 1 tablet (50 mg) by mouth daily     progesterone (PROMETRIUM) 100 MG capsule Take 100 mg by mouth At Bedtime      sacubitril-valsartan (ENTRESTO) 49-51 MG per tablet Take 1 tablet by mouth 2 times daily     spironolactone (ALDACTONE) 25 MG tablet Take 2 tablets (50 mg) by mouth daily     amitriptyline (ELAVIL) 25 MG tablet TAKE 1 TABLET(25 MG) BY MOUTH AT BEDTIME (Patient not taking: Reported on 2/18/2022)     famotidine (PEPCID) 40 MG tablet Take 1 tablet (40 mg) by mouth daily (Patient not taking: Reported on 2/18/2022)     omeprazole (PRILOSEC) 40 MG DR capsule Take 1 capsule (40 mg) by mouth daily (Patient not taking: Reported on 2/18/2022)     oxyCODONE (ROXICODONE) 5 MG tablet Take 1 tablet (5 mg) by mouth every 3 hours as needed for moderate to severe pain (Patient not taking: Reported on 10/11/2021)     sertraline (ZOLOFT) 50 MG tablet TAKE 1 TABLET BY MOUTH EVERY DAY (Patient not taking: Reported on 12/21/2021)     solifenacin (VESICARE) 10 MG tablet Take 10 mg by mouth daily (Patient not taking: Reported on 2/18/2022)     zolpidem (AMBIEN) 5 MG tablet Take tablet by mouth 15 minutes prior to sleep, for Sleep Study (Patient not taking: Reported on 12/21/2021)     No current facility-administered medications for this visit.     Facility-Administered  "Medications Ordered in Other Visits   Medication     sodium chloride (PF) 0.9% PF flush 10 mL         EXAM:   /80 (BP Location: Left arm, Patient Position: Chair, Cuff Size: Adult Regular)   Pulse 63   Ht 1.623 m (5' 3.9\")   Wt 108.7 kg (239 lb 9.6 oz)   LMP 10/19/2008 (Approximate)   SpO2 100%   BMI 41.26 kg/m     GENERAL: Alert, oriented, NAD  NECK: No adenopathy. 2+ carotids,   HEART: RRR,+ S1 and S2, no murmurs or gallops.  JVP 7-8 cm without HJR  LUNGS:  Clear to auscultation bilaterally  ABDOMEN: Soft, obese, nontender, nondistended, bowel sounds present, no hepatomeagly appreciated although exam limited by body habitus  EXTREMITIES: trace lower extremity edema.  2+ bilateral peripheral pulses.     LABS  Last Comprehensive Metabolic Panel:  Sodium   Date Value Ref Range Status   02/18/2022 138 133 - 144 mmol/L Final   07/11/2021 134 133 - 144 mmol/L Final     Potassium   Date Value Ref Range Status   02/18/2022 4.0 3.4 - 5.3 mmol/L Final   07/11/2021 4.2 3.4 - 5.3 mmol/L Final     Chloride   Date Value Ref Range Status   02/18/2022 105 94 - 109 mmol/L Final   07/11/2021 102 94 - 109 mmol/L Final     Carbon Dioxide   Date Value Ref Range Status   07/11/2021 30 20 - 32 mmol/L Final     Carbon Dioxide (CO2)   Date Value Ref Range Status   02/18/2022 27 20 - 32 mmol/L Final     Anion Gap   Date Value Ref Range Status   02/18/2022 6 3 - 14 mmol/L Final   07/11/2021 2 (L) 3 - 14 mmol/L Final     Glucose   Date Value Ref Range Status   02/18/2022 84 70 - 99 mg/dL Final   07/11/2021 87 70 - 99 mg/dL Final     Urea Nitrogen   Date Value Ref Range Status   02/18/2022 18 7 - 30 mg/dL Final   07/11/2021 14 7 - 30 mg/dL Final     Creatinine   Date Value Ref Range Status   02/18/2022 0.79 0.52 - 1.04 mg/dL Final   07/11/2021 0.79 0.52 - 1.04 mg/dL Final     GFR Estimate   Date Value Ref Range Status   02/18/2022 87 >60 mL/min/1.73m2 Final     Comment:     Effective December 21, 2021 eGFRcr in adults is " calculated using the 2021 CKD-EPI creatinine equation which includes age and gender (Aakash et al., NEJM, DOI: 10.1056/SLGVmf1434748)   07/11/2021 83 >60 mL/min/[1.73_m2] Final     Comment:     Non  GFR Calc  Starting 12/18/2018, serum creatinine based estimated GFR (eGFR) will be   calculated using the Chronic Kidney Disease Epidemiology Collaboration   (CKD-EPI) equation.       Calcium   Date Value Ref Range Status   02/18/2022 8.5 8.5 - 10.1 mg/dL Final   07/11/2021 8.2 (L) 8.5 - 10.1 mg/dL Final       CMR Report  01-  Clinical history: 55 yo woman with a familial cardiomyopathy to have repeat CMRI.  Comparison CMR: 10/6/2016.  1.  The global systolic function is moderately decreased and the LVEF is 38%. The RV is moderately dilated while wall thickness is normal. There is moderate global hypokinesis.  2. The RV is the upper limit of normal in cavity size. The global systolic function is mildly decreased and  the RVEF is 52%.   3. Both atria are mildly dilated.  4. There is no significant valvular disease.   5. Late gadolinium enhancement imaging shows no MI, fibrosis or infiltrative disease.   CONCLUSIONS:   The global systolic function is moderately decreased and the LVEF is 38%. The RV is moderately dilated  while wall thickness is normal. There is moderate global hypokinesis.  The RV is the upper limit of normal in cavity size. The global systolic function is mildly decreased and  the RVEF is 52%.   Compared to the prior study of 2016, LV systolic performance is minimally decreased and RV systolic  performance is similar.  Both LV and RV dilation are mildly increased.             Echo 6/16/2021  Interpretation Summary  Severe left ventricular dilation is present. LVIDd 7.1cm.  Severe diffuse hypokinesis is present.  LVEF 40% based on biplane 2D tracing.  Right ventricular function, chamber size, wall motion, and thickness are  normal.  IVC diameter and respiratory changes fall into an  intermediate range  suggesting an RA pressure of 8 mmHg.  This study was compared with the study from 8/12/2020.  No significant changes noted.      Assessment and Plan: 56 year old female with a history of familial cardiomyopathy, left total knee arthroplasty on 10/3/2017 and right knee replacement on 2/20/2019, and repair of encephalocele and CSF leak on 7/5/21 who presents for evaluation due to concern of fluid retention.    # Chronic systolic heart failure secondary to familial cardiomyopathy.    LVEF 40-45%  Stage B, NYHA Class IIb-IIIa (difficult to assess d/t orthopedic limitations)  MVO2 reduced on cardiopulmonary stress test in 2020 however patient did not meet anerobic threshold therefore MVO2 not a accurate measurement of cardiac exercise capacity.  VE/VCO2 slope normal and good BP response thus no evidence of significant cardiac limitation to exercise.    Patient presents today for concern of abdominal bloating and lower extremity edema.  She is euvolemic on exam today with normal JVP and very mild pedal edema.  Feel unlikely discomfort is 2/2 fluid retention    ACEI/ARB/ARNI: yes, entresto 49-51 mg BID  BB: Yes, continue metoprolol XL 50mg daily  Aldosterone antagonist - Continue spironolactone 50mg daily   SCD prophylaxis: Recent echo confirms LVEF 40%, thus ICD not indicated at present  % BiV pacing: N/A  Fluid status euvolemic  NSAID use: advised to avoid NSAIDs  Encouraged patient to begin regular aerobic exercise aiming for at least 150 minutes of moderate physical activity or 75 minutes of vigorous physical activity - or an equal combination of both - each week. and follow low-salt, heart healthy diet.    Follow-up:  In June with labs, echo and EKG.  Will be happy to see sooner if change in clinical status or new questions/concerns arise.    Cassie Kolb MD  Section Head - Advanced Heart Failure, Transplantation and Mechanical Circulatory Support  Director - Adult Congenital and Cardiovascular  Genetics Center  Associate Professor of Medicine, [facility]    I spent a total of 30 minutes today with the patient personally reviewing today's laboratory results, history and examination, and discussion and counseling with the patient.

## 2022-02-18 NOTE — PATIENT INSTRUCTIONS
Cardiology Providers you saw during your visit:  Dr. Kolb     Medication changes:  1-         Follow up:  1-      Please call if you have:  1. Weight gain of more than 2 pounds in a day or 5 pounds in a week  2. Increased shortness of breath, swelling or bloating  3. Dizziness, lightheadedness   4. Any questions or concerns.      Follow the American Heart Association Diet and Lifestyle recommendations:  Limit saturated fat, trans fat, sodium, red meat, sweets and sugar-sweetened beverages. If you choose to eat red meat, compare labels and select the leanest cuts available.  Aim for at least 150 minutes of moderate physical activity or 75 minutes of vigorous physical activity - or an equal combination of both - each week.     During business hours: [phone], press option # 1 to schedule an appointment or send a message to your care team     After hours, weekends or holidays: On Call Cardiologist- [phone]   option #4 and ask to speak to the on-call Cardiologist. Inform them you are a CORE/heart failure patient at the [facility].     Gwen Swenson RN BSN  Cardiology Nurse Care Coordinator     Keep up the good work!     Take Care!

## 2022-02-18 NOTE — LETTER
2/18/2022      RE: Marleen Mcfadden  [address]  [facility] [address]       Dear Colleague,    Thank you for the opportunity to participate in the care of your patient, Marleen Mcfadden, at the [facility] [place] [facility]. Please see a copy of my visit note below.    Advanced Heart Failure Clinic Follow up:  02/18/2022    HPI:  56 year old female with a history of familial cardiomyopathy (LVEF 40-45%) and bilateral total knee arthroplasties (2017 & 2019) who presents for ongoing evaluation and management.  Pt reports that over the past few days she has noted increased abdominal distension and lower extremity edema.  She reports that she notices her edema at the end of the day but it is largely gone in the morning.  She does note that she has started a new job and has been on her feet walking more.  She denies any chest pain or pressure, sob, orthopnea, pnd, palpitations or syncope/presyncope.  She denies any sob at rest but reports some mild lea when walking up steep incline but she does not feel this has worsened from her baseline.  She has been taking lasix for the past few days.      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     Familial cardiomyopathy (H)      GERD without esophagitis      History of transfusion      Hypertension      Injury of left shoulder, initial encounter 01/12/2017     Morbid obesity (H)      Nontraumatic rupture of quadriceps tendon, left 06/21/2018      KATIA (obstructive sleep apnea)- moderate-severe (AHI 29) 2021     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     no longer an issue     Peroneus longus tendinitis 2015     Plantar fasciitis 2014     PONV (postoperative nausea and vomiting)      Rotator cuff injury 2017     Sprain of other ligament of left ankle, initial encounter 2017     Status post left knee replacement 2018     TB lung, latent     negative quantiferon gold test  12       Past Surgical History:   Procedure Laterality Date     ARTHROPLASTY REVISION KNEE Left 2019    Procedure: REVISION, LEFT TOTAL KNEE  ARTHROPLASTY;  Surgeon: Dev Rocha MD;  Location: [facility] OR;  Service: Orthopedics     ARTHROPLASTY REVISION KNEE Right 2020    Procedure: RIGHT REVISION TOTAL KNEE ARTHROPLASTY;  Surgeon: Dev Rocha MD;  Location: [facility] OR;  Service: Orthopedics     BREAST SURGERY      Breast Reduction     C EXCISE EXCESS SKIN TISSUE,ABDOMEN        SECTION  1989      SECTION  10/1985     COLONOSCOPY WITH CO2 INSUFFLATION N/A 2021    Procedure: COLONOSCOPY, WITH CO2 INSUFFLATION;  Surgeon: Angela Harrington DO;  Location: [facility] OR     CRANIECTOMY Right 2021    Procedure: RIGHT MIDDLE FOSSA APPROACH, CSF LEAK REPAIR;  Surgeon: Jocelyn Noe MD;  Location: [facility] OR     CRANIOTOMY MIDDLE FOSSA, EXCISE ACOUSTIC NEUROMA, COMBINED N/A 2021    Procedure: Right CRANIOTOMY, MIDDLE FOSSA APPROACH, FOR REPAIR OF ENCEPHALOCELE;  Surgeon: Jocelyne Harrell MD;  Location: [facility] OR     CYSTOURETHROSCOPY N/A 2020    Procedure: CYSTOSCOPY;  Surgeon: Cherelle Willams MD;  Location: [facility] OR;  Service: Gynecology     GYN SURGERY N/A 2020    Procedure: MIDURETHRAL SLING, CYSTOSCOPY;  Surgeon: Cherelle Willams MD;  Location: [facility] OR;  Service: Gynecology      HYSTERECTOMY TOTAL ABDOMINAL      for fibroids; reports having blood transfusion after surgery     INSERT DRAIN LUMBAR N/A 2021    Procedure: Insert drain lumbar;  Surgeon: Jocelyn Noe MD;  Location: [facility]     ORTHOPEDIC SURGERY      Right Knee ACL repair     THYROIDECTOMY  2013    Procedure: THYROIDECTOMY;  LEFT THYROID LOBECTOMY.  (LIGASURE, RECURRENT LARYNGEAL NERVE MONITOR) ;  Surgeon: Uriah Camargo MD;  Location: [facility]     THYROIDECTOMY       TOTAL KNEE ARTHROPLASTY Left 10/03/2017     TOTAL KNEE ARTHROPLASTY Right 2019    Procedure: RIGHT TOTAL KNEE ARTHROPLASTY;  Surgeon: Dev Rocha MD;  Location: [facility];  Service: Orthopedics     TUBAL LIGATION       ZZC EXCIS UTERINE FIBROID,ABD APPRCH         Family History   Problem Relation Age of Onset     Hypertension Father         dec     Diabetes Father         dec     Heart Disease Father         dec     Alcohol/Drug Father      Cardiovascular Father      Heart Disease Mother         dec     Alcohol/Drug Mother      Cardiovascular Mother      Heart Disease Daughter         Cardiomyopathy     Cardiovascular Daughter         cardiomyopathy     Colon Cancer Sister      Cardiovascular Son         cardiomyopathy     Diabetes Paternal Grandmother         dec     Hypertension Paternal Grandmother         dec     Cerebrovascular Disease Paternal Grandmother         dec     Cancer Sister         Lupus     Cardiovascular Sister         cardiomyopathy     Heart Disease Sister         heart failure, and kidney failure   Her maternal grandmother  in her 30's from a brain aneurysm. Marleen's father  at 55 years after a massive heart attack and CABG procedure. Marleen's paternal grandmother's mother  suddenly in her 70's while sitting at a bus stop.   Father and mother both had heart failure and heart disease. Mother  suddenly at 37 yo  Sister had heart failure/DCM and had a  heart transplant  Both son and daughter have cardiomyopathy diagnoses  Half sister on mother's side does NOT have cardiomyopathy/HF      Social History     Tobacco Use     Smoking status: Never Smoker     Smokeless tobacco: Never Used   Substance Use Topics     Alcohol use: Yes     Comment: rare, twice a year, she has a drink little wine 2 days ago         Current Outpatient Medications   Medication Sig     Cholecalciferol (VITAMIN D) 2000 UNIT tablet Take 5,000 Units by mouth as needed Reported on 3/8/2017     Collagen 500 MG CAPS Take 1 capsule by mouth daily      estradiol cypionate (DEPO-ESTRADIOL) 5 MG/ML injection Inject into the muscle every 28 days     FLAXSEED, LINSEED, PO Take 1 capsule by mouth daily      fluticasone (FLONASE) 50 MCG/ACT nasal spray Spray 2 sprays into both nostrils At Bedtime     furosemide (LASIX) 20 MG tablet Take two tabs daily as needed     loratadine (CLARITIN) 10 MG tablet Take 1 tablet (10 mg) by mouth daily     metoprolol succinate ER (TOPROL-XL) 50 MG 24 hr tablet Take 1 tablet (50 mg) by mouth daily     progesterone (PROMETRIUM) 100 MG capsule Take 100 mg by mouth At Bedtime      sacubitril-valsartan (ENTRESTO) 49-51 MG per tablet Take 1 tablet by mouth 2 times daily     spironolactone (ALDACTONE) 25 MG tablet Take 2 tablets (50 mg) by mouth daily     amitriptyline (ELAVIL) 25 MG tablet TAKE 1 TABLET(25 MG) BY MOUTH AT BEDTIME (Patient not taking: Reported on 2/18/2022)     famotidine (PEPCID) 40 MG tablet Take 1 tablet (40 mg) by mouth daily (Patient not taking: Reported on 2/18/2022)     omeprazole (PRILOSEC) 40 MG DR capsule Take 1 capsule (40 mg) by mouth daily (Patient not taking: Reported on 2/18/2022)     oxyCODONE (ROXICODONE) 5 MG tablet Take 1 tablet (5 mg) by mouth every 3 hours as needed for moderate to severe pain (Patient not taking: Reported on 10/11/2021)     sertraline (ZOLOFT) 50 MG tablet TAKE 1 TABLET BY MOUTH EVERY DAY (Patient not taking: Reported on  "12/21/2021)     solifenacin (VESICARE) 10 MG tablet Take 10 mg by mouth daily (Patient not taking: Reported on 2/18/2022)     zolpidem (AMBIEN) 5 MG tablet Take tablet by mouth 15 minutes prior to sleep, for Sleep Study (Patient not taking: Reported on 12/21/2021)     No current facility-administered medications for this visit.     Facility-Administered Medications Ordered in Other Visits   Medication     sodium chloride (PF) 0.9% PF flush 10 mL         EXAM:   /80 (BP Location: Left arm, Patient Position: Chair, Cuff Size: Adult Regular)   Pulse 63   Ht 1.623 m (5' 3.9\")   Wt 108.7 kg (239 lb 9.6 oz)   LMP 10/19/2008 (Approximate)   SpO2 100%   BMI 41.26 kg/m     GENERAL: Alert, oriented, NAD  NECK: No adenopathy. 2+ carotids,   HEART: RRR,+ S1 and S2, no murmurs or gallops.  JVP 7-8 cm without HJR  LUNGS:  Clear to auscultation bilaterally  ABDOMEN: Soft, obese, nontender, nondistended, bowel sounds present, no hepatomeagly appreciated although exam limited by body habitus  EXTREMITIES: trace lower extremity edema.  2+ bilateral peripheral pulses.     LABS  Last Comprehensive Metabolic Panel:  Sodium   Date Value Ref Range Status   02/18/2022 138 133 - 144 mmol/L Final   07/11/2021 134 133 - 144 mmol/L Final     Potassium   Date Value Ref Range Status   02/18/2022 4.0 3.4 - 5.3 mmol/L Final   07/11/2021 4.2 3.4 - 5.3 mmol/L Final     Chloride   Date Value Ref Range Status   02/18/2022 105 94 - 109 mmol/L Final   07/11/2021 102 94 - 109 mmol/L Final     Carbon Dioxide   Date Value Ref Range Status   07/11/2021 30 20 - 32 mmol/L Final     Carbon Dioxide (CO2)   Date Value Ref Range Status   02/18/2022 27 20 - 32 mmol/L Final     Anion Gap   Date Value Ref Range Status   02/18/2022 6 3 - 14 mmol/L Final   07/11/2021 2 (L) 3 - 14 mmol/L Final     Glucose   Date Value Ref Range Status   02/18/2022 84 70 - 99 mg/dL Final   07/11/2021 87 70 - 99 mg/dL Final     Urea Nitrogen   Date Value Ref Range Status "   02/18/2022 18 7 - 30 mg/dL Final   07/11/2021 14 7 - 30 mg/dL Final     Creatinine   Date Value Ref Range Status   02/18/2022 0.79 0.52 - 1.04 mg/dL Final   07/11/2021 0.79 0.52 - 1.04 mg/dL Final     GFR Estimate   Date Value Ref Range Status   02/18/2022 87 >60 mL/min/1.73m2 Final     Comment:     Effective December 21, 2021 eGFRcr in adults is calculated using the 2021 CKD-EPI creatinine equation which includes age and gender (Aakash et al., NEJM, DOI: 10.1056/PTPMcy0363063)   07/11/2021 83 >60 mL/min/[1.73_m2] Final     Comment:     Non  GFR Calc  Starting 12/18/2018, serum creatinine based estimated GFR (eGFR) will be   calculated using the Chronic Kidney Disease Epidemiology Collaboration   (CKD-EPI) equation.       Calcium   Date Value Ref Range Status   02/18/2022 8.5 8.5 - 10.1 mg/dL Final   07/11/2021 8.2 (L) 8.5 - 10.1 mg/dL Final       CMR Report  01-  Clinical history: 53 yo woman with a familial cardiomyopathy to have repeat CMRI.  Comparison CMR: 10/6/2016.  1.  The global systolic function is moderately decreased and the LVEF is 38%. The RV is moderately dilated while wall thickness is normal. There is moderate global hypokinesis.  2. The RV is the upper limit of normal in cavity size. The global systolic function is mildly decreased and  the RVEF is 52%.   3. Both atria are mildly dilated.  4. There is no significant valvular disease.   5. Late gadolinium enhancement imaging shows no MI, fibrosis or infiltrative disease.   CONCLUSIONS:   The global systolic function is moderately decreased and the LVEF is 38%. The RV is moderately dilated  while wall thickness is normal. There is moderate global hypokinesis.  The RV is the upper limit of normal in cavity size. The global systolic function is mildly decreased and  the RVEF is 52%.   Compared to the prior study of 2016, LV systolic performance is minimally decreased and RV systolic  performance is similar.  Both LV and RV  dilation are mildly increased.             Echo 6/16/2021  Interpretation Summary  Severe left ventricular dilation is present. LVIDd 7.1cm.  Severe diffuse hypokinesis is present.  LVEF 40% based on biplane 2D tracing.  Right ventricular function, chamber size, wall motion, and thickness are  normal.  IVC diameter and respiratory changes fall into an intermediate range  suggesting an RA pressure of 8 mmHg.  This study was compared with the study from 8/12/2020.  No significant changes noted.      Assessment and Plan: 56 year old female with a history of familial cardiomyopathy, left total knee arthroplasty on 10/3/2017 and right knee replacement on 2/20/2019, and repair of encephalocele and CSF leak on 7/5/21 who presents for evaluation due to concern of fluid retention.    # Chronic systolic heart failure secondary to familial cardiomyopathy.    LVEF 40-45%  Stage B, NYHA Class IIb-IIIa (difficult to assess d/t orthopedic limitations)  MVO2 reduced on cardiopulmonary stress test in 2020 however patient did not meet anerobic threshold therefore MVO2 not a accurate measurement of cardiac exercise capacity.  VE/VCO2 slope normal and good BP response thus no evidence of significant cardiac limitation to exercise.    Patient presents today for concern of abdominal bloating and lower extremity edema.  She is euvolemic on exam today with normal JVP and very mild pedal edema.  Feel unlikely discomfort is 2/2 fluid retention    ACEI/ARB/ARNI: yes, entresto 49-51 mg BID  BB: Yes, continue metoprolol XL 50mg daily  Aldosterone antagonist - Continue spironolactone 50mg daily   SCD prophylaxis: Recent echo confirms LVEF 40%, thus ICD not indicated at present  % BiV pacing: N/A  Fluid status euvolemic  NSAID use: advised to avoid NSAIDs  Encouraged patient to begin regular aerobic exercise aiming for at least 150 minutes of moderate physical activity or 75 minutes of vigorous physical activity - or an equal combination of both  - each week. and follow low-salt, heart healthy diet.    Follow-up:  In June with labs, echo and EKG.  Will be happy to see sooner if change in clinical status or new questions/concerns arise.    Cassie Kolb MD  Section Head - Advanced Heart Failure, Transplantation and Mechanical Circulatory Support  Director - Adult Congenital and Cardiovascular Genetics Center  Associate Professor of Medicine, [facility]    I spent a total of 30 minutes today with the patient personally reviewing today's laboratory results, history and examination, and discussion and counseling with the patient.      Please do not hesitate to contact me if you have any questions/concerns.     Sincerely,     Cassie Kolb MD

## 2022-02-18 NOTE — TELEPHONE ENCOUNTER
Called patient to go over her insurance requirements for CPAP compliance. Explained the compliance with usage and the compliance follow up that are both required by the 90th day on CPAP. I let the patient know were she was at with her compliance and let her know she did not meet the requirement of 70% usage and the required follow up. Patient understood and stated she would return the CPAP device. I let her know that we recommend that she follow up with her sleep provider to discuss her options whether she would like to restart CPAP therapy or if there is something else she could do to resolve her KATIA. Patient understood. Patient was driving so she could not write down the [facility] Show[facility] info. I let her know I would call back and leave a voicemail for her. Patient stated it would be at least a week before she could drop it off since she works. I let her know I would document that.     Called patient back to leave a voicemail. I gave patient the [facility] Address, location info, hours, and phone number. I gave her customer service phone number as well. I went over COVID screening and let her know to contact us to find another time to come in if any of the questions pertain to her. I requested she wear a mask when she enters the building. Let her know to all [facility] if she has questions.     [facility]  Home Medical Equipment  [address]  [address]  [place] [address]  Office: [phone]  Fax: [phone]    [facility]  Home Medical Equipment  [facility]  [place] [facility]  [address]   [place] Office: [phone] (Select Showroom Option)

## 2022-02-18 NOTE — NURSING NOTE
Chief Complaint   Patient presents with     Follow Up     56 year old female presents with history of Familial cardiomyopathy, systolic HF, EF 35% for follow up with labs prior     Vitals were taken and medications were reconciled.   León Vick, EMT  2:08 PM

## 2022-02-21 ENCOUNTER — OFFICE VISIT (OUTPATIENT)
Dept: URGENT CARE | Facility: URGENT CARE | Age: 57
End: 2022-02-21
Payer: MEDICARE

## 2022-02-21 VITALS
HEART RATE: 84 BPM | OXYGEN SATURATION: 100 % | RESPIRATION RATE: 16 BRPM | SYSTOLIC BLOOD PRESSURE: 133 MMHG | DIASTOLIC BLOOD PRESSURE: 86 MMHG | WEIGHT: 239.6 LBS | BODY MASS INDEX: 41.26 KG/M2 | TEMPERATURE: 100.1 F

## 2022-02-21 DIAGNOSIS — R10.32 LLQ ABDOMINAL PAIN: Primary | ICD-10-CM

## 2022-02-21 DIAGNOSIS — I50.9 CONGESTIVE HEART FAILURE, UNSPECIFIED HF CHRONICITY, UNSPECIFIED HEART FAILURE TYPE (H): ICD-10-CM

## 2022-02-21 PROCEDURE — 99215 OFFICE O/P EST HI 40 MIN: CPT | Performed by: EMERGENCY MEDICINE

## 2022-02-21 ASSESSMENT — PAIN SCALES - GENERAL: PAINLEVEL: SEVERE PAIN (7)

## 2022-02-21 NOTE — PATIENT INSTRUCTIONS
Go now to [facility] ER:    [address] in [place], [place].

## 2022-02-21 NOTE — PROGRESS NOTES
Assessment & Plan     Diagnosis:    (R10.32) LLQ abdominal pain  (primary encounter diagnosis)    (I50.9) Congestive heart failure, unspecified HF chronicity, unspecified heart failure type (H)      Medical Decision Making:  Marleen Mcfadden is a 56 year old female with a history of CHF who presents for evaluation of LLQ abdominal pain and fever.  A broad differential diagnosis was considered including  urinary obstruction, colitis, appendicitis, intestinal cramping, aortic pathologies, pyelonephritis, ureterolithiasis, UTI, constipation, diverticulitis, obstruction, ileus, volvulus, C diff infection.     Patient was seen by cardiologist a few days ago and had a echocardiogram and CMP that were unremarkable.  She thought perhaps her symptoms were stemming from her CHF.  She is not having any chest pain or shortness of breath here.    Patient in moderate-to-severe distress with left lower quadrant abdominal pain and peritoneal signs on exam. Reports of fever; temperature of 100.1 F here.  She notes foul-smelling stools and I cannot rule out diverticulitis, colitis secondary to C. Difficile, appendicitis or other intra-abdominal catastrophe and recommend she go to the ER now for further evaluation.    Discussed transportation by ambulance and patient prefers to go by private vehicle.  I discussed risks of transportation; she verbalized understanding and agreement with the plan.  Hemodynamically stable here. All questions answered.    Disposition: Go to the ER Now    [facility] U[facility]ENT [facility]    Subjective     Marleen Mcfadden is a 56 year old female who presents to clinic today for the following health issues:  Chief Complaint   Patient presents with     Abdominal Pain     abdominal pain all over but mostly in the left side-pain since Tuesday and thought its gas but it's getting worse, had a fever last night between 100-101, temp was 101 about an hour ago     Fever     HPI  Patient with  "history of CHF presents to the clinic for evaluation of left-sided abdominal pain and fevers.  She states that for the past 5 days she has felt as if she is \"bloated\" and constipated.  She has been taking MiraLAX and laxatives and having foul-smelling stools, but no dark or bloody stools.  She notes that she has had progressively worsening left lower quadrant abdominal pain that seems to be exacerbated with movement and is causing her to feel like she is doubling over at work multiple times.  She notes that she is a low-grade fever as well between 100-101 F.  She is nauseous but not vomiting.  She denies any urinary symptoms, back or flank pain, chest pain, shortness of breath, leg swelling, diarrhea, recent travel.  He has a history of hysterectomy and 2 C-sections; no other abdominal surgeries.    Moreover, the patient states that she was seen by her cardiologist a few days ago and had a negative CMP and echo as she thought she was getting bloated and distended from her CHF.      Review of Systems    See HPI    Objective      Vitals: /86 (BP Location: Left arm, Patient Position: Sitting, Cuff Size: Adult Large)   Pulse 84   Temp 100.1  F (37.8  C) (Tympanic)   Resp 16   Wt 108.7 kg (239 lb 9.6 oz)   LMP 10/19/2008 (Approximate)   SpO2 100%   BMI 41.26 kg/m    Resp: 20    Patient Vitals for the past 24 hrs:   BP Temp Temp src Pulse Resp SpO2 Weight   02/21/22 1150 133/86 100.1  F (37.8  C) Tympanic 84 16 100 % 108.7 kg (239 lb 9.6 oz)       Vital signs reviewed by: Orion Tom PA-C    Physical Exam   Constitutional: The patient is oriented to person, place, and time. Alert and cooperative. Appears in moderate-to-severe distress; tearful.  Cardiovascular: Regular rate and rhythm  Pulmonary/Chest: Effort normal. No respiratory distress. No wheezes, rales or rhonchi.  GI: Abdomen is tender in the left lower quadrant with rebound and guarding.  No CVA tenderness.   Neurological: Alert and oriented x3. " Symmetric strength and sensation in the bilateral lower and upper extremities.  Musculoskeletal: Normal range of motion. Normal tone.  Skin: No rash noted on visualized skin.  Psychiatric: Anxious and tearful affect.      Orion Tom PA-C, February 21, 2022

## 2022-02-25 ENCOUNTER — PATIENT OUTREACH (OUTPATIENT)
Dept: NURSING | Facility: CLINIC | Age: 57
End: 2022-02-25
Payer: MEDICARE

## 2022-02-25 ASSESSMENT — ACTIVITIES OF DAILY LIVING (ADL): DEPENDENT_IADLS:: INDEPENDENT

## 2022-02-25 NOTE — TELEPHONE ENCOUNTER
I have some availabilities 3/3 or 3/4.  TC - can you please call pt to see if those timings would work.  Thanks!  DM

## 2022-02-25 NOTE — PROGRESS NOTES
Clinic Care Coordination Contact    Writer received a call from patient who is asking to be seen by her PCP. She shares she has a post hospital appointment set up next with another provider and she would feel more comfortable seeing her PCP. Patient is asking writer to reach out to PCP to see if she is able to see patient next week.   Writer will route to PCP and follow up with patient.     Jolanta Velazquez, RN Care Coordinator     [facility] Ambulatory Care Management  [facility] and   [email or web address]    Office: [phone]

## 2022-02-28 ENCOUNTER — PATIENT OUTREACH (OUTPATIENT)
Dept: NURSING | Facility: CLINIC | Age: 57
End: 2022-02-28
Payer: MEDICARE

## 2022-02-28 ASSESSMENT — ACTIVITIES OF DAILY LIVING (ADL): DEPENDENT_IADLS:: INDEPENDENT

## 2022-02-28 NOTE — PROGRESS NOTES
Clinic Care Coordination Contact    Writer reviewed patient's appointment and saw she was given an appointment with her PCP this week as requested. Writer called patient to confirm that she was aware of this appointment and see if she needed any more support or resources. Patient declined need for further outreaches.   Writer will make no further outreaches at this time due to patient declining.     Jolanta Velazquez RN Care Coordinator     [facility] Ambulatory Care Management  [facility] and   [email or web address]    Office: [phone]

## 2022-03-02 NOTE — PROGRESS NOTES
Marleen Mcfadden is seen in the [facility] lateral skullbase clinic.  She is a 56 year old female being seen s/p right middle fossa approach for repair of temporal lobe encephalocele 2 months ago (07/14/2021). She endorsed a headache while flying on a plane at her 2 month follow up (09/02/2021), but denied any other complaints. She has had no right ear or right nostril drainage. Dr. Noe and I cleared her to return to rowing and other desired activities. Today, the patient reports that her hearing has improved. She endorses a runny nose that is coming out of both nostrils. Of note, she reports being in the hospital for a few days for a bout of diverticulitis. She feels recovered from this and is now lifting weights 5 days per week as she is working towards losing weight and has already lost a fair amount of weight.    Physical examination:  Constitutional:  In no acute distress, appears stated age  Right ear: periauricular incision well healing. EAC and TM intact. Middle ear is well aerated.  Left ear: EAC and TM intact. Middle ear is well aerated.  Respiratory:  No increased work of breathing, wheezing or stridor  Neurologic:  House Brackman 1/6 bilaterally, ambulating normally    Audiogram:  AUDIOGRAM: She underwent an audiogram today. This demonstrated: symmetric normal/mild downsloping to moderate to severe primarily sensorineural hearing loss.        Right: Speech reception threshold is 20 dB with 100% word recognition at 60 dB. Tympanogram A type   Left: Speech reception threshold is 15 dB with 100% word recognition at 60 dB. Tympanogram A type     Audiogram was independently reviewed and compared to her preoperative test. The right conductive hearing loss has resolved.    Assessment and plan:    Marleen Mcfadden is seen in consultation in the [facility] skull[facility] clinic. She is doing well and healing well. We went over signs/symptoms she should watch for such as right  fullness with hearing loss or watery drainage from the right nostril only, particularly if it occurs with straining or bending over. She expressed understanding and will contact us if she develops any of those symptoms. Overall she is pleased.    Scribe Disclosure:  I, Lisa Davis, am serving as a scribe to document services personally performed by Jocelyne Harrell MD at this visit, based upon the provider's statements to me. All documentation has been reviewed by the aforementioned provider prior to being entered into the official medical record.       The documentation recorded by the scribe accurately reflects the services I personally performed and the decisions made by me.     Ambulatory

## 2022-03-03 ENCOUNTER — OFFICE VISIT (OUTPATIENT)
Dept: FAMILY MEDICINE | Facility: CLINIC | Age: 57
End: 2022-03-03
Payer: MEDICARE

## 2022-03-03 ENCOUNTER — OFFICE VISIT (OUTPATIENT)
Dept: AUDIOLOGY | Facility: CLINIC | Age: 57
End: 2022-03-03
Attending: OTOLARYNGOLOGY
Payer: MEDICARE

## 2022-03-03 ENCOUNTER — OFFICE VISIT (OUTPATIENT)
Dept: OTOLARYNGOLOGY | Facility: CLINIC | Age: 57
End: 2022-03-03
Payer: MEDICARE

## 2022-03-03 ENCOUNTER — ANCILLARY PROCEDURE (OUTPATIENT)
Dept: CT IMAGING | Facility: CLINIC | Age: 57
End: 2022-03-03
Attending: FAMILY MEDICINE
Payer: MEDICARE

## 2022-03-03 VITALS
SYSTOLIC BLOOD PRESSURE: 107 MMHG | TEMPERATURE: 96.6 F | WEIGHT: 239 LBS | DIASTOLIC BLOOD PRESSURE: 74 MMHG | BODY MASS INDEX: 40.8 KG/M2 | HEART RATE: 65 BPM | HEIGHT: 64 IN

## 2022-03-03 VITALS
HEART RATE: 53 BPM | BODY MASS INDEX: 41.2 KG/M2 | WEIGHT: 240 LBS | DIASTOLIC BLOOD PRESSURE: 73 MMHG | SYSTOLIC BLOOD PRESSURE: 124 MMHG | TEMPERATURE: 97.3 F | OXYGEN SATURATION: 98 %

## 2022-03-03 DIAGNOSIS — K57.32 SIGMOID DIVERTICULITIS: ICD-10-CM

## 2022-03-03 DIAGNOSIS — H90.3 SENSORINEURAL HEARING LOSS (SNHL) OF BOTH EARS: Primary | ICD-10-CM

## 2022-03-03 DIAGNOSIS — H90.3 BILATERAL SENSORINEURAL HEARING LOSS: Primary | ICD-10-CM

## 2022-03-03 DIAGNOSIS — K57.32 SIGMOID DIVERTICULITIS: Primary | ICD-10-CM

## 2022-03-03 DIAGNOSIS — Q01.8 TEMPORAL ENCEPHALOCELE (H): ICD-10-CM

## 2022-03-03 LAB
CRP SERPL-MCNC: 4.6 MG/L (ref 0–8)
ERYTHROCYTE [DISTWIDTH] IN BLOOD BY AUTOMATED COUNT: 13 % (ref 10–15)
HCT VFR BLD AUTO: 39.1 % (ref 35–47)
HGB BLD-MCNC: 12.6 G/DL (ref 11.7–15.7)
MCH RBC QN AUTO: 30.5 PG (ref 26.5–33)
MCHC RBC AUTO-ENTMCNC: 32.2 G/DL (ref 31.5–36.5)
MCV RBC AUTO: 95 FL (ref 78–100)
PLATELET # BLD AUTO: 428 10E3/UL (ref 150–450)
RBC # BLD AUTO: 4.13 10E6/UL (ref 3.8–5.2)
WBC # BLD AUTO: 7.8 10E3/UL (ref 4–11)

## 2022-03-03 PROCEDURE — 99024 POSTOP FOLLOW-UP VISIT: CPT | Performed by: OTOLARYNGOLOGY

## 2022-03-03 PROCEDURE — 92550 TYMPANOMETRY & REFLEX THRESH: CPT | Performed by: AUDIOLOGIST

## 2022-03-03 PROCEDURE — 36415 COLL VENOUS BLD VENIPUNCTURE: CPT | Performed by: FAMILY MEDICINE

## 2022-03-03 PROCEDURE — 86140 C-REACTIVE PROTEIN: CPT | Performed by: FAMILY MEDICINE

## 2022-03-03 PROCEDURE — 92557 COMPREHENSIVE HEARING TEST: CPT | Performed by: AUDIOLOGIST

## 2022-03-03 PROCEDURE — 80053 COMPREHEN METABOLIC PANEL: CPT | Performed by: FAMILY MEDICINE

## 2022-03-03 PROCEDURE — 74177 CT ABD & PELVIS W/CONTRAST: CPT | Mod: ME | Performed by: RADIOLOGY

## 2022-03-03 PROCEDURE — G1004 CDSM NDSC: HCPCS | Mod: GC | Performed by: RADIOLOGY

## 2022-03-03 PROCEDURE — 99215 OFFICE O/P EST HI 40 MIN: CPT | Performed by: FAMILY MEDICINE

## 2022-03-03 PROCEDURE — 85027 COMPLETE CBC AUTOMATED: CPT | Performed by: FAMILY MEDICINE

## 2022-03-03 RX ORDER — IOPAMIDOL 755 MG/ML
135 INJECTION, SOLUTION INTRAVASCULAR ONCE
Status: COMPLETED | OUTPATIENT
Start: 2022-03-03 | End: 2022-03-03

## 2022-03-03 RX ADMIN — IOPAMIDOL 135 ML: 755 INJECTION, SOLUTION INTRAVASCULAR at 17:07

## 2022-03-03 ASSESSMENT — PAIN SCALES - GENERAL: PAINLEVEL: NO PAIN (0)

## 2022-03-03 ASSESSMENT — PATIENT HEALTH QUESTIONNAIRE - PHQ9
10. IF YOU CHECKED OFF ANY PROBLEMS, HOW DIFFICULT HAVE THESE PROBLEMS MADE IT FOR YOU TO DO YOUR WORK, TAKE CARE OF THINGS AT HOME, OR GET ALONG WITH OTHER PEOPLE: NOT DIFFICULT AT ALL
SUM OF ALL RESPONSES TO PHQ QUESTIONS 1-9: 4
SUM OF ALL RESPONSES TO PHQ QUESTIONS 1-9: 4

## 2022-03-03 NOTE — PROGRESS NOTES
Assessment & Plan       Sigmoid diverticulitis  - Recently discharged from the hospital for sepsis due to perforated sigmoid diverticulitis. Symptoms are not improving and abdominal exam tender. Normal vitals which showed no fever. Concern for abscess and I recommended the acute diagnostic center. [facility] did not have imaging available and pt stated that [place] location is too far. We were able to schedule a STAT CT scan and order labs. Fortunately imaging did not show abscess and lab work showed normal inflammatory markers and infectious marker. I advised pt to continue current abx regimen and alternate tylenol and ibuprofen. Follow up scheduled on 3/7/22.   - CBC with platelets; Future  - Comprehensive metabolic panel (BMP + Alb, Alk Phos, ALT, AST, Total. Bili, TP); Future  - CRP, inflammation; Future  - CT Abdomen Pelvis w Contrast; Future  - CBC with platelets  - Comprehensive metabolic panel (BMP + Alb, Alk Phos, ALT, AST, Total. Bili, TP)  - CRP, inflammation  - ondansetron (ZOFRAN-ODT) 4 MG ODT tab; Take 1 tablet (4 mg) by mouth every 8 hours as needed for nausea  Dispense: 30 tablet; Refill: 0      Deqa Alix Matias MD  [facility]   Marleen is a 56 year old who presents for the following health issues     HPI     FOLLOW UP:  Instructions to follow up provider:   Nando Whitlock MD     Specify time frame for follow up?: 2 Weeks   Instructions to follow-up provider: see below   Plan to do plan low residual diet until the inflammationis resolved.   We discussed emergency surgery with hartmans pouch and colostomy and that it is not permanent.   We also discussed doing elective Laparoscopic sigmoid resection and anastomosis.   If patient continues to improve and is able to switch to oral antibiotics then will see back in 2-3 weeks in clinic. To see how she is doing and to set her up for a colonoscopy. In 6-8 weeks.   She knows to let us know if She  feels she is not improving and what to look for as syptoms and signs of infection. Will have [facility] to do another ct scan to look for an abscess.   [facility]     Specify time frame for follow up?: 1 Week   Instructions to follow-up provider: Hospital discharge follow up. Might also need update with return to work restrictions. Patient due to return to work Thursday 3/3/2022       HOSPITAL COURSE:  The patient is a 56-year-old woman with known morbid obesity, essential hypertension, allergic rhinitis, nonischemic cardiomyopathy, history diverticulitis, and obstructive sleep apnea who was admitted to hospitalist medicine service from [place] emergency department 2/21/2022 with sepsis due to perforated sigmoid diverticulitis.     #. Sepsis due to perforated sigmoid diverticulitis:  Sepsis resolved. Tolerating PO without difficulty. Patient having less left lower quadrant tenderness. Afebrile over the last 48 hours and WBC normalized. No need for surgery. Sepsis was present on admission and defined by T-max 100.5  F and white count 12,000 with abdominal CT demonstrating perforated diverticulitis without abscess.  Sepsis due to perforated diverticulitis. There is no drainable fluid collection, and surgical intervention likely not warranted. Blood cultures no growth to date. The patient's pain is been well controlled with scheduled Tylenol, naproxen, and as needed oxycodone. Patient received IV piperacillin-tazobactam while in the hospital, the patient transition to oral Augmentin for 10 more days. The patient has outpatient follow-up scheduled with general surgery, and an order was placed for patient to follow-up with primary care provider in 1 week. Patient will continue following a low residue diet upon being discharged home. Because the patient was discharged with narcotic analgesia, the patient was also prescribed daily lactulose to avoid opioid-induced constipation.    #. Essential  HTN  #. Chronic systolic heart failure - NICM  Patient primarily follows with [facility]. Appeared euvolemic. Continued PTA metoprolol XL, spironolactone, and sacubitril-valsartan. Initially held PTA furosemide, the patient resumed this medication upon being discharged from the hospital.     #. Hyponatremia:  Resolved. Initially due to decreased PO intake.     #. KATIA:   CPAP per home settings.       #. Morbid obesity:  BMI 40.40     2/23/21 - colonoscopy   Impression:               - Hemorrhoids found on perianal exam.                             - Diverticulosis in the sigmoid colon, in the                             transverse colon and at the hepatic flexure.                             - Internal hemorrhoids.                             - No specimens collected.   Recommendation:           - Referral to pelvic floor clinic                             - Repeat colonoscopy in 5 years for surveillance                             given history of polyps and family history of CRC                             - Patient has a contact number available for                             emergencies. The signs and symptoms of potential                             delayed complications were discussed with the                             patient. Return to normal activities tomorrow.                             Written discharge instructions were provided to the                             patient.     Update -   She is not feeling better.   During hospitalization she had no pain.  LLQ pain is when she is having BM.   She notes new RLQ pain. RLQ pain hurts, unable to rate the pain  She has 3 days left of the antibiotics.   She took one tablet of oxycodone while at home.   She has been taking tylenol to help with the pain. She is taking Tylenol 1, 000 mg BID.   She is out of naproxen.    She has been experiencing intermittent nausea and GERD.  She has been experiencing chills every few days.   She vomited one  time which was a few days ago.   Stools are coming out like granola and sometimes has formed stool. Stool is starting to have foul odor.  Appetite and fluid intake are reduced.       Answers for HPI/ROS submitted by the patient on 3/3/2022  If you checked off any problems, how difficult have these problems made it for you to do your work, take care of things at home, or get along with other people?: Not difficult at all  PHQ9 TOTAL SCORE: 4      Review of Systems         Objective    LMP 10/19/2008 (Approximate)   There is no height or weight on file to calculate BMI.  Physical Exam   GENERAL: healthy, alert and no distress  ABDOMEN: soft, + diffuse tender, no hepatosplenomegaly, no masses and bowel sounds normal    Results for orders placed or performed in visit on 03/03/22 (from the past 24 hour(s))   CBC with platelets   Result Value Ref Range    WBC Count 7.8 4.0 - 11.0 10e3/uL    RBC Count 4.13 3.80 - 5.20 10e6/uL    Hemoglobin 12.6 11.7 - 15.7 g/dL    Hematocrit 39.1 35.0 - 47.0 %    MCV 95 78 - 100 fL    MCH 30.5 26.5 - 33.0 pg    MCHC 32.2 31.5 - 36.5 g/dL    RDW 13.0 10.0 - 15.0 %    Platelet Count 428 150 - 450 10e3/uL   CRP, inflammation   Result Value Ref Range    CRP Inflammation 4.6 0.0 - 8.0 mg/L     *Note: Due to a large number of results and/or encounters for the requested time period, some results have not been displayed. A complete set of results can be found in Results Review.

## 2022-03-03 NOTE — LETTER
3/3/2022      RE: Marleen Mcfadden  [address]  [place] [address]       Marleen Mcfadden is seen in the [facility] skullbase clinic.  She is a 56 year old female being seen s/p right middle fossa approach for repair of temporal lobe encephalocele 2 months ago (07/14/2021). She endorsed a headache while flying on a plane at her 2 month follow up (09/02/2021), but denied any other complaints. She has had no right ear or right nostril drainage. Dr. Noe and I cleared her to return to rowing and other desired activities. Today, the patient reports that her hearing has improved. She endorses a runny nose that is coming out of both nostrils. Of note, she reports being in the hospital for a few days for a bout of diverticulitis. She feels recovered from this and is now lifting weights 5 days per week as she is working towards losing weight and has already lost a fair amount of weight.    Physical examination:  Constitutional:  In no acute distress, appears stated age  Right ear: periauricular incision well healing. EAC and TM intact. Middle ear is well aerated.  Left ear: EAC and TM intact. Middle ear is well aerated.  Respiratory:  No increased work of breathing, wheezing or stridor  Neurologic:  House Brackman 1/6 bilaterally, ambulating normally    Audiogram:  AUDIOGRAM: She underwent an audiogram today. This demonstrated: symmetric normal/mild downsloping to moderate to severe primarily sensorineural hearing loss.        Right: Speech reception threshold is 20 dB with 100% word recognition at 60 dB. Tympanogram A type   Left: Speech reception threshold is 15 dB with 100% word recognition at 60 dB. Tympanogram A type     Audiogram was independently reviewed and compared to her preoperative test. The right conductive hearing loss has resolved.    Assessment and plan:    Marleen Mcfadden is seen in consultation in the [facility] skullbase clinic. She is  doing well and healing well. We went over signs/symptoms she should watch for such as right fullness with hearing loss or watery drainage from the right nostril only, particularly if it occurs with straining or bending over. She expressed understanding and will contact us if she develops any of those symptoms. Overall she is pleased.    Scribe Disclosure:  I, Lisa Ryan, am serving as a scribe to document services personally performed by Jocelyne Harrell MD at this visit, based upon the provider's statements to me. All documentation has been reviewed by the aforementioned provider prior to being entered into the official medical record.       The documentation recorded by the scribe accurately reflects the services I personally performed and the decisions made by me.        Jocelyne Harrell MD

## 2022-03-03 NOTE — PATIENT INSTRUCTIONS
Thank you for choosing [facility]. You were seen in the Skull Base Clinic today with Dr. Harrell.    Next steps:  1. Return to clinic as needed.      Please don't hesitate to reach out via MyChart or telephone if you have any questions after your visit.      Contact Numbers:    Neurosurgery Clinic: [phone]   ENT Clinic: [phone]     Nina Osorio MA, RN, PHN, NBC-HWC  RN Care Coordinator, Skull Base Surgery  Direct: [phone] Erica Hooker, RN, BSN, PHN, LSN, CPN  RN Care Coordinator, ENT  Direct: [phone]     We do our best to check voicemail frequently throughout the day. For urgent matters, please use the general clinic phone numbers listed above. Your call will be routed appropriately.

## 2022-03-03 NOTE — PROGRESS NOTES
AUDIOLOGY REPORT    SUMMARY: Audiology visit completed. See audiogram for results.      RECOMMENDATIONS: Follow-up with ENT.    Lobo Qureshi. CCC-A  Licensed Audiologist   [place] #[id number]

## 2022-03-03 NOTE — LETTER
March 4, 2022      Re: Marleen Mcfadden  [address]   [place] [address]        To Whom It May Concern,     Marleen Mcfadden was evaluated on March 3, 2022. Due to her continued symptoms, I recommend that she return to work on March 14, 2022. Please call me if you have any questions or concerns.       Sincerely,        Yanna Matias MD

## 2022-03-03 NOTE — NURSING NOTE
"Chief Complaint   Patient presents with     RECHECK     6 month follow up      Blood pressure 107/74, pulse 65, temperature (!) 96.6  F (35.9  C), height 1.626 m (5' 4\"), weight 108.4 kg (239 lb), last menstrual period 10/19/2008, not currently breastfeeding.    Cortes Slater LPN    "

## 2022-03-04 LAB
ALBUMIN SERPL-MCNC: 3.5 G/DL (ref 3.4–5)
ALP SERPL-CCNC: 66 U/L (ref 40–150)
ALT SERPL W P-5'-P-CCNC: 71 U/L (ref 0–50)
ANION GAP SERPL CALCULATED.3IONS-SCNC: 3 MMOL/L (ref 3–14)
AST SERPL W P-5'-P-CCNC: 19 U/L (ref 0–45)
BILIRUB SERPL-MCNC: 0.4 MG/DL (ref 0.2–1.3)
BUN SERPL-MCNC: 17 MG/DL (ref 7–30)
CALCIUM SERPL-MCNC: 9.1 MG/DL (ref 8.5–10.1)
CHLORIDE BLD-SCNC: 103 MMOL/L (ref 94–109)
CO2 SERPL-SCNC: 27 MMOL/L (ref 20–32)
CREAT SERPL-MCNC: 0.88 MG/DL (ref 0.52–1.04)
GFR SERPL CREATININE-BSD FRML MDRD: 76 ML/MIN/1.73M2
GLUCOSE BLD-MCNC: 93 MG/DL (ref 70–99)
POTASSIUM BLD-SCNC: 4.2 MMOL/L (ref 3.4–5.3)
PROT SERPL-MCNC: 7.6 G/DL (ref 6.8–8.8)
SODIUM SERPL-SCNC: 133 MMOL/L (ref 133–144)

## 2022-03-04 RX ORDER — ONDANSETRON 4 MG/1
4 TABLET, ORALLY DISINTEGRATING ORAL EVERY 8 HOURS PRN
Qty: 30 TABLET | Refills: 0 | Status: SHIPPED | OUTPATIENT
Start: 2022-03-04 | End: 2022-08-03

## 2022-03-04 ASSESSMENT — PATIENT HEALTH QUESTIONNAIRE - PHQ9: SUM OF ALL RESPONSES TO PHQ QUESTIONS 1-9: 4

## 2022-03-04 NOTE — PATIENT INSTRUCTIONS
Tylenol 500 to 1, 000 mg three times daily alternating with Ibuprofen 400 to 600 mg every 8 hours

## 2022-03-07 ENCOUNTER — VIRTUAL VISIT (OUTPATIENT)
Dept: FAMILY MEDICINE | Facility: CLINIC | Age: 57
End: 2022-03-07
Payer: MEDICARE

## 2022-03-07 DIAGNOSIS — Z80.0 FAMILY HISTORY OF COLON CANCER: ICD-10-CM

## 2022-03-07 DIAGNOSIS — K57.32 SIGMOID DIVERTICULITIS: Primary | ICD-10-CM

## 2022-03-07 PROCEDURE — 99207 E-CONSULT TO GASTROENTEROLOGY (ADULT OUTPT PROVIDER TO SPECIALIST WRITTEN QUESTION & RESPONSE): CPT | Performed by: FAMILY MEDICINE

## 2022-03-07 PROCEDURE — 99443 PR PHYSICIAN TELEPHONE EVALUATION 21-30 MIN: CPT | Mod: 95 | Performed by: FAMILY MEDICINE

## 2022-03-07 NOTE — PROGRESS NOTES
Marleen is a 56 year old who is being evaluated via a billable telephone visit.      What phone number would you like to be contacted at? Cell phone   How would you like to obtain your AVS? MyChart    Assessment & Plan     Sigmoid diverticulitis  Family history of colon cancer  Discussed labs/imaging with pt. Abdominal pain has improved but pt continues to have mild symptoms along with stools that come out like crumbles. I recommend an e-consult to GI for recommendations on if further antibiotics are needed. Last dose of antibiotics were 3/5/22.  Work accommodations were filled, scanned and copy mailed to pt.   Scheduled with surgery 3/11/22. Per pt colonoscopy will be ordered during surgery visit.   Advised to continue current regimen.   - E-CONSULT TO GASTROENTEROLOGY (ADULT OUTPT PROVIDER TO SPECIALIST WRITTEN QUESTION & RESPONSE)      Addendum -   Discussed e-consult recommendations with pt.    Yanna Matias MD  [facility]   Marleen is a 56 year old who presents for the following health issues     HPI     2/23/21   Recommendation:           - Referral to pelvic floor clinic                             - Repeat colonoscopy in 5 years for surveillance                             given history of polyps and family history of CRC                             - Patient has a contact number available for                             emergencies. The signs and symptoms of potential                             delayed complications were discussed with the                             patient. Return to normal activities tomorrow.                             Written discharge instructions were provided to the                             patient.        Her stools are still coming out like sand. She did take one dose of laxative and had good BM.    When she has BM, she has pain in that area.   Pain is now mild, pain is mainly in the RLQ abdomen.   No nausea or vomiting.    She is taking tylenol three times daily.   Since last visit, she is feeling a little better.   Last dose of antibiotics was on 3/5/22.   Appt with surgery on 3/11/22.     Review of Systems         Objective           Vitals:  No vitals were obtained today due to virtual visit.    Physical Exam   healthy, alert and no distress  PSYCH: Alert and oriented times 3; coherent speech, normal   rate and volume, able to articulate logical thoughts, able   to abstract reason, no tangential thoughts, no hallucinations   or delusions  Her affect is normal  RESP: No cough, no audible wheezing, able to talk in full sentences  Remainder of exam unable to be completed due to telephone visits                Phone call duration: 27 minutes

## 2022-03-08 ENCOUNTER — E-CONSULT (OUTPATIENT)
Dept: GASTROENTEROLOGY | Facility: CLINIC | Age: 57
End: 2022-03-08
Payer: MEDICARE

## 2022-03-08 PROCEDURE — 99451 NTRPROF PH1/NTRNET/EHR 5/>: CPT | Performed by: INTERNAL MEDICINE

## 2022-03-08 NOTE — PROGRESS NOTES
ALL SMARTFIELDS MUST BE COMPLETED FOR PATIENT CARE AND BILLING    3/8/2022     E-Consult has been accepted.    Interprofessional consultation requested by:  Yanna Matias MD      Clinical Question/Purpose: MY CLINICAL QUESTION IS: Are further antibiotics needed?     Patient assessment and information reviewed:     1. Complicated sigmoid diverticulosis (small perforation) managed with IV abx and then PO antibiotics (finished abx 3/5). Repeat CT scan 3/3 showed changes of uncomplicated diverticulitis but no further perforation, abscess, etc. WBC had normalized.    Per notes patient has some mild persistent abdominal pain and some constipation. Plan to meet with surgery as outpatient on 3/11.    Recommendations:   -if pain is minimal and patient is having no fevers and there is normal WBC then I do not think there is a strong indication for additional antibiotics at this time. CT showed some residual thickening of sigmoid but no perforation or abscess collection  -if patient develops worsening pain or fevers or leukocytosis then would consider repeat cross sectional imaging and starting antibiotics  -follow up with general surgery as scheduled  -agree with colonoscopy in 6 weeks to ensure no colon cancer or other abnormality that could mimic diverticulitis  -agree with miralax as needed for constipation       The recommendations provided in this E-Consult are based on a review of clinical data pertinent to the clinical question presented, without a review of the patient's complete medical record or, the benefit of a comprehensive in-person or virtual patient evaluation. This consultation should not replace the clinical judgement and evaluation of the provider ordering this E-Consult. Any new clinical issues, or changes in patient status since the filing of this E-Consult will need to be taken into account when assessing these recommendations. Please contact me if you have further questions.    My total time spent  reviewing clinical information and formulating assessment was 20 minutes.    Report sent automatically to requesting provider once signed.     Kasi Fu MD

## 2022-03-11 ENCOUNTER — OFFICE VISIT (OUTPATIENT)
Dept: SURGERY | Facility: CLINIC | Age: 57
End: 2022-03-11
Payer: MEDICARE

## 2022-03-11 VITALS
DIASTOLIC BLOOD PRESSURE: 82 MMHG | BODY MASS INDEX: 41.2 KG/M2 | HEART RATE: 78 BPM | SYSTOLIC BLOOD PRESSURE: 142 MMHG | WEIGHT: 240 LBS

## 2022-03-11 DIAGNOSIS — K57.32 DIVERTICULITIS OF COLON: Primary | ICD-10-CM

## 2022-03-11 PROCEDURE — 99214 OFFICE O/P EST MOD 30 MIN: CPT | Performed by: SURGERY

## 2022-03-11 NOTE — PROGRESS NOTES
Marleen is a 56 year old female who is status post in the hospital for diverticulitis  with no chills and nofever.      Patient's  Pain is  improving. Was having some mld left lower quadrant pain until last week and with a bowel movement.   Appetite is  improving.    Abdomen is not tender  Do not feel any ventral hernias.    EXAMINATION: CT ABDOMEN PELVIS W CONTRAST  3/3/2022 5:20 PM       CLINICAL HISTORY: Diverticulitis suspected; concern for abscess;  hospitalized 2/21//22 for sepsis due to perforated sigmoid  diverticulitis; Sigmoid diverticulitis     COMPARISON: Radiograph dated 2/4/2022. Outside CT report dated  2/21/2022.     TECHNIQUE: CT of the abdomen and pelvis with intravenous contrast.  Coronal and sagittal reformatted images were obtained.     FINDINGS:  Lines and tubes:  None.     Lower chest:   Lung bases are clear. Heart size is normal. No pericardial effusion.  Small hiatal hernia.     Abdomen and pelvis:  Hypoattenuating lesion in the inferior right hepatic lobe measuring 15  mm with overlying capsular retraction, doubtful clinical significance,  possibly a sclerosed hemangioma. The gallbladder, pancreas and spleen  are unremarkable. No adrenal nodules. No hydronephrosis or obstructing  stones. No suspicious renal masses or cysts. No bowel dilation or wall  thickening. Colonic diverticulosis. There is focal wall thickening and  mild surrounding fat stranding in the proximal sigmoid colon (series 3  image 240) associated with a diverticulum. No intra-abdominal abscess.  No extraluminal air. Trace free fluid in the pelvis. Normal appendix.  Urinary bladder is partially filled and otherwise unremarkable.  Surgical changes of a supracervical hysterectomy with nabothian cysts  in the cervix. No adnexal mass.     Vessels and lymph nodes:   The abdominal aorta is normal in caliber. No retroperitoneal  lymphadenopathy. Mildly enlarged left iliac chain lymph nodes are  likely reactive.     Bones and soft  tissues:  No suspicious osseous lesions. Mild degenerative changes of the spine.  Fat-containing inguinal hernias.                                                                      IMPRESSION:  Known proximal sigmoid diverticulitis. No evidence of perforation or  abscess.     I have personally reviewed the examination and initial interpretation  and I agree with the findings.     MIGUELINA COLORAD[place], DO       Plan:we discussed her bilateral inguinal hernia and doing this robotic   Patient has also been having lose stool.  Has been seen by pelvic floor.    Patient has a sister who  of colon cancer.   Will set up colonoscopy     Time spent with the patient with greater that 50% of the time in discussion was 30 minutes.  Nando Whitlock MD      CONSULTATION NOTE  Patient Name: Marleen Mcfadden  Address: [address] [place] [address] [address]  [address]  [facility] [id number]  Age:56 y.o.  Sex: female   Admission Date/Time: 2022  1:37 PM   Hospital Attending Physician: Valentino Mccann MD     I was asked to see this patient at the request of Dr. Ortega for evaluation of left lower quadrant pain.     HPI:   Marleen Mcfadden is 56 y.o. female chief complaint is forabout a week she has had to hold her left lower abdomen while working al lthis week.   She did have a fever one night of101.  She has been eaing ok but has had for some time stoolthat comes on suddenly and is pudding consitancy and this last week has smelled differently .      REVIEW OF SYSTEMS  PAST MEDICAL HISTORY  Essential hypertension  Allergic rhinitis  Non ischemic cardiomyopathy ( TTE EF 45%)  Obstructive sleep apnea      PAST SURGICAL HISTORY  S/P right TKA      A 10 point review of symptoms are all negative except what is mentioned above.           PAST MEDICAL HISTORY  Past Medical History   No past medical history on file.      Past Surgical History         PAST SURGICAL HISTORY  Past Surgical History:   Procedure Laterality Date     [id number]   2021          Current Medications             CURRENT MEDS  Current Facility-Administered Medications   Medication Dose Route Frequency Provider Last Rate Last Admin     **saline FLUSH syringe 10 mL  10 mL Intravenous Q8H Valentino Mccann MD         **saline FLUSH syringe 10 mL  10 mL Intravenous PRN Valentino Mccann MD         acetaminophen (TYLENOL) tablet 1,000 mg  1,000 mg oral Q6H Valetnino Mccann MD   1,000 mg at 02/22/22 0753     benzocaine-menthol (CEPACOL) lozenge 1 lozenge  1 lozenge oral Q2H PRN Valentino Mccann MD         diphenhydrAMINE (BENADRYL) tablet 25-50 mg  25-50 mg oral Q6H PRN Jim Howard MD   25 mg at 02/22/22 1018     enoxaparin (LOVENOX) injection 40 mg  40 mg Subcutaneous Q24H (NS) Valentino Mccann MD   40 mg at 02/21/22 1850     fluticasone propionate (FLONASE) nasal spray 2 spray  2 spray Each Nostril DAILY PRN Valentino Mccann MD         HYDROmorphone (DILAUDID) syringe 0.2-0.5 mg  0.2-0.5 mg Intravenous Q2H PRN Valentino Mccann MD         HYDROmorphone (DILAUDID) syringe 0.5 mg  0.5 mg Intravenous Q 15 MINS PRN Girish Ortega MD   0.5 mg at 02/21/22 1659     lidocaine (LMX-4) topical cream 1 Application  1 Application topical PRN Valentino Mccann MD         lidocaine / sod bicarb (buffered lidocaine) syringe for IV starts 0.1-0.3 mL  0.1-0.3 mL Intradermal PRN Valentino Mccann MD         loratadine (CLARITIN) tablet 10 mg  10 mg oral DAILY Valentino Mccann MD   10 mg at 02/22/22 0753     magnesium hydroxide (MILK OF MAGNESIA) suspension 30 mL  30 mL oral DAILY PRN Valentino Mccann MD         MAGNESIUM REPLACEMENT INTRAVENOUS - NOT FOR DOCUMENTATION PURPOSES   Intravenous PER PROTOCOL Valentino Mccann MD         melatonin tablet 3-6 mg  3-6 mg oral BEDTIME PRN Valentino Mccann MD         metoprolol succinate (XL) (TOPROL XL) extended release tablet 24 HR 50 mg  50 mg oral DAILY Valentino Mccann MD   50 mg at 02/22/22 0753     naproxen (NAPROSYN) tablet 250 mg  250 mg oral  Twice Daily Valentino Mccann MD   250 mg at 02/22/22 0753     ondansetron (ZOFRAN) injection 4-8 mg  4-8 mg Intravenous Q6H PRN Valentino Mccann MD         oxyCODONE (immediate release) (ROXICODONE) tablet 5-10 mg  5-10 mg oral Q4H PRN Valentino Mccann MD   5 mg at 02/22/22 0805     piperacillin-tazobactam (ZOSYN) 3.375 g in sodium chloride 0.9 % 100 mL IV piggyback  3.375 g Intravenous Q8H (NS) Girish Ortega MD   Stopped at 02/22/22 0958     polyethylene glycol (MIRALAX) packet 17 g  17 g oral DAILY PRN Valentino Mccann MD         polyvinyl alcohol (ARTIFICIAL TEARS) ophthalmic (EYE) drops 1-2 drop  1-2 drop Each Eye QID PRN Valentino Mccann MD         POTASSIUM REPLACEMENT INTRAVENOUS - NOT FOR DOCUMENTATION PURPOSES   Intravenous PER PROTOCOL Valentino Mccann MD         POTASSIUM REPLACEMENT ORAL - NOT FOR DOCUMENTATION PURPOSES   oral PER PROTOCOL Valentino Mccann MD         prochlorperazine (COMPAZINE) injection 5-10 mg  5-10 mg Intravenous Q6H PRN Valentino Mccann MD         progesterone micronized (PROMETRIUM) capsule 100 mg  100 mg oral Q BEDTIME Valentino Mccann MD   100 mg at 02/21/22 2159     sacubitriL-valsartan 49-51 mg Tab 1 tablet  1 tablet oral Twice Daily Valentino Mccann MD   1 tablet at 02/22/22 0754     saline with benzyl alcohol injection 0.1-0.3 mL  0.1-0.3 mL Intradermal PRN Valentino Mccann MD         senna (SENOKOT) tablet 1-2 tablet  1-2 tablet oral BID PRN Valentino Mccann MD         sodium chloride 0.65% (OCEAN) nasal solution 1-2 spray  1-2 spray Each Nostril Q2H PRN Valentino Mccann MD         solifenacin (VESICARE) tablet 5 mg  5 mg oral DAILY Valentino Mccann MD   5 mg at 02/22/22 0755     spironolactone (ALDACTONE) tablet 50 mg  50 mg oral DAILY Valentino Mccann MD   50 mg at 02/22/22 0753               ALLERGIES/SENSITIVITIES  Allergies   Allergen Reactions     Morphine         intolerance     Nickel         blisters     Sulfa (Sulfonamide Antibiotics)          "Face swelling      FAMILY HISTORY  Family History   No family history on file.      SOCIAL HISTORY  Social History               Socioeconomic History     Marital status:        Spouse name: Not on file     Number of children: Not on file     Years of education: Not on file     Highest education level: Not on file   Occupational History     Not on file   Tobacco Use     Smoking status: Never Smoker     Smokeless tobacco: Never Used   Substance and Sexual Activity     Alcohol use: Not Currently     Drug use: Never     Sexual activity: Not on file   Other Topics Concern     Not on file   Social History Narrative     Not on file      Social Determinants of Health      Financial Resource Strain: Not on file   Food Insecurity: Not on file   Transportation Needs: Not on file   Physical Activity: Not on file   Stress: Not on file   Social Connections: Not on file   Intimate Partner Violence: Not on file   Housing Stability: Not on file            PHYSICAL EXAM  BP (!) 107/56   Pulse 68   Temp 98  F (36.7  C)   Resp 18   Ht 5' 5\" (1.651 m)   Wt 109.2 kg (240 lb 12.8 oz)   SpO2 94%   Breastfeeding No   BMI 40.07 kg/m    Body mass index is 40.07 kg/m .   Patient able to get sit up without difficulty.   Patient is alert and orientated.   Head eyes, nose and mouth within normal limits.     No carotid bruits auscultated.  Lungs are clear to auscultation  Heart is regular rate and rhythm with no murmur or thrills noted.     Abdomen is abdomen is soft without significant tenderness except at the left lower quadrant with  Mild rebound tenderness that patient feels is a lot betterthanyesterday.       Skin was warm and pink  Normal Affect     Lower extremity edema is mildly present and per patient is normal for her        LABS        WBC   Date Value Ref Range Status   02/22/2022 9.0 4.3 - 10.8 K/uL Final            RBC   Date Value Ref Range Status   02/22/2022 3.65 (L) 4.20 - 5.40 M/uL Final            HEMOGLOBIN "   Date Value Ref Range Status   02/22/2022 11.3 (L) 12.0 - 16.0 gm/dL Final            HEMATOCRIT   Date Value Ref Range Status   02/22/2022 34.0 (L) 36.0 - 48.0 % Final      MCV   Date Value Ref Range Status   02/22/2022 93 80 - 100 fl Final            MCH   Date Value Ref Range Status   02/22/2022 31 27 - 33 pg Final            MCHC   Date Value Ref Range Status   02/22/2022 33 33 - 36 gm/dL Final            RDW   Date Value Ref Range Status   02/22/2022 13.0 11.5 - 14.5 % Final            PLATELET COUNT   Date Value Ref Range Status   02/22/2022 240 150 - 400 K/UL Final            AST (SGOT)   Date Value Ref Range Status   02/21/2022 17 15 - 37 U/L Final            ALBUMIN   Date Value Ref Range Status   02/21/2022 3.4 3.4 - 5.0 g/dL Final            ALKALINE P'TASE   Date Value Ref Range Status   02/21/2022 72 45 - 117 U/L Final            ALT   Date Value Ref Range Status   02/21/2022 26 12 - 68 U/L Final            BILIRUBIN-DIRECT   Date Value Ref Range Status   02/21/2022 0.14 <=0.20 mg/dL Final            BILIRUBIN-TOTAL   Date Value Ref Range Status   02/21/2022 0.6 0.2 - 1.0 mg/dL Final            PROTEIN TOTAL   Date Value Ref Range Status   02/21/2022 7.9 6.4 - 8.2 g/dL Final      CT Scan Narrative & Impression  EXAM: CT ABDOMEN & PELVIS W/O ORAL W IV CON     DATE: 2/21/2022 2:52 PM     COMPARISON: None.     CLINICAL DATA: LLQ Pain.      ICD 10:     TECHNIQUE: Thin-section contiguous transaxial images were obtained through the abdomen and pelvis with intravenous contrast.  No oral contrast was given.  Coronal reformations were also obtained through the abdomen and pelvis.       CONTRAST:  IOHEXOL 350 MGI/ML    Dose Given: 100 mL     FINDINGS:      ABDOMEN:  Breathing motion artifact at the lung bases bilaterally. Moderate hiatal hernia. Liver is low attenuating without enhancing mass or duct dilation. Renal enhancement is symmetric. Spleen, splenic vein, pancreas and portal veins enhance normally. No  radiopaque gallstones. No small bowel distention.     PELVIS:  Wall thickening associated with the proximal sigmoid colon. Few sigmoid diverticula are present. Increased density is noted in the fat adjacent to one of these diverticula. Small volume of extraluminal gas is noted in this location. Minimal fluid in the deep pelvis. No loculated fluid collection.     IMPRESSION  IMPRESSION:      1.  Study demonstrates findings of focally perforated proximal sigmoid diverticulitis. Although a small volume of fluid is noted near the proximal sigmoid colon and dependently in the pelvis no abscess is identified.        REPORT SIGNED BY DR. Stu Ruiz      CONSULTATION ASSESSMENT:  Principal Problem:    Diverticulitis        PLAN/RECOMMENDATIONS:      Assessment: diverticulits that is improving on iv antibiotics.  Her wbc has improved and she is feeling better.    Plan to do plan low residual diet until the inflammationis resolved.    We discussed emergency surgery with hartmans pouch and colostomy and that it is not permanent.    We also discussed doing elective Laparoscopic sigmoid resection and anastomosis.    If patient continues to improve and is able to switch to oral antibiotics then will see back in 2-3 weeks in clinic. To see how she is doing and to set her up for a colonoscopy.  In 6-8 weeks.    She knows to let us know if  She feels she is not improving and what to look for as  syptoms and signs of infection.  Will have  Low bar to do another ct scan to look for an abscess. .     Risks of surgery include damage to nerves, bleeding, infection, damage to  Vessels, recurrence.   Possible colostomy.       Time: 80 minutes  Nando Whitlock MD           PROGRESS NOTE  Day 2 of antibiotics and patient is feeling better each day.    SUBJECTIVE  Pain well controlled. No nausea or vomiting.      OBJECTIVE  Temp (24hrs), Av  F (36.7  C), Min:97.5  F (36.4  C), Max:98.4  F (36.9  C)     /69   Pulse (!) 50    "Temp 97.5  F (36.4  C)   Resp 16   Ht 5' 5\" (1.651 m)   Wt 109.2 kg (240 lb 12.8 oz)   SpO2 98%   Breastfeeding No   BMI 40.07 kg/m           Intake/Output Summary (Last 24 hours) at 2/23/2022 1539  Last data filed at 2/22/2022 2008      Gross per 24 hour   Intake 1020 ml   Output --   Net 1020 ml      Outputs (Urine/Drains) in the last 8 hours:  Measured Void (ml): (not recorded)  Urine Output (ml): (not recorded)  Drain Output (ml): (not recorded)         GASTROINTESTINAL: soft, flat decreased left lower quadrant pain  NEUROLOGIC: alert and oriented x 4, moves all extremities  No increaes in lower extremity edema     LABS      WBC (K/uL)   Date Value   02/23/2022 5.5          HEMOGLOBIN (gm/dL)   Date Value   02/23/2022 11.1          PLATELET COUNT (K/UL)   Date Value   02/23/2022 265          SODIUM (mmol/L)   Date Value   02/23/2022 137          POTASSIUM (mmol/L)   Date Value   02/23/2022 3.8         Additional comments: I reviewed the patient's new clinical lab test results. Wbc is normal now.    Patient is passing flatus and stool. And eating normal meal without problems.  She asures me she is chewing the veggies well.      IMPRESSION AND PLAN  Discharge today and follow up with me in clinic in about 2 weeks.   If getting worse to let us know.   Time talking to patient including looking up records and talking to other physicians and nurses about patient care is 40 minutes .           Nando Whitlock MD              "

## 2022-03-11 NOTE — LETTER
3/11/2022         RE: Marleen Mcfadden  [address]  [facility] [address]        Dear Colleague,    Thank you for referring your patient, Marleen Mcfadden, to the [facility]. Please see a copy of my visit note below.    Marleen is a 56 year old female who is status post in the hospital for diverticulitis  with no chills and nofever.      Patient's  Pain is  improving. Was having some mld left lower quadrant pain until last week and with a bowel movement.   Appetite is  improving.    Abdomen is not tender  Do not feel any ventral hernias.    EXAMINATION: CT ABDOMEN PELVIS W CONTRAST  3/3/2022 5:20 PM       CLINICAL HISTORY: Diverticulitis suspected; concern for abscess;  hospitalized 2/21//22 for sepsis due to perforated sigmoid  diverticulitis; Sigmoid diverticulitis     COMPARISON: Radiograph dated 2/4/2022. Outside CT report dated  2/21/2022.     TECHNIQUE: CT of the abdomen and pelvis with intravenous contrast.  Coronal and sagittal reformatted images were obtained.     FINDINGS:  Lines and tubes:  None.     Lower chest:   Lung bases are clear. Heart size is normal. No pericardial effusion.  Small hiatal hernia.     Abdomen and pelvis:  Hypoattenuating lesion in the inferior right hepatic lobe measuring 15  mm with overlying capsular retraction, doubtful clinical significance,  possibly a sclerosed hemangioma. The gallbladder, pancreas and spleen  are unremarkable. No adrenal nodules. No hydronephrosis or obstructing  stones. No suspicious renal masses or cysts. No bowel dilation or wall  thickening. Colonic diverticulosis. There is focal wall thickening and  mild surrounding fat stranding in the proximal sigmoid colon (series 3  image 240) associated with a diverticulum. No intra-abdominal abscess.  No extraluminal air. Trace free fluid in the pelvis. Normal appendix.  Urinary bladder is partially filled and otherwise unremarkable.  Surgical changes of a supracervical  hysterectomy with nabothian cysts  in the cervix. No adnexal mass.     Vessels and lymph nodes:   The abdominal aorta is normal in caliber. No retroperitoneal  lymphadenopathy. Mildly enlarged left iliac chain lymph nodes are  likely reactive.     Bones and soft tissues:  No suspicious osseous lesions. Mild degenerative changes of the spine.  Fat-containing inguinal hernias.                                                                      IMPRESSION:  Known proximal sigmoid diverticulitis. No evidence of perforation or  abscess.     I have personally reviewed the examination and initial interpretation  and I agree with the findings.     MIGUELINA COLORADO, DO       Plan:we discussed her bilateral inguinal hernia and doing this robotic   Patient has also been having lose stool.  Has been seen by pelvic floor.    Patient has a sister who  of colon cancer.   Will set up colonoscopy     Time spent with the patient with greater that 50% of the time in discussion was 30 minutes.  Nando Whitlock MD      CONSULTATION NOTE  Patient Name: Marleen Mcfadden  Address: [address] [place] [address] [address]  [address]  [facility] [address]  Age:56 y.o.  Sex: female   Admission Date/Time: 2022  1:37 PM   Hospital Attending Physician: Valentino Mccann MD     I was asked to see this patient at the request of Dr. Ortega for evaluation of left lower quadrant pain.     HPI:   Marleen Mcfadden is 56 y.o. female chief complaint is forabout a week she has had to hold her left lower abdomen while working al lthis week.   She did have a fever one night of101.  She has been eaing ok but has had for some time stoolthat comes on suddenly and is pudding consitancy and this last week has smelled differently .      REVIEW OF SYSTEMS  PAST MEDICAL HISTORY  Essential hypertension  Allergic rhinitis  Non ischemic cardiomyopathy ( TTE EF 45%)  Obstructive sleep apnea      PAST SURGICAL HISTORY  S/P right TKA      A 10 point review of symptoms are all  negative except what is mentioned above.           PAST MEDICAL HISTORY  Past Medical History   No past medical history on file.      Past Surgical History         PAST SURGICAL HISTORY  Past Surgical History:   Procedure Laterality Date     [id number]   July 5th 2021         Current Medications             CURRENT MEDS  Current Facility-Administered Medications   Medication Dose Route Frequency Provider Last Rate Last Admin     **saline FLUSH syringe 10 mL  10 mL Intravenous Q8H Valentino Mccann MD         **saline FLUSH syringe 10 mL  10 mL Intravenous PRN Valentino Mccann MD         acetaminophen (TYLENOL) tablet 1,000 mg  1,000 mg oral Q6H Valentino Mccann MD   1,000 mg at 02/22/22 0753     benzocaine-menthol (CEPACOL) lozenge 1 lozenge  1 lozenge oral Q2H PRN Valentino Mccann MD         diphenhydrAMINE (BENADRYL) tablet 25-50 mg  25-50 mg oral Q6H PRN Jim Howard MD   25 mg at 02/22/22 1018     enoxaparin (LOVENOX) injection 40 mg  40 mg Subcutaneous Q24H (NS) Valentino Mccann MD   40 mg at 02/21/22 1850     fluticasone propionate (FLONASE) nasal spray 2 spray  2 spray Each Nostril DAILY PRN Valentino Mccann MD         HYDROmorphone (DILAUDID) syringe 0.2-0.5 mg  0.2-0.5 mg Intravenous Q2H PRN Valentino Mccann MD         HYDROmorphone (DILAUDID) syringe 0.5 mg  0.5 mg Intravenous Q 15 MINS PRN Girish Ortega MD   0.5 mg at 02/21/22 1659     lidocaine (LMX-4) topical cream 1 Application  1 Application topical PRN Valentino Mccann MD         lidocaine / sod bicarb (buffered lidocaine) syringe for IV starts 0.1-0.3 mL  0.1-0.3 mL Intradermal PRN Valentino Mccann MD         loratadine (CLARITIN) tablet 10 mg  10 mg oral DAILY Valentino Mccann MD   10 mg at 02/22/22 0753     magnesium hydroxide (MILK OF MAGNESIA) suspension 30 mL  30 mL oral DAILY PRN Valentino Mccann MD         MAGNESIUM REPLACEMENT INTRAVENOUS - NOT FOR DOCUMENTATION PURPOSES   Intravenous PER PROTOCOL Valentino Mccann MD          melatonin tablet 3-6 mg  3-6 mg oral BEDTIME PRN Valentino Mccann MD         metoprolol succinate (XL) (TOPROL XL) extended release tablet 24 HR 50 mg  50 mg oral DAILY Valentino Mccann MD   50 mg at 02/22/22 0753     naproxen (NAPROSYN) tablet 250 mg  250 mg oral Twice Daily Valentino Mccann MD   250 mg at 02/22/22 0753     ondansetron (ZOFRAN) injection 4-8 mg  4-8 mg Intravenous Q6H PRN Valentino Mccann MD         oxyCODONE (immediate release) (ROXICODONE) tablet 5-10 mg  5-10 mg oral Q4H PRN Valentino Mccann MD   5 mg at 02/22/22 0805     piperacillin-tazobactam (ZOSYN) 3.375 g in sodium chloride 0.9 % 100 mL IV piggyback  3.375 g Intravenous Q8H (NS) Girish Ortega MD   Stopped at 02/22/22 0958     polyethylene glycol (MIRALAX) packet 17 g  17 g oral DAILY PRN Valentino Mccann MD         polyvinyl alcohol (ARTIFICIAL TEARS) ophthalmic (EYE) drops 1-2 drop  1-2 drop Each Eye QID PRN Valentino Mccann MD         POTASSIUM REPLACEMENT INTRAVENOUS - NOT FOR DOCUMENTATION PURPOSES   Intravenous PER PROTOCOL Valentino Mccann MD         POTASSIUM REPLACEMENT ORAL - NOT FOR DOCUMENTATION PURPOSES   oral PER PROTOCOL Valentino Mccann MD         prochlorperazine (COMPAZINE) injection 5-10 mg  5-10 mg Intravenous Q6H PRN Valentino Mccann MD         progesterone micronized (PROMETRIUM) capsule 100 mg  100 mg oral Q BEDTIME Valentino Mccann MD   100 mg at 02/21/22 2159     sacubitriL-valsartan 49-51 mg Tab 1 tablet  1 tablet oral Twice Daily Valentino Mccann MD   1 tablet at 02/22/22 0754     saline with benzyl alcohol injection 0.1-0.3 mL  0.1-0.3 mL Intradermal PRN Valentino Mccann MD         senna (SENOKOT) tablet 1-2 tablet  1-2 tablet oral BID PRN Valentino Mccann MD         sodium chloride 0.65% (OCEAN) nasal solution 1-2 spray  1-2 spray Each Nostril Q2H PRN Valentino Mccann MD         solifenacin (VESICARE) tablet 5 mg  5 mg oral DAILY Valentino Mccann MD   5 mg at 02/22/22 0759     spironolactone  "(ALDACTONE) tablet 50 mg  50 mg oral DAILY Valentino Mccann MD   50 mg at 02/22/22 0753               ALLERGIES/SENSITIVITIES  Allergies   Allergen Reactions     Morphine         intolerance     Nickel         blisters     Sulfa (Sulfonamide Antibiotics)         Face swelling      FAMILY HISTORY  Family History   No family history on file.      SOCIAL HISTORY  Social History               Socioeconomic History     Marital status:        Spouse name: Not on file     Number of children: Not on file     Years of education: Not on file     Highest education level: Not on file   Occupational History     Not on file   Tobacco Use     Smoking status: Never Smoker     Smokeless tobacco: Never Used   Substance and Sexual Activity     Alcohol use: Not Currently     Drug use: Never     Sexual activity: Not on file   Other Topics Concern     Not on file   Social History Narrative     Not on file      Social Determinants of Health      Financial Resource Strain: Not on file   Food Insecurity: Not on file   Transportation Needs: Not on file   Physical Activity: Not on file   Stress: Not on file   Social Connections: Not on file   Intimate Partner Violence: Not on file   Housing Stability: Not on file            PHYSICAL EXAM  BP (!) 107/56   Pulse 68   Temp 98  F (36.7  C)   Resp 18   Ht 5' 5\" (1.651 m)   Wt 109.2 kg (240 lb 12.8 oz)   SpO2 94%   Breastfeeding No   BMI 40.07 kg/m    Body mass index is 40.07 kg/m .   Patient able to get sit up without difficulty.   Patient is alert and orientated.   Head eyes, nose and mouth within normal limits.     No carotid bruits auscultated.  Lungs are clear to auscultation  Heart is regular rate and rhythm with no murmur or thrills noted.     Abdomen is abdomen is soft without significant tenderness except at the left lower quadrant with  Mild rebound tenderness that patient feels is a lot betterthanyesterday.       Skin was warm and pink  Normal Affect     Lower extremity " edema is mildly present and per patient is normal for her        LABS        WBC   Date Value Ref Range Status   02/22/2022 9.0 4.3 - 10.8 K/uL Final            RBC   Date Value Ref Range Status   02/22/2022 3.65 (L) 4.20 - 5.40 M/uL Final            HEMOGLOBIN   Date Value Ref Range Status   02/22/2022 11.3 (L) 12.0 - 16.0 gm/dL Final            HEMATOCRIT   Date Value Ref Range Status   02/22/2022 34.0 (L) 36.0 - 48.0 % Final            MCV   Date Value Ref Range Status   02/22/2022 93 80 - 100 fl Final            MCH   Date Value Ref Range Status   02/22/2022 31 27 - 33 pg Final            MCHC   Date Value Ref Range Status   02/22/2022 33 33 - 36 gm/dL Final            RDW   Date Value Ref Range Status   02/22/2022 13.0 11.5 - 14.5 % Final            PLATELET COUNT   Date Value Ref Range Status   02/22/2022 240 150 - 400 K/UL Final            AST (SGOT)   Date Value Ref Range Status   02/21/2022 17 15 - 37 U/L Final            ALBUMIN   Date Value Ref Range Status   02/21/2022 3.4 3.4 - 5.0 g/dL Final            ALKALINE P'TASE   Date Value Ref Range Status   02/21/2022 72 45 - 117 U/L Final            ALT   Date Value Ref Range Status   02/21/2022 26 12 - 68 U/L Final            BILIRUBIN-DIRECT   Date Value Ref Range Status   02/21/2022 0.14 <=0.20 mg/dL Final            BILIRUBIN-TOTAL   Date Value Ref Range Status   02/21/2022 0.6 0.2 - 1.0 mg/dL Final            PROTEIN TOTAL   Date Value Ref Range Status   02/21/2022 7.9 6.4 - 8.2 g/dL Final      CT Scan Narrative & Impression  EXAM: CT ABDOMEN & PELVIS W/O ORAL W IV CON     DATE: 2/21/2022 2:52 PM     COMPARISON: None.     CLINICAL DATA: LLQ Pain.      ICD 10:     TECHNIQUE: Thin-section contiguous transaxial images were obtained through the abdomen and pelvis with intravenous contrast.  No oral contrast was given.  Coronal reformations were also obtained through the abdomen and pelvis.       CONTRAST:  IOHEXOL 350 MGI/ML    Dose Given: 100  mL     FINDINGS:      ABDOMEN:  Breathing motion artifact at the lung bases bilaterally. Moderate hiatal hernia. Liver is low attenuating without enhancing mass or duct dilation. Renal enhancement is symmetric. Spleen, splenic vein, pancreas and portal veins enhance normally. No radiopaque gallstones. No small bowel distention.     PELVIS:  Wall thickening associated with the proximal sigmoid colon. Few sigmoid diverticula are present. Increased density is noted in the fat adjacent to one of these diverticula. Small volume of extraluminal gas is noted in this location. Minimal fluid in the deep pelvis. No loculated fluid collection.     IMPRESSION  IMPRESSION:      1.  Study demonstrates findings of focally perforated proximal sigmoid diverticulitis. Although a small volume of fluid is noted near the proximal sigmoid colon and dependently in the pelvis no abscess is identified.        REPORT SIGNED BY DR. Stu Ruiz      CONSULTATION ASSESSMENT:  Principal Problem:    Diverticulitis        PLAN/RECOMMENDATIONS:      Assessment: diverticulits that is improving on iv antibiotics.  Her wbc has improved and she is feeling better.    Plan to do plan low residual diet until the inflammationis resolved.    We discussed emergency surgery with hartmans pouch and colostomy and that it is not permanent.    We also discussed doing elective Laparoscopic sigmoid resection and anastomosis.    If patient continues to improve and is able to switch to oral antibiotics then will see back in 2-3 weeks in clinic. To see how she is doing and to set her up for a colonoscopy.  In 6-8 weeks.    She knows to let us know if  She feels she is not improving and what to look for as  syptoms and signs of infection.  Will have  Low bar to do another ct scan to look for an abscess. .     Risks of surgery include damage to nerves, bleeding, infection, damage to  Vessels, recurrence.   Possible colostomy.       Time: 80 minutes  Nando BLUE  "MD Chinyere           PROGRESS NOTE  Day 2 of antibiotics and patient is feeling better each day.    SUBJECTIVE  Pain well controlled. No nausea or vomiting.      OBJECTIVE  Temp (24hrs), Av  F (36.7  C), Min:97.5  F (36.4  C), Max:98.4  F (36.9  C)     /69   Pulse (!) 50   Temp 97.5  F (36.4  C)   Resp 16   Ht 5' 5\" (1.651 m)   Wt 109.2 kg (240 lb 12.8 oz)   SpO2 98%   Breastfeeding No   BMI 40.07 kg/m           Intake/Output Summary (Last 24 hours) at 2022 153  Last data filed at 2022      Gross per 24 hour   Intake 1020 ml   Output --   Net 1020 ml      Outputs (Urine/Drains) in the last 8 hours:  Measured Void (ml): (not recorded)  Urine Output (ml): (not recorded)  Drain Output (ml): (not recorded)         GASTROINTESTINAL: soft, flat decreased left lower quadrant pain  NEUROLOGIC: alert and oriented x 4, moves all extremities  No increaes in lower extremity edema     LABS      WBC (K/uL)   Date Value   2022 5.5          HEMOGLOBIN (gm/dL)   Date Value   2022 11.1          PLATELET COUNT (K/UL)   Date Value   2022 265          SODIUM (mmol/L)   Date Value   2022 137          POTASSIUM (mmol/L)   Date Value   2022 3.8         Additional comments: I reviewed the patient's new clinical lab test results. Wbc is normal now.    Patient is passing flatus and stool. And eating normal meal without problems.  She asures me she is chewing the veggies well.      IMPRESSION AND PLAN  Discharge today and follow up with me in clinic in about 2 weeks.   If getting worse to let us know.   Time talking to patient including looking up records and talking to other physicians and nurses about patient care is 40 minutes .           Nando Whitlock MD                  Again, thank you for allowing me to participate in the care of your patient.        Sincerely,        Nando Whitlock MD    "

## 2022-03-11 NOTE — PATIENT INSTRUCTIONS
Marleen is a 56 year old female who is status post in the hospital for diverticulitis  with no chills and nofever.      Patient's  Pain is  improving. Was having some mld left lower quadrant pain until last week and with a bowel movement.   Appetite is  improving.    Abdomen is not tender  Do not feel any ventral hernias.    EXAMINATION: CT ABDOMEN PELVIS W CONTRAST  3/3/2022 5:20 PM       CLINICAL HISTORY: Diverticulitis suspected; concern for abscess;  hospitalized 2/21//22 for sepsis due to perforated sigmoid  diverticulitis; Sigmoid diverticulitis     COMPARISON: Radiograph dated 2/4/2022. Outside CT report dated  2/21/2022.     TECHNIQUE: CT of the abdomen and pelvis with intravenous contrast.  Coronal and sagittal reformatted images were obtained.     FINDINGS:  Lines and tubes:  None.     Lower chest:   Lung bases are clear. Heart size is normal. No pericardial effusion.  Small hiatal hernia.     Abdomen and pelvis:  Hypoattenuating lesion in the inferior right hepatic lobe measuring 15  mm with overlying capsular retraction, doubtful clinical significance,  possibly a sclerosed hemangioma. The gallbladder, pancreas and spleen  are unremarkable. No adrenal nodules. No hydronephrosis or obstructing  stones. No suspicious renal masses or cysts. No bowel dilation or wall  thickening. Colonic diverticulosis. There is focal wall thickening and  mild surrounding fat stranding in the proximal sigmoid colon (series 3  image 240) associated with a diverticulum. No intra-abdominal abscess.  No extraluminal air. Trace free fluid in the pelvis. Normal appendix.  Urinary bladder is partially filled and otherwise unremarkable.  Surgical changes of a supracervical hysterectomy with nabothian cysts  in the cervix. No adnexal mass.     Vessels and lymph nodes:   The abdominal aorta is normal in caliber. No retroperitoneal  lymphadenopathy. Mildly enlarged left iliac chain lymph nodes are  likely reactive.     Bones and soft  tissues:  No suspicious osseous lesions. Mild degenerative changes of the spine.  Fat-containing inguinal hernias.                                                                      IMPRESSION:  Known proximal sigmoid diverticulitis. No evidence of perforation or  abscess.     I have personally reviewed the examination and initial interpretation  and I agree with the findings.     MIGUELINA COLORADO, DO       Plan:we discussed her bilateral inguinal hernia and doing this robotic   Patient has also been having lose stool.  Has been seen by pelvic floor.    Patient has a sister who  of colon cancer.   Will set up colonoscopy

## 2022-03-22 ENCOUNTER — TELEPHONE (OUTPATIENT)
Dept: FAMILY MEDICINE | Facility: CLINIC | Age: 57
End: 2022-03-22
Payer: MEDICARE

## 2022-03-22 NOTE — TELEPHONE ENCOUNTER
Reason for Call:  Form, our goal is to have forms completed with 72 hours, however, some forms may require a visit or additional information.    Type of letter, form or note:  disability    Who is the form from?: Insurance comp    Where did the form come from: form was faxed in    What clinic location was the form placed at?: [facility]    Where the form was placed: Dr. Choe form folder POD B    What number is listed as a contact on the form?: [phone]       Additional comments: N/A    Call taken on 3/22/2022 at 3:20 PM by Jackie Taylor

## 2022-03-23 ENCOUNTER — TELEPHONE (OUTPATIENT)
Dept: GASTROENTEROLOGY | Facility: CLINIC | Age: 57
End: 2022-03-23
Payer: MEDICARE

## 2022-03-23 DIAGNOSIS — Z11.59 ENCOUNTER FOR SCREENING FOR OTHER VIRAL DISEASES: Primary | ICD-10-CM

## 2022-03-23 NOTE — TELEPHONE ENCOUNTER
Screening Questions  BlueKIND OF PREP RedLOCATION [review exclusion criteria] GreenSEDATION TYPE  1. Have you had a positive covid test in the last 90 days? N     2. Are you active on mychart? Y    3. What insurance is in the chart? Medicare     3.   Ordering/Referring Provider: Nando Whitlock MD     4. BMI 41.2 [BMI OVER 40-EXTENDED PREP]  If greater than 40 review exclusion criteria [PAC APPT IF @ [facility]]        5.  Respiratory Screening :  [If yes to any of the following HOSPITAL setting only]     Do you use daily home oxygen? N    Do you have mod to severe Obstructive Sleep Apnea? N  [OKAY @ [facility] SH PH RI]   Do you have Pulmonary Hypertension? N     Do you have UNCONTROLLED asthma? N        6.   Have you had a heart or lung transplant? N      7.   Are you currently on dialysis? N [ If yes, G-PREP & HOSPITAL setting only]     8.   Do you have chronic kidney disease? N [ If yes, G-PREP ]    9.   Have you had a stroke or Transient ischemic attack (TIA - aka  mini stroke ) within 6 months?  N (If yes, please review exclusion criteria)    10.   In the past 6 months, have you had any heart related issues including cardiomyopathy or heart attack? N           If yes, did it require cardiac stenting or other implantable device? NA      11.   Do you have any implantable devices in your body (pacemaker, defib, LVAD)? N (If yes, please review exclusion criteria)    12.   Do you take nitroglycerin? N           If yes, how often? NA  (if yes, HOSPITAL setting ONLY)    13.   Are you currently taking any blood thinners? N           [IF YES, INFORM PATIENT TO FOLLOW UP W/ ORDERING PROVIDER FOR BRIDGING INSTRUCTIONS]     14.   Do you have a diagnosis of diabetes? N   [ If yes, G-PREP ]    15.   [FEMALES] Are you currently pregnant? N    If yes, how many weeks? NA    16.   Are you taking any prescription pain medications on a routine schedule?  N  [ If yes, EXTENDED PREP.] [If yes, MAC]    17.   Do you have any  chemical dependencies such as alcohol, street drugs, or methadone?  N [If yes, MAC]    18.   Do you have any history of post-traumatic stress syndrome, severe anxiety or history of psychosis?  Y  [If yes, MAC]    19.   Do you have a significant intellectual disability?  N [If yes, MAC]    20.   Do you transfer independently?  Y    21.  On a regular basis do you go 3-5 days between bowel movements? N   [ If yes, EXTENDED PREP.]    22.   Preferred LOCAL Pharmacy for Pre Prescription     [id number] [address] AT [facility] [address]        Scheduling Details      Caller : Marleen Mcfadden  (Please ask for phone number if not scheduled by patient)    Type of Procedure Scheduled: Colon  Which Colonoscopy Prep was Sent?: Extended Prep  [facility] PATIENTS & [facility] PATIENTS NEEDS EXTENDED PREP  Surgeon: Chinyere  Date of Procedure: 4/28  Location:       Sedation Type: CS  Conscious Sedation- Needs  for 6 hours after the procedure  MAC/General-Needs  for 24 hours after procedure    Pre-op Required at [facility] [place], [place] and OR for MAC sedation: Y  (advise patient they will need a pre-op prior to procedure -)      Informed patient they will need an adult  Y  Cannot take any type of public or medical transportation alone    Pre-Procedure Covid test to [facility] or Externally: Yes, 4/25 [place]     Confirmed Nurse will call to complete assessment Y    Additional comments: Patient answered yes to severe anxiety so appointment was made for MAC but she did have prior colon before and that was under CS. Also, she has moderate to severe KATIA but she does not want to be scheduled anywhere else besides . Staff message sent to see if patient can be seen at  or if she needs to be rescheduled to another location.

## 2022-03-23 NOTE — TELEPHONE ENCOUNTER
"Patient can be seen at - See Staff messages below:    From: Nando Whitlock MD To: Nigel Linares  [facility] Procedures    \"Yes we can do it here at the  same day surgery center.   Nando Whitlock MD\"        From: Bhumika Johnson, CLARITZA  To:Jaelyn Linares    \"Hi Jaelyn,     I see that Dr. Whitlock messaged you as well.  Patient can have procedure here, she is not on home oxygen.       Thanks,   Bhumika Johnson\"     "

## 2022-03-28 NOTE — TELEPHONE ENCOUNTER
"Chief Complaint  Cough (Pt c/o cough, congestion. Symptoms ongoing for about 8-9 days./)    Subjective          Becky Zavala presents to [facility]  Cough  This is a new problem. The current episode started 1 to 4 weeks ago. The problem has been unchanged. The cough is non-productive. Associated symptoms include nasal congestion, rhinorrhea, a sore throat (Resolved) and shortness of breath (occasional). Pertinent negatives include no chest pain, chills, ear congestion, ear pain, fever, headaches, hemoptysis, myalgias, postnasal drip or wheezing. Nothing aggravates the symptoms. Treatments tried: OTC cold and cough medication; apap. The treatment provided mild relief. There is no history of asthma, COPD or pneumonia.       Objective   Vital Signs:   /98 (BP Location: Left arm, Patient Position: Sitting)   Pulse 67   Temp 97.5 °F (36.4 °C) (Oral)   Ht 168.9 cm (66.5\")   Wt 66.2 kg (145 lb 14.4 oz)   SpO2 98% Comment: room air  BMI 23.20 kg/m²     Physical Exam  Constitutional:       General: She is not in acute distress.     Appearance: Normal appearance. She is normal weight.   HENT:      Head: Normocephalic.      Nose: Congestion present.   Eyes:      Pupils: Pupils are equal, round, and reactive to light.      Visual Fields: Right eye visual fields normal and left eye visual fields normal.   Neck:      Trachea: Trachea normal.   Cardiovascular:      Rate and Rhythm: Normal rate and regular rhythm.      Heart sounds: Normal heart sounds.   Pulmonary:      Effort: Pulmonary effort is normal.      Breath sounds: Normal air entry. Wheezing and rhonchi present.   Musculoskeletal:      Right lower leg: No edema.      Left lower leg: No edema.   Skin:     General: Skin is warm and dry.   Neurological:      Mental Status: She is alert and oriented to person, place, and time.   Psychiatric:         Mood and Affect: Mood and affect normal.         Behavior: Behavior normal.   " Pt returned call and spoke with pt. She has c/o of her ear draining for the last 3 days. Muffled hearing d/t the drainage. Pt calling as she states it has drained in the past several times and Dr Cam wanted to collect a sample, but every time it has stopped draining prior to her getting in. She denies any other symptoms at this time. Denies being sick recently as well, although she sounds congested on the phone. Will call her back with a plan.    Yasmeen OLSEN RN Specialty Triage 4/15/2021 10:37 AM             Thought Content: Thought content normal.        Result Review :   The following data was reviewed by: PEARL Graham on 03/28/2022:                  Assessment and Plan    Diagnoses and all orders for this visit:    1. Cough (Primary)  -     POCT SARS-CoV-2 Antigen KENDRA  -     POCT Influenza A/B  -     amoxicillin-clavulanate (Augmentin) 875-125 MG per tablet; Take 1 tablet by mouth 2 (Two) Times a Day for 10 days.  Dispense: 20 tablet; Refill: 0  -     XR Chest PA & Lateral; Future  -     methylPREDNISolone (MEDROL) 4 MG dose pack; Take as directed on package instructions.  Dispense: 21 each; Refill: 0    She is currently taking doxycycline 100 mg twice daily.  We will send in Augmentin to take in addition.  We will also send in Medrol pack.  Recommended that she can use Zyrtec over-the-counter, but asked the pharmacist to see if it interferes with any of her medication.  We will also obtain chest x-ray.      Follow Up   Return if symptoms worsen or fail to improve.  Patient was given instructions and counseling regarding her condition or for health maintenance advice. Please see specific information pulled into the AVS if appropriate.

## 2022-04-05 ENCOUNTER — TELEPHONE (OUTPATIENT)
Dept: FAMILY MEDICINE | Facility: CLINIC | Age: 57
End: 2022-04-05
Payer: MEDICARE

## 2022-04-05 NOTE — TELEPHONE ENCOUNTER
PT called back.  Pt says she has been waiting on these forms. Dropped off on 3/22/22.    Made a phone visit with Dr. Matias for a double booked appt on 4/7/22.    Will update Dr. Matias.  CLARITZA Hernandez

## 2022-04-07 ENCOUNTER — VIRTUAL VISIT (OUTPATIENT)
Dept: FAMILY MEDICINE | Facility: CLINIC | Age: 57
End: 2022-04-07
Payer: COMMERCIAL

## 2022-04-07 DIAGNOSIS — K57.32 SIGMOID DIVERTICULITIS: Primary | ICD-10-CM

## 2022-04-07 PROCEDURE — 99213 OFFICE O/P EST LOW 20 MIN: CPT | Mod: 95 | Performed by: FAMILY MEDICINE

## 2022-04-07 NOTE — PROGRESS NOTES
Marleen is a 56 year old who is being evaluated via a billable telephone visit.      What phone number would you like to be contacted at? [phone]   How would you like to obtain your AVS? MyChart    Assessment & Plan     1. Sigmoid diverticulitis  - forms filled    Yanna Matias MD  [facility]    Dinorah Hawley is a 56 year old who presents for the following health issues     HPI     Patient calling for Disability form.    Returned to work 3/14/22  Off work 2/21/22   Review of Systems         Objective           Vitals:  No vitals were obtained today due to virtual visit.    Physical Exam   healthy, alert and no distress  PSYCH: Alert and oriented times 3; coherent speech, normal   rate and volume, able to articulate logical thoughts, able   to abstract reason, no tangential thoughts, no hallucinations   or delusions  Her affect is normal  RESP: No cough, no audible wheezing, able to talk in full sentences  Remainder of exam unable to be completed due to telephone visits                Phone call duration: 12 minutes

## 2022-04-08 NOTE — TELEPHONE ENCOUNTER
Faxed to [phone]  Copy sent to abstract  Copy kept in clinic  Patient informed of completion.

## 2022-04-21 RX ORDER — BISACODYL 5 MG
10 TABLET, DELAYED RELEASE (ENTERIC COATED) ORAL SEE ADMIN INSTRUCTIONS
Qty: 2 TABLET | Refills: 0 | Status: SHIPPED | OUTPATIENT
Start: 2022-04-21 | End: 2022-08-03

## 2022-04-22 ENCOUNTER — DOCUMENTATION ONLY (OUTPATIENT)
Dept: SLEEP MEDICINE | Facility: CLINIC | Age: 57
End: 2022-04-22

## 2022-04-22 ENCOUNTER — OFFICE VISIT (OUTPATIENT)
Dept: FAMILY MEDICINE | Facility: CLINIC | Age: 57
End: 2022-04-22
Payer: COMMERCIAL

## 2022-04-22 VITALS
OXYGEN SATURATION: 99 % | TEMPERATURE: 97.3 F | SYSTOLIC BLOOD PRESSURE: 136 MMHG | HEART RATE: 54 BPM | DIASTOLIC BLOOD PRESSURE: 76 MMHG

## 2022-04-22 DIAGNOSIS — E66.813 CLASS 3 SEVERE OBESITY DUE TO EXCESS CALORIES WITH SERIOUS COMORBIDITY AND BODY MASS INDEX (BMI) OF 40.0 TO 44.9 IN ADULT (H): ICD-10-CM

## 2022-04-22 DIAGNOSIS — F32.1 MODERATE MAJOR DEPRESSION (H): ICD-10-CM

## 2022-04-22 DIAGNOSIS — G47.33 OSA (OBSTRUCTIVE SLEEP APNEA): Chronic | ICD-10-CM

## 2022-04-22 DIAGNOSIS — Z01.818 PREOP GENERAL PHYSICAL EXAM: Primary | ICD-10-CM

## 2022-04-22 DIAGNOSIS — I50.22 CHRONIC SYSTOLIC CONGESTIVE HEART FAILURE (H): Chronic | ICD-10-CM

## 2022-04-22 DIAGNOSIS — E66.01 CLASS 3 SEVERE OBESITY DUE TO EXCESS CALORIES WITH SERIOUS COMORBIDITY AND BODY MASS INDEX (BMI) OF 40.0 TO 44.9 IN ADULT (H): ICD-10-CM

## 2022-04-22 DIAGNOSIS — I10 ESSENTIAL HYPERTENSION: Chronic | ICD-10-CM

## 2022-04-22 DIAGNOSIS — K57.32 SIGMOID DIVERTICULITIS: ICD-10-CM

## 2022-04-22 DIAGNOSIS — K21.00 GASTROESOPHAGEAL REFLUX DISEASE WITH ESOPHAGITIS WITHOUT HEMORRHAGE: Chronic | ICD-10-CM

## 2022-04-22 DIAGNOSIS — G96.01 CSF OTORRHEA: ICD-10-CM

## 2022-04-22 PROBLEM — K57.92 DIVERTICULITIS: Status: ACTIVE | Noted: 2022-02-21

## 2022-04-22 PROBLEM — A60.09 HERPES SIMPLEX OF FEMALE GENITALIA: Status: ACTIVE | Noted: 2022-04-22

## 2022-04-22 PROCEDURE — 99214 OFFICE O/P EST MOD 30 MIN: CPT | Performed by: NURSE PRACTITIONER

## 2022-04-22 NOTE — PATIENT INSTRUCTIONS
Preparing for Your Surgery  Getting started  A nurse will call you to review your health history and instructions. They will give you an arrival time based on your scheduled surgery time. Please be ready to share:    Your doctor's clinic name and phone number    Your medical, surgical and anesthesia history    A list of allergies and sensitivities    A list of medicines, including herbal treatments and over-the-counter drugs    Whether the patient has a legal guardian (ask how to send us the papers in advance)  Please tell us if you're pregnant--or if there's any chance you might be pregnant. Some surgeries may injure a fetus (unborn baby), so they require a pregnancy test. Surgeries that are safe for a fetus don't always need a test, and you can choose whether to have one.   If you have a child who's having surgery, please ask for a copy of Preparing for Your Child's Surgery.    Preparing for surgery    Within 30 days of surgery: Have a pre-op exam (sometimes called an H&P, or History and Physical). This can be done at a clinic or pre-operative center.  ? If you're having a , you may not need this exam. Talk to your care team.    At your pre-op exam, talk to your care team about all medicines you take. If you need to stop any medicines before surgery, ask when to start taking them again.  ? We do this for your safety. Many medicines can make you bleed too much during surgery. Some change how well surgery (anesthesia) drugs work.    Call your insurance company to let them know you're having surgery. (If you don't have insurance, call [phone].)    Call your clinic if there's any change in your health. This includes signs of a cold or flu (sore throat, runny nose, cough, rash, fever). It also includes a scrape or scratch near the surgery site.    If you have questions on the day of surgery, call your hospital or surgery center.  COVID testing  You may need to be tested for COVID-19 before having  surgery. If so, your surgical team will give you instructions for scheduling this test, separate from your preoperative history and physical.  Eating and drinking guidelines  For your safety: Unless your surgeon tells you otherwise, follow the guidelines below.    Eat and drink as usual until 8 hours before surgery. After that, no food or milk.    Drink clear liquids until 2 hours before surgery. These are liquids you can see through, like water, Gatorade and Propel Water. You may also have black coffee and tea (no cream or milk).    Nothing by mouth within 2 hours of surgery. This includes gum, candy and breath mints.    If you drink alcohol: Stop drinking it the night before surgery.    If your care team tells you to take medicine on the morning of surgery, it's okay to take it with a sip of water.  Preventing infection    Shower or bathe the night before and morning of your surgery. Follow the instructions your clinic gave you. (If no instructions, use regular soap.)    Don't shave or clip hair near your surgery site. We'll remove the hair if needed.    Don't smoke or vape the morning of surgery. You may chew nicotine gum up to 2 hours before surgery. A nicotine patch is okay.  ? Note: Some surgeries require you to completely quit smoking and nicotine. Check with your surgeon.    Your care team will make every effort to keep you safe from infection. We will:  ? Clean our hands often with soap and water (or an alcohol-based hand rub).  ? Clean the skin at your surgery site with a special soap that kills germs.  ? Give you a special gown to keep you warm. (Cold raises the risk of infection.)  ? Wear special hair covers, masks, gowns and gloves during surgery.  ? Give antibiotic medicine, if prescribed. Not all surgeries need antibiotics.  What to bring on the day of surgery    Photo ID and insurance card    Copy of your health care directive, if you have one    Glasses and hearing aides (bring cases)  ? You can't  wear contacts during surgery    Inhaler and eye drops, if you use them (tell us about these when you arrive)    CPAP machine or breathing device, if you use them    A few personal items, if spending the night    If you have . . .  ? A pacemaker, ICD (cardiac defibrillator) or other implant: Bring the ID card.  ? An implanted stimulator: Bring the remote control.  ? A legal guardian: Bring a copy of the certified (court-stamped) guardianship papers.  Please remove any jewelry, including body piercings. Leave jewelry and other valuables at home.  If you're going home the day of surgery    You must have a responsible adult drive you home. They should stay with you overnight as well.    If you don't have someone to stay with you, and you aren't safe to go home alone, we may keep you overnight. Insurance often won't pay for this.  After surgery  If it's hard to control your pain or you need more pain medicine, please call your surgeon's office.  Questions?   If you have any questions for your care team, list them here: _________________________________________________________________________________________________________________________________________________________________________ ____________________________________ ____________________________________ ____________________________________  For informational purposes only. Not to replace the advice of your health care provider. Copyright   2003, 2019 [facility]. All rights reserved. Clinically reviewed by Angie Whitfield MD. [facility] [id number] - REV 07/21.

## 2022-04-22 NOTE — PROGRESS NOTES
Pt returned her CPAP machine to [facility] at [facility] [place] [facility].

## 2022-04-22 NOTE — PROGRESS NOTES
[facility] [address] [place] [address]  Phone: [phone]  Primary Provider: Yanna Matias  Pre-op Performing Provider: SANDRO MADRIGAL      PREOPERATIVE EVALUATION:  Today's date: 4/22/2022    Marleen Mcfadden is a 56 year old female who presents for a preoperative evaluation.    Surgical Information:  Surgery/Procedure:COLONOSCOPY, WITH CO2 INSUFFLATION  Surgery Location:  OR  Surgeon: Nando Whitlock MD  Surgery Date: 4-28-22  Time of Surgery: 10:50 AM  Where patient plans to recover: At home with family  Fax number for surgical facility: Note does not need to be faxed, will be available electronically in Epic.    Type of Anesthesia Anticipated: to be determined    Assessment & Plan     The proposed surgical procedure is considered LOW risk.    Preop general physical exam      Sigmoid diverticulitis      KATIA (obstructive sleep apnea)- moderate-severe (AHI 29)  Not on CPAP- had insurance issue with machine.     Class 3 severe obesity due to excess calories with serious comorbidity and body mass index (BMI) of 40.0 to 44.9 in adult (H)      Moderate major depression (H)  Stable.     Chronic systolic congestive heart failure (H)  Follows with cardiology. On Lasix.  No increased swelling, dyspnea, or chest pain. Reviewed most recent Echocardiogram.     Essential hypertension  Controlled. Continue Entresto.    Gastroesophageal reflux disease with esophagitis without hemorrhage  Resolved, no new symptoms.      CSF otorrhea  S/P craniotomy.  Stable, no new headaches or symptoms.            RECOMMENDATION:  APPROVAL GIVEN to proceed with proposed procedure, without further diagnostic evaluation.        Subjective     HPI related to upcoming procedure: Colonoscopy    Preop Questions 4/22/2022   1. Have you ever had a heart attack or stroke? No   2. Have you ever had surgery on your heart or blood vessels, such as a stent placement, a coronary artery bypass, or  surgery on an artery in your head, neck, heart, or legs? No   3. Do you have chest pain with activity? No   4. Do you have a history of  heart failure? YES - HF- follows with Dr. Kolb   5. Do you currently have a cold, bronchitis or symptoms of other infection? No   6. Do you have a cough, shortness of breath, or wheezing? No   7. Do you or anyone in your family have previous history of blood clots? No   8. Do you or does anyone in your family have a serious bleeding problem such as prolonged bleeding following surgeries or cuts? No   9. Have you ever had problems with anemia or been told to take iron pills? YES - not on supplements- resolved.   10. Have you had any abnormal blood loss such as black, tarry or bloody stools, or abnormal vaginal bleeding? No   11. Have you ever had a blood transfusion? YES - post-hysterectomy   11a. Have you ever had a transfusion reaction? No   12. Are you willing to have a blood transfusion if it is medically needed before, during, or after your surgery? Yes   13. Have you or any of your relatives ever had problems with anesthesia? No   14. Do you have sleep apnea, excessive snoring or daytime drowsiness? YES - No currently on CPAP   14a. Do you have a CPAP machine? No   15. Do you have any artifical heart valves or other implanted medical devices like a pacemaker, defibrillator, or continuous glucose monitor? No   16. Do you have artificial joints? YES - Bilateral knee replacements.     17. Are you allergic to latex? No   18. Is there any chance that you may be pregnant? No       Health Care Directive:  Patient does not have a Health Care Directive or Living Will: Discussed advance care planning with patient; information given to patient to review.    Preoperative Review of :   reviewed - controlled substances prescribed by other outside provider(s).          Review of Systems  CONSTITUTIONAL: NEGATIVE for fever, chills, change in weight  ENT/MOUTH: NEGATIVE for ear, mouth  and throat problems  RESP: NEGATIVE for significant cough or SOB  CV: NEGATIVE for chest pain, palpitations or peripheral edema    Patient Active Problem List    Diagnosis Date Noted     Family history of colon cancer 01/31/2022     Priority: Medium     KATIA (obstructive sleep apnea)- moderate-severe (AHI 29) 08/26/2021     Priority: Medium     8/23/2021 [place] Diagnostic Sleep Study (246.0 lbs) - AHI 29.5, RDI 32.0, Supine AHI 21.0, REM AHI 60.4, Low O2 70.3%, Time Spent ?88% 25.4 minutes / Time Spent ?89% 30.7 minutes. Hypoventilation was not suggested with a maximum change from 40 to 47 mmHg       CSF otorrhea 04/26/2021     Priority: Medium     S/p 7/5/21 Right middle fossa approach for repair of encephalocele and CSF leak; lumbar drain placement. S/p 7/8/21 Redo right middle fossa approach for repair of encephalocele and CSF leak          Essential hypertension 08/27/2020     Priority: Medium     Dysfunction of both eustachian tubes 06/19/2020     Priority: Medium     Chronic rhinitis 06/19/2020     Priority: Medium     Odd detrimental health beliefs 09/20/2019     Priority: Medium     Moderate major depression (H) 09/20/2019     Priority: Medium     Gastroesophageal reflux disease with esophagitis 01/30/2019     Priority: Medium     Primary osteoarthritis of both knees 01/08/2019     Priority: Medium     Left shoulder pain 01/17/2017     Priority: Medium     Chronic systolic congestive heart failure (H) 10/27/2016     Priority: Medium     ECHO 4/2020: LVIDd 68mm. The Ejection Fraction is estimated at 40-45%. Right ventricular function, chamber size, wall motion, and thickness are normal. Pulmonary artery systolic pressure cannot be assessed.  The inferior vena cava is normal. Mild improvement in LV function since previous study.       Chronic bilateral low back pain with right-sided sciatica 07/07/2016     Priority: Medium     Chronic bilateral low back pain with left-sided sciatica 07/07/2016     Priority:  Medium     Degenerative disc disease at L5-S1 level 06/02/2016     Priority: Medium     Familial cardiomyopathy (H) 10/21/2015     Priority: Medium     Cardiomyopathy- dx'd 1999 famial idiopathic.        Shaina's nodes 06/16/2015     Priority: Medium     Morbid obesity (H) 05/05/2015     Priority: Medium     CARDIOVASCULAR SCREENING; LDL GOAL LESS THAN 160 11/11/2014     Priority: Medium     Pain in joint involving ankle and foot 06/16/2014     Priority: Medium     Thyroid nodule 09/03/2013     Priority: Medium     Knee pain 12/20/2012     Priority: Medium     [facility] 05/24/2011     Priority: Medium     EMERGENCY CARE PLAN  Presenting Problem Signs and Symptoms Treatment Plan    Questions or conerns during clinic hours    I will call the clinic directly     Questions or conerns outside clinic hours    I will call the 24 hour nurse line at [phone]    Patient needs to schedule an appointment    I will call the 24 hour scheduling team at [phone] or clinic directly    Same day treatment     I will call the clinic first, nurse line if after hours, urgent care and express care if needed                            DX V65.8 REPLACED WITH [id number] [facility] (04/08/2013)       Anemia      Priority: Medium     Allergic rhinitis      Priority: Medium     Leiomyoma of uterus 10/25/2006     Priority: Medium     Autoimmune disease, not elsewhere classified 11/08/2004     Priority: Medium     Autoimmune disease- unknown/poss SLE        Past Medical History:   Diagnosis Date     Acute bilateral low back pain with right-sided sciatica 06/02/2016     Allergic rhinitis, cause unspecified      Arthritis      Autoimmune disease (H)      Autoimmune disease NEC     Autoimmune disease- unknown/poss SLE     Calcaneal spur 10/21/2014    Xray 10/17/14      CHF with cardiomyopathy (H)      Chronic low back pain      DDD (degenerative disc disease), lumbar      Depression      Failed total knee arthroplasty, initial  encounter (H) 2019     Familial cardiomyopathy (H)      GERD without esophagitis      History of transfusion      Hypertension      Injury of left shoulder, initial encounter 2017     Morbid obesity (H)      Nontraumatic rupture of quadriceps tendon, left 2018     KATIA (obstructive sleep apnea)- moderate-severe (AHI 29) 2021     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     no longer an issue     Peroneus longus tendinitis 2015     Plantar fasciitis 2014     PONV (postoperative nausea and vomiting)      Rotator cuff injury 2017     Sprain of other ligament of left ankle, initial encounter 2017     Status post left knee replacement 2018     TB lung, latent     negative quantiferon gold test  12     Past Surgical History:   Procedure Laterality Date     ARTHROPLASTY REVISION KNEE Left 2019    Procedure: REVISION, LEFT TOTAL KNEE  ARTHROPLASTY;  Surgeon: Dev Rocha MD;  Location: [facility];  Service: Orthopedics     ARTHROPLASTY REVISION KNEE Right 2020    Procedure: RIGHT REVISION TOTAL KNEE ARTHROPLASTY;  Surgeon: Dev Rocha MD;  Location: [facility];  Service: Orthopedics     BREAST SURGERY      Breast Reduction     C EXCISE EXCESS SKIN TISSUE,ABDOMEN        SECTION  1989      SECTION  10/1985     COLONOSCOPY WITH CO2 INSUFFLATION N/A 2021    Procedure: COLONOSCOPY, WITH CO2 INSUFFLATION;  Surgeon: Angela Harrington DO;  Location: [facility] OR     CRANIECTOMY Right 2021    Procedure: RIGHT MIDDLE FOSSA APPROACH, CSF LEAK REPAIR;  Surgeon: Jocelyn Noe MD;  Location: [facility] OR     CRANIOTOMY MIDDLE FOSSA, EXCISE ACOUSTIC NEUROMA, COMBINED N/A 2021    Procedure: Right CRANIOTOMY, MIDDLE FOSSA APPROACH, FOR REPAIR OF ENCEPHALOCELE;  Surgeon: Jocelyne Harrell MD;  Location: [facility] OR     CYSTOURETHROSCOPY N/A 2020    Procedure:  "CYSTOSCOPY;  Surgeon: Cherelle Willams MD;  Location: [facility];  Service: Gynecology     GYN SURGERY N/A 08/18/2020    Procedure: MIDURETHRAL SLING, CYSTOSCOPY;  Surgeon: Cherelle Willams MD;  Location: [facility];  Service: Gynecology     HYSTERECTOMY TOTAL ABDOMINAL  2006    for fibroids; reports having blood transfusion after surgery     INSERT DRAIN LUMBAR N/A 07/05/2021    Procedure: Insert drain lumbar;  Surgeon: Jocelyn Noe MD;  Location:  OR     ORTHOPEDIC SURGERY  1998    Right Knee ACL repair     THYROIDECTOMY  09/09/2013    Procedure: THYROIDECTOMY;  LEFT THYROID LOBECTOMY.  (LIGASURE, RECURRENT LARYNGEAL NERVE MONITOR) ;  Surgeon: Uriah Camargo MD;  Location: [facility]     THYROIDECTOMY       TOTAL KNEE ARTHROPLASTY Left 10/03/2017     TOTAL KNEE ARTHROPLASTY Right 02/07/2019    Procedure: RIGHT TOTAL KNEE ARTHROPLASTY;  Surgeon: Dev Rocha MD;  Location: [facility];  Service: Orthopedics     TUBAL LIGATION  1989     ZZC EXCIS UTERINE FIBROID,ABD APPRCH  1999     Current Outpatient Medications   Medication Sig Dispense Refill     amitriptyline (ELAVIL) 25 MG tablet TAKE 1 TABLET(25 MG) BY MOUTH AT BEDTIME 90 tablet 0     bisacodyl (DULCOLAX) 5 MG EC tablet Take 2 tablets (10 mg) by mouth See Admin Instructions Refer to \"Two-Day Golytely (Colyte, Nulytely)\" Instructions for your Colonoscopy handout. 2 tablet 0     Cholecalciferol (VITAMIN D) 2000 UNIT tablet Take 5,000 Units by mouth as needed Reported on 3/8/2017 100 tablet 12     Collagen 500 MG CAPS Take 1 capsule by mouth daily  (Patient not taking: Reported on 4/7/2022)       famotidine (PEPCID) 40 MG tablet Take 1 tablet (40 mg) by mouth daily 90 tablet 3     FLAXSEED, LINSEED, PO Take 1 capsule by mouth daily        fluticasone (FLONASE) 50 MCG/ACT nasal spray Spray 2 sprays into both nostrils At Bedtime 15.8 mL 4     furosemide (LASIX) 20 MG tablet Take two tabs daily " "as needed 180 tablet 3     loratadine (CLARITIN) 10 MG tablet Take 1 tablet (10 mg) by mouth daily 90 tablet 3     magnesium citrate solution Take 296 mLs by mouth See Admin Instructions 1 - 10 ounce bottle = 296 ml,  Not red. 296 mL 0     metoprolol succinate ER (TOPROL-XL) 50 MG 24 hr tablet Take 1 tablet (50 mg) by mouth daily 90 tablet 3     omeprazole (PRILOSEC) 40 MG DR capsule Take 1 capsule (40 mg) by mouth daily 90 capsule 3     ondansetron (ZOFRAN-ODT) 4 MG ODT tab Take 1 tablet (4 mg) by mouth every 8 hours as needed for nausea 30 tablet 0     polyethylene glycol (GOLYTELY) 236 g suspension Take 8,000 mLs by mouth See Admin Instructions Refer to \"Two-Day Golytely (Colyte, Nulytely) Instructions for your Colonoscopy\" handout. 8000 mL 0     progesterone (PROMETRIUM) 100 MG capsule Take 100 mg by mouth At Bedtime        sacubitril-valsartan (ENTRESTO) 49-51 MG per tablet Take 1 tablet by mouth 2 times daily 180 tablet 3     sertraline (ZOLOFT) 50 MG tablet TAKE 1 TABLET BY MOUTH EVERY DAY 90 tablet 0     solifenacin (VESICARE) 10 MG tablet Take 10 mg by mouth daily        spironolactone (ALDACTONE) 25 MG tablet Take 2 tablets (50 mg) by mouth daily 180 tablet 3     zolpidem (AMBIEN) 5 MG tablet Take tablet by mouth 15 minutes prior to sleep, for Sleep Study 1 tablet 0       Allergies   Allergen Reactions     Morphine      EMESIS     Nickel      Sulfa Drugs Swelling        Social History     Tobacco Use     Smoking status: Never Smoker     Smokeless tobacco: Never Used   Substance Use Topics     Alcohol use: Yes     Comment: rare, twice a year, she has a drink little wine 2 days ago       History   Drug Use No         Objective     LMP 10/19/2008 (Approximate)     Physical Exam  GENERAL APPEARANCE: healthy, alert and no distress  HENT: ear canals and TM's normal and nose and mouth without ulcers or lesions  RESP: lungs clear to auscultation - no rales, rhonchi or wheezes  CV: regular rate and rhythm, normal " S1 S2, no S3 or S4 and no murmur, click or rub   ABDOMEN: soft, nontender, no HSM or masses and bowel sounds normal  MS: extremities normal- no gross deformities noted  NEURO: Normal strength and tone, sensory exam grossly normal, mentation intact and speech normal    Recent Labs   Lab Test 03/03/22  1541 02/18/22  1345 12/08/21  0823 11/12/21  1149 07/14/21  0748 07/05/21  1232 06/21/21  1330   HGB 12.6  --   --   --  10.2*   < > 12.4     --   --   --  353   < > 253    138 138   < > 138   < >  --    POTASSIUM 4.2 4.0 4.1   < > 4.0   < >  --    CR 0.88 0.79 0.90   < > 0.81   < >  --    A1C  --   --  5.4  --   --   --  5.5    < > = values in this interval not displayed.        Diagnostics:  No labs were ordered during this visit.   No EKG required for low risk surgery (cataract, skin procedure, breast biopsy, etc).    Revised Cardiac Risk Index (RCRI):  The patient has the following serious cardiovascular risks for perioperative complications:   - Congestive Heart Failure (pulmonary edema, PND, s3 franki, CXR with pulmonary congestion, basilar rales) = 1 point     RCRI Interpretation: 1 point: Class II (low risk - 0.9% complication rate)           Signed Electronically by: PITO Montoya CNP  Copy of this evaluation report is provided to requesting physician.

## 2022-04-25 ENCOUNTER — LAB (OUTPATIENT)
Dept: LAB | Facility: CLINIC | Age: 57
End: 2022-04-25
Payer: COMMERCIAL

## 2022-04-25 DIAGNOSIS — Z11.59 ENCOUNTER FOR SCREENING FOR OTHER VIRAL DISEASES: ICD-10-CM

## 2022-04-25 DIAGNOSIS — N95.1 SYMPTOMATIC MENOPAUSAL OR FEMALE CLIMACTERIC STATES: ICD-10-CM

## 2022-04-25 DIAGNOSIS — K57.32 SIGMOID DIVERTICULITIS: ICD-10-CM

## 2022-04-25 LAB
ALBUMIN SERPL-MCNC: 3.5 G/DL (ref 3.4–5)
ALP SERPL-CCNC: 54 U/L (ref 40–150)
ALT SERPL W P-5'-P-CCNC: 23 U/L (ref 0–50)
AST SERPL W P-5'-P-CCNC: 14 U/L (ref 0–45)
BILIRUB DIRECT SERPL-MCNC: 0.1 MG/DL (ref 0–0.2)
BILIRUB SERPL-MCNC: 0.4 MG/DL (ref 0.2–1.3)
ESTRADIOL SERPL-MCNC: 82 PG/ML
FSH SERPL-ACNC: 5.2 IU/L
PROT SERPL-MCNC: 7.4 G/DL (ref 6.8–8.8)

## 2022-04-25 PROCEDURE — 36415 COLL VENOUS BLD VENIPUNCTURE: CPT

## 2022-04-25 PROCEDURE — U0005 INFEC AGEN DETEC AMPLI PROBE: HCPCS

## 2022-04-25 PROCEDURE — 84403 ASSAY OF TOTAL TESTOSTERONE: CPT

## 2022-04-25 PROCEDURE — 83001 ASSAY OF GONADOTROPIN (FSH): CPT

## 2022-04-25 PROCEDURE — 82670 ASSAY OF TOTAL ESTRADIOL: CPT

## 2022-04-25 PROCEDURE — 80076 HEPATIC FUNCTION PANEL: CPT

## 2022-04-26 LAB — SARS-COV-2 RNA RESP QL NAA+PROBE: NEGATIVE

## 2022-04-27 ENCOUNTER — ANESTHESIA EVENT (OUTPATIENT)
Dept: SURGERY | Facility: AMBULATORY SURGERY CENTER | Age: 57
End: 2022-04-27
Payer: COMMERCIAL

## 2022-04-27 LAB — TESTOST SERPL-MCNC: 127 NG/DL (ref 8–60)

## 2022-04-28 ENCOUNTER — HOSPITAL ENCOUNTER (OUTPATIENT)
Facility: AMBULATORY SURGERY CENTER | Age: 57
Discharge: HOME | End: 2022-04-28
Attending: SURGERY | Admitting: SURGERY
Payer: COMMERCIAL

## 2022-04-28 ENCOUNTER — ANESTHESIA (OUTPATIENT)
Dept: SURGERY | Facility: AMBULATORY SURGERY CENTER | Age: 57
End: 2022-04-28
Payer: COMMERCIAL

## 2022-04-28 VITALS
RESPIRATION RATE: 18 BRPM | SYSTOLIC BLOOD PRESSURE: 133 MMHG | HEART RATE: 70 BPM | OXYGEN SATURATION: 98 % | DIASTOLIC BLOOD PRESSURE: 90 MMHG | TEMPERATURE: 96.8 F

## 2022-04-28 VITALS — HEART RATE: 68 BPM

## 2022-04-28 DIAGNOSIS — Z12.11 SCREEN FOR COLON CANCER: Primary | ICD-10-CM

## 2022-04-28 LAB — COLONOSCOPY: NORMAL

## 2022-04-28 PROCEDURE — 45378 DIAGNOSTIC COLONOSCOPY: CPT

## 2022-04-28 PROCEDURE — G8907 PT DOC NO EVENTS ON DISCHARG: HCPCS

## 2022-04-28 PROCEDURE — G8918 PT W/O PREOP ORDER IV AB PRO: HCPCS

## 2022-04-28 PROCEDURE — 45378 DIAGNOSTIC COLONOSCOPY: CPT | Performed by: SURGERY

## 2022-04-28 RX ORDER — ONDANSETRON 2 MG/ML
4 INJECTION INTRAMUSCULAR; INTRAVENOUS EVERY 6 HOURS PRN
Status: DISCONTINUED | OUTPATIENT
Start: 2022-04-28 | End: 2022-04-29 | Stop reason: HOSPADM

## 2022-04-28 RX ORDER — FENTANYL CITRATE 50 UG/ML
25 INJECTION, SOLUTION INTRAMUSCULAR; INTRAVENOUS
Status: DISCONTINUED | OUTPATIENT
Start: 2022-04-28 | End: 2022-04-29 | Stop reason: HOSPADM

## 2022-04-28 RX ORDER — LIDOCAINE 40 MG/G
CREAM TOPICAL
Status: DISCONTINUED | OUTPATIENT
Start: 2022-04-28 | End: 2022-04-29 | Stop reason: HOSPADM

## 2022-04-28 RX ORDER — FENTANYL CITRATE 50 UG/ML
25 INJECTION, SOLUTION INTRAMUSCULAR; INTRAVENOUS EVERY 5 MIN PRN
Status: DISCONTINUED | OUTPATIENT
Start: 2022-04-28 | End: 2022-04-29 | Stop reason: HOSPADM

## 2022-04-28 RX ORDER — MEPERIDINE HYDROCHLORIDE 25 MG/ML
12.5 INJECTION INTRAMUSCULAR; INTRAVENOUS; SUBCUTANEOUS
Status: DISCONTINUED | OUTPATIENT
Start: 2022-04-28 | End: 2022-04-29 | Stop reason: HOSPADM

## 2022-04-28 RX ORDER — ONDANSETRON 4 MG/1
4 TABLET, ORALLY DISINTEGRATING ORAL EVERY 6 HOURS PRN
Status: DISCONTINUED | OUTPATIENT
Start: 2022-04-28 | End: 2022-04-29 | Stop reason: HOSPADM

## 2022-04-28 RX ORDER — PROPOFOL 10 MG/ML
INJECTION, EMULSION INTRAVENOUS PRN
Status: DISCONTINUED | OUTPATIENT
Start: 2022-04-28 | End: 2022-04-28

## 2022-04-28 RX ORDER — PROCHLORPERAZINE MALEATE 10 MG
10 TABLET ORAL EVERY 6 HOURS PRN
Status: DISCONTINUED | OUTPATIENT
Start: 2022-04-28 | End: 2022-04-29 | Stop reason: HOSPADM

## 2022-04-28 RX ORDER — SODIUM CHLORIDE, SODIUM LACTATE, POTASSIUM CHLORIDE, CALCIUM CHLORIDE 600; 310; 30; 20 MG/100ML; MG/100ML; MG/100ML; MG/100ML
INJECTION, SOLUTION INTRAVENOUS CONTINUOUS
Status: DISCONTINUED | OUTPATIENT
Start: 2022-04-28 | End: 2022-04-29 | Stop reason: HOSPADM

## 2022-04-28 RX ORDER — LIDOCAINE HYDROCHLORIDE 20 MG/ML
INJECTION, SOLUTION INFILTRATION; PERINEURAL PRN
Status: DISCONTINUED | OUTPATIENT
Start: 2022-04-28 | End: 2022-04-28

## 2022-04-28 RX ORDER — NALOXONE HYDROCHLORIDE 0.4 MG/ML
0.2 INJECTION, SOLUTION INTRAMUSCULAR; INTRAVENOUS; SUBCUTANEOUS
Status: DISCONTINUED | OUTPATIENT
Start: 2022-04-28 | End: 2022-04-29 | Stop reason: HOSPADM

## 2022-04-28 RX ORDER — ACETAMINOPHEN 325 MG/1
975 TABLET ORAL ONCE
Status: DISCONTINUED | OUTPATIENT
Start: 2022-04-28 | End: 2022-04-29 | Stop reason: HOSPADM

## 2022-04-28 RX ORDER — NALOXONE HYDROCHLORIDE 0.4 MG/ML
0.4 INJECTION, SOLUTION INTRAMUSCULAR; INTRAVENOUS; SUBCUTANEOUS
Status: DISCONTINUED | OUTPATIENT
Start: 2022-04-28 | End: 2022-04-29 | Stop reason: HOSPADM

## 2022-04-28 RX ORDER — ONDANSETRON 2 MG/ML
4 INJECTION INTRAMUSCULAR; INTRAVENOUS EVERY 30 MIN PRN
Status: DISCONTINUED | OUTPATIENT
Start: 2022-04-28 | End: 2022-04-29 | Stop reason: HOSPADM

## 2022-04-28 RX ORDER — PROPOFOL 10 MG/ML
INJECTION, EMULSION INTRAVENOUS CONTINUOUS PRN
Status: DISCONTINUED | OUTPATIENT
Start: 2022-04-28 | End: 2022-04-28

## 2022-04-28 RX ORDER — ONDANSETRON 4 MG/1
4 TABLET, ORALLY DISINTEGRATING ORAL EVERY 30 MIN PRN
Status: DISCONTINUED | OUTPATIENT
Start: 2022-04-28 | End: 2022-04-29 | Stop reason: HOSPADM

## 2022-04-28 RX ORDER — FLUMAZENIL 0.1 MG/ML
0.2 INJECTION, SOLUTION INTRAVENOUS
Status: DISCONTINUED | OUTPATIENT
Start: 2022-04-28 | End: 2022-04-29 | Stop reason: HOSPADM

## 2022-04-28 RX ADMIN — SODIUM CHLORIDE, SODIUM LACTATE, POTASSIUM CHLORIDE, CALCIUM CHLORIDE: 600; 310; 30; 20 INJECTION, SOLUTION INTRAVENOUS at 11:17

## 2022-04-28 RX ADMIN — PROPOFOL 80 MG: 10 INJECTION, EMULSION INTRAVENOUS at 11:30

## 2022-04-28 RX ADMIN — LIDOCAINE HYDROCHLORIDE 100 MG: 20 INJECTION, SOLUTION INFILTRATION; PERINEURAL at 11:30

## 2022-04-28 RX ADMIN — PROPOFOL 125 MCG/KG/MIN: 10 INJECTION, EMULSION INTRAVENOUS at 11:30

## 2022-04-28 NOTE — H&P
ENDOSCOPY PRE-SEDATION H&P FOR OUTPATIENT PROCEDURES    Marleen Mcfadden  [id number]  1965    Procedure:   Colonoscopy possible biopsy, possible polypectomy, with moderate sedation.     Pre-procedure diagnosis: history diverticulitis    Past medical history:   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     Familial cardiomyopathy (H)      GERD without esophagitis      History of transfusion      Hypertension      Injury of left shoulder, initial encounter 01/12/2017     Morbid obesity (H)      Nontraumatic rupture of quadriceps tendon, left 06/21/2018     KATIA (obstructive sleep apnea)- moderate-severe (AHI 29) 8/26/2021     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     no longer an issue     Peroneus longus tendinitis 01/02/2015     Plantar fasciitis 11/11/2014     PONV (postoperative nausea and vomiting)      Rotator cuff injury 01/17/2017     Sprain of other ligament of left ankle, initial encounter 01/12/2017     Status post left knee replacement 06/21/2018     TB lung, latent     negative quantiferon gold test  11/5/12       Past surgical history:   Past Surgical History:   Procedure Laterality Date     ARTHROPLASTY REVISION KNEE Left 01/17/2019    Procedure: REVISION, LEFT TOTAL KNEE  ARTHROPLASTY;  Surgeon: Dev Rocha MD;  Location: [facility];  Service: Orthopedics     ARTHROPLASTY REVISION KNEE Right 09/11/2020    Procedure: RIGHT REVISION TOTAL KNEE ARTHROPLASTY;  Surgeon: Dev Rocha,  MD;  Location: [facility];  Service: Orthopedics     BREAST SURGERY      Breast Reduction     C EXCISE EXCESS SKIN TISSUE,ABDOMEN        SECTION  1989      SECTION  10/1985     COLONOSCOPY WITH CO2 INSUFFLATION N/A 2021    Procedure: COLONOSCOPY, WITH CO2 INSUFFLATION;  Surgeon: Angela Harrington DO;  Location:  OR     CRANIECTOMY Right 2021    Procedure: RIGHT MIDDLE FOSSA APPROACH, CSF LEAK REPAIR;  Surgeon: Jocelyn Noe MD;  Location: [facility] OR     CRANIOTOMY MIDDLE FOSSA, EXCISE ACOUSTIC NEUROMA, COMBINED N/A 2021    Procedure: Right CRANIOTOMY, MIDDLE FOSSA APPROACH, FOR REPAIR OF ENCEPHALOCELE;  Surgeon: Jocelyne Harrell MD;  Location: [facility] OR     CYSTOURETHROSCOPY N/A 2020    Procedure: CYSTOSCOPY;  Surgeon: Cherelle Willams MD;  Location: [facility];  Service: Gynecology     GYN SURGERY N/A 2020    Procedure: MIDURETHRAL SLING, CYSTOSCOPY;  Surgeon: Cherelle Willams MD;  Location: [facility];  Service: Gynecology     HYSTERECTOMY TOTAL ABDOMINAL      for fibroids; reports having blood transfusion after surgery     INSERT DRAIN LUMBAR N/A 2021    Procedure: Insert drain lumbar;  Surgeon: Jocelyn Noe MD;  Location:  OR     ORTHOPEDIC SURGERY      Right Knee ACL repair     THYROIDECTOMY  2013    Procedure: THYROIDECTOMY;  LEFT THYROID LOBECTOMY.  (LIGASURE, RECURRENT LARYNGEAL NERVE MONITOR) ;  Surgeon: Uriah Camargo MD;  [facility]     THYROIDECTOMY       TOTAL KNEE ARTHROPLASTY Left 10/03/2017     TOTAL KNEE ARTHROPLASTY Right 2019    Procedure: RIGHT TOTAL KNEE ARTHROPLASTY;  Surgeon: Dev Rocha MD;  Location: [facility];  Service: Orthopedics     TUBAL LIGATION       [facility]         Current Outpatient Medications   Medication     bisacodyl (DULCOLAX) 5 MG EC tablet     magnesium citrate  solution     polyethylene glycol (GOLYTELY) 236 g suspension     amitriptyline (ELAVIL) 25 MG tablet     Cholecalciferol (VITAMIN D) 2000 UNIT tablet     famotidine (PEPCID) 40 MG tablet     FLAXSEED, LINSEED, PO     fluticasone (FLONASE) 50 MCG/ACT nasal spray     furosemide (LASIX) 20 MG tablet     loratadine (CLARITIN) 10 MG tablet     metoprolol succinate ER (TOPROL-XL) 50 MG 24 hr tablet     omeprazole (PRILOSEC) 40 MG DR capsule     ondansetron (ZOFRAN-ODT) 4 MG ODT tab     progesterone (PROMETRIUM) 100 MG capsule     sacubitril-valsartan (ENTRESTO) 49-51 MG per tablet     solifenacin (VESICARE) 10 MG tablet     spironolactone (ALDACTONE) 25 MG tablet     No current facility-administered medications for this encounter.     Facility-Administered Medications Ordered in Other Encounters   Medication     sodium chloride (PF) 0.9% PF flush 10 mL       Allergies   Allergen Reactions     Morphine      EMESIS     Nickel      Sulfa Drugs Swelling       History of Anesthesia/Sedation Problems: no    Physical Exam:    Mental status: alert  Heart: Normal  Lung: Normal  Assessment of patient's airway: Normal  Other as pertinent for procedure: None     ASA Score: See Provation note    Mallampati score:  II - Faucial pillars and soft palate may be seen, but uvula is masked by the base of the tongue    Assessment/Plan:     The patient is an appropriate candidate to receive sedation.    Informed consent was discussed with the patient/family, including the risks, benefits, potential complications and any alternative options associated with sedation.    Patient assessment completed just prior to sedation and while under constant observation by the provider. Condition determined to be adequate for proceeding with sedation.    The specific risks for the procedure were discussed with the patient at the time of informed consent and include but are not limited to perforation which could require surgery, missing significant neoplasm  or lesion, hemorrhage and adverse sedative complication.      Nando Whitlock MD

## 2022-04-28 NOTE — ANESTHESIA PREPROCEDURE EVALUATION
Anesthesia Pre-Procedure Evaluation    Patient: Marleen Mcfadden   MRN: [id number] : 1965        Procedure : Procedure(s):  COLONOSCOPY, WITH CO2 INSUFFLATION          Past Medical History:   Diagnosis Date     Acute bilateral low back pain with right-sided sciatica 2016     Allergic rhinitis, cause unspecified      Arthritis      Autoimmune disease (H)      Autoimmune disease NEC     Autoimmune disease- unknown/poss SLE     Calcaneal spur 10/21/2014    Xray 10/17/14      CHF with cardiomyopathy (H)      Chronic low back pain      DDD (degenerative disc disease), lumbar      Depression      Failed total knee arthroplasty, initial encounter (H) 2019     Familial cardiomyopathy (H)      GERD without esophagitis      History of transfusion      Hypertension      Injury of left shoulder, initial encounter 2017     Morbid obesity (H)      Nontraumatic rupture of quadriceps tendon, left 2018     KATIA (obstructive sleep apnea)- moderate-severe (AHI 29) 2021     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     no longer an issue     Peroneus longus tendinitis 2015     Plantar fasciitis 2014     PONV (postoperative nausea and vomiting)      Rotator cuff injury 2017     Sprain of other ligament of left ankle, initial encounter 2017     Status post left knee replacement 2018     TB lung, latent     negative quantiferon gold test  12      Past Surgical History:   Procedure Laterality Date     ARTHROPLASTY REVISION KNEE Left 2019    Procedure: REVISION, LEFT TOTAL KNEE  ARTHROPLASTY;  Surgeon: Dve Rocha MD;  Location: [facility];  Service: Orthopedics     ARTHROPLASTY REVISION KNEE Right 2020    Procedure: RIGHT REVISION TOTAL KNEE ARTHROPLASTY;  Surgeon: Dev Rocha MD;  Location: [facility] OR;  Service: Orthopedics     BREAST SURGERY      Breast Reduction     C EXCISE  EXCESS SKIN TISSUE,ABDOMEN        SECTION  1989      SECTION  10/1985     COLONOSCOPY WITH CO2 INSUFFLATION N/A 2021    Procedure: COLONOSCOPY, WITH CO2 INSUFFLATION;  Surgeon: Angela Harrington DO;  Location: [facility] OR     CRANIECTOMY Right 2021    Procedure: RIGHT MIDDLE FOSSA APPROACH, CSF LEAK REPAIR;  Surgeon: Jocelyn Noe MD;  Location: [facility] OR     CRANIOTOMY MIDDLE FOSSA, EXCISE ACOUSTIC NEUROMA, COMBINED N/A 2021    Procedure: Right CRANIOTOMY, MIDDLE FOSSA APPROACH, FOR REPAIR OF ENCEPHALOCELE;  Surgeon: Jocelyne Harrell MD;  Location: [facility] OR     CYSTOURETHROSCOPY N/A 2020    Procedure: CYSTOSCOPY;  Surgeon: Cherelle Willams MD;  Location: [facility];  Service: Gynecology     GYN SURGERY N/A 2020    Procedure: MIDURETHRAL SLING, CYSTOSCOPY;  Surgeon: Cherelle Willams MD;  Location: [facility];  Service: Gynecology     HYSTERECTOMY TOTAL ABDOMINAL      for fibroids; reports having blood transfusion after surgery     INSERT DRAIN LUMBAR N/A 2021    Procedure: Insert drain lumbar;  Surgeon: Jocelyn Noe MD;  Location:  OR     ORTHOPEDIC SURGERY      Right Knee ACL repair     THYROIDECTOMY  2013    Procedure: THYROIDECTOMY;  LEFT THYROID LOBECTOMY.  (LIGASURE, RECURRENT LARYNGEAL NERVE MONITOR) ;  Surgeon: Uriah Camargo MD;  Location:  [facility]     THYROIDECTOMY       TOTAL KNEE ARTHROPLASTY Left 10/03/2017     TOTAL KNEE ARTHROPLASTY Right 2019    Procedure: RIGHT TOTAL KNEE ARTHROPLASTY;  Surgeon: Dev Rocha MD;  Location: [facility];  Service: Orthopedics     TUBAL LIGATION       ZZC EXCIS UTERINE FIBROID,ABD APPRCH        Allergies   Allergen Reactions     Morphine      EMESIS     Nickel      Sulfa Drugs Swelling      Social History     Tobacco Use     Smoking status: Never Smoker     Smokeless tobacco: Never Used   Substance Use Topics      Alcohol use: Yes     Comment: rare, twice a year, she has a drink little wine 2 days ago      Wt Readings from Last 1 Encounters:   03/11/22 108.9 kg (240 lb)        Anesthesia Evaluation            ROS/MED HX  ENT/Pulmonary: Comment: Latent TB    (+) sleep apnea, allergic rhinitis,     Neurologic:       Cardiovascular: Comment: Familial cardiomyopathy    (+) hypertension-----CHF Last EF: 40 date: 2/21     METS/Exercise Tolerance:     Hematologic:       Musculoskeletal:   (+) arthritis,     GI/Hepatic:     (+) GERD,     Renal/Genitourinary:       Endo:     (+) thyroid problem, hypothyroidism, Obesity,     Psychiatric/Substance Use:       Infectious Disease:       Malignancy:       Other:            Physical Exam    Airway  airway exam normal      Mallampati: II   TM distance: > 3 FB   Neck ROM: full   Mouth opening: > 3 cm    Respiratory Devices and Support         Dental  no notable dental history         Cardiovascular          Rhythm and rate: regular and normal     Pulmonary   pulmonary exam normal        breath sounds clear to auscultation           OUTSIDE LABS:  CBC:   Lab Results   Component Value Date    WBC 7.8 03/03/2022    WBC 7.4 07/14/2021    HGB 12.6 03/03/2022    HGB 10.2 (L) 07/14/2021    HCT 39.1 03/03/2022    HCT 31.9 (L) 07/14/2021     03/03/2022     07/14/2021     BMP:   Lab Results   Component Value Date     03/03/2022     02/18/2022    POTASSIUM 4.2 03/03/2022    POTASSIUM 4.0 02/18/2022    CHLORIDE 103 03/03/2022    CHLORIDE 105 02/18/2022    CO2 27 03/03/2022    CO2 27 02/18/2022    BUN 17 03/03/2022    BUN 18 02/18/2022    CR 0.88 03/03/2022    CR 0.79 02/18/2022    GLC 93 03/03/2022    GLC 84 02/18/2022     COAGS:   Lab Results   Component Value Date    PTT 29 04/19/2013    INR 0.97 09/18/2019     POC:   Lab Results   Component Value Date     (H) 07/09/2021    HCG Negative 06/15/2009     HEPATIC:   Lab Results   Component Value Date    ALBUMIN 3.5  04/25/2022    PROTTOTAL 7.4 04/25/2022    ALT 23 04/25/2022    AST 14 04/25/2022    GGT <10 08/04/2011    ALKPHOS 54 04/25/2022    BILITOTAL 0.4 04/25/2022    CHARLIE 2 (L) 11/30/2005     OTHER:   Lab Results   Component Value Date    PH 7.42 07/05/2021    LACT 1.8 07/05/2021    A1C 5.4 12/08/2021    CARMEN 9.1 03/03/2022    PHOS 3.5 07/11/2021    MAG 2.1 12/08/2021    LIPASE 173 10/14/2006    AMYLASE 82 10/14/2006    TSH 2.10 06/03/2020    T4 1.04 06/03/2020    CRP 4.6 03/03/2022    SED 30 03/13/2020       Anesthesia Plan    ASA Status:  3   NPO Status:  NPO Appropriate    Anesthesia Type: MAC.     - Reason for MAC: immobility needed, straight local not clinically adequate   Induction: Intravenous.   Maintenance: TIVA.        Consents    Anesthesia Plan(s) and associated risks, benefits, and realistic alternatives discussed. Questions answered and patient/representative(s) expressed understanding.    - Discussed:     - Discussed with:  Patient      - Extended Intubation/Ventilatory Support Discussed: No.      - Patient is DNR/DNI Status: No    Use of blood products discussed: No .     Postoperative Care    Pain management: Oral pain medications, IV analgesics, Multi-modal analgesia.   PONV prophylaxis: Ondansetron (or other 5HT-3), Background Propofol Infusion     Comments:                Navdeep Campuzano MD

## 2022-04-28 NOTE — ANESTHESIA CARE TRANSFER NOTE
Patient: Marleen Mcfadden    Procedure: Procedure(s):  COLONOSCOPY, WITH CO2 INSUFFLATION       Diagnosis: Diverticulitis of colon [K57.32]  Diagnosis Additional Information: No value filed.    Anesthesia Type:   MAC     Note:    Oropharynx: oropharynx clear of all foreign objects  Level of Consciousness: awake  Oxygen Supplementation: room air    Independent Airway: airway patency satisfactory and stable  Dentition: dentition unchanged  Vital Signs Stable: post-procedure vital signs reviewed and stable  Report to RN Given: handoff report given  Patient transferred to: Phase II    Handoff Report: Identifed the Patient, Identified the Reponsible Provider, Reviewed the pertinent medical history, Discussed the surgical course, Reviewed Intra-OP anesthesia mangement and issues during anesthesia, Set expectations for post-procedure period and Allowed opportunity for questions and acknowledgement of understanding      Vitals:  Vitals Value Taken Time   BP     Temp     Pulse     Resp     SpO2         Electronically Signed By: PITO Molina CRNA  April 28, 2022  11:53 AM

## 2022-04-28 NOTE — ANESTHESIA POSTPROCEDURE EVALUATION
Patient: Marleen Mcfadden    Procedure: Procedure(s):  COLONOSCOPY, WITH CO2 INSUFFLATION       Anesthesia Type:  MAC    Note:  Disposition: Outpatient   Postop Pain Control: Uneventful            Sign Out: Well controlled pain   PONV: No   Neuro/Psych: Uneventful            Sign Out: Acceptable/Baseline neuro status   Airway/Respiratory: Uneventful            Sign Out: Acceptable/Baseline resp. status   CV/Hemodynamics: Uneventful            Sign Out: Acceptable CV status   Other NRE: NONE   DID A NON-ROUTINE EVENT OCCUR? No           Last vitals:  Vitals Value Taken Time   /90 04/28/22 1223   Temp 36  C (96.8  F) 04/28/22 1223   Pulse     Resp 18 04/28/22 1223   SpO2 98 % 04/28/22 1223       Electronically Signed By: Navdeep Campuzano MD  April 28, 2022  4:06 PM

## 2022-05-02 ENCOUNTER — NURSE TRIAGE (OUTPATIENT)
Dept: FAMILY MEDICINE | Facility: CLINIC | Age: 57
End: 2022-05-02
Payer: MEDICARE

## 2022-05-02 ENCOUNTER — OFFICE VISIT (OUTPATIENT)
Dept: URGENT CARE | Facility: URGENT CARE | Age: 57
End: 2022-05-02
Payer: COMMERCIAL

## 2022-05-02 VITALS
BODY MASS INDEX: 40.37 KG/M2 | DIASTOLIC BLOOD PRESSURE: 75 MMHG | OXYGEN SATURATION: 98 % | SYSTOLIC BLOOD PRESSURE: 118 MMHG | WEIGHT: 235.2 LBS | HEART RATE: 57 BPM | TEMPERATURE: 97.9 F

## 2022-05-02 DIAGNOSIS — R50.9 FEVER AND CHILLS: ICD-10-CM

## 2022-05-02 DIAGNOSIS — R10.84 ABDOMINAL PAIN, GENERALIZED: Primary | ICD-10-CM

## 2022-05-02 DIAGNOSIS — Z98.890 S/P COLONOSCOPY: ICD-10-CM

## 2022-05-02 PROCEDURE — 99214 OFFICE O/P EST MOD 30 MIN: CPT | Performed by: PHYSICIAN ASSISTANT

## 2022-05-02 ASSESSMENT — ENCOUNTER SYMPTOMS
FREQUENCY: 0
COUGH: 0
HEARTBURN: 0
NAUSEA: 1
FEVER: 1
CARDIOVASCULAR NEGATIVE: 1
VOMITING: 0
DYSURIA: 0
CHEST TIGHTNESS: 0
FLANK PAIN: 0
CHILLS: 1
DIARRHEA: 1
FATIGUE: 0
HEMATURIA: 0
RESPIRATORY NEGATIVE: 1
PALPITATIONS: 0
CONSTIPATION: 0
WHEEZING: 0
SHORTNESS OF BREATH: 0
ABDOMINAL PAIN: 1
HEMATOCHEZIA: 0

## 2022-05-02 NOTE — TELEPHONE ENCOUNTER
Call received from patient:   1. Colonoscopy on Thursday   A. Friday temp 100.5^F   B. Saturday temperature of 101.9^F-consistent that entire day   C. Sunday temperature went up to 102^F   D. Today temperature is 99^F  2. Still has abdominal pain   A. Persistent since Thursday  3. Thought was gas related and took Gas-X, which is not helpful  4. Passing stool not as tender but is uncomfortable  5. GI told patient to contact PCP about these symptoms  6. Abdominal discomfort is mild to moderate   A. Upper right abdomen, but was entire stomach  7. Colonoscopy found a few pockets but no active diverticulitis  8. Diarrhea/loose stool ever since Thursday  9. Nausea but no emesis    Reason for Disposition    Patient wants to be seen    Additional Information    Negative: SEVERE abdominal pain (e.g., excruciating)    Negative: Pain lasting > 10 minutes and over 50 years old    Negative: Pain lasting > 10 minutes and over 40 years old and associated chest, arm, neck, upper back, or jaw pain    Negative: Pain lasting > 10 minutes and over 35 years old and at least one cardiac risk factor    Negative: Pain lasting > 10 minutes and history of heart disease (i.e., heart attack, bypass surgery, angina, angioplasty, CHF)    Negative: Recent injury to the abdomen    Negative: Vomiting red blood or black (coffee ground) material    Negative: Bloody, black, or tarry bowel movements (Exception: chronic-unchanged  black-grey bowel movements and is taking iron pills or Pepto-bismol)    Negative: Passed out (i.e., fainted, collapsed and was not responding)    Negative: Shock suspected (e.g., cold/pale/clammy skin, too weak to stand, low BP, rapid pulse)    Negative: Visible sweat on face or sweat is dripping down    Negative: Chest pain    Negative: Pregnant > 24 weeks and hand or face swelling    Negative: Constant abdominal pain lasting > 2 hours    Negative: Vomiting bile (green color)    Negative: Patient sounds very sick or weak to  the triager    Negative: Vomiting and abdomen looks much more swollen than usual    Negative: White of the eyes have turned yellow (i.e.,  jaundice)    Negative: Fever > 103 F (39.4 C)    Negative: Fever > 101 F (38.3 C) and over 60 years of age    Negative: Fever > 100.0 F (37.8 C) and has diabetes mellitus or a weak immune system (e.g., HIV positive, cancer chemotherapy, organ transplant, splenectomy, chronic steroids)    Negative: Fever > 100.0 F (37.8 C) and bedridden (e.g., nursing home patient, stroke, chronic illness, recovering from surgery)    Negative: Age > 60 years    Protocols used: ABDOMINAL PAIN - UPPER-A-OH    SADA BraswellN, RN  [facility]

## 2022-05-02 NOTE — PROGRESS NOTES
Dinorah Hawley is a 56 year old who presents for the following health issues   HPI   Abdominal/Flank Pain  Onset/Duration: 4days.  Symptoms started day after colonoscopy.  Colonoscopy was completed without complications per report.    Description:   Character: dull ache  Location: generalized  Radiation: None  Intensity: moderate  Progression of Symptoms:  worsening  Accompanying Signs & Symptoms:  Fever/Chills: Yes  Gas/Bloating: Yes  Nausea: Yes  Vomitting: no  Diarrhea: Yes but no blood present  Constipation: no  Dysuria or Hematuria: No dysuria, urinary frequency, urgency or hematuria.  No vaginal d/c, bleeding, rashes and irritation.  No new partners.   History:   Trauma: no  Previous similar pain: no  Previous tests done: none  Precipitating factors:   Does the pain change with:     Food: Yes, worse    Bowel Movement: no    Urination: no   Other factors:  no  Therapies tried and outcome: fluids, tylenol with minimal relief     Patient Active Problem List   Diagnosis     Leiomyoma of uterus     Allergic rhinitis     Anemia     [facility]     Knee pain     Thyroid nodule     Pain in joint involving ankle and foot     CARDIOVASCULAR SCREENING; LDL GOAL LESS THAN 160     Morbid obesity (H)     Shaina's nodes     Familial cardiomyopathy (H)     Degenerative disc disease at L5-S1 level     Chronic bilateral low back pain with right-sided sciatica     Chronic bilateral low back pain with left-sided sciatica     Chronic systolic congestive heart failure (H)     Left shoulder pain     Primary osteoarthritis of both knees     Gastroesophageal reflux disease with esophagitis     Odd detrimental health beliefs     Moderate major depression (H)     Dysfunction of both eustachian tubes     Chronic rhinitis     Essential hypertension     CSF otorrhea     KATIA (obstructive sleep apnea)- moderate-severe (AHI 29)     Family history of colon cancer     Diverticulitis     Herpes simplex of female genitalia      Current Outpatient Medications   Medication     amitriptyline (ELAVIL) 25 MG tablet     bisacodyl (DULCOLAX) 5 MG EC tablet     Cholecalciferol (VITAMIN D) 2000 UNIT tablet     famotidine (PEPCID) 40 MG tablet     FLAXSEED, LINSEED, PO     fluticasone (FLONASE) 50 MCG/ACT nasal spray     furosemide (LASIX) 20 MG tablet     loratadine (CLARITIN) 10 MG tablet     magnesium citrate solution     metoprolol succinate ER (TOPROL-XL) 50 MG 24 hr tablet     omeprazole (PRILOSEC) 40 MG DR capsule     ondansetron (ZOFRAN-ODT) 4 MG ODT tab     polyethylene glycol (GOLYTELY) 236 g suspension     progesterone (PROMETRIUM) 100 MG capsule     sacubitril-valsartan (ENTRESTO) 49-51 MG per tablet     solifenacin (VESICARE) 10 MG tablet     spironolactone (ALDACTONE) 25 MG tablet     No current facility-administered medications for this visit.     Facility-Administered Medications Ordered in Other Visits   Medication     sodium chloride (PF) 0.9% PF flush 10 mL        Allergies   Allergen Reactions     Morphine      EMESIS     Nickel      Sulfa Drugs Swelling     Review of Systems   Constitutional: Positive for chills and fever. Negative for fatigue.   Respiratory: Negative.  Negative for cough, chest tightness, shortness of breath and wheezing.    Cardiovascular: Negative.  Negative for chest pain, palpitations and peripheral edema.   Gastrointestinal: Positive for abdominal pain, diarrhea and nausea. Negative for constipation, heartburn, hematochezia and vomiting.   Genitourinary: Negative for dysuria, flank pain, frequency, hematuria, pelvic pain, urgency, vaginal bleeding and vaginal discharge.   All other systems reviewed and are negative.           Objective    /75   Pulse 57   Temp 97.9  F (36.6  C) (Axillary)   Wt 106.7 kg (235 lb 3.2 oz)   LMP 10/19/2008 (Approximate)   SpO2 98%   BMI 40.37 kg/m    Body mass index is 40.37 kg/m .  Physical Exam  Vitals and nursing note reviewed.   Constitutional:        General: She is not in acute distress.     Appearance: Normal appearance. She is well-developed. She is obese. She is not ill-appearing.   Cardiovascular:      Rate and Rhythm: Normal rate and regular rhythm.      Pulses: Normal pulses.      Heart sounds: Normal heart sounds, S1 normal and S2 normal. No murmur heard.    No friction rub. No gallop.   Pulmonary:      Effort: Pulmonary effort is normal. No accessory muscle usage or respiratory distress.      Breath sounds: Normal breath sounds and air entry. No decreased breath sounds, wheezing, rhonchi or rales.   Abdominal:      General: Abdomen is flat. Bowel sounds are normal.      Palpations: Abdomen is soft. There is no hepatomegaly, splenomegaly or mass.      Tenderness: There is generalized abdominal tenderness and tenderness in the right upper quadrant, right lower quadrant and left lower quadrant. There is guarding and rebound. There is no right CVA tenderness or left CVA tenderness. Positive signs include Berkowitz's sign and McBurney's sign. Negative signs include Rovsing's sign, psoas sign and obturator sign.      Hernia: No hernia is present.   Genitourinary:     Comments: Declined pelvic exam.  Skin:     General: Skin is warm and dry.   Neurological:      Mental Status: She is alert and oriented to person, place, and time.   Psychiatric:         Mood and Affect: Mood normal.         Behavior: Behavior normal.         Thought Content: Thought content normal.         Judgment: Judgment normal.          Assessment/Plan:  Abdominal pain, generalized:  Along with fever/chills, nausea/diarrhea, and recent colonoscopy.  H&P is concerning for complications from colonoscopy vs diverticulitis vs cholecystitis vs appendicitis.  Recommend further evaluation and management in the ER.  Will most likely need further workup with labs and/or imaging.  Patient has declined transportation via ambulance and will have family drive her/him.  Understands risks and benefits of  ambulance transfer and s/he has declined.  Call 911 if worsening symptoms.  S/he plans to go to [facility].  S/he left in stable condition with AVS in hand.  F/u with PCP after ER visit.     Fever and chills    S/P colonoscopy        Chen See KINGS Ortez

## 2022-05-26 DIAGNOSIS — I50.22 CHRONIC SYSTOLIC CONGESTIVE HEART FAILURE (H): Primary | ICD-10-CM

## 2022-05-31 ENCOUNTER — LAB (OUTPATIENT)
Dept: LAB | Facility: CLINIC | Age: 57
End: 2022-05-31
Payer: COMMERCIAL

## 2022-05-31 DIAGNOSIS — Z96.652 STATUS POST LEFT KNEE REPLACEMENT: ICD-10-CM

## 2022-05-31 DIAGNOSIS — I50.22 CHRONIC SYSTOLIC CONGESTIVE HEART FAILURE (H): ICD-10-CM

## 2022-05-31 DIAGNOSIS — R25.2 MUSCLE CRAMP: ICD-10-CM

## 2022-05-31 DIAGNOSIS — M25.562 LEFT KNEE PAIN, UNSPECIFIED CHRONICITY: ICD-10-CM

## 2022-05-31 DIAGNOSIS — M79.2 NERVE PAIN: ICD-10-CM

## 2022-05-31 LAB
ANION GAP SERPL CALCULATED.3IONS-SCNC: 7 MMOL/L (ref 3–14)
BUN SERPL-MCNC: 13 MG/DL (ref 7–30)
CALCIUM SERPL-MCNC: 9.1 MG/DL (ref 8.5–10.1)
CHLORIDE BLD-SCNC: 108 MMOL/L (ref 94–109)
CO2 SERPL-SCNC: 24 MMOL/L (ref 20–32)
CREAT SERPL-MCNC: 0.76 MG/DL (ref 0.52–1.04)
GFR SERPL CREATININE-BSD FRML MDRD: >90 ML/MIN/1.73M2
GLUCOSE BLD-MCNC: 94 MG/DL (ref 70–99)
POTASSIUM BLD-SCNC: 3.8 MMOL/L (ref 3.4–5.3)
SODIUM SERPL-SCNC: 139 MMOL/L (ref 133–144)

## 2022-05-31 PROCEDURE — 36415 COLL VENOUS BLD VENIPUNCTURE: CPT

## 2022-05-31 PROCEDURE — 82310 ASSAY OF CALCIUM: CPT

## 2022-06-01 ENCOUNTER — ANCILLARY PROCEDURE (OUTPATIENT)
Dept: CARDIOLOGY | Facility: CLINIC | Age: 57
End: 2022-06-01
Attending: INTERNAL MEDICINE
Payer: COMMERCIAL

## 2022-06-01 VITALS
WEIGHT: 232.1 LBS | DIASTOLIC BLOOD PRESSURE: 69 MMHG | BODY MASS INDEX: 39.63 KG/M2 | OXYGEN SATURATION: 100 % | HEART RATE: 46 BPM | HEIGHT: 64 IN | SYSTOLIC BLOOD PRESSURE: 103 MMHG

## 2022-06-01 DIAGNOSIS — F40.240 CLAUSTROPHOBIA: ICD-10-CM

## 2022-06-01 DIAGNOSIS — I42.9 FAMILIAL CARDIOMYOPATHY (H): ICD-10-CM

## 2022-06-01 DIAGNOSIS — I42.9 CARDIOMYOPATHY, UNSPECIFIED TYPE (H): Primary | ICD-10-CM

## 2022-06-01 LAB — LVEF ECHO: NORMAL

## 2022-06-01 PROCEDURE — 93005 ELECTROCARDIOGRAM TRACING: CPT

## 2022-06-01 PROCEDURE — 93306 TTE W/DOPPLER COMPLETE: CPT | Performed by: STUDENT IN AN ORGANIZED HEALTH CARE EDUCATION/TRAINING PROGRAM

## 2022-06-01 PROCEDURE — 99207 PR STATISTIC IV PUSH SINGLE INITIAL SUBSTANCE: CPT | Performed by: STUDENT IN AN ORGANIZED HEALTH CARE EDUCATION/TRAINING PROGRAM

## 2022-06-01 PROCEDURE — 99214 OFFICE O/P EST MOD 30 MIN: CPT | Mod: 25 | Performed by: INTERNAL MEDICINE

## 2022-06-01 PROCEDURE — G0463 HOSPITAL OUTPT CLINIC VISIT: HCPCS | Mod: 25

## 2022-06-01 RX ORDER — DIAZEPAM 5 MG
5 TABLET ORAL ONCE
Qty: 1 TABLET | Refills: 0 | Status: SHIPPED | OUTPATIENT
Start: 2022-06-01 | End: 2022-06-01

## 2022-06-01 RX ADMIN — Medication 6 ML: at 15:47

## 2022-06-01 ASSESSMENT — PAIN SCALES - GENERAL: PAINLEVEL: NO PAIN (0)

## 2022-06-01 NOTE — LETTER
6/1/2022      RE: Marleen Mcfadden  [address]  [facility] [address]       Dear Colleague,    Thank you for the opportunity to participate in the care of your patient, Marleen Mcfadden, at the [facility] [place] at [facility]. Please see a copy of my visit note below.    Advanced Heart Failure Clinic Follow up:  06/01/2022    HPI:  56 year old female with a history of familial cardiomyopathy who presents for ongoing evaluation and management.  Pt reports that since a few days after last visit when she had noted increased abdominal distension she developed lower abdominal tenderness and fevers requiring ER evaluation where she was found to have a perforated sigmoid diverticulitis.  She was treated conservatively with antibiotics and pain medications she subsequent resolution of her symptoms.  Patient relates that from a cardiac standpoint she has continued to feel fairly well.  She denies any chest pain or pressure, sob, orthopnea, pnd, palpitations or syncope/presyncope.  She denies any sob at rest but reports some mild lea when walking up steep incline but she does not feel this has worsened from her baseline.  She also denies any worsening of her mild, dependent pedal edema.  She reports compliance with her medications.      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     Familial cardiomyopathy (H)      GERD without esophagitis      History of transfusion      Hypertension      Injury  of left shoulder, initial encounter 2017     Morbid obesity (H)      Nontraumatic rupture of quadriceps tendon, left 2018     KATIA (obstructive sleep apnea)- moderate-severe (AHI 29) 2021     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     no longer an issue     Peroneus longus tendinitis 2015     Plantar fasciitis 2014     PONV (postoperative nausea and vomiting)      Rotator cuff injury 2017     Sprain of other ligament of left ankle, initial encounter 2017     Status post left knee replacement 2018     TB lung, latent     negative quantiferon gold test  12       Past Surgical History:   Procedure Laterality Date     ARTHROPLASTY REVISION KNEE Left 2019    Procedure: REVISION, LEFT TOTAL KNEE  ARTHROPLASTY;  Surgeon: Dev Rocha MD;  Location: [facility] OR;  Service: Orthopedics     ARTHROPLASTY REVISION KNEE Right 2020    Procedure: RIGHT REVISION TOTAL KNEE ARTHROPLASTY;  Surgeon: Dev Rocha MD;  Location: [facility] OR;  Service: Orthopedics     BREAST SURGERY      Breast Reduction     C EXCISE EXCESS SKIN TISSUE,ABDOMEN        SECTION  1989      SECTION  10/1985     COLONOSCOPY WITH CO2 INSUFFLATION N/A 2021    Procedure: COLONOSCOPY, WITH CO2 INSUFFLATION;  Surgeon: Angela Harrington DO;  Location: [facility] OR     COLONOSCOPY WITH CO2 INSUFFLATION N/A 2022    Procedure: COLONOSCOPY, WITH CO2 INSUFFLATION;  Surgeon: Nando Whitlock MD;  Location: [facility] OR     CRANIECTOMY Right 2021    Procedure: RIGHT MIDDLE FOSSA APPROACH, CSF LEAK REPAIR;  Surgeon: Jocelyn Noe MD;  Location: [facility] OR     CRANIOTOMY MIDDLE FOSSA, EXCISE ACOUSTIC NEUROMA, COMBINED N/A 2021    Procedure: Right CRANIOTOMY, MIDDLE FOSSA APPROACH, FOR REPAIR OF ENCEPHALOCELE;  Surgeon: Jocelyne Harrell MD;  Location: [facility] OR     CYSTOURETHROSCOPY N/A 2020     Procedure: CYSTOSCOPY;  Surgeon: Cherelle Willams MD;  Location: [facility];  Service: Gynecology     GYN SURGERY N/A 2020    Procedure: MIDURETHRAL SLING, CYSTOSCOPY;  Surgeon: Cherelle Willams MD;  Location: [facility];  Service: Gynecology     HYSTERECTOMY TOTAL ABDOMINAL  2006    for fibroids; reports having blood transfusion after surgery     INSERT DRAIN LUMBAR N/A 2021    Procedure: Insert drain lumbar;  Surgeon: Jocelyn Noe MD;  Location: [facility]     ORTHOPEDIC SURGERY      Right Knee ACL repair     THYROIDECTOMY  2013    Procedure: THYROIDECTOMY;  LEFT THYROID LOBECTOMY.  (LIGASURE, RECURRENT LARYNGEAL NERVE MONITOR) ;  Surgeon: Uriah Camargo MD;  Location: [facility]     THYROIDECTOMY       TOTAL KNEE ARTHROPLASTY Left 10/03/2017     TOTAL KNEE ARTHROPLASTY Right 2019    Procedure: RIGHT TOTAL KNEE ARTHROPLASTY;  Surgeon: Dev Rocha MD;  Location: [facility];  Service: Orthopedics     TUBAL LIGATION       ZZC EXCIS UTERINE FIBROID,ABD APPRCH         Family History   Problem Relation Age of Onset     Hypertension Father         dec     Diabetes Father         dec     Heart Disease Father         dec     Alcohol/Drug Father      Cardiovascular Father      Heart Disease Mother         dec     Alcohol/Drug Mother      Cardiovascular Mother      Heart Disease Daughter         Cardiomyopathy     Cardiovascular Daughter         cardiomyopathy     Colon Cancer Sister      Cardiovascular Son         cardiomyopathy     Diabetes Paternal Grandmother         dec     Hypertension Paternal Grandmother         dec     Cerebrovascular Disease Paternal Grandmother         dec     Cancer Sister         Lupus     Cardiovascular Sister         cardiomyopathy     Heart Disease Sister         heart failure, and kidney failure   Her maternal grandmother  in her 30's from a brain aneurysm. Marleen's father  at  "55 years after a massive heart attack and CABG procedure. Marleen's paternal grandmother's mother  suddenly in her 70's while sitting at a bus stop.   Father and mother both had heart failure and heart disease. Mother  suddenly at 37 yo  Sister had heart failure/DCM and had a heart transplant  Both son and daughter have cardiomyopathy diagnoses  Half sister on mother's side does NOT have cardiomyopathy/HF      Social History     Tobacco Use     Smoking status: Never Smoker     Smokeless tobacco: Never Used   Substance Use Topics     Alcohol use: Yes     Comment: rare, twice a year, she has a drink little wine 2 days ago         Current Outpatient Medications   Medication Sig     Cholecalciferol (VITAMIN D) 2000 UNIT tablet Take 5,000 Units by mouth as needed Reported on 3/8/2017     FLAXSEED, LINSEED, PO Take 1 capsule by mouth daily      fluticasone (FLONASE) 50 MCG/ACT nasal spray Spray 2 sprays into both nostrils At Bedtime     furosemide (LASIX) 20 MG tablet Take two tabs daily as needed     loratadine (CLARITIN) 10 MG tablet Take 1 tablet (10 mg) by mouth daily     metoprolol succinate ER (TOPROL-XL) 50 MG 24 hr tablet Take 1 tablet (50 mg) by mouth daily     progesterone (PROMETRIUM) 100 MG capsule Take 100 mg by mouth At Bedtime      sacubitril-valsartan (ENTRESTO) 49-51 MG per tablet Take 1 tablet by mouth 2 times daily     spironolactone (ALDACTONE) 25 MG tablet Take 2 tablets (50 mg) by mouth daily     amitriptyline (ELAVIL) 25 MG tablet TAKE 1 TABLET(25 MG) BY MOUTH AT BEDTIME (Patient not taking: Reported on 2022)     bisacodyl (DULCOLAX) 5 MG EC tablet Take 2 tablets (10 mg) by mouth See Admin Instructions Refer to \"Two-Day Golytely (Colyte, Nulytely)\" Instructions for your Colonoscopy handout. (Patient not taking: Reported on 2022)     famotidine (PEPCID) 40 MG tablet Take 1 tablet (40 mg) by mouth daily (Patient not taking: Reported on 2022)     magnesium citrate solution Take " "296 mLs by mouth See Admin Instructions 1 - 10 ounce bottle = 296 ml,  Not red. (Patient not taking: Reported on 6/1/2022)     omeprazole (PRILOSEC) 40 MG DR capsule Take 1 capsule (40 mg) by mouth daily (Patient not taking: Reported on 6/1/2022)     ondansetron (ZOFRAN-ODT) 4 MG ODT tab Take 1 tablet (4 mg) by mouth every 8 hours as needed for nausea (Patient not taking: Reported on 6/1/2022)     polyethylene glycol (GOLYTELY) 236 g suspension Take 8,000 mLs by mouth See Admin Instructions Refer to \"Two-Day Golytely (Colyte, Nulytely) Instructions for your Colonoscopy\" handout. (Patient not taking: Reported on 6/1/2022)     solifenacin (VESICARE) 10 MG tablet Take 10 mg by mouth daily  (Patient not taking: Reported on 6/1/2022)     No current facility-administered medications for this visit.     Facility-Administered Medications Ordered in Other Visits   Medication     sodium chloride (PF) 0.9% PF flush 10 mL         EXAM:   /69 (BP Location: Left arm, Patient Position: Chair, Cuff Size: Adult Large)   Pulse (!) 46   Ht 1.613 m (5' 3.5\")   Wt 105.3 kg (232 lb 1.6 oz)   LMP 10/19/2008 (Approximate)   SpO2 100%   BMI 40.47 kg/m     GENERAL: Alert, oriented, NAD  NECK: No adenopathy. 2+ carotids,   HEART: RRR,+ S1 and S2, no murmurs or gallops.  JVP 7-8 cm without HJR  LUNGS:  Clear to auscultation bilaterally  ABDOMEN: Soft, obese, nontender, nondistended, bowel sounds present, no hepatomeagly appreciated although exam limited by body habitus  EXTREMITIES: trace lower extremity edema.  2+ bilateral peripheral pulses.     LABS  Last Comprehensive Metabolic Panel:  Sodium   Date Value Ref Range Status   05/31/2022 139 133 - 144 mmol/L Final   07/11/2021 134 133 - 144 mmol/L Final     Potassium   Date Value Ref Range Status   05/31/2022 3.8 3.4 - 5.3 mmol/L Final   07/11/2021 4.2 3.4 - 5.3 mmol/L Final     Chloride   Date Value Ref Range Status   05/31/2022 108 94 - 109 mmol/L Final   07/11/2021 102 94 - 109 " mmol/L Final     Carbon Dioxide   Date Value Ref Range Status   07/11/2021 30 20 - 32 mmol/L Final     Carbon Dioxide (CO2)   Date Value Ref Range Status   05/31/2022 24 20 - 32 mmol/L Final     Anion Gap   Date Value Ref Range Status   05/31/2022 7 3 - 14 mmol/L Final   07/11/2021 2 (L) 3 - 14 mmol/L Final     Glucose   Date Value Ref Range Status   05/31/2022 94 70 - 99 mg/dL Final   07/11/2021 87 70 - 99 mg/dL Final     Urea Nitrogen   Date Value Ref Range Status   05/31/2022 13 7 - 30 mg/dL Final   07/11/2021 14 7 - 30 mg/dL Final     Creatinine   Date Value Ref Range Status   05/31/2022 0.76 0.52 - 1.04 mg/dL Final   07/11/2021 0.79 0.52 - 1.04 mg/dL Final     GFR Estimate   Date Value Ref Range Status   05/31/2022 >90 >60 mL/min/1.73m2 Final     Comment:     Effective December 21, 2021 eGFRcr in adults is calculated using the 2021 CKD-EPI creatinine equation which includes age and gender (Aakash et al., NEJM, DOI: 10.1056/GPBKph2791520)   07/11/2021 83 >60 mL/min/[1.73_m2] Final     Comment:     Non  GFR Calc  Starting 12/18/2018, serum creatinine based estimated GFR (eGFR) will be   calculated using the Chronic Kidney Disease Epidemiology Collaboration   (CKD-EPI) equation.       Calcium   Date Value Ref Range Status   05/31/2022 9.1 8.5 - 10.1 mg/dL Final   07/11/2021 8.2 (L) 8.5 - 10.1 mg/dL Final       CMR Report  01-  Clinical history: 53 yo woman with a familial cardiomyopathy to have repeat CMRI.  Comparison CMR: 10/6/2016.  1.  The global systolic function is moderately decreased and the LVEF is 38%. The RV is moderately dilated while wall thickness is normal. There is moderate global hypokinesis.  2. The RV is the upper limit of normal in cavity size. The global systolic function is mildly decreased and  the RVEF is 52%.   3. Both atria are mildly dilated.  4. There is no significant valvular disease.   5. Late gadolinium enhancement imaging shows no MI, fibrosis or infiltrative  disease.   CONCLUSIONS:   The global systolic function is moderately decreased and the LVEF is 38%. The RV is moderately dilated  while wall thickness is normal. There is moderate global hypokinesis.  The RV is the upper limit of normal in cavity size. The global systolic function is mildly decreased and  the RVEF is 52%.   Compared to the prior study of 2016, LV systolic performance is minimally decreased and RV systolic  performance is similar.  Both LV and RV dilation are mildly increased.             Echo 6/16/2021  Interpretation Summary  Severe left ventricular dilation is present. LVIDd 7.1cm.  Severe diffuse hypokinesis is present.  LVEF 40% based on biplane 2D tracing.  Right ventricular function, chamber size, wall motion, and thickness are  normal.  IVC diameter and respiratory changes fall into an intermediate range  suggesting an RA pressure of 8 mmHg.  This study was compared with the study from 8/12/2020.  No significant changes noted.    Echo 6/1/22  Technically difficult study despite the use of contrast. Poor acoustic  windows.  Left ventricular function is decreased. The ejection fraction is 35-40%  (moderately reduced). Moderate diffuse hypokinesis is present. Grade II or  moderate diastolic dysfunction. Diastolic Doppler findings (E/E' ratio and/or  other parameters) suggest left ventricular filling pressures are increased.  LVEDD 7.1 cm.  The RV is not clearly visualized but the size and function are probably  normal.  No significant valvular abnormalities.  IVC diameter <2.1 cm collapsing >50% with sniff suggests a normal RA pressure  of 3 mmHg.  This study was compared with the study from 06/16/2021. No significant changes  in the LV size or function upon direct visual comparison, although the poor  acoustic windows on today's examination preclude a detailed comparison.        Assessment and Plan:  56 year old female with a history of familial cardiomyopathy who presents for ongoing  evaluation and managemen    # Chronic systolic heart failure secondary to familial cardiomyopathy.    LVEF 35-40%  Stage B, NYHA Class IIb  MVO2 reduced on cardiopulmonary stress test in 2020 however patient did not meet anerobic threshold therefore MVO2 not a accurate measurement of cardiac exercise capacity.  VE/VCO2 slope normal and good BP response thus no evidence of significant cardiac limitation to exercise.    ACEI/ARB/ARNI: yes, continue entresto 49-51 mg BID  BB: Yes, continue metoprolol XL 50mg daily  Aldosterone antagonist - Continue spironolactone 50mg daily   Today's echo confirms reports LVEF 35-40% with no signficant change from prior.  Given that LVEF report is slightly lower ad LVEDD is reported to be slightly higher than prior and echo is reported as technically difficult will obtain cardiac MRI to further assess   If LVEF does appear to be <40% will attempt to further uptitrate medical therapy and if LVEF <35% will again dicuss option of ICD   % BiV pacing: N/A, narrow qrs  Fluid status euvolemic  NSAID use: advised to avoid NSAIDs  Encouraged patient to begin regular aerobic exercise aiming for at least 150 minutes of moderate physical activity or 75 minutes of vigorous physical activity - or an equal combination of both - each week. and follow low-salt, heart healthy diet.  Patient has previously met with genetic counselor regarding genetic testing.  Encouraged patient to proceed with genetic testing given family history.  Pt is aware of recommendation for all first degree relatives to undergo screening for familial cardiomyopathy preferably with cardiac MRI but at least echo.    Follow-up:  Cardiac MRI in next few weeks.  RTC in 6 months with labs and EKG.  Will be happy to see sooner if change in clinical status or new questions/concerns arise.    aCssie Kolb MD  Section Head - Advanced Heart Failure, Transplantation and Mechanical Circulatory Support  Director - Adult Congenital and  Cardiovascular Genetics Center  Associate Professor of Medicine, [facility]    I spent a total of 30 minutes today with the patient personally reviewing today's laboratory results, history and examination, and discussion and counseling with the patient.      Please do not hesitate to contact me if you have any questions/concerns.     Sincerely,     Cassie Kolb MD

## 2022-06-01 NOTE — NURSING NOTE
Diet: Patient instructed regarding a heart failure healthy diet, including discussion of reduced fat and 2000 mg daily sodium restriction, daily weights, medication purpose and compliance, fluid restrictions and resources for patient and family to access for assistance with heart failure management.       Labs: Patient was given results of the laboratory testing obtained today and patient was instructed about when to return for the next laboratory testing.     Med Reconcile: Reviewed and verified all current medications with the patient. The updated medication list was printed and given to the patient. NO CHANGES    Return Appointment: Patient given instructions regarding scheduling next clinic visit. RTC for cardiac MRI in next 2 months. Follow up with dr sachin thomas in 6 months.    Patient stated she understood all health information given and agreed to call with further questions or concerns.     Gwen Swenson RN

## 2022-06-01 NOTE — NURSING NOTE
Chief Complaint   Patient presents with     Follow Up     RTN HF: 56 year old female presents with history of Familial cardiomyopathy, systolic HF, EF 35% for follow up with labs echo and EKG prior     Vitals were taken, medications reconciled, and EKG performed.    Bebeto Rosas  4:33 PM

## 2022-06-01 NOTE — PROGRESS NOTES
[facility] Follow up:  06/01/2022    HPI:  56 year old female with a history of familial cardiomyopathy who presents for ongoing evaluation and management.  Pt reports that since a few days after last visit when she had noted increased abdominal distension she developed lower abdominal tenderness and fevers requiring ER evaluation where she was found to have a perforated sigmoid diverticulitis.  She was treated conservatively with antibiotics and pain medications she subsequent resolution of her symptoms.  Patient relates that from a cardiac standpoint she has continued to feel fairly well.  She denies any chest pain or pressure, sob, orthopnea, pnd, palpitations or syncope/presyncope.  She denies any sob at rest but reports some mild lea when walking up steep incline but she does not feel this has worsened from her baseline.  She also denies any worsening of her mild, dependent pedal edema.  She reports compliance with her medications.      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     Familial cardiomyopathy (H)      GERD without esophagitis      History of transfusion      Hypertension      Injury of left shoulder, initial encounter 01/12/2017     Morbid obesity (H)      Nontraumatic rupture of quadriceps tendon, left 06/21/2018     KATIA (obstructive sleep apnea)- moderate-severe (AHI 29) 8/26/2021     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     no longer an issue     Peroneus longus tendinitis  2015     Plantar fasciitis 2014     PONV (postoperative nausea and vomiting)      Rotator cuff injury 2017     Sprain of other ligament of left ankle, initial encounter 2017     Status post left knee replacement 2018     TB lung, latent     negative quantiferon gold test  12       Past Surgical History:   Procedure Laterality Date     ARTHROPLASTY REVISION KNEE Left 2019    Procedure: REVISION, LEFT TOTAL KNEE  ARTHROPLASTY;  Surgeon: Dev Rocha MD;  Location: [facility] OR;  Service: Orthopedics     ARTHROPLASTY REVISION KNEE Right 2020    Procedure: RIGHT REVISION TOTAL KNEE ARTHROPLASTY;  Surgeon: Dev Rocha MD;  Location: [facility] OR;  Service: Orthopedics     BREAST SURGERY      Breast Reduction     C EXCISE EXCESS SKIN TISSUE,ABDOMEN        SECTION  1989      SECTION  10/1985     COLONOSCOPY WITH CO2 INSUFFLATION N/A 2021    Procedure: COLONOSCOPY, WITH CO2 INSUFFLATION;  Surgeon: Angela Harrington DO;  Location: [facility] OR     COLONOSCOPY WITH CO2 INSUFFLATION N/A 2022    Procedure: COLONOSCOPY, WITH CO2 INSUFFLATION;  Surgeon: Nando Whitlock MD;  Location: [facility] OR     CRANIECTOMY Right 2021    Procedure: RIGHT MIDDLE FOSSA APPROACH, CSF LEAK REPAIR;  Surgeon: Jocelyn Noe MD;  Location: [facility] OR     CRANIOTOMY MIDDLE FOSSA, EXCISE ACOUSTIC NEUROMA, COMBINED N/A 2021    Procedure: Right CRANIOTOMY, MIDDLE FOSSA APPROACH, FOR REPAIR OF ENCEPHALOCELE;  Surgeon: Jocelyne Harrell MD;  Location: [facility] OR     CYSTOURETHROSCOPY N/A 2020    Procedure: CYSTOSCOPY;  Surgeon: Cherelle Willams MD;  Location: [facility] OR;  Service: Gynecology     GYN SURGERY N/A 2020    Procedure: MIDURETHRAL SLING, CYSTOSCOPY;  Surgeon: Cherelle Willams MD;  Location: [facility] OR;  Service: Gynecology     HYSTERECTOMY TOTAL ABDOMINAL  2006    for fibroids;  reports having blood transfusion after surgery     INSERT DRAIN LUMBAR N/A 2021    Procedure: Insert drain lumbar;  Surgeon: Jocelyn Noe MD;  Location: [facility]     ORTHOPEDIC SURGERY      Right Knee ACL repair     THYROIDECTOMY  2013    Procedure: THYROIDECTOMY;  LEFT THYROID LOBECTOMY.  (LIGASURE, RECURRENT LARYNGEAL NERVE MONITOR) ;  Surgeon: Uriah Camargo MD;  Location: [facility]     THYROIDECTOMY       TOTAL KNEE ARTHROPLASTY Left 10/03/2017     TOTAL KNEE ARTHROPLASTY Right 2019    Procedure: RIGHT TOTAL KNEE ARTHROPLASTY;  Surgeon: Dev Rocha MD;  Location: [facility];  Service: Orthopedics     TUBAL LIGATION       ZZC EXCIS UTERINE FIBROID,ABD APPRCH         Family History   Problem Relation Age of Onset     Hypertension Father         dec     Diabetes Father         dec     Heart Disease Father         dec     Alcohol/Drug Father      Cardiovascular Father      Heart Disease Mother         dec     Alcohol/Drug Mother      Cardiovascular Mother      Heart Disease Daughter         Cardiomyopathy     Cardiovascular Daughter         cardiomyopathy     Colon Cancer Sister      Cardiovascular Son         cardiomyopathy     Diabetes Paternal Grandmother         dec     Hypertension Paternal Grandmother         dec     Cerebrovascular Disease Paternal Grandmother         dec     Cancer Sister         Lupus     Cardiovascular Sister         cardiomyopathy     Heart Disease Sister         heart failure, and kidney failure   Her maternal grandmother  in her 30's from a brain aneurysm. Marleen's father  at 55 years after a massive heart attack and CABG procedure. Marleen's paternal grandmother's mother  suddenly in her 70's while sitting at a bus stop.   Father and mother both had heart failure and heart disease. Mother  suddenly at 39 yo  Sister had heart failure/DCM and had a heart transplant  Both son and daughter have  "cardiomyopathy diagnoses  Half sister on mother's side does NOT have cardiomyopathy/HF      Social History     Tobacco Use     Smoking status: Never Smoker     Smokeless tobacco: Never Used   Substance Use Topics     Alcohol use: Yes     Comment: rare, twice a year, she has a drink little wine 2 days ago         Current Outpatient Medications   Medication Sig     Cholecalciferol (VITAMIN D) 2000 UNIT tablet Take 5,000 Units by mouth as needed Reported on 3/8/2017     FLAXSEED, LINSEED, PO Take 1 capsule by mouth daily      fluticasone (FLONASE) 50 MCG/ACT nasal spray Spray 2 sprays into both nostrils At Bedtime     furosemide (LASIX) 20 MG tablet Take two tabs daily as needed     loratadine (CLARITIN) 10 MG tablet Take 1 tablet (10 mg) by mouth daily     metoprolol succinate ER (TOPROL-XL) 50 MG 24 hr tablet Take 1 tablet (50 mg) by mouth daily     progesterone (PROMETRIUM) 100 MG capsule Take 100 mg by mouth At Bedtime      sacubitril-valsartan (ENTRESTO) 49-51 MG per tablet Take 1 tablet by mouth 2 times daily     spironolactone (ALDACTONE) 25 MG tablet Take 2 tablets (50 mg) by mouth daily     amitriptyline (ELAVIL) 25 MG tablet TAKE 1 TABLET(25 MG) BY MOUTH AT BEDTIME (Patient not taking: Reported on 6/1/2022)     bisacodyl (DULCOLAX) 5 MG EC tablet Take 2 tablets (10 mg) by mouth See Admin Instructions Refer to \"Two-Day Golytely (Colyte, Nulytely)\" Instructions for your Colonoscopy handout. (Patient not taking: Reported on 6/1/2022)     famotidine (PEPCID) 40 MG tablet Take 1 tablet (40 mg) by mouth daily (Patient not taking: Reported on 6/1/2022)     magnesium citrate solution Take 296 mLs by mouth See Admin Instructions 1 - 10 ounce bottle = 296 ml,  Not red. (Patient not taking: Reported on 6/1/2022)     omeprazole (PRILOSEC) 40 MG DR capsule Take 1 capsule (40 mg) by mouth daily (Patient not taking: Reported on 6/1/2022)     ondansetron (ZOFRAN-ODT) 4 MG ODT tab Take 1 tablet (4 mg) by mouth every 8 hours " "as needed for nausea (Patient not taking: Reported on 6/1/2022)     polyethylene glycol (GOLYTELY) 236 g suspension Take 8,000 mLs by mouth See Admin Instructions Refer to \"Two-Day Golytely (Colyte, Nulytely) Instructions for your Colonoscopy\" handout. (Patient not taking: Reported on 6/1/2022)     solifenacin (VESICARE) 10 MG tablet Take 10 mg by mouth daily  (Patient not taking: Reported on 6/1/2022)     No current facility-administered medications for this visit.     Facility-Administered Medications Ordered in Other Visits   Medication     sodium chloride (PF) 0.9% PF flush 10 mL         EXAM:   /69 (BP Location: Left arm, Patient Position: Chair, Cuff Size: Adult Large)   Pulse (!) 46   Ht 1.613 m (5' 3.5\")   Wt 105.3 kg (232 lb 1.6 oz)   LMP 10/19/2008 (Approximate)   SpO2 100%   BMI 40.47 kg/m     GENERAL: Alert, oriented, NAD  NECK: No adenopathy. 2+ carotids,   HEART: RRR,+ S1 and S2, no murmurs or gallops.  JVP 7-8 cm without HJR  LUNGS:  Clear to auscultation bilaterally  ABDOMEN: Soft, obese, nontender, nondistended, bowel sounds present, no hepatomeagly appreciated although exam limited by body habitus  EXTREMITIES: trace lower extremity edema.  2+ bilateral peripheral pulses.     LABS  Last Comprehensive Metabolic Panel:  Sodium   Date Value Ref Range Status   05/31/2022 139 133 - 144 mmol/L Final   07/11/2021 134 133 - 144 mmol/L Final     Potassium   Date Value Ref Range Status   05/31/2022 3.8 3.4 - 5.3 mmol/L Final   07/11/2021 4.2 3.4 - 5.3 mmol/L Final     Chloride   Date Value Ref Range Status   05/31/2022 108 94 - 109 mmol/L Final   07/11/2021 102 94 - 109 mmol/L Final     Carbon Dioxide   Date Value Ref Range Status   07/11/2021 30 20 - 32 mmol/L Final     Carbon Dioxide (CO2)   Date Value Ref Range Status   05/31/2022 24 20 - 32 mmol/L Final     Anion Gap   Date Value Ref Range Status   05/31/2022 7 3 - 14 mmol/L Final   07/11/2021 2 (L) 3 - 14 mmol/L Final     Glucose   Date " Value Ref Range Status   05/31/2022 94 70 - 99 mg/dL Final   07/11/2021 87 70 - 99 mg/dL Final     Urea Nitrogen   Date Value Ref Range Status   05/31/2022 13 7 - 30 mg/dL Final   07/11/2021 14 7 - 30 mg/dL Final     Creatinine   Date Value Ref Range Status   05/31/2022 0.76 0.52 - 1.04 mg/dL Final   07/11/2021 0.79 0.52 - 1.04 mg/dL Final     GFR Estimate   Date Value Ref Range Status   05/31/2022 >90 >60 mL/min/1.73m2 Final     Comment:     Effective December 21, 2021 eGFRcr in adults is calculated using the 2021 CKD-EPI creatinine equation which includes age and gender (Aaksah et al., NEJ, DOI: 10.1056/MJAGyl3314204)   07/11/2021 83 >60 mL/min/[1.73_m2] Final     Comment:     Non  GFR Calc  Starting 12/18/2018, serum creatinine based estimated GFR (eGFR) will be   calculated using the Chronic Kidney Disease Epidemiology Collaboration   (CKD-EPI) equation.       Calcium   Date Value Ref Range Status   05/31/2022 9.1 8.5 - 10.1 mg/dL Final   07/11/2021 8.2 (L) 8.5 - 10.1 mg/dL Final       CMR Report  01-  Clinical history: 53 yo woman with a familial cardiomyopathy to have repeat CMRI.  Comparison CMR: 10/6/2016.  1.  The global systolic function is moderately decreased and the LVEF is 38%. The RV is moderately dilated while wall thickness is normal. There is moderate global hypokinesis.  2. The RV is the upper limit of normal in cavity size. The global systolic function is mildly decreased and  the RVEF is 52%.   3. Both atria are mildly dilated.  4. There is no significant valvular disease.   5. Late gadolinium enhancement imaging shows no MI, fibrosis or infiltrative disease.   CONCLUSIONS:   The global systolic function is moderately decreased and the LVEF is 38%. The RV is moderately dilated  while wall thickness is normal. There is moderate global hypokinesis.  The RV is the upper limit of normal in cavity size. The global systolic function is mildly decreased and  the RVEF is 52%.    Compared to the prior study of 2016, LV systolic performance is minimally decreased and RV systolic  performance is similar.  Both LV and RV dilation are mildly increased.             Echo 6/16/2021  Interpretation Summary  Severe left ventricular dilation is present. LVIDd 7.1cm.  Severe diffuse hypokinesis is present.  LVEF 40% based on biplane 2D tracing.  Right ventricular function, chamber size, wall motion, and thickness are  normal.  IVC diameter and respiratory changes fall into an intermediate range  suggesting an RA pressure of 8 mmHg.  This study was compared with the study from 8/12/2020.  No significant changes noted.    Echo 6/1/22  Technically difficult study despite the use of contrast. Poor acoustic  windows.  Left ventricular function is decreased. The ejection fraction is 35-40%  (moderately reduced). Moderate diffuse hypokinesis is present. Grade II or  moderate diastolic dysfunction. Diastolic Doppler findings (E/E' ratio and/or  other parameters) suggest left ventricular filling pressures are increased.  LVEDD 7.1 cm.  The RV is not clearly visualized but the size and function are probably  normal.  No significant valvular abnormalities.  IVC diameter <2.1 cm collapsing >50% with sniff suggests a normal RA pressure  of 3 mmHg.  This study was compared with the study from 06/16/2021. No significant changes  in the LV size or function upon direct visual comparison, although the poor  acoustic windows on today's examination preclude a detailed comparison.        Assessment and Plan:  56 year old female with a history of familial cardiomyopathy who presents for ongoing evaluation and managemen    # Chronic systolic heart failure secondary to familial cardiomyopathy.    LVEF 35-40%  Stage B, NYHA Class IIb  MVO2 reduced on cardiopulmonary stress test in 2020 however patient did not meet anerobic threshold therefore MVO2 not a accurate measurement of cardiac exercise capacity.  VE/VCO2 slope normal  and good BP response thus no evidence of significant cardiac limitation to exercise.    ACEI/ARB/ARNI: yes, continue entresto 49-51 mg BID  BB: Yes, continue metoprolol XL 50mg daily  Aldosterone antagonist - Continue spironolactone 50mg daily   Today's echo confirms reports LVEF 35-40% with no signficant change from prior.  Given that LVEF report is slightly lower ad LVEDD is reported to be slightly higher than prior and echo is reported as technically difficult will obtain cardiac MRI to further assess   If LVEF does appear to be <40% will attempt to further uptitrate medical therapy and if LVEF <35% will again dicuss option of ICD   % BiV pacing: N/A, narrow qrs  Fluid status euvolemic  NSAID use: advised to avoid NSAIDs  Encouraged patient to begin regular aerobic exercise aiming for at least 150 minutes of moderate physical activity or 75 minutes of vigorous physical activity - or an equal combination of both - each week. and follow low-salt, heart healthy diet.  Patient has previously met with genetic counselor regarding genetic testing.  Encouraged patient to proceed with genetic testing given family history.  Pt is aware of recommendation for all first degree relatives to undergo screening for familial cardiomyopathy preferably with cardiac MRI but at least echo.    Follow-up:  Cardiac MRI in next few weeks.  RTC in 6 months with labs and EKG.  Will be happy to see sooner if change in clinical status or new questions/concerns arise.    Cassie Kolb MD  Section Head - Advanced Heart Failure, Transplantation and Mechanical Circulatory Support  Director - Adult [facility]ovascular [facility]etics [facility]  Associate Professor of Medicine, [facility]    I spent a total of 30 minutes today with the patient personally reviewing today's laboratory results, history and examination, and discussion and counseling with the patient.

## 2022-06-01 NOTE — PATIENT INSTRUCTIONS
Cardiology Providers you saw during your visit:  Dr. Kolb     Medication changes:  1- No medication changes       Follow up:  1- cardiac MRI - Cannot be done on Monday or Thursdays and needs to be done at the [place]. (Tuesday, Wednesday, Friday okay)  2 - Please complete and send in your genetic testing.   3. Follow up with Dr Real Seaman in 6 months       Please call if you have:  1. Weight gain of more than 2 pounds in a day or 5 pounds in a week  2. Increased shortness of breath, swelling or bloating  3. Dizziness, lightheadedness   4. Any questions or concerns.      Follow the American Heart Association Diet and Lifestyle recommendations:  Limit saturated fat, trans fat, sodium, red meat, sweets and sugar-sweetened beverages. If you choose to eat red meat, compare labels and select the leanest cuts available.  Aim for at least 150 minutes of moderate physical activity or 75 minutes of vigorous physical activity - or an equal combination of both - each week.     During business hours: [phone], press option # 1 to schedule an appointment or send a message to your care team     After hours, weekends or holidays: On Call Cardiologist- [phone]   option #4 and ask to speak to the on-call Cardiologist. Inform them you are a CORE/heart failure patient at the [place].     Gwen Swenson RN BSN  Cardiology Nurse Care Coordinator     Keep up the good work!     Take Care!

## 2022-06-02 NOTE — TELEPHONE ENCOUNTER
Refused; noted in cardiology note from 6/1/22 that pt is no longer taking.    LESA Sullivan RN  [facility]

## 2022-06-03 ENCOUNTER — HOSPITAL ENCOUNTER (OUTPATIENT)
Dept: MRI IMAGING | Facility: CLINIC | Age: 57
Discharge: HOME | End: 2022-06-03
Attending: INTERNAL MEDICINE | Admitting: INTERNAL MEDICINE
Payer: COMMERCIAL

## 2022-06-03 DIAGNOSIS — I42.9 FAMILIAL CARDIOMYOPATHY (H): ICD-10-CM

## 2022-06-03 LAB
ATRIAL RATE - MUSE: 44 BPM
DIASTOLIC BLOOD PRESSURE - MUSE: NORMAL MMHG
INTERPRETATION ECG - MUSE: NORMAL
P AXIS - MUSE: 17 DEGREES
PR INTERVAL - MUSE: 166 MS
QRS DURATION - MUSE: 112 MS
QT - MUSE: 462 MS
QTC - MUSE: 395 MS
R AXIS - MUSE: -16 DEGREES
SYSTOLIC BLOOD PRESSURE - MUSE: NORMAL MMHG
T AXIS - MUSE: 6 DEGREES
VENTRICULAR RATE- MUSE: 44 BPM

## 2022-06-03 PROCEDURE — G1004 CDSM NDSC: HCPCS | Performed by: INTERNAL MEDICINE

## 2022-06-03 PROCEDURE — G1004 CDSM NDSC: HCPCS

## 2022-06-03 PROCEDURE — 75561 CARDIAC MRI FOR MORPH W/DYE: CPT | Mod: ME

## 2022-06-03 PROCEDURE — 255N000002 HC RX 255 OP 636: Performed by: INTERNAL MEDICINE

## 2022-06-03 PROCEDURE — A9585 GADOBUTROL INJECTION: HCPCS | Performed by: INTERNAL MEDICINE

## 2022-06-03 PROCEDURE — 75561 CARDIAC MRI FOR MORPH W/DYE: CPT | Mod: 26 | Performed by: INTERNAL MEDICINE

## 2022-06-03 RX ORDER — GADOBUTROL 604.72 MG/ML
7.5 INJECTION INTRAVENOUS ONCE
Status: COMPLETED | OUTPATIENT
Start: 2022-06-03 | End: 2022-06-03

## 2022-06-03 RX ADMIN — GADOBUTROL 6.5 ML: 604.72 INJECTION INTRAVENOUS at 09:14

## 2022-06-03 RX ADMIN — GADOBUTROL 7.5 ML: 604.72 INJECTION INTRAVENOUS at 09:14

## 2022-06-17 ENCOUNTER — PATIENT OUTREACH (OUTPATIENT)
Dept: CARDIOLOGY | Facility: CLINIC | Age: 57
End: 2022-06-17
Payer: MEDICARE

## 2022-06-17 DIAGNOSIS — I50.22 CHRONIC SYSTOLIC CONGESTIVE HEART FAILURE (H): Primary | ICD-10-CM

## 2022-06-17 RX ORDER — SACUBITRIL AND VALSARTAN 97; 103 MG/1; MG/1
1 TABLET, FILM COATED ORAL 2 TIMES DAILY
Qty: 180 TABLET | Refills: 1 | Status: SHIPPED | OUTPATIENT
Start: 2022-06-17 | End: 2022-10-21

## 2022-06-17 NOTE — TELEPHONE ENCOUNTER
Date: 6/17/2022    Time of Call: 12:04 PM     Diagnosis:  Heart failure     [ VORB ] Ordering provider: Cassie Ruffin MD    Order: increase entresto 49-51mg to 1.5 tablets BID for 2 weeks. After 2 weeks, then increase to 2 tablets BID (equivalent of 97-103mg tablets).      Order received by: Gwen Swenson RN     Follow-up/additional notes: order placed. Called Marleen. Reviewed cMRI results. Reviewed above instructions. Marleen states understanding. Reviewed different strength entresto tablets and when to increase. She stated understanding. Scheduled to see dr ruffin on 7/20.

## 2022-07-05 ENCOUNTER — PATIENT OUTREACH (OUTPATIENT)
Dept: CARDIOLOGY | Facility: CLINIC | Age: 57
End: 2022-07-05

## 2022-07-05 DIAGNOSIS — I50.22 CHRONIC SYSTOLIC CONGESTIVE HEART FAILURE (H): Primary | ICD-10-CM

## 2022-07-05 NOTE — TELEPHONE ENCOUNTER
Called Bora to check in after increasing entresto. She states it is going okay, reports she does sometimes get lightheaded. Reports this happens with positional changes when she gets up too fast. She is still going to work and able to complete her work as normal. She confirms she is on full dose entresto, 97-103mg BID. Reports she started taking the full dose tablets once the pharmacy got them for her. She reports no concerns at this time. Confirmed follow up for 7/20.

## 2022-07-15 ENCOUNTER — TRANSFERRED RECORDS (OUTPATIENT)
Dept: MULTI SPECIALTY CLINIC | Facility: CLINIC | Age: 57
End: 2022-07-15

## 2022-07-15 LAB — PAP SMEAR - HIM PATIENT REPORTED: NORMAL

## 2022-07-20 ENCOUNTER — OFFICE VISIT (OUTPATIENT)
Dept: CARDIOLOGY | Facility: CLINIC | Age: 57
End: 2022-07-20
Attending: INTERNAL MEDICINE
Payer: MEDICARE

## 2022-07-20 VITALS
OXYGEN SATURATION: 99 % | BODY MASS INDEX: 39.21 KG/M2 | WEIGHT: 229.7 LBS | DIASTOLIC BLOOD PRESSURE: 71 MMHG | HEIGHT: 64 IN | HEART RATE: 50 BPM | SYSTOLIC BLOOD PRESSURE: 119 MMHG

## 2022-07-20 DIAGNOSIS — I50.22 CHRONIC SYSTOLIC CONGESTIVE HEART FAILURE (H): Primary | ICD-10-CM

## 2022-07-20 PROCEDURE — 99214 OFFICE O/P EST MOD 30 MIN: CPT | Performed by: INTERNAL MEDICINE

## 2022-07-20 PROCEDURE — G0463 HOSPITAL OUTPT CLINIC VISIT: HCPCS

## 2022-07-20 ASSESSMENT — PAIN SCALES - GENERAL: PAINLEVEL: NO PAIN (0)

## 2022-07-20 NOTE — NURSING NOTE
Diet: Patient instructed regarding a heart failure healthy diet, including discussion of reduced fat and 2000 mg daily sodium restriction, daily weights, medication purpose and compliance, fluid restrictions and resources for patient and family to access for assistance with heart failure management.       Labs: Patient was given results of the laboratory testing obtained today and patient was instructed about when to return for the next laboratory testing. BMP on Friday    Med Reconcile: Reviewed and verified all current medications with the patient. The updated medication list was printed and given to the patient. NO CHANGES    Return Appointment: Patient given instructions regarding scheduling next clinic visit. RTC to see Dr Seaman in December with labs and echo prior    Patient stated she understood all health information given and agreed to call with further questions or concerns.     Gwen Swenson RN

## 2022-07-20 NOTE — NURSING NOTE
Chief Complaint   Patient presents with     Follow Up     57 year old female presents with history of Familial cardiomyopathy, systolic HF, EF 35% for follow up        Vitals were taken and medications were reconciled.   León Vick, EMT  2:09 PM

## 2022-07-20 NOTE — PATIENT INSTRUCTIONS
Cardiology Providers you saw during your visit:  Dr. Kolb     Medication changes:  1- No medication changes        Follow up:  1- Labs on Friday  2 - Follow up with Dr Seaman as scheduled, with labs and echo prior         Please call if you have:  1. Weight gain of more than 2 pounds in a day or 5 pounds in a week  2. Increased shortness of breath, swelling or bloating  3. Dizziness, lightheadedness   4. Any questions or concerns.      Follow the American Heart Association Diet and Lifestyle recommendations:  Limit saturated fat, trans fat, sodium, red meat, sweets and sugar-sweetened beverages. If you choose to eat red meat, compare labels and select the leanest cuts available.  Aim for at least 150 minutes of moderate physical activity or 75 minutes of vigorous physical activity - or an equal combination of both - each week.     During business hours: [phone], press option # 1 to schedule an appointment or send a message to your care team     After hours, weekends or holidays: On Call Cardiologist- [phone]   option #4 and ask to speak to the on-call Cardiologist. Inform them you are a CORE/heart failure patient at the [place].     Gwen Swenson, RN BSN  Cardiology Nurse Care Coordinator     Keep up the good work!     Take Care!

## 2022-07-20 NOTE — PROGRESS NOTES
Advanced [facility] Failure Clinic Follow up:    HPI:  57 year old female with a history of familial cardiomyopathy who presents for ongoing evaluation and management.   Patient relates that since last clinic visit from a cardiac standpoint she has continued to feel  well.  She denies any chest pain or pressure, sob, orthopnea, pnd, palpitations or syncope/presyncope.  She denies any sob at rest but reports some mild lea when walking up steep incline but feels that her lea and exercise capacity have improved somewhat since increasing her entresto.  She also feels that her mild, dependent pedal edema also improved with the increase in entresto.  She denies any problems with her medications and reports compliance with her medications.      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     Familial cardiomyopathy (H)      GERD without esophagitis      History of transfusion      Hypertension      Injury of left shoulder, initial encounter 01/12/2017     Morbid obesity (H)      Nontraumatic rupture of quadriceps tendon, left 06/21/2018     KATIA (obstructive sleep apnea)- moderate-severe (AHI 29) 8/26/2021     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     no longer an issue     Peroneus longus tendinitis 01/02/2015     Plantar fasciitis 11/11/2014     PONV (postoperative nausea and vomiting)      Rotator cuff injury 01/17/2017     Sprain of other ligament of left ankle, initial encounter 01/12/2017     Status post left knee replacement  2018     TB lung, latent     negative quantiferon gold test  12       Past Surgical History:   Procedure Laterality Date     ARTHROPLASTY REVISION KNEE Left 2019    Procedure: REVISION, LEFT TOTAL KNEE  ARTHROPLASTY;  Surgeon: Dev Rocha MD;  Location: [facility] OR;  Service: Orthopedics     ARTHROPLASTY REVISION KNEE Right 2020    Procedure: RIGHT REVISION TOTAL KNEE ARTHROPLASTY;  Surgeon: Dev Rocha MD;  Location: [facility] OR;  Service: Orthopedics     BREAST SURGERY      Breast Reduction     C EXCISE EXCESS SKIN TISSUE,ABDOMEN        SECTION  1989      SECTION  10/1985     COLONOSCOPY WITH CO2 INSUFFLATION N/A 2021    Procedure: COLONOSCOPY, WITH CO2 INSUFFLATION;  Surgeon: Angela Harrington DO;  Location: [facility] OR     COLONOSCOPY WITH CO2 INSUFFLATION N/A 2022    Procedure: COLONOSCOPY, WITH CO2 INSUFFLATION;  Surgeon: Nando Whitlock MD;  Location: [facility] OR     CRANIECTOMY Right 2021    Procedure: RIGHT MIDDLE FOSSA APPROACH, CSF LEAK REPAIR;  Surgeon: Jocelyn Noe MD;  Location: [facility] OR     CRANIOTOMY MIDDLE FOSSA, EXCISE ACOUSTIC NEUROMA, COMBINED N/A 2021    Procedure: Right CRANIOTOMY, MIDDLE FOSSA APPROACH, FOR REPAIR OF ENCEPHALOCELE;  Surgeon: Jocelyne Harrell MD;  Location: [facility] OR     CYSTOURETHROSCOPY N/A 2020    Procedure: CYSTOSCOPY;  Surgeon: Cherelle Willams MD;  Location: [facility] OR;  Service: Gynecology     GYN SURGERY N/A 2020    Procedure: MIDURETHRAL SLING, CYSTOSCOPY;  Surgeon: Cherelle Willams MD;  Location: [facility] OR;  Service: Gynecology     HYSTERECTOMY TOTAL ABDOMINAL      for fibroids; reports having blood transfusion after surgery     INSERT DRAIN LUMBAR N/A 2021    Procedure: Insert drain lumbar;  Surgeon: Jocelyn Noe MD;  Location: [facility] OR     ORTHOPEDIC SURGERY      Right Knee ACL repair      THYROIDECTOMY  2013    Procedure: THYROIDECTOMY;  LEFT THYROID LOBECTOMY.  (LIGASURE, RECURRENT LARYNGEAL NERVE MONITOR) ;  Surgeon: Uriah Camargo MD;  Location: [facility]     THYROIDECTOMY       TOTAL KNEE ARTHROPLASTY Left 10/03/2017     TOTAL KNEE ARTHROPLASTY Right 2019    Procedure: RIGHT TOTAL KNEE ARTHROPLASTY;  Surgeon: Dev Rocha MD;  Location: [facility] OR;  Service: Orthopedics     TUBAL LIGATION       ZZC EXCIS UTERINE FIBROID,ABD APPRCH         Family History   Problem Relation Age of Onset     Hypertension Father         dec     Diabetes Father         dec     Heart Disease Father         dec     Alcohol/Drug Father      Cardiovascular Father      Heart Disease Mother         dec     Alcohol/Drug Mother      Cardiovascular Mother      Heart Disease Daughter         Cardiomyopathy     Cardiovascular Daughter         cardiomyopathy     Colon Cancer Sister      Cardiovascular Son         cardiomyopathy     Diabetes Paternal Grandmother         dec     Hypertension Paternal Grandmother         dec     Cerebrovascular Disease Paternal Grandmother         dec     Cancer Sister         Lupus     Cardiovascular Sister         cardiomyopathy     Heart Disease Sister         heart failure, and kidney failure   Her maternal grandmother  in her 30's from a brain aneurysm. Marleen's father  at 55 years after a massive heart attack and CABG procedure. Marleen's paternal grandmother's mother  suddenly in her 70's while sitting at a bus stop.   Father and mother both had heart failure and heart disease. Mother  suddenly at 39 yo  Sister had heart failure/DCM and had a heart transplant  Both son and daughter have cardiomyopathy diagnoses  Half sister on mother's side does NOT have cardiomyopathy/HF      Social History     Tobacco Use     Smoking status: Never Smoker     Smokeless tobacco: Never Used   Substance Use Topics     Alcohol use: Yes     Comment:  "rare, twice a year, she has a drink little wine 2 days ago         Current Outpatient Medications   Medication Sig     Cholecalciferol (VITAMIN D) 2000 UNIT tablet Take 5,000 Units by mouth as needed Reported on 3/8/2017     FLAXSEED, LINSEED, PO Take 1 capsule by mouth daily      fluticasone (FLONASE) 50 MCG/ACT nasal spray Spray 2 sprays into both nostrils At Bedtime     furosemide (LASIX) 20 MG tablet Take two tabs daily as needed     loratadine (CLARITIN) 10 MG tablet Take 1 tablet (10 mg) by mouth daily     metoprolol succinate ER (TOPROL-XL) 50 MG 24 hr tablet Take 1 tablet (50 mg) by mouth daily     progesterone (PROMETRIUM) 100 MG capsule Take 100 mg by mouth At Bedtime      sacubitril-valsartan (ENTRESTO)  MG per tablet Take 1 tablet by mouth 2 times daily     spironolactone (ALDACTONE) 25 MG tablet Take 2 tablets (50 mg) by mouth daily     progesterone (PROMETRIUM) 100 MG capsule TAKE 1 CAPSULE BY MOUTH EVERY NIGHT AT BEDTIME     No current facility-administered medications for this visit.     Facility-Administered Medications Ordered in Other Visits   Medication     sodium chloride (PF) 0.9% PF flush 10 mL         EXAM:   /71 (BP Location: Right arm, Patient Position: Sitting, Cuff Size: Adult Large)   Pulse 50   Ht 1.623 m (5' 3.9\")   Wt 104.2 kg (229 lb 11.2 oz)   LMP 10/19/2008 (Approximate)   SpO2 99%   BMI 39.55 kg/m     GENERAL: Alert, oriented, NAD  NECK: No adenopathy. 2+ carotids,   HEART: RRR,+ S1 and S2, no murmurs or gallops.  JVP 7-8 cm without HJR  LUNGS:  Clear to auscultation bilaterally  ABDOMEN: Soft, obese, nontender, nondistended, bowel sounds present, no hepatomeagly appreciated although exam limited by body habitus  EXTREMITIES: trace lower extremity edema.  Warm, well perfused     LABS   Latest Reference Range & Units 07/22/22 14:19   Sodium 133 - 144 mmol/L 138   Potassium 3.4 - 5.3 mmol/L 4.2   Chloride 94 - 109 mmol/L 107   Carbon Dioxide 20 - 32 mmol/L 25 "   Urea Nitrogen 7 - 30 mg/dL 21   Creatinine 0.52 - 1.04 mg/dL 1.09 (H)   GFR Estimate >60 mL/min/1.73m2 59 (L)   Calcium 8.5 - 10.1 mg/dL 8.9   Anion Gap 3 - 14 mmol/L 6   Glucose 70 - 99 mg/dL 102 (H)       CMR Report  01-  Clinical history: 53 yo woman with a familial cardiomyopathy to have repeat CMRI.  Comparison CMR: 10/6/2016.  1.  The global systolic function is moderately decreased and the LVEF is 38%. The RV is moderately dilated while wall thickness is normal. There is moderate global hypokinesis.  2. The RV is the upper limit of normal in cavity size. The global systolic function is mildly decreased and  the RVEF is 52%.   3. Both atria are mildly dilated.  4. There is no significant valvular disease.   5. Late gadolinium enhancement imaging shows no MI, fibrosis or infiltrative disease.   CONCLUSIONS:   The global systolic function is moderately decreased and the LVEF is 38%. The RV is moderately dilated  while wall thickness is normal. There is moderate global hypokinesis.  The RV is the upper limit of normal in cavity size. The global systolic function is mildly decreased and  the RVEF is 52%.   Compared to the prior study of 2016, LV systolic performance is minimally decreased and RV systolic  performance is similar.  Both LV and RV dilation are mildly increased.             Echo 6/16/2021  Interpretation Summary  Severe left ventricular dilation is present. LVIDd 7.1cm.  Severe diffuse hypokinesis is present.  LVEF 40% based on biplane 2D tracing.  Right ventricular function, chamber size, wall motion, and thickness are  normal.  IVC diameter and respiratory changes fall into an intermediate range  suggesting an RA pressure of 8 mmHg.  This study was compared with the study from 8/12/2020.  No significant changes noted.    Echo 6/1/22  Technically difficult study despite the use of contrast. Poor acoustic  windows.  Left ventricular function is decreased. The ejection fraction is  35-40%  (moderately reduced). Moderate diffuse hypokinesis is present. Grade II or  moderate diastolic dysfunction. Diastolic Doppler findings (E/E' ratio and/or  other parameters) suggest left ventricular filling pressures are increased.  LVEDD 7.1 cm.  The RV is not clearly visualized but the size and function are probably  normal.  No significant valvular abnormalities.  IVC diameter <2.1 cm collapsing >50% with sniff suggests a normal RA pressure  of 3 mmHg.  This study was compared with the study from 06/16/2021. No significant changes  in the LV size or function upon direct visual comparison, although the poor  acoustic windows on today's examination preclude a detailed comparison.    EKG 6/1/22      CMR Report 6-  56r-old woman with familial cardiomyopathy.  Comparison CMR: 01/03/2020, 10/06/2016  1. The LV is mild-to-moderately dilated in cavity size. The wall thickness is normal. The global systolic function is severely decreased. The LVEF is 33%. There is severe global hypokinesis.  2. The RV is normal in cavity size. The global systolic function is mildly decreased. The RVEF is 53%.   3. Both atria are normal in size.  4. There is no significant valvular disease.   5. Late gadolinium enhancement imaging shows no MI, fibrosis or infiltrative disease.   6. There is no pericardial effusion or thickening.  7. There is no intracardiac thrombus.  CONCLUSIONS: Severe, non-ischemic dilated cardiomyopathy, most likely of genetic cause. LVEF of 33% and RVEF of 53% with no LGE. When directly compared to the prior CMR, the LV and RV size and function have not  significantly changed.      Assessment and Plan:  57 year old female with a history of familial cardiomyopathy who presents for ongoing evaluation and managemen    # Chronic systolic heart failure secondary to familial cardiomyopathy.    LVEF 35-40%  Stage B, NYHA Class IIb  MVO2 reduced on cardiopulmonary stress test in 2020 however patient did not meet  anerobic threshold therefore MVO2 not a accurate measurement of cardiac exercise capacity.  VE/VCO2 slope normal and good BP response thus no evidence of significant cardiac limitation to exercise.    ACEI/ARB/ARNI: yes, continue entresto 97/103 mg BID  BB: Yes, continue metoprolol XL 50mg daily  Aldosterone antagonist - Continue spironolactone 50mg daily   Recent cardiac MRI  reports LVEF 33% with no signficant change in LV size or function from prior MRIs in 2020 and 2016.  After this result obtained, patient was agreeable to increasing entresto to 97/103mg bid which she has tolerated well.  Discussed with patient the recommendation of beginning SGLT2i however patient does not want to being at present.  Over next few months will work to further uptitrate GDMT and if LVEF <35% will again dicuss option of ICD.  Did discuss this today and patient she remains uninterested in ICD at present but if LV function does not improve with titration of medical therapy she may reconsider.  % BiV pacing: N/A, narrow qrs  Fluid status euvolemic  NSAID use: advised to avoid NSAIDs  Encouraged patient to begin regular aerobic exercise aiming for at least 150 minutes of moderate physical activity or 75 minutes of vigorous physical activity - or an equal combination of both - each week. and follow low-salt, heart healthy diet.  Patient has previously met with genetic counselor regarding genetic testing.  Encouraged patient to proceed with genetic testing given family history.  Pt is aware of recommendation for all first degree relatives to undergo screening for familial cardiomyopathy preferably with cardiac MRI but at least echo.    Follow-up:  Given my upcoming departure patient will follow-up with Dr. Real Seaman as previously scheduled with echo and labs prior.    Cassie Kolb MD  Section Head - Advanced Heart Failure, Transplantation and Mechanical Circulatory Support  Director - Adult Congenital and Cardiovascular Genetics  Center  Associate Professor of Medicine, [facility]    I spent a total of 30 minutes today with the patient personally reviewing recent cardiac testing/laboratory results, history and examination, and discussion and counseling with the patient.

## 2022-07-20 NOTE — LETTER
7/20/2022      RE: Marleen Mcfadden  [address]  [facility] [address]       Dear Colleague,    Thank you for the opportunity to participate in the care of your patient, Marleen Mcfadden, at the [facility] [place] at [facility]. Please see a copy of my visit note below.    Advanced Heart Failure Clinic Follow up:    HPI:  57 year old female with a history of familial cardiomyopathy who presents for ongoing evaluation and management.   Patient relates that since last clinic visit from a cardiac standpoint she has continued to feel  well.  She denies any chest pain or pressure, sob, orthopnea, pnd, palpitations or syncope/presyncope.  She denies any sob at rest but reports some mild lea when walking up steep incline but feels that her lea and exercise capacity have improved somewhat since increasing her entresto.  She also feels that her mild, dependent pedal edema also improved with the increase in entresto.  She denies any problems with her medications and reports compliance with her medications.      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     Familial cardiomyopathy (H)      GERD without esophagitis      History of transfusion      Hypertension      Injury of left shoulder, initial encounter 01/12/2017     Morbid obesity (H)      Nontraumatic rupture of quadriceps tendon, left 06/21/2018     KATIA (obstructive sleep apnea)- moderate-severe (AHI 29) 8/26/2021     Other acute  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     no longer an issue     Peroneus longus tendinitis 2015     Plantar fasciitis 2014     PONV (postoperative nausea and vomiting)      Rotator cuff injury 2017     Sprain of other ligament of left ankle, initial encounter 2017     Status post left knee replacement 2018     TB lung, latent     negative quantiferon gold test  12       Past Surgical History:   Procedure Laterality Date     ARTHROPLASTY REVISION KNEE Left 2019    Procedure: REVISION, LEFT TOTAL KNEE  ARTHROPLASTY;  Surgeon: Dev Rocha MD;  Location: [facility] OR;  Service: Orthopedics     ARTHROPLASTY REVISION KNEE Right 2020    Procedure: RIGHT REVISION TOTAL KNEE ARTHROPLASTY;  Surgeon: Dev Rocha MD;  Location: [facility] OR;  Service: Orthopedics     BREAST SURGERY      Breast Reduction     C EXCISE EXCESS SKIN TISSUE,ABDOMEN        SECTION  1989      SECTION  10/1985     COLONOSCOPY WITH CO2 INSUFFLATION N/A 2021    Procedure: COLONOSCOPY, WITH CO2 INSUFFLATION;  Surgeon: Angela Harrington DO;  Location: [facility] OR     COLONOSCOPY WITH CO2 INSUFFLATION N/A 2022    Procedure: COLONOSCOPY, WITH CO2 INSUFFLATION;  Surgeon: Nando Whitlock MD;  Location: [facility] OR     CRANIECTOMY Right 2021    Procedure: RIGHT MIDDLE FOSSA APPROACH, CSF LEAK REPAIR;  Surgeon: Jocelyn Noe MD;  Location: [facility] OR     CRANIOTOMY MIDDLE FOSSA, EXCISE ACOUSTIC NEUROMA, COMBINED N/A 2021    Procedure: Right CRANIOTOMY, MIDDLE FOSSA APPROACH, FOR REPAIR OF ENCEPHALOCELE;  Surgeon: Jocelyne Harrell MD;  Location: [facility] OR     CYSTOURETHROSCOPY N/A 2020    Procedure: CYSTOSCOPY;  Surgeon: Cherelle Willams MD;  Location: [facility] OR;  Service: Gynecology     GYN SURGERY N/A 2020    Procedure: MIDURETHRAL SLING, CYSTOSCOPY;  Surgeon: Cherelle Willams  Virgie Hoffman MD;  Location: [facility] OR;  Service: Gynecology     HYSTERECTOMY TOTAL ABDOMINAL      for fibroids; reports having blood transfusion after surgery     INSERT DRAIN LUMBAR N/A 2021    Procedure: Insert drain lumbar;  Surgeon: Jocelyn Noe MD;  Location:  OR     ORTHOPEDIC SURGERY      Right Knee ACL repair     THYROIDECTOMY  2013    Procedure: THYROIDECTOMY;  LEFT THYROID LOBECTOMY.  (LIGASURE, RECURRENT LARYNGEAL NERVE MONITOR) ;  Surgeon: Uriah Camargo MD;  Location: [facility]     THYROIDECTOMY       TOTAL KNEE ARTHROPLASTY Left 10/03/2017     TOTAL KNEE ARTHROPLASTY Right 2019    Procedure: RIGHT TOTAL KNEE ARTHROPLASTY;  Surgeon: Dev Rocha MD;  Location: [facility] OR;  Service: Orthopedics     TUBAL LIGATION       ZZC EXCIS UTERINE FIBROID,ABD APPRCH         Family History   Problem Relation Age of Onset     Hypertension Father         dec     Diabetes Father         dec     Heart Disease Father         dec     Alcohol/Drug Father      Cardiovascular Father      Heart Disease Mother         dec     Alcohol/Drug Mother      Cardiovascular Mother      Heart Disease Daughter         Cardiomyopathy     Cardiovascular Daughter         cardiomyopathy     Colon Cancer Sister      Cardiovascular Son         cardiomyopathy     Diabetes Paternal Grandmother         dec     Hypertension Paternal Grandmother         dec     Cerebrovascular Disease Paternal Grandmother         dec     Cancer Sister         Lupus     Cardiovascular Sister         cardiomyopathy     Heart Disease Sister         heart failure, and kidney failure   Her maternal grandmother  in her 30's from a brain aneurysm. Marleen's father  at 55 years after a massive heart attack and CABG procedure. Marleen's paternal grandmother's mother  suddenly in her 70's while sitting at a bus stop.   Father and mother both had heart failure and heart disease.  "Mother  suddenly at 39 yo  Sister had heart failure/DCM and had a heart transplant  Both son and daughter have cardiomyopathy diagnoses  Half sister on mother's side does NOT have cardiomyopathy/HF      Social History     Tobacco Use     Smoking status: Never Smoker     Smokeless tobacco: Never Used   Substance Use Topics     Alcohol use: Yes     Comment: rare, twice a year, she has a drink little wine 2 days ago         Current Outpatient Medications   Medication Sig     Cholecalciferol (VITAMIN D) 2000 UNIT tablet Take 5,000 Units by mouth as needed Reported on 3/8/2017     FLAXSEED, LINSEED, PO Take 1 capsule by mouth daily      fluticasone (FLONASE) 50 MCG/ACT nasal spray Spray 2 sprays into both nostrils At Bedtime     furosemide (LASIX) 20 MG tablet Take two tabs daily as needed     loratadine (CLARITIN) 10 MG tablet Take 1 tablet (10 mg) by mouth daily     metoprolol succinate ER (TOPROL-XL) 50 MG 24 hr tablet Take 1 tablet (50 mg) by mouth daily     progesterone (PROMETRIUM) 100 MG capsule Take 100 mg by mouth At Bedtime      sacubitril-valsartan (ENTRESTO)  MG per tablet Take 1 tablet by mouth 2 times daily     spironolactone (ALDACTONE) 25 MG tablet Take 2 tablets (50 mg) by mouth daily     progesterone (PROMETRIUM) 100 MG capsule TAKE 1 CAPSULE BY MOUTH EVERY NIGHT AT BEDTIME     No current facility-administered medications for this visit.     Facility-Administered Medications Ordered in Other Visits   Medication     sodium chloride (PF) 0.9% PF flush 10 mL         EXAM:   /71 (BP Location: Right arm, Patient Position: Sitting, Cuff Size: Adult Large)   Pulse 50   Ht 1.623 m (5' 3.9\")   Wt 104.2 kg (229 lb 11.2 oz)   LMP 10/19/2008 (Approximate)   SpO2 99%   BMI 39.55 kg/m     GENERAL: Alert, oriented, NAD  NECK: No adenopathy. 2+ carotids,   HEART: RRR,+ S1 and S2, no murmurs or gallops.  JVP 7-8 cm without HJR  LUNGS:  Clear to auscultation bilaterally  ABDOMEN: Soft, obese, " nontender, nondistended, bowel sounds present, no hepatomeagly appreciated although exam limited by body habitus  EXTREMITIES: trace lower extremity edema.  Warm, well perfused     LABS   Latest Reference Range & Units 07/22/22 14:19   Sodium 133 - 144 mmol/L 138   Potassium 3.4 - 5.3 mmol/L 4.2   Chloride 94 - 109 mmol/L 107   Carbon Dioxide 20 - 32 mmol/L 25   Urea Nitrogen 7 - 30 mg/dL 21   Creatinine 0.52 - 1.04 mg/dL 1.09 (H)   GFR Estimate >60 mL/min/1.73m2 59 (L)   Calcium 8.5 - 10.1 mg/dL 8.9   Anion Gap 3 - 14 mmol/L 6   Glucose 70 - 99 mg/dL 102 (H)       CMR Report  01-  Clinical history: 53 yo woman with a familial cardiomyopathy to have repeat CMRI.  Comparison CMR: 10/6/2016.  1.  The global systolic function is moderately decreased and the LVEF is 38%. The RV is moderately dilated while wall thickness is normal. There is moderate global hypokinesis.  2. The RV is the upper limit of normal in cavity size. The global systolic function is mildly decreased and  the RVEF is 52%.   3. Both atria are mildly dilated.  4. There is no significant valvular disease.   5. Late gadolinium enhancement imaging shows no MI, fibrosis or infiltrative disease.   CONCLUSIONS:   The global systolic function is moderately decreased and the LVEF is 38%. The RV is moderately dilated  while wall thickness is normal. There is moderate global hypokinesis.  The RV is the upper limit of normal in cavity size. The global systolic function is mildly decreased and  the RVEF is 52%.   Compared to the prior study of 2016, LV systolic performance is minimally decreased and RV systolic  performance is similar.  Both LV and RV dilation are mildly increased.             Echo 6/16/2021  Interpretation Summary  Severe left ventricular dilation is present. LVIDd 7.1cm.  Severe diffuse hypokinesis is present.  LVEF 40% based on biplane 2D tracing.  Right ventricular function, chamber size, wall motion, and thickness are  normal.  IVC  diameter and respiratory changes fall into an intermediate range  suggesting an RA pressure of 8 mmHg.  This study was compared with the study from 8/12/2020.  No significant changes noted.    Echo 6/1/22  Technically difficult study despite the use of contrast. Poor acoustic  windows.  Left ventricular function is decreased. The ejection fraction is 35-40%  (moderately reduced). Moderate diffuse hypokinesis is present. Grade II or  moderate diastolic dysfunction. Diastolic Doppler findings (E/E' ratio and/or  other parameters) suggest left ventricular filling pressures are increased.  LVEDD 7.1 cm.  The RV is not clearly visualized but the size and function are probably  normal.  No significant valvular abnormalities.  IVC diameter <2.1 cm collapsing >50% with sniff suggests a normal RA pressure  of 3 mmHg.  This study was compared with the study from 06/16/2021. No significant changes  in the LV size or function upon direct visual comparison, although the poor  acoustic windows on today's examination preclude a detailed comparison.    EKG 6/1/22      CMR Report 6-  56r-old woman with familial cardiomyopathy.  Comparison CMR: 01/03/2020, 10/06/2016  1. The LV is mild-to-moderately dilated in cavity size. The wall thickness is normal. The global systolic function is severely decreased. The LVEF is 33%. There is severe global hypokinesis.  2. The RV is normal in cavity size. The global systolic function is mildly decreased. The RVEF is 53%.   3. Both atria are normal in size.  4. There is no significant valvular disease.   5. Late gadolinium enhancement imaging shows no MI, fibrosis or infiltrative disease.   6. There is no pericardial effusion or thickening.  7. There is no intracardiac thrombus.  CONCLUSIONS: Severe, non-ischemic dilated cardiomyopathy, most likely of genetic cause. LVEF of 33% and RVEF of 53% with no LGE. When directly compared to the prior CMR, the LV and RV size and function have  not  significantly changed.      Assessment and Plan:  57 year old female with a history of familial cardiomyopathy who presents for ongoing evaluation and managemen    # Chronic systolic heart failure secondary to familial cardiomyopathy.    LVEF 35-40%  Stage B, NYHA Class IIb  MVO2 reduced on cardiopulmonary stress test in 2020 however patient did not meet anerobic threshold therefore MVO2 not a accurate measurement of cardiac exercise capacity.  VE/VCO2 slope normal and good BP response thus no evidence of significant cardiac limitation to exercise.    ACEI/ARB/ARNI: yes, continue entresto 97/103 mg BID  BB: Yes, continue metoprolol XL 50mg daily  Aldosterone antagonist - Continue spironolactone 50mg daily   Recent cardiac MRI  reports LVEF 33% with no signficant change in LV size or function from prior MRIs in 2020 and 2016.  After this result obtained, patient was agreeable to increasing entresto to 97/103mg bid which she has tolerated well.  Discussed with patient the recommendation of beginning SGLT2i however patient does not want to being at present.  Over next few months will work to further uptitrate GDMT and if LVEF <35% will again dicuss option of ICD.  Did discuss this today and patient she remains uninterested in ICD at present but if LV function does not improve with titration of medical therapy she may reconsider.  % BiV pacing: N/A, narrow qrs  Fluid status euvolemic  NSAID use: advised to avoid NSAIDs  Encouraged patient to begin regular aerobic exercise aiming for at least 150 minutes of moderate physical activity or 75 minutes of vigorous physical activity - or an equal combination of both - each week. and follow low-salt, heart healthy diet.  Patient has previously met with genetic counselor regarding genetic testing.  Encouraged patient to proceed with genetic testing given family history.  Pt is aware of recommendation for all first degree relatives to undergo screening for familial  cardiomyopathy preferably with cardiac MRI but at least echo.    Follow-up:  Given my upcoming departure patient will follow-up with Dr. Real Seaman as previously scheduled with echo and labs prior.    Cassie Kolb MD  Section Head - Advanced Heart Failure, Transplantation and Mechanical Circulatory Support  Director - Adult Congenital and Cardiovascular Genetics Center  Associate Professor of Medicine, [facility]    I spent a total of 30 minutes today with the patient personally reviewing recent cardiac testing/laboratory results, history and examination, and discussion and counseling with the patient.      Please do not hesitate to contact me if you have any questions/concerns.     Sincerely,     Cassie Kolb MD

## 2022-07-22 ENCOUNTER — LAB (OUTPATIENT)
Dept: LAB | Facility: CLINIC | Age: 57
End: 2022-07-22
Payer: COMMERCIAL

## 2022-07-22 DIAGNOSIS — I50.22 CHRONIC SYSTOLIC CONGESTIVE HEART FAILURE (H): ICD-10-CM

## 2022-07-22 LAB
ANION GAP SERPL CALCULATED.3IONS-SCNC: 6 MMOL/L (ref 3–14)
BUN SERPL-MCNC: 21 MG/DL (ref 7–30)
CALCIUM SERPL-MCNC: 8.9 MG/DL (ref 8.5–10.1)
CHLORIDE BLD-SCNC: 107 MMOL/L (ref 94–109)
CO2 SERPL-SCNC: 25 MMOL/L (ref 20–32)
CREAT SERPL-MCNC: 1.09 MG/DL (ref 0.52–1.04)
GFR SERPL CREATININE-BSD FRML MDRD: 59 ML/MIN/1.73M2
GLUCOSE BLD-MCNC: 102 MG/DL (ref 70–99)
POTASSIUM BLD-SCNC: 4.2 MMOL/L (ref 3.4–5.3)
SODIUM SERPL-SCNC: 138 MMOL/L (ref 133–144)

## 2022-07-22 PROCEDURE — 36415 COLL VENOUS BLD VENIPUNCTURE: CPT

## 2022-07-22 PROCEDURE — 80048 BASIC METABOLIC PNL TOTAL CA: CPT

## 2022-08-02 ENCOUNTER — OFFICE VISIT (OUTPATIENT)
Dept: URGENT CARE | Facility: URGENT CARE | Age: 57
End: 2022-08-02
Payer: COMMERCIAL

## 2022-08-02 VITALS
OXYGEN SATURATION: 99 % | HEART RATE: 47 BPM | SYSTOLIC BLOOD PRESSURE: 117 MMHG | TEMPERATURE: 98 F | WEIGHT: 229 LBS | DIASTOLIC BLOOD PRESSURE: 78 MMHG | BODY MASS INDEX: 39.43 KG/M2

## 2022-08-02 DIAGNOSIS — R10.32 ABDOMINAL PAIN, LEFT LOWER QUADRANT: Primary | ICD-10-CM

## 2022-08-02 DIAGNOSIS — Z87.19 HISTORY OF DIVERTICULITIS: ICD-10-CM

## 2022-08-02 LAB
ALBUMIN SERPL-MCNC: 3.3 G/DL (ref 3.4–5)
ALBUMIN UR-MCNC: NEGATIVE MG/DL
ALP SERPL-CCNC: 57 U/L (ref 40–150)
ALT SERPL W P-5'-P-CCNC: 21 U/L (ref 0–50)
ANION GAP SERPL CALCULATED.3IONS-SCNC: 5 MMOL/L (ref 3–14)
APPEARANCE UR: ABNORMAL
AST SERPL W P-5'-P-CCNC: 13 U/L (ref 0–45)
BASOPHILS # BLD AUTO: 0 10E3/UL (ref 0–0.2)
BASOPHILS NFR BLD AUTO: 1 %
BILIRUB SERPL-MCNC: 0.3 MG/DL (ref 0.2–1.3)
BILIRUB UR QL STRIP: NEGATIVE
BUN SERPL-MCNC: 13 MG/DL (ref 7–30)
CALCIUM SERPL-MCNC: 8.3 MG/DL (ref 8.5–10.1)
CHLORIDE BLD-SCNC: 109 MMOL/L (ref 94–109)
CO2 SERPL-SCNC: 27 MMOL/L (ref 20–32)
COLOR UR AUTO: YELLOW
CREAT SERPL-MCNC: 0.8 MG/DL (ref 0.52–1.04)
EOSINOPHIL # BLD AUTO: 0.1 10E3/UL (ref 0–0.7)
EOSINOPHIL NFR BLD AUTO: 2 %
ERYTHROCYTE [DISTWIDTH] IN BLOOD BY AUTOMATED COUNT: 13.7 % (ref 10–15)
GFR SERPL CREATININE-BSD FRML MDRD: 85 ML/MIN/1.73M2
GLUCOSE BLD-MCNC: 88 MG/DL (ref 70–99)
GLUCOSE UR STRIP-MCNC: NEGATIVE MG/DL
HCT VFR BLD AUTO: 39.4 % (ref 35–47)
HGB BLD-MCNC: 12.8 G/DL (ref 11.7–15.7)
HGB UR QL STRIP: ABNORMAL
IMM GRANULOCYTES # BLD: 0 10E3/UL
IMM GRANULOCYTES NFR BLD: 0 %
KETONES UR STRIP-MCNC: NEGATIVE MG/DL
LEUKOCYTE ESTERASE UR QL STRIP: ABNORMAL
LIPASE SERPL-CCNC: 122 U/L (ref 73–393)
LYMPHOCYTES # BLD AUTO: 3 10E3/UL (ref 0.8–5.3)
LYMPHOCYTES NFR BLD AUTO: 49 %
MCH RBC QN AUTO: 31.1 PG (ref 26.5–33)
MCHC RBC AUTO-ENTMCNC: 32.5 G/DL (ref 31.5–36.5)
MCV RBC AUTO: 96 FL (ref 78–100)
MONOCYTES # BLD AUTO: 0.5 10E3/UL (ref 0–1.3)
MONOCYTES NFR BLD AUTO: 8 %
MUCOUS THREADS #/AREA URNS LPF: PRESENT /LPF
NEUTROPHILS # BLD AUTO: 2.5 10E3/UL (ref 1.6–8.3)
NEUTROPHILS NFR BLD AUTO: 41 %
NITRATE UR QL: NEGATIVE
PH UR STRIP: 6 [PH] (ref 5–7)
PLATELET # BLD AUTO: 256 10E3/UL (ref 150–450)
POTASSIUM BLD-SCNC: 4.1 MMOL/L (ref 3.4–5.3)
PROT SERPL-MCNC: 7 G/DL (ref 6.8–8.8)
RBC # BLD AUTO: 4.12 10E6/UL (ref 3.8–5.2)
RBC #/AREA URNS AUTO: ABNORMAL /HPF
SODIUM SERPL-SCNC: 141 MMOL/L (ref 133–144)
SP GR UR STRIP: >=1.03 (ref 1–1.03)
SQUAMOUS #/AREA URNS AUTO: ABNORMAL /LPF
UROBILINOGEN UR STRIP-ACNC: 0.2 E.U./DL
WBC # BLD AUTO: 6.1 10E3/UL (ref 4–11)
WBC #/AREA URNS AUTO: ABNORMAL /HPF

## 2022-08-02 PROCEDURE — 80053 COMPREHEN METABOLIC PANEL: CPT | Performed by: PHYSICIAN ASSISTANT

## 2022-08-02 PROCEDURE — 81001 URINALYSIS AUTO W/SCOPE: CPT | Performed by: PHYSICIAN ASSISTANT

## 2022-08-02 PROCEDURE — 83690 ASSAY OF LIPASE: CPT | Performed by: PHYSICIAN ASSISTANT

## 2022-08-02 PROCEDURE — 36415 COLL VENOUS BLD VENIPUNCTURE: CPT | Performed by: PHYSICIAN ASSISTANT

## 2022-08-02 PROCEDURE — 85025 COMPLETE CBC W/AUTO DIFF WBC: CPT | Performed by: PHYSICIAN ASSISTANT

## 2022-08-02 PROCEDURE — 99214 OFFICE O/P EST MOD 30 MIN: CPT | Performed by: PHYSICIAN ASSISTANT

## 2022-08-02 RX ORDER — HYDROCODONE BITARTRATE AND ACETAMINOPHEN 5; 325 MG/1; MG/1
1 TABLET ORAL EVERY 6 HOURS PRN
Qty: 12 TABLET | Refills: 0 | Status: SHIPPED | OUTPATIENT
Start: 2022-08-02 | End: 2022-08-05

## 2022-08-02 ASSESSMENT — ENCOUNTER SYMPTOMS
DIARRHEA: 1
FATIGUE: 0
WHEEZING: 0
FREQUENCY: 0
FLANK PAIN: 0
HEMATURIA: 0
PALPITATIONS: 0
HEARTBURN: 0
COUGH: 0
CARDIOVASCULAR NEGATIVE: 1
CHILLS: 0
RESPIRATORY NEGATIVE: 1
DYSURIA: 0
CHEST TIGHTNESS: 0
HEMATOCHEZIA: 0
VOMITING: 0
NAUSEA: 0
CONSTIPATION: 1
FEVER: 1
SHORTNESS OF BREATH: 0
ABDOMINAL PAIN: 1

## 2022-08-02 NOTE — PROGRESS NOTES
"  Dinorah Hawley is a 57 year old, presenting for the following health issues:  Abdominal Pain (ABDOMINAL PAIN. tHINKS DIVERTICULITIS (In hospital for it at beginning of 2022) IS FLARING UP. Onset: 3 days 07/30/22. Pain is worse today. \"Can feel intestines when moving around\" No tenderness. Pain all the time.)    HPI   Abdominal/Flank Pain  Onset/Duration: 3days  Description:   Character: dull ache  Location: LLQ  Radiation: None  Intensity: moderate  Progression of Symptoms:  same  Accompanying Signs & Symptoms:  Fever/Chills: Yes  Gas/Bloating: no  Nausea: no  Vomitting: no  Diarrhea: Yes  Constipation: Yes but no blood present  Dysuria or Hematuria: No dysuria, urinary frequency, urgency or hematuria.  No vaginal d/c, bleeding, rashes and irritation.  No new partners.  S/p hysterectomy  History:   Trauma: no  Previous similar pain: Yes, with her previous diverticulitis and rupture but not as bad  Previous tests done: none  Precipitating factors:   Does the pain change with:     Food: no    Bowel Movement: Yes, some relief    Urination: no   Other factors:  no  Therapies tried and outcome: fluids, advil, tylenol with minimal relief     Patient Active Problem List   Diagnosis     Leiomyoma of uterus     Allergic rhinitis     Anemia     [facility]     Knee pain     Thyroid nodule     Pain in joint involving ankle and foot     CARDIOVASCULAR SCREENING; LDL GOAL LESS THAN 160     Morbid obesity (H)     Shaina's nodes     Familial cardiomyopathy (H)     Degenerative disc disease at L5-S1 level     Chronic bilateral low back pain with right-sided sciatica     Chronic bilateral low back pain with left-sided sciatica     Chronic systolic congestive heart failure (H)     Left shoulder pain     Primary osteoarthritis of both knees     Gastroesophageal reflux disease with esophagitis     Odd detrimental health beliefs     Moderate major depression (H)     Dysfunction of both eustachian tubes     Chronic " rhinitis     Essential hypertension     CSF otorrhea     KATIA (obstructive sleep apnea)- moderate-severe (AHI 29)     Family history of colon cancer     Diverticulitis     Herpes simplex of female genitalia     Current Outpatient Medications   Medication     Cholecalciferol (VITAMIN D) 2000 UNIT tablet     FLAXSEED, LINSEED, PO     fluticasone (FLONASE) 50 MCG/ACT nasal spray     furosemide (LASIX) 20 MG tablet     loratadine (CLARITIN) 10 MG tablet     metoprolol succinate ER (TOPROL-XL) 50 MG 24 hr tablet     progesterone (PROMETRIUM) 100 MG capsule     sacubitril-valsartan (ENTRESTO)  MG per tablet     spironolactone (ALDACTONE) 25 MG tablet     amitriptyline (ELAVIL) 25 MG tablet     bisacodyl (DULCOLAX) 5 MG EC tablet     famotidine (PEPCID) 40 MG tablet     magnesium citrate solution     omeprazole (PRILOSEC) 40 MG DR capsule     ondansetron (ZOFRAN-ODT) 4 MG ODT tab     polyethylene glycol (GOLYTELY) 236 g suspension     solifenacin (VESICARE) 10 MG tablet     No current facility-administered medications for this visit.     Facility-Administered Medications Ordered in Other Visits   Medication     sodium chloride (PF) 0.9% PF flush 10 mL        Allergies   Allergen Reactions     Morphine      EMESIS     Nickel      Sulfa Drugs Swelling     Review of Systems   Constitutional: Positive for fever. Negative for chills and fatigue.   Respiratory: Negative.  Negative for cough, chest tightness, shortness of breath and wheezing.    Cardiovascular: Negative.  Negative for chest pain, palpitations and peripheral edema.   Gastrointestinal: Positive for abdominal pain, constipation and diarrhea. Negative for heartburn, hematochezia, nausea and vomiting.   Genitourinary: Negative for dysuria, flank pain, frequency, hematuria, pelvic pain, urgency, vaginal bleeding and vaginal discharge.   All other systems reviewed and are negative.           Objective    /78 (BP Location: Left arm, Patient Position: Sitting,  Cuff Size: Adult Large)   Pulse (!) 47   Temp 98  F (36.7  C) (Tympanic)   Wt 103.9 kg (229 lb)   LMP 10/19/2008 (Approximate)   SpO2 99%   BMI 39.43 kg/m    Body mass index is 39.43 kg/m .  Physical Exam  Vitals and nursing note reviewed.   Constitutional:       General: She is not in acute distress.     Appearance: Normal appearance. She is well-developed. She is obese. She is not ill-appearing.   Cardiovascular:      Rate and Rhythm: Normal rate and regular rhythm.      Pulses: Normal pulses.      Heart sounds: Normal heart sounds, S1 normal and S2 normal. No murmur heard.    No friction rub. No gallop.   Pulmonary:      Effort: Pulmonary effort is normal. No accessory muscle usage or respiratory distress.      Breath sounds: Normal breath sounds and air entry. No decreased breath sounds, wheezing, rhonchi or rales.   Abdominal:      General: Abdomen is protuberant. Bowel sounds are normal.      Palpations: Abdomen is soft. There is no hepatomegaly, splenomegaly or mass.      Tenderness: There is abdominal tenderness in the left lower quadrant. There is guarding and rebound. There is no right CVA tenderness or left CVA tenderness. Negative signs include Berkowitz's sign, Rovsing's sign, McBurney's sign, psoas sign and obturator sign.      Hernia: No hernia is present.   Genitourinary:     Comments: Declined pelvic exam.  Skin:     General: Skin is warm and dry.   Neurological:      Mental Status: She is alert and oriented to person, place, and time.   Psychiatric:         Mood and Affect: Mood normal.         Behavior: Behavior normal.         Thought Content: Thought content normal.         Judgment: Judgment normal.       Results for orders placed or performed in visit on 08/02/22 (from the past 24 hour(s))   CBC with platelets and differential    Narrative    The following orders were created for panel order CBC with platelets and differential.  Procedure                               Abnormality          Status                     ---------                               -----------         ------                     CBC with platelets and d...[[id number]]                      Final result                 Please view results for these tests on the individual orders.   UA Macro with Reflex to Micro and Culture - lab collect    Specimen: Urine, Clean Catch   Result Value Ref Range    Color Urine Yellow Colorless, Straw, Light Yellow, Yellow    Appearance Urine Slightly Cloudy (A) Clear    Glucose Urine Negative Negative mg/dL    Bilirubin Urine Negative Negative    Ketones Urine Negative Negative mg/dL    Specific Gravity Urine >=1.030 1.003 - 1.035    Blood Urine Trace (A) Negative    pH Urine 6.0 5.0 - 7.0    Protein Albumin Urine Negative Negative mg/dL    Urobilinogen Urine 0.2 0.2, 1.0 E.U./dL    Nitrite Urine Negative Negative    Leukocyte Esterase Urine Trace (A) Negative   CBC with platelets and differential   Result Value Ref Range    WBC Count 6.1 4.0 - 11.0 10e3/uL    RBC Count 4.12 3.80 - 5.20 10e6/uL    Hemoglobin 12.8 11.7 - 15.7 g/dL    Hematocrit 39.4 35.0 - 47.0 %    MCV 96 78 - 100 fL    MCH 31.1 26.5 - 33.0 pg    MCHC 32.5 31.5 - 36.5 g/dL    RDW 13.7 10.0 - 15.0 %    Platelet Count 256 150 - 450 10e3/uL    % Neutrophils 41 %    % Lymphocytes 49 %    % Monocytes 8 %    % Eosinophils 2 %    % Basophils 1 %    % Immature Granulocytes 0 %    Absolute Neutrophils 2.5 1.6 - 8.3 10e3/uL    Absolute Lymphocytes 3.0 0.8 - 5.3 10e3/uL    Absolute Monocytes 0.5 0.0 - 1.3 10e3/uL    Absolute Eosinophils 0.1 0.0 - 0.7 10e3/uL    Absolute Basophils 0.0 0.0 - 0.2 10e3/uL    Absolute Immature Granulocytes 0.0 <=0.4 10e3/uL   Urine Microscopic   Result Value Ref Range    RBC Urine 2-5 (A) 0-2 /HPF /HPF    WBC Urine 0-5 0-5 /HPF /HPF    Squamous Epithelials Urine Few (A) None Seen /LPF    Mucus Urine Present (A) None Seen /LPF    Narrative    Urine Culture not indicated   XR Abdomen 2 Views    Narrative    ABDOMEN  TWO-THREE VIEW  8/2/2022 4:19 PM     HISTORY: Abdominal pain, left lower quadrant    COMPARISON: February 4, 2022    FINDINGS: Moderate  amount of stool. No free air. There are no air  filled distended loops of small bowel. The colon is not distended. The  lung bases are unremarkable. Probable pelvic phleboliths.      Impression    IMPRESSION: Nonobstructed bowel gas pattern.    YARELI BERTRAND MD         SYSTEM ID:  [id number]     *Note: Due to a large number of results and/or encounters for the requested time period, some results have not been displayed. A complete set of results can be found in Results Review.         Assessment/Plan:  Abdominal pain, left lower quadrant:  CBC with diff, UA and abdominal xrays were normal or negative, will check labs below.  And start [id number] for diverticulitis and give norco as needed for pain.  Discussed risks and benefits of medication along with side effects, direction for use.  No driving or operating machinery due to sedation.  Clear liquid diet.  Will also send to [facility] tomorrow for possible CT scan to evaluation for rupture due to hx of rupture.  VS are stable.  She is not acutely ill.  To the ER if worsening symptoms.  -     Referral to Acute and Diagnostic Services (Day of diagnostic / First order acute)  -     CBC with platelets and differential; Future  -     Comprehensive metabolic panel (BMP + Alb, Alk Phos, ALT, AST, Total. Bili, TP); Future  -     Lipase; Future  -     UA Macro with Reflex to Micro and Culture - lab collect; Future  -     XR Abdomen 2 Views  -     amoxicillin-clavulanate (AUGMENTIN) 875-125 MG tablet; Take 1 tablet by mouth 2 times daily for 10 days  -     HYDROcodone-acetaminophen (NORCO) 5-325 MG tablet; Take 1 tablet by mouth every 6 hours as needed for moderate to severe pain  -     CBC with platelets and differential  -     Comprehensive metabolic panel (BMP + Alb, Alk Phos, ALT, AST, Total. Bili, TP)  -     Lipase  -     UA Macro with  Reflex to Micro and Culture - lab collect  -     Urine Microscopic    History of diverticulitis        Chen See KINGS Ortez  ..

## 2022-08-02 NOTE — LETTER
[facility] [address] [address]    2022    Re: Marleen Mcfadden  [address]   [facility] [address]  [phone] (home)     : 1965      To Whom It May Concern:      Marleen Mcfadden was seen in clinic today and is unable to work 22-8/3/22 due to her symptoms.  Please feel free to contact me via phone if you have any questions or concerns.        Sincerely,      Chen See KINGS Ortez

## 2022-08-03 ENCOUNTER — OFFICE VISIT (OUTPATIENT)
Dept: PEDIATRICS | Facility: CLINIC | Age: 57
End: 2022-08-03
Attending: PHYSICIAN ASSISTANT
Payer: COMMERCIAL

## 2022-08-03 ENCOUNTER — ANCILLARY PROCEDURE (OUTPATIENT)
Dept: CT IMAGING | Facility: CLINIC | Age: 57
End: 2022-08-03
Attending: NURSE PRACTITIONER
Payer: COMMERCIAL

## 2022-08-03 VITALS
TEMPERATURE: 99 F | HEART RATE: 57 BPM | OXYGEN SATURATION: 97 % | SYSTOLIC BLOOD PRESSURE: 113 MMHG | DIASTOLIC BLOOD PRESSURE: 77 MMHG

## 2022-08-03 DIAGNOSIS — Z87.19 HISTORY OF DIVERTICULITIS: ICD-10-CM

## 2022-08-03 DIAGNOSIS — Z86.19 HISTORY OF CANDIDAL VULVOVAGINITIS: ICD-10-CM

## 2022-08-03 DIAGNOSIS — K59.00 CONSTIPATION, UNSPECIFIED CONSTIPATION TYPE: ICD-10-CM

## 2022-08-03 DIAGNOSIS — R10.32 LLQ ABDOMINAL PAIN: ICD-10-CM

## 2022-08-03 DIAGNOSIS — N95.1 SYMPTOMATIC MENOPAUSAL OR FEMALE CLIMACTERIC STATES: ICD-10-CM

## 2022-08-03 DIAGNOSIS — R31.29 MICROSCOPIC HEMATURIA: ICD-10-CM

## 2022-08-03 DIAGNOSIS — R10.32 LLQ ABDOMINAL PAIN: Primary | ICD-10-CM

## 2022-08-03 LAB
CLUE CELLS: ABNORMAL
HOLD SPECIMEN: NORMAL
TRICHOMONAS, WET PREP: ABNORMAL
WBC'S/HIGH POWER FIELD, WET PREP: ABNORMAL
YEAST, WET PREP: ABNORMAL

## 2022-08-03 PROCEDURE — 99417 PROLNG OP E/M EACH 15 MIN: CPT

## 2022-08-03 PROCEDURE — 74177 CT ABD & PELVIS W/CONTRAST: CPT | Mod: GC | Performed by: RADIOLOGY

## 2022-08-03 PROCEDURE — 99215 OFFICE O/P EST HI 40 MIN: CPT

## 2022-08-03 PROCEDURE — 87210 SMEAR WET MOUNT SALINE/INK: CPT

## 2022-08-03 RX ORDER — PROGESTERONE 100 MG/1
CAPSULE ORAL
COMMUNITY
Start: 2022-07-20 | End: 2023-02-28

## 2022-08-03 RX ORDER — FLUCONAZOLE 150 MG/1
150 TABLET ORAL ONCE
Qty: 1 TABLET | Refills: 0 | Status: SHIPPED | OUTPATIENT
Start: 2022-08-03 | End: 2022-08-03

## 2022-08-03 RX ORDER — IOPAMIDOL 755 MG/ML
120 INJECTION, SOLUTION INTRAVASCULAR ONCE
Status: COMPLETED | OUTPATIENT
Start: 2022-08-03 | End: 2022-08-03

## 2022-08-03 RX ADMIN — IOPAMIDOL 120 ML: 755 INJECTION, SOLUTION INTRAVASCULAR at 11:41

## 2022-08-03 ASSESSMENT — PAIN SCALES - GENERAL: PAINLEVEL: MODERATE PAIN (5)

## 2022-08-03 NOTE — PROGRESS NOTES
Chief Complaint   Patient presents with     Abdominal Pain         ICD-10-CM    1. LLQ abdominal pain  R10.32 CANCELED: CT Abdomen Pelvis w/o & w Contrast   2. History of diverticulitis  Z87.19 CANCELED: CT Abdomen Pelvis w/o & w Contrast   3. Microscopic hematuria  R31.29 Wet prep - Clinic Collect   4. Constipation, unspecified constipation type  K59.00    5. History of candidal vulvovaginitis  Z86.19 fluconazole (DIFLUCAN) 150 MG tablet   Will treat patient for the moderate amount of stool in her colon which actually of a large lump in the left lower quadrant.  If symptoms worsen or she develops fever she is instructed to go to the emergency room immediately for further assessment.    patient reports she always gets vaginal  Yeast infections with antibiotics and requests Diflucan tablet.    72 minutes spent on the date of the encounter doing chart review, history and exam, documentation and further activities per the note    Medical Decision Making  Differential diagnosis includes but is not limited to: Gastritis, gastroenteritis, enteritis, colitis, appendicitis, viral infection, ileus, bowel obstruction, volvulus, intussusception, gas pains, indigestion, GERD, UTI, pyelonephritis, gastric ulcer, duodenal ulcer, viscus perforation, abdominal hernia, internal hernia.      CT scan of the abdomen with contrast showed diverticulosis but no evidence of diverticulitis.    Results for orders placed or performed in visit on 08/03/22 (from the past 24 hour(s))   Wet prep - Clinic Collect    Specimen: Vagina; Swab   Result Value Ref Range    Trichomonas Absent Absent    Yeast Absent Absent    Clue Cells Absent Absent    WBCs/high power field 1+ (A) None   Extra Tube    Narrative    The following orders were created for panel order Extra Tube.  Procedure                               Abnormality         Status                     ---------                               -----------         ------                     Extra  Serum Separator Tu...[[id number]]                      Final result               Extra Green Top (Lithium...[[id number]]                      Final result               Extra Purple Top Tube[[id number]]                            Final result                 Please view results for these tests on the individual orders.   Extra Serum Separator Tube (SST)   Result Value Ref Range    Hold Specimen JIC    Extra Green Top (Lithium Heparin) Tube   Result Value Ref Range    Hold Specimen JIC    Extra Purple Top Tube   Result Value Ref Range    Hold Specimen JIC      *Note: Due to a large number of results and/or encounters for the requested time period, some results have not been displayed. A complete set of results can be found in Results Review.       Subjective     Marleen Mcfadden is an 57 year old female who presents to clinic today for abdominal pain for the last 4 days.  Primarily in the left lower quadrant and symptoms have been slowly worsening.  She does have a history of diverticulitis and had a very small rupture in January of this year.  She is suspicious this is the problem again.  She was feeling quite constipated then developed diarrhea but has not had a bowel movement today.  She was referred from the [place] urgent care for further assessment today.  Abdominal x-ray from yesterday does show a moderate amount of stool throughout colon.  CMP, CBC, lipase were all essentially normal yesterday.  She did have a urinalysis which did not show infection but it did show some microscopic hematuria.  She was treated for bacterial vaginosis a couple of weeks ago and did complete that medication.    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     Familial cardiomyopathy (H)      GERD without esophagitis      History of transfusion      Hypertension      Injury of left shoulder, initial encounter 01/12/2017     Morbid obesity (H)      Nontraumatic rupture of quadriceps tendon, left 06/21/2018     KATIA (obstructive sleep apnea)- moderate-severe (AHI 29) 8/26/2021     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 - Acute Pain   Have you seen anyone else for your pain? Yes - [facility]   Where in your body do you have pain? Abdominal/Flank Pain  Onset/Duration: x 4 days   Description:   Character: Dull ache and Burning  Location: left lower quadrant  Radiation: None and Pelvic region  Intensity: mild, moderate  Progression of Symptoms:  improving  Accompanying Signs & Symptoms:  Fever/Chills: YES  Gas/Bloating: YES  Nausea: YES  Vomitting: No  Diarrhea: YES  Constipation: Yes   Dysuria or Hematuria: No  History:   Trauma: No  Previous similar pain: YES  Previous tests done: CT  Precipitating factors:   Does the pain change with:     Food: temporary relief     Bowel Movement: YES    Urination: No   Other factors:  No  Therapies tried and outcome: Antibiotic and pain medication    Patient's last menstrual period was 10/19/2008 (approximate).    ROS: 10 point ROS neg other than the symptoms noted above in  the HPI.       Objective    /77 (BP Location: Left arm, Patient Position: Sitting, Cuff Size: Adult Large)   Pulse 57   Temp 99  F (37.2  C) (Oral)   LMP 10/19/2008 (Approximate)   SpO2 97%   Nurses notes and VS have been reviewed.    Physical Exam       GENERAL APPEARANCE: alert and mild distress     EYES: PERRL, EOMI, sclera non-icteric     HENT: oral exam benign, mucus membranes intact, without ulcers or lesions     NECK: no adenopathy or asymmetry, thyroid normal to palpation     RESP: lungs clear to auscultation - no rales, rhonchi or wheezes     CV: regular rates and rhythm, no murmurs, rubs, or gallop     ABDOMEN: Soft, slightly tender in the left upper quadrant, very tender in the left lower quadrant, normal bowel sounds     MS: extremities normal- no gross deformities noted; normal muscle tone.     SKIN: no suspicious lesions or rashes     NEURO: Normal strength and tone, mentation intact and speech normal     PSYCH: normal thought process; no significant mood disturbance    Patient Instructions   .Take 2 Tbsp of Milk of Magnesia today and repeat in the morning. If still no results take 1/2 bottle of Magnesium Citrate and repeat the second half in 8 hours if needed.     If symptoms continue to worsen or you develop fever please go to the emergency room immediately for further assessment.      PITO Pina, CNP  [facility] Provider    The use of Dragon/[employer]norange [employer] dictation services may have been used to construct the content in this note; any grammatical or spelling errors are non-intentional. Please contact the author of this note directly if you are in need of any clarification.       .  ..

## 2022-08-03 NOTE — PATIENT INSTRUCTIONS
.Take 2 Tbsp of Milk of Magnesia today and repeat in the morning. If still no results take 1/2 bottle of Magnesium Citrate and repeat the second half in 8 hours if needed.     If symptoms continue to worsen or you develop fever please go to the emergency room immediately for further assessment.

## 2022-08-04 LAB
ESTRADIOL SERPL-MCNC: 55 PG/ML
FSH SERPL-ACNC: 5.9 IU/L

## 2022-08-04 PROCEDURE — 36415 COLL VENOUS BLD VENIPUNCTURE: CPT

## 2022-08-04 PROCEDURE — 82670 ASSAY OF TOTAL ESTRADIOL: CPT

## 2022-08-04 PROCEDURE — 83001 ASSAY OF GONADOTROPIN (FSH): CPT

## 2022-08-04 PROCEDURE — 84403 ASSAY OF TOTAL TESTOSTERONE: CPT

## 2022-08-06 ENCOUNTER — HEALTH MAINTENANCE LETTER (OUTPATIENT)
Age: 57
End: 2022-08-06

## 2022-08-06 LAB — TESTOST SERPL-MCNC: 107 NG/DL (ref 8–60)

## 2022-10-13 ENCOUNTER — OFFICE VISIT (OUTPATIENT)
Dept: URGENT CARE | Facility: URGENT CARE | Age: 57
End: 2022-10-13
Payer: COMMERCIAL

## 2022-10-13 VITALS
TEMPERATURE: 98 F | WEIGHT: 231.4 LBS | HEIGHT: 64 IN | SYSTOLIC BLOOD PRESSURE: 133 MMHG | OXYGEN SATURATION: 98 % | DIASTOLIC BLOOD PRESSURE: 81 MMHG | HEART RATE: 54 BPM | BODY MASS INDEX: 39.5 KG/M2

## 2022-10-13 DIAGNOSIS — A08.4 VIRAL GASTROENTERITIS: Primary | ICD-10-CM

## 2022-10-13 LAB — SARS-COV-2 RNA RESP QL NAA+PROBE: NEGATIVE

## 2022-10-13 PROCEDURE — U0005 INFEC AGEN DETEC AMPLI PROBE: HCPCS | Performed by: PHYSICIAN ASSISTANT

## 2022-10-13 PROCEDURE — 99213 OFFICE O/P EST LOW 20 MIN: CPT | Mod: CS | Performed by: PHYSICIAN ASSISTANT

## 2022-10-13 PROCEDURE — U0003 INFECTIOUS AGENT DETECTION BY NUCLEIC ACID (DNA OR RNA); SEVERE ACUTE RESPIRATORY SYNDROME CORONAVIRUS 2 (SARS-COV-2) (CORONAVIRUS DISEASE [COVID-19]), AMPLIFIED PROBE TECHNIQUE, MAKING USE OF HIGH THROUGHPUT TECHNOLOGIES AS DESCRIBED BY CMS-2020-01-R: HCPCS | Performed by: PHYSICIAN ASSISTANT

## 2022-10-13 RX ORDER — IBUPROFEN 600 MG/1
600 TABLET, FILM COATED ORAL EVERY 6 HOURS PRN
Qty: 30 TABLET | Refills: 0 | Status: SHIPPED | OUTPATIENT
Start: 2022-10-13 | End: 2023-02-28

## 2022-10-13 RX ORDER — LOPERAMIDE HYDROCHLORIDE 2 MG/1
2 TABLET ORAL 4 TIMES DAILY PRN
Qty: 30 TABLET | Refills: 0 | Status: SHIPPED | OUTPATIENT
Start: 2022-10-13 | End: 2023-02-28

## 2022-10-13 RX ORDER — ONDANSETRON 4 MG/1
4 TABLET, ORALLY DISINTEGRATING ORAL EVERY 8 HOURS PRN
Qty: 30 TABLET | Refills: 0 | Status: ON HOLD | OUTPATIENT
Start: 2022-10-13 | End: 2023-01-10

## 2022-10-13 ASSESSMENT — ENCOUNTER SYMPTOMS
COUGH: 0
CARDIOVASCULAR NEGATIVE: 1
WHEEZING: 0
PALPITATIONS: 0
ABDOMINAL PAIN: 1
HEARTBURN: 0
SINUS PAIN: 0
CHILLS: 1
NAUSEA: 0
SHORTNESS OF BREATH: 0
VOMITING: 1
SORE THROAT: 0
FATIGUE: 0
HEMATOCHEZIA: 0
RHINORRHEA: 0
DIARRHEA: 1
FEVER: 0
RESPIRATORY NEGATIVE: 1
SINUS PRESSURE: 0
ANAL BLEEDING: 0
CONSTIPATION: 0

## 2022-10-13 NOTE — PROGRESS NOTES
Dinorah Hawley is a 57 year old, presenting for the following health issues:  Diarrhea, Vomiting, Nausea (Symptoms has lasted for 2 days.), and Headache    HPI   Diarrhea  Onset/Duration: 2days  Description:       Consistency of stool: watery       Blood in stool: No       Number of loose stools past 24 hours: too many to count  Progression of Symptoms: same  Accompanying signs and symptoms:       Fever: No but reports chills       Nausea/Vomiting: YES with headache       Abdominal pain: YES       Weight loss: No       Episodes of constipation: No  History   Ill contacts: Yes, was watching a sick baby who had similar symptoms  Recent use of antibiotics: No  Recent travels: No  Recent medication-new or changes(Rx or OTC): No  Precipitating or alleviating factors: None  Therapies tried and outcome: rest, fluids, probiotics with minimal relief    Patient Active Problem List   Diagnosis     Leiomyoma of uterus     Allergic rhinitis     Anemia     [facility]     Knee pain     Thyroid nodule     Pain in joint involving ankle and foot     CARDIOVASCULAR SCREENING; LDL GOAL LESS THAN 160     Morbid obesity (H)     Shaina's nodes     Familial cardiomyopathy (H)     Degenerative disc disease at L5-S1 level     Chronic bilateral low back pain with right-sided sciatica     Chronic bilateral low back pain with left-sided sciatica     Chronic systolic congestive heart failure (H)     Left shoulder pain     Primary osteoarthritis of both knees     Gastroesophageal reflux disease with esophagitis     Odd detrimental health beliefs     Moderate major depression (H)     Dysfunction of both eustachian tubes     Chronic rhinitis     Essential hypertension     CSF otorrhea     KTAIA (obstructive sleep apnea)- moderate-severe (AHI 29)     Family history of colon cancer     Diverticulitis     Herpes simplex of female genitalia     Current Outpatient Medications   Medication     Cholecalciferol (VITAMIN D) 2000 UNIT tablet  "    FLAXSEED, LINSEED, PO     fluticasone (FLONASE) 50 MCG/ACT nasal spray     furosemide (LASIX) 20 MG tablet     loratadine (CLARITIN) 10 MG tablet     metoprolol succinate ER (TOPROL-XL) 50 MG 24 hr tablet     progesterone (PROMETRIUM) 100 MG capsule     progesterone (PROMETRIUM) 100 MG capsule     sacubitril-valsartan (ENTRESTO)  MG per tablet     spironolactone (ALDACTONE) 25 MG tablet     No current facility-administered medications for this visit.     Facility-Administered Medications Ordered in Other Visits   Medication     sodium chloride (PF) 0.9% PF flush 10 mL        Allergies   Allergen Reactions     Morphine      EMESIS     Nickel      Sulfa Drugs Swelling         Review of Systems   Constitutional: Positive for chills. Negative for fatigue and fever.   HENT: Negative for congestion, ear discharge, ear pain, hearing loss, rhinorrhea, sinus pressure, sinus pain and sore throat.    Respiratory: Negative.  Negative for cough, shortness of breath and wheezing.    Cardiovascular: Negative.  Negative for chest pain, palpitations and peripheral edema.   Gastrointestinal: Positive for abdominal pain, diarrhea and vomiting. Negative for anal bleeding, constipation, heartburn, hematochezia and nausea.   Allergic/Immunologic: Negative for environmental allergies.   All other systems reviewed and are negative.           Objective    /81 (BP Location: Left arm, Patient Position: Sitting, Cuff Size: Adult Large)   Pulse 54   Temp 98  F (36.7  C) (Tympanic)   Ht 1.626 m (5' 4\")   Wt 105 kg (231 lb 6.4 oz)   LMP 10/19/2008 (Approximate)   SpO2 98%   BMI 39.72 kg/m    Body mass index is 39.72 kg/m .  Physical Exam  Vitals and nursing note reviewed.   Constitutional:       General: She is not in acute distress.     Appearance: Normal appearance. She is well-developed and normal weight. She is not ill-appearing.   Cardiovascular:      Rate and Rhythm: Normal rate and regular rhythm.      Pulses: Normal " pulses.      Heart sounds: Normal heart sounds, S1 normal and S2 normal. No murmur heard.    No friction rub. No gallop.   Pulmonary:      Effort: Pulmonary effort is normal. No accessory muscle usage or respiratory distress.      Breath sounds: Normal breath sounds and air entry. No decreased breath sounds, wheezing, rhonchi or rales.   Abdominal:      General: Abdomen is flat. Bowel sounds are normal.      Palpations: Abdomen is soft. There is no hepatomegaly, splenomegaly or mass.      Tenderness: There is generalized abdominal tenderness. There is no right CVA tenderness, left CVA tenderness, guarding or rebound. Negative signs include Berkowitz's sign, Rovsing's sign, McBurney's sign, psoas sign and obturator sign.      Hernia: No hernia is present.   Genitourinary:     Comments: Declined pelvic exam.  Skin:     General: Skin is warm and dry.   Neurological:      Mental Status: She is alert and oriented to person, place, and time.   Psychiatric:         Mood and Affect: Mood normal.         Behavior: Behavior normal.         Thought Content: Thought content normal.         Judgment: Judgment normal.          Assessment/Plan:  Viral gastroenteritis:  Most likely viral.  Recommend referral to ADS for IV fluids which she declined due to her hx of CHF.  Will give imodium as needed for diarrhea, zofran as needed for vomiting and recommended increase fluids, probiotics, BRAT diet and tylenol/ibuprofen as needed for pain.  Recheck in clinic if symptoms worsen or if symptoms do not improve.  To the ER if worsening pain, fevers, uncontrollable vomiting and bloody stools.  -     Symptomatic; Unknown COVID-19 Virus (Coronavirus) by PCR Nose  -     ondansetron (ZOFRAN ODT) 4 MG ODT tab; Take 1 tablet (4 mg) by mouth every 8 hours as needed for nausea or vomiting  -     loperamide (IMODIUM A-D) 2 MG tablet; Take 1 tablet (2 mg) by mouth 4 times daily as needed for diarrhea  -     ibuprofen (ADVIL/MOTRIN) 600 MG tablet; Take 1  tablet (600 mg) by mouth every 6 hours as needed for moderate pain        Chen See KINGS Ortez

## 2022-10-13 NOTE — LETTER
[facility] [address] [address]    2022    Re: Marleen Mcfadden  [address]   [place] [place] [address]  [phone] (home)     : 1965      To Whom It May Concern:      Marleen Mcfadden was seen in clinic today and is unable to work from 10/13/22-10/14/220 due to her symptoms.  Please feel free to contact me via phone if you have any questions or concerns.        Sincerely,      Chen See KINGS Ortez

## 2022-10-16 DIAGNOSIS — I50.32 CHRONIC DIASTOLIC CONGESTIVE HEART FAILURE (H): ICD-10-CM

## 2022-10-20 RX ORDER — SPIRONOLACTONE 25 MG/1
50 TABLET ORAL DAILY
Qty: 180 TABLET | Refills: 3 | Status: SHIPPED | OUTPATIENT
Start: 2022-10-20 | End: 2022-10-21

## 2022-10-20 NOTE — TELEPHONE ENCOUNTER
spironolactone (ALDACTONE) 25 MG tablet  Last Written Prescription Date:   10/22/2021  Last Fill Quantity: 180,   # refills: 3  Last Office Visit :  7/20/2022  Future Office visit:   12/12/2022  180 Tabs, 3 Refills sent to pharm 10/20/2022      Jojo Barajas RN  Central Triage Red Flags/Med Refills      Warnings Override History for spironolactone (ALDACTONE) 25 MG tablet [[id number]]    Overridden by Gwen Swenson, RN on Jun 17, 2022 12:08 PM   Drug-Drug   1. ANGIOTENSIN II RECEPTOR ANTAGONISTS / POTASSIUM-SPARING DIURETICS [Level: Major] [Reason: Will monitor drug levels/drug effects ]   Other Orders: sacubitril-valsartan (ENTRESTO)  MG per tablet         Overridden by Gwen Swenson, RN on Oct 22, 2021 1:49 PM   Drug-Drug   1. POTASSIUM-SPARING DIURETICS / ANGIOTENSIN II RECEPTOR ANTAGONISTS [Level: Major] [Reason: Tolerated medication/side effects in past]   Other Orders: sacubitril-valsartan (ENTRESTO) 49-51 MG per tablet

## 2022-10-22 ENCOUNTER — HEALTH MAINTENANCE LETTER (OUTPATIENT)
Age: 57
End: 2022-10-22

## 2022-10-26 ENCOUNTER — HOSPITAL ENCOUNTER (OUTPATIENT)
Dept: MAMMOGRAPHY | Facility: CLINIC | Age: 57
Discharge: HOME | End: 2022-10-26
Attending: FAMILY MEDICINE | Admitting: FAMILY MEDICINE
Payer: COMMERCIAL

## 2022-10-26 DIAGNOSIS — Z12.31 VISIT FOR SCREENING MAMMOGRAM: ICD-10-CM

## 2022-10-26 DIAGNOSIS — I42.0 DILATED CARDIOMYOPATHY (H): ICD-10-CM

## 2022-10-26 DIAGNOSIS — Z84.89 FAMILY HISTORY OF SUDDEN DEATH IN MOTHER: ICD-10-CM

## 2022-10-26 DIAGNOSIS — I42.9 FAMILIAL CARDIOMYOPATHY (H): ICD-10-CM

## 2022-10-26 PROCEDURE — 77067 SCR MAMMO BI INCL CAD: CPT

## 2022-11-28 DIAGNOSIS — I50.22 CHRONIC SYSTOLIC CONGESTIVE HEART FAILURE (H): Primary | ICD-10-CM

## 2022-11-30 ENCOUNTER — LAB (OUTPATIENT)
Dept: LAB | Facility: CLINIC | Age: 57
End: 2022-11-30
Payer: COMMERCIAL

## 2022-11-30 DIAGNOSIS — N95.1 SYMPTOMATIC MENOPAUSAL OR FEMALE CLIMACTERIC STATES: ICD-10-CM

## 2022-11-30 LAB
ESTRADIOL SERPL-MCNC: 99 PG/ML
FSH SERPL-ACNC: 5 IU/L

## 2022-11-30 PROCEDURE — 36415 COLL VENOUS BLD VENIPUNCTURE: CPT

## 2022-11-30 PROCEDURE — 84403 ASSAY OF TOTAL TESTOSTERONE: CPT

## 2022-11-30 PROCEDURE — 82670 ASSAY OF TOTAL ESTRADIOL: CPT

## 2022-11-30 PROCEDURE — 83001 ASSAY OF GONADOTROPIN (FSH): CPT

## 2022-12-02 LAB — TESTOST SERPL-MCNC: 136 NG/DL (ref 8–60)

## 2022-12-06 ENCOUNTER — TELEPHONE (OUTPATIENT)
Dept: CARDIOLOGY | Facility: CLINIC | Age: 57
End: 2022-12-06

## 2022-12-07 NOTE — TELEPHONE ENCOUNTER
Spoke with Marleen today to review results of genetic testing. She underwent genetic testing for the Arrhythmia and Cardiomyopathy Comprehensive panel. DNA was collected via cheek swab on October 28, 2022 and sent to [facility] laboratory.   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  A variant of unknown significance means that there is a genetic change in which we do not have enough information to determine if it is disease causing or not. Labs review published data, functional studies, presences in population databases, similarity to normal sequence, and computer prediction models.    The FLNC gene is known to be associated with several forms of cardiomyopathy, including dilated cardiomyopathy (DCM), as well as some skeletal myopathy. This particular variant creates a new amino acid that has different physiochemical properties.  This variant is not found in population databases and has not been previously reported in  the literature.  Computer models predict this variant will be disruptive on protein structure and function. Although suspicion is raisied, there is not enough current information to be certain if this variant alone can cause disease.   Risk to relatives could still be as high as 50% but genetic testing is not predictive or recommended because we cannot interpret the results and make predictions.    However, to help better understand this variant genetic testing could be performed in parents or other affected individuals, such as her son and daughter.  Reviewed recommendation for cardiac screening in all first degree relatives (parents, siblings, children).  Clinical screening should include physical exam with a cardiologist, history, EKG, and ECHO. In addition, Holter montior and MRI may be recommended.  A summary letter and copy of the results will be sent to patient. All questions answered at this time.  Esther Bunch MS,  [facility]  Licensed, Certified Genetic Counselor  [facility]  [facility]

## 2022-12-12 ENCOUNTER — ANCILLARY PROCEDURE (OUTPATIENT)
Dept: CARDIOLOGY | Facility: CLINIC | Age: 57
End: 2022-12-12
Attending: INTERNAL MEDICINE
Payer: COMMERCIAL

## 2022-12-12 ENCOUNTER — LAB (OUTPATIENT)
Dept: LAB | Facility: CLINIC | Age: 57
End: 2022-12-12
Attending: INTERNAL MEDICINE
Payer: COMMERCIAL

## 2022-12-12 ENCOUNTER — LAB REQUISITION (OUTPATIENT)
Dept: LAB | Facility: CLINIC | Age: 57
End: 2022-12-12

## 2022-12-12 VITALS
SYSTOLIC BLOOD PRESSURE: 121 MMHG | OXYGEN SATURATION: 98 % | WEIGHT: 226.1 LBS | DIASTOLIC BLOOD PRESSURE: 74 MMHG | BODY MASS INDEX: 38.81 KG/M2 | HEART RATE: 55 BPM

## 2022-12-12 DIAGNOSIS — M79.89 PAIN AND SWELLING OF LEFT LOWER LEG: ICD-10-CM

## 2022-12-12 DIAGNOSIS — I50.22 CHRONIC SYSTOLIC CONGESTIVE HEART FAILURE (H): ICD-10-CM

## 2022-12-12 DIAGNOSIS — I42.9 CARDIOMYOPATHY, UNSPECIFIED TYPE (H): ICD-10-CM

## 2022-12-12 DIAGNOSIS — M79.662 PAIN OF LEFT LOWER LEG: ICD-10-CM

## 2022-12-12 DIAGNOSIS — I42.9 FAMILIAL CARDIOMYOPATHY (H): ICD-10-CM

## 2022-12-12 DIAGNOSIS — I50.22 CHRONIC SYSTOLIC CONGESTIVE HEART FAILURE (H): Primary | ICD-10-CM

## 2022-12-12 DIAGNOSIS — M79.662 PAIN AND SWELLING OF LEFT LOWER LEG: ICD-10-CM

## 2022-12-12 LAB
ALBUMIN SERPL-MCNC: 3.6 G/DL (ref 3.4–5)
ALP SERPL-CCNC: 58 U/L (ref 40–150)
ALT SERPL W P-5'-P-CCNC: 22 U/L (ref 0–50)
ANION GAP SERPL CALCULATED.3IONS-SCNC: 4 MMOL/L (ref 3–14)
AST SERPL W P-5'-P-CCNC: 12 U/L (ref 0–45)
BI-PLANE LVEF ECHO: NORMAL
BILIRUB SERPL-MCNC: 0.6 MG/DL (ref 0.2–1.3)
BUN SERPL-MCNC: 23 MG/DL (ref 7–30)
CALCIUM SERPL-MCNC: 8.8 MG/DL (ref 8.5–10.1)
CHLORIDE BLD-SCNC: 106 MMOL/L (ref 94–109)
CO2 SERPL-SCNC: 26 MMOL/L (ref 20–32)
CREAT SERPL-MCNC: 0.78 MG/DL (ref 0.52–1.04)
GFR SERPL CREATININE-BSD FRML MDRD: 88 ML/MIN/1.73M2
GLUCOSE BLD-MCNC: 107 MG/DL (ref 70–99)
MAGNESIUM SERPL-MCNC: 1.8 MG/DL (ref 1.6–2.3)
NT-PROBNP SERPL-MCNC: 136 PG/ML (ref 0–900)
POTASSIUM BLD-SCNC: 4.1 MMOL/L (ref 3.4–5.3)
PROT SERPL-MCNC: 7.5 G/DL (ref 6.8–8.8)
SARS-COV-2 RNA RESP QL NAA+PROBE: NEGATIVE
SODIUM SERPL-SCNC: 136 MMOL/L (ref 133–144)

## 2022-12-12 PROCEDURE — 99207 PR STATISTIC IV PUSH SINGLE INITIAL SUBSTANCE: CPT | Performed by: INTERNAL MEDICINE

## 2022-12-12 PROCEDURE — 84155 ASSAY OF PROTEIN SERUM: CPT

## 2022-12-12 PROCEDURE — 83880 ASSAY OF NATRIURETIC PEPTIDE: CPT

## 2022-12-12 PROCEDURE — U0005 INFEC AGEN DETEC AMPLI PROBE: HCPCS | Performed by: INTERNAL MEDICINE

## 2022-12-12 PROCEDURE — 82310 ASSAY OF CALCIUM: CPT

## 2022-12-12 PROCEDURE — 93244 EXT ECG>48HR<7D REV&INTERPJ: CPT | Performed by: INTERNAL MEDICINE

## 2022-12-12 PROCEDURE — G0463 HOSPITAL OUTPT CLINIC VISIT: HCPCS

## 2022-12-12 PROCEDURE — 99215 OFFICE O/P EST HI 40 MIN: CPT | Mod: 25 | Performed by: INTERNAL MEDICINE

## 2022-12-12 PROCEDURE — G0463 HOSPITAL OUTPT CLINIC VISIT: HCPCS | Mod: 25 | Performed by: INTERNAL MEDICINE

## 2022-12-12 PROCEDURE — 36415 COLL VENOUS BLD VENIPUNCTURE: CPT

## 2022-12-12 PROCEDURE — 83735 ASSAY OF MAGNESIUM: CPT

## 2022-12-12 PROCEDURE — 93306 TTE W/DOPPLER COMPLETE: CPT | Performed by: INTERNAL MEDICINE

## 2022-12-12 RX ORDER — DAPAGLIFLOZIN 10 MG/1
10 TABLET, FILM COATED ORAL DAILY
Qty: 30 TABLET | Refills: 3 | Status: SHIPPED | OUTPATIENT
Start: 2022-12-12 | End: 2022-12-12

## 2022-12-12 RX ORDER — DAPAGLIFLOZIN 10 MG/1
10 TABLET, FILM COATED ORAL DAILY
Qty: 30 TABLET | Refills: 3 | Status: SHIPPED | OUTPATIENT
Start: 2022-12-12 | End: 2022-12-13

## 2022-12-12 RX ORDER — SACUBITRIL AND VALSARTAN 97; 103 MG/1; MG/1
1 TABLET, FILM COATED ORAL 2 TIMES DAILY
Qty: 180 TABLET | Refills: 3 | Status: SHIPPED | OUTPATIENT
Start: 2022-12-12 | End: 2023-07-14

## 2022-12-12 RX ADMIN — Medication 6 ML: at 14:47

## 2022-12-12 ASSESSMENT — PAIN SCALES - GENERAL: PAINLEVEL: NO PAIN (0)

## 2022-12-12 NOTE — PROGRESS NOTES
Cardiovascular Genetics Clinic Progress Note     Name: Marleen Mcfadden  : 1965  MRN: [id number]    2022    Dear Dr. Levine,    I had the pleasure of seeing Marleen Mcfadden, a 57 year old woman today in the 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      As you know, she has a family history of dilated cardiomyopathy including her sister, son, and daughter. Her genetic testing returned with a ariant of unknown significance (VUS) in the FLNC gene (c. 4069 G>A). She was seeing my colleague Dr. Kolb until her departure and she last saw her on 2022.     Overall, she is more fatigued than she was 6 months ago. 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 She has not been sleeping well, but was more fatigued even before the sleep issues. Her exercise tolerance has not changed and she also lifts weights several times a week. She notes that she gets dyspnea only when walking fast. He weight has decreased from 250 to 225 lbs which has been intentional. She has some edema at the end of the day, which is mild and unchanged. She has not taken any furosemide since her last visit. She has not had othopnea or PND. She has lightheadedness when standing up fast, no dizziness, and she has experienced presycope with laughing hard, sneezing, and coughing. She does have a racing heart sensation occasionally. She has KATIA but was not using her CPAP and it was returned. She has been noted to snore. She has also had left leg pain which can occur at rest. No NSAID use. She has not had UTIs in the past.     Her son Nano Mcfadden is 37 and has DCM and he would like to pursue genetic testing.     FAMILY HISTORY: from Ms. [facility]  note 2021 and any updates as as above    detailed family history was obtained during today's consult.  Family history was significant for the following cardiac history:    Full sister with DCM. She  in September after developing colon cancer.  Her history was also remarkable for MERSA, kidney disease and ultimate transplant.  She did not have an ICD or much care for her DCM.    Marleen's son and daughter also have DCM.      A maternal half sister has hypertension, epilepsy and reported dizziness and fainting. She does not have a cardiac issue to Marleen's knowledge.    Mother  suddenly at 38 yrs of age.  It was thought to be the result of a cardiac problem. However, she also had a long history of alcoholism and had a colostomy. Her two siblings have no known heart issues.    Maternal grandmother  in her 30's from a brain aneurysm. Nothing is known about maternal grandfather.    Marleen's father  at 55 years after a massive heart attack and CABG procedure.  His death occurred one day after the surgery reportedly from a clot.  One of his sisters  from a stroke.    Paternal grandmother  in her 70's from a stroke.  Her mother (Marleen's great grandmother)  suddenly in her 70's while sitting at a bus stop.Nothing is known about grandfather.    Two paternal half sisters in good health.  There is no additional history of cardiomyopathy, arrhythmias, heart attacks, fainting, sudden cardiac death, genetic conditions, or birth     REVIEW OF SYSTEMS: 10 point ROS neg other than the symptoms noted above in the HPI.    PAST MEDICAL HISTORY:   1. Familial systolic heart failure with VUS in FLNC   2. KATIA  3. Obesity     ALLERGIES:    Allergies   Allergen Reactions     Morphine      EMESIS     Nickel      Sulfa Drugs Swelling       MEDICATIONS:   Cholecalciferol (VITAMIN D) 2000 UNIT tablet, Take 5,000 Units by mouth as needed Reported on 3/8/2017  FLAXSEED, LINSEED, PO, Take 1 capsule by  mouth daily   fluticasone (FLONASE) 50 MCG/ACT nasal spray, Spray 2 sprays into both nostrils At Bedtime  furosemide (LASIX) 20 MG tablet, Take two tabs daily as needed  ibuprofen (ADVIL/MOTRIN) 600 MG tablet, Take 1 tablet (600 mg) by mouth every 6 hours as needed for moderate pain  loperamide (IMODIUM A-D) 2 MG tablet, Take 1 tablet (2 mg) by mouth 4 times daily as needed for diarrhea  loratadine (CLARITIN) 10 MG tablet, Take 1 tablet (10 mg) by mouth daily  metoprolol succinate ER (TOPROL XL) 50 MG 24 hr tablet, Take 1 tablet (50 mg) by mouth daily  ondansetron (ZOFRAN ODT) 4 MG ODT tab, Take 1 tablet (4 mg) by mouth every 8 hours as needed for nausea or vomiting  progesterone (PROMETRIUM) 100 MG capsule, TAKE 1 CAPSULE BY MOUTH EVERY NIGHT AT BEDTIME  progesterone (PROMETRIUM) 100 MG capsule, Take 100 mg by mouth At Bedtime   sacubitril-valsartan (ENTRESTO)  MG per tablet, Take 1 tablet by mouth 2 times daily  spironolactone (ALDACTONE) 25 MG tablet, Take 2 tablets (50 mg) by mouth daily    sodium chloride (PF) 0.9% PF flush 10 mL    SOCIAL HISTORY: 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 No substance use.     PHYSICAL EXAM:   /74 (BP Location: Left arm, Patient Position: Chair, Cuff Size: Adult Large)   Pulse 55   Wt 102.6 kg (226 lb 1.6 oz)   LMP 10/19/2008 (Approximate)   SpO2 98%   BMI 38.81 kg/m    General: comfortable, conversant, NAD  HEENT: normocephalic, atraumatic, anicteric  Neck: Estimate JVP <9cm blood   CV: RRR, nl s1 and s2, no murmurs, gallops, or rubs   Lungs: CTAB, no crackles or wheezes, normal work of breathing  Abdomen: BS+, soft, non tender, non distended  Extremities: warm and well perfused, trac edema  Neuro: normal speech and gait,     DIAGNOSTIC TESTING:    Latest Reference Range & Units 12/12/22 08:40   Sodium 133 - 144 mmol/L 136   Potassium 3.4 - 5.3 mmol/L 4.1   Chloride 94 - 109 mmol/L 106   Carbon Dioxide 20 - 32 mmol/L 26   Urea Nitrogen 7 - 30 mg/dL 23   Creatinine 0.52 -  1.04 mg/dL 0.78   GFR Estimate >60 mL/min/1.73m2 88   Calcium 8.5 - 10.1 mg/dL 8.8   Anion Gap 3 - 14 mmol/L 4   Magnesium 1.6 - 2.3 mg/dL 1.8   Albumin 3.4 - 5.0 g/dL 3.6   Protein Total 6.8 - 8.8 g/dL 7.5   Alkaline Phosphatase 40 - 150 U/L 58   ALT 0 - 50 U/L 22   AST 0 - 45 U/L 12   Bilirubin Total 0.2 - 1.3 mg/dL 0.6   N-Terminal Pro Bnp 0 - 900 pg/mL 136   Glucose 70 - 99 mg/dL 107 (H)   (H): Data is abnormally high    Echo 12/12/2022: Severe left ventricular dilation is present.LVIDd 70mm.  Biplane LVEF is 50%.  Right ventricular function, chamber size, wall motion, and thickness are  normal.  Both atria appear normal.  Pulmonary artery systolic pressure is normal.  The inferior vena cava is normal.  No pericardial effusion is present.  The left ventricular function has improved.    CMR 6/3/22: 1. The LV is mild-to-moderately dilated in cavity size. The wall thickness is normal. The global systolic  function is severely decreased. The LVEF is 33%. There is severe global hypokinesis.     2. The RV is normal in cavity size. The global systolic function is mildly decreased. The RVEF is 53%.      3. Both atria are normal in size.     4. There is no significant valvular disease.      5. Late gadolinium enhancement imaging shows no MI, fibrosis or infiltrative disease.      6. There is no pericardial effusion or thickening.     7. There is no intracardiac thrombus.     CONCLUSIONS: Severe, non-ischemic dilated cardiomyopathy, most likely of genetic cause. LVEF of 33% and  RVEF of 53% with no LGE. When directly compared to the prior CMR, the LV and RV size and function have not  significantly changed.      ASSESSMENT AND PLAN: 57 year-old woman with chronic systolic heart failure due to a famililal cardiomyopathy with VUS in [facility] who is here for routine follow up and management       1.  Chronic systolic heart failure secondary to familial cardiomyopathy.  Stage C, NYHA Class II  Overall, she is euvolemic and well  compensated. Her end organ function is normal. Her NT pro BNP is also normal. She had an echo today that shows severe LV dilatation at 7cm which is unchanged from the prior CMR. Her LVEF by echo is read at 50% and 6 months ago by CMR her LVEF was 30-35%. Her LVEF has fluctuated significantly in the past. She is agreeable to start an SGLT2 inhibitor now and reassess in 6 months. Continue current ARNI, BB, and MRA. Given her increase in fatigue I will check a cardiopulmonary exercise stress test.   ACEI/ARB/ARNI: yes, continue entresto 97/103 mg BID  BB: Yes, continue metoprolol XL 50mg daily  Aldosterone antagonist - Continue spironolactone 50mg daily   SGLT2i: initiate today, risks and benefits discussed   SCD prevention: If LVEF remain < 35% than she is agreable to consider an ICD for primary prevention   Fluid status euvolemic  Apnea: apnea evaluation   NSAID use: advised to avoid NSAIDs  Patient has previously met with genetic counselor regarding genetic testing.  Encouraged patient to proceed with genetic testing given family history.  Pt is aware of recommendation for all first degree relatives to undergo screening for familial cardiomyopathy preferably with cardiac MRI but at least echo.    2. Palpitations racing heart: Ziopatch monitor to assess for VT/arrythmias.   3. Left leg pain: pulses are intact. Check CAMRON.       PLAN:   -empagliflozin 10mg daily   -ziopatch monitor   -Sleep medicine referal for KATIA  -CAMRON   -6 month follow up with CMR and CPEX   65 minutes on DOS for chart review, patient history and exam, counseling, review of diagnostic testing, coordination of care and documentation.     Thank you for allowing me to participate in the care of your patient. Please do not hesitate to contact me if you have any questions.     Sincerely,   Forum     Forum MD Jurgen, PhD, FACC  Advanced Heart Failure/Transplantation/MCS  [facility]/[employer]

## 2022-12-12 NOTE — NURSING NOTE
Chief Complaint   Patient presents with     Follow Up     December 12, 2022 - Reason for visit: RTN HF: 57 year old female presents with history of Familial cardiomyopathy, systolic HF, EF 35% presents for follow up with labs and echo prior.     Vitals were taken and medications reconciled.    Moy Lemon, EMT  3:12 PM

## 2022-12-12 NOTE — LETTER
2022      RE: Marleen Mcfadden  [address]  [facility] [address]       Dear Colleague,    Thank you for the opportunity to participate in the care of your patient, Marleen Mcfadden, at the [facility] HEART [facility]IN[facility] [place] [facility]. Please see a copy of my visit note below.    Cardiovascular Genetics Clinic Progress Note     Name: Marleen Mcfadden  : 1965  MRN: [id number]    2022    Dear Dr. 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      As you know, she has a family history of dilated cardiomyopathy including her sister, son, and daughter. Her genetic testing returned with a ariant of unknown significance (VUS) in the FLNC gene (c. 4069 G>A). She was seeing my colleague Dr. Kolb until her departure and she last saw her on 2022.     Overall, she is more fatigued than she was 6 months ago. 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 She has not been sleeping well, but was more fatigued even before the sleep issues. Her exercise tolerance has not changed and she also lifts weights several times a week. She notes that she gets dyspnea only when walking fast. He weight has decreased from 250 to 225 lbs which has been intentional. She has some edema at the end of the day, which is mild and unchanged. She has not taken any furosemide since her last visit. She has not had othopnea or PND. She has lightheadedness when standing up fast, no dizziness, and she has experienced presycope with laughing hard, sneezing, and coughing. She does have a racing  heart sensation occasionally. She has KATIA but was not using her CPAP and it was returned. She has been noted to snore. She has also had left leg pain which can occur at rest. No NSAID use. She has not had UTIs in the past.     Her son Nano Mcfadden is 37 and has DCM and he would like to pursue genetic testing.     FAMILY HISTORY: from Ms. Bunch note 2021 and any updates as as above    detailed family history was obtained during today's consult.  Family history was significant for the following cardiac history:    Full sister with DCM. She  in September after developing colon cancer.  Her history was also remarkable for MERSA, kidney disease and ultimate transplant.  She did not have an ICD or much care for her DCM.    Marleen's son and daughter also have DCM.      A maternal half sister has hypertension, epilepsy and reported dizziness and fainting. She does not have a cardiac issue to Marleen's knowledge.    Mother  suddenly at 38 yrs of age.  It was thought to be the result of a cardiac problem. However, she also had a long history of alcoholism and had a colostomy. Her two siblings have no known heart issues.    Maternal grandmother  in her 30's from a brain aneurysm. Nothing is known about maternal grandfather.    Marleen's father  at 55 years after a massive heart attack and CABG procedure.  His death occurred one day after the surgery reportedly from a clot.  One of his sisters  from a stroke.    Paternal grandmother  in her 70's from a stroke.  Her mother (Marleen's great grandmother)  suddenly in her 70's while sitting at a bus stop.Nothing is known about grandfather.    Two paternal half sisters in good health.  There is no additional history of cardiomyopathy, arrhythmias, heart attacks, fainting, sudden cardiac death, genetic conditions, or birth     REVIEW OF SYSTEMS: 10 point ROS neg other than the symptoms noted above in the HPI.    PAST MEDICAL  HISTORY:   1. Familial systolic heart failure with VUS in [facility]   2. KATIA  3. Obesity     ALLERGIES:    Allergies   Allergen Reactions     Morphine      EMESIS     Nickel      Sulfa Drugs Swelling       MEDICATIONS:   Cholecalciferol (VITAMIN D) 2000 UNIT tablet, Take 5,000 Units by mouth as needed Reported on 3/8/2017  FLAXSEED, LINSEED, PO, Take 1 capsule by mouth daily   fluticasone (FLONASE) 50 MCG/ACT nasal spray, Spray 2 sprays into both nostrils At Bedtime  furosemide (LASIX) 20 MG tablet, Take two tabs daily as needed  ibuprofen (ADVIL/MOTRIN) 600 MG tablet, Take 1 tablet (600 mg) by mouth every 6 hours as needed for moderate pain  loperamide (IMODIUM A-D) 2 MG tablet, Take 1 tablet (2 mg) by mouth 4 times daily as needed for diarrhea  loratadine (CLARITIN) 10 MG tablet, Take 1 tablet (10 mg) by mouth daily  metoprolol succinate ER (TOPROL XL) 50 MG 24 hr tablet, Take 1 tablet (50 mg) by mouth daily  ondansetron (ZOFRAN ODT) 4 MG ODT tab, Take 1 tablet (4 mg) by mouth every 8 hours as needed for nausea or vomiting  progesterone (PROMETRIUM) 100 MG capsule, TAKE 1 CAPSULE BY MOUTH EVERY NIGHT AT BEDTIME  progesterone (PROMETRIUM) 100 MG capsule, Take 100 mg by mouth At Bedtime   sacubitril-valsartan (ENTRESTO)  MG per tablet, Take 1 tablet by mouth 2 times daily  spironolactone (ALDACTONE) 25 MG tablet, Take 2 tablets (50 mg) by mouth daily    sodium chloride (PF) 0.9% PF flush 10 mL    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     PHYSICAL EXAM:   /74 (BP Location: Left arm, Patient Position: Chair, Cuff Size: Adult Large)   Pulse 55   Wt 102.6 kg (226 lb 1.6 oz)   LMP 10/19/2008 (Approximate)   SpO2 98%   BMI 38.81 kg/m    General: comfortable, conversant, NAD  HEENT: normocephalic, atraumatic, anicteric  Neck: Estimate JVP <9cm blood   CV: RRR, nl s1 and s2, no murmurs, gallops, or rubs   Lungs: CTAB, no crackles or wheezes, normal work of breathing  Abdomen: BS+, soft,  non tender, non distended  Extremities: warm and well perfused, trac edema  Neuro: normal speech and gait,     DIAGNOSTIC TESTING:    Latest Reference Range & Units 12/12/22 08:40   Sodium 133 - 144 mmol/L 136   Potassium 3.4 - 5.3 mmol/L 4.1   Chloride 94 - 109 mmol/L 106   Carbon Dioxide 20 - 32 mmol/L 26   Urea Nitrogen 7 - 30 mg/dL 23   Creatinine 0.52 - 1.04 mg/dL 0.78   GFR Estimate >60 mL/min/1.73m2 88   Calcium 8.5 - 10.1 mg/dL 8.8   Anion Gap 3 - 14 mmol/L 4   Magnesium 1.6 - 2.3 mg/dL 1.8   Albumin 3.4 - 5.0 g/dL 3.6   Protein Total 6.8 - 8.8 g/dL 7.5   Alkaline Phosphatase 40 - 150 U/L 58   ALT 0 - 50 U/L 22   AST 0 - 45 U/L 12   Bilirubin Total 0.2 - 1.3 mg/dL 0.6   N-Terminal Pro Bnp 0 - 900 pg/mL 136   Glucose 70 - 99 mg/dL 107 (H)   (H): Data is abnormally high    Echo 12/12/2022: Severe left ventricular dilation is present.LVIDd 70mm.  Biplane LVEF is 50%.  Right ventricular function, chamber size, wall motion, and thickness are  normal.  Both atria appear normal.  Pulmonary artery systolic pressure is normal.  The inferior vena cava is normal.  No pericardial effusion is present.  The left ventricular function has improved.    CMR 6/3/22: 1. The LV is mild-to-moderately dilated in cavity size. The wall thickness is normal. The global systolic  function is severely decreased. The LVEF is 33%. There is severe global hypokinesis.     2. The RV is normal in cavity size. The global systolic function is mildly decreased. The RVEF is 53%.      3. Both atria are normal in size.     4. There is no significant valvular disease.      5. Late gadolinium enhancement imaging shows no MI, fibrosis or infiltrative disease.      6. There is no pericardial effusion or thickening.     7. There is no intracardiac thrombus.     CONCLUSIONS: Severe, non-ischemic dilated cardiomyopathy, most likely of genetic cause. LVEF of 33% and  RVEF of 53% with no LGE. When directly compared to the prior CMR, the LV and RV size and  function have not  significantly changed.      ASSESSMENT AND PLAN: 57 year-old woman with chronic systolic heart failure due to a famililal cardiomyopathy with VUS in FLNC who is here for routine follow up and management       1.  Chronic systolic heart failure secondary to familial cardiomyopathy.  Stage C, NYHA Class II  Overall, she is euvolemic and well compensated. Her end organ function is normal. Her NT pro BNP is also normal. She had an echo today that shows severe LV dilatation at 7cm which is unchanged from the prior CMR. Her LVEF by echo is read at 50% and 6 months ago by CMR her LVEF was 30-35%. Her LVEF has fluctuated significantly in the past. She is agreeable to start an SGLT2 inhibitor now and reassess in 6 months. Continue current ARNI, BB, and MRA. Given her increase in fatigue I will check a cardiopulmonary exercise stress test.   ACEI/ARB/ARNI: yes, continue entresto 97/103 mg BID  BB: Yes, continue metoprolol XL 50mg daily  Aldosterone antagonist - Continue spironolactone 50mg daily   SGLT2i: initiate today, risks and benefits discussed   SCD prevention: If LVEF remain < 35% than she is agreable to consider an ICD for primary prevention   Fluid status euvolemic  Apnea: apnea evaluation   NSAID use: advised to avoid NSAIDs  Patient has previously met with genetic counselor regarding genetic testing.  Encouraged patient to proceed with genetic testing given family history.  Pt is aware of recommendation for all first degree relatives to undergo screening for familial cardiomyopathy preferably with cardiac MRI but at least echo.    2. Palpitations racing heart: Ziopatch monitor to assess for VT/arrythmias.   3. Left leg pain: pulses are intact. Check CAMRON.       PLAN:   -empagliflozin 10mg daily   -ziopatch monitor   -Sleep medicine referal for KATIA  -CAMRON   -6 month follow up with CMR and CPEX   65 minutes on DOS for chart review, patient history and exam, counseling, review of diagnostic testing,  coordination of care and documentation.     Thank you for allowing me to participate in the care of your patient. Please do not hesitate to contact me if you have any questions.     Sincerely,   Forum     Forum MD Jurgen, PhD, [facility]  Advanced Heart Failure/Transplantation/MCS  [facility]/[employer]

## 2022-12-12 NOTE — PATIENT INSTRUCTIONS
"You were seen today in the Cardiovascular Clinic at the [facility].      Cardiology Providers you saw during your visit:  Dr. Real Seaman     Recommendations:   SGLT2 - Farxiga or Jardiance 10 mg daily  We will place a Zio patch (portable cardiac monitor) on today in clinic. You will wear this for 7 days and then mail it in. It can take up to 3 weeks to get the results back from your Zio patch after it has been mailed back in. We will contact you when we receive and review the results.   Sleep medicine referral for KATIA  CMARON - please call [phone] to schedule.  Follow up with Dr. Seaman in 6 months with cardiac MRI and cardiopulmonary stress test prior.             Thank you for your visit today!   Please MyChart message or call if you have any questions or concerns.      During Business Hours:  [phone], option # 1 \"To leave a message for your care team\"     After hours, weekends or holidays:   [phone], Option #4  Ask to speak to the On-Call Cardiologist. Inform them you are a heart failure patient at the [place].      Megha Meyer RN BSN   Cardiology Nurse Coordinator - Heart Failure/C.O.R.E. Clinic  [facility]  [phone] option 1 to schedule an appointment or leave a message for your care team        Cardiac MRI    Magnetic resonance imaging (MRI) is a test that lets your doctor see detailed pictures of the inside of your body. MRI combines the use of strong magnets and radio waves to form an MRI image. A Cardiac MRI will give a detailed image of your heart.    Follow these instructions:    Please arrive to the [facility] Waiting Room in the [facility].   1. You will be required to lay flat and follow breath-hold instructions.   2. You will need to remove all metal and answer a safety questionnaire. Please wear clothes without metal (snaps and zippers). Please remove any body piercings and hair extensions before you arrive. You will also remove watches, " jewelry, hairpins, wallets, dentures, partial dental plates and hearing aids. You may wear contact lenses, and you may be able to wear your rings. We have a safe place to keep your personal items, but it is safer to leave them at home.  3. You will be given IV contrast for this exam.  To prepare:  The day before the exam drink extra fluid - at least six 8oz glasses (unless you are on a fluid restriction per your doctor)     Please inform us if you would answer YES to any of the following questions:  Are you claustrophobic?   Do you have metals in your body?  Do you any contrast allergies?  Diabetes or Kidney disease?

## 2022-12-13 RX ORDER — DAPAGLIFLOZIN 10 MG/1
10 TABLET, FILM COATED ORAL DAILY
Qty: 90 TABLET | Refills: 3 | Status: SHIPPED | OUTPATIENT
Start: 2022-12-13 | End: 2023-05-01

## 2022-12-13 NOTE — NURSING NOTE
Labs: Patient was given results of the laboratory testing obtained today. Patient demonstrated understanding of this information and agreed to call with further questions or concerns.     Med Reconcile: Reviewed and verified all current medications with the patient. The updated medication list was printed and given to the patient.    New Medication: Patient was educated regarding newly prescribed medication, including discussion of  the indication, administration, side effects, and when to report to MD or RN. Patient demonstrated understanding of this information and agreed to call with further questions or concerns.    Farxiga - 30 day supply is $15.   Jardiance - 30 day supply is $50.    Return Appointment: Patient given instructions regarding scheduling next clinic visit. Patient demonstrated understanding of this information and agreed to call with further questions or concerns. Follow up in 6 months with cardiac MRI and CPEX.    Patient stated she understood all health information given and agreed to call with further questions or concerns.    Megha Meyer RN

## 2022-12-19 ENCOUNTER — OFFICE VISIT (OUTPATIENT)
Dept: URGENT CARE | Facility: URGENT CARE | Age: 57
End: 2022-12-19
Payer: COMMERCIAL

## 2022-12-19 VITALS
BODY MASS INDEX: 39.29 KG/M2 | DIASTOLIC BLOOD PRESSURE: 82 MMHG | SYSTOLIC BLOOD PRESSURE: 120 MMHG | HEART RATE: 50 BPM | TEMPERATURE: 97.4 F | OXYGEN SATURATION: 99 % | WEIGHT: 228.9 LBS

## 2022-12-19 DIAGNOSIS — R50.9 FEVER, UNSPECIFIED FEVER CAUSE: Primary | ICD-10-CM

## 2022-12-19 DIAGNOSIS — Z20.818 EXPOSURE TO STREP THROAT: ICD-10-CM

## 2022-12-19 LAB
DEPRECATED S PYO AG THROAT QL EIA: NEGATIVE
FLUAV AG SPEC QL IA: NEGATIVE
FLUBV AG SPEC QL IA: NEGATIVE
GROUP A STREP BY PCR: NOT DETECTED

## 2022-12-19 PROCEDURE — 99213 OFFICE O/P EST LOW 20 MIN: CPT

## 2022-12-19 PROCEDURE — 87804 INFLUENZA ASSAY W/OPTIC: CPT

## 2022-12-19 PROCEDURE — 87651 STREP A DNA AMP PROBE: CPT

## 2022-12-19 NOTE — LETTER
December 19, 2022      Marleen Mcfadden  [address]   [facility] [address]        To Whom It May Concern:    Marleen Mcfadden  was seen on December 19, 2022.  Please excuse her from work until December 21st due to illness.        Sincerely,        Simba Torres, PITO CNP

## 2022-12-19 NOTE — PATIENT INSTRUCTIONS
Strep and influenza tests are negative.  Get plenty of rest and drink fluids.  You can use Tylenol as needed for pain and fever.  Maximum dose of Tylenol is 3000mg in a 24 hour period of time.  You can also try warm salt water gargles, hot/warm water or tea with honey and/or lemon and/or Cepacol lozenges or spray for your sore throat.

## 2022-12-19 NOTE — PROGRESS NOTES
ASSESSMENT:  (R50.9) Fever, unspecified fever cause  (primary encounter diagnosis)  Plan: Influenza A & B Antigen - Clinic Collect    (Z20.380) Exposure to strep throat  Plan: Streptococcus A Rapid Screen w/Reflex to PCR -         Clinic Collect, Group A Streptococcus PCR         Throat Swab    PLAN:  Informed the patient the strep and influenza tests are negative.  We discussed the need for her to get plenty of rest and drink fluids.  Informed her to use Tylenol as needed for pain and fever with the maximum dose being 3000 mg in a 24-hour period of time.  We also discussed trying warm salt water gargles, hot/warm water or tea with honey and or lemon and/or Cepacol lozenges or spray for her sore throat.  Informed her to return to clinic with any new or worsening symptoms.  Patient acknowledged her understanding of the above plan.    PITO Hamilton CNP      SUBJECTIVE:   Marleen Mcfadden is a 57 year old female presenting with a chief complaint of chills, body aches, sore throat, cough, runny nose, vomiting and diarrhea.  Onset of symptoms was 3 day(s) ago.  Course of illness is worsening.    Treatment measures tried include Tylenol/Ibuprofen and garlic, onion and ginger tea.  Predisposing factors include exposure to strep, exposure to influenza and exposure to COVID.    COVID test negative at work.     ROS:  Negative except noted above.    OBJECTIVE:  /82 (BP Location: Left arm, Patient Position: Sitting, Cuff Size: Adult Large)   Pulse 50   Temp 97.4  F (36.3  C) (Tympanic)   Wt 103.8 kg (228 lb 14.4 oz)   LMP 10/19/2008 (Approximate)   SpO2 99%   BMI 39.29 kg/m    GENERAL APPEARANCE: healthy, alert and no distress  EYES: EOMI,  PERRL, conjunctiva clear  HENT: ear canals and TM's normal.  Nose and mouth without ulcers, erythema or lesions  NECK: supple, nontender, no lymphadenopathy  RESP: lungs clear to auscultation - no rales, rhonchi or wheezes  CV: regular rates and rhythm, normal S1  S2, no murmur noted  ABDOMEN:  soft, nontender, no HSM or masses and bowel sounds normal  NEURO: Normal strength and tone, sensory exam grossly normal,  normal speech and mentation  SKIN: no suspicious lesions or rashes  PSYCH: mentation appears normal    Rapid Strep test: Negative

## 2023-01-04 ENCOUNTER — MYC MEDICAL ADVICE (OUTPATIENT)
Dept: CARDIOLOGY | Facility: CLINIC | Age: 58
End: 2023-01-04

## 2023-01-05 ENCOUNTER — CARE COORDINATION (OUTPATIENT)
Dept: CARDIOLOGY | Facility: CLINIC | Age: 58
End: 2023-01-05

## 2023-01-05 ENCOUNTER — TELEPHONE (OUTPATIENT)
Dept: CARDIOLOGY | Facility: CLINIC | Age: 58
End: 2023-01-05

## 2023-01-05 DIAGNOSIS — I50.22 CHRONIC SYSTOLIC CONGESTIVE HEART FAILURE (H): Primary | ICD-10-CM

## 2023-01-05 DIAGNOSIS — I49.3 PVC'S (PREMATURE VENTRICULAR CONTRACTIONS): Primary | ICD-10-CM

## 2023-01-05 RX ORDER — LIDOCAINE 40 MG/G
CREAM TOPICAL
Status: CANCELLED | OUTPATIENT
Start: 2023-01-05

## 2023-01-05 NOTE — TELEPHONE ENCOUNTER
I called Marleen and went over pre-procedure instructions with her.    Megha Meyer RN    Pre-procedure instructions - Right heart catheterization  Patient Education    1. Your arrival time is 10:00 Tuesday, January 10.  Location is [address] Waiting Room  2. Please plan on being at the hospital all day.  3. At any time, emergencies and/or urgent cases may come up which could delay the start of your procedure.    Pre-procedure instructions - Right heart catheterization  - Nothing to eat for 6 hours   - You may have clear liquids up until the time of your procedure  - You can take your morning medications (except diabetic and blood thinners) with sips of water  - We recommend you arrange for a ride to drop you off and pick you up, in the instance, you are unable to drive home, however you should be able to function as you normally would after the procedure    Diabetic Medication Instructions  ? Typical instructions for insulin diabetic medication holding are below. However, please reach out to your Primary Care Provider or Endocrinologist for specific instructions  ? DO NOT take any oral diabetic medication, short-acting diabetes medications/insulin, humalog or regular insulin the morning of your test  ? Take   dose of long-acting insulin (Lantus, Levemir) the day of your test  ? Remember to  bring your glucometer and insulin with you to take after your test if needed              Anticoagulation Medication Instructions   NA

## 2023-01-06 NOTE — TELEPHONE ENCOUNTER
Date: 1/6/2023    Time of Call: 1:45 PM     Diagnosis:  PVCs     [ TORB ] Ordering provider: Real Seaman MD  Order: Referral to Dr. Aragon     Order received by: Megha Meyer RN     Follow-up/additional notes: MyChart sent to Marleen

## 2023-01-09 ENCOUNTER — APPOINTMENT (OUTPATIENT)
Dept: LAB | Facility: CLINIC | Age: 58
End: 2023-01-09
Payer: COMMERCIAL

## 2023-01-09 ENCOUNTER — TELEPHONE (OUTPATIENT)
Dept: CARDIOLOGY | Facility: CLINIC | Age: 58
End: 2023-01-09

## 2023-01-09 LAB
ALBUMIN SERPL-MCNC: 3.6 G/DL (ref 3.4–5)
ALP SERPL-CCNC: 49 U/L (ref 40–150)
ALT SERPL W P-5'-P-CCNC: 22 U/L (ref 0–50)
ANION GAP SERPL CALCULATED.3IONS-SCNC: 6 MMOL/L (ref 3–14)
AST SERPL W P-5'-P-CCNC: 14 U/L (ref 0–45)
B-HCG SERPL-ACNC: <1 IU/L (ref 0–5)
BASOPHILS # BLD AUTO: 0 10E3/UL (ref 0–0.2)
BASOPHILS NFR BLD AUTO: 1 %
BILIRUB SERPL-MCNC: 0.3 MG/DL (ref 0.2–1.3)
BUN SERPL-MCNC: 16 MG/DL (ref 7–30)
CALCIUM SERPL-MCNC: 9.1 MG/DL (ref 8.5–10.1)
CHLORIDE BLD-SCNC: 105 MMOL/L (ref 94–109)
CO2 SERPL-SCNC: 26 MMOL/L (ref 20–32)
CREAT SERPL-MCNC: 0.79 MG/DL (ref 0.52–1.04)
EOSINOPHIL # BLD AUTO: 0.1 10E3/UL (ref 0–0.7)
EOSINOPHIL NFR BLD AUTO: 2 %
ERYTHROCYTE [DISTWIDTH] IN BLOOD BY AUTOMATED COUNT: 12.8 % (ref 10–15)
GFR SERPL CREATININE-BSD FRML MDRD: 86 ML/MIN/1.73M2
GLUCOSE BLD-MCNC: 93 MG/DL (ref 70–99)
HCT VFR BLD AUTO: 37.8 % (ref 35–47)
HGB BLD-MCNC: 12.6 G/DL (ref 11.7–15.7)
IMM GRANULOCYTES # BLD: 0 10E3/UL
IMM GRANULOCYTES NFR BLD: 0 %
INR PPP: 1.05 (ref 0.85–1.15)
LYMPHOCYTES # BLD AUTO: 3 10E3/UL (ref 0.8–5.3)
LYMPHOCYTES NFR BLD AUTO: 45 %
MCH RBC QN AUTO: 31.5 PG (ref 26.5–33)
MCHC RBC AUTO-ENTMCNC: 33.3 G/DL (ref 31.5–36.5)
MCV RBC AUTO: 95 FL (ref 78–100)
MONOCYTES # BLD AUTO: 0.5 10E3/UL (ref 0–1.3)
MONOCYTES NFR BLD AUTO: 8 %
NEUTROPHILS # BLD AUTO: 2.8 10E3/UL (ref 1.6–8.3)
NEUTROPHILS NFR BLD AUTO: 44 %
NRBC # BLD AUTO: 0 10E3/UL
NRBC BLD AUTO-RTO: 0 /100
PLATELET # BLD AUTO: 250 10E3/UL (ref 150–450)
POTASSIUM BLD-SCNC: 3.9 MMOL/L (ref 3.4–5.3)
PROT SERPL-MCNC: 7.2 G/DL (ref 6.8–8.8)
RBC # BLD AUTO: 4 10E6/UL (ref 3.8–5.2)
SODIUM SERPL-SCNC: 137 MMOL/L (ref 133–144)
WBC # BLD AUTO: 6.5 10E3/UL (ref 4–11)

## 2023-01-09 PROCEDURE — 84702 CHORIONIC GONADOTROPIN TEST: CPT | Performed by: INTERNAL MEDICINE

## 2023-01-09 PROCEDURE — 80053 COMPREHEN METABOLIC PANEL: CPT | Performed by: INTERNAL MEDICINE

## 2023-01-09 PROCEDURE — 36415 COLL VENOUS BLD VENIPUNCTURE: CPT | Performed by: INTERNAL MEDICINE

## 2023-01-09 PROCEDURE — 85025 COMPLETE CBC W/AUTO DIFF WBC: CPT | Performed by: INTERNAL MEDICINE

## 2023-01-09 PROCEDURE — 85610 PROTHROMBIN TIME: CPT | Performed by: INTERNAL MEDICINE

## 2023-01-09 NOTE — TELEPHONE ENCOUNTER
I called Marleen and left a voicemail letting her know that orders were released.    Megha Meyer RN

## 2023-01-09 NOTE — TELEPHONE ENCOUNTER
Health Call Center    Phone Message    May a detailed message be left on voicemail: yes     Reason for Call: Other: Please reach out to patient as she is at the lab and is wanting to do the four labs today instead of before her procedure tomorrow.  needs orders released to do them.      Action Taken: Message routed to:  Other: cardiology    Travel Screening: Not Applicable     Thank you!  [facility]

## 2023-01-10 ENCOUNTER — APPOINTMENT (OUTPATIENT)
Dept: MEDSURG UNIT | Facility: CLINIC | Age: 58
End: 2023-01-10
Attending: INTERNAL MEDICINE
Payer: COMMERCIAL

## 2023-01-10 ENCOUNTER — HOSPITAL ENCOUNTER (OUTPATIENT)
Facility: CLINIC | Age: 58
Discharge: HOME | End: 2023-01-10
Attending: INTERNAL MEDICINE | Admitting: INTERNAL MEDICINE
Payer: COMMERCIAL

## 2023-01-10 ENCOUNTER — APPOINTMENT (OUTPATIENT)
Dept: LAB | Facility: CLINIC | Age: 58
End: 2023-01-10
Attending: INTERNAL MEDICINE
Payer: COMMERCIAL

## 2023-01-10 VITALS
HEART RATE: 41 BPM | HEIGHT: 64 IN | SYSTOLIC BLOOD PRESSURE: 121 MMHG | BODY MASS INDEX: 38.58 KG/M2 | OXYGEN SATURATION: 98 % | WEIGHT: 225.97 LBS | DIASTOLIC BLOOD PRESSURE: 64 MMHG | RESPIRATION RATE: 16 BRPM | TEMPERATURE: 98.5 F

## 2023-01-10 DIAGNOSIS — I50.22 CHRONIC SYSTOLIC CONGESTIVE HEART FAILURE (H): ICD-10-CM

## 2023-01-10 PROCEDURE — 250N000009 HC RX 250: Performed by: INTERNAL MEDICINE

## 2023-01-10 PROCEDURE — 272N000001 HC OR GENERAL SUPPLY STERILE: Performed by: INTERNAL MEDICINE

## 2023-01-10 PROCEDURE — 999N000142 HC STATISTIC PROCEDURE PREP ONLY

## 2023-01-10 PROCEDURE — 93451 RIGHT HEART CATH: CPT | Performed by: INTERNAL MEDICINE

## 2023-01-10 PROCEDURE — 93451 RIGHT HEART CATH: CPT | Mod: 26 | Performed by: INTERNAL MEDICINE

## 2023-01-10 PROCEDURE — 999N000132 HC STATISTIC PP CARE STAGE 1

## 2023-01-10 RX ORDER — LIDOCAINE 40 MG/G
CREAM TOPICAL
Status: COMPLETED | OUTPATIENT
Start: 2023-01-10 | End: 2023-01-10

## 2023-01-10 RX ADMIN — LIDOCAINE: 40 CREAM TOPICAL at 10:26

## 2023-01-10 ASSESSMENT — ACTIVITIES OF DAILY LIVING (ADL)
ADLS_ACUITY_SCORE: 37

## 2023-01-10 NOTE — DISCHARGE INSTRUCTIONS
[facility] Interventional Cardiology Discharge Instructions Post Right Heart Cath  AFTER YOU GO HOME:  DO drink plenty of fluids  DO resume your regular diet and medications unless otherwise instructed by your Primary Physician  Do Not scrub the procedure site vigorously  No lotion or powder to the puncture site for 3 days    CALL YOUR PRIMARY PHYSICIAN IF: You may resume all normal activity.  Monitor neck site for bleeding, swelling, or voice changes. If you notice bleeding or swelling immediately apply pressure to the site and call number below to speak with Cardiology Fellow.  If you experience any changes in your breathing you should call your doctor immediately or come to the closest Emergency Department.  Do not drive yourself.    ADDITIONAL INSTRUCTIONS: Medications: You are to resume all home medications including anticoagulation therapy unless otherwise advised by your primary cardiologist or nurse coordinator.    Follow Up: Per your primary cardiology team    If you have any questions or concerns regarding your procedure site please call [phone] at anytime and ask for Cardiology Fellow on call.  They are available 24 hours a day.  You may also contact the Cardiology Clinic after hours number at [phone].                                                     Telephone Numbers [phone] Monday-Friday 8:00 am to 4:30 pm    [phone] After 4:30 pm Monday-Friday, Weekends & Holidays  Ask for Interventional Cardiologist on call. Someone is on call 24 hours/day   [facility] toll free number [phone] Monday-Friday 8:00 am to 4:30 pm   [facility] Emergency Dept [phone]

## 2023-01-11 ENCOUNTER — ANCILLARY PROCEDURE (OUTPATIENT)
Dept: ULTRASOUND IMAGING | Facility: CLINIC | Age: 58
End: 2023-01-11
Attending: INTERNAL MEDICINE
Payer: COMMERCIAL

## 2023-01-11 DIAGNOSIS — M79.662 PAIN AND SWELLING OF LEFT LOWER LEG: ICD-10-CM

## 2023-01-11 DIAGNOSIS — M79.89 PAIN AND SWELLING OF LEFT LOWER LEG: ICD-10-CM

## 2023-01-11 DIAGNOSIS — M79.662 PAIN OF LEFT LOWER LEG: ICD-10-CM

## 2023-01-11 PROCEDURE — 93924 LWR XTR VASC STDY BILAT: CPT | Mod: GC | Performed by: RADIOLOGY

## 2023-01-11 ASSESSMENT — ACTIVITIES OF DAILY LIVING (ADL)
ADLS_ACUITY_SCORE: 37

## 2023-01-12 ASSESSMENT — ACTIVITIES OF DAILY LIVING (ADL)
ADLS_ACUITY_SCORE: 37

## 2023-01-12 NOTE — NURSING NOTE
"Chief Complaint   Patient presents with     URI       Initial /74 (BP Location: Left arm, Patient Position: Chair, Cuff Size: Adult Large)  Pulse 64  Temp 98.5  F (36.9  C) (Oral)  Resp 19  Wt 199 lb 8 oz (90.5 kg)  LMP 10/19/2008  SpO2 98%  BMI 34.24 kg/m2 Estimated body mass index is 34.24 kg/(m^2) as calculated from the following:    Height as of 2/16/17: 5' 4\" (1.626 m).    Weight as of this encounter: 199 lb 8 oz (90.5 kg).  Medication Reconciliation: complete       [place] ZAY Terry  " Yes

## 2023-01-13 ENCOUNTER — OFFICE VISIT (OUTPATIENT)
Dept: CARDIOLOGY | Facility: CLINIC | Age: 58
End: 2023-01-13
Attending: INTERNAL MEDICINE
Payer: COMMERCIAL

## 2023-01-13 VITALS
WEIGHT: 227.6 LBS | BODY MASS INDEX: 37.92 KG/M2 | SYSTOLIC BLOOD PRESSURE: 118 MMHG | HEART RATE: 57 BPM | HEIGHT: 65 IN | DIASTOLIC BLOOD PRESSURE: 79 MMHG | OXYGEN SATURATION: 100 %

## 2023-01-13 DIAGNOSIS — M79.662 PAIN OF LEFT LOWER LEG: ICD-10-CM

## 2023-01-13 DIAGNOSIS — I49.3 PVC'S (PREMATURE VENTRICULAR CONTRACTIONS): ICD-10-CM

## 2023-01-13 DIAGNOSIS — I42.9 FAMILIAL CARDIOMYOPATHY (H): ICD-10-CM

## 2023-01-13 DIAGNOSIS — I42.0 DILATED CARDIOMYOPATHY (H): ICD-10-CM

## 2023-01-13 DIAGNOSIS — I50.22 CHRONIC SYSTOLIC CONGESTIVE HEART FAILURE (H): Primary | ICD-10-CM

## 2023-01-13 PROCEDURE — G0463 HOSPITAL OUTPT CLINIC VISIT: HCPCS | Performed by: NURSE PRACTITIONER

## 2023-01-13 PROCEDURE — G0463 HOSPITAL OUTPT CLINIC VISIT: HCPCS | Mod: 25 | Performed by: NURSE PRACTITIONER

## 2023-01-13 PROCEDURE — 99214 OFFICE O/P EST MOD 30 MIN: CPT | Performed by: NURSE PRACTITIONER

## 2023-01-13 PROCEDURE — G0463 HOSPITAL OUTPT CLINIC VISIT: HCPCS | Mod: 25

## 2023-01-13 RX ORDER — METOPROLOL SUCCINATE 25 MG/1
25 TABLET, EXTENDED RELEASE ORAL DAILY
Qty: 90 TABLET | Refills: 3 | Status: SHIPPED | OUTPATIENT
Start: 2023-01-13 | End: 2023-02-18

## 2023-01-13 RX ORDER — METOPROLOL SUCCINATE 50 MG/1
25 TABLET, EXTENDED RELEASE ORAL DAILY
Qty: 90 TABLET | Refills: 0 | Status: SHIPPED | OUTPATIENT
Start: 2023-01-13 | End: 2023-01-13

## 2023-01-13 ASSESSMENT — ACTIVITIES OF DAILY LIVING (ADL)
ADLS_ACUITY_SCORE: 37

## 2023-01-13 ASSESSMENT — PAIN SCALES - GENERAL: PAINLEVEL: NO PAIN (0)

## 2023-01-13 NOTE — PROGRESS NOTES
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       As you know, she has a family history of dilated cardiomyopathy including her sister, son, and daughter. Her genetic testing returned with a ariant of unknown significance (VUS) in the FLNC gene (c. 4069 G>A). She was seeing  Dr. Kolb until her departure. She has now established care with Dr. Seaman.    Her son Nano Mcfadden is 37 and has DCM and he would like to pursue genetic testing.    She has not been sleeping well, but was more fatigued even before the sleep issues. Her exercise tolerance has not changed and she also lifts weights several times a week. Weight 226 lbs at home. She uses her rowing machine and uses the spin bike as well - 15 min each.   She has no SOB at rest. She has some SOB at work and she stops what she is doing then she resumes. She has some days when she feels \"great\" and some days she feels she is pushing herself.  She has swelling in the leg/ankles- at baseline.  She has some distention in the abdomen- but she has a lot issues with gas. She uses one pillow and sleeps on her back with no issues. She uses the Lasix as needed. Its been a while since the last time she used. She eats well.     Denies SOB, PND, orthopnea, weight gain, chest pain, palpitations, lightheadedness, dizziness, near syncopal/syncopal episodes. Marleen has been following salt and fluid restrictions.      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     Familial cardiomyopathy (H)      GERD without esophagitis      History of transfusion      Hypertension      Injury of left shoulder, initial encounter 01/12/2017     Morbid obesity (H)      Nontraumatic rupture of quadriceps tendon, left 06/21/2018     KATIA (obstructive sleep apnea)- moderate-severe (AHI 29) 8/26/2021     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     no longer an issue     Peroneus longus tendinitis 01/02/2015     Plantar fasciitis 11/11/2014     PONV (postoperative nausea and vomiting)      Rotator cuff injury 01/17/2017     Sprain of other ligament of left ankle, initial encounter 01/12/2017     Status post left knee replacement 06/21/2018     TB lung, latent     negative quantiferon gold test  11/5/12       FAMILY HISTORY:  Family History   Problem Relation Age of Onset     Hypertension Father         dec     Diabetes Father         dec     Heart Disease Father         dec     Alcohol/Drug Father      Cardiovascular Father      Heart Disease Mother         dec     Alcohol/Drug Mother      Cardiovascular Mother      Heart Disease Daughter         Cardiomyopathy     Cardiovascular Daughter         cardiomyopathy     Colon Cancer Sister      Cardiovascular Son         cardiomyopathy     Diabetes Paternal Grandmother         dec     Hypertension Paternal Grandmother         dec     Cerebrovascular Disease Paternal Grandmother         dec     Cancer Sister         Lupus     Cardiovascular Sister         cardiomyopathy     Heart Disease Sister         heart failure, and kidney failure       SOCIAL HISTORY:  Social History     Tobacco Use     Smoking status: Never     Smokeless tobacco: Never   Substance Use Topics     Alcohol use: Yes     Comment: rare, twice a year, she has a drink little wine 2 days ago      "Drug use: No           CURRENT MEDICATIONS:  Current Outpatient Medications   Medication Sig Dispense Refill     metoprolol succinate ER (TOPROL XL) 25 MG 24 hr tablet Take 1 tablet (25 mg) by mouth daily 90 tablet 3       ROS:  Review Of Systems  Skin: negative  Eyes: negative  Ears/Nose/Throat: negative  Respiratory: Dyspnea on exertion-   Cardiovascular: lower extremity edema  Gastrointestinal: negative  Genitourinary: negative  Musculoskeletal: negative  Neurologic: negative  Psychiatric: negative  Hematologic/Lymphatic/Immunologic: negative  Endocrine: negative      EXAM:  /79 (BP Location: Right arm, Patient Position: Chair, Cuff Size: Adult Large)   Pulse 57   Ht 1.648 m (5' 4.88\")   Wt 103.2 kg (227 lb 9.6 oz)   LMP 10/19/2008 (Approximate)   SpO2 100%   BMI 38.01 kg/m    Home weight:  General: alert, articulate, and in no acute distress.  HEENT: normocephalic, atraumatic, anicteric sclera, EOMI, mucosa moist, no cyanosis.   Neck: neck supple.  No adenopathy, masses, or carotid bruits.  JVP flat at 90 degrees  Heart: regular rhythm, normal S1/S2, no murmur, gallop, rub.  Precordium quiet with normal PMI.     Lungs: clear, no rales, ronchi, or wheezing.  No accessory muscle use, respirations unlabored.   Abdomen: soft, non-tender, bowel sounds present, no hepatomegaly  Extremities: no pitting edema.   No cyanosis.    Neurological: alert and oriented x 3.  normal speech and affect, no gross motor deficits  Skin:  No ecchymoses, rashes, or clubbing.    Labs:  CBC RESULTS:  Lab Results   Component Value Date    WBC 6.5 01/09/2023    WBC 8.2 07/11/2021    RBC 4.00 01/09/2023    RBC 3.32 (L) 07/11/2021    HGB 12.6 01/09/2023    HGB 10.2 (L) 07/11/2021    HCT 37.8 01/09/2023    HCT 32.0 (L) 07/11/2021    MCV 95 01/09/2023    MCV 96 07/11/2021    MCH 31.5 01/09/2023    MCH 30.7 07/11/2021    MCHC 33.3 01/09/2023    MCHC 31.9 07/11/2021    RDW 12.8 01/09/2023    RDW 13.4 07/11/2021     01/09/2023 " "    07/11/2021       CMP RESULTS:  Lab Results   Component Value Date     01/09/2023     07/11/2021    POTASSIUM 3.9 01/09/2023    POTASSIUM 4.2 07/11/2021    CHLORIDE 105 01/09/2023    CHLORIDE 102 07/11/2021    CO2 26 01/09/2023    CO2 30 07/11/2021    ANIONGAP 6 01/09/2023    ANIONGAP 2 (L) 07/11/2021    GLC 93 01/09/2023    GLC 87 07/11/2021    BUN 16 01/09/2023    BUN 14 07/11/2021    CR 0.79 01/09/2023    CR 0.79 07/11/2021    GFRESTIMATED 86 01/09/2023    GFRESTIMATED 83 07/11/2021    GFRESTBLACK >90 07/11/2021    CARMNE 9.1 01/09/2023    CARMEN 8.2 (L) 07/11/2021    BILITOTAL 0.3 01/09/2023    BILITOTAL 0.3 08/12/2020    ALBUMIN 3.6 01/09/2023    ALBUMIN 3.4 08/12/2020    ALKPHOS 49 01/09/2023    ALKPHOS 70 08/12/2020    ALT 22 01/09/2023    ALT 35 08/12/2020    AST 14 01/09/2023    AST 20 08/12/2020        INR RESULTS:  Lab Results   Component Value Date    INR 1.05 01/09/2023    INR 0.97 09/18/2019       No components found for: CK  Lab Results   Component Value Date    MAG 1.8 12/12/2022    MAG 1.9 07/11/2021     Lab Results   Component Value Date    NTBNP 136 12/12/2022     @BRIEFLABR (dig)@    Most recent echocardiogram:  No results found for this or any previous visit (from the past 8760 hour(s)).      Assessment and Plan:     57 year-old woman with chronic systolic heart failure due to a famililal cardiomyopathy with VUS in [facility] who is here for an initial CORE visit.     Her results from her recent heart cath show normal filling pressures. Reduced cardiac index and high SVR.   These were reviewed with the patient. She will do the decrease of the Metoprolol but she is adamantly refusing the start of Isordil and Hydralazine at this time.  She will look into the medications and inform us if she changes her mind. \"I don't like being on so much medicine. I will try to work on exercising more and I have seen this to be very helpful for me.\"     1.  Chronic systolic heart failure secondary to " familial cardiomyopathy..  Stage C  NYHA Class II  ACEi/ARB: yes- ARNI Entresto 97/103 mg BID MAX DOSE  BB: yes- Metoprolol decrease to 25 mg   Aldosterone antagonist: yes- max dose   SCD prophylaxis: doesn't meet criteria  Fluid status: euvolemic  Anticoagulation:   Antiplatelet:  ASA dose   NSAID use:  Contraindicated.  Avoid use.  Remote Monitoring:  SGLT 2 - Farxiga 10 mg daily  Patient has previously met with genetic counselor regarding genetic testing.  Encouraged patient to proceed with genetic testing given family history.  Pt is aware of recommendation for all first degree relatives to undergo screening for familial cardiomyopathy preferably with cardiac MRI but at least echo.    2.  Other comordbid conditions:      Frequent PVCs with 8% PVC burden on metoprolol. Referral to Dr. Margo Bruno done 12/12/22    Leg pain - CAMRON - normal - patient informed and was recommended to follow up with her PCP to look at other potential causes.    3.  Follow-up   Decrease Metoprolol to 25 mg   CORE in 2-3 months      Hunter SHELDON, CNP  [facility]

## 2023-01-13 NOTE — NURSING NOTE
Chief Complaint   Patient presents with     Follow Up     Enrollment CORE     Vitals were taken and medications reconciled.    RADHA Mullins  8:24 AM

## 2023-01-13 NOTE — NURSING NOTE
Labs: Patient was given results of the laboratory testing obtained today. Patient demonstrated understanding of this information and agreed to call with further questions or concerns.     Med Reconcile: Reviewed and verified all current medications with the patient. The updated medication list was printed and given to the patient.    Return Appointment: Patient given instructions regarding scheduling next clinic visit. Patient demonstrated understanding of this information and agreed to call with further questions or concerns. CORE in 2 months. Margo and Jurgen as scheduled.    Medication Change: Patient was educated regarding prescribed medication change, including discussion of the indication, administration, side effects, and when to report to MD or RN. Patient demonstrated understanding of this information and agreed to call with further questions or concerns. Decrease metoprolol to 25 mg daily.    Patient was asked to start isordil 30 mg daily and hydralazine 10 mg TID - she does not want to start these medication at this time.    Patient stated she understood all health information given and agreed to call with further questions or concerns.    Megha Meyer RN

## 2023-01-13 NOTE — PATIENT INSTRUCTIONS
Take your medicines every day, as directed    Changes made today:        - Decrease to Metoprolol 25 mg daily      Isordil and Hydralazine were discussed in clinic   Monitor Your Weight and Symptoms    Contact us if you:    Gain 2 pounds in one day or 5 pounds in one week  Feel more short of breath  Notice more leg swelling  Feel lightheadeded   Change your lifestyle    Limit Salt or Sodium:  2000 mg  Limit Fluids:  2000 mL or approximately 64 ounces  Eat a Heart Healthy Diet  Low in saturated fats  Stay Active:  Aim to move at least 150 minutes every  week         To Contact us    During Business Hours:  [phone], option # 1      After hours, weekends or holidays:   [phone], Option #4  Ask to speak to the On-Call Cardiologist. Inform them you are a CORE/heart failure patient at the [place].     Use [employer] allows you to communicate directly with your heart team through secure messaging.  [employer] can be accessed any time on your phone, computer, or tablet.  If you need assistance, we'd be happy to help!         Keep your Heart Appointments:    3/1 Margo (RATNA)    CORE in 2-3 months     7/7 Jurgen with labs       Please consider attending our virtual support group which is held monthly. Please reach out to Robert at [phone] for more information if you are interested in attending.       2023 dates:  Monday, January 2nd , 1-2pm: Heart Failure and Electrophysiology  Monday, February 6th , 1-2pm  Monday, March 6th , 1-2pm  Monday, April 3rd, 1-2pm  Monday, May 1st, 1-2pm  Monday, June 5th, 1-2pm  Monday, July 3rd, 1-2pm  Monday, August 7th, 1-2pm  Monday, September 11th, 1-2pm  Monday, October 2nd, 1-2pm  Monday, November 6th, 1-2pm  Monday, December 4th, 1-2pm

## 2023-01-13 NOTE — LETTER
"1/13/2023      RE: Marleen Mcfadedn  [address]  [place] [id number]       Dear Colleague,    Thank you for the opportunity to participate in the care of your patient, Marleen Mcfadden, at the [facility] [place] at [facility]. Please see a copy of my visit note below.      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       As you know, she has a family history of dilated cardiomyopathy including her sister, son, and daughter. Her genetic testing returned with a ariant of unknown significance (VUS) in the FLNC gene (c. 4069 G>A). She was seeing  Dr. Kolb until her departure. She has now established care with Dr. Seaman.    Her son Nano Mcfadden is 37 and has DCM and he would like to pursue genetic testing.    She has not been sleeping well, but was more fatigued even before the sleep issues. Her exercise tolerance has not changed and she also lifts weights several times a week. Weight 226 lbs at home. She uses her rowing machine and uses the spin bike as well - 15 min each.   She has no SOB at rest. She has some SOB at work and she stops what she is doing then she resumes. She has some days when she feels \"great\" and some days she feels she is pushing herself.  She has swelling in the leg/ankles- at baseline.  She has some distention in the abdomen- but she has a lot issues with gas. She uses one pillow and sleeps on her back with no issues. She uses the Lasix as needed. Its been a while since the last time she used. She eats well.     Denies SOB, PND, orthopnea, weight gain, chest pain, palpitations, lightheadedness, dizziness, near syncopal/syncopal episodes. Marleen has been following salt and fluid restrictions.      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     Familial cardiomyopathy (H)      GERD without esophagitis      History of transfusion      Hypertension      Injury of left shoulder, initial encounter 01/12/2017     Morbid obesity (H)      Nontraumatic rupture of quadriceps tendon, left 06/21/2018     KATIA (obstructive sleep apnea)- moderate-severe (AHI 29) 8/26/2021     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     no longer an issue     Peroneus longus tendinitis 01/02/2015     Plantar fasciitis 11/11/2014     PONV (postoperative nausea and vomiting)      Rotator cuff injury 01/17/2017     Sprain of other ligament of left ankle, initial encounter 01/12/2017     Status post left knee replacement 06/21/2018     TB lung, latent     negative quantiferon gold test  11/5/12       FAMILY HISTORY:  Family History   Problem Relation Age of Onset     Hypertension Father         dec     Diabetes Father         dec     Heart Disease Father         dec     Alcohol/Drug Father      Cardiovascular Father      Heart Disease Mother         dec     Alcohol/Drug Mother      Cardiovascular Mother      Heart Disease Daughter         Cardiomyopathy     Cardiovascular Daughter         cardiomyopathy     Colon Cancer Sister      Cardiovascular Son         cardiomyopathy     Diabetes Paternal Grandmother         dec     Hypertension Paternal Grandmother         dec     Cerebrovascular Disease Paternal Grandmother         dec     Cancer Sister   "       Lupus     Cardiovascular Sister         cardiomyopathy     Heart Disease Sister         heart failure, and kidney failure       SOCIAL HISTORY:  Social History     Tobacco Use     Smoking status: Never     Smokeless tobacco: Never   Substance Use Topics     Alcohol use: Yes     Comment: rare, twice a year, she has a drink little wine 2 days ago     Drug use: No           CURRENT MEDICATIONS:  Current Outpatient Medications   Medication Sig Dispense Refill     metoprolol succinate ER (TOPROL XL) 25 MG 24 hr tablet Take 1 tablet (25 mg) by mouth daily 90 tablet 3       ROS:  Review Of Systems  Skin: negative  Eyes: negative  Ears/Nose/Throat: negative  Respiratory: Dyspnea on exertion-   Cardiovascular: lower extremity edema  Gastrointestinal: negative  Genitourinary: negative  Musculoskeletal: negative  Neurologic: negative  Psychiatric: negative  Hematologic/Lymphatic/Immunologic: negative  Endocrine: negative      EXAM:  /79 (BP Location: Right arm, Patient Position: Chair, Cuff Size: Adult Large)   Pulse 57   Ht 1.648 m (5' 4.88\")   Wt 103.2 kg (227 lb 9.6 oz)   LMP 10/19/2008 (Approximate)   SpO2 100%   BMI 38.01 kg/m    Home weight:  General: alert, articulate, and in no acute distress.  HEENT: normocephalic, atraumatic, anicteric sclera, EOMI, mucosa moist, no cyanosis.   Neck: neck supple.  No adenopathy, masses, or carotid bruits.  JVP flat at 90 degrees  Heart: regular rhythm, normal S1/S2, no murmur, gallop, rub.  Precordium quiet with normal PMI.     Lungs: clear, no rales, ronchi, or wheezing.  No accessory muscle use, respirations unlabored.   Abdomen: soft, non-tender, bowel sounds present, no hepatomegaly  Extremities: no pitting edema.   No cyanosis.    Neurological: alert and oriented x 3.  normal speech and affect, no gross motor deficits  Skin:  No ecchymoses, rashes, or clubbing.    Labs:  CBC RESULTS:  Lab Results   Component Value Date    WBC 6.5 01/09/2023    WBC 8.2 " 07/11/2021    RBC 4.00 01/09/2023    RBC 3.32 (L) 07/11/2021    HGB 12.6 01/09/2023    HGB 10.2 (L) 07/11/2021    HCT 37.8 01/09/2023    HCT 32.0 (L) 07/11/2021    MCV 95 01/09/2023    MCV 96 07/11/2021    MCH 31.5 01/09/2023    MCH 30.7 07/11/2021    MCHC 33.3 01/09/2023    MCHC 31.9 07/11/2021    RDW 12.8 01/09/2023    RDW 13.4 07/11/2021     01/09/2023     07/11/2021       CMP RESULTS:  Lab Results   Component Value Date     01/09/2023     07/11/2021    POTASSIUM 3.9 01/09/2023    POTASSIUM 4.2 07/11/2021    CHLORIDE 105 01/09/2023    CHLORIDE 102 07/11/2021    CO2 26 01/09/2023    CO2 30 07/11/2021    ANIONGAP 6 01/09/2023    ANIONGAP 2 (L) 07/11/2021    GLC 93 01/09/2023    GLC 87 07/11/2021    BUN 16 01/09/2023    BUN 14 07/11/2021    CR 0.79 01/09/2023    CR 0.79 07/11/2021    GFRESTIMATED 86 01/09/2023    GFRESTIMATED 83 07/11/2021    GFRESTBLACK >90 07/11/2021    CARMEN 9.1 01/09/2023    CARMEN 8.2 (L) 07/11/2021    BILITOTAL 0.3 01/09/2023    BILITOTAL 0.3 08/12/2020    ALBUMIN 3.6 01/09/2023    ALBUMIN 3.4 08/12/2020    ALKPHOS 49 01/09/2023    ALKPHOS 70 08/12/2020    ALT 22 01/09/2023    ALT 35 08/12/2020    AST 14 01/09/2023    AST 20 08/12/2020        INR RESULTS:  Lab Results   Component Value Date    INR 1.05 01/09/2023    INR 0.97 09/18/2019       No components found for: CK  Lab Results   Component Value Date    MAG 1.8 12/12/2022    MAG 1.9 07/11/2021     Lab Results   Component Value Date    NTBNP 136 12/12/2022     @BRIEFLABR (dig)@    Most recent echocardiogram:  No results found for this or any previous visit (from the past 8760 hour(s)).      Assessment and Plan:     57 year-old woman with chronic systolic heart failure due to a famililal cardiomyopathy with VUS in [facility] who is here for an initial CORE visit.     Her results from her recent heart cath show normal filling pressures. Reduced cardiac index and high SVR.   These were reviewed with the patient. She will do the  "decrease of the Metoprolol but she is adamantly refusing the start of Isordil and Hydralazine at this time.  She will look into the medications and inform us if she changes her mind. \"I don't like being on so much medicine. I will try to work on exercising more and I have seen this to be very helpful for me.\"     1.  Chronic systolic heart failure secondary to familial cardiomyopathy..  Stage C  NYHA Class II  ACEi/ARB: yes- ARNI Entresto 97/103 mg BID MAX DOSE  BB: yes- Metoprolol decrease to 25 mg   Aldosterone antagonist: yes- max dose   SCD prophylaxis: doesn't meet criteria  Fluid status: euvolemic  Anticoagulation:   Antiplatelet:  ASA dose   NSAID use:  Contraindicated.  Avoid use.  Remote Monitoring:  SGLT 2 - Farxiga 10 mg daily  Patient has previously met with genetic counselor regarding genetic testing.  Encouraged patient to proceed with genetic testing given family history.  Pt is aware of recommendation for all first degree relatives to undergo screening for familial cardiomyopathy preferably with cardiac MRI but at least echo.    2.  Other comordbid conditions:      Frequent PVCs with 8% PVC burden on metoprolol. Referral to Dr. Margo Bruno done 12/12/22    Leg pain - CAMRON - normal - patient informed and was recommended to follow up with her PCP to look at other potential causes.    3.  Follow-up   Decrease Metoprolol to 25 mg   CORE in 2-3 months      Hunter SHELDON, CNP  [facility]               "

## 2023-01-14 ASSESSMENT — ACTIVITIES OF DAILY LIVING (ADL)
ADLS_ACUITY_SCORE: 37

## 2023-01-15 ASSESSMENT — ACTIVITIES OF DAILY LIVING (ADL)
ADLS_ACUITY_SCORE: 37

## 2023-01-31 DIAGNOSIS — H90.3 SENSORINEURAL HEARING LOSS (SNHL) OF BOTH EARS: Primary | ICD-10-CM

## 2023-02-09 DIAGNOSIS — I50.9 CHF WITH CARDIOMYOPATHY (H): ICD-10-CM

## 2023-02-09 DIAGNOSIS — I42.9 CHF WITH CARDIOMYOPATHY (H): ICD-10-CM

## 2023-02-13 RX ORDER — FUROSEMIDE 20 MG
40 TABLET ORAL DAILY PRN
Qty: 180 TABLET | Refills: 3 | Status: SHIPPED | OUTPATIENT
Start: 2023-02-13 | End: 2024-09-12 | Stop reason: ALTCHOICE

## 2023-02-17 ENCOUNTER — HOSPITAL ENCOUNTER (OUTPATIENT)
Facility: CLINIC | Age: 58
Setting detail: OBSERVATION
Discharge: HOME | End: 2023-02-18
Attending: EMERGENCY MEDICINE | Admitting: EMERGENCY MEDICINE
Payer: COMMERCIAL

## 2023-02-17 ENCOUNTER — MYC MEDICAL ADVICE (OUTPATIENT)
Dept: CARDIOLOGY | Facility: CLINIC | Age: 58
End: 2023-02-17
Payer: COMMERCIAL

## 2023-02-17 ENCOUNTER — HOSPITAL ENCOUNTER (OUTPATIENT)
Dept: CARDIOLOGY | Facility: CLINIC | Age: 58
Discharge: HOME | End: 2023-02-17
Attending: INTERNAL MEDICINE | Admitting: INTERNAL MEDICINE
Payer: COMMERCIAL

## 2023-02-17 ENCOUNTER — APPOINTMENT (OUTPATIENT)
Dept: CARDIOLOGY | Facility: CLINIC | Age: 58
End: 2023-02-17
Attending: EMERGENCY MEDICINE
Payer: COMMERCIAL

## 2023-02-17 DIAGNOSIS — I42.9 PRIMARY CARDIOMYOPATHY (H): ICD-10-CM

## 2023-02-17 DIAGNOSIS — R00.1 SEVERE SINUS BRADYCARDIA: ICD-10-CM

## 2023-02-17 DIAGNOSIS — Z11.52 ENCOUNTER FOR SCREENING LABORATORY TESTING FOR SEVERE ACUTE RESPIRATORY SYNDROME CORONAVIRUS 2 (SARS-COV-2): ICD-10-CM

## 2023-02-17 DIAGNOSIS — I50.22 CHRONIC SYSTOLIC CONGESTIVE HEART FAILURE (H): Primary | Chronic | ICD-10-CM

## 2023-02-17 DIAGNOSIS — I10 ESSENTIAL HYPERTENSION, MALIGNANT: ICD-10-CM

## 2023-02-17 DIAGNOSIS — R00.1 SYMPTOMATIC BRADYCARDIA: ICD-10-CM

## 2023-02-17 DIAGNOSIS — I50.22 CHRONIC SYSTOLIC CONGESTIVE HEART FAILURE (H): ICD-10-CM

## 2023-02-17 LAB
ALBUMIN SERPL BCG-MCNC: 3.9 G/DL (ref 3.5–5.2)
ALBUMIN UR-MCNC: NEGATIVE MG/DL
ALP SERPL-CCNC: 54 U/L (ref 35–104)
ALT SERPL W P-5'-P-CCNC: 20 U/L (ref 10–35)
ANION GAP SERPL CALCULATED.3IONS-SCNC: 10 MMOL/L (ref 7–15)
APPEARANCE UR: CLEAR
AST SERPL W P-5'-P-CCNC: NORMAL U/L
ATRIAL RATE - MUSE: 68 BPM
BASOPHILS # BLD AUTO: 0.1 10E3/UL (ref 0–0.2)
BASOPHILS NFR BLD AUTO: 1 %
BI-PLANE LVEF ECHO: NORMAL
BILIRUB SERPL-MCNC: 0.2 MG/DL
BILIRUB UR QL STRIP: NEGATIVE
BUN SERPL-MCNC: 15.5 MG/DL (ref 6–20)
CALCIUM SERPL-MCNC: 9 MG/DL (ref 8.6–10)
CHLORIDE SERPL-SCNC: 105 MMOL/L (ref 98–107)
COLOR UR AUTO: ABNORMAL
CREAT SERPL-MCNC: 0.8 MG/DL (ref 0.51–0.95)
DEPRECATED HCO3 PLAS-SCNC: 25 MMOL/L (ref 22–29)
DIASTOLIC BLOOD PRESSURE - MUSE: NORMAL MMHG
EOSINOPHIL # BLD AUTO: 0.1 10E3/UL (ref 0–0.7)
EOSINOPHIL NFR BLD AUTO: 2 %
ERYTHROCYTE [DISTWIDTH] IN BLOOD BY AUTOMATED COUNT: 13.5 % (ref 10–15)
FLUAV RNA SPEC QL NAA+PROBE: NEGATIVE
FLUBV RNA RESP QL NAA+PROBE: NEGATIVE
GFR SERPL CREATININE-BSD FRML MDRD: 85 ML/MIN/1.73M2
GLUCOSE SERPL-MCNC: 92 MG/DL (ref 70–99)
GLUCOSE UR STRIP-MCNC: >=1000 MG/DL
HCT VFR BLD AUTO: 40.5 % (ref 35–47)
HGB BLD-MCNC: 13.2 G/DL (ref 11.7–15.7)
HGB UR QL STRIP: NEGATIVE
IMM GRANULOCYTES # BLD: 0 10E3/UL
IMM GRANULOCYTES NFR BLD: 0 %
INTERPRETATION ECG - MUSE: NORMAL
KETONES UR STRIP-MCNC: NEGATIVE MG/DL
LEUKOCYTE ESTERASE UR QL STRIP: NEGATIVE
LVEF ECHO: NORMAL
LYMPHOCYTES # BLD AUTO: 2.5 10E3/UL (ref 0.8–5.3)
LYMPHOCYTES NFR BLD AUTO: 40 %
MAGNESIUM SERPL-MCNC: 1.8 MG/DL (ref 1.7–2.3)
MCH RBC QN AUTO: 31.3 PG (ref 26.5–33)
MCHC RBC AUTO-ENTMCNC: 32.6 G/DL (ref 31.5–36.5)
MCV RBC AUTO: 96 FL (ref 78–100)
MONOCYTES # BLD AUTO: 0.6 10E3/UL (ref 0–1.3)
MONOCYTES NFR BLD AUTO: 10 %
NEUTROPHILS # BLD AUTO: 3 10E3/UL (ref 1.6–8.3)
NEUTROPHILS NFR BLD AUTO: 47 %
NITRATE UR QL: NEGATIVE
NRBC # BLD AUTO: 0 10E3/UL
NRBC BLD AUTO-RTO: 0 /100
NT-PROBNP SERPL-MCNC: 263 PG/ML (ref 0–900)
P AXIS - MUSE: 41 DEGREES
PH UR STRIP: 7.5 [PH] (ref 5–7)
PHOSPHATE SERPL-MCNC: 3.8 MG/DL (ref 2.5–4.5)
PLATELET # BLD AUTO: 246 10E3/UL (ref 150–450)
POTASSIUM SERPL-SCNC: 4.5 MMOL/L (ref 3.4–5.3)
PR INTERVAL - MUSE: 166 MS
PROT SERPL-MCNC: 6.9 G/DL (ref 6.4–8.3)
QRS DURATION - MUSE: 102 MS
QT - MUSE: 416 MS
QTC - MUSE: 442 MS
R AXIS - MUSE: -19 DEGREES
RBC # BLD AUTO: 4.22 10E6/UL (ref 3.8–5.2)
RBC URINE: <1 /HPF
RSV RNA SPEC NAA+PROBE: NEGATIVE
SARS-COV-2 RNA RESP QL NAA+PROBE: NEGATIVE
SODIUM SERPL-SCNC: 140 MMOL/L (ref 136–145)
SP GR UR STRIP: 1.03 (ref 1–1.03)
SQUAMOUS EPITHELIAL: 1 /HPF
SYSTOLIC BLOOD PRESSURE - MUSE: NORMAL MMHG
T AXIS - MUSE: 16 DEGREES
TROPONIN T SERPL HS-MCNC: <6 NG/L
TSH SERPL DL<=0.005 MIU/L-ACNC: 3.13 UIU/ML (ref 0.3–4.2)
UROBILINOGEN UR STRIP-MCNC: NORMAL MG/DL
VENTRICULAR RATE- MUSE: 68 BPM
WBC # BLD AUTO: 6.3 10E3/UL (ref 4–11)
WBC URINE: <1 /HPF

## 2023-02-17 PROCEDURE — 84443 ASSAY THYROID STIM HORMONE: CPT

## 2023-02-17 PROCEDURE — 93325 DOPPLER ECHO COLOR FLOW MAPG: CPT | Mod: 26 | Performed by: INTERNAL MEDICINE

## 2023-02-17 PROCEDURE — 250N000013 HC RX MED GY IP 250 OP 250 PS 637

## 2023-02-17 PROCEDURE — 99221 1ST HOSP IP/OBS SF/LOW 40: CPT | Mod: GC | Performed by: INTERNAL MEDICINE

## 2023-02-17 PROCEDURE — 83880 ASSAY OF NATRIURETIC PEPTIDE: CPT | Performed by: EMERGENCY MEDICINE

## 2023-02-17 PROCEDURE — 93010 ELECTROCARDIOGRAM REPORT: CPT | Performed by: EMERGENCY MEDICINE

## 2023-02-17 PROCEDURE — 84484 ASSAY OF TROPONIN QUANT: CPT | Performed by: EMERGENCY MEDICINE

## 2023-02-17 PROCEDURE — 99285 EMERGENCY DEPT VISIT HI MDM: CPT | Mod: 25 | Performed by: EMERGENCY MEDICINE

## 2023-02-17 PROCEDURE — 85025 COMPLETE CBC W/AUTO DIFF WBC: CPT | Performed by: EMERGENCY MEDICINE

## 2023-02-17 PROCEDURE — 93308 TTE F-UP OR LMTD: CPT | Mod: 26 | Performed by: INTERNAL MEDICINE

## 2023-02-17 PROCEDURE — 255N000002 HC RX 255 OP 636: Performed by: INTERNAL MEDICINE

## 2023-02-17 PROCEDURE — 94621 CARDIOPULM EXERCISE TESTING: CPT

## 2023-02-17 PROCEDURE — 87637 SARSCOV2&INF A&B&RSV AMP PRB: CPT | Performed by: EMERGENCY MEDICINE

## 2023-02-17 PROCEDURE — 84155 ASSAY OF PROTEIN SERUM: CPT | Performed by: EMERGENCY MEDICINE

## 2023-02-17 PROCEDURE — 93005 ELECTROCARDIOGRAM TRACING: CPT | Performed by: EMERGENCY MEDICINE

## 2023-02-17 PROCEDURE — 36415 COLL VENOUS BLD VENIPUNCTURE: CPT | Performed by: EMERGENCY MEDICINE

## 2023-02-17 PROCEDURE — 999N000208 ECHOCARDIOGRAM LIMITED

## 2023-02-17 PROCEDURE — 81001 URINALYSIS AUTO W/SCOPE: CPT | Performed by: EMERGENCY MEDICINE

## 2023-02-17 PROCEDURE — 93321 DOPPLER ECHO F-UP/LMTD STD: CPT | Mod: 26 | Performed by: INTERNAL MEDICINE

## 2023-02-17 PROCEDURE — 120N000002 HC R&B MED SURG/OB UMMC

## 2023-02-17 PROCEDURE — C9803 HOPD COVID-19 SPEC COLLECT: HCPCS | Performed by: EMERGENCY MEDICINE

## 2023-02-17 PROCEDURE — 83735 ASSAY OF MAGNESIUM: CPT | Performed by: EMERGENCY MEDICINE

## 2023-02-17 PROCEDURE — 94621 CARDIOPULM EXERCISE TESTING: CPT | Mod: 26 | Performed by: INTERNAL MEDICINE

## 2023-02-17 RX ORDER — ACETAMINOPHEN 325 MG/1
650 TABLET ORAL EVERY 6 HOURS PRN
Status: DISCONTINUED | OUTPATIENT
Start: 2023-02-17 | End: 2023-02-18 | Stop reason: HOSPADM

## 2023-02-17 RX ORDER — FLUTICASONE PROPIONATE 50 MCG
2 SPRAY, SUSPENSION (ML) NASAL AT BEDTIME
Status: DISCONTINUED | OUTPATIENT
Start: 2023-02-17 | End: 2023-02-18 | Stop reason: HOSPADM

## 2023-02-17 RX ORDER — METOPROLOL SUCCINATE 25 MG/1
25 TABLET, EXTENDED RELEASE ORAL DAILY
Status: DISCONTINUED | OUTPATIENT
Start: 2023-02-18 | End: 2023-02-18 | Stop reason: HOSPADM

## 2023-02-17 RX ORDER — ONDANSETRON 2 MG/ML
4 INJECTION INTRAMUSCULAR; INTRAVENOUS EVERY 6 HOURS PRN
Status: DISCONTINUED | OUTPATIENT
Start: 2023-02-17 | End: 2023-02-18 | Stop reason: HOSPADM

## 2023-02-17 RX ORDER — FUROSEMIDE 20 MG
40 TABLET ORAL DAILY PRN
Status: DISCONTINUED | OUTPATIENT
Start: 2023-02-17 | End: 2023-02-18

## 2023-02-17 RX ORDER — LIDOCAINE 40 MG/G
CREAM TOPICAL
Status: DISCONTINUED | OUTPATIENT
Start: 2023-02-17 | End: 2023-02-18 | Stop reason: HOSPADM

## 2023-02-17 RX ORDER — ONDANSETRON 4 MG/1
4 TABLET, ORALLY DISINTEGRATING ORAL EVERY 6 HOURS PRN
Status: DISCONTINUED | OUTPATIENT
Start: 2023-02-17 | End: 2023-02-18 | Stop reason: HOSPADM

## 2023-02-17 RX ORDER — AMOXICILLIN 250 MG
1 CAPSULE ORAL 2 TIMES DAILY PRN
Status: DISCONTINUED | OUTPATIENT
Start: 2023-02-17 | End: 2023-02-18 | Stop reason: HOSPADM

## 2023-02-17 RX ORDER — AMOXICILLIN 250 MG
2 CAPSULE ORAL 2 TIMES DAILY PRN
Status: DISCONTINUED | OUTPATIENT
Start: 2023-02-17 | End: 2023-02-18 | Stop reason: HOSPADM

## 2023-02-17 RX ORDER — CALCIUM CARBONATE 500 MG/1
500 TABLET, CHEWABLE ORAL DAILY PRN
Status: DISCONTINUED | OUTPATIENT
Start: 2023-02-17 | End: 2023-02-18 | Stop reason: HOSPADM

## 2023-02-17 RX ORDER — SPIRONOLACTONE 50 MG/1
50 TABLET, FILM COATED ORAL DAILY
Status: DISCONTINUED | OUTPATIENT
Start: 2023-02-18 | End: 2023-02-18 | Stop reason: HOSPADM

## 2023-02-17 RX ORDER — POLYETHYLENE GLYCOL 3350 17 G/17G
17 POWDER, FOR SOLUTION ORAL DAILY PRN
Status: DISCONTINUED | OUTPATIENT
Start: 2023-02-17 | End: 2023-02-18 | Stop reason: HOSPADM

## 2023-02-17 RX ORDER — SIMETHICONE 80 MG
80 TABLET,CHEWABLE ORAL EVERY 6 HOURS PRN
Status: DISCONTINUED | OUTPATIENT
Start: 2023-02-17 | End: 2023-02-18 | Stop reason: HOSPADM

## 2023-02-17 RX ORDER — DAPAGLIFLOZIN 10 MG/1
10 TABLET, FILM COATED ORAL DAILY
Status: DISCONTINUED | OUTPATIENT
Start: 2023-02-18 | End: 2023-02-17

## 2023-02-17 RX ADMIN — HUMAN ALBUMIN MICROSPHERES AND PERFLUTREN 5 ML: 10; .22 INJECTION, SOLUTION INTRAVENOUS at 17:45

## 2023-02-17 RX ADMIN — SACUBITRIL AND VALSARTAN 1 TABLET: 97; 103 TABLET, FILM COATED ORAL at 22:28

## 2023-02-17 ASSESSMENT — ACTIVITIES OF DAILY LIVING (ADL)
ADLS_ACUITY_SCORE: 37
ADLS_ACUITY_SCORE: 35

## 2023-02-17 NOTE — TELEPHONE ENCOUNTER
Called Marleen back to further assess. Reports she was playing pool last night, bent down to take shot, stood up and fell backwards. Lost consciousness for a couple seconds. Witnessed by friends. Did not hit head. Has not happened before. Doesn't have a device.     Has been having moments of feeling like she's going to pass out recently but this was the first time it has happened.   Had a few palpitations that felt like extra beats on Tuesday. Then passed out on Thursday night.     Weight has been stable at home, she reports she's actually been losing some weight. Wouldn't give me a number because she was on the car speaker with other people in the car. Had her CPX today and per the scale at hospital was 8 lbs higher.  Has noticed a little shortness of breath but is about her baseline. No worsening swelling.     Will review with Dr. Seaman and call her back.   Lakisha Souza RN

## 2023-02-17 NOTE — TELEPHONE ENCOUNTER
Reviewed with Dr. Seaman. Sounds arrhythmic and should go to ER immediately for further workup.   Reviewed with Marleen. Reviewed risks of not going including that this could be an irregular rhythm and the risk is that her heart doesn't come out of it next time and she verbalized understanding. [facility] I told her I would call to give them report.   Called and spoke with Carol, charge nurse and gave report.   For full history see detailed office visit note from Dr. Seaman dated 12/12/2022   Lakisha Souza RN

## 2023-02-18 VITALS
WEIGHT: 225 LBS | HEART RATE: 69 BPM | SYSTOLIC BLOOD PRESSURE: 108 MMHG | RESPIRATION RATE: 11 BRPM | BODY MASS INDEX: 38.41 KG/M2 | HEIGHT: 64 IN | TEMPERATURE: 97.9 F | OXYGEN SATURATION: 99 % | DIASTOLIC BLOOD PRESSURE: 78 MMHG

## 2023-02-18 LAB
ANION GAP SERPL CALCULATED.3IONS-SCNC: 8 MMOL/L (ref 7–15)
BUN SERPL-MCNC: 12.3 MG/DL (ref 6–20)
CALCIUM SERPL-MCNC: 8.4 MG/DL (ref 8.6–10)
CHLORIDE SERPL-SCNC: 105 MMOL/L (ref 98–107)
CREAT SERPL-MCNC: 0.82 MG/DL (ref 0.51–0.95)
DEPRECATED HCO3 PLAS-SCNC: 25 MMOL/L (ref 22–29)
ERYTHROCYTE [DISTWIDTH] IN BLOOD BY AUTOMATED COUNT: 13.5 % (ref 10–15)
GFR SERPL CREATININE-BSD FRML MDRD: 82 ML/MIN/1.73M2
GLUCOSE SERPL-MCNC: 94 MG/DL (ref 70–99)
HCT VFR BLD AUTO: 39.7 % (ref 35–47)
HGB BLD-MCNC: 12.7 G/DL (ref 11.7–15.7)
MAGNESIUM SERPL-MCNC: 1.8 MG/DL (ref 1.7–2.3)
MCH RBC QN AUTO: 31.3 PG (ref 26.5–33)
MCHC RBC AUTO-ENTMCNC: 32 G/DL (ref 31.5–36.5)
MCV RBC AUTO: 98 FL (ref 78–100)
PHOSPHATE SERPL-MCNC: 4.3 MG/DL (ref 2.5–4.5)
PLATELET # BLD AUTO: 227 10E3/UL (ref 150–450)
POTASSIUM SERPL-SCNC: 4.1 MMOL/L (ref 3.4–5.3)
RBC # BLD AUTO: 4.06 10E6/UL (ref 3.8–5.2)
SODIUM SERPL-SCNC: 138 MMOL/L (ref 136–145)
WBC # BLD AUTO: 4.5 10E3/UL (ref 4–11)

## 2023-02-18 PROCEDURE — 80048 BASIC METABOLIC PNL TOTAL CA: CPT

## 2023-02-18 PROCEDURE — G0378 HOSPITAL OBSERVATION PER HR: HCPCS

## 2023-02-18 PROCEDURE — 84100 ASSAY OF PHOSPHORUS: CPT

## 2023-02-18 PROCEDURE — 85027 COMPLETE CBC AUTOMATED: CPT

## 2023-02-18 PROCEDURE — 83735 ASSAY OF MAGNESIUM: CPT

## 2023-02-18 PROCEDURE — 250N000013 HC RX MED GY IP 250 OP 250 PS 637

## 2023-02-18 PROCEDURE — 36415 COLL VENOUS BLD VENIPUNCTURE: CPT

## 2023-02-18 PROCEDURE — 99239 HOSP IP/OBS DSCHRG MGMT >30: CPT | Mod: FS | Performed by: CASE MANAGER/CARE COORDINATOR

## 2023-02-18 PROCEDURE — 250N000013 HC RX MED GY IP 250 OP 250 PS 637: Performed by: INTERNAL MEDICINE

## 2023-02-18 RX ORDER — MAGNESIUM OXIDE 400 MG/1
400 TABLET ORAL EVERY 4 HOURS
Status: DISCONTINUED | OUTPATIENT
Start: 2023-02-18 | End: 2023-02-18 | Stop reason: HOSPADM

## 2023-02-18 RX ORDER — METOPROLOL SUCCINATE 25 MG/1
12.5 TABLET, EXTENDED RELEASE ORAL DAILY
Qty: 30 TABLET | Refills: 3 | Status: SHIPPED | OUTPATIENT
Start: 2023-02-19 | End: 2023-03-14

## 2023-02-18 RX ADMIN — SPIRONOLACTONE 50 MG: 50 TABLET, FILM COATED ORAL at 07:39

## 2023-02-18 RX ADMIN — SACUBITRIL AND VALSARTAN 1 TABLET: 97; 103 TABLET, FILM COATED ORAL at 07:39

## 2023-02-18 RX ADMIN — Medication 125 MCG: at 07:39

## 2023-02-18 RX ADMIN — EMPAGLIFLOZIN 25 MG: 25 TABLET, FILM COATED ORAL at 07:40

## 2023-02-18 RX ADMIN — MAGNESIUM OXIDE TAB 400 MG (240 MG ELEMENTAL MG) 400 MG: 400 (240 MG) TAB at 10:47

## 2023-02-18 ASSESSMENT — ACTIVITIES OF DAILY LIVING (ADL)
ADLS_ACUITY_SCORE: 37

## 2023-02-18 NOTE — H&P
[facility]    Cardiology History and Physical - Cardiology       Date of Admission:  2/17/2023    Assessment & Plan: SL    Marleencarlos alberto Mcfadden is a 57 year old female admitted on 2/17/2023. She has a history of HTN, familial cardiomyopathy with EF 35-40%, latent TB, GERD, and R middle fossa CSF leak repair and is admitted for symptomatic bradycardia and syncopal episode.    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 Stage C  NYHA Class II. Echocardiogram on 2/17 with EF 35-40% and moderate to severe LV dilation. Syncopal episode likely due to orthostatic hypotension given history, with component of vagal etiology. Sinus bradycardia to as low as 50 at admission but currently asymptomatic. EKG normal, troponin negative, and BNP normal.  - Telemetry  - GDMT:   ACEi/ARB/ARNI: Entresto 97/103 mg BID    BB: yes- Metoprolol succinate ER 25 mg daily PTA; holding in setting of bradycardia   Aldosterone antagonist: spironolactone 50 mg daily    SGLT2i: on dapagliflozin PTA; empagliflozin 25 mg daily inpatient   SCD prophylaxis: doesn't meet criteria   Fluid status: appears euvolemic   - Orthostatic blood pressure  - Consider NM stress test or CTA angiogram in AM    # Palpitations with hx of frequent PVCs  Had ziopatch placed outpatient with noted 8% PVC burden on metoprolol  - TSH ordered  - Telemetry  - Holding metoprolol in setting of bradycardia    # Glucosuria  UA positive for glucose. No UTI symptoms. Glucosuria secondary to Farxiga use.  - Monitor    # GERD  # Bloating  - PRN Tums and simethicone     Diet: Combination Diet Regular Diet Adult    DVT Prophylaxis: Low Risk/Ambulatory with no VTE prophylaxis indicated  Awad Catheter:  "Not present  Cardiac Monitoring: ACTIVE order. Indication: Acute decompensated heart failure (48 hours)  Code Status: Full Code       Disposition Plan   Expected discharge: 2 - 3 days, recommended to prior living arrangement once safe disposition plan/ TCU bed available.    Entered: Min Jett Jr., MD 02/17/2023, 11:25 PM   The patient's care was discussed with the Patient.    To be formally staffed in AM.    Min Jett Jr., MD  Internal Medicine- PGY2  [facility]  __________________________________________________________________    Chief Complaint   Palpitations, syncope    History is obtained from the patient    History of Present Illness   Marleen Mcfadden is a 57 year old female who has a history of HTN, cardiomyopathy, latent TB, GERD, and R middle fossa CSF leak repair who presented to the ED from cardiology clinic after report of syncopal episode the night of 2/16 while playing pool. She also reports palpitations for the past 4 days. Patient is interviewed with her partner who provides some history. Patient and partner report that patient had \"passed out\" briefly while playing pool after she stood up quickly from a seated position. Patient reports that this was the first time she has passed out but would experience episodes of dizziness when standing too quickly or when she laughed or cough for an extended period. Currently, she denies any dizziness, chest pain, pressure, but does report that she has been experiencing palpitations and occasional tingling in her left arm. She denies shortness of breath or lightheaded. States that she has had an 8 lbs weight gain over the past 3 days but denies swelling in her legs, difficulty laying down flat or paroxysmal nocturnal dyspnea. Denies any infectious symptoms such as fever, chills, sweats. Reports experiencing gas in her abdomen with occasional heartburn.     She has an " exercise stress on 2/17. In the ED, labs obtained were significant for normal CBC and CMP, negative troponin and normal BNP.    Past Medical History    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     Familial cardiomyopathy (H)      GERD without esophagitis      History of transfusion      Hypertension      Injury of left shoulder, initial encounter 01/12/2017     Morbid obesity (H)      Nontraumatic rupture of quadriceps tendon, left 06/21/2018     KATIA (obstructive sleep apnea)- moderate-severe (AHI 29) 8/26/2021     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     no longer an issue     Peroneus longus tendinitis 01/02/2015     Plantar fasciitis 11/11/2014     PONV (postoperative nausea and vomiting)      Rotator cuff injury 01/17/2017     Sprain of other ligament of left ankle, initial encounter 01/12/2017     Status post left knee replacement 06/21/2018     TB lung, latent     negative quantiferon gold test  11/5/12     Past Surgical History   Past Surgical History:   Procedure Laterality Date     ARTHROPLASTY REVISION KNEE Left 01/17/2019    Procedure: REVISION, LEFT TOTAL KNEE  ARTHROPLASTY;  Surgeon: Dev Rocha MD;  Location: [facility];  Service: Orthopedics     ARTHROPLASTY REVISION KNEE Right 09/11/2020    Procedure: RIGHT REVISION TOTAL KNEE ARTHROPLASTY;  Surgeon: Dev Rocha MD;  Location: [facility];  Service: Orthopedics     BREAST SURGERY  2001    Breast Reduction     C EXCISE  EXCESS SKIN TISSUE,ABDOMEN        SECTION  1989      SECTION  10/1985     COLONOSCOPY WITH CO2 INSUFFLATION N/A 2021    Procedure: COLONOSCOPY, WITH CO2 INSUFFLATION;  Surgeon: Angela Harrington DO;  Location: [facility] OR     COLONOSCOPY WITH CO2 INSUFFLATION N/A 2022    Procedure: COLONOSCOPY, WITH CO2 INSUFFLATION;  Surgeon: Nando Whitlock MD;  Location: [facility] OR     CRANIECTOMY Right 2021    Procedure: RIGHT MIDDLE FOSSA APPROACH, CSF LEAK REPAIR;  Surgeon: Jocelyn Noe MD;  Location: [facility] OR     CRANIOTOMY MIDDLE FOSSA, EXCISE ACOUSTIC NEUROMA, COMBINED N/A 2021    Procedure: Right CRANIOTOMY, MIDDLE FOSSA APPROACH, FOR REPAIR OF ENCEPHALOCELE;  Surgeon: Jocelyne Harrell MD;  Location: [facility] OR     CV RIGHT HEART CATH MEASUREMENTS RECORDED N/A 1/10/2023    Procedure: Heart Cath Right Heart Cath;  Surgeon: Slade Hanson MD;  Location: [facility] HEART CARD[facility]AC [facility]ATH LAB     CYSTOURETHROSCOPY N/A 2020    Procedure: CYSTOSCOPY;  Surgeon: Cherelle Willams MD;  Location: [facility] OR;  Service: Gynecology     GYN SURGERY N/A 2020    Procedure: MIDURETHRAL SLING, CYSTOSCOPY;  Surgeon: Cherelle Willams MD;  Location: [facility] OR;  Service: Gynecology     HYSTERECTOMY TOTAL ABDOMINAL  2006    for fibroids; reports having blood transfusion after surgery     INSERT DRAIN LUMBAR N/A 2021    Procedure: Insert drain lumbar;  Surgeon: Jocelyn Noe MD;  Location: [facility] OR     ORTHOPEDIC SURGERY      Right Knee ACL repair     THYROIDECTOMY  2013    Procedure: THYROIDECTOMY;  LEFT THYROID LOBECTOMY.  (LIGASURE, RECURRENT LARYNGEAL NERVE MONITOR) ;  Surgeon: Uriah Camargo MD;  Location:  [facility]     THYROIDECTOMY       TOTAL KNEE ARTHROPLASTY Left 10/03/2017     TOTAL KNEE ARTHROPLASTY Right 2019    Procedure: RIGHT TOTAL KNEE ARTHROPLASTY;  Surgeon: Dev Rocha MD;  Location:  [facility];  Service: Orthopedics     TUBAL LIGATION  1989     [facility]     Prior to Admission Medications   Prior to Admission Medications   Prescriptions Last Dose Informant Patient Reported? Taking?   Cholecalciferol (VITAMIN D) 2000 UNIT tablet   Yes No   Sig: Take 5,000 Units by mouth as needed Reported on 3/8/2017   FLAXSEED, LINSEED, PO   Yes No   Sig: Take 1 capsule by mouth daily    dapagliflozin (FARXIGA) 10 MG TABS tablet   No No   Sig: Take 1 tablet (10 mg) by mouth daily   fluticasone (FLONASE) 50 MCG/ACT nasal spray   No No   Sig: Spray 2 sprays into both nostrils At Bedtime   furosemide (LASIX) 20 MG tablet   No No   Sig: Take 2 tablets (40 mg) by mouth daily as needed (weight gain/edema)   ibuprofen (ADVIL/MOTRIN) 600 MG tablet   No No   Sig: Take 1 tablet (600 mg) by mouth every 6 hours as needed for moderate pain   loperamide (IMODIUM A-D) 2 MG tablet   No No   Sig: Take 1 tablet (2 mg) by mouth 4 times daily as needed for diarrhea   loratadine (CLARITIN) 10 MG tablet   No No   Sig: Take 1 tablet (10 mg) by mouth daily   metoprolol succinate ER (TOPROL XL) 25 MG 24 hr tablet   No No   Sig: Take 1 tablet (25 mg) by mouth daily   progesterone (PROMETRIUM) 100 MG capsule   Yes No   Sig: Take 100 mg by mouth At Bedtime    progesterone (PROMETRIUM) 100 MG capsule   Yes No   Sig: TAKE 1 CAPSULE BY MOUTH EVERY NIGHT AT BEDTIME   sacubitril-valsartan (ENTRESTO)  MG per tablet   No No   Sig: Take 1 tablet by mouth 2 times daily   spironolactone (ALDACTONE) 25 MG tablet   No No   Sig: Take 2 tablets (50 mg) by mouth daily      Facility-Administered Medications: None        Review of Systems    The 10 point Review of Systems is negative other than noted in the HPI or here.     Social History   I have reviewed this patient's social history and updated it with pertinent information if needed.  Social History     Tobacco Use     Smoking status: Never     Smokeless  tobacco: Never   Substance Use Topics     Alcohol use: Yes     Comment: rare, twice a year, she has a drink little wine 2 days ago     Drug use: No     Family History   I have reviewed this patient's family history and updated it with pertinent information if needed.  Family History   Problem Relation Age of Onset     Hypertension Father         dec     Diabetes Father         dec     Heart Disease Father         dec     Alcohol/Drug Father      Cardiovascular Father      Heart Disease Mother         dec     Alcohol/Drug Mother      Cardiovascular Mother      Heart Disease Daughter         Cardiomyopathy     Cardiovascular Daughter         cardiomyopathy     Colon Cancer Sister      Cardiovascular Son         cardiomyopathy     Diabetes Paternal Grandmother         dec     Hypertension Paternal Grandmother         dec     Cerebrovascular Disease Paternal Grandmother         dec     Cancer Sister         Lupus     Cardiovascular Sister         cardiomyopathy     Heart Disease Sister         heart failure, and kidney failure        Physical Exam   Vital Signs: Temp: 97.9  F (36.6  C) Temp src: Oral BP: 104/65 Pulse: 56   Resp: 29 SpO2: 98 % O2 Device: None (Room air)    Weight: 0 lbs 0 oz    Constitutional: awake, alert, cooperative, no apparent distress, and appears stated age  Eyes: extra ocular muscles intact, sclera clear, conjunctiva normal  ENT: Normocephalic, moist mucous membranes  Hematologic / Lymphatic: no cervical lymphadenopathy  Respiratory: No increased work of breathing, good air exchange, clear to auscultation bilaterally, no crackles or wheezing  Cardiovascular: regular rate and rhythm, normal S1 and S2, no S3 or S4, and no murmur noted  GI: normal bowel sounds, soft, non-distended, non-tender, no masses palpated, no hepatosplenomegally  Skin: no bruising or bleeding  Musculoskeletal: There is no redness, warmth, or swelling of the joints. Tone is normal.  Neurologic: Awake, alert, oriented to name,  place and time. Cranial nerves II-XII are grossly intact.  Neuropsychiatric: General: normal, calm and normal eye contact    Data     I have personally reviewed the following data over the past 24 hrs:    6.3  \   13.2   / 246     140 105 15.5 /  92   4.5 25 0.80 \       ALT: 20 AST: N/A AP: 54 TBILI: 0.2   ALB: 3.9 TOT PROTEIN: 6.9 LIPASE: N/A       Trop: <6 BNP: 263       TSH: 3.13 T4: N/A A1C: N/A       Imaging results reviewed over the past 24 hrs:   Recent Results (from the past 24 hour(s))   Cardiopulmonary Stress Test - Adult   Result Value    Target     Baseline /56    Baseline HR 44    Rest /58    Rest 50    Last Stress /67    Max HR  124    Max Predicted HR  77    Exercise duration (min) 8    Exercise duration (sec) 51    Estimated workload 4.3    RPE 18    PREDICTED VO2MAX 26.5    Max 17,360    CARPULSTRPH1 2    [id number] 0    CARPULSTRPH1 87    CARPULBPPH1 115/72    CARPULSTRPH1 100    CARPULSTRPH2 4    [id number] 0    CARPULSTRPH2 91    CARPULBPPH2 108/66    CARPULSTRPH2 100    CARPULSTRPH3 6    [id number] 0    CARPULSTRPH3 103    CARPULBPPH3 132/66    CARPULSTRPH3 100    CARPULSTRPH4 8    [id number] 51    CARPULSTRPH4 124    CARPULBPPH4 140/67    CARPULSTRPH4 100    CARPULSTRPB1 1    [id number] 0    CARPULSTRPHB1 88    CARPULBPPHB1 126/57    CARPULSTRPHB1 100    CARPULSTRPHB2 3    [id number] 0    CARPULSTRPHB2 75    CARPULBPPHB2 106/54    CARPULSTRPHB2 100    CARPULSTRPHB3 5    [id number] 0    CARPULSTRPHB3 74    CARPULBPPHB3 98/58    Max 14.91    Predicted 26.50    Percent 56    RER 1.02    VE/VCO2 Frio  27.61    CARPULANEROBICTHRES 13.70    CARPULANAEROBICTHRESPERDICTED 52.0    Actual SVC (L) 2.27    Predicted SVC (L) 3.48    Percent SVC (L) 65    Actual FVC (L) 2.28    Predicted FVC (L) 3.48    Percent FVC (L) 66    Actual FVC (L) 1.95    Predicted FVC (L) 2.70    Percent FVC (L) 72    Actual FEV 1.0/FVC % 85.0    Predicted FEV 1.0/ FVC %   79.0    Percent FEV 1.0 FVC% 108    Rest 3.00    Max 14.91    Rest  0.90    Rest  340.00    Max 1,692.00    Rest 0.85    Max 1.02    Rest 5.00    Max  14.00    Rest 13.10    Max 51.40    Rest 620.00    Max  1,390.00    Rest 20.00    Max  37.00    Rest 80.80    Max 24.90    Rest 42.00    Max  31.00    Rest 39.00    Max  30.00    Max 27.61    Rest 118.00    Max 110.00    Rest 30.00    Max 36.00    Rest  100.00    Max 100.00   Echo Limited   Result Value    Biplane LVEF 37%    LVEF  35-40% (moderately reduced)    [facility]    [id number]  [id number]  [id number]  [id number]^SARKIS^NILAY^ADÁN     [facility]  Echocardiography Laboratory  [address] [place] [id number]     Name: BALTAZAR OREILLY  MRN: [id number]  : 1965  Study Date: 2023 05:23 PM  Age: 57 yrs  Gender: Female  Patient Location: [facility]  Reason For Study: Syncope  Ordering Physician: NILAY WEST  Performed By: Kelsi Zhou     BSA: 2.1 m2  Height: 64 in  Weight: 227 lb  BP: 120/66 mmHg  ______________________________________________________________________________  Procedure  Limited Portable Echo Adult. Contrast Optison. Optison (NDC #2253-1608-25)  given intravenously. Patient was given 5 ml mixture of 3 ml Optison and 6 ml  saline. 4 ml wasted. The final echo results were communicated to Dr. West.  The final echo results were communicated to the ordering physician by phone .  ______________________________________________________________________________  Interpretation Summary  Moderate to severe left ventricular dilation is present. Left ventricular  function is decreased. The ejection fraction is 35-40% (moderately reduced).  Biplane LVEF is 37%.  The right ventricle is normal size. Global right ventricular function is  normal.  No pericardial effusion is present.  This study was compared with the study from 22: LVEF appears similar or  at most minimally lower and appears to hve been  overestimated on the prior  study.  ______________________________________________________________________________  Left Ventricle  Biplane LVEF is 37%. Moderate to severe left ventricular dilation is present.  Left ventricular function is decreased. The ejection fraction is 35-40%  (moderately reduced). Moderate diffuse hypokinesis is present.     Right Ventricle  The right ventricle is normal size. Global right ventricular function is  normal.     Mitral Valve  The mitral valve is normal.     Aortic Valve  The valve leaflets are not well visualized. On Doppler interrogation, there is  no significant stenosis or regurgitation.     Tricuspid Valve  The tricuspid valve is normal. Trace tricuspid insufficiency is present. The  right ventricular systolic pressure is approximated at 19.7 mmHg plus the  right atrial pressure.     Pulmonic Valve  The pulmonic valve is normal. Trace pulmonic insufficiency is present.     Pericardium  No pericardial effusion is present.     Compared to Previous Study  This study was compared with the study from 22 . LVEF appears similar or  at most minimally lower and appears to hve been overestimated on the prior  study.     ______________________________________________________________________________  MMode/2D Measurements & Calculations  IVSd: 0.69 cm  LVIDd: 6.4 cm  LVIDs: 5.3 cm  LVPWd: 1.0 cm  FS: 16.5 %  LV mass(C)d: 229.1 grams  LV mass(C)dI: 111.0 grams/m2  RWT: 0.33     Doppler Measurements & Calculations  PA acc time: 0.11 sec  TR max marta: 222.0 cm/sec  TR max P.7 mmHg     ______________________________________________________________________________  Report approved by: Gustavo Nair 2023 06:29 PM         I saw and evaluated patient with CV resident. I examined patient with resident. I discussed the results with patient and CV resident. I discussed our plan with patient and CV resident.  I agree with CV resident's note and I edited the resident's note  to make it a more comprehensive document.    45 min were spent with patient and resident.    Wilson Dasilva MD, PhD  Professor of Medicine  Division of Cardiology

## 2023-02-18 NOTE — DISCHARGE INSTRUCTIONS
Don't take metoprolol today (2/18). We are reducing your dose. Tomorrow (2/19), please take the new dose of 12.5mg of metoprolol.  Attached are instructions about checking your pulse. If you feel dizzy or lightheaded, please check your pulse and don't take metoprolol that day.  Please follow up with the [facility] NP within the next 2-3 weeks.

## 2023-02-18 NOTE — DISCHARGE SUMMARY
"    [address] [address]  p: [phone]    Discharge Summary: Cardiology Service    Marleen Mcfadden MRN# [id number]   YOB: 1965 Age: 57 year old       Admission Date: 2/17/2023  Discharge Date: 02/18/23    Discharge Diagnoses:  #Familial cardiomyopathy  #Syncopal episode  #Bradycardia    Brief HPI:  Marleen Mcfadden is a 57 year old female with history of HTN, cardiomyopathy, latent TB, GERD, and R middle fossa CSF leak repair, who presents today referred in by cardiology clinic with report of syncopal episode the night of 2/16 and recurrent worsening palpitations of the last 4 days. Per patient, while she was playing pool, she stood up quickly from a seated position and \"passed out\" briefly. She doesn't remember this episode. She reports her heart rates been low for the past few months and approximately 1 month ago her metoprolol was cut in half.    Patient was evaluated in ED, including labs and echo. Metoprolol was held and orhtostatis vitals were performed with increasing HR & BP. Patient was discharged to home on 2/18 with decreased metoprolol dose and instructions to follow up in [facility].    Hospital Course by Diagnosis:  #Familial cardiomyopathy  #Syncopal episode  #Bradycardia  Complete echo in ED revealed stable EF (35-40%) with normal RV function.  - HR sinus cinthia/normal sinus 40-60s (per chart review, this looks to be her norm)  - Orthostatic vitals performed with increase in HR & BP, pt remained asymptomatic  - Held Toprol today, ordered decreased dose to start tomorrow (12.5mg daily) with instructions on how to check pulse  - Follow up with [facility] in 2-3 weeks    Medication Changes:  Decreased Toprol to 12.5mg daily    Discharge medications:   Current Discharge Medication List      CONTINUE these medications which have CHANGED    Details   metoprolol succinate ER (TOPROL XL) 25 MG 24 hr tablet Take 0.5 " tablets (12.5 mg) by mouth daily  Qty: 30 tablet, Refills: 3    Associated Diagnoses: Chronic systolic congestive heart failure (H)         CONTINUE these medications which have NOT CHANGED    Details   Cholecalciferol (VITAMIN D) 2000 UNIT tablet Take 5,000 Units by mouth as needed Reported on 3/8/2017  Qty: 100 tablet, Refills: 12      dapagliflozin (FARXIGA) 10 MG TABS tablet Take 1 tablet (10 mg) by mouth daily  Qty: 90 tablet, Refills: 3    Associated Diagnoses: Familial cardiomyopathy (H); Chronic systolic congestive heart failure (H)      FLAXSEED, LINSEED, PO Take 1 capsule by mouth daily       fluticasone (FLONASE) 50 MCG/ACT nasal spray Spray 2 sprays into both nostrils At Bedtime  Qty: 15.8 mL, Refills: 4    Associated Diagnoses: Otalgia, unspecified laterality; Otorrhea, bilateral; Dysfunction of both eustachian tubes; Chronic rhinitis      furosemide (LASIX) 20 MG tablet Take 2 tablets (40 mg) by mouth daily as needed (weight gain/edema)  Qty: 180 tablet, Refills: 3    Associated Diagnoses: CHF with cardiomyopathy (H)      ibuprofen (ADVIL/MOTRIN) 600 MG tablet Take 1 tablet (600 mg) by mouth every 6 hours as needed for moderate pain  Qty: 30 tablet, Refills: 0    Associated Diagnoses: Viral gastroenteritis      loperamide (IMODIUM A-D) 2 MG tablet Take 1 tablet (2 mg) by mouth 4 times daily as needed for diarrhea  Qty: 30 tablet, Refills: 0    Associated Diagnoses: Viral gastroenteritis      loratadine (CLARITIN) 10 MG tablet Take 1 tablet (10 mg) by mouth daily  Qty: 90 tablet, Refills: 3    Associated Diagnoses: Otalgia, unspecified laterality; Otorrhea, bilateral; Dysfunction of both eustachian tubes; Chronic rhinitis      progesterone (PROMETRIUM) 100 MG capsule TAKE 1 CAPSULE BY MOUTH EVERY NIGHT AT BEDTIME      progesterone (PROMETRIUM) 100 MG capsule Take 100 mg by mouth At Bedtime       sacubitril-valsartan (ENTRESTO)  MG per tablet Take 1 tablet by mouth 2 times daily  Qty: 180 tablet,  Refills: 3    Associated Diagnoses: Chronic systolic congestive heart failure (H)      spironolactone (ALDACTONE) 25 MG tablet Take 2 tablets (50 mg) by mouth daily  Qty: 180 tablet, Refills: 0    Associated Diagnoses: Chronic diastolic congestive heart failure (H)             Follow-up:  Follow up with PCP in 7-10 days  Follow up with [facility] in 2-3 weeks    Code status:  Full    Condition on discharge  Temp:  [97.9  F (36.6  C)] 97.9  F (36.6  C)  Pulse:  [47-78] 69  Resp:  [11-29] 11  BP: ()/() 108/78  SpO2:  [95 %-100 %] 99 %  General: Alert, interactive, NAD  Eyes: sclera anicteric, EOMI  Neck: JVP not elevated  Cardiovascular: regular rate and rhythm, normal S1 and S2, no murmurs, gallops, or rubs  Resp: clear to auscultation bilaterally, no rales, wheezes, or rhonchi  GI: Soft, nontender, nondistended. +BS.  No HSM or masses, no rebound or guarding.  Extremities: No edema, no cyanosis or clubbing, dorsalis pedis and posterior tibialis pulses 2+ bilaterally  Skin: Warm and dry, no jaundice or rash  Neuro: CN 2-12 intact, moves all extremities equally  Psych: Alert & oriented x 3    Imaging with results:  Echocardiogram 2/17:  Procedure  Limited Portable Echo Adult. Contrast Optison. Optison (NDC #2007-5408-15)  given intravenously. Patient was given 5 ml mixture of 3 ml Optison and 6 ml  saline. 4 ml wasted. The final echo results were communicated to Dr. West.  The final echo results were communicated to the ordering physician by phone .  ______________________________________________________________________________  Interpretation Summary  Moderate to severe left ventricular dilation is present. Left ventricular  function is decreased. The ejection fraction is 35-40% (moderately reduced).  Biplane LVEF is 37%.  The right ventricle is normal size. Global right ventricular function is  normal.  No pericardial effusion is present.  This study was compared with the study from 12/12/22: LVEF  appears similar or  at most minimally lower and appears to hve been overestimated on the prior  study.  ______________________________________________________________________________  Left Ventricle  Biplane LVEF is 37%. Moderate to severe left ventricular dilation is present.  Left ventricular function is decreased. The ejection fraction is 35-40%  (moderately reduced). Moderate diffuse hypokinesis is present.     Right Ventricle  The right ventricle is normal size. Global right ventricular function is  normal.     Mitral Valve  The mitral valve is normal.     Aortic Valve  The valve leaflets are not well visualized. On Doppler interrogation, there is  no significant stenosis or regurgitation.     Tricuspid Valve  The tricuspid valve is normal. Trace tricuspid insufficiency is present. The  right ventricular systolic pressure is approximated at 19.7 mmHg plus the  right atrial pressure.     Pulmonic Valve  The pulmonic valve is normal. Trace pulmonic insufficiency is present.     Pericardium  No pericardial effusion is present.     Compared to Previous Study  This study was compared with the study from 22 . LVEF appears similar or  at most minimally lower and appears to hve been overestimated on the prior  study.     ______________________________________________________________________________  MMode/2D Measurements & Calculations  IVSd: 0.69 cm  LVIDd: 6.4 cm  LVIDs: 5.3 cm  LVPWd: 1.0 cm  FS: 16.5 %  LV mass(C)d: 229.1 grams  LV mass(C)dI: 111.0 grams/m2  RWT: 0.33     Doppler Measurements & Calculations  PA acc time: 0.11 sec  TR max marta: 222.0 cm/sec  TR max P.7 mmHg       EKG: NSR HR 68        Patient Care Team:  Yanna Matias MD as PCP - General (Family Practice)  Danny Grace MD as MD (OB/Gyn)  Cassie Kolb MD as MD (Cardiology)  Dev Rocha MD as MD (Orthopaedic Surgery)  Gwen Swenson RN as Specialty Care Coordinator (Cardiology)  Yanna Matias MD as  "Assigned PCP  Jocelyn Noe MD as Assigned Neuroscience Provider  Alex Sanchez PA-C as Physician Assistant (Gastroenterology)  Jocelyne Harrell MD as Assigned Surgical Provider  Megha Meyer RN as Specialty Care Coordinator (Cardiology)  Real Seaman MD as MD (Cardiovascular Disease)  Hunter Gallo APRN CNP as Assigned Heart and Vascular Provider    PITO Lopez CNP  [facility] Cardiology  Patient discussed with staff cardiologist, Dr. Dasilva, who agrees with the above documentation and plan. Documentation represents joint decision making.     Time Spent on this Encounter   I, PITO WOOD CNP, personally saw the patient today and spent greater than 30 minutes discharging this patient.     This is a shared discharge note with PITO Lopez CNP    Discharge Diagnoses:  #Familial cardiomyopathy  #Syncopal episode  #Bradycardia    Brief HPI:  Marleen Mcfadden is a 57 year old female with history of HTN, cardiomyopathy, latent TB, GERD, and R middle fossa CSF leak repair, who presents today referred in by cardiology clinic with report of syncopal episode the night of 2/16 and recurrent worsening palpitations of the last 4 days. Per patient, while she was playing pool, she stood up quickly from a seated position and \"passed out\" briefly. She doesn't remember this episode. She reports her heart rates been low for the past few months and approximately 1 month ago her metoprolol was cut in half.    Patient was evaluated in ED, including labs and echo. Metoprolol was held and orhtostatis vitals were performed with increasing HR & BP. Patient was discharged to home on 2/18 with decreased metoprolol dose and instructions to follow up in [facility].    Hospital Course by Diagnosis:  #Familial cardiomyopathy  #Syncopal episode  #Bradycardia  Complete echo in ED revealed stable EF (35-40%) with normal RV function.  - HR sinus cinthia/normal sinus 40-60s (per chart review, this " looks to be her norm)  - Orthostatic vitals performed with increase in HR & BP, pt remained asymptomatic  - Held Toprol today, ordered decreased dose to start tomorrow (12.5mg daily) with instructions on how to check pulse  - Follow up with [facility] in 2-3 weeks    Medication Changes:  Decreased Toprol to 12.5mg daily      Follow-up:  Follow up with PCP in 7-10 days  Follow up with [facility] in 2-3 weeks    Code status:  Full    Condition on discharge  Temp:  [97.9  F (36.6  C)] 97.9  F (36.6  C)  Pulse:  [47-78] 69  Resp:  [11-29] 11  BP: ()/() 108/78  SpO2:  [95 %-100 %] 99 %    Physical exam:  General: Alert, interactive, NAD  Neck: JVP not elevated  Cardiovascular: regular rate and rhythm, normal S1 and S2, no murmurs, gallops, or rubs  Resp: clear to auscultation bilaterally, no rales, wheezes, or rhonchi  GI: Soft, nontender, nondistended. +BS.  No HSM or masses, no rebound or guarding.  Extremities: No edema, no cyanosis or clubbing, dorsalis pedis and posterior tibialis pulses 2+ bilaterally      I saw and evaluated patient with NP during discharge proces. . I examined patient with NP. I discussed the results of labs, EKG, echocardiography  with patient and NP. I discussed our therapeutic plan with patient and NP.  I agree with NP s note and I edited the NP's note to make it a more comprehensive document.    During discharge process 35 min were spent with patient and NP.    Wilson Dasilva MD, PhD  Professor of Medicine  Division of Cardiology

## 2023-02-18 NOTE — ED PROVIDER NOTES
ED Provider Note  [facility]      History     Chief Complaint   Patient presents with     Syncope     HPI  Marleen Mcfadden is a 57 year old female with history of HTN, cardiomyopathy, latent TB, GERD, and R middle fossa CSF leak repair, who presents today referred in by cardiology clinic with report of syncopal episode last night and recurrent worsening palpitations of the last 4 days.  She reports some associated chest discomfort including left-sided chest discomfort with radiation to the left forearm, also associated with these palpitations over the past few days.  Reports some mild dyspnea on exertion but otherwise denies any shortness of breath or lightheadedness while at rest.  She reports a 8 pound weight gain in the last 3 days.  She reports her heart rates been low for the past few months and approximately 1 month ago her metoprolol was cut in half.    Results summary from complete echocardiogram 12/12/2022 pasted below:    ______________________________________________________________________________  Interpretation Summary  Severe left ventricular dilation is present.LVIDd 70mm.  Biplane LVEF is 50%.  Right ventricular function, chamber size, wall motion, and thickness are  normal.  Both atria appear normal.  Pulmonary artery systolic pressure is normal.  The inferior vena cava is normal.  No pericardial effusion is present.  The left ventricular function has improved.      Results from stress test today, 2/17/2023, pasted below:    Conclusion          A cardiopulmonary stress test was performed following a Supriya ramp protocol with the patient exercising for 8 minutes and 51 seconds under the supervision of Dr. Julio Wood.  Blood pressure demonstrated a normal response to exercise.  Heart rate demonstrated a blunted response to exercise.     The stress electrocardiogram was non-diagnostic due to baseline ST changes.     The patient's exercise capacity is severely  impaired.     A prior study was conducted on 2/27/2020. This study has changes noted with the prior study. In comparison to previous CPX, patient improved her exercise time from 7:32 min to 8:51 min. Her peak VO2 also improved today from 12.1 ml/kg/min to 14.91 ml/kg/min.     The patient gave a submaximal effort (RER <1.10).  Dyspnea was the chief complaint that ultimately forced the patient to stop the exercise.  The patient likely has a cardiac limitation to exercise given the low peak VO2 and RPP.      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     Familial cardiomyopathy (H)      GERD without esophagitis      History of transfusion      Hypertension      Injury of left shoulder, initial encounter 01/12/2017     Morbid obesity (H)      Nontraumatic rupture of quadriceps tendon, left 06/21/2018     KATIA (obstructive sleep apnea)- moderate-severe (AHI 29) 8/26/2021     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     no longer an issue     Peroneus longus tendinitis 01/02/2015     Plantar fasciitis 11/11/2014     PONV (postoperative nausea and vomiting)      Rotator cuff injury 01/17/2017     Sprain of other ligament of left ankle, initial encounter 01/12/2017     Status post left knee replacement 06/21/2018     TB lung, latent     negative quantiferon gold test  11/5/12     Past Surgical History:   Procedure Laterality Date     ARTHROPLASTY REVISION KNEE Left 01/17/2019    Procedure: REVISION, LEFT TOTAL  KNEE  ARTHROPLASTY;  Surgeon: Dev Rocha MD;  Location: [facility] OR;  Service: Orthopedics     ARTHROPLASTY REVISION KNEE Right 2020    Procedure: RIGHT REVISION TOTAL KNEE ARTHROPLASTY;  Surgeon: Dev Rocha MD;  Location: [facility] OR;  Service: Orthopedics     BREAST SURGERY      Breast Reduction     C EXCISE EXCESS SKIN TISSUE,ABDOMEN        SECTION  1989      SECTION  10/1985     COLONOSCOPY WITH CO2 INSUFFLATION N/A 2021    Procedure: COLONOSCOPY, WITH CO2 INSUFFLATION;  Surgeon: Angela Harrington DO;  Location: [facility] OR     COLONOSCOPY WITH CO2 INSUFFLATION N/A 2022    Procedure: COLONOSCOPY, WITH CO2 INSUFFLATION;  Surgeon: Nando Whitlock MD;  Location: [facility] OR     CRANIECTOMY Right 2021    Procedure: RIGHT MIDDLE FOSSA APPROACH, CSF LEAK REPAIR;  Surgeon: Jocelyn Noe MD;  Location: [facility] OR     CRANIOTOMY MIDDLE FOSSA, EXCISE ACOUSTIC NEUROMA, COMBINED N/A 2021    Procedure: Right CRANIOTOMY, MIDDLE FOSSA APPROACH, FOR REPAIR OF ENCEPHALOCELE;  Surgeon: Jocelyne Harrell MD;  Location: [facility] OR     CV RIGHT HEART CATH MEASUREMENTS RECORDED N/A 1/10/2023    Procedure: Heart Cath Right Heart Cath;  Surgeon: Slade Hanson MD;  Location:  HEART CARDIAC CATH LAB     CYSTOURETHROSCOPY N/A 2020    Procedure: CYSTOSCOPY;  Surgeon: Cherelle Willams MD;  Location: [facility] OR;  Service: Gynecology     GYN SURGERY N/A 2020    Procedure: MIDURETHRAL SLING, CYSTOSCOPY;  Surgeon: Cherelle Willams MD;  Location: [facility] OR;  Service: Gynecology     HYSTERECTOMY TOTAL ABDOMINAL      for fibroids; reports having blood transfusion after surgery     INSERT DRAIN LUMBAR N/A 2021    Procedure: Insert drain lumbar;  Surgeon: Jocelyn Noe MD;  Location: [facility] OR     ORTHOPEDIC SURGERY      Right Knee ACL repair     THYROIDECTOMY  2013     Procedure: THYROIDECTOMY;  LEFT THYROID LOBECTOMY.  (LIGASURE, RECURRENT LARYNGEAL NERVE MONITOR) ;  Surgeon: Uriah Camargo MD;  Location:  [facility]     THYROIDECTOMY       TOTAL KNEE ARTHROPLASTY Left 10/03/2017     TOTAL KNEE ARTHROPLASTY Right 02/07/2019    Procedure: RIGHT TOTAL KNEE ARTHROPLASTY;  Surgeon: Dev Rocha MD;  Location: [facility];  Service: Orthopedics     TUBAL LIGATION  1989     ZZC EXCIS UTERINE FIBROID,ABD APPRCH  1999     Cholecalciferol (VITAMIN D) 2000 UNIT tablet  dapagliflozin (FARXIGA) 10 MG TABS tablet  FLAXSEED, LINSEED, PO  fluticasone (FLONASE) 50 MCG/ACT nasal spray  furosemide (LASIX) 20 MG tablet  ibuprofen (ADVIL/MOTRIN) 600 MG tablet  loperamide (IMODIUM A-D) 2 MG tablet  loratadine (CLARITIN) 10 MG tablet  metoprolol succinate ER (TOPROL XL) 25 MG 24 hr tablet  progesterone (PROMETRIUM) 100 MG capsule  progesterone (PROMETRIUM) 100 MG capsule  sacubitril-valsartan (ENTRESTO)  MG per tablet  spironolactone (ALDACTONE) 25 MG tablet      Allergies   Allergen Reactions     Morphine      EMESIS     Nickel      Sulfa Drugs Swelling     Family History  Family History   Problem Relation Age of Onset     Hypertension Father         dec     Diabetes Father         dec     Heart Disease Father         dec     Alcohol/Drug Father      Cardiovascular Father      Heart Disease Mother         dec     Alcohol/Drug Mother      Cardiovascular Mother      Heart Disease Daughter         Cardiomyopathy     Cardiovascular Daughter         cardiomyopathy     Colon Cancer Sister      Cardiovascular Son         cardiomyopathy     Diabetes Paternal Grandmother         dec     Hypertension Paternal Grandmother         dec     Cerebrovascular Disease Paternal Grandmother         dec     Cancer Sister         Lupus     Cardiovascular Sister         cardiomyopathy     Heart Disease Sister         heart failure, and kidney failure     Social History   Social History     Tobacco  Use     Smoking status: Never     Smokeless tobacco: Never   Substance Use Topics     Alcohol use: Yes     Comment: rare, twice a year, she has a drink little wine 2 days ago     Drug use: No      Past medical history, past surgical history, medications, allergies, family history, and social history were reviewed with the patient. No additional pertinent items.      A medically appropriate review of systems was performed with pertinent positives and negatives noted in the HPI, and all other systems negative.    Physical Exam   BP: 120/66  Pulse: 52  Resp: 16  SpO2: 98 %  Physical Exam  Vitals and nursing note reviewed.   Constitutional:       General: She is not in acute distress.     Appearance: Normal appearance. She is not diaphoretic.   HENT:      Head: Atraumatic.      Mouth/Throat:      Pharynx: No oropharyngeal exudate.   Eyes:      General: No scleral icterus.     Pupils: Pupils are equal, round, and reactive to light.   Cardiovascular:      Rate and Rhythm: Bradycardia present.      Heart sounds: Normal heart sounds.   Pulmonary:      Effort: No respiratory distress.      Breath sounds: Normal breath sounds.   Abdominal:      General: Bowel sounds are normal.      Palpations: Abdomen is soft.      Tenderness: There is no abdominal tenderness.   Musculoskeletal:         General: No tenderness.   Skin:     General: Skin is warm.      Findings: No rash.   Neurological:      General: No focal deficit present.      Mental Status: She is alert and oriented to person, place, and time.             ED Course, Procedures, & Data      Procedures       ED Course Selections:        EKG Interpretation:      Interpreted by Pato West MD  Time reviewed: per Epic  Symptoms at time of EKG: none   Rhythm: normal sinus   Rate: normal  Axis: normal  Ectopy: none  Conduction: normal  ST Segments/ T Waves: No ST-T wave changes  Q Waves: none  Comparison to prior: Unchanged    Clinical Impression: normal EKG                      Results for orders placed or performed during the hospital encounter of 02/17/23   Comprehensive metabolic panel     Status: None   Result Value Ref Range    Sodium 140 136 - 145 mmol/L    Potassium 4.5 3.4 - 5.3 mmol/L    Chloride 105 98 - 107 mmol/L    Carbon Dioxide (CO2) 25 22 - 29 mmol/L    Anion Gap 10 7 - 15 mmol/L    Urea Nitrogen 15.5 6.0 - 20.0 mg/dL    Creatinine 0.80 0.51 - 0.95 mg/dL    Calcium 9.0 8.6 - 10.0 mg/dL    Glucose 92 70 - 99 mg/dL    Alkaline Phosphatase 54 35 - 104 U/L    AST      ALT 20 10 - 35 U/L    Protein Total 6.9 6.4 - 8.3 g/dL    Albumin 3.9 3.5 - 5.2 g/dL    Bilirubin Total 0.2 <=1.2 mg/dL    GFR Estimate 85 >60 mL/min/1.73m2   Troponin T, High Sensitivity     Status: Normal   Result Value Ref Range    Troponin T, High Sensitivity <6 <=14 ng/L   Magnesium     Status: Normal   Result Value Ref Range    Magnesium 1.8 1.7 - 2.3 mg/dL   Nt probnp inpatient (BNP)     Status: Normal   Result Value Ref Range    N terminal Pro BNP Inpatient 263 0 - 900 pg/mL   UA with Microscopic reflex to Culture     Status: Abnormal    Specimen: Urine, Clean Catch   Result Value Ref Range    Color Urine Light Yellow Colorless, Straw, Light Yellow, Yellow    Appearance Urine Clear Clear    Glucose Urine >=1000 (A) Negative mg/dL    Bilirubin Urine Negative Negative    Ketones Urine Negative Negative mg/dL    Specific Gravity Urine 1.028 1.003 - 1.035    Blood Urine Negative Negative    pH Urine 7.5 (H) 5.0 - 7.0    Protein Albumin Urine Negative Negative mg/dL    Urobilinogen Urine Normal Normal, 2.0 mg/dL    Nitrite Urine Negative Negative    Leukocyte Esterase Urine Negative Negative    RBC Urine <1 <=2 /HPF    WBC Urine <1 <=5 /HPF    Squamous Epithelials Urine 1 <=1 /HPF    Narrative    Urine Culture not indicated   CBC with platelets and differential     Status: None   Result Value Ref Range    WBC Count 6.3 4.0 - 11.0 10e3/uL    RBC Count 4.22 3.80 - 5.20 10e6/uL    Hemoglobin 13.2 11.7 - 15.7  g/dL    Hematocrit 40.5 35.0 - 47.0 %    MCV 96 78 - 100 fL    MCH 31.3 26.5 - 33.0 pg    MCHC 32.6 31.5 - 36.5 g/dL    RDW 13.5 10.0 - 15.0 %    Platelet Count 246 150 - 450 10e3/uL    % Neutrophils 47 %    % Lymphocytes 40 %    % Monocytes 10 %    % Eosinophils 2 %    % Basophils 1 %    % Immature Granulocytes 0 %    NRBCs per 100 WBC 0 <1 /100    Absolute Neutrophils 3.0 1.6 - 8.3 10e3/uL    Absolute Lymphocytes 2.5 0.8 - 5.3 10e3/uL    Absolute Monocytes 0.6 0.0 - 1.3 10e3/uL    Absolute Eosinophils 0.1 0.0 - 0.7 10e3/uL    Absolute Basophils 0.1 0.0 - 0.2 10e3/uL    Absolute Immature Granulocytes 0.0 <=0.4 10e3/uL    Absolute NRBCs 0.0 10e3/uL   EKG 12-lead, tracing only     Status: None (Preliminary result)   Result Value Ref Range    Systolic Blood Pressure  mmHg    Diastolic Blood Pressure  mmHg    Ventricular Rate 68 BPM    Atrial Rate 68 BPM    CO Interval 166 ms    QRS Duration 102 ms     ms    QTc 442 ms    P Axis 41 degrees    R AXIS -19 degrees    T Axis 16 degrees    Interpretation ECG       Sinus rhythm with sinus arrhythmia  Nonspecific T wave abnormality  Abnormal ECG     Echo Limited     Status: None   Result Value Ref Range    Biplane LVEF 37%     LVEF  35-40% (moderately reduced)     [facility]    [id number]  [id number]  [id number]  [id number]^SARKIS^NILAY^W     [facility],[place]  Echocardiography Laboratory  [address] [place] [id number]     Name: BALTAZAR OREILLY  MRN: [id number]  : 1965  Study Date: 2023 05:23 PM  Age: 57 yrs  Gender: Female  Patient Location: [facility]  Reason For Study: Syncope  Ordering Physician: NILAY DOCKERY  Performed By: Kelsi Zhou     BSA: 2.1 m2  Height: 64 in  Weight: 227 lb  BP: 120/66 mmHg  ______________________________________________________________________________  Procedure  Limited Portable Echo Adult. Contrast Optison. Optison (NDC #0866-8421-39)  given intravenously. Patient was given 5 ml mixture of 3  ml Optison and 6 ml  saline. 4 ml wasted. The final echo results were communicated to Dr. West.  The final echo results were communicated to the ordering physician by phone .  ______________________________________________________________________________  Interpretation Summary  Moderate to severe left ventricular dilation is present. Left ventricular  function is decreased. The ejection fraction is 35-40% (moderately reduced).  Biplane LVEF is 37%.  The right ventricle is normal size. Global right ventricular function is  normal.  No pericardial effusion is present.  This study was compared with the study from 12/12/22: LVEF appears similar or  at most minimally lower and appears to hve been overestimated on the prior  study.  ______________________________________________________________________________  Left Ventricle  Biplane LVEF is 37%. Moderate to severe left ventricular dilation is present.  Left ventricular function is decreased. The ejection fraction is 35-40%  (moderately reduced). Moderate diffuse hypokinesis is present.     Right Ventricle  The right ventricle is normal size. Global right ventricular function is  normal.     Mitral Valve  The mitral valve is normal.     Aortic Valve  The valve leaflets are not well visualized. On Doppler interrogation, there is  no significant stenosis or regurgitation.     Tricuspid Valve  The tricuspid valve is normal. Trace tricuspid insufficiency is present. The  right ventricular systolic pressure is approximated at 19.7 mmHg plus the  right atrial pressure.     Pulmonic Valve  The pulmonic valve is normal. Trace pulmonic insufficiency is present.     Pericardium  No pericardial effusion is present.     Compared to Previous Study  This study was compared with the study from 12/12/22 . LVEF appears similar or  at most minimally lower and appears to hve been overestimated on the prior  study.      ______________________________________________________________________________  MMode/2D Measurements & Calculations  IVSd: 0.69 cm  LVIDd: 6.4 cm  LVIDs: 5.3 cm  LVPWd: 1.0 cm  FS: 16.5 %  LV mass(C)d: 229.1 grams  LV mass(C)dI: 111.0 grams/m2  RWT: 0.33     Doppler Measurements & Calculations  PA acc time: 0.11 sec  TR max marta: 222.0 cm/sec  TR max P.7 mmHg     ______________________________________________________________________________  Report approved by: Gustavo Nair 2023 06:29 PM         CBC with platelets differential     Status: None    Narrative    The following orders were created for panel order CBC with platelets differential.  Procedure                               Abnormality         Status                     ---------                               -----------         ------                     CBC with platelets and d...[[id number]]                      Final result                 Please view results for these tests on the individual orders.   Results for orders placed or performed during the hospital encounter of 23   Cardiopulmonary Stress Test - Adult     Status: None (In process)   Result Value Ref Range    Target      Baseline /56 mmHg    Baseline HR 44 bpm    Rest /58 mmHg    Rest 50 bpm    Last Stress /67 mmHg    Max HR  124     Max Predicted HR  77 %    Exercise duration (min) 8 min    Exercise duration (sec) 51 sec    Estimated workload 4.3 METS    RPE 18     PREDICTED VO2MAX 26.5     Max 17,360     CARPULSTRPH1 2 mins    [id number] 0 sec    CARPULSTRPH1 87 bpm    CARPULBPPH1 115/72 mmHg    CARPULSTRPH1 100 % SpO2    CARPULSTRPH2 4 mins    [id number] 0 sec    CARPULSTRPH2 91 bpm    CARPULBPPH2 108/66 mmHg    CARPULSTRPH2 100 % SpO2    CARPULSTRPH3 6 mins    [id number] 0 sec    CARPULSTRPH3 103 bpm    CARPULBPPH3 132/66 mmHg    CARPULSTRPH3 100 % SpO2    CARPULSTRPH4 8 mins    [id number] 51 sec    CARPULSTRPH4 124 bpm     CARPULBPPH4 140/67 mmHg    CARPULSTRPH4 100 % SpO2    CARPULSTRPB1 1 mins    [id number] 0 sec    CARPULSTRPHB1 88 bpm    CARPULBPPHB1 126/57 mmHg    CARPULSTRPHB1 100 % SpO2    CARPULSTRPHB2 3 mins    [id number] 0 sec    CARPULSTRPHB2 75 bpm    CARPULBPPHB2 106/54 mmHg    CARPULSTRPHB2 100 % SpO2    CARPULSTRPHB3 5 mins    [id number] 0 sec    CARPULSTRPHB3 74 bpm    CARPULBPPHB3 98/58 mmHg    Max 14.91 ml/kg/min    Predicted 26.50 ml/kg/min    Percent 56 %    RER 1.02     VE/VCO2 Lamb  27.61     CARPULANEROBICTHRES 13.70 ml/kg/min    CARPULANAEROBICTHRESPERDICTED 52.0 %    Actual SVC (L) 2.27     Predicted SVC (L) 3.48     Percent SVC (L) 65 %    Actual FVC (L) 2.28     Predicted FVC (L) 3.48     Percent FVC (L) 66 %    Actual FVC (L) 1.95     Predicted FVC (L) 2.70     Percent FVC (L) 72 %    Actual FEV 1.0/FVC % 85.0     Predicted FEV 1.0/ FVC %  79.0     Percent FEV 1.0 FVC% 108 %    Rest 3.00 ml/kg/min    Max 14.91 ml/kg/min    Rest  0.90     Rest  340.00 ml/min    Max 1,692.00 ml/min    Rest 0.85     Max 1.02     Rest 5.00 ml/beat    Max  14.00 ml/beat    Rest 13.10 l/min    Max 51.40 l/min    Rest 620.00 ml    Max  1,390.00 ml    Rest 20.00 br/min    Max  37.00 br/min    Rest 80.80     Max 24.90     Rest 42.00     Max  31.00     Rest 39.00     Max  30.00     Max 27.61     Rest 118.00     Max 110.00     Rest 30.00     Max 36.00     Rest  100.00 %    Max 100.00 %     Medications   perflutren diluted 1mL to 2mL with saline (OPTISON) diluted injection 5 mL (5 mLs Intravenous Given 2/17/23 4154)     Labs Ordered and Resulted from Time of ED Arrival to Time of ED Departure   ROUTINE UA WITH MICROSCOPIC REFLEX TO CULTURE - Abnormal       Result Value    Color Urine Light Yellow      Appearance Urine Clear      Glucose Urine >=1000 (*)     Bilirubin Urine Negative      Ketones Urine Negative      Specific Gravity Urine 1.028      Blood Urine Negative      pH Urine 7.5 (*)     Protein Albumin Urine  Negative      Urobilinogen Urine Normal      Nitrite Urine Negative      Leukocyte Esterase Urine Negative      RBC Urine <1      WBC Urine <1      Squamous Epithelials Urine 1     TROPONIN T, HIGH SENSITIVITY - Normal    Troponin T, High Sensitivity <6     MAGNESIUM - Normal    Magnesium 1.8     NT PROBNP INPATIENT - Normal    N terminal Pro BNP Inpatient 263     COMPREHENSIVE METABOLIC PANEL    Sodium 140      Potassium 4.5      Chloride 105      Carbon Dioxide (CO2) 25      Anion Gap 10      Urea Nitrogen 15.5      Creatinine 0.80      Calcium 9.0      Glucose 92      Alkaline Phosphatase 54      AST        ALT 20      Protein Total 6.9      Albumin 3.9      Bilirubin Total 0.2      GFR Estimate 85     CBC WITH PLATELETS AND DIFFERENTIAL    WBC Count 6.3      RBC Count 4.22      Hemoglobin 13.2      Hematocrit 40.5      MCV 96      MCH 31.3      MCHC 32.6      RDW 13.5      Platelet Count 246      % Neutrophils 47      % Lymphocytes 40      % Monocytes 10      % Eosinophils 2      % Basophils 1      % Immature Granulocytes 0      NRBCs per 100 WBC 0      Absolute Neutrophils 3.0      Absolute Lymphocytes 2.5      Absolute Monocytes 0.6      Absolute Eosinophils 0.1      Absolute Basophils 0.1      Absolute Immature Granulocytes 0.0      Absolute NRBCs 0.0       Echo Limited   Final Result             Medical Decision Making  The patient's presentation is strongly suggestive of a chronic illness mild to moderate exacerbation, progression, or side effect of treatment.    The patient's evaluation involved:  review of external note(s) from 1 sources (see separate area of note for details)  ordering and/or review of 3+ test(s) in this encounter (see separate area of note for details)  discussion of management or test interpretation with another health professional (see separate area of note for details)    The patient's management involved a decision regarding hospitalization.      Assessment & Plan      57 year old  female with history of HTN, cardiomyopathy, latent TB, GERD, and R middle fossa CSF leak repair, who presents today referred in by cardiology clinic with report of syncopal episode last night and recurrent worsening palpitations of the last 4 days.  Vital signs in triage notable for bradycardia with a pulse of 52 but otherwise stable including normal pulse ox at 98% on room air.  Patient placed on cardiac monitor which revealed persistent bradycardia in the low 50s over several hours of observation in the emergency department. EKG reviewed which revealed normal sinus rhythm without acute ischemic changes.  IV established, labs drawn sent reviewed document epic essentially all unremarkable normal CBC and electrolytes, negative troponin, normal BNP, normal hepatic panel, negative UA.  Complete echo obtained here in the emergency department which revealed an interval decrease (compared to report from prior study 12/12/2022) in ejection fraction to 35 to 40%.  Case discussed with cardiology staff with clarification of echo results per his review.  Patient will be admitted to cardiology 1 service for symptomatic bradycardia.  Cardiology resident paged.     I have reviewed the nursing notes. I have reviewed the findings, diagnosis, plan and need for follow up with the patient.    New Prescriptions    No medications on file       Final diagnoses:   Symptomatic bradycardia       Pato West MD  [facility] EMERGENCY DEPARTMENT  2/17/2023     Pato West MD  02/18/23 0017

## 2023-02-20 ENCOUNTER — PATIENT OUTREACH (OUTPATIENT)
Dept: CARE COORDINATION | Facility: CLINIC | Age: 58
End: 2023-02-20
Payer: COMMERCIAL

## 2023-02-20 LAB
CARDIOPULMONARY ANAEROBIC THRESHOLD PREDICTED PEAK: 52 %
CARDIOPULMONARY ANAEROBIC THRESHOLD VO2: 13.7 ML/KG/MIN
CARDIOPULMONARY BLOOD PRESSURE REST: NORMAL MMHG
CARDIOPULMONARY BREATHING RESERVE REST: 80.8
CARDIOPULMONARY BREATHING RESERVE V02MAX: 24.9
CARDIOPULMONARY CO2 OUTPUT REST: 340 ML/MIN
CARDIOPULMONARY CO2 OUTPUT VO2MAX: 1692 ML/MIN
CARDIOPULMONARY FEV 1.0 (L) ACTUAL: 1.95
CARDIOPULMONARY FEV 1.0 (L) PRECENT: 72 %
CARDIOPULMONARY FEV 1.0 (L) PREDICTED: 2.7
CARDIOPULMONARY FEV 1.0 FVC (%) ACTUAL: 85
CARDIOPULMONARY FEV 1.0 FVC (%) PERCENT: 108 %
CARDIOPULMONARY FEV 1.0 FVC (%) PREDICTED: 79
CARDIOPULMONARY FUNCTIONAL CAPACITY MAX ML/KG/MIN: 14.91 ML/KG/MIN
CARDIOPULMONARY FUNCTIONAL CAPACITY PERCENT: 56 %
CARDIOPULMONARY FUNCTIONAL CAPACITY PREDICTED: 26.5 ML/KG/MIN
CARDIOPULMONARY FVC (L) ACTUAL: 2.28
CARDIOPULMONARY FVC (L) PERCENT: 66 %
CARDIOPULMONARY FVC (L) PREDICTED: 3.48
CARDIOPULMONARY HEART RATE REST: 50 BPM
CARDIOPULMONARY MET'S REST: 0.9
CARDIOPULMONARY MINUTE VENTILATION REST: 13.1 L/MIN
CARDIOPULMONARY MINUTE VENTILATION VO2MAX: 51.4 L/MIN
CARDIOPULMONARY MYOCARDIAC O2 DEMAND MAX: NORMAL
CARDIOPULMONARY OXYGEN CONSUMPTION REST: 3 ML/KG/MIN
CARDIOPULMONARY OXYGEN CONSUMPTION VO2MAX: 14.91 ML/KG/MIN
CARDIOPULMONARY OXYGEN PULSE REST: 5 ML/BEAT
CARDIOPULMONARY OXYGEN PULSE VO2MAX: 14 ML/BEAT
CARDIOPULMONARY OXYGEN SATURATION- OXIMETRY REST: 100 %
CARDIOPULMONARY OXYGEN SATURATION- OXIMETRY VO2MAX: 100 %
CARDIOPULMONARY PET C02 REST: 30
CARDIOPULMONARY PET C02 VO2MAX: 36
CARDIOPULMONARY PET02 REST: 118
CARDIOPULMONARY PET02 V02 MAX: 110
CARDIOPULMONARY RER: 1.02
CARDIOPULMONARY RESPIRALORY EXCHANGE RATIO VO2MAX: 1.02
CARDIOPULMONARY RESPIRALORY EXCHANGE RATIO: 0.85
CARDIOPULMONARY RESPIRATORY RATE REST: 20 BR/MIN
CARDIOPULMONARY RESPIRATORY RATE VO2MAX: 37 BR/MIN
CARDIOPULMONARY STRESS BASE 1 BP MMHG: NORMAL MMHG
CARDIOPULMONARY STRESS BASE 1 BPA: 88 BPM
CARDIOPULMONARY STRESS BASE 1 SPO2: 100 % SPO2
CARDIOPULMONARY STRESS BASE 1 TIME SEC: 0 SEC
CARDIOPULMONARY STRESS BASE 1 TIME: 1 MINS
CARDIOPULMONARY STRESS BASE 2 BP MMHG: NORMAL MMHG
CARDIOPULMONARY STRESS BASE 2 BPA: 75 BPM
CARDIOPULMONARY STRESS BASE 2 SPO2: 100 % SPO2
CARDIOPULMONARY STRESS BASE 2 TIME SEC: 0 SEC
CARDIOPULMONARY STRESS BASE 2 TIME: 3 MINS
CARDIOPULMONARY STRESS BASE 3 BP MMHG: NORMAL MMHG
CARDIOPULMONARY STRESS BASE 3 BPA: 74 BPM
CARDIOPULMONARY STRESS BASE 3 TIME SEC: 0 SEC
CARDIOPULMONARY STRESS BASE 3 TIME: 5 MINS
CARDIOPULMONARY STRESS PHASE 1 BP MMHG: NORMAL MMHG
CARDIOPULMONARY STRESS PHASE 1 BPM: 87 BPM
CARDIOPULMONARY STRESS PHASE 1 SPO2: 100 % SPO2
CARDIOPULMONARY STRESS PHASE 1 TIME SEC: 0 SEC
CARDIOPULMONARY STRESS PHASE 1 TIME: 2 MINS
CARDIOPULMONARY STRESS PHASE 2 BP MMHG: NORMAL MMHG
CARDIOPULMONARY STRESS PHASE 2 BPM: 91 BPM
CARDIOPULMONARY STRESS PHASE 2 SPO2: 100 % SPO2
CARDIOPULMONARY STRESS PHASE 2 TIME SEC: 0 SEC
CARDIOPULMONARY STRESS PHASE 2 TIME: 4 MINS
CARDIOPULMONARY STRESS PHASE 3 BP MMHG: NORMAL MMHG
CARDIOPULMONARY STRESS PHASE 3 BPM: 103 BPM
CARDIOPULMONARY STRESS PHASE 3 SPO2: 100 % SPO2
CARDIOPULMONARY STRESS PHASE 3 TIME SEC: 0 SEC
CARDIOPULMONARY STRESS PHASE 3 TIME: 6 MINS
CARDIOPULMONARY STRESS PHASE 4 BP MMHG: NORMAL MMHG
CARDIOPULMONARY STRESS PHASE 4 BPM: 124 BPM
CARDIOPULMONARY STRESS PHASE 4 SPO2: 100 % SPO2
CARDIOPULMONARY STRESS PHASE 4 TIME SEC: 51 SEC
CARDIOPULMONARY STRESS PHASE 4 TIME: 8 MINS
CARDIOPULMONARY SVC (L) ACTUAL: 2.27
CARDIOPULMONARY SVC (L) PERCENT: 65 %
CARDIOPULMONARY SVC (L) PREDICTED: 3.48
CARDIOPULMONARY TIDAL VOLUME REST: 620 ML
CARDIOPULMONARY TIDAL VOLUME VO2MAX: 1390 ML
CARDIOPULMONARY VE/VCO2 SLOPE: 27.61
CARDIOPULMONARY VENTILATORY EQUIVALENT 02 REST: 42
CARDIOPULMONARY VENTILATORY EQUIVALENT 02 V02: 31
CARDIOPULMONARY VENTILATORY EQUIVALENT C02 REST: 39
CARDIOPULMONARY VENTILATORY EQUIVALENT C02 SLOPE VO2MAX: 27.61
CARDIOPULMONARY VENTILATORY EQUIVALENT C02 VO2MAX: 30
CV STRESS MAX HR HE: 124
PREDICTED VO2MAX: 26.5
RATED PERCEIVED EXERTION: 18
STRESS ECHO BASELINE BP: NORMAL MMHG
STRESS ECHO BASELINE HR: 44 BPM
STRESS ECHO CALCULATED PERCENT HR: 77 %
STRESS ECHO LAST STRESS BP: NORMAL MMHG
STRESS ECHO POST ESTIMATED WORKLOAD: 4.3 METS
STRESS ECHO POST EXERCISE DUR MIN: 8 MIN
STRESS ECHO POST EXERCISE DUR SEC: 51 SEC
STRESS ECHO TARGET HR: 162

## 2023-02-20 NOTE — PROGRESS NOTES
Clinic Care Coordination Contact  [facility]: Post-Discharge Note  SITUATION                                                      Admission:    Admission Date: 02/17/23   Reason for Admission: Syncope  Discharge:   Discharge Date: 02/18/23  Discharge Diagnosis: Syncope    BACKGROUND                                                      Per hospital discharge summary and inpatient provider notes:    Marleen Mcfadden is a 57 year old female with history of HTN, cardiomyopathy, latent TB, GERD, and R middle fossa CSF leak repair, who presents today referred in by cardiology clinic with report of syncopal episode last night and recurrent worsening palpitations of the last 4 days.  She reports some associated chest discomfort including left-sided chest discomfort with radiation to the left forearm, also associated with these palpitations over the past few days.  Reports some mild dyspnea on exertion but otherwise denies any shortness of breath or lightheadedness while at rest.  She reports a 8 pound weight gain in the last 3 days.  She reports her heart rates been low for the past few months and approximately 1 month ago her metoprolol was cut in half.    ASSESSMENT           Discharge Assessment  How are you doing now that you are home?: I am doing okay, I am still getting lightheaded.  How are your symptoms? (Red Flag symptoms escalate to triage hotline per guidelines): Unchanged  Do you feel your condition is stable enough to be safe at home until your provider visit?: Yes  Does the patient have their discharge instructions? : Yes  Does the patient have questions regarding their discharge instructions? : No  Were you started on any new medications or were there changes to any of your previous medications? : Yes  Does the patient have all of their medications?: Yes  Do you have questions regarding any of your medications? : No  Discharge follow-up appointment scheduled within 14 calendar days? : Yes  Discharge  Follow Up Appointment Date: 02/24/23  Discharge Follow Up Appointment Scheduled with?: Specialty Care Provider                  PLAN                                                      Outpatient Plan: Follow-Up with Cardiology [facility] Heart Failure Discharge    Future Appointments   Date Time Provider Department Center   2/23/2023  7:30 AM Jessica Oshea, [facility]   2/23/2023  9:00 AM Jocelyne Harrell MD [facility]   3/1/2023  9:30 AM Abdelrahman Aragon MD [facility]   3/10/2023  9:30 AM [facility]   3/10/2023 10:00 AM Hunter Gallo, APRN CNP [facility]   3/24/2023  9:30 AM [facility]   3/24/2023 10:00 AM Hunter Gallo, APRN CNP [facility]   4/3/2023 11:00 AM Uriah Torres MD [facility]   6/12/2023  8:15 AM [address]   7/7/2023 10:00 AM Real Seaman MD [facility]         For any urgent concerns, please contact our 24 hour nurse triage line: [phone])       DENISA Butcher  [phone]  [facility]

## 2023-02-21 ENCOUNTER — TELEPHONE (OUTPATIENT)
Dept: OTOLARYNGOLOGY | Facility: CLINIC | Age: 58
End: 2023-02-21
Payer: COMMERCIAL

## 2023-02-23 ENCOUNTER — DOCUMENTATION ONLY (OUTPATIENT)
Dept: LAB | Facility: CLINIC | Age: 58
End: 2023-02-23

## 2023-02-23 DIAGNOSIS — I50.22 CHRONIC SYSTOLIC CONGESTIVE HEART FAILURE (H): Primary | ICD-10-CM

## 2023-02-24 ENCOUNTER — OFFICE VISIT (OUTPATIENT)
Dept: CARDIOLOGY | Facility: CLINIC | Age: 58
End: 2023-02-24
Attending: NURSE PRACTITIONER
Payer: COMMERCIAL

## 2023-02-24 ENCOUNTER — LAB (OUTPATIENT)
Dept: LAB | Facility: CLINIC | Age: 58
End: 2023-02-24
Attending: NURSE PRACTITIONER
Payer: COMMERCIAL

## 2023-02-24 ENCOUNTER — ANCILLARY PROCEDURE (OUTPATIENT)
Dept: CARDIOLOGY | Facility: CLINIC | Age: 58
End: 2023-02-24
Attending: INTERNAL MEDICINE
Payer: COMMERCIAL

## 2023-02-24 VITALS
DIASTOLIC BLOOD PRESSURE: 72 MMHG | BODY MASS INDEX: 38.33 KG/M2 | OXYGEN SATURATION: 100 % | WEIGHT: 223.3 LBS | SYSTOLIC BLOOD PRESSURE: 117 MMHG | HEART RATE: 55 BPM

## 2023-02-24 DIAGNOSIS — R55 SYNCOPE: ICD-10-CM

## 2023-02-24 DIAGNOSIS — I50.22 CHRONIC SYSTOLIC CONGESTIVE HEART FAILURE (H): Primary | Chronic | ICD-10-CM

## 2023-02-24 DIAGNOSIS — R55 SYNCOPE, UNSPECIFIED SYNCOPE TYPE: ICD-10-CM

## 2023-02-24 DIAGNOSIS — I50.22 CHRONIC SYSTOLIC CONGESTIVE HEART FAILURE (H): ICD-10-CM

## 2023-02-24 DIAGNOSIS — I49.3 PVC'S (PREMATURE VENTRICULAR CONTRACTIONS): ICD-10-CM

## 2023-02-24 DIAGNOSIS — R00.2 PALPITATIONS: ICD-10-CM

## 2023-02-24 LAB
ANION GAP SERPL CALCULATED.3IONS-SCNC: 7 MMOL/L (ref 7–15)
BUN SERPL-MCNC: 12.9 MG/DL (ref 6–20)
CALCIUM SERPL-MCNC: 9.1 MG/DL (ref 8.6–10)
CHLORIDE SERPL-SCNC: 102 MMOL/L (ref 98–107)
CREAT SERPL-MCNC: 0.88 MG/DL (ref 0.51–0.95)
DEPRECATED HCO3 PLAS-SCNC: 27 MMOL/L (ref 22–29)
GFR SERPL CREATININE-BSD FRML MDRD: 76 ML/MIN/1.73M2
GLUCOSE SERPL-MCNC: 93 MG/DL (ref 70–99)
POTASSIUM SERPL-SCNC: 4.4 MMOL/L (ref 3.4–5.3)
SODIUM SERPL-SCNC: 136 MMOL/L (ref 136–145)

## 2023-02-24 PROCEDURE — G0463 HOSPITAL OUTPT CLINIC VISIT: HCPCS | Mod: 25 | Performed by: NURSE PRACTITIONER

## 2023-02-24 PROCEDURE — 99213 OFFICE O/P EST LOW 20 MIN: CPT | Mod: 25 | Performed by: NURSE PRACTITIONER

## 2023-02-24 PROCEDURE — 36415 COLL VENOUS BLD VENIPUNCTURE: CPT | Performed by: PATHOLOGY

## 2023-02-24 PROCEDURE — 93248 EXT ECG>7D<15D REV&INTERPJ: CPT | Performed by: INTERNAL MEDICINE

## 2023-02-24 PROCEDURE — 80048 BASIC METABOLIC PNL TOTAL CA: CPT | Performed by: PATHOLOGY

## 2023-02-24 PROCEDURE — 93242 EXT ECG>48HR<7D RECORDING: CPT

## 2023-02-24 ASSESSMENT — PAIN SCALES - GENERAL: PAINLEVEL: NO PAIN (0)

## 2023-02-24 NOTE — NURSING NOTE
Chief Complaint   Patient presents with     Follow Up     Return CORE, 56 yo female with systolic heart failure presents for follow up with labs prior.          Vitals were taken and medications reconciled.     Stephon Mosley, EMT   12:49 PM

## 2023-02-24 NOTE — PATIENT INSTRUCTIONS
Take your medicines every day, as directed    Changes made today:  Zio patch for 7 days per Dr. Seaman     Monitor Your Weight and Symptoms    Contact us if you:    Gain 2 pounds in one day or 5 pounds in one week  Feel more short of breath  Notice more leg swelling  Feel lightheadeded   Change your lifestyle    Limit Salt or Sodium:  2000 mg  Limit Fluids:  2000 mL or approximately 64 ounces  Eat a Heart Healthy Diet  Low in saturated fats  Stay Active:  Aim to move at least 150 minutes every  week         To Contact us    During Business Hours:  [phone], option # 1      After hours, weekends or holidays:   [phone], Option #4  Ask to speak to the On-Call Cardiologist. Inform them you are a CORE/heart failure patient at the [facility].     Use [employer] allows you to communicate directly with your heart team through secure messaging.  [employer] can be accessed any time on your phone, computer, or tablet.  If you need assistance, we'd be happy to help!         Keep your Heart Appointments:    CORE in      Please consider attending our virtual support group which is held monthly. Please reach out to Robert at [phone] for more information if you are interested in attending.       2023 dates:    Monday, March 6th , 1-2pm (Medications Review)  Monday, April 3rd, 1-2pm  Monday, May 1st, 1-2pm  Monday, June 5th, 1-2pm  Monday, July 3rd, 1-2pm  Monday, August 7th, 1-2pm  Monday, September 11th, 1-2pm  Monday, October 2nd, 1-2pm  Monday, November 6th, 1-2pm  Monday, December 4th, 1-2pm

## 2023-02-24 NOTE — PROGRESS NOTES
Per Dr. Seaman, patient to have 7-day zio monitor placed.  Diagnosis: PVCs  Monitor placed: Yes  Patient Instructed: Yes  Patient verbalized understanding: Yes  Holter # [id number]    Monitor was placed by RADHA Capps   1:36 PM

## 2023-02-24 NOTE — NURSING NOTE
Labs: Patient was given results of the laboratory testing obtained today. Patient demonstrated understanding of this information and agreed to call with further questions or concerns.     Med Reconcile: Reviewed and verified all current medications with the patient. The updated medication list was printed and given to the patient.    Return Appointment: Patient given instructions regarding scheduling next clinic visit. Patient demonstrated understanding of this information and agreed to call with further questions or concerns. CORE in 6 months.    Patient stated she understood all health information given and agreed to call with further questions or concerns.    Megha Meyer RN

## 2023-02-24 NOTE — LETTER
2/24/2023      RE: Marleen Mcfadden  [address]  [place] [address]       Dear Colleague,    Thank you for the opportunity to participate in the care of your patient, Marleen Mcfadden, at the [facility] [place] [facility]. Please see a copy of my visit note below.      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       As you know, she has a family history of dilated cardiomyopathy including her sister, son, and daughter. Her genetic testing returned with a ariant of unknown significance (VUS) in the FLNC gene (c. 4069 G>A). She was seeing  Dr. Kolb until her departure. She has now established care with Dr. Seaman.    Her son Nano Mcfadden is 37 and has DCM and he would like to pursue genetic testing.    She feels better after her last ER visit. No more episodes since being home.  Weight 223 lbs at clinic. She hasn't been able to exercise.   She has no SOB at rest. Sleep study 2/27. She has swelling in the leg/ankles- at baseline.   She uses one pillow and sleeps on her back with no issues. She uses the Lasix as needed. Its been a while since the last time she used. She eats well.     Denies SOB, PND, orthopnea, weight gain, chest pain, palpitations, lightheadedness, dizziness, near syncopal/syncopal episodes. Marleen has been following salt and fluid restrictions.      PAST MEDICAL HISTORY:  Past Medical History:   Diagnosis Date     Acute bilateral low back pain with right-sided sciatica 06/02/2016     Allergic rhinitis, cause unspecified      Arthritis      Autoimmune disease (H)      Autoimmune disease NEC     Autoimmune disease- unknown/poss SLE     Calcaneal spur 10/21/2014    Xray 10/17/14      CHF with cardiomyopathy (H)       Chronic low back pain      DDD (degenerative disc disease), lumbar      Depression      Failed total knee arthroplasty, 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     Familial cardiomyopathy (H)      GERD without esophagitis      History of transfusion      Hypertension      Injury of left shoulder, initial encounter 01/12/2017     Morbid obesity (H)      Nontraumatic rupture of quadriceps tendon, left 06/21/2018     KATIA (obstructive sleep apnea)- moderate-severe (AHI 29) 8/26/2021     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     no longer an issue     Peroneus longus tendinitis 01/02/2015     Plantar fasciitis 11/11/2014     PONV (postoperative nausea and vomiting)      Rotator cuff injury 01/17/2017     Sprain of other ligament of left ankle, initial encounter 01/12/2017     Status post left knee replacement 06/21/2018     TB lung, latent     negative quantiferon gold test  11/5/12       FAMILY HISTORY:  Family History   Problem Relation Age of Onset     Hypertension Father         dec     Diabetes Father         dec     Heart Disease Father         dec     Alcohol/Drug Father      Cardiovascular Father      Heart Disease Mother         dec     Alcohol/Drug Mother      Cardiovascular Mother      Heart Disease Daughter         Cardiomyopathy     Cardiovascular Daughter         cardiomyopathy     Colon Cancer Sister      Cardiovascular Son         cardiomyopathy     Diabetes Paternal Grandmother         dec     Hypertension Paternal Grandmother         dec     Cerebrovascular Disease Paternal Grandmother         dec     Cancer Sister         Lupus     Cardiovascular Sister         cardiomyopathy     Heart Disease Sister         heart failure, and kidney failure       SOCIAL HISTORY:  Social History     Tobacco Use     Smoking status: Never     Smokeless tobacco: Never   Substance Use Topics     Alcohol use: Yes     Comment: rare, twice a year, she has a drink little wine 2 days ago      Drug use: No           CURRENT MEDICATIONS:  Current Outpatient Medications   Medication Sig Dispense Refill     Cholecalciferol (VITAMIN D) 2000 UNIT tablet Take 5,000 Units by mouth as needed Reported on 3/8/2017 100 tablet 12     dapagliflozin (FARXIGA) 10 MG TABS tablet Take 1 tablet (10 mg) by mouth daily 90 tablet 3     FLAXSEED, LINSEED, PO Take 1 capsule by mouth daily        fluticasone (FLONASE) 50 MCG/ACT nasal spray Spray 2 sprays into both nostrils At Bedtime 15.8 mL 4     furosemide (LASIX) 20 MG tablet Take 2 tablets (40 mg) by mouth daily as needed (weight gain/edema) 180 tablet 3     loratadine (CLARITIN) 10 MG tablet Take 1 tablet (10 mg) by mouth daily 90 tablet 3     metoprolol succinate ER (TOPROL XL) 25 MG 24 hr tablet Take 0.5 tablets (12.5 mg) by mouth daily 30 tablet 3     progesterone (PROMETRIUM) 100 MG capsule TAKE 1 CAPSULE BY MOUTH EVERY NIGHT AT BEDTIME       progesterone (PROMETRIUM) 100 MG capsule Take 100 mg by mouth At Bedtime        sacubitril-valsartan (ENTRESTO)  MG per tablet Take 1 tablet by mouth 2 times daily 180 tablet 3     spironolactone (ALDACTONE) 25 MG tablet Take 2 tablets (50 mg) by mouth daily 180 tablet 0     ibuprofen (ADVIL/MOTRIN) 600 MG tablet Take 1 tablet (600 mg) by mouth every 6 hours as needed for moderate pain (Patient not taking: Reported on 2/24/2023) 30 tablet 0     loperamide (IMODIUM A-D) 2 MG tablet Take 1 tablet (2 mg) by mouth 4 times daily as needed for diarrhea (Patient not taking: Reported on 2/24/2023) 30 tablet 0       ROS:  Review Of Systems  Skin: negative  Eyes: negative  Ears/Nose/Throat: negative  Respiratory: Dyspnea on exertion-   Cardiovascular: lower extremity edema  Gastrointestinal: negative  Genitourinary: negative  Musculoskeletal: negative  Neurologic: negative  Psychiatric: negative  Hematologic/Lymphatic/Immunologic: negative  Endocrine: negative      EXAM:  /72 (BP Location: Right arm, Patient Position: Sitting,  Cuff Size: Adult Large)   Pulse 55   Wt 101.3 kg (223 lb 4.8 oz)   LMP 10/19/2008 (Approximate)   SpO2 100%   BMI 38.33 kg/m    Home weight:  General: alert, articulate, and in no acute distress.  HEENT: normocephalic, atraumatic, anicteric sclera, EOMI, mucosa moist, no cyanosis.   Neck: neck supple.  No adenopathy, masses, or carotid bruits.  JVP flat at 90 degrees  Heart: regular rhythm, normal S1/S2, no murmur, gallop, rub.  Precordium quiet with normal PMI.     Lungs: clear, no rales, ronchi, or wheezing.  No accessory muscle use, respirations unlabored.   Abdomen: soft, non-tender, bowel sounds present, no hepatomegaly  Extremities: no pitting edema.   No cyanosis.    Neurological: alert and oriented x 3.  normal speech and affect, no gross motor deficits  Skin:  No ecchymoses, rashes, or clubbing.    Labs:  CBC RESULTS:  Lab Results   Component Value Date    WBC 4.5 02/18/2023    WBC 8.2 07/11/2021    RBC 4.06 02/18/2023    RBC 3.32 (L) 07/11/2021    HGB 12.7 02/18/2023    HGB 10.2 (L) 07/11/2021    HCT 39.7 02/18/2023    HCT 32.0 (L) 07/11/2021    MCV 98 02/18/2023    MCV 96 07/11/2021    MCH 31.3 02/18/2023    MCH 30.7 07/11/2021    MCHC 32.0 02/18/2023    MCHC 31.9 07/11/2021    RDW 13.5 02/18/2023    RDW 13.4 07/11/2021     02/18/2023     07/11/2021       CMP RESULTS:  Lab Results   Component Value Date     02/18/2023     07/11/2021    POTASSIUM 4.1 02/18/2023    POTASSIUM 3.9 01/09/2023    POTASSIUM 4.2 07/11/2021    CHLORIDE 105 02/18/2023    CHLORIDE 105 01/09/2023    CHLORIDE 102 07/11/2021    CO2 25 02/18/2023    CO2 26 01/09/2023    CO2 30 07/11/2021    ANIONGAP 8 02/18/2023    ANIONGAP 6 01/09/2023    ANIONGAP 2 (L) 07/11/2021    GLC 94 02/18/2023    GLC 93 01/09/2023    GLC 87 07/11/2021    BUN 12.3 02/18/2023    BUN 16 01/09/2023    BUN 14 07/11/2021    CR 0.82 02/18/2023    CR 0.79 07/11/2021    GFRESTIMATED 82 02/18/2023    GFRESTIMATED 83 07/11/2021     "GFRESTBLACK >90 07/11/2021    CARMEN 8.4 (L) 02/18/2023    CARMEN 8.2 (L) 07/11/2021    BILITOTAL 0.2 02/17/2023    BILITOTAL 0.3 08/12/2020    ALBUMIN 3.9 02/17/2023    ALBUMIN 3.6 01/09/2023    ALBUMIN 3.4 08/12/2020    ALKPHOS 54 02/17/2023    ALKPHOS 70 08/12/2020    ALT 20 02/17/2023    ALT 35 08/12/2020    AST  02/17/2023      Comment:      Unsatisfactory specimen - hemolyzed    Specimen is hemolyzed which can falsely elevate AST. Analysis of a non-hemolyzed specimen may result in a lower value.    AST 20 08/12/2020        INR RESULTS:  Lab Results   Component Value Date    INR 1.05 01/09/2023    INR 0.97 09/18/2019       No components found for: CK  Lab Results   Component Value Date    MAG 1.8 02/18/2023    MAG 1.9 07/11/2021     Lab Results   Component Value Date    NTBNP 136 12/12/2022     @BRIEFLABR (dig)@    Most recent echocardiogram:  No results found for this or any previous visit (from the past 8760 hour(s)).      Assessment and Plan:     57 year-old woman with chronic systolic heart failure due to a famililal cardiomyopathy with VUS in [facility] who is here for a  CORE visit.     She refuses any medication adjustments.  She is fine with the ziopatch being placed \"I don't like being on so much medicine. I will try to work on exercising more and I have seen this to be very helpful for me.\" she prefers the appts spaced out unless she needs something sooner she will reach out.     1.  Chronic systolic heart failure secondary to familial cardiomyopathy..  Stage C  NYHA Class II  ACEi/ARB: yes- ARNI Entresto 97/103 mg BID MAX DOSE  BB: yes- Metoprolol  12.5 mg   Aldosterone antagonist: yes- max dose   SCD prophylaxis: doesn't meet criteria  Fluid status: euvolemic  Anticoagulation:   Antiplatelet:  ASA dose   NSAID use:  Contraindicated.  Avoid use.  Remote Monitoring:  SGLT 2 - Farxiga 10 mg daily  Patient has previously met with genetic counselor regarding genetic testing.  Encouraged patient to proceed with " genetic testing given family history.  Pt is aware of recommendation for all first degree relatives to undergo screening for familial cardiomyopathy preferably with cardiac MRI but at least echo.    2.  Other comordbid conditions:      Frequent PVCs with 8% PVC burden 12/22 on metoprolol. Referral to Dr. Margo Bruno to be placed today    Leg pain - CAMRON - normal - patient informed and was recommended to follow up with her PCP to look at other potential causes.    3.  Follow-up   Zio patch today  CORE in 6 months        Please do not hesitate to contact me if you have any questions/concerns.     Sincerely,     PITO Pabon CNP

## 2023-02-24 NOTE — PROGRESS NOTES
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       As you know, she has a family history of dilated cardiomyopathy including her sister, son, and daughter. Her genetic testing returned with a ariant of unknown significance (VUS) in the FLNC gene (c. 4069 G>A). She was seeing  Dr. Kolb until her departure. She has now established care with Dr. Seaman.    Her son Nano Mcfadden is 37 and has DCM and he would like to pursue genetic testing.    She feels better after her last ER visit. No more episodes since being home.  Weight 223 lbs at clinic. She hasn't been able to exercise.   She has no SOB at rest. Sleep study 2/27. She has swelling in the leg/ankles- at baseline.   She uses one pillow and sleeps on her back with no issues. She uses the Lasix as needed. Its been a while since the last time she used. She eats well.     Denies SOB, PND, orthopnea, weight gain, chest pain, palpitations, lightheadedness, dizziness, near syncopal/syncopal episodes. Marleen has been following salt and fluid restrictions.      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     Familial cardiomyopathy (H)      GERD without esophagitis      History of transfusion      Hypertension      Injury of left shoulder, initial encounter 01/12/2017     Morbid obesity (H)       Nontraumatic rupture of quadriceps tendon, left 06/21/2018     KATIA (obstructive sleep apnea)- moderate-severe (AHI 29) 8/26/2021     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     no longer an issue     Peroneus longus tendinitis 01/02/2015     Plantar fasciitis 11/11/2014     PONV (postoperative nausea and vomiting)      Rotator cuff injury 01/17/2017     Sprain of other ligament of left ankle, initial encounter 01/12/2017     Status post left knee replacement 06/21/2018     TB lung, latent     negative quantiferon gold test  11/5/12       FAMILY HISTORY:  Family History   Problem Relation Age of Onset     Hypertension Father         dec     Diabetes Father         dec     Heart Disease Father         dec     Alcohol/Drug Father      Cardiovascular Father      Heart Disease Mother         dec     Alcohol/Drug Mother      Cardiovascular Mother      Heart Disease Daughter         Cardiomyopathy     Cardiovascular Daughter         cardiomyopathy     Colon Cancer Sister      Cardiovascular Son         cardiomyopathy     Diabetes Paternal Grandmother         dec     Hypertension Paternal Grandmother         dec     Cerebrovascular Disease Paternal Grandmother         dec     Cancer Sister         Lupus     Cardiovascular Sister         cardiomyopathy     Heart Disease Sister         heart failure, and kidney failure       SOCIAL HISTORY:  Social History     Tobacco Use     Smoking status: Never     Smokeless tobacco: Never   Substance Use Topics     Alcohol use: Yes     Comment: rare, twice a year, she has a drink little wine 2 days ago     Drug use: No           CURRENT MEDICATIONS:  Current Outpatient Medications   Medication Sig Dispense Refill     Cholecalciferol (VITAMIN D) 2000 UNIT tablet Take 5,000 Units by mouth as needed Reported on 3/8/2017 100 tablet 12     dapagliflozin (FARXIGA) 10 MG TABS tablet Take 1 tablet (10 mg) by mouth daily 90 tablet 3     FLAXSEED, LINSEED,  PO Take 1 capsule by mouth daily        fluticasone (FLONASE) 50 MCG/ACT nasal spray Spray 2 sprays into both nostrils At Bedtime 15.8 mL 4     furosemide (LASIX) 20 MG tablet Take 2 tablets (40 mg) by mouth daily as needed (weight gain/edema) 180 tablet 3     loratadine (CLARITIN) 10 MG tablet Take 1 tablet (10 mg) by mouth daily 90 tablet 3     metoprolol succinate ER (TOPROL XL) 25 MG 24 hr tablet Take 0.5 tablets (12.5 mg) by mouth daily 30 tablet 3     progesterone (PROMETRIUM) 100 MG capsule TAKE 1 CAPSULE BY MOUTH EVERY NIGHT AT BEDTIME       progesterone (PROMETRIUM) 100 MG capsule Take 100 mg by mouth At Bedtime        sacubitril-valsartan (ENTRESTO)  MG per tablet Take 1 tablet by mouth 2 times daily 180 tablet 3     spironolactone (ALDACTONE) 25 MG tablet Take 2 tablets (50 mg) by mouth daily 180 tablet 0     ibuprofen (ADVIL/MOTRIN) 600 MG tablet Take 1 tablet (600 mg) by mouth every 6 hours as needed for moderate pain (Patient not taking: Reported on 2/24/2023) 30 tablet 0     loperamide (IMODIUM A-D) 2 MG tablet Take 1 tablet (2 mg) by mouth 4 times daily as needed for diarrhea (Patient not taking: Reported on 2/24/2023) 30 tablet 0       ROS:  Review Of Systems  Skin: negative  Eyes: negative  Ears/Nose/Throat: negative  Respiratory: Dyspnea on exertion-   Cardiovascular: lower extremity edema  Gastrointestinal: negative  Genitourinary: negative  Musculoskeletal: negative  Neurologic: negative  Psychiatric: negative  Hematologic/Lymphatic/Immunologic: negative  Endocrine: negative      EXAM:  /72 (BP Location: Right arm, Patient Position: Sitting, Cuff Size: Adult Large)   Pulse 55   Wt 101.3 kg (223 lb 4.8 oz)   LMP 10/19/2008 (Approximate)   SpO2 100%   BMI 38.33 kg/m    Home weight:  General: alert, articulate, and in no acute distress.  HEENT: normocephalic, atraumatic, anicteric sclera, EOMI, mucosa moist, no cyanosis.   Neck: neck supple.  No adenopathy, masses, or carotid  bruits.  JVP flat at 90 degrees  Heart: regular rhythm, normal S1/S2, no murmur, gallop, rub.  Precordium quiet with normal PMI.     Lungs: clear, no rales, ronchi, or wheezing.  No accessory muscle use, respirations unlabored.   Abdomen: soft, non-tender, bowel sounds present, no hepatomegaly  Extremities: no pitting edema.   No cyanosis.    Neurological: alert and oriented x 3.  normal speech and affect, no gross motor deficits  Skin:  No ecchymoses, rashes, or clubbing.    Labs:  CBC RESULTS:  Lab Results   Component Value Date    WBC 4.5 02/18/2023    WBC 8.2 07/11/2021    RBC 4.06 02/18/2023    RBC 3.32 (L) 07/11/2021    HGB 12.7 02/18/2023    HGB 10.2 (L) 07/11/2021    HCT 39.7 02/18/2023    HCT 32.0 (L) 07/11/2021    MCV 98 02/18/2023    MCV 96 07/11/2021    MCH 31.3 02/18/2023    MCH 30.7 07/11/2021    MCHC 32.0 02/18/2023    MCHC 31.9 07/11/2021    RDW 13.5 02/18/2023    RDW 13.4 07/11/2021     02/18/2023     07/11/2021       CMP RESULTS:  Lab Results   Component Value Date     02/18/2023     07/11/2021    POTASSIUM 4.1 02/18/2023    POTASSIUM 3.9 01/09/2023    POTASSIUM 4.2 07/11/2021    CHLORIDE 105 02/18/2023    CHLORIDE 105 01/09/2023    CHLORIDE 102 07/11/2021    CO2 25 02/18/2023    CO2 26 01/09/2023    CO2 30 07/11/2021    ANIONGAP 8 02/18/2023    ANIONGAP 6 01/09/2023    ANIONGAP 2 (L) 07/11/2021    GLC 94 02/18/2023    GLC 93 01/09/2023    GLC 87 07/11/2021    BUN 12.3 02/18/2023    BUN 16 01/09/2023    BUN 14 07/11/2021    CR 0.82 02/18/2023    CR 0.79 07/11/2021    GFRESTIMATED 82 02/18/2023    GFRESTIMATED 83 07/11/2021    GFRESTBLACK >90 07/11/2021    CARMEN 8.4 (L) 02/18/2023    CARMEN 8.2 (L) 07/11/2021    BILITOTAL 0.2 02/17/2023    BILITOTAL 0.3 08/12/2020    ALBUMIN 3.9 02/17/2023    ALBUMIN 3.6 01/09/2023    ALBUMIN 3.4 08/12/2020    ALKPHOS 54 02/17/2023    ALKPHOS 70 08/12/2020    ALT 20 02/17/2023    ALT 35 08/12/2020    AST  02/17/2023      Comment:       "Unsatisfactory specimen - hemolyzed    Specimen is hemolyzed which can falsely elevate AST. Analysis of a non-hemolyzed specimen may result in a lower value.    AST 20 08/12/2020        INR RESULTS:  Lab Results   Component Value Date    INR 1.05 01/09/2023    INR 0.97 09/18/2019       No components found for: CK  Lab Results   Component Value Date    MAG 1.8 02/18/2023    MAG 1.9 07/11/2021     Lab Results   Component Value Date    NTBNP 136 12/12/2022     @BRIEFLABR (dig)@    Most recent echocardiogram:  No results found for this or any previous visit (from the past 8760 hour(s)).      Assessment and Plan:     57 year-old woman with chronic systolic heart failure due to a famililal cardiomyopathy with VUS in [facility] who is here for a  [facility] visit.     She refuses any medication adjustments.  She is fine with the ziopatch being placed \"I don't like being on so much medicine. I will try to work on exercising more and I have seen this to be very helpful for me.\" she prefers the appts spaced out unless she needs something sooner she will reach out.     1.  Chronic systolic heart failure secondary to familial cardiomyopathy..  Stage C  NYHA Class II  ACEi/ARB: yes- ARNI Entresto 97/103 mg BID MAX DOSE  BB: yes- Metoprolol  12.5 mg   Aldosterone antagonist: yes- max dose   SCD prophylaxis: doesn't meet criteria  Fluid status: euvolemic  Anticoagulation:   Antiplatelet:  ASA dose   NSAID use:  Contraindicated.  Avoid use.  Remote Monitoring:  SGLT 2 - Farxiga 10 mg daily  Patient has previously met with genetic counselor regarding genetic testing.  Encouraged patient to proceed with genetic testing given family history.  Pt is aware of recommendation for all first degree relatives to undergo screening for familial cardiomyopathy preferably with cardiac MRI but at least echo.    2.  Other comordbid conditions:      Frequent PVCs with 8% PVC burden 12/22 on metoprolol. Referral to Dr. Margo Bruno to be placed today    Leg " pain - CAMRON - normal - patient informed and was recommended to follow up with her PCP to look at other potential causes.    3.  Follow-up   Salinaso patch today  CORE in 6 months      Hunter SHELDON CNP  [facility]

## 2023-02-27 ENCOUNTER — OFFICE VISIT (OUTPATIENT)
Dept: SLEEP MEDICINE | Facility: CLINIC | Age: 58
End: 2023-02-27
Attending: INTERNAL MEDICINE
Payer: COMMERCIAL

## 2023-02-27 VITALS
WEIGHT: 224 LBS | OXYGEN SATURATION: 98 % | HEIGHT: 64 IN | BODY MASS INDEX: 38.24 KG/M2 | DIASTOLIC BLOOD PRESSURE: 64 MMHG | SYSTOLIC BLOOD PRESSURE: 100 MMHG | HEART RATE: 65 BPM

## 2023-02-27 DIAGNOSIS — I42.9 FAMILIAL CARDIOMYOPATHY (H): Chronic | ICD-10-CM

## 2023-02-27 DIAGNOSIS — G47.33 OSA (OBSTRUCTIVE SLEEP APNEA): Primary | Chronic | ICD-10-CM

## 2023-02-27 DIAGNOSIS — R00.1 SYMPTOMATIC BRADYCARDIA: ICD-10-CM

## 2023-02-27 DIAGNOSIS — I50.22 CHRONIC SYSTOLIC CONGESTIVE HEART FAILURE (H): ICD-10-CM

## 2023-02-27 DIAGNOSIS — F32.9 MAJOR DEPRESSIVE DISORDER, REMISSION STATUS UNSPECIFIED, UNSPECIFIED WHETHER RECURRENT: Chronic | ICD-10-CM

## 2023-02-27 PROCEDURE — 99204 OFFICE O/P NEW MOD 45 MIN: CPT | Performed by: PSYCHIATRY & NEUROLOGY
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
## 2023-03-01 ENCOUNTER — OFFICE VISIT (OUTPATIENT)
Dept: CARDIOLOGY | Facility: CLINIC | Age: 58
End: 2023-03-01
Attending: INTERNAL MEDICINE
Payer: COMMERCIAL

## 2023-03-01 VITALS
HEIGHT: 64 IN | SYSTOLIC BLOOD PRESSURE: 109 MMHG | WEIGHT: 223.9 LBS | DIASTOLIC BLOOD PRESSURE: 74 MMHG | HEART RATE: 59 BPM | BODY MASS INDEX: 38.22 KG/M2 | OXYGEN SATURATION: 99 %

## 2023-03-01 DIAGNOSIS — I49.3 PVC'S (PREMATURE VENTRICULAR CONTRACTIONS): ICD-10-CM

## 2023-03-01 PROCEDURE — G0463 HOSPITAL OUTPT CLINIC VISIT: HCPCS | Performed by: INTERNAL MEDICINE

## 2023-03-01 PROCEDURE — 99215 OFFICE O/P EST HI 40 MIN: CPT | Performed by: INTERNAL MEDICINE

## 2023-03-01 ASSESSMENT — PAIN SCALES - GENERAL: PAINLEVEL: NO PAIN (0)

## 2023-03-01 NOTE — PATIENT INSTRUCTIONS
You were seen today in the Adult Congenital and Cardiovascular Genetics Clinic at the [facility].    Cardiology Providers you saw during your visit:  Abdelrahman Aragon MD    Diagnosis: hx of cardiomyopathy    Results:  Abdelrahman Aragon MD reviewed the results of your previous testing today in clinic.    Recommendations for you:    No changes      General Cardiac Recommendations:  Continue to eat a heart healthy, low salt diet.  Continue to get 20-30 minutes of aerobic activity, 4-5 days per week.  Examples of aerobic activity include walking, running, swimming, cycling, etc.  Continue to observe good oral hygiene, with regular dental visits.      SBE prophylaxis:   Yes____  No__X__    If YES is checked, follow the recommendations outlined below:  Take antibiotic(s) prior to recommended dental procedures and procedures on the respiratory tract or with infected skin, muscle or bones. SBE prophylaxis is not needed for routine GI and  procedures (ie. Colonoscopy or vaginal delivery)  Observe good oral hygiene daily, as advised by your dentist. Get regular professional dental care.  Keep cuts clean.  Infections should be treated promptly.  Symptoms of Infective Endocarditis could include: fever lasting more than 4-5 days or a recurrent fever that initially resolves but returns within 1-2 days)      Exercise restrictions:   Yes__X__  No____         If yes, list restrictions:  Must be allowed to rest if fatigued or SOB      FASTING CHOLESTEROL was checked in the last 5 years YES___  NO_X__   If no, please follow up with your primary care physician. You should have a cholesterol screening every 5 years.      Follow-up: Follow up with Dr. Aragon as needed.    If you have questions or concerns please contact us at:    Adri Orourke, RN, BSN    Xenia Goyal (Scheduling)  Nurse Care Coordinator     Clinic   Adult Congenital and CV Genetics   Adult Congenital and CV Genetic  [facility]  Heart Care   [facility]  (P) [phone]     (P) [phone]  [email or web address]  (F) [phone]        For after hours urgent needs, call [phone] and ask to speak to the Adult Congenital Physician on call.  Mention Job Code 0401.    For emergencies call 911.    [facility]  [facility]  Mail Code [id number]  [address] [place], [place] [address]

## 2023-03-01 NOTE — NURSING NOTE
Chief Complaint   Patient presents with     New Patient     New Genetic Heart- 57 year old female with history of cardiomyopathy presenting for evaluation.     Vitals were taken and medications reconciled.    Marcos Hoskins, RADHA  9:40 AM

## 2023-03-01 NOTE — LETTER
3/1/2023      RE: Marleen Mcfadden  [address]  [facility] [address]       Dear Colleague,    Thank you for the opportunity to participate in the care of your patient, Marleen Mcfadden, at the [facility] [place] [facility]. Please see a copy of my visit note below.        ELECTROPHYSIOLOGY CLINIC VISIT    Assessment/Recommendations   Assessment/Plan:    Ms. 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     Familial NICM LVEF 35-40%, NYHA I:  1. ACEi/ARB: Continue Entresto.  2. BB: Continue Toprol XL   3. Aldosterone antagonist: Continue Spironolactone.  4. SCD prophylaxis: No need for ICD unless EF<30% with NYHA class 1, or if VUS in FLNC gene gets reclassified as pathogenic with an EF of 35% cutoff.  5. Fluid status: continue lasix, appears euvolemic on exam.   6. Syncope does sound orthostatic in nature, but Will follow up on zio patch results at this time. Consider ILR if needed.       Follow up to be determined based on results.        History of Present Illness/Subjective    Ms. Marleen Mcfadden is a 57 year old female who comes in today for EP consultation of genetic CM, carol.    Ms. 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     She has a family history of DCM. Her sister was diagnosed and lead to family screening. 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 Her most recent echo showed LVEF 35-40% with moderate/severe LV dilation. Her son and daughter have DCM. Patient has been following with Dr. Kolb since 2006. She has been on GDMT. More recently, she was having episodes of lightheadedness. She was playing pool and she stood up and fell back and went down on the ground. Her fiance said she was still awake the whole time, she does not remember  that, only once, she was able to stand up immediately, and went to the chair. No orthostatic dizziness otherwize, she reports having a syncope episode 3 years ago after laughing very hard.      She reports feeling well. She is currently wearing a zio path. She does still have some dizzy spells when she laughs/coughs really hard. Otherwise, she is exercising and weightlifting 3 times a week without symptoms/limitations. She denies chest discomfort, palpitations, abdominal fullness/bloating or peripheral edema, shortness of breath, paroxysmal nocturnal dyspnea, orthopnea, lightheadedness, dizziness, pre-syncope, or syncope. Current cardiac medications include: Toprol XL, Lasix, Farxiga, Entresto, and Spironolactone. .       Family history was significant for the following cardiac history:    Full sister with DCM. She  in September after developing colon cancer.  Her history was also remarkable for MERSA, kidney disease and ultimate transplant.  She did not have an ICD or much care for her DCM.    Marleen's son and daughter also have DCM.      A maternal half sister has hypertension, epilepsy and reported dizziness and fainting. She does not have a cardiac issue to Marleen's knowledge.    Mother  suddenly at 38 yrs of age.  It was thought to be the result of a cardiac problem. However, she also had a long history of alcoholism and had a colostomy. Her two siblings have no known heart issues.    Maternal grandmother  in her 30's from a brain aneurysm. Nothing is known about maternal grandfather.    Marleen's father  at 55 years after a massive heart attack and CABG procedure.  His death occurred one day after the surgery reportedly from a clot.  One of his sisters  from a stroke.    Paternal grandmother  in her 70's from a stroke.  Her mother (Marleen's great grandmother)  suddenly in her 70's while sitting at a bus stop.Nothing is known about grandfather.    Two paternal half sisters  "in good health.  I have reviewed and updated the patient's Past Medical History, Social History, Family History and Medication List.     Cardiographics (Personally Reviewed) :   2/17/23 Echo:   Interpretation Summary  Moderate to severe left ventricular dilation is present. Left ventricular  function is decreased. The ejection fraction is 35-40% (moderately reduced).  Biplane LVEF is 37%.  The right ventricle is normal size. Global right ventricular function is normal.  No pericardial effusion is present.  This study was compared with the study from 12/12/22: LVEF appears similar or at most minimally lower and appears to hve been overestimated on the prior study.    1/10/23 RHC:  Conclusion      Right sided filling pressures are normal.    Mild elevated pulmonary hypertension.    Left sided filling pressures are normal.    Left ventricular filling pressures are normal.    Normal cardiac output level.         6/3/22 CMR:  1. The LV is mild-to-moderately dilated in cavity size. The wall thickness is normal. The global systolic function is severely decreased. The LVEF is 33%. There is severe global hypokinesis.  2. The RV is normal in cavity size. The global systolic function is mildly decreased. The RVEF is 53%.   3. Both atria are normal in size.  4. There is no significant valvular disease.   5. Late gadolinium enhancement imaging shows no MI, fibrosis or infiltrative disease.   6. There is no pericardial effusion or thickening.  7. There is no intracardiac thrombus.  CONCLUSIONS: Severe, non-ischemic dilated cardiomyopathy, most likely of genetic cause. LVEF of 33% and RVEF of 53% with no LGE. When directly compared to the prior CMR, the LV and RV size and function have not significantly changed.       Physical Examination   /74 (BP Location: Right arm, Patient Position: Chair, Cuff Size: Adult Large)   Pulse 59   Ht 1.62 m (5' 3.78\")   Wt 101.6 kg (223 lb 14.4 oz)   LMP 10/19/2008 (Approximate)   SpO2 " 99%   BMI 38.70 kg/m    Wt Readings from Last 3 Encounters:   02/27/23 101.6 kg (224 lb)   02/24/23 101.3 kg (223 lb 4.8 oz)   02/18/23 102.1 kg (225 lb)     General Appearance:   Alert, well-appearing and in no acute distress.   HEENT: Atraumatic, normocephalic. PERRL.  MMM.   Chest/Lungs:   Respirations unlabored.  Lungs are clear to auscultation.   Cardiovascular:   Regular rate and rhythm.  S1/S2. No murmur.    Abdomen:  Soft, nontender, nondistended.   Extremities: No cyanosis or clubbing. No edema.    Musculoskeletal: Moves all extremities.  Gait normal.   Skin: Warm, dry, intact.    Neurologic: Mood and affect are appropriate.  Alert and oriented to person, place, time, and situation.          Medications  Allergies   Current Outpatient Medications   Medication Sig Dispense Refill     Cholecalciferol (VITAMIN D) 2000 UNIT tablet Take 5,000 Units by mouth as needed Reported on 3/8/2017 100 tablet 12     dapagliflozin (FARXIGA) 10 MG TABS tablet Take 1 tablet (10 mg) by mouth daily 90 tablet 3     FLAXSEED, LINSEED, PO Take 1 capsule by mouth daily        fluticasone (FLONASE) 50 MCG/ACT nasal spray Spray 2 sprays into both nostrils At Bedtime 15.8 mL 4     furosemide (LASIX) 20 MG tablet Take 2 tablets (40 mg) by mouth daily as needed (weight gain/edema) 180 tablet 3     loratadine (CLARITIN) 10 MG tablet Take 1 tablet (10 mg) by mouth daily 90 tablet 3     metoprolol succinate ER (TOPROL XL) 25 MG 24 hr tablet Take 0.5 tablets (12.5 mg) by mouth daily 30 tablet 3     progesterone (PROMETRIUM) 100 MG capsule Take 100 mg by mouth At Bedtime        sacubitril-valsartan (ENTRESTO)  MG per tablet Take 1 tablet by mouth 2 times daily 180 tablet 3     spironolactone (ALDACTONE) 25 MG tablet Take 2 tablets (50 mg) by mouth daily 180 tablet 0    Allergies   Allergen Reactions     Morphine      EMESIS     Nickel      Sulfa Drugs Swelling         Lab Results (Personally Reviewed)    Chemistry/lipid CBC Cardiac  Enzymes/BNP/TSH/INR   Lab Results   Component Value Date    BUN 12.9 02/24/2023     02/24/2023    CO2 27 02/24/2023     Creatinine   Date Value Ref Range Status   02/24/2023 0.88 0.51 - 0.95 mg/dL Final   07/11/2021 0.79 0.52 - 1.04 mg/dL Final       Lab Results   Component Value Date    CHOL 172 08/25/2020    HDL 54 08/25/2020    LDL 95 08/25/2020      Lab Results   Component Value Date    WBC 4.5 02/18/2023    HGB 12.7 02/18/2023    HCT 39.7 02/18/2023    MCV 98 02/18/2023     02/18/2023    Lab Results   Component Value Date    TSH 3.13 02/17/2023    INR 1.05 01/09/2023        The patient states understanding and is agreeable with the plan.   Abdelrahman Aragon MD [facility]  Cardiology - Electrophysiology    Total time spent on patient visit, reviewing notes, imaging, labs, orders, and completing necessary documentation: 60 minutes.

## 2023-03-01 NOTE — PROGRESS NOTES
ELECTROPHYSIOLOGY CLINIC VISIT    Assessment/Recommendations   Assessment/Plan:    Ms. 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     Familial NICM LVEF 35-40%, NYHA I:  1. ACEi/ARB: Continue Entresto.  2. BB: Continue Toprol XL   3. Aldosterone antagonist: Continue Spironolactone.  4. SCD prophylaxis: No need for ICD unless EF<30% with NYHA class 1, or if VUS in FLNC gene gets reclassified as pathogenic with an EF of 35% cutoff.  5. Fluid status: continue lasix, appears euvolemic on exam.   6. Syncope does sound orthostatic in nature, but Will follow up on zio patch results at this time. Consider ILR if needed.       Follow up to be determined based on results.        History of Present Illness/Subjective    Ms. Marleen Mcfadden is a 57 year old female who comes in today for EP consultation of genetic CM, sygloriaope.    Ms. 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     She has a family history of DCM. Her sister was diagnosed and lead to family screening. 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 Her most recent echo showed LVEF 35-40% with moderate/severe LV dilation. Her son and daughter have DCM. Patient has been following with Dr. Kolb since 2006. She has been on GDMT. More recently, she was having episodes of lightheadedness. She was playing pool and she stood up and fell back and went down on the ground. Her fiance said she was still awake the whole time, she does not remember that, only once, she was able to stand up immediately, and went to the chair. No orthostatic dizziness otherwize, she reports having a syncope episode 3 years ago after laughing very hard.      She reports feeling well. She is currently wearing a zio path. She does still have some dizzy spells when she laughs/coughs really hard. Otherwise, she is  exercising and weightlifting 3 times a week without symptoms/limitations. She denies chest discomfort, palpitations, abdominal fullness/bloating or peripheral edema, shortness of breath, paroxysmal nocturnal dyspnea, orthopnea, lightheadedness, dizziness, pre-syncope, or syncope. Current cardiac medications include: Toprol XL, Lasix, Farxiga, Entresto, and Spironolactone. .       Family history was significant for the following cardiac history:    Full sister with DCM. She  in September after developing colon cancer.  Her history was also remarkable for MERSA, kidney disease and ultimate transplant.  She did not have an ICD or much care for her DCM.    Marleen's son and daughter also have DCM.      A maternal half sister has hypertension, epilepsy and reported dizziness and fainting. She does not have a cardiac issue to Marleen's knowledge.    Mother  suddenly at 38 yrs of age.  It was thought to be the result of a cardiac problem. However, she also had a long history of alcoholism and had a colostomy. Her two siblings have no known heart issues.    Maternal grandmother  in her 30's from a brain aneurysm. Nothing is known about maternal grandfather.    Marleen's father  at 55 years after a massive heart attack and CABG procedure.  His death occurred one day after the surgery reportedly from a clot.  One of his sisters  from a stroke.    Paternal grandmother  in her 70's from a stroke.  Her mother (Marleen's great grandmother)  suddenly in her 70's while sitting at a bus stop.Nothing is known about grandfather.    Two paternal half sisters in good health.  I have reviewed and updated the patient's Past Medical History, Social History, Family History and Medication List.     Cardiographics (Personally Reviewed) :   23 Echo:   Interpretation Summary  Moderate to severe left ventricular dilation is present. Left ventricular  function is decreased. The ejection fraction is 35-40%  "(moderately reduced).  Biplane LVEF is 37%.  The right ventricle is normal size. Global right ventricular function is normal.  No pericardial effusion is present.  This study was compared with the study from 12/12/22: LVEF appears similar or at most minimally lower and appears to hve been overestimated on the prior study.    1/10/23 RHC:  Conclusion      Right sided filling pressures are normal.    Mild elevated pulmonary hypertension.    Left sided filling pressures are normal.    Left ventricular filling pressures are normal.    Normal cardiac output level.         6/3/22 CMR:  1. The LV is mild-to-moderately dilated in cavity size. The wall thickness is normal. The global systolic function is severely decreased. The LVEF is 33%. There is severe global hypokinesis.  2. The RV is normal in cavity size. The global systolic function is mildly decreased. The RVEF is 53%.   3. Both atria are normal in size.  4. There is no significant valvular disease.   5. Late gadolinium enhancement imaging shows no MI, fibrosis or infiltrative disease.   6. There is no pericardial effusion or thickening.  7. There is no intracardiac thrombus.  CONCLUSIONS: Severe, non-ischemic dilated cardiomyopathy, most likely of genetic cause. LVEF of 33% and RVEF of 53% with no LGE. When directly compared to the prior CMR, the LV and RV size and function have not significantly changed.       Physical Examination   /74 (BP Location: Right arm, Patient Position: Chair, Cuff Size: Adult Large)   Pulse 59   Ht 1.62 m (5' 3.78\")   Wt 101.6 kg (223 lb 14.4 oz)   LMP 10/19/2008 (Approximate)   SpO2 99%   BMI 38.70 kg/m    Wt Readings from Last 3 Encounters:   02/27/23 101.6 kg (224 lb)   02/24/23 101.3 kg (223 lb 4.8 oz)   02/18/23 102.1 kg (225 lb)     General Appearance:   Alert, well-appearing and in no acute distress.   HEENT: Atraumatic, normocephalic. PERRL.  MMM.   Chest/Lungs:   Respirations unlabored.  Lungs are clear to " auscultation.   Cardiovascular:   Regular rate and rhythm.  S1/S2. No murmur.    Abdomen:  Soft, nontender, nondistended.   Extremities: No cyanosis or clubbing. No edema.    Musculoskeletal: Moves all extremities.  Gait normal.   Skin: Warm, dry, intact.    Neurologic: Mood and affect are appropriate.  Alert and oriented to person, place, time, and situation.          Medications  Allergies   Current Outpatient Medications   Medication Sig Dispense Refill     Cholecalciferol (VITAMIN D) 2000 UNIT tablet Take 5,000 Units by mouth as needed Reported on 3/8/2017 100 tablet 12     dapagliflozin (FARXIGA) 10 MG TABS tablet Take 1 tablet (10 mg) by mouth daily 90 tablet 3     FLAXSEED, LINSEED, PO Take 1 capsule by mouth daily        fluticasone (FLONASE) 50 MCG/ACT nasal spray Spray 2 sprays into both nostrils At Bedtime 15.8 mL 4     furosemide (LASIX) 20 MG tablet Take 2 tablets (40 mg) by mouth daily as needed (weight gain/edema) 180 tablet 3     loratadine (CLARITIN) 10 MG tablet Take 1 tablet (10 mg) by mouth daily 90 tablet 3     metoprolol succinate ER (TOPROL XL) 25 MG 24 hr tablet Take 0.5 tablets (12.5 mg) by mouth daily 30 tablet 3     progesterone (PROMETRIUM) 100 MG capsule Take 100 mg by mouth At Bedtime        sacubitril-valsartan (ENTRESTO)  MG per tablet Take 1 tablet by mouth 2 times daily 180 tablet 3     spironolactone (ALDACTONE) 25 MG tablet Take 2 tablets (50 mg) by mouth daily 180 tablet 0    Allergies   Allergen Reactions     Morphine      EMESIS     Nickel      Sulfa Drugs Swelling         Lab Results (Personally Reviewed)    Chemistry/lipid CBC Cardiac Enzymes/BNP/TSH/INR   Lab Results   Component Value Date    BUN 12.9 02/24/2023     02/24/2023    CO2 27 02/24/2023     Creatinine   Date Value Ref Range Status   02/24/2023 0.88 0.51 - 0.95 mg/dL Final   07/11/2021 0.79 0.52 - 1.04 mg/dL Final       Lab Results   Component Value Date    CHOL 172 08/25/2020    HDL 54 08/25/2020     LDL 95 08/25/2020      Lab Results   Component Value Date    WBC 4.5 02/18/2023    HGB 12.7 02/18/2023    HCT 39.7 02/18/2023    MCV 98 02/18/2023     02/18/2023    Lab Results   Component Value Date    TSH 3.13 02/17/2023    INR 1.05 01/09/2023        The patient states understanding and is agreeable with the plan.   Abdelrahman Aragon MD [facility]  Cardiology - Electrophysiology    Total time spent on patient visit, reviewing notes, imaging, labs, orders, and completing necessary documentation: 60 minutes.

## 2023-03-02 ENCOUNTER — OFFICE VISIT (OUTPATIENT)
Dept: OTOLARYNGOLOGY | Facility: CLINIC | Age: 58
End: 2023-03-02
Payer: COMMERCIAL

## 2023-03-02 ENCOUNTER — OFFICE VISIT (OUTPATIENT)
Dept: AUDIOLOGY | Facility: CLINIC | Age: 58
End: 2023-03-02
Attending: OTOLARYNGOLOGY
Payer: COMMERCIAL

## 2023-03-02 VITALS
HEART RATE: 75 BPM | SYSTOLIC BLOOD PRESSURE: 104 MMHG | TEMPERATURE: 98.6 F | OXYGEN SATURATION: 100 % | HEIGHT: 63 IN | DIASTOLIC BLOOD PRESSURE: 72 MMHG | BODY MASS INDEX: 39.51 KG/M2 | WEIGHT: 223 LBS

## 2023-03-02 DIAGNOSIS — G89.29 CHRONIC HEADACHE WITH NEW FEATURES: Primary | ICD-10-CM

## 2023-03-02 DIAGNOSIS — H90.3 SENSORINEURAL HEARING LOSS (SNHL) OF BOTH EARS: ICD-10-CM

## 2023-03-02 DIAGNOSIS — Z98.890 S/P CRANIOTOMY: ICD-10-CM

## 2023-03-02 DIAGNOSIS — G89.29 CHRONIC NONINTRACTABLE HEADACHE, UNSPECIFIED HEADACHE TYPE: ICD-10-CM

## 2023-03-02 DIAGNOSIS — R51.9 CHRONIC NONINTRACTABLE HEADACHE, UNSPECIFIED HEADACHE TYPE: ICD-10-CM

## 2023-03-02 DIAGNOSIS — R51.9 CHRONIC HEADACHE WITH NEW FEATURES: Primary | ICD-10-CM

## 2023-03-02 DIAGNOSIS — Z98.890 S/P CRANIOTOMY: Primary | ICD-10-CM

## 2023-03-02 PROCEDURE — 92557 COMPREHENSIVE HEARING TEST: CPT | Performed by: AUDIOLOGIST

## 2023-03-02 PROCEDURE — 99202 OFFICE O/P NEW SF 15 MIN: CPT | Performed by: NEUROLOGICAL SURGERY

## 2023-03-02 PROCEDURE — 92550 TYMPANOMETRY & REFLEX THRESH: CPT | Performed by: AUDIOLOGIST

## 2023-03-02 PROCEDURE — 92504 EAR MICROSCOPY EXAMINATION: CPT | Performed by: OTOLARYNGOLOGY

## 2023-03-02 PROCEDURE — 99213 OFFICE O/P EST LOW 20 MIN: CPT | Mod: 25 | Performed by: OTOLARYNGOLOGY

## 2023-03-02 ASSESSMENT — PAIN SCALES - GENERAL: PAINLEVEL: NO PAIN (0)

## 2023-03-02 NOTE — PROGRESS NOTES
AUDIOLOGY REPORT    SUMMARY: Audiology visit completed. See audiogram for results.      RECOMMENDATIONS: Follow-up with ENT.      Dunia Campos  Audiologist  [place] License  #[id number]

## 2023-03-02 NOTE — PROGRESS NOTES
"Service Date: 2023    Yanna Matias MD  [address], [address]  [place]  [address]    RE:  Marleen Mcfadden  MRN:  [id number]  :  1965    Dear Dr. Matias:    We saw Ms. 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  She is over 18 months out from a right middle fossa approach for repair of the encephalocele.  She had been doing relatively well until about 4 months ago when she developed right-sided headaches.  The headaches occur almost daily and are very transient.  There is no trigger.  She has not noticed any temporal or positional pattern.  They last briefly and seem to resolve spontaneously following a \"popping\" sensation.  She is not having any drainage from the ears or the nose.  However, she describes some postnasal drainage with the popping sensation that relieves the headache.  She reiterated that she typically does not get any headaches.  Her main concern is that these headaches are harbingers of impending recurrent encephaloceles.    She describes some mild blurring of bilateral vision but no significant decline.  She denies any double vision or peripheral vision loss.  She is due for her annual eye examination.  She has lost about 40 pounds over the past year.  I saw her in conjunction with my [facility] partner from Neurotology, Dr. Harrell.  Dr. Harrell found no effusions in either ear.    PHYSICAL EXAMINATION:  On exam, her general appearance is good.  She appears comfortable.  Blood pressure 104/72, heart rate 75, weight 101.2 kilograms.  BMI 39.5.  She is obese.  Her right temporal incision has healed well.  Her gross neurological examination is normal.    Her audiogram from today shows high-frequency sensorineural hearing loss.  Her speech recognition is 100% bilaterally.  Overall, her audiogram is unchanged and she still has a very good hearing.    IMPRESSION AND PLAN: "  Her headaches do not fit a pattern for any specific etiology.  The most pertinent concern would be idiopathic intracranial hypertension, though she is not in the typical age group.  We will arrange for her to have an examination in our general Neuro-Ophthalmology Clinic to look for papilledema.  Assuming she does not, we can have her evaluated in our Headache Clinic.  If there are any concerns for idiopathic intracranial hypertension, we can pursue lumbar puncture to measure opening pressure.  Once again, her headaches do not fit a specific pattern.  We will follow up with her after her eye exam to determine next steps.  Please do not hesitate to contact us with questions.    Sincerely,    Jocelyn Noe MD        D: 2023   T: 2023   MT: south    Name:     BALTAZAR OREILLY  MRN:      [id number]        Account:      [id number]   :      1965           Service Date: 2023       Document: [id number]

## 2023-03-02 NOTE — PROGRESS NOTES
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 At our last visit on 3/3/22 she was healing well with improved hearing. She recently presented to the ED on 23 after a syncopal episode while playing pool. Today she tells me that she has had tinnitus and headaches for the past 4 months which are which similar to before she was diagnosed with her encephalocele. These headaches are right sided and typically come on quickly a couple times a week and then resolve with a popping sensation and post nasal drainage. These have woken her out of sleep once before but they are typically random. She has not noticed headaches in the morning and mentions that headaches are not typical for her. She has recently started a medication for cardiac issues but her headaches predated this. She currently wears bifocals but feels she needs to update her prescription. She has had some rhinorrhea recently, worse on the right than the left, and in the mornings she wakes up with clear crusting of her nose. She has intentionally lost about 40 pounds over the past year. She has not had difficulty closing her mouth and does not clench her teeth but has felt a tension pain in her jaw.    Physical Exam:   /72 (BP Location: Left arm, Patient Position: Sitting, Cuff Size: Adult Large)   Pulse 75   Temp 98.6  F (37  C)   Ht 1.6 m (5' 3\")   Wt 101.2 kg (223 lb)   LMP 10/19/2008 (Approximate)   SpO2 100%   BMI 39.50 kg/m       Female in no acute distress.  Alert and answering questions appropriately.  HB 1/6 bilaterally.  Both ears examined under the microscope. Right EAC is clear and TM " intact with middle ear well aerated. Right periauricular incision well healed. Left EAC is clear and TM intact with middle ear well aerated.     Audiogram:  AUDIOGRAM: She underwent an audiogram today. This demonstrates: bilateral normal downsloping to moderate on the left and severe on the right sensorineural hearing loss, both ears are fairly symmetric with only slight divergence in the highest frequencies.      Right: Speech reception threshold is 10 dB with 100% word recognition at 50 dB. Tympanogram A type   Left: Speech reception threshold is 10 dB with 100% word recognition at 50 dB. Tympanogram A type     Audiogram was independently reviewed and compared to her last test and it stable from one year ago.     Assessment and Plan:  Marleen Mcfadden is a 57 year old female who presents for follow up. I reviewed her audiogram results with her which shows her hearing has remained stable. Her exam today is reassuring with no signs of leak. Dr. Noe had a discussion regarding her headaches which could be due to a variety of etiologies including potentially increased intracranial pressure. She should discuss this with her eye team and evaluate for optic nerve edema as a first step. We don't feel that a lumbar puncture is emergently indicated so she will start with an eye exam and we will place a referral to ophthalmology. We also offered a referral for neurology to address her headaches but will start with an eye exam. It is also reassuring that she has been losing weight as this could improve intracranial hypertension. From a skull base standpoint she has no restrictions for her sleep apnea studies.       Scribe Disclosure:  I, Lyndsey De La Garza, am serving as a scribe to document services personally performed by Jocelyne Harrell MD at this visit, based upon the provider's statements to me. All documentation has been reviewed by the aforementioned provider prior to being entered into the official medical record.      The documentation recorded by the scribe accurately reflects the services I personally performed and the decisions made by me.

## 2023-03-02 NOTE — LETTER
"3/2/2023       RE: Marleen Mcfadden  [address]  [place] [id number]     Dear Colleague,    Thank you for referring your patient, Marleen Mcfadden, to the [facility] EAR NOSE AND THROAT CLINIC [place] at [facility]. Please see a copy of my visit note below.      Service Date: 2023    Yanna Matias MD  [place] [address] [facility], [address]  [place]  [address]    RE:  Marleen Mcfadden  MRN:  [id number]  :  1965    Dear Dr. Matias:    We saw Ms. 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  She is over 18 months out from a right middle fossa approach for repair of the encephalocele.  She had been doing relatively well until about 4 months ago when she developed right-sided headaches.  The headaches occur almost daily and are very transient.  There is no trigger.  She has not noticed any temporal or positional pattern.  They last briefly and seem to resolve spontaneously following a \"popping\" sensation.  She is not having any drainage from the ears or the nose.  However, she describes some postnasal drainage with the popping sensation that relieves the headache.  She reiterated that she typically does not get any headaches.  Her main concern is that these headaches are harbingers of impending recurrent encephaloceles.    She describes some mild blurring of bilateral vision but no significant decline.  She denies any double vision or peripheral vision loss.  She is due for her annual eye examination.  She has lost about 40 pounds over the past year.  I saw her in conjunction with my Skull Base Surgery partner from Neurotology, Dr. Harrell.  Dr. Harrell found no effusions in either ear.    PHYSICAL EXAMINATION:  On exam, her general appearance is good.  She appears comfortable.  Blood pressure 104/72, heart rate 75, weight 101.2 kilograms.  " BMI 39.5.  She is obese.  Her right temporal incision has healed well.  Her gross neurological examination is normal.    Her audiogram from today shows high-frequency sensorineural hearing loss.  Her speech recognition is 100% bilaterally.  Overall, her audiogram is unchanged and she still has a very good hearing.    IMPRESSION AND PLAN:  Her headaches do not fit a pattern for any specific etiology.  The most pertinent concern would be idiopathic intracranial hypertension, though she is not in the typical age group.  We will arrange for her to have an examination in our general Neuro-Ophthalmology Clinic to look for papilledema.  Assuming she does not, we can have her evaluated in our Headache Clinic.  If there are any concerns for idiopathic intracranial hypertension, we can pursue lumbar puncture to measure opening pressure.  Once again, her headaches do not fit a specific pattern.  We will follow up with her after her eye exam to determine next steps.  Please do not hesitate to contact us with questions.    Sincerely,    Jocelyn Noe MD        D: 2023   T: 2023   MT:     Name:     BALTAZAR OREILLYSuzie  MRN:      [id number]        Account:      [id number]   :      1965           Service Date: 2023       Document: [id number]

## 2023-03-02 NOTE — LETTER
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 At our last visit on 3/3/22 she was healing well with improved hearing. She recently presented to the ED on 23 after a syncopal episode while playing pool. Today she tells me that she has had tinnitus and headaches for the past 4 months which are which similar to before she was diagnosed with her encephalocele. These headaches are right sided and typically come on quickly a couple times a week and then resolve with a popping sensation and post nasal drainage. These have woken her out of sleep once before but they are typically random. She has not noticed headaches in the morning and mentions that headaches are not typical for her. She has recently started a medication for cardiac issues but her headaches predated this. She currently wears bifocals but feels she needs to update her prescription. She has had some rhinorrhea recently, worse on the right than the left, and in the mornings she wakes up with clear crusting of her nose. She has intentionally lost about 40 pounds over the past year. She has not had difficulty closing her mouth and does not clench her teeth but has felt a tension pain in her jaw.    Physical Exam:   /72 (BP Location: Left arm, Patient Position: Sitting, Cuff Size: Adult Large)   Pulse 75   Temp 98.6  F (37  C)   Ht 1.6 m (5' 3\")   Wt 101.2 kg (223 lb)   LMP 10/19/2008 (Approximate)   SpO2 100%   BMI 39.50 kg/m       Female in no acute distress.  Alert and answering questions appropriately.  HB " 1/6 bilaterally.  Both ears examined under the microscope. Right EAC is clear and TM intact with middle ear well aerated. Right periauricular incision well healed. Left EAC is clear and TM intact with middle ear well aerated.     Audiogram:  AUDIOGRAM: She underwent an audiogram today. This demonstrates: bilateral normal downsloping to moderate on the left and severe on the right sensorineural hearing loss, both ears are fairly symmetric with only slight divergence in the highest frequencies.      Right: Speech reception threshold is 10 dB with 100% word recognition at 50 dB. Tympanogram A type   Left: Speech reception threshold is 10 dB with 100% word recognition at 50 dB. Tympanogram A type     Audiogram was independently reviewed and compared to her last test and it stable from one year ago.     Assessment and Plan:  Marleen Mcfadden is a 57 year old female who presents for follow up. I reviewed her audiogram results with her which shows her hearing has remained stable. Her exam today is reassuring with no signs of leak. Dr. Noe had a discussion regarding her headaches which could be due to a variety of etiologies including potentially increased intracranial pressure. She should discuss this with her eye team and evaluate for optic nerve edema as a first step. We don't feel that a lumbar puncture is emergently indicated so she will start with an eye exam and we will place a referral to ophthalmology. We also offered a referral for neurology to address her headaches but will start with an eye exam. It is also reassuring that she has been losing weight as this could improve intracranial hypertension. From a skull base standpoint she has no restrictions for her sleep apnea studies.       Scribe Disclosure:  I, Lyndsey De La Garza, am serving as a scribe to document services personally performed by Jocelyne Harrell MD at this visit, based upon the provider's statements to me. All documentation has been reviewed by the  aforementioned provider prior to being entered into the official medical record.     The documentation recorded by the scribe accurately reflects the services I personally performed and the decisions made by me.        Jocelyne Harrell MD

## 2023-03-02 NOTE — LETTER
3/2/2023       RE: Marleen Mcfadden  [address]  [facility] [address]     Dear Colleague,    Thank you for referring your patient, Marleen Mcfadden, to the [facility] EAR NOSE AND THROAT CLINIC [place] at [facility]. Please see a copy of my visit note below.      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 At our last visit on 3/3/22 she was healing well with improved hearing. She recently presented to the ED on 23 after a syncopal episode while playing pool. Today she tells me that she has had tinnitus and headaches for the past 4 months which are which similar to before she was diagnosed with her encephalocele. These headaches are right sided and typically come on quickly a couple times a week and then resolve with a popping sensation and post nasal drainage. These have woken her out of sleep once before but they are typically random. She has not noticed headaches in the morning and mentions that headaches are not typical for her. She has recently started a medication for cardiology issues but her headaches predated this. She currently wears bifocals but feels she needs to update her prescription. She has had some rhinorrhea recently, worse on the right than the left, and in the mornings she wakes up with clear crusting of her nose. She has lost about 40 pounds over the past year. She has not had difficulty closing her mouth and does not clench her teeth but has felt a tension pain in her jaw.    Physical Exam:   /72 (BP Location: Left arm, Patient Position: Sitting, Cuff Size: Adult  "Large)   Pulse 75   Temp 98.6  F (37  C)   Ht 1.6 m (5' 3\")   Wt 101.2 kg (223 lb)   LMP 10/19/2008 (Approximate)   SpO2 100%   BMI 39.50 kg/m       Physical examination:  Female in no acute distress.  Alert and answering questions appropriately.  HB 1/6 bilaterally.  Both ears examined under the microscope. Right EAC is clear and TM intact with middle ear well aerated. Right periauricular incision well healing. Left EAC is clear and TM intact with middle ear well aerated.      Assessment and Plan:  Marleen Mcfadden is a 57 year old female who presents for follow up. I reviewed her audiogram results with her which shows her hearing has remained stable. Her exam today is reassuring with no signs of leak. Dr. Noe had a discussion regarding her headaches which could be due to a variety of etiologies. She should discuss this with her eye team and evaluate for ocular edema as a first next step. I don't feel that a lumbar puncture is emergently indicated so she will start with eye exam and we will place a referral to ophthalmology. We also offered referral for headache specialty but will start with an eye exam. It is also reassuring that she has been losing weight and this will hopefully help these symptoms. From a skull base standpoint she has no restrictions for her sleep apnea studies. We will plan to see her back pending her visual exam.       Scribe Disclosure:  I, Lyndsey De La Garza, am serving as a scribe to document services personally performed by Jocelyne Harrell MD at this visit, based upon the provider's statements to me. All documentation has been reviewed by the aforementioned provider prior to being entered into the official medical record.       Again, thank you for allowing me to participate in the care of your patient.      Sincerely,    Jocelyne Harrell MD      "

## 2023-03-02 NOTE — PATIENT INSTRUCTIONS
You were seen today with Dr. Harrell and Dr. Noe. Your primary contact after today's visit is: CLARITZA Del Valle    Next steps:  An opthalmology referral has been placed. Once the appointment has been completed, we will follow-up with next steps.     How to Contact Us  Send a [employer] message to your provider.   Call the clinic - your call will be routed appropriately.   ENT Clinic: [phone]   Neurosurgery Clinic: [phone]  To speak directly to an RN Care Coordinator:  Ivon RN: [phone]  Nina RN: [phone]    Note: We do our best to check voicemail frequently throughout the day and make every effort to return calls within 1-2 business days. For urgent matters, please use the general clinic phone numbers listed above.

## 2023-03-14 ENCOUNTER — OFFICE VISIT (OUTPATIENT)
Dept: FAMILY MEDICINE | Facility: CLINIC | Age: 58
End: 2023-03-14
Payer: COMMERCIAL

## 2023-03-14 ENCOUNTER — MYC MEDICAL ADVICE (OUTPATIENT)
Dept: CARDIOLOGY | Facility: CLINIC | Age: 58
End: 2023-03-14

## 2023-03-14 VITALS
SYSTOLIC BLOOD PRESSURE: 113 MMHG | DIASTOLIC BLOOD PRESSURE: 72 MMHG | WEIGHT: 222.8 LBS | HEART RATE: 51 BPM | OXYGEN SATURATION: 100 % | BODY MASS INDEX: 39.47 KG/M2 | RESPIRATION RATE: 16 BRPM

## 2023-03-14 DIAGNOSIS — B37.31 CANDIDIASIS OF VAGINA: ICD-10-CM

## 2023-03-14 DIAGNOSIS — E66.01 CLASS 3 SEVERE OBESITY DUE TO EXCESS CALORIES WITH SERIOUS COMORBIDITY AND BODY MASS INDEX (BMI) OF 40.0 TO 44.9 IN ADULT (H): ICD-10-CM

## 2023-03-14 DIAGNOSIS — N89.8 VAGINAL DISCHARGE: Primary | ICD-10-CM

## 2023-03-14 DIAGNOSIS — E66.813 CLASS 3 SEVERE OBESITY DUE TO EXCESS CALORIES WITH SERIOUS COMORBIDITY AND BODY MASS INDEX (BMI) OF 40.0 TO 44.9 IN ADULT (H): ICD-10-CM

## 2023-03-14 DIAGNOSIS — Z23 HIGH PRIORITY FOR 2019-NCOV VACCINE: ICD-10-CM

## 2023-03-14 DIAGNOSIS — I50.22 CHRONIC SYSTOLIC CONGESTIVE HEART FAILURE (H): Chronic | ICD-10-CM

## 2023-03-14 LAB
CLUE CELLS: ABNORMAL
TRICHOMONAS, WET PREP: ABNORMAL
WBC'S/HIGH POWER FIELD, WET PREP: ABNORMAL
YEAST, WET PREP: PRESENT

## 2023-03-14 PROCEDURE — 99213 OFFICE O/P EST LOW 20 MIN: CPT | Mod: 25 | Performed by: PHYSICIAN ASSISTANT

## 2023-03-14 PROCEDURE — 87210 SMEAR WET MOUNT SALINE/INK: CPT | Performed by: PHYSICIAN ASSISTANT

## 2023-03-14 PROCEDURE — 91312 COVID-19 VACCINE BIVALENT BOOSTER 12+ (PFIZER): CPT | Performed by: PHYSICIAN ASSISTANT

## 2023-03-14 PROCEDURE — 0124A COVID-19 VACCINE BIVALENT BOOSTER 12+ (PFIZER): CPT | Performed by: PHYSICIAN ASSISTANT

## 2023-03-14 RX ORDER — DIAZEPAM 5 MG
TABLET ORAL
COMMUNITY
Start: 2022-06-02 | End: 2023-03-14

## 2023-03-14 RX ORDER — FLUCONAZOLE 150 MG/1
150 TABLET ORAL ONCE
Qty: 1 TABLET | Refills: 0 | Status: SHIPPED | OUTPATIENT
Start: 2023-03-14 | End: 2023-03-14

## 2023-03-14 RX ORDER — FLUCONAZOLE 150 MG/1
TABLET ORAL
COMMUNITY
Start: 2022-08-03 | End: 2023-03-14

## 2023-03-14 RX ORDER — METRONIDAZOLE 7.5 MG/G
GEL TOPICAL
COMMUNITY
Start: 2022-07-08 | End: 2023-03-14

## 2023-03-14 RX ORDER — METOPROLOL SUCCINATE 25 MG/1
12.5 TABLET, EXTENDED RELEASE ORAL DAILY
Qty: 45 TABLET | Refills: 3 | Status: SHIPPED | OUTPATIENT
Start: 2023-03-14 | End: 2023-06-13

## 2023-03-14 RX ORDER — SOLIFENACIN SUCCINATE 5 MG/1
1 TABLET, FILM COATED ORAL
COMMUNITY
Start: 2022-07-08 | End: 2023-10-05

## 2023-03-14 ASSESSMENT — ASTHMA QUESTIONNAIRES
ACT_TOTALSCORE: 25
QUESTION_5 LAST FOUR WEEKS HOW WOULD YOU RATE YOUR ASTHMA CONTROL: COMPLETELY CONTROLLED
QUESTION_3 LAST FOUR WEEKS HOW OFTEN DID YOUR ASTHMA SYMPTOMS (WHEEZING, COUGHING, SHORTNESS OF BREATH, CHEST TIGHTNESS OR PAIN) WAKE YOU UP AT NIGHT OR EARLIER THAN USUAL IN THE MORNING: NOT AT ALL
ACT_TOTALSCORE: 25
QUESTION_4 LAST FOUR WEEKS HOW OFTEN HAVE YOU USED YOUR RESCUE INHALER OR NEBULIZER MEDICATION (SUCH AS ALBUTEROL): NOT AT ALL
QUESTION_1 LAST FOUR WEEKS HOW MUCH OF THE TIME DID YOUR ASTHMA KEEP YOU FROM GETTING AS MUCH DONE AT WORK, SCHOOL OR AT HOME: NONE OF THE TIME
QUESTION_2 LAST FOUR WEEKS HOW OFTEN HAVE YOU HAD SHORTNESS OF BREATH: NOT AT ALL

## 2023-03-14 ASSESSMENT — PAIN SCALES - GENERAL: PAINLEVEL: NO PAIN (0)

## 2023-03-14 ASSESSMENT — PATIENT HEALTH QUESTIONNAIRE - PHQ9
10. IF YOU CHECKED OFF ANY PROBLEMS, HOW DIFFICULT HAVE THESE PROBLEMS MADE IT FOR YOU TO DO YOUR WORK, TAKE CARE OF THINGS AT HOME, OR GET ALONG WITH OTHER PEOPLE: NOT DIFFICULT AT ALL
SUM OF ALL RESPONSES TO PHQ QUESTIONS 1-9: 2
SUM OF ALL RESPONSES TO PHQ QUESTIONS 1-9: 2

## 2023-03-14 NOTE — TELEPHONE ENCOUNTER
Spoke with patient and resent in metoprolol prescription. It was still active in her chart and last clinic note said to continue taking.

## 2023-03-14 NOTE — PROGRESS NOTES
"  Assessment & Plan      Assessment/Plan:  Lab: Wet Prep  Diagnosis: yeast infection  Treatment: Diflucan 150 mg once for 1 dose    Problem List Items Addressed This Visit    None  Visit Diagnoses     Vaginal discharge    -  Primary    Candidiasis of vagina        Relevant Medications    fluconazole (DIFLUCAN) 150 MG tablet    Class 3 severe obesity due to excess calories with serious comorbidity and body mass index (BMI) of 40.0 to 44.9 in adult (H)        High priority for 2019-nCoV vaccine        Relevant Orders    COVID-19,PF,PFIZER BOOSTER BIVALENT 12+Yrs (Completed)          17 minutes spent on the date of the encounter doing chart review, history and exam, documentation and further activities per the note        BMI:   Estimated body mass index is 39.47 kg/m  as calculated from the following:    Height as of 3/2/23: 1.6 m (5' 3\").    Weight as of this encounter: 101.1 kg (222 lb 12.8 oz).   Weight is trending down which should help with BP control        Return in about 1 week (around 3/21/2023), or if symptoms worsen or fail to improve, for PCP Follow-up.    MATTHEW Negron  [facility]    Dinorah Hawley is a 57 year old, presenting for the following health issues:  Vaginal Problem and Imm/Inj (COVID-19 VACCINE)  Vaginal discharge     HPI:  Started about a week and a half ago, creamy white discharge in underwear. No itching, no odor and no pain. No burning but has tingling sensation. Moderate amount of discharge daily.  Skin: dry, flaky, limited to face  History of Present Illness       Reason for visit:  Vaginal discharge  Symptom onset:  1-2 weeks ago    She eats 2-3 servings of fruits and vegetables daily.She consumes 1 sweetened beverage(s) daily.She exercises with enough effort to increase her heart rate 60 or more minutes per day.  She exercises with enough effort to increase her heart rate 3 or less days per week.   She is taking medications " regularly.    Today's PHQ-9         PHQ-9 Total Score: 2    PHQ-9 Q9 Thoughts of better off dead/self-harm past 2 weeks :   Not at all    How difficult have these problems made it for you to do your work, take care of things at home, or get along with other people: Not difficult at all      Review of Systems    CONSTITUTIONAL: NEGATIVE for fever, chills, change in weight  ENT/MOUTH: NEGATIVE for ear, mouth and throat problems  RESP: NEGATIVE for significant cough or SOB  CV: NEGATIVE for chest pain, palpitations or peripheral edema  :  vaginal discharge      Objective    /72 (BP Location: Right arm, Patient Position: Sitting, Cuff Size: Adult Large)   Pulse 51   Resp 16   Wt 101.1 kg (222 lb 12.8 oz)   LMP 10/19/2008 (Approximate)   SpO2 100%   BMI 39.47 kg/m    Body mass index is 39.47 kg/m .  Physical Exam   General: Alert and oriented.   Hearing intact, PERRLA, clear conjunctiva and sclera; TMs visualized, pearly gray, clear nasal passage, oral mucosa pink and moist.  Skin: Dry and flaky   Abdomen: Soft, non-tender and non-distended. Bowel sounds presents in 4 quadrants.  : Creamy discharge present with no odor        Results for orders placed or performed in visit on 03/14/23 (from the past 24 hour(s))   Wet prep - lab collect    Specimen: Vagina; Swab   Result Value Ref Range    Trichomonas Absent Absent    Yeast Present (A) Absent    Clue Cells Absent Absent    WBCs/high power field 3+ (A) None     *Note: Due to a large number of results and/or encounters for the requested time period, some results have not been displayed. A complete set of results can be found in Results Review.        I was present with the student who participated in the service and in the documentation of the note. I have verified the history and personally performed the physical exam and medical decision-making. I agree with the assessment and plan of care as documented in the note.  Antwan Julian PA-C

## 2023-03-14 NOTE — PATIENT INSTRUCTIONS
At [facility], we strive to deliver an exceptional experience to you, every time we see you. If you receive a survey, please complete it as we do value your feedback.  If you have MyChart, you can expect to receive results automatically within 24 hours of their completion.  Your provider will send a note interpreting your results as well.   If you do not have MyChart, you should receive your results in about a week by mail.    Your care team:                            Family Medicine Internal Medicine   MD Devin Mahmood MD Shantel Branch-Fleming, MD Srinivasa Vaka, MD Katya Belousova, PAPITO Fofana CNP, MD ([facility]   Antwan Julian, MD Clary Cheng MD Amelia Massimini APRN ARIEL Monroy APRN MD Hanane Garcia MD          Clinic hours: Monday - Thursday 7 am-6 pm; Fridays 7 am-5 pm.   Urgent care: Monday - Friday 10 am- 8 pm; Saturday and Sunday 9 am-5 pm.    Clinic: [phone]       [facility]: Monday - Thursday 8 am - 7 pm; Friday 8 am - 6 pm  [facility]: [phone]

## 2023-03-24 ENCOUNTER — OFFICE VISIT (OUTPATIENT)
Dept: FAMILY MEDICINE | Facility: CLINIC | Age: 58
End: 2023-03-24
Payer: COMMERCIAL

## 2023-03-24 VITALS
HEART RATE: 60 BPM | RESPIRATION RATE: 14 BRPM | DIASTOLIC BLOOD PRESSURE: 82 MMHG | HEIGHT: 64 IN | OXYGEN SATURATION: 99 % | TEMPERATURE: 98.3 F | BODY MASS INDEX: 37.63 KG/M2 | WEIGHT: 220.4 LBS | SYSTOLIC BLOOD PRESSURE: 122 MMHG

## 2023-03-24 DIAGNOSIS — Z28.21 VACCINATION NOT CARRIED OUT BECAUSE OF PATIENT REFUSAL: ICD-10-CM

## 2023-03-24 DIAGNOSIS — N89.8 VAGINAL DISCHARGE: Primary | ICD-10-CM

## 2023-03-24 PROBLEM — E66.01 SEVERE OBESITY (BMI 35.0-39.9) WITH COMORBIDITY (H): Status: ACTIVE | Noted: 2023-03-24

## 2023-03-24 PROCEDURE — 99213 OFFICE O/P EST LOW 20 MIN: CPT | Performed by: FAMILY MEDICINE

## 2023-03-24 PROCEDURE — 87210 SMEAR WET MOUNT SALINE/INK: CPT | Performed by: FAMILY MEDICINE

## 2023-03-24 RX ORDER — METRONIDAZOLE 500 MG/1
500 TABLET ORAL 2 TIMES DAILY
Qty: 14 TABLET | Refills: 0 | Status: SHIPPED | OUTPATIENT
Start: 2023-03-24 | End: 2023-03-31

## 2023-03-24 ASSESSMENT — PAIN SCALES - GENERAL: PAINLEVEL: NO PAIN (0)

## 2023-03-24 NOTE — PROGRESS NOTES
Assessment & Plan     (N89.8) Vaginal discharge  (primary encounter diagnosis)  Comment: She does not have clue cells, but there are still WBCs present, and she recalls being treated successfully with metronidazole in a similar situation.  Plan: metroNIDAZOLE (FLAGYL) 500 MG tablet        follow up as needed.    (Z28.21) Vaccination not carried out because of patient refusal  Comment: Shingrix (Patient reports receiving Hep-B series through her employer).  Plan:       Return in about 29 days (around 4/22/2023) for full physical.           Kian Diallo MD  [facility] FLIP Hawley is a 57 year old, presenting for the following health issues:  Vaginal Problem  No flowsheet data found.  HPI     Vaginal Symptoms  Onset/Duration: three weeks  Description:  Vaginal Discharge: white creamy, thinner than when she presented earlier this month.  Itching (Pruritis): YES  Burning sensation:  No  Odor: No  Accompanying Signs & Symptoms:  Urinary symptoms: No  Abdominal pain: No  Fever: No  History:   Sexually active: YES  New Partner: No  Possibility of Pregnancy:  No  Recent antibiotic use: No  Previous vaginitis issues: YES  Precipitating or alleviating factors: None  Therapies tried and outcome: Fluconazole, not helpful.       The patient reports that she was started on Farxiga 3 months ago, but stopped about a week ago because she has been having issues with discharge and yeast infections for the last 3-4 weeks. She was seen in clinic on 3/14/23 and treated for a yeast infection using fluconazole. She reports that this treatment had some slight success, but complains that she is still having vaginal discharge (slightly less thick however). She also brings up that she has previously needed to be treated for bacterial vaginosis following a previous yeast infection. Treatments for BV in the past have reportedly relieved her symptoms.        Review of Systems   The patient also  "reports stomach pains from Farxiga, which she adds is the main reason she chose to stop the medication.      Objective    /82 (BP Location: Left arm, Patient Position: Sitting, Cuff Size: Adult Large)   Pulse 60   Temp 98.3  F (36.8  C) (Oral)   Resp 14   Ht 1.615 m (5' 3.58\")   Wt 100 kg (220 lb 6.4 oz)   LMP 10/19/2008 (Approximate)   SpO2 99%   BMI 38.33 kg/m    Body mass index is 38.33 kg/m .  Physical Exam   GENERAL: healthy, alert and no distress  EYES: Eyes grossly normal to inspection, PERRL, EOMI, sclerae white and conjunctivae normal  MS: no gross musculoskeletal defects noted, no edema  SKIN: no suspicious lesions or rashes to visible skin  NEURO: Normal strength and tone, sensory exam grossly normal, mentation intact, oriented times 3 and cranial nerves 2-12 intact  PSYCH: mentation appears normal, affect normal/bright       Results for orders placed or performed in visit on 03/24/23 (from the past 24 hour(s))   Wet prep - lab collect    Specimen: Vagina; Swab   Result Value Ref Range    Trichomonas Absent Absent    Yeast Absent Absent    Clue Cells Absent Absent    WBCs/high power field 2+ (A) None   Extra Tube    Narrative    The following orders were created for panel order Extra Tube.  Procedure                               Abnormality         Status                     ---------                               -----------         ------                     Extra Urine Collection[[id number]]                           In process                   Please view results for these tests on the individual orders.     *Note: Due to a large number of results and/or encounters for the requested time period, some results have not been displayed. A complete set of results can be found in Results Review.                 This document serves as a record of the services and decisions personally performed and made by Dr. Diallo. It was created on his behalf by Jaydon Adame, a trained medical scribe. The " creation of this document is based the provider's statements to the medical scribe.  Jaydon Adame,  11:58 AM

## 2023-03-24 NOTE — PATIENT INSTRUCTIONS
At [facility], we strive to deliver an exceptional experience to you, every time we see you. If you receive a survey, please complete it as we do value your feedback.  If you have MyChart, you can expect to receive results automatically within 24 hours of their completion.  Your provider will send a note interpreting your results as well.   If you do not have MyChart, you should receive your results in about a week by mail.    Your care team:                            Family Medicine Internal Medicine   MD Devin Mahmood MD Shantel Branch-Fleming, MD Srinivasa Vaka, MD Katya Belousova, PAPITO Fofana CNP, MD ([facility]   Antwan Julian, MD Clary Cheng MD Amelia Massimini APRN ARIEL Monroy APRN MD Hanane Garcia MD          Clinic hours: Monday - Thursday 7 am-6 pm; Fridays 7 am-5 pm.   Urgent care: Monday - Friday 10 am- 8 pm; Saturday and Sunday 9 am-5 pm.    Clinic: [phone]       [facility]: Monday - Thursday 8 am - 7 pm; Friday 8 am - 6 pm  [facility]: [phone]

## 2023-03-29 DIAGNOSIS — I50.22 CHRONIC SYSTOLIC CONGESTIVE HEART FAILURE (H): Chronic | ICD-10-CM

## 2023-03-30 ENCOUNTER — LAB (OUTPATIENT)
Dept: LAB | Facility: CLINIC | Age: 58
End: 2023-03-30
Payer: COMMERCIAL

## 2023-03-30 DIAGNOSIS — N95.1 SYMPTOMATIC MENOPAUSAL OR FEMALE CLIMACTERIC STATES: Primary | ICD-10-CM

## 2023-03-30 LAB
ESTRADIOL SERPL-MCNC: 70 PG/ML
FSH SERPL IRP2-ACNC: 5.4 MIU/ML

## 2023-03-30 PROCEDURE — 83001 ASSAY OF GONADOTROPIN (FSH): CPT

## 2023-03-30 PROCEDURE — 82670 ASSAY OF TOTAL ESTRADIOL: CPT

## 2023-03-30 PROCEDURE — 36415 COLL VENOUS BLD VENIPUNCTURE: CPT

## 2023-03-30 PROCEDURE — 84403 ASSAY OF TOTAL TESTOSTERONE: CPT

## 2023-03-31 LAB — TESTOST SERPL-MCNC: 81 NG/DL (ref 8–60)

## 2023-04-03 NOTE — TELEPHONE ENCOUNTER
metoprolol succinate ER (TOPROL XL) 50 MG 24 hr tablet    Please clarify orders.  Is Pt taking 12.5 Mg Tabs?  Or Taking 50 Mg Tabs.  Order in chart and order from pharmacy do not match.  Please update pharmacy of current dose.         Jojo Barajas RN  Central Triage Red Flags/Med Refills

## 2023-04-04 RX ORDER — METOPROLOL SUCCINATE 50 MG/1
50 TABLET, EXTENDED RELEASE ORAL DAILY
OUTPATIENT
Start: 2023-04-04

## 2023-04-12 ENCOUNTER — TRANSFERRED RECORDS (OUTPATIENT)
Dept: HEALTH INFORMATION MANAGEMENT | Facility: CLINIC | Age: 58
End: 2023-04-12
Payer: COMMERCIAL

## 2023-04-17 ENCOUNTER — TRANSFERRED RECORDS (OUTPATIENT)
Dept: HEALTH INFORMATION MANAGEMENT | Facility: CLINIC | Age: 58
End: 2023-04-17
Payer: COMMERCIAL

## 2023-04-18 ENCOUNTER — LAB (OUTPATIENT)
Dept: LAB | Facility: CLINIC | Age: 58
End: 2023-04-18
Payer: COMMERCIAL

## 2023-04-18 DIAGNOSIS — M25.561 BILATERAL KNEE PAIN: Primary | ICD-10-CM

## 2023-04-18 DIAGNOSIS — M25.562 BILATERAL KNEE PAIN: Primary | ICD-10-CM

## 2023-04-18 LAB
CRP SERPL-MCNC: <3 MG/L
ERYTHROCYTE [SEDIMENTATION RATE] IN BLOOD BY WESTERGREN METHOD: 16 MM/HR (ref 0–30)

## 2023-04-18 PROCEDURE — 36415 COLL VENOUS BLD VENIPUNCTURE: CPT

## 2023-04-18 PROCEDURE — 85652 RBC SED RATE AUTOMATED: CPT

## 2023-04-18 PROCEDURE — 86140 C-REACTIVE PROTEIN: CPT

## 2023-05-01 ENCOUNTER — OFFICE VISIT (OUTPATIENT)
Dept: OPHTHALMOLOGY | Facility: CLINIC | Age: 58
End: 2023-05-01
Payer: COMMERCIAL

## 2023-05-01 ENCOUNTER — TRANSFERRED RECORDS (OUTPATIENT)
Dept: HEALTH INFORMATION MANAGEMENT | Facility: CLINIC | Age: 58
End: 2023-05-01

## 2023-05-01 DIAGNOSIS — H26.013 LAMELLAR CATARACT OF BOTH EYES: ICD-10-CM

## 2023-05-01 DIAGNOSIS — H52.03 HYPEROPIA OF BOTH EYES WITH ASTIGMATISM AND PRESBYOPIA: ICD-10-CM

## 2023-05-01 DIAGNOSIS — H52.203 HYPEROPIA OF BOTH EYES WITH ASTIGMATISM AND PRESBYOPIA: ICD-10-CM

## 2023-05-01 DIAGNOSIS — H52.4 HYPEROPIA OF BOTH EYES WITH ASTIGMATISM AND PRESBYOPIA: ICD-10-CM

## 2023-05-01 DIAGNOSIS — G89.29 CHRONIC NONINTRACTABLE HEADACHE, UNSPECIFIED HEADACHE TYPE: ICD-10-CM

## 2023-05-01 DIAGNOSIS — R51.9 CHRONIC NONINTRACTABLE HEADACHE, UNSPECIFIED HEADACHE TYPE: ICD-10-CM

## 2023-05-01 DIAGNOSIS — Z98.890 S/P CRANIOTOMY: Primary | ICD-10-CM

## 2023-05-01 DIAGNOSIS — H04.123 DRY EYE SYNDROME OF BOTH EYES: ICD-10-CM

## 2023-05-01 PROCEDURE — 92004 COMPRE OPH EXAM NEW PT 1/>: CPT | Performed by: OPHTHALMOLOGY

## 2023-05-01 PROCEDURE — 92083 EXTENDED VISUAL FIELD XM: CPT | Performed by: OPHTHALMOLOGY

## 2023-05-01 PROCEDURE — 92133 CPTRZD OPH DX IMG PST SGM ON: CPT | Performed by: OPHTHALMOLOGY

## 2023-05-01 PROCEDURE — 92015 DETERMINE REFRACTIVE STATE: CPT | Performed by: OPHTHALMOLOGY

## 2023-05-01 ASSESSMENT — VISUAL ACUITY
OD_CC: 20/25
METHOD: SNELLEN - LINEAR
OD_CC+: +1
OS_CC+: -3
CORRECTION_TYPE: GLASSES
OS_CC: 20/25

## 2023-05-01 ASSESSMENT — CONF VISUAL FIELD
OS_NORMAL: 1
OS_INFERIOR_NASAL_RESTRICTION: 0
OS_SUPERIOR_NASAL_RESTRICTION: 0
METHOD: COUNTING FINGERS
OD_INFERIOR_NASAL_RESTRICTION: 0
OD_SUPERIOR_TEMPORAL_RESTRICTION: 0
OD_NORMAL: 1
OS_INFERIOR_TEMPORAL_RESTRICTION: 0
OS_SUPERIOR_TEMPORAL_RESTRICTION: 0
OD_SUPERIOR_NASAL_RESTRICTION: 0
OD_INFERIOR_TEMPORAL_RESTRICTION: 0

## 2023-05-01 ASSESSMENT — REFRACTION_WEARINGRX
OD_SPHERE: +3.00
OS_SPHERE: +0.25
OS_AXIS: 083
OS_CYLINDER: +3.50
OS_ADD: +2.75
OD_ADD: +2.75
OD_AXIS: 088
SPECS_TYPE: BIFOCAL
OD_CYLINDER: +2.75

## 2023-05-01 ASSESSMENT — REFRACTION_MANIFEST
OD_ADD: +2.75
OS_SPHERE: +0.25
OS_CYLINDER: +3.50
OS_ADD: +2.75
OD_AXIS: 080
OD_CYLINDER: +2.75
OS_AXIS: 083
OD_SPHERE: +3.25

## 2023-05-01 ASSESSMENT — GONIOSCOPY: METHOD: SUSSMAN, FOUR MIRROR

## 2023-05-01 ASSESSMENT — CUP TO DISC RATIO
OS_RATIO: 0.2
OD_RATIO: 0.2

## 2023-05-01 ASSESSMENT — TONOMETRY
OD_IOP_MMHG: 12
OS_IOP_MMHG: 11
IOP_METHOD: ICARE

## 2023-05-01 ASSESSMENT — SLIT LAMP EXAM - LIDS
COMMENTS: NORMAL
COMMENTS: NORMAL

## 2023-05-01 NOTE — NURSING NOTE
Chief Complaints and History of Present Illnesses   Patient presents with     Papilledema Evaluation     Chief Complaint(s) and History of Present Illness(es)     Papilledema Evaluation            Laterality: both eyes    Course: stable    Associated symptoms: headache.  Negative for eye pain, flashes and floaters    Treatments tried: no treatments          Comments    Here for papilledema evaluation both eyes. Last eye exam was over 1 year ago. Vision is about the same since last eye exam. Glare and night driving is bothersome. Notes occasional HA. No eye pain. No flashes or floaters.       Neftali Ortez COT 9:16 AM May 1, 2023

## 2023-05-01 NOTE — LETTER
5/1/2023         RE: Marleen Mcfadden  [address]  [facility] [address]        Dear Colleague,    Thank you for referring your patient, Marleen Mcfadden, to the [facility]. Please see a copy of my visit note below.    HPI     Papilledema Evaluation    In both eyes.  Since onset it is stable.  Associated symptoms include headache.  Negative for eye pain, flashes and floaters.  Treatments tried include no treatments.           Comments    Here for papilledema evaluation both eyes. Last eye exam was over 1 year ago. Vision is about the same since last eye exam. Glare and night driving is bothersome. Notes occasional HA. No eye pain. No flashes or floaters.     Neftali Ortez COT 9:16 AM May 1, 2023     Paternal uncle-glaucoma    4 months of itnermittent headache. Previously daily, have since alleviated to 2-3x/week. It is a striking pain. Lasts minutes. Denies pulsatile tinnitus. Notes intermittent horizontal diplopia but unsure if from bifocal when looking down. When she closes one eye or readjusts her glasses, it is alleviated.          Last edited by Jaydon Ochoa MD on 5/1/2023 10:58 AM.         Review of systems for the eyes was negative other than the pertinent positives/negatives listed in the HPI.      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 She notes a 6 month history of headache which were previously daily. Last minutes. They have since diminished in frequency and severity to 2-3x/week. She also endorses some tinnitus, but it is not pulsatile. She has noted some intermittent horizontal binocular diplopia but only when looking through bifocal, and this is resolved with readjusting her position or glasses.      POHx: lamellar cataract,  hyperopia  PMHx:  Current Medications: Cholecalciferol (VITAMIN D) 2000 UNIT tablet, Take 5,000 Units by mouth as needed Reported on 3/8/2017  FLAXSEED, LINSEED, PO, Take 1 capsule by mouth daily   furosemide (LASIX) 20 MG tablet, Take 2 tablets (40 mg) by mouth daily as needed (weight gain/edema)  loratadine (CLARITIN) 10 MG tablet, Take 1 tablet (10 mg) by mouth daily  metoprolol succinate ER (TOPROL XL) 25 MG 24 hr tablet, Take 0.5 tablets (12.5 mg) by mouth daily  progesterone (PROMETRIUM) 100 MG capsule, Take 100 mg by mouth At Bedtime   sacubitril-valsartan (ENTRESTO)  MG per tablet, Take 1 tablet by mouth 2 times daily  solifenacin (VESICARE) 5 MG tablet, Take 1 tablet by mouth daily at 2 pm  spironolactone (ALDACTONE) 25 MG tablet, Take 2 tablets (50 mg) by mouth daily    sodium chloride (PF) 0.9% PF flush 10 mL      Works as phlebotomist      Current Eye Medications:      Assessment & Plan:  (Z98.890) S/P craniotomy  (primary encounter diagnosis)  (R51.9,  G89.29) Chronic nonintractable headache, unspecified headache type  Headache history was carefully reviewed. The majority of headaches in patients are due to issues unrelated to the eyes. These include sinus issues, allergies, stress and migraines. The referring provider will want to know that we have looked at the optic nerve carefully for signs of swelling. There was NO optic nerve swelling seen on this exam.  Best corrected visual acuity reasonable for given amount of cataract  Color vision was full, extraocular motility was full and oct retinal nerve fiber layer did not demonstrate thickening. Visual field had nonspecific deficits both eyes which may be related to her cataract and poor test taking, not elevated ICP. Although spontaeous venous pulsations were not evident on exam, her exam was limited by her cataract and absence of SVPs can be normal in up to 20% of the population.   I will have her repeat the visual field in 6 weeks, low concern  for ICP elevation at this time.          (H52.03,  H52.203,  H52.4) Hyperopia of both eyes with astigmatism and presbyopia  Patient has minimal change in hyperopia but a copy of today's glasses prescription was given.  The patient may wish to update the glasses if the lenses are scratched or the frames are too small.     (H26.013) Lamellar cataract of both eyes  Mild cataracts are present and may account for some of the patient's visual complaints. THe only treatment is surgery. Surgery may be performed at patient's request at any time. The patient will monitor for vision changes and contact us with any decrease in vision. Recheck in one year.     (H04.123) Dry eye syndrome of both eyes  Start artificial tears four times a day        Return in about 6 weeks (around 6/12/2023) for v/t, 24-2 VF, color plates.        Jaydon Ochoa MD     Attending Physician Attestation:  Complete documentation of historical and exam elements from today's encounter can be found in the full encounter summary report (not reduplicated in this progress note).  I personally obtained the chief complaint(s) and history of present illness.  I confirmed and edited as necessary the review of systems, past medical/surgical history, family history, social history, and examination findings as documented by others; and I examined the patient myself.  I personally reviewed the relevant tests, images, and reports as documented above.  I formulated and edited as necessary the assessment and plan and discussed the findings and management plan with the patient and family. - Jaydon Ochoa MD           Again, thank you for allowing me to participate in the care of your patient.        Sincerely,        Jaydon Ochoa MD

## 2023-05-02 NOTE — PROGRESS NOTES
HPI     Papilledema Evaluation    In both eyes.  Since onset it is stable.  Associated symptoms include headache.  Negative for eye pain, flashes and floaters.  Treatments tried include no treatments.           Comments    Here for papilledema evaluation both eyes. Last eye exam was over 1 year ago. Vision is about the same since last eye exam. Glare and night driving is bothersome. Notes occasional HA. No eye pain. No flashes or floaters.     Neftali Ortez COT 9:16 AM May 1, 2023     Paternal uncle-glaucoma    4 months of itnermittent headache. Previously daily, have since alleviated to 2-3x/week. It is a striking pain. Lasts minutes. Denies pulsatile tinnitus. Notes intermittent horizontal diplopia but unsure if from bifocal when looking down. When she closes one eye or readjusts her glasses, it is alleviated.          Last edited by Jaydon Ochoa MD on 5/1/2023 10:58 AM.         Review of systems for the eyes was negative other than the pertinent positives/negatives listed in the HPI.      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 She notes a 6 month history of headache which were previously daily. Last minutes. They have since diminished in frequency and severity to 2-3x/week. She also endorses some tinnitus, but it is not pulsatile. She has noted some intermittent horizontal binocular diplopia but only when looking through bifocal, and this is resolved with readjusting her position or glasses.      POHx: lamellar cataract, hyperopia  PMHx:  Current Medications: Cholecalciferol (VITAMIN D) 2000 UNIT tablet, Take 5,000 Units by mouth as needed Reported on 3/8/2017  FLAXSEED, LINSEED, PO, Take 1 capsule by mouth daily   furosemide (LASIX) 20 MG tablet, Take 2 tablets (40 mg) by mouth daily as  needed (weight gain/edema)  loratadine (CLARITIN) 10 MG tablet, Take 1 tablet (10 mg) by mouth daily  metoprolol succinate ER (TOPROL XL) 25 MG 24 hr tablet, Take 0.5 tablets (12.5 mg) by mouth daily  progesterone (PROMETRIUM) 100 MG capsule, Take 100 mg by mouth At Bedtime   sacubitril-valsartan (ENTRESTO)  MG per tablet, Take 1 tablet by mouth 2 times daily  solifenacin (VESICARE) 5 MG tablet, Take 1 tablet by mouth daily at 2 pm  spironolactone (ALDACTONE) 25 MG tablet, Take 2 tablets (50 mg) by mouth daily    sodium chloride (PF) 0.9% PF flush 10 mL      Works as phlebotomist      Current Eye Medications:      Assessment & Plan:  (Z98.890) S/P craniotomy  (primary encounter diagnosis)  (R51.9,  G89.29) Chronic nonintractable headache, unspecified headache type  Headache history was carefully reviewed. The majority of headaches in patients are due to issues unrelated to the eyes. These include sinus issues, allergies, stress and migraines. The referring provider will want to know that we have looked at the optic nerve carefully for signs of swelling. There was NO optic nerve swelling seen on this exam.  Best corrected visual acuity reasonable for given amount of cataract  Color vision was full, extraocular motility was full and oct retinal nerve fiber layer did not demonstrate thickening. Visual field had nonspecific deficits both eyes which may be related to her cataract and poor test taking, not elevated ICP. Although spontaeous venous pulsations were not evident on exam, her exam was limited by her cataract and absence of SVPs can be normal in up to 20% of the population.   I will have her repeat the visual field in 6 weeks, low concern for ICP elevation at this time.          (H52.03,  H52.203,  H52.4) Hyperopia of both eyes with astigmatism and presbyopia  Patient has minimal change in hyperopia but a copy of today's glasses prescription was given.  The patient may wish to update the glasses if the  lenses are scratched or the frames are too small.     (H26.013) Lamellar cataract of both eyes  Mild cataracts are present and may account for some of the patient's visual complaints. THe only treatment is surgery. Surgery may be performed at patient's request at any time. The patient will monitor for vision changes and contact us with any decrease in vision. Recheck in one year.     (H04.123) Dry eye syndrome of both eyes  Start artificial tears four times a day        Return in about 6 weeks (around 6/12/2023) for v/t, 24-2 VF, color plates.        Jaydon Ochoa MD     Attending Physician Attestation:  Complete documentation of historical and exam elements from today's encounter can be found in the full encounter summary report (not reduplicated in this progress note).  I personally obtained the chief complaint(s) and history of present illness.  I confirmed and edited as necessary the review of systems, past medical/surgical history, family history, social history, and examination findings as documented by others; and I examined the patient myself.  I personally reviewed the relevant tests, images, and reports as documented above.  I formulated and edited as necessary the assessment and plan and discussed the findings and management plan with the patient and family. - Jaydon Ochoa MD

## 2023-05-04 DIAGNOSIS — Z98.890 S/P CRANIOTOMY: ICD-10-CM

## 2023-05-04 DIAGNOSIS — G89.29 CHRONIC HEADACHE WITH NEW FEATURES: Primary | ICD-10-CM

## 2023-05-04 DIAGNOSIS — R51.9 CHRONIC HEADACHE WITH NEW FEATURES: Primary | ICD-10-CM

## 2023-05-09 LAB — SCANNED LAB RESULT: ABNORMAL

## 2023-05-10 ENCOUNTER — TELEPHONE (OUTPATIENT)
Dept: CARDIOLOGY | Facility: CLINIC | Age: 58
End: 2023-05-10
Payer: COMMERCIAL

## 2023-05-10 NOTE — TELEPHONE ENCOUNTER
Spoke with Marleen today to review the updated results of her genetic testing.  Marleen originally had genetic testing last fall due to her personal and family history of dilated cardiomyopathy (DCM).  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 (see amended report dated 5/8/23).    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  Due to this change in classification, predictive genetic testing is now an option for Marleen's family members.     Marleen does not think that her daughter will be interested in pursuing this but her son may be.  Reiterated that now that the genetic cause is known, testing could be performed in other affected individuals but also in any at risk family member, including her grandchildren, siblings, nieces and nephews.      Marleen reports that one of her nephews recently went in to the hospital of heart related issues but they didn't even do an echo.  Reiterated that genetic testing could be helpful for him or other at risk individuals.    Predictive testing can help guide management decisions of Marleen but also help determine who needs clinical screening in the family.    Provided her with my contact info if questions arise and scheduling number for her family members.    Explained that Dr. Seaman, and her team, would also like to speak with Marleen to discuss any clinical changes.  She  Plans to keep her MRI appt and will return a call to Dr. Seaman.    All questions answered at this time.    Esther Bunch MS, [facility]  Licensed, Certified Genetic Counselor  [facility]  [facility]

## 2023-05-16 ENCOUNTER — TEAM CONFERENCE (OUTPATIENT)
Dept: CARDIOLOGY | Facility: CLINIC | Age: 58
End: 2023-05-16
Payer: COMMERCIAL

## 2023-05-16 NOTE — CONFIDENTIAL NOTE
[facility] Conference  23    Demographic Information on Marleen Mcfadden:    Marleen Mcfadden  Gender: female  : 1965    [facility] Physician:  Dr. Seaman and Dr Aragon   [facility] RN Coordinator: Adri Orourke RN     Conference Participants:  1. Dr. Seaman, Dr. Simms, Dr. Aragon,  Dr Singh, Dr Wood,  Dr Zhou, Lo Nuñez, APRN,  PITO Blank, Esther Bunch, , and Adri Orourke, RNCC    Patient Medical History:    From Dr Aragon note 3/23  Assessment/Plan:    Ms. 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      Familial NICM LVEF 35-40%, NYHA I:  1. ACEi/ARB: Continue Entresto.  2. BB: Continue Toprol XL   3. Aldosterone antagonist: Continue Spironolactone.  4. SCD prophylaxis: No need for ICD unless EF<30% with NYHA class 1, or if VUS in FLNC gene gets reclassified as pathogenic with an EF of 35% cutoff.  5. Fluid status: continue lasix, appears euvolemic on exam.   6. Syncope does sound orthostatic in nature, but Will follow up on zio patch results at this time. Consider ILR if needed.      Ms. 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      She has a family history of DCM. Her sister was diagnosed and lead to family screening. 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 Her most recent echo showed LVEF 35-40% with moderate/severe LV dilation. Her son and daughter have DCM. Patient has been following with Dr. Kolb since . She has been on GDMT. More recently, she was having episodes of lightheadedness. She was playing pool and she stood up and fell back and went down on the ground. Her fiance said she was still awake the whole time, she does not remember that, only once, she was able to stand up immediately, and went to the chair. No orthostatic dizziness otherwize, she reports having a syncope episode 3 years ago after  laughing very hard.        She reports feeling well. She is currently wearing a zio path. She does still have some dizzy spells when she laughs/coughs really hard. Otherwise, she is exercising and weightlifting 3 times a week without symptoms/limitations. She denies chest discomfort, palpitations, abdominal fullness/bloating or peripheral edema, shortness of breath, paroxysmal nocturnal dyspnea, orthopnea, lightheadedness, dizziness, pre-syncope, or syncope. Current cardiac medications include: Toprol XL, Lasix, Farxiga, Entresto, and Spironolactone. .         Family history was significant for the following cardiac history:    Full sister with DCM. She  in September after developing colon cancer.  Her history was also remarkable for MERSA, kidney disease and ultimate transplant.  She did not have an ICD or much care for her DCM.    Marleen's son and daughter also have DCM.      A maternal half sister has hypertension, epilepsy and reported dizziness and fainting. She does not have a cardiac issue to Marleen's knowledge.    Mother  suddenly at 38 yrs of age.  It was thought to be the result of a cardiac problem. However, she also had a long history of alcoholism and had a colostomy. Her two siblings have no known heart issues.    Maternal grandmother  in her 30's from a brain aneurysm. Nothing is known about maternal grandfather.    Marleen's father  at 55 years after a massive heart attack and CABG procedure.  His death occurred one day after the surgery reportedly from a clot.  One of his sisters  from a stroke.    Paternal grandmother  in her 70's from a stroke.  Her mother (Marleen's great grandmother)  suddenly in her 70's while sitting at a bus stop.Nothing is known about grandfather.    Two paternal half sisters in good health.  I have reviewed and updated the patient's Past Medical History, Social History, Family History and Medication List.      Cardiographics (Personally  Reviewed) :   2/17/23 Echo:   Interpretation Summary  Moderate to severe left ventricular dilation is present. Left ventricular  function is decreased. The ejection fraction is 35-40% (moderately reduced).  Biplane LVEF is 37%.  The right ventricle is normal size. Global right ventricular function is normal.  No pericardial effusion is present.  This study was compared with the study from 12/12/22: LVEF appears similar or at most minimally lower and appears to hve been overestimated on the prior study.     1/10/23 RHC:  Conclusion       Right sided filling pressures are normal.    Mild elevated pulmonary hypertension.    Left sided filling pressures are normal.    Left ventricular filling pressures are normal.    Normal cardiac output level.           6/3/22 CMR:  1. The LV is mild-to-moderately dilated in cavity size. The wall thickness is normal. The global systolic function is severely decreased. The LVEF is 33%. There is severe global hypokinesis.  2. The RV is normal in cavity size. The global systolic function is mildly decreased. The RVEF is 53%.   3. Both atria are normal in size.  4. There is no significant valvular disease.   5. Late gadolinium enhancement imaging shows no MI, fibrosis or infiltrative disease.   6. There is no pericardial effusion or thickening.  7. There is no intracardiac thrombus.  CONCLUSIONS: Severe, non-ischemic dilated cardiomyopathy, most likely of genetic cause. LVEF of 33% and RVEF of 53% with no LGE. When directly compared to the prior CMR, the LV and RV size and function have not significantly changed.      Dr Ash note from 12/2022  ASSESSMENT AND PLAN: 57 year-old woman with chronic systolic heart failure due to a famililal cardiomyopathy with VUS in [facility] who is here for routine follow up and management        1.  Chronic systolic heart failure secondary to familial cardiomyopathy.  Stage C, NYHA Class II  Overall, she is euvolemic and well compensated. Her end organ  function is normal. Her NT pro BNP is also normal. She had an echo today that shows severe LV dilatation at 7cm which is unchanged from the prior CMR. Her LVEF by echo is read at 50% and 6 months ago by CMR her LVEF was 30-35%. Her LVEF has fluctuated significantly in the past. She is agreeable to start an SGLT2 inhibitor now and reassess in 6 months. Continue current ARNI, BB, and MRA. Given her increase in fatigue I will check a cardiopulmonary exercise stress test.   ACEI/ARB/ARNI: yes, continue entresto 97/103 mg BID  BB: Yes, continue metoprolol XL 50mg daily  Aldosterone antagonist - Continue spironolactone 50mg daily   SGLT2i: initiate today, risks and benefits discussed   SCD prevention: If LVEF remain < 35% than she is agreable to consider an ICD for primary prevention   Fluid status euvolemic  Apnea: apnea evaluation   NSAID use: advised to avoid NSAIDs  Patient has previously met with genetic counselor regarding genetic testing.  Encouraged patient to proceed with genetic testing given family history.  Pt is aware of recommendation for all first degree relatives to undergo screening for familial cardiomyopathy preferably with cardiac MRI but at least echo.     2. Palpitations racing heart: Ziopatch monitor to assess for VT/arrythmias.   3. Left leg pain: pulses are intact. Check CAMRON.         PLAN:   -empagliflozin 10mg daily   -ziopatch monitor   -Sleep medicine referal for KATIA  -CAMRON   -6 month follow up with CMR and CPEX   65 minutes on DOS for chart review, patient history and exam, counseling, review of diagnostic testing, coordination of care and documentation.            Discussion Notes:    There has been a reclassification of FLNC gene-we are going to reassess plan. She has decreased CPX and EF (decreased EF earns ICD with new FLNC mutation)    CMRI scheduled for 6/12/2023- if MRI is getting better, recheck and give medication therapy more time (full effects are 6-12 months after starting)  If EF  less than 40% consider another discuss for ICD implant

## 2023-05-22 DIAGNOSIS — I50.22 CHRONIC SYSTOLIC CONGESTIVE HEART FAILURE (H): Primary | ICD-10-CM

## 2023-06-12 ENCOUNTER — HOSPITAL ENCOUNTER (OUTPATIENT)
Dept: MRI IMAGING | Facility: CLINIC | Age: 58
Discharge: HOME | End: 2023-06-12
Attending: INTERNAL MEDICINE
Payer: COMMERCIAL

## 2023-06-12 ENCOUNTER — LAB (OUTPATIENT)
Dept: LAB | Facility: CLINIC | Age: 58
End: 2023-06-12
Attending: INTERNAL MEDICINE
Payer: COMMERCIAL

## 2023-06-12 ENCOUNTER — OFFICE VISIT (OUTPATIENT)
Dept: CARDIOLOGY | Facility: CLINIC | Age: 58
End: 2023-06-12
Attending: INTERNAL MEDICINE
Payer: COMMERCIAL

## 2023-06-12 VITALS
OXYGEN SATURATION: 100 % | SYSTOLIC BLOOD PRESSURE: 113 MMHG | DIASTOLIC BLOOD PRESSURE: 78 MMHG | BODY MASS INDEX: 39.49 KG/M2 | WEIGHT: 227.1 LBS | HEART RATE: 47 BPM

## 2023-06-12 DIAGNOSIS — I50.22 CHRONIC SYSTOLIC CONGESTIVE HEART FAILURE (H): ICD-10-CM

## 2023-06-12 LAB
ALBUMIN SERPL BCG-MCNC: 4.1 G/DL (ref 3.5–5.2)
ALP SERPL-CCNC: 62 U/L (ref 35–104)
ALT SERPL W P-5'-P-CCNC: 22 U/L (ref 10–35)
ANION GAP SERPL CALCULATED.3IONS-SCNC: 7 MMOL/L (ref 7–15)
AST SERPL W P-5'-P-CCNC: 19 U/L (ref 10–35)
BILIRUB SERPL-MCNC: 0.4 MG/DL
BUN SERPL-MCNC: 12.3 MG/DL (ref 6–20)
CALCIUM SERPL-MCNC: 9 MG/DL (ref 8.6–10)
CHLORIDE SERPL-SCNC: 104 MMOL/L (ref 98–107)
CK SERPL-CCNC: 132 U/L (ref 26–192)
CREAT SERPL-MCNC: 0.8 MG/DL (ref 0.51–0.95)
DEPRECATED HCO3 PLAS-SCNC: 27 MMOL/L (ref 22–29)
GFR SERPL CREATININE-BSD FRML MDRD: 85 ML/MIN/1.73M2
GLUCOSE SERPL-MCNC: 92 MG/DL (ref 70–99)
MAGNESIUM SERPL-MCNC: 1.9 MG/DL (ref 1.7–2.3)
NT-PROBNP SERPL-MCNC: 126 PG/ML (ref 0–900)
POTASSIUM SERPL-SCNC: 4.6 MMOL/L (ref 3.4–5.3)
PROT SERPL-MCNC: 7.2 G/DL (ref 6.4–8.3)
SODIUM SERPL-SCNC: 138 MMOL/L (ref 136–145)

## 2023-06-12 PROCEDURE — 83880 ASSAY OF NATRIURETIC PEPTIDE: CPT | Performed by: PATHOLOGY

## 2023-06-12 PROCEDURE — G1010 CDSM STANSON: HCPCS | Performed by: RADIOLOGY

## 2023-06-12 PROCEDURE — 82550 ASSAY OF CK (CPK): CPT | Performed by: PATHOLOGY

## 2023-06-12 PROCEDURE — A9585 GADOBUTROL INJECTION: HCPCS | Performed by: INTERNAL MEDICINE

## 2023-06-12 PROCEDURE — 83735 ASSAY OF MAGNESIUM: CPT | Performed by: PATHOLOGY

## 2023-06-12 PROCEDURE — 99417 PROLNG OP E/M EACH 15 MIN: CPT | Performed by: INTERNAL MEDICINE

## 2023-06-12 PROCEDURE — 99215 OFFICE O/P EST HI 40 MIN: CPT | Mod: 25 | Performed by: INTERNAL MEDICINE

## 2023-06-12 PROCEDURE — G1010 CDSM STANSON: HCPCS

## 2023-06-12 PROCEDURE — 36415 COLL VENOUS BLD VENIPUNCTURE: CPT | Performed by: PATHOLOGY

## 2023-06-12 PROCEDURE — 80053 COMPREHEN METABOLIC PANEL: CPT | Performed by: PATHOLOGY

## 2023-06-12 PROCEDURE — G0463 HOSPITAL OUTPT CLINIC VISIT: HCPCS | Performed by: INTERNAL MEDICINE

## 2023-06-12 PROCEDURE — 255N000002 HC RX 255 OP 636: Performed by: INTERNAL MEDICINE

## 2023-06-12 PROCEDURE — 75561 CARDIAC MRI FOR MORPH W/DYE: CPT | Mod: 26 | Performed by: RADIOLOGY

## 2023-06-12 RX ORDER — GADOBUTROL 604.72 MG/ML
12 INJECTION INTRAVENOUS ONCE
Status: COMPLETED | OUTPATIENT
Start: 2023-06-12 | End: 2023-06-12

## 2023-06-12 RX ADMIN — GADOBUTROL 12 ML: 604.72 INJECTION INTRAVENOUS at 09:33

## 2023-06-12 NOTE — PROGRESS NOTES
Marleen Mcfadden's goals for this visit include:     She requests these members of her care team be copied on today's visit information: PCP    PCP: Yanna Matias Y    Referring Provider:  Real Seaman MD  [address] [place] [address]    /78 (BP Location: Right arm, Patient Position: Sitting, Cuff Size: Adult Large)   Pulse (!) 47   Wt 103 kg (227 lb 1.6 oz)   LMP 10/19/2008 (Approximate)   SpO2 100%   BMI 39.49 kg/m      Do you need any medication refills at today's visit? Yes. Vesicare.     Steven Lunsford, EMT  Clinic Support  [facility]    [phone]    Employed by [facility] [employer]

## 2023-06-12 NOTE — LETTER
2023      RE: Marleen Mcfadden  [address]  [facility] [place] [facility] [address]       Dear Colleague,    Thank you for the opportunity to participate in the care of your patient, Marleen Mcfadden, at the [facility] [place] [facility]. Please see a copy of my visit note below.    Cardiovascular Genetics Clinic Progress Note     Name: Marleen Mcfadden  : 1965  MRN: [id number]    2022    Dear Dr. Levine and colleages,    I had the pleasure of seeing Marleen Mcfadden, a 57 year old woman today in the [facility] Cardiovascular Genetics Clinic for evaluation of systolic heart failure due to a familial cardiomyopathy in the setting of a himanshu pathogenic variant  in FLNC (filamin C).      As you know, she has a family history of dilated cardiomyopathy including her sister, son, and daughter. 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    I last saw her in clinic on May 19, 2023. Overall, she continues to work full time including walking 10K+ steps. She has not been exercising outside of walking at work due to fatigue. Her weight has been stable at 221 lbs on her home scale. She briefly stopped her SGLT2i when she had a vaginal yeast infection, but has resumed the SGLT2i. She has not had edema, but she has had some increase in abdominal distension. She has not had orthopnea or PND. She has had no change in lightheadedness when standing up. She has had palpitations where she felt strong heart contractions last week. She did wear a ziopatch monitor and only exercised one day while wearing it, but she has not gotten the results back. She has increased fatigue and is not sleeping well. She has sleep apnea but has not tolerated CPAP. She canceled her sleep study but is going to pursue a sleep appliance. Her appetite remains reduced.     FAMILY HISTORY: from Ms. Julio C note  2021 and any updates as as above    detailed family history was obtained during today's consult.  Family history was significant for the following cardiac history:  Full sister with DCM. She  in September after developing colon cancer.  Her history was also remarkable for MERSA, kidney disease and ultimate transplant.  She did not have an ICD or much care for her DCM.  Marleen's son and daughter also have DCM.    A maternal half sister has hypertension, epilepsy and reported dizziness and fainting. She does not have a cardiac issue to Marleen's knowledge.  Mother  suddenly at 38 yrs of age.  It was thought to be the result of a cardiac problem. However, she also had a long history of alcoholism and had a colostomy. Her two siblings have no known heart issues.  Maternal grandmother  in her 30's from a brain aneurysm. Nothing is known about maternal grandfather.  Marleen's father  at 55 years after a massive heart attack and CABG procedure.  His death occurred one day after the surgery reportedly from a clot.  One of his sisters  from a stroke.  Paternal grandmother  in her 70's from a stroke.  Her mother (Marleen's great grandmother)  suddenly in her 70's while sitting at a bus stop.Nothing is known about grandfather.  Two paternal half sisters in good health.  There is no additional history of cardiomyopathy, arrhythmias, heart attacks, fainting, sudden cardiac death, genetic conditions, or birth     REVIEW OF SYSTEMS: 10 point ROS neg other than the symptoms noted above in the HPI.    PAST MEDICAL HISTORY:   1. Familial systolic heart failure with VUS in FLNC   2. KTAIA  3. Obesity   4. Encephalocele repair   ALLERGIES:    Allergies   Allergen Reactions    Morphine      EMESIS    Nickel     Sulfa Antibiotics Swelling       MEDICATIONS:   Cholecalciferol (VITAMIN D) 2000 UNIT tablet, Take 5,000 Units by mouth as needed Reported on 3/8/2017  FLAXSEED, LINSEED, PO, Take 1  capsule by mouth daily   furosemide (LASIX) 20 MG tablet, Take 2 tablets (40 mg) by mouth daily as needed (weight gain/edema)  loratadine (CLARITIN) 10 MG tablet, Take 1 tablet (10 mg) by mouth daily  metoprolol succinate ER (TOPROL XL) 25 MG 24 hr tablet, Take 0.5 tablets (12.5 mg) by mouth daily  progesterone (PROMETRIUM) 100 MG capsule, Take 100 mg by mouth At Bedtime   sacubitril-valsartan (ENTRESTO)  MG per tablet, Take 1 tablet by mouth 2 times daily  solifenacin (VESICARE) 5 MG tablet, Take 1 tablet by mouth daily at 2 pm  spironolactone (ALDACTONE) 25 MG tablet, Take 2 tablets (50 mg) by mouth daily    sodium chloride (PF) 0.9% PF flush 10 mL    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     PHYSICAL EXAM:   /78 (BP Location: Right arm, Patient Position: Sitting, Cuff Size: Adult Large)   Pulse (!) 47   Wt 103 kg (227 lb 1.6 oz)   LMP 10/19/2008 (Approximate)   SpO2 100%   BMI 39.49 kg/m    General: comfortable, conversant, NAD  HEENT: normocephalic, atraumatic, anicteric  Neck: Estimate JVP 7cm   CV: RRR, nl s1 and s2, no murmurs, gallops, or rubs   Lungs: CTAB, no crackles or wheezes, normal work of breathing  Abdomen: BS+, soft, non tender, non distended  Extremities: warm and well perfused, trace edema      DIAGNOSTIC TESTING:      Latest Reference Range & Units 06/12/23 10:43   Sodium 136 - 145 mmol/L 138   Potassium 3.4 - 5.3 mmol/L 4.6   Chloride 98 - 107 mmol/L 104   Carbon Dioxide (CO2) 22 - 29 mmol/L 27   Urea Nitrogen 6.0 - 20.0 mg/dL 12.3   Creatinine 0.51 - 0.95 mg/dL 0.80   GFR Estimate >60 mL/min/1.73m2 85   Calcium 8.6 - 10.0 mg/dL 9.0   Anion Gap 7 - 15 mmol/L 7   Magnesium 1.7 - 2.3 mg/dL 1.9   Albumin 3.5 - 5.2 g/dL 4.1   Protein Total 6.4 - 8.3 g/dL 7.2   Alkaline Phosphatase 35 - 104 U/L 62   ALT 10 - 35 U/L 22   AST 10 - 35 U/L 19      Latest Reference Range & Units 06/12/23 10:43   N-Terminal Pro Bnp 0 - 900 pg/mL 126       Echo 12/12/2022: Severe  left ventricular dilation is present.LVIDd 70mm.  Biplane LVEF is 50%.  Right ventricular function, chamber size, wall motion, and thickness are  normal.  Both atria appear normal.  Pulmonary artery systolic pressure is normal.  The inferior vena cava is normal.  No pericardial effusion is present.  The left ventricular function has improved.    CMR 6/3/22: 1. The LV is mild-to-moderately dilated in cavity size. The wall thickness is normal. The global systolic  function is severely decreased. The LVEF is 33%. There is severe global hypokinesis.     2. The RV is normal in cavity size. The global systolic function is mildly decreased. The RVEF is 53%.      3. Both atria are normal in size.     4. There is no significant valvular disease.      5. Late gadolinium enhancement imaging shows no MI, fibrosis or infiltrative disease.      6. There is no pericardial effusion or thickening.     7. There is no intracardiac thrombus.     CONCLUSIONS: Severe, non-ischemic dilated cardiomyopathy, most likely of genetic cause. LVEF of 33% and  RVEF of 53% with no LGE. When directly compared to the prior CMR, the LV and RV size and function have not  significantly changed.      CPEX 12/12/22: Key Measurements    Peak VO2 (ml/kg/min) VE/VCO2  Shannon RER   14.91        27.61        1.02            Predicted VO2 (ml/kg/min) Predicted VO2 %   26.50        56            Resting Supine BP (mmHg) Resting Standing BP (mmHg) Final Stress BP (mmHg)   106/56        112/58        140/67          Resting Supine HR (bpm) Resting Standing HR (bpm)    44        50             Max HR (bpm) Max Predicted HR (bpm) Max Perdicted HR %   124        162        77            Exercise time (min) Exercise time (sec) Estimated workload (METS)   8        51        4.3              [facility] 1/2023:    Time Systolic (mmHg) Diastolic (mmHg) Mean (mmHg) A Wave (mmHg) V Wave (mmHg) EDP (mmHg) Max dp/dt (mmHg/sec) HR (bpm) Content (mL/dL) SAT (%)   RA Pressures  1:56 PM    6    10    8      38        RV Pressures  1:56 PM 40        12     37        PA Pressures  1:57 PM 41    4    22        39        PCW Pressures  1:57 PM   11    22    20      39        AO Pressures  2:01     63    91        37          Blood Flow Results Phase: Baseline     Time Results  Indexed Values (L/min/m2)   QP  1:45 PM 2.71 L/min    1.31      QS  1:45 PM 2.71 L/min    1.31        Blood Oximetry Phase: Baseline     Time Hb  SAT(%)  PO2  Content (mL/dL) PA Sat (%)   PA  1:45 PM  62.7 %      62.7      Art  1:45 PM  100 %     17.14         Cardiac Output Phase: Baseline     Time TDCO (L/min) TDCI (L/min/m2) Marianne C.O. (L/min) Marianne C.I. (L/min/m2) Marianne HR (bpm)   Cardiac Output Results  1:45 PM   2.71    1.31         Resistance Results Phase: Baseline     Time PVR  SVR  TPR  TVR  PVR/SVR  TPR/TVR    Resistance Results (Metric)  1:45 .77 dsc-5    2509.59 dsc-5    649.54 dsc-5    2686.74 dsc-5    0.13    0.24      Resistance Results (Wood)  1:45 PM 4.06 DOZIER    31.38 DOZIER    8.12 DOZIER    33.59 DOZIER    0.13    0.24        Stoke Volume Results Phase: Baseline            ASSESSMENT AND PLAN: 57 year-old woman with chronic systolic heart failure due to a famililal cardiomyopathy with a likely pathogenic variant in FLNC who is here for routine follow up and management       1.  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     Overall, she is euvolemic and well compensated. Her end organ function is normal. Her NT pro BNP is also normal. She has been tolerating neurohormonal therapies and largely adherent. She should continue all neurohormonal therapies and discussed the mechanisms. Her RHC demonstrated normal filling pressures but her marianne CI was reduced (no thermodilution CO obtained) and similarly her CPEX demonstrated a reduced mV02. Her echo and prior CMR demonstrate moderate to severe LV  dilatation though the LVEF has fluctuated and there has been no fibrosis previously. She had a CMR today and the results are pending. She will need close follow up for optimization and consideration for advanced therapies as needed.   ACEI/ARB/ARNI: yes, continue entresto 97/103 mg BID  BB: Yes, continue metoprolol XL 50mg daily  Aldosterone antagonist - Continue spironolactone 50mg daily   SGLT2i:continue dapagliflozin   SCD prevention: If LVEF <45% ICD is recommended by current HRS guideline for SCD prevention, she would like to rediscuss this  Fluid status euvolemic  Apnea: She has not tolerated CPAP and is evaluating non CPAP options for KATIA   NSAID use: advised to avoid NSAIDs  Patient has previously met with genetic counselor regarding genetic testing. She is aware of recommendation for all first degree relatives to undergo genetic testing giving that the FLNC variant was reclassified as likely pathogenic.        2. Palpitations racing heart: She had a syncopal event and was seen by Dr. Aragon, and this was thought to be vasovagal in nature. She has worn a ziopatch monitor and we will follow up on the results.       PLAN:   -follow up visit to discuss CMR results  -follow up on ziopatch results   -3 month visit with Irina Melendez or me       90 minutes on DOS for chart review, patient history and exam, counseling, review of diagnostic testing, coordination of care and documentation.     Thank you for allowing me to participate in the care of your patient. Please do not hesitate to contact me if you have any questions.     Sincerely,   Forum     Forum MD Jurgen, PhD, FACC  Advanced Heart Failure/Transplantation/MCS  [facility]/[employer]      Answers for HPI/ROS submitted by the patient on 6/12/2023  General Symptoms: No  Skin Symptoms: No  HENT Symptoms: No  EYE SYMPTOMS: No  HEART SYMPTOMS: No  LUNG SYMPTOMS: No  INTESTINAL SYMPTOMS: No  URINARY SYMPTOMS: No  GYNECOLOGIC SYMPTOMS: No  BREAST SYMPTOMS:  No  SKELETAL SYMPTOMS: No  BLOOD SYMPTOMS: No  NERVOUS SYSTEM SYMPTOMS: No  MENTAL HEALTH SYMPTOMS: No        Marleen IRVIN Mcfadden's goals for this visit include:     She requests these members of her care team be copied on today's visit information: PCP    PCP: Yanna Matias Y    Referring Provider:  Real Seaman MD  [address] [place] [address]    /78 (BP Location: Right arm, Patient Position: Sitting, Cuff Size: Adult Large)   Pulse (!) 47   Wt 103 kg (227 lb 1.6 oz)   LMP 10/19/2008 (Approximate)   SpO2 100%   BMI 39.49 kg/m      Do you need any medication refills at today's visit? Yes. Percyre.     Steven Lunsford, EMT  Clinic Support  [facility]    [phone]    Employed by [facility] [employer]            Please do not hesitate to contact me if you have any questions/concerns.     Sincerely,     Real Seaman MD

## 2023-06-12 NOTE — PATIENT INSTRUCTIONS
"You were seen today in the Cardiovascular Clinic at the [facility].      Cardiology Providers you saw during your visit:  Dr. Real Seaman     Recommendations:   Cardiac MRI results  Zio patch results  Follow up with Dr. Seaman or Irina in 3 months with labs prior      Thank you for your visit today!   Please MyChart message or call if you have any questions or concerns.      During Business Hours:  [phone], option # 1 \"To leave a message for your care team\"     After hours, weekends or holidays:   [phone], Option #4  Ask to speak to the On-Call Cardiologist. Inform them you are a heart failure patient at the [facility].      Megha Meyer RN BSN   Cardiology Nurse Coordinator - Heart Failure/C.O.R.E. Clinic  [facility]  [phone] option 1 to schedule an appointment or leave a message for your care team      "

## 2023-06-12 NOTE — PROGRESS NOTES
Cardiovascular Genetics Clinic Progress Note     Name: Marleen Mcfadden  : 1965  MRN: [id number]    2022    Dear Dr. 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      As you know, she has a family history of dilated cardiomyopathy including her sister, son, and daughter. 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    I last saw her in clinic on May 19, 2023. Overall, she continues to work full time including walking 10K+ steps. She has not been exercising outside of walking at work due to fatigue. Her weight has been stable at 221 lbs on her home scale. She briefly stopped her SGLT2i when she had a vaginal yeast infection, but has resumed the SGLT2i. She has not had edema, but she has had some increase in abdominal distension. She has not had orthopnea or PND. She has had no change in lightheadedness when standing up. She has had palpitations where she felt strong heart contractions last week. She did wear a ziopatch monitor and only exercised one day while wearing it, but she has not gotten the results back. She has increased fatigue and is not sleeping well. She has sleep apnea but has not tolerated CPAP. She canceled her sleep study but is going to pursue a sleep appliance. Her appetite remains reduced.     FAMILY HISTORY: from Ms. Bunch note 2021 and any updates as as above    detailed family history was obtained during today's consult.  Family history was significant for the following cardiac history:    Full sister with DCM. She  in September after developing colon cancer.  Her history was also remarkable for MERSA, kidney disease and ultimate transplant.  She did not have an  ICD or much care for her DCM.    Marleen's son and daughter also have DCM.      A maternal half sister has hypertension, epilepsy and reported dizziness and fainting. She does not have a cardiac issue to Marleen's knowledge.    Mother  suddenly at 38 yrs of age.  It was thought to be the result of a cardiac problem. However, she also had a long history of alcoholism and had a colostomy. Her two siblings have no known heart issues.    Maternal grandmother  in her 30's from a brain aneurysm. Nothing is known about maternal grandfather.    Marleen's father  at 55 years after a massive heart attack and CABG procedure.  His death occurred one day after the surgery reportedly from a clot.  One of his sisters  from a stroke.    Paternal grandmother  in her 70's from a stroke.  Her mother (Marleen's great grandmother)  suddenly in her 70's while sitting at a bus stop.Nothing is known about grandfather.    Two paternal half sisters in good health.  There is no additional history of cardiomyopathy, arrhythmias, heart attacks, fainting, sudden cardiac death, genetic conditions, or birth     REVIEW OF SYSTEMS: 10 point ROS neg other than the symptoms noted above in the HPI.    PAST MEDICAL HISTORY:   1. Familial systolic heart failure with VUS in FLNC   2. KATIA  3. Obesity   4. Encephalocele repair   ALLERGIES:    Allergies   Allergen Reactions     Morphine      EMESIS     Nickel      Sulfa Antibiotics Swelling       MEDICATIONS:   Cholecalciferol (VITAMIN D) 2000 UNIT tablet, Take 5,000 Units by mouth as needed Reported on 3/8/2017  FLAXSEED, LINSEED, PO, Take 1 capsule by mouth daily   furosemide (LASIX) 20 MG tablet, Take 2 tablets (40 mg) by mouth daily as needed (weight gain/edema)  loratadine (CLARITIN) 10 MG tablet, Take 1 tablet (10 mg) by mouth daily  metoprolol succinate ER (TOPROL XL) 25 MG 24 hr tablet, Take 0.5 tablets (12.5 mg) by mouth daily  progesterone (PROMETRIUM) 100 MG  capsule, Take 100 mg by mouth At Bedtime   sacubitril-valsartan (ENTRESTO)  MG per tablet, Take 1 tablet by mouth 2 times daily  solifenacin (VESICARE) 5 MG tablet, Take 1 tablet by mouth daily at 2 pm  spironolactone (ALDACTONE) 25 MG tablet, Take 2 tablets (50 mg) by mouth daily    sodium chloride (PF) 0.9% PF flush 10 mL    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     PHYSICAL EXAM:   /78 (BP Location: Right arm, Patient Position: Sitting, Cuff Size: Adult Large)   Pulse (!) 47   Wt 103 kg (227 lb 1.6 oz)   LMP 10/19/2008 (Approximate)   SpO2 100%   BMI 39.49 kg/m    General: comfortable, conversant, NAD  HEENT: normocephalic, atraumatic, anicteric  Neck: Estimate JVP 7cm   CV: RRR, nl s1 and s2, no murmurs, gallops, or rubs   Lungs: CTAB, no crackles or wheezes, normal work of breathing  Abdomen: BS+, soft, non tender, non distended  Extremities: warm and well perfused, trace edema      DIAGNOSTIC TESTING:      Latest Reference Range & Units 06/12/23 10:43   Sodium 136 - 145 mmol/L 138   Potassium 3.4 - 5.3 mmol/L 4.6   Chloride 98 - 107 mmol/L 104   Carbon Dioxide (CO2) 22 - 29 mmol/L 27   Urea Nitrogen 6.0 - 20.0 mg/dL 12.3   Creatinine 0.51 - 0.95 mg/dL 0.80   GFR Estimate >60 mL/min/1.73m2 85   Calcium 8.6 - 10.0 mg/dL 9.0   Anion Gap 7 - 15 mmol/L 7   Magnesium 1.7 - 2.3 mg/dL 1.9   Albumin 3.5 - 5.2 g/dL 4.1   Protein Total 6.4 - 8.3 g/dL 7.2   Alkaline Phosphatase 35 - 104 U/L 62   ALT 10 - 35 U/L 22   AST 10 - 35 U/L 19      Latest Reference Range & Units 06/12/23 10:43   N-Terminal Pro Bnp 0 - 900 pg/mL 126       Echo 12/12/2022: Severe left ventricular dilation is present.LVIDd 70mm.  Biplane LVEF is 50%.  Right ventricular function, chamber size, wall motion, and thickness are  normal.  Both atria appear normal.  Pulmonary artery systolic pressure is normal.  The inferior vena cava is normal.  No pericardial effusion is present.  The left ventricular function has  improved.    CMR 6/3/22: 1. The LV is mild-to-moderately dilated in cavity size. The wall thickness is normal. The global systolic  function is severely decreased. The LVEF is 33%. There is severe global hypokinesis.     2. The RV is normal in cavity size. The global systolic function is mildly decreased. The RVEF is 53%.      3. Both atria are normal in size.     4. There is no significant valvular disease.      5. Late gadolinium enhancement imaging shows no MI, fibrosis or infiltrative disease.      6. There is no pericardial effusion or thickening.     7. There is no intracardiac thrombus.     CONCLUSIONS: Severe, non-ischemic dilated cardiomyopathy, most likely of genetic cause. LVEF of 33% and  RVEF of 53% with no LGE. When directly compared to the prior CMR, the LV and RV size and function have not  significantly changed.      CPEX 12/12/22: Key Measurements    Peak VO2 (ml/kg/min) VE/VCO2  Wilkinson RER   14.91        27.61        1.02            Predicted VO2 (ml/kg/min) Predicted VO2 %   26.50        56            Resting Supine BP (mmHg) Resting Standing BP (mmHg) Final Stress BP (mmHg)   106/56        112/58        140/67          Resting Supine HR (bpm) Resting Standing HR (bpm)    44        50             Max HR (bpm) Max Predicted HR (bpm) Max Perdicted HR %   124        162        77            Exercise time (min) Exercise time (sec) Estimated workload (METS)   8        51        4.3              [facility] 1/2023:    Time Systolic (mmHg) Diastolic (mmHg) Mean (mmHg) A Wave (mmHg) V Wave (mmHg) EDP (mmHg) Max dp/dt (mmHg/sec) HR (bpm) Content (mL/dL) SAT (%)   RA Pressures  1:56 PM   6    10    8      38        RV Pressures  1:56 PM 40        12     37        PA Pressures  1:57 PM 41    4    22        39        PCW Pressures  1:57 PM   11    22    20      39        AO Pressures  2:01     63    91        37          Blood Flow Results Phase: Baseline     Time Results  Indexed Values (L/min/m2)   QP  1:45  PM 2.71 L/min    1.31      QS  1:45 PM 2.71 L/min    1.31        Blood Oximetry Phase: Baseline     Time Hb  SAT(%)  PO2  Content (mL/dL) PA Sat (%)   PA  1:45 PM  62.7 %      62.7      Art  1:45 PM  100 %     17.14         Cardiac Output Phase: Baseline     Time TDCO (L/min) TDCI (L/min/m2) Marianne C.O. (L/min) Marianne C.I. (L/min/m2) Marianne HR (bpm)   Cardiac Output Results  1:45 PM   2.71    1.31         Resistance Results Phase: Baseline     Time PVR  SVR  TPR  TVR  PVR/SVR  TPR/TVR    Resistance Results (Metric)  1:45 .77 dsc-5    2509.59 dsc-5    649.54 dsc-5    2686.74 dsc-5    0.13    0.24      Resistance Results (Wood)  1:45 PM 4.06 DOZIER    31.38 DOZIER    8.12 DOZIER    33.59 DOZIER    0.13    0.24        Stoke Volume Results Phase: Baseline            ASSESSMENT AND PLAN: 57 year-old woman with chronic systolic heart failure due to a famililal cardiomyopathy with a likely pathogenic variant in FLNC who is here for routine follow up and management       1.  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     Overall, she is euvolemic and well compensated. Her end organ function is normal. Her NT pro BNP is also normal. She has been tolerating neurohormonal therapies and largely adherent. She should continue all neurohormonal therapies and discussed the mechanisms. Her RHC demonstrated normal filling pressures but her marianne CI was reduced (no thermodilution CO obtained) and similarly her CPEX demonstrated a reduced mV02. Her echo and prior CMR demonstrate moderate to severe LV dilatation though the LVEF has fluctuated and there has been no fibrosis previously. She had a CMR today and the results are pending. She will need close follow up for optimization and consideration for advanced therapies as needed.   ACEI/ARB/ARNI: yes, continue entresto 97/103 mg BID  BB: Yes, continue metoprolol XL 50mg  daily  Aldosterone antagonist - Continue spironolactone 50mg daily   SGLT2i:continue dapagliflozin   SCD prevention: If LVEF <45% ICD is recommended by current HRS guideline for SCD prevention, she would like to rediscuss this  Fluid status euvolemic  Apnea: She has not tolerated CPAP and is evaluating non CPAP options for KATIA   NSAID use: advised to avoid NSAIDs  Patient has previously met with genetic counselor regarding genetic testing. She is aware of recommendation for all first degree relatives to undergo genetic testing giving that the FLNC variant was reclassified as likely pathogenic.        2. Palpitations racing heart: She had a syncopal event and was seen by Dr. Aragon, and this was thought to be vasovagal in nature. She has worn a ziopatch monitor and we will follow up on the results.       PLAN:   -follow up visit to discuss CMR results  -follow up on ziopatch results   -3 month visit with Irina Melendez or me       90 minutes on DOS for chart review, patient history and exam, counseling, review of diagnostic testing, coordination of care and documentation.     Thank you for allowing me to participate in the care of your patient. Please do not hesitate to contact me if you have any questions.     Sincerely,   Forum     Forum MD Jurgen, PhD, FACC  Advanced Heart Failure/Transplantation/MCS  [facility]/[place]      Answers for HPI/ROS submitted by the patient on 6/12/2023  General Symptoms: No  Skin Symptoms: No  HENT Symptoms: No  EYE SYMPTOMS: No  HEART SYMPTOMS: No  LUNG SYMPTOMS: No  INTESTINAL SYMPTOMS: No  URINARY SYMPTOMS: No  GYNECOLOGIC SYMPTOMS: No  BREAST SYMPTOMS: No  SKELETAL SYMPTOMS: No  BLOOD SYMPTOMS: No  NERVOUS SYSTEM SYMPTOMS: No  MENTAL HEALTH SYMPTOMS: No

## 2023-06-12 NOTE — NURSING NOTE
Labs: Patient was given results of the laboratory testing obtained today. Patient demonstrated understanding of this information and agreed to call with further questions or concerns.     Med Reconcile: Reviewed and verified all current medications with the patient. The updated medication list was printed and given to the patient.    Return Appointment: Patient given instructions regarding scheduling next clinic visit. Patient demonstrated understanding of this information and agreed to call with further questions or concerns. Follow up with Dr. Seaman/Irina in 3 months.    Patient stated she understood all health information given and agreed to call with further questions or concerns.    Megha Meyer RN

## 2023-06-13 ENCOUNTER — VIRTUAL VISIT (OUTPATIENT)
Dept: CARDIOLOGY | Facility: CLINIC | Age: 58
End: 2023-06-13
Attending: INTERNAL MEDICINE
Payer: COMMERCIAL

## 2023-06-13 DIAGNOSIS — I50.22 CHRONIC SYSTOLIC CONGESTIVE HEART FAILURE (H): Chronic | ICD-10-CM

## 2023-06-13 PROCEDURE — 99417 PROLNG OP E/M EACH 15 MIN: CPT | Mod: VID | Performed by: INTERNAL MEDICINE

## 2023-06-13 PROCEDURE — 99215 OFFICE O/P EST HI 40 MIN: CPT | Mod: VID | Performed by: INTERNAL MEDICINE

## 2023-06-13 RX ORDER — METOPROLOL SUCCINATE 25 MG/1
25 TABLET, EXTENDED RELEASE ORAL DAILY
Qty: 45 TABLET | Refills: 3 | Status: SHIPPED | OUTPATIENT
Start: 2023-06-13 | End: 2023-12-06

## 2023-06-13 NOTE — PROGRESS NOTES
Cardiovascular Genetics Clinic Progress Note     Name: Marleen Mcfadden  : 1965  MRN: [id number]    2022    Dear Dr. Levine and colleages,    I had the pleasure of conducting a virtual visit with Ms. 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      As you know, she has a family history of dilated cardiomyopathy including her sister, son, and daughter. 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    I last saw her in clinic on 2023. We reviewed the results of her CMR which showed an LVEF of 32%, LVEDD 6.1cm, RVEF of 45%, and no fibrosis. Additionally, she had a ziopatch monitor which we reviewed the results in which she had 9.4% PVCs and predominantly sinus rhythm.     FAMILY HISTORY: from Ms. Bunch note 2021 and any updates as as above    detailed family history was obtained during today's consult.  Family history was significant for the following cardiac history:    Full sister with DCM. She  in September after developing colon cancer.  Her history was also remarkable for MERSA, kidney disease and ultimate transplant.  She did not have an ICD or much care for her DCM.    Marleen's son and daughter also have DCM.      A maternal half sister has hypertension, epilepsy and reported dizziness and fainting. She does not have a cardiac issue to Marleen's knowledge.    Mother  suddenly at 38 yrs of age.  It was thought to be the result of a cardiac problem. However, she also had a long history of alcoholism and had a colostomy. Her two siblings have no known heart issues.    Maternal grandmother  in her 30's from a brain aneurysm. Nothing is known about maternal grandfather.    Marleen's father   at 55 years after a massive heart attack and CABG procedure.  His death occurred one day after the surgery reportedly from a clot.  One of his sisters  from a stroke.    Paternal grandmother  in her 70's from a stroke.  Her mother (Marleen's great grandmother)  suddenly in her 70's while sitting at a bus stop.Nothing is known about grandfather.    Two paternal half sisters in good health.  There is no additional history of cardiomyopathy, arrhythmias, heart attacks, fainting, sudden cardiac death, genetic conditions, or birth     REVIEW OF SYSTEMS: 10 point ROS neg other than the symptoms noted above in the HPI.    PAST MEDICAL HISTORY:   1. Familial systolic heart failure with VUS in FLNC   2. KATIA  3. Obesity   4. Encephalocele repair   ALLERGIES:    Allergies   Allergen Reactions     Morphine      EMESIS     Nickel      Sulfa Antibiotics Swelling       MEDICATIONS:   Cholecalciferol (VITAMIN D) 2000 UNIT tablet, Take 5,000 Units by mouth as needed Reported on 3/8/2017  FLAXSEED, LINSEED, PO, Take 1 capsule by mouth daily   furosemide (LASIX) 20 MG tablet, Take 2 tablets (40 mg) by mouth daily as needed (weight gain/edema)  loratadine (CLARITIN) 10 MG tablet, Take 1 tablet (10 mg) by mouth daily  progesterone (PROMETRIUM) 100 MG capsule, Take 100 mg by mouth At Bedtime   sacubitril-valsartan (ENTRESTO)  MG per tablet, Take 1 tablet by mouth 2 times daily  solifenacin (VESICARE) 5 MG tablet, Take 1 tablet by mouth daily at 2 pm  spironolactone (ALDACTONE) 25 MG tablet, Take 2 tablets (50 mg) by mouth daily    sodium chloride (PF) 0.9% PF flush 10 mL    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     PHYSICAL EXAM:   GENERAL: Healthy, alert and no distress  EYES: Eyes grossly normal to inspection.  No discharge or erythema, or obvious scleral/conjunctival abnormalities.  RESP: No audible wheeze, cough, or visible cyanosis.  No visible retractions or increased work of breathing.       DIAGNOSTIC TESTING: personally reviewed  CMR 6/12/2023:   1. The LV is normal in cavity size and wall thickness. The global systolic function is moderately reduced.  The LVEF is 32%. There is global hypokinesis.     2. The RV is normal in cavity size. The global systolic function is mildly reduced. The RVEF is 45%.      3. Both atria are normal in size.     4. There is no significant valvular disease.      5. Late gadolinium enhancement imaging shows no MI, fibrosis or infiltrative disease.      6. There is no pericardial effusion or thickening.     7. There is no intracardiac thrombus.     CONCLUSIONS: Severe, nonischemic cardiomyopathy, today the LV is no longer dilated. LVEF 32% not  significantly changed. RV function is mildly globally reduced, RVEF 45%, slightly decreased from previous  53%. No LGE.    Ziopatch monitor 2/24-3/3/2023: sinus with 9.4% PVC burden    Echo 12/12/2022: Severe left ventricular dilation is present.LVIDd 70mm.  Biplane LVEF is 50%.  Right ventricular function, chamber size, wall motion, and thickness are  normal.  Both atria appear normal.  Pulmonary artery systolic pressure is normal.  The inferior vena cava is normal.  No pericardial effusion is present.  The left ventricular function has improved.    CMR 6/3/22: 1. The LV is mild-to-moderately dilated in cavity size. The wall thickness is normal. The global systolic  function is severely decreased. The LVEF is 33%. There is severe global hypokinesis.     2. The RV is normal in cavity size. The global systolic function is mildly decreased. The RVEF is 53%.      3. Both atria are normal in size.     4. There is no significant valvular disease.      5. Late gadolinium enhancement imaging shows no MI, fibrosis or infiltrative disease.      6. There is no pericardial effusion or thickening.     7. There is no intracardiac thrombus.     CONCLUSIONS: Severe, non-ischemic dilated cardiomyopathy, most likely of genetic cause. LVEF of 33%  and  RVEF of 53% with no LGE. When directly compared to the prior CMR, the LV and RV size and function have not  significantly changed.      CPEX 12/12/22: Key Measurements    Peak VO2 (ml/kg/min) VE/VCO2  Sebastian RER   14.91        27.61        1.02            Predicted VO2 (ml/kg/min) Predicted VO2 %   26.50        56            Resting Supine BP (mmHg) Resting Standing BP (mmHg) Final Stress BP (mmHg)   106/56        112/58        140/67          Resting Supine HR (bpm) Resting Standing HR (bpm)    44        50             Max HR (bpm) Max Predicted HR (bpm) Max Perdicted HR %   124        162        77            Exercise time (min) Exercise time (sec) Estimated workload (METS)   8        51        4.3              [facility] 1/2023:    Time Systolic (mmHg) Diastolic (mmHg) Mean (mmHg) A Wave (mmHg) V Wave (mmHg) EDP (mmHg) Max dp/dt (mmHg/sec) HR (bpm) Content (mL/dL) SAT (%)   RA Pressures  1:56 PM   6    10    8      38        RV Pressures  1:56 PM 40        12     37        PA Pressures  1:57 PM 41    4    22        39        PCW Pressures  1:57 PM   11    22    20      39        AO Pressures  2:01     63    91        37          Blood Flow Results Phase: Baseline     Time Results  Indexed Values (L/min/m2)   QP  1:45 PM 2.71 L/min    1.31      QS  1:45 PM 2.71 L/min    1.31        Blood Oximetry Phase: Baseline     Time Hb  SAT(%)  PO2  Content (mL/dL) PA Sat (%)   PA  1:45 PM  62.7 %      62.7      Art  1:45 PM  100 %     17.14         Cardiac Output Phase: Baseline     Time TDCO (L/min) TDCI (L/min/m2) Estrella C.O. (L/min) Estrella C.I. (L/min/m2) Estrella HR (bpm)   Cardiac Output Results  1:45 PM   2.71    1.31         Resistance Results Phase: Baseline     Time PVR  SVR  TPR  TVR  PVR/SVR  TPR/TVR    Resistance Results (Metric)  1:45 .77 dsc-5    2509.59 dsc-5    649.54 dsc-5    2686.74 dsc-5    0.13    0.24      Resistance Results (Wood)  1:45 PM 4.06 DOZIER    31.38 DOZIER    8.12 DOZIER    33.59 DOZIER    0.13    0.24         Stoke Volume Results Phase: Baseline      ASSESSMENT AND PLAN: 57 year-old woman with chronic systolic heart failure due to a famililal cardiomyopathy with a likely pathogenic variant in FLNC who is here for follow up on results      1.  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   2.  Premature ventricular contractions, palpitations:    Her cardiac MRI shows an LVEF of 32%, LVEDD of 6.1cm, and no fibrosis on neurohormonal therapy.  Comparison to her prior cardiac MRI a year ago the LVEF is unchanged, but the LV end-diastolic dimension has reduced from 6.6cm to 6.1 cm on the current MRI, and the RV function is slightly reduced from 53% to 45%.  We discussed that continuing neurohormonal therapy is important to mitigate the neurohormonal cascade that occurs with heart failure and that there is evidence of some reverse remodeling with her LV end-diastolic dimension.     Additionally in her Zio patch monitor in February to March of 2023 she had approximately 9.4% PVC burden otherwise predominantly sinus rhythm without any significant dysrhythmias.  She did subsequently have a syncopal event and again has been having an increase in palpitations.    We spent the majority of our visit discussing indications for ICD for sudden cardiac death prevention in the setting of a FLNC likely pathogenic variant.  She meets standard ICD criteria based on her ejection fraction less than 35% despite neurohormonal therapies and more recent guidelines for FLNC with an EF of less than 45%.  She understandably expressed many concerns about having a defibrillator.  I have suggested that she meets with Dr. Aragon, to discuss her specific concerns regarding ICD implantation.  I will also communicate with Dr. Aragon about this.     Given her PVC burden we discussed that may also be contributing to her reduced ejection  fraction and following up with electrophysiology would be important to consider options including ablation.  For the time being we agreed to increase metoprolol despite the lower cardiac index and reassess her PVC burden, as she would prefer non invasive methods as much as possible.       PLAN:   -metoprolol 25mg daily   -repeat ziopatch   -follow up as scheduled     70 minutes on DOS for chart review, counseling, review of diagnostic testing, coordination of care and documentation.     Thank you for allowing me to participate in the care of your patient. Please do not hesitate to contact me if you have any questions.     Sincerely,   Forum     Forum MD Jurgen, PhD, FACC  Advanced Heart Failure/Transplantation/MCS  [facility]

## 2023-06-13 NOTE — PROGRESS NOTES
Virtual Visit Details    Type of service:  Video Visit   Video Start Time: 2:05  Video End Time:2:45    Originating Location (pt. Location): Home    Distant Location (provider location):  On-site  Platform used for Video Visit: [employer]     Answers for HPI/ROS submitted by the patient on 6/12/2023  General Symptoms: No  Skin Symptoms: No  HENT Symptoms: No  EYE SYMPTOMS: No  HEART SYMPTOMS: No  LUNG SYMPTOMS: No  INTESTINAL SYMPTOMS: No  URINARY SYMPTOMS: No  GYNECOLOGIC SYMPTOMS: No  BREAST SYMPTOMS: No  SKELETAL SYMPTOMS: No  BLOOD SYMPTOMS: No  NERVOUS SYSTEM SYMPTOMS: No  MENTAL HEALTH SYMPTOMS: No

## 2023-06-13 NOTE — NURSING NOTE
Chief Complaint   Patient presents with     Follow Up     Follow up from yesterday per pt, no updates per pt. Medications not reviewed, pt states all up to date from yesterday. Allergies reviewed, no changes.        Is the patient currently in the state of [place]? YES    Visit mode:VIDEO    If the visit is dropped, the patient can be reconnected by: VIDEO VISIT: Text to cell phone: [phone]    Will anyone else be joining the visit? NO      How would you like to obtain your AVS? MyChart    Are changes needed to the allergy or medication list? NO    Patient declined individual allergy and medication review by support staff because patient denies any changes since echeck-in regarding medication and allergies and states all information entered during echeck-in remains accurate.    Brendon Russell, Visit Facilitator/MA.

## 2023-06-13 NOTE — PATIENT INSTRUCTIONS
"You were seen today in the Cardiovascular Clinic at the [facility].      Cardiology Providers you saw during your visit:  Dr. Real Seaman     Recommendations:   Increase metoprolol succinate to 25 mg daily.  Repeat Zio patch (7 days) in 1 month.  Follow up with Irina Melendez in 3 months.       Thank you for your visit today!   Please MyChart message or call if you have any questions or concerns.      During Business Hours:  [phone], option # 1 \"To leave a message for your care team\"     After hours, weekends or holidays:   [phone], Option #4  Ask to speak to the On-Call Cardiologist. Inform them you are a heart failure patient at the [facility].      Megha Meyer RN BSN   Cardiology Nurse Coordinator - Heart Failure/[facility]  [phone] option 1 to schedule an appointment or leave a message for your care team  "

## 2023-06-13 NOTE — LETTER
2023      RE: Marleen Mcfadden  [address]  [place] [address]       Dear Colleague,    Thank you for the opportunity to participate in the care of your patient, Marleen Mcfadden, at the [facility] [place] at [facility]. Please see a copy of my visit note below.    Virtual Visit Details    Type of service:  Video Visit   Video Start Time: 2:05  Video End Time:2:45    Originating Location (pt. Location): Home    Distant Location (provider location):  On-site  Platform used for Video Visit: ETARGET     Answers for HPI/ROS submitted by the patient on 2023  General Symptoms: No  Skin Symptoms: No  HENT Symptoms: No  EYE SYMPTOMS: No  HEART SYMPTOMS: No  LUNG SYMPTOMS: No  INTESTINAL SYMPTOMS: No  URINARY SYMPTOMS: No  GYNECOLOGIC SYMPTOMS: No  BREAST SYMPTOMS: No  SKELETAL SYMPTOMS: No  BLOOD SYMPTOMS: No  NERVOUS SYSTEM SYMPTOMS: No  MENTAL HEALTH SYMPTOMS: No        Cardiovascular Genetics Clinic Progress Note     Name: Marleen Mcfadden  : 1965  MRN: [id number]    2022    Dear Dr. Levine and colleages,    I had the pleasure of conducting a virtual visit with Ms. 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      As you know, she has a family history of dilated cardiomyopathy including her sister, son, and daughter. 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    I last saw her in clinic on 2023. We reviewed the results of her CMR which showed an LVEF of 32%, LVEDD 6.1cm, RVEF of 45%, and no fibrosis. Additionally, she had a ziopatch monitor which we reviewed the results in which she had 9.4% PVCs and predominantly sinus rhythm.      FAMILY HISTORY: from Ms. Bunch note 2021 and any updates as as above    detailed family history was obtained during today's consult.  Family history was significant for the following cardiac history:  Full sister with DCM. She  in September after developing colon cancer.  Her history was also remarkable for MERSA, kidney disease and ultimate transplant.  She did not have an ICD or much care for her DCM.  Marleen's son and daughter also have DCM.    A maternal half sister has hypertension, epilepsy and reported dizziness and fainting. She does not have a cardiac issue to Marleen's knowledge.  Mother  suddenly at 38 yrs of age.  It was thought to be the result of a cardiac problem. However, she also had a long history of alcoholism and had a colostomy. Her two siblings have no known heart issues.  Maternal grandmother  in her 30's from a brain aneurysm. Nothing is known about maternal grandfather.  Marleen's father  at 55 years after a massive heart attack and CABG procedure.  His death occurred one day after the surgery reportedly from a clot.  One of his sisters  from a stroke.  Paternal grandmother  in her 70's from a stroke.  Her mother (Marleen's great grandmother)  suddenly in her 70's while sitting at a bus stop.Nothing is known about grandfather.  Two paternal half sisters in good health.  There is no additional history of cardiomyopathy, arrhythmias, heart attacks, fainting, sudden cardiac death, genetic conditions, or birth     REVIEW OF SYSTEMS: 10 point ROS neg other than the symptoms noted above in the HPI.    PAST MEDICAL HISTORY:   1. Familial systolic heart failure with VUS in FLNC   2. KATIA  3. Obesity   4. Encephalocele repair   ALLERGIES:    Allergies   Allergen Reactions    Morphine      EMESIS    Nickel     Sulfa Antibiotics Swelling       MEDICATIONS:   Cholecalciferol (VITAMIN D) 2000 UNIT tablet, Take 5,000 Units by mouth as needed Reported on  3/8/2017  FLAXSEED, LINSEED, PO, Take 1 capsule by mouth daily   furosemide (LASIX) 20 MG tablet, Take 2 tablets (40 mg) by mouth daily as needed (weight gain/edema)  loratadine (CLARITIN) 10 MG tablet, Take 1 tablet (10 mg) by mouth daily  progesterone (PROMETRIUM) 100 MG capsule, Take 100 mg by mouth At Bedtime   sacubitril-valsartan (ENTRESTO)  MG per tablet, Take 1 tablet by mouth 2 times daily  solifenacin (VESICARE) 5 MG tablet, Take 1 tablet by mouth daily at 2 pm  spironolactone (ALDACTONE) 25 MG tablet, Take 2 tablets (50 mg) by mouth daily    sodium chloride (PF) 0.9% PF flush 10 mL    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     PHYSICAL EXAM:   GENERAL: Healthy, alert and no distress  EYES: Eyes grossly normal to inspection.  No discharge or erythema, or obvious scleral/conjunctival abnormalities.  RESP: No audible wheeze, cough, or visible cyanosis.  No visible retractions or increased work of breathing.      DIAGNOSTIC TESTING: personally reviewed  CMR 6/12/2023:   1. The LV is normal in cavity size and wall thickness. The global systolic function is moderately reduced.  The LVEF is 32%. There is global hypokinesis.     2. The RV is normal in cavity size. The global systolic function is mildly reduced. The RVEF is 45%.      3. Both atria are normal in size.     4. There is no significant valvular disease.      5. Late gadolinium enhancement imaging shows no MI, fibrosis or infiltrative disease.      6. There is no pericardial effusion or thickening.     7. There is no intracardiac thrombus.     CONCLUSIONS: Severe, nonischemic cardiomyopathy, today the LV is no longer dilated. LVEF 32% not  significantly changed. RV function is mildly globally reduced, RVEF 45%, slightly decreased from previous  53%. No LGE.    Ziopatch monitor 2/24-3/3/2023: sinus with 9.4% PVC burden    Echo 12/12/2022: Severe left ventricular dilation is present.LVIDd 70mm.  Biplane LVEF is 50%.  Right  ventricular function, chamber size, wall motion, and thickness are  normal.  Both atria appear normal.  Pulmonary artery systolic pressure is normal.  The inferior vena cava is normal.  No pericardial effusion is present.  The left ventricular function has improved.    CMR 6/3/22: 1. The LV is mild-to-moderately dilated in cavity size. The wall thickness is normal. The global systolic  function is severely decreased. The LVEF is 33%. There is severe global hypokinesis.     2. The RV is normal in cavity size. The global systolic function is mildly decreased. The RVEF is 53%.      3. Both atria are normal in size.     4. There is no significant valvular disease.      5. Late gadolinium enhancement imaging shows no MI, fibrosis or infiltrative disease.      6. There is no pericardial effusion or thickening.     7. There is no intracardiac thrombus.     CONCLUSIONS: Severe, non-ischemic dilated cardiomyopathy, most likely of genetic cause. LVEF of 33% and  RVEF of 53% with no LGE. When directly compared to the prior CMR, the LV and RV size and function have not  significantly changed.      CPEX 12/12/22: Key Measurements    Peak VO2 (ml/kg/min) VE/VCO2  [place]en RER   14.91        27.61        1.02            Predicted VO2 (ml/kg/min) Predicted VO2 %   26.50        56            Resting Supine BP (mmHg) Resting Standing BP (mmHg) Final Stress BP (mmHg)   106/56        112/58        140/67          Resting Supine HR (bpm) Resting Standing HR (bpm)    44        50             Max HR (bpm) Max Predicted HR (bpm) Max Perdicted HR %   124        162        77            Exercise time (min) Exercise time (sec) Estimated workload (METS)   8        51        4.3              [facility] 1/2023:    Time Systolic (mmHg) Diastolic (mmHg) Mean (mmHg) A Wave (mmHg) V Wave (mmHg) EDP (mmHg) Max dp/dt (mmHg/sec) HR (bpm) Content (mL/dL) SAT (%)   RA Pressures  1:56 PM   6    10    8      38        RV Pressures  1:56 PM 40        12     37         PA Pressures  1:57 PM 41    4    22        39        PCW Pressures  1:57 PM   11    22    20      39        AO Pressures  2:01     63    91        37          Blood Flow Results Phase: Baseline     Time Results  Indexed Values (L/min/m2)   QP  1:45 PM 2.71 L/min    1.31      QS  1:45 PM 2.71 L/min    1.31        Blood Oximetry Phase: Baseline     Time Hb  SAT(%)  PO2  Content (mL/dL) PA Sat (%)   PA  1:45 PM  62.7 %      62.7      Art  1:45 PM  100 %     17.14         Cardiac Output Phase: Baseline     Time TDCO (L/min) TDCI (L/min/m2) Estrella C.O. (L/min) Estrella C.I. 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   2.  Premature ventricular contractions, palpitations:    Her cardiac MRI shows an LVEF of 32%, LVEDD of 6.1cm, and no fibrosis on neurohormonal therapy.  Comparison to her prior cardiac MRI a year ago the LVEF is unchanged, but the LV end-diastolic dimension has reduced from 6.6cm to 6.1 cm on the current MRI, and the RV function is slightly reduced from 53% to 45%.  We discussed that continuing neurohormonal therapy is important to mitigate the neurohormonal cascade that occurs with heart failure and that there is evidence of some reverse remodeling with her LV  end-diastolic dimension.     Additionally in her Zio patch monitor in February to March of 2023 she had approximately 9.4% PVC burden otherwise predominantly sinus rhythm without any significant dysrhythmias.  She did subsequently have a syncopal event and again has been having an increase in palpitations.    We spent the majority of our visit discussing indications for ICD for sudden cardiac death prevention in the setting of a FLNC likely pathogenic variant.  She meets standard ICD criteria based on her ejection fraction less than 35% despite neurohormonal therapies and more recent guidelines for FLNC with an EF of less than 45%.  She understandably expressed many concerns about having a defibrillator.  I have suggested that she meets with Dr. Aragon, to discuss her specific concerns regarding ICD implantation.  I will also communicate with Dr. Aragon about this.     Given her PVC burden we discussed that may also be contributing to her reduced ejection fraction and following up with electrophysiology would be important to consider options including ablation.  For the time being we agreed to increase metoprolol despite the lower cardiac index and reassess her PVC burden, as she would prefer non invasive methods as much as possible.       PLAN:   -metoprolol 25mg daily   -repeat ziopatch   -follow up as scheduled     70 minutes on DOS for chart review, counseling, review of diagnostic testing, coordination of care and documentation.     Thank you for allowing me to participate in the care of your patient. Please do not hesitate to contact me if you have any questions.     Sincerely,   [employer]     Real Seaman MD, PhD, [facility]  Advanced Heart Failure/Transplantation/MCS  [facility]/[place]

## 2023-06-26 ENCOUNTER — TELEPHONE (OUTPATIENT)
Dept: CARDIOLOGY | Facility: CLINIC | Age: 58
End: 2023-06-26
Payer: COMMERCIAL

## 2023-06-26 DIAGNOSIS — I50.9 CHF WITH CARDIOMYOPATHY (H): ICD-10-CM

## 2023-06-26 DIAGNOSIS — I42.9 CHF WITH CARDIOMYOPATHY (H): ICD-10-CM

## 2023-06-26 DIAGNOSIS — I42.9 FAMILIAL CARDIOMYOPATHY (H): Primary | ICD-10-CM

## 2023-06-26 DIAGNOSIS — I42.0 DILATED CARDIOMYOPATHY (H): ICD-10-CM

## 2023-06-26 NOTE — TELEPHONE ENCOUNTER
Date: 6/26/2023    Time of Call: 8:42 AM     Diagnosis:  FLNC genetic testing, cardiomyopathy     [ TORB ] Ordering provider: Abdelrahman Aragon MD  Order: It looks like Forum already talked to her some and we connected about this patient. I think she is resistant to ICD. For now, book her next available so I can have the ICD conversation with her again.   Thanks,     Abdelrahman        Order received by: Adri Orourke RN     Follow-up/additional notes: sent to scheduling

## 2023-07-11 DIAGNOSIS — R00.2 PALPITATIONS: ICD-10-CM

## 2023-07-11 DIAGNOSIS — R55 SYNCOPE: ICD-10-CM

## 2023-07-11 DIAGNOSIS — I49.3 PVC'S (PREMATURE VENTRICULAR CONTRACTIONS): ICD-10-CM

## 2023-07-11 DIAGNOSIS — I42.9 CHF WITH CARDIOMYOPATHY (H): Primary | ICD-10-CM

## 2023-07-11 DIAGNOSIS — I50.9 CHF WITH CARDIOMYOPATHY (H): Primary | ICD-10-CM

## 2023-07-11 DIAGNOSIS — I50.22 CHRONIC SYSTOLIC CONGESTIVE HEART FAILURE (H): ICD-10-CM

## 2023-07-14 ENCOUNTER — ALLIED HEALTH/NURSE VISIT (OUTPATIENT)
Dept: CARDIOLOGY | Facility: CLINIC | Age: 58
End: 2023-07-14
Payer: MEDICARE

## 2023-07-14 ENCOUNTER — MYC MEDICAL ADVICE (OUTPATIENT)
Dept: CARDIOLOGY | Facility: CLINIC | Age: 58
End: 2023-07-14
Payer: COMMERCIAL

## 2023-07-14 DIAGNOSIS — R55 SYNCOPE: ICD-10-CM

## 2023-07-14 DIAGNOSIS — I49.3 PVC'S (PREMATURE VENTRICULAR CONTRACTIONS): ICD-10-CM

## 2023-07-14 DIAGNOSIS — R00.2 PALPITATIONS: ICD-10-CM

## 2023-07-14 DIAGNOSIS — I50.22 CHRONIC SYSTOLIC CONGESTIVE HEART FAILURE (H): ICD-10-CM

## 2023-07-14 RX ORDER — SACUBITRIL AND VALSARTAN 97; 103 MG/1; MG/1
1 TABLET, FILM COATED ORAL 2 TIMES DAILY
Qty: 180 TABLET | Refills: 3 | Status: SHIPPED | OUTPATIENT
Start: 2023-07-14 | End: 2024-07-23

## 2023-07-14 RX ORDER — DAPAGLIFLOZIN 10 MG/1
10 TABLET, FILM COATED ORAL DAILY
Qty: 90 TABLET | Refills: 3 | Status: SHIPPED | OUTPATIENT
Start: 2023-07-14 | End: 2024-04-04

## 2023-07-16 ENCOUNTER — MYC MEDICAL ADVICE (OUTPATIENT)
Dept: FAMILY MEDICINE | Facility: CLINIC | Age: 58
End: 2023-07-16
Payer: COMMERCIAL

## 2023-07-19 ENCOUNTER — OFFICE VISIT (OUTPATIENT)
Dept: FAMILY MEDICINE | Facility: CLINIC | Age: 58
End: 2023-07-19
Payer: COMMERCIAL

## 2023-07-19 VITALS
HEIGHT: 64 IN | SYSTOLIC BLOOD PRESSURE: 102 MMHG | WEIGHT: 224 LBS | TEMPERATURE: 97.3 F | BODY MASS INDEX: 38.24 KG/M2 | RESPIRATION RATE: 22 BRPM | OXYGEN SATURATION: 98 % | DIASTOLIC BLOOD PRESSURE: 67 MMHG | HEART RATE: 65 BPM

## 2023-07-19 DIAGNOSIS — K57.32 DIVERTICULITIS OF COLON: Primary | ICD-10-CM

## 2023-07-19 PROBLEM — Z78.9: Chronic | Status: RESOLVED | Noted: 2019-09-20 | Resolved: 2023-07-19

## 2023-07-19 PROBLEM — Z78.9: Chronic | Status: ACTIVE | Noted: 2019-09-20

## 2023-07-19 PROBLEM — Z78.9: Status: ACTIVE | Noted: 2019-09-20

## 2023-07-19 PROCEDURE — 99213 OFFICE O/P EST LOW 20 MIN: CPT | Performed by: FAMILY MEDICINE

## 2023-07-19 NOTE — PROGRESS NOTES
Assessment & Plan     Diverticulitis of colon  - symptoms improving with antibiotics  - colonoscopy up to date   - Dr. Whitlock scheduled next month to discuss if colonoscopy needs to be updated due to recent flare         Yanna Matias MD  [facility]    Dinorah Hawley is a 58 year old, presenting for the following health issues:  Hospital F/U        7/19/2023     2:01 PM   Additional Questions   Roomed by CORNELIA   Accompanied by boyfriend     HPI     She started antibiotics on 7/12/23. Yesterday symptoms started improving. She was having BM which were strings and mucous. Now stools are just pudding with mucous. Abdominal pain was previously 21/10 and now 4/10. She has intermittent nausea and no vomiting since 7/12/23. No fever or recent chills. Appetite and fluid intake normal.     Review of Systems         Objective    LMP 10/19/2008 (Approximate)   There is no height or weight on file to calculate BMI.  Physical Exam

## 2023-08-04 ENCOUNTER — LAB (OUTPATIENT)
Dept: LAB | Facility: CLINIC | Age: 58
End: 2023-08-04
Payer: COMMERCIAL

## 2023-08-04 ENCOUNTER — OFFICE VISIT (OUTPATIENT)
Dept: SURGERY | Facility: CLINIC | Age: 58
End: 2023-08-04
Payer: COMMERCIAL

## 2023-08-04 VITALS
WEIGHT: 228 LBS | DIASTOLIC BLOOD PRESSURE: 74 MMHG | HEART RATE: 70 BPM | SYSTOLIC BLOOD PRESSURE: 120 MMHG | BODY MASS INDEX: 39.02 KG/M2

## 2023-08-04 DIAGNOSIS — R10.11 RUQ ABDOMINAL PAIN: Primary | ICD-10-CM

## 2023-08-04 DIAGNOSIS — R10.11 RUQ ABDOMINAL PAIN: ICD-10-CM

## 2023-08-04 LAB
ALBUMIN SERPL BCG-MCNC: 3.9 G/DL (ref 3.5–5.2)
ALP SERPL-CCNC: 60 U/L (ref 35–104)
ALT SERPL W P-5'-P-CCNC: 18 U/L (ref 0–50)
AST SERPL W P-5'-P-CCNC: 16 U/L (ref 0–45)
BILIRUB DIRECT SERPL-MCNC: <0.2 MG/DL (ref 0–0.3)
BILIRUB SERPL-MCNC: 0.3 MG/DL
LIPASE SERPL-CCNC: 33 U/L (ref 13–60)
PROT SERPL-MCNC: 7.1 G/DL (ref 6.4–8.3)

## 2023-08-04 PROCEDURE — 36415 COLL VENOUS BLD VENIPUNCTURE: CPT

## 2023-08-04 PROCEDURE — 99215 OFFICE O/P EST HI 40 MIN: CPT | Performed by: SURGERY

## 2023-08-04 PROCEDURE — 80076 HEPATIC FUNCTION PANEL: CPT

## 2023-08-04 PROCEDURE — 83690 ASSAY OF LIPASE: CPT

## 2023-08-04 NOTE — PATIENT INSTRUCTIONS
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 questions were                             answered and informed consent was obtained. Patient                             identification and proposed procedure were verified                             by the physician in the pre-procedure area. Mental                             Status Examination: alert and oriented. Airway                             Examination: normal oropharyngeal airway and neck                             mobility. Respiratory Examination: clear to                             auscultation. CV Examination: normal. Prophylactic                             Antibiotics: The patient does not require                             prophylactic antibiotics. Prior Anticoagulants: The                              patient has taken no anticoagulant or antiplatelet                             agents. ASA Grade Assessment: II - A patient with                             mild systemic disease. After reviewing the risks                             and benefits, the patient was deemed in                             satisfactory condition to undergo the procedure.                             The anesthesia plan was to use monitored anesthesia                             care (MAC). This assessment was completed at 11:15                             AM, before the administration of sedation.                             After obtaining informed consent, the colonoscope                             was passed under direct vision. Throughout the                             procedure, the patient's blood pressure, pulse, and                             oxygen saturations were monitored continuously. The                             was introduced through the anus and advanced to the                             cecum, identified by appendiceal orifice and                             ileocecal valve. The colonoscopy was performed                             without difficulty. The patient tolerated the                             procedure well. The quality of the bowel                             preparation was good.                                                                                    Findings:       The perianal and digital rectal examinations were normal.        Multiple small and large-mouthed diverticula were found in the sigmoid        colon, proximal transverse colon and hepatic flexure.        The exam was otherwise without abnormality.                                                                                    Moderate Sedation:        See the other procedure note for documentation of moderate sedation with        intraservice time.   Impression:               - Diverticulosis in the sigmoid colon, in  the                             proximal transverse colon and at the hepatic                             flexure.                             diverticulosis from 40 to 20 cm but spaced out and                             did not see any pus or narrowing or many                             diverticulum.                             - The examination was otherwise normal.                             - No specimens collected.   Recommendation:           - Your colonoscopy went very well. You do not have                             any polyps. But because of your family history of                             colon cancer you will need another colonoscopy in 5                             years. I would recommend that you continue to                             follow up with your primary doctor on at least an                             annual basis. They may need to check your stool for                             blood periodically. If you have any questions or                             would like to make an appointment with me please                             call [phone].                             - Will need to start on Metamucil or its equivalent                             to help reduce diverticulum in the future. See                             patient information for more details.                             diverticulosis from 40 to 20 cm but spaced out and                             did not see any pus or narrowing or many                             diverticulum.                             next colonoscopy will be in 5 years.                                                                                      _________________________   TETE CEDEÑO MD   4/28/2022 11:57:04 AM     Marleen is a 56 year old female who is status post in the hospital for diverticulitis  with no chills and nofever.       Patient's  Pain is  improving. Was having some mld left lower quadrant pain until  last week and with a bowel movement.   Appetite is  improving.     Abdomen is not tender  Do not feel any ventral hernias.    EXAMINATION: CT ABDOMEN PELVIS W CONTRAST  3/3/2022 5:20 PM       CLINICAL HISTORY: Diverticulitis suspected; concern for abscess;  hospitalized 2/21//22 for sepsis due to perforated sigmoid  diverticulitis; Sigmoid diverticulitis     COMPARISON: Radiograph dated 2/4/2022. Outside CT report dated  2/21/2022.     TECHNIQUE: CT of the abdomen and pelvis with intravenous contrast.  Coronal and sagittal reformatted images were obtained.     FINDINGS:  Lines and tubes:  None.     Lower chest:   Lung bases are clear. Heart size is normal. No pericardial effusion.  Small hiatal hernia.     Abdomen and pelvis:  Hypoattenuating lesion in the inferior right hepatic lobe measuring 15  mm with overlying capsular retraction, doubtful clinical significance,  possibly a sclerosed hemangioma. The gallbladder, pancreas and spleen  are unremarkable. No adrenal nodules. No hydronephrosis or obstructing  stones. No suspicious renal masses or cysts. No bowel dilation or wall  thickening. Colonic diverticulosis. There is focal wall thickening and  mild surrounding fat stranding in the proximal sigmoid colon (series 3  image 240) associated with a diverticulum. No intra-abdominal abscess.  No extraluminal air. Trace free fluid in the pelvis. Normal appendix.  Urinary bladder is partially filled and otherwise unremarkable.  Surgical changes of a supracervical hysterectomy with nabothian cysts  in the cervix. No adnexal mass.     Vessels and lymph nodes:   The abdominal aorta is normal in caliber. No retroperitoneal  lymphadenopathy. Mildly enlarged left iliac chain lymph nodes are  likely reactive.     Bones and soft tissues:  No suspicious osseous lesions. Mild degenerative changes of the spine.  Fat-containing inguinal hernias.                                                                      IMPRESSION:  Known  proximal sigmoid diverticulitis. No evidence of perforation or  abscess.     I have personally reviewed the examination and initial interpretation  and I agree with the findings.     MIGUELINA COLORADO, DO         Plan:we discussed her bilateral inguinal hernia and doing this robotic   Patient has also been having lose stool.  Has been seen by pelvic floor.    Patient has a sister who  of colon cancer.   Will set up colonoscopy

## 2023-08-04 NOTE — PROGRESS NOTES
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 were                             answered and informed consent was obtained. Patient                             identification and proposed procedure were verified                             by the physician in the pre-procedure area. Mental                             Status Examination: alert and oriented. Airway                             Examination: normal oropharyngeal airway and neck                             mobility. Respiratory Examination: clear to                             auscultation. CV Examination: normal. Prophylactic                             Antibiotics: The patient does not require                             prophylactic antibiotics. Prior Anticoagulants: The                              patient has taken no anticoagulant or antiplatelet                             agents. ASA Grade Assessment: II - A patient with                             mild systemic disease. After reviewing the risks                             and benefits, the patient was deemed in                             satisfactory condition to undergo the procedure.                             The anesthesia plan was to use monitored anesthesia                             care (MAC). This assessment was completed at 11:15                             AM, before the administration of sedation.                             After obtaining informed consent, the colonoscope                             was passed under direct vision. Throughout the                             procedure, the patient's blood pressure, pulse, and                             oxygen saturations were monitored continuously. The                             was introduced through the anus and advanced to the                             cecum, identified by appendiceal orifice and                             ileocecal valve. The colonoscopy was performed                             without difficulty. The patient tolerated the                             procedure well. The quality of the bowel                             preparation was good.                                                                                    Findings:       The perianal and digital rectal examinations were normal.        Multiple small and large-mouthed diverticula were found in the sigmoid        colon, proximal transverse colon and hepatic flexure.        The exam was otherwise without abnormality.                                                                                    Moderate Sedation:        See the other procedure note for documentation of moderate sedation with        intraservice time.   Impression:               - Diverticulosis in the sigmoid colon, in  the                             proximal transverse colon and at the hepatic                             flexure.                             diverticulosis from 40 to 20 cm but spaced out and                             did not see any pus or narrowing or many                             diverticulum.                             - The examination was otherwise normal.                             - No specimens collected.   Recommendation:           - Your colonoscopy went very well. You do not have                             any polyps. But because of your family history of                             colon cancer you will need another colonoscopy in 5                             years. I would recommend that you continue to                             follow up with your primary doctor on at least an                             annual basis. They may need to check your stool for                             blood periodically. If you have any questions or                             would like to make an appointment with me please                             call [phone].                             - Will need to start on Metamucil or its equivalent                             to help reduce diverticulum in the future. See                             patient information for more details.                             diverticulosis from 40 to 20 cm but spaced out and                             did not see any pus or narrowing or many                             diverticulum.                             next colonoscopy will be in 5 years.                                                                                      _________________________   TETE CEDEÑO MD   4/28/2022 11:57:04 AM     Marleen is a 56 year old female who is status post in the hospital for diverticulitis  with no chills and nofever.       Patient's  Pain is  improving. Was having some mld left lower quadrant pain until  last week and with a bowel movement.   Appetite is  improving.     Abdomen is not tender  Do not feel any ventral hernias.    EXAMINATION: CT ABDOMEN PELVIS W CONTRAST  3/3/2022 5:20 PM       CLINICAL HISTORY: Diverticulitis suspected; concern for abscess;  hospitalized 2/21//22 for sepsis due to perforated sigmoid  diverticulitis; Sigmoid diverticulitis     COMPARISON: Radiograph dated 2/4/2022. Outside CT report dated  2/21/2022.     TECHNIQUE: CT of the abdomen and pelvis with intravenous contrast.  Coronal and sagittal reformatted images were obtained.     FINDINGS:  Lines and tubes:  None.     Lower chest:   Lung bases are clear. Heart size is normal. No pericardial effusion.  Small hiatal hernia.     Abdomen and pelvis:  Hypoattenuating lesion in the inferior right hepatic lobe measuring 15  mm with overlying capsular retraction, doubtful clinical significance,  possibly a sclerosed hemangioma. The gallbladder, pancreas and spleen  are unremarkable. No adrenal nodules. No hydronephrosis or obstructing  stones. No suspicious renal masses or cysts. No bowel dilation or wall  thickening. Colonic diverticulosis. There is focal wall thickening and  mild surrounding fat stranding in the proximal sigmoid colon (series 3  image 240) associated with a diverticulum. No intra-abdominal abscess.  No extraluminal air. Trace free fluid in the pelvis. Normal appendix.  Urinary bladder is partially filled and otherwise unremarkable.  Surgical changes of a supracervical hysterectomy with nabothian cysts  in the cervix. No adnexal mass.     Vessels and lymph nodes:   The abdominal aorta is normal in caliber. No retroperitoneal  lymphadenopathy. Mildly enlarged left iliac chain lymph nodes are  likely reactive.     Bones and soft tissues:  No suspicious osseous lesions. Mild degenerative changes of the spine.  Fat-containing inguinal hernias.                                                                      IMPRESSION:  Known  proximal sigmoid diverticulitis. No evidence of perforation or  abscess.     I have personally reviewed the examination and initial interpretation  and I agree with the findings.     MIGUELINA COLORADO, DO         Plan:we discussed her bilateral inguinal hernia and doing this robotic   Patient has also been having lose stool.  Has been seen by pelvic floor.    Patient has a sister who  of colon cancer.   Will set up colonoscopy      Time spent with the patient with greater that 50% of the time in discussion was 45 minutes.  Nando Whitlock MD

## 2023-08-04 NOTE — LETTER
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 the procedure, a History and Physical                             was performed, and patient medications and                             allergies were reviewed. The risks and benefits of                             the procedure and the sedation options and risks                             were discussed with the patient. All questions were                             answered and informed consent was obtained. Patient                             identification and proposed procedure were verified                             by the physician in the pre-procedure area. Mental                             Status  Examination: alert and oriented. Airway                             Examination: normal oropharyngeal airway and neck                             mobility. Respiratory Examination: clear to                             auscultation. CV Examination: normal. Prophylactic                             Antibiotics: The patient does not require                             prophylactic antibiotics. Prior Anticoagulants: The                             patient has taken no anticoagulant or antiplatelet                             agents. ASA Grade Assessment: II - A patient with                             mild systemic disease. After reviewing the risks                             and benefits, the patient was deemed in                             satisfactory condition to undergo the procedure.                             The anesthesia plan was to use monitored anesthesia                             care (MAC). This assessment was completed at 11:15                             AM, before the administration of sedation.                             After obtaining informed consent, the colonoscope                             was passed under direct vision. Throughout the                             procedure, the patient's blood pressure, pulse, and                             oxygen saturations were monitored continuously. The                             was introduced through the anus and advanced to the                             cecum, identified by appendiceal orifice and                             ileocecal valve. The colonoscopy was performed                             without difficulty. The patient tolerated the                             procedure well. The quality of the bowel                             preparation was good.                                                                                    Findings:       The perianal and digital rectal examinations were normal.        Multiple small and  large-mouthed diverticula were found in the sigmoid        colon, proximal transverse colon and hepatic flexure.        The exam was otherwise without abnormality.                                                                                    Moderate Sedation:        See the other procedure note for documentation of moderate sedation with        intraservice time.   Impression:               - Diverticulosis in the sigmoid colon, in the                             proximal transverse colon and at the hepatic                             flexure.                             diverticulosis from 40 to 20 cm but spaced out and                             did not see any pus or narrowing or many                             diverticulum.                             - The examination was otherwise normal.                             - No specimens collected.   Recommendation:           - Your colonoscopy went very well. You do not have                             any polyps. But because of your family history of                             colon cancer you will need another colonoscopy in 5                             years. I would recommend that you continue to                             follow up with your primary doctor on at least an                             annual basis. They may need to check your stool for                             blood periodically. If you have any questions or                             would like to make an appointment with me please                             call [phone].                             - Will need to start on Metamucil or its equivalent                             to help reduce diverticulum in the future. See                             patient information for more details.                             diverticulosis from 40 to 20 cm but spaced out and                             did not see any pus or narrowing or many                              diverticulum.                             next colonoscopy will be in 5 years.                                                                                      _________________________   TETE CEDEÑO MD   4/28/2022 11:57:04 AM     Marleen is a 56 year old female who is status post in the hospital for diverticulitis  with no chills and nofever.       Patient's  Pain is  improving. Was having some mld left lower quadrant pain until last week and with a bowel movement.   Appetite is  improving.     Abdomen is not tender  Do not feel any ventral hernias.    EXAMINATION: CT ABDOMEN PELVIS W CONTRAST  3/3/2022 5:20 PM       CLINICAL HISTORY: Diverticulitis suspected; concern for abscess;  hospitalized 2/21//22 for sepsis due to perforated sigmoid  diverticulitis; Sigmoid diverticulitis     COMPARISON: Radiograph dated 2/4/2022. Outside CT report dated  2/21/2022.     TECHNIQUE: CT of the abdomen and pelvis with intravenous contrast.  Coronal and sagittal reformatted images were obtained.     FINDINGS:  Lines and tubes:  None.     Lower chest:   Lung bases are clear. Heart size is normal. No pericardial effusion.  Small hiatal hernia.     Abdomen and pelvis:  Hypoattenuating lesion in the inferior right hepatic lobe measuring 15  mm with overlying capsular retraction, doubtful clinical significance,  possibly a sclerosed hemangioma. The gallbladder, pancreas and spleen  are unremarkable. No adrenal nodules. No hydronephrosis or obstructing  stones. No suspicious renal masses or cysts. No bowel dilation or wall  thickening. Colonic diverticulosis. There is focal wall thickening and  mild surrounding fat stranding in the proximal sigmoid colon (series 3  image 240) associated with a diverticulum. No intra-abdominal abscess.  No extraluminal air. Trace free fluid in the pelvis. Normal appendix.  Urinary bladder is partially filled and otherwise unremarkable.  Surgical changes of a supracervical hysterectomy  with nabothian cysts  in the cervix. No adnexal mass.     Vessels and lymph nodes:   The abdominal aorta is normal in caliber. No retroperitoneal  lymphadenopathy. Mildly enlarged left iliac chain lymph nodes are  likely reactive.     Bones and soft tissues:  No suspicious osseous lesions. Mild degenerative changes of the spine.  Fat-containing inguinal hernias.                                                                      IMPRESSION:  Known proximal sigmoid diverticulitis. No evidence of perforation or  abscess.     I have personally reviewed the examination and initial interpretation  and I agree with the findings.     MIGUELINA COLORADO, DO         Plan:we discussed her bilateral inguinal hernia and doing this robotic   Patient has also been having lose stool.  Has been seen by pelvic floor.    Patient has a sister who  of colon cancer.   Will set up colonoscopy      Time spent with the patient with greater that 50% of the time in discussion was 45 minutes.  Nando Whitlock MD         Again, thank you for allowing me to participate in the care of your patient.        Sincerely,        Nando Whitlock MD

## 2023-08-11 ENCOUNTER — ANCILLARY PROCEDURE (OUTPATIENT)
Dept: ULTRASOUND IMAGING | Facility: CLINIC | Age: 58
End: 2023-08-11
Attending: SURGERY
Payer: COMMERCIAL

## 2023-08-11 DIAGNOSIS — R10.11 RUQ ABDOMINAL PAIN: ICD-10-CM

## 2023-08-11 DIAGNOSIS — K57.32 DIVERTICULITIS OF COLON: Primary | ICD-10-CM

## 2023-08-11 PROCEDURE — 76700 US EXAM ABDOM COMPLETE: CPT

## 2023-08-21 ENCOUNTER — HOSPITAL ENCOUNTER (OUTPATIENT)
Facility: AMBULATORY SURGERY CENTER | Age: 58
End: 2023-08-21
Attending: INTERNAL MEDICINE
Payer: COMMERCIAL

## 2023-08-21 ENCOUNTER — TELEPHONE (OUTPATIENT)
Dept: GASTROENTEROLOGY | Facility: CLINIC | Age: 58
End: 2023-08-21
Payer: COMMERCIAL

## 2023-08-21 NOTE — TELEPHONE ENCOUNTER
"Pt wanted [facility] was ok with not being with [facility]       Endoscopy Scheduling Screen    Have you had a positive Covid test in the last 14 days?  No    Are you active on MyChart?   Yes    What insurance is in the chart?  Other:  [facility]    Ordering/Referring Provider:     Nando Whitlock MD in [facility]      (If ordering provider performs procedure, schedule with ordering provider unless otherwise instructed. )    BMI: Estimated body mass index is 39.02 kg/m  as calculated from the following:    Height as of 7/19/23: 1.628 m (5' 4.09\").    Weight as of 8/4/23: 103.4 kg (228 lb).     Sedation Ordered  moderate sedation.   If patient BMI > 50 do not schedule in [facility].    Are you taking any prescription medications for pain?   No    Are you taking methadone or Suboxone?  No    Do you have a history of malignant hyperthermia or adverse reaction to anesthesia?  No    (Females) Are you currently pregnant?   No     Have you been diagnosed or told you have pulmonary hypertension?   No    Do you have an LVAD?  No    Have you been told you have moderate to severe sleep apnea?  Yes (RN Review required for scheduling unless scheduling in Hospital.)    Have you been told you have COPD, asthma, or any other lung disease?  No    Do you have any heart conditions?  Yes     In the past 6 months, have you had any hospitalizations for heart related issues including cardiomyopathy, heart attack, or stent placement?  No    Do you have any implantable devices in your body (pacemaker, ICD)?  No    Do you take nitroglycerine?  No    Have you ever had or are you awaiting a heart or lung transplant?   No    Have you had a stroke or transient ischemic attack (TIA aka \"mini stroke\" in the last 6 months?   No    Have you been diagnosed with or been told you have cirrhosis of the liver?   No    Are you currently on dialysis?   No    Do you need assistance transferring?   No    BMI: Estimated body mass index is 39.02 kg/m  as calculated " "from the following:    Height as of 7/19/23: 1.628 m (5' 4.09\").    Weight as of 8/4/23: 103.4 kg (228 lb).     Is patients BMI > 40 and scheduling location [facility]?  No    Do you take the medication Phentermine, Ozempic or Wegovy?  No    Do you take the medication Naltrexone?  No    Do you take blood thinners?  No      Prep   Are you currently on dialysis or do you have chronic kidney disease?  No    Do you have a diagnosis of diabetes?  No    Do you have a diagnosis of cystic fibrosis (CF)?  No    On a regular basis do you go 3 -5 days between bowel movements?  Yes (Extended Prep)    BMI > 40?  No    Preferred Pharmacy:    [id number] [address]  [address]  [facility] [address]  Phone: [phone] Fax: [phone]      Final Scheduling Details   Colonoscopy prep sent?  Golytely Extended Prep    Procedure scheduled  Colonoscopy    Surgeon:  Len     Date of procedure:  09/28/2023     Schedule PAC:   No    Location  [facility]    Sedation   Moderate Sedation    Patient Reminders:   You will receive a call from a Nurse to review instructions and health history.  This assessment must be completed prior to your procedure.  Failure to complete the Nurse assessment may result in the procedure being cancelled.      On the day of your procedure, please designate an adult(s) who can drive you home stay with you for the next 24 hours. The medicines used in the exam will make you sleepy. You will not be able to drive.      You cannot take public transportation, ride share services, or non-medical taxi service without a responsible caregiver.  Medical transport services are allowed with the requirement that a responsible caregiver will receive you at your destination.  We require that drivers and caregivers are confirmed prior to your procedure.  "

## 2023-09-08 ENCOUNTER — DOCUMENTATION ONLY (OUTPATIENT)
Dept: CARDIOLOGY | Facility: CLINIC | Age: 58
End: 2023-09-08
Payer: COMMERCIAL

## 2023-09-10 ENCOUNTER — PRE VISIT (OUTPATIENT)
Dept: CARDIOLOGY | Facility: CLINIC | Age: 58
End: 2023-09-10
Payer: COMMERCIAL

## 2023-09-10 DIAGNOSIS — I50.22 CHRONIC SYSTOLIC CONGESTIVE HEART FAILURE (H): Primary | ICD-10-CM

## 2023-09-11 ENCOUNTER — OFFICE VISIT (OUTPATIENT)
Dept: CARDIOLOGY | Facility: CLINIC | Age: 58
End: 2023-09-11
Attending: NURSE PRACTITIONER
Payer: COMMERCIAL

## 2023-09-11 VITALS
SYSTOLIC BLOOD PRESSURE: 112 MMHG | HEART RATE: 53 BPM | BODY MASS INDEX: 39.24 KG/M2 | WEIGHT: 229.3 LBS | OXYGEN SATURATION: 98 % | DIASTOLIC BLOOD PRESSURE: 68 MMHG

## 2023-09-11 DIAGNOSIS — I49.3 PVC'S (PREMATURE VENTRICULAR CONTRACTIONS): ICD-10-CM

## 2023-09-11 DIAGNOSIS — I50.22 CHRONIC SYSTOLIC CONGESTIVE HEART FAILURE (H): ICD-10-CM

## 2023-09-11 DIAGNOSIS — I42.9 FAMILIAL CARDIOMYOPATHY (H): Primary | ICD-10-CM

## 2023-09-11 DIAGNOSIS — G96.00 CSF LEAK: ICD-10-CM

## 2023-09-11 PROCEDURE — 99215 OFFICE O/P EST HI 40 MIN: CPT | Performed by: NURSE PRACTITIONER

## 2023-09-11 PROCEDURE — G0463 HOSPITAL OUTPT CLINIC VISIT: HCPCS | Performed by: NURSE PRACTITIONER

## 2023-09-11 PROCEDURE — 83880 ASSAY OF NATRIURETIC PEPTIDE: CPT | Performed by: NURSE PRACTITIONER

## 2023-09-11 PROCEDURE — 80053 COMPREHEN METABOLIC PANEL: CPT | Performed by: NURSE PRACTITIONER

## 2023-09-11 ASSESSMENT — PAIN SCALES - GENERAL: PAINLEVEL: NO PAIN (0)
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
## 2023-09-11 NOTE — NURSING NOTE
Chief Complaint   Patient presents with    Follow Up     Return Genetic Heart- Familial cardiomyopathy, systolic HF, EF 35% presents for follow up with labs prior.     Vitals were taken and medications reconciled.    Macros Hoskins, EMT  1:42 PM

## 2023-09-11 NOTE — NURSING NOTE
Return Appointment:   -Follow-up with Irina in 3 months with labs prior.   Patient given instructions regarding scheduling next clinic visit. Patient demonstrated understanding of this information and agreed to call with further questions or concerns.    Patient stated she understood all health information given and agreed to call with further questions or concerns.

## 2023-09-11 NOTE — PATIENT INSTRUCTIONS
"                        Neuro Cardiomyopathy   Cardiology Providers you saw during your visit: Irina Melendez CNP    Medication changes:   -None     Follow up:  -Follow-up with  Irina Melendez in 3 months with labs prior.   -Neurology Referral- you will need to call and make this appt. Please call [phone].       Follow the American Heart Association Diet and Lifestyle recommendations:  Limit saturated fat, trans fat, sodium, red meat, sweets and sugar-sweetened beverages. If you choose to eat red meat, compare labels and select the leanest cuts available.  Aim for at least 150 minutes of moderate physical activity or 75 minutes of vigorous physical activity - or an equal combination of both - each week.      During business hours: [phone], press option # 1 to schedule or leave a message for your care team      After hours, weekends or holidays: On Call Cardiologist- [phone]   option #4 and ask to speak to the on-call Cardiologist.      Heavenly Martinez RN   Cardiology Care Coordinator   [facility]   Questions and [phone]   First press #1 for [employer] \"To send a message to your care team\"    "

## 2023-09-15 ENCOUNTER — TELEPHONE (OUTPATIENT)
Dept: GASTROENTEROLOGY | Facility: CLINIC | Age: 58
End: 2023-09-15
Payer: COMMERCIAL

## 2023-09-15 ENCOUNTER — HOSPITAL ENCOUNTER (OUTPATIENT)
Facility: CLINIC | Age: 58
End: 2023-09-15
Attending: INTERNAL MEDICINE
Payer: MEDICARE

## 2023-09-15 DIAGNOSIS — K57.32 DIVERTICULITIS OF COLON: ICD-10-CM

## 2023-09-15 DIAGNOSIS — R10.11 RUQ ABDOMINAL PAIN: Primary | ICD-10-CM

## 2023-09-15 RX ORDER — BISACODYL 5 MG/1
TABLET, DELAYED RELEASE ORAL
Qty: 4 TABLET | Refills: 0 | Status: SHIPPED | OUTPATIENT
Start: 2023-09-15 | End: 2023-10-24

## 2023-09-15 RX ORDER — BISACODYL 5 MG/1
TABLET, DELAYED RELEASE ORAL
Qty: 4 TABLET | Refills: 0 | Status: SHIPPED | OUTPATIENT
Start: 2023-09-15 | End: 2023-09-15

## 2023-09-15 NOTE — TELEPHONE ENCOUNTER
Caller: Marleen   Reason for Reschedule/Cancellation (please be detailed, any staff messages or encounters to note?): per inbasket message below     Reason for r/s:  Please r/s to hospital setting d/t CHF and decreased EF         Prior to reschedule please review:  Ordering Provider:     JADE abdominal pain [R10.11]     Sedation per order: Moderate  Does patient have any ASC Exclusions, please identify?: y per message above       Notes on Cancelled Procedure:  Procedure: Lower Endoscopy [Colonoscopy]   Date: 09/28/2023  Location: [facility]; [address], [place] [address]  Surgeon: Len      Rescheduled: Yes  Procedure: Lower Endoscopy [Colonoscopy]   Date: 09/28/2023  Location: [facility]; [address], [address], [place] [address]  Surgeon: SU Jansen  Sedation Level Scheduled  moderate,  Reason for Sedation Level per order  Prep/Instructions updated and sent: y       Send In - basket message to Panc - Lukasz Pool if EUS  procedure is canceled or rescheduled: [ N/A, YES or NO] na

## 2023-09-15 NOTE — TELEPHONE ENCOUNTER
Pre assessment completed for upcoming procedure.      Procedure details:    Patient scheduled for Colonoscopy  on 9/28/23.     Arrival time: 0745. Procedure time 0845    Pre op exam needed? N/A    Facility location: [facility]; [address], [address], [place] [address]    Sedation type: Conscious sedation     Indication for procedure: RUQ abdominal pain, Diverticulitis     COVID policy reviewed.    Designated  policy reviewed. Instructed to have someone stay 6 hours post procedure.       Chart review:     Electronic implanted devices? No    Diabetic? No however is taking diabetes med.    Diabetic medication HOLDING recommendations: (if applicable)  Oral diabetic medications: Yes:  Farxiga (dapagliflozin). HOLD 3 days before procedure.  Diabetic injectables: No  Insulin: No    Medication review:    Anticoagulants? No    NSAIDS? No    Other medication HOLDING recommendations:  N/A      Prep for procedure:     Bowel prep recommendation: Standard Golytely   Due to: patient request/preference.     Patient denied constipation. Patient requested Golytely prep.     Prep instructions sent via NetMovie     Bowel prep script sent to   [facility][id number] [place], [place] - [address] [facility] [address]    Reviewed procedure prep instructions.     Patient verbalized understanding and had no questions or concerns at this time.        Adri Haskins RN  Endoscopy Procedure Pre Assessment RN  [phone] option 4

## 2023-09-26 NOTE — TELEPHONE ENCOUNTER
Pt called to go over instructions and low fiber diet.     Reviewed procedure prep instructions.      Patient verbalized understanding and had no questions or concerns at this time.    Lakisha Camarillo RN on 9/26/2023 at 9:23 AM

## 2023-09-28 ENCOUNTER — HOSPITAL ENCOUNTER (OUTPATIENT)
Facility: CLINIC | Age: 58
Discharge: HOME | End: 2023-09-28
Attending: INTERNAL MEDICINE | Admitting: INTERNAL MEDICINE
Payer: COMMERCIAL

## 2023-09-28 VITALS
RESPIRATION RATE: 16 BRPM | SYSTOLIC BLOOD PRESSURE: 102 MMHG | OXYGEN SATURATION: 96 % | DIASTOLIC BLOOD PRESSURE: 67 MMHG | HEART RATE: 65 BPM

## 2023-09-28 LAB — COLONOSCOPY: NORMAL

## 2023-09-28 PROCEDURE — G0500 MOD SEDAT ENDO SERVICE >5YRS: HCPCS | Performed by: INTERNAL MEDICINE

## 2023-09-28 PROCEDURE — G0121 COLON CA SCRN NOT HI RSK IND: HCPCS | Performed by: INTERNAL MEDICINE

## 2023-09-28 PROCEDURE — 250N000011 HC RX IP 250 OP 636: Performed by: INTERNAL MEDICINE

## 2023-09-28 PROCEDURE — 45378 DIAGNOSTIC COLONOSCOPY: CPT | Performed by: INTERNAL MEDICINE

## 2023-09-28 RX ORDER — ONDANSETRON 2 MG/ML
4 INJECTION INTRAMUSCULAR; INTRAVENOUS
Status: DISCONTINUED | OUTPATIENT
Start: 2023-09-28 | End: 2023-09-28 | Stop reason: HOSPADM

## 2023-09-28 RX ORDER — LIDOCAINE 40 MG/G
CREAM TOPICAL
Status: DISCONTINUED | OUTPATIENT
Start: 2023-09-28 | End: 2023-09-28 | Stop reason: HOSPADM

## 2023-09-28 RX ORDER — FENTANYL CITRATE 50 UG/ML
INJECTION, SOLUTION INTRAMUSCULAR; INTRAVENOUS PRN
Status: DISCONTINUED | OUTPATIENT
Start: 2023-09-28 | End: 2023-09-28 | Stop reason: HOSPADM

## 2023-09-28 ASSESSMENT — ACTIVITIES OF DAILY LIVING (ADL): ADLS_ACUITY_SCORE: 37

## 2023-09-28 NOTE — H&P
Marleen Mcfadden  [id number]  female  58 year old      Reason for procedure/surgery: History of Diverticulitis  Change in bowel habits  Patient Active Problem List   Diagnosis    Leiomyoma of uterus    Allergic rhinitis    Anemia    [facility]    Knee pain    Thyroid nodule    Pain in joint involving ankle and foot    CARDIOVASCULAR SCREENING; LDL GOAL LESS THAN 160    Shaina's nodes    Familial cardiomyopathy (H)    Degenerative disc disease at L5-S1 level    Chronic bilateral low back pain with right-sided sciatica    Chronic bilateral low back pain with left-sided sciatica    Chronic systolic congestive heart failure (H)    Left shoulder pain    Primary osteoarthritis of both knees    Gastroesophageal reflux disease with esophagitis    Moderate major depression (H)    Dysfunction of both eustachian tubes    Chronic rhinitis    Essential hypertension    CSF otorrhea    KATIA (obstructive sleep apnea)- moderate-severe (AHI 29)    Family history of colon cancer    Diverticulitis    Herpes simplex of female genitalia    Symptomatic bradycardia    Severe obesity (BMI 35.0-39.9) with comorbidity (H)       Past Surgical History:    Past Surgical History:   Procedure Laterality Date    ARTHROPLASTY REVISION KNEE Left 2019    Procedure: REVISION, LEFT TOTAL KNEE  ARTHROPLASTY;  Surgeon: Dev Rocha MD;  Location: [facility];  Service: Orthopedics    ARTHROPLASTY REVISION KNEE Right 2020    Procedure: RIGHT REVISION TOTAL KNEE ARTHROPLASTY;  Surgeon: Dev Rocha MD;  Location: [facility];  Service: Orthopedics    BREAST SURGERY      Breast Reduction    C EXCISE EXCESS SKIN TISSUE,ABDOMEN       SECTION  1989     SECTION  10/1985    COLONOSCOPY WITH CO2 INSUFFLATION N/A 2021    Procedure: COLONOSCOPY, WITH CO2 INSUFFLATION;  Surgeon: Angela Harrington DO;  Location: [facility]    COLONOSCOPY WITH CO2 INSUFFLATION N/A 2022    Procedure:  COLONOSCOPY, WITH CO2 INSUFFLATION;  Surgeon: Nando Whitlock MD;  Location: [facility]    CRANIECTOMY Right 07/08/2021    Procedure: RIGHT MIDDLE FOSSA APPROACH, CSF LEAK REPAIR;  Surgeon: Jocelyn Noe MD;  Location: [facility]    CRANIOTOMY MIDDLE FOSSA, EXCISE ACOUSTIC NEUROMA, COMBINED N/A 07/05/2021    Procedure: Right CRANIOTOMY, MIDDLE FOSSA APPROACH, FOR REPAIR OF ENCEPHALOCELE;  Surgeon: Jocelyne Harrell MD;  Location: [facility] OR    CV RIGHT HEART CATH MEASUREMENTS RECORDED N/A 1/10/2023    Procedure: Heart Cath Right Heart Cath;  Surgeon: Slade Hanson MD;  [facility]    CYSTOURETHROSCOPY N/A 08/18/2020    Procedure: CYSTOSCOPY;  Surgeon: Cherelle Willams MD;  Location: [facility];  Service: Gynecology    GYN SURGERY N/A 08/18/2020    Procedure: MIDURETHRAL SLING, CYSTOSCOPY;  Surgeon: Cherelle Willams MD;  Location: [facility];  Service: Gynecology    HYSTERECTOMY TOTAL ABDOMINAL  2006    for fibroids; reports having blood transfusion after surgery    INSERT DRAIN LUMBAR N/A 07/05/2021    Procedure: Insert drain lumbar;  Surgeon: Jocelyn Noe MD;  Location:  OR    ORTHOPEDIC SURGERY  1998    Right Knee ACL repair    THYROIDECTOMY  09/09/2013    Procedure: THYROIDECTOMY;  LEFT THYROID LOBECTOMY.  (LIGASURE, RECURRENT LARYNGEAL NERVE MONITOR) ;  Surgeon: Uriah Camargo MD;  Location:  [facility]    THYROIDECTOMY      TOTAL KNEE ARTHROPLASTY Left 10/03/2017    TOTAL KNEE ARTHROPLASTY Right 02/07/2019    Procedure: RIGHT TOTAL KNEE ARTHROPLASTY;  Surgeon: Dev Rocha MD;  Location: [facility];  Service: Orthopedics    TUBAL LIGATION  1989    ZZC EXCIS UTERINE FIBROID,ABD APPRCH  1999       Past Medical History:   Past Medical History:   Diagnosis Date    Acute bilateral low back pain with right-sided sciatica 06/02/2016    Allergic rhinitis, cause unspecified     Arthritis     Autoimmune disease  (H)     Autoimmune disease NEC     Autoimmune disease- unknown/poss SLE    Calcaneal spur 10/21/2014    Xray 10/17/14     CHF with cardiomyopathy (H)     Chronic low back pain     DDD (degenerative disc disease), lumbar     Depression     Failed total knee arthroplasty, 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    Familial cardiomyopathy (H)     GERD without esophagitis     History of transfusion     Hypertension     Injury of left shoulder, initial encounter 01/12/2017    Morbid obesity (H)     Nontraumatic rupture of quadriceps tendon, left 06/21/2018    KATIA (obstructive sleep apnea)- moderate-severe (AHI 29) 8/26/2021    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     no longer an issue    Peroneus longus tendinitis 01/02/2015    Plantar fasciitis 11/11/2014    PONV (postoperative nausea and vomiting)     Rotator cuff injury 01/17/2017    Sprain of other ligament of left ankle, initial encounter 01/12/2017    Status post left knee replacement 06/21/2018    TB lung, latent     negative quantiferon gold test  11/5/12       Social History:   Social History     Tobacco Use    Smoking status: Never    Smokeless tobacco: Never   Substance Use Topics    Alcohol use: Yes     Comment: rare, twice a year, she has a drink little wine 2 days ago       Family History:   Family History   Problem Relation Age of Onset    Hypertension Father         dec    Diabetes Father         dec    Heart Disease Father         dec    Alcohol/Drug Father     Cardiovascular Father     Heart Disease Mother         dec    Alcohol/Drug Mother     Cardiovascular Mother     Heart Disease Daughter         Cardiomyopathy    Cardiovascular Daughter         cardiomyopathy    Colon Cancer Sister     Cardiovascular Son         cardiomyopathy    Diabetes Paternal Grandmother         dec    Hypertension Paternal Grandmother         dec    Cerebrovascular Disease Paternal Grandmother         dec    Cancer Sister         Lupus     Cardiovascular Sister         cardiomyopathy    Heart Disease Sister         heart failure, and kidney failure       Allergies:   Allergies   Allergen Reactions    Morphine      EMESIS    Nickel     Sulfa Antibiotics Swelling       Active Medications:   No current outpatient medications on file.       Systemic Review:   CONSTITUTIONAL: NEGATIVE for fever, chills, change in weight  ENT/MOUTH: NEGATIVE for ear, mouth and throat problems  RESP: NEGATIVE for significant cough or SOB  CV: NEGATIVE for chest pain, palpitations or peripheral edema    Physical Examination:   Vital Signs: /77   Pulse 60   Resp 16   LMP 10/19/2008 (Approximate)   SpO2 96%   GENERAL: healthy, alert and no distress  NECK: no adenopathy, no asymmetry, masses, or scars  RESP: lungs clear to auscultation - no rales, rhonchi or wheezes  CV: regular rate and rhythm, normal S1 S2, no S3 or S4, no murmur, click or rub, no peripheral edema and peripheral pulses strong  ABDOMEN: soft, nontender, no hepatosplenomegaly, no masses and bowel sounds normal  MS: no gross musculoskeletal defects noted, no edema    ASA: 2    Mallampati Score: 2    Plan: Appropriate to proceed as scheduled.      Simone Jansen MD  9/28/2023    PCP:  Yanna Matias

## 2023-09-29 ENCOUNTER — MYC MEDICAL ADVICE (OUTPATIENT)
Dept: FAMILY MEDICINE | Facility: CLINIC | Age: 58
End: 2023-09-29

## 2023-09-29 ENCOUNTER — ANCILLARY PROCEDURE (OUTPATIENT)
Dept: NUCLEAR MEDICINE | Facility: CLINIC | Age: 58
End: 2023-09-29
Attending: SURGERY
Payer: COMMERCIAL

## 2023-09-29 DIAGNOSIS — K44.9 HIATAL HERNIA: ICD-10-CM

## 2023-09-29 DIAGNOSIS — K21.00 GASTROESOPHAGEAL REFLUX DISEASE WITH ESOPHAGITIS, UNSPECIFIED WHETHER HEMORRHAGE: Primary | ICD-10-CM

## 2023-09-29 DIAGNOSIS — R10.11 RUQ ABDOMINAL PAIN: ICD-10-CM

## 2023-09-29 PROCEDURE — A9537 TC99M MEBROFENIN: HCPCS | Performed by: RADIOLOGY

## 2023-09-29 PROCEDURE — 78227 HEPATOBIL SYST IMAGE W/DRUG: CPT | Mod: GC | Performed by: RADIOLOGY

## 2023-09-29 RX ORDER — KIT FOR THE PREPARATION OF TECHNETIUM TC 99M MEBROFENIN 45 MG/10ML
6.5 INJECTION, POWDER, LYOPHILIZED, FOR SOLUTION INTRAVENOUS ONCE
Status: COMPLETED | OUTPATIENT
Start: 2023-09-29 | End: 2023-09-29

## 2023-09-29 RX ADMIN — KIT FOR THE PREPARATION OF TECHNETIUM TC 99M MEBROFENIN 6.5 MILLICURIE: 45 INJECTION, POWDER, LYOPHILIZED, FOR SOLUTION INTRAVENOUS at 08:54

## 2023-09-29 NOTE — TELEPHONE ENCOUNTER
Dr. Matias,    Patient requests referral for GERD and hiatal hernia.    GI referral in place [for diverticulitis]. Pended surgery referral for hiatal hernia for your review.      Selin Juárez, RN, BSN, PHN  [facility]  [phone]

## 2023-10-05 ENCOUNTER — PRE VISIT (OUTPATIENT)
Dept: ONCOLOGY | Facility: CLINIC | Age: 58
End: 2023-10-05

## 2023-10-05 ENCOUNTER — PATIENT OUTREACH (OUTPATIENT)
Dept: ONCOLOGY | Facility: CLINIC | Age: 58
End: 2023-10-05

## 2023-10-05 ENCOUNTER — OFFICE VISIT (OUTPATIENT)
Dept: FAMILY MEDICINE | Facility: CLINIC | Age: 58
End: 2023-10-05
Payer: COMMERCIAL

## 2023-10-05 VITALS
BODY MASS INDEX: 39.98 KG/M2 | HEART RATE: 50 BPM | OXYGEN SATURATION: 99 % | DIASTOLIC BLOOD PRESSURE: 89 MMHG | SYSTOLIC BLOOD PRESSURE: 130 MMHG | WEIGHT: 233.6 LBS | TEMPERATURE: 98.2 F

## 2023-10-05 DIAGNOSIS — K44.9 HIATAL HERNIA: ICD-10-CM

## 2023-10-05 DIAGNOSIS — K21.9 GASTROESOPHAGEAL REFLUX DISEASE, UNSPECIFIED WHETHER ESOPHAGITIS PRESENT: Primary | ICD-10-CM

## 2023-10-05 DIAGNOSIS — K21.00 GASTROESOPHAGEAL REFLUX DISEASE WITH ESOPHAGITIS, UNSPECIFIED WHETHER HEMORRHAGE: Chronic | ICD-10-CM

## 2023-10-05 DIAGNOSIS — R14.0 BLOATING: ICD-10-CM

## 2023-10-05 DIAGNOSIS — K44.9 HIATAL HERNIA: Primary | ICD-10-CM

## 2023-10-05 DIAGNOSIS — R39.15 URINARY URGENCY: ICD-10-CM

## 2023-10-05 DIAGNOSIS — R10.13 EPIGASTRIC PAIN: ICD-10-CM

## 2023-10-05 PROCEDURE — 99214 OFFICE O/P EST MOD 30 MIN: CPT | Performed by: PHYSICIAN ASSISTANT

## 2023-10-05 RX ORDER — SOLIFENACIN SUCCINATE 5 MG/1
5 TABLET, FILM COATED ORAL
Qty: 90 TABLET | Refills: 1 | Status: SHIPPED | OUTPATIENT
Start: 2023-10-05 | End: 2024-01-15

## 2023-10-05 NOTE — PROGRESS NOTES
"  Assessment & Plan     Gastroesophageal reflux disease, unspecified whether esophagitis present  Hiatal hernia  Epigastric pain  Bloating  Suggest starting prilosec. Start at 20 mg, if persistent pain can increase to 40mg. I did request she complete H Pylori testing as well. Can see GI for hiatal hernia to discuss if a contributing factor.   - omeprazole (PRILOSEC) 20 MG DR capsule; Take 2 capsules (40 mg) by mouth daily for 90 days  - Adult GI  Referral - Consult Only; Future  - Helicobacter pylori Antigen Stool    Urinary urgency  Refilled. Stable on medication.  - solifenacin (VESICARE) 5 MG tablet; Take 1 tablet (5 mg) by mouth daily at 2 pm             BMI:   Estimated body mass index is 39.98 kg/m  as calculated from the following:    Height as of 7/19/23: 1.628 m (5' 4.09\").    Weight as of this encounter: 106 kg (233 lb 9.6 oz).   Weight management plan: Discussed healthy diet and exercise guidelines        [facility] GONZALEZ Hawley is a 58 year old, presenting for the following health issues:  Gastrophageal Reflux and Hernia        10/5/2023     8:41 AM   Additional Questions   Roomed by arthur   Accompanied by stephania Randle       History of Present Illness       Reason for visit:  Reflux and hiatal hernia    She eats 2-3 servings of fruits and vegetables daily.She consumes 1 sweetened beverage(s) daily.She exercises with enough effort to increase her heart rate 20 to 29 minutes per day.  She exercises with enough effort to increase her heart rate 4 days per week. She is missing 1 dose(s) of medications per week.         GERD/Heartburn  Onset/Duration: last year- end of July worsened  Description: burning, coming up esophagus every other hour, coughing, spitting  Intensity: severe  Progression of Symptoms: worsening  Accompanying Signs & Symptoms:  Does it feel like food gets stuck or trouble swallowing: No  Nausea: YES  Vomiting (bloody?): " YES  Abdominal Pain: YES- soreness across stomach  Black-Tarry stools: No  Bloody stools: No  History:  Previous similar episodes: YES  Previous ulcers: No  Precipitating factors:   Caffeine use: YES  Alcohol use: YES sparingly  NSAID/Aspirin use: No  Tobacco use: No  Worse with fatty foods, spicy foods, caffeinated drinks, alcohol, and peppermint.  Alleviating factors: esomeprazole- taking fiances/ famotidin- trying to take it  Therapies tried and outcome:             Lifestyle changes: None            Medications: esomeprazole and famotidine      Hiatal hernia on 07/12/2023 CT scan.            Review of Systems   Constitutional, HEENT, cardiovascular, pulmonary, gi and gu systems are negative, except as otherwise noted.      Objective    /89 (BP Location: Left arm, Patient Position: Sitting, Cuff Size: Adult Large)   Pulse 50   Temp 98.2  F (36.8  C) (Oral)   Wt 106 kg (233 lb 9.6 oz)   LMP 10/19/2008 (Approximate)   SpO2 99%   BMI 39.98 kg/m    Body mass index is 39.98 kg/m .  Physical Exam   GENERAL: healthy, alert and no distress  RESP: lungs clear to auscultation - no rales, rhonchi or wheezes  CV: regular rate and rhythm, normal S1 S2, no S3 or S4, no murmur, click or rub, no peripheral edema and peripheral pulses strong  ABDOMEN: soft, nontender, no hepatosplenomegaly, no masses and bowel sounds normal  MS: no gross musculoskeletal defects noted, no edema

## 2023-10-05 NOTE — PROGRESS NOTES
New Patient Oncology Nurse Navigator Note     Referring provider: Dr. Yanna Matias    Referring Clinic/Organization: [facility]  Referred to: Thoracic Surgery  Requested provider (if applicable): First available - did not specify   Referral Received: 10/05/23  (referral in wrong workqueue)     Evaluation for :   Diagnosis   K21.00 (ICD-10-CM) - Gastroesophageal reflux disease with esophagitis, unspecified whether hemorrhage   K44.9 (ICD-10-CM) - Hiatal hernia     Clinical History (per Nurse review of records provided):      07/12/2023 CT Abdomen/Pelvis (bookmarked) showed:   IMPRESSION:   1.  Acute uncomplicated proximal sigmoid diverticulitis. The degree of adjacent inflammation and small volume of pelvic free fluid are less prominent than the prior examination. No abscess identified.   2.  Moderate hiatal hernia.     Clinical Assessment / Barriers to Care (Per Nurse):    Never smoker  Records Location: [facility]   Records Needed: Outside images from [facility]  Additional testing needed prior to consult: None  Referral updates and Plan:     SADA MoralezN, RN, OCN  [facility] Oncology Nurse Navigator  [phone] / [phone]

## 2023-10-05 NOTE — TELEPHONE ENCOUNTER
RECORDS STATUS - ALL OTHER DIAGNOSIS      RECORDS RECEIVED FROM: [facility]    Appt Date: TBD NN WQ     Action    Action Taken 10/5/2023 10:09AM FANNIE  I called [facility]'s IMG Dept Ph: [phone] -the CT scans will be pushed to [place] PACS.     10/5/2023 2:05pm FANNIE   I resolved imaging from [facility] in PACS      NOTES STATUS DETAILS   OFFICE NOTE from referring provider     DISCHARGE SUMMARY from hospital Complete 9/28/2023    DISCHARGE REPORT from the ER     OPERATIVE REPORT Complete 9/28/2023 Colonoscopy    MEDICATION LIST Complete [facility]   CLINICAL TRIAL TREATMENTS TO DATE     LABS     PATHOLOGY REPORTS     ANYTHING RELATED TO DIAGNOSIS Complete Labs last updated on 10/5/2023    GENONOMIC TESTING     TYPE:     IMAGING (NEED IMAGES & REPORT)     CT SCANS Complete - [facility]  CT Abdomen Pelvis 7/12/2023, 2/21/2022   MRI     MAMMO     ULTRASOUND     PET

## 2023-10-09 NOTE — PROGRESS NOTES
ELECTROPHYSIOLOGY CLINIC VISIT    Assessment/Recommendations   Assessment/Plan:    Ms. Mcfadden is a 58 year old female who has a past medical history significant for familial CM with FLNC gene mutation, KATIA, and obesity.     Familial FLNC NICM LVEF 35-40%, NYHA I:  1. ACEi/ARB: Continue Entresto.  2. BB: Continue Toprol XL   3. Aldosterone antagonist: Continue Spironolactone.  4. SCD prophylaxis:  FLNC was reclassified as pathogenic and last CMR showed LVEF 32%.  Discussed we recommend ICD dual-chamber for bradycardia and only on low-dose beta-blocker. We discussed with the patient the rationale for ICD placement, alternative therapies,  technical aspects of the surgical procedure, risks/benefits of therapy and need for long-term follow-up in the Device Clinic.  The risk of defibrillator placement include: over sedation, reaction to local anesthetic, reaction to narcotics or benzodiazipines used for moderate secation, localized bleeding, internal bleeding, collapsed lung, and acute or late infections. There is the possibilty of unforseen complications as well such as device or lead failure, lead dislodgement, and inappropriate shocks from the defibrillator. All question/concerns were addressed. After our discussion, the patient verbalized understanding and wishes to think about this.  She will let us know how she wishes to proceed.   5. Fluid status: continue lasix, appears euvolemic on exam.   6. Etiology: FLNC genetic CM-recommended screening of all first-degree relatives.    Follow up to be determined based on results.        History of Present Illness/Subjective    Ms. Marleen Mcfadden is a 58 year old female who comes in today for EP consultation of genetic CM, carol.    Ms. Mcfadden is a 58 year old female who has a past medical history significant for familial CM with FLNC gene mutation, KATIA, and obesity.     She has a family history of DCM. Her sister was diagnosed and lead to family screening. 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 Her most recent echo showed LVEF 35-40% with moderate/severe LV dilation. Her son and daughter have DCM. Patient has been following with Dr. Kolb since 2006. She has been on GDMT. More recently, she was having episodes of lightheadedness. She was playing pool and she stood up and fell back and went down on the ground. Her fiance said she was still awake the whole time, she does not remember that, only once, she was able to stand up immediately, and went to the chair. No orthostatic dizziness otherwize, she reports having a syncope episode 3 years ago after laughing very hard.      EP Visit 3/1/23: She reports feeling well. She is currently wearing a zio path. She does still have some dizzy spells when she laughs/coughs really hard. Otherwise, she is exercising and weightlifting 3 times a week without symptoms/limitations. She denies chest discomfort, palpitations, abdominal fullness/bloating or peripheral edema, shortness of breath, paroxysmal nocturnal dyspnea, orthopnea, lightheadedness, dizziness, pre-syncope, or syncope. Current cardiac medications include: Toprol XL, Lasix, Farxiga, Entresto, and Spironolactone. .     She presents today for follow up. 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 She reports feeling some chest pressure that is not related to exertion.  She has had some bad acid reflux at the same time.  Gets SOB climbing stairs but does not have to stop.. She denies chest discomfort, palpitations, abdominal fullness/bloating or peripheral edema, shortness of breath, paroxysmal nocturnal dyspnea, orthopnea, lightheadedness, dizziness, pre-syncope, or syncope. ZIo 7/22-7/29 showing showing 2 NSVT, short, and 7% PVC burden, no symptoms reported.  Presenting 12 lead ECG shows SB Vent Rate 42 bpm,  ms,  ms, QTc 429 ms. Current cardiac medications include: Toprol XL, Lasix, Farxiga, Entresto, and Spironolactone.       Family history  was significant for the following cardiac history:  Full sister with DCM. She  in September after developing colon cancer.  Her history was also remarkable for MERSA, kidney disease and ultimate transplant.  She did not have an ICD or much care for her DCM.  Marleen's son and daughter also have DCM.    A maternal half sister has hypertension, epilepsy and reported dizziness and fainting. She does not have a cardiac issue to Marleen's knowledge.  Mother  suddenly at 38 yrs of age.  It was thought to be the result of a cardiac problem. However, she also had a long history of alcoholism and had a colostomy. Her two siblings have no known heart issues.  Maternal grandmother  in her 30's from a brain aneurysm. Nothing is known about maternal grandfather.  Marleen's father  at 55 years after a massive heart attack and CABG procedure.  His death occurred one day after the surgery reportedly from a clot.  One of his sisters  from a stroke.  Paternal grandmother  in her 70's from a stroke.  Her mother (Marleen's great grandmother)  suddenly in her 70's while sitting at a bus stop.Nothing is known about grandfather.  Two paternal half sisters in good health.  I have reviewed and updated the patient's Past Medical History, Social History, Family History and Medication List.     Cardiographics (Personally Reviewed) :   23 Echo:   Interpretation Summary  Moderate to severe left ventricular dilation is present. Left ventricular  function is decreased. The ejection fraction is 35-40% (moderately reduced).  Biplane LVEF is 37%.  The right ventricle is normal size. Global right ventricular function is normal.  No pericardial effusion is present.  This study was compared with the study from 22: LVEF appears similar or at most minimally lower and appears to hve been overestimated on the prior study.    1/10/23 RHC:  Conclusion    Right sided filling pressures are normal.  Mild elevated  pulmonary hypertension.  Left sided filling pressures are normal.  Left ventricular filling pressures are normal.  Normal cardiac output level.         6/3/22 CMR:  1. The LV is mild-to-moderately dilated in cavity size. The wall thickness is normal. The global systolic function is severely decreased. The LVEF is 33%. There is severe global hypokinesis.  2. The RV is normal in cavity size. The global systolic function is mildly decreased. The RVEF is 53%.   3. Both atria are normal in size.  4. There is no significant valvular disease.   5. Late gadolinium enhancement imaging shows no MI, fibrosis or infiltrative disease.   6. There is no pericardial effusion or thickening.  7. There is no intracardiac thrombus.  CONCLUSIONS: Severe, non-ischemic dilated cardiomyopathy, most likely of genetic cause. LVEF of 33% and RVEF of 53% with no LGE. When directly compared to the prior CMR, the LV and RV size and function have not significantly changed.    6/2023 CMR:  1. The LV is normal in cavity size and wall thickness. The global systolic function is moderately reduced.  The LVEF is 32%. There is global hypokinesis.  2. The RV is normal in cavity size. The global systolic function is mildly reduced. The RVEF is 45%.   3. Both atria are normal in size.  4. There is no significant valvular disease.   5. Late gadolinium enhancement imaging shows no MI, fibrosis or infiltrative disease.   6. There is no pericardial effusion or thickening.  7. There is no intracardiac thrombus.  CONCLUSIONS: Severe, nonischemic cardiomyopathy, today the LV is no longer dilated. LVEF 32% not  significantly changed. RV function is mildly globally reduced, RVEF 45%, slightly decreased from previous  53%. No LGE.     Physical Examination   /79 (BP Location: Right arm, Patient Position: Chair, Cuff Size: Adult Large)   Pulse (!) 48   Wt 105.4 kg (232 lb 4.8 oz)   LMP 10/19/2008 (Approximate)   SpO2 99%   BMI 39.76 kg/m    Wt Readings from  Last 3 Encounters:   10/05/23 106 kg (233 lb 9.6 oz)   09/11/23 104 kg (229 lb 4.8 oz)   08/04/23 103.4 kg (228 lb)     General Appearance:   Alert, well-appearing and in no acute distress.   HEENT: Atraumatic, normocephalic. PERRL.  MMM.   Chest/Lungs:   Respirations unlabored.  Lungs are clear to auscultation.   Cardiovascular:   Regular rate and rhythm.  S1/S2. No murmur.    Abdomen:  Soft, nontender, nondistended.   Extremities: No cyanosis or clubbing. No edema.    Musculoskeletal: Moves all extremities.  Gait normal.   Skin: Warm, dry, intact.    Neurologic: Mood and affect are appropriate.  Alert and oriented to person, place, time, and situation.          Medications  Allergies   Current Outpatient Medications   Medication Sig Dispense Refill    bisacodyl (DULCOLAX) 5 MG EC tablet Take 2 tablets at 3 pm the day before your procedure. If your procedure is before 11 am, take 2 additional tablets at 11 pm. If your procedure is after 11 am, take 2 additional tablets at 6 am. For additional instructions refer to your colonoscopy prep instructions. (Patient not taking: Reported on 10/5/2023) 4 tablet 0    Cholecalciferol (VITAMIN D) 2000 UNIT tablet Take 5,000 Units by mouth as needed Reported on 3/8/2017 (Patient not taking: Reported on 10/5/2023) 100 tablet 12    dapagliflozin (FARXIGA) 10 MG TABS tablet Take 1 tablet (10 mg) by mouth daily 90 tablet 3    FLAXSEED, LINSEED, PO Take 1 capsule by mouth daily  (Patient not taking: Reported on 10/5/2023)      furosemide (LASIX) 20 MG tablet Take 2 tablets (40 mg) by mouth daily as needed (weight gain/edema) (Patient not taking: Reported on 10/5/2023) 180 tablet 3    loratadine (CLARITIN) 10 MG tablet Take 1 tablet (10 mg) by mouth daily 90 tablet 3    metoprolol succinate ER (TOPROL XL) 25 MG 24 hr tablet Take 1 tablet (25 mg) by mouth daily 45 tablet 3    omeprazole (PRILOSEC) 20 MG DR capsule Take 2 capsules (40 mg) by mouth daily for 90 days 180 capsule 0     progesterone (PROMETRIUM) 100 MG capsule Take 100 mg by mouth At Bedtime  (Patient not taking: Reported on 10/5/2023)      sacubitril-valsartan (ENTRESTO)  MG per tablet Take 1 tablet by mouth 2 times daily 180 tablet 3    solifenacin (VESICARE) 5 MG tablet Take 1 tablet (5 mg) by mouth daily at 2 pm 90 tablet 1    spironolactone (ALDACTONE) 25 MG tablet Take 2 tablets (50 mg) by mouth daily 180 tablet 0    Allergies   Allergen Reactions    Morphine      EMESIS    Nickel     Sulfa Antibiotics Swelling         Lab Results (Personally Reviewed)    Chemistry/lipid CBC Cardiac Enzymes/BNP/TSH/INR   Lab Results   Component Value Date    BUN 14.9 09/11/2023     09/11/2023    CO2 23 09/11/2023     Creatinine   Date Value Ref Range Status   09/11/2023 0.71 0.51 - 0.95 mg/dL Final   07/11/2021 0.79 0.52 - 1.04 mg/dL Final       Lab Results   Component Value Date    CHOL 172 08/25/2020    HDL 54 08/25/2020    LDL 95 08/25/2020      Lab Results   Component Value Date    WBC 4.5 02/18/2023    HGB 12.7 02/18/2023    HCT 39.7 02/18/2023    MCV 98 02/18/2023     02/18/2023    Lab Results   Component Value Date    TSH 3.13 02/17/2023    INR 1.05 01/09/2023        The patient states understanding and is agreeable with the plan.   Abdelrahman Aragon MD [facility]  Cardiology - Electrophysiology    Total time spent on patient visit, reviewing notes, imaging, labs, orders, and completing necessary documentation: 45 minutes.

## 2023-10-10 ENCOUNTER — OFFICE VISIT (OUTPATIENT)
Dept: CARDIOLOGY | Facility: CLINIC | Age: 58
End: 2023-10-10
Attending: INTERNAL MEDICINE
Payer: COMMERCIAL

## 2023-10-10 VITALS
HEART RATE: 48 BPM | BODY MASS INDEX: 39.76 KG/M2 | OXYGEN SATURATION: 99 % | DIASTOLIC BLOOD PRESSURE: 79 MMHG | SYSTOLIC BLOOD PRESSURE: 119 MMHG | WEIGHT: 232.3 LBS

## 2023-10-10 DIAGNOSIS — I42.0 DILATED CARDIOMYOPATHY (H): ICD-10-CM

## 2023-10-10 DIAGNOSIS — I42.9 FAMILIAL CARDIOMYOPATHY (H): ICD-10-CM

## 2023-10-10 DIAGNOSIS — I42.9 CHF WITH CARDIOMYOPATHY (H): ICD-10-CM

## 2023-10-10 DIAGNOSIS — I50.9 CHF WITH CARDIOMYOPATHY (H): ICD-10-CM

## 2023-10-10 PROCEDURE — 93005 ELECTROCARDIOGRAM TRACING: CPT

## 2023-10-10 PROCEDURE — 93010 ELECTROCARDIOGRAM REPORT: CPT | Performed by: INTERNAL MEDICINE

## 2023-10-10 PROCEDURE — 99215 OFFICE O/P EST HI 40 MIN: CPT | Performed by: INTERNAL MEDICINE

## 2023-10-10 PROCEDURE — 99213 OFFICE O/P EST LOW 20 MIN: CPT | Performed by: INTERNAL MEDICINE

## 2023-10-10 ASSESSMENT — PAIN SCALES - GENERAL: PAINLEVEL: NO PAIN (0)

## 2023-10-10 NOTE — LETTER
10/10/2023      RE: Marleen Mcfadden  [address]  [place] [address]       Dear Colleague,    Thank you for the opportunity to participate in the care of your patient, Marleen Mcfadden, at the [facility] [place] at [facility]. Please see a copy of my visit note below.        ELECTROPHYSIOLOGY CLINIC VISIT    Assessment/Recommendations   Assessment/Plan:    Ms. Mcfadden is a 58 year old female who has a past medical history significant for familial CM with FLNC gene mutation, KATIA, and obesity.     Familial FLNC NICM LVEF 35-40%, NYHA I:  1. ACEi/ARB: Continue Entresto.  2. BB: Continue Toprol XL   3. Aldosterone antagonist: Continue Spironolactone.  4. SCD prophylaxis:  FLNC was reclassified as pathogenic and last CMR showed LVEF 32%.  Discussed we recommend ICD dual-chamber for bradycardia and only on low-dose beta-blocker. We discussed with the patient the rationale for ICD placement, alternative therapies,  technical aspects of the surgical procedure, risks/benefits of therapy and need for long-term follow-up in the Device Clinic.  The risk of defibrillator placement include: over sedation, reaction to local anesthetic, reaction to narcotics or benzodiazipines used for moderate secation, localized bleeding, internal bleeding, collapsed lung, and acute or late infections. There is the possibilty of unforseen complications as well such as device or lead failure, lead dislodgement, and inappropriate shocks from the defibrillator. All question/concerns were addressed. After our discussion, the patient verbalized understanding and wishes to think about this.  She will let us know how she wishes to proceed.   5. Fluid status: continue lasix, appears euvolemic on exam.   6. Etiology: FLNC genetic CM-recommended screening of all first-degree relatives.    Follow up to be determined based on results.        History of Present  Illness/Subjective    Ms. Marleen Mcfadden is a 58 year old female who comes in today for EP consultation of genetic CM, carol.    Ms. Mcfadden is a 58 year old female who has a past medical history significant for familial CM with FLNC gene mutation, KATIA, and obesity.     She has a family history of DCM. Her sister was diagnosed and lead to family screening. 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 Her most recent echo showed LVEF 35-40% with moderate/severe LV dilation. Her son and daughter have DCM. Patient has been following with Dr. Kolb since 2006. She has been on GDMT. More recently, she was having episodes of lightheadedness. She was playing pool and she stood up and fell back and went down on the ground. Her fiance said she was still awake the whole time, she does not remember that, only once, she was able to stand up immediately, and went to the chair. No orthostatic dizziness otherwize, she reports having a syncope episode 3 years ago after laughing very hard.      EP Visit 3/1/23: She reports feeling well. She is currently wearing a zio path. She does still have some dizzy spells when she laughs/coughs really hard. Otherwise, she is exercising and weightlifting 3 times a week without symptoms/limitations. She denies chest discomfort, palpitations, abdominal fullness/bloating or peripheral edema, shortness of breath, paroxysmal nocturnal dyspnea, orthopnea, lightheadedness, dizziness, pre-syncope, or syncope. Current cardiac medications include: Toprol XL, Lasix, Farxiga, Entresto, and Spironolactone. .     She presents today for follow up. 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 She reports feeling some chest pressure that is not related to exertion.  She has had some bad acid reflux at the same time.  Gets SOB climbing stairs but does not have to stop.. She denies chest discomfort, palpitations, abdominal fullness/bloating or peripheral edema, shortness of breath, paroxysmal  nocturnal dyspnea, orthopnea, lightheadedness, dizziness, pre-syncope, or syncope. ZIo - showing showing 2 NSVT, short, and 7% PVC burden, no symptoms reported.  Presenting 12 lead ECG shows SB Vent Rate 42 bpm,  ms,  ms, QTc 429 ms. Current cardiac medications include: Toprol XL, Lasix, Farxiga, Entresto, and Spironolactone.       Family history was significant for the following cardiac history:  Full sister with DCM. She  in September after developing colon cancer.  Her history was also remarkable for MERSA, kidney disease and ultimate transplant.  She did not have an ICD or much care for her DCM.  Marleen's son and daughter also have DCM.    A maternal half sister has hypertension, epilepsy and reported dizziness and fainting. She does not have a cardiac issue to Marleen's knowledge.  Mother  suddenly at 38 yrs of age.  It was thought to be the result of a cardiac problem. However, she also had a long history of alcoholism and had a colostomy. Her two siblings have no known heart issues.  Maternal grandmother  in her 30's from a brain aneurysm. Nothing is known about maternal grandfather.  Marleen's father  at 55 years after a massive heart attack and CABG procedure.  His death occurred one day after the surgery reportedly from a clot.  One of his sisters  from a stroke.  Paternal grandmother  in her 70's from a stroke.  Her mother (Marleen's great grandmother)  suddenly in her 70's while sitting at a bus stop.Nothing is known about grandfather.  Two paternal half sisters in good health.  I have reviewed and updated the patient's Past Medical History, Social History, Family History and Medication List.     Cardiographics (Personally Reviewed) :   23 Echo:   Interpretation Summary  Moderate to severe left ventricular dilation is present. Left ventricular  function is decreased. The ejection fraction is 35-40% (moderately reduced).  Biplane LVEF is  37%.  The right ventricle is normal size. Global right ventricular function is normal.  No pericardial effusion is present.  This study was compared with the study from 12/12/22: LVEF appears similar or at most minimally lower and appears to hve been overestimated on the prior study.    1/10/23 RHC:  Conclusion    Right sided filling pressures are normal.  Mild elevated pulmonary hypertension.  Left sided filling pressures are normal.  Left ventricular filling pressures are normal.  Normal cardiac output level.         6/3/22 CMR:  1. The LV is mild-to-moderately dilated in cavity size. The wall thickness is normal. The global systolic function is severely decreased. The LVEF is 33%. There is severe global hypokinesis.  2. The RV is normal in cavity size. The global systolic function is mildly decreased. The RVEF is 53%.   3. Both atria are normal in size.  4. There is no significant valvular disease.   5. Late gadolinium enhancement imaging shows no MI, fibrosis or infiltrative disease.   6. There is no pericardial effusion or thickening.  7. There is no intracardiac thrombus.  CONCLUSIONS: Severe, non-ischemic dilated cardiomyopathy, most likely of genetic cause. LVEF of 33% and RVEF of 53% with no LGE. When directly compared to the prior CMR, the LV and RV size and function have not significantly changed.    6/2023 CMR:  1. The LV is normal in cavity size and wall thickness. The global systolic function is moderately reduced.  The LVEF is 32%. There is global hypokinesis.  2. The RV is normal in cavity size. The global systolic function is mildly reduced. The RVEF is 45%.   3. Both atria are normal in size.  4. There is no significant valvular disease.   5. Late gadolinium enhancement imaging shows no MI, fibrosis or infiltrative disease.   6. There is no pericardial effusion or thickening.  7. There is no intracardiac thrombus.  CONCLUSIONS: Severe, nonischemic cardiomyopathy, today the LV is no longer dilated.  LVEF 32% not  significantly changed. RV function is mildly globally reduced, RVEF 45%, slightly decreased from previous  53%. No LGE.     Physical Examination   /79 (BP Location: Right arm, Patient Position: Chair, Cuff Size: Adult Large)   Pulse (!) 48   Wt 105.4 kg (232 lb 4.8 oz)   LMP 10/19/2008 (Approximate)   SpO2 99%   BMI 39.76 kg/m    Wt Readings from Last 3 Encounters:   10/05/23 106 kg (233 lb 9.6 oz)   09/11/23 104 kg (229 lb 4.8 oz)   08/04/23 103.4 kg (228 lb)     General Appearance:   Alert, well-appearing and in no acute distress.   HEENT: Atraumatic, normocephalic. PERRL.  MMM.   Chest/Lungs:   Respirations unlabored.  Lungs are clear to auscultation.   Cardiovascular:   Regular rate and rhythm.  S1/S2. No murmur.    Abdomen:  Soft, nontender, nondistended.   Extremities: No cyanosis or clubbing. No edema.    Musculoskeletal: Moves all extremities.  Gait normal.   Skin: Warm, dry, intact.    Neurologic: Mood and affect are appropriate.  Alert and oriented to person, place, time, and situation.          Medications  Allergies   Current Outpatient Medications   Medication Sig Dispense Refill    bisacodyl (DULCOLAX) 5 MG EC tablet Take 2 tablets at 3 pm the day before your procedure. If your procedure is before 11 am, take 2 additional tablets at 11 pm. If your procedure is after 11 am, take 2 additional tablets at 6 am. For additional instructions refer to your colonoscopy prep instructions. (Patient not taking: Reported on 10/5/2023) 4 tablet 0    Cholecalciferol (VITAMIN D) 2000 UNIT tablet Take 5,000 Units by mouth as needed Reported on 3/8/2017 (Patient not taking: Reported on 10/5/2023) 100 tablet 12    dapagliflozin (FARXIGA) 10 MG TABS tablet Take 1 tablet (10 mg) by mouth daily 90 tablet 3    FLAXSEED, LINSEED, PO Take 1 capsule by mouth daily  (Patient not taking: Reported on 10/5/2023)      furosemide (LASIX) 20 MG tablet Take 2 tablets (40 mg) by mouth daily as needed (weight  gain/edema) (Patient not taking: Reported on 10/5/2023) 180 tablet 3    loratadine (CLARITIN) 10 MG tablet Take 1 tablet (10 mg) by mouth daily 90 tablet 3    metoprolol succinate ER (TOPROL XL) 25 MG 24 hr tablet Take 1 tablet (25 mg) by mouth daily 45 tablet 3    omeprazole (PRILOSEC) 20 MG DR capsule Take 2 capsules (40 mg) by mouth daily for 90 days 180 capsule 0    progesterone (PROMETRIUM) 100 MG capsule Take 100 mg by mouth At Bedtime  (Patient not taking: Reported on 10/5/2023)      sacubitril-valsartan (ENTRESTO)  MG per tablet Take 1 tablet by mouth 2 times daily 180 tablet 3    solifenacin (VESICARE) 5 MG tablet Take 1 tablet (5 mg) by mouth daily at 2 pm 90 tablet 1    spironolactone (ALDACTONE) 25 MG tablet Take 2 tablets (50 mg) by mouth daily 180 tablet 0    Allergies   Allergen Reactions    Morphine      EMESIS    Nickel     Sulfa Antibiotics Swelling         Lab Results (Personally Reviewed)    Chemistry/lipid CBC Cardiac Enzymes/BNP/TSH/INR   Lab Results   Component Value Date    BUN 14.9 09/11/2023     09/11/2023    CO2 23 09/11/2023     Creatinine   Date Value Ref Range Status   09/11/2023 0.71 0.51 - 0.95 mg/dL Final   07/11/2021 0.79 0.52 - 1.04 mg/dL Final       Lab Results   Component Value Date    CHOL 172 08/25/2020    HDL 54 08/25/2020    LDL 95 08/25/2020      Lab Results   Component Value Date    WBC 4.5 02/18/2023    HGB 12.7 02/18/2023    HCT 39.7 02/18/2023    MCV 98 02/18/2023     02/18/2023    Lab Results   Component Value Date    TSH 3.13 02/17/2023    INR 1.05 01/09/2023        The patient states understanding and is agreeable with the plan.   Abdelrahman Aragon MD [facility]  Cardiology - Electrophysiology    Total time spent on patient visit, reviewing notes, imaging, labs, orders, and completing necessary documentation: 45 minutes.

## 2023-10-10 NOTE — PATIENT INSTRUCTIONS
You were seen today in the Adult Congenital and Cardiovascular Genetics Clinic at the [facility].    Cardiology Providers you saw during your visit:  Abdelrahman Aragon MD    Diagnosis:  cardiomyopathy    Results:  Abdelrahman Aragon MD reviewed the results of your EKG and zio testing today in clinic.    Recommendations for you:    Call us if you would like to move forward with ICD implant      Follow-up:  Follow up with Dr Aragon to be determined based on ICD decision    If you have questions or concerns please contact us at:    Adri Orourke, RN, BSN   Xenia Goyal (Scheduling)  Nurse Care Coordinator     Clinic   Adult Congenital and CV Genetics   Adult Congenital and CV Genetic  [facility]   [facility]  (P) [phone]     (P) [phone]            (F) [phone]        For after hours urgent needs, call [phone] and ask to speak to the Adult Congenital Physician on call.  Mention Job Code [id number].    For emergencies call [phone].    [facility]  [facility]  Mail Code [id number]  [address] [place] [address]

## 2023-10-12 LAB
ATRIAL RATE - MUSE: 42 BPM
DIASTOLIC BLOOD PRESSURE - MUSE: NORMAL MMHG
INTERPRETATION ECG - MUSE: NORMAL
P AXIS - MUSE: 48 DEGREES
PR INTERVAL - MUSE: 164 MS
QRS DURATION - MUSE: 100 MS
QT - MUSE: 514 MS
QTC - MUSE: 429 MS
R AXIS - MUSE: -19 DEGREES
SYSTOLIC BLOOD PRESSURE - MUSE: NORMAL MMHG
T AXIS - MUSE: 83 DEGREES
VENTRICULAR RATE- MUSE: 42 BPM

## 2023-10-13 ENCOUNTER — OFFICE VISIT (OUTPATIENT)
Dept: SURGERY | Facility: CLINIC | Age: 58
End: 2023-10-13
Payer: COMMERCIAL

## 2023-10-13 VITALS
DIASTOLIC BLOOD PRESSURE: 70 MMHG | BODY MASS INDEX: 39.53 KG/M2 | WEIGHT: 231 LBS | HEART RATE: 70 BPM | SYSTOLIC BLOOD PRESSURE: 127 MMHG

## 2023-10-13 DIAGNOSIS — R13.19 OTHER DYSPHAGIA: ICD-10-CM

## 2023-10-13 DIAGNOSIS — K21.9 GASTROESOPHAGEAL REFLUX DISEASE, UNSPECIFIED WHETHER ESOPHAGITIS PRESENT: Primary | ICD-10-CM

## 2023-10-13 DIAGNOSIS — R10.13 EPIGASTRIC PAIN: ICD-10-CM

## 2023-10-13 PROCEDURE — 99215 OFFICE O/P EST HI 40 MIN: CPT | Performed by: SURGERY

## 2023-10-13 RX ORDER — SUCRALFATE 1 G/1
1 TABLET ORAL 4 TIMES DAILY
Qty: 120 TABLET | Refills: 4 | Status: SHIPPED | OUTPATIENT
Start: 2023-10-13 | End: 2024-01-25

## 2023-10-13 RX ORDER — PANTOPRAZOLE SODIUM 40 MG/1
40 TABLET, DELAYED RELEASE ORAL EVERY 12 HOURS
Qty: 60 TABLET | Refills: 11 | Status: SHIPPED | OUTPATIENT
Start: 2023-10-13 | End: 2024-01-25

## 2023-10-13 NOTE — LETTER
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       Things you will need to do before surgery are getting a preoperative appointment with your primary care doctor to be cleared for surgery.          Time spent with the patient with greater that 50% of the time in discussion was 4 minutes,  discussing the surgery and what to expect afterwards.  Nando Whitlock MD   IMPRESSION:  1.  Acute uncomplicated proximal sigmoid diverticulitis. The degree of adjacent inflammation and small volume of pelvic free fluid are less prominent than the prior examination. No abscess identified.  2.  Moderate hiatal hernia.    REPORT SIGNED BY DR. Stu Ruiz  Narrative     EXAM: CT ABDOMEN & PELVIS W/O ORAL W/O IV CON    DATE: 7/12/2023 7:27 PM    CLINICAL DATA: Left lower quadrant colicky pain. Microscopic hematuria.    COMPARISON: February 21, 2022.    ICD 10:    TECHNIQUE: This examination was  tailored for evaluation of urinary tract stones.  Unenhanced contiguous transaxial images images were obtained through the abdomen and pelvis. Axial, coronal and sagittal reformations were submitted for interpretation.    FINDINGS:    ABDOMEN: Moderate hiatal hernia. Noncontrast appearance of the liver is normal. Slightly increased density of the dependent material in the gallbladder. Discrete calcified gallstone not identified. Spleen normal size. No pancreatic calcification. Renal collecting systems are without stones or dilation. No free air. Few right colonic diverticula.    PELVIS: An appendix is not confidently identified. Increased density is noted in the fat adjacent to the proximal sigmoid colon. This approximates the area previously identified acute inflammation. Thickening of the adjacent fascia. No extraluminal gas. No fluid collection identified. Minimal pelvic free fluid.    BONES: Disc degeneration most pronounced at L5-S1 but also at T11-12; unchanged.     The hiatal hernia may be the cause of your reflux so taking the Prilosec may help with this.   Exam End: 07/12/23  7:30 PM                 Again, thank you for allowing me to participate in the care of your patient.        Sincerely,        Nando Whitlock MD

## 2023-10-13 NOTE — PROGRESS NOTES
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 afterwards.  Nando Whitlock MD   IMPRESSION:  1.  Acute uncomplicated proximal sigmoid diverticulitis. The degree of adjacent inflammation and small volume of pelvic free fluid are less prominent than the prior examination. No abscess identified.  2.  Moderate hiatal hernia.    REPORT SIGNED BY DR. Stu Ruiz  Narrative     EXAM: CT ABDOMEN & PELVIS W/O ORAL W/O IV CON    DATE: 7/12/2023 7:27 PM    CLINICAL DATA: Left lower quadrant colicky pain. Microscopic hematuria.    COMPARISON: February 21, 2022.    ICD 10:    TECHNIQUE: This examination was tailored for evaluation of urinary tract stones.  Unenhanced contiguous transaxial images images were obtained through the abdomen and pelvis. Axial, coronal and sagittal reformations were submitted for interpretation.    FINDINGS:    ABDOMEN: Moderate hiatal hernia. Noncontrast appearance of the liver is normal. Slightly increased density of the dependent material in the  gallbladder. Discrete calcified gallstone not identified. Spleen normal size. No pancreatic calcification. Renal collecting systems are without stones or dilation. No free air. Few right colonic diverticula.    PELVIS: An appendix is not confidently identified. Increased density is noted in the fat adjacent to the proximal sigmoid colon. This approximates the area previously identified acute inflammation. Thickening of the adjacent fascia. No extraluminal gas. No fluid collection identified. Minimal pelvic free fluid.    BONES: Disc degeneration most pronounced at L5-S1 but also at T11-12; unchanged.     The hiatal hernia may be the cause of your reflux so taking the Prilosec may help with this.   Exam End: 07/12/23  7:30 PM

## 2023-10-13 NOTE — PATIENT INSTRUCTIONS
ABDOMEN: Moderate hiatal hernia. Noncontrast appearance of the liver is normal. Slightly increased density of the dependent material in the gallbladder. Discrete calcified gallstone not identified. Spleen normal size. No pancreatic calcification. Renal collecting systems are without stones or dilation. No free air. Few right colonic diverticula.    PELVIS: An appendix is not confidently identified. Increased density is noted in the fat adjacent to the proximal sigmoid colon. This approximates the area previously identified acute inflammation. Thickening of the adjacent fascia. No extraluminal gas. No fluid collection identified. Minimal pelvic free fluid.    BONES: Disc degeneration most pronounced at L5-S1 but also at T11-12; unchanged.  Exam End: 07/12/23  7:30 PM    Specimen Collected: 07/12/23  7:33 PM Last Resulted: 07/12/23  7:46 PM   Received From: [facility]  Result Received: 07/13/23  8:53 PM       Patient had to have the colonoscopy done at a hospital due to her heart.  Has CHF.  33 % EJECTION FRACTION.  Is going to be getting a pace maker in the near future.  so patient may be best at the  of  for the procedures.      So will set up an EGD for her with follow up with the surgeons who does nissen's and other antireflux procedures   But in the meanwhile will see if we can help with medications for her reflux.      Patient is still having nausea with eating and has to have an emesis with food after eating.     Prilosec is not making much difference has been on for about a week.   Will try Protonix.   And add Carafate For the Carafate take 1 pill and drop in glass of water and allow to dissolve for 30 minutes and then drink it. Do this every 6 hours.  Do for 1 month and if start having symptoms again on the other medications for reducing acid then can start it again.  If still having problems follow up with me or your regular doctor.  This one may affect absorption of other medications.  Talk to  your pharmacy about this.  I can order the liquid form but most insurance does not cover it and this is a lot less expensive.    Can try aloe vera,   For the helping of the lower esophageal sphincter make sure to chew well and eat slowly and to avoid these things.  For helping with the reflux avoid caffeine, chocolate, tobacco, alcohol and peppermint as they make your lower esophageal sphincter not work well.   Things that will help with reflux are: elevating the head of your bed, weight loss, no eating 2 hours before bed time.   A diagram was drawn and we did discuss an antireflux procedure and talked about risks with this. Like not being able to burp or vomit.    Sincerely     Nando Whitlock M.D.

## 2023-10-17 ENCOUNTER — TELEPHONE (OUTPATIENT)
Dept: GASTROENTEROLOGY | Facility: CLINIC | Age: 58
End: 2023-10-17
Payer: COMMERCIAL

## 2023-10-17 NOTE — TELEPHONE ENCOUNTER
Endoscopy Scheduling Screen  Screening questions completed on 8/21  Preferred Pharmacy:    [id number] - [facility] [address] AT [facility] [address]  [address]  [facility] [address]  Phone: [phone] Fax: [phone]      Final Scheduling Details   Colonoscopy prep sent?  N/A    Procedure scheduled  Upper endoscopy (EGD)    Surgeon:  Collins    Date of procedure:  12/27/23     Pre-OP / PAC:   No - Not required for this site.    Location  [facility] - Patient preference.    Sedation   Moderate Sedation - Per order.      Patient Reminders:   You will receive a call from a Nurse to review instructions and health history.  This assessment must be completed prior to your procedure.  Failure to complete the Nurse assessment may result in the procedure being cancelled.      On the day of your procedure, please designate an adult(s) who can drive you home stay with you for the next 24 hours. The medicines used in the exam will make you sleepy. You will not be able to drive.      You cannot take public transportation, ride share services, or non-medical taxi service without a responsible caregiver.  Medical transport services are allowed with the requirement that a responsible caregiver will receive you at your destination.  We require that drivers and caregivers are confirmed prior to your procedure.

## 2023-10-18 ENCOUNTER — APPOINTMENT (OUTPATIENT)
Dept: GENERAL RADIOLOGY | Facility: CLINIC | Age: 58
End: 2023-10-18
Attending: NURSE PRACTITIONER
Payer: COMMERCIAL

## 2023-10-18 ENCOUNTER — HOSPITAL ENCOUNTER (EMERGENCY)
Facility: CLINIC | Age: 58
Discharge: HOME | End: 2023-10-18
Attending: EMERGENCY MEDICINE | Admitting: EMERGENCY MEDICINE
Payer: COMMERCIAL

## 2023-10-18 ENCOUNTER — MYC MEDICAL ADVICE (OUTPATIENT)
Dept: CARDIOLOGY | Facility: CLINIC | Age: 58
End: 2023-10-18
Payer: COMMERCIAL

## 2023-10-18 ENCOUNTER — APPOINTMENT (OUTPATIENT)
Dept: CARDIOLOGY | Facility: CLINIC | Age: 58
End: 2023-10-18
Attending: NURSE PRACTITIONER
Payer: COMMERCIAL

## 2023-10-18 VITALS
TEMPERATURE: 98 F | SYSTOLIC BLOOD PRESSURE: 124 MMHG | RESPIRATION RATE: 16 BRPM | DIASTOLIC BLOOD PRESSURE: 72 MMHG | OXYGEN SATURATION: 99 % | HEART RATE: 56 BPM

## 2023-10-18 DIAGNOSIS — R55 SYNCOPE, UNSPECIFIED SYNCOPE TYPE: ICD-10-CM

## 2023-10-18 LAB
ALBUMIN SERPL BCG-MCNC: 4 G/DL (ref 3.5–5.2)
ALP SERPL-CCNC: 60 U/L (ref 35–104)
ALT SERPL W P-5'-P-CCNC: 19 U/L (ref 0–50)
ANION GAP SERPL CALCULATED.3IONS-SCNC: 12 MMOL/L (ref 7–15)
AST SERPL W P-5'-P-CCNC: 26 U/L (ref 0–45)
BASO+EOS+MONOS # BLD AUTO: NORMAL 10*3/UL
BASO+EOS+MONOS NFR BLD AUTO: NORMAL %
BASOPHILS # BLD AUTO: 0 10E3/UL (ref 0–0.2)
BASOPHILS NFR BLD AUTO: 1 %
BI-PLANE LVEF ECHO: NORMAL
BILIRUB SERPL-MCNC: 0.4 MG/DL
BUN SERPL-MCNC: 15 MG/DL (ref 8–23)
CALCIUM SERPL-MCNC: 8.8 MG/DL (ref 8.6–10)
CHLORIDE SERPL-SCNC: 103 MMOL/L (ref 98–107)
CREAT SERPL-MCNC: 0.82 MG/DL (ref 0.51–0.95)
DEPRECATED HCO3 PLAS-SCNC: 23 MMOL/L (ref 22–29)
EGFRCR SERPLBLD CKD-EPI 2021: 82 ML/MIN/1.73M2
EOSINOPHIL # BLD AUTO: 0.2 10E3/UL (ref 0–0.7)
EOSINOPHIL NFR BLD AUTO: 4 %
ERYTHROCYTE [DISTWIDTH] IN BLOOD BY AUTOMATED COUNT: 12.7 % (ref 10–15)
GLUCOSE SERPL-MCNC: 98 MG/DL (ref 70–99)
HCT VFR BLD AUTO: 41.5 % (ref 35–47)
HGB BLD-MCNC: 13.6 G/DL (ref 11.7–15.7)
IMM GRANULOCYTES # BLD: 0 10E3/UL
IMM GRANULOCYTES NFR BLD: 0 %
LVEF ECHO: NORMAL
LYMPHOCYTES # BLD AUTO: 2.4 10E3/UL (ref 0.8–5.3)
LYMPHOCYTES NFR BLD AUTO: 46 %
MAGNESIUM SERPL-MCNC: 1.8 MG/DL (ref 1.7–2.3)
MCH RBC QN AUTO: 31.1 PG (ref 26.5–33)
MCHC RBC AUTO-ENTMCNC: 32.8 G/DL (ref 31.5–36.5)
MCV RBC AUTO: 95 FL (ref 78–100)
MONOCYTES # BLD AUTO: 0.4 10E3/UL (ref 0–1.3)
MONOCYTES NFR BLD AUTO: 8 %
NEUTROPHILS # BLD AUTO: 2.1 10E3/UL (ref 1.6–8.3)
NEUTROPHILS NFR BLD AUTO: 41 %
NRBC # BLD AUTO: 0 10E3/UL
NRBC BLD AUTO-RTO: 0 /100
NT-PROBNP SERPL-MCNC: 183 PG/ML (ref 0–900)
PLATELET # BLD AUTO: 255 10E3/UL (ref 150–450)
POTASSIUM SERPL-SCNC: 4.2 MMOL/L (ref 3.4–5.3)
PROT SERPL-MCNC: 7.2 G/DL (ref 6.4–8.3)
RBC # BLD AUTO: 4.37 10E6/UL (ref 3.8–5.2)
SODIUM SERPL-SCNC: 138 MMOL/L (ref 135–145)
TROPONIN T SERPL HS-MCNC: <6 NG/L
WBC # BLD AUTO: 5.1 10E3/UL (ref 4–11)

## 2023-10-18 PROCEDURE — 83880 ASSAY OF NATRIURETIC PEPTIDE: CPT | Performed by: NURSE PRACTITIONER

## 2023-10-18 PROCEDURE — 93306 TTE W/DOPPLER COMPLETE: CPT | Mod: 26 | Performed by: STUDENT IN AN ORGANIZED HEALTH CARE EDUCATION/TRAINING PROGRAM

## 2023-10-18 PROCEDURE — 80053 COMPREHEN METABOLIC PANEL: CPT | Performed by: NURSE PRACTITIONER

## 2023-10-18 PROCEDURE — 99285 EMERGENCY DEPT VISIT HI MDM: CPT | Mod: 25 | Performed by: EMERGENCY MEDICINE

## 2023-10-18 PROCEDURE — 250N000011 HC RX IP 250 OP 636: Mod: JZ | Performed by: NURSE PRACTITIONER

## 2023-10-18 PROCEDURE — 71046 X-RAY EXAM CHEST 2 VIEWS: CPT | Mod: 26 | Performed by: RADIOLOGY

## 2023-10-18 PROCEDURE — 85025 COMPLETE CBC W/AUTO DIFF WBC: CPT | Performed by: NURSE PRACTITIONER

## 2023-10-18 PROCEDURE — C9113 INJ PANTOPRAZOLE SODIUM, VIA: HCPCS | Mod: JZ | Performed by: NURSE PRACTITIONER

## 2023-10-18 PROCEDURE — 99222 1ST HOSP IP/OBS MODERATE 55: CPT | Mod: 25

## 2023-10-18 PROCEDURE — 93005 ELECTROCARDIOGRAM TRACING: CPT | Performed by: EMERGENCY MEDICINE

## 2023-10-18 PROCEDURE — 93010 ELECTROCARDIOGRAM REPORT: CPT | Performed by: EMERGENCY MEDICINE

## 2023-10-18 PROCEDURE — 36415 COLL VENOUS BLD VENIPUNCTURE: CPT | Performed by: NURSE PRACTITIONER

## 2023-10-18 PROCEDURE — 84484 ASSAY OF TROPONIN QUANT: CPT | Performed by: NURSE PRACTITIONER

## 2023-10-18 PROCEDURE — 83735 ASSAY OF MAGNESIUM: CPT | Performed by: NURSE PRACTITIONER

## 2023-10-18 PROCEDURE — 255N000002 HC RX 255 OP 636: Performed by: INTERNAL MEDICINE

## 2023-10-18 PROCEDURE — 999N000208 ECHOCARDIOGRAM COMPLETE

## 2023-10-18 PROCEDURE — 71046 X-RAY EXAM CHEST 2 VIEWS: CPT

## 2023-10-18 PROCEDURE — 96374 THER/PROPH/DIAG INJ IV PUSH: CPT | Performed by: EMERGENCY MEDICINE

## 2023-10-18 RX ADMIN — HUMAN ALBUMIN MICROSPHERES AND PERFLUTREN 6 ML: 10; .22 INJECTION, SOLUTION INTRAVENOUS at 14:27

## 2023-10-18 RX ADMIN — PANTOPRAZOLE SODIUM 40 MG: 40 INJECTION, POWDER, FOR SOLUTION INTRAVENOUS at 13:05

## 2023-10-18 ASSESSMENT — ACTIVITIES OF DAILY LIVING (ADL)
ADLS_ACUITY_SCORE: 37

## 2023-10-18 NOTE — CONSULTS
Electrophysiology Consultation Note   EP Attending: Dr. Aragon  Reason for consultation: ICD consideration.   Provider requesting consultation: Dr. Araya  Date of Service: 10/18/2023      HPI:   Marleen Mcfadden is a 58 year old female with past medical history significant for familial CM with FLNC gene mutation, KATIA, and obesity.   She presented to the ER today after a syncopal episode. She reports around 8:45 pm yesterday evening she was kneeling in her bathroom, she had been kneeling following 3-4 episodes of emesis, she went to stand up and reports passing out following this. She has hx of severe GERD with recurrent episodes of emesis, she is having an endoscopy done in December for further evaluation of this. Labs today: electrolytes wnl, CBC wnl, troponin negative, BNP wnl. Echo done with LVEF 30-35%.   This has been her 2nd syncopal episode this year. Earlier this year, she reports she was playing pool, was shooting the ball, stood up and became lightheaded and passed out. Her fiance reports she was awake the whole time, but she does not recall this.   She follows with Dr Aragon and was last seen by him on 10/10/23. 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 At this time, they had discussed dual chamber ICD implant in detail, she was going to think about whether or not she wished to proceed.   Past Medical History:   Past Medical History:   Diagnosis Date    Acute bilateral low back pain with right-sided sciatica 06/02/2016    Allergic rhinitis, cause unspecified     Arthritis     Autoimmune disease (H24)     Autoimmune disease NEC     Autoimmune disease- unknown/poss SLE    Calcaneal spur 10/21/2014    Xray 10/17/14     CHF with cardiomyopathy (H)     Chronic low back pain     DDD (degenerative disc disease), lumbar     Depression     Failed total knee arthroplasty, initial encounter  01/17/2019    Familial cardiomyopathy (H)     GERD without esophagitis      History of transfusion     Hypertension     Injury of left shoulder, initial encounter 2017    Morbid obesity (H)     Nontraumatic rupture of quadriceps tendon, left 2018    KATIA (obstructive sleep apnea)- moderate-severe (AHI 29) 2021    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     no longer an issue    Peroneus longus tendinitis 2015    Plantar fasciitis 2014    PONV (postoperative nausea and vomiting)     Rotator cuff injury 2017    Sprain of other ligament of left ankle, initial encounter 2017    Status post left knee replacement 2018    TB lung, latent     negative quantiferon gold test  12     Past Surgical History:   Past Surgical History:   Procedure Laterality Date    ARTHROPLASTY REVISION KNEE Left 2019    Procedure: REVISION, LEFT TOTAL KNEE  ARTHROPLASTY;  Surgeon: Dev Rocha MD;  Location: [facility];  Service: Orthopedics    ARTHROPLASTY REVISION KNEE Right 2020    Procedure: RIGHT REVISION TOTAL KNEE ARTHROPLASTY;  Surgeon: Dev Rocha MD;  Location: [facility] OR;  Service: Orthopedics    BREAST SURGERY      Breast Reduction    C EXCISE EXCESS SKIN TISSUE,ABDOMEN       SECTION  1989     SECTION  10/1985    COLONOSCOPY N/A 2023    Procedure: Colonoscopy;  Surgeon: Simone Jansen MD;  Location: [facility]    COLONOSCOPY WITH CO2 INSUFFLATION N/A 2021    Procedure: COLONOSCOPY, WITH CO2 INSUFFLATION;  Surgeon: Angela Harrington DO;  Location: [facility]    COLONOSCOPY WITH CO2 INSUFFLATION N/A 2022    Procedure: COLONOSCOPY, WITH CO2 INSUFFLATION;  Surgeon: Nando Whitlock MD;  Location: [facility] OR    CRANIECTOMY Right 2021    Procedure: RIGHT MIDDLE FOSSA APPROACH, CSF LEAK REPAIR;  Surgeon: Jocelyn Noe MD;  Location: [facility]    CRANIOTOMY MIDDLE FOSSA, EXCISE ACOUSTIC NEUROMA, COMBINED N/A 2021     Procedure: Right CRANIOTOMY, MIDDLE FOSSA APPROACH, FOR REPAIR OF ENCEPHALOCELE;  Surgeon: Jocelyne Harrell MD;  Location: [facility]    CV RIGHT HEART CATH MEASUREMENTS RECORDED N/A 1/10/2023    Procedure: Heart Cath Right Heart Cath;  Surgeon: Slade Hanson MD;  [facility]    CYSTOURETHROSCOPY N/A 08/18/2020    Procedure: CYSTOSCOPY;  Surgeon: Cherelle Willams MD;  Location: [facility];  Service: Gynecology    GYN SURGERY N/A 08/18/2020    Procedure: MIDURETHRAL SLING, CYSTOSCOPY;  Surgeon: Cherelle Willams MD;  Location: [facility];  Service: Gynecology    HYSTERECTOMY TOTAL ABDOMINAL  2006    for fibroids; reports having blood transfusion after surgery    INSERT DRAIN LUMBAR N/A 07/05/2021    Procedure: Insert drain lumbar;  Surgeon: Jocelyn Noe MD;  Location:  OR    ORTHOPEDIC SURGERY  1998    Right Knee ACL repair    THYROIDECTOMY  09/09/2013    Procedure: THYROIDECTOMY;  LEFT THYROID LOBECTOMY.  (LIGASURE, RECURRENT LARYNGEAL NERVE MONITOR) ;  Surgeon: Uriah Camargo MD;  Location: [facility]    THYROIDECTOMY      TOTAL KNEE ARTHROPLASTY Left 10/03/2017    TOTAL KNEE ARTHROPLASTY Right 02/07/2019    Procedure: RIGHT TOTAL KNEE ARTHROPLASTY;  Surgeon: Dev Rocha MD;  Location: [facility];  Service: Orthopedics    TUBAL LIGATION  1989    ZZC EXCIS UTERINE FIBROID,ABD APPRCH  1999     Allergies: Per MAR     Allergies   Allergen Reactions    Morphine      EMESIS    Nickel     Sulfa Antibiotics Swelling     Medications:   Per MAR current outpatient cardiovascular medications include: (Not in a hospital admission)    Current Outpatient Medications   Medication Sig Dispense Refill    bisacodyl (DULCOLAX) 5 MG EC tablet Take 2 tablets at 3 pm the day before your procedure. If your procedure is before 11 am, take 2 additional tablets at 11 pm. If your procedure is after 11 am, take 2 additional tablets at 6 am. For  additional instructions refer to your colonoscopy prep instructions. (Patient not taking: Reported on 10/5/2023) 4 tablet 0    Cholecalciferol (VITAMIN D) 2000 UNIT tablet Take 5,000 Units by mouth as needed Reported on 3/8/2017 (Patient not taking: Reported on 10/5/2023) 100 tablet 12    dapagliflozin (FARXIGA) 10 MG TABS tablet Take 1 tablet (10 mg) by mouth daily 90 tablet 3    FLAXSEED, LINSEED, PO Take 1 capsule by mouth daily  (Patient not taking: Reported on 10/5/2023)      furosemide (LASIX) 20 MG tablet Take 2 tablets (40 mg) by mouth daily as needed (weight gain/edema) (Patient not taking: Reported on 10/5/2023) 180 tablet 3    loratadine (CLARITIN) 10 MG tablet Take 1 tablet (10 mg) by mouth daily 90 tablet 3    metoprolol succinate ER (TOPROL XL) 25 MG 24 hr tablet Take 1 tablet (25 mg) by mouth daily 45 tablet 3    omeprazole (PRILOSEC) 20 MG DR capsule Take 2 capsules (40 mg) by mouth daily for 90 days 180 capsule 0    pantoprazole (PROTONIX) 40 MG EC tablet Take 1 tablet (40 mg) by mouth every 12 hours 60 tablet 11    progesterone (PROMETRIUM) 100 MG capsule Take 100 mg by mouth At Bedtime  (Patient not taking: Reported on 10/5/2023)      sacubitril-valsartan (ENTRESTO)  MG per tablet Take 1 tablet by mouth 2 times daily 180 tablet 3    solifenacin (VESICARE) 5 MG tablet Take 1 tablet (5 mg) by mouth daily at 2 pm 90 tablet 1    spironolactone (ALDACTONE) 25 MG tablet Take 2 tablets (50 mg) by mouth daily 180 tablet 0    sucralfate (CARAFATE) 1 GM tablet Take 1 tablet (1 g) by mouth 4 times daily 120 tablet 4       Family History:   Family History   Problem Relation Age of Onset    Hypertension Father         dec    Diabetes Father         dec    Heart Disease Father         dec    Alcohol/Drug Father     Cardiovascular Father     Heart Disease Mother         dec    Alcohol/Drug Mother     Cardiovascular Mother     Heart Disease Daughter         Cardiomyopathy    Cardiovascular Daughter          "cardiomyopathy    Colon Cancer Sister     Cardiovascular Son         cardiomyopathy    Diabetes Paternal Grandmother         dec    Hypertension Paternal Grandmother         dec    Cerebrovascular Disease Paternal Grandmother         dec    Cancer Sister         Lupus    Cardiovascular Sister         cardiomyopathy    Heart Disease Sister         heart failure, and kidney failure     Social History:   Social History     Tobacco Use    Smoking status: Never    Smokeless tobacco: Never   Substance Use Topics    Alcohol use: Yes     Comment: rare, twice a year, she has a drink little wine 2 days ago       ROS:   A comprehensive 10 point ROS was negative other than as mentioned in HPI.    Physical Examination:   VITALS: /77   Pulse 54   Temp 98  F (36.7  C) (Oral)   Resp 18   LMP 10/19/2008 (Approximate)   SpO2 98%     GENERAL APPEARANCE: AxO, NAD   HEENT: NCAT, MMM.   CHEST: CTAB   CARDIOVASCULAR: S1S2, Reg, No m/r/g.   ABDOMEN: BS+, soft, nontender   EXTREMITIES: No pedal edema. Distal pulses intact.   NEURO: Grossly nonfocal.   PSYCH: Normal affect.  SKIN: Warm and dry.   Data:   Labs:  BMP  Recent Labs   Lab 10/18/23  1302      POTASSIUM 4.2   CHLORIDE 103   CARMEN 8.8   CO2 23   BUN 15.0   CR 0.82   GLC 98     CBC  Recent Labs   Lab 10/18/23  1302   WBC 5.1   RBC 4.37   HGB 13.6   HCT 41.5   MCV 95   MCH 31.1   MCHC 32.8   RDW 12.7        INRNo lab results found in last 7 days.  No results found for: \"CKTOTAL\", \"CKMB\", \"TROPN\"  Cholesterol (mg/dL)   Date Value   08/25/2020 172   01/09/2019 162   01/08/2019 172   04/09/2014 169     Cholesterol/HDL Ratio (no units)   Date Value   04/09/2014 2.8   12/20/2012 3.2   05/14/2008 2.9   08/11/2006 3.2     HDL Cholesterol (mg/dL)   Date Value   08/25/2020 54   01/09/2019 44 (L)   01/08/2019 44 (L)   04/09/2014 60     LDL Cholesterol Calculated (mg/dL)   Date Value   08/25/2020 95   01/09/2019 83   01/08/2019 92   04/09/2014 96     CMR 6/12/23  Comparison " CMR:  1. The LV is normal in cavity size and wall thickness. The global systolic function is moderately reduced.  The LVEF is 32%. There is global hypokinesis.  2. The RV is normal in cavity size. The global systolic function is mildly reduced. The RVEF is 45%.   3. Both atria are normal in size.  4. There is no significant valvular disease.   5. Late gadolinium enhancement imaging shows no MI, fibrosis or infiltrative disease.   6. There is no pericardial effusion or thickening.  7. There is no intracardiac thrombus.  CONCLUSIONS: Severe, nonischemic cardiomyopathy, today the LV is no longer dilated. LVEF 32% not  significantly changed. RV function is mildly globally reduced, RVEF 45%, slightly decreased from previous 53%. No LGE.    Assessment:   Marleen Mcfadden is a 58 year old female with past medical history significant for familial CM with FLNC gene mutation, KATIA, and obesity.   She presented to the ER today after a syncopal episode. She reports around 8:45 pm yesterday evening she was kneeling in her bathroom, she had been kneeling following 3-4 episodes of emesis, she went to stand up and reports passing out following this. She has hx of severe GERD with recurrent episodes of emesis, she is having an endoscopy done in December for further evaluation of this. Labs today: electrolytes wnl, CBC wnl, troponin negative, BNP wnl. Echo done with LVEF 30-35%.     EP Recommendations:  Familial FLNC NICM LVEF 35-40%, NYHA I:   1. ACEi/ARB: Continue Entresto.  2. BB: Continue Toprol XL   3. Aldosterone antagonist: Continue Spironolactone.  4. SCD prophylaxis:  FLNC was reclassified as pathogenic and last CMR showed LVEF 32%.  Discussed we recommend ICD dual-chamber for bradycardia and only on low-dose beta-blocker. We discussed with the patient the rationale for ICD placement, alternative therapies,  technical aspects of the surgical procedure, risks/benefits of therapy and need for long-term follow-up in the Device  Clinic.    - She has decided she would like to move forward with the ICD implant. We will work on getting this scheduled as an outpatient. She would prefer to proceed sometime in January following her endoscopy.     Suspect her syncopal episode earlier this year, and her episode yesterday were orthostatic in nature. Both occurring with positional changes and associated with some lightheadedness and dizziness prior to falling. Recommend she change positions slowly and carefully, along with increasing hydration to a goal intake of 64 oz of water/day.     The patient states understanding and is agreeable with plan.   Thank you for allowing us to participate in the care of this patient.     The patient was discussed w/ Dr. Aragon. The above note reflects our joint plan.  Rachel Jacobo PA-C  [facility]  Electrophysiology Consult Service  Pager: [phone]    EP STAFF NOTE  I have seen and examined the patient as part of a shared visit with RATNA Meehan.  I agree with the note above. I reviewed today's vital signs and medications. I have reviewed and discussed with the advanced practice provider their physical examination, assessment, and plan   Briefly, syncopal event that sounds orthostatic, FLNC CM  My key history/exam findings are: RRR.   The key management decisions made by me: no emergent intervention at this time, same plan for ICD as outpatient.  Total time spent on patient visit, reviewing notes, imaging, labs, orders, and completing necessary documentation: 60 minutes.  >50% of visit spent on counseling patient and/or coordination of care.     Abdelrahman Aragon MD [facility]  Cardiology - Electrophysiology

## 2023-10-18 NOTE — ED TRIAGE NOTES
Arrives ambulatory with a syncope episode. Per patient she passed out last night. Called her PCP and was told to come to the ER. HX CHF      Triage Assessment (Adult)       Row Name 10/18/23 1236          Triage Assessment    Airway WDL WDL        Respiratory WDL    Respiratory WDL WDL        Skin Circulation/Temperature WDL    Skin Circulation/Temperature WDL WDL        Cardiac WDL    Cardiac WDL WDL        Peripheral/Neurovascular WDL    Peripheral Neurovascular WDL WDL        Cognitive/Neuro/Behavioral WDL    Cognitive/Neuro/Behavioral WDL WDL

## 2023-10-18 NOTE — TELEPHONE ENCOUNTER
"Spoke with patient, vomiting is a reoccurring issue for her, she has acid reflux and vomits due it sometimes. This is her 3rd syncopal event ever and Dr Cordova advised for her to go to the ER at [facility] to be assessed. Patient informed and agrees will plan. She states she is feeling improved right now but still a little \"off\"  "

## 2023-10-18 NOTE — ED PROVIDER NOTES
ED Provider Note  [facility]      History     Chief Complaint   Patient presents with    Syncope     HPI  Marleen Mcfadden is a 58 year old female with a past history of HTN, cardiomyopathy, CHF, latent TB, GERD, R middle fossa CSF leak repair, KATIA, who presents following a syncopal episode. She reports around 8:45 pm last evening she she was kneeling in her restroom and had 3-4 episodes of emesis, and when she went to stand she reports she passed out.  reports paitent was out few seconds. Reports she has severe acid reflux and has recurrent episodes of emesis. Does state this is her third syncopal event this year.    Cardiac MRI from 06/12/2023    1. The LV is normal in cavity size and wall thickness. The global systolic function is moderately reduced.  The LVEF is 32%. There is global hypokinesis.     2. The RV is normal in cavity size. The global systolic function is mildly reduced. The RVEF is 45%        Past Medical History  Past Medical History:   Diagnosis Date    Acute bilateral low back pain with right-sided sciatica 06/02/2016    Allergic rhinitis, cause unspecified     Arthritis     Autoimmune disease (H24)     Autoimmune disease NEC     Autoimmune disease- unknown/poss SLE    Calcaneal spur 10/21/2014    Xray 10/17/14     CHF with cardiomyopathy (H)     Chronic low back pain     DDD (degenerative disc disease), lumbar     Depression     Failed total knee arthroplasty, initial encounter  01/17/2019    Familial cardiomyopathy (H)     GERD without esophagitis     History of transfusion     Hypertension     Injury of left shoulder, initial encounter 01/12/2017    Morbid obesity (H)     Nontraumatic rupture of quadriceps tendon, left 06/21/2018    KATIA (obstructive sleep apnea)- moderate-severe (AHI 29) 8/26/2021    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     no longer an issue    Peroneus longus tendinitis 01/02/2015    Plantar  fasciitis 2014    PONV (postoperative nausea and vomiting)     Rotator cuff injury 2017    Sprain of other ligament of left ankle, initial encounter 2017    Status post left knee replacement 2018    TB lung, latent     negative quantiferon gold test  12     Past Surgical History:   Procedure Laterality Date    ARTHROPLASTY REVISION KNEE Left 2019    Procedure: REVISION, LEFT TOTAL KNEE  ARTHROPLASTY;  Surgeon: Dev Rocha MD;  Location: [facility] OR;  Service: Orthopedics    ARTHROPLASTY REVISION KNEE Right 2020    Procedure: RIGHT REVISION TOTAL KNEE ARTHROPLASTY;  Surgeon: Dev Rocha MD;  Location: [facility] OR;  Service: Orthopedics    BREAST SURGERY      Breast Reduction    C EXCISE EXCESS SKIN TISSUE,ABDOMEN       SECTION  1989     SECTION  10/1985    COLONOSCOPY N/A 2023    Procedure: Colonoscopy;  Surgeon: Simone Jansen MD;  Location: [facility]    COLONOSCOPY WITH CO2 INSUFFLATION N/A 2021    Procedure: COLONOSCOPY, WITH CO2 INSUFFLATION;  Surgeon: Angela Harrington DO;  Location: [facility] OR    COLONOSCOPY WITH CO2 INSUFFLATION N/A 2022    Procedure: COLONOSCOPY, WITH CO2 INSUFFLATION;  Surgeon: Nando Whitlock MD;  Location: [facility] OR    CRANIECTOMY Right 2021    Procedure: RIGHT MIDDLE FOSSA APPROACH, CSF LEAK REPAIR;  Surgeon: Jocelyn Noe MD;  Location: [facility]    CRANIOTOMY MIDDLE FOSSA, EXCISE ACOUSTIC NEUROMA, COMBINED N/A 2021    Procedure: Right CRANIOTOMY, MIDDLE FOSSA APPROACH, FOR REPAIR OF ENCEPHALOCELE;  Surgeon: Jocelyne Harrell MD;  Location: [facility] OR    CV RIGHT HEART CATH MEASUREMENTS RECORDED N/A 1/10/2023    Procedure: Heart Cath Right Heart Cath;  Surgeon: Slade Hanson MD;  Location: [facility]    CYSTOURETHROSCOPY N/A 2020    Procedure: CYSTOSCOPY;  Surgeon: Cherelle Willams MD;  Location: [facility] OR;   Service: Gynecology    GYN SURGERY N/A 08/18/2020    Procedure: MIDURETHRAL SLING, CYSTOSCOPY;  Surgeon: Cherelle Willams MD;  Location: [facility] OR;  Service: Gynecology    HYSTERECTOMY TOTAL ABDOMINAL  2006    for fibroids; reports having blood transfusion after surgery    INSERT DRAIN LUMBAR N/A 07/05/2021    Procedure: Insert drain lumbar;  Surgeon: Jocelyn Noe MD;  Location:  OR    ORTHOPEDIC SURGERY  1998    Right Knee ACL repair    THYROIDECTOMY  09/09/2013    Procedure: THYROIDECTOMY;  LEFT THYROID LOBECTOMY.  (LIGASURE, RECURRENT LARYNGEAL NERVE MONITOR) ;  Surgeon: Uriah Camargo MD;  Location: [facility]    THYROIDECTOMY      TOTAL KNEE ARTHROPLASTY Left 10/03/2017    TOTAL KNEE ARTHROPLASTY Right 02/07/2019    Procedure: RIGHT TOTAL KNEE ARTHROPLASTY;  Surgeon: Dev Rocha MD;  Location: [facility];  Service: Orthopedics    TUBAL LIGATION  1989    ZZC EXCIS UTERINE FIBROID,ABD APPRCH  1999     bisacodyl (DULCOLAX) 5 MG EC tablet  Cholecalciferol (VITAMIN D) 2000 UNIT tablet  dapagliflozin (FARXIGA) 10 MG TABS tablet  FLAXSEED, LINSEED, PO  furosemide (LASIX) 20 MG tablet  loratadine (CLARITIN) 10 MG tablet  metoprolol succinate ER (TOPROL XL) 25 MG 24 hr tablet  omeprazole (PRILOSEC) 20 MG DR capsule  pantoprazole (PROTONIX) 40 MG EC tablet  progesterone (PROMETRIUM) 100 MG capsule  sacubitril-valsartan (ENTRESTO)  MG per tablet  solifenacin (VESICARE) 5 MG tablet  spironolactone (ALDACTONE) 25 MG tablet  sucralfate (CARAFATE) 1 GM tablet      Allergies   Allergen Reactions    Morphine      EMESIS    Nickel     Sulfa Antibiotics Swelling     Family History  Family History   Problem Relation Age of Onset    Hypertension Father         dec    Diabetes Father         dec    Heart Disease Father         dec    Alcohol/Drug Father     Cardiovascular Father     Heart Disease Mother         dec    Alcohol/Drug Mother     Cardiovascular Mother      Heart Disease Daughter         Cardiomyopathy    Cardiovascular Daughter         cardiomyopathy    Colon Cancer Sister     Cardiovascular Son         cardiomyopathy    Diabetes Paternal Grandmother         dec    Hypertension Paternal Grandmother         dec    Cerebrovascular Disease Paternal Grandmother         dec    Cancer Sister         Lupus    Cardiovascular Sister         cardiomyopathy    Heart Disease Sister         heart failure, and kidney failure     Social History   Social History     Tobacco Use    Smoking status: Never    Smokeless tobacco: Never   Vaping Use    Vaping Use: Never used   Substance Use Topics    Alcohol use: Yes     Comment: rare, twice a year, she has a drink little wine 2 days ago    Drug use: No         A medically appropriate review of systems was performed with pertinent positives and negatives noted in the HPI, and all other systems negative.    Physical Exam   BP: 122/77  Pulse: 54  Temp: 98  F (36.7  C)  Resp: 18  SpO2: 98 %  Physical Exam  Vitals and nursing note reviewed.   HENT:      Head: Normocephalic and atraumatic.      Right Ear: External ear normal.      Left Ear: External ear normal.      Nose: Nose normal.      Mouth/Throat:      Mouth: Mucous membranes are moist.   Eyes:      Conjunctiva/sclera: Conjunctivae normal.   Neck:      Vascular: No carotid bruit or JVD.   Cardiovascular:      Rate and Rhythm: Regular rhythm. Bradycardia present.      Pulses: Normal pulses.           Radial pulses are 2+ on the right side and 2+ on the left side.        Posterior tibial pulses are 2+ on the right side and 2+ on the left side.      Heart sounds: Normal heart sounds.   Pulmonary:      Effort: Pulmonary effort is normal. No respiratory distress.      Breath sounds: Normal breath sounds. No rhonchi.   Abdominal:      General: Abdomen is flat. Bowel sounds are normal.      Palpations: Abdomen is soft.      Tenderness: There is no abdominal tenderness.   Musculoskeletal:          General: Normal range of motion.      Cervical back: Normal range of motion and neck supple.      Right lower leg: Edema present.      Left lower leg: Edema present.   Neurological:      Mental Status: She is alert and oriented to person, place, and time.   Psychiatric:         Mood and Affect: Mood normal.           ED Course, Procedures, & Data     ED Course as of 10/18/23 1846   Wed Oct 18, 2023   1442 Spoke with patients about labs results. She reports she would like to have her endoscopy and GI work up for her acid reflux before AICD placement.      Procedures            EKG Interpretation:      Interpreted by PITO Bradley CNP  Time reviewed: 1243  Symptoms at time of EKG: None   Rhythm: sinus bradycardia  Rate: Bradycardia  Axis: Normal  Ectopy: none  Conduction: left bundle branch block (incomplete)  ST Segments/ T Waves: No acute ischemic changes  Q Waves: none  Comparison to prior: Unchanged from 10/10/2023  Clinical Impression: no acute changes          Results for orders placed or performed during the hospital encounter of 10/18/23   XR Chest 2 Views     Status: None    Narrative    Exam: XR CHEST 2 VIEWS, 10/18/2023 2:51 PM    Indication: Hx CHF, syncopal event.    Comparison: Chest x-ray 8/24/2020    Findings:   AP and lateral upright views of the chest were obtained. Stable  cardiomediastinal silhouette. Midline trachea. Clear costophrenic  angles. Distinct pulmonary vasculature. No focal pulmonary opacities.  No acute osseous abnormality.      Impression    Impression: No acute airspace disease.    I have personally reviewed the examination and initial interpretation  and I agree with the findings.    CONCETTA WALKER MD         SYSTEM ID:  [id number]   Comprehensive metabolic panel     Status: Normal   Result Value Ref Range    Sodium 138 135 - 145 mmol/L    Potassium 4.2 3.4 - 5.3 mmol/L    Carbon Dioxide (CO2) 23 22 - 29 mmol/L    Anion Gap 12 7 - 15 mmol/L    Urea Nitrogen 15.0 8.0 - 23.0  mg/dL    Creatinine 0.82 0.51 - 0.95 mg/dL    GFR Estimate 82 >60 mL/min/1.73m2    Calcium 8.8 8.6 - 10.0 mg/dL    Chloride 103 98 - 107 mmol/L    Glucose 98 70 - 99 mg/dL    Alkaline Phosphatase 60 35 - 104 U/L    AST 26 0 - 45 U/L    ALT 19 0 - 50 U/L    Protein Total 7.2 6.4 - 8.3 g/dL    Albumin 4.0 3.5 - 5.2 g/dL    Bilirubin Total 0.4 <=1.2 mg/dL   Troponin T, High Sensitivity     Status: Normal   Result Value Ref Range    Troponin T, High Sensitivity <6 <=14 ng/L   Magnesium     Status: Normal   Result Value Ref Range    Magnesium 1.8 1.7 - 2.3 mg/dL   Nt probnp inpatient (BNP)     Status: Normal   Result Value Ref Range    N terminal Pro BNP Inpatient 183 0 - 900 pg/mL   CBC with platelets and differential     Status: None   Result Value Ref Range    WBC Count 5.1 4.0 - 11.0 10e3/uL    RBC Count 4.37 3.80 - 5.20 10e6/uL    Hemoglobin 13.6 11.7 - 15.7 g/dL    Hematocrit 41.5 35.0 - 47.0 %    MCV 95 78 - 100 fL    MCH 31.1 26.5 - 33.0 pg    MCHC 32.8 31.5 - 36.5 g/dL    RDW 12.7 10.0 - 15.0 %    Platelet Count 255 150 - 450 10e3/uL    % Neutrophils 41 %    % Lymphocytes 46 %    % Monocytes 8 %    Mids % (Monos, Eos, Basos)      % Eosinophils 4 %    % Basophils 1 %    % Immature Granulocytes 0 %    NRBCs per 100 WBC 0 <1 /100    Absolute Neutrophils 2.1 1.6 - 8.3 10e3/uL    Absolute Lymphocytes 2.4 0.8 - 5.3 10e3/uL    Absolute Monocytes 0.4 0.0 - 1.3 10e3/uL    Mids Abs (Monos, Eos, Basos)      Absolute Eosinophils 0.2 0.0 - 0.7 10e3/uL    Absolute Basophils 0.0 0.0 - 0.2 10e3/uL    Absolute Immature Granulocytes 0.0 <=0.4 10e3/uL    Absolute NRBCs 0.0 10e3/uL   EKG 12 lead     Status: None (Preliminary result)   Result Value Ref Range    Systolic Blood Pressure  mmHg    Diastolic Blood Pressure  mmHg    Ventricular Rate 51 BPM    Atrial Rate 51 BPM    TN Interval 146 ms    QRS Duration 108 ms     ms    QTc 423 ms    P Axis 48 degrees    R AXIS -21 degrees    T Axis -21 degrees    Interpretation ECG        Sinus bradycardia  Incomplete left bundle branch block  Nonspecific ST and T wave abnormality  Abnormal ECG     Echo Complete     Status: None   Result Value Ref Range    Biplane LVEF 34%     LVEF  30-35% (moderately reduced)     [facility]    [id number]  [facility]  [id number]  [id number]^TATY^CARLOS     [facility],[place]  Echocardiography Laboratory  [address] [place] [address]     Name: BALTAZAR OREILLY  MRN: [id number]  : 1965  Study Date: 10/18/2023 01:45 PM  Age: 58 yrs  Gender: Female  Patient Location: [facility]  Reason For Study: Syncope  Ordering Physician: CARLOS POSEY  Performed By: Erica Camargo RDCS     BSA: 2.1 m2  Height: 64 in  Weight: 231 lb  ______________________________________________________________________________  Procedure  Complete Portable Echo Adult. Contrast Optison. Optison (NDC #4007-1370-37)  given intravenously. Patient was given 6 ml mixture of 3 ml Optison and 6 ml  saline. 3 ml wasted.  ______________________________________________________________________________  Interpretation Summary  Left ventricular function is decreased. The ejection fraction is 30-35%  (moderately reduced).Severe left ventricular dilation is present. LVIDd 7cm  The right ventricle is normal size. Global right ventricular function is  normal.  IVC diameter <2.1 cm collapsing >50% with sniff suggests a normal RA pressure  of 3 mmHg.  No pericardial effusion is present.  No significant changes noted.  ______________________________________________________________________________  Left Ventricle  Left ventricular size is normal. Biplane LVEF is 34%. Severe left ventricular  dilation is present. LVIDd 7cm. Left ventricular function is decreased. The  ejection fraction is 30-35% (moderately reduced). Left ventricular diastolic  function is indeterminate. Severe diffuse hypokinesis is present.     Right Ventricle  The right ventricle is normal size. Global right  ventricular function is  normal.     Atria  Mild left atrial enlargement is present.     Mitral Valve  Trace mitral insufficiency is present.     Aortic Valve  The aortic valve is tricuspid. Trace aortic insufficiency is present.     Tricuspid Valve  Trace tricuspid insufficiency is present. The right ventricular systolic  pressure is 13mmHg above the right atrial pressure. Pulmonary artery systolic  pressure is normal.     Vessels  The aorta root is normal. The thoracic aorta is normal. IVC diameter <2.1 cm  collapsing >50% with sniff suggests a normal RA pressure of 3 mmHg.     Pericardium  No pericardial effusion is present.     Compared to Previous Study  No significant changes noted.  ______________________________________________________________________________  MMode/2D Measurements & Calculations  IVSd: 0.83 cm     LVIDd: 7.0 cm  LVIDs: 5.6 cm  LVPWd: 0.77 cm  FS: 20.2 %  LV mass(C)d: 246.4 grams  LV mass(C)dI: 118.5 grams/m2  Ao root diam: 3.1 cm  asc Aorta Diam: 3.2 cm  LVOT diam: 2.3 cm  LVOT area: 4.1 cm2     EF(MOD-bp): 33.9 %  Ao root diam index Ht(cm/m): 1.9  Ao root diam index BSA (cm/m2): 1.5  Asc Ao diam index BSA (cm/m2): 1.5  Asc Ao diam index Ht(cm/m): 2.0  LA Volume (BP): 78.1 ml     LA Volume Index (BP): 37.5 ml/m2  RWT: 0.22     Doppler Measurements & Calculations  MV E max nick: 83.7 cm/sec  MV A max nick: 56.2 cm/sec  MV E/A: 1.5  MV dec time: 0.20 sec  TR max nick: 179.7 cm/sec  TR max P.9 mmHg  E/E' av.8  Lateral E/e': 12.6  Medial E/e': 13.0  RV S Nick: 12.3 cm/sec     ______________________________________________________________________________  Report approved by: Luis Manuel Chisholm 10/18/2023 03:04 PM         CBC with platelets differential     Status: None    Narrative    The following orders were created for panel order CBC with platelets differential.  Procedure                               Abnormality         Status                     ---------                                -----------         ------                     CBC with platelets and d...[[id number]]                      Final result                 Please view results for these tests on the individual orders.     Medications   pantoprazole (PROTONIX) IV push injection 40 mg (40 mg Intravenous $Given 10/18/23 1305)   perflutren diluted 1mL to 2mL with saline (OPTISON) diluted injection 6 mL (6 mLs Intravenous $Given 10/18/23 6606)   sodium chloride (PF) 0.9% PF flush 6 mL (6 mLs Intravenous $Given 10/18/23 1423)     Labs Ordered and Resulted from Time of ED Arrival to Time of ED Departure   COMPREHENSIVE METABOLIC PANEL - Normal       Result Value    Sodium 138      Potassium 4.2      Carbon Dioxide (CO2) 23      Anion Gap 12      Urea Nitrogen 15.0      Creatinine 0.82      GFR Estimate 82      Calcium 8.8      Chloride 103      Glucose 98      Alkaline Phosphatase 60      AST 26      ALT 19      Protein Total 7.2      Albumin 4.0      Bilirubin Total 0.4     TROPONIN T, HIGH SENSITIVITY - Normal    Troponin T, High Sensitivity <6     MAGNESIUM - Normal    Magnesium 1.8     NT PROBNP INPATIENT - Normal    N terminal Pro BNP Inpatient 183     CBC WITH PLATELETS AND DIFFERENTIAL    WBC Count 5.1      RBC Count 4.37      Hemoglobin 13.6      Hematocrit 41.5      MCV 95      MCH 31.1      MCHC 32.8      RDW 12.7      Platelet Count 255      % Neutrophils 41      % Lymphocytes 46      % Monocytes 8      Mids % (Monos, Eos, Basos)        % Eosinophils 4      % Basophils 1      % Immature Granulocytes 0      NRBCs per 100 WBC 0      Absolute Neutrophils 2.1      Absolute Lymphocytes 2.4      Absolute Monocytes 0.4      Mids Abs (Monos, Eos, Basos)        Absolute Eosinophils 0.2      Absolute Basophils 0.0      Absolute Immature Granulocytes 0.0      Absolute NRBCs 0.0       XR Chest 2 Views   Final Result   Impression: No acute airspace disease.      I have personally reviewed the examination and initial  interpretation   and I agree with the findings.      CONCETTA WALKER MD            SYSTEM ID:  [id number]      Echo Complete   Final Result             Critical care was not performed.     Medical Decision Making  The patient's presentation was of high complexity (a chronic illness severe exacerbation, progression, or side effect of treatment).    The patient's evaluation involved:  review of external note(s) from 2 sources (hospital discharge summary, cardiology clinic notes)  review of 3+ test result(s) ordered prior to this encounter (CBC, CMP, BNP)  ordering and/or review of 3+ test(s) in this encounter (see separate area of note for details)  independent interpretation of testing performed by another health professional (CXR)  discussion of management or test interpretation with another health professional (electrophysiology)    The patient's management necessitated high risk (a decision regarding major procedure (ICD placement)).    Assessment & Plan    58 year old female with a  past medical history notable for HTN, cardiomyopathy, latent TB, GERD who presents today referred in by cardiology clinic with report of syncopal episode last night.    Clinically, patient appears nontoxic and in no acute distress. Vital signs with stable blood pressure, without tachycardia.  Patient has previously documented bradycardia which is present today. Otherwise on examination, her lungs are clear to auscultation, cardiac exam negative for murmurs.  Radial pulses 2+ and equal, DP pulses 2+ bilaterally equal.      Ddx includes, but not limited to vasovagal syncope, arrhythmia due to underlying heart failure and cardiomyopathy, orthostasis.    She had an IV placed, and a, CMP were unremarkable.  Her troponin was negative.  BNP, CMP, troponin, magnesium and BNP obtained.  Also was able to obtain a cardiac echo as well as chest x-ray.    CBC and CMP were unremarkable. BNP is not elevated and is slightly improved from 8 months  ago.  Magnesium was within normal limits.  I did personally interpret the chest x-ray images, and agree with the final radiology report at this time there is no signs of opacities or significant findings that would be consistent with pulmonary edema.  Her cardiac echo today does show decreasing ejection fraction of 30 to 35%.    I did speak to the patient's outpatient cardiologist about the patient who did recommend having electrophysiology see patient due to concern over need for AICD placement.  Please see electrophysiology note for the full recommendations.  In short patient can be discharged home today, with electrophysiology clinic reaching out to schedule her AICD placement.     At this time with her reassuring work up, suspect possible orthostasis or vasovagal syncope as it occurred after multiple episodes of emesis and when she was changing positions.    I have reviewed the nursing notes. I have reviewed the findings, diagnosis, plan and need for follow up with the patient.    Discharge Medication List as of 10/18/2023  5:44 PM          Final diagnoses:   Syncope, unspecified syncope type         PITO Bradley CNP  [facility] EMERGENCY DEPARTMENT  10/18/2023     Freddy Roach APRN CNP  10/18/23 1848

## 2023-10-18 NOTE — DISCHARGE INSTRUCTIONS
TODAY'S VISIT:  - You were seen today for syncope    - If you had any labs or imaging/radiology tests performed today, you should also discuss these tests with your usual provider.     - Emergency Department testing is focused on the potential causes of your symptoms that are the most dangerous possibilities and cannot cover every possibility. Based on the evaluation, it was deemed sufficiently safe to discharge and continue management through the clinics. Thus, follow-up is very important to assess for improvement/worsening, potential further testing, and potential treatment adjustments.       - Have your provider review the results from today's visit with you again to make sure no further follow-up or additional testing is needed based on those results.       RETURN TO THE EMERGENCY DEPARTMENT  - Return to the Emergency Department at any time for any new or worsening symptoms or any concerns.

## 2023-10-19 LAB
ATRIAL RATE - MUSE: 51 BPM
DIASTOLIC BLOOD PRESSURE - MUSE: NORMAL MMHG
INTERPRETATION ECG - MUSE: NORMAL
P AXIS - MUSE: 48 DEGREES
PR INTERVAL - MUSE: 146 MS
QRS DURATION - MUSE: 108 MS
QT - MUSE: 460 MS
QTC - MUSE: 423 MS
R AXIS - MUSE: -21 DEGREES
SYSTOLIC BLOOD PRESSURE - MUSE: NORMAL MMHG
T AXIS - MUSE: -21 DEGREES
VENTRICULAR RATE- MUSE: 51 BPM

## 2023-10-23 ENCOUNTER — TELEPHONE (OUTPATIENT)
Dept: NEUROLOGY | Facility: CLINIC | Age: 58
End: 2023-10-23
Payer: COMMERCIAL

## 2023-10-24 ENCOUNTER — OFFICE VISIT (OUTPATIENT)
Dept: NEUROLOGY | Facility: CLINIC | Age: 58
End: 2023-10-24
Attending: OTOLARYNGOLOGY
Payer: COMMERCIAL

## 2023-10-24 VITALS — SYSTOLIC BLOOD PRESSURE: 109 MMHG | OXYGEN SATURATION: 99 % | DIASTOLIC BLOOD PRESSURE: 73 MMHG | HEART RATE: 70 BPM

## 2023-10-24 DIAGNOSIS — Z98.890 S/P CRANIOTOMY: ICD-10-CM

## 2023-10-24 DIAGNOSIS — R51.9 CHRONIC HEADACHE WITH NEW FEATURES: ICD-10-CM

## 2023-10-24 DIAGNOSIS — R29.818 LHERMITTE'S SIGN POSITIVE: Primary | ICD-10-CM

## 2023-10-24 DIAGNOSIS — G44.53 THUNDERCLAP HEADACHE: ICD-10-CM

## 2023-10-24 DIAGNOSIS — G89.29 CHRONIC HEADACHE WITH NEW FEATURES: ICD-10-CM

## 2023-10-24 PROCEDURE — 99417 PROLNG OP E/M EACH 15 MIN: CPT | Performed by: PSYCHIATRY & NEUROLOGY

## 2023-10-24 PROCEDURE — 99215 OFFICE O/P EST HI 40 MIN: CPT | Performed by: PSYCHIATRY & NEUROLOGY
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
## 2023-10-24 NOTE — NURSING NOTE
"Marleen Mcfadden is a 58 year old female who presents for:  Chief Complaint   Patient presents with    Headache     Ref: Julio Cesar Casanova. Roughly 12 headaches a week or so - Waxing and waning headaches daily. Pulsing like feeling in terms of coming and going. Does not stay in the same place. Noticed once when she was stressed/angry she felt an electrical feeling flow through her body. Medication: no prescriptions but Motrin some times.         Initial Vitals:  LMP 10/19/2008 (Approximate)  Estimated body mass index is 39.53 kg/m  as calculated from the following:    Height as of 7/19/23: 1.628 m (5' 4.09\").    Weight as of 10/13/23: 104.8 kg (231 lb).. There is no height or weight on file to calculate BSA. BP completed using cuff size: martine Gupta CMA    "

## 2023-10-27 ENCOUNTER — ANCILLARY PROCEDURE (OUTPATIENT)
Dept: CT IMAGING | Facility: CLINIC | Age: 58
End: 2023-10-27
Attending: PSYCHIATRY & NEUROLOGY
Payer: COMMERCIAL

## 2023-10-27 DIAGNOSIS — G44.53 THUNDERCLAP HEADACHE: ICD-10-CM

## 2023-10-27 PROCEDURE — 70498 CT ANGIOGRAPHY NECK: CPT | Mod: GC | Performed by: RADIOLOGY

## 2023-10-27 PROCEDURE — 70496 CT ANGIOGRAPHY HEAD: CPT | Mod: GC | Performed by: RADIOLOGY

## 2023-10-27 RX ORDER — IOPAMIDOL 755 MG/ML
70 INJECTION, SOLUTION INTRAVASCULAR ONCE
Status: COMPLETED | OUTPATIENT
Start: 2023-10-27 | End: 2023-10-27

## 2023-10-27 RX ADMIN — IOPAMIDOL 70 ML: 755 INJECTION, SOLUTION INTRAVASCULAR at 07:10

## 2023-11-01 ENCOUNTER — LAB (OUTPATIENT)
Dept: LAB | Facility: CLINIC | Age: 58
End: 2023-11-01
Payer: COMMERCIAL

## 2023-11-01 DIAGNOSIS — G44.53 THUNDERCLAP HEADACHE: ICD-10-CM

## 2023-11-01 LAB
CRP SERPL-MCNC: <3 MG/L
ERYTHROCYTE [SEDIMENTATION RATE] IN BLOOD BY WESTERGREN METHOD: 21 MM/HR (ref 0–30)

## 2023-11-01 PROCEDURE — 85652 RBC SED RATE AUTOMATED: CPT

## 2023-11-01 PROCEDURE — 86038 ANTINUCLEAR ANTIBODIES: CPT

## 2023-11-01 PROCEDURE — 86140 C-REACTIVE PROTEIN: CPT

## 2023-11-01 PROCEDURE — 36415 COLL VENOUS BLD VENIPUNCTURE: CPT

## 2023-11-02 LAB
ANA PAT SER IF-IMP: ABNORMAL
ANA SER QL IF: POSITIVE
ANA TITR SER IF: ABNORMAL {TITER}

## 2023-11-05 ENCOUNTER — HEALTH MAINTENANCE LETTER (OUTPATIENT)
Age: 58
End: 2023-11-05

## 2023-11-06 DIAGNOSIS — I50.32 CHRONIC DIASTOLIC CONGESTIVE HEART FAILURE (H): ICD-10-CM

## 2023-11-08 RX ORDER — SPIRONOLACTONE 25 MG/1
50 TABLET ORAL DAILY
Qty: 180 TABLET | Refills: 1 | Status: SHIPPED | OUTPATIENT
Start: 2023-11-08

## 2023-11-08 NOTE — TELEPHONE ENCOUNTER
[facility], [id number] [facility] [id number] [phone]      FOLLOW UP NOTE      PATIENT: Marcellus Rivera    MEDICAL RECORD NUMBER: [id number]    YOB: 2005  AGE: 13 year old    Date: 4/29/2019   Time: 11:45 AM        TYPE OF APPOINTMENT:  Medication management with psychotherapy.    TOTAL TIME SPENT:   Greater than 50% of this 25 minute face-to-face visit spent in counseling the patient and parents about appropriate changes in medications and importance of medication compliance.    RECORDS/ DATA REVIEWED: Vitals, Medications, Allergies    Subjective:  Pt presents to [facility] with Dad.    Mood/ADHD:  No changes in mood since last visit. No safety concerns. Pt feels he continues to struggle with motivation and feels this is the sole contributor to his low grades. He doesn't feel focusing is an issue, feels he is able to follow what the teacher is saying, can follow directions, can complete projects if he wants. However, feels down and just doesn't feel he has motivation to do anything. Discussed [place]yanne results (see below) and Dad feels sxs are consistent with what he sees at home. Teacher also notes pt can apply himself, but chooses not to. Discussed tx for depression and that pt has had made a little progress thus far in therapy, but Dad would like to intervene pharmacologically given that he himself recently started an antidepressant and feels it has been 'life changing.' Discussed that therapy may take more time, but pt and Dad would not like to wait to start med. They will continue therapy.     Predominantly Inattentive subtype   Must score a 2 or 3 on 6 out of 9 items on questions 1-9 AND   Score a 4 or 5 on any of the Performance questions 36-43   Pt scores 7/9   Predominantly Hyperactive/Impulsive subtype   Must score a 2 or 3 on 6 out of 9 items on questions 10-18 AND   Score a 4 or 5 on any of the Performance  spironolactone (ALDACTONE) 25 MG tablet 180 tablet 0 10/21/2022     Last Office Visit: 6/13/23  Future Office visit:  12/11/23     Refill protocol passed  Ro Garcias RN     questions 36-43   Pt scores 1/9   ADHD Combined Inattention/Hyperactivity   Requires the above criteria on both inattention and hyperactivity/impulsivity   Oppositional-Defiant/Conduct Disorder Screen   Must score a 2 or 3 on 3 out of 10 items on questions 19-28 AND   Score a 4 or 5 on any of the Performance questions 36-43   Pt scores 0/10  Anxiety/Depression Screen   Must score a 2 or 3 on 3 out of 7 items on questions 29-35 AND   Score a 4 or 5 on any of the Performance questions 36-43  Pt scores 0/9    Pt scores a 0 for academic performance and 3 for classroom behavior performance.    PSYCHOTHERAPY: 16 minutes providing psychoeducation discussing diagnostic impressions, illness symptomatology, course, prognosis, and treatment options.    There are no active problems to display for this patient.    No past medical history on file.  Patient's medications, allergies, and past medical history were reviewed and have been updated when appropriate     REVIEW OF SYSTEMS:  Constitutional: Denies fever.          ROS otherwise negative except for any noted above.     ALLERGIES:  Not on File    MEDICATIONS:  Current Outpatient Medications   Medication Sig Dispense Refill   • escitalopram (LEXAPRO) 5 MG tablet Take 1 tablet by mouth at bedtime. 30 tablet 0     No current facility-administered medications for this visit.        UPDATES TO FAMILY AND SOCIAL HISTORY:  Updates in italics, otherwise, copied from previous note:    Past Psychiatric History:  Previous Diagnosis: Depression  Therapist:[]  Never had therapy [x] previously in therapy [x] Currently followed by Rhea  Previous Psychiatrist: [x]none   []       Previous Psychiatric Hospitalizations: [x]none  [] other-  Previous Suicide Attempts and Self-Injurious Behavior: None, unless indicated above  Past Psychiatric Medications: None    FAMILY MEDICAL AND PSYCHIATRIC HISTORY:  Mom has bipolar disorder, anxiety, and substance use disorder  Bio sister has bipolar  disorder  Dad feels several other family members from Mom's side have bipolar.   Cousin with ADHD  Dad has depression and started Mirtazapine    SOCIAL HISTORY:   Living Situation:Pt lives with Dad, step Mom, step brother, paternal grandfather. Minimal contact with bio Mom. Dad has sole custody for past 7 yrs.  Support level:Good  Relationships:  Sexual Activity: N/A  No sexual orientation or gender identity issues.  No bullying at school or on social media.    Trauma History/ Prior DCF involvement: DCFS was involved several yrs ago when Dad was gaining sole custody of him and Mom had heroine addict.    School: 10th grader at [employer], no 504 or IEP    Legal History: none    Habits:Alcohol/Drugs/Smoking: As noted in the HPI    Suicide Risk Assessment  YES NO If yes, describe      x Suicide attempt in last 24 hour?      x Suicidal thoughts?      x Plan or considering various methods? If so, Describe:       x Access to means? If so- Specify weapon location       x Indication of substance abuse/dependence?      x Attempts in past?  How many?  Date of most recent:   Method used?       x Any family members, loved ones, friends who committed suicide?      x Recent deaths, losses, anniversary dates?      x Has made preparations for death?      x Lack of support system?    x    Verbal contract for safety?   x    Patient has no current intent,or plan, but agrees to contact provider if suicidal ideation arises.    x   Patient given emergency 24 hour access information.      VIOLENCE/HOMICIDE POTENTIAL   YES  NO  If yes, describe current or past violence    [ ]  [X]  Threat made to harm or kill someone?   A specific individual? Name:     [ ]  [X]  Access to weapon? Where is weapon?    [ ]  [X]  History of violence/aggressive behavior to others?    [ ]  [X]  History of significant damage to property?    [ ]  [X]  Indication of substance abuse/dependence?    [ ]  [X]  Witnessed violence or significant aggression?       VITAL SIGNS:  Visit Vitals  BP 92/62   Pulse 92   Ht 5' 5\" (1.651 m)   Wt (!) 89.8 kg   BMI 32.95 kg/m²       MENTAL STATUS EXAM:  Appearance:  Well-groomed, good eye contact, appears stated age.   Hygiene: Within normal limits  Distress level: minimal  Behavior: calm, pleasant, interactive and cooperative unless noted otherwise here:  [] hyperactive [] fidgety [] guarded and uncooperative [] other:   Gait:  Normal.    Posture: Normal.  Motor: No tics, tremors, or psychomotor agitation/ retardation.  Speech:  Normal rate, tone, and volume.   Language:  No abnormality noted.    Mood: euthymic  Affect: congruent  Thought Process:  Linear, logical, goal-directed.    Thought Content: No overt delusions or abnormality noted.   Perception: No hallucinations. Not responding to internal stimuli.  No intrusive recollections.  Consciousness: Awake and alert.   Orientation: Oriented to person, place and time.   Memory: Good, able to demonstrate accurate historical recall for recent and more remote events and discussions.  Attention:  Good  Concentration: Good   Fund of Knowledge:  Consistent with education and experiences as evidenced by  vocabulary.   Insight:  Good, based on appropriate recognition of impact of psychiatric symptoms and need for treatment.   Judgment:  fair based on appropriate recent decisions.  Safety: No suicidal ideation, intent and plan; denies homicidal ideation, intent and plan.  Motivation to pursue treatment:  Good.    DIAGNOSES:  Major Depressive Disorder, recurrent, mild  Trauma and Stressor Related Disorder  R/o ADHD    ASSESSMENT AND PLAN:    -The patient does not pose any imminent danger of harming self or others; therefore he can be followed as an outpatient.   -Diagnostic impression, prognosis and treatment were discussed with the patient and parents.  -Start Lexapro 5 mg qhs. Will monitor ADHD sxs and consider stimulant if academic concerns persist despite improvement in his  mood.  -Discussed risks/benefits/side effects. Provided parent and patient with medication handout from Helping Parents and Teachers Understand Medications for Behavioral and Emotional Problems by Marciano Gayle MD and Carmencita Canales Md. Thoroughly reviewed this handout together and addressed all questions and concerns.  -Patient and the family were encouraged to cont to see a psychotherapist to help pt adapt to stress, learn coping skills, understand himself/herself, rebuild self-esteem, improve social skills, improve family communication and dynamics, etc.   -I would like to have collaborative approach with the therapist and school.   -Parent has author's contact information and confirmed understanding that author can be called before next appointment if there are any questions or concerns for the author to address.  -Parent was advised on emergency measures, which were understood and agreed to seek emergent care if patient is a danger to self or others.  -Next appointment in 2 weeks.    Medications:   Education: Ready to learn, no apparent learning barriers were identified.  Explained diagnosis/diagnoses and treatment plan. Parent/Guardians and patient expressed understanding of the content and signed consent for treatment and medication (if medications were prescribed).  Disc risks, benefits and alternatives of medications in my typical fashion, including but not limited to:  [x]  increased risk of suicide,   []metabolic risks such as increased weight gain, diabetes and hyperlipidemia,  [] extrapyramidal side effects and Tardive Dyskinesia  [] increase risk of seizures  [] increase risk of drug dependence  []  legal risks []  Death if mixed with alcohol or other depressants  []  Rodriguez Roverto syndrome   [] Other:         Lorenzo Palmer MD

## 2023-11-09 NOTE — RESULT ENCOUNTER NOTE
CRP/ESR normal and this effectively rules out temporal arteritis, but the ARIC is positive and I would recommend that she have further evaluation for this. Will reach out to her with results.

## 2023-11-09 NOTE — RESULT ENCOUNTER NOTE
No cerebral venous sinus thrombosis to explain headaches, but there are enlarged internal jugular veins with known cardiomyopathy. She also has an LVEF of 32%, RVEF 45% and is followed by cardiology for this and an ICD implanted device is planned for bradycardia.

## 2023-11-20 DIAGNOSIS — F40.240 CLAUSTROPHOBIA: Primary | ICD-10-CM

## 2023-11-20 RX ORDER — LORAZEPAM 0.5 MG/1
0.5 TABLET ORAL EVERY 6 HOURS PRN
Qty: 2 TABLET | Refills: 0 | Status: SHIPPED | OUTPATIENT
Start: 2023-11-20 | End: 2024-08-16

## 2023-11-21 ENCOUNTER — ANCILLARY PROCEDURE (OUTPATIENT)
Dept: MRI IMAGING | Facility: CLINIC | Age: 58
End: 2023-11-21
Attending: PSYCHIATRY & NEUROLOGY
Payer: COMMERCIAL

## 2023-11-21 DIAGNOSIS — R29.818 LHERMITTE'S SIGN POSITIVE: ICD-10-CM

## 2023-11-21 PROCEDURE — 72157 MRI CHEST SPINE W/O & W/DYE: CPT | Mod: GC | Performed by: RADIOLOGY

## 2023-11-21 PROCEDURE — A9585 GADOBUTROL INJECTION: HCPCS | Mod: JZ | Performed by: RADIOLOGY

## 2023-11-21 PROCEDURE — 72156 MRI NECK SPINE W/O & W/DYE: CPT | Mod: GC | Performed by: RADIOLOGY

## 2023-11-21 RX ORDER — GADOBUTROL 604.72 MG/ML
10 INJECTION INTRAVENOUS ONCE
Status: COMPLETED | OUTPATIENT
Start: 2023-11-21 | End: 2023-11-21

## 2023-11-21 RX ADMIN — GADOBUTROL 10 ML: 604.72 INJECTION INTRAVENOUS at 16:17

## 2023-11-24 ENCOUNTER — ANCILLARY PROCEDURE (OUTPATIENT)
Dept: MRI IMAGING | Facility: CLINIC | Age: 58
End: 2023-11-24
Attending: PSYCHIATRY & NEUROLOGY
Payer: COMMERCIAL

## 2023-11-24 DIAGNOSIS — R29.818 LHERMITTE'S SIGN POSITIVE: ICD-10-CM

## 2023-11-24 DIAGNOSIS — G44.53 THUNDERCLAP HEADACHE: ICD-10-CM

## 2023-11-24 PROCEDURE — 70553 MRI BRAIN STEM W/O & W/DYE: CPT | Performed by: RADIOLOGY

## 2023-11-24 PROCEDURE — A9585 GADOBUTROL INJECTION: HCPCS | Mod: JZ | Performed by: RADIOLOGY

## 2023-11-24 PROCEDURE — 70546 MR ANGIOGRAPH HEAD W/O&W/DYE: CPT | Performed by: RADIOLOGY

## 2023-11-24 PROCEDURE — 70549 MR ANGIOGRAPH NECK W/O&W/DYE: CPT | Performed by: RADIOLOGY

## 2023-11-24 RX ORDER — GADOBUTROL 604.72 MG/ML
10 INJECTION INTRAVENOUS ONCE
Status: COMPLETED | OUTPATIENT
Start: 2023-11-24 | End: 2023-11-24

## 2023-11-24 RX ADMIN — GADOBUTROL 10 ML: 604.72 INJECTION INTRAVENOUS at 16:26

## 2023-12-03 ENCOUNTER — DOCUMENTATION ONLY (OUTPATIENT)
Dept: NEUROLOGY | Facility: CLINIC | Age: 58
End: 2023-12-03
Payer: COMMERCIAL

## 2023-12-03 NOTE — PROGRESS NOTES
I reviewed her MR imaging studies and their reports which are:    MRI brain with and without contrast  MR head and neck angiogram  MR Cervical and thoracic spine imaging with and without contrast    Studies were negative for any structural explanation for headaches. Lhermitte's could be a result of the questionable myelopathic signal at C5 noted on the cervical spine MRI. The cord is not significantly compressed in this region and I suspect that is why radiology thought it may be artifactual.     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 I would need to see her in return to rule out peripheral processes for this however as her exam was previously normal and not suggestive of a myelopathic process, however the history was  This lesion would not explain any facial numbness however. It would be too low to account for a spinal trigeminal nucleus activation and her right sided facial paresthesias.    I will await the consultation results from our ophthalmology colleagues and will ensure she has follow up following that visit as it is not scheduled at this time.     At this time, it is not clear if there is one unified explanation for her paresthesia and the headaches, or if these are separate entities, but the hand paresthesias on the right could be separate. The headaches were throbbing and severe and there was some nausea, though she suspected not associated with the headaches. They were too short to be consistent with migraine and still consistent with thunderclap headache.     We will discuss next steps for evaluation in follow up.

## 2023-12-05 DIAGNOSIS — I50.32 CHRONIC DIASTOLIC CONGESTIVE HEART FAILURE (H): Primary | ICD-10-CM

## 2023-12-06 ENCOUNTER — TELEPHONE (OUTPATIENT)
Dept: OPHTHALMOLOGY | Facility: CLINIC | Age: 58
End: 2023-12-06
Payer: COMMERCIAL

## 2023-12-06 DIAGNOSIS — I50.22 CHRONIC SYSTOLIC CONGESTIVE HEART FAILURE (H): Chronic | ICD-10-CM

## 2023-12-06 NOTE — TELEPHONE ENCOUNTER
This encounter is being sent to inform the clinic that this patient has a referral from Sissy Bhatia MD in  NEUROLOGY for the diagnoses of Chronic headache with new features [R51.9, G89.29], Papilledema? decreased visual acuity and headaches and has requested that this patient be seen within next available and/or with Dr Ochoa.  Based on the availability of our provider(s), we are unable to accommodate this request.    Were all sites offered this patient?  Yes    Does scheduling algorithm request to schedule next available?  Patient has been scheduled for the first available opening with Dr Ochoa on 1/10/2024.  We have informed the patient that the clinic will review their referral and reach out if a sooner appointment is medically necessary.       Patient would like to be seen sooner possible at [facility] with Dr Ochoa and wants to know if we can squeeze her in. Please call patient back at [phone]. Thank you.

## 2023-12-08 ENCOUNTER — TELEPHONE (OUTPATIENT)
Dept: GASTROENTEROLOGY | Facility: CLINIC | Age: 58
End: 2023-12-08
Payer: COMMERCIAL

## 2023-12-08 RX ORDER — METOPROLOL SUCCINATE 25 MG/1
25 TABLET, EXTENDED RELEASE ORAL DAILY
Qty: 45 TABLET | Refills: 3 | Status: SHIPPED | OUTPATIENT
Start: 2023-12-08 | End: 2024-03-29

## 2023-12-08 NOTE — TELEPHONE ENCOUNTER
Pre visit planning completed.      Procedure details:    Patient scheduled for Upper endoscopy (EGD) on 12/27/23.     Arrival time: 0745. Procedure time 0845    Pre op exam needed? N/A    Facility location: [facility]; [address] [place] [address], [address] Floor, [place] [address]    Sedation type: Conscious sedation     Indication for procedure: dysphagia and refluyx and hiatal hernia epigastric pain       Chart review:     Electronic implanted devices? No    Recent diagnosis of diverticulitis within the last 6 weeks? No    Diabetic? No    Medication review:    Anticoagulants? No    NSAIDS? No NSAID medications per patient's medication list.  RN will verify with pre-assessment call.    Other medication HOLDING recommendations:  Sucralfate (Carafate): HOLD 1 day before procedure.      Prep for procedure:     Bowel prep recommendation: N/A       Prep instructions sent via mychart Corinne Kliber, RN  Endoscopy Procedure Pre Assessment RN  [phone] option 4

## 2023-12-11 ENCOUNTER — OFFICE VISIT (OUTPATIENT)
Dept: CARDIOLOGY | Facility: CLINIC | Age: 58
End: 2023-12-11
Attending: INTERNAL MEDICINE
Payer: COMMERCIAL

## 2023-12-11 ENCOUNTER — LAB (OUTPATIENT)
Dept: LAB | Facility: CLINIC | Age: 58
End: 2023-12-11
Attending: INTERNAL MEDICINE
Payer: COMMERCIAL

## 2023-12-11 VITALS
OXYGEN SATURATION: 98 % | HEART RATE: 67 BPM | DIASTOLIC BLOOD PRESSURE: 75 MMHG | WEIGHT: 232.2 LBS | BODY MASS INDEX: 39.74 KG/M2 | SYSTOLIC BLOOD PRESSURE: 113 MMHG

## 2023-12-11 DIAGNOSIS — I50.32 CHRONIC DIASTOLIC CONGESTIVE HEART FAILURE (H): ICD-10-CM

## 2023-12-11 DIAGNOSIS — I50.22 CHRONIC SYSTOLIC CONGESTIVE HEART FAILURE (H): Primary | ICD-10-CM

## 2023-12-11 LAB
ALBUMIN SERPL BCG-MCNC: 4.3 G/DL (ref 3.5–5.2)
ALP SERPL-CCNC: 63 U/L (ref 40–150)
ALT SERPL W P-5'-P-CCNC: 22 U/L (ref 0–50)
ANION GAP SERPL CALCULATED.3IONS-SCNC: 7 MMOL/L (ref 7–15)
AST SERPL W P-5'-P-CCNC: 18 U/L (ref 0–45)
BILIRUB SERPL-MCNC: 0.3 MG/DL
BUN SERPL-MCNC: 15.3 MG/DL (ref 8–23)
CALCIUM SERPL-MCNC: 9.1 MG/DL (ref 8.6–10)
CHLORIDE SERPL-SCNC: 104 MMOL/L (ref 98–107)
CREAT SERPL-MCNC: 0.86 MG/DL (ref 0.51–0.95)
DEPRECATED HCO3 PLAS-SCNC: 26 MMOL/L (ref 22–29)
EGFRCR SERPLBLD CKD-EPI 2021: 77 ML/MIN/1.73M2
GLUCOSE SERPL-MCNC: 98 MG/DL (ref 70–99)
MAGNESIUM SERPL-MCNC: 1.9 MG/DL (ref 1.7–2.3)
NT-PROBNP SERPL-MCNC: 244 PG/ML (ref 0–900)
POTASSIUM SERPL-SCNC: 3.8 MMOL/L (ref 3.4–5.3)
PROT SERPL-MCNC: 7.5 G/DL (ref 6.4–8.3)
SODIUM SERPL-SCNC: 137 MMOL/L (ref 135–145)

## 2023-12-11 PROCEDURE — 99417 PROLNG OP E/M EACH 15 MIN: CPT | Performed by: INTERNAL MEDICINE

## 2023-12-11 PROCEDURE — 36415 COLL VENOUS BLD VENIPUNCTURE: CPT

## 2023-12-11 PROCEDURE — 83880 ASSAY OF NATRIURETIC PEPTIDE: CPT

## 2023-12-11 PROCEDURE — 80053 COMPREHEN METABOLIC PANEL: CPT

## 2023-12-11 PROCEDURE — 99213 OFFICE O/P EST LOW 20 MIN: CPT | Performed by: INTERNAL MEDICINE

## 2023-12-11 PROCEDURE — 83735 ASSAY OF MAGNESIUM: CPT

## 2023-12-11 PROCEDURE — 99215 OFFICE O/P EST HI 40 MIN: CPT | Performed by: INTERNAL MEDICINE

## 2023-12-11 ASSESSMENT — PAIN SCALES - GENERAL: PAINLEVEL: NO PAIN (0)

## 2023-12-11 NOTE — TELEPHONE ENCOUNTER
Caller: [facility]  Reason for Reschedule/Cancellation (please be detailed, any staff messages or encounters to note?): worsening symptoms      Prior to reschedule please review:  Ordering Provider: Chinyere  Sedation per order: moderate  Does patient have any ASC Exclusions, please identify?: y cardiomyopathy      Notes on Cancelled Procedure:  Procedure: Upper Endoscopy [EGD]   Date: 12/27  Location: [facility]; [address], [address] [place] [address]  Surgeon: Marlen      Rescheduled: Yes  Procedure: Upper Endoscopy [EGD]   Date: 12/13  Location: [facility]; [address], [address], [place] [address]  Surgeon: Marlen  Sedation Level Scheduled  moderate,  Reason for Sedation Level ordered  Prep/Instructions updated and sent: y       Send In - basket message to Panc - Lukasz Pool if EUS  procedure is canceled or rescheduled: [ N/A, YES or NO] n/a

## 2023-12-11 NOTE — LETTER
2023      RE: Marleen Mcfadden  [address]  [place] [address]       Dear Colleague,    Thank you for the opportunity to participate in the care of your patient, Marleen Mcfadden, at the [facility] [place] at [facility]. Please see a copy of my visit note below.    Cardiovascular Genetics Clinic Progress Note     Name: Marleen Mcfadden  : 1965  MRN: [id number]    2023    Dear Dr. Levine and colleages,    I had the pleasure of seeing Ms. 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      As you know, she has a family history of dilated cardiomyopathy including her sister, son, and daughter. 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    I last saw her in clinic on 2023 Her CMR showed an LVEF of 32%, LVEDD 6.1cm, RVEF of 45%, and no fibrosis. Additionally, she had a ziopatch monitor which we reviewed the results in which she had 9.4% PVCs and predominantly sinus rhythm. She saw Ms. Melendez in the interim on 23 and with Dr. Aragon in EP on 10/10/23 where a primary prevention ICD. She was seen in the ED on 10/18/23 for a syncopal episode and was seen by EP, which felt the episode was orthostatic and recommended an outpatient ICD.     She continues to work full time and walks 14-15 steps per day. She has not had exertional dyspnea or chest pain. She has been vomiting 2-3 times per day for the past 2 months and has been having heart burn. She has tried multiple PPIs and gaviscon without much relief. She has worse heart burn when laying down and is propping herself up with 2-3 pillows. She has a sore throat due to the heartburn and  coughing. More recently she has developed bowel incontinence with coughing, which is very distressing for her. She has also been lightheaded and had a presyncopal event after her ED visit. Her weight is stable. She has no edema. She has lightheadedness that is worse when she is vomiting more often. No palpitations. Her appetite and energy levels are reduced. She is feels her quality of life is significantly impacted by her GI symptoms. She also would like to get an ICD after her GI symptoms are resolved.     FAMILY HISTORY: from Ms. Bunch note 2021 and any updates as as above    detailed family history was obtained during today's consult.  Family history was significant for the following cardiac history:  Full sister with DCM. She  in September after developing colon cancer.  Her history was also remarkable for MERSA, kidney disease and ultimate transplant.  She did not have an ICD or much care for her DCM.  Marleen's son and daughter also have DCM.    A maternal half sister has hypertension, epilepsy and reported dizziness and fainting. She does not have a cardiac issue to Marleen's knowledge.  Mother  suddenly at 38 yrs of age.  It was thought to be the result of a cardiac problem. However, she also had a long history of alcoholism and had a colostomy. Her two siblings have no known heart issues.  Maternal grandmother  in her 30's from a brain aneurysm. Nothing is known about maternal grandfather.  Marleen's father  at 55 years after a massive heart attack and CABG procedure.  His death occurred one day after the surgery reportedly from a clot.  One of his sisters  from a stroke.  Paternal grandmother  in her 70's from a stroke.  Her mother (Marleen's great grandmother)  suddenly in her 70's while sitting at a bus stop.Nothing is known about grandfather.  Two paternal half sisters in good health.  There is no additional history of cardiomyopathy, arrhythmias, heart  attacks, fainting, sudden cardiac death, genetic conditions, or birth     REVIEW OF SYSTEMS: 10 point ROS neg other than the symptoms noted above in the HPI.    PAST MEDICAL HISTORY:   1. Familial systolic heart failure with VUS in FLNC   2. KATIA  3. Obesity   4. Encephalocele repair   ALLERGIES:    Allergies   Allergen Reactions     Morphine      EMESIS     Nickel      Sulfa Antibiotics Swelling       MEDICATIONS:   Cholecalciferol (VITAMIN D) 2000 UNIT tablet, Take 5,000 Units by mouth as needed Reported on 3/8/2017  dapagliflozin (FARXIGA) 10 MG TABS tablet, Take 1 tablet (10 mg) by mouth daily  FLAXSEED, LINSEED, PO, Take 1 capsule by mouth daily  furosemide (LASIX) 20 MG tablet, Take 2 tablets (40 mg) by mouth daily as needed (weight gain/edema)  loratadine (CLARITIN) 10 MG tablet, Take 1 tablet (10 mg) by mouth daily  LORazepam (ATIVAN) 0.5 MG tablet, Take 1 tablet (0.5 mg) by mouth every 6 hours as needed for anxiety (take 1 tab 30 min prior to MRI)  metoprolol succinate ER (TOPROL XL) 25 MG 24 hr tablet, Take 1 tablet (25 mg) by mouth daily  omeprazole (PRILOSEC) 20 MG DR capsule, Take 2 capsules (40 mg) by mouth daily for 90 days  pantoprazole (PROTONIX) 40 MG EC tablet, Take 1 tablet (40 mg) by mouth every 12 hours  progesterone (PROMETRIUM) 100 MG capsule, Take 100 mg by mouth at bedtime  sacubitril-valsartan (ENTRESTO)  MG per tablet, Take 1 tablet by mouth 2 times daily  solifenacin (VESICARE) 5 MG tablet, Take 1 tablet (5 mg) by mouth daily at 2 pm  spironolactone (ALDACTONE) 25 MG tablet, Take 2 tablets (50 mg) by mouth daily  sucralfate (CARAFATE) 1 GM tablet, Take 1 tablet (1 g) by mouth 4 times daily    sodium chloride (PF) 0.9% PF flush 10 mL    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     PHYSICAL EXAM:   General: comfortable, conversant, NAD  HEENT: normocephalic, atraumatic, anicteric  Neck: Estimate JVP <6m   CV: RRR, nl s1 and s2, no murmurs, gallops, or rubs   Lungs:  CTAB, no crackles or wheezes, normal work of breathing  Abdomen: BS+, soft, non tender, non distended  Extremities: warm and well perfused, no edema      DIAGNOSTIC TESTING: personally reviewed   Latest Reference Range & Units 12/11/23 10:21   Sodium 135 - 145 mmol/L 137   Potassium 3.4 - 5.3 mmol/L 3.8   Chloride 98 - 107 mmol/L 104   Carbon Dioxide (CO2) 22 - 29 mmol/L 26   Urea Nitrogen 8.0 - 23.0 mg/dL 15.3   Creatinine 0.51 - 0.95 mg/dL 0.86   GFR Estimate >60 mL/min/1.73m2 77   Calcium 8.6 - 10.0 mg/dL 9.1   Anion Gap 7 - 15 mmol/L 7   Magnesium 1.7 - 2.3 mg/dL 1.9   Albumin 3.5 - 5.2 g/dL 4.3   Protein Total 6.4 - 8.3 g/dL 7.5   Alkaline Phosphatase 40 - 150 U/L 63   ALT 0 - 50 U/L 22   AST 0 - 45 U/L 18   Bilirubin Total <=1.2 mg/dL 0.3   Glucose 70 - 99 mg/dL 98   N-Terminal Pro Bnp 0 - 900 pg/mL 244     Echo 10/18/23:   Left ventricular function is decreased. The ejection fraction is 30-35%  (moderately reduced).Severe left ventricular dilation is present. LVIDd 7cm  The right ventricle is normal size. Global right ventricular function is  normal.  IVC diameter <2.1 cm collapsing >50% with sniff suggests a normal RA pressure  of 3 mmHg.  No pericardial effusion is present.  No significant changes noted.      CMR 6/12/2023:   1. The LV is normal in cavity size and wall thickness. The global systolic function is moderately reduced.  The LVEF is 32%. There is global hypokinesis.     2. The RV is normal in cavity size. The global systolic function is mildly reduced. The RVEF is 45%.      3. Both atria are normal in size.     4. There is no significant valvular disease.      5. Late gadolinium enhancement imaging shows no MI, fibrosis or infiltrative disease.      6. There is no pericardial effusion or thickening.     7. There is no intracardiac thrombus.     CONCLUSIONS: Severe, nonischemic cardiomyopathy, today the LV is no longer dilated. LVEF 32% not  significantly changed. RV function is mildly globally  reduced, RVEF 45%, slightly decreased from previous  53%. No LGE.    Ziopatch monitor 2/24-3/3/2023: sinus with 9.4% PVC burden    Echo 12/12/2022: Severe left ventricular dilation is present.LVIDd 70mm.  Biplane LVEF is 50%.  Right ventricular function, chamber size, wall motion, and thickness are  normal.  Both atria appear normal.  Pulmonary artery systolic pressure is normal.  The inferior vena cava is normal.  No pericardial effusion is present.  The left ventricular function has improved.    CMR 6/3/22: 1. The LV is mild-to-moderately dilated in cavity size. The wall thickness is normal. The global systolic  function is severely decreased. The LVEF is 33%. There is severe global hypokinesis.     2. The RV is normal in cavity size. The global systolic function is mildly decreased. The RVEF is 53%.      3. Both atria are normal in size.     4. There is no significant valvular disease.      5. Late gadolinium enhancement imaging shows no MI, fibrosis or infiltrative disease.      6. There is no pericardial effusion or thickening.     7. There is no intracardiac thrombus.     CONCLUSIONS: Severe, non-ischemic dilated cardiomyopathy, most likely of genetic cause. LVEF of 33% and  RVEF of 53% with no LGE. When directly compared to the prior CMR, the LV and RV size and function have not  significantly changed.      CPEX 12/12/22: Key Measurements    Peak VO2 (ml/kg/min) VE/VCO2  [place] RER   14.91        27.61        1.02            Predicted VO2 (ml/kg/min) Predicted VO2 %   26.50        56            Resting Supine BP (mmHg) Resting Standing BP (mmHg) Final Stress BP (mmHg)   106/56        112/58        140/67          Resting Supine HR (bpm) Resting Standing HR (bpm)    44        50             Max HR (bpm) Max Predicted HR (bpm) Max Perdicted HR %   124        162        77            Exercise time (min) Exercise time (sec) Estimated workload (METS)   8        51        4.3              [facility] 1/2023:    Time  Systolic (mmHg) Diastolic (mmHg) Mean (mmHg) A Wave (mmHg) V Wave (mmHg) EDP (mmHg) Max dp/dt (mmHg/sec) HR (bpm) Content (mL/dL) SAT (%)   RA Pressures  1:56 PM   6    10    8      38        RV Pressures  1:56 PM 40        12     37        PA Pressures  1:57 PM 41    4    22        39        PCW Pressures  1:57 PM   11    22    20      39        AO Pressures  2:01     63    91        37          Blood Flow Results Phase: Baseline     Time Results  Indexed Values (L/min/m2)   QP  1:45 PM 2.71 L/min    1.31      QS  1:45 PM 2.71 L/min    1.31        Blood Oximetry Phase: Baseline     Time Hb  SAT(%)  PO2  Content (mL/dL) PA Sat (%)   PA  1:45 PM  62.7 %      62.7      Art  1:45 PM  100 %     17.14         Cardiac Output Phase: Baseline     Time TDCO (L/min) TDCI (L/min/m2) Estrella C.O. (L/min) Estrella C.I. 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   2.  Premature ventricular contractions, palpitations:    She is hypovolemic by exam, which is likely due to volume loss. Her NT pro BNP remains normal. She otherwise is well compensated and has normal end organ function. Given her ongoing vomiting and presyncope, I would like her to hold furosemide and dapagliflozin. She should continue  sacubitril valsartan,  metoprolol, and spironolactone.     Her cardiac MRI shows an LVEF of 32%, LVEDD of 6.1cm, and no fibrosis on neurohormonal therapy.  Comparison to her prior cardiac MRI a year ago the LVEF is unchanged, but the LV end-diastolic dimension has reduced from 6.6cm to 6.1 cm on the current MRI, and the RV function is slightly reduced from 53% to 45%.  We discussed that continuing neurohormonal therapy is important to mitigate the neurohormonal cascade that occurs with heart failure and that there is evidence of some reverse remodeling with her LV end-diastolic dimension. Additionally in her Zio patch monitor in February to March of 2023 she had approximately 9.4% PVC burden otherwise predominantly sinus rhythm without any significant dysrhythmias.  She did subsequently have a syncopal event and again has been having an increase in palpitations. She meets standard ICD criteria based on her ejection fraction less than 35% despite neurohormonal therapies and more recent guidelines for FLNC with an EF of less than 45%.  She understandably expressed many concerns about having a defibrillator.  I have suggested that she meets with Dr. Aragon, to discuss her specific concerns regarding ICD implantation.  I will also communicate with Dr. Aragon about this. She has met with Dr. Aragon regarding ICD for SCD prevention in the setting of a FLNC mutation. She is interested in pursuing an ICD when her GI issue are resolved.     3. Vomiting  4. Heart Burn  She has progressive vomiting and and heart burn symptoms over the past 2 months. She has non ischemic cardiomyopathy and less likely ischemia precipitating these symptoms. She has an EGD scheduled for 12/27 and we will check with GI if she can be seen sooner.       PLAN:   -message to GI, if worsening symptoms go to ER  -hold farxiga  -hold furosemide  -follow up in 3 months with [facility]  -follow up with me in 6 months    70 minutes on DOS for chart review,  counseling, review of diagnostic testing, coordination of care (with GI) and documentation.     Thank you for allowing me to participate in the care of your patient. Please do not hesitate to contact me if you have any questions.     Sincerely,   Forum     Forum MD Jurgen, PhD, FACC  Advanced Heart Failure/Transplantation/MCS  [facility]/[employer]

## 2023-12-11 NOTE — TELEPHONE ENCOUNTER
Attempted to contact patient in order to complete pre assessment questions.     No answer. Left message to return call to [phone] option 4    Missed call communication sent via [place].    Lakisha Krueger RN  Endoscopy Procedure Pre Assessment RN  [phone] option 4

## 2023-12-11 NOTE — PATIENT INSTRUCTIONS
"You were seen today in the Cardiovascular Clinic at the [facility].      Cardiology Providers you saw during your visit:  Dr. Real Seaman     Recommendations:   I will send a message to GI, if worsening symptoms go to ER  Hold farxiga (resume after EGD)  Hold furosemide (resume after EGD)  Follow up in 3 months with [facility], labs prior  Follow up with me in 6 months, labs prior  Have [place] paperwork faxed to [phone] and we will fill it out and send it in.             Thank you for your visit today!   Please MyChart message or call if you have any questions or concerns.      During Business Hours:  [phone], option # 1 \"To leave a message for your care team\"     After hours, weekends or holidays:   [phone], Option #4  Ask to speak to the On-Call Cardiologist. Inform them you are a heart failure patient at the [facility].      Megha Meyer RN BSN   Cardiology Nurse Coordinator - Heart Failure/C.O.R.E. Clinic  [facility]  [phone] option 1 to schedule an appointment or leave a message for your care team      "

## 2023-12-11 NOTE — NURSING NOTE
Labs: Patient was given results of the laboratory testing obtained today. Patient demonstrated understanding of this information and agreed to call with further questions or concerns.     Med Reconcile: Reviewed and verified all current medications with the patient. The updated medication list was printed and given to the patient.    Return Appointment: Patient given instructions regarding scheduling next clinic visit. Patient demonstrated understanding of this information and agreed to call with further questions or concerns. [facility] in 3 months, Dr. Seaman in 6 months.    Medication Change: Patient was educated regarding prescribed medication change, including discussion of the indication, administration, side effects, and when to report to MD or RN. Patient demonstrated understanding of this information and agreed to call with further questions or concerns. Hold Farxiga and lasix until EGD is complete/GI issues resolve.    Send in [facility] paperwork for Dr. Seaman to fill out.    Patient stated she understood all health information given and agreed to call with further questions or concerns.    Megha Meyer, RN

## 2023-12-11 NOTE — TELEPHONE ENCOUNTER
Pre assessment completed for upcoming procedure.   (Please see previous telephone encounter notes for complete details)    Patient  returned call.       Procedure details:    Arrival time and facility location reviewed.    Pre op exam needed? N/A    Designated  policy reviewed. Instructed to have someone stay 6 hours post procedure.     COVID policy reviewed.      Medication review:    Medications reviewed. Please see supporting documentation below. Holding recommendations discussed (if applicable).   Sucralfate (Carafate): HOLD 1 day before procedure.  Patient is taking Farxiga but not for diabetes    Prep for procedure:     Procedure prep instructions reviewed.        Additional information needed?  N/A      Patient  verbalized understanding and had no questions or concerns at this time.      Lakisha Cameron RN  Endoscopy Procedure Pre Assessment RN  [phone] option 4

## 2023-12-11 NOTE — NURSING NOTE
Chief Complaint   Patient presents with    Follow Up     Return Heart Failure     Vitals were taken and medications reconciled.    RADHA Barker  1:15 PM

## 2023-12-11 NOTE — PROGRESS NOTES
Cardiovascular [facility]etics Clinic Progress Note     Name: Marleen Mcfadden  : 1965  MRN: [id number]    2023    Dear Dr. Levine and colleages,    I had the pleasure of seeing Ms. 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      As you know, she has a family history of dilated cardiomyopathy including her sister, son, and daughter. 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    I last saw her in clinic on 2023 Her CMR showed an LVEF of 32%, LVEDD 6.1cm, RVEF of 45%, and no fibrosis. Additionally, she had a ziopatch monitor which we reviewed the results in which she had 9.4% PVCs and predominantly sinus rhythm. She saw Ms. Al in the interim on 23 and with Dr. Aragon in EP on 10/10/23 where a primary prevention ICD. She was seen in the ED on 10/18/23 for a syncopal episode and was seen by EP, which felt the episode was orthostatic and recommended an outpatient ICD.     She continues to work full time and walks 14-15 steps per day. She has not had exertional dyspnea or chest pain. She has been vomiting 2-3 times per day for the past 2 months and has been having heart burn. She has tried multiple PPIs and gaviscon without much relief. She has worse heart burn when laying down and is propping herself up with 2-3 pillows. She has a sore throat due to the heartburn and coughing. More recently she has developed bowel incontinence with coughing, which is very distressing for her. She has also been lightheaded and had a presyncopal event after her ED visit. Her weight is stable. She has no edema. She has lightheadedness that is worse when she is vomiting more often. No palpitations. Her appetite and energy levels are  reduced. She is feels her quality of life is significantly impacted by her GI symptoms. She also would like to get an ICD after her GI symptoms are resolved.     FAMILY HISTORY: from Ms. Bunch note 2021 and any updates as as above    detailed family history was obtained during today's consult.  Family history was significant for the following cardiac history:  Full sister with DCM. She  in September after developing colon cancer.  Her history was also remarkable for MERSA, kidney disease and ultimate transplant.  She did not have an ICD or much care for her DCM.  Marleen's son and daughter also have DCM.    A maternal half sister has hypertension, epilepsy and reported dizziness and fainting. She does not have a cardiac issue to Marleen's knowledge.  Mother  suddenly at 38 yrs of age.  It was thought to be the result of a cardiac problem. However, she also had a long history of alcoholism and had a colostomy. Her two siblings have no known heart issues.  Maternal grandmother  in her 30's from a brain aneurysm. Nothing is known about maternal grandfather.  Marleen's father  at 55 years after a massive heart attack and CABG procedure.  His death occurred one day after the surgery reportedly from a clot.  One of his sisters  from a stroke.  Paternal grandmother  in her 70's from a stroke.  Her mother (Marleen's great grandmother)  suddenly in her 70's while sitting at a bus stop.Nothing is known about grandfather.  Two paternal half sisters in good health.  There is no additional history of cardiomyopathy, arrhythmias, heart attacks, fainting, sudden cardiac death, genetic conditions, or birth     REVIEW OF SYSTEMS: 10 point ROS neg other than the symptoms noted above in the HPI.    PAST MEDICAL HISTORY:   1. Familial systolic heart failure with VUS in FLNC   2. KATIA  3. Obesity   4. Encephalocele repair   ALLERGIES:    Allergies   Allergen Reactions    Morphine      EMESIS     Nickel     Sulfa Antibiotics Swelling       MEDICATIONS:   Cholecalciferol (VITAMIN D) 2000 UNIT tablet, Take 5,000 Units by mouth as needed Reported on 3/8/2017  dapagliflozin (FARXIGA) 10 MG TABS tablet, Take 1 tablet (10 mg) by mouth daily  FLAXSEED, LINSEED, PO, Take 1 capsule by mouth daily  furosemide (LASIX) 20 MG tablet, Take 2 tablets (40 mg) by mouth daily as needed (weight gain/edema)  loratadine (CLARITIN) 10 MG tablet, Take 1 tablet (10 mg) by mouth daily  LORazepam (ATIVAN) 0.5 MG tablet, Take 1 tablet (0.5 mg) by mouth every 6 hours as needed for anxiety (take 1 tab 30 min prior to MRI)  metoprolol succinate ER (TOPROL XL) 25 MG 24 hr tablet, Take 1 tablet (25 mg) by mouth daily  omeprazole (PRILOSEC) 20 MG DR capsule, Take 2 capsules (40 mg) by mouth daily for 90 days  pantoprazole (PROTONIX) 40 MG EC tablet, Take 1 tablet (40 mg) by mouth every 12 hours  progesterone (PROMETRIUM) 100 MG capsule, Take 100 mg by mouth at bedtime  sacubitril-valsartan (ENTRESTO)  MG per tablet, Take 1 tablet by mouth 2 times daily  solifenacin (VESICARE) 5 MG tablet, Take 1 tablet (5 mg) by mouth daily at 2 pm  spironolactone (ALDACTONE) 25 MG tablet, Take 2 tablets (50 mg) by mouth daily  sucralfate (CARAFATE) 1 GM tablet, Take 1 tablet (1 g) by mouth 4 times daily    sodium chloride (PF) 0.9% PF flush 10 mL    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     PHYSICAL EXAM:   General: comfortable, conversant, NAD  HEENT: normocephalic, atraumatic, anicteric  Neck: Estimate JVP <6m   CV: RRR, nl s1 and s2, no murmurs, gallops, or rubs   Lungs: CTAB, no crackles or wheezes, normal work of breathing  Abdomen: BS+, soft, non tender, non distended  Extremities: warm and well perfused, no edema      DIAGNOSTIC TESTING: personally reviewed   Latest Reference Range & Units 12/11/23 10:21   Sodium 135 - 145 mmol/L 137   Potassium 3.4 - 5.3 mmol/L 3.8   Chloride 98 - 107 mmol/L 104   Carbon Dioxide (CO2)  22 - 29 mmol/L 26   Urea Nitrogen 8.0 - 23.0 mg/dL 15.3   Creatinine 0.51 - 0.95 mg/dL 0.86   GFR Estimate >60 mL/min/1.73m2 77   Calcium 8.6 - 10.0 mg/dL 9.1   Anion Gap 7 - 15 mmol/L 7   Magnesium 1.7 - 2.3 mg/dL 1.9   Albumin 3.5 - 5.2 g/dL 4.3   Protein Total 6.4 - 8.3 g/dL 7.5   Alkaline Phosphatase 40 - 150 U/L 63   ALT 0 - 50 U/L 22   AST 0 - 45 U/L 18   Bilirubin Total <=1.2 mg/dL 0.3   Glucose 70 - 99 mg/dL 98   N-Terminal Pro Bnp 0 - 900 pg/mL 244     Echo 10/18/23:   Left ventricular function is decreased. The ejection fraction is 30-35%  (moderately reduced).Severe left ventricular dilation is present. LVIDd 7cm  The right ventricle is normal size. Global right ventricular function is  normal.  IVC diameter <2.1 cm collapsing >50% with sniff suggests a normal RA pressure  of 3 mmHg.  No pericardial effusion is present.  No significant changes noted.      CMR 6/12/2023:   1. The LV is normal in cavity size and wall thickness. The global systolic function is moderately reduced.  The LVEF is 32%. There is global hypokinesis.     2. The RV is normal in cavity size. The global systolic function is mildly reduced. The RVEF is 45%.      3. Both atria are normal in size.     4. There is no significant valvular disease.      5. Late gadolinium enhancement imaging shows no MI, fibrosis or infiltrative disease.      6. There is no pericardial effusion or thickening.     7. There is no intracardiac thrombus.     CONCLUSIONS: Severe, nonischemic cardiomyopathy, today the LV is no longer dilated. LVEF 32% not  significantly changed. RV function is mildly globally reduced, RVEF 45%, slightly decreased from previous  53%. No LGE.    Ziopatch monitor 2/24-3/3/2023: sinus with 9.4% PVC burden    Echo 12/12/2022: Severe left ventricular dilation is present.LVIDd 70mm.  Biplane LVEF is 50%.  Right ventricular function, chamber size, wall motion, and thickness are  normal.  Both atria appear normal.  Pulmonary artery  systolic pressure is normal.  The inferior vena cava is normal.  No pericardial effusion is present.  The left ventricular function has improved.    CMR 6/3/22: 1. The LV is mild-to-moderately dilated in cavity size. The wall thickness is normal. The global systolic  function is severely decreased. The LVEF is 33%. There is severe global hypokinesis.     2. The RV is normal in cavity size. The global systolic function is mildly decreased. The RVEF is 53%.      3. Both atria are normal in size.     4. There is no significant valvular disease.      5. Late gadolinium enhancement imaging shows no MI, fibrosis or infiltrative disease.      6. There is no pericardial effusion or thickening.     7. There is no intracardiac thrombus.     CONCLUSIONS: Severe, non-ischemic dilated cardiomyopathy, most likely of genetic cause. LVEF of 33% and  RVEF of 53% with no LGE. When directly compared to the prior CMR, the LV and RV size and function have not  significantly changed.      CPEX 12/12/22: Key Measurements    Peak VO2 (ml/kg/min) VE/VCO2  Pleasants RER   14.91        27.61        1.02            Predicted VO2 (ml/kg/min) Predicted VO2 %   26.50        56            Resting Supine BP (mmHg) Resting Standing BP (mmHg) Final Stress BP (mmHg)   106/56        112/58        140/67          Resting Supine HR (bpm) Resting Standing HR (bpm)    44        50             Max HR (bpm) Max Predicted HR (bpm) Max Perdicted HR %   124        162        77            Exercise time (min) Exercise time (sec) Estimated workload (METS)   8        51        4.3              [facility] 1/2023:    Time Systolic (mmHg) Diastolic (mmHg) Mean (mmHg) A Wave (mmHg) V Wave (mmHg) EDP (mmHg) Max dp/dt (mmHg/sec) HR (bpm) Content (mL/dL) SAT (%)   RA Pressures  1:56 PM   6    10    8      38        RV Pressures  1:56 PM 40        12     37        PA Pressures  1:57 PM 41    4    22        39        PCW Pressures  1:57 PM   11    22    20      39        AO Pressures   2:01     63    91        37          Blood Flow Results Phase: Baseline     Time Results  Indexed Values (L/min/m2)   QP  1:45 PM 2.71 L/min    1.31      QS  1:45 PM 2.71 L/min    1.31        Blood Oximetry Phase: Baseline     Time Hb  SAT(%)  PO2  Content (mL/dL) PA Sat (%)   PA  1:45 PM  62.7 %      62.7      Art  1:45 PM  100 %     17.14         Cardiac Output Phase: Baseline     Time TDCO (L/min) TDCI (L/min/m2) Estrella C.O. (L/min) Estrella C.I. 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   2.  Premature ventricular contractions, palpitations:    She is hypovolemic by exam, which is likely due to volume loss. Her NT pro BNP remains normal. She otherwise is well compensated and has normal end organ function. Given her ongoing vomiting and presyncope, I would like her to hold furosemide and dapagliflozin. She should continue sacubitril valsartan,  metoprolol, and spironolactone.     Her cardiac MRI shows an LVEF of 32%, LVEDD of 6.1cm, and no fibrosis on neurohormonal therapy.  Comparison to her prior cardiac MRI a year ago the LVEF is unchanged, but the LV end-diastolic dimension has reduced from 6.6cm to 6.1 cm on the current MRI, and the RV function is slightly reduced from 53% to  45%.  We discussed that continuing neurohormonal therapy is important to mitigate the neurohormonal cascade that occurs with heart failure and that there is evidence of some reverse remodeling with her LV end-diastolic dimension. Additionally in her Zio patch monitor in February to March of 2023 she had approximately 9.4% PVC burden otherwise predominantly sinus rhythm without any significant dysrhythmias.  She did subsequently have a syncopal event and again has been having an increase in palpitations. She meets standard ICD criteria based on her ejection fraction less than 35% despite neurohormonal therapies and more recent guidelines for FLNC with an EF of less than 45%.  She understandably expressed many concerns about having a defibrillator.  I have suggested that she meets with Dr. Aragon, to discuss her specific concerns regarding ICD implantation.  I will also communicate with Dr. Aragon about this. She has met with Dr. Aragon regarding ICD for SCD prevention in the setting of a FLNC mutation. She is interested in pursuing an ICD when her GI issue are resolved.     3. Vomiting  4. Heart Burn  She has progressive vomiting and and heart burn symptoms over the past 2 months. She has non ischemic cardiomyopathy and less likely ischemia precipitating these symptoms. She has an EGD scheduled for 12/27 and we will check with GI if she can be seen sooner.       PLAN:   -message to GI, if worsening symptoms go to ER  -hold farxiga  -hold furosemide  -follow up in 3 months with [facility]  -follow up with me in 6 months    70 minutes on DOS for chart review, counseling, review of diagnostic testing, coordination of care (with GI) and documentation.     Thank you for allowing me to participate in the care of your patient. Please do not hesitate to contact me if you have any questions.     Sincerely,   Forum     Forum MD Jurgen, PhD, FACC  Advanced Heart Failure/Transplantation/MCS  [facility]  [place]

## 2023-12-13 ENCOUNTER — PATIENT OUTREACH (OUTPATIENT)
Dept: ONCOLOGY | Facility: CLINIC | Age: 58
End: 2023-12-13
Payer: COMMERCIAL

## 2023-12-13 ENCOUNTER — HOSPITAL ENCOUNTER (OUTPATIENT)
Facility: CLINIC | Age: 58
Discharge: HOME | End: 2023-12-13
Attending: INTERNAL MEDICINE | Admitting: INTERNAL MEDICINE
Payer: COMMERCIAL

## 2023-12-13 VITALS
OXYGEN SATURATION: 96 % | SYSTOLIC BLOOD PRESSURE: 102 MMHG | HEART RATE: 49 BPM | RESPIRATION RATE: 14 BRPM | DIASTOLIC BLOOD PRESSURE: 62 MMHG

## 2023-12-13 DIAGNOSIS — K44.9 HIATAL HERNIA: Primary | ICD-10-CM

## 2023-12-13 DIAGNOSIS — R11.10 VOMITING, UNSPECIFIED VOMITING TYPE, UNSPECIFIED WHETHER NAUSEA PRESENT: Primary | ICD-10-CM

## 2023-12-13 DIAGNOSIS — I42.9 FAMILIAL CARDIOMYOPATHY (H): Chronic | ICD-10-CM

## 2023-12-13 LAB — UPPER GI ENDOSCOPY: NORMAL

## 2023-12-13 PROCEDURE — 250N000009 HC RX 250: Performed by: INTERNAL MEDICINE

## 2023-12-13 PROCEDURE — G0500 MOD SEDAT ENDO SERVICE >5YRS: HCPCS | Performed by: INTERNAL MEDICINE

## 2023-12-13 PROCEDURE — 250N000011 HC RX IP 250 OP 636: Performed by: INTERNAL MEDICINE

## 2023-12-13 PROCEDURE — 43235 EGD DIAGNOSTIC BRUSH WASH: CPT | Performed by: INTERNAL MEDICINE

## 2023-12-13 RX ORDER — FENTANYL CITRATE 50 UG/ML
INJECTION, SOLUTION INTRAMUSCULAR; INTRAVENOUS PRN
Status: DISCONTINUED | OUTPATIENT
Start: 2023-12-13 | End: 2023-12-13 | Stop reason: HOSPADM

## 2023-12-13 ASSESSMENT — ACTIVITIES OF DAILY LIVING (ADL)
ADLS_ACUITY_SCORE: 37
ADLS_ACUITY_SCORE: 37

## 2023-12-13 NOTE — PROGRESS NOTES
New Patient Oncology Nurse Navigator Note     Referring provider: Dr. Stu Guadalupe    Referring Clinic/Organization: [facility]  Referred to: Thoracic Surgery  Requested provider (if applicable): First available - did not specify   Referral Received: 12/13/23       Evaluation for :   Diagnosis   R11.10 (ICD-10-CM) - Vomiting, unspecified vomiting type, unspecified whether nausea present   I42.9 (ICD-10-CM) - Familial cardiomyopathy (H)     Clinical History (per Nurse review of records provided):      07/12/2023 CT Abdomen Pelvis w/o contrast (Care Everywhere) showed:   IMPRESSION:   1.  Acute uncomplicated proximal sigmoid diverticulitis. The degree of adjacent inflammation and small volume of pelvic free fluid are less prominent than the prior examination. No abscess identified.   2.  Moderate hiatal hernia.     12/13/2023 Upper GI Endoscopy (bookmarked) showed:   Impression:            EGD performed because of daily vomiting and heartburn                          that has been present since an episode of                          diverticulitis in July 2023. She believes that she has                          lost weight. She reports no GI bleeding. She has had                          persistent symptoms despite PPI use.                          A clear cause for her symptoms was not identified on                          this exam. She has a small hiatal hernia, and the EG                          sphincter did not appear to close tightly, which could                          predispose to reflux.   Clinical Assessment / Barriers to Care (Per Nurse):    Never smoker    Records Location: [facility]   Records Needed: None  Additional testing needed prior to consult: None    SADA MoralezN, RN, OCN  [facility] Oncology Nurse Navigator  [phone] / [phone]

## 2023-12-13 NOTE — OR NURSING
Procedure: egd  Sedation: conscious sedation   Specimens:none  O2: ra  Tolerated procedure: well  Pt to recovery area in stable condition accompanied by RN.   Other:  glasses with pt on return to recovery

## 2023-12-20 DIAGNOSIS — R51.9 CHRONIC HEADACHE WITH NEW FEATURES: Primary | ICD-10-CM

## 2023-12-20 DIAGNOSIS — G89.29 CHRONIC HEADACHE WITH NEW FEATURES: Primary | ICD-10-CM

## 2023-12-29 ENCOUNTER — APPOINTMENT (OUTPATIENT)
Dept: LAB | Facility: CLINIC | Age: 58
End: 2023-12-29
Payer: COMMERCIAL

## 2023-12-29 PROCEDURE — 87338 HPYLORI STOOL AG IA: CPT | Performed by: PHYSICIAN ASSISTANT

## 2024-01-02 LAB — H PYLORI AG STL QL IA: NEGATIVE

## 2024-01-03 ENCOUNTER — MYC MEDICAL ADVICE (OUTPATIENT)
Dept: CARDIOLOGY | Facility: CLINIC | Age: 59
End: 2024-01-03

## 2024-01-03 ENCOUNTER — OFFICE VISIT (OUTPATIENT)
Dept: RHEUMATOLOGY | Facility: CLINIC | Age: 59
End: 2024-01-03
Attending: PSYCHIATRY & NEUROLOGY
Payer: COMMERCIAL

## 2024-01-03 VITALS
DIASTOLIC BLOOD PRESSURE: 69 MMHG | OXYGEN SATURATION: 99 % | BODY MASS INDEX: 40.05 KG/M2 | HEART RATE: 54 BPM | WEIGHT: 234 LBS | SYSTOLIC BLOOD PRESSURE: 100 MMHG

## 2024-01-03 DIAGNOSIS — R20.0 NUMBNESS AND TINGLING: ICD-10-CM

## 2024-01-03 DIAGNOSIS — R76.8 ANA POSITIVE: Primary | ICD-10-CM

## 2024-01-03 DIAGNOSIS — M25.50 MULTIPLE JOINT PAIN: ICD-10-CM

## 2024-01-03 DIAGNOSIS — R51.9 CHRONIC HEADACHE WITH NEW FEATURES: ICD-10-CM

## 2024-01-03 DIAGNOSIS — R20.2 NUMBNESS AND TINGLING: ICD-10-CM

## 2024-01-03 DIAGNOSIS — G89.29 CHRONIC HEADACHE WITH NEW FEATURES: ICD-10-CM

## 2024-01-03 LAB
ALBUMIN MFR UR ELPH: 8.1 MG/DL
ALBUMIN SERPL BCG-MCNC: 4 G/DL (ref 3.5–5.2)
ALBUMIN UR-MCNC: NEGATIVE MG/DL
ALP SERPL-CCNC: 60 U/L (ref 40–150)
ALT SERPL W P-5'-P-CCNC: 18 U/L (ref 0–50)
ANION GAP SERPL CALCULATED.3IONS-SCNC: 10 MMOL/L (ref 7–15)
APPEARANCE UR: CLEAR
AST SERPL W P-5'-P-CCNC: 21 U/L (ref 0–45)
BILIRUB SERPL-MCNC: 0.2 MG/DL
BILIRUB UR QL STRIP: NEGATIVE
BUN SERPL-MCNC: 14.8 MG/DL (ref 8–23)
CALCIUM SERPL-MCNC: 9 MG/DL (ref 8.6–10)
CHLORIDE SERPL-SCNC: 103 MMOL/L (ref 98–107)
CK SERPL-CCNC: 89 U/L (ref 26–192)
COLOR UR AUTO: YELLOW
CREAT SERPL-MCNC: 0.75 MG/DL (ref 0.51–0.95)
CREAT UR-MCNC: 85.5 MG/DL
CRP SERPL-MCNC: <3 MG/L
DEPRECATED HCO3 PLAS-SCNC: 24 MMOL/L (ref 22–29)
EGFRCR SERPLBLD CKD-EPI 2021: >90 ML/MIN/1.73M2
ERYTHROCYTE [SEDIMENTATION RATE] IN BLOOD BY WESTERGREN METHOD: 18 MM/HR (ref 0–30)
GLUCOSE SERPL-MCNC: 87 MG/DL (ref 70–99)
GLUCOSE UR STRIP-MCNC: 100 MG/DL
HBV CORE AB SERPL QL IA: NONREACTIVE
HBV SURFACE AG SERPL QL IA: NONREACTIVE
HCV AB SERPL QL IA: NONREACTIVE
HGB UR QL STRIP: ABNORMAL
KETONES UR STRIP-MCNC: NEGATIVE MG/DL
LEUKOCYTE ESTERASE UR QL STRIP: ABNORMAL
NITRATE UR QL: NEGATIVE
PH UR STRIP: 5.5 [PH] (ref 5–7)
POTASSIUM SERPL-SCNC: 4 MMOL/L (ref 3.4–5.3)
PROT SERPL-MCNC: 7.3 G/DL (ref 6.4–8.3)
PROT/CREAT 24H UR: 0.09 MG/MG CR (ref 0–0.2)
RBC #/AREA URNS AUTO: NORMAL /HPF
SODIUM SERPL-SCNC: 137 MMOL/L (ref 135–145)
SP GR UR STRIP: 1.02 (ref 1–1.03)
UROBILINOGEN UR STRIP-ACNC: 0.2 E.U./DL
VIT D+METAB SERPL-MCNC: 19 NG/ML (ref 20–50)
WBC #/AREA URNS AUTO: NORMAL /HPF

## 2024-01-03 PROCEDURE — 80053 COMPREHEN METABOLIC PANEL: CPT | Performed by: INTERNAL MEDICINE

## 2024-01-03 PROCEDURE — 85613 RUSSELL VIPER VENOM DILUTED: CPT | Performed by: INTERNAL MEDICINE

## 2024-01-03 PROCEDURE — 86225 DNA ANTIBODY NATIVE: CPT | Performed by: INTERNAL MEDICINE

## 2024-01-03 PROCEDURE — 81001 URINALYSIS AUTO W/SCOPE: CPT | Performed by: INTERNAL MEDICINE

## 2024-01-03 PROCEDURE — 86160 COMPLEMENT ANTIGEN: CPT | Performed by: INTERNAL MEDICINE

## 2024-01-03 PROCEDURE — 86146 BETA-2 GLYCOPROTEIN ANTIBODY: CPT | Performed by: INTERNAL MEDICINE

## 2024-01-03 PROCEDURE — 85652 RBC SED RATE AUTOMATED: CPT | Performed by: INTERNAL MEDICINE

## 2024-01-03 PROCEDURE — 82550 ASSAY OF CK (CPK): CPT | Performed by: INTERNAL MEDICINE

## 2024-01-03 PROCEDURE — 86704 HEP B CORE ANTIBODY TOTAL: CPT | Performed by: INTERNAL MEDICINE

## 2024-01-03 PROCEDURE — 86140 C-REACTIVE PROTEIN: CPT | Performed by: INTERNAL MEDICINE

## 2024-01-03 PROCEDURE — 86147 CARDIOLIPIN ANTIBODY EA IG: CPT | Performed by: INTERNAL MEDICINE

## 2024-01-03 PROCEDURE — 86803 HEPATITIS C AB TEST: CPT | Performed by: INTERNAL MEDICINE

## 2024-01-03 PROCEDURE — 99204 OFFICE O/P NEW MOD 45 MIN: CPT | Performed by: INTERNAL MEDICINE

## 2024-01-03 PROCEDURE — 84156 ASSAY OF PROTEIN URINE: CPT | Performed by: INTERNAL MEDICINE

## 2024-01-03 PROCEDURE — 86618 LYME DISEASE ANTIBODY: CPT | Performed by: INTERNAL MEDICINE

## 2024-01-03 PROCEDURE — 87340 HEPATITIS B SURFACE AG IA: CPT | Performed by: INTERNAL MEDICINE

## 2024-01-03 PROCEDURE — 82306 VITAMIN D 25 HYDROXY: CPT | Performed by: INTERNAL MEDICINE

## 2024-01-03 PROCEDURE — 86235 NUCLEAR ANTIGEN ANTIBODY: CPT | Performed by: INTERNAL MEDICINE

## 2024-01-03 PROCEDURE — 36415 COLL VENOUS BLD VENIPUNCTURE: CPT | Performed by: INTERNAL MEDICINE

## 2024-01-03 PROCEDURE — 85390 FIBRINOLYSINS SCREEN I&R: CPT | Performed by: PATHOLOGY

## 2024-01-03 PROCEDURE — 85730 THROMBOPLASTIN TIME PARTIAL: CPT | Performed by: INTERNAL MEDICINE

## 2024-01-03 NOTE — PROGRESS NOTES
Rheumatology Clinic Visit      Marleen Mcfadden MRN# [id number]   YOB: 1965 Age: 58 year old      Date of visit: 1/03/24   Referring provider: Dr. Sissy Bhatia  PCP: Dr. Yanna Matias    Chief Complaint   Patient presents with:  Flare    Assessment and Plan     1.  ARIC 1:160 speckled: Headaches and intermittent numbness/tingling that can affect any part of the skin.  ARIC 1:160.  No photosensitivity or photophobia, no cytopenia, no hx of thromboembolic event, history of nephritis but biopsy more than 20 years ago was not suggestive of lupus, ESR and CRP normal, recent creatinine normal.  At time time she does not have an obvious connective tissue disease.  Additional workup as noted below for further assessment because of the positive ARIC.  Overall her exam is most consistent with chronic pain syndrome such as fibromyalgia.  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  Continue following with neurology.   - Labs today: CMP, C3, C4, ESR, CRP, dsDNA, CK, cardiolipin, lupus anticoagulant, beta-2 glycoprotein, Lyme screen, hepatitis C antibody, hepatitis B surface antigen and core antibody, vitamin D, UA, Uprotein:creatinine    Total minutes spent in evaluation with patient, documentation, , and review of pertinent studies and chart notes: 50     Ms. Mcfadden verbalized agreement with and understanding of the rational for the diagnosis and treatment plan.  All questions were answered to best of my ability and the patient's satisfaction. Ms. Mcfadden was advised to contact the clinic with any questions that may arise after the clinic visit.      Thank you for involving me in the care of the patient    Return to clinic: 1 - 2 weeks      HPI   Marleen Mcfadden is a 58 year old female with a past medical history significant for familial cardiomyopathy, degenerative disc  "disease of the lumbar spine, GERD, depression, hypertension, obstructive sleep apnea, history of diverticulitis, genital HSV, bilateral TKA (each replaced twice) and a positive ARIC who presents for rheumatology evaluation of the positive ARIC    1/26/2009 [facility] rheumatology note by Dr. Sarah Villatoro documents that her history and exam are suggestive of a diffuse myofascial pain syndrome.  See original note for full details.    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  See original note for full details.    10/24/2023 neurology note by Dr. Bhatia documents recurrent thunderclap headaches since skull-based surgery in 2021.  Increasing frequency of a sharp electric-like sensation that shoots from her head down to T4.  Tingling and burning paresthesias in the bilateral face, upper and lower extremities.    Today, 1/3/2024: diffuse tingling and numbness that may occur anywhere on the skin, comes and goes.  Sometimes with cramps in the fingers, legs, and toes that may last for up to a minute, typically 1-2 x/day.  Joint pain diffusely for about 20 years; not better or worse with anything; does not limit activities.  Feels like her right shoulder and bilateral knees may swell from time to time, typically associated with activity.  Pain specifically noted at the 1st CMC joints that she points to. Canker sore last occurred 2 months ago;  infrequent occurrence; duration: lifelong.  Cardiomyopathy, stable for years; she reports that it is genetic. Worsening GERD recently; associated with cough with mucous production; working with GI.  Emesis daily for 60-90 days, non-bloody, non-bilious, looking like the food she ate; working with GI.  Headaches daily, can be sharp and severe; has had CSF issues and brain surgery in the past.  Headaches started before GI issues.  She is working with a neurologist for the headaches.    Denies fevers, or chills. No " abdominal pain currently. No chest pain/pressure, palpitations, or shortness of breath. No LE swelling. No neck pain. No oral or nasal sores currently.  No rash. No sicca symptoms. No photosensitivity or photophobia. No eye pain or redness. No history of inflammatory eye disease.  No history of inflammatory bowel disease.  No history of DVT, pulmonary embolism, or miscarriage.   No history of serositis.  No history of Raynaud's Phenomenon.  No known seizure disorder.  No known renal disorder.  No pain or burning with urination.  No increased urinary frequency.    Familial cardiomyopathy diagnosed in .  Has a history of nephritis;  kidney biopsy did not suggest lupus related nephritis.  Around that time she was given Plaquenil that she says she took for no more than 1 week.      FHx: Sister possibly had an unknown autoimmune disease at the end of her life;  due to colon cancer    Tobacco: none  EtOH: no more than 1 drink per day, infrequently  Drugs: none  Occupation: phlebotomist     ROS   12 point review of system was completed and negative except as noted in the HPI     Active Problem List     Patient Active Problem List   Diagnosis    Leiomyoma of uterus    Allergic rhinitis    Anemia    [facility]    Knee pain    Thyroid nodule    Pain in joint involving ankle and foot    CARDIOVASCULAR SCREENING; LDL GOAL LESS THAN 160    Shaina's nodes    Familial cardiomyopathy (H)    Degenerative disc disease at L5-S1 level    Chronic bilateral low back pain with right-sided sciatica    Chronic bilateral low back pain with left-sided sciatica    Chronic systolic congestive heart failure (H)    Left shoulder pain    Primary osteoarthritis of both knees    Gastroesophageal reflux disease with esophagitis    Moderate major depression (H)    Dysfunction of both eustachian tubes    Chronic rhinitis    Essential hypertension    CSF otorrhea    KATIA (obstructive sleep apnea)- moderate-severe (AHI 29)    Family  history of colon cancer    Diverticulitis    Herpes simplex of female genitalia    Symptomatic bradycardia    Severe obesity (BMI 35.0-39.9) with comorbidity (H)     Past Medical History     Past Medical History:   Diagnosis Date    Acute bilateral low back pain with right-sided sciatica 06/02/2016    Allergic rhinitis, cause unspecified     Arthritis     Autoimmune disease (H24)     Autoimmune disease NEC     Autoimmune disease- unknown/poss SLE    Calcaneal spur 10/21/2014    Xray 10/17/14     CHF with cardiomyopathy (H)     Chronic low back pain     DDD (degenerative disc disease), lumbar     Depression     Failed total knee arthroplasty, initial encounter  01/17/2019    Familial cardiomyopathy (H)     GERD without esophagitis     History of transfusion     Hypertension     Injury of left shoulder, initial encounter 01/12/2017    Morbid obesity (H)     Nontraumatic rupture of quadriceps tendon, left 06/21/2018    KATIA (obstructive sleep apnea)- moderate-severe (AHI 29) 8/26/2021    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     no longer an issue    Peroneus longus tendinitis 01/02/2015    Plantar fasciitis 11/11/2014    PONV (postoperative nausea and vomiting)     Rotator cuff injury 01/17/2017    Sprain of other ligament of left ankle, initial encounter 01/12/2017    Status post left knee replacement 06/21/2018    TB lung, latent     negative quantiferon gold test  11/5/12     Past Surgical History     Past Surgical History:   Procedure Laterality Date    ARTHROPLASTY REVISION KNEE Left 01/17/2019    Procedure: REVISION, LEFT TOTAL KNEE  ARTHROPLASTY;  Surgeon: Dev Rocha MD;  Location: [facility] OR;  Service: Orthopedics    ARTHROPLASTY REVISION KNEE Right 09/11/2020    Procedure: RIGHT REVISION TOTAL KNEE ARTHROPLASTY;  Surgeon: Dev Rocha MD;  Location: [facility] OR;  Service: Orthopedics    BREAST SURGERY  2001    Breast Reduction    C  EXCISE EXCESS SKIN TISSUE,ABDOMEN       SECTION  1989     SECTION  10/1985    COLONOSCOPY N/A 2023    Procedure: Colonoscopy;  Surgeon: Simone Jansen MD;  Location: [facility]    COLONOSCOPY WITH CO2 INSUFFLATION N/A 2021    Procedure: COLONOSCOPY, WITH CO2 INSUFFLATION;  Surgeon: Angela Harrington DO;  Location: [facility]    COLONOSCOPY WITH CO2 INSUFFLATION N/A 2022    Procedure: COLONOSCOPY, WITH CO2 INSUFFLATION;  Surgeon: Nando Whitlock MD;  Location: [facility] OR    CRANIECTOMY Right 2021    Procedure: RIGHT MIDDLE FOSSA APPROACH, CSF LEAK REPAIR;  Surgeon: Jocelyn Noe MD;  Location: [facility]    CRANIOTOMY MIDDLE FOSSA, EXCISE ACOUSTIC NEUROMA, COMBINED N/A 2021    Procedure: Right CRANIOTOMY, MIDDLE FOSSA APPROACH, FOR REPAIR OF ENCEPHALOCELE;  Surgeon: Jocelyne Harrell MD;  Location: [facility] OR    CV RIGHT HEART CATH MEASUREMENTS RECORDED N/A 1/10/2023    Procedure: Heart Cath Right Heart Cath;  Surgeon: Slade Hanson MD;  [facility]    CYSTOURETHROSCOPY N/A 2020    Procedure: CYSTOSCOPY;  Surgeon: Cherelle Willams MD;  Location: [facility];  Service: Gynecology    ESOPHAGOSCOPY, GASTROSCOPY, DUODENOSCOPY (EGD), COMBINED N/A 2023    Procedure: Esophagoscopy, gastroscopy, duodenoscopy (EGD), combined;  Surgeon: Stu Guadalupe MD;  Location: [facility]    GYN SURGERY N/A 2020    Procedure: MIDURETHRAL SLING, CYSTOSCOPY;  Surgeon: Cherelle Willams MD;  Location: [facility] OR;  Service: Gynecology    HYSTERECTOMY TOTAL ABDOMINAL      for fibroids; reports having blood transfusion after surgery    INSERT DRAIN LUMBAR N/A 2021    Procedure: Insert drain lumbar;  Surgeon: Jocelyn Noe MD;  Location: [facility]    ORTHOPEDIC SURGERY      Right Knee ACL repair    THYROIDECTOMY  2013    Procedure: THYROIDECTOMY;  LEFT THYROID LOBECTOMY.  (LISA,  RECURRENT LARYNGEAL NERVE MONITOR) ;  Surgeon: Uriah Camargo MD;  Location:  [facility]    THYROIDECTOMY      TOTAL KNEE ARTHROPLASTY Left 10/03/2017    TOTAL KNEE ARTHROPLASTY Right 02/07/2019    Procedure: RIGHT TOTAL KNEE ARTHROPLASTY;  Surgeon: Dev Rocha MD;  Location: [facility] OR;  Service: Orthopedics    TUBAL LIGATION  1989    ZZC EXCIS UTERINE FIBROID,ABD APPRCH  1999     Allergy     Allergies   Allergen Reactions    Morphine      EMESIS    Nickel     Sulfa Antibiotics Swelling     Current Medication List     Current Outpatient Medications   Medication Sig    Cholecalciferol (VITAMIN D) 2000 UNIT tablet Take 5,000 Units by mouth as needed Reported on 3/8/2017    dapagliflozin (FARXIGA) 10 MG TABS tablet Take 1 tablet (10 mg) by mouth daily    FLAXSEED, LINSEED, PO Take 1 capsule by mouth daily    furosemide (LASIX) 20 MG tablet Take 2 tablets (40 mg) by mouth daily as needed (weight gain/edema)    loratadine (CLARITIN) 10 MG tablet Take 1 tablet (10 mg) by mouth daily    LORazepam (ATIVAN) 0.5 MG tablet Take 1 tablet (0.5 mg) by mouth every 6 hours as needed for anxiety (take 1 tab 30 min prior to MRI)    metoprolol succinate ER (TOPROL XL) 25 MG 24 hr tablet Take 1 tablet (25 mg) by mouth daily    pantoprazole (PROTONIX) 40 MG EC tablet Take 1 tablet (40 mg) by mouth every 12 hours    progesterone (PROMETRIUM) 100 MG capsule Take 100 mg by mouth at bedtime    sacubitril-valsartan (ENTRESTO)  MG per tablet Take 1 tablet by mouth 2 times daily    solifenacin (VESICARE) 5 MG tablet Take 1 tablet (5 mg) by mouth daily at 2 pm    spironolactone (ALDACTONE) 25 MG tablet Take 2 tablets (50 mg) by mouth daily    sucralfate (CARAFATE) 1 GM tablet Take 1 tablet (1 g) by mouth 4 times daily     No current facility-administered medications for this visit.     Facility-Administered Medications Ordered in Other Visits   Medication    sodium chloride (PF) 0.9% PF flush 10 mL         Social  "History   See HPI    Family History     Family History   Problem Relation Age of Onset    Hypertension Father         dec    Diabetes Father         dec    Heart Disease Father         dec    Alcohol/Drug Father     Cardiovascular Father     Heart Disease Mother         dec    Alcohol/Drug Mother     Cardiovascular Mother     Heart Disease Daughter         Cardiomyopathy    Cardiovascular Daughter         cardiomyopathy    Colon Cancer Sister     Cardiovascular Son         cardiomyopathy    Diabetes Paternal Grandmother         dec    Hypertension Paternal Grandmother         dec    Cerebrovascular Disease Paternal Grandmother         dec    Cancer Sister         Lupus    Cardiovascular Sister         cardiomyopathy    Heart Disease Sister         heart failure, and kidney failure       Physical Exam     Temp Readings from Last 3 Encounters:   10/18/23 98  F (36.7  C) (Oral)   10/05/23 98.2  F (36.8  C) (Oral)   07/19/23 97.3  F (36.3  C) (Temporal)     BP Readings from Last 5 Encounters:   01/03/24 100/69   12/13/23 102/62   12/11/23 113/75   10/24/23 109/73   10/18/23 124/72     Pulse Readings from Last 1 Encounters:   01/03/24 54     Resp Readings from Last 1 Encounters:   12/13/23 14     Estimated body mass index is 40.05 kg/m  as calculated from the following:    Height as of 7/19/23: 1.628 m (5' 4.09\").    Weight as of this encounter: 106.1 kg (234 lb).      GEN: NAD.   HEENT:  Anicteric, noninjected sclera. No obvious external lesions of the ear and nose. Hearing intact.  CV: S1, S2. RRR. No m/r/g  PULM: No increased work of breathing. CTA bilaterally   MSK: MCPs, PIPs, DIPs, wrists, elbows, shoulders, knees, ankles, and MTPs mildly tender to palpation diffusely but without swelling, increased warmth, or overlying erythema.  14 fibromyalgia tender points positive.  LYMPH: No lower extremity edema  SKIN: No rash or jaundice seen.  No nail pitting.  PSYCH: Alert. Appropriate.      Labs / Imaging (select " studies)     RF/CCP  Recent Labs   Lab Test 03/13/20  1406   RHF <7     ARIC  Recent Labs   Lab Test 11/01/23  1506 03/13/20  1406   KANIKA Positive* Borderline Positive*   ANAP1 Speckled SPECKLED   ANAT1 1:160 1:40     CBC  Recent Labs   Lab Test 10/18/23  1302 02/18/23  0632 02/17/23  1721 01/09/23  1405 06/21/21  1330 09/08/20  1506 02/27/20  1033 09/18/19  1201 09/12/19  1619   WBC 5.1 4.5 6.3 6.5   < > 6.5   < > 5.4 6.0   RBC 4.37 4.06 4.22 4.00   < > 4.13   < > 4.37 4.20   HGB 13.6 12.7 13.2 12.6   < > 12.5   < > 12.6 12.7   HCT 41.5 39.7 40.5 37.8   < > 38.8   < > 40.8 39.3   MCV 95 98 96 95   < > 94   < > 93 94   RDW 12.7 13.5 13.5 12.8   < > 13.2   < > 13.3 14.1    227 246 250   < > 276   < > 294 292   MCH 31.1 31.3 31.3 31.5   < > 30.3   < > 28.8 30.2   MCHC 32.8 32.0 32.6 33.3   < > 32.2   < > 30.9* 32.3   NEUTROPHIL 41  --  47 44   < > 47.6  --  45.8 47.8   LYMPH 46  --  40 45   < > 40.7  --  43.2 40.9   MONOCYTE 8  --  10 8   < > 7.2  --  6.8 7.9   EOSINOPHIL 4  --  2 2   < > 3.7  --  3.3 2.7   BASOPHIL 1  --  1 1   < > 0.8  --  0.7 0.7   ANEU  --   --   --   --   --  3.1  --  2.5 2.9   ALYM  --   --   --   --   --  2.7  --  2.3 2.4   NHAN  --   --   --   --   --  0.5  --  0.4 0.5   AEOS  --   --   --   --   --  0.2  --  0.2 0.2   ABAS  --   --   --   --   --  0.1  --  0.0 0.0   ANEUTAUTO 2.1  --  3.0 2.8   < >  --   --   --   --    ALYMPAUTO 2.4  --  2.5 3.0   < >  --   --   --   --    AMONOAUTO 0.4  --  0.6 0.5   < >  --   --   --   --    AEOSAUTO 0.2  --  0.1 0.1   < >  --   --   --   --    ABSBASO 0.0  --  0.1 0.0   < >  --   --   --   --     < > = values in this interval not displayed.     CMP  Recent Labs   Lab Test 12/11/23  1021 10/18/23  1302 09/11/23  1059 08/04/23  1112 06/12/23  1043 02/24/23  1231 02/18/23  0632 07/12/21  1156 07/11/21  0453 07/10/21  0419 07/09/21  0415 07/08/21  0548    138 139  --  138 136 138   < > 134 134 133 133   POTASSIUM 3.8 4.2 4.3  --  4.6 4.4 4.1   < >  4.2 4.2 4.6 4.1   CHLORIDE 104 103 106  --  104 102 105   < > 102 101 101 100   CO2 26 23 23  --  27 27 25   < > 30 31 27 30   ANIONGAP 7 12 10  --  7 7 8   < > 2* 2* 5 2*   GLC 98 98 94  --  92 93 94   < > 87 103* 130* 91   BUN 15.3 15.0 14.9  --  12.3 12.9 12.3   < > 14 12 8 9   CR 0.86 0.82 0.71  --  0.80 0.88 0.82   < > 0.79 0.89 0.70 0.87   GFRESTIMATED 77 82 >90  --  85 76 82   < > 83 72 >90 74   GFRESTBLACK  --   --   --   --   --   --   --   --  >90 84 >90 85   CARMEN 9.1 8.8 9.1  --  9.0 9.1 8.4*   < > 8.2* 7.9* 8.4* 8.0*   BILITOTAL 0.3 0.4 0.4 0.3 0.4  --   --    < >  --   --   --   --    ALBUMIN 4.3 4.0 4.0 3.9 4.1  --   --    < >  --   --   --   --    PROTTOTAL 7.5 7.2 7.1 7.1 7.2  --   --    < >  --   --   --   --    ALKPHOS 63 60 61 60 62  --   --    < >  --   --   --   --    AST 18 26 21 16 19  --   --    < >  --   --   --   --    ALT 22 19 22 18 22  --   --    < >  --   --   --   --     < > = values in this interval not displayed.     HgA1c  Recent Labs   Lab Test 12/08/21  0823 06/21/21  1330 08/12/20  1102   A1C 5.4 5.5 5.7*     Iron Studies  Recent Labs   Lab Test 02/27/20  1033 02/12/19  0744   IRON 82 37*     --    IRONSAT 26  --      Calcium/VitaminD  Recent Labs   Lab Test 12/11/23  1021 10/18/23  1302 09/11/23  1059   CARMEN 9.1 8.8 9.1     ESR/CRP  Recent Labs   Lab Test 11/01/23  1506 04/18/23  1152 03/03/22  1541 03/13/20  1406 03/13/20  1045 05/02/18  1439   SED 21 16  --  30  --  26   CRP  --   --  4.6  --  <2.9 <2.9   CRPI <3.00 <3.00  --   --   --   --      CK/Aldolase  Recent Labs   Lab Test 06/12/23  1043        TSH/T4  Recent Labs   Lab Test 02/17/23  1721 06/03/20  1759 09/18/19  1201 02/15/18  1450   TSH 3.13 2.10 2.84 2.15   T4  --  1.04  --  0.94     Lipid Panel  Recent Labs   Lab Test 08/25/20  0942 01/09/19  0740 01/08/19  1121   CHOL 172 162 172   TRIG 113 175* 179*   HDL 54 44* 44*   LDL 95 83 92   NHDL 118 118 128     Tuberculosis Screening  Recent Labs   Lab Test  12/30/21  1349   TBRES Negative     HIV Screening  Recent Labs   Lab Test 01/08/19  1121   HIAGAB Nonreactive     UA  Recent Labs   Lab Test 02/17/23  1721 08/02/22  1555 06/03/20  1816 09/18/19  1234 07/08/19  1145   COLOR Light Yellow Yellow Yellow Yellow Light Yellow   APPEARANCE Clear Slightly Cloudy* Slightly Cloudy Slightly Cloudy Clear   URINEGLC >=1000* Negative Negative Negative Negative   URINEBILI Negative Negative Negative Negative Negative   SG 1.028 >=1.030 >1.030 1.028 1.017   URINEPH 7.5* 6.0 5.5 5.5 5.0   PROTEIN Negative Negative Trace* 10* Negative   UROBILINOGEN  --  0.2 0.2  --   --    NITRITE Negative Negative Negative Negative Negative   UBLD Negative Trace* Moderate* Negative Negative   LEUKEST Negative Trace* Negative Negative Negative   WBCU <1 0-5 0 - 5 1 0   RBCU <1 2-5* 2-5* 1 <1   SQUAMOUSEPI  --   --  Moderate*  --   --    BACTERIA  --   --  Moderate* Few* Few*   MUCOUS  --   --   --  Present* Present*     Urine Microscopic  Recent Labs   Lab Test 02/17/23  1721 08/02/22  1555 06/03/20  1816 09/18/19  1234 07/08/19  1145   WBCU <1 0-5 0 - 5 1 0   RBCU <1 2-5* 2-5* 1 <1   SQUAMOUSEPI  --   --  Moderate*  --   --    BACTERIA  --   --  Moderate* Few* Few*   MUCOUS  --   --   --  Present* Present*     Urine Protein  GHUTP and UTP= Urine protein (random), GHUTPG and UTPG = urine protein:creatinine ratio (random), UCRR = urine creatinine (random)  Recent Labs   Lab Test 06/03/20  1816   UCRR 430     Synovial Fluid Studies  Recent Labs   Lab Test 03/17/20  1254   FCOL Raymond   FAPR Cloudy   FWBC 376*   FNEU 4   FLYM 41   CRYSTL No Crystals Seen     Immunization History     Immunization History   Administered Date(s) Administered    COVID-19 Bivalent 12+ (Pfizer) 03/14/2023    COVID-19 MONOVALENT 12+ (Pfizer) 03/30/2021, 04/20/2021, 12/23/2021    DTaP, Unspecified 08/26/2020    HepB 07/30/2012, 09/18/2012    Influenza Vaccine 18-64 (Flublok) 01/04/2022, 10/03/2022    MMR 09/14/2009, 04/24/2012     Pneumo Conj 13-V (2010&after) 05/06/2021    Pneumococcal 23 valent 07/02/2021    TDAP (Adacel,Boostrix) 08/26/2020    TDAP Vaccine (Adacel) 09/11/2009, 08/14/2020          Chart documentation done in part with Dragon Voice recognition Software. Although reviewed after completion, some word and grammatical error may remain.    Davin Herrera MD

## 2024-01-04 LAB
B BURGDOR IGG+IGM SER QL: 0.12
C3 SERPL-MCNC: 126 MG/DL (ref 81–157)
C4 SERPL-MCNC: 26 MG/DL (ref 13–39)
DRVVT SCREEN RATIO: 0.87
INR PPP: 0.98 (ref 0.85–1.15)
LA PPP-IMP: NEGATIVE
LUPUS INTERPRETATION: NORMAL
PTT RATIO: 1.11
THROMBIN TIME: 18.7 SECONDS (ref 13–19)

## 2024-01-05 LAB
B2 GLYCOPROT1 IGG SERPL IA-ACNC: 1.9 U/ML
B2 GLYCOPROT1 IGM SERPL IA-ACNC: 3.3 U/ML
CARDIOLIPIN IGG SER IA-ACNC: 2.2 GPL-U/ML
CARDIOLIPIN IGG SER IA-ACNC: NEGATIVE
CARDIOLIPIN IGM SER IA-ACNC: 2.3 MPL-U/ML
CARDIOLIPIN IGM SER IA-ACNC: NEGATIVE
DSDNA AB SER-ACNC: 1.2 IU/ML
ENA SM IGG SER IA-ACNC: 1.3 U/ML
ENA SM IGG SER IA-ACNC: NEGATIVE
ENA SS-A AB SER IA-ACNC: 1 U/ML
ENA SS-A AB SER IA-ACNC: NEGATIVE
ENA SS-B IGG SER IA-ACNC: 0.8 U/ML
ENA SS-B IGG SER IA-ACNC: NEGATIVE
U1 SNRNP IGG SER IA-ACNC: 3.9 U/ML
U1 SNRNP IGG SER IA-ACNC: NEGATIVE

## 2024-01-08 ENCOUNTER — OFFICE VISIT (OUTPATIENT)
Dept: GASTROENTEROLOGY | Facility: CLINIC | Age: 59
End: 2024-01-08
Attending: INTERNAL MEDICINE
Payer: COMMERCIAL

## 2024-01-08 ENCOUNTER — MYC MEDICAL ADVICE (OUTPATIENT)
Dept: GASTROENTEROLOGY | Facility: CLINIC | Age: 59
End: 2024-01-08

## 2024-01-08 VITALS
BODY MASS INDEX: 40.84 KG/M2 | SYSTOLIC BLOOD PRESSURE: 117 MMHG | DIASTOLIC BLOOD PRESSURE: 73 MMHG | OXYGEN SATURATION: 99 % | HEART RATE: 54 BPM | WEIGHT: 238.6 LBS

## 2024-01-08 DIAGNOSIS — K21.9 GASTROESOPHAGEAL REFLUX DISEASE WITHOUT ESOPHAGITIS: Primary | ICD-10-CM

## 2024-01-08 DIAGNOSIS — K57.32 DIVERTICULITIS OF COLON: ICD-10-CM

## 2024-01-08 DIAGNOSIS — K21.9 GASTROESOPHAGEAL REFLUX DISEASE WITHOUT ESOPHAGITIS: ICD-10-CM

## 2024-01-08 DIAGNOSIS — R11.10 VOMITING, UNSPECIFIED VOMITING TYPE, UNSPECIFIED WHETHER NAUSEA PRESENT: Primary | ICD-10-CM

## 2024-01-08 DIAGNOSIS — R15.9 INCONTINENCE OF FECES WITH FECAL URGENCY: ICD-10-CM

## 2024-01-08 DIAGNOSIS — R10.32 LLQ ABDOMINAL PAIN: ICD-10-CM

## 2024-01-08 DIAGNOSIS — R15.2 INCONTINENCE OF FECES WITH FECAL URGENCY: ICD-10-CM

## 2024-01-08 PROCEDURE — 99215 OFFICE O/P EST HI 40 MIN: CPT | Performed by: PHYSICIAN ASSISTANT

## 2024-01-08 RX ORDER — RABEPRAZOLE SODIUM 20 MG/1
20 TABLET, DELAYED RELEASE ORAL 2 TIMES DAILY
Qty: 60 TABLET | Refills: 3 | Status: SHIPPED | OUTPATIENT
Start: 2024-01-08 | End: 2024-01-19

## 2024-01-08 RX ORDER — FAMOTIDINE 40 MG/1
40 TABLET, FILM COATED ORAL
Qty: 30 TABLET | Refills: 3 | Status: SHIPPED | OUTPATIENT
Start: 2024-01-08 | End: 2024-05-13

## 2024-01-08 ASSESSMENT — PAIN SCALES - GENERAL: PAINLEVEL: NO PAIN (0)

## 2024-01-08 NOTE — PROGRESS NOTES
GI FOLLOW UP CLINIC VISIT     REASON FOR VISIT: Follow Up    HPI: Marleen Mcfadden is 58 year old female who presents for follow up.     She returns today with similar concerns as before of fecal incontinence. This was previously evaluated with colonoscopy in April 2022 was unremarkable. She completed an eval with pelvic floor center which she found uncomfortable. She was treated with biofeedback and PT which was not helpful. More recently she reports having worsening symptoms in the past several months. Fecal incontinence occurring more frequently, almost daily. Primarily occurs with coughing/sneezing. Having very urgent BM's. Describes stools as pudding consistency. [place] stool 5 to 7. Also has LLQ abdominal pain that comes and goes. Precipitating factors unknown. Colonoscopy in September 2023 was unremarkable.     She also has uncontrolled reflux in past several months. She has tried multiple PPI's without much success. She is currently on protonix 40mg twice daily. Despite this continues to have reflux. Has mutiple episodes of n/v. Denies early satiety. Also taking carafate which isn't helping. EGD in December 2023 was unrevealing besides a small hiatal hernia. Ultrasound August 2023 without explanation for symptoms. HIDA scan normal.     She suspects symptoms are related to Farxiga which she takes for cardiomyopathy. She feels there has been some improvement since holding medication.     ALLERGIES:  Morphine, Nickel, and Sulfa antibiotics       PERTINENT MEDICATIONS:    Current Outpatient Medications:     Cholecalciferol (VITAMIN D) 2000 UNIT tablet, Take 5,000 Units by mouth as needed Reported on 3/8/2017, Disp: 100 tablet, Rfl: 12    dapagliflozin (FARXIGA) 10 MG TABS tablet, Take 1 tablet (10 mg) by mouth daily, Disp: 90 tablet, Rfl: 3    FLAXSEED, LINSEED, PO, Take 1 capsule by mouth daily, Disp: , Rfl:     furosemide (LASIX) 20 MG tablet, Take 2 tablets (40 mg) by mouth daily as needed (weight  gain/edema), Disp: 180 tablet, Rfl: 3    loratadine (CLARITIN) 10 MG tablet, Take 1 tablet (10 mg) by mouth daily, Disp: 90 tablet, Rfl: 3    LORazepam (ATIVAN) 0.5 MG tablet, Take 1 tablet (0.5 mg) by mouth every 6 hours as needed for anxiety (take 1 tab 30 min prior to MRI), Disp: 2 tablet, Rfl: 0    metoprolol succinate ER (TOPROL XL) 25 MG 24 hr tablet, Take 1 tablet (25 mg) by mouth daily, Disp: 45 tablet, Rfl: 3    pantoprazole (PROTONIX) 40 MG EC tablet, Take 1 tablet (40 mg) by mouth every 12 hours, Disp: 60 tablet, Rfl: 11    progesterone (PROMETRIUM) 100 MG capsule, Take 100 mg by mouth at bedtime, Disp: , Rfl:     sacubitril-valsartan (ENTRESTO)  MG per tablet, Take 1 tablet by mouth 2 times daily, Disp: 180 tablet, Rfl: 3    solifenacin (VESICARE) 5 MG tablet, Take 1 tablet (5 mg) by mouth daily at 2 pm, Disp: 90 tablet, Rfl: 1    spironolactone (ALDACTONE) 25 MG tablet, Take 2 tablets (50 mg) by mouth daily, Disp: 180 tablet, Rfl: 1    sucralfate (CARAFATE) 1 GM tablet, Take 1 tablet (1 g) by mouth 4 times daily, Disp: 120 tablet, Rfl: 4  No current facility-administered medications for this visit.    Facility-Administered Medications Ordered in Other Visits:     sodium chloride (PF) 0.9% PF flush 10 mL, 10 mL, Intravenous, Once, Julio Leroy MD      PHYSICAL EXAMINATION:  Constitutional: aaox3, cooperative, pleasant, not dyspneic/diaphoretic, no acute distress  Vitals reviewed: /73 (BP Location: Left arm, Patient Position: Sitting, Cuff Size: Adult Large)   Pulse 54   Wt 108.2 kg (238 lb 9.6 oz)   LMP 10/19/2008 (Approximate)   SpO2 99%   BMI 40.84 kg/m      Eyes: Sclera anicteric/injected  Respiratory: Unlabored breathing  Abd: Non-distended, no masses, +bs, no hepatosplenomegaly, left lower abd tender to palpation, no peritoneal signs  Skin: warm, perfused, no jaundice  Psych: Normal affect  MSK: Normal gait      ASSESSMENT/PLAN:    Marleen Beardenant is a 58 year old female  who presents for follow up.    #nausea/vomiting  #GERD  Patient with frequent n/v and uncontrolled reflux. US in August 2023 unrevealing. Normal HIDA scan in September 2023. Recent EGD with small hiatal hernia but otherwise unremarkable. Symptoms persist despite multiple diff PPI's. Currently on protonix and pepcid. We will try aciphex instead of protonix at once daily dosing. If ineffective, consider BID dosing. Will continue with pepcid at bedtime prn. If symptoms persist despite double dose will look into manometry and 24 hr pH impedence. Can stop carafate since not helpping. Can try reflux gourmet at bedtime.     #fecal incontinence   Hx of symptoms for several years. Colonoscopy without explanation. Has hx of pelvic floor dysfunction with biofeedback and PT with little benefit in the past. Reviewed option of revisiting physical therapy however she defers. We will have her increase her fiber supplement in hopes of bulking her stools further which should help with the urgent fecal incontinence she is experiencing.     #LLQ abd pain   Unclear etiolgoy of this LLQ abd pain. Her work up thus far with colonoscopy has been unrevealing. We will check a CT scan to r/o additional concerns.     Follow up in 2 months, sooner if needed.       I spent a total of 46 minutes on the day of the visit.   Time spent by me doing chart review, history and exam, documentation and further activities per the note      This note was created with voice recognition software, and while reviewed for accuracy, typos may remain.     Alex Sanchez PA-C  Division of Gastroenterology, Hepatology and Nutrition   [facility]

## 2024-01-08 NOTE — PATIENT INSTRUCTIONS
It was a pleasure taking care of you today.  I've included a brief summary of our discussion and care plan from today's visit below.  Please review this information with your primary care provider.  _______________________________________________________________________     My recommendations are summarized as follows:     - stop carafate (sucralfate) since it isn't helping  - start aciphex once a day. Best taken every morning on an empty stomach about 30 minutes eating   - if you continue to have reflux then increase aciphex to twice daily  - you can take famotidine (pepcid) at night time as needed  - schedule a CT scan of your abdomen/pelvis   - increase your fiber supplement   - you can use reflux gourmet as needed        ______________________________________________________________________     How do I schedule labs, imaging studies, or procedures that were ordered in clinic today?      Labs: To schedule lab appointment you can contact your local [facility] or call [phone] to schedule at any convenient [facility] location.     Procedures: If a colonoscopy, upper endoscopy, breath test, esophageal manometry, or pH impedence was ordered today, our endoscopy team will call you to schedule this. If you have not heard from our endoscopy team within a week, please call [phone] to schedule.      Imaging Studies: If you were scheduled for a CT scan, X-ray, MRI, ultrasound, HIDA scan or other imaging study, please call [phone] to have this scheduled.      Referral: If a referral to another specialty was ordered, expect a phone call or follow instructions above. If you have not heard from anyone regarding your referral in a week, please call our clinic to check the status.      Who do I call with any questions after my visit?  Please be in touch if there are any further questions that arise following today's visit.  There are multiple ways to contact your gastroenterology care team.        During business hours, you may reach a Gastroenterology nurse at [phone]     To schedule or reschedule an appointment, please call [phone].      You can always send a secure message through [employer].  [employer] messages are answered by your nurse or doctor typically within 24 hours.  Please allow extra time on weekends and holidays.       For urgent/emergent questions after business hours, you may reach the on-call GI Fellow by contacting the [facility]  at [phone].     How will I get the results of any tests ordered?    You will receive all of your results.  If you have signed up for [employer], any tests ordered at your visit will be available to you after your physician reviews them.  Typically this takes 1-2 weeks.  If there are urgent results that require a change in your care plan, your physician or nurse will call you to discuss the next steps.       What is [employer]?  [employer] is a secure way for you to access all of your healthcare records from the [facility].  It is a web based computer program, so you can sign on to it from any location.  It also allows you to send secure messages to your care team.  I recommend signing up for [employer] access if you have not already done so and are comfortable with using a computer.       How to I schedule a follow-up visit?  If you did not schedule a follow-up visit today, please call [phone] to schedule a follow-up office visit.      Alex Sanchez PA-C  Division of Gastroenterology, Hepatology and Nutrition   [facility]

## 2024-01-08 NOTE — LETTER
1/8/2024         RE: Marleen Mcfadden  [address]  [place] [address]        Dear Colleague,    Thank you for referring your patient, Marleen Mcfadden, to the [facility]. Please see a copy of my visit note below.          GI FOLLOW UP CLINIC VISIT     REASON FOR VISIT: Follow Up    HPI: Malreen Mcfadden is 58 year old female who presents for follow up.     She returns today with similar concerns as before of fecal incontinence. This was previously evaluated with colonoscopy in April 2022 was unremarkable. She completed an eval with pelvic floor center which she found uncomfortable. She was treated with biofeedback and PT which was not helpful. More recently she reports having worsening symptoms in the past several months. Fecal incontinence occurring more frequently, almost daily. Primarily occurs with coughing/sneezing. Having very urgent BM's. Describes stools as pudding consistency. [place] stool 5 to 7. Also has LLQ abdominal pain that comes and goes. Precipitating factors unknown. Colonoscopy in September 2023 was unremarkable.     She also has uncontrolled reflux in past several months. She has tried multiple PPI's without much success. She is currently on protonix 40mg twice daily. Despite this continues to have reflux. Has mutiple episodes of n/v. Denies early satiety. Also taking carafate which isn't helping. EGD in December 2023 was unrevealing besides a small hiatal hernia. Ultrasound August 2023 without explanation for symptoms. HIDA scan normal.     She suspects symptoms are related to Farxiga which she takes for cardiomyopathy. She feels there has been some improvement since holding medication.     ALLERGIES:  Morphine, Nickel, and Sulfa antibiotics       PERTINENT MEDICATIONS:    Current Outpatient Medications:      Cholecalciferol (VITAMIN D) 2000 UNIT tablet, Take 5,000 Units by mouth as needed Reported on 3/8/2017, Disp: 100 tablet, Rfl: 12      dapagliflozin (FARXIGA) 10 MG TABS tablet, Take 1 tablet (10 mg) by mouth daily, Disp: 90 tablet, Rfl: 3     FLAXSEED, LINSEED, PO, Take 1 capsule by mouth daily, Disp: , Rfl:      furosemide (LASIX) 20 MG tablet, Take 2 tablets (40 mg) by mouth daily as needed (weight gain/edema), Disp: 180 tablet, Rfl: 3     loratadine (CLARITIN) 10 MG tablet, Take 1 tablet (10 mg) by mouth daily, Disp: 90 tablet, Rfl: 3     LORazepam (ATIVAN) 0.5 MG tablet, Take 1 tablet (0.5 mg) by mouth every 6 hours as needed for anxiety (take 1 tab 30 min prior to MRI), Disp: 2 tablet, Rfl: 0     metoprolol succinate ER (TOPROL XL) 25 MG 24 hr tablet, Take 1 tablet (25 mg) by mouth daily, Disp: 45 tablet, Rfl: 3     pantoprazole (PROTONIX) 40 MG EC tablet, Take 1 tablet (40 mg) by mouth every 12 hours, Disp: 60 tablet, Rfl: 11     progesterone (PROMETRIUM) 100 MG capsule, Take 100 mg by mouth at bedtime, Disp: , Rfl:      sacubitril-valsartan (ENTRESTO)  MG per tablet, Take 1 tablet by mouth 2 times daily, Disp: 180 tablet, Rfl: 3     solifenacin (VESICARE) 5 MG tablet, Take 1 tablet (5 mg) by mouth daily at 2 pm, Disp: 90 tablet, Rfl: 1     spironolactone (ALDACTONE) 25 MG tablet, Take 2 tablets (50 mg) by mouth daily, Disp: 180 tablet, Rfl: 1     sucralfate (CARAFATE) 1 GM tablet, Take 1 tablet (1 g) by mouth 4 times daily, Disp: 120 tablet, Rfl: 4  No current facility-administered medications for this visit.    Facility-Administered Medications Ordered in Other Visits:      sodium chloride (PF) 0.9% PF flush 10 mL, 10 mL, Intravenous, Once, Julio Leroy MD      PHYSICAL EXAMINATION:  Constitutional: aaox3, cooperative, pleasant, not dyspneic/diaphoretic, no acute distress  Vitals reviewed: /73 (BP Location: Left arm, Patient Position: Sitting, Cuff Size: Adult Large)   Pulse 54   Wt 108.2 kg (238 lb 9.6 oz)   LMP 10/19/2008 (Approximate)   SpO2 99%   BMI 40.84 kg/m      Eyes: Sclera anicteric/injected  Respiratory:  Unlabored breathing  Abd: Non-distended, no masses, +bs, no hepatosplenomegaly, left lower abd tender to palpation, no peritoneal signs  Skin: warm, perfused, no jaundice  Psych: Normal affect  MSK: Normal gait      ASSESSMENT/PLAN:    Marleen Mcfadden is a 58 year old female who presents for follow up.    #nausea/vomiting  #GERD  Patient with frequent n/v and uncontrolled reflux. US in August 2023 unrevealing. Normal HIDA scan in September 2023. Recent EGD with small hiatal hernia but otherwise unremarkable. Symptoms persist despite multiple diff PPI's. Currently on protonix and pepcid. We will try aciphex instead of protonix at once daily dosing. If ineffective, consider BID dosing. Will continue with pepcid at bedtime prn. If symptoms persist despite double dose will look into manometry and 24 hr pH impedence. Can stop carafate since not helpping. Can try reflux gourmet at bedtime.     #fecal incontinence   Hx of symptoms for several years. Colonoscopy without explanation. Has hx of pelvic floor dysfunction with biofeedback and PT with little benefit in the past. Reviewed option of revisiting physical therapy however she defers. We will have her increase her fiber supplement in hopes of bulking her stools further which should help with the urgent fecal incontinence she is experiencing.     #LLQ abd pain   Unclear etiolgoy of this LLQ abd pain. Her work up thus far with colonoscopy has been unrevealing. We will check a CT scan to r/o additional concerns.     Follow up in 2 months, sooner if needed.       I spent a total of 46 minutes on the day of the visit.   Time spent by me doing chart review, history and exam, documentation and further activities per the note      This note was created with voice recognition software, and while reviewed for accuracy, typos may remain.     Alex Sanchez PA-C  Division of Gastroenterology, Hepatology and Nutrition   [facility]        Again, thank you for allowing me to participate in the care of your patient.        Sincerely,        Alex Sanchez PA-C

## 2024-01-09 ENCOUNTER — OFFICE VISIT (OUTPATIENT)
Dept: URGENT CARE | Facility: URGENT CARE | Age: 59
End: 2024-01-09
Payer: COMMERCIAL

## 2024-01-09 VITALS
RESPIRATION RATE: 16 BRPM | WEIGHT: 236.2 LBS | BODY MASS INDEX: 40.42 KG/M2 | TEMPERATURE: 97.2 F | SYSTOLIC BLOOD PRESSURE: 131 MMHG | HEART RATE: 54 BPM | DIASTOLIC BLOOD PRESSURE: 76 MMHG | OXYGEN SATURATION: 99 %

## 2024-01-09 DIAGNOSIS — J11.1 INFLUENZA-LIKE ILLNESS: Primary | ICD-10-CM

## 2024-01-09 DIAGNOSIS — R51.9 CHRONIC NONINTRACTABLE HEADACHE, UNSPECIFIED HEADACHE TYPE: Primary | ICD-10-CM

## 2024-01-09 DIAGNOSIS — G89.29 CHRONIC NONINTRACTABLE HEADACHE, UNSPECIFIED HEADACHE TYPE: Primary | ICD-10-CM

## 2024-01-09 LAB
DEPRECATED S PYO AG THROAT QL EIA: NEGATIVE
FLUAV AG SPEC QL IA: NEGATIVE
FLUBV AG SPEC QL IA: NEGATIVE
GROUP A STREP BY PCR: NOT DETECTED
SARS-COV-2 RNA RESP QL NAA+PROBE: NEGATIVE

## 2024-01-09 PROCEDURE — 87804 INFLUENZA ASSAY W/OPTIC: CPT | Performed by: INTERNAL MEDICINE

## 2024-01-09 PROCEDURE — 99214 OFFICE O/P EST MOD 30 MIN: CPT | Performed by: INTERNAL MEDICINE

## 2024-01-09 PROCEDURE — 87635 SARS-COV-2 COVID-19 AMP PRB: CPT | Performed by: INTERNAL MEDICINE

## 2024-01-09 PROCEDURE — 87651 STREP A DNA AMP PROBE: CPT | Performed by: INTERNAL MEDICINE

## 2024-01-09 RX ORDER — OSELTAMIVIR PHOSPHATE 75 MG/1
75 CAPSULE ORAL 2 TIMES DAILY
Qty: 10 CAPSULE | Refills: 0 | Status: SHIPPED | OUTPATIENT
Start: 2024-01-09 | End: 2024-01-25

## 2024-01-09 RX ORDER — RABEPRAZOLE SODIUM 20 MG/1
20 TABLET, DELAYED RELEASE ORAL DAILY
Qty: 30 TABLET | Refills: 3 | Status: SHIPPED | OUTPATIENT
Start: 2024-01-09 | End: 2024-01-25 | Stop reason: DRUGHIGH

## 2024-01-09 ASSESSMENT — PAIN SCALES - GENERAL: PAINLEVEL: EXTREME PAIN (8)
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
## 2024-01-09 NOTE — LETTER
January 9, 2024      Marleen Mcfadden  [address] [place]  [facility] [place] [address]        To Whom It May Concern:    Marleen Mcfadden was seen in our clinic for an illness.  She will miss work today (1/9/24) and may need to miss up to an additional four days due to illness.  When her symptoms have improved, she may return to work without restrictions.      Sincerely,        Zuleika Alonzo MD

## 2024-01-10 ENCOUNTER — OFFICE VISIT (OUTPATIENT)
Dept: OPHTHALMOLOGY | Facility: CLINIC | Age: 59
End: 2024-01-10
Attending: OPHTHALMOLOGY
Payer: COMMERCIAL

## 2024-01-10 ENCOUNTER — OFFICE VISIT (OUTPATIENT)
Dept: RHEUMATOLOGY | Facility: CLINIC | Age: 59
End: 2024-01-10
Payer: COMMERCIAL

## 2024-01-10 VITALS
HEIGHT: 64 IN | BODY MASS INDEX: 40.29 KG/M2 | DIASTOLIC BLOOD PRESSURE: 78 MMHG | OXYGEN SATURATION: 97 % | HEART RATE: 55 BPM | SYSTOLIC BLOOD PRESSURE: 128 MMHG | WEIGHT: 236 LBS

## 2024-01-10 DIAGNOSIS — R51.9 CHRONIC NONINTRACTABLE HEADACHE, UNSPECIFIED HEADACHE TYPE: ICD-10-CM

## 2024-01-10 DIAGNOSIS — H26.013 LAMELLAR CATARACT OF BOTH EYES: ICD-10-CM

## 2024-01-10 DIAGNOSIS — H52.03 HYPEROPIA OF BOTH EYES WITH ASTIGMATISM AND PRESBYOPIA: ICD-10-CM

## 2024-01-10 DIAGNOSIS — R76.8 ANA POSITIVE: ICD-10-CM

## 2024-01-10 DIAGNOSIS — H04.123 DRY EYE SYNDROME OF BOTH EYES: ICD-10-CM

## 2024-01-10 DIAGNOSIS — R51.9 CHRONIC HEADACHE WITH NEW FEATURES: ICD-10-CM

## 2024-01-10 DIAGNOSIS — H40.033 NARROW ANGLE GLAUCOMA SUSPECT OF BOTH EYES: Primary | ICD-10-CM

## 2024-01-10 DIAGNOSIS — H52.4 HYPEROPIA OF BOTH EYES WITH ASTIGMATISM AND PRESBYOPIA: ICD-10-CM

## 2024-01-10 DIAGNOSIS — H52.203 HYPEROPIA OF BOTH EYES WITH ASTIGMATISM AND PRESBYOPIA: ICD-10-CM

## 2024-01-10 DIAGNOSIS — R79.89 LOW VITAMIN D LEVEL: Primary | ICD-10-CM

## 2024-01-10 DIAGNOSIS — G89.29 CHRONIC NONINTRACTABLE HEADACHE, UNSPECIFIED HEADACHE TYPE: ICD-10-CM

## 2024-01-10 DIAGNOSIS — G89.29 CHRONIC HEADACHE WITH NEW FEATURES: ICD-10-CM

## 2024-01-10 PROCEDURE — 92133 CPTRZD OPH DX IMG PST SGM ON: CPT | Performed by: OPHTHALMOLOGY

## 2024-01-10 PROCEDURE — 92020 GONIOSCOPY: CPT | Performed by: OPHTHALMOLOGY

## 2024-01-10 PROCEDURE — 99214 OFFICE O/P EST MOD 30 MIN: CPT | Performed by: OPHTHALMOLOGY

## 2024-01-10 PROCEDURE — 92083 EXTENDED VISUAL FIELD XM: CPT | Performed by: OPHTHALMOLOGY

## 2024-01-10 PROCEDURE — 99212 OFFICE O/P EST SF 10 MIN: CPT | Performed by: INTERNAL MEDICINE

## 2024-01-10 RX ORDER — VITAMIN B COMPLEX
1 TABLET ORAL DAILY
Qty: 90 TABLET | Refills: 2 | Status: SHIPPED | OUTPATIENT
Start: 2024-01-10

## 2024-01-10 ASSESSMENT — GONIOSCOPY: METHOD: SUSSMAN, FOUR MIRROR

## 2024-01-10 ASSESSMENT — REFRACTION_WEARINGRX
OD_CYLINDER: +2.75
OD_ADD: +2.75
OS_ADD: +2.75
OS_AXIS: 083
OS_CYLINDER: +3.50
OS_SPHERE: +0.25
OD_SPHERE: +3.00
SPECS_TYPE: BIFOCAL
OD_AXIS: 088

## 2024-01-10 ASSESSMENT — VISUAL ACUITY
OS_CC: 20/25
OD_CC: 20/25
OS_CC+: -2
CORRECTION_TYPE: GLASSES
METHOD: SNELLEN - LINEAR

## 2024-01-10 ASSESSMENT — TONOMETRY
OD_IOP_MMHG: 14
OS_IOP_MMHG: 13
IOP_METHOD: TONOPEN

## 2024-01-10 ASSESSMENT — SLIT LAMP EXAM - LIDS
COMMENTS: NORMAL
COMMENTS: NORMAL

## 2024-01-10 ASSESSMENT — PAIN SCALES - GENERAL: PAINLEVEL: SEVERE PAIN (6)

## 2024-01-10 ASSESSMENT — CONF VISUAL FIELD: COMMENTS: VF PERFORMED TODAY

## 2024-01-10 ASSESSMENT — CUP TO DISC RATIO
OD_RATIO: 0.2
OS_RATIO: 0.2

## 2024-01-10 NOTE — NURSING NOTE
RAPID3 (0-30) Cumulative Score            RAPID3 Weighted Score (divide #4 by 3 and that is the weighted score)       DECLINED  Zonia Singleton Certified Medical Assistant

## 2024-01-10 NOTE — PROGRESS NOTES
HPI       Follow Up    Associated symptoms include headache.  Negative for double vision, redness and photophobia.  Treatments tried include no treatments.  Pain was noted as 0/10. Additional comments: Fallow up craniotomy.  Last seen May 1, 2023.              Comments    Pt reports no noticeable changes in vision since last seen, BE   Still gets headaches daily.  Is not using any eye drops at this time.    Katharine Awad OA, January 10, 2024             Last edited by Katharine Phan on 1/10/2024  2:02 PM.         Review of systems for the eyes was negative other than the pertinent positives/negatives listed in the HPI.      Assessment & Plan    HPI:  Marleen Mcfadden is a 58 year old female with history of temporal lobe encephalocele repair 7/2021, chronic CHF, HTN, hyperopia with astigmatism, lamellar cataract presents for followup of headache. She has noted intermittent diplopia. She notes pulsatile tinnitus. She was seen initially in May 2023 but did not followup for her 6 week appointment. She is here undilated    Headache are intermittent and don't last long.     POHx: lamellar cataract, hyperopia  PMHx:   Current Medications: dapagliflozin (FARXIGA) 10 MG TABS tablet, Take 1 tablet (10 mg) by mouth daily  famotidine (PEPCID) 40 MG tablet, Take 1 tablet (40 mg) by mouth nightly as needed (heartburn)  furosemide (LASIX) 20 MG tablet, Take 2 tablets (40 mg) by mouth daily as needed (weight gain/edema)  loratadine (CLARITIN) 10 MG tablet, Take 1 tablet (10 mg) by mouth daily  LORazepam (ATIVAN) 0.5 MG tablet, Take 1 tablet (0.5 mg) by mouth every 6 hours as needed for anxiety (take 1 tab 30 min prior to MRI)  metoprolol succinate ER (TOPROL XL) 25 MG 24 hr tablet, Take 1 tablet (25 mg) by mouth daily  oseltamivir (TAMIFLU) 75 MG capsule, Take 1 capsule (75 mg) by mouth 2 times daily for 5 days  pantoprazole (PROTONIX) 40 MG EC tablet, Take 1 tablet (40 mg) by mouth every 12 hours  progesterone (PROMETRIUM) 100  "MG capsule, Take 100 mg by mouth at bedtime  RABEprazole (ACIPHEX) 20 MG EC tablet, Take 1 tablet (20 mg) by mouth 2 times daily  sacubitril-valsartan (ENTRESTO)  MG per tablet, Take 1 tablet by mouth 2 times daily  solifenacin (VESICARE) 5 MG tablet, Take 1 tablet (5 mg) by mouth daily at 2 pm  spironolactone (ALDACTONE) 25 MG tablet, Take 2 tablets (50 mg) by mouth daily  sucralfate (CARAFATE) 1 GM tablet, Take 1 tablet (1 g) by mouth 4 times daily  Cholecalciferol (VITAMIN D) 2000 UNIT tablet, Take 5,000 Units by mouth as needed Reported on 3/8/2017 (Patient not taking: Reported on 1/10/2024)  FLAXSEED, LINSEED, PO, Take 1 capsule by mouth daily (Patient not taking: Reported on 1/10/2024)  RABEprazole (ACIPHEX) 20 MG EC tablet, Take 1 tablet (20 mg) by mouth daily (Patient not taking: Reported on 1/10/2024)    sodium chloride (PF) 0.9% PF flush 10 mL      Works as phlebotomist      Current Eye Medications:  None    Assessment & Plan:  (H40.033) Narrow angle glaucoma suspect of both eyes  (primary encounter diagnosis)  Gonio today narrow and only open to thin scleral spur   Recommend laser peripheral iridotomy (LPI) prior to dilation  Patient became extremely confrontational upon recommendation of laser peripheral iridotomy (LPI), asking \"what happens when you mess up?\". Communication between patient and myself was extremely unproductive following this interaction. She stated that \"it's my right to know what happens when you make a mistake\".   Patient would like second opinion  I encourage a second opinion and would list every possible complication as it relates to any procedure in a consent    (Z98.890) S/P craniotomy   (R51.9,  G89.29) Chronic nonintractable headache, unspecified headache type  Best corrected visual acuity reasonable for given amount of cataract  Color vision was full, extraocular motility was full and oct retinal nerve fiber layer did not demonstrate thickening and is stable compared to 8 " months ago in 5/2023. Visual field had nonspecific deficits both eyes which may be related to her cataract and poor test taking, not elevated ICP. Although spontaeous venous pulsations were not evident on exam, her exam was limited by her cataract and absence of SVPs can be normal in up to 20% of the population. She was also undilated today. Her OCT is st      (H52.03,  H52.203,  H52.4) Hyperopia of both eyes with astigmatism and presbyopia  Doing well in current Mrx    (H26.013) Lamellar cataract of both eyes  Moderate cataracts are present and may account for some of the patient's visual complaints. The only treatment is surgery. Surgery would also benefit her narrow angles    (H04.123) Dry eye syndrome of both eyes  Start artificial tears four times a day        Return for LPI OD followed by OS .        Jaydon Ochoa MD     Attending Physician Attestation:  Complete documentation of historical and exam elements from today's encounter can be found in the full encounter summary report (not reduplicated in this progress note).  I personally obtained the chief complaint(s) and history of present illness.  I confirmed and edited as necessary the review of systems, past medical/surgical history, family history, social history, and examination findings as documented by others; and I examined the patient myself.  I personally reviewed the relevant tests, images, and reports as documented above.  I formulated and edited as necessary the assessment and plan and discussed the findings and management plan with the patient and family. - Jaydon Ochoa MD

## 2024-01-10 NOTE — PROGRESS NOTES
Rheumatology Clinic Visit      Marleen Mcfadden MRN# [id number]   YOB: 1965 Age: 58 year old      Date of visit: 1/10/24   Referring provider: Dr. Sissy Bhatia  PCP: Dr. Yanna Matias    Chief Complaint   Patient presents with:  RECHECK: ARIC positive. Patient is here for results and questions.     Assessment and Plan     1.  ARIC 1:160 speckled: Headaches and intermittent numbness/tingling that can affect any part of the skin.  ARIC 1:160.  No photosensitivity or photophobia, no cytopenia, no hx of thromboembolic event, history of nephritis but biopsy more than 20 years ago was not suggestive of lupus, ESR and CRP normal, recent creatinine normal.  At time time she does not have an obvious connective tissue disease.  Workup showed negative Sm, RNP, SSA, SSB, dsDNA, cardiolopin, beta-2-glycoprotein, lupus anticoagulation; normal C3 and C4; normal creatinine; normal ESR and CRP, normal CK, no proteinuria, no hematuria.   Overall her exam is most consistent with chronic pain syndrome such as fibromyalgia.  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  Continue following with neurology and her PCP.  No active rheumatologic disease identified at this time.    Total minutes spent in evaluation with patient, documentation, , and review of pertinent studies and chart notes: 18     Ms. Mcfadden verbalized agreement with and understanding of the rational for the diagnosis and treatment plan.  All questions were answered to best of my ability and the patient's satisfaction. Ms. Mcfadden was advised to contact the clinic with any questions that may arise after the clinic visit.      Thank you for involving me in the care of the patient    Return to clinic: No scheduled return appointment in rheumatology needed at this time. Return PRN.     HPI   Marleen Mcfadden is a 58 year  "old female with a past medical history significant for familial cardiomyopathy, degenerative disc disease of the lumbar spine, GERD, depression, hypertension, obstructive sleep apnea, history of diverticulitis, genital HSV, bilateral TKA (each replaced twice) and a positive ARIC who presents for rheumatology evaluation of positive ARIC    1/26/2009 [facility] rheumatology note by Dr. Sarah Villatoro documents that her history and exam are suggestive of a diffuse myofascial pain syndrome.  See original note for full details.    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  See original note for full details.    10/24/2023 neurology note by Dr. Bhatia documents recurrent thunderclap headaches since skull-based surgery in 2021.  Increasing frequency of a sharp electric-like sensation that shoots from her head down to T4.  Tingling and burning paresthesias in the bilateral face, upper and lower extremities.    1/3/2024: diffuse tingling and numbness that may occur anywhere on the skin, comes and goes.  Sometimes with cramps in the fingers, legs, and toes that may last for up to a minute, typically 1-2 x/day.  Joint pain diffusely for about 20 years; not better or worse with anything; does not limit activities.  Feels like her right shoulder and bilateral knees may swell from time to time, typically associated with activity.  Pain specifically noted at the 1st CMC joints that she points to. Canker sore last occurred 2 months ago;  infrequent occurrence; duration: lifelong.  Cardiomyopathy, stable for years; she reports that it is genetic. Worsening GERD recently; associated with cough with mucous production; working with GI.  Emesis daily for 60-90 days, non-bloody, non-bilious, looking like the food she ate; working with GI.  Headaches daily, can be sharp and severe; has had CSF issues and brain surgery in the past.  Headaches started before GI issues.  She " is working with a neurologist for the headaches.    Denies fevers, or chills. No abdominal pain currently. No chest pain/pressure, palpitations, or shortness of breath. No LE swelling. No neck pain. No oral or nasal sores currently.  No rash. No sicca symptoms. No photosensitivity or photophobia. No eye pain or redness. No history of inflammatory eye disease.  No history of inflammatory bowel disease.  No history of DVT, pulmonary embolism, or miscarriage.   No history of serositis.  No history of Raynaud's Phenomenon.  No known seizure disorder.  No known renal disorder.  No pain or burning with urination.  No increased urinary frequency.    Familial cardiomyopathy diagnosed in 2000.  Has a history of nephritis; 1997 kidney biopsy did not suggest lupus related nephritis.  Around that time she was given Plaquenil that she says she took for no more than 1 week.      Today, 1/10/2024: Recently with cough and feeling under the weather; being treated for influenza.  Whole body ache worse with current illness thought to be flu.  No other new information or change.      Tobacco: none  EtOH: no more than 1 drink per day, infrequently  Drugs: none  Occupation: phlebotomist     ROS   12 point review of system was completed and negative except as noted in the HPI     Active Problem List     Patient Active Problem List   Diagnosis    Leiomyoma of uterus    Allergic rhinitis    Anemia    [facility]    Knee pain    Thyroid nodule    Pain in joint involving ankle and foot    CARDIOVASCULAR SCREENING; LDL GOAL LESS THAN 160    Shaina's nodes    Familial cardiomyopathy (H)    Degenerative disc disease at L5-S1 level    Chronic bilateral low back pain with right-sided sciatica    Chronic bilateral low back pain with left-sided sciatica    Chronic systolic congestive heart failure (H)    Left shoulder pain    Primary osteoarthritis of both knees    Gastroesophageal reflux disease with esophagitis    Moderate major depression  (H)    Dysfunction of both eustachian tubes    Chronic rhinitis    Essential hypertension    CSF otorrhea    KATIA (obstructive sleep apnea)- moderate-severe (AHI 29)    Family history of colon cancer    Diverticulitis    Herpes simplex of female genitalia    Symptomatic bradycardia    Severe obesity (BMI 35.0-39.9) with comorbidity (H)     Past Medical History     Past Medical History:   Diagnosis Date    Acute bilateral low back pain with right-sided sciatica 06/02/2016    Allergic rhinitis, cause unspecified     Arthritis     Autoimmune disease (H24)     Autoimmune disease NEC     Autoimmune disease- unknown/poss SLE    Calcaneal spur 10/21/2014    Xray 10/17/14     CHF with cardiomyopathy (H)     Chronic low back pain     DDD (degenerative disc disease), lumbar     Depression     Failed total knee arthroplasty, initial encounter  (H24) 01/17/2019    Familial cardiomyopathy (H)     GERD without esophagitis     History of transfusion     Hypertension     Injury of left shoulder, initial encounter 01/12/2017    Morbid obesity (H)     Nontraumatic rupture of quadriceps tendon, left 06/21/2018    KATIA (obstructive sleep apnea)- moderate-severe (AHI 29) 8/26/2021    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     no longer an issue    Peroneus longus tendinitis 01/02/2015    Plantar fasciitis 11/11/2014    PONV (postoperative nausea and vomiting)     Rotator cuff injury 01/17/2017    Sprain of other ligament of left ankle, initial encounter 01/12/2017    Status post left knee replacement 06/21/2018    TB lung, latent     negative quantiferon gold test  11/5/12     Past Surgical History     Past Surgical History:   Procedure Laterality Date    ARTHROPLASTY REVISION KNEE Left 01/17/2019    Procedure: REVISION, LEFT TOTAL KNEE  ARTHROPLASTY;  Surgeon: Dev Rocha MD;  Location: [facility];  Service: Orthopedics    ARTHROPLASTY REVISION KNEE Right 09/11/2020     Procedure: RIGHT REVISION TOTAL KNEE ARTHROPLASTY;  Surgeon: Dev Rocha MD;  Location: [facility];  Service: Orthopedics    BREAST SURGERY      Breast Reduction    C EXCISE EXCESS SKIN TISSUE,ABDOMEN       SECTION  1989     SECTION  10/1985    COLONOSCOPY N/A 2023    Procedure: Colonoscopy;  Surgeon: Simone Jansne MD;  Location: [facility]    COLONOSCOPY WITH CO2 INSUFFLATION N/A 2021    Procedure: COLONOSCOPY, WITH CO2 INSUFFLATION;  Surgeon: Angela Harrington DO;  Location: [facility]    COLONOSCOPY WITH CO2 INSUFFLATION N/A 2022    Procedure: COLONOSCOPY, WITH CO2 INSUFFLATION;  Surgeon: Nando Whitlock MD;  Location: [facility]    CRANIECTOMY Right 2021    Procedure: RIGHT MIDDLE FOSSA APPROACH, CSF LEAK REPAIR;  Surgeon: Jocelyn Noe MD;  Location: [facility]    CRANIOTOMY MIDDLE FOSSA, EXCISE ACOUSTIC NEUROMA, COMBINED N/A 2021    Procedure: Right CRANIOTOMY, MIDDLE FOSSA APPROACH, FOR REPAIR OF ENCEPHALOCELE;  Surgeon: Jocelyne Harrell MD;  Location: [facility]    CV RIGHT HEART CATH MEASUREMENTS RECORDED N/A 1/10/2023    Procedure: Heart Cath Right Heart Cath;  Surgeon: Slade Hanson MD;  Location:  HEART CARDIAC CATH LAB    CYSTOURETHROSCOPY N/A 2020    Procedure: CYSTOSCOPY;  Surgeon: Cherelle Willams MD;  Location: [facility];  Service: Gynecology    ESOPHAGOSCOPY, GASTROSCOPY, DUODENOSCOPY (EGD), COMBINED N/A 2023    Procedure: Esophagoscopy, gastroscopy, duodenoscopy (EGD), combined;  Surgeon: Stu Guadalupe MD;  [facility]    GYN SURGERY N/A 2020    Procedure: MIDURETHRAL SLING, CYSTOSCOPY;  Surgeon: Cherelle Willams MD;  Location: [facility];  Service: Gynecology    HYSTERECTOMY TOTAL ABDOMINAL  2006    for fibroids; reports having blood transfusion after surgery    INSERT DRAIN LUMBAR N/A 2021    Procedure: Insert drain lumbar;  Surgeon: Kusum  Jocelyn Roberts MD;  Location:  OR    ORTHOPEDIC SURGERY  1998    Right Knee ACL repair    THYROIDECTOMY  09/09/2013    Procedure: THYROIDECTOMY;  LEFT THYROID LOBECTOMY.  (LIGASURE, RECURRENT LARYNGEAL NERVE MONITOR) ;  Surgeon: Uriah Camargo MD;  Location:  [facility]    THYROIDECTOMY      TOTAL KNEE ARTHROPLASTY Left 10/03/2017    TOTAL KNEE ARTHROPLASTY Right 02/07/2019    Procedure: RIGHT TOTAL KNEE ARTHROPLASTY;  Surgeon: Dev Rocha MD;  Location: [facility] OR;  Service: Orthopedics    TUBAL LIGATION  1989    [facility]     Allergy     Allergies   Allergen Reactions    Morphine      EMESIS    Nickel     Sulfa Antibiotics Swelling     Current Medication List     Current Outpatient Medications   Medication Sig    dapagliflozin (FARXIGA) 10 MG TABS tablet Take 1 tablet (10 mg) by mouth daily    famotidine (PEPCID) 40 MG tablet Take 1 tablet (40 mg) by mouth nightly as needed (heartburn)    furosemide (LASIX) 20 MG tablet Take 2 tablets (40 mg) by mouth daily as needed (weight gain/edema)    loratadine (CLARITIN) 10 MG tablet Take 1 tablet (10 mg) by mouth daily    LORazepam (ATIVAN) 0.5 MG tablet Take 1 tablet (0.5 mg) by mouth every 6 hours as needed for anxiety (take 1 tab 30 min prior to MRI)    metoprolol succinate ER (TOPROL XL) 25 MG 24 hr tablet Take 1 tablet (25 mg) by mouth daily    oseltamivir (TAMIFLU) 75 MG capsule Take 1 capsule (75 mg) by mouth 2 times daily for 5 days    pantoprazole (PROTONIX) 40 MG EC tablet Take 1 tablet (40 mg) by mouth every 12 hours    progesterone (PROMETRIUM) 100 MG capsule Take 100 mg by mouth at bedtime    RABEprazole (ACIPHEX) 20 MG EC tablet Take 1 tablet (20 mg) by mouth 2 times daily    sacubitril-valsartan (ENTRESTO)  MG per tablet Take 1 tablet by mouth 2 times daily    solifenacin (VESICARE) 5 MG tablet Take 1 tablet (5 mg) by mouth daily at 2 pm    spironolactone (ALDACTONE) 25 MG tablet Take 2 tablets  (50 mg) by mouth daily    sucralfate (CARAFATE) 1 GM tablet Take 1 tablet (1 g) by mouth 4 times daily    Cholecalciferol (VITAMIN D) 2000 UNIT tablet Take 5,000 Units by mouth as needed Reported on 3/8/2017 (Patient not taking: Reported on 1/10/2024)    FLAXSEED, LINSEED, PO Take 1 capsule by mouth daily (Patient not taking: Reported on 1/10/2024)    RABEprazole (ACIPHEX) 20 MG EC tablet Take 1 tablet (20 mg) by mouth daily (Patient not taking: Reported on 1/10/2024)     No current facility-administered medications for this visit.     Facility-Administered Medications Ordered in Other Visits   Medication    sodium chloride (PF) 0.9% PF flush 10 mL         Social History   See HPI    Family History     Family History   Problem Relation Age of Onset    Hypertension Father         dec    Diabetes Father         dec    Heart Disease Father         dec    Alcohol/Drug Father     Cardiovascular Father     Heart Disease Mother         dec    Alcohol/Drug Mother     Cardiovascular Mother     Heart Disease Daughter         Cardiomyopathy    Cardiovascular Daughter         cardiomyopathy    Colon Cancer Sister     Cardiovascular Son         cardiomyopathy    Diabetes Paternal Grandmother         dec    Hypertension Paternal Grandmother         dec    Cerebrovascular Disease Paternal Grandmother         dec    Cancer Sister         Lupus    Cardiovascular Sister         cardiomyopathy    Heart Disease Sister         heart failure, and kidney failure       Physical Exam     Temp Readings from Last 3 Encounters:   01/09/24 97.2  F (36.2  C) (Tympanic)   10/18/23 98  F (36.7  C) (Oral)   10/05/23 98.2  F (36.8  C) (Oral)     BP Readings from Last 5 Encounters:   01/10/24 128/78   01/09/24 131/76   01/08/24 117/73   01/03/24 100/69   12/13/23 102/62     Pulse Readings from Last 1 Encounters:   01/10/24 55     Resp Readings from Last 1 Encounters:   01/09/24 16     Estimated body mass index is 40.4 kg/m  as calculated from the  "following:    Height as of this encounter: 1.628 m (5' 4.09\").    Weight as of this encounter: 107 kg (236 lb).      GEN: NAD.   HEENT:  Anicteric, noninjected sclera. No obvious external lesions of the ear and nose. Hearing intact.  CV: S1, S2. RRR. No m/r/g  PULM: No increased work of breathing. CTA bilaterally   MSK: MCPs, PIPs, DIPs, wrists, elbows, shoulders, knees, ankles, and MTPs mildly tender to palpation diffusely but without swelling, increased warmth, or overlying erythema.  14 fibromyalgia tender points positive.  LYMPH: No lower extremity edema  SKIN: No rash or jaundice seen.  No nail pitting.  PSYCH: Alert. Appropriate.      Labs / Imaging (select studies)     RF/CCP  Recent Labs   Lab Test 03/13/20  1406   RHF <7     ARIC  Recent Labs   Lab Test 11/01/23  1506 03/13/20  1406   KANIKA Positive* Borderline Positive*   ANAP1 Speckled SPECKLED   ANAT1 1:160 1:40     RNP/Sm/SSA/SSB  Recent Labs   Lab Test 01/03/24  1022   RNPIGG Negative   SMIGG Negative   SSAIGG Negative   SSBIGG Negative     dsDNA  Recent Labs   Lab Test 01/03/24  1022   DNA 1.2     C3/C4  Recent Labs   Lab Test 01/03/24  1022   P0XJEOO 126   G3AQDCJ 26     Antiphospholipid Antibodies  Recent Labs   Lab Test 01/03/24  1022   B2GPG 1.9   B2GPM 3.3   CARDG Negative   CARDM Negative   LUPINT Negative  The INR is normal.  APTT ratio is normal.    DRVVT Screen ratio is normal.  Thrombin time is normal.  NEGATIVE TEST; A LUPUS ANTICOAGULANT WAS NOT DETECTED IN THIS SPECIMEN WITHIN THE LIMITS OF THE TESTING REPERTOIRE.  If the clinical picture is strongly suggestive of an antiphospholipid syndrome, recommend anticardiolipin and beta-2-glycoprotein (IgG and IgM) antibody tests.    Virgie Green MD, PhD  [facility]         CBC  Recent Labs   Lab Test 10/18/23  1302 02/18/23  0632 02/17/23  1721 01/09/23  1405 06/21/21  1330 09/08/20  1506 02/27/20  1033 09/18/19  1201 09/12/19  1619   WBC 5.1 4.5 6.3 6.5   < > 6.5   < > 5.4 6.0   RBC " 4.37 4.06 4.22 4.00   < > 4.13   < > 4.37 4.20   HGB 13.6 12.7 13.2 12.6   < > 12.5   < > 12.6 12.7   HCT 41.5 39.7 40.5 37.8   < > 38.8   < > 40.8 39.3   MCV 95 98 96 95   < > 94   < > 93 94   RDW 12.7 13.5 13.5 12.8   < > 13.2   < > 13.3 14.1    227 246 250   < > 276   < > 294 292   MCH 31.1 31.3 31.3 31.5   < > 30.3   < > 28.8 30.2   MCHC 32.8 32.0 32.6 33.3   < > 32.2   < > 30.9* 32.3   NEUTROPHIL 41  --  47 44   < > 47.6  --  45.8 47.8   LYMPH 46  --  40 45   < > 40.7  --  43.2 40.9   MONOCYTE 8  --  10 8   < > 7.2  --  6.8 7.9   EOSINOPHIL 4  --  2 2   < > 3.7  --  3.3 2.7   BASOPHIL 1  --  1 1   < > 0.8  --  0.7 0.7   ANEU  --   --   --   --   --  3.1  --  2.5 2.9   ALYM  --   --   --   --   --  2.7  --  2.3 2.4   NHAN  --   --   --   --   --  0.5  --  0.4 0.5   AEOS  --   --   --   --   --  0.2  --  0.2 0.2   ABAS  --   --   --   --   --  0.1  --  0.0 0.0   ANEUTAUTO 2.1  --  3.0 2.8   < >  --   --   --   --    ALYMPAUTO 2.4  --  2.5 3.0   < >  --   --   --   --    AMONOAUTO 0.4  --  0.6 0.5   < >  --   --   --   --    AEOSAUTO 0.2  --  0.1 0.1   < >  --   --   --   --    ABSBASO 0.0  --  0.1 0.0   < >  --   --   --   --     < > = values in this interval not displayed.     CMP  Recent Labs   Lab Test 01/03/24  1022 12/11/23  1021 10/18/23  1302 09/11/23  1059 08/04/23  1112 06/12/23  1043 02/24/23  1231 07/12/21  1156 07/11/21  0453 07/10/21  0419 07/09/21  0415 07/08/21  0548    137 138 139  --  138 136   < > 134 134 133 133   POTASSIUM 4.0 3.8 4.2 4.3  --  4.6 4.4   < > 4.2 4.2 4.6 4.1   CHLORIDE 103 104 103 106  --  104 102   < > 102 101 101 100   CO2 24 26 23 23  --  27 27   < > 30 31 27 30   ANIONGAP 10 7 12 10  --  7 7   < > 2* 2* 5 2*   GLC 87 98 98 94  --  92 93   < > 87 103* 130* 91   BUN 14.8 15.3 15.0 14.9  --  12.3 12.9   < > 14 12 8 9   CR 0.75 0.86 0.82 0.71  --  0.80 0.88   < > 0.79 0.89 0.70 0.87   GFRESTIMATED >90 77 82 >90  --  85 76   < > 83 72 >90 74   GFRESTBLACK  --   --    --   --   --   --   --   --  >90 84 >90 85   CARMEN 9.0 9.1 8.8 9.1  --  9.0 9.1   < > 8.2* 7.9* 8.4* 8.0*   BILITOTAL 0.2 0.3 0.4 0.4   < > 0.4  --    < >  --   --   --   --    ALBUMIN 4.0 4.3 4.0 4.0   < > 4.1  --    < >  --   --   --   --    PROTTOTAL 7.3 7.5 7.2 7.1   < > 7.2  --    < >  --   --   --   --    ALKPHOS 60 63 60 61   < > 62  --    < >  --   --   --   --    AST 21 18 26 21   < > 19  --    < >  --   --   --   --    ALT 18 22 19 22   < > 22  --    < >  --   --   --   --     < > = values in this interval not displayed.       HgA1c  Recent Labs   Lab Test 12/08/21  0823 06/21/21  1330 08/12/20  1102   A1C 5.4 5.5 5.7*     Iron Studies  Recent Labs   Lab Test 02/27/20  1033 02/12/19  0744   IRON 82 37*     --    IRONSAT 26  --      Calcium/VitaminD  Recent Labs   Lab Test 01/03/24  1022 12/11/23  1021 10/18/23  1302   CARMEN 9.0 9.1 8.8   VITDT 19*  --   --      ESR/CRP  Recent Labs   Lab Test 01/03/24  1022 11/01/23  1506 04/18/23  1152 03/03/22  1541 03/13/20  1406 03/13/20  1045 05/02/18  1439   SED 18 21 16  --    < >  --  26   CRP  --   --   --  4.6  --  <2.9 <2.9   CRPI <3.00 <3.00 <3.00  --   --   --   --     < > = values in this interval not displayed.     CK/Aldolase  Recent Labs   Lab Test 01/03/24  1022 06/12/23  1043   CKT 89 132     TSH/T4  Recent Labs   Lab Test 02/17/23  1721 06/03/20  1759 09/18/19  1201 02/15/18  1450   TSH 3.13 2.10 2.84 2.15   T4  --  1.04  --  0.94     Lipid Panel  Recent Labs   Lab Test 08/25/20  0942 01/09/19  0740 01/08/19  1121   CHOL 172 162 172   TRIG 113 175* 179*   HDL 54 44* 44*   LDL 95 83 92   NHDL 118 118 128     Hepatitis B  Recent Labs   Lab Test 01/03/24  1022   HBCAB Nonreactive   HEPBANG Nonreactive     Hepatitis C  Recent Labs   Lab Test 01/03/24  1022   HCVAB Nonreactive     Lyme ab screening  Recent Labs   Lab Test 01/03/24  1022   LYMEGM 0.12     Tuberculosis Screening  Recent Labs   Lab Test 12/30/21  1349   TBRES Negative     HIV  Screening  Recent Labs   Lab Test 01/08/19  1121   HIAGAB Nonreactive     UA  Recent Labs   Lab Test 01/03/24  1022 02/17/23  1721 08/02/22  1555 06/03/20  1816 09/18/19  1234 07/08/19  1145   COLOR Yellow Light Yellow Yellow Yellow Yellow Light Yellow   APPEARANCE Clear Clear Slightly Cloudy* Slightly Cloudy Slightly Cloudy Clear   URINEGLC 100* >=1000* Negative Negative Negative Negative   URINEBILI Negative Negative Negative Negative Negative Negative   SG 1.020 1.028 >=1.030 >1.030 1.028 1.017   URINEPH 5.5 7.5* 6.0 5.5 5.5 5.0   PROTEIN Negative Negative Negative Trace* 10* Negative   UROBILINOGEN 0.2  --  0.2 0.2  --   --    NITRITE Negative Negative Negative Negative Negative Negative   UBLD Trace* Negative Trace* Moderate* Negative Negative   LEUKEST Small* Negative Trace* Negative Negative Negative   WBCU 0-5 <1 0-5 0 - 5 1 0   RBCU 0-2 <1 2-5* 2-5* 1 <1   SQUAMOUSEPI  --   --   --  Moderate*  --   --    BACTERIA  --   --   --  Moderate* Few* Few*   MUCOUS  --   --   --   --  Present* Present*     Urine Microscopic  Recent Labs   Lab Test 01/03/24  1022 02/17/23  1721 08/02/22  1555 06/03/20  1816 09/18/19  1234 07/08/19  1145   WBCU 0-5 <1 0-5 0 - 5 1 0   RBCU 0-2 <1 2-5* 2-5* 1 <1   SQUAMOUSEPI  --   --   --  Moderate*  --   --    BACTERIA  --   --   --  Moderate* Few* Few*   MUCOUS  --   --   --   --  Present* Present*     Urine Protein  GHUTP and UTP= Urine protein (random), GHUTPG and UTPG = urine protein:creatinine ratio (random), UCRR = urine creatinine (random)  Recent Labs   Lab Test 01/03/24  1022 06/03/20  1816   GHUTP 8.1  --    GHUTPG 0.09  --    UCRR 85.5 430     Immunization History     Immunization History   Administered Date(s) Administered    COVID-19 Bivalent 12+ (Pfizer) 03/14/2023    COVID-19 MONOVALENT 12+ (Pfizer) 03/30/2021, 04/20/2021, 12/23/2021    DTaP, Unspecified 08/26/2020    HepB 07/30/2012, 09/18/2012    Influenza Vaccine 18-64 (Flublok) 01/04/2022, 10/03/2022    MMR  09/14/2009, 04/24/2012    Pneumo Conj 13-V (2010&after) 05/06/2021    Pneumococcal 23 valent 07/02/2021    TDAP (Adacel,Boostrix) 08/26/2020    TDAP Vaccine (Adacel) 09/11/2009, 08/14/2020          Chart documentation done in part with Dragon Voice recognition Software. Although reviewed after completion, some word and grammatical error may remain.    Davin Herrera MD

## 2024-01-10 NOTE — NURSING NOTE
Chief Complaints and History of Present Illnesses   Patient presents with    Follow Up     Fallow up craniotomy.  Last seen May 1, 2023.      Chief Complaint(s) and History of Present Illness(es)       Follow Up              Associated symptoms: headache.  Negative for double vision, redness and photophobia    Treatments tried: no treatments    Pain scale: 0/10    Comments: Fallow up craniotomy.  Last seen May 1, 2023.               Comments    Pt reports no noticeable changes in vision since last seen, BE   Still gets headaches daily.  Is not using any eye drops at this time.    Kristine Awad OA, January 10, 2024

## 2024-01-10 NOTE — PATIENT INSTRUCTIONS
"Use one drop of artificial tears both eyes 3-4 x daily.  Continue to use the drops regardless if your eyes are comfortable.  Artificial tears work best as a preventative and not as well after your eyes are starting to bother you.  Preservative-free drops are best if you will be using them more than 6x daily. Some brands include: Celluvisc, Refresh, Systane, Blink, Optive, iVizia. Avoid using any drops that state \"get the red out\".  It may take 4-6 weeks of using the drops before you notice improvement.  If after that time you are still having problems schedule an appointment for an evaluation and discussion of different treatments which may include medicated eye drops, punctal plugs to keep the tears that are made and supplemented on the surface of the eye longer, or other treatments. Dry eyes are a chronic condition and you may have more symptoms at certain times of the year.   "

## 2024-01-12 ENCOUNTER — MEDICAL CORRESPONDENCE (OUTPATIENT)
Dept: SCHEDULING | Facility: CLINIC | Age: 59
End: 2024-01-12

## 2024-01-12 ENCOUNTER — ANCILLARY PROCEDURE (OUTPATIENT)
Dept: CT IMAGING | Facility: CLINIC | Age: 59
End: 2024-01-12
Attending: PHYSICIAN ASSISTANT
Payer: COMMERCIAL

## 2024-01-12 DIAGNOSIS — R10.32 LLQ ABDOMINAL PAIN: ICD-10-CM

## 2024-01-12 PROCEDURE — 74177 CT ABD & PELVIS W/CONTRAST: CPT | Mod: GC | Performed by: RADIOLOGY

## 2024-01-12 RX ORDER — IOPAMIDOL 755 MG/ML
122 INJECTION, SOLUTION INTRAVASCULAR ONCE
Status: COMPLETED | OUTPATIENT
Start: 2024-01-12 | End: 2024-01-12

## 2024-01-12 RX ADMIN — IOPAMIDOL 122 ML: 755 INJECTION, SOLUTION INTRAVASCULAR at 14:09

## 2024-01-13 ENCOUNTER — MYC MEDICAL ADVICE (OUTPATIENT)
Dept: GASTROENTEROLOGY | Facility: CLINIC | Age: 59
End: 2024-01-13
Payer: COMMERCIAL

## 2024-01-13 LAB — RADIOLOGIST FLAGS: ABNORMAL

## 2024-01-13 RX ORDER — METRONIDAZOLE 500 MG/1
500 TABLET ORAL 3 TIMES DAILY
Qty: 30 TABLET | Refills: 0 | Status: SHIPPED | OUTPATIENT
Start: 2024-01-13 | End: 2024-01-23

## 2024-01-13 RX ORDER — CIPROFLOXACIN 500 MG/1
500 TABLET, FILM COATED ORAL 2 TIMES DAILY
Qty: 20 TABLET | Refills: 0 | Status: SHIPPED | OUTPATIENT
Start: 2024-01-13 | End: 2024-01-23

## 2024-01-14 ENCOUNTER — HEALTH MAINTENANCE LETTER (OUTPATIENT)
Age: 59
End: 2024-01-14

## 2024-01-15 ENCOUNTER — OFFICE VISIT (OUTPATIENT)
Dept: CARDIOLOGY | Facility: CLINIC | Age: 59
End: 2024-01-15
Attending: INTERNAL MEDICINE
Payer: COMMERCIAL

## 2024-01-15 ENCOUNTER — LAB (OUTPATIENT)
Dept: LAB | Facility: CLINIC | Age: 59
End: 2024-01-15
Payer: COMMERCIAL

## 2024-01-15 ENCOUNTER — MYC MEDICAL ADVICE (OUTPATIENT)
Dept: GASTROENTEROLOGY | Facility: CLINIC | Age: 59
End: 2024-01-15

## 2024-01-15 VITALS
HEART RATE: 65 BPM | OXYGEN SATURATION: 98 % | WEIGHT: 232.2 LBS | DIASTOLIC BLOOD PRESSURE: 65 MMHG | SYSTOLIC BLOOD PRESSURE: 113 MMHG | BODY MASS INDEX: 39.75 KG/M2

## 2024-01-15 DIAGNOSIS — R39.15 URINARY URGENCY: ICD-10-CM

## 2024-01-15 DIAGNOSIS — B37.9 ANTIBIOTIC-INDUCED YEAST INFECTION: Primary | ICD-10-CM

## 2024-01-15 DIAGNOSIS — I50.22 CHRONIC SYSTOLIC CONGESTIVE HEART FAILURE (H): ICD-10-CM

## 2024-01-15 DIAGNOSIS — R73.9 HYPERGLYCEMIA: Primary | ICD-10-CM

## 2024-01-15 DIAGNOSIS — R11.10 VOMITING, UNSPECIFIED VOMITING TYPE, UNSPECIFIED WHETHER NAUSEA PRESENT: ICD-10-CM

## 2024-01-15 DIAGNOSIS — T36.95XA ANTIBIOTIC-INDUCED YEAST INFECTION: Primary | ICD-10-CM

## 2024-01-15 DIAGNOSIS — I50.22 CHRONIC SYSTOLIC CONGESTIVE HEART FAILURE (H): Primary | ICD-10-CM

## 2024-01-15 LAB
ANION GAP SERPL CALCULATED.3IONS-SCNC: 6 MMOL/L (ref 7–15)
BUN SERPL-MCNC: 10.8 MG/DL (ref 8–23)
CALCIUM SERPL-MCNC: 8.7 MG/DL (ref 8.6–10)
CHLORIDE SERPL-SCNC: 105 MMOL/L (ref 98–107)
CREAT SERPL-MCNC: 0.92 MG/DL (ref 0.51–0.95)
DEPRECATED HCO3 PLAS-SCNC: 28 MMOL/L (ref 22–29)
EGFRCR SERPLBLD CKD-EPI 2021: 71 ML/MIN/1.73M2
GLUCOSE SERPL-MCNC: 106 MG/DL (ref 70–99)
LIPASE SERPL-CCNC: 38 U/L (ref 13–60)
POTASSIUM SERPL-SCNC: 3.9 MMOL/L (ref 3.4–5.3)
SODIUM SERPL-SCNC: 139 MMOL/L (ref 135–145)

## 2024-01-15 PROCEDURE — 99214 OFFICE O/P EST MOD 30 MIN: CPT | Performed by: CASE MANAGER/CARE COORDINATOR

## 2024-01-15 PROCEDURE — 80048 BASIC METABOLIC PNL TOTAL CA: CPT | Performed by: PATHOLOGY

## 2024-01-15 PROCEDURE — 36415 COLL VENOUS BLD VENIPUNCTURE: CPT | Performed by: PATHOLOGY

## 2024-01-15 PROCEDURE — 83690 ASSAY OF LIPASE: CPT | Performed by: PATHOLOGY

## 2024-01-15 PROCEDURE — 99213 OFFICE O/P EST LOW 20 MIN: CPT | Performed by: CASE MANAGER/CARE COORDINATOR

## 2024-01-15 RX ORDER — SOLIFENACIN SUCCINATE 5 MG/1
5 TABLET, FILM COATED ORAL
Qty: 90 TABLET | Refills: 1 | Status: SHIPPED | OUTPATIENT
Start: 2024-01-15 | End: 2024-07-24

## 2024-01-15 RX ORDER — FLUCONAZOLE 150 MG/1
150 TABLET ORAL ONCE
Qty: 1 TABLET | Refills: 0 | Status: SHIPPED | OUTPATIENT
Start: 2024-01-15 | End: 2024-01-25

## 2024-01-15 ASSESSMENT — PAIN SCALES - GENERAL: PAINLEVEL: NO PAIN (0)

## 2024-01-15 NOTE — TELEPHONE ENCOUNTER
Spoke to Marleen who states is doing well on the liquid diet, symptoms have not worsened, will continue on low fiber diet. Prefers to take the antibiotics, writer reviewed CT result message from Alex Sanchez:    Marleen,  I attempted to reach you today January 13, 2024 with these results but unfortunately I missed you. I did leave you a detailed voice message.  Your CT scan is showing a possible mild diverticulitis. Typically this can be treated with clear liquid diet for several days (soups, broth, water, gatorade, popsicles), followed by a low fiber diet for ~ 2 weeks. However if your symptoms are not improving with those measures then antibiotics may be warranted. I will go ahead and send you an antibiotic incase you should need it. If your pain continues or exacerbates or you are unable to tolerate fluids, develop a fever, then please go to the emergency department.     Alex Sanchez PA-C

## 2024-01-15 NOTE — Clinical Note
Kory Seaman and Megha,  I saw Marleen in [facility] clinic today for follow up. She's had another bout of diverticulitis and a viral illness over the past few weeks, gained then lost 6 lbs, back to her baseline weight. She's feeling better, no shortness of breath, mild leg swelling for which Lasix 40 daily PRN has been helping. Labs are normal. No plans to change any meds today, she has a follow-up appt scheduled with Hunter in March.  Graeme

## 2024-01-15 NOTE — PATIENT INSTRUCTIONS
You were seen today in the Cardiovascular Clinic at the [facility] by:       PITO SMITH CNP    Your visit summary and instructions are as follows:    No medication changes today  Okay to continue Lasix as needed for swelling      Return to cardiology clinic in March to see Hunter     Thank you for your visit today!     Please MyChart message me or call my nurse if you have any questions or concerns.      During Business Hours:  [phone], option # 1 (University) then option # 4 (medical questions) and ask to speak with my nurse.     After hours, weekends or holidays:   [phone], Option #4  Ask to speak to the On-Call Cardiologist. Inform them you are a cardiology patient at the [place].
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
## 2024-01-15 NOTE — TELEPHONE ENCOUNTER
See pended rx for diflucan for antibiotic associated yeast infection.     Please also contact patient regarding CT scan results. Concerns of diverticulitis noted. Pt placed on abx. Clear liquid diet x 2-3 days followed by ~2-3 weeks of low residue diet. Please see CT result note.     Alex Sanchez PA-C   
Writer spoke with Marleen, per request Diflucan order sent to [facility].   
.

## 2024-01-15 NOTE — NURSING NOTE
Chief Complaint   Patient presents with    Follow Up     Return heart failure, 58 year old female with history of Familial cardiomyopathy, systolic HF, EF 35% presents for follow up with labs prior.       Vitals were taken, medications reviewed.    Marah Castle, EMT   1:54 PM
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
## 2024-01-16 ENCOUNTER — MYC MEDICAL ADVICE (OUTPATIENT)
Dept: GASTROENTEROLOGY | Facility: CLINIC | Age: 59
End: 2024-01-16
Payer: COMMERCIAL

## 2024-01-16 DIAGNOSIS — K21.9 GASTROESOPHAGEAL REFLUX DISEASE WITHOUT ESOPHAGITIS: ICD-10-CM

## 2024-01-16 NOTE — LETTER
January 16, 2024      Marleen Mcfadden  [address]  [place] [place] [address]        To Whom It May Concern:    Marleen Mcfadden was seen in our clinic. She may return to work with the following: must be able to take periodic breaks during the work day.     Sincerely,      Alex Sanchez

## 2024-01-17 ENCOUNTER — LAB (OUTPATIENT)
Dept: LAB | Facility: CLINIC | Age: 59
End: 2024-01-17
Payer: COMMERCIAL

## 2024-01-17 DIAGNOSIS — R73.9 HYPERGLYCEMIA: ICD-10-CM

## 2024-01-17 LAB — HBA1C MFR BLD: 5.5 %

## 2024-01-17 PROCEDURE — 83036 HEMOGLOBIN GLYCOSYLATED A1C: CPT

## 2024-01-17 PROCEDURE — 36415 COLL VENOUS BLD VENIPUNCTURE: CPT

## 2024-01-19 ENCOUNTER — TELEPHONE (OUTPATIENT)
Dept: GASTROENTEROLOGY | Facility: CLINIC | Age: 59
End: 2024-01-19
Payer: COMMERCIAL

## 2024-01-19 RX ORDER — RABEPRAZOLE SODIUM 20 MG/1
20 TABLET, DELAYED RELEASE ORAL 2 TIMES DAILY
Qty: 60 TABLET | Refills: 3 | Status: SHIPPED | OUTPATIENT
Start: 2024-01-19 | End: 2024-05-13

## 2024-01-19 NOTE — TELEPHONE ENCOUNTER
Reviewed mychart message. Start a BRAT diet for a few days, then afterwards patient can start to advance diet by incorporating more fiber and this will help bulk up stools. Would initiate this slowly.     Yes, we can increase aciphex to BID, may require prior auth. She has tried and failed multiple PPI's in the past.     Alex Sanchez PA-C

## 2024-01-19 NOTE — TELEPHONE ENCOUNTER
Discussed recommendations from Alex Sanchez with Marleen voicing understanding  Reviewed eyeQhart message. Start a BRAT diet for a few days, then afterwards patient can start to advance diet by incorporating more fiber and this will help bulk up stools. Would initiate this slowly.      Yes, we can increase aciphex to BID, may require prior auth. She has tried and failed multiple PPI's in the past.      Alex Sanchez PA-C

## 2024-01-19 NOTE — TELEPHONE ENCOUNTER
Prior Authorization Retail Medication Request    Medication/Dose:    Disp Refills Start End DANYEL   RABEprazole (ACIPHEX) 20 MG EC tablet 60 tablet 3 1/19/2024 -- No   Sig - Route: Take 1 tablet (20 mg) by mouth 2 times daily - Oral   Sent to pharmacy as: RABEprazole Sodium 20 MG Oral Tablet Delayed Release (ACIPHEX)   Class: E-Prescribe   Order: [id number]   E-Prescribing Status: Receipt confirmed by pharmacy (1/19/2024  2:37 PM CST)     Diagnosis and ICD code (if different than what is on RX):    New/renewal/insurance change PA/secondary ins. PA:  Previously Tried and Failed:    Rationale:      Insurance   Primary:   Insurance ID:      Secondary (if applicable):  Insurance ID:      Pharmacy Information (if different than what is on RX)  Name:    Phone:   Fax:

## 2024-01-26 NOTE — TELEPHONE ENCOUNTER
Central Prior Authorization Team  Phone: [phone]    PA Initiation    Medication: RABEPRAZOLE SODIUM 20 MG PO [facility]  Insurance Company: [employer] - Phone [phone] Fax [phone]  Pharmacy Filling the Rx: [id number] [address] [facility] [address] [facility]  Filling Pharmacy Phone: [phone]  Filling Pharmacy Fax:    Start Date: 1/26/2024

## 2024-01-26 NOTE — TELEPHONE ENCOUNTER
Information from insurance company sent from patient routed to PA team    The insurance company [employer] said they need chart notes  faxed as to why I need the medication twice daily. They said please use PA# [id number] for chart notes and fax them to the following number. [phone]

## 2024-01-26 NOTE — TELEPHONE ENCOUNTER
Faxed Chart notes as requested to [phone].  Marked as URGENT.  I will update the outcome as soon as we receive it.  Retail Pharmacy Prior Authorization Team   Phone: [phone]      Faxed Q Set to [phone] @ 5:55 PM

## 2024-01-28 NOTE — TELEPHONE ENCOUNTER
Prior Authorization Approval    Medication: RABEPRAZOLE SODIUM 20 MG PO [facility]  Authorization Effective Date: 1/27/2024  Authorization Expiration Date: 1/26/2025  Approved Dose/Quantity: 60 per 30 days  Reference #:     Insurance Company: [employer] Phone [phone] Fax [phone]  Expected CoPay: $    CoPay Card Available:      Financial Assistance Needed: No  Which Pharmacy is filling the prescription: [id number] - [facility] [address] [facility] AT [address] [place] [address]  Pharmacy Notified: Yes  Patient Notified: Pharmacy will notify patient.

## 2024-01-29 NOTE — TELEPHONE ENCOUNTER
LPN attempted to contact patient. Unable to leave a voicemail as patients mailbox is full. LPN sent patient a "[employer]" message with update of PA approval.    Marry Gibbs LPN

## 2024-02-05 ENCOUNTER — TELEPHONE (OUTPATIENT)
Dept: GASTROENTEROLOGY | Facility: CLINIC | Age: 59
End: 2024-02-05
Payer: COMMERCIAL

## 2024-02-05 NOTE — TELEPHONE ENCOUNTER
Non-Invasive GI Procedure Scheduling Questionnaire    Have you had a positive Covid test in the last 14 days?  No    Are you active on Giritechhart?   Yes    What insurance is in the chart?  Other:  [facility]    Ordering/Referring Provider: RAN VELEZ   (If ordering provider performs procedure, schedule with ordering provider unless otherwise instructed. )    (Females) Are you currently pregnant? No - Okay to continue scheduling.    Have you ever had or are you awaiting a heart or lung transplant? No - Schedule next available.    Do you need assistance transferring? No - Continue scheduling.    Do you take acid reflux medication or proton-pump inhibitors (PPI)? Yes - Advise patients to discontinue acid suppression medications 2-4 weeks prior to the exam/procedure. ( Scheduled for more than 14 days out.)    Patient Reminders:  Please inform patients to review instructions sent via WEISS[employer] or letter two weeks before procedure.  The Manometry exam may take up to 90 minutes.  The Breath Test exam may take up to 4 hours.       [History reviewed] : History reviewed. [Medications and Allergies reviewed] : Medications and allergies reviewed.

## 2024-02-07 ENCOUNTER — MYC MEDICAL ADVICE (OUTPATIENT)
Dept: GASTROENTEROLOGY | Facility: CLINIC | Age: 59
End: 2024-02-07
Payer: COMMERCIAL

## 2024-02-07 DIAGNOSIS — R11.10 VOMITING, UNSPECIFIED VOMITING TYPE, UNSPECIFIED WHETHER NAUSEA PRESENT: Primary | ICD-10-CM

## 2024-02-08 ENCOUNTER — TELEPHONE (OUTPATIENT)
Dept: GASTROENTEROLOGY | Facility: CLINIC | Age: 59
End: 2024-02-08
Payer: COMMERCIAL

## 2024-02-08 RX ORDER — METOCLOPRAMIDE 5 MG/1
5 TABLET ORAL
Qty: 120 TABLET | Refills: 0 | Status: SHIPPED | OUTPATIENT
Start: 2024-02-08 | End: 2024-06-11

## 2024-02-08 NOTE — TELEPHONE ENCOUNTER
pH testing ordered for on therapy to assess for refractory reflux.     Pt however was given information that she should stop PPI x 2 weeks.     Can we please update endoscopy scheduling that this should be on therapy.     Thank you,     Alex Sanchez PA-C

## 2024-02-08 NOTE — TELEPHONE ENCOUNTER
REFER TO:  Audiology Service    Anticipatory guidance:       Compliance with referral as directed including follow-up visits       Environmental control - nutrition       Promote healthy diet & physical activity for age       Good handwashing - safety       Watchful observation    RTC 1 - 2 months for follow-up or PRN    Discussed findings & plans with mom  Mom verbalized understanding & consent    The mother indicated understanding of the diagnosis and agreed with the plan of care.   Spoke with endoscopy team, discussed order notes from Alex Sanchez:    pH testing ordered for on therapy to assess for refractory reflux.      Pt however was given information that she should stop PPI x 2 weeks.      Can we please update endoscopy scheduling that this should be on therapy.

## 2024-02-08 NOTE — TELEPHONE ENCOUNTER
Received call from Alex Sanchez's office looking to clarify that the pt does not need to stop her PPI prior to her Manometry with PH.

## 2024-02-13 ENCOUNTER — ANCILLARY PROCEDURE (OUTPATIENT)
Dept: MAMMOGRAPHY | Facility: CLINIC | Age: 59
End: 2024-02-13
Attending: OBSTETRICS & GYNECOLOGY
Payer: COMMERCIAL

## 2024-02-13 DIAGNOSIS — Z12.31 VISIT FOR SCREENING MAMMOGRAM: ICD-10-CM

## 2024-02-13 PROCEDURE — 77067 SCR MAMMO BI INCL CAD: CPT | Mod: TC | Performed by: RADIOLOGY

## 2024-02-13 PROCEDURE — 77063 BREAST TOMOSYNTHESIS BI: CPT | Mod: TC | Performed by: RADIOLOGY

## 2024-03-04 ENCOUNTER — OFFICE VISIT (OUTPATIENT)
Dept: GASTROENTEROLOGY | Facility: CLINIC | Age: 59
End: 2024-03-04
Attending: PHYSICIAN ASSISTANT
Payer: COMMERCIAL

## 2024-03-04 VITALS
DIASTOLIC BLOOD PRESSURE: 66 MMHG | SYSTOLIC BLOOD PRESSURE: 100 MMHG | OXYGEN SATURATION: 100 % | WEIGHT: 241 LBS | HEART RATE: 66 BPM | BODY MASS INDEX: 41.25 KG/M2

## 2024-03-04 DIAGNOSIS — K21.9 GASTROESOPHAGEAL REFLUX DISEASE WITHOUT ESOPHAGITIS: ICD-10-CM

## 2024-03-04 DIAGNOSIS — R11.0 NAUSEA: ICD-10-CM

## 2024-03-04 DIAGNOSIS — K57.32 DIVERTICULITIS OF COLON: ICD-10-CM

## 2024-03-04 DIAGNOSIS — K59.00 CONSTIPATION, UNSPECIFIED CONSTIPATION TYPE: Primary | ICD-10-CM

## 2024-03-04 DIAGNOSIS — M62.89 PELVIC FLOOR DYSFUNCTION: ICD-10-CM

## 2024-03-04 DIAGNOSIS — R10.32 LLQ ABDOMINAL PAIN: ICD-10-CM

## 2024-03-04 PROCEDURE — 99215 OFFICE O/P EST HI 40 MIN: CPT | Performed by: PHYSICIAN ASSISTANT

## 2024-03-04 RX ORDER — METOCLOPRAMIDE 10 MG/1
10 TABLET ORAL
Qty: 120 TABLET | Refills: 0 | Status: SHIPPED | OUTPATIENT
Start: 2024-03-04 | End: 2024-04-04

## 2024-03-04 RX ORDER — DOCUSATE SODIUM 100 MG/1
100 CAPSULE, LIQUID FILLED ORAL 3 TIMES DAILY PRN
Qty: 60 CAPSULE | Refills: 1 | Status: SHIPPED | OUTPATIENT
Start: 2024-03-04

## 2024-03-04 NOTE — LETTER
3/4/2024         RE: Marleen Mcfadden  [address]  [place] [address]        Dear Colleague,    Thank you for referring your patient, Marleen Mcfadden, to the [facility]. Please see a copy of my visit note below.          GI FOLLOW UP CLINIC VISIT       HPI: Marleen Mcfadden is 58 year old female who presents for follow up.     Her reflux has improved since switching protonix to aciphex. Currently taking aciphex 20mg twice daily and famotidine 40mg nightly. She does have some relief for a few hours at a time with the above, but overall still struggles with her reflux. We have her additionally taking gaviscon as needed after meals. Also still has occ nasuea and vomiting with undigested foods. She additionally was started on reglan which she is taking TID and helps. US in August 2023 unrevealing. Normal HIDA scan in September 2023. EGD in December 2023 was unrevealing besides a small hiatal hernia. She is scheduled for manometry and 24 hr pH impedence testing next week.     She continues to have a left lower abdominal pain. A CT scan in January 2024 noted concern for mild diverticulitis. We treated with abx. She did have some improvement but she admits that the LLQ pain has waxed and waned since then. She does have some constipation. Started taking more fiber (flax seed) which has helped. Her BM's are type 3. But still not emptying out as well. Has some days without BM's at a time. No longer having fecal incontinence. Had a prio colonoscopy in September 2023 which was unremarkable.         ALLERGIES:Morphine, Nickel, and Sulfa antibiotics     Current Outpatient Medications   Medication     dapagliflozin (FARXIGA) 10 MG TABS tablet     famotidine (PEPCID) 40 MG tablet     furosemide (LASIX) 20 MG tablet     loratadine (CLARITIN) 10 MG tablet     LORazepam (ATIVAN) 0.5 MG tablet     metoclopramide (REGLAN) 5 MG tablet     metoprolol succinate ER (TOPROL XL) 25 MG 24 hr  tablet     progesterone (PROMETRIUM) 100 MG capsule     RABEprazole (ACIPHEX) 20 MG EC tablet     sacubitril-valsartan (ENTRESTO)  MG per tablet     solifenacin (VESICARE) 5 MG tablet     spironolactone (ALDACTONE) 25 MG tablet     Vitamin D3 (CHOLECALCIFEROL) 25 mcg (1000 units) tablet     No current facility-administered medications for this visit.     Facility-Administered Medications Ordered in Other Visits   Medication     sodium chloride (PF) 0.9% PF flush 10 mL            PHYSICAL EXAMINATION:  Constitutional: aaox3, cooperative, pleasant, not dyspneic/diaphoretic, no acute distress  Vitals reviewed: /66 (BP Location: Left arm, Patient Position: Sitting, Cuff Size: Adult Large)   Pulse 66   Wt 109.3 kg (241 lb)   LMP 10/19/2008 (Approximate)   SpO2 100%   BMI 41.25 kg/m       Eyes: Sclera anicteric/injected  CV: RRR  Respiratory: Unlabored breathing  Skin: warm, perfused, no jaundice  Psych: Normal affect  MSK: Normal gait        ASSESSMENT/PLAN:    Marleen Mcfadden is a 58 year old female who presents for follow up.    # GERD   # nausea  Continue to have nausea and uncontrolled reflux although it is a little improved with the changes we made. US in August 2023 unrevealing. Normal HIDA scan in September 2023. Recent EGD with small hiatal hernia but otherwise unremarkable. We will have her continue her current regimen with aciphex and famotidine. I will have her increase her metoclopramide to 10 mg for a short period of time given the continued feeling of fullness and and occ emesis of undigested foods. We will continue with manometry and 24 hr pH impedence.       # constipation   # LLQ   # hx of diverticulitis   # pelvic floor dysfunction   Recently found to have diverticulitis on CT ~ 2 months ago. Treated at that time with abx. Did have some improvement but admits pain has waxed and waned since then. Will repeat CT scan to r/o smoldering diverticulitis. She did recently complete a  colonoscopy several months prior which was unremarkable, however consider repeating. Does have some constipation. Taking fiber through flax seed has helped. Still has days without BM and feels pain is worse with her BM's. Advised her to increase her water intake, start stool softener. Rx prescribed per preference. Referral to physical therapy for pelvic floor exercises.         I spent a total of 54 minutes on the day of the visit.   Time spent by me doing chart review, history and exam, documentation and further activities per the note      This note was created with voice recognition software, and while reviewed for accuracy, typos may remain.     Alex Sanchez PA-C  Division of Gastroenterology, Hepatology and Nutrition   [facility]       Again, thank you for allowing me to participate in the care of your patient.        Sincerely,        Alex Sanchez PA-C

## 2024-03-04 NOTE — PROGRESS NOTES
GI FOLLOW UP CLINIC VISIT       HPI: Marleen Mcfadden is 58 year old female who presents for follow up.     Her reflux has improved since switching protonix to aciphex. Currently taking aciphex 20mg twice daily and famotidine 40mg nightly. She does have some relief for a few hours at a time with the above, but overall still struggles with her reflux. We have her additionally taking gaviscon as needed after meals. Also still has occ nasuea and vomiting with undigested foods. She additionally was started on reglan which she is taking TID and helps. US in August 2023 unrevealing. Normal HIDA scan in September 2023. EGD in December 2023 was unrevealing besides a small hiatal hernia. She is scheduled for manometry and 24 hr pH impedence testing next week.     She continues to have a left lower abdominal pain. A CT scan in January 2024 noted concern for mild diverticulitis. We treated with abx. She did have some improvement but she admits that the LLQ pain has waxed and waned since then. She does have some constipation. Started taking more fiber (flax seed) which has helped. Her BM's are type 3. But still not emptying out as well. Has some days without BM's at a time. No longer having fecal incontinence. Had a prio colonoscopy in September 2023 which was unremarkable.         ALLERGIES:Morphine, Nickel, and Sulfa antibiotics     Current Outpatient Medications   Medication    dapagliflozin (FARXIGA) 10 MG TABS tablet    famotidine (PEPCID) 40 MG tablet    furosemide (LASIX) 20 MG tablet    loratadine (CLARITIN) 10 MG tablet    LORazepam (ATIVAN) 0.5 MG tablet    metoclopramide (REGLAN) 5 MG tablet    metoprolol succinate ER (TOPROL XL) 25 MG 24 hr tablet    progesterone (PROMETRIUM) 100 MG capsule    RABEprazole (ACIPHEX) 20 MG EC tablet    sacubitril-valsartan (ENTRESTO)  MG per tablet    solifenacin (VESICARE) 5 MG tablet    spironolactone (ALDACTONE) 25 MG tablet    Vitamin D3 (CHOLECALCIFEROL) 25 mcg  (1000 units) tablet     No current facility-administered medications for this visit.     Facility-Administered Medications Ordered in Other Visits   Medication    sodium chloride (PF) 0.9% PF flush 10 mL            PHYSICAL EXAMINATION:  Constitutional: aaox3, cooperative, pleasant, not dyspneic/diaphoretic, no acute distress  Vitals reviewed: /66 (BP Location: Left arm, Patient Position: Sitting, Cuff Size: Adult Large)   Pulse 66   Wt 109.3 kg (241 lb)   LMP 10/19/2008 (Approximate)   SpO2 100%   BMI 41.25 kg/m       Eyes: Sclera anicteric/injected  CV: RRR  Respiratory: Unlabored breathing  Skin: warm, perfused, no jaundice  Psych: Normal affect  MSK: Normal gait        ASSESSMENT/PLAN:    Marleen Mcfadden is a 58 year old female who presents for follow up.    # GERD   # nausea  Continue to have nausea and uncontrolled reflux although it is a little improved with the changes we made. US in August 2023 unrevealing. Normal HIDA scan in September 2023. Recent EGD with small hiatal hernia but otherwise unremarkable. We will have her continue her current regimen with aciphex and famotidine. I will have her increase her metoclopramide to 10 mg for a short period of time given the continued feeling of fullness and and occ emesis of undigested foods. We will continue with manometry and 24 hr pH impedence.       # constipation   # LLQ   # hx of diverticulitis   # pelvic floor dysfunction   Recently found to have diverticulitis on CT ~ 2 months ago. Treated at that time with abx. Did have some improvement but admits pain has waxed and waned since then. Will repeat CT scan to r/o smoldering diverticulitis. She did recently complete a colonoscopy several months prior which was unremarkable, however consider repeating. Does have some constipation. Taking fiber through flax seed has helped. Still has days without BM and feels pain is worse with her BM's. Advised her to increase her water intake, start stool  softener. Rx prescribed per preference. Referral to physical therapy for pelvic floor exercises.         I spent a total of 54 minutes on the day of the visit.   Time spent by me doing chart review, history and exam, documentation and further activities per the note      This note was created with voice recognition software, and while reviewed for accuracy, typos may remain.     Alex Sanchez PA-C  Division of Gastroenterology, Hepatology and Nutrition   [facility]

## 2024-03-04 NOTE — LETTER
March 4, 2024      Marleen Mcfadden  [address]  [facility] [place] [address]        To Whom It May Concern:    Marleen Mcfadden  has a scheduled gastroenterology procedure and work up on March 11, 2024. Please excuse her until March 13, 2024 due to this specialized testing.         Sincerely,        Alex Sanchez PA-C

## 2024-03-04 NOTE — PATIENT INSTRUCTIONS
It was a pleasure taking care of you today.  I've included a brief summary of our discussion and care plan from today's visit below.  Please review this information with your primary care provider.  _______________________________________________________________________     My recommendations are summarized as follows:     -- let's try increasing metoclopramide to 10 mg 3-4 times a day     -- continue taking aciphex twice daily and continue famotidine at bedtime     -- continue with testing of the manometry and pH impedence testing     -- take stool softeners two to three times a day     -- drink enough water (8 glasses of water a day)     -- pelvic floor physical therapy     -- repeat CT scan      ______________________________________________________________________     How do I schedule labs, imaging studies, or procedures that were ordered in clinic today?      Labs: To schedule lab appointment you can contact your local [facility] or call [phone] to schedule at any convenient [facility] location.     Procedures: If a colonoscopy, upper endoscopy, breath test, esophageal manometry, or pH impedence was ordered today, our endoscopy team will call you to schedule this. If you have not heard from our endoscopy team within a week, please call [phone] to schedule.      Imaging Studies: If you were scheduled for a CT scan, X-ray, MRI, ultrasound, HIDA scan or other imaging study, please call [phone] to have this scheduled.      Referral: If a referral to another specialty was ordered, expect a phone call or follow instructions above. If you have not heard from anyone regarding your referral in a week, please call our clinic to check the status.      Who do I call with any questions after my visit?  Please be in touch if there are any further questions that arise following today's visit.  There are multiple ways to contact your gastroenterology care team.       During business hours, you  may reach a Gastroenterology nurse at [phone]     To schedule or reschedule an appointment, please call [phone].      You can always send a secure message through [employer].  [employer] messages are answered by your nurse or doctor typically within 24 hours.  Please allow extra time on weekends and holidays.       For urgent/emergent questions after business hours, you may reach the on-call GI Fellow by contacting the [facility]  at [phone].     How will I get the results of any tests ordered?    You will receive all of your results.  If you have signed up for [employer], any tests ordered at your visit will be available to you after your physician reviews them.  Typically this takes 1-2 weeks.  If there are urgent results that require a change in your care plan, your physician or nurse will call you to discuss the next steps.       What is [employer]?  [employer] is a secure way for you to access all of your healthcare records from the [facility].  It is a web based computer program, so you can sign on to it from any location.  It also allows you to send secure messages to your care team.  I recommend signing up for [employer] access if you have not already done so and are comfortable with using a computer.       How to I schedule a follow-up visit?  If you did not schedule a follow-up visit today, please call [phone] to schedule a follow-up office visit.      Alex Sanchez PA-C  Division of Gastroenterology, Hepatology and Nutrition   [facility]

## 2024-03-05 ENCOUNTER — LAB (OUTPATIENT)
Dept: LAB | Facility: CLINIC | Age: 59
End: 2024-03-05
Payer: COMMERCIAL

## 2024-03-05 DIAGNOSIS — R10.32 LLQ ABDOMINAL PAIN: ICD-10-CM

## 2024-03-05 DIAGNOSIS — K57.32 DIVERTICULITIS OF COLON: ICD-10-CM

## 2024-03-05 LAB
ALBUMIN SERPL BCG-MCNC: 4.1 G/DL (ref 3.5–5.2)
ALP SERPL-CCNC: 59 U/L (ref 40–150)
ALT SERPL W P-5'-P-CCNC: 30 U/L (ref 0–50)
ANION GAP SERPL CALCULATED.3IONS-SCNC: 8 MMOL/L (ref 7–15)
AST SERPL W P-5'-P-CCNC: 23 U/L (ref 0–45)
BASOPHILS # BLD AUTO: 0 10E3/UL (ref 0–0.2)
BASOPHILS NFR BLD AUTO: 1 %
BILIRUB SERPL-MCNC: 0.3 MG/DL
BUN SERPL-MCNC: 17.7 MG/DL (ref 8–23)
CALCIUM SERPL-MCNC: 8.7 MG/DL (ref 8.6–10)
CHLORIDE SERPL-SCNC: 103 MMOL/L (ref 98–107)
CREAT SERPL-MCNC: 0.92 MG/DL (ref 0.51–0.95)
CRP SERPL-MCNC: <3 MG/L
DEPRECATED HCO3 PLAS-SCNC: 26 MMOL/L (ref 22–29)
EGFRCR SERPLBLD CKD-EPI 2021: 71 ML/MIN/1.73M2
EOSINOPHIL # BLD AUTO: 0.1 10E3/UL (ref 0–0.7)
EOSINOPHIL NFR BLD AUTO: 2 %
ERYTHROCYTE [DISTWIDTH] IN BLOOD BY AUTOMATED COUNT: 12.8 % (ref 10–15)
ERYTHROCYTE [SEDIMENTATION RATE] IN BLOOD BY WESTERGREN METHOD: 19 MM/HR (ref 0–30)
GLUCOSE SERPL-MCNC: 95 MG/DL (ref 70–99)
HCT VFR BLD AUTO: 36.7 % (ref 35–47)
HGB BLD-MCNC: 12.1 G/DL (ref 11.7–15.7)
IMM GRANULOCYTES # BLD: 0 10E3/UL
IMM GRANULOCYTES NFR BLD: 0 %
LYMPHOCYTES # BLD AUTO: 2.3 10E3/UL (ref 0.8–5.3)
LYMPHOCYTES NFR BLD AUTO: 42 %
MCH RBC QN AUTO: 31.2 PG (ref 26.5–33)
MCHC RBC AUTO-ENTMCNC: 33 G/DL (ref 31.5–36.5)
MCV RBC AUTO: 95 FL (ref 78–100)
MONOCYTES # BLD AUTO: 0.5 10E3/UL (ref 0–1.3)
MONOCYTES NFR BLD AUTO: 9 %
NEUTROPHILS # BLD AUTO: 2.6 10E3/UL (ref 1.6–8.3)
NEUTROPHILS NFR BLD AUTO: 46 %
NRBC # BLD AUTO: 0 10E3/UL
NRBC BLD AUTO-RTO: 0 /100
PLATELET # BLD AUTO: 244 10E3/UL (ref 150–450)
POTASSIUM SERPL-SCNC: 4.4 MMOL/L (ref 3.4–5.3)
PROT SERPL-MCNC: 7 G/DL (ref 6.4–8.3)
RBC # BLD AUTO: 3.88 10E6/UL (ref 3.8–5.2)
SODIUM SERPL-SCNC: 137 MMOL/L (ref 135–145)
WBC # BLD AUTO: 5.5 10E3/UL (ref 4–11)

## 2024-03-05 PROCEDURE — 85652 RBC SED RATE AUTOMATED: CPT

## 2024-03-05 PROCEDURE — 86140 C-REACTIVE PROTEIN: CPT

## 2024-03-05 PROCEDURE — 82247 BILIRUBIN TOTAL: CPT

## 2024-03-05 PROCEDURE — 85025 COMPLETE CBC W/AUTO DIFF WBC: CPT

## 2024-03-05 PROCEDURE — 84450 TRANSFERASE (AST) (SGOT): CPT

## 2024-03-05 PROCEDURE — 36415 COLL VENOUS BLD VENIPUNCTURE: CPT

## 2024-03-06 ENCOUNTER — PATIENT OUTREACH (OUTPATIENT)
Dept: GASTROENTEROLOGY | Facility: CLINIC | Age: 59
End: 2024-03-06
Payer: COMMERCIAL

## 2024-03-07 ENCOUNTER — MYC MEDICAL ADVICE (OUTPATIENT)
Dept: GASTROENTEROLOGY | Facility: CLINIC | Age: 59
End: 2024-03-07
Payer: COMMERCIAL

## 2024-03-08 DIAGNOSIS — I50.22 CHRONIC SYSTOLIC CONGESTIVE HEART FAILURE (H): Primary | ICD-10-CM

## 2024-03-11 ENCOUNTER — MEDICAL CORRESPONDENCE (OUTPATIENT)
Dept: HEALTH INFORMATION MANAGEMENT | Facility: CLINIC | Age: 59
End: 2024-03-11

## 2024-03-11 ENCOUNTER — OFFICE VISIT (OUTPATIENT)
Dept: GASTROENTEROLOGY | Facility: CLINIC | Age: 59
End: 2024-03-11
Payer: COMMERCIAL

## 2024-03-11 VITALS
HEIGHT: 64 IN | HEART RATE: 45 BPM | DIASTOLIC BLOOD PRESSURE: 76 MMHG | SYSTOLIC BLOOD PRESSURE: 123 MMHG | TEMPERATURE: 97 F | RESPIRATION RATE: 16 BRPM | OXYGEN SATURATION: 100 % | BODY MASS INDEX: 41.37 KG/M2

## 2024-03-11 DIAGNOSIS — K21.9 GASTROESOPHAGEAL REFLUX DISEASE WITHOUT ESOPHAGITIS: ICD-10-CM

## 2024-03-11 PROCEDURE — 91010 ESOPHAGUS MOTILITY STUDY: CPT | Performed by: INTERNAL MEDICINE

## 2024-03-11 PROCEDURE — 91038 ESOPH IMPED FUNCT TEST > 1HR: CPT | Performed by: INTERNAL MEDICINE

## 2024-03-11 ASSESSMENT — PAIN SCALES - GENERAL: PAINLEVEL: NO PAIN (0)

## 2024-03-11 NOTE — PATIENT INSTRUCTIONS
Esophogeal Manometry with pH  1. Resume regular diet.  2. You may have a bloody nose or sore throat after the procedure.  3. You had a 24-hour probe placed, return the next day to have the probe removed.  Removal will take 5 minutes.  4. If you have questions call [phone] from 7:00am-5:00pm.  For afterhours questions call GI doctor on call at [phone].

## 2024-03-11 NOTE — PROGRESS NOTES
Non-Invasive GI Procedure Visit    Marleen Mcfadden is a 58 year old female with history of Gastroesophageal reflux disease without esophagitis.   Patient stated reason for procedure: Dysphagia, Regurgitation, and GERD      COVID-19 PCR Results          4/25/2022    13:14 10/13/2022    13:26 12/12/2022    11:55 2/17/2023    19:58 1/9/2024    10:43   COVID-19 PCR Results   SARS CoV2 PCR Negative  Negative  Negative  Negative  Negative      COVID-19 Antibody Results, Testing for Immunity           No data to display                Pre-Procedure Assessment  Patient presents to clinic today for Esophageal Manometry Study with pH    Referring Provider: Alex CASTRO  Patient has undergone previous endoscopy.    Does patient report taking a PPI (omeprazole, pantoprazole, rabeprazole, lansoprazole, esomeprazole, dexlansoprazole)? Yes. Is patient taking a PPI twice daily? Yes  Does patient report taking a H2 blocker (ranitidine, or famotidine)? Yes  Does patient report taking opioids? No  Patient reported that last food and/or drink was last consumed 12 hours ago.  Esophageal Questionnaire(s) Completed: Yes - Esophageal Questionnaire(s)    BEDQ Questionnaire      3/11/2024    11:45 AM   BEDQ Questionnaire: How Often Have You Had the Following?   Trouble eating solid food (meat, bread, vegetables) 2   Trouble eating soft foods (yogurt, jello, pudding) 0   Trouble swallowing liquids 0   Pain while swallowing 0   Coughing or choking while swallowing foods or liquids 2   Total Score: 4         3/11/2024    11:45 AM   BEDQ Questionnaire: Discomfort/Pain Ratings   Eating solid food (meat, bread, vegetables) 0   Eating soft foods (yogurt, jello, pudding) 0   Drinking liquid 0   Total Score: 0       Eckardt Questionnaire      3/11/2024    11:45 AM   Eckardt Questionnaire   Dysphagia 2   Regurgitation 2   Retrosternal Pain 0   Weight Loss (kg) 0   Total Score:  4       Promis 10 Questionnaire      3/11/2024    11:45 AM   PROMIS  "10 FLOWSHEET DATA   In general, would you say your health is: 4   In general, would you say your quality of life is: 5   In general, how would you rate your physical health? 3   In general, how would you rate your mental health, including your mood and your ability to think? 5   In general, how would you rate your satisfaction with your social activities and relationships? 5   In general, please rate how well you carry out your usual social activities and roles. (This includes activities at home, at work and in your community, and responsibilities as a parent, child, spouse, employee, friend, etc.) 5   To what extent are you able to carry out your everyday physical activities such as walking, climbing stairs, carrying groceries, or moving a chair? 4   In the past 7 days, how often have you been bothered by emotional problems such as feeling anxious, depressed, or irritable? 1   In the past 7 days, how would you rate your fatigue on average? 3   In the past 7 days, how would you rate your pain on average, where 0 means no pain, and 10 means worst imaginable pain? 7   Mental health question re-calculation - no clinical value 5   Physical health question re-calculation - no clinical value 3   Pain question re-calculation - no clinical value 2   Global Mental Health Score 20   Global Physical Health Score 12   PROMIS TOTAL - SUBSCORES 32   .    Patient Hx  Patient's history, medications and allergies were reviewed.     Height: 5' 4\"   Weight: 0 lbs 0 oz    Patient Active Problem List    Diagnosis Date Noted    Severe obesity (BMI 35.0-39.9) with comorbidity (H) 03/24/2023     Priority: Medium    Symptomatic bradycardia 02/17/2023     Priority: Medium    Herpes simplex of female genitalia 04/22/2022     Priority: Medium    Diverticulitis 02/21/2022     Priority: Medium    Family history of colon cancer 01/31/2022     Priority: Medium    KATIA (obstructive sleep apnea)- moderate-severe (AHI 29) 08/26/2021     Priority: " Medium     8/23/2021 [place] Sleep Study (246.0 lbs) - AHI 29.5, RDI 32.0, Supine AHI 21.0, REM AHI 60.4, Low O2 70.3%, Time Spent ?88% 25.4 minutes / Time Spent ?89% 30.7 minutes. Hypoventilation was not suggested with a maximum change from 40 to 47 mmHg      CSF otorrhea 04/26/2021     Priority: Medium     S/p 7/5/21 Right middle fossa approach for repair of encephalocele and CSF leak; lumbar drain placement. S/p 7/8/21 Redo right middle fossa approach for repair of encephalocele and CSF leak         Essential hypertension 08/27/2020     Priority: Medium    Dysfunction of both eustachian tubes 06/19/2020     Priority: Medium    Chronic rhinitis 06/19/2020     Priority: Medium    Moderate major depression (H) 09/20/2019     Priority: Medium    Gastroesophageal reflux disease with esophagitis 01/30/2019     Priority: Medium    Primary osteoarthritis of both knees 01/08/2019     Priority: Medium    Left shoulder pain 01/17/2017     Priority: Medium    Chronic systolic congestive heart failure (H) 10/27/2016     Priority: Medium     ECHO 4/2020: LVIDd 68mm. The Ejection Fraction is estimated at 40-45%. Right ventricular function, chamber size, wall motion, and thickness are normal. Pulmonary artery systolic pressure cannot be assessed.  The inferior vena cava is normal. Mild improvement in LV function since previous study.      Chronic bilateral low back pain with right-sided sciatica 07/07/2016     Priority: Medium    Chronic bilateral low back pain with left-sided sciatica 07/07/2016     Priority: Medium    Degenerative disc disease at L5-S1 level 06/02/2016     Priority: Medium    Familial cardiomyopathy (H) 10/21/2015     Priority: Medium     Cardiomyopathy- dx'd 1999 famial idiopathic.       Shaina's nodes 06/16/2015     Priority: Medium    CARDIOVASCULAR SCREENING; LDL GOAL LESS THAN 160 11/11/2014     Priority: Medium    Pain in joint involving ankle and foot 06/16/2014     Priority: Medium     Thyroid nodule 09/03/2013     Priority: Medium    Knee pain 12/20/2012     Priority: Medium    [facility]     Priority: Medium     EMERGENCY CARE PLAN  Presenting Problem Signs and Symptoms Treatment Plan    Questions or conerns during clinic hours    I will call the clinic directly     Questions or conerns outside clinic hours    I will call the 24 hour nurse line at [phone]    Patient needs to schedule an appointment    I will call the 24 hour scheduling team at [phone] or clinic directly    Same day treatment     I will call the clinic first, nurse line if after hours, urgent care and express care if needed                                DX V65.8 REPLACED WITH [id number] [facility] (04/08/2013)      Anemia      Priority: Medium    Allergic rhinitis      Priority: Medium    Leiomyoma of uterus 10/25/2006     Priority: Medium      Prior to Admission medications    Medication Sig Start Date End Date Taking? Authorizing Provider   dapagliflozin (FARXIGA) 10 MG TABS tablet Take 1 tablet (10 mg) by mouth daily 7/14/23   Real Seaman MD   docusate sodium (COLACE) 100 MG capsule Take 1 capsule (100 mg) by mouth 3 times daily as needed for constipation 3/4/24   Alex Sanchez PA-C   famotidine (PEPCID) 40 MG tablet Take 1 tablet (40 mg) by mouth nightly as needed (heartburn) 1/8/24   Alex Sanchez PA-C   furosemide (LASIX) 20 MG tablet Take 2 tablets (40 mg) by mouth daily as needed (weight gain/edema) 2/13/23   Hunter Gallo APRN CNP   loratadine (CLARITIN) 10 MG tablet Take 1 tablet (10 mg) by mouth daily 6/19/20   Wilson Cam MD   LORazepam (ATIVAN) 0.5 MG tablet Take 1 tablet (0.5 mg) by mouth every 6 hours as needed for anxiety (take 1 tab 30 min prior to MRI) 11/20/23   Sissy Bhatia MD   metoclopramide (REGLAN) 10 MG tablet Take 1 tablet (10 mg) by mouth 4 times daily (before meals and nightly) 3/4/24   Alex Sanchez PA-C   metoclopramide (REGLAN) 5 MG  tablet Take 1 tablet (5 mg) by mouth 4 times daily (before meals and nightly) 2/8/24   Alex Sanchez PA-C   metoprolol succinate ER (TOPROL XL) 25 MG 24 hr tablet Take 1 tablet (25 mg) by mouth daily 12/8/23   Abdelrahman Aragon MD   progesterone (PROMETRIUM) 100 MG capsule Take 100 mg by mouth at bedtime 3/3/17   Reported, Patient   RABEprazole (ACIPHEX) 20 MG EC tablet Take 1 tablet (20 mg) by mouth 2 times daily 1/19/24   Alex Sanchez PA-C   sacubitril-valsartan (ENTRESTO)  MG per tablet Take 1 tablet by mouth 2 times daily 7/14/23   Real Seaman MD   solifenacin (VESICARE) 5 MG tablet Take 1 tablet (5 mg) by mouth daily at 2 pm 1/15/24   Gabi Jones APRN CNP   spironolactone (ALDACTONE) 25 MG tablet Take 2 tablets (50 mg) by mouth daily 11/8/23   Cassie Kolb MD   Vitamin D3 (CHOLECALCIFEROL) 25 mcg (1000 units) tablet Take 1 tablet (25 mcg) by mouth daily 1/10/24   Davin Herrera MD     Allergies   Allergen Reactions    Morphine      EMESIS    Nickel     Sulfa Antibiotics Swelling     Past Medical History:   Diagnosis Date    Acute bilateral low back pain with right-sided sciatica 06/02/2016    Allergic rhinitis, cause unspecified     Arthritis     Autoimmune disease (H24)     Autoimmune disease NEC     Autoimmune disease- unknown/poss SLE    Calcaneal spur 10/21/2014    Xray 10/17/14     CHF with cardiomyopathy (H)     Chronic low back pain     DDD (degenerative disc disease), lumbar     Depression     Failed total knee arthroplasty, initial encounter  (H24) 01/17/2019    Familial cardiomyopathy (H)     GERD without esophagitis     History of transfusion     Hypertension     Injury of left shoulder, initial encounter 01/12/2017    Morbid obesity (H)     Nontraumatic rupture of quadriceps tendon, left 06/21/2018    KATIA (obstructive sleep apnea)- moderate-severe (AHI 29) 8/26/2021    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     no longer an issue     Peroneus longus tendinitis 2015    Plantar fasciitis 2014    PONV (postoperative nausea and vomiting)     Rotator cuff injury 2017    Sprain of other ligament of left ankle, initial encounter 2017    Status post left knee replacement 2018    TB lung, latent     negative quantiferon gold test  12     Past Surgical History:   Procedure Laterality Date    ARTHROPLASTY REVISION KNEE Left 2019    Procedure: REVISION, LEFT TOTAL KNEE  ARTHROPLASTY;  Surgeon: Dev Rocha MD;  Location: [facility] Main OR;  Service: Orthopedics    ARTHROPLASTY REVISION KNEE Right 2020    Procedure: RIGHT REVISION TOTAL KNEE ARTHROPLASTY;  Surgeon: Dev Rocha MD;  Location: [facility] Main OR;  Service: Orthopedics    BREAST SURGERY      Breast Reduction    C EXCISE EXCESS SKIN TISSUE,ABDOMEN       SECTION  1989     SECTION  10/1985    COLONOSCOPY N/A 2023    Procedure: Colonoscopy;  Surgeon: Simone Jansen MD;  Location: [facility]    COLONOSCOPY WITH CO2 INSUFFLATION N/A 2021    Procedure: COLONOSCOPY, WITH CO2 INSUFFLATION;  Surgeon: Angela Harrington DO;  Location: [facility] OR    COLONOSCOPY WITH CO2 INSUFFLATION N/A 2022    Procedure: COLONOSCOPY, WITH CO2 INSUFFLATION;  Surgeon: Nando Whitlock MD;  Location: [facility] OR    CRANIECTOMY Right 2021    Procedure: RIGHT MIDDLE FOSSA APPROACH, CSF LEAK REPAIR;  Surgeon: Jocelyn Noe MD;  Location: [facility] OR    CRANIOTOMY MIDDLE FOSSA, EXCISE ACOUSTIC NEUROMA, COMBINED N/A 2021    Procedure: Right CRANIOTOMY, MIDDLE FOSSA APPROACH, FOR REPAIR OF ENCEPHALOCELE;  Surgeon: Jocelyne Harrell MD;  Location: [facility] OR    CV RIGHT HEART CATH MEASUREMENTS RECORDED N/A 1/10/2023    Procedure: Heart Cath Right Heart Cath;  Surgeon: Slade Hanson MD;  Location: [facility] CARD[facility]    CYSTOURETHROSCOPY N/A 2020    Procedure: CYSTOSCOPY;  Surgeon: Cherelle Willams  MD Yasmin;  Location: [facility];  Service: Gynecology    ESOPHAGOSCOPY, GASTROSCOPY, DUODENOSCOPY (EGD), COMBINED N/A 12/13/2023    Procedure: Esophagoscopy, gastroscopy, duodenoscopy (EGD), combined;  Surgeon: Stu Guadalupe MD;  Location:  GI    GYN SURGERY N/A 08/18/2020    Procedure: MIDURETHRAL SLING, CYSTOSCOPY;  Surgeon: Cherelle Willams MD;  Location: [facility];  Service: Gynecology    HYSTERECTOMY TOTAL ABDOMINAL  2006    for fibroids; reports having blood transfusion after surgery    INSERT DRAIN LUMBAR N/A 07/05/2021    Procedure: Insert drain lumbar;  Surgeon: Jocelyn Noe MD;  Location:  OR    ORTHOPEDIC SURGERY  1998    Right Knee ACL repair    THYROIDECTOMY  09/09/2013    Procedure: THYROIDECTOMY;  LEFT THYROID LOBECTOMY.  (LIGASURE, RECURRENT LARYNGEAL NERVE MONITOR) ;  Surgeon: Uriah Camargo MD;  Location:  [facility]    THYROIDECTOMY      TOTAL KNEE ARTHROPLASTY Left 10/03/2017    TOTAL KNEE ARTHROPLASTY Right 02/07/2019    Procedure: RIGHT TOTAL KNEE ARTHROPLASTY;  Surgeon: Dev Rocha MD;  Location: [facility];  Service: Orthopedics    TUBAL LIGATION  1989    ZZC EXCIS UTERINE FIBROID,ABD APPRCH  1999     Family History   Problem Relation Age of Onset    Hypertension Father         dec    Diabetes Father         dec    Heart Disease Father         dec    Alcohol/Drug Father     Cardiovascular Father     Heart Disease Mother         dec    Alcohol/Drug Mother     Cardiovascular Mother     Heart Disease Daughter         Cardiomyopathy    Cardiovascular Daughter         cardiomyopathy    Colon Cancer Sister     Cardiovascular Son         cardiomyopathy    Diabetes Paternal Grandmother         dec    Hypertension Paternal Grandmother         dec    Cerebrovascular Disease Paternal Grandmother         dec    Cancer Sister         Lupus    Cardiovascular Sister         cardiomyopathy    Heart Disease Sister         heart  failure, and kidney failure     Social History     Tobacco Use    Smoking status: Never    Smokeless tobacco: Never   Substance Use Topics    Alcohol use: Yes     Comment: rare, twice a year, she has a drink little wine 2 days ago        Pre-Procedure Education & Consent  Procedure education was provided to: Patient  Teaching method: Explanation  Barriers to learning: No Barrier    Patient indicated understanding of pre-procedure instruction and appropriate consent was obtained and documented.    ____________________________________________________________________    Post-Procedure Documentation: GI Esophageal Manometry with 24 Hour Ph    Manometry catheter was placed via right nare to 52 cm and normal saline swallows given per protocol. Manometry catheter was removed at the end of test and the pH cathter was placed via right nare to 27 cm.      Diary and discharge instructions given to patient and patent instructed to return tomorrow for catheter removal.    Notification of pending test results sent to provider for interpretation. Please reference scanned document for final interpretation of results. Patient will follow up with referring provider for test results.    Dulce Maria Fernandez RN on 3/11/2024 at 11:49 AM

## 2024-03-12 ENCOUNTER — ALLIED HEALTH/NURSE VISIT (OUTPATIENT)
Dept: GASTROENTEROLOGY | Facility: CLINIC | Age: 59
End: 2024-03-12
Payer: COMMERCIAL

## 2024-03-12 DIAGNOSIS — K21.9 GASTROESOPHAGEAL REFLUX DISEASE WITHOUT ESOPHAGITIS: Primary | ICD-10-CM

## 2024-03-12 PROCEDURE — 99207 PR NO CHARGE NURSE ONLY: CPT

## 2024-03-12 NOTE — PROGRESS NOTES
Ph probe pulled. No complications. Diary scanned into patient chart. Diary and monitor returned to procedure on 3rd floor.    Nela Briggs LPN

## 2024-03-14 ENCOUNTER — MYC MEDICAL ADVICE (OUTPATIENT)
Dept: CARDIOLOGY | Facility: CLINIC | Age: 59
End: 2024-03-14
Payer: COMMERCIAL

## 2024-03-14 ENCOUNTER — NURSE TRIAGE (OUTPATIENT)
Dept: FAMILY MEDICINE | Facility: CLINIC | Age: 59
End: 2024-03-14
Payer: COMMERCIAL

## 2024-03-14 ENCOUNTER — TELEPHONE (OUTPATIENT)
Dept: CARDIOLOGY | Facility: CLINIC | Age: 59
End: 2024-03-14
Payer: COMMERCIAL

## 2024-03-14 ENCOUNTER — MYC MEDICAL ADVICE (OUTPATIENT)
Dept: FAMILY MEDICINE | Facility: CLINIC | Age: 59
End: 2024-03-14
Payer: COMMERCIAL

## 2024-03-14 DIAGNOSIS — U07.1 INFECTION DUE TO 2019 NOVEL CORONAVIRUS: Primary | ICD-10-CM

## 2024-03-14 NOTE — TELEPHONE ENCOUNTER
Called pt and they were previously treated with RN triage. Pt received Palxovid, pt has no questions at this time.     Wilson Boss RN

## 2024-03-14 NOTE — TELEPHONE ENCOUNTER
Appt for Return CORE appt on 3/15 cancelled as pthas Covid. Message sent to schedulers to please call pt to get her rescheduled with Hunter or Gabi Jones.     Heavenly Martinez RN

## 2024-03-14 NOTE — TELEPHONE ENCOUNTER
Reason for Disposition   HIGH RISK patient (e.g., weak immune system, age > 64 years, obesity with BMI of 30 or higher, pregnant, chronic lung disease or other chronic medical condition) and COVID symptoms (e.g., cough, fever)  (Exceptions: Already seen by doctor or NP/PA and no new or worsening symptoms.)    Additional Information   Negative: SEVERE difficulty breathing (e.g., struggling for each breath, speaks in single words)   Negative: Difficult to awaken or acting confused (e.g., disoriented, slurred speech)   Negative: Bluish (or gray) lips or face now   Negative: Shock suspected (e.g., cold/pale/clammy skin, too weak to stand, low BP, rapid pulse)   Negative: Sounds like a life-threatening emergency to the triager   Negative: Diagnosed or suspected COVID-19 and symptoms lasting 3 or more weeks   Negative: COVID-19 exposure and no symptoms   Negative: COVID-19 vaccine reaction suspected (e.g., fever, headache, muscle aches) occurring 1 to 3 days after getting vaccine   Negative: COVID-19 vaccine, questions about   Negative: Lives with someone known to have influenza (flu test positive) and flu-like symptoms (e.g., cough, runny nose, sore throat, SOB; with or without fever)   Negative: Possible COVID-19 symptoms and triager concerned about severity of symptoms or other causes   Negative: COVID-19 and breastfeeding, questions about   Negative: SEVERE or constant chest pain or pressure  (Exception: Mild central chest pain, present only when coughing.)   Negative: MODERATE difficulty breathing (e.g., speaks in phrases, SOB even at rest, pulse 100-120)   Negative: Headache and stiff neck (can't touch chin to chest)   Negative: Oxygen level (e.g., pulse oximetry) 90% or lower   Negative: Chest pain or pressure  (Exception: MILD central chest pain, present only when coughing.)   Negative: Drinking very little and dehydration suspected (e.g., no urine > 12 hours, very dry mouth, very lightheaded)   Negative:  "Patient sounds very sick or weak to the triager    Answer Assessment - Initial Assessment Questions  1. COVID-19 DIAGNOSIS: \"How do you know that you have COVID?\" (e.g., positive lab test or self-test, diagnosed by doctor or NP/PA, symptoms after exposure).      Positive test today, sx last night, coughing, fever 100.5    4. WORST SYMPTOM: \"What is your worst symptom?\" (e.g., cough, fever, shortness of breath, muscle aches)      Cough  5. COUGH: \"Do you have a cough?\" If Yes, ask: \"How bad is the cough?\"        mild  6. FEVER: \"Do you have a fever?\" If Yes, ask: \"What is your temperature, how was it measured, and when did it start?\"      sob  7. RESPIRATORY STATUS: \"Describe your breathing?\" (e.g., normal; shortness of breath, wheezing, unable to speak)       Denies    Protocols used: Coronavirus (COVID-19) Diagnosed or [id number]  RN COVID TREATMENT VISIT  03/14/24      The patient has been triaged and does not require a higher level of care.    Marleen Mcfadden  58 year old  Current weight?   Wt Readings from Last 2 Encounters:   03/04/24 109.3 kg (241 lb)   01/15/24 105.3 kg (232 lb 3.2 oz)         Has the patient been seen by a primary care provider at an [facility] or [facility] within the past two years? Yes.   Have you been in close proximity to/do you have a known exposure to a person with a confirmed case of influenza? No.     General treatment eligibility:  Date of positive COVID test (PCR or at home)?  3/14    Are you or have you been hospitalized for this COVID-19 infection? No.   Have you received monoclonal antibodies or antiviral treatment for COVID-19 since this positive test? No.   Do you have any of the following conditions that place you at risk of being very sick from COVID-19?   - Age 50 years or older  - Heart conditions such as cardiomyopathies, congenital heart defects, coronary artery disease, heart arrhythmias, heart failure, hypertension, valve disorders "   Yes, patient has at least one high risk condition as noted above.     Current COVID symptoms:   - fever or chills  - cough  Yes. Patient has at least one symptom as selected.     How many days since symptoms started? 5 days or less. Established patient, 12 years or older weighing at least 88.2 lbs, who has symptoms that started in the past 5 days, has not been hospitalized nor received treatment already, and is at risk for being very sick from COVID-19.     Treatment eligibility by RN:  Are you currently pregnant or nursing? No  Do you have a clinically significant hypersensitivity to nirmatrelvir or ritonavir, or toxic epidermal necrolysis (TEN) or Pryor-Johnie Syndrome? No  Do you have a history of hepatitis, any hepatic impairment on the Problem List (such as Child-Ferris Class C, cirrhosis, fatty liver disease, alcoholic liver disease), or was the last liver lab (hepatic panel, ALT, AST, ALK Phos, bilirubin) elevated in the past 6 months? No  Do you have any history of severe renal impairment (eGFR < 30mL/min)? No  Lab Results   Component Value Date    ALT 30 03/05/2024    ALT 35 08/12/2020     Creatinine   Date Value Ref Range Status   03/05/2024 0.92 0.51 - 0.95 mg/dL Final   01/15/2024 0.92 0.51 - 0.95 mg/dL Final   01/03/2024 0.75 0.51 - 0.95 mg/dL Final   12/11/2023 0.86 0.51 - 0.95 mg/dL Final   10/18/2023 0.82 0.51 - 0.95 mg/dL Final   09/11/2023 0.71 0.51 - 0.95 mg/dL Final   07/11/2021 0.79 0.52 - 1.04 mg/dL Final   07/10/2021 0.89 0.52 - 1.04 mg/dL Final   07/09/2021 0.70 0.52 - 1.04 mg/dL Final   07/08/2021 0.87 0.52 - 1.04 mg/dL Final   07/07/2021 0.85 0.52 - 1.04 mg/dL Final   07/06/2021 0.68 0.52 - 1.04 mg/dL Final       Is patient eligible to continue? Yes, patient meets all eligibility requirements for the RN COVID treatment (as denoted by all no responses above).     Current Outpatient Medications   Medication Sig Dispense Refill    dapagliflozin (FARXIGA) 10 MG TABS tablet Take 1 tablet (10  mg) by mouth daily 90 tablet 3    docusate sodium (COLACE) 100 MG capsule Take 1 capsule (100 mg) by mouth 3 times daily as needed for constipation 60 capsule 1    famotidine (PEPCID) 40 MG tablet Take 1 tablet (40 mg) by mouth nightly as needed (heartburn) 30 tablet 3    furosemide (LASIX) 20 MG tablet Take 2 tablets (40 mg) by mouth daily as needed (weight gain/edema) 180 tablet 3    loratadine (CLARITIN) 10 MG tablet Take 1 tablet (10 mg) by mouth daily 90 tablet 3    LORazepam (ATIVAN) 0.5 MG tablet Take 1 tablet (0.5 mg) by mouth every 6 hours as needed for anxiety (take 1 tab 30 min prior to MRI) 2 tablet 0    metoclopramide (REGLAN) 10 MG tablet Take 1 tablet (10 mg) by mouth 4 times daily (before meals and nightly) 120 tablet 0    metoclopramide (REGLAN) 5 MG tablet Take 1 tablet (5 mg) by mouth 4 times daily (before meals and nightly) 120 tablet 0    metoprolol succinate ER (TOPROL XL) 25 MG 24 hr tablet Take 1 tablet (25 mg) by mouth daily 45 tablet 3    progesterone (PROMETRIUM) 100 MG capsule Take 100 mg by mouth at bedtime      RABEprazole (ACIPHEX) 20 MG EC tablet Take 1 tablet (20 mg) by mouth 2 times daily 60 tablet 3    sacubitril-valsartan (ENTRESTO)  MG per tablet Take 1 tablet by mouth 2 times daily 180 tablet 3    solifenacin (VESICARE) 5 MG tablet Take 1 tablet (5 mg) by mouth daily at 2 pm 90 tablet 1    spironolactone (ALDACTONE) 25 MG tablet Take 2 tablets (50 mg) by mouth daily 180 tablet 1    Vitamin D3 (CHOLECALCIFEROL) 25 mcg (1000 units) tablet Take 1 tablet (25 mcg) by mouth daily 90 tablet 2       Medications from List 1 of the standing order (on medications that exclude the use of Paxlovid) that patient is taking: NONE. Is patient taking Osgood's Wort? No  Is patient taking Osgood's Wort or any meds from List 1? No.   Medications from List 2 of the standing order (on meds that provider needs to adjust) that patient is taking: NONE. Is patient on any of the meds from List 2?  No.   Medications from List 3 of standing order (on meds that a RN needs to adjust) that patient is taking: solifenacin (Vesicare): Instruct patient to limit their dose of solifenacin to 5mg once daily. Usual dose of solifenacin should be restarted 3 days after the completion of Paxlovid. Is patient on any meds from List 3? No.     Paxlovid has an approximate 90% reduction in hospitalization. Paxlovid can possibly cause altered sense of taste, diarrhea (loose, watery stools), high blood pressure, muscle aches.     Would patient like a Paxlovid prescription?   Yes.   Lab Results   Component Value Date    GFRESTIMATED 71 03/05/2024       Was last eGFR reduced? No, eGFR 60 or greater/ No Result on record. Patient can receive the normal renal function dose. Paxlovid Rx sent to [facility]   other     All medication adjustments (holds, etc) were discussed with the patient and patient was asked to repeat back (teachback) their med adjustment.  Did patient understand med adjustment? Yes, patient repeated back and understood correctly.        Reviewed the following instructions with the patient:    Paxlovid (nimatrelvir and ritonavir)    How it works  Two medicines (nirmatrelvir and ritonavir) are taken together. They stop the virus from growing. Less amount of virus is easier for your body to fight.    How to take  Medicine comes in a daily container with both medicine tablets. Take by mouth twice daily (once in the morning, once at night) for 5 days.  The number of tablets to take varies by patient.  Don't chew or break capsules. Swallow whole.    When to take  Take as soon as possible after positive COVID-19 test result, and within 5 days of your first symptoms.    Possible side effects  Can cause altered sense of taste, diarrhea (loose, watery stools), high blood pressure, muscle aches.    Darcy Larios RN

## 2024-03-14 NOTE — TELEPHONE ENCOUNTER
----- Message from Heavenly Martinez RN sent at 3/14/2024 11:50 AM CDT -----  Hello!     Pt had to cancel her return CORE appt for 3/15 with Hunter because she has Covid.     Please call pt and re-schedule return core with labs prior next available. This can be with Hunter or Gabi Jones.     Thanks,   Heavenly JERRY

## 2024-03-25 ENCOUNTER — ANCILLARY PROCEDURE (OUTPATIENT)
Dept: GENERAL RADIOLOGY | Facility: CLINIC | Age: 59
End: 2024-03-25
Payer: COMMERCIAL

## 2024-03-25 ENCOUNTER — OFFICE VISIT (OUTPATIENT)
Dept: URGENT CARE | Facility: URGENT CARE | Age: 59
End: 2024-03-25
Payer: COMMERCIAL

## 2024-03-25 VITALS
SYSTOLIC BLOOD PRESSURE: 130 MMHG | HEART RATE: 56 BPM | WEIGHT: 239 LBS | OXYGEN SATURATION: 96 % | TEMPERATURE: 97.5 F | DIASTOLIC BLOOD PRESSURE: 84 MMHG | BODY MASS INDEX: 41.02 KG/M2

## 2024-03-25 DIAGNOSIS — J20.8 ACUTE BRONCHITIS DUE TO OTHER SPECIFIED ORGANISMS: Primary | ICD-10-CM

## 2024-03-25 DIAGNOSIS — R05.1 ACUTE COUGH: ICD-10-CM

## 2024-03-25 PROCEDURE — 99213 OFFICE O/P EST LOW 20 MIN: CPT

## 2024-03-25 PROCEDURE — 71046 X-RAY EXAM CHEST 2 VIEWS: CPT | Mod: TC | Performed by: STUDENT IN AN ORGANIZED HEALTH CARE EDUCATION/TRAINING PROGRAM

## 2024-03-25 RX ORDER — BENZONATATE 200 MG/1
200 CAPSULE ORAL 3 TIMES DAILY PRN
Qty: 21 CAPSULE | Refills: 0 | Status: SHIPPED | OUTPATIENT
Start: 2024-03-25 | End: 2024-04-23

## 2024-03-25 NOTE — PROGRESS NOTES
ASSESSMENT:  (J20.8) Acute bronchitis due to other specified organisms  (primary encounter diagnosis)  Plan: benzonatate (TESSALON) 200 MG capsule    (R05.1) Acute cough  Plan: XR Chest 2 Views    PLAN:  Informed the patient that the chest x-ray does not show any pneumonia or fluid on the lungs per the radiologist report.  Bronchitis patient instructions discussed and provided.  Informed the patient to take benzonatate as prescribed for cough.  We discussed following up with her primary care provider in 7 to 10 days should symptoms persist.  Work note provided.  Patient acknowledged her understanding of the above plan.    The use of Dragon/PowerMic dictation services may have been used to construct the content in this note; any grammatical or spelling errors are non-intentional. Please contact the author of this note directly if you are in need of any clarification.      Simba Torres, PITO CNP      SUBJECTIVE:   Marleen Mcfadden is a 58 year old female presenting with a chief complaint of sweats, runny nose, cough - productive, earache, body aches, and fatigue.  Onset of symptoms was 12 day(s) ago.  She has also had loose stools the past two days.  Course of illness is worsening.    Patient denies: fever and vomiting  Treatment measures tried include completed Paxlovid therapy.  Predisposing factors include tested positive for COVID 10 days ago.    ROS:  Negative except noted above.    OBJECTIVE:  /84 (BP Location: Left arm, Patient Position: Sitting, Cuff Size: Adult Large)   Pulse 56   Temp 97.5  F (36.4  C) (Tympanic)   Wt 108.4 kg (239 lb)   LMP 10/19/2008 (Approximate)   SpO2 96%   BMI 41.02 kg/m    GENERAL APPEARANCE: healthy, alert and no distress  EYES: EOMI,  PERRL, conjunctiva clear  HENT: ear canals and TM's normal.  Nose and mouth without ulcers, erythema or lesions  NECK: supple, nontender, no lymphadenopathy  RESP: lungs clear to auscultation - no rales, rhonchi or wheezes  CV:  regular rates and rhythm, normal S1 S2, no murmur noted  SKIN: no suspicious lesions or rashes      X-RAY: Chest x-ray does not show any pneumonia or fluid on the lungs per the radiologist report.

## 2024-03-25 NOTE — PATIENT INSTRUCTIONS
Chest x-ray does not show any pneumonia or fluid on the lungs per the radiologist report.  Take benzonatate as prescribed for your cough.  Follow up with up with your primary care provider in 7-10 days should symptoms persist.

## 2024-03-25 NOTE — LETTER
March 25, 2024      Marleen Mcfadden  [address]  [place] [address]        To Whom It May Concern:    Marleen Mcfadden  was seen on March 25, 2024.  Please excuse her from work until March 28th due to illness.        Sincerely,        Simba Torres, PITO CNP

## 2024-03-27 ENCOUNTER — TELEPHONE (OUTPATIENT)
Dept: FAMILY MEDICINE | Facility: CLINIC | Age: 59
End: 2024-03-27
Payer: COMMERCIAL

## 2024-03-27 DIAGNOSIS — R11.0 NAUSEA: ICD-10-CM

## 2024-03-27 DIAGNOSIS — R11.10 VOMITING, UNSPECIFIED VOMITING TYPE, UNSPECIFIED WHETHER NAUSEA PRESENT: ICD-10-CM

## 2024-03-27 DIAGNOSIS — I50.22 CHRONIC SYSTOLIC CONGESTIVE HEART FAILURE (H): Chronic | ICD-10-CM

## 2024-03-27 NOTE — TELEPHONE ENCOUNTER
Forms/Letter Request    Type of form/letter: Disability      Do we have the form/letter: Yes: Placed in care team 1    Who is the form from? Insurance comp    Where did/will the form come from? form was faxed in    When is form/letter needed by: after patient's appointment on 4/3/24    How would you like the form/letter returned: Fax : [phone]

## 2024-04-01 ENCOUNTER — MYC MEDICAL ADVICE (OUTPATIENT)
Dept: FAMILY MEDICINE | Facility: CLINIC | Age: 59
End: 2024-04-01
Payer: COMMERCIAL

## 2024-04-01 ENCOUNTER — MEDICAL CORRESPONDENCE (OUTPATIENT)
Dept: HEALTH INFORMATION MANAGEMENT | Facility: CLINIC | Age: 59
End: 2024-04-01
Payer: COMMERCIAL

## 2024-04-03 ENCOUNTER — VIRTUAL VISIT (OUTPATIENT)
Dept: FAMILY MEDICINE | Facility: CLINIC | Age: 59
End: 2024-04-03
Payer: COMMERCIAL

## 2024-04-03 ENCOUNTER — LAB (OUTPATIENT)
Dept: LAB | Facility: CLINIC | Age: 59
End: 2024-04-03
Payer: COMMERCIAL

## 2024-04-03 DIAGNOSIS — J20.9 ACUTE BRONCHITIS, UNSPECIFIED ORGANISM: ICD-10-CM

## 2024-04-03 DIAGNOSIS — U07.1 INFECTION DUE TO 2019 NOVEL CORONAVIRUS: ICD-10-CM

## 2024-04-03 DIAGNOSIS — R05.1 ACUTE COUGH: Primary | ICD-10-CM

## 2024-04-03 DIAGNOSIS — Z02.89 ENCOUNTER FOR COMPLETION OF FORM WITH PATIENT: ICD-10-CM

## 2024-04-03 DIAGNOSIS — R09.82 POST-NASAL DRIP: ICD-10-CM

## 2024-04-03 DIAGNOSIS — I50.22 CHRONIC SYSTOLIC CONGESTIVE HEART FAILURE (H): ICD-10-CM

## 2024-04-03 PROCEDURE — 99213 OFFICE O/P EST LOW 20 MIN: CPT | Mod: 95 | Performed by: FAMILY MEDICINE

## 2024-04-03 PROCEDURE — 80048 BASIC METABOLIC PNL TOTAL CA: CPT

## 2024-04-03 PROCEDURE — 36415 COLL VENOUS BLD VENIPUNCTURE: CPT

## 2024-04-03 RX ORDER — CODEINE PHOSPHATE AND GUAIFENESIN 10; 100 MG/5ML; MG/5ML
1-2 SOLUTION ORAL
Qty: 180 ML | Refills: 0 | Status: SHIPPED | OUTPATIENT
Start: 2024-04-03 | End: 2024-04-23

## 2024-04-03 RX ORDER — FLUTICASONE PROPIONATE 50 MCG
2 SPRAY, SUSPENSION (ML) NASAL DAILY
Qty: 16 G | Refills: 0 | Status: SHIPPED | OUTPATIENT
Start: 2024-04-03

## 2024-04-03 RX ORDER — METOPROLOL SUCCINATE 25 MG/1
25 TABLET, EXTENDED RELEASE ORAL DAILY
Qty: 90 TABLET | Refills: 2 | Status: SHIPPED | OUTPATIENT
Start: 2024-04-03

## 2024-04-03 NOTE — PROGRESS NOTES
Marleen is a 58 year old who is being evaluated via a billable video visit.    How would you like to obtain your AVS? MyChart  If the video visit is dropped, the invitation should be resent by: Text to cell phone: [phone]  Will anyone else be joining your video visit? No      Assessment & Plan     Acute cough  Infection due to 2019 novel coronavirus  Acute bronchitis, unspecified organism  Encounter for completion of form with patient  - reviewed UC notes 3/25/24, reviewed triage notes 3/14/24  - guaiFENesin-codeine (ROBITUSSIN AC) 100-10 MG/5ML solution; Take 5-10 mLs by mouth nightly as needed for cough; advised to not drive or take with alcohol   - ok to take with tessalon pearls   - Follow if symptoms worsen or fail to improve.     Post-nasal drip  Encounter for completion of form with patient  - fluticasone (FLONASE) 50 MCG/ACT nasal spray; Spray 2 sprays into both nostrils daily      Previously tolerated codeine   Subjective   Marleen is a 58 year old, presenting for the following health issues:  Forms        4/3/2024     3:34 PM   Additional Questions   Roomed by DIOGO RN     History of Present Illness       Reason for visit:  Forms        She will go back to work on 4/8/24   She is taking nyquil to sleep at night.   She is still have chest congestion, head congestion, productive cough (clear), chills and nasal drainage.   Cough is disrupting sleep.   No subjective fevers.   Home COVID test negative.                   Objective           Vitals:  No vitals were obtained today due to virtual visit.    Physical Exam   GENERAL: alert and no distress            Video-Visit Details    Type of service:  Video Visit   Originating Location (pt. Location): Home    Distant Location (provider location):  On-site  Platform used for Video Visit: Jess  Signed Electronically by: Yanna Matias MD

## 2024-04-04 ENCOUNTER — OFFICE VISIT (OUTPATIENT)
Dept: CARDIOLOGY | Facility: CLINIC | Age: 59
End: 2024-04-04
Attending: INTERNAL MEDICINE
Payer: COMMERCIAL

## 2024-04-04 VITALS
DIASTOLIC BLOOD PRESSURE: 77 MMHG | HEART RATE: 49 BPM | SYSTOLIC BLOOD PRESSURE: 125 MMHG | OXYGEN SATURATION: 98 % | WEIGHT: 244.2 LBS | BODY MASS INDEX: 41.92 KG/M2

## 2024-04-04 DIAGNOSIS — I50.22 CHRONIC SYSTOLIC CONGESTIVE HEART FAILURE (H): Primary | ICD-10-CM

## 2024-04-04 DIAGNOSIS — I42.9 FAMILIAL CARDIOMYOPATHY (H): ICD-10-CM

## 2024-04-04 LAB
ANION GAP SERPL CALCULATED.3IONS-SCNC: 8 MMOL/L (ref 7–15)
BUN SERPL-MCNC: 12.5 MG/DL (ref 8–23)
CALCIUM SERPL-MCNC: 8.5 MG/DL (ref 8.6–10)
CHLORIDE SERPL-SCNC: 105 MMOL/L (ref 98–107)
CREAT SERPL-MCNC: 0.98 MG/DL (ref 0.51–0.95)
DEPRECATED HCO3 PLAS-SCNC: 26 MMOL/L (ref 22–29)
EGFRCR SERPLBLD CKD-EPI 2021: 66 ML/MIN/1.73M2
GLUCOSE SERPL-MCNC: 86 MG/DL (ref 70–99)
POTASSIUM SERPL-SCNC: 4.1 MMOL/L (ref 3.4–5.3)
SODIUM SERPL-SCNC: 139 MMOL/L (ref 135–145)

## 2024-04-04 PROCEDURE — 99213 OFFICE O/P EST LOW 20 MIN: CPT | Performed by: NURSE PRACTITIONER

## 2024-04-04 RX ORDER — METOCLOPRAMIDE 10 MG/1
TABLET ORAL
Qty: 120 TABLET | Refills: 0 | Status: SHIPPED | OUTPATIENT
Start: 2024-04-04 | End: 2024-06-11

## 2024-04-04 RX ORDER — METOCLOPRAMIDE 5 MG/1
TABLET ORAL
Qty: 120 TABLET | Refills: 0 | OUTPATIENT
Start: 2024-04-04

## 2024-04-04 NOTE — NURSING NOTE
Labs: Patient was given results of the laboratory testing obtained today. Patient demonstrated understanding of this information and agreed to call with further questions or concerns.     Med Reconcile: Reviewed and verified all current medications with the patient. The updated medication list was printed and given to the patient.    Return Appointment: Patient given instructions regarding scheduling next clinic visit. Patient demonstrated understanding of this information and agreed to call with further questions or concerns. Dr. Seaman as scheduled in June. CORE in September.     Medication Change: Patient was educated regarding prescribed medication change, including discussion of the indication, administration, side effects, and when to report to MD or RN. Patient demonstrated understanding of this information and agreed to call with further questions or concerns. Take lasix 60 mg daily for the next 4 days.     Patient stated she understood all health information given and agreed to call with further questions or concerns.     Fe Bills, nursing student
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
## 2024-04-04 NOTE — NURSING NOTE
"Chief Complaint   Patient presents with    Follow Up      Reason for visit: Return CORE, 57 yo female with systolic heart failure presents for follow up with labs prior.       Initial /77 (BP Location: Right arm, Patient Position: Sitting, Cuff Size: Adult Large)   Pulse (!) 49   Wt 110.8 kg (244 lb 3.2 oz)   LMP 10/19/2008 (Approximate)   SpO2 98%   BMI 41.92 kg/m   Estimated body mass index is 41.92 kg/m  as calculated from the following:    Height as of 3/11/24: 1.626 m (5' 4\").    Weight as of this encounter: 110.8 kg (244 lb 3.2 oz)..  BP completed using cuff size: REGINA Rush      "

## 2024-04-04 NOTE — PROGRESS NOTES
Optimal Vascular Metrics    Blood Pressure   BP < 140/90 Yes    On Aspirin  no    On Statin  no    Tobacco use  No

## 2024-04-04 NOTE — TELEPHONE ENCOUNTER
There is no UZMA for paperwork to be faxed anywhere  Connected with patient and advised this. Offered to attach to MyC, mail or have at  for pickup. Patient stated she needs it faxed from clinic so writer suggested she stop in to complete UZMA so it can be faxed accordingly.   Patient was frustrated as she thought this had been sent last night and that we should have called earlier as she was at a [facility] site.   Writer apologized that she had just gotten forms from completed bin, had she known earlier would have made outreach.  Forms kept in folder at my desk, patient will be in tonight or tomorrow  Please complete once UZMA received-

## 2024-04-04 NOTE — PATIENT INSTRUCTIONS
Take your medicines every day, as directed    Changes made today:  Take Lasix 60 mg daily for the next 4 days  Monitor weight and swelling at home  RN check in on Tuesday 4/9   Monitor Your Weight and Symptoms    Contact us if you:    Gain 2 pounds in one day or 5 pounds in one week  Feel more short of breath  Notice more leg swelling  Feel lightheadeded   Change your lifestyle    Limit Salt or Sodium:  2000 mg  Limit Fluids:  2000 mL or approximately 64 ounces  Eat a Heart Healthy Diet  Low in saturated fats  Stay Active:  Aim to move at least 150 minutes every  week         To Contact us    During Business Hours:  [phone], option # 1      After hours, weekends or holidays:   [phone], Option #4  Ask to speak to the On-Call Cardiologist. Inform them you are a CORE/heart failure patient at the [facility].     Use [employer] allows you to communicate directly with your heart team through secure messaging.  [employer] can be accessed any time on your phone, computer, or tablet.  If you need assistance, we'd be happy to help!         Keep your Heart Appointments:    Dr. Seaman as scheduled in June     CORE in September     Please consider attending our virtual support group which is held monthly. Please reach out to Robert at [phone] for more information if you are interested in attending.     2024 dates:    Monday, May 6th, 1-2pm     Monday, June 3rd, 1-2pm     Monday, July 1st, 1-2pm     Monday, August 5th, 1-2pm     Monday, September 9th, 1-2pm     Monday, October 7th, 1-2pm     Monday, November 4th, 1-2pm     Monday, December 2nd, 1-2pm

## 2024-04-04 NOTE — TELEPHONE ENCOUNTER
metoclopramide (REGLAN) 10 MG tablet 120 tablet 0 3/4/2024     metoclopramide (REGLAN) 5 MG tablet 120 tablet 0 2/8/2024     Last Office Visit : 3/4/24  Future Office visit:  0    Routing refill request to provider for review/approval because:  Unclear what dose pt should be on.  Last note states..I will have her increase her metoclopramide to 10 mg for a short period of time given the continued feeling of fullness and and occ emesis of undigested foods.

## 2024-04-04 NOTE — TELEPHONE ENCOUNTER
Refill approved for 10mg 4 times daily. Should decrease use given concerns of tardive dyskinesia with prolonged use.     Alex Sanchez PA-C    (4) rarely moist
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
## 2024-04-04 NOTE — TELEPHONE ENCOUNTER
LPN spoke with patient and discussed what dose of Reglan she is taking. Patient reports she is taking the 10 mg 4 times a day. Patient would like the refill sent to the [facility].    Marry Gibbs LPN

## 2024-04-05 ENCOUNTER — ANCILLARY PROCEDURE (OUTPATIENT)
Dept: CT IMAGING | Facility: CLINIC | Age: 59
End: 2024-04-05
Attending: PHYSICIAN ASSISTANT
Payer: COMMERCIAL

## 2024-04-05 DIAGNOSIS — R11.0 NAUSEA: ICD-10-CM

## 2024-04-05 DIAGNOSIS — K57.32 DIVERTICULITIS OF COLON: ICD-10-CM

## 2024-04-05 DIAGNOSIS — R10.32 LLQ ABDOMINAL PAIN: ICD-10-CM

## 2024-04-05 PROCEDURE — 74177 CT ABD & PELVIS W/CONTRAST: CPT | Performed by: RADIOLOGY

## 2024-04-05 RX ORDER — METOCLOPRAMIDE 10 MG/1
TABLET ORAL
Qty: 120 TABLET | Refills: 0 | OUTPATIENT
Start: 2024-04-05

## 2024-04-05 RX ORDER — IOPAMIDOL 755 MG/ML
122 INJECTION, SOLUTION INTRAVASCULAR ONCE
Status: COMPLETED | OUTPATIENT
Start: 2024-04-05 | End: 2024-04-05

## 2024-04-05 RX ADMIN — IOPAMIDOL 122 ML: 755 INJECTION, SOLUTION INTRAVASCULAR at 12:11

## 2024-04-05 NOTE — TELEPHONE ENCOUNTER
[employer] message sent to patient with Alex Sanchez PA-C message in previous note.    Marry Gibbs LPN

## 2024-04-05 NOTE — TELEPHONE ENCOUNTER
Medication was refilled on 4/4/24 by Alex Sanchez PA-C. HOLLIE spoke with Rafael at [facility] in [facility] and requested that prescription be transferred from the [facility] as patient wanted it sent to the [place] location. Rafael stated he pulled the Rx over.    Marry Gibbs LPN

## 2024-04-05 NOTE — TELEPHONE ENCOUNTER
Patient came and filled out UZMA.   Faxed unum form to [phone]  Faxed FMLA to [phone]  Copies kept in clinic  Copies sent to abstract

## 2024-04-09 ENCOUNTER — THERAPY VISIT (OUTPATIENT)
Dept: PHYSICAL THERAPY | Facility: CLINIC | Age: 59
End: 2024-04-09
Attending: PHYSICIAN ASSISTANT
Payer: COMMERCIAL

## 2024-04-09 DIAGNOSIS — R15.9 INCONTINENCE OF FECES, UNSPECIFIED FECAL INCONTINENCE TYPE: ICD-10-CM

## 2024-04-09 DIAGNOSIS — K59.00 CONSTIPATION, UNSPECIFIED CONSTIPATION TYPE: Primary | ICD-10-CM

## 2024-04-09 DIAGNOSIS — R32 URINARY INCONTINENCE, UNSPECIFIED TYPE: ICD-10-CM

## 2024-04-09 DIAGNOSIS — M62.89 PELVIC FLOOR DYSFUNCTION: ICD-10-CM

## 2024-04-09 PROCEDURE — 97161 PT EVAL LOW COMPLEX 20 MIN: CPT | Mod: GP | Performed by: PHYSICAL THERAPIST

## 2024-04-09 PROCEDURE — 97530 THERAPEUTIC ACTIVITIES: CPT | Mod: GP | Performed by: PHYSICAL THERAPIST

## 2024-04-09 PROCEDURE — 97110 THERAPEUTIC EXERCISES: CPT | Mod: GP | Performed by: PHYSICAL THERAPIST

## 2024-04-09 NOTE — PROGRESS NOTES
PHYSICAL THERAPY EVALUATION  Type of Visit: Evaluation    See electronic medical record for Abuse and Falls Screening details.    Subjective       Presenting condition or subjective complaint: Patient reports insidious onset of acid reflux and fecal incontinence in 6/2023. Currently, she c/o lack of control of gas or stool  and fecal incontinence 1-2 x daily. She feels stool leakage with a cough or sneeze and also related to bowel urgency. She also does not feel she empty's her bowel completely and strains often when having a bowel movement. She c/o urine leakage 1x daily related to urgency, waiting too long. She reports history of diverticulitis.   Date of onset: 03/04/24 (MD order for PT)    Relevant medical history: Bladder or bowel problems (diverticultis, congestive heart failure)   Dates & types of surgery: pelvic sling 2021,  TKA R 2021, L TKA 2019, 2022- brain surgery, hysterectomy 2006, abdominoplasty breast reduction in 1999    Prior diagnostic imaging/testing results:       Prior therapy history for the same diagnosis, illness or injury:        Prior Level of Function  Transfers:   Ambulation:   ADL:   IADL:     Living Environment  Social support: With a significant other or spouse   Type of home: Multi-level   Stairs to enter the home:         Ramp:     Stairs inside the home:         Help at home:    Equipment owned:       Employment: Yes phelbotomist  Hobbies/Interests:      Patient goals for therapy:      Pain assessment: none     Objective      PELVIC EVALUATION  ADDITIONAL HISTORY:  Sex assigned at birth: Female  Gender identity: Female    Pronouns: She/Her Hers      Bladder History:  Feels bladder filling: Yes  Triggers for feeling of inability to wait to go to the bathroom:      How long can you wait to urinate:    Gets up at night to urinate: Yes 1-2  Can stop the flow of urine when urinating: -- (unsure)  Volume of urine usually released: Large   Other issues:    Number of bladder infections in  last 12 months:    Fluid intake per day: 70 oz      Medications taken for bladder: Yes vesicare   Activities causing urine leak: Hurrying to the bathroom due to a strong urge to urinate (pee)    Amount of urine typically leaked: small  Pads used to help with leaking: No        Bowel History:  Frequency of bowel movement: daily  Consistency of stool: Soft    Ignores the urge to defecate: No  Other bowel issues: Straining to have bowel movement; Loss of gas; Loss of stool  Length of time spent trying to have a bowel movement: 5 min.    Sexual Function History:  Sexual orientation: Straight    Sexually active: Yes  Lubrication used:      Pelvic pain:      Pain or difficulty with orgasms/erection/ejaculation:      State of menopause: Post-menopause (I am done with menopause)  Hormone medications: No      Are you currently pregnant: No, Number of previous pregnancies: 3, Number of deliveries: 2 1985, If you have delivered before, did you have any of these issues during delivery:  delivery, Have you been diagnosed with pelvic prolapse or abdominal separation: No    Discussed reason for referral regarding pelvic health needs and external/internal pelvic floor muscle examination with patient/guardian.  Opportunity provided to ask questions and verbal consent for assessment and intervention was given.    PAIN: none  POSTURE:   LUMBAR SCREEN:   HIP SCREEN:  Strength:    Functional Strength Testing:     PELVIC/SI SCREEN:     PAIN PROVOCATION TEST:   PELVIS/SI SPECIAL TESTS:   BREATHING SYMMETRY:     PELVIC EXAM  External Visual Inspection:  At rest: Normal  With voluntary pelvic floor contraction: Present  Relaxation of PFM: Partial/delayed relaxation    Integumentary:   Introitus: Unremarkable  Anal: normal    External Digital Palpation per Perineum:   WNL- pain free    Scar:   Location/Type:   Mobility:     Internal Digital Palpation:  Per Vagina:  Myofascial Resistance to Palpation: Firm  Digital Muscle  "Performance: P (Power): 3-/5  E (Endurance): 3-4 \" hold  R (Repetitions): 4  F (Fast Twitch): 5  Compensations: Abdominals, Breath holding  Relaxation Post-Contraction: Partial/delayed relaxation    Per Rectum:  Myofascial Resistance to Palpation: Firm  Digital Muscle Performance: P (Power): 3-/5  E (Endurance): 3-4\"  R (Repetitions): 4  F (Fast Twitch): 4  Compensations: Gluts  Relaxation Post-Contraction: Partial/delayed relaxation  EAS: low tone with palpation     Pelvic Organ Prolapse:       ABDOMINAL ASSESSMENT  Diastasis Rectus Abdominis (JAMEL):  JAMEL presence: No    Abdominal Activation/Strength:     Scar:   Location/Type:   Mobility:     Fascial Tension/Restriction:     BIOFEEDBACK:  Position:   Surface Electrodes:     Abdominals:     Perianals:       DERMATOMES:   DTR S:     Assessment & Plan   CLINICAL IMPRESSIONS  Medical Diagnosis: pelvic floor dysfunction    Treatment Diagnosis: PF dysfunction, fecal incontinence, urinary incontinence, constipation   Impression/Assessment: Patient is a 58 year old female with pelvic floor dysfunction, fecal/urinary incontinence, constipation complaints.  The following significant findings have been identified: Decreased strength and Impaired muscle performance. These impairments interfere with their ability to perform self care tasks as compared to previous level of function.     Clinical Decision Making (Complexity):  Clinical Presentation: Stable/Uncomplicated  Clinical Presentation Rationale: based on medical and personal factors listed in PT evaluation  Clinical Decision Making (Complexity): Low complexity    PLAN OF CARE  Treatment Interventions:  Modalities: Biofeedback  Interventions: Manual Therapy, Neuromuscular Re-education, Therapeutic Activity, Therapeutic Exercise, Self-Care/Home Management    Long Term Goals     PT Goal 1  Goal Identifier: fecal incontinence  Goal Description: decrease fecal incontinence episodes to < 1-2x week (baseline: episodes 1-2x " "daily)  Rationale: to maximize safety and independence with self cares;to maximize safety and independence with performance of ADLs and functional tasks  Target Date: 06/18/24  PT Goal 2  Goal Identifier: pelvic floor msucle strength/endurance /relaxation  Goal Description: MMT 4-/5 10\" hold( vaginally/rectally), full relaxation (baseline: MMT 3-/5( vaginally/ rectally), 5\" hold, partially delayed relaxation)  Rationale: to maximize safety and independence with performance of ADLs and functional tasks;to maximize safety and independence with self cares  Target Date: 06/18/24      Frequency of Treatment: 1x week every other week  Duration of Treatment: 10 weeks    Recommended Referrals to Other Professionals: Physical Therapy  Education Assessment:   Learner/Method: Patient;Listening;Pictures/Video    Risks and benefits of evaluation/treatment have been explained.   Patient/Family/caregiver agrees with Plan of Care.     Evaluation Time:     PT Eval, Low Complexity Minutes (83867): 25       Signing Clinician: Noy Medeiros, PT          "

## 2024-04-11 ENCOUNTER — CARE COORDINATION (OUTPATIENT)
Dept: CARDIOLOGY | Facility: CLINIC | Age: 59
End: 2024-04-11
Payer: COMMERCIAL

## 2024-04-11 NOTE — PROGRESS NOTES
Called Marleen to follow up about arm swelling and home weights. Patient states that since coming into the clinic, she has been following the recommendation of taking her weight everyday as best she can. Patient notices that her arm swelling has gone down. Says she can now put wrap her fingers all the way around her wrists.     Fe Bills, nursing student

## 2024-04-11 NOTE — PROGRESS NOTES
Called patient to check in since Lasix increase to 60 mg for 4 days. Patient reported that she took an increased dose two days last week and again today and will tomorrow, but when she goes into work does not take Lasix as she does not want to spend the day in the bathroom. Patient has not completed 4 days in a row of increased dose for this reason. Weight was 240 lbs yesterday. No change in SOB and swelling.     Fe Bills, nursing student

## 2024-04-16 ENCOUNTER — MYC MEDICAL ADVICE (OUTPATIENT)
Dept: FAMILY MEDICINE | Facility: CLINIC | Age: 59
End: 2024-04-16
Payer: COMMERCIAL

## 2024-04-23 ENCOUNTER — TELEPHONE (OUTPATIENT)
Dept: FAMILY MEDICINE | Facility: CLINIC | Age: 59
End: 2024-04-23

## 2024-04-23 ENCOUNTER — OFFICE VISIT (OUTPATIENT)
Dept: FAMILY MEDICINE | Facility: CLINIC | Age: 59
End: 2024-04-23
Payer: COMMERCIAL

## 2024-04-23 VITALS
TEMPERATURE: 97.3 F | BODY MASS INDEX: 42.68 KG/M2 | HEIGHT: 64 IN | DIASTOLIC BLOOD PRESSURE: 84 MMHG | WEIGHT: 250 LBS | SYSTOLIC BLOOD PRESSURE: 127 MMHG | RESPIRATION RATE: 16 BRPM | HEART RATE: 52 BPM | OXYGEN SATURATION: 100 %

## 2024-04-23 DIAGNOSIS — F51.04 PSYCHOPHYSIOLOGICAL INSOMNIA: Primary | ICD-10-CM

## 2024-04-23 PROCEDURE — 99213 OFFICE O/P EST LOW 20 MIN: CPT | Performed by: PHYSICIAN ASSISTANT

## 2024-04-23 RX ORDER — HYDROXYZINE HYDROCHLORIDE 25 MG/1
25-50 TABLET, FILM COATED ORAL
Qty: 180 TABLET | Refills: 0 | Status: SHIPPED | OUTPATIENT
Start: 2024-04-23 | End: 2024-08-16 | Stop reason: SINTOL

## 2024-04-23 ASSESSMENT — ANXIETY QUESTIONNAIRES
5. BEING SO RESTLESS THAT IT IS HARD TO SIT STILL: NOT AT ALL
GAD7 TOTAL SCORE: 5
2. NOT BEING ABLE TO STOP OR CONTROL WORRYING: SEVERAL DAYS
1. FEELING NERVOUS, ANXIOUS, OR ON EDGE: SEVERAL DAYS
6. BECOMING EASILY ANNOYED OR IRRITABLE: SEVERAL DAYS
8. IF YOU CHECKED OFF ANY PROBLEMS, HOW DIFFICULT HAVE THESE MADE IT FOR YOU TO DO YOUR WORK, TAKE CARE OF THINGS AT HOME, OR GET ALONG WITH OTHER PEOPLE?: SOMEWHAT DIFFICULT
GAD7 TOTAL SCORE: 5
7. FEELING AFRAID AS IF SOMETHING AWFUL MIGHT HAPPEN: NOT AT ALL
IF YOU CHECKED OFF ANY PROBLEMS ON THIS QUESTIONNAIRE, HOW DIFFICULT HAVE THESE PROBLEMS MADE IT FOR YOU TO DO YOUR WORK, TAKE CARE OF THINGS AT HOME, OR GET ALONG WITH OTHER PEOPLE: SOMEWHAT DIFFICULT
4. TROUBLE RELAXING: SEVERAL DAYS
GAD7 TOTAL SCORE: 5
3. WORRYING TOO MUCH ABOUT DIFFERENT THINGS: SEVERAL DAYS
7. FEELING AFRAID AS IF SOMETHING AWFUL MIGHT HAPPEN: NOT AT ALL

## 2024-04-23 ASSESSMENT — PAIN SCALES - GENERAL: PAINLEVEL: SEVERE PAIN (6)

## 2024-04-23 ASSESSMENT — PATIENT HEALTH QUESTIONNAIRE - PHQ9
SUM OF ALL RESPONSES TO PHQ QUESTIONS 1-9: 9
SUM OF ALL RESPONSES TO PHQ QUESTIONS 1-9: 9
10. IF YOU CHECKED OFF ANY PROBLEMS, HOW DIFFICULT HAVE THESE PROBLEMS MADE IT FOR YOU TO DO YOUR WORK, TAKE CARE OF THINGS AT HOME, OR GET ALONG WITH OTHER PEOPLE: SOMEWHAT DIFFICULT

## 2024-04-23 ASSESSMENT — ENCOUNTER SYMPTOMS: NERVOUS/ANXIOUS: 1

## 2024-04-23 NOTE — TELEPHONE ENCOUNTER
Pt called stating she has been having Insomnia for the last 4 days. Reason is she has anxiety. Pt request medication to help her sleep. Pt stated she tried all OTC meds and nothing works. Appt scheduled today 4/23/24.      Param Keith, BSN RN  [facility]

## 2024-04-23 NOTE — LETTER
April 23, 2024      Marleen Mcfadden  [address]  [place] [address]        To Whom It May Concern:    Marleen Mcfadden was seen on 4/23/24.  Please excuse her for absences on 4/22/24-4/23/24 due to illness.        Sincerely,        Kelley Lawson PA-C

## 2024-04-23 NOTE — PROGRESS NOTES
Assessment & Plan     Psychophysiological insomnia - will try hydroxyzine again as this was helpful in the past. Could consider amitriptyline again if still not effective. Did also offer daily medication to help with mood stabilization, she declines. Work note provided.   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  Patient's anxiety since last visit has been:  Bad  The patient is not having other symptoms associated with anxiety.  Any significant life events: other  Patient is feeling anxious or having panic attacks.  Patient has no concerns about alcohol or drug use.    She eats 2-3 servings of fruits and vegetables daily.She consumes 1 sweetened beverage(s) daily.She exercises with enough effort to increase her heart rate 30 to 60 minutes per day.  She exercises with enough effort to increase her heart rate 3 or less days per week.   She is taking medications regularly.         Objective    /84 (BP Location: Right arm, Patient Position: Sitting, Cuff Size: Adult Large)   Pulse 52   Temp 97.3  F (36.3  C) (Temporal)   Resp 16  "  Ht 1.63 m (5' 4.17\")   Wt 113.4 kg (250 lb)   LMP 10/19/2008 (Approximate)   SpO2 100%   BMI 42.68 kg/m    Body mass index is 42.68 kg/m .  Physical Exam   GENERAL: alert and no distress  PSYCH: mentation appears normal, affect normal/bright        Signed Electronically by: Kelley Lawson PA-C    "

## 2024-05-10 ENCOUNTER — MYC REFILL (OUTPATIENT)
Dept: GASTROENTEROLOGY | Facility: CLINIC | Age: 59
End: 2024-05-10
Payer: COMMERCIAL

## 2024-05-10 DIAGNOSIS — K21.9 GASTROESOPHAGEAL REFLUX DISEASE WITHOUT ESOPHAGITIS: ICD-10-CM

## 2024-05-13 ENCOUNTER — MYC MEDICAL ADVICE (OUTPATIENT)
Dept: FAMILY MEDICINE | Facility: CLINIC | Age: 59
End: 2024-05-13
Payer: COMMERCIAL

## 2024-05-13 RX ORDER — RABEPRAZOLE SODIUM 20 MG/1
20 TABLET, DELAYED RELEASE ORAL 2 TIMES DAILY
Qty: 180 TABLET | Refills: 1 | Status: SHIPPED | OUTPATIENT
Start: 2024-05-13

## 2024-05-13 RX ORDER — FAMOTIDINE 40 MG/1
40 TABLET, FILM COATED ORAL
Qty: 30 TABLET | Refills: 3 | OUTPATIENT
Start: 2024-05-13

## 2024-05-13 RX ORDER — FAMOTIDINE 40 MG/1
TABLET, FILM COATED ORAL
Qty: 90 TABLET | Refills: 1 | Status: SHIPPED | OUTPATIENT
Start: 2024-05-13 | End: 2024-07-24

## 2024-05-13 NOTE — TELEPHONE ENCOUNTER
"Per last visit note with Alex Sanchez PA-C on 3/4/24 \"continue taking aciphex twice daily and continue famotidine at bedtime.\"    Patient has upcoming visit with Richa Campuzano PA-C on 6/11/24.     Routing to provider for review.    Marry Gibbs LPN        "

## 2024-06-10 ENCOUNTER — TELEPHONE (OUTPATIENT)
Dept: CARDIOLOGY | Facility: CLINIC | Age: 59
End: 2024-06-10
Payer: COMMERCIAL

## 2024-06-10 ENCOUNTER — MYC MEDICAL ADVICE (OUTPATIENT)
Dept: CARDIOLOGY | Facility: CLINIC | Age: 59
End: 2024-06-10
Payer: COMMERCIAL

## 2024-06-10 DIAGNOSIS — I50.22 CHRONIC SYSTOLIC CONGESTIVE HEART FAILURE (H): Primary | ICD-10-CM

## 2024-06-10 NOTE — TELEPHONE ENCOUNTER
6/10/2024 5:47PM Tracey Julian  Patient has not yet confirmed scheduled appointment:  Date: 6/11/2024  Time: 10AM  Visit type: Nurse Only (ZIO)  Provider: RONIT CVC NURSE  [place] [address] [id number]  Testing/imaging: NA  Additional notes: 6/10 LVM and MYC that pt is scheduled for Nurse visit tomorrow for zio patch placement at 10AM per pt's MYC message. NAKIA Julian 6/10/2024 5:47PM

## 2024-06-11 ENCOUNTER — ALLIED HEALTH/NURSE VISIT (OUTPATIENT)
Dept: CARDIOLOGY | Facility: CLINIC | Age: 59
End: 2024-06-11
Payer: COMMERCIAL

## 2024-06-11 ENCOUNTER — OFFICE VISIT (OUTPATIENT)
Dept: GASTROENTEROLOGY | Facility: CLINIC | Age: 59
End: 2024-06-11
Payer: COMMERCIAL

## 2024-06-11 VITALS
BODY MASS INDEX: 42.95 KG/M2 | SYSTOLIC BLOOD PRESSURE: 134 MMHG | OXYGEN SATURATION: 96 % | HEART RATE: 50 BPM | DIASTOLIC BLOOD PRESSURE: 84 MMHG | WEIGHT: 251.6 LBS

## 2024-06-11 DIAGNOSIS — R10.32 LLQ ABDOMINAL PAIN: ICD-10-CM

## 2024-06-11 DIAGNOSIS — R11.0 NAUSEA: ICD-10-CM

## 2024-06-11 DIAGNOSIS — K59.00 CONSTIPATION, UNSPECIFIED CONSTIPATION TYPE: ICD-10-CM

## 2024-06-11 DIAGNOSIS — K21.9 GASTROESOPHAGEAL REFLUX DISEASE WITHOUT ESOPHAGITIS: Primary | ICD-10-CM

## 2024-06-11 DIAGNOSIS — I50.22 CHRONIC SYSTOLIC CONGESTIVE HEART FAILURE (H): ICD-10-CM

## 2024-06-11 PROCEDURE — 93242 EXT ECG>48HR<7D RECORDING: CPT

## 2024-06-11 PROCEDURE — 99214 OFFICE O/P EST MOD 30 MIN: CPT | Performed by: PHYSICIAN ASSISTANT

## 2024-06-11 RX ORDER — METOCLOPRAMIDE 10 MG/1
10 TABLET ORAL 3 TIMES DAILY PRN
Qty: 270 TABLET | Refills: 1 | Status: SHIPPED | OUTPATIENT
Start: 2024-06-11 | End: 2024-12-08

## 2024-06-11 ASSESSMENT — PAIN SCALES - GENERAL: PAINLEVEL: NO PAIN (0)
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
## 2024-06-11 NOTE — PATIENT INSTRUCTIONS
It was a pleasure taking care of you today.  I've included a brief summary of our discussion and care plan from today's visit below.  Please review this information with your primary care provider.  ______________________________________________________________________    My recommendations are summarized as follows:  --continue aciphex 20mg two times a day.   --continue pepcid 40mg at bedtime.   --continue gaviscon with meals.   -- continue relgan, three times a day -- please take breaks, for a week or so at at time.  --continue use of flax.   --please follow up with the pelvic floor center.     -- Referral to [facility]. Our team will fax a referral for you.  [address]  [place] [address]  Phone: [phone]      -- please see scheduling information provided below     Return to GI Clinic in 6 months to review your progress.    ______________________________________________________________________    How do I schedule labs, imaging studies, or procedures that were ordered in clinic today?     Labs: To schedule lab appointment at the [facility], use my chart or call [phone]. If you have a [place] lab closer to home where you are regularly seen you can give them a call.     Procedures: If a colonoscopy, upper endoscopy, breath test, esophageal manometry, or pH impedence was ordered today, our endoscopy team will call you to schedule this. If you have not heard from our endoscopy team within a week, please call [phone] to schedule.     Imaging Studies: If you were scheduled for a CT scan, X-ray, MRI, ultrasound, HIDA scan or other imaging study, please call [phone] to have this scheduled.     Referral: If a referral to another specialty was ordered, expect a phone call or follow instructions above. If you have not heard from anyone regarding your referral in a week, please call our clinic to check the status.      Who do I call with any questions after my visit?  Please be in touch if there are any further questions that arise following today's visit.  There are multiple ways to contact your gastroenterology care team.      During business hours, you may reach a Gastroenterology nurse at [phone]    To schedule or reschedule an appointment, please call [phone].     You can always send a secure message through [employer].  [employer] messages are answered by your nurse or doctor typically within 24 hours.  Please allow extra time on weekends and holidays.      For urgent/emergent questions after business hours, you may reach the on-call GI Fellow by contacting the [facility]  at [phone].     How will I get the results of any tests ordered?    You will receive all of your results.  If you have signed up for [employer], any tests ordered at your visit will be available to you after your provider reviews them.  Typically this takes 1-2 weeks.  If there are urgent results that require a change in your care plan, your provider or nurse will call you to discuss the next steps.      What is [employer]?  [employer] is a secure way for you to access all of your healthcare records from the [facility].  It is a web based computer program, so you can sign on to it from any location.  It also allows you to send secure messages to your care team.  I recommend signing up for [employer] access if you have not already done so and are comfortable with using a computer.      How to I schedule a follow-up visit?  If you did not schedule a follow-up visit today, please call [phone] to schedule a follow-up office visit.      Sincerely,    Richa Campuzano PA-C  Division of Gastroenterology, Hepatology & Nutrition  [facility]

## 2024-06-19 PROBLEM — R15.9 FECAL INCONTINENCE: Status: RESOLVED | Noted: 2024-04-09 | Resolved: 2024-06-19

## 2024-06-19 PROBLEM — R32 URINARY INCONTINENCE: Status: RESOLVED | Noted: 2024-04-09 | Resolved: 2024-06-19

## 2024-06-19 PROBLEM — K59.00 CONSTIPATION: Status: RESOLVED | Noted: 2024-04-09 | Resolved: 2024-06-19

## 2024-06-19 PROBLEM — M62.89 PELVIC FLOOR DYSFUNCTION: Status: RESOLVED | Noted: 2024-04-09 | Resolved: 2024-06-19

## 2024-06-24 ENCOUNTER — LAB (OUTPATIENT)
Dept: LAB | Facility: CLINIC | Age: 59
End: 2024-06-24
Attending: INTERNAL MEDICINE
Payer: COMMERCIAL

## 2024-06-24 ENCOUNTER — OFFICE VISIT (OUTPATIENT)
Dept: CARDIOLOGY | Facility: CLINIC | Age: 59
End: 2024-06-24
Attending: INTERNAL MEDICINE
Payer: COMMERCIAL

## 2024-06-24 VITALS
OXYGEN SATURATION: 98 % | DIASTOLIC BLOOD PRESSURE: 80 MMHG | HEART RATE: 52 BPM | SYSTOLIC BLOOD PRESSURE: 138 MMHG | BODY MASS INDEX: 42.73 KG/M2 | WEIGHT: 250.3 LBS

## 2024-06-24 DIAGNOSIS — E83.42 HYPOMAGNESEMIA: Primary | ICD-10-CM

## 2024-06-24 DIAGNOSIS — R11.0 NAUSEA: ICD-10-CM

## 2024-06-24 DIAGNOSIS — I50.22 CHRONIC SYSTOLIC CONGESTIVE HEART FAILURE (H): ICD-10-CM

## 2024-06-24 LAB
ALBUMIN SERPL BCG-MCNC: 4.1 G/DL (ref 3.5–5.2)
ALP SERPL-CCNC: 62 U/L (ref 40–150)
ALT SERPL W P-5'-P-CCNC: 22 U/L (ref 0–50)
ANION GAP SERPL CALCULATED.3IONS-SCNC: 9 MMOL/L (ref 7–15)
AST SERPL W P-5'-P-CCNC: 19 U/L (ref 0–45)
BILIRUB SERPL-MCNC: 0.3 MG/DL
BUN SERPL-MCNC: 16.8 MG/DL (ref 8–23)
CALCIUM SERPL-MCNC: 9.3 MG/DL (ref 8.6–10)
CHLORIDE SERPL-SCNC: 103 MMOL/L (ref 98–107)
CREAT SERPL-MCNC: 0.98 MG/DL (ref 0.51–0.95)
DEPRECATED HCO3 PLAS-SCNC: 26 MMOL/L (ref 22–29)
EGFRCR SERPLBLD CKD-EPI 2021: 66 ML/MIN/1.73M2
GLUCOSE SERPL-MCNC: 91 MG/DL (ref 70–99)
MAGNESIUM SERPL-MCNC: 1.6 MG/DL (ref 1.7–2.3)
NT-PROBNP SERPL-MCNC: 185 PG/ML (ref 0–900)
POTASSIUM SERPL-SCNC: 4.2 MMOL/L (ref 3.4–5.3)
PROT SERPL-MCNC: 7.2 G/DL (ref 6.4–8.3)
SODIUM SERPL-SCNC: 138 MMOL/L (ref 135–145)

## 2024-06-24 PROCEDURE — G2211 COMPLEX E/M VISIT ADD ON: HCPCS | Performed by: INTERNAL MEDICINE

## 2024-06-24 PROCEDURE — 99215 OFFICE O/P EST HI 40 MIN: CPT | Performed by: INTERNAL MEDICINE

## 2024-06-24 PROCEDURE — 80053 COMPREHEN METABOLIC PANEL: CPT | Performed by: PATHOLOGY

## 2024-06-24 PROCEDURE — 99417 PROLNG OP E/M EACH 15 MIN: CPT | Performed by: INTERNAL MEDICINE

## 2024-06-24 PROCEDURE — 99213 OFFICE O/P EST LOW 20 MIN: CPT | Performed by: INTERNAL MEDICINE

## 2024-06-24 PROCEDURE — 83880 ASSAY OF NATRIURETIC PEPTIDE: CPT | Performed by: PATHOLOGY

## 2024-06-24 PROCEDURE — 83735 ASSAY OF MAGNESIUM: CPT | Performed by: PATHOLOGY

## 2024-06-24 PROCEDURE — 36415 COLL VENOUS BLD VENIPUNCTURE: CPT | Performed by: PATHOLOGY

## 2024-06-24 RX ORDER — MAGNESIUM OXIDE 400 MG/1
400 TABLET ORAL 2 TIMES DAILY
Qty: 180 TABLET | Refills: 3 | Status: SHIPPED | OUTPATIENT
Start: 2024-06-24

## 2024-06-24 ASSESSMENT — PAIN SCALES - GENERAL: PAINLEVEL: NO PAIN (0)

## 2024-06-24 NOTE — LETTER
2024      RE: Marleen Mcfadden  [address]  [place] [address]       Dear Colleague,    Thank you for the opportunity to participate in the care of your patient, Marleen Mcfadden, at the [facility] [place] at [facility]. Please see a copy of my visit note below.    Cardiovascular Genetics Clinic Progress Note     Name: Marleen Mcfadden  : 1965  MRN: [id number]    2024    Dear Dr. Levine and colleages,    I had the pleasure of seeing Ms. 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 She is accompanied by her fiance.     As you know, she has a family history of dilated cardiomyopathy including her sister, son, and daughter. 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    I last saw her in clinic on 2023. Her CMR showed an LVEF of 32%, LVEDD 6.1cm, RVEF of 45%, and no fibrosis. Additionally, she had a ziopatch monitor which we reviewed the results in which she had 9.4% PVCs and predominantly sinus rhythm. She saw Ms. Melendez in the interim on 23 and with Dr. Aragon in EP on 10/10/23 where a primary prevention ICD. She was seen in the ED on 10/18/23 for a syncopal episode and was seen by EP, which felt the episode was orthostatic and recommended an outpatient ICD. She has been following with GI and has had an improvement in her symptoms. She was seen in [facility]  by Ms. Gallo and was hypervolemic and increased lasix to 60mg for 4 days. She did feel an improvement after the increase dose of diuretics. She still continues to have an increase in abdominal bloating and ankle edema, though it is better today. She continues to work full time  and walks 14K. She now find            More edema now in ankles and abdomen  Still working full time 14K, exhausted at the end of the day, falls asleep sitting up   Here with fiancee  Going up incline result sin dyspnea which is worse now  Having panic attacks  Achy feeling in chest  unclear if heart burn  Gi symptoms were improving   Had COVID   Weight increased 30 bls  Sleepign with 3 pillows due to acid reflux  No PND  +snornig  Fatigue is worse  Appetite remains   Always dizziness, worse with laughing and coughing and presyncope  Palpitations 3x per week  No syncope  Mucous in  AM   QOL is good  Weight management clinic   Severe body cramps  Using lasix 20mg prn 1-2 per week      /80 (BP Location: Right arm, Patient Position: Chair, Cuff Size: Adult Large)   Pulse 52   Wt 113.5 kg (250 lb 4.8 oz)   LMP 10/19/2008 (Approximate)   SpO2 98%   BMI 42.73 kg/m            She continues to work full time and walks 14-15 steps per day. She has not had exertional dyspnea or chest pain. She has been vomiting 2-3 times per day for the past 2 months and has been having heart burn. She has tried multiple PPIs and gaviscon without much relief. She has worse heart burn when laying down and is propping herself up with 2-3 pillows. She has a sore throat due to the heartburn and coughing. More recently she has developed bowel incontinence with coughing, which is very distressing for her. She has also been lightheaded and had a presyncopal event after her ED visit. Her weight is stable. She has no edema. She has lightheadedness that is worse when she is vomiting more often. No palpitations. Her appetite and energy levels are reduced. She is feels her quality of life is significantly impacted by her GI symptoms. She also would like to get an ICD after her GI symptoms are resolved.     FAMILY HISTORY: from MsSuzie Julio C note 12/21/2021 and any updates as as above    detailed family history was obtained during today's  consult.  Family history was significant for the following cardiac history:  Full sister with DCM. She  in September after developing colon cancer.  Her history was also remarkable for MERSA, kidney disease and ultimate transplant.  She did not have an ICD or much care for her DCM.  Marleen's son and daughter also have DCM.    A maternal half sister has hypertension, epilepsy and reported dizziness and fainting. She does not have a cardiac issue to Marleen's knowledge.  Mother  suddenly at 38 yrs of age.  It was thought to be the result of a cardiac problem. However, she also had a long history of alcoholism and had a colostomy. Her two siblings have no known heart issues.  Maternal grandmother  in her 30's from a brain aneurysm. Nothing is known about maternal grandfather.  Marleen's father  at 55 years after a massive heart attack and CABG procedure.  His death occurred one day after the surgery reportedly from a clot.  One of his sisters  from a stroke.  Paternal grandmother  in her 70's from a stroke.  Her mother (Marleen's great grandmother)  suddenly in her 70's while sitting at a bus stop.Nothing is known about grandfather.  Two paternal half sisters in good health.  There is no additional history of cardiomyopathy, arrhythmias, heart attacks, fainting, sudden cardiac death, genetic conditions, or birth     REVIEW OF SYSTEMS: 10 point ROS neg other than the symptoms noted above in the HPI.    PAST MEDICAL HISTORY:   1. Familial systolic heart failure with VUS in FLNC   2. KATIA  3. Obesity   4. Encephalocele repair   ALLERGIES:    Allergies   Allergen Reactions     Morphine      EMESIS     Nickel      Sulfa Antibiotics Swelling       MEDICATIONS:   Current Outpatient Medications   Medication Sig Dispense Refill     docusate sodium (COLACE) 100 MG capsule Take 1 capsule (100 mg) by mouth 3 times daily as needed for constipation 60 capsule 1     famotidine (PEPCID) 40 MG tablet  TAKE 1 TABLET(40 MG) BY MOUTH EVERY NIGHT AS NEEDED FOR HEARTBURN 90 tablet 1     fluticasone (FLONASE) 50 MCG/ACT nasal spray Spray 2 sprays into both nostrils daily 16 g 0     furosemide (LASIX) 20 MG tablet Take 2 tablets (40 mg) by mouth daily as needed (weight gain/edema) 180 tablet 3     hydrOXYzine HCl (ATARAX) 25 MG tablet Take 1-2 tablets (25-50 mg) by mouth nightly as needed for other or anxiety (sleep) 180 tablet 0     loratadine (CLARITIN) 10 MG tablet Take 1 tablet (10 mg) by mouth daily 90 tablet 3     LORazepam (ATIVAN) 0.5 MG tablet Take 1 tablet (0.5 mg) by mouth every 6 hours as needed for anxiety (take 1 tab 30 min prior to MRI) 2 tablet 0     metoclopramide (REGLAN) 10 MG tablet Take 1 tablet (10 mg) by mouth 3 times daily as needed (nausea) 270 tablet 1     metoprolol succinate ER (TOPROL XL) 25 MG 24 hr tablet Take 1 tablet (25 mg) by mouth daily 90 tablet 2     progesterone (PROMETRIUM) 100 MG capsule Take 100 mg by mouth at bedtime       RABEprazole (ACIPHEX) 20 MG EC tablet TAKE 1 TABLET(20 MG) BY MOUTH TWICE DAILY 180 tablet 1     sacubitril-valsartan (ENTRESTO)  MG per tablet Take 1 tablet by mouth 2 times daily 180 tablet 3     solifenacin (VESICARE) 5 MG tablet Take 1 tablet (5 mg) by mouth daily at 2 pm 90 tablet 1     spironolactone (ALDACTONE) 25 MG tablet Take 2 tablets (50 mg) by mouth daily 180 tablet 1     Vitamin D3 (CHOLECALCIFEROL) 25 mcg (1000 units) tablet Take 1 tablet (25 mcg) by mouth daily 90 tablet 2     No current facility-administered medications for this visit.     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     PHYSICAL EXAM:   General: comfortable, conversant, NAD  HEENT: normocephalic, atraumatic, anicteric  Neck: Estimate JVP  <6m   CV: RRR, nl s1 and s2, no murmurs, gallops, or rubs   Lungs: CTAB, no crackles or wheezes, normal work of breathing  Abdomen: BS+, soft, non tender, non distended  Extremities: warm and well perfused, no edema      DIAGNOSTIC TESTING: personally reviewed   Latest Reference Range & Units 12/11/23 10:21   Sodium 135 - 145 mmol/L 137   Potassium 3.4 - 5.3 mmol/L 3.8   Chloride 98 - 107 mmol/L 104   Carbon Dioxide (CO2) 22 - 29 mmol/L 26   Urea Nitrogen 8.0 - 23.0 mg/dL 15.3   Creatinine 0.51 - 0.95 mg/dL 0.86   GFR Estimate >60 mL/min/1.73m2 77   Calcium 8.6 - 10.0 mg/dL 9.1   Anion Gap 7 - 15 mmol/L 7   Magnesium 1.7 - 2.3 mg/dL 1.9   Albumin 3.5 - 5.2 g/dL 4.3   Protein Total 6.4 - 8.3 g/dL 7.5   Alkaline Phosphatase 40 - 150 U/L 63   ALT 0 - 50 U/L 22   AST 0 - 45 U/L 18   Bilirubin Total <=1.2 mg/dL 0.3   Glucose 70 - 99 mg/dL 98   N-Terminal Pro Bnp 0 - 900 pg/mL 244     Echo 10/18/23:   Left ventricular function is decreased. The ejection fraction is 30-35%  (moderately reduced).Severe left ventricular dilation is present. LVIDd 7cm  The right ventricle is normal size. Global right ventricular function is  normal.  IVC diameter <2.1 cm collapsing >50% with sniff suggests a normal RA pressure  of 3 mmHg.  No pericardial effusion is present.  No significant changes noted.      CMR 6/12/2023:   1. The LV is normal in cavity size and wall thickness. The global systolic function is moderately reduced.  The LVEF is 32%. There is global hypokinesis.     2. The RV is normal in cavity size. The global systolic function is mildly reduced. The RVEF is 45%.      3. Both atria are normal in size.     4. There is no significant valvular disease.      5. Late gadolinium enhancement imaging shows no MI, fibrosis or infiltrative disease.      6. There is no pericardial effusion or thickening.     7. There is no intracardiac thrombus.     CONCLUSIONS: Severe, nonischemic cardiomyopathy, today the LV is no longer dilated.  LVEF 32% not  significantly changed. RV function is mildly globally reduced, RVEF 45%, slightly decreased from previous  53%. No LGE.    Ziopatch monitor 2/24-3/3/2023: sinus with 9.4% PVC burden    Echo 12/12/2022: Severe left ventricular dilation is present.LVIDd 70mm.  Biplane LVEF is 50%.  Right ventricular function, chamber size, wall motion, and thickness are  normal.  Both atria appear normal.  Pulmonary artery systolic pressure is normal.  The inferior vena cava is normal.  No pericardial effusion is present.  The left ventricular function has improved.    CMR 6/3/22: 1. The LV is mild-to-moderately dilated in cavity size. The wall thickness is normal. The global systolic  function is severely decreased. The LVEF is 33%. There is severe global hypokinesis.     2. The RV is normal in cavity size. The global systolic function is mildly decreased. The RVEF is 53%.      3. Both atria are normal in size.     4. There is no significant valvular disease.      5. Late gadolinium enhancement imaging shows no MI, fibrosis or infiltrative disease.      6. There is no pericardial effusion or thickening.     7. There is no intracardiac thrombus.     CONCLUSIONS: Severe, non-ischemic dilated cardiomyopathy, most likely of genetic cause. LVEF of 33% and  RVEF of 53% with no LGE. When directly compared to the prior CMR, the LV and RV size and function have not  significantly changed.      CPEX 12/12/22: Key Measurements    Peak VO2 (ml/kg/min) VE/VCO2  [place] RER   14.91        27.61        1.02            Predicted VO2 (ml/kg/min) Predicted VO2 %   26.50        56            Resting Supine BP (mmHg) Resting Standing BP (mmHg) Final Stress BP (mmHg)   106/56        112/58        140/67          Resting Supine HR (bpm) Resting Standing HR (bpm)    44        50             Max HR (bpm) Max Predicted HR (bpm) Max Perdicted HR %   124        162        77            Exercise time (min) Exercise time (sec) Estimated workload  (METS)   8        51        4.3              [facility] 1/2023:    Time Systolic (mmHg) Diastolic (mmHg) Mean (mmHg) A Wave (mmHg) V Wave (mmHg) EDP (mmHg) Max dp/dt (mmHg/sec) HR (bpm) Content (mL/dL) SAT (%)   RA Pressures  1:56 PM   6    10    8      38        RV Pressures  1:56 PM 40        12     37        PA Pressures  1:57 PM 41    4    22        39        PCW Pressures  1:57 PM   11    22    20      39        AO Pressures  2:01     63    91        37          Blood Flow Results Phase: Baseline     Time Results  Indexed Values (L/min/m2)   QP  1:45 PM 2.71 L/min    1.31      QS  1:45 PM 2.71 L/min    1.31        Blood Oximetry Phase: Baseline     Time Hb  SAT(%)  PO2  Content (mL/dL) PA Sat (%)   PA  1:45 PM  62.7 %      62.7      Art  1:45 PM  100 %     17.14         Cardiac Output Phase: Baseline     Time TDCO (L/min) TDCI (L/min/m2) Estrella C.O. (L/min) Estrella C.I. 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   2.  Premature ventricular contractions, palpitations:    She is hypovolemic by exam, which is likely due to volume loss. Her NT pro BNP remains normal. She otherwise is well compensated and has normal end organ function. Given her ongoing vomiting and presyncope, I would like  her to hold furosemide and dapagliflozin. She should continue sacubitril valsartan,  metoprolol, and spironolactone.     Her cardiac MRI shows an LVEF of 32%, LVEDD of 6.1cm, and no fibrosis on neurohormonal therapy.  Comparison to her prior cardiac MRI a year ago the LVEF is unchanged, but the LV end-diastolic dimension has reduced from 6.6cm to 6.1 cm on the current MRI, and the RV function is slightly reduced from 53% to 45%.  We discussed that continuing neurohormonal therapy is important to mitigate the neurohormonal cascade that occurs with heart failure and that there is evidence of some reverse remodeling with her LV end-diastolic dimension. Additionally in her Zio patch monitor in February to March of 2023 she had approximately 9.4% PVC burden otherwise predominantly sinus rhythm without any significant dysrhythmias.  She did subsequently have a syncopal event and again has been having an increase in palpitations. She meets standard ICD criteria based on her ejection fraction less than 35% despite neurohormonal therapies and more recent guidelines for FLNC with an EF of less than 45%.  She understandably expressed many concerns about having a defibrillator.  I have suggested that she meets with Dr. Aragon, to discuss her specific concerns regarding ICD implantation.  I will also communicate with Dr. Aragon about this. She has met with Dr. Aragon regarding ICD for SCD prevention in the setting of a FLNC mutation. She is interested in pursuing an ICD when her GI issue are resolved.     3. Vomiting  4. Heart Burn  She has progressive vomiting and and heart burn symptoms over the past 2 months. She has non ischemic cardiomyopathy and less likely ischemia precipitating these symptoms. She has an EGD scheduled for 12/27 and we will check with GI if she can be seen sooner.       PLAN:   -2L fluid restriction   -mag oxide 500mg BID   -lasix 60mg on Friday  -follow up ziopatch monitor   -echo   -follow up in  2-3months with [facility]ORE  -follow up with me in 6 months    70 minutes on DOS for chart review, counseling, review of diagnostic testing, coordination of care (with GI) and documentation.     Thank you for allowing me to participate in the care of your patient. Please do not hesitate to contact me if you have any questions.     Sincerely,   Forum     Forum MD Jurgen, PhD, FACC  Advanced Heart Failure/Transplantation/MCS  [facility]

## 2024-06-24 NOTE — PATIENT INSTRUCTIONS
"You were seen today in the Cardiovascular Clinic at the [facility].      Cardiology Providers you saw during your visit:  Dr. Real Seaman     Recommendations:   Letter for work - let us know if you need other paperwork filled out.  2 liter fluid restriction  Start magnesium oxide 400 mg twice daily.  Lasix 60 mg on Friday.  Follow up on zio patch results.  Echocardiogram - call [phone] to reschedule.  Follow up in [facility] as scheduled in September.   Follow up with Dr. Seaman on January 13 - 1:00 PM.             Thank you for your visit today!   Please MyChart message or call if you have any questions or concerns.      During Business Hours:  [phone], option # 1 \"To leave a message for your care team\"     After hours, weekends or holidays:   [phone], Option #4  Ask to speak to the On-Call Cardiologist. Inform them you are a heart failure patient at the [place].      Megha Meyer RN BSN   Cardiology Nurse Coordinator - Heart Failure/C.O.R.E. Clinic  [facility]  [phone] option 1 to schedule an appointment or leave a message for your care team      "

## 2024-06-24 NOTE — LETTER
July 17, 2024    RE:  Marleen Mcfadden                              [address]  [place] [address]            To whom it may concern:    Marleen Mcfadden is under my professional care for    Chronic systolic congestive heart failure (H)  Hypomagnesemia. She may return to work with the following: {WC ABLE/UNABLE:921702}    When the patient returns to work, the following restrictions apply until ***:  A) Bend: {WC FREQUENCY:416935}  B) Squat: {WC FREQUENCY:202570}  C) Walk/Stand: {WC FREQUENCY:367367}  D) Reach Above Shoulders: {WC FREQUENCY:261299}  E) Lift, carry, push, and pull no more than:  {WC LBS:590027}{:870948} on or about ***.      Sincerely,        Real Seaman {PROVIDER CREDENTIALS:208077}

## 2024-06-24 NOTE — NURSING NOTE
Chief Complaint   Patient presents with    Follow Up     Return heart failure, 58 year old female with history of Familial cardiomyopathy, systolic HF, EF 35% presents for follow up with labs prior.     Vitals were taken and medications reconciled.    Marcos Hoskins, RADHA  4:06 PM

## 2024-06-24 NOTE — PROGRESS NOTES
Cardiovascular Genetics Clinic Progress Note     Name: Marleen Mcfadden  : 1965  MRN: [id number]    2024    Dear Dr. Levine and colleages,    I had the pleasure of seeing Ms. 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 She is accompanied by her fiance.     As you know, she has a family history of dilated cardiomyopathy including her sister, son, and daughter. 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    I last saw her in clinic on 2023. Her CMR showed an LVEF of 32%, LVEDD 6.1cm, RVEF of 45%, and no fibrosis. Additionally, she had a ziopatch monitor which we reviewed the results in which she had 9.4% PVCs and predominantly sinus rhythm. She saw Ms. Melendez in the interim on 23 and with Dr. Aragon in EP on 10/10/23 where a primary prevention ICD. She was seen in the ED on 10/18/23 for a syncopal episode and was seen by EP, which felt the episode was orthostatic and recommended an outpatient ICD. She has been following with GI and has had an improvement in her symptoms. She was seen in [facility]  by Ms. Gallo and was hypervolemic and increased lasix to 60mg for 4 days. She did feel an improvement after the increase dose of diuretics. She still continues to have an increase in abdominal bloating and ankle edema, though it is better today. She continues to work full time and walks 14K. She now finds her self exhausted at the end of the day and falls asleep sitting up. She has more dyspnea with going up an incline now. She has been having some panic attacks and also has an achy feeling in her chest, which she attribute to heart burn. Her GI symptoms were improving and improved more after the was treated for COVID. Her  weight increase 30 lbs and she is working with the weight management clinic. She uses lasix 20mg prn 1-2 times per week. She drinks more than 2L of fluid.  She uses 3 pillows for heart burn but denies orthopnea or PND. She is noted to snore. Her appetite is unchanged, but she is more fatigued. She has dizzininess that is worse with laughing or coughing and palpitations 3 times per week, but no syncope. She is waiting until her GI symptoms resolve to have an ICD placed. She has been having body cramps.       FAMILY HISTORY: from Ms. Bunch note 2021 and any updates as as above    detailed family history was obtained during today's consult.  Family history was significant for the following cardiac history:  Full sister with DCM. She  in September after developing colon cancer.  Her history was also remarkable for MERSA, kidney disease and ultimate transplant.  She did not have an ICD or much care for her DCM.  Marleen's son and daughter also have DCM.    A maternal half sister has hypertension, epilepsy and reported dizziness and fainting. She does not have a cardiac issue to Marleen's knowledge.  Mother  suddenly at 38 yrs of age.  It was thought to be the result of a cardiac problem. However, she also had a long history of alcoholism and had a colostomy. Her two siblings have no known heart issues.  Maternal grandmother  in her 30's from a brain aneurysm. Nothing is known about maternal grandfather.  Marleen's father  at 55 years after a massive heart attack and CABG procedure.  His death occurred one day after the surgery reportedly from a clot.  One of his sisters  from a stroke.  Paternal grandmother  in her 70's from a stroke.  Her mother (Marleen's great grandmother)  suddenly in her 70's while sitting at a bus stop.Nothing is known about grandfather.  Two paternal half sisters in good health.  There is no additional history of cardiomyopathy, arrhythmias, heart  attacks, fainting, sudden cardiac death, genetic conditions, or birth     REVIEW OF SYSTEMS: 10 point ROS neg other than the symptoms noted above in the HPI.    PAST MEDICAL HISTORY:   1. Familial systolic heart failure with VUS in FLNC   2. KATIA  3. Obesity   4. Encephalocele repair   ALLERGIES:    Allergies   Allergen Reactions    Morphine      EMESIS    Nickel     Sulfa Antibiotics Swelling       MEDICATIONS:   Current Outpatient Medications   Medication Sig Dispense Refill    docusate sodium (COLACE) 100 MG capsule Take 1 capsule (100 mg) by mouth 3 times daily as needed for constipation 60 capsule 1    famotidine (PEPCID) 40 MG tablet TAKE 1 TABLET(40 MG) BY MOUTH EVERY NIGHT AS NEEDED FOR HEARTBURN 90 tablet 1    fluticasone (FLONASE) 50 MCG/ACT nasal spray Spray 2 sprays into both nostrils daily 16 g 0    furosemide (LASIX) 20 MG tablet Take 2 tablets (40 mg) by mouth daily as needed (weight gain/edema) 180 tablet 3    hydrOXYzine HCl (ATARAX) 25 MG tablet Take 1-2 tablets (25-50 mg) by mouth nightly as needed for other or anxiety (sleep) 180 tablet 0    loratadine (CLARITIN) 10 MG tablet Take 1 tablet (10 mg) by mouth daily 90 tablet 3    LORazepam (ATIVAN) 0.5 MG tablet Take 1 tablet (0.5 mg) by mouth every 6 hours as needed for anxiety (take 1 tab 30 min prior to MRI) 2 tablet 0    metoclopramide (REGLAN) 10 MG tablet Take 1 tablet (10 mg) by mouth 3 times daily as needed (nausea) 270 tablet 1    metoprolol succinate ER (TOPROL XL) 25 MG 24 hr tablet Take 1 tablet (25 mg) by mouth daily 90 tablet 2    progesterone (PROMETRIUM) 100 MG capsule Take 100 mg by mouth at bedtime      RABEprazole (ACIPHEX) 20 MG EC tablet TAKE 1 TABLET(20 MG) BY MOUTH TWICE DAILY 180 tablet 1    sacubitril-valsartan (ENTRESTO)  MG per tablet Take 1 tablet by mouth 2 times daily 180 tablet 3    solifenacin (VESICARE) 5 MG tablet Take 1 tablet (5 mg) by mouth daily at 2 pm 90 tablet 1    spironolactone (ALDACTONE) 25 MG tablet  Take 2 tablets (50 mg) by mouth daily 180 tablet 1    Vitamin D3 (CHOLECALCIFEROL) 25 mcg (1000 units) tablet Take 1 tablet (25 mcg) by mouth daily 90 tablet 2     No current facility-administered medications for this visit.     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     PHYSICAL EXAM:   General: comfortable, conversant, NAD  HEENT: normocephalic, atraumatic, anicteric  Neck: Estimate JVP 8   CV: RRR, nl s1 and s2, no murmurs, gallops, or rubs   Lungs: CTAB, no crackles or wheezes, normal work of breathing  Abdomen: BS+, soft, non tender, non distended  Extremities: warm and well perfused, trace edema      DIAGNOSTIC TESTING: personally reviewed   Latest Reference Range & Units 06/24/24 15:32   Sodium 135 - 145 mmol/L 138   Potassium 3.4 - 5.3 mmol/L 4.2   Chloride 98 - 107 mmol/L 103   Carbon Dioxide (CO2) 22 - 29 mmol/L 26   Urea Nitrogen 8.0 - 23.0 mg/dL 16.8   Creatinine 0.51 - 0.95 mg/dL 0.98 (H)   GFR Estimate >60 mL/min/1.73m2 66   Calcium 8.6 - 10.0 mg/dL 9.3   Anion Gap 7 - 15 mmol/L 9   Magnesium 1.7 - 2.3 mg/dL 1.6 (L)   Albumin 3.5 - 5.2 g/dL 4.1   Protein Total 6.4 - 8.3 g/dL 7.2   Alkaline Phosphatase 40 - 150 U/L 62   ALT 0 - 50 U/L 22   AST 0 - 45 U/L 19   Bilirubin Total <=1.2 mg/dL 0.3   Glucose 70 - 99 mg/dL 91   N-Terminal Pro Bnp 0 - 900 pg/mL 185   (H): Data is abnormally high  (L): Data is abnormally low        Echo 10/18/23:   Left ventricular function is decreased. The ejection fraction is 30-35%  (moderately reduced).Severe left ventricular dilation is present. LVIDd 7cm  The right ventricle is normal size. Global right ventricular function is  normal.  IVC diameter <2.1 cm collapsing >50% with sniff suggests a normal RA pressure  of 3 mmHg.  No  pericardial effusion is present.  No significant changes noted.      CMR 6/12/2023:   1. The LV is normal in cavity size and wall thickness. The global systolic function is moderately reduced.  The LVEF is 32%. There is global hypokinesis.     2. The RV is normal in cavity size. The global systolic function is mildly reduced. The RVEF is 45%.      3. Both atria are normal in size.     4. There is no significant valvular disease.      5. Late gadolinium enhancement imaging shows no MI, fibrosis or infiltrative disease.      6. There is no pericardial effusion or thickening.     7. There is no intracardiac thrombus.     CONCLUSIONS: Severe, nonischemic cardiomyopathy, today the LV is no longer dilated. LVEF 32% not  significantly changed. RV function is mildly globally reduced, RVEF 45%, slightly decreased from previous  53%. No LGE.    Ziopatch monitor 2/24-3/3/2023: sinus with 9.4% PVC burden    Echo 12/12/2022: Severe left ventricular dilation is present.LVIDd 70mm.  Biplane LVEF is 50%.  Right ventricular function, chamber size, wall motion, and thickness are  normal.  Both atria appear normal.  Pulmonary artery systolic pressure is normal.  The inferior vena cava is normal.  No pericardial effusion is present.  The left ventricular function has improved.    CMR 6/3/22: 1. The LV is mild-to-moderately dilated in cavity size. The wall thickness is normal. The global systolic  function is severely decreased. The LVEF is 33%. There is severe global hypokinesis.     2. The RV is normal in cavity size. The global systolic function is mildly decreased. The RVEF is 53%.      3. Both atria are normal in size.     4. There is no significant valvular disease.      5. Late gadolinium enhancement imaging shows no MI, fibrosis or infiltrative disease.      6. There is no pericardial effusion or thickening.     7. There is no intracardiac thrombus.     CONCLUSIONS: Severe, non-ischemic dilated cardiomyopathy, most likely of  genetic cause. LVEF of 33% and  RVEF of 53% with no LGE. When directly compared to the prior CMR, the LV and RV size and function have not  significantly changed.      CPEX 12/12/22: Key Measurements    Peak VO2 (ml/kg/min) VE/VCO2  Da[place]hin RER   14.91        27.61        1.02            Predicted VO2 (ml/kg/min) Predicted VO2 %   26.50        56            Resting Supine BP (mmHg) Resting Standing BP (mmHg) Final Stress BP (mmHg)   106/56        112/58        140/67          Resting Supine HR (bpm) Resting Standing HR (bpm)    44        50             Max HR (bpm) Max Predicted HR (bpm) Max Perdicted HR %   124        162        77            Exercise time (min) Exercise time (sec) Estimated workload (METS)   8        51        4.3              [facility] 1/2023:    Time Systolic (mmHg) Diastolic (mmHg) Mean (mmHg) A Wave (mmHg) V Wave (mmHg) EDP (mmHg) Max dp/dt (mmHg/sec) HR (bpm) Content (mL/dL) SAT (%)   RA Pressures  1:56 PM   6    10    8      38        RV Pressures  1:56 PM 40        12     37        PA Pressures  1:57 PM 41    4    22        39        PCW Pressures  1:57 PM   11    22    20      39        AO Pressures  2:01     63    91        37          Blood Flow Results Phase: Baseline     Time Results  Indexed Values (L/min/m2)   QP  1:45 PM 2.71 L/min    1.31      QS  1:45 PM 2.71 L/min    1.31        Blood Oximetry Phase: Baseline     Time Hb  SAT(%)  PO2  Content (mL/dL) PA Sat (%)   PA  1:45 PM  62.7 %      62.7      Art  1:45 PM  100 %     17.14         Cardiac Output Phase: Baseline     Time TDCO (L/min) TDCI (L/min/m2) Estrella C.O. (L/min) Estrella C.I. 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   2.  Premature ventricular contractions, palpitations  3. Fatigue     She has had progressive exercise intolerance with now having increased dyspnea with walking and more fatigue. She is mildly hypervolemic today by exam. Her creatinine is mildly elevated at 0.98 from a baseline of 0.8. Her NT pro BNP is normal in the setting of an elevated BMI. I will have her take a dose of furosemide this Friday. She finds it hard to take diuretics due to her work schedule. She should continue  sacubitril valsartan,  metoprolol, and spironolactone. She was on dapagliflozin but developed yeast infections and stopped it.     Her cardiac MRI from 2023 shows an LVEF of 32%, LVEDD of 6.1cm, and no fibrosis on neurohormonal therapy.   Additionally in her Zio patch monitor in February to March of 2023 she had approximately 9.4% PVC burden otherwise predominantly sinus rhythm without any significant dysrhythmias. We will repeat her ziopatch today given her palpitations and prior PVC burden. She meets standard ICD criteria based on her ejection fraction less than 35% despite neurohormonal therapies and more recent guidelines for FLNC with an EF of less than 45%. Dr. Aragon and I have both advised her to pursue ICD implantation for SCD prevention in the setting of a FLNC mutation. She understands but wants to wait until her GI symptoms are improving.     3. Vomiting  4. Heart Burn  She has progressive vomiting and and heart burn symptoms and is seeing GI.     5. Hypomagnesemia: Mg is 1.6 today likely due to GI  losses. Magnesium oxide for replacement.      PLAN:   -2L fluid restriction   -mag oxide 500mg BID   -lasix 60mg on Friday  -follow up ziopatch monitor   -echo   -follow up in 2-3months with [facility]  -follow up with me in 6 months    70 minutes on DOS for chart review, counseling, review of diagnostic testing, coordination of care (with GI) and documentation.     Thank you for allowing me to participate in the care of your patient. Please do not hesitate to contact me if you have any questions.     Sincerely,   Forum     Forum MD Jurgen, PhD, [facility]  Advanced Heart Failure/Transplantation/MCS  [facility]/[employer]    The longitudinal plan of care for the diagnosis(es)/condition(s) as documented were addressed during this visit. Due to the added complexity in care, I will continue to support Marleen in the subsequent management and with ongoing continuity of care.

## 2024-06-24 NOTE — LETTER
June 24, 2024    RE:  Marleen Mcfadden                              [address]  [place] [address]            To whom it may concern:    Marleen Mcfadden is under my professional care in the cardiology clinic at the [facility]. Due to the increase in her functional limitations she is currently having, she should be working no more than 30 hours per week. Please contact me with any questions.      Sincerely,        Real Seaman MD, PhD, FACC  Advanced Heart Failure/Transplantation/MCS  [facility]/[employer]

## 2024-06-25 PROCEDURE — 93248 EXT ECG>7D<15D REV&INTERPJ: CPT | Performed by: INTERNAL MEDICINE

## 2024-07-03 RX ORDER — METOCLOPRAMIDE 10 MG/1
TABLET ORAL
Qty: 120 TABLET | OUTPATIENT
Start: 2024-07-03

## 2024-07-09 ENCOUNTER — MYC MEDICAL ADVICE (OUTPATIENT)
Dept: CARDIOLOGY | Facility: CLINIC | Age: 59
End: 2024-07-09
Payer: COMMERCIAL

## 2024-07-09 ENCOUNTER — APPOINTMENT (OUTPATIENT)
Dept: LAB | Facility: CLINIC | Age: 59
End: 2024-07-09
Payer: COMMERCIAL

## 2024-07-09 DIAGNOSIS — I50.22 CHRONIC SYSTOLIC CONGESTIVE HEART FAILURE (H): Primary | ICD-10-CM

## 2024-07-09 DIAGNOSIS — E83.42 HYPOMAGNESEMIA: ICD-10-CM

## 2024-07-10 ENCOUNTER — LAB (OUTPATIENT)
Dept: LAB | Facility: CLINIC | Age: 59
End: 2024-07-10
Payer: COMMERCIAL

## 2024-07-10 DIAGNOSIS — I50.22 CHRONIC SYSTOLIC CONGESTIVE HEART FAILURE (H): ICD-10-CM

## 2024-07-10 DIAGNOSIS — E83.42 HYPOMAGNESEMIA: ICD-10-CM

## 2024-07-10 LAB — MAGNESIUM SERPL-MCNC: 1.8 MG/DL (ref 1.7–2.3)

## 2024-07-10 PROCEDURE — 83735 ASSAY OF MAGNESIUM: CPT

## 2024-07-10 PROCEDURE — 36415 COLL VENOUS BLD VENIPUNCTURE: CPT

## 2024-07-10 NOTE — TELEPHONE ENCOUNTER
Date: 7/10/2024    Time of Call: 11:33 AM     Diagnosis:  hypomagnesemia     [ TORB ] Ordering provider: Real Seaman MD  Order: recheck serum magnesium level     Order received by: Megha Meyer RN

## 2024-07-17 ENCOUNTER — TELEPHONE (OUTPATIENT)
Dept: RHEUMATOLOGY | Facility: CLINIC | Age: 59
End: 2024-07-17
Payer: COMMERCIAL

## 2024-07-17 ENCOUNTER — MYC MEDICAL ADVICE (OUTPATIENT)
Dept: CARDIOLOGY | Facility: CLINIC | Age: 59
End: 2024-07-17
Payer: COMMERCIAL

## 2024-07-17 NOTE — TELEPHONE ENCOUNTER
Health Call Center    Phone Message    May a detailed message be left on voicemail: yes     Reason for Call: Symptoms or Concerns     If patient has red-flag symptoms, warm transfer to triage line    Current symptom or concern: Patient states yesterday the top of patients feet hurt really bad. Patient states her shoulders, elbows, finger joints, everything in her body hurts very bad. Patient states this had started this morning. Patient states on a pain scale from 1 to 10, she states she is a 6. Patient states she has never had this pain before.  Patient is wanting to get a call back to further discuss    Symptoms have been present for:  1 day(s)    Has patient previously been seen for this? No    By n/a    Date: n/a    Are there any new or worsening symptoms? Yes: pain is such an achy feeling and it is remaining the same    Action Taken: Message routed to:  [facility] ([facility] Rheum    Travel Screening: Not Applicable     Date of Service:

## 2024-07-17 NOTE — TELEPHONE ENCOUNTER
Returned call to pt who has complainants about bi lat shoulders, feet, ankles, & hips with pain.  Shoulders are A little puffy  Ankles are a little swollen  Discomfort has lessened as the day has gone on with use of naproxen   Finger joints were puffy and went down with meds as well. Appt made for tomorrow am for in clinic judy.    PERLA Wilson RN 7/17/2024 2:09 PM

## 2024-07-18 ENCOUNTER — OFFICE VISIT (OUTPATIENT)
Dept: RHEUMATOLOGY | Facility: CLINIC | Age: 59
End: 2024-07-18
Payer: COMMERCIAL

## 2024-07-18 VITALS
BODY MASS INDEX: 43.4 KG/M2 | OXYGEN SATURATION: 98 % | DIASTOLIC BLOOD PRESSURE: 84 MMHG | WEIGHT: 254.2 LBS | HEART RATE: 54 BPM | SYSTOLIC BLOOD PRESSURE: 138 MMHG | RESPIRATION RATE: 16 BRPM

## 2024-07-18 DIAGNOSIS — M79.7 FIBROMYALGIA: Primary | ICD-10-CM

## 2024-07-18 DIAGNOSIS — M54.12 CERVICAL RADICULOPATHY: ICD-10-CM

## 2024-07-18 DIAGNOSIS — M25.551 BILATERAL HIP PAIN: ICD-10-CM

## 2024-07-18 DIAGNOSIS — M18.0 PRIMARY OSTEOARTHRITIS OF BOTH FIRST CARPOMETACARPAL JOINTS: ICD-10-CM

## 2024-07-18 DIAGNOSIS — M21.41 PES PLANUS OF BOTH FEET: ICD-10-CM

## 2024-07-18 DIAGNOSIS — M79.671 BILATERAL FOOT PAIN: ICD-10-CM

## 2024-07-18 DIAGNOSIS — M70.50 PES ANSERINE BURSITIS: ICD-10-CM

## 2024-07-18 DIAGNOSIS — M21.42 PES PLANUS OF BOTH FEET: ICD-10-CM

## 2024-07-18 DIAGNOSIS — M25.552 BILATERAL HIP PAIN: ICD-10-CM

## 2024-07-18 DIAGNOSIS — M79.672 BILATERAL FOOT PAIN: ICD-10-CM

## 2024-07-18 PROCEDURE — 99214 OFFICE O/P EST MOD 30 MIN: CPT | Performed by: INTERNAL MEDICINE

## 2024-07-18 NOTE — PATIENT INSTRUCTIONS
RHEUMATOLOGY    [facility]  [address] [place] [address]  [place] [address]    Phone number: [phone]  Fax number: [phone]    If you need a medication refill, please contact us as you may need lab work and/or a follow up visit prior to your refill.      Thank you for choosing [facility]!    Tia Jett CMA Rheumatology

## 2024-07-18 NOTE — PROGRESS NOTES
Rheumatology Clinic Visit      Marleen Mcfadden MRN# [id number]   YOB: 1965 Age: 59 year old      Date of visit: 7/18/24   PCP: Dr. Yanna Matias    Chief Complaint   Patient presents with:  Joint Pain: Pain all over. Started last week but was worse yesterday. Did take some prednisone she had left over and some naproxen.    Assessment and Plan     1.  ARIC 1:160 speckled: Headaches and intermittent numbness/tingling that can affect any part of the skin.  ARIC 1:160.  No photosensitivity or photophobia, no cytopenia, no hx of thromboembolic event, history of nephritis but biopsy more than 20 years ago was not suggestive of lupus, ESR and CRP normal, recent creatinine normal.  At time time she does not have an obvious connective tissue disease.  Workup showed negative Sm, RNP, SSA, SSB, dsDNA, cardiolopin, beta-2-glycoprotein, lupus anticoagulation; normal C3 and C4; normal creatinine; normal ESR and CRP, normal CK, no proteinuria, no hematuria.   Overall her exam is most consistent with chronic pain syndrome such as fibromyalgia.      2.  Fibromyalgia: Exam today is most consistent with fibromyalgia.  Discussed that she follow-up with her primary care provider and/or establish care in the pain clinic.  She would like to consider pain management evaluation  - Pain management referral    3.  Bilateral hip pain: Degenerative in nature with pain radiating to the groin.  Advised physical therapy.  If no improvement then consider additional imaging with x-ray, and depending on results consider MRIs  - Physical therapy referral    4.  Bilateral first CMC joint osteoarthritis: Reviewed the diagnosis and treatment options.  Advise starting hand therapy  - Hand therapy referral    5.  Bilateral pes planus, chronic foot pain: Degenerative foot pain.  Pes planus.  Patient would like to see podiatry  - Podiatry referral    6.  Numbness and tingling in the arms that occurs during the day and night, intermittent:  Intermittent, symmetric, and no specific nerve distribution.  Question if cervical radiculopathy versus peripheral compressive neuropathy.  Advise starting physical therapy for cervical radiculopathy considering degenerative changes seen on previous imaging.  If no improvement then consider EMG and/or MRI of the cervical spine.    7.  Bilateral pes anserine bursitis: Reviewed the diagnosis and treatment options.  She has had bilateral TKA and is following with her orthopedic surgeon.  She says she will follow-up with her orthopedic surgeon regarding bursitis that she may benefit from steroid injection.  Refer to physical therapy.  - Physical therapy referral  - Follow-up with orthopedic surgeon    No inflammatory arthritis identified at this time.  Patient may follow-up with her primary care provider for long-term management, and in this clinic as needed    Total minutes spent in evaluation with patient, documentation, , and review of pertinent studies and chart notes: 36    Ms. Mcfadden verbalized agreement with and understanding of the rational for the diagnosis and treatment plan.  All questions were answered to best of my ability and the patient's satisfaction. Ms. Mcfadden was advised to contact the clinic with any questions that may arise after the clinic visit.      Thank you for involving me in the care of the patient    Return to clinic: No scheduled return appointment in rheumatology needed at this time. Return PRN.     HPI   Marleen IRVIN Mcfadden is a 59 year old female with a past medical history significant for familial cardiomyopathy, degenerative disc disease of the lumbar spine, GERD, depression, hypertension, obstructive sleep apnea, history of diverticulitis, genital HSV, bilateral TKA (each replaced twice) and a positive ARIC who presents for rheumatology follow-up    1/26/2009 [facility] rheumatology note by Dr. Sarah Villatoro documents that her history and exam are suggestive of  "a diffuse myofascial pain syndrome.  See original note for full details.    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  See original note for full details.    10/24/2023 neurology note by Dr. Bhatia documents recurrent thunderclap headaches since skull-based surgery in 2021.  Increasing frequency of a sharp electric-like sensation that shoots from her head down to T4.  Tingling and burning paresthesias in the bilateral face, upper and lower extremities.    1/3/2024: diffuse tingling and numbness that may occur anywhere on the skin, comes and goes.  Sometimes with cramps in the fingers, legs, and toes that may last for up to a minute, typically 1-2 x/day.  Joint pain diffusely for about 20 years; not better or worse with anything; does not limit activities.  Feels like her right shoulder and bilateral knees may swell from time to time, typically associated with activity.  Pain specifically noted at the 1st CMC joints that she points to. Canker sore last occurred 2 months ago;  infrequent occurrence; duration: lifelong.  Cardiomyopathy, stable for years; she reports that it is genetic. Worsening GERD recently; associated with cough with mucous production; working with GI.  Emesis daily for 60-90 days, non-bloody, non-bilious, looking like the food she ate; working with GI.  Headaches daily, can be sharp and severe; has had CSF issues and brain surgery in the past.  Headaches started before GI issues.  She is working with a neurologist for the headaches.    Denies fevers, or chills. No abdominal pain currently. No chest pain/pressure, palpitations, or shortness of breath. No LE swelling. No neck pain. No oral or nasal sores currently.  No rash. No sicca symptoms. No photosensitivity or photophobia. No eye pain or redness. No history of inflammatory eye disease.  No history of inflammatory bowel disease.  No history of DVT, pulmonary embolism, or miscarriage.   No " history of serositis.  No history of Raynaud's Phenomenon.  No known seizure disorder.  No known renal disorder.  No pain or burning with urination.  No increased urinary frequency.    Familial cardiomyopathy diagnosed in 2000.  Has a history of nephritis; 1997 kidney biopsy did not suggest lupus related nephritis.  Around that time she was given Plaquenil that she says she took for no more than 1 week.      1/10/2024: Recently with cough and feeling under the weather; being treated for influenza.  Whole body ache worse with current illness thought to be flu.  No other new information or change.    Today, 7/18/2024: Patient scheduled today's appointment because of whole body ache.  Also with bilateral hip pain that radiates to the groin, worse with activity and improved with rest.  Bilateral for CMC joint osteoarthritis pain, worse with activity and improves with rest.  Bilateral knee pain over the pes anserine bursa; has followed up with her orthopedic surgeon because she has had bilateral TKA but was told that her knees were okay at that visit.  Pes planus bilaterally; foot pain worse with activity, improved with rest.  Numbness and tingling in the arms that occurs during the day and night, intermittent; degenerative arthritis of the cervical spine.  Upper extremity numbness and tingling is in no specific nerve distribution.    Tobacco: none  EtOH: no more than 1 drink per day, infrequently  Drugs: none  Occupation: phlebotomist     ROS   12 point review of system was completed and negative except as noted in the HPI     Active Problem List     Patient Active Problem List   Diagnosis    Leiomyoma of uterus    Allergic rhinitis    Anemia    Knee pain    Thyroid nodule    Pain in joint involving ankle and foot    CARDIOVASCULAR SCREENING; LDL GOAL LESS THAN 160    Shaina's nodes    Familial cardiomyopathy (H)    Degenerative disc disease at L5-S1 level    Chronic bilateral low back pain with right-sided sciatica     Chronic bilateral low back pain with left-sided sciatica    Chronic systolic congestive heart failure (H)    Left shoulder pain    Primary osteoarthritis of both knees    Gastroesophageal reflux disease with esophagitis    Moderate major depression (H)    Dysfunction of both eustachian tubes    Chronic rhinitis    Essential hypertension    CSF otorrhea    KATIA (obstructive sleep apnea)- moderate-severe (AHI 29)    Family history of colon cancer    Diverticulitis    Herpes simplex of female genitalia    Symptomatic bradycardia    Severe obesity (BMI 35.0-39.9) with comorbidity (H)     Past Medical History     Past Medical History:   Diagnosis Date    Acute bilateral low back pain with right-sided sciatica 06/02/2016    Allergic rhinitis, cause unspecified     Arthritis     Autoimmune disease (H24)     Autoimmune disease NEC     Autoimmune disease- unknown/poss SLE    Calcaneal spur 10/21/2014    Xray 10/17/14     CHF with cardiomyopathy (H)     Chronic low back pain     DDD (degenerative disc disease), lumbar     Depression     Failed total knee arthroplasty, initial encounter  (H24) 01/17/2019    Familial cardiomyopathy (H)     GERD without esophagitis     History of transfusion     Hypertension     Injury of left shoulder, initial encounter 01/12/2017    Morbid obesity (H)     Nontraumatic rupture of quadriceps tendon, left 06/21/2018    KATIA (obstructive sleep apnea)- moderate-severe (AHI 29) 8/26/2021    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     no longer an issue    Peroneus longus tendinitis 01/02/2015    Plantar fasciitis 11/11/2014    PONV (postoperative nausea and vomiting)     Rotator cuff injury 01/17/2017    Sprain of other ligament of left ankle, initial encounter 01/12/2017    Status post left knee replacement 06/21/2018    TB lung, latent     negative quantiferon gold test  11/5/12     Past Surgical History     Past Surgical History:   Procedure Laterality  Date    ARTHROPLASTY REVISION KNEE Left 2019    Procedure: REVISION, LEFT TOTAL KNEE  ARTHROPLASTY;  Surgeon: Dev Rocha MD;  Location: [facility] Main OR;  Service: Orthopedics    ARTHROPLASTY REVISION KNEE Right 2020    Procedure: RIGHT REVISION TOTAL KNEE ARTHROPLASTY;  Surgeon: Dev Rocha MD;  Location: [facility] OR;  Service: Orthopedics    BREAST SURGERY      Breast Reduction    C EXCISE EXCESS SKIN TISSUE,ABDOMEN       SECTION  1989     SECTION  10/1985    COLONOSCOPY N/A 2023    Procedure: Colonoscopy;  Surgeon: Simone Jansen MD;  Location:  GI    COLONOSCOPY WITH CO2 INSUFFLATION N/A 2021    Procedure: COLONOSCOPY, WITH CO2 INSUFFLATION;  Surgeon: Angela Harrington DO;  Location: [facility] OR    COLONOSCOPY WITH CO2 INSUFFLATION N/A 2022    Procedure: COLONOSCOPY, WITH CO2 INSUFFLATION;  Surgeon: Nando Whitlock MD;  Location: [facility] OR    CRANIECTOMY Right 2021    Procedure: RIGHT MIDDLE FOSSA APPROACH, CSF LEAK REPAIR;  Surgeon: Jocelyn Noe MD;  Location: [facility] OR    CRANIOTOMY MIDDLE FOSSA, EXCISE ACOUSTIC NEUROMA, COMBINED N/A 2021    Procedure: Right CRANIOTOMY, MIDDLE FOSSA APPROACH, FOR REPAIR OF ENCEPHALOCELE;  Surgeon: Jocelyne Harrell MD;  Location: [facility] OR    CV RIGHT HEART CATH MEASUREMENTS RECORDED N/A 1/10/2023    Procedure: Heart Cath Right Heart Cath;  Surgeon: Slade Hanson MD;  Location:  HEART CARDIAC CATH LAB    CYSTOURETHROSCOPY N/A 2020    Procedure: CYSTOSCOPY;  Surgeon: Cherelle Willams MD;  Location: [facility] OR;  Service: Gynecology    ESOPHAGOSCOPY, GASTROSCOPY, DUODENOSCOPY (EGD), COMBINED N/A 2023    Procedure: Esophagoscopy, gastroscopy, duodenoscopy (EGD), combined;  Surgeon: Stu Guadalupe MD;  Location:  GI    GYN SURGERY N/A 2020    Procedure: MIDURETHRAL SLING, CYSTOSCOPY;  Surgeon: Cherelle Willams MD;   Location: [place] Main OR;  Service: Gynecology    HYSTERECTOMY TOTAL ABDOMINAL  2006    for fibroids; reports having blood transfusion after surgery    INSERT DRAIN LUMBAR N/A 07/05/2021    Procedure: Insert drain lumbar;  Surgeon: Jocelyn Noe MD;  Location:  OR    ORTHOPEDIC SURGERY  1998    Right Knee ACL repair    THYROIDECTOMY  09/09/2013    Procedure: THYROIDECTOMY;  LEFT THYROID LOBECTOMY.  (LIGASURE, RECURRENT LARYNGEAL NERVE MONITOR) ;  Surgeon: Uriah Camargo MD;  Location: [facility]    THYROIDECTOMY      TOTAL KNEE ARTHROPLASTY Left 10/03/2017    TOTAL KNEE ARTHROPLASTY Right 02/07/2019    Procedure: RIGHT TOTAL KNEE ARTHROPLASTY;  Surgeon: Dev Rocha MD;  Location: [facility] Main OR;  Service: Orthopedics    TUBAL LIGATION  1989    [facility]     Allergy     Allergies   Allergen Reactions    Morphine      EMESIS    Nickel     Sulfa Antibiotics Swelling     Current Medication List     Current Outpatient Medications   Medication Sig Dispense Refill    docusate sodium (COLACE) 100 MG capsule Take 1 capsule (100 mg) by mouth 3 times daily as needed for constipation 60 capsule 1    famotidine (PEPCID) 40 MG tablet TAKE 1 TABLET(40 MG) BY MOUTH EVERY NIGHT AS NEEDED FOR HEARTBURN 90 tablet 1    fluticasone (FLONASE) 50 MCG/ACT nasal spray Spray 2 sprays into both nostrils daily 16 g 0    furosemide (LASIX) 20 MG tablet Take 2 tablets (40 mg) by mouth daily as needed (weight gain/edema) 180 tablet 3    hydrOXYzine HCl (ATARAX) 25 MG tablet Take 1-2 tablets (25-50 mg) by mouth nightly as needed for other or anxiety (sleep) 180 tablet 0    loratadine (CLARITIN) 10 MG tablet Take 1 tablet (10 mg) by mouth daily 90 tablet 3    LORazepam (ATIVAN) 0.5 MG tablet Take 1 tablet (0.5 mg) by mouth every 6 hours as needed for anxiety (take 1 tab 30 min prior to MRI) 2 tablet 0    magnesium oxide (MAG-OX) 400 MG tablet Take 1 tablet (400 mg) by mouth 2  times daily 180 tablet 3    metoclopramide (REGLAN) 10 MG tablet Take 1 tablet (10 mg) by mouth 3 times daily as needed (nausea) 270 tablet 1    metoprolol succinate ER (TOPROL XL) 25 MG 24 hr tablet Take 1 tablet (25 mg) by mouth daily 90 tablet 2    progesterone (PROMETRIUM) 100 MG capsule Take 100 mg by mouth at bedtime      RABEprazole (ACIPHEX) 20 MG EC tablet TAKE 1 TABLET(20 MG) BY MOUTH TWICE DAILY 180 tablet 1    sacubitril-valsartan (ENTRESTO)  MG per tablet Take 1 tablet by mouth 2 times daily 180 tablet 3    solifenacin (VESICARE) 5 MG tablet Take 1 tablet (5 mg) by mouth daily at 2 pm 90 tablet 1    spironolactone (ALDACTONE) 25 MG tablet Take 2 tablets (50 mg) by mouth daily 180 tablet 1    Vitamin D3 (CHOLECALCIFEROL) 25 mcg (1000 units) tablet Take 1 tablet (25 mcg) by mouth daily 90 tablet 2     No current facility-administered medications for this visit.     Facility-Administered Medications Ordered in Other Visits   Medication Dose Route Frequency Provider Last Rate Last Admin    sodium chloride (PF) 0.9% PF flush 10 mL  10 mL Intravenous Once Van'T Julio Beckham MD             Social History   See HPI    Family History     Family History   Problem Relation Age of Onset    Hypertension Father         dec    Diabetes Father         dec    Heart Disease Father         dec    Alcohol/Drug Father     Cardiovascular Father     Heart Disease Mother         dec    Alcohol/Drug Mother     Cardiovascular Mother     Heart Disease Daughter         Cardiomyopathy    Cardiovascular Daughter         cardiomyopathy    Colon Cancer Sister     Cardiovascular Son         cardiomyopathy    Diabetes Paternal Grandmother         dec    Hypertension Paternal Grandmother         dec    Cerebrovascular Disease Paternal Grandmother         dec    Cancer Sister         Lupus    Cardiovascular Sister         cardiomyopathy    Heart Disease Sister         heart failure, and kidney failure       Physical Exam     Temp  "Readings from Last 3 Encounters:   04/23/24 97.3  F (36.3  C) (Temporal)   03/25/24 97.5  F (36.4  C) (Tympanic)   03/11/24 97  F (36.1  C) (Temporal)     BP Readings from Last 5 Encounters:   07/18/24 138/84   06/24/24 138/80   06/11/24 134/84   04/23/24 127/84   04/04/24 125/77     Pulse Readings from Last 1 Encounters:   07/18/24 54     Resp Readings from Last 1 Encounters:   07/18/24 16     Estimated body mass index is 43.4 kg/m  as calculated from the following:    Height as of 4/23/24: 1.63 m (5' 4.17\").    Weight as of this encounter: 115.3 kg (254 lb 3.2 oz).      GEN: NAD.   HEENT:  Anicteric, noninjected sclera. No obvious external lesions of the ear and nose. Hearing intact.  CV: S1, S2. RRR. No m/r/g  PULM: No increased work of breathing. CTA bilaterally   MSK: MCPs, PIPs, DIPs, wrists, elbows, shoulders, ankles, and MTPs mildly tender to palpation diffusely but without swelling, increased warmth, or overlying erythema.  All fibromyalgia tender points positive.  Negative straight leg test bilaterally.  Internal and external rotation of each hip resulted in hip pain radiating towards the groin.  Tender to palpation over the trochanteric bursae bilaterally.  Tender to palpation over the pes anserine bursae bilaterally.  Pes planus bilaterally.  NEURO: Negative Tinel's over the wrist and elbows.  LYMPH: No lower extremity edema  SKIN: No rash or jaundice seen.  No nail pitting.  PSYCH: Alert. Appropriate.      Labs / Imaging (select studies)     RF/CCP  Recent Labs   Lab Test 03/13/20  1406   RHF <7     ARIC  Recent Labs   Lab Test 11/01/23  1506 03/13/20  1406   KANIKA Positive* Borderline Positive*   ANAP1 Speckled SPECKLED   ANAT1 1:160 1:40     RNP/Sm/SSA/SSB  Recent Labs   Lab Test 01/03/24  1022   RNPIGG Negative   SMIGG Negative   SSAIGG Negative   SSBIGG Negative     dsDNA  Recent Labs   Lab Test 01/03/24  1022   DNA 1.2     C3/C4  Recent Labs   Lab Test 01/03/24  1022   L4RIJCJ 126   L3LXTZH 26 "       Antiphospholipid Antibodies  Recent Labs   Lab Test 01/03/24  1022   B2GPG 1.9   B2GPM 3.3   CARDG Negative   CARDM Negative   LUPINT Negative  The INR is normal.  APTT ratio is normal.    DRVVT Screen ratio is normal.  Thrombin time is normal.  NEGATIVE TEST; A LUPUS ANTICOAGULANT WAS NOT DETECTED IN THIS SPECIMEN WITHIN THE LIMITS OF THE TESTING REPERTOIRE.  If the clinical picture is strongly suggestive of an antiphospholipid syndrome, recommend anticardiolipin and beta-2-glycoprotein (IgG and IgM) antibody tests.    Virgie Green MD, PhD  [facility]         CBC  Recent Labs   Lab Test 03/05/24  1355 10/18/23  1302 02/18/23  0632 02/17/23  1721 06/21/21  1330 09/08/20  1506 02/27/20  1033 09/18/19  1201 09/12/19  1619   WBC 5.5 5.1 4.5 6.3   < > 6.5   < > 5.4 6.0   RBC 3.88 4.37 4.06 4.22   < > 4.13   < > 4.37 4.20   HGB 12.1 13.6 12.7 13.2   < > 12.5   < > 12.6 12.7   HCT 36.7 41.5 39.7 40.5   < > 38.8   < > 40.8 39.3   MCV 95 95 98 96   < > 94   < > 93 94   RDW 12.8 12.7 13.5 13.5   < > 13.2   < > 13.3 14.1    255 227 246   < > 276   < > 294 292   MCH 31.2 31.1 31.3 31.3   < > 30.3   < > 28.8 30.2   MCHC 33.0 32.8 32.0 32.6   < > 32.2   < > 30.9* 32.3   NEUTROPHIL 46 41  --  47   < > 47.6  --  45.8 47.8   LYMPH 42 46  --  40   < > 40.7  --  43.2 40.9   MONOCYTE 9 8  --  10   < > 7.2  --  6.8 7.9   EOSINOPHIL 2 4  --  2   < > 3.7  --  3.3 2.7   BASOPHIL 1 1  --  1   < > 0.8  --  0.7 0.7   ANEU  --   --   --   --   --  3.1  --  2.5 2.9   ALYM  --   --   --   --   --  2.7  --  2.3 2.4   NHAN  --   --   --   --   --  0.5  --  0.4 0.5   AEOS  --   --   --   --   --  0.2  --  0.2 0.2   ABAS  --   --   --   --   --  0.1  --  0.0 0.0   ANEUTAUTO 2.6 2.1  --  3.0   < >  --   --   --   --    ALYMPAUTO 2.3 2.4  --  2.5   < >  --   --   --   --    AMONOAUTO 0.5 0.4  --  0.6   < >  --   --   --   --    AEOSAUTO 0.1 0.2  --  0.1   < >  --   --   --   --    ABSBASO 0.0 0.0  --  0.1   < >  --   --   --    --     < > = values in this interval not displayed.     CMP  Recent Labs   Lab Test 06/24/24  1532 04/03/24  1252 03/05/24  1355 01/15/24  1324 01/03/24  1022 12/11/23  1021 07/12/21  1156 07/11/21  0453 07/10/21  0419 07/09/21  0415 07/08/21  0548    139 137 139 137 137   < > 134 134 133 133   POTASSIUM 4.2 4.1 4.4 3.9 4.0 3.8   < > 4.2 4.2 4.6 4.1   CHLORIDE 103 105 103 105 103 104   < > 102 101 101 100   CO2 26 26 26 28 24 26   < > 30 31 27 30   ANIONGAP 9 8 8 6* 10 7   < > 2* 2* 5 2*   GLC 91 86 95 106* 87 98   < > 87 103* 130* 91   BUN 16.8 12.5 17.7 10.8 14.8 15.3   < > 14 12 8 9   CR 0.98* 0.98* 0.92 0.92 0.75 0.86   < > 0.79 0.89 0.70 0.87   GFRESTIMATED 66 66 71 71 >90 77   < > 83 72 >90 74   GFRESTBLACK  --   --   --   --   --   --   --  >90 84 >90 85   CARMEN 9.3 8.5* 8.7 8.7 9.0 9.1   < > 8.2* 7.9* 8.4* 8.0*   BILITOTAL 0.3  --  0.3  --  0.2 0.3   < >  --   --   --   --    ALBUMIN 4.1  --  4.1  --  4.0 4.3   < >  --   --   --   --    PROTTOTAL 7.2  --  7.0  --  7.3 7.5   < >  --   --   --   --    ALKPHOS 62  --  59  --  60 63   < >  --   --   --   --    AST 19  --  23  --  21 18   < >  --   --   --   --    ALT 22  --  30  --  18 22   < >  --   --   --   --     < > = values in this interval not displayed.     HgA1c  Recent Labs   Lab Test 01/17/24  0845 12/08/21  0823 06/21/21  1330   A1C 5.5 5.4 5.5     Iron Studies  Recent Labs   Lab Test 02/27/20  1033 02/12/19  0744   IRON 82 37*     --    IRONSAT 26  --      Calcium/VitaminD  Recent Labs   Lab Test 06/24/24  1532 04/03/24  1252 03/05/24  1355 01/15/24  1324 01/03/24  1022   CARMEN 9.3 8.5* 8.7   < > 9.0   VITDT  --   --   --   --  19*    < > = values in this interval not displayed.     ESR/CRP  Recent Labs   Lab Test 03/05/24  1355 01/03/24  1022 11/01/23  1506 04/18/23  1152 03/03/22  1541 03/13/20  1406 03/13/20  1045 05/02/18  1439   SED 19 18 21   < >  --    < >  --  26   CRP  --   --   --   --  4.6  --  <2.9 <2.9   CRPI <3.00 <3.00 <3.00    < >  --   --   --   --     < > = values in this interval not displayed.     CK/Aldolase  Recent Labs   Lab Test 01/03/24  1022 06/12/23  1043   CKT 89 132     TSH/T4  Recent Labs   Lab Test 02/17/23  1721 06/03/20  1759 09/18/19  1201 02/15/18  1450   TSH 3.13 2.10 2.84 2.15   T4  --  1.04  --  0.94     Lipid Panel  Recent Labs   Lab Test 08/25/20  0942 01/09/19  0740 01/08/19  1121   CHOL 172 162 172   TRIG 113 175* 179*   HDL 54 44* 44*   LDL 95 83 92   NHDL 118 118 128     Hepatitis B  Recent Labs   Lab Test 01/03/24  1022   HBCAB Nonreactive   HEPBANG Nonreactive     Hepatitis C  Recent Labs   Lab Test 01/03/24  1022   HCVAB Nonreactive     Lyme ab screening  Recent Labs   Lab Test 01/03/24  1022   LYMEGM 0.12     Tuberculosis Screening  Recent Labs   Lab Test 12/30/21  1349   TBRES Negative     HIV Screening  Recent Labs   Lab Test 01/08/19  1121   HIAGAB Nonreactive     UA  Recent Labs   Lab Test 01/03/24  1022 02/17/23  1721 08/02/22  1555 06/03/20  1816 09/18/19  1234 07/08/19  1145   COLOR Yellow Light Yellow Yellow Yellow Yellow Light Yellow   APPEARANCE Clear Clear Slightly Cloudy* Slightly Cloudy Slightly Cloudy Clear   URINEGLC 100* >=1000* Negative Negative Negative Negative   URINEBILI Negative Negative Negative Negative Negative Negative   SG 1.020 1.028 >=1.030 >1.030 1.028 1.017   URINEPH 5.5 7.5* 6.0 5.5 5.5 5.0   PROTEIN Negative Negative Negative Trace* 10* Negative   UROBILINOGEN 0.2  --  0.2 0.2  --   --    NITRITE Negative Negative Negative Negative Negative Negative   UBLD Trace* Negative Trace* Moderate* Negative Negative   LEUKEST Small* Negative Trace* Negative Negative Negative   WBCU 0-5 <1 0-5 0 - 5 1 0   RBCU 0-2 <1 2-5* 2-5* 1 <1   SQUAMOUSEPI  --   --   --  Moderate*  --   --    BACTERIA  --   --   --  Moderate* Few* Few*   MUCOUS  --   --   --   --  Present* Present*     Urine Microscopic  Recent Labs   Lab Test 01/03/24  1022 02/17/23  1721 08/02/22  1555 06/03/20  181  09/18/19  1234 07/08/19  1145   WBCU 0-5 <1 0-5 0 - 5 1 0   RBCU 0-2 <1 2-5* 2-5* 1 <1   SQUAMOUSEPI  --   --   --  Moderate*  --   --    BACTERIA  --   --   --  Moderate* Few* Few*   MUCOUS  --   --   --   --  Present* Present*     Urine Protein  GHUTP and UTP= Urine protein (random), GHUTPG and UTPG = urine protein:creatinine ratio (random), UCRR = urine creatinine (random)  Recent Labs   Lab Test 01/03/24  1022 06/03/20  1816   GHUTP 8.1  --    GHUTPG 0.09  --    UCRR 85.5 430       Immunization History     Immunization History   Administered Date(s) Administered    COVID-19 Bivalent 12+ (Pfizer) 03/14/2023    COVID-19 MONOVALENT 12+ (Pfizer) 03/30/2021, 04/20/2021, 12/23/2021    DTaP, Unspecified 08/26/2020    HepB 07/30/2012, 09/18/2012    Influenza Vaccine 18-64 (Flublok) 01/04/2022, 10/03/2022, 12/05/2023    MMR 09/14/2009, 04/24/2012    Pneumo Conj 13-V (2010&after) 05/06/2021    Pneumococcal 23 valent 07/02/2021    TDAP (Adacel,Boostrix) 08/26/2020    TDAP Vaccine (Adacel) 09/11/2009, 08/14/2020          Chart documentation done in part with Dragon Voice recognition Software. Although reviewed after completion, some word and grammatical error may remain.    Davin Herrera MD

## 2024-07-19 ENCOUNTER — ANCILLARY PROCEDURE (OUTPATIENT)
Dept: CARDIOLOGY | Facility: CLINIC | Age: 59
End: 2024-07-19
Attending: INTERNAL MEDICINE
Payer: COMMERCIAL

## 2024-07-19 DIAGNOSIS — I50.22 CHRONIC SYSTOLIC CONGESTIVE HEART FAILURE (H): ICD-10-CM

## 2024-07-19 LAB — LVEF ECHO: NORMAL

## 2024-07-19 PROCEDURE — 99207 PR STATISTIC IV PUSH SINGLE INITIAL SUBSTANCE: CPT | Performed by: INTERNAL MEDICINE

## 2024-07-19 PROCEDURE — 93306 TTE W/DOPPLER COMPLETE: CPT | Performed by: INTERNAL MEDICINE

## 2024-07-19 RX ADMIN — Medication 5 ML: at 09:46

## 2024-07-22 ENCOUNTER — VIRTUAL VISIT (OUTPATIENT)
Dept: FAMILY MEDICINE | Facility: CLINIC | Age: 59
End: 2024-07-22
Payer: COMMERCIAL

## 2024-07-22 DIAGNOSIS — F41.1 GAD (GENERALIZED ANXIETY DISORDER): Primary | ICD-10-CM

## 2024-07-22 PROCEDURE — 99214 OFFICE O/P EST MOD 30 MIN: CPT | Mod: 95 | Performed by: NURSE PRACTITIONER

## 2024-07-22 RX ORDER — ESCITALOPRAM OXALATE 10 MG/1
10 TABLET ORAL DAILY
Qty: 90 TABLET | Refills: 0 | Status: SHIPPED | OUTPATIENT
Start: 2024-07-22

## 2024-07-22 RX ORDER — SOLIFENACIN SUCCINATE 5 MG/1
5 TABLET, FILM COATED ORAL
Qty: 90 TABLET | Refills: 1 | Status: CANCELLED | OUTPATIENT
Start: 2024-07-22

## 2024-07-22 ASSESSMENT — ANXIETY QUESTIONNAIRES
5. BEING SO RESTLESS THAT IT IS HARD TO SIT STILL: NOT AT ALL
7. FEELING AFRAID AS IF SOMETHING AWFUL MIGHT HAPPEN: NOT AT ALL
IF YOU CHECKED OFF ANY PROBLEMS ON THIS QUESTIONNAIRE, HOW DIFFICULT HAVE THESE PROBLEMS MADE IT FOR YOU TO DO YOUR WORK, TAKE CARE OF THINGS AT HOME, OR GET ALONG WITH OTHER PEOPLE: NOT DIFFICULT AT ALL
6. BECOMING EASILY ANNOYED OR IRRITABLE: NOT AT ALL
GAD7 TOTAL SCORE: 3
7. FEELING AFRAID AS IF SOMETHING AWFUL MIGHT HAPPEN: NOT AT ALL
GAD7 TOTAL SCORE: 3
8. IF YOU CHECKED OFF ANY PROBLEMS, HOW DIFFICULT HAVE THESE MADE IT FOR YOU TO DO YOUR WORK, TAKE CARE OF THINGS AT HOME, OR GET ALONG WITH OTHER PEOPLE?: NOT DIFFICULT AT ALL
4. TROUBLE RELAXING: NOT AT ALL
GAD7 TOTAL SCORE: 3
2. NOT BEING ABLE TO STOP OR CONTROL WORRYING: SEVERAL DAYS
1. FEELING NERVOUS, ANXIOUS, OR ON EDGE: SEVERAL DAYS
3. WORRYING TOO MUCH ABOUT DIFFERENT THINGS: SEVERAL DAYS

## 2024-07-22 ASSESSMENT — PATIENT HEALTH QUESTIONNAIRE - PHQ9
SUM OF ALL RESPONSES TO PHQ QUESTIONS 1-9: 4
10. IF YOU CHECKED OFF ANY PROBLEMS, HOW DIFFICULT HAVE THESE PROBLEMS MADE IT FOR YOU TO DO YOUR WORK, TAKE CARE OF THINGS AT HOME, OR GET ALONG WITH OTHER PEOPLE: NOT DIFFICULT AT ALL
SUM OF ALL RESPONSES TO PHQ QUESTIONS 1-9: 4

## 2024-07-22 ASSESSMENT — ENCOUNTER SYMPTOMS: NERVOUS/ANXIOUS: 1

## 2024-07-22 NOTE — PROGRESS NOTES
"Marleen is a 59 year old who is being evaluated via a billable video visit.    How would you like to obtain your AVS? MyChart and mail copy  If the video visit is dropped, the invitation should be resent by: Text to cell phone: [phone]  Will anyone else be joining your video visit? NO      Assessment & Plan     GREGORIO (generalized anxiety disorder)  Open to starting daily medication today, this was previously discussed back in April with another provider.  Will start lexapro today, follow up in 4-6 weeks to reassess.  She is due for preventative visit as well.   - escitalopram (LEXAPRO) 10 MG tablet; Take 1 tablet (10 mg) by mouth daily          BMI  Estimated body mass index is 43.4 kg/m  as calculated from the following:    Height as of 4/23/24: 1.63 m (5' 4.17\").    Weight as of 7/18/24: 115.3 kg (254 lb 3.2 oz).             Subjective   Marleen is a 59 year old, presenting for the following health issues:  Anxiety        7/22/2024    10:32 AM   Additional Questions   Roomed by Isabella PEREZ MA   Accompanied by Self     Anxiety    History of Present Illness       Mental Health Follow-up:  Patient presents to follow-up on Anxiety.    Patient's anxiety since last visit has been:  No change  The patient is not having other symptoms associated with anxiety.  Any significant life events: No  Patient is feeling anxious or having panic attacks.  Patient has no concerns about alcohol or drug use.    She eats 2-3 servings of fruits and vegetables daily.She consumes 0 sweetened beverage(s) daily.She exercises with enough effort to increase her heart rate 10 to 19 minutes per day.  She exercises with enough effort to increase her heart rate 7 days per week.   She is taking medications regularly.       Anxiety- increased.    Feels she can control it,  Coming out of the blue  Feeling nervous.            Review of Systems  Constitutional, HEENT, cardiovascular, pulmonary, gi and gu systems are negative, except as otherwise " "noted.      Objective    Vitals - Patient Reported  Systolic (Patient Reported): 119  Diastolic (Patient Reported): 67  Weight (Patient Reported): 113.4 kg (250 lb)  Height (Patient Reported): 162.6 cm (5' 4\")  BMI (Based on Pt Reported Ht/Wt): 42.91  Pain Score: No Pain (0)      Vitals:  No vitals were obtained today due to virtual visit.    Physical Exam   GENERAL: alert and no distress  EYES: Eyes grossly normal to inspection.  No discharge or erythema, or obvious scleral/conjunctival abnormalities.  RESP: No audible wheeze, cough, or visible cyanosis.    SKIN: Visible skin clear. No significant rash, abnormal pigmentation or lesions.  NEURO: Cranial nerves grossly intact.  Mentation and speech appropriate for age.  PSYCH: Appropriate affect, tone, and pace of words          Video-Visit Details    Type of service:  Video Visit   Originating Location (pt. Location): Home    Distant Location (provider location):  Off-site  Platform used for Video Visit: Jess  Signed Electronically by: Noy Kolb DNP    "

## 2024-07-23 ENCOUNTER — MYC REFILL (OUTPATIENT)
Dept: CARDIOLOGY | Facility: CLINIC | Age: 59
End: 2024-07-23
Payer: COMMERCIAL

## 2024-07-23 DIAGNOSIS — I50.22 CHRONIC SYSTOLIC CONGESTIVE HEART FAILURE (H): ICD-10-CM

## 2024-07-24 ENCOUNTER — MYC REFILL (OUTPATIENT)
Dept: FAMILY MEDICINE | Facility: CLINIC | Age: 59
End: 2024-07-24
Payer: COMMERCIAL

## 2024-07-24 ENCOUNTER — MYC REFILL (OUTPATIENT)
Dept: GASTROENTEROLOGY | Facility: CLINIC | Age: 59
End: 2024-07-24
Payer: COMMERCIAL

## 2024-07-24 DIAGNOSIS — R39.15 URINARY URGENCY: ICD-10-CM

## 2024-07-24 DIAGNOSIS — K21.9 GASTROESOPHAGEAL REFLUX DISEASE WITHOUT ESOPHAGITIS: ICD-10-CM

## 2024-07-24 RX ORDER — SACUBITRIL AND VALSARTAN 97; 103 MG/1; MG/1
1 TABLET, FILM COATED ORAL 2 TIMES DAILY
Qty: 180 TABLET | Refills: 2 | Status: SHIPPED | OUTPATIENT
Start: 2024-07-24

## 2024-07-24 RX ORDER — FAMOTIDINE 40 MG/1
40 TABLET, FILM COATED ORAL
Qty: 90 TABLET | Refills: 3 | Status: SHIPPED | OUTPATIENT
Start: 2024-07-24

## 2024-07-24 NOTE — TELEPHONE ENCOUNTER
sacubitril-valsartan (ENTRESTO)  MG per tablet         Last Written Prescription Date:  7-14-23  Last Fill Quantity: 180,   # refills: 3  Last Office Visit : 6-24--24  Future Office visit:  9-12-24  RF 180t:2rf        previous drug override Cards clinic staff  Warnings Override History for sacubitril-valsartan (ENTRESTO)  MG per tablet [[id number]]    Overridden by Megha Meyer RN on Jul 14, 2023 12:14 PM  Drug-Drug  1. POTASSIUM-SPARING DIURETICS / ANGIOTENSIN II RECEPTOR ANTAGONISTS [Level: Major] [Reason: Tolerated medication/side effects in past]  Other Orders: spironolactone (ALDACTONE) 25 MG tablet

## 2024-07-24 NOTE — TELEPHONE ENCOUNTER
Last Clinic Visit: 6/11/2024 [facility]    famotidine (PEPCID) 40 MG tablet: passed medication protocol  - refills sent  - message sent to patient

## 2024-07-25 RX ORDER — SOLIFENACIN SUCCINATE 5 MG/1
5 TABLET, FILM COATED ORAL
Qty: 90 TABLET | Refills: 1 | Status: SHIPPED | OUTPATIENT
Start: 2024-07-25

## 2024-07-27 DIAGNOSIS — K21.9 GASTROESOPHAGEAL REFLUX DISEASE WITHOUT ESOPHAGITIS: ICD-10-CM

## 2024-07-28 ENCOUNTER — MYC REFILL (OUTPATIENT)
Dept: GASTROENTEROLOGY | Facility: CLINIC | Age: 59
End: 2024-07-28
Payer: COMMERCIAL

## 2024-07-28 DIAGNOSIS — K21.9 GASTROESOPHAGEAL REFLUX DISEASE WITHOUT ESOPHAGITIS: ICD-10-CM

## 2024-07-28 RX ORDER — RABEPRAZOLE SODIUM 20 MG/1
20 TABLET, DELAYED RELEASE ORAL 2 TIMES DAILY
Qty: 180 TABLET | Refills: 1 | Status: CANCELLED | OUTPATIENT
Start: 2024-07-28

## 2024-07-31 NOTE — TELEPHONE ENCOUNTER
RABEprazole (ACIPHEX) 20 MG EC [id number] yeevpo13/13/2024    [facility] #[id number] - [facility], [place] - [address] [facility] AT [address] [place] [address] & [address]

## 2024-08-01 RX ORDER — RABEPRAZOLE SODIUM 20 MG/1
20 TABLET, DELAYED RELEASE ORAL 2 TIMES DAILY
Qty: 60 TABLET | Refills: 0 | OUTPATIENT
Start: 2024-08-01

## 2024-08-01 NOTE — TELEPHONE ENCOUNTER
RABEprazole (ACIPHEX) 20 MG EC tablet #180/1 RF sent 5/13/24 [facility] #[id number]  Pending Prescriptions:                       Disp   Refills    RABEprazole (ACIPHEX) 20 MG EC tablet [Ph*60 tab*             Sig: TAKE 1 TABLET(20 MG) BY MOUTH TWICE DAILY   On file

## 2024-08-15 NOTE — PROGRESS NOTES
"Video-Visit Details    Type of service:  Video Visit    Video Start Time: 1:28 PM  Video End Time: 1:39 PM     Originating Location (pt. Location): Home    Distant Location (provider location):  Offsite (providers home) [facility] [place]     Platform used for Video Visit: N4MD    New Weight Management Nutrition Consultation    Marleen Mcfadden is a 59 year old female presents today for new weight management nutrition consultation.  Patient referred by MATTHEW Elizabeth on 2024.    Patient with Co-morbidities of obesity includin/16/2024     7:20 AM   --   I have the following health issues associated with obesity Heart Disease    GERD (Reflux)   I have the following symptoms associated with obesity Lower Extremity Swelling    Fatigue    Groin Rash     Anthropometrics:  Initial consult weight: 254 lb on 24  Estimated body mass index is 43.6 kg/m  as calculated from the following:    Height as of this encounter: 1.626 m (5' 4\").    Weight as of this encounter: 115.2 kg (254 lb).    Medications for Weight Loss:  Wegovy if approved by insurance     NUTRITION HISTORY  Food allergies: NKFA  Food intolerances: None   Vitamin/Mineral Supplements: Vitamin D   Previous methods of diet modification for weight loss: eating clean (olive oil, vegetables, whole foods, minimal starches)   RD before: None     Goals discussed with MATTHEW Elizabeth:   Cut out sugary drinks (sugar free coffee creamer, non-sugar pop, cutting out juice)   Exercise 3x a week   7 hours of sleep per night minimum (consider black out curtains to help with darkness in room    Typically eats 2 meals a day, skips lunch. Craves sweets . Is  able to get full. Struggles to stay full until next meal, will snack sometimes an hour after a meal. Does struggle with portion control, only with sweets.  Does experience food noise, particularly with sweets. Does experience emotional eating (sadness, " happiness, celebration). Does a loss of control around eating, particularly with homemade cake or cookies. Denies purging to compensate for overeating.    Patient would like to get an exercise plan started.     Diet recall:   Breakfast - yogurt and flaxseed.     Hydration: Drinks soda (regular coke) 1-2 times a week, more if it's in the house., juice (grape fruit, krystian d) 4 times a week with breakfast , water. Coffee in the morning with hazelnut coffee mate. ETOH socially, 1 drink per sitting, 2-3 times per month.         8/16/2024     7:20 AM   Diet Recall Review with Patient   If you do eat breakfast, what types of food do you eat? pugh sausage eggs yogurt toast grits fruit english muffin   If you do eat supper, what types of food do you typically eat? beef, chicken, fish, rice,salad greens, yuri greens, yams, mac&cheese, pasta,veggies   If you do snack, what types of food do you typically eat? cake cookies candy chips fruit nuts   How many glasses of juice do you drink in a typical day? 1   How many of glasses of milk do you drink in a typical day? 0   How many 8oz glasses of sugar containing drinks such as Niels-Aid/sweet tea do you drink in a day? 0   How many cans/bottles of sugar pop/soda/tea/sports drinks do you drink in a day? 1   How many cans/bottles of diet pop/soda/tea or sports drink do you drink in a day? 0   How often do you have a drink of alcohol? Monthly or Less   If you do drink, how many drinks might you have in a day? 1 or 2           8/16/2024     7:20 AM   Eating Habits   Generally, my meals include foods like these bread, pasta, rice, potatoes, corn, crackers, sweet dessert, pop, or juice Almost Everyday   Eat at a buffet or sit-down restaurant Less Than Weekly   Rarely sit down for a meal but snack or graze throughout Never   Eat in the middle of the night or wake up at night to eat Never   Worry about not having enough food to eat Never   I eat when I am depressed Less Than Weekly   I  finish all the food on my plate even if I am already full Almost Everyday   I eat when I am preparing the meal Less Than Weekly   What foods, if any, do you crave? Sweets/Candy/Chocolate         8/16/2024     7:20 AM   Amount of Food   I feel out of control when eating Never   I eat a large amount of food, like a loaf of bread, a box of cookies, a pint/quart of ice cream, all at once Never   I eat a large amount of food even when I am not hungry Never   I eat rapidly Everyday   I eat alone because I feel embarrassed and do not want others to see how much I have eaten Never   I eat until I am uncomfortably full Never   I feel bad, disgusted, or guilty after I overeat Never     Physical Activity:  On her feet all day during work shifts, walks throughout the hospital.         8/16/2024     7:20 AM   Activity/Exercise History   How much of a typical 12 hour day do you spend sitting? Less Than Half the Day   How much of a typical 12 hour day do you spend lying down? Less Than Half the Day   How much of a typical day do you spend walking/standing? Most of the Day   How many hours (not including work) do you spend on the TV/Video Games/Computer/Tablet/Phone? 2-3 Hours   How many times a week are you active for the purpose of exercise? Never   What keeps you from being more active? Lack of Time    Too tired       Nutrition Prescription  Recommended energy/nutrient modification.    Nutrition Diagnosis  Obesity r/t long history of positive energy balance aeb BMI >30.    Nutrition Intervention  Provided education on nutrition considerations when starting GLP1 medication including, changes in meal/snack volumes; meeting protein, hydration and micronutrient needs; limiting high-fat foods; and diet/lifestyle strategies for preventing constipation.  Patient demonstrates understanding. Co-developed goals to work towards. Provided pt with list of goals and resources below via "[employer]"t.     Expected Engagement: good    Nutrition  Goals  1) Aim for  grams of protein daily.   2) Aim for 25 grams of fiber per day or at least increase fiber containing foods in diet.   3) Aim for 64 oz of water per day.     The Plate Method  [email or web address]    Protein Sources   [email or web address]     Fiber Content of Foods  [email or web address]     Carbohydrates  [email or web address]     Mindful Eating  [email or web address]     Summary of Volumetrics Eating Plan  [email or web address]       At Home Exercise Resources  [employer] Library - Exercises by [employer]  https://www.[employer].org/resources/everyone/exercise-library/    Channels with Exercise Modifications:  Coach Charles (strengthening) https://www.[employer].com/@Mahesh  HASfit (strengthening): https://www.[employer].com/channel/EJCDE1-JYCUx72IR-z7MWwhA  Yoga with Zelinda: https://www.[employer].com/@yogawithzelinda  Gxc6Alpp (strengthening for any age, functional strength training, examples of exercise for seniors): https://www.[employer].com/@rmu3gdds    Size-Inclusive Fitness Routines:   Beginner Workout - Standing (low impact)  https://www.[employer].com/watch?v=1JcJTQo7FPK      Yoga  https://www.[employer].com/watch?v=VdIX8auOH_M&t=36s  https://www.[employer].com/watch?v=zUnjJdJitPw    Pilates  https://www.[employer].com/watch?v=jACnqcCT9Kw    Full Body Strengthening:  https://www.[employer].com/watch?v=0XsQL6s-sn5   https://www.[employer].com/watch?v=A0b83fPnbfK    Other Fitness Channels:  Dance Workouts: [employer]   https://www.[employer].com/user/Marie    HIMAREK Workouts: [employer] Fitness  https://www.[employer].com/user/popsugartvfit    Pilates: Blogilates  https://www.[employer].com/user/blogilates    Yoga: Yoga with Hillary   https://www.[employer].com/user/yogawithadriene/featured      Handouts Relating to Exercise :    1.     Learning About Being Physically Active     2.      Muscle Conditionin Exercises    3.     Resistance  Training with Free Weights: 3 Exercises    4.      Resistance Training with Surgical Tubin Exercises    5.     Stretchin Exercises    6.     Seated Exercises for Arms and Legs: 11 Exercises    Follow-Up: .    Time spent with patient: 19 minutes.  Niurka Aguilar RD, LD
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
## 2024-08-16 ENCOUNTER — MYC REFILL (OUTPATIENT)
Dept: GASTROENTEROLOGY | Facility: CLINIC | Age: 59
End: 2024-08-16

## 2024-08-16 ENCOUNTER — VIRTUAL VISIT (OUTPATIENT)
Dept: ENDOCRINOLOGY | Facility: CLINIC | Age: 59
End: 2024-08-16
Payer: COMMERCIAL

## 2024-08-16 ENCOUNTER — TELEPHONE (OUTPATIENT)
Dept: ENDOCRINOLOGY | Facility: CLINIC | Age: 59
End: 2024-08-16

## 2024-08-16 ENCOUNTER — TELEPHONE (OUTPATIENT)
Dept: CARDIOLOGY | Facility: CLINIC | Age: 59
End: 2024-08-16

## 2024-08-16 ENCOUNTER — OFFICE VISIT (OUTPATIENT)
Dept: ENDOCRINOLOGY | Facility: CLINIC | Age: 59
End: 2024-08-16
Payer: COMMERCIAL

## 2024-08-16 VITALS
DIASTOLIC BLOOD PRESSURE: 86 MMHG | SYSTOLIC BLOOD PRESSURE: 123 MMHG | HEART RATE: 83 BPM | WEIGHT: 254.4 LBS | OXYGEN SATURATION: 98 % | BODY MASS INDEX: 43.43 KG/M2 | HEIGHT: 64 IN

## 2024-08-16 VITALS — WEIGHT: 254 LBS | HEIGHT: 64 IN | BODY MASS INDEX: 43.36 KG/M2

## 2024-08-16 DIAGNOSIS — E66.01 CLASS 3 SEVERE OBESITY IN ADULT, UNSPECIFIED BMI, UNSPECIFIED OBESITY TYPE, UNSPECIFIED WHETHER SERIOUS COMORBIDITY PRESENT (H): ICD-10-CM

## 2024-08-16 DIAGNOSIS — E66.813 CLASS 3 SEVERE OBESITY IN ADULT, UNSPECIFIED BMI, UNSPECIFIED OBESITY TYPE, UNSPECIFIED WHETHER SERIOUS COMORBIDITY PRESENT (H): ICD-10-CM

## 2024-08-16 DIAGNOSIS — Z71.3 NUTRITIONAL COUNSELING: Primary | ICD-10-CM

## 2024-08-16 DIAGNOSIS — R11.0 NAUSEA: ICD-10-CM

## 2024-08-16 PROCEDURE — 99215 OFFICE O/P EST HI 40 MIN: CPT

## 2024-08-16 PROCEDURE — 99207 PR NO CHARGE LOS: CPT | Mod: 95

## 2024-08-16 PROCEDURE — G2211 COMPLEX E/M VISIT ADD ON: HCPCS

## 2024-08-16 PROCEDURE — 99417 PROLNG OP E/M EACH 15 MIN: CPT

## 2024-08-16 PROCEDURE — 97802 MEDICAL NUTRITION INDIV IN: CPT | Mod: 95

## 2024-08-16 ASSESSMENT — PAIN SCALES - GENERAL
PAINLEVEL: NO PAIN (0)
PAINLEVEL: NO PAIN (0)

## 2024-08-16 NOTE — TELEPHONE ENCOUNTER
Patient confirmed scheduled appointment:  Date: 12/2/24  Time: 9:30 am  Visit type: ANGELICA GUAJARDO  Provider: Evonne Doss  Location: [facility] 4th floor  Testing/imaging: n/a  Additional notes: n/a

## 2024-08-16 NOTE — NURSING NOTE
Current patient location: [address]  [facility] [address]    Is the patient currently in the state of [place]? YES    Visit mode:VIDEO    If the visit is dropped, the patient can be reconnected by: VIDEO VISIT: Text to cell phone:   Telephone Information:   Mobile [phone]       Will anyone else be joining the visit? NO  (If patient encounters technical issues they should call [phone]    How would you like to obtain your AVS? MyChart    Are changes needed to the allergy or medication list? N/A    Are refills needed on medications prescribed by this physician? NO    Rooming Documentation:  Not applicable      Reason for visit: Consult    Dario ARREDONDO

## 2024-08-16 NOTE — NURSING NOTE
"(   Chief Complaint   Patient presents with    New Patient     New MWM, BMI of 42.9    )    ( Weight: 115.4 kg (254 lb 6.4 oz) )  ( Height: 162.6 cm (5' 4\") )  ( BMI (Calculated): 43.67 )  (   )  (   )  (   )  (   )  ( Waist Circumference (cm): 115 cm )  (   )    ( BP: 123/86 )  (   )  (   )  (   )  ( Pulse: 83 )  (   )  ( SpO2: 98 % )    (   Patient Active Problem List   Diagnosis    Leiomyoma of uterus    Allergic rhinitis    Anemia    Knee pain    Thyroid nodule    Pain in joint involving ankle and foot    CARDIOVASCULAR SCREENING; LDL GOAL LESS THAN 160    Shaina's nodes    Familial cardiomyopathy (H)    Degenerative disc disease at L5-S1 level    Chronic bilateral low back pain with right-sided sciatica    Chronic bilateral low back pain with left-sided sciatica    Chronic systolic congestive heart failure (H)    Left shoulder pain    Primary osteoarthritis of both knees    Gastroesophageal reflux disease with esophagitis    Moderate major depression (H)    Dysfunction of both eustachian tubes    Chronic rhinitis    Essential hypertension    CSF otorrhea    KATIA (obstructive sleep apnea)- moderate-severe (AHI 29)    Family history of colon cancer    Diverticulitis    Herpes simplex of female genitalia    Symptomatic bradycardia    Severe obesity (BMI 35.0-39.9) with comorbidity (H)    )  (   Current Outpatient Medications   Medication Sig Dispense Refill    docusate sodium (COLACE) 100 MG capsule Take 1 capsule (100 mg) by mouth 3 times daily as needed for constipation 60 capsule 1    escitalopram (LEXAPRO) 10 MG tablet Take 1 tablet (10 mg) by mouth daily 90 tablet 0    famotidine (PEPCID) 40 MG tablet Take 1 tablet (40 mg) by mouth nightly as needed for heartburn 90 tablet 3    fluticasone (FLONASE) 50 MCG/ACT nasal spray Spray 2 sprays into both nostrils daily 16 g 0    furosemide (LASIX) 20 MG tablet Take 2 tablets (40 mg) by mouth daily as needed (weight gain/edema) 180 tablet 3    hydrOXYzine HCl " (ATARAX) 25 MG tablet Take 1-2 tablets (25-50 mg) by mouth nightly as needed for other or anxiety (sleep) 180 tablet 0    loratadine (CLARITIN) 10 MG tablet Take 1 tablet (10 mg) by mouth daily 90 tablet 3    LORazepam (ATIVAN) 0.5 MG tablet Take 1 tablet (0.5 mg) by mouth every 6 hours as needed for anxiety (take 1 tab 30 min prior to MRI) 2 tablet 0    magnesium oxide (MAG-OX) 400 MG tablet Take 1 tablet (400 mg) by mouth 2 times daily 180 tablet 3    metoclopramide (REGLAN) 10 MG tablet Take 1 tablet (10 mg) by mouth 3 times daily as needed (nausea) 270 tablet 1    metoprolol succinate ER (TOPROL XL) 25 MG 24 hr tablet Take 1 tablet (25 mg) by mouth daily 90 tablet 2    progesterone (PROMETRIUM) 100 MG capsule Take 100 mg by mouth at bedtime      RABEprazole (ACIPHEX) 20 MG EC tablet TAKE 1 TABLET(20 MG) BY MOUTH TWICE DAILY 180 tablet 1    sacubitril-valsartan (ENTRESTO)  MG per tablet Take 1 tablet by mouth 2 times daily 180 tablet 2    solifenacin (VESICARE) 5 MG tablet Take 1 tablet (5 mg) by mouth daily at 2 pm 90 tablet 1    spironolactone (ALDACTONE) 25 MG tablet Take 2 tablets (50 mg) by mouth daily 180 tablet 1    Vitamin D3 (CHOLECALCIFEROL) 25 mcg (1000 units) tablet Take 1 tablet (25 mcg) by mouth daily 90 tablet 2    )  ( Diabetes Eval:    )    ( Pain Eval:  No Pain (0) )    ( Wound Eval:       )    (   History   Smoking Status    Never   Smokeless Tobacco    Never    )    ( Signed By:  Jim Esteban, EMT; August 16, 2024; 8:05 AM )

## 2024-08-16 NOTE — TELEPHONE ENCOUNTER
Patient confirmed scheduled appointment:  Date: 8/16/24  Time: 3:30 pm  Visit type: lab  Provider: Evonne Doss  Location: [facility] 1st floor  Testing/imaging: n/a  Additional notes: n/a

## 2024-08-16 NOTE — LETTER
"2024       RE: Marleen Mcfadden  [address]  [facility] [address]     Dear Colleague,    Thank you for referring your patient, Marleen Mcfadden, to the [facility] [place] at [facility]. Please see a copy of my visit note below.    Video-Visit Details    Type of service:  Video Visit    Video Start Time: 1:28 PM  Video End Time: 1:39 PM     Originating Location (pt. Location): Home    Distant Location (provider location):  Offsite (providers home) [facility] [place]     Platform used for Video Visit: [employer]    New Weight Management Nutrition Consultation    Marleen Mcfadden is a 59 year old female presents today for new weight management nutrition consultation.  Patient referred by MATTHEW Elizabeth on 2024.    Patient with Co-morbidities of obesity includin/16/2024     7:20 AM   --   I have the following health issues associated with obesity Heart Disease    GERD (Reflux)   I have the following symptoms associated with obesity Lower Extremity Swelling    Fatigue    Groin Rash     Anthropometrics:  Initial consult weight: 254 lb on 24  Estimated body mass index is 43.6 kg/m  as calculated from the following:    Height as of this encounter: 1.626 m (5' 4\").    Weight as of this encounter: 115.2 kg (254 lb).    Medications for Weight Loss:  Wegovy if approved by insurance     NUTRITION HISTORY  Food allergies: NKFA  Food intolerances: None   Vitamin/Mineral Supplements: Vitamin D   Previous methods of diet modification for weight loss: eating clean (olive oil, vegetables, whole foods, minimal starches)   RD before: None     Goals discussed with MATTHEW Elizabeth:   Cut out sugary drinks (sugar free coffee creamer, non-sugar pop, cutting out juice)   Exercise 3x a week   7 hours of sleep per night minimum (consider black out curtains to help with darkness " in room    Typically eats 2 meals a day, skips lunch. Craves sweets . Is  able to get full. Struggles to stay full until next meal, will snack sometimes an hour after a meal. Does struggle with portion control, only with sweets.  Does experience food noise, particularly with sweets. Does experience emotional eating (sadness, happiness, celebration). Does a loss of control around eating, particularly with homemade cake or cookies. Denies purging to compensate for overeating.    Patient would like to get an exercise plan started.     Diet recall:   Breakfast - yogurt and flaxseed.     Hydration: Drinks soda (regular coke) 1-2 times a week, more if it's in the house., juice (grape fruit, krystian d) 4 times a week with breakfast , water. Coffee in the morning with hazelnut coffee mate. ETOH socially, 1 drink per sitting, 2-3 times per month.         8/16/2024     7:20 AM   Diet Recall Review with Patient   If you do eat breakfast, what types of food do you eat? pugh sausage eggs yogurt toast grits fruit english muffin   If you do eat supper, what types of food do you typically eat? beef, chicken, fish, rice,salad greens, yuri greens, yams, mac&cheese, pasta,veggies   If you do snack, what types of food do you typically eat? cake cookies candy chips fruit nuts   How many glasses of juice do you drink in a typical day? 1   How many of glasses of milk do you drink in a typical day? 0   How many 8oz glasses of sugar containing drinks such as Niels-Aid/sweet tea do you drink in a day? 0   How many cans/bottles of sugar pop/soda/tea/sports drinks do you drink in a day? 1   How many cans/bottles of diet pop/soda/tea or sports drink do you drink in a day? 0   How often do you have a drink of alcohol? Monthly or Less   If you do drink, how many drinks might you have in a day? 1 or 2           8/16/2024     7:20 AM   Eating Habits   Generally, my meals include foods like these bread, pasta, rice, potatoes, corn, crackers, sweet  dessert, pop, or juice Almost Everyday   Eat at a buffet or sit-down restaurant Less Than Weekly   Rarely sit down for a meal but snack or graze throughout Never   Eat in the middle of the night or wake up at night to eat Never   Worry about not having enough food to eat Never   I eat when I am depressed Less Than Weekly   I finish all the food on my plate even if I am already full Almost Everyday   I eat when I am preparing the meal Less Than Weekly   What foods, if any, do you crave? Sweets/Candy/Chocolate         8/16/2024     7:20 AM   Amount of Food   I feel out of control when eating Never   I eat a large amount of food, like a loaf of bread, a box of cookies, a pint/quart of ice cream, all at once Never   I eat a large amount of food even when I am not hungry Never   I eat rapidly Everyday   I eat alone because I feel embarrassed and do not want others to see how much I have eaten Never   I eat until I am uncomfortably full Never   I feel bad, disgusted, or guilty after I overeat Never     Physical Activity:  On her feet all day during work shifts, walks throughout the hospital.         8/16/2024     7:20 AM   Activity/Exercise History   How much of a typical 12 hour day do you spend sitting? Less Than Half the Day   How much of a typical 12 hour day do you spend lying down? Less Than Half the Day   How much of a typical day do you spend walking/standing? Most of the Day   How many hours (not including work) do you spend on the TV/Video Games/Computer/Tablet/Phone? 2-3 Hours   How many times a week are you active for the purpose of exercise? Never   What keeps you from being more active? Lack of Time    Too tired       Nutrition Prescription  Recommended energy/nutrient modification.    Nutrition Diagnosis  Obesity r/t long history of positive energy balance aeb BMI >30.    Nutrition Intervention  Provided education on nutrition considerations when starting GLP1 medication including, changes in meal/snack  volumes; meeting protein, hydration and micronutrient needs; limiting high-fat foods; and diet/lifestyle strategies for preventing constipation.  Patient demonstrates understanding. Co-developed goals to work towards. Provided pt with list of goals and resources below via Webstept.     Expected Engagement: good    Nutrition Goals  1) Aim for  grams of protein daily.   2) Aim for 25 grams of fiber per day or at least increase fiber containing foods in diet.   3) Aim for 64 oz of water per day.     The Plate Method  [email or web address]    Protein Sources   [email or web address]     Fiber Content of Foods  [email or web address]     Carbohydrates  [email or web address]     Mindful Eating  [email or web address]     Summary of Volumetrics Eating Plan  [email or web address]       At Home Exercise Resources  [employer] - Exercises by [employer]  https://www.[employer].org/resources/everyone/exercise-library/    Channels with Exercise Modifications:  Coach Charles (strengthening) https://www.N-able Technologiesube.com/@CoaAidan  HASfit (strengthening): https://www.N-able Technologiesube.com/channel/EMCEE5-AVOUu28DF-f4RRciK  Yoga with Zelinda: https://www.N-able Technologiesube.com/@yogawithzelinda  Qsp3Cazc (strengthening for any age, functional strength training, examples of exercise for seniors): https://www.N-able Technologiesube.com/@mgl0uqdp    Size-Inclusive Fitness Routines:   Beginner Workout - Standing (low impact)  https://www.N-able Technologiesube.com/watch?v=4OtKWNy1MMS      Yoga  https://www.youAquamarine Powerube.com/watch?v=VdIX8auOH_M&t=36s  https://www.N-able Technologiesube.com/watch?v=zUnjJdJitPw    Pilates  https://www.N-able Technologiesube.com/watch?v=lBSazvQU3Kc    Full Body Strengthening:  https://www.N-able Technologiesube.com/watch?v=1NfUN5h-xg1   https://www.youtube.com/watch?v=P5k51dVyepO    Other Fitness Channels:  Dance Workouts: The Caitlin Neal   https://www.youAquamarine Powerube.com/user/Marie IBARRA Workouts: Jesse  Fitness  https://www.Hyperopticube.com/user/popsugartvfit    Pilates: Blogilates  https://www.Hyperopticube.com/user/blogilates    Yoga: Yoga with Hillary   https://www.Iunika.com/user/yogawithadriene/featured      Handouts Relating to Exercise :    1.     Learning About Being Physically Active     2.      Muscle Conditionin Exercises    3.     Resistance Training with Free Weights: 3 Exercises    4.      Resistance Training with Surgical Tubin Exercises    5.     Stretchin Exercises    6.     Seated Exercises for Arms and Legs: 11 Exercises    Follow-Up: .    Time spent with patient: 19 minutes.  Niurka Aguilar RD, LD      Again, thank you for allowing me to participate in the care of your patient.      Sincerely,    Niurka Aguilar RD
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
## 2024-08-16 NOTE — PATIENT INSTRUCTIONS
"Thank you for allowing us the privilege of caring for you. We hope we provided you with the excellent service you deserve.   Please let us know if there is anything else we can do for you so that we can be sure you are completely satisfied with your care experience.    To ensure the quality of our services you may be receiving a patient satisfaction survey from an independent patient satisfaction monitoring company.    The greatest compliment you can give is a \"Likely to Recommend\"    Your visit was with Evonne Doss PA-C today.    Instructions per today's visit:     Kory Mcfadden, it was great to visit with you today.  Here is a review of our visit.  If our clinic scheduler is not able to reach you please call [phone] to schedule your next appointments.    Plan  Once meeting with [facility] pharmacy: Start Wegovy 0.25mg once weekly for 4 weeks, then increase to 0.5mg once weekly. Consider Zepbound and Saxenda as needed.   Alternatives:   Goals we discussed today:   Cut out sugary drinks (sugar free coffee creamer, non-sugar pop, cutting out juice)   Exercise 3x a week   7 hours of sleep per night minimum (consider black out curtains to help with darkness in room_   Labs ordered today: pth, cmp, A1c, vitamin d, lipids   [facility] pharmacy asap to start wegovy   Follow up with Evonne in 3 months   Dietician appointment today        Information about Video Visits with [employer]: video visit information  _________________________________________________________________________________________________________________________________________________________  If you are asked by your clinic team to have your blood pressure checked:  [facility] do offer several locations for blood pressure checks. Please follow the below link to schedule an appointment. Scheduling an appointment at the pharmacy for a blood pressure check is now preferred.    Appointment Plus " ([email or web address])  _________________________________________________________________________________________________________________________________________________________  Important contact and scheduling information:  Please call our contact center at [phone] to schedule your next appointments.  To find a lab location near you, please call [phone].  For any nursing questions or concerns call Perlita Meraz LPN at [phone] or Zuleika Sotomayor RN at [phone]  Please call during clinic hours Monday through Friday 8:00a - 4:00p if you have questions or you can contact us via [facility] at anytime and we will reply during clinic hours.    Lab results will be communicated through My Chart or letter (if My Chart not used). Please call the clinic if you have not received communication after 1 week or if you have any questions.?  Clinic Fax: [phone]    _________________________________________________________________________________________________________________________________________________________  Meal Replacement Products:    Here is the link to our new e-store where you can purchase our meal replacement products    [facility] E-Store  [facility].[employer]/store    The one week starter kit is a great way to sample a variety of products and see what works for you.    If you want more information about the product go to: Fresh Steps Meals  [employer].Secure-24    If you are an employee or [facility] or [facility] please contact your care team for a 10% estore discount    Free Shipping for orders over $75     Benefits of meal replacements products:    Portion and calorie control  Improved nutrition  Structured eating  Simplified food choices  Avoid contact with trigger foods  _________________________________________________________________________________________________________________________________________________________  Interested in working with a health  ?  Health coaches work with you to improve your overall health and wellbeing.  They look at the whole person, and may involve discussion of different areas of life, including, but not limited to the four pillars of health (sleep, exercise, nutrition, and stress management). Discuss with your care team if you would like to start working a health .  Health Coaching-3 Pack: Schedule by calling [phone]    $99 for three health coaching visits    Visits may be done in person or via phone    Coaching is a partnership between the  and the client; Coaches do not prescribe or diagnose    Coaching helps inspire the client to reach his/her personal goals   _________________________________________________________________________________________________________________________________________________________  24 Week Healthy Lifestyle Plan:    Our mission in the 24-week Healthy Lifestyle Plan is to provide you with individualized care by giving you the tools, education and support you need to lose weight and maintain a healthy lifestyle. In your 24-week journey, you ll be supported by a dedicated weight loss team that includes registered dietitians, medical weight management providers, health coaches, and nurses -- all with special expertise in weight loss -- to help you every step of the way.     Monthly meetings with your registered dietician or medical weight management provider help to review your progress, update your care plan, and make any adjustments needed to ensure success. Between these visits, weekly and bi-weekly health  visits will help you focus on the four pillars of weight loss -- stress, sleep, nutrition, and exercise -- and how you can best adapt each to achieve sustainable weight loss results.    In addition, you will be given exclusive access to online wellbeing classes through My[employer].  Your initial visit will be with a medical weight management provider who will help to  understand your weight loss goals and ensure this program is the right fit for you. Please let our team know if you are interested in the 24 week plan by sending a message to your care team or calling [phone] to schedule.  _________________________________________________________________________________________________________________________________________________________  __________  [facility] Get Moving Program  Our team of physical therapists is trained to help you understand and take control of your condition. They will perform a thorough evaluation to determine your ability for activity and develop a customized plan to fit your goals and physical ability.  Scheduling: Unsure if the Get Moving program is right for you? Discuss the program with your medical provider or diabetes educator. You can also call us at [phone] to ask questions or schedule an appointment.   [facility] Get Moving Program  ____________________________________________________________________________________________________________________________________________________________________________  [facility] Diabetes Prevention Program (DPP)  If you have prediabetes and Medicare please contact us via Assistera to learn more about the Diabetes Prevention Program (DPP)  Program Details:   [facility] offers the year-long Diabetes Prevention Program (DPP). The program helps you to make lifestyle changes that prevent or delay type 2 diabetes by supporting healthy eating, increased physical activity, stress reduction and use of coping skills.   On average, previous [facility] DPP cohorts have lost and maintained at least 5% of their starting weight throughout the program and averaged more than 150 minutes of physical activity per week.  Participants meet weekly for one-hour group sessions over sixteen weeks, every other week for the next 8 weeks, and monthly for the last six months.   A year-long  maintenance program is also available for participants who complete the first year.   Location & Cost:   During the COVID-19 Public Health Emergency, the program is offered virtually. When in-person classes can resume, they will be held at [facility].  For people with Medicare, the program is covered in full. A self-pay option will also be available for those with non-Medicare insurance plans.   ______________________________________________________________________________________________________________________________________________________________________________________________________________________________    To work with a Behavioral Health Psychologist:    Call to schedule:    Tom Nguyen - [phone]  Edgard Mojica - [phone]  Erica Herr - [phone]  Riya Sanchez - [phone]   Bobbi Gibson PhD (cannot accept Medicare) [phone]        Thank you,   [facility] Comprehensive Weight Management Team

## 2024-08-16 NOTE — TELEPHONE ENCOUNTER
Patient confirmed scheduled appointment:  Date: 9/27/24  Time: 10:30 am  Visit type: NEW MTM  Provider: Ambika Nuno  Location: virtual  Testing/imaging: n/a  Additional notes: n/a

## 2024-08-16 NOTE — PATIENT INSTRUCTIONS
Kory Hawley,     It was nice to meet you today!    Follow-up with RD on September 27.     Thank you,    Niurka Aguilar, GRACIA, LD  If you would like to schedule or reschedule an appointment with the RD, please call [phone]    Nutrition Goals  1) Aim for  grams of protein daily.   2) Aim for 25 grams of fiber per day or at least increase fiber containing foods in diet.   3) Aim for 64 oz of water per day.     The Plate Method  [email or web address]    Protein Sources   [email or web address]://[email or web address]     Fiber Content of Foods  http://www.fvfiles.[email or web address]/292344.[email or web address]     Carbohydrates  [email or web address]://[email or web address]     Mindful Eating  http://Triplify/246117.[email or web address]     Summary of Volumetrics Eating Plan  [email or web address]://[email or web address].[email or web address]       At Home Exercise Resources  Protonet Library - Exercises by Muscle Group  https://www.tuta.co.org/resources/everyone/exercise-library/    Channels with Exercise Modifications:  Coach Charles (strengthening) https://www.YourPlaceube.com/@CoaAidan  HASfit (strengthening): https://www.YourPlaceube.com/channel/PTMUB6-YMSYa77QI-f7CEjfZ  Yoga with Zelinda: https://www.YourPlaceube.com/@yogawithzelinda  Ryy7Mbmg (strengthening for any age, functional strength training, examples of exercise for seniors): https://www.YourPlaceube.com/@woo2jsbj    Size-Inclusive Fitness Routines:   Beginner Workout - Standing (low impact)  https://www.YourPlaceube.com/watch?v=1QtJGKw0ZZD      Yoga  https://www.youCloudEngineube.com/watch?v=VdIX8auOH_M&t=36s  https://www.YourPlaceube.com/watch?v=zUnjJdJitPw    Pilates  https://www.YourPlaceube.com/watch?v=yYWxtgMZ8Sd    Full Body Strengthening:  https://www.YourPlaceube.com/watch?v=3PhCY4s-ws1   https://www.YourPlaceube.com/watch?v=F5n29dCmarN    Other Fitness Channels:  Dance Workouts: The Fitness Ángel   https://www.YourPlaceube.com/user/Marie IBARRA Workouts: PopSuVisualase Fitness  https://www.YourPlaceube.com/user/popsugartvfit    Pilates:  Blogilates  https://www.IronPort Systems.com/user/blogilates    Yoga: Yoga with Hillary   https://www.Tookitakiube.com/user/yogawithadriene/featured      Handouts Relating to Exercise :    1.     Learning About Being Physically Active     2.      Muscle Conditionin Exercises    3.     Resistance Training with Free Weights: 3 Exercises    4.      Resistance Training with Surgical Tubin Exercises    5.     Stretchin Exercises    6.     Seated Exercises for Arms and Legs: 11 Exercises    COMPREHENSIVE WEIGHT MANAGEMENT PROGRAM  VIRTUAL SUPPORT GROUPS    At [facility], our Comprehensive Weight Management program offers on-line support groups for patients who are working on weight loss and considering, preparing for, or have had weight loss surgery.     There is no cost for this opportunity.  You are invited to attend the?Virtual Support Groups?provided by any of the following locations:    [facility] via Microsoft Teams with Yasmeen Diallo RN  2.   [place] via Psydex with Dominguez Earl, PhD, LP  3.   [place] via Psydex with Cherelle Leiva RN  4.   [facility] via a Zoom Meeting with MUSTAPHA Powers    The following Support Group information can also be found on our website:  https://www.[facility].org/treatments/weight-loss-and-weight-loss-surgery-support-groups      [facility] Weight Loss Surgery Support Group  The support group is a patient-lead forum that meets monthly to share experiences, encouragement and education. It is open to those who have had weight loss surgery, are scheduled for surgery, or are considering surgery.   WHEN: This group meets on the  of each month from 5:00PM - 6:00PM virtually using Microsoft Teams.   FACILITATOR: Led by Yasmeen Savage, RD, LD, RN, the program's Clinical Coordinator.   TO REGISTER: Please contact the clinic via CREAT or call the nurse line directly at [phone] to inform our staff that you  "would like an invite sent to you and to let us know the email you would like the invite sent to. Prior to the meeting, a link with directions on how to join the meeting will be sent to you.    2024 Meetings   January 17  February 21  March 20  April 17  May 15  Esther 19      [facility] Bariatric Care Support Group?  This is open to all pre- and post- operative bariatric surgery patients as well as their support system.   WHEN: This group meets the 3rd Tuesday of each month from 6:30 PM - 8:00 PM virtually using Microsoft Teams.   FACILITATOR: Led by Dominguez Earl, Ph.D who is a Licensed Psychologist with the [facility] Comprehensive Weight Management Program.   TO REGISTER: Please send an email to Dominguez Earl, Ph.D., LP at?[email or web address]?if you would like an invitation to the group. Prior to the meeting, a link with directions on how to join the meeting will be sent to you.    2024 Meetings January 16: \"Medication Management and Bariatric Surgery\", Luanne Rowland, PharmD, Pharmacy Resident at [facility]  February 20: \"A Bariatric Surgery Patient's Perspective\", JAYESH Serra, [facility], Behavioral Health Clinician at [facility]  March 19  April 16  May 21  Esther 18: \"Nutritional Labeling\", Dietitian speaker to be announced.  November 19: \"Holiday Eating\", Dietitian speaker to be announced.    [facility] Post-Operative Bariatric Surgery Support Group  This is a support group for [facility] bariatric patients (and those external to [facility]) who have had bariatric surgery and are at least 3 months post-surgery.  WHEN: This support group meets the 4th Wednesday of the month from 11:00 AM - 12:00 PM virtually using Microsoft Teams.   FACILITATOR: Led by Certified Bariatric Nurse, Cherelle Leiva RN.   TO REGISTER: Please send an " "email to Cherelle at [email or web address] if you would like an invitation to the group.  Prior to the meeting, a link with directions on how to join the meeting will be sent to you.    2024 Meetings  January 24  February 28  March 27  April 24  May 22  Esther 26    [facility] Healthy Lifestyle Group?  This is a 60 minute virtual coaching group for those who want to lead a healthier lifestyle. Come together to set goals and overcome barriers in a supportive group environment. We will address the four pillars of health: nutrition, exercise, sleep and emotional well-being.  This group is highly recommended for those who are participating in the 24 week Healthy Lifestyle Plan and our Health Coaching sessions.  WHEN: This group meets the 1st Friday of the month, 12:30 PM - 1:30 PM online, via a zoom meeting.    FACILITATOR: Led by National Board Certified Health and , Cherelle Vega, [facility].   TO REGISTER: Please call the Call Center at [phone] to register. You will get an appointment to attend in [facility] [employer]. Fifteen minutes prior to the meeting, complete the e-check in and you will get the link to join the meeting.    There is no charge to attend this group and space is limited.     2024 Meetings  January 5: \"New Years Vision: Manifest your Best 2024!\" (guided imagery,  journaling and discussion)  February 2: \"Let's Talk\"  March 1: \"10 Percent Happier\" by Mateo Oshea (Book Bites - a guided discussion on the nuggets of wisdom from favorite wellness books, no need to read the book but highly encouraged)  April 5: \"Let's Talk\"  May 3: \"Essentialism: The Disciplined Pursuit of Less\" by Gregg Cordero (Book Bites discussion)  June 7: \"Let's Talk\"  July 5: NO MEETING, off for the 4th of July Holiday  August 2: \"The Blue Zones, Secrets for Living a Longer Life\" by Mateo Deleon (Book Bites discussion)        "

## 2024-08-21 ENCOUNTER — LAB (OUTPATIENT)
Dept: LAB | Facility: CLINIC | Age: 59
End: 2024-08-21
Payer: COMMERCIAL

## 2024-08-21 DIAGNOSIS — E66.01 CLASS 3 SEVERE OBESITY IN ADULT, UNSPECIFIED BMI, UNSPECIFIED OBESITY TYPE, UNSPECIFIED WHETHER SERIOUS COMORBIDITY PRESENT (H): ICD-10-CM

## 2024-08-21 DIAGNOSIS — E66.813 CLASS 3 SEVERE OBESITY IN ADULT, UNSPECIFIED BMI, UNSPECIFIED OBESITY TYPE, UNSPECIFIED WHETHER SERIOUS COMORBIDITY PRESENT (H): ICD-10-CM

## 2024-08-21 LAB
ALBUMIN SERPL BCG-MCNC: 4 G/DL (ref 3.5–5.2)
ALP SERPL-CCNC: 60 U/L (ref 40–150)
ALT SERPL W P-5'-P-CCNC: 23 U/L (ref 0–50)
ANION GAP SERPL CALCULATED.3IONS-SCNC: 9 MMOL/L (ref 7–15)
AST SERPL W P-5'-P-CCNC: 22 U/L (ref 0–45)
BILIRUB SERPL-MCNC: 0.3 MG/DL
BUN SERPL-MCNC: 14 MG/DL (ref 8–23)
CALCIUM SERPL-MCNC: 9.1 MG/DL (ref 8.8–10.4)
CHLORIDE SERPL-SCNC: 104 MMOL/L (ref 98–107)
CHOLEST SERPL-MCNC: 185 MG/DL
CREAT SERPL-MCNC: 0.84 MG/DL (ref 0.51–0.95)
EGFRCR SERPLBLD CKD-EPI 2021: 79 ML/MIN/1.73M2
FASTING STATUS PATIENT QL REPORTED: NO
FASTING STATUS PATIENT QL REPORTED: NO
GLUCOSE SERPL-MCNC: 104 MG/DL (ref 70–99)
HBA1C MFR BLD: 5.4 %
HCO3 SERPL-SCNC: 26 MMOL/L (ref 22–29)
HDLC SERPL-MCNC: 56 MG/DL
LDLC SERPL CALC-MCNC: 113 MG/DL
NONHDLC SERPL-MCNC: 129 MG/DL
POTASSIUM SERPL-SCNC: 4.3 MMOL/L (ref 3.4–5.3)
PROT SERPL-MCNC: 7.5 G/DL (ref 6.4–8.3)
PTH-INTACT SERPL-MCNC: 36 PG/ML (ref 15–65)
SODIUM SERPL-SCNC: 139 MMOL/L (ref 135–145)
TRIGL SERPL-MCNC: 79 MG/DL
VIT D+METAB SERPL-MCNC: 28 NG/ML (ref 20–50)

## 2024-08-21 PROCEDURE — 80061 LIPID PANEL: CPT

## 2024-08-21 PROCEDURE — 83036 HEMOGLOBIN GLYCOSYLATED A1C: CPT

## 2024-08-21 PROCEDURE — 36415 COLL VENOUS BLD VENIPUNCTURE: CPT

## 2024-08-21 PROCEDURE — 82306 VITAMIN D 25 HYDROXY: CPT

## 2024-08-21 PROCEDURE — 83970 ASSAY OF PARATHORMONE: CPT

## 2024-08-21 PROCEDURE — 82040 ASSAY OF SERUM ALBUMIN: CPT

## 2024-08-21 RX ORDER — METOCLOPRAMIDE 10 MG/1
10 TABLET ORAL 3 TIMES DAILY PRN
Qty: 270 TABLET | Refills: 1 | OUTPATIENT
Start: 2024-08-21

## 2024-08-21 NOTE — TELEPHONE ENCOUNTER
Medication Requested:  metoclopramide (REGLAN) 10 MG tablet 270 tablet 1 6/11/2024 12/8/2024 No   Sig - Route: Take 1 tablet (10 mg) by mouth 3 times daily as needed (nausea) - Oral     ----------------------  Last Office Visit : 6/11/2024  [facility]      Future Office visit:     12/11/2024 8:00 AM (60 min)  Edgardo   Arrive by:  7:45 AM   RETURN GI   [facility] ([place])   Richa Campuzano PA-C     ----------------------      Refills on file.

## 2024-08-23 ENCOUNTER — VIRTUAL VISIT (OUTPATIENT)
Dept: CARDIOLOGY | Facility: CLINIC | Age: 59
End: 2024-08-23
Payer: COMMERCIAL

## 2024-08-23 ENCOUNTER — TELEPHONE (OUTPATIENT)
Dept: ENDOCRINOLOGY | Facility: CLINIC | Age: 59
End: 2024-08-23

## 2024-08-23 VITALS — BODY MASS INDEX: 43.36 KG/M2 | WEIGHT: 254 LBS | HEIGHT: 64 IN

## 2024-08-23 DIAGNOSIS — E66.813 CLASS 3 SEVERE OBESITY DUE TO EXCESS CALORIES IN ADULT, UNSPECIFIED BMI, UNSPECIFIED WHETHER SERIOUS COMORBIDITY PRESENT (H): Primary | ICD-10-CM

## 2024-08-23 DIAGNOSIS — E66.01 CLASS 3 SEVERE OBESITY DUE TO EXCESS CALORIES IN ADULT, UNSPECIFIED BMI, UNSPECIFIED WHETHER SERIOUS COMORBIDITY PRESENT (H): Primary | ICD-10-CM

## 2024-08-23 ASSESSMENT — PAIN SCALES - GENERAL: PAINLEVEL: NO PAIN (0)

## 2024-08-23 NOTE — PATIENT INSTRUCTIONS
Good to see you again, Marleen! Good luck getting started on the medication - reach out with any questions!    Ambika    Recommendations from today's MTM visit:                                                    Pharmacist to start Prior Authorization on Wegovy. Once approved, to start Wegovy 0.25 mg subcutaneous once weekly for 4 weeks, then if tolerating increase to 0.5 mg weekly thereafter.  RX will be sent to [facility] for both 0.25 mg and 0.5 mg doses. The 0.25 mg dose will be filled. The 0.5 mg dose will be put on hold. Call the pharmacy when ready to fill the 0.5 mg box.     To help with tolerability and effectiveness of Wegovy:  Eat small meals/snacks throughout the day (about every 2 hours)  Focus on getting protein in first with each meal and snack.   A good starting goal is 60 g protein daily (track this, especially if at weight loss plateau). Once you consistently are getting 60g daily, try getting 90 g protein daily.  Drink plenty of water - goal 64 oz throughout the day  You may try Metamucil, Benefiber, or Citrucel to help feel more full (less nausea) and have softer, more consistent bowel movements.  To optimize weight management - work on incorporating resistance training/weight lifting to build muscle and improve overall metabolism of adipose tissue.      [place] employees with [employer] insurance ([facility]) are restricted to using the [facility] for Saxenda, Wegovy, and Zepbound    [facility]  [place], [place] - [facility] [address]  Phone: [phone]    Next steps:  After processing the prescription and saving to your profile, the pharmacy will give you an automated call to inform you that they have your prescription on file. This typically occurs within 1-2 days after the prescription is sent but may take 3-5 business days during high volume times.  You will need to call the pharmacy back to  "set up the first delivery of every new prescription or new medication dose.   Once you are on a stable dose, you can inquire with the pharmacy about signing up for \"Text to Order through Klatcher\", \"Auto Fill\", and/or using the \""[employer] Rx\" buster.     Follow-up: 9/27/2024 with Nan (dietitian)    It was great speaking with you today.  I value your experience and would be very thankful for your time in providing feedback in our clinic survey. In the next few days, you may receive an email or text message from [facility] [employer] with a link to a survey related to your  clinical pharmacist.\"     To schedule another MTM appointment, please call the clinic directly or you may call the MTM scheduling line at [phone] or toll-free at [phone].     My Clinical Pharmacist's contact information:                                                      Please feel free to contact me with any questions or concerns you have.      Ambika Nuno, PharmD, [facility]  Medication Therapy Management (MTM) Pharmacist   [facility]       ---  Wegovy Dosing:   Start Wegovy: It is a subcutaneous injection that you inject once weekly and titrate the dose slowly over time. Make sure inject the same time each day of the week, for example Monday evenings.  Each pen is single use.  In each prescription, you will get 4 pens in each box.  You will need a new prescription for each strength of the medication.  Week 1-4: Inject 0.25 mg once weekly  Week 5-8: If tolerating, increase to 0.5 mg once weekly  Week 9-12: If tolerating, increase to 1 mg once weekly  Week 13-15: If tolerating, increase to 1.7 mg once weekly  Week 16 & on: If tolerating, increase to 2.4 mg once weekly  *If you are having some nausea or other side effects to where you are hesitant to move up to the next dose, stay at the same dose you are on for an additional week to see if side effect(s) improves/resolves. Make sure " to take this time to hydrate and ensure you are drinking at least 64 oz water per day.  If you are wanting to stay at the same dose and do not have additional refills on that prescription please reach out to the clinic.    The goal is to get to a dose that is well tolerated and effective for you. You do not have to go up on the dose each month or get to maximum dose if getting an effective response with minimal side effects.     Wegovy Administration Video: Video that was created by the .  https://www.CoachUp.com/watch?v=MY8a3Sh9oU6    Wegovy Storage and Stability:   Make sure that when you get the prescription that you store the prescription in the refrigerator until it is time to use the Wegovy pen.  Once it is time to use the Wegovy pen, you can keep the pen at room temperature and it is good for up to 28 days at room temperature.     Wegovy Common Side Effects:   Nausea, diarrhea, constipation, headache, tiredness (fatigue), dizziness, stomach upset/pain. Less commonly, Wegovy can cause low blood sugar (symptoms: shaky, dizzy, sweaty, agitation). Please reach out to the care team should you feel like this is occurring. It is important to ensure that you are eating consistent meals and not skipping meals. Ensure you are getting at least 64 oz water daily.     If cost is prohibitive, you may try using [employer]ter[employer]o coupon (https://www.wegovy.com/coverage-and-savings)

## 2024-08-23 NOTE — LETTER
8/23/2024      RE: Marleen Mcfadden  [address]  [place] [address]       Dear Colleague,    Thank you for the opportunity to participate in the care of your patient, Marleen Mcfadden, at the [facility] [place] [facility]. Please see a copy of my visit note below.    Medication Therapy Management (MTM) Encounter    ASSESSMENT:                            Medication Adherence/Access: No issues identified    Weight management:   Patient would benefit from additional pharmacotherapy for weight management. Given class III obesity, recommend GLP1/GIP therapy as data to support most significant weight loss. Patient also likely to benefit from reduction in food noise and increased satiety. Negative GI symptomatology at baseline. Negative history of pancreatitis, medullary thyroid cancer and multiple endocrine neoplasia type 2. Reviewed mechanism, adverse effects, monitoring, safety, administration with use of Wegovy as recommended by Evonne Doss    For patients that are under [employer] insurance coverage, it is mandated by insurance that each qualifying patient meet with hospital based Weight Management Medication Therapy Management pharmacist to continue therapy coverage. The following patient meets the below coverage criteria and can therefore continue GLP-1/GIP agonist therapy for Weight Management:    Adult  BMI >40 with or without comorbidities   OR   BMI >30 + NAFLD*   at time of initiating GLP-1/GIP agonist therapy Approved for 29 weeks  Met Updated Initial Criteria   At least 5% weight loss of baseline body weight  Approved for 12 months          PLAN:                            Pharmacist to start Prior Authorization on Wegovy. Once approved, to start Wegovy 0.25 mg subcutaneous once weekly for 4 weeks, then if tolerating increase to 0.5 mg weekly thereafter.  RX will be sent to [facility] for both 0.25 mg and 0.5 mg doses. The 0.25 mg dose will be filled. The 0.5 mg dose will be put on hold. Call the pharmacy when ready to fill the 0.5 mg box.     To help with tolerability and effectiveness of Wegovy:  Eat small meals/snacks throughout the day (about every 2 hours)  Focus on getting protein in first with each meal and snack.   A good starting goal is 60 g protein daily (track this, especially if at weight loss plateau). Once you consistently are getting 60g daily, try getting 90 g protein daily.  Drink plenty of water - goal 64 oz throughout the day  You may try Metamucil, Benefiber, or Citrucel to help feel more full (less nausea) and have softer, more consistent bowel movements.  To optimize weight management - work on incorporating resistance training/weight lifting to build muscle and improve overall metabolism of adipose tissue.      [employer] employees with [employer] insurance ([facility]) are restricted to using the [facility] for Saxenda, Wegovy, and Zepbound    [facility]  [place], [place] - [facility] [address]  Phone: [phone]    Next steps:  After processing the prescription and saving to your profile, the pharmacy will give you an automated call to inform you that they have your prescription on file. This typically occurs within 1-2 days after the prescription is sent but may take 3-5 business days during high volume times.  You will need to call the pharmacy back to set up the first delivery of every new prescription or new medication dose.   Once you are on a stable dose, you can inquire with the pharmacy about signing up for \"Text to Order through [employer]\", \"Auto Fill\", and/or using the \"My [employer] Rx\" buster.     Follow-up: 9/27/2024 with Nan (dietitian)      SUBJECTIVE/OBJECTIVE:                          Marleen Mcfadden is a 59 year old female contacted via secure video for an " "initial visit. She was referred to me from Evonne Doss  .      Reason for visit: [facility] Insurance requirements.    Allergies/ADRs: Reviewed in chart  Past Medical History: Reviewed in chart  Tobacco: She reports that she has never smoked. She has never used smokeless tobacco.  Alcohol: Less than 1 beverage / month  Other Substance Use: none  Caffeine: 2 cups of coffee daily    Medication Adherence/Access: no issues reported    Medical History:  MEN2/Medullary Thyroid Cancer: none  Pancreatitis: none  Baseline GI symptomatology: hx of constipation, using docusate as needed, comfortable with self titrating medicaiton       Weight Management    Nutrition/Eating Habits: working with RD - addressing sugary drinks, increasing protein and fiber.    Exercise/Activity: walks a lot at work, working toward incorporating exercise on days off    Medications Tried/Failed:  None.     Current goals:   No sugary drinks - aiming for Bubbly water, zero sugar CoffeeMate creamer, crystal light instead of juice  Traerica Gutierrez is a supporter of this journey, involved with initial visit     Initial Consult Weight: 254 lbs (8/23/2024)     Current (initial) weight today: 254 lbs 0 oz    Wt Readings from Last 4 Encounters:   08/23/24 115.2 kg (254 lb)   08/16/24 115.2 kg (254 lb)   08/16/24 115.4 kg (254 lb 6.4 oz)   07/18/24 115.3 kg (254 lb 3.2 oz)     Estimated body mass index is 43.6 kg/m  as calculated from the following:    Height as of this encounter: 1.626 m (5' 4\").    Weight as of this encounter: 115.2 kg (254 lb).      ----------------      I spent 31 minutes with this patient today. All changes were made via collaborative practice agreement with Evonne Doss and Wendy Hawley PA-C as one of the credentialed prescriber of the [employer] [facility].   A copy of the visit note was provided to the patient's provider(s).    A summary of these recommendations was sent via [employer].    Ambika Nuno, " PharmD, BCACP  Medication Therapy Management (MTM) Pharmacist   [facility]    Telemedicine Visit Details  Type of service:  Video Conference via [employer]  Start Time:  2:33 PM  End Time: 3:04 PM     Medication Therapy Recommendations  No medication therapy recommendations to display       Please do not hesitate to contact me if you have any questions/concerns.     Sincerely,     Ambika Nuno [facility]

## 2024-08-23 NOTE — TELEPHONE ENCOUNTER
Prior Authorization Retail Medication Request    Medication/Dose: [facility] insurance requirements Wegovy 0.25 mg - 2.4 mg (all doses) as indicated by supply, safety, and efficacy.    Diagnosis and ICD code (if different than what is on RX):    New/renewal/insurance change PA/secondary ins. PA:    Rationale: Marleen Mcfadden is a 59 year old female with a diagnosis of Class III Obesity (BMI at least 40 kg/m2). Patient met with [facility] Medication Therapy Management Pharmacist 08/23/2024 per [facility] insurance requirements. Patient denies personal or family history of MEN Type2, MTC, Pancreatitis.     Patient would benefit from additional pharmacotherapy for weight management. Given class III obesity, recommend GLP1/GIP therapy as data to support most significant weight loss. Patient also likely to benefit from reduction in food noise and increased satiety. Negative GI symptomatology at baseline. Negative history of pancreatitis, medullary thyroid cancer and multiple endocrine neoplasia type 2. Reviewed mechanism, adverse effects, monitoring, safety, administration with use of Wegovy as recommended by Evonne Doss     For patients that are under [employer] insurance coverage, it is mandated by insurance that each qualifying patient meet with hospital based Weight Management Medication Therapy Management pharmacist to continue therapy coverage. The following patient meets the below coverage criteria and can therefore continue GLP-1/GIP agonist therapy for Weight Management:     Adult  BMI >40 with or without comorbidities   OR   BMI >30 + NAFLD*   at time of initiating GLP-1/GIP agonist therapy Approved for 29 weeks  Met Updated Initial Criteria   At least 5% weight loss of baseline body weight  Approved for 12 months         Estimated body mass index is 43.6 kg/m  as calculated from the following:    Height as of an earlier encounter on 8/23/24: 1.626 m (5'  "4\").    Weight as of an earlier encounter on 8/23/24: 115.2 kg (254 lb).     Insurance       Pharmacy Information (if different than what is on RX)  [facility]    Phone: [phone]  - Press 2 to leave a refill request on a secured voicemail  - Press 3 to place an automated refill request  - Press 4 to speak directly to a member of the pharmacy staff    Hours: Monday through Friday 8am to 7pm and Saturday 8am to 4pm      "

## 2024-08-23 NOTE — PROGRESS NOTES
Medication Therapy Management (MTM) Encounter    ASSESSMENT:                            Medication Adherence/Access: No issues identified    Weight management:   Patient would benefit from additional pharmacotherapy for weight management. Given class III obesity, recommend GLP1/GIP therapy as data to support most significant weight loss. Patient also likely to benefit from reduction in food noise and increased satiety. Negative GI symptomatology at baseline. Negative history of pancreatitis, medullary thyroid cancer and multiple endocrine neoplasia type 2. Reviewed mechanism, adverse effects, monitoring, safety, administration with use of Wegovy as recommended by Evonne Doss    For patients that are under [employer] Employee/[employer] insurance coverage, it is mandated by insurance that each qualifying patient meet with hospital based Weight Management Medication Therapy Management pharmacist to continue therapy coverage. The following patient meets the below coverage criteria and can therefore continue GLP-1/GIP agonist therapy for Weight Management:    Adult  BMI >40 with or without comorbidities   OR   BMI >30 + NAFLD*   at time of initiating GLP-1/GIP agonist therapy Approved for 29 weeks  Met Updated Initial Criteria   At least 5% weight loss of baseline body weight  Approved for 12 months          PLAN:                            Pharmacist to start Prior Authorization on Wegovy. Once approved, to start Wegovy 0.25 mg subcutaneous once weekly for 4 weeks, then if tolerating increase to 0.5 mg weekly thereafter.  RX will be sent to [facility] for both 0.25 mg and 0.5 mg doses. The 0.25 mg dose will be filled. The 0.5 mg dose will be put on hold. Call the pharmacy when ready to fill the 0.5 mg box.     To help with tolerability and effectiveness of Wegovy:  Eat small meals/snacks throughout the day (about every 2 hours)  Focus on getting protein in first with each meal and  "snack.   A good starting goal is 60 g protein daily (track this, especially if at weight loss plateau). Once you consistently are getting 60g daily, try getting 90 g protein daily.  Drink plenty of water - goal 64 oz throughout the day  You may try Metamucil, Benefiber, or Citrucel to help feel more full (less nausea) and have softer, more consistent bowel movements.  To optimize weight management - work on incorporating resistance training/weight lifting to build muscle and improve overall metabolism of adipose tissue.      [employer] employees with [employer] insurance ([facility]) are restricted to using the [employer] [facility] Pharmacy for Saxenda, Wegovy, and Zepbound    [facility]  [place], [place] - [address]  Phone: [phone]    Next steps:  After processing the prescription and saving to your profile, the pharmacy will give you an automated call to inform you that they have your prescription on file. This typically occurs within 1-2 days after the prescription is sent but may take 3-5 business days during high volume times.  You will need to call the pharmacy back to set up the first delivery of every new prescription or new medication dose.   Once you are on a stable dose, you can inquire with the pharmacy about signing up for \"Text to Order through [employer]\", \"Auto Fill\", and/or using the \"My [employer] Rx\" buster.     Follow-up: 9/27/2024 with Nan (dietitian)      SUBJECTIVE/OBJECTIVE:                          Marleen Mcfadden is a 59 year old female contacted via secure video for an initial visit. She was referred to me from Evonne Doss  .      Reason for visit: [facility] Insurance requirements.    Allergies/ADRs: Reviewed in chart  Past Medical History: Reviewed in chart  Tobacco: She reports that she has never smoked. She has never used smokeless tobacco.  Alcohol: Less than 1 beverage / month  Other Substance Use: none  Caffeine: " "2 cups of coffee daily    Medication Adherence/Access: no issues reported    Medical History:  MEN2/Medullary Thyroid Cancer: none  Pancreatitis: none  Baseline GI symptomatology: hx of constipation, using docusate as needed, comfortable with self titrating medicaiton       Weight Management     Nutrition/Eating Habits: working with RD - addressing sugary drinks, increasing protein and fiber.    Exercise/Activity: walks a lot at work, working toward incorporating exercise on days off    Medications Tried/Failed:  None.     Current goals:   No sugary drinks - aiming for Bubbly water, zero sugar CoffeeMate creamer, crystal light instead of juice  Traerica Gutierrez is a supporter of this journey, involved with initial visit     Initial Consult Weight: 254 lbs (8/23/2024)     Current (initial) weight today: 254 lbs 0 oz    Wt Readings from Last 4 Encounters:   08/23/24 115.2 kg (254 lb)   08/16/24 115.2 kg (254 lb)   08/16/24 115.4 kg (254 lb 6.4 oz)   07/18/24 115.3 kg (254 lb 3.2 oz)     Estimated body mass index is 43.6 kg/m  as calculated from the following:    Height as of this encounter: 1.626 m (5' 4\").    Weight as of this encounter: 115.2 kg (254 lb).      ----------------      I spent 31 minutes with this patient today. All changes were made via collaborative practice agreement with Evonne Doss and Wendy Hawley PA-C as one of the credentialed prescriber of the [facility].   A copy of the visit note was provided to the patient's provider(s).    A summary of these recommendations was sent via [employer].    Ambika Nuno, PharmD, BCACP  Medication Therapy Management (MTM) Pharmacist   [facility]    Telemedicine Visit Details  Type of service:  Video Conference via [employer]  Start Time:  2:33 PM  End Time: 3:04 PM     Medication Therapy Recommendations  No medication therapy recommendations to display     "

## 2024-08-23 NOTE — NURSING NOTE
Current patient location: [address]  [facility] [address]    Is the patient currently in the state of [place]? YES    Visit mode:VIDEO    If the visit is dropped, the patient can be reconnected by: VIDEO VISIT: Text to cell phone:   Telephone Information:   Mobile [phone]       Will anyone else be joining the visit? NO  (If patient encounters technical issues they should call [phone]    How would you like to obtain your AVS? MyChart    Are changes needed to the allergy or medication list? No    Are refills needed on medications prescribed by this physician? NO    Rooming Documentation:  Not applicable      Reason for visit: Medication Therapy Management    Dario ARREDONDO

## 2024-08-26 NOTE — TELEPHONE ENCOUNTER
PA Initiation    Medication: WEGOVY 0.25 MG/0.5ML SC SOAJ  Insurance Company: [employer] - Phone [phone] Fax [phone]  Pharmacy Filling the Rx: [facility] - [place], [place] - [address]  Filling Pharmacy Phone:    Filling Pharmacy Fax:    Start Date: 8/26/2024    Key: [id number]

## 2024-08-27 NOTE — TELEPHONE ENCOUNTER
Prior Authorization Approval    Medication: WEGOVY 0.25 MG/0.5ML SC SOAJ  Authorization Effective Date: 8/26/2024  Authorization Expiration Date: 3/17/2025  Approved Dose/Quantity: 2ml per 28 days  Reference #: Key: [id number]   Insurance Company: [employer] - Phone [phone] Fax [phone]  Expected CoPay: $ 75  CoPay Card Available: Yes    Financial Assistance Needed: yes  Which Pharmacy is filling the prescription: [facility] - [place], [place] - [address]   Pharmacy Notified: yes  Patient Notified: yes

## 2024-09-04 NOTE — LETTER
2021      RE: Marleen Mcfadden  [address]  [facility] [address]       Dear Colleague,    Thank you for the opportunity to participate in the care of your patient, Marleen Mcfadden, at the [facility] [place] [facility]. Please see a copy of my visit note below.    Advanced Heart Failure Clinic Follow up:  2021    HPI:   Ms Mcfadden is a 56 year old female with a history of familial cardiomyopathy (LVEF 40-45%) and bilateral total knee arthroplasties ( & ) who presents for ongoing evaluation and management of her cardiomyopathy.     The patient was last seen in clinic in 2021. Since then, she reports no CHF ER visits or hospitalizations. She underwent repair of encephalocele and CSF leak on 21.    During today's visit, the patient reports feeling largely well. She denies any chest pain or pressure, orthopnea, PND, palpitations, syncope/presyncope or change in SOB or CHAVARRIA. She does complain of mild fatigue and slightly poorer exercise capacity after being transitioned from Losartan to Entresto, but had similar complaints while on losartan.     Her current cardiac medication regimen includes Entresto 49-51mg BID, Toprol XL 50 mg daily and Aldactone 50 mg daily. She reports good compliance with all her medications.     Family history:  Her maternal grandmother  in her 30's from a brain aneurysm. Marleen's father  at 55 years after a massive heart attack and CABG procedure. Marleen's paternal grandmother's mother  suddenly in her 70's while sitting at a bus stop.   Father and mother both had heart failure and heart disease. Mother  suddenly at 37 yo  Sister had heart failure/DCM and had a heart transplant  Both son and daughter have cardiomyopathy diagnoses  Half sister on mother's side does NOT have cardiomyopathy/HF    Past Medical History:   Diagnosis Date     Acute bilateral low back pain  with right-sided sciatica 2016     Allergic rhinitis, cause unspecified      Arthritis      Autoimmune disease (H)      Autoimmune disease NEC     Autoimmune disease- unknown/poss SLE     Calcaneal spur 10/21/2014    Xray 10/17/14      CHF with cardiomyopathy (H)      Chronic low back pain      DDD (degenerative disc disease), lumbar      Depression      Failed total knee arthroplasty, initial encounter (H) 2019     Familial cardiomyopathy (H)      GERD without esophagitis      History of transfusion      Hypertension      Injury of left shoulder, initial encounter 2017     Morbid obesity (H)      Nontraumatic rupture of quadriceps tendon, left 2018     KATIA (obstructive sleep apnea)- moderate-severe (AHI 29) 2021     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     no longer an issue     Peroneus longus tendinitis 2015     Plantar fasciitis 2014     PONV (postoperative nausea and vomiting)      Rotator cuff injury 2017     Sprain of other ligament of left ankle, initial encounter 2017     Status post left knee replacement 2018     TB lung, latent     negative quantiferon gold test  12       Past Surgical History:   Procedure Laterality Date     ARTHROPLASTY REVISION KNEE Left 2019    Procedure: REVISION, LEFT TOTAL KNEE  ARTHROPLASTY;  Surgeon: Dev Rocha MD;  Location: [facility] Main OR;  Service: Orthopedics     ARTHROPLASTY REVISION KNEE Right 2020    Procedure: RIGHT REVISION TOTAL KNEE ARTHROPLASTY;  Surgeon: Dev Rocha MD;  Location: [facility] OR;  Service: Orthopedics     BREAST SURGERY      Breast Reduction     C EXCIS UTERINE FIBROID,ABD APPRCH       C EXCISE EXCESS SKIN TISSUE,ABDOMEN        SECTION  1989      SECTION  10/1985     COLONOSCOPY WITH CO2 INSUFFLATION N/A 2021    Procedure: COLONOSCOPY, WITH CO2 INSUFFLATION;  Surgeon:  Angela Harrington, ;  Location: [facility] OR     CRANIECTOMY Right 07/08/2021    Procedure: RIGHT MIDDLE FOSSA APPROACH, CSF LEAK REPAIR;  Surgeon: Jocelyn Noe MD;  Location: [facility] OR     CRANIOTOMY MIDDLE FOSSA, EXCISE ACOUSTIC NEUROMA, COMBINED N/A 07/05/2021    Procedure: Right CRANIOTOMY, MIDDLE FOSSA APPROACH, FOR REPAIR OF ENCEPHALOCELE;  Surgeon: Jocelyne Harrell MD;  Location: [facility] OR     CYSTOURETHROSCOPY N/A 08/18/2020    Procedure: CYSTOSCOPY;  Surgeon: Cherelle Willams MD;  Location: [facility];  Service: Gynecology     GYN SURGERY N/A 08/18/2020    Procedure: MIDURETHRAL SLING, CYSTOSCOPY;  Surgeon: Cherelle Willams MD;  Location: [facility];  Service: Gynecology     HYSTERECTOMY TOTAL ABDOMINAL  2006    for fibroids; reports having blood transfusion after surgery     INSERT DRAIN LUMBAR N/A 07/05/2021    Procedure: Insert drain lumbar;  Surgeon: Jocelyn Noe MD;  Location:  OR     ORTHOPEDIC SURGERY  1998    Right Knee ACL repair     THYROIDECTOMY  09/09/2013    Procedure: THYROIDECTOMY;  LEFT THYROID LOBECTOMY.  (LIGASURE, RECURRENT LARYNGEAL NERVE MONITOR) ;  Surgeon: Uriah Camargo MD;  Location: [facility]     THYROIDECTOMY       TOTAL KNEE ARTHROPLASTY Left 10/03/2017     TOTAL KNEE ARTHROPLASTY Right 02/07/2019    Procedure: RIGHT TOTAL KNEE ARTHROPLASTY;  Surgeon: Dev Rocha MD;  Location: [facility];  Service: Orthopedics     TUBAL LIGATION  1989       Family History   Problem Relation Age of Onset     Hypertension Father         dec     Diabetes Father         dec     Heart Disease Father         dec     Alcohol/Drug Father      Cardiovascular Father      Heart Disease Mother         dec     Alcohol/Drug Mother      Cardiovascular Mother      Heart Disease Daughter         Cardiomyopathy     Cardiovascular Daughter         cardiomyopathy     Colon Cancer Sister      Cardiovascular Son         cardiomyopathy      Diabetes Paternal Grandmother         dec     Hypertension Paternal Grandmother         dec     Cerebrovascular Disease Paternal Grandmother         dec     Cancer Sister         Lupus     Cardiovascular Sister         cardiomyopathy     Heart Disease Sister         heart failure, and kidney failure       Social History     Tobacco Use     Smoking status: Never Smoker     Smokeless tobacco: Never Used   Substance Use Topics     Alcohol use: Yes     Comment: rare, twice a year, she has a drink little wine 2 days ago         Current Outpatient Medications   Medication Sig     amitriptyline (ELAVIL) 25 MG tablet TAKE 1 TABLET(25 MG) BY MOUTH AT BEDTIME     Cholecalciferol (VITAMIN D) 2000 UNIT tablet Take 5,000 Units by mouth as needed Reported on 3/8/2017     Collagen 500 MG CAPS Take 1 capsule by mouth daily      estradiol cypionate (DEPO-ESTRADIOL) 5 MG/ML injection Inject into the muscle every 28 days     famotidine (PEPCID) 40 MG tablet Take 1 tablet (40 mg) by mouth daily (Patient taking differently: Take 40 mg by mouth daily as needed )     FLAXSEED, LINSEED, PO Take 1 capsule by mouth daily      fluticasone (FLONASE) 50 MCG/ACT nasal spray Spray 2 sprays into both nostrils At Bedtime     furosemide (LASIX) 20 MG tablet Take 2 tablets (40 mg) by mouth daily as needed (as needed for weight gain/edema)     loratadine (CLARITIN) 10 MG tablet Take 1 tablet (10 mg) by mouth daily     metoprolol succinate ER (TOPROL-XL) 50 MG 24 hr tablet Take 1 tablet (50 mg) by mouth daily     omeprazole (PRILOSEC) 40 MG DR capsule Take 1 capsule (40 mg) by mouth daily     progesterone (PROMETRIUM) 100 MG capsule Take 100 mg by mouth At Bedtime      sacubitril-valsartan (ENTRESTO) 49-51 MG per tablet Take 1 tablet by mouth 2 times daily     solifenacin (VESICARE) 10 MG tablet Take 10 mg by mouth daily     spironolactone (ALDACTONE) 25 MG tablet Take 2 tablets (50 mg) by mouth daily     oxyCODONE (ROXICODONE) 5 MG tablet Take 1  tablet (5 mg) by mouth every 3 hours as needed for moderate to severe pain (Patient not taking: Reported on 10/11/2021)     sertraline (ZOLOFT) 50 MG tablet TAKE 1 TABLET BY MOUTH EVERY DAY (Patient not taking: Reported on 12/8/2021)     zolpidem (AMBIEN) 5 MG tablet Take tablet by mouth 15 minutes prior to sleep, for Sleep Study (Patient not taking: Reported on 12/8/2021)     No current facility-administered medications for this visit.     Facility-Administered Medications Ordered in Other Visits   Medication     sodium chloride (PF) 0.9% PF flush 10 mL         ROS:   10 point ROS negative other than what is discussed above    EXAM:   LMP 10/19/2008 (Approximate)      GENERAL: Alert, oriented, NAD  HEENT:  NC/AT.  Sclerae white.  MMM,   NECK: No adenopathy. 2+ carotids,   HEART: RRR,+ S1 and S2, no murmurs or gallops.  JVP 6-7 cm without HJR  LUNGS:  Clear to auscultation bilaterally, no wheezes, rales, or rhonchi  ABDOMEN: Soft, obese, nontender, nondistended, bowel sounds present, no hepatomeagly appreciated although exam limited by body habitus  EXTREMITIES: No lower extremity edema.  2+ bilateral peripheral pulses.  PSYCH: Normal affect     LABS  Last Comprehensive Metabolic Panel:  Sodium   Date Value Ref Range Status   12/08/2021 138 133 - 144 mmol/L Final   07/11/2021 134 133 - 144 mmol/L Final     Potassium   Date Value Ref Range Status   12/08/2021 4.1 3.4 - 5.3 mmol/L Final   07/11/2021 4.2 3.4 - 5.3 mmol/L Final     Chloride   Date Value Ref Range Status   12/08/2021 104 94 - 109 mmol/L Final   07/11/2021 102 94 - 109 mmol/L Final     Carbon Dioxide   Date Value Ref Range Status   07/11/2021 30 20 - 32 mmol/L Final     Carbon Dioxide (CO2)   Date Value Ref Range Status   12/08/2021 28 20 - 32 mmol/L Final     Anion Gap   Date Value Ref Range Status   12/08/2021 6 3 - 14 mmol/L Final   07/11/2021 2 (L) 3 - 14 mmol/L Final     Glucose   Date Value Ref Range Status   12/08/2021 93 70 - 99 mg/dL Final    07/11/2021 87 70 - 99 mg/dL Final     Urea Nitrogen   Date Value Ref Range Status   12/08/2021 15 7 - 30 mg/dL Final   07/11/2021 14 7 - 30 mg/dL Final     Creatinine   Date Value Ref Range Status   12/08/2021 0.90 0.52 - 1.04 mg/dL Final   07/11/2021 0.79 0.52 - 1.04 mg/dL Final     GFR Estimate   Date Value Ref Range Status   12/08/2021 71 >60 mL/min/1.73m2 Final     Comment:     As of July 11, 2021, eGFR is calculated by the CKD-EPI creatinine equation, without race adjustment. eGFR can be influenced by muscle mass, exercise, and diet. The reported eGFR is an estimation only and is only applicable if the renal function is stable.   07/11/2021 83 >60 mL/min/[1.73_m2] Final     Comment:     Non  GFR Calc  Starting 12/18/2018, serum creatinine based estimated GFR (eGFR) will be   calculated using the Chronic Kidney Disease Epidemiology Collaboration   (CKD-EPI) equation.       Calcium   Date Value Ref Range Status   12/08/2021 8.8 8.5 - 10.1 mg/dL Final   07/11/2021 8.2 (L) 8.5 - 10.1 mg/dL Final       CMR Report  01-  Clinical history: 53 yo woman with a familial cardiomyopathy to have repeat CMRI.  Comparison CMR: 10/6/2016.  1.  The global systolic function is moderately decreased and the LVEF is 38%. The RV is moderately dilated while wall thickness is normal. There is moderate global hypokinesis.  2. The RV is the upper limit of normal in cavity size. The global systolic function is mildly decreased and  the RVEF is 52%.   3. Both atria are mildly dilated.  4. There is no significant valvular disease.   5. Late gadolinium enhancement imaging shows no MI, fibrosis or infiltrative disease.   CONCLUSIONS:   The global systolic function is moderately decreased and the LVEF is 38%. The RV is moderately dilated  while wall thickness is normal. There is moderate global hypokinesis.  The RV is the upper limit of normal in cavity size. The global systolic function is mildly decreased and  the  RVEF is 52%.   Compared to the prior study of 2016, LV systolic performance is minimally decreased and RV systolic  performance is similar.  Both LV and RV dilation are mildly increased.             Echo 6/16/2021  Interpretation Summary  Severe left ventricular dilation is present. LVIDd 7.1cm.  Severe diffuse hypokinesis is present.  LVEF 40% based on biplane 2D tracing.  Right ventricular function, chamber size, wall motion, and thickness are  normal.  IVC diameter and respiratory changes fall into an intermediate range  suggesting an RA pressure of 8 mmHg.  This study was compared with the study from 8/12/2020.  No significant changes noted.      Assessment and Plan: 56 year old female with a history of familial cardiomyopathy, left total knee arthroplasty on 10/3/2017 and right knee replacement on 2/20/2019, and repair of encephalocele and CSF leak on 7/5/21 who presents for ongoing evaluation and management.      # Chronic systolic heart failure secondary to familial cardiomyopathy.    LVEF 40-45%  Stage B, NYHA Class IIb-IIIa (difficult to assess d/t orthopedic limitations)  MVO2 reduced on cardiopulmonary stress test in 2020 however patient did not meet anerobic threshold therefore MVO2 not a accurate measurement of cardiac exercise capacity.  VE/VCO2 slope normal and good BP response thus no evidence of significant cardiac limitation to exercise.    On assessment, patient compensated from a volume perspective, dry and warm profile. Labs revealed stable renal function, normal lytes. No med changes today. We will refer her to [facility] for genetic counseling and possible testing given her strong family history. Ok for virtual appointment.    ACEI/ARB/ARNI: yes, entresto 49-51 mg BID  BB: Yes, continue metoprolol XL 50mg daily  Aldosterone antagonist - Continue spironolactone 50mg daily   SCD prophylaxis: Recent echo confirms LVEF 40%, thus ICD not indicated at present  % BiV pacing: N/A  Fluid status  euvolemic  NSAID use: advised to avoid NSAIDs  Encouraged patient to begin regular aerobic exercise aiming for at least 150 minutes of moderate physical activity or 75 minutes of vigorous physical activity - or an equal combination of both - each week. and follow low-salt, heart healthy diet.    RTC: In 6 months with echo and labs. Will be happy to see sooner if change in clinical status or new questions/concerns arise.    Assessment and plan discussed with Dr Kolb, who's in agreement with the plan outlined above.     Anjali Albert MD  Cardiology Fellow      I have reviewed today's vital signs, notes, medications, labs and imaging. I have also seen and examined the patient and agree with the findings and plan as outlined above.    Cassie Kolb MD  Section Head - Advanced Heart Failure, Transplantation and Mechanical Circulatory Support  Director - Adult Congenital and Cardiovascular Genetics Center  Associate Professor of Medicine, [facility]    I spent a total of 30 minutes today with the patient personally reviewing recent cardiac testing and/or laboratory results, today's history and examination, and discussion and counseling with the patient.      Please do not hesitate to contact me if you have any questions/concerns.     Sincerely,     Cassie Kolb MD     non-verbal indicators present

## 2024-09-05 DIAGNOSIS — I50.22 CHRONIC SYSTOLIC CONGESTIVE HEART FAILURE (H): Primary | ICD-10-CM

## 2024-09-12 ENCOUNTER — LAB (OUTPATIENT)
Dept: LAB | Facility: CLINIC | Age: 59
End: 2024-09-12
Payer: COMMERCIAL

## 2024-09-12 ENCOUNTER — OFFICE VISIT (OUTPATIENT)
Dept: CARDIOLOGY | Facility: CLINIC | Age: 59
End: 2024-09-12
Payer: COMMERCIAL

## 2024-09-12 VITALS
BODY MASS INDEX: 43.74 KG/M2 | WEIGHT: 254.8 LBS | DIASTOLIC BLOOD PRESSURE: 69 MMHG | SYSTOLIC BLOOD PRESSURE: 126 MMHG | HEART RATE: 63 BPM | OXYGEN SATURATION: 97 %

## 2024-09-12 DIAGNOSIS — K57.92 DIVERTICULITIS: ICD-10-CM

## 2024-09-12 DIAGNOSIS — I42.9 FAMILIAL CARDIOMYOPATHY (H): Chronic | ICD-10-CM

## 2024-09-12 DIAGNOSIS — E66.01 SEVERE OBESITY (BMI 35.0-39.9) WITH COMORBIDITY (H): ICD-10-CM

## 2024-09-12 DIAGNOSIS — I50.22 CHRONIC SYSTOLIC CONGESTIVE HEART FAILURE (H): Primary | ICD-10-CM

## 2024-09-12 DIAGNOSIS — I50.22 CHRONIC SYSTOLIC CONGESTIVE HEART FAILURE (H): ICD-10-CM

## 2024-09-12 DIAGNOSIS — I10 ESSENTIAL HYPERTENSION: Chronic | ICD-10-CM

## 2024-09-12 DIAGNOSIS — G47.33 OSA (OBSTRUCTIVE SLEEP APNEA): Chronic | ICD-10-CM

## 2024-09-12 LAB
ANION GAP SERPL CALCULATED.3IONS-SCNC: 8 MMOL/L (ref 7–15)
BUN SERPL-MCNC: 12.3 MG/DL (ref 8–23)
CALCIUM SERPL-MCNC: 8.6 MG/DL (ref 8.8–10.4)
CHLORIDE SERPL-SCNC: 104 MMOL/L (ref 98–107)
CREAT SERPL-MCNC: 0.88 MG/DL (ref 0.51–0.95)
EGFRCR SERPLBLD CKD-EPI 2021: 75 ML/MIN/1.73M2
GLUCOSE SERPL-MCNC: 88 MG/DL (ref 70–99)
HCO3 SERPL-SCNC: 26 MMOL/L (ref 22–29)
POTASSIUM SERPL-SCNC: 4.4 MMOL/L (ref 3.4–5.3)
SODIUM SERPL-SCNC: 138 MMOL/L (ref 135–145)

## 2024-09-12 PROCEDURE — 36415 COLL VENOUS BLD VENIPUNCTURE: CPT

## 2024-09-12 PROCEDURE — 99214 OFFICE O/P EST MOD 30 MIN: CPT | Performed by: NURSE PRACTITIONER

## 2024-09-12 PROCEDURE — 80048 BASIC METABOLIC PNL TOTAL CA: CPT

## 2024-09-12 RX ORDER — TORSEMIDE 20 MG/1
40 TABLET ORAL DAILY
Qty: 60 TABLET | Refills: 0 | Status: SHIPPED | OUTPATIENT
Start: 2024-09-12 | End: 2024-09-17

## 2024-09-12 ASSESSMENT — PAIN SCALES - GENERAL: PAINLEVEL: NO PAIN (0)

## 2024-09-12 NOTE — NURSING NOTE
Chief Complaint   Patient presents with    Follow Up     September 12, 2024 - Reason for visit: Return CORE, 58 yo female with systolic heart failure presents for follow up with labs prior.       Vitals were taken and medications reconciled.    Moy Lemon, RADHA  3:40 PM

## 2024-09-12 NOTE — LETTER
September 12, 2024    RE:  Marleen Mcfadden  [address]  [facility] [address]            To whom it may concern:       This letter is written to document that Ms. Marleen Mcfadden is under the medical care of Hunter Gallo NP, of the Division of Cardiology at the [facility] outpatient clinics. Ms. Mcfadden was absent from work 9/11/24 - 9/12/24 because of heart failure symptoms and for cardiology appointments with related testing done. If there are questions or concerns regarding this information, please contact the Cardiology Clinic. Thank you for your time and consideration.       Sincerely,        Hunter Gallo NP
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
## 2024-09-12 NOTE — NURSING NOTE
Labs: Patient was given results of the laboratory testing obtained today. Patient demonstrated understanding of this information and agreed to call with further questions or concerns.     Med Reconcile: Reviewed and verified all current medications with the patient. The updated medication list was printed and given to the patient.    Return Appointment: Patient given instructions regarding scheduling next clinic visit. Patient demonstrated understanding of this information and agreed to call with further questions or concerns. CORE in 2 months.    Medication Change: Patient was educated regarding prescribed medication change, including discussion of the indication, administration, side effects, and when to report to MD or RN. Patient demonstrated understanding of this information and agreed to call with further questions or concerns. Stop lasix, start torsemide 40 mg daily.    Patient stated she understood all health information given and agreed to call with further questions or concerns.    Megha Meyer, CLARIZTA

## 2024-09-12 NOTE — LETTER
September 12, 2024    RE:  Marleen Mcfadden                              [address]  [facility] [address]            To whom it may concern:       This letter is written to document that Ms. Marleen Mcfadden is under the medical care of Hunter Gallo NP, of the Division of Cardiology at the [facility] outpatient clinics. Ms. Mcfadden was absent from work 9/10/24 - 9/12/24 because of heart failure symptoms and for cardiology appointments with related testing done. If there are questions or concerns regarding this information, please contact the Cardiology Clinic. Thank you for your time and consideration.       Sincerely,        Hunter Gallo NP
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
## 2024-09-12 NOTE — PATIENT INSTRUCTIONS
Take your medicines every day, as directed    Changes made today:  Labs pending  Stop taking lasix  Start taking torsemide 40 mg daily until Monday.  RN check in on Tuesday to see what your weight is and see how shortness of breath is.   Monitor Your Weight and Symptoms    Contact us if you:    Gain 2 pounds in one day or 5 pounds in one week  Feel more short of breath  Notice more leg swelling  Feel lightheadeded   Change your lifestyle    Limit Salt or Sodium:  2000 mg  Limit Fluids:  2000 mL or approximately 64 ounces  Eat a Heart Healthy Diet  Low in saturated fats  Stay Active:  Aim to move at least 150 minutes every  week         To Contact us    During Business Hours:  [phone], option # 1      After hours, weekends or holidays:   [phone], Option #4  Ask to speak to the On-Call Cardiologist. Inform them you are a CORE/heart failure patient at the [facility].     Use [employer] allows you to communicate directly with your heart team through secure messaging.  TRAKLOK can be accessed any time on your phone, computer, or tablet.  If you need assistance, we'd be happy to help!         Keep your Heart Appointments:    CORE in 2 months     Dr. Seaman in January - I will contact you once her schedule is released         Heart Failure Support Group  Virtual meetings will continue in 2024 Please reach out if you would like to attend and we can get you the information you need to log in.     2024 dates:    Monday, August 5th, 1-2pm     Monday, September 9th, 1-2pm     Monday, October 7th, 1-2pm     Monday, November 4th, 1-2pm     Monday, December 2nd, 1-2pm     Follow the American Heart Association Diet and Lifestyle recommendations:  Limit saturated fat, trans fat, sodium, red meat, sweets and sugar-sweetened beverages. If you choose to eat red meat, compare labels and select the leanest cuts available.  Aim for at least 150 minutes of moderate physical activity or 75 minutes of vigorous  physical activity - or an equal combination of both - each week.     During business hours: [phone], press option # 1 to schedule an appointment or send a message to your care team     After hours, weekends or holidays: On Call Cardiologist- [phone]   option #4 and ask to speak to the on-call Cardiologist. Inform them you are a CORE/heart failure patient at the [facility].

## 2024-09-12 NOTE — LETTER
September 12, 2024    RE:  Marleen Mcfadden  [address]  [facility] [address]            To whom it may concern:     This letter is written to document that Ms. Marleen Mcfadden is under the medical care of Hunter Gallo NP, of the Division of Cardiology at the [facility] outpatient clinics. Ms. Mcfadden was absent from work because heart failure symptoms and for cardiology appointments with related testing done. If there are questions or concerns regarding this information, please contact the Cardiology Clinic. Thank you for your time and consideration.       Sincerely,        Hunter Gallo NP

## 2024-09-13 ENCOUNTER — MYC MEDICAL ADVICE (OUTPATIENT)
Dept: CARDIOLOGY | Facility: CLINIC | Age: 59
End: 2024-09-13
Payer: COMMERCIAL

## 2024-09-13 NOTE — TELEPHONE ENCOUNTER
Called Sarah several times to Follow-up on issue getting Torsemide; was on hold for 20+ minutes every time I called, unable to speak with pharmacy.     [employer] message sent to pt to see if Bumex can be sent to a different pharmacy for her to .     Heavenly Martinez RN

## 2024-09-14 ENCOUNTER — MYC MEDICAL ADVICE (OUTPATIENT)
Dept: CARDIOLOGY | Facility: CLINIC | Age: 59
End: 2024-09-14
Payer: COMMERCIAL

## 2024-09-14 DIAGNOSIS — I50.22 CHRONIC SYSTOLIC CONGESTIVE HEART FAILURE (H): ICD-10-CM

## 2024-09-14 DIAGNOSIS — R25.2 CRAMP OF LIMB: Primary | ICD-10-CM

## 2024-09-16 DIAGNOSIS — I50.22 CHRONIC SYSTOLIC CONGESTIVE HEART FAILURE (H): Primary | ICD-10-CM

## 2024-09-16 NOTE — PROGRESS NOTES
Date: 9/16/2024    Time of Call: 2:41 PM     Diagnosis:  cramping, heart failure     [ TORB ] Ordering provider: Dr. Seaman  Order: recheck BMP and magnesium.     Order received by: Megha Meyer RN     Follow-up/additional notes: If the Mg is <1.8 she can double her mag to 800mg BID.

## 2024-09-17 ENCOUNTER — LAB (OUTPATIENT)
Dept: LAB | Facility: CLINIC | Age: 59
End: 2024-09-17
Payer: COMMERCIAL

## 2024-09-17 DIAGNOSIS — I50.22 CHRONIC SYSTOLIC CONGESTIVE HEART FAILURE (H): ICD-10-CM

## 2024-09-17 LAB
ANION GAP SERPL CALCULATED.3IONS-SCNC: 8 MMOL/L (ref 7–15)
BUN SERPL-MCNC: 25.2 MG/DL (ref 8–23)
CALCIUM SERPL-MCNC: 9.7 MG/DL (ref 8.8–10.4)
CHLORIDE SERPL-SCNC: 97 MMOL/L (ref 98–107)
CREAT SERPL-MCNC: 1.21 MG/DL (ref 0.51–0.95)
EGFRCR SERPLBLD CKD-EPI 2021: 51 ML/MIN/1.73M2
GLUCOSE SERPL-MCNC: 105 MG/DL (ref 70–99)
HCO3 SERPL-SCNC: 30 MMOL/L (ref 22–29)
MAGNESIUM SERPL-MCNC: 1.9 MG/DL (ref 1.7–2.3)
POTASSIUM SERPL-SCNC: 4 MMOL/L (ref 3.4–5.3)
SODIUM SERPL-SCNC: 135 MMOL/L (ref 135–145)

## 2024-09-17 PROCEDURE — 83735 ASSAY OF MAGNESIUM: CPT

## 2024-09-17 PROCEDURE — 36415 COLL VENOUS BLD VENIPUNCTURE: CPT

## 2024-09-17 PROCEDURE — 80048 BASIC METABOLIC PNL TOTAL CA: CPT

## 2024-09-17 RX ORDER — TORSEMIDE 20 MG/1
20 TABLET ORAL DAILY
Qty: 90 TABLET | Refills: 0 | Status: SHIPPED | OUTPATIENT
Start: 2024-09-17

## 2024-09-17 NOTE — TELEPHONE ENCOUNTER
Date: 9/17/2024    Time of Call: 2:36 PM     Diagnosis:  OSMAR     [ TORB ] Ordering provider: Hunter Gallo NP  Order: stop torsemide for 2 days, after 2 days restart torsemide at 20 mg daily, BMP 1 week after restarting torsemide.     Order received by: Megha Meyer RN

## 2024-09-24 ENCOUNTER — TELEPHONE (OUTPATIENT)
Dept: CARDIOLOGY | Facility: CLINIC | Age: 59
End: 2024-09-24
Payer: COMMERCIAL

## 2024-09-24 NOTE — TELEPHONE ENCOUNTER
9/24 Patient confirmed scheduled appointment:  Date: 1/13/2025  Time: 1:00 PM  Visit type: Return Heart Failure  Provider: DARON  Location: [facility]  Testing/imaging: labs prior   Additional notes: n/a    29-Feb-2024 21:59

## 2024-09-26 NOTE — PROGRESS NOTES
"Video-Visit Details    Type of service:  Video Visit    Video Start Time: 10:29 AM  Video End Time: 10:45 AM      Originating Location (pt. Location): Home    Distant Location (provider location):  Offsite (providers home) [facility] [place]     Platform used for Video Visit: [employer]    Return Weight Management Nutrition Consultation    Marleen Mcfadden is a 59 year old female presents today for return weight management nutrition consultation.  Patient referred by MATTHEW Elizabeth on 2024.    Patient with Co-morbidities of obesity includin/16/2024     7:20 AM   --   I have the following health issues associated with obesity Heart Disease    GERD (Reflux)   I have the following symptoms associated with obesity Lower Extremity Swelling    Fatigue    Groin Rash     Anthropometrics:  Initial consult weight: 254 lb on 24  Estimated body mass index is 42.69 kg/m  as calculated from the following:    Height as of this encounter: 1.626 m (5' 4.02\").    Weight as of this encounter: 112.9 kg (248 lb 12.8 oz).  Current Weight: 248 lb per patient report     Recently started a diuretic, unsure how much is true weight loss.     Medications for Weight Loss:  Wegovy 0.25 mg - constipation, body cramps and headaches but got better. Notices she does not need a snack most days.     NUTRITION HISTORY  Food allergies: NKFA  Food intolerances: None   Vitamin/Mineral Supplements: Vitamin D   Previous methods of diet modification for weight loss: eating clean (olive oil, vegetables, whole foods, minimal starches)   RD before: None     Goals discussed with MATTHEW Elizabeth:   Cut out sugary drinks (sugar free coffee creamer, non-sugar pop, cutting out juice)   Exercise 3x a week   7 hours of sleep per night minimum (consider black out curtains to help with darkness in room    Typically eats 2 meals a day, skips lunch. Craves sweets . Is  able to get full. Struggles to stay " full until next meal, will snack sometimes an hour after a meal. Does struggle with portion control, only with sweets.  Does experience food noise, particularly with sweets. Does experience emotional eating (sadness, happiness, celebration). Does a loss of control around eating, particularly with homemade cake or cookies. Denies purging to compensate for overeating.    Patient would like to get an exercise plan started.     Diet recall:   Breakfast - yogurt and flaxseed.   Hydration: Drinks soda (regular coke) 1-2 times a week, more if it's in the house., juice (grape fruit, krystian d) 4 times a week with breakfast , water. Coffee in the morning with hazelnut coffee mate. ETOH socially, 1 drink per sitting, 2-3 times per month.     September 2024: Switched coffee creamer to zero sugar, doesn't love the taste of it but is making do. Has been limiting sugary drinks or doing a zero sugar option.  Purchased [employer] protein shakes. Usually drinks 1 for breakfast. Sometimes will skip lunch. Dinner has been a protein + vegetable + carb. Notices she is not needing a snack. Has been dealing with constipation. To help with this is trying to focus on more fiber, more water and drinking prune juice.  Continues to do good with minimizing alcohol.    Discussed having protein shake during the time she is skipping a meal.     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         8/16/2024     7:20 AM   Diet Recall Review with Patient   If you do eat breakfast, what types of food do you eat? pugh sausage eggs yogurt toast grits fruit english muffin   If you do eat supper, what types of food do you typically eat? beef, chicken, fish, rice,salad greens, yuri greens, yams, mac&cheese, pasta,veggies   If you do snack, what types of food do you typically eat? cake cookies candy chips fruit nuts   How many glasses of juice do you drink in a typical day? 1   How many of glasses of milk do  you drink in a typical day? 0   How many 8oz glasses of sugar containing drinks such as Niels-Aid/sweet tea do you drink in a day? 0   How many cans/bottles of sugar pop/soda/tea/sports drinks do you drink in a day? 1   How many cans/bottles of diet pop/soda/tea or sports drink do you drink in a day? 0   How often do you have a drink of alcohol? Monthly or Less   If you do drink, how many drinks might you have in a day? 1 or 2           8/16/2024     7:20 AM   Eating Habits   Generally, my meals include foods like these bread, pasta, rice, potatoes, corn, crackers, sweet dessert, pop, or juice Almost Everyday   Eat at a buffet or sit-down restaurant Less Than Weekly   Rarely sit down for a meal but snack or graze throughout Never   Eat in the middle of the night or wake up at night to eat Never   Worry about not having enough food to eat Never   I eat when I am depressed Less Than Weekly   I finish all the food on my plate even if I am already full Almost Everyday   I eat when I am preparing the meal Less Than Weekly   What foods, if any, do you crave? Sweets/Candy/Chocolate         8/16/2024     7:20 AM   Amount of Food   I feel out of control when eating Never   I eat a large amount of food, like a loaf of bread, a box of cookies, a pint/quart of ice cream, all at once Never   I eat a large amount of food even when I am not hungry Never   I eat rapidly Everyday   I eat alone because I feel embarrassed and do not want others to see how much I have eaten Never   I eat until I am uncomfortably full Never   I feel bad, disgusted, or guilty after I overeat Never     Physical Activity:  On her feet all day during work shifts, walks throughout the hospital.         8/16/2024     7:20 AM   Activity/Exercise History   How much of a typical 12 hour day do you spend sitting? Less Than Half the Day   How much of a typical 12 hour day do you spend lying down? Less Than Half the Day   How much of a typical day do you spend  walking/standing? Most of the Day   How many hours (not including work) do you spend on the TV/Video Games/Computer/Tablet/Phone? 2-3 Hours   How many times a week are you active for the purpose of exercise? Never   What keeps you from being more active? Lack of Time    Too tired     Progress Towards Previous Goals  1) Aim for  grams of protein daily. - not consistent, continues   2) Aim for 25 grams of fiber per day or at least increase fiber containing foods in diet - improving  3) Aim for 64 oz of water per day - improving    Nutrition Prescription  Recommended energy/nutrient modification.    Nutrition Diagnosis  Obesity r/t long history of positive energy balance aeb BMI >30.    Nutrition Intervention  Reviewed current dietary habits and pts history   Answered pt questions  Coordination of care   Nutrition education   AVS and handouts via [employer]    Expected Engagement: good    Nutrition Goals  1) Aim for  grams of protein daily.   2) Continue to incorporate good amounts of fiber containing foods in diet.   3) Aim for 48-64 oz of water per day.     Follow-Up: October 28    Time spent with patient: 16 minutes.  Niurka Aguilar RD, LD

## 2024-09-27 ENCOUNTER — VIRTUAL VISIT (OUTPATIENT)
Dept: ENDOCRINOLOGY | Facility: CLINIC | Age: 59
End: 2024-09-27
Payer: COMMERCIAL

## 2024-09-27 VITALS — HEIGHT: 64 IN | WEIGHT: 248.8 LBS | BODY MASS INDEX: 42.47 KG/M2

## 2024-09-27 DIAGNOSIS — E66.01 CLASS 3 SEVERE OBESITY IN ADULT, UNSPECIFIED BMI, UNSPECIFIED OBESITY TYPE, UNSPECIFIED WHETHER SERIOUS COMORBIDITY PRESENT (H): ICD-10-CM

## 2024-09-27 DIAGNOSIS — E66.813 CLASS 3 SEVERE OBESITY IN ADULT, UNSPECIFIED BMI, UNSPECIFIED OBESITY TYPE, UNSPECIFIED WHETHER SERIOUS COMORBIDITY PRESENT (H): ICD-10-CM

## 2024-09-27 DIAGNOSIS — Z71.3 NUTRITIONAL COUNSELING: Primary | ICD-10-CM

## 2024-09-27 PROCEDURE — 99207 PR NO CHARGE LOS: CPT | Mod: 95

## 2024-09-27 PROCEDURE — 97803 MED NUTRITION INDIV SUBSEQ: CPT | Mod: 95

## 2024-09-27 NOTE — TELEPHONE ENCOUNTER
Pt was to get labs this week to recheck creatinine; [employer] message sent to pt to get lab appt scheduled.     Heavenly Martinez RN

## 2024-09-27 NOTE — NURSING NOTE
Current patient location: [address]  [facility] [address]    Is the patient currently in the state of [place]? YES    Visit mode:VIDEO    If the visit is dropped, the patient can be reconnected by: VIDEO VISIT: Text to cell phone:   Telephone Information:   Mobile [phone]       Will anyone else be joining the visit? NO  (If patient encounters technical issues they should call [phone]    How would you like to obtain your AVS? MyChart    Are changes needed to the allergy or medication list? Pt stated no changes to allergies and Pt stated no med changes    Are refills needed on medications prescribed by this physician? NO    Reason for visit: RECHECK    Cassie ARREDONDO

## 2024-09-27 NOTE — LETTER
"2024       RE: Marleen Mcfadden  [address]  [facility] [address]     Dear Colleague,    Thank you for referring your patient, Marleen Mcfadden, to the [facility]NAGEMENT [facility]IN[facility] [place] at [facility]. Please see a copy of my visit note below.    Video-Visit Details    Type of service:  Video Visit    Video Start Time: 10:29 AM  Video End Time: 10:45 AM      Originating Location (pt. Location): Home    Distant Location (provider location):  Offsite (providers home) [facility]NA[facility] [place]     Platform used for Video Visit: [employer]    Return Weight Management Nutrition Consultation    Marleen Mcfadden is a 59 year old female presents today for return weight management nutrition consultation.  Patient referred by MATTHEW Elizabeth on 2024.    Patient with Co-morbidities of obesity includin/16/2024     7:20 AM   --   I have the following health issues associated with obesity Heart Disease    GERD (Reflux)   I have the following symptoms associated with obesity Lower Extremity Swelling    Fatigue    Groin Rash     Anthropometrics:  Initial consult weight: 254 lb on 24  Estimated body mass index is 42.69 kg/m  as calculated from the following:    Height as of this encounter: 1.626 m (5' 4.02\").    Weight as of this encounter: 112.9 kg (248 lb 12.8 oz).  Current Weight: 248 lb per patient report     Recently started a diuretic, unsure how much is true weight loss.     Medications for Weight Loss:  Wegovy 0.25 mg - constipation, body cramps and headaches but got better. Notices she does not need a snack most days.     NUTRITION HISTORY  Food allergies: NKFA  Food intolerances: None   Vitamin/Mineral Supplements: Vitamin D   Previous methods of diet modification for weight loss: eating clean (olive oil, vegetables, whole foods, minimal starches)   RD before: None     Goals discussed " with MATTHEW Elizabeth:   Cut out sugary drinks (sugar free coffee creamer, non-sugar pop, cutting out juice)   Exercise 3x a week   7 hours of sleep per night minimum (consider black out curtains to help with darkness in room    Typically eats 2 meals a day, skips lunch. Craves sweets . Is  able to get full. Struggles to stay full until next meal, will snack sometimes an hour after a meal. Does struggle with portion control, only with sweets.  Does experience food noise, particularly with sweets. Does experience emotional eating (sadness, happiness, celebration). Does a loss of control around eating, particularly with homemade cake or cookies. Denies purging to compensate for overeating.    Patient would like to get an exercise plan started.     Diet recall:   Breakfast - yogurt and flaxseed.   Hydration: Drinks soda (regular coke) 1-2 times a week, more if it's in the house., juice (grape fruit, krystian d) 4 times a week with breakfast , water. Coffee in the morning with hazelnut coffee mate. ETOH socially, 1 drink per sitting, 2-3 times per month.     September 2024: Switched coffee creamer to zero sugar, doesn't love the taste of it but is making do. Has been limiting sugary drinks or doing a zero sugar option.  Purchased [employer] protein shakes. Usually drinks 1 for breakfast. Sometimes will skip lunch. Dinner has been a protein + vegetable + carb. Notices she is not needing a snack. Has been dealing with constipation. To help with this is trying to focus on more fiber, more water and drinking prune juice.  Continues to do good with minimizing alcohol.    Discussed having protein shake during the time she is skipping a meal.     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         8/16/2024     7:20 AM   Diet Recall Review with Patient   If you do eat breakfast, what types of food do you eat? pugh sausage eggs yogurt toast grits fruit english muffin   If you do  eat supper, what types of food do you typically eat? beef, chicken, fish, rice,salad greens, yuri greens, yams, mac&cheese, pasta,veggies   If you do snack, what types of food do you typically eat? cake cookies candy chips fruit nuts   How many glasses of juice do you drink in a typical day? 1   How many of glasses of milk do you drink in a typical day? 0   How many 8oz glasses of sugar containing drinks such as Niels-Aid/sweet tea do you drink in a day? 0   How many cans/bottles of sugar pop/soda/tea/sports drinks do you drink in a day? 1   How many cans/bottles of diet pop/soda/tea or sports drink do you drink in a day? 0   How often do you have a drink of alcohol? Monthly or Less   If you do drink, how many drinks might you have in a day? 1 or 2           8/16/2024     7:20 AM   Eating Habits   Generally, my meals include foods like these bread, pasta, rice, potatoes, corn, crackers, sweet dessert, pop, or juice Almost Everyday   Eat at a buffet or sit-down restaurant Less Than Weekly   Rarely sit down for a meal but snack or graze throughout Never   Eat in the middle of the night or wake up at night to eat Never   Worry about not having enough food to eat Never   I eat when I am depressed Less Than Weekly   I finish all the food on my plate even if I am already full Almost Everyday   I eat when I am preparing the meal Less Than Weekly   What foods, if any, do you crave? Sweets/Candy/Chocolate         8/16/2024     7:20 AM   Amount of Food   I feel out of control when eating Never   I eat a large amount of food, like a loaf of bread, a box of cookies, a pint/quart of ice cream, all at once Never   I eat a large amount of food even when I am not hungry Never   I eat rapidly Everyday   I eat alone because I feel embarrassed and do not want others to see how much I have eaten Never   I eat until I am uncomfortably full Never   I feel bad, disgusted, or guilty after I overeat Never     Physical Activity:  On her  feet all day during work shifts, walks throughout the hospital.         8/16/2024     7:20 AM   Activity/Exercise History   How much of a typical 12 hour day do you spend sitting? Less Than Half the Day   How much of a typical 12 hour day do you spend lying down? Less Than Half the Day   How much of a typical day do you spend walking/standing? Most of the Day   How many hours (not including work) do you spend on the TV/Video Games/Computer/Tablet/Phone? 2-3 Hours   How many times a week are you active for the purpose of exercise? Never   What keeps you from being more active? Lack of Time    Too tired     Progress Towards Previous Goals  1) Aim for  grams of protein daily. - not consistent, continues   2) Aim for 25 grams of fiber per day or at least increase fiber containing foods in diet - improving  3) Aim for 64 oz of water per day - improving    Nutrition Prescription  Recommended energy/nutrient modification.    Nutrition Diagnosis  Obesity r/t long history of positive energy balance aeb BMI >30.    Nutrition Intervention  Reviewed current dietary habits and pts history   Answered pt questions  Coordination of care   Nutrition education   AVS and handouts via [employer]    Expected Engagement: good    Nutrition Goals  1) Aim for  grams of protein daily.   2) Continue to incorporate good amounts of fiber containing foods in diet.   3) Aim for 48-64 oz of water per day.     Follow-Up: October 28    Time spent with patient: 16 minutes.  Niurka Aguilar RD, LD      Again, thank you for allowing me to participate in the care of your patient.      Sincerely,    Niurka Aguilar RD

## 2024-09-27 NOTE — PATIENT INSTRUCTIONS
Kory Hawley,     Follow-up with GRACIA on October 28.    Thank you,    Niurka Aguilar RD, LD  If you would like to schedule or reschedule an appointment with the RD, please call [phone]    Nutrition Goals  1) Aim for  grams of protein daily.   2) Continue to incorporate good amounts of fiber containing foods in diet.   3) Aim for 48-64 oz of water per day.     COMPREHENSIVE WEIGHT MANAGEMENT PROGRAM  VIRTUAL SUPPORT GROUPS    At [facility], our Comprehensive Weight Management program offers on-line support groups for patients who are working on weight loss and considering, preparing for, or have had weight loss surgery.     There is no cost for this opportunity.  You are invited to attend the?Virtual Support Groups?provided by any of the following locations:    [facility] via Microsoft Teams with Yasmeen Diallo RD, RN  2.   [facility] via [employer] with Dominguez Earl, PhD, LP  3.   [place] via [employer] with Cherelle Leiva RN  4.   [facility] via a Zoom Meeting with MUSTAPHA Powers    The following Support Group information can also be found on our website:  [email or web address]      [facility]   WEIGHT LOSS SURGERY SUPPORT GROUP  The support group is a patient-lead forum that meets monthly to share experiences, encouragement and education. It is open to those who have had weight loss surgery, are scheduled for surgery, or are considering surgery.   WHEN: 3rd Wednesday of each month from 5:00PM - 6:00PM using Microsoft Teams.   FACILITATOR: Led by Yasmeen Savage RD, LD, RN, the program's Clinical Coordinator.   TO REGISTER: Please contact the clinic via [employer] or call the nurse line directly at [phone] to inform our staff that you would like an invite sent to you and to let us know the email you would like the invite sent to. Prior to the meeting, a link with directions on how  "to join the meeting will be sent to you.    2024 Meetings September 18: Sapna Posada, PhD, Outpatient Clinical Therapist, \"Pro Tips for Habit Change\".  October 16:  Let's Talk  This will be a time of group support.??   November 20:  Let's Talk  about How to Navigate the Upcoming Holiday Season.  December 18:  Let's Talk  and Celebrate the past year.       [facility] BARIATRIC CARE SUPPORT GROUP  This is open to all pre- and post- operative bariatric surgery patients as well as their support system.   WHEN: 3rd Tuesday of each month from 6:30 PM - 8:00 PM using Microsoft Teams.   FACILITATOR: Led by Dominguez Earl, Ph.D who is a Licensed Psychologist with the [facility] Comprehensive Weight Management Program.   TO REGISTER: Please send an email to Dominguez Earl, Ph.D., LP [email or web address]?if you would like an invitation to the group. Prior to the meeting, a link with directions on how to join the meeting will be sent to you.    2024 Meetings September 17: IN PERSON MEETING. [facility], [facility] [place], [place], [address], 1st floor Conference Room.  October 15: Date changed to OCTOBER 8th (2nd Tuesday).   November 19: \"Holiday Eating\", Bobbi Zhang, GRACIA, LD.  December 17: \"Hospital Stay and Compliance\", Cherelle Leiva RN, CBN.      [facility] POST-OPERATIVE BARIATRIC SURGERY SUPPORT GROUP  This is a support group for [facility] bariatric patients (and those external to [facility]) who have had bariatric surgery and are at least 3 months post-surgery.  WHEN: 4th Thursday of the month from 11:00 AM - 12:00 PM using Microsoft Teams.   FACILITATOR: Led by Certified Bariatric Nurse, Cherelle Leiva RN.   TO REGISTER: Please send an email to Cherelle at [email or web address] if you would like an invitation to the group.  Prior to the meeting, a link with " directions on how to join the meeting will be sent to you.    2024 Meetings  September 26 October 24 November 28: No meeting  December 26    [facility]   HEALTHY LIFESTYLE COACHING GROUP  This is a 60 minute virtual coaching group for those who want to lead a healthier lifestyle. Come together to set goals and overcome barriers in a supportive group environment. We will address the four pillars of health: nutrition, exercise, sleep, and emotional well-being. This group is highly recommended for those who are participating in the 24 week Healthy Lifestyle Plan and our Health Coaching sessions.   WHEN: 1st Friday of the month, 12:30 PM - 1:30 PM   using a Zoom meeting.     FACILITATOR: Led by National Board-Certified Health and , Cherelle Vega [facility].  TO REGISTER: Please call the [facility] at [phone] to register. You will get an appointment to attend in [place]. Fifteen minutes prior to the meeting, complete the e-check in and you will get the link to join the meeting.  There is no charge to attend this group and space is limited.  Please register for each month you wish to attend    2024 Meetings  September 5 No meeting.  October 4 November 1 December 6

## 2024-09-30 ENCOUNTER — LAB (OUTPATIENT)
Dept: LAB | Facility: CLINIC | Age: 59
End: 2024-09-30
Payer: COMMERCIAL

## 2024-09-30 DIAGNOSIS — I50.22 CHRONIC SYSTOLIC CONGESTIVE HEART FAILURE (H): ICD-10-CM

## 2024-09-30 DIAGNOSIS — R25.2 CRAMP OF LIMB: ICD-10-CM

## 2024-09-30 PROCEDURE — 36415 COLL VENOUS BLD VENIPUNCTURE: CPT

## 2024-09-30 PROCEDURE — 80048 BASIC METABOLIC PNL TOTAL CA: CPT

## 2024-09-30 PROCEDURE — 82550 ASSAY OF CK (CPK): CPT

## 2024-10-01 LAB
ANION GAP SERPL CALCULATED.3IONS-SCNC: 10 MMOL/L (ref 7–15)
BUN SERPL-MCNC: 16.8 MG/DL (ref 8–23)
CALCIUM SERPL-MCNC: 8.8 MG/DL (ref 8.8–10.4)
CHLORIDE SERPL-SCNC: 101 MMOL/L (ref 98–107)
CK SERPL-CCNC: 108 U/L (ref 26–192)
CREAT SERPL-MCNC: 0.98 MG/DL (ref 0.51–0.95)
EGFRCR SERPLBLD CKD-EPI 2021: 66 ML/MIN/1.73M2
GLUCOSE SERPL-MCNC: 90 MG/DL (ref 70–99)
HCO3 SERPL-SCNC: 26 MMOL/L (ref 22–29)
POTASSIUM SERPL-SCNC: 4 MMOL/L (ref 3.4–5.3)
SODIUM SERPL-SCNC: 137 MMOL/L (ref 135–145)

## 2024-10-14 ENCOUNTER — ALLIED HEALTH/NURSE VISIT (OUTPATIENT)
Dept: FAMILY MEDICINE | Facility: CLINIC | Age: 59
End: 2024-10-14
Payer: COMMERCIAL

## 2024-10-14 DIAGNOSIS — Z23 ENCOUNTER FOR IMMUNIZATION: Primary | ICD-10-CM

## 2024-10-14 PROCEDURE — 90678 RSV VACC PREF BIVALENT IM: CPT

## 2024-10-14 PROCEDURE — 90750 HZV VACC RECOMBINANT IM: CPT

## 2024-10-14 PROCEDURE — 90471 IMMUNIZATION ADMIN: CPT

## 2024-10-14 PROCEDURE — 90480 ADMN SARSCOV2 VAC 1/ONLY CMP: CPT

## 2024-10-14 PROCEDURE — 91320 SARSCV2 VAC 30MCG TRS-SUC IM: CPT

## 2024-10-14 PROCEDURE — 90472 IMMUNIZATION ADMIN EACH ADD: CPT

## 2024-10-14 PROCEDURE — 99207 PR NO CHARGE NURSE ONLY: CPT

## 2024-10-14 NOTE — PROGRESS NOTES
Prior to immunization administration, verified patients identity using patient s name and date of birth. Please see Immunization Activity for additional information.     Is the patient's temperature normal (100.5 or less)? Yes     Patient MEETS CRITERIA. PROCEED with vaccine administration.        10/14/2024   General Questionnaire    Do you have any questions for your care team about the vaccines you will be receiving today? no                10/14/2024   COVID   Have you had myocarditis or pericarditis (inflammation of or around the heart muscle) after getting a COVID-19 vaccine? !YES   Have you had a serious reaction to a COVID vaccine or something in a COVID vaccine, like polyethylene glycol (PEG) or polysorbate? No   Have you had multisystem inflammatory syndrome from COVID-19 in the past 90 days? No   Have you received a bone marrow transplant within the previous 3 months? No            DO NOT VACCINATE. Patient is NOT eligible for vaccination. Discuss with provider at upcoming appointment if they have questions.            10/14/2024   RSV   Have you had a serious reaction to the RSV vaccine or something in the RSV vaccine (like polysorbate)? No            Patient MEETS CRITERIA. PROCEED with vaccine administration.          10/14/2024   Zoster   Have you had a serious reaction to the shingles vaccine or something in the shingles vaccine? No   Do you have shingles now? No   Are you getting treatment for cancer, organ transplant, or bone marrow transplant? No   Do you have an autoimmune or inflammatory condition? No   Is the patient immunocompromised and wanting to complete the Shingles vaccine series in less than 2 months? No   Have you had Guillain-[place] syndrome within 6 weeks of getting a vaccine? No            Patient MEETS CRITERIA. PROCEED with vaccine administration.      Patient instructed to remain in clinic for 15 minutes afterwards, and to report any adverse reactions.      Link to Ancillary Visit  Immunization Standing Orders SmartSet     Screening performed by Nasreen Edward MA on 10/14/2024 at 1:53 PM.        Prior to immunization administration, verified patients identity using patient s name and date of birth. Please see Immunization Activity for additional information.     Is the patient's temperature normal (100.5 or less)? Yes     Patient MEETS CRITERIA. PROCEED with vaccine administration.        10/14/2024   General Questionnaire    Do you have any questions for your care team about the vaccines you will be receiving today? no                10/14/2024   COVID   Have you had myocarditis or pericarditis (inflammation of or around the heart muscle) after getting a COVID-19 vaccine? !YES   Have you had a serious reaction to a COVID vaccine or something in a COVID vaccine, like polyethylene glycol (PEG) or polysorbate? No   Have you had multisystem inflammatory syndrome from COVID-19 in the past 90 days? No   Have you received a bone marrow transplant within the previous 3 months? No            DO NOT VACCINATE. Patient is NOT eligible for vaccination. Discuss with provider at upcoming appointment if they have questions.            10/14/2024   RSV   Have you had a serious reaction to the RSV vaccine or something in the RSV vaccine (like polysorbate)? No            Patient MEETS CRITERIA. PROCEED with vaccine administration.          10/14/2024   Zoster   Have you had a serious reaction to the shingles vaccine or something in the shingles vaccine? No   Do you have shingles now? No   Are you getting treatment for cancer, organ transplant, or bone marrow transplant? No   Do you have an autoimmune or inflammatory condition? No   Is the patient immunocompromised and wanting to complete the Shingles vaccine series in less than 2 months? No   Have you had Guillain-[place] syndrome within 6 weeks of getting a vaccine? No            Patient MEETS CRITERIA. PROCEED with vaccine administration.      Patient  instructed to remain in clinic for 15 minutes afterwards, and to report any adverse reactions.      Link to Ancillary Visit Immunization Standing Orders SmartSet     Screening performed by Nasreen Edward MA on 10/14/2024 at 2:00 PM.

## 2024-10-18 ENCOUNTER — MYC REFILL (OUTPATIENT)
Dept: CARDIOLOGY | Facility: CLINIC | Age: 59
End: 2024-10-18
Payer: COMMERCIAL

## 2024-10-18 DIAGNOSIS — I50.32 CHRONIC DIASTOLIC CONGESTIVE HEART FAILURE (H): ICD-10-CM

## 2024-10-22 RX ORDER — SPIRONOLACTONE 25 MG/1
50 TABLET ORAL DAILY
Qty: 180 TABLET | Refills: 3 | Status: SHIPPED | OUTPATIENT
Start: 2024-10-22

## 2024-10-25 NOTE — PROGRESS NOTES
"Video-Visit Details    Type of service:  Video Visit    Video Start Time: 9:29 AM  Video End Time: 9:39 AM    Originating Location (pt. Location): Home    Distant Location (provider location):  Offsite (providers home) [facility] [place]     Platform used for Video Visit: [employer]    Return Weight Management Nutrition Consultation    Marleen Mcfadden is a 59 year old female presents today for return weight management nutrition consultation.  Patient referred by MATTHEW Elizabeth on 2024.    Patient with Co-morbidities of obesity includin/16/2024     7:20 AM   --   I have the following health issues associated with obesity Heart Disease    GERD (Reflux)   I have the following symptoms associated with obesity Lower Extremity Swelling    Fatigue    Groin Rash     Anthropometrics:  Initial consult weight: 254 lb on 24  Estimated body mass index is 42.74 kg/m  as calculated from the following:    Height as of this encounter: 1.626 m (5' 4\").    Weight as of this encounter: 112.9 kg (249 lb).  Current Weight: 249 lb per patient report     Medications for Weight Loss:  Wegovy 0.5 mg - constipation, has some nausea at times.     NUTRITION HISTORY  Food allergies: NKFA  Food intolerances: None   Vitamin/Mineral Supplements: Vitamin D   Previous methods of diet modification for weight loss: eating clean (olive oil, vegetables, whole foods, minimal starches)   RD before: None     Goals discussed with MATTHEW Elizabeth:   Cut out sugary drinks (sugar free coffee creamer, non-sugar pop, cutting out juice)   Exercise 3x a week   7 hours of sleep per night minimum (consider black out curtains to help with darkness in room    Typically eats 2 meals a day, skips lunch. Craves sweets . Is  able to get full. Struggles to stay full until next meal, will snack sometimes an hour after a meal. Does struggle with portion control, only with sweets.  Does experience food noise, " particularly with sweets. Does experience emotional eating (sadness, happiness, celebration). Does a loss of control around eating, particularly with homemade cake or cookies. Denies purging to compensate for overeating.    Patient would like to get an exercise plan started.     Diet recall:   Breakfast - yogurt and flaxseed.   Hydration: Drinks soda (regular coke) 1-2 times a week, more if it's in the house., juice (grape fruit, krystian d) 4 times a week with breakfast , water. Coffee in the morning with hazelnut coffee mate. ETOH socially, 1 drink per sitting, 2-3 times per month.     September 2024: Switched coffee creamer to zero sugar, doesn't love the taste of it but is making do. Has been limiting sugary drinks or doing a zero sugar option.  Purchased Cronote protein shakes. Usually drinks 1 for breakfast. Sometimes will skip lunch. Dinner has been a protein + vegetable + carb. Notices she is not needing a snack. Has been dealing with constipation. To help with this is trying to focus on more fiber, more water and drinking prune juice.  Continues to do good with minimizing alcohol.    Discussed having protein shake during the time she is skipping a meal.     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     October 2024: Has been working on increasing her fruits and vegetable consumption. Eating 2 meals a day, tries to get 3 in if she has time with work. Yogurt in the morning OR 2 boiled eggs with 2 turkey sausage, Lunch - something with protein. Has a protein shake in her purse if she isn't able to take a true lunch break. Dinner will have a protein and a vegetable, water 48-64 oz  daily. Continues to get in a lot of activity throughout the day with her job and being on her feet walking around.         8/16/2024     7:20 AM   Diet Recall Review with Patient   If you do eat breakfast, what types of food do you eat? pugh sausage eggs yogurt toast grits fruit  english muffin   If you do eat supper, what types of food do you typically eat? beef, chicken, fish, rice,salad greens, yuri greens, yams, mac&cheese, pasta,veggies   If you do snack, what types of food do you typically eat? cake cookies candy chips fruit nuts   How many glasses of juice do you drink in a typical day? 1   How many of glasses of milk do you drink in a typical day? 0   How many 8oz glasses of sugar containing drinks such as Niels-Aid/sweet tea do you drink in a day? 0   How many cans/bottles of sugar pop/soda/tea/sports drinks do you drink in a day? 1   How many cans/bottles of diet pop/soda/tea or sports drink do you drink in a day? 0   How often do you have a drink of alcohol? Monthly or Less   If you do drink, how many drinks might you have in a day? 1 or 2           8/16/2024     7:20 AM   Eating Habits   Generally, my meals include foods like these bread, pasta, rice, potatoes, corn, crackers, sweet dessert, pop, or juice Almost Everyday   Eat at a buffet or sit-down restaurant Less Than Weekly   Rarely sit down for a meal but snack or graze throughout Never   Eat in the middle of the night or wake up at night to eat Never   Worry about not having enough food to eat Never   I eat when I am depressed Less Than Weekly   I finish all the food on my plate even if I am already full Almost Everyday   I eat when I am preparing the meal Less Than Weekly   What foods, if any, do you crave? Sweets/Candy/Chocolate         8/16/2024     7:20 AM   Amount of Food   I feel out of control when eating Never   I eat a large amount of food, like a loaf of bread, a box of cookies, a pint/quart of ice cream, all at once Never   I eat a large amount of food even when I am not hungry Never   I eat rapidly Everyday   I eat alone because I feel embarrassed and do not want others to see how much I have eaten Never   I eat until I am uncomfortably full Never   I feel bad, disgusted, or guilty after I overeat Never      Physical Activity:  On her feet all day during work shifts, walks throughout the hospital.         8/16/2024     7:20 AM   Activity/Exercise History   How much of a typical 12 hour day do you spend sitting? Less Than Half the Day   How much of a typical 12 hour day do you spend lying down? Less Than Half the Day   How much of a typical day do you spend walking/standing? Most of the Day   How many hours (not including work) do you spend on the TV/Video Games/Computer/Tablet/Phone? 2-3 Hours   How many times a week are you active for the purpose of exercise? Never   What keeps you from being more active? Lack of Time    Too tired     Progress Towards Previous Goals - continues   1) Aim for  grams of protein daily.   2) Continue to incorporate good amounts of fiber containing foods in diet.   3) Aim for 48-64 oz of water per day.     Nutrition Prescription  Recommended energy/nutrient modification.    Nutrition Diagnosis  Obesity r/t long history of positive energy balance aeb BMI >30 - improving.    Nutrition Intervention  Reviewed current dietary habits and pts history   Answered pt questions  Coordination of care   Nutrition education   AVS and handouts via [facility]    Expected Engagement: good    Nutrition Goals  1) Aim for  grams of protein daily.   2) Continue to incorporate good amounts of fiber containing foods in diet.   3) Aim for 48-64 oz of water per day.     Follow-Up: January 6.     Time spent with patient: 10 minutes.  Niurka Aguilar RD, LD

## 2024-10-28 ENCOUNTER — VIRTUAL VISIT (OUTPATIENT)
Dept: ENDOCRINOLOGY | Facility: CLINIC | Age: 59
End: 2024-10-28
Payer: COMMERCIAL

## 2024-10-28 VITALS — BODY MASS INDEX: 42.51 KG/M2 | HEIGHT: 64 IN | WEIGHT: 249 LBS

## 2024-10-28 DIAGNOSIS — Z71.3 NUTRITIONAL COUNSELING: Primary | ICD-10-CM

## 2024-10-28 DIAGNOSIS — E66.01 CLASS 3 SEVERE OBESITY IN ADULT, UNSPECIFIED BMI, UNSPECIFIED OBESITY TYPE, UNSPECIFIED WHETHER SERIOUS COMORBIDITY PRESENT (H): ICD-10-CM

## 2024-10-28 DIAGNOSIS — E66.813 CLASS 3 SEVERE OBESITY IN ADULT, UNSPECIFIED BMI, UNSPECIFIED OBESITY TYPE, UNSPECIFIED WHETHER SERIOUS COMORBIDITY PRESENT (H): ICD-10-CM

## 2024-10-28 PROCEDURE — 99207 PR NO CHARGE LOS: CPT | Mod: 95

## 2024-10-28 PROCEDURE — 97803 MED NUTRITION INDIV SUBSEQ: CPT | Mod: 95

## 2024-10-28 ASSESSMENT — PAIN SCALES - GENERAL: PAINLEVEL_OUTOF10: NO PAIN (0)

## 2024-10-28 NOTE — NURSING NOTE
Current patient location: home    Is the patient currently in the state of [place]? YES    Visit mode:VIDEO    If the visit is dropped, the patient can be reconnected by: VIDEO VISIT: Text to cell phone:   Telephone Information:   Mobile [phone]       Will anyone else be joining the visit? NO  (If patient encounters technical issues they should call [phone]    Are changes needed to the allergy or medication list? N/A    Are refills needed on medications prescribed by this physician? NO    Rooming Documentation:  Not applicable      Reason for visit: RECHECK    Wt other than 24 hrs:    Pain more than one location:  no  Ivette ARREDONDO

## 2024-10-28 NOTE — LETTER
"10/28/2024       RE: Marleen Mcfadden  [address]  [facility] [address]     Dear Colleague,    Thank you for referring your patient, Marleen Mcfadden, to the [facility]. Please see a copy of my visit note below.    Video-Visit Details    Type of service:  Video Visit    Video Start Time: 9:29 AM  Video End Time: 9:39 AM    Originating Location (pt. Location): Home    Distant Location (provider location):  Offsite (providers home) [facility]     Platform used for Video Visit: [employer]    Return Weight Management Nutrition Consultation    Marleen Mcfadden is a 59 year old female presents today for return weight management nutrition consultation.  Patient referred by MATTHEW Elizabeth on 2024.    Patient with Co-morbidities of obesity includin/16/2024     7:20 AM   --   I have the following health issues associated with obesity Heart Disease    GERD (Reflux)   I have the following symptoms associated with obesity Lower Extremity Swelling    Fatigue    Groin Rash     Anthropometrics:  Initial consult weight: 254 lb on 24  Estimated body mass index is 42.74 kg/m  as calculated from the following:    Height as of this encounter: 1.626 m (5' 4\").    Weight as of this encounter: 112.9 kg (249 lb).  Current Weight: 249 lb per patient report     Medications for Weight Loss:  Wegovy 0.5 mg - constipation, has some nausea at times.     NUTRITION HISTORY  Food allergies: NKFA  Food intolerances: None   Vitamin/Mineral Supplements: Vitamin D   Previous methods of diet modification for weight loss: eating clean (olive oil, vegetables, whole foods, minimal starches)   RD before: None     Goals discussed with MATTHEW Elizabeth:   Cut out sugary drinks (sugar free coffee creamer, non-sugar pop, cutting out juice)   Exercise 3x a week   7 hours of " sleep per night minimum (consider black out curtains to help with darkness in room    Typically eats 2 meals a day, skips lunch. Craves sweets . Is  able to get full. Struggles to stay full until next meal, will snack sometimes an hour after a meal. Does struggle with portion control, only with sweets.  Does experience food noise, particularly with sweets. Does experience emotional eating (sadness, happiness, celebration). Does a loss of control around eating, particularly with homemade cake or cookies. Denies purging to compensate for overeating.    Patient would like to get an exercise plan started.     Diet recall:   Breakfast - yogurt and flaxseed.   Hydration: Drinks soda (regular coke) 1-2 times a week, more if it's in the house., juice (grape fruit, krystian d) 4 times a week with breakfast , water. Coffee in the morning with hazelnut coffee mate. ETOH socially, 1 drink per sitting, 2-3 times per month.     September 2024: Switched coffee creamer to zero sugar, doesn't love the taste of it but is making do. Has been limiting sugary drinks or doing a zero sugar option.  Purchased Red Mapache protein shakes. Usually drinks 1 for breakfast. Sometimes will skip lunch. Dinner has been a protein + vegetable + carb. Notices she is not needing a snack. Has been dealing with constipation. To help with this is trying to focus on more fiber, more water and drinking prune juice.  Continues to do good with minimizing alcohol.    Discussed having protein shake during the time she is skipping a meal.     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     October 2024: Has been working on increasing her fruits and vegetable consumption. Eating 2 meals a day, tries to get 3 in if she has time with work. Yogurt in the morning OR 2 boiled eggs with 2 turkey sausage, Lunch - something with protein. Has a protein shake in her purse if she isn't able to take a true lunch break. Dinner will  have a protein and a vegetable, water 48-64 oz  daily. Continues to get in a lot of activity throughout the day with her job and being on her feet walking around.         8/16/2024     7:20 AM   Diet Recall Review with Patient   If you do eat breakfast, what types of food do you eat? pugh sausage eggs yogurt toast grits fruit english muffin   If you do eat supper, what types of food do you typically eat? beef, chicken, fish, rice,salad greens, yuri greens, yams, mac&cheese, pasta,veggies   If you do snack, what types of food do you typically eat? cake cookies candy chips fruit nuts   How many glasses of juice do you drink in a typical day? 1   How many of glasses of milk do you drink in a typical day? 0   How many 8oz glasses of sugar containing drinks such as Niels-Aid/sweet tea do you drink in a day? 0   How many cans/bottles of sugar pop/soda/tea/sports drinks do you drink in a day? 1   How many cans/bottles of diet pop/soda/tea or sports drink do you drink in a day? 0   How often do you have a drink of alcohol? Monthly or Less   If you do drink, how many drinks might you have in a day? 1 or 2           8/16/2024     7:20 AM   Eating Habits   Generally, my meals include foods like these bread, pasta, rice, potatoes, corn, crackers, sweet dessert, pop, or juice Almost Everyday   Eat at a buffet or sit-down restaurant Less Than Weekly   Rarely sit down for a meal but snack or graze throughout Never   Eat in the middle of the night or wake up at night to eat Never   Worry about not having enough food to eat Never   I eat when I am depressed Less Than Weekly   I finish all the food on my plate even if I am already full Almost Everyday   I eat when I am preparing the meal Less Than Weekly   What foods, if any, do you crave? Sweets/Candy/Chocolate         8/16/2024     7:20 AM   Amount of Food   I feel out of control when eating Never   I eat a large amount of food, like a loaf of bread, a box of cookies, a  pint/quart of ice cream, all at once Never   I eat a large amount of food even when I am not hungry Never   I eat rapidly Everyday   I eat alone because I feel embarrassed and do not want others to see how much I have eaten Never   I eat until I am uncomfortably full Never   I feel bad, disgusted, or guilty after I overeat Never     Physical Activity:  On her feet all day during work shifts, walks throughout the hospital.         8/16/2024     7:20 AM   Activity/Exercise History   How much of a typical 12 hour day do you spend sitting? Less Than Half the Day   How much of a typical 12 hour day do you spend lying down? Less Than Half the Day   How much of a typical day do you spend walking/standing? Most of the Day   How many hours (not including work) do you spend on the TV/Video Games/Computer/Tablet/Phone? 2-3 Hours   How many times a week are you active for the purpose of exercise? Never   What keeps you from being more active? Lack of Time    Too tired     Progress Towards Previous Goals - continues   1) Aim for  grams of protein daily.   2) Continue to incorporate good amounts of fiber containing foods in diet.   3) Aim for 48-64 oz of water per day.     Nutrition Prescription  Recommended energy/nutrient modification.    Nutrition Diagnosis  Obesity r/t long history of positive energy balance aeb BMI >30 - improving.    Nutrition Intervention  Reviewed current dietary habits and pts history   Answered pt questions  Coordination of care   Nutrition education   AVS and handouts via [employer]    Expected Engagement: good    Nutrition Goals  1) Aim for  grams of protein daily.   2) Continue to incorporate good amounts of fiber containing foods in diet.   3) Aim for 48-64 oz of water per day.     Follow-Up: January 6.     Time spent with patient: 10 minutes.  Niurka Aguilar RD, LD      Again, thank you for allowing me to participate in the care of your patient.      Sincerely,    Niurka Aguilar RD

## 2024-10-28 NOTE — PATIENT INSTRUCTIONS
Kory Hawley,     Follow-up with GRACIA on January 6.     Thank you,    Niurka Aguilar RD, LD  If you would like to schedule or reschedule an appointment with the RD, please call [phone]    Nutrition Goals  1) Aim for  grams of protein daily.   2) Continue to incorporate good amounts of fiber containing foods in diet.   3) Aim for 48-64 oz of water per day.     COMPREHENSIVE WEIGHT MANAGEMENT PROGRAM  VIRTUAL SUPPORT GROUPS    At [facility], our Comprehensive Weight Management program offers on-line support groups for patients who are working on weight loss and considering, preparing for, or have had weight loss surgery.     There is no cost for this opportunity.  You are invited to attend the?Virtual Support Groups?provided by any of the following locations:    [facility] via Microsoft Teams with Yasmeen Diallo RD, RN  2.   [place] via [employer] Signals with Dominguez Earl, PhD, LP  3.   [place] via [employer] Signals with Cherelle Leiva RN  4.   [facility] via a Zoom Meeting with MUSTAPHA Powers    The following Support Group information can also be found on our website:  [email or web address]      [facility]   WEIGHT LOSS SURGERY SUPPORT GROUP  The support group is a patient-lead forum that meets monthly to share experiences, encouragement and education. It is open to those who have had weight loss surgery, are scheduled for surgery, or are considering surgery.   WHEN: 3rd Wednesday of each month from 5:00PM - 6:00PM using Microsoft Teams.   FACILITATOR: Led by Yasmeen Savage RD, LD, RN, the program's Clinical Coordinator.   TO REGISTER: Please contact the clinic via [employer] or call the nurse line directly at [phone] to inform our staff that you would like an invite sent to you and to let us know the email you would like the invite sent to. Prior to the meeting, a link with directions on how  "to join the meeting will be sent to you.    2024 Meetings September 18: Sapna Posada, PhD, Outpatient Clinical Therapist, \"Pro Tips for Habit Change\".  October 16:  Let's Talk  This will be a time of group support.??   November 20:  Let's Talk  about How to Navigate the Upcoming Holiday Season.  December 18:  Let's Talk  and Celebrate the past year.       [facility] BARIATRIC CARE SUPPORT GROUP  This is open to all pre- and post- operative bariatric surgery patients as well as their support system.   WHEN: 3rd Tuesday of each month from 6:30 PM - 8:00 PM using Microsoft Teams.   FACILITATOR: Led by Dominguez Earl, Ph.D who is a Licensed Psychologist with the [facility] Comprehensive Weight Management Program.   TO REGISTER: Please send an email to Dominguez Earl, Ph.D., LP at?[email or web address]?if you would like an invitation to the group. Prior to the meeting, a link with directions on how to join the meeting will be sent to you.    2024 Meetings September 17: IN PERSON MEETING. [facility], [facility] [place], [place], [address], 1st floor Conference Room.  October 15: Date changed to OCTOBER 8th (2nd Tuesday).   November 19: \"Holiday Eating\", Bobbi Zhang, GRACIA, LD.  December 17: \"Hospital Stay and Compliance\", Cherelle Leiva RN, CBN.      [facility] POST-OPERATIVE BARIATRIC SURGERY SUPPORT GROUP  This is a support group for [facility] bariatric patients (and those external to [facility]) who have had bariatric surgery and are at least 3 months post-surgery.  WHEN: 4th Thursday of the month from 11:00 AM - 12:00 PM using Microsoft Teams.   FACILITATOR: Led by Certified Bariatric Nurse, Cherelle Leiva RN.   TO REGISTER: Please send an email to Cherelle at [email or web address] if you would like an invitation to the group.  Prior to the meeting, a link with " directions on how to join the meeting will be sent to you.    2024 Meetings  September 26 October 24 November 28: No meeting  December 26    [facility]   HEALTHY LIFESTYLE COACHING GROUP  This is a 60 minute virtual coaching group for those who want to lead a healthier lifestyle. Come together to set goals and overcome barriers in a supportive group environment. We will address the four pillars of health: nutrition, exercise, sleep, and emotional well-being. This group is highly recommended for those who are participating in the 24 week Healthy Lifestyle Plan and our Health Coaching sessions.   WHEN: 1st Friday of the month, 12:30 PM - 1:30 PM   using a Zoom meeting.     FACILITATOR: Led by National Board-Certified Health and , Cherelle Vega [facility].  TO REGISTER: Please call the [employer] at [phone] to register. You will get an appointment to attend in [employer]. Fifteen minutes prior to the meeting, complete the e-check in and you will get the link to join the meeting.  There is no charge to attend this group and space is limited.  Please register for each month you wish to attend    2024 Meetings  September 5 No meeting.  October 4 November 1 December 6

## 2024-10-29 DIAGNOSIS — E66.01 SEVERE OBESITY (BMI 35.0-39.9) WITH COMORBIDITY (H): Primary | ICD-10-CM

## 2024-10-29 NOTE — TELEPHONE ENCOUNTER
Sending dose increase for Wegovy 1 mg per request from Evonne Doss PA-C after follow-up with registered dietitian.     Ambika Nuno, PharmD, BCACP  Medication Therapy Management (MTM) Pharmacist   [facility] Weight Management [facility]

## 2024-11-06 DIAGNOSIS — I50.32 CHRONIC DIASTOLIC CONGESTIVE HEART FAILURE (H): Primary | ICD-10-CM

## 2024-11-06 DIAGNOSIS — I50.22 CHRONIC SYSTOLIC CONGESTIVE HEART FAILURE (H): Chronic | ICD-10-CM

## 2024-11-06 DIAGNOSIS — I42.9 FAMILIAL CARDIOMYOPATHY (H): Chronic | ICD-10-CM

## 2024-11-09 ENCOUNTER — HOSPITAL ENCOUNTER (OUTPATIENT)
Facility: CLINIC | Age: 59
Setting detail: OBSERVATION
Discharge: HOME | End: 2024-11-10
Attending: FAMILY MEDICINE | Admitting: INTERNAL MEDICINE
Payer: COMMERCIAL

## 2024-11-09 ENCOUNTER — APPOINTMENT (OUTPATIENT)
Dept: GENERAL RADIOLOGY | Facility: CLINIC | Age: 59
End: 2024-11-09
Attending: FAMILY MEDICINE
Payer: COMMERCIAL

## 2024-11-09 DIAGNOSIS — I50.22 CHRONIC SYSTOLIC CONGESTIVE HEART FAILURE (H): Chronic | ICD-10-CM

## 2024-11-09 DIAGNOSIS — R68.83 CHILLS: ICD-10-CM

## 2024-11-09 DIAGNOSIS — R06.02 SHORTNESS OF BREATH: ICD-10-CM

## 2024-11-09 DIAGNOSIS — R55 NEAR SYNCOPE: ICD-10-CM

## 2024-11-09 DIAGNOSIS — R60.9 EDEMA, UNSPECIFIED TYPE: ICD-10-CM

## 2024-11-09 DIAGNOSIS — R42 DIZZINESS: ICD-10-CM

## 2024-11-09 DIAGNOSIS — R51.9 NONINTRACTABLE HEADACHE, UNSPECIFIED CHRONICITY PATTERN, UNSPECIFIED HEADACHE TYPE: Primary | ICD-10-CM

## 2024-11-09 DIAGNOSIS — E66.01 SEVERE OBESITY (BMI 35.0-39.9) WITH COMORBIDITY (H): ICD-10-CM

## 2024-11-09 DIAGNOSIS — M25.512 ACUTE PAIN OF LEFT SHOULDER: ICD-10-CM

## 2024-11-09 LAB
ALBUMIN SERPL BCG-MCNC: 4.3 G/DL (ref 3.5–5.2)
ALBUMIN UR-MCNC: NEGATIVE MG/DL
ALP SERPL-CCNC: 75 U/L (ref 40–150)
ALT SERPL W P-5'-P-CCNC: 28 U/L (ref 0–50)
ANION GAP SERPL CALCULATED.3IONS-SCNC: 10 MMOL/L (ref 7–15)
APPEARANCE UR: CLEAR
APTT PPP: 27 SECONDS (ref 22–38)
AST SERPL W P-5'-P-CCNC: 15 U/L (ref 0–45)
BASOPHILS # BLD AUTO: 0.1 10E3/UL (ref 0–0.2)
BASOPHILS NFR BLD AUTO: 1 %
BILIRUB SERPL-MCNC: 0.3 MG/DL
BILIRUB UR QL STRIP: NEGATIVE
BUN SERPL-MCNC: 15.6 MG/DL (ref 8–23)
CALCIUM SERPL-MCNC: 9.6 MG/DL (ref 8.8–10.4)
CHLORIDE SERPL-SCNC: 100 MMOL/L (ref 98–107)
COLOR UR AUTO: ABNORMAL
CREAT SERPL-MCNC: 0.99 MG/DL (ref 0.51–0.95)
EGFRCR SERPLBLD CKD-EPI 2021: 65 ML/MIN/1.73M2
EOSINOPHIL # BLD AUTO: 0.2 10E3/UL (ref 0–0.7)
EOSINOPHIL NFR BLD AUTO: 3 %
ERYTHROCYTE [DISTWIDTH] IN BLOOD BY AUTOMATED COUNT: 13.1 % (ref 10–15)
FLUAV RNA SPEC QL NAA+PROBE: NEGATIVE
FLUBV RNA RESP QL NAA+PROBE: NEGATIVE
GLUCOSE SERPL-MCNC: 98 MG/DL (ref 70–99)
GLUCOSE UR STRIP-MCNC: NEGATIVE MG/DL
HCO3 SERPL-SCNC: 27 MMOL/L (ref 22–29)
HCT VFR BLD AUTO: 38.6 % (ref 35–47)
HGB BLD-MCNC: 12.8 G/DL (ref 11.7–15.7)
HGB UR QL STRIP: NEGATIVE
IMM GRANULOCYTES # BLD: 0 10E3/UL
IMM GRANULOCYTES NFR BLD: 0 %
INR PPP: 0.94 (ref 0.85–1.15)
KETONES UR STRIP-MCNC: NEGATIVE MG/DL
LEUKOCYTE ESTERASE UR QL STRIP: NEGATIVE
LYMPHOCYTES # BLD AUTO: 2.1 10E3/UL (ref 0.8–5.3)
LYMPHOCYTES NFR BLD AUTO: 33 %
MAGNESIUM SERPL-MCNC: 1.9 MG/DL (ref 1.7–2.3)
MAGNESIUM SERPL-MCNC: 1.9 MG/DL (ref 1.7–2.3)
MCH RBC QN AUTO: 30.9 PG (ref 26.5–33)
MCHC RBC AUTO-ENTMCNC: 33.2 G/DL (ref 31.5–36.5)
MCV RBC AUTO: 93 FL (ref 78–100)
MONOCYTES # BLD AUTO: 0.4 10E3/UL (ref 0–1.3)
MONOCYTES NFR BLD AUTO: 6 %
MUCOUS THREADS #/AREA URNS LPF: PRESENT /LPF
NEUTROPHILS # BLD AUTO: 3.8 10E3/UL (ref 1.6–8.3)
NEUTROPHILS NFR BLD AUTO: 58 %
NITRATE UR QL: NEGATIVE
NRBC # BLD AUTO: 0 10E3/UL
NRBC BLD AUTO-RTO: 0 /100
NT-PROBNP SERPL-MCNC: 193 PG/ML (ref 0–900)
PH UR STRIP: 6.5 [PH] (ref 5–7)
PLATELET # BLD AUTO: 288 10E3/UL (ref 150–450)
POTASSIUM SERPL-SCNC: 4.4 MMOL/L (ref 3.4–5.3)
PROT SERPL-MCNC: 7.9 G/DL (ref 6.4–8.3)
RBC # BLD AUTO: 4.14 10E6/UL (ref 3.8–5.2)
RBC URINE: <1 /HPF
RSV RNA SPEC NAA+PROBE: NEGATIVE
SARS-COV-2 RNA RESP QL NAA+PROBE: NEGATIVE
SODIUM SERPL-SCNC: 137 MMOL/L (ref 135–145)
SP GR UR STRIP: 1.01 (ref 1–1.03)
SQUAMOUS EPITHELIAL: 1 /HPF
T4 FREE SERPL-MCNC: 0.99 NG/DL (ref 0.9–1.7)
TROPONIN T SERPL HS-MCNC: <6 NG/L
TSH SERPL DL<=0.005 MIU/L-ACNC: 4.49 UIU/ML (ref 0.3–4.2)
UROBILINOGEN UR STRIP-MCNC: NORMAL MG/DL
WBC # BLD AUTO: 6.5 10E3/UL (ref 4–11)
WBC URINE: 1 /HPF

## 2024-11-09 PROCEDURE — 99285 EMERGENCY DEPT VISIT HI MDM: CPT | Performed by: FAMILY MEDICINE

## 2024-11-09 PROCEDURE — 85610 PROTHROMBIN TIME: CPT | Performed by: FAMILY MEDICINE

## 2024-11-09 PROCEDURE — 84484 ASSAY OF TROPONIN QUANT: CPT | Performed by: FAMILY MEDICINE

## 2024-11-09 PROCEDURE — 96360 HYDRATION IV INFUSION INIT: CPT | Performed by: FAMILY MEDICINE

## 2024-11-09 PROCEDURE — 85730 THROMBOPLASTIN TIME PARTIAL: CPT | Performed by: FAMILY MEDICINE

## 2024-11-09 PROCEDURE — G0378 HOSPITAL OBSERVATION PER HR: HCPCS

## 2024-11-09 PROCEDURE — 87637 SARSCOV2&INF A&B&RSV AMP PRB: CPT | Performed by: FAMILY MEDICINE

## 2024-11-09 PROCEDURE — 36415 COLL VENOUS BLD VENIPUNCTURE: CPT | Performed by: FAMILY MEDICINE

## 2024-11-09 PROCEDURE — 82040 ASSAY OF SERUM ALBUMIN: CPT | Performed by: FAMILY MEDICINE

## 2024-11-09 PROCEDURE — 81003 URINALYSIS AUTO W/O SCOPE: CPT | Performed by: FAMILY MEDICINE

## 2024-11-09 PROCEDURE — 87633 RESP VIRUS 12-25 TARGETS: CPT | Performed by: PHYSICIAN ASSISTANT

## 2024-11-09 PROCEDURE — 99207 PR APP CREDIT; MD BILLING SHARED VISIT: CPT | Performed by: PHYSICIAN ASSISTANT

## 2024-11-09 PROCEDURE — 250N000013 HC RX MED GY IP 250 OP 250 PS 637: Performed by: FAMILY MEDICINE

## 2024-11-09 PROCEDURE — 84484 ASSAY OF TROPONIN QUANT: CPT | Performed by: PHYSICIAN ASSISTANT

## 2024-11-09 PROCEDURE — 250N000013 HC RX MED GY IP 250 OP 250 PS 637: Performed by: PHYSICIAN ASSISTANT

## 2024-11-09 PROCEDURE — 83735 ASSAY OF MAGNESIUM: CPT | Performed by: PHYSICIAN ASSISTANT

## 2024-11-09 PROCEDURE — 87486 CHLMYD PNEUM DNA AMP PROBE: CPT | Performed by: PHYSICIAN ASSISTANT

## 2024-11-09 PROCEDURE — 93005 ELECTROCARDIOGRAM TRACING: CPT | Performed by: FAMILY MEDICINE

## 2024-11-09 PROCEDURE — 84443 ASSAY THYROID STIM HORMONE: CPT | Performed by: FAMILY MEDICINE

## 2024-11-09 PROCEDURE — 99222 1ST HOSP IP/OBS MODERATE 55: CPT | Mod: FS | Performed by: INTERNAL MEDICINE

## 2024-11-09 PROCEDURE — 83880 ASSAY OF NATRIURETIC PEPTIDE: CPT | Performed by: FAMILY MEDICINE

## 2024-11-09 PROCEDURE — 258N000003 HC RX IP 258 OP 636: Performed by: FAMILY MEDICINE

## 2024-11-09 PROCEDURE — 71046 X-RAY EXAM CHEST 2 VIEWS: CPT

## 2024-11-09 PROCEDURE — 80053 COMPREHEN METABOLIC PANEL: CPT | Performed by: FAMILY MEDICINE

## 2024-11-09 PROCEDURE — 83735 ASSAY OF MAGNESIUM: CPT | Performed by: FAMILY MEDICINE

## 2024-11-09 PROCEDURE — 85025 COMPLETE CBC W/AUTO DIFF WBC: CPT | Performed by: FAMILY MEDICINE

## 2024-11-09 PROCEDURE — 84439 ASSAY OF FREE THYROXINE: CPT | Performed by: PHYSICIAN ASSISTANT

## 2024-11-09 PROCEDURE — 36415 COLL VENOUS BLD VENIPUNCTURE: CPT | Performed by: PHYSICIAN ASSISTANT

## 2024-11-09 PROCEDURE — 93010 ELECTROCARDIOGRAM REPORT: CPT | Performed by: FAMILY MEDICINE

## 2024-11-09 PROCEDURE — 82435 ASSAY OF BLOOD CHLORIDE: CPT | Performed by: FAMILY MEDICINE

## 2024-11-09 PROCEDURE — 99285 EMERGENCY DEPT VISIT HI MDM: CPT | Mod: 25 | Performed by: FAMILY MEDICINE

## 2024-11-09 PROCEDURE — 93005 ELECTROCARDIOGRAM TRACING: CPT | Mod: 76

## 2024-11-09 RX ORDER — AMOXICILLIN 250 MG
1 CAPSULE ORAL 2 TIMES DAILY PRN
Status: DISCONTINUED | OUTPATIENT
Start: 2024-11-09 | End: 2024-11-10 | Stop reason: HOSPADM

## 2024-11-09 RX ORDER — ASPIRIN 81 MG/1
162 TABLET, CHEWABLE ORAL ONCE
Status: COMPLETED | OUTPATIENT
Start: 2024-11-09 | End: 2024-11-09

## 2024-11-09 RX ORDER — AMOXICILLIN 250 MG
2 CAPSULE ORAL 2 TIMES DAILY PRN
Status: DISCONTINUED | OUTPATIENT
Start: 2024-11-09 | End: 2024-11-10 | Stop reason: HOSPADM

## 2024-11-09 RX ORDER — MAGNESIUM OXIDE 400 MG/1
400 TABLET ORAL 2 TIMES DAILY
Status: DISCONTINUED | OUTPATIENT
Start: 2024-11-09 | End: 2024-11-10 | Stop reason: HOSPADM

## 2024-11-09 RX ORDER — ACETAMINOPHEN 325 MG/1
650 TABLET ORAL EVERY 4 HOURS PRN
Status: DISCONTINUED | OUTPATIENT
Start: 2024-11-09 | End: 2024-11-10 | Stop reason: HOSPADM

## 2024-11-09 RX ORDER — ONDANSETRON 2 MG/ML
4 INJECTION INTRAMUSCULAR; INTRAVENOUS EVERY 6 HOURS PRN
Status: DISCONTINUED | OUTPATIENT
Start: 2024-11-09 | End: 2024-11-10 | Stop reason: HOSPADM

## 2024-11-09 RX ORDER — LORATADINE 10 MG/1
10 TABLET ORAL DAILY
Status: DISCONTINUED | OUTPATIENT
Start: 2024-11-09 | End: 2024-11-10 | Stop reason: HOSPADM

## 2024-11-09 RX ORDER — METOCLOPRAMIDE 5 MG/1
10 TABLET ORAL 3 TIMES DAILY PRN
Status: DISCONTINUED | OUTPATIENT
Start: 2024-11-09 | End: 2024-11-10 | Stop reason: HOSPADM

## 2024-11-09 RX ORDER — TORSEMIDE 20 MG/1
20 TABLET ORAL DAILY
Status: DISCONTINUED | OUTPATIENT
Start: 2024-11-09 | End: 2024-11-10 | Stop reason: HOSPADM

## 2024-11-09 RX ORDER — NITROGLYCERIN 0.4 MG/1
0.4 TABLET SUBLINGUAL ONCE
Status: COMPLETED | OUTPATIENT
Start: 2024-11-09 | End: 2024-11-09

## 2024-11-09 RX ORDER — SODIUM CHLORIDE 9 MG/ML
INJECTION, SOLUTION INTRAVENOUS
Status: DISCONTINUED
Start: 2024-11-09 | End: 2024-11-09 | Stop reason: WASHOUT

## 2024-11-09 RX ORDER — ONDANSETRON 4 MG/1
4 TABLET, ORALLY DISINTEGRATING ORAL EVERY 6 HOURS PRN
Status: DISCONTINUED | OUTPATIENT
Start: 2024-11-09 | End: 2024-11-10 | Stop reason: HOSPADM

## 2024-11-09 RX ORDER — VITAMIN B COMPLEX
25 TABLET ORAL DAILY
Status: DISCONTINUED | OUTPATIENT
Start: 2024-11-10 | End: 2024-11-10 | Stop reason: HOSPADM

## 2024-11-09 RX ORDER — METOPROLOL SUCCINATE 25 MG/1
25 TABLET, EXTENDED RELEASE ORAL DAILY
Status: DISCONTINUED | OUTPATIENT
Start: 2024-11-10 | End: 2024-11-10 | Stop reason: HOSPADM

## 2024-11-09 RX ORDER — FAMOTIDINE 20 MG/1
40 TABLET, FILM COATED ORAL
Status: DISCONTINUED | OUTPATIENT
Start: 2024-11-09 | End: 2024-11-10 | Stop reason: HOSPADM

## 2024-11-09 RX ORDER — ACETAMINOPHEN 500 MG
1000 TABLET ORAL ONCE
Status: COMPLETED | OUTPATIENT
Start: 2024-11-09 | End: 2024-11-09

## 2024-11-09 RX ORDER — SPIRONOLACTONE 25 MG/1
50 TABLET ORAL DAILY
Status: DISCONTINUED | OUTPATIENT
Start: 2024-11-10 | End: 2024-11-10 | Stop reason: HOSPADM

## 2024-11-09 RX ORDER — SOLIFENACIN SUCCINATE 5 MG/1
5 TABLET, FILM COATED ORAL DAILY
Status: DISCONTINUED | OUTPATIENT
Start: 2024-11-09 | End: 2024-11-10 | Stop reason: HOSPADM

## 2024-11-09 RX ORDER — SODIUM CHLORIDE 9 MG/ML
INJECTION, SOLUTION INTRAVENOUS
Status: COMPLETED
Start: 2024-11-09 | End: 2024-11-09

## 2024-11-09 RX ORDER — RABEPRAZOLE SODIUM 20 MG/1
20 TABLET, DELAYED RELEASE ORAL 2 TIMES DAILY
Status: DISCONTINUED | OUTPATIENT
Start: 2024-11-09 | End: 2024-11-10

## 2024-11-09 RX ORDER — LIDOCAINE 40 MG/G
CREAM TOPICAL
Status: DISCONTINUED | OUTPATIENT
Start: 2024-11-09 | End: 2024-11-10 | Stop reason: HOSPADM

## 2024-11-09 RX ORDER — ACETAMINOPHEN 650 MG/1
650 SUPPOSITORY RECTAL EVERY 4 HOURS PRN
Status: DISCONTINUED | OUTPATIENT
Start: 2024-11-09 | End: 2024-11-10 | Stop reason: HOSPADM

## 2024-11-09 RX ADMIN — ACETAMINOPHEN 650 MG: 325 TABLET, FILM COATED ORAL at 20:17

## 2024-11-09 RX ADMIN — SODIUM CHLORIDE 500 ML: 9 INJECTION, SOLUTION INTRAVENOUS at 16:03

## 2024-11-09 RX ADMIN — MAGNESIUM OXIDE TAB 400 MG (241.3 MG ELEMENTAL MG) 400 MG: 400 (241.3 MG) TAB at 20:12

## 2024-11-09 RX ADMIN — SODIUM CHLORIDE 250 ML: 9 INJECTION, SOLUTION INTRAVENOUS at 11:19

## 2024-11-09 RX ADMIN — FAMOTIDINE 40 MG: 20 TABLET ORAL at 20:12

## 2024-11-09 RX ADMIN — ASPIRIN 81 MG CHEWABLE TABLET 162 MG: 81 TABLET CHEWABLE at 18:31

## 2024-11-09 RX ADMIN — Medication 500 ML: at 16:03

## 2024-11-09 RX ADMIN — METOCLOPRAMIDE 10 MG: 5 TABLET ORAL at 20:12

## 2024-11-09 RX ADMIN — ACETAMINOPHEN 1000 MG: 500 TABLET ORAL at 10:53

## 2024-11-09 RX ADMIN — NITROGLYCERIN 0.4 MG: 0.4 TABLET SUBLINGUAL at 11:23

## 2024-11-09 ASSESSMENT — ACTIVITIES OF DAILY LIVING (ADL)
ADLS_ACUITY_SCORE: 0

## 2024-11-09 ASSESSMENT — COLUMBIA-SUICIDE SEVERITY RATING SCALE - C-SSRS
2. HAVE YOU ACTUALLY HAD ANY THOUGHTS OF KILLING YOURSELF IN THE PAST MONTH?: NO
6. HAVE YOU EVER DONE ANYTHING, STARTED TO DO ANYTHING, OR PREPARED TO DO ANYTHING TO END YOUR LIFE?: NO
1. IN THE PAST MONTH, HAVE YOU WISHED YOU WERE DEAD OR WISHED YOU COULD GO TO SLEEP AND NOT WAKE UP?: NO

## 2024-11-09 NOTE — MEDICATION SCRIBE - ADMISSION MEDICATION HISTORY
Medication Scribe Admission Medication History    Admission medication history is complete. The information provided in this note is only as accurate as the sources available at the time of the update.    Information Source(s): Patient and CareEverywhere/SureScripts via in-person    Pertinent Information: N/A    Changes made to PTA medication list:  Added: None  Deleted: escitalopram 10mg, colace 100mg, flonase  Changed: None    Allergies reviewed with patient and updates made in EHR: yes    Medication History Completed By: Lucy Lopez 11/9/2024 5:39 PM    PTA Med List   Medication Sig Last Dose/Taking    famotidine (PEPCID) 40 MG tablet Take 1 tablet (40 mg) by mouth nightly as needed for heartburn 11/9/2024 Morning    loratadine (CLARITIN) 10 MG tablet Take 1 tablet (10 mg) by mouth daily Past Week    magnesium oxide (MAG-OX) 400 MG tablet Take 1 tablet (400 mg) by mouth 2 times daily 11/9/2024 Morning    metoclopramide (REGLAN) 10 MG tablet Take 1 tablet (10 mg) by mouth 3 times daily as needed (nausea) 11/9/2024 Morning    metoprolol succinate ER (TOPROL XL) 25 MG 24 hr tablet Take 1 tablet (25 mg) by mouth daily 11/9/2024 Morning    RABEprazole (ACIPHEX) 20 MG EC tablet TAKE 1 TABLET(20 MG) BY MOUTH TWICE DAILY 11/9/2024 Morning    sacubitril-valsartan (ENTRESTO)  MG per tablet Take 1 tablet by mouth 2 times daily 11/9/2024 Morning    Semaglutide-Weight Management (WEGOVY) 1 MG/0.5ML pen Inject 1 mg subcutaneously once a week. (Patient taking differently: Inject 1 mg subcutaneously once a week. Monday's) 11/4/2024    solifenacin (VESICARE) 5 MG tablet Take 1 tablet (5 mg) by mouth daily at 2 pm 11/9/2024 Morning    spironolactone (ALDACTONE) 25 MG tablet Take 2 tablets (50 mg) by mouth daily. 11/9/2024 Morning    torsemide (DEMADEX) 20 MG tablet Take 1 tablet (20 mg) by mouth daily. Past Week    Vitamin D3 (CHOLECALCIFEROL) 25 mcg (1000 units) tablet Take 1 tablet (25 mcg) by mouth daily 11/9/2024  Morning

## 2024-11-09 NOTE — H&P
[facility]    History and Physical - Hospitalist Service, GOLD TEAM        Date of Admission:  11/9/2024    Assessment & Plan      Marleen Mcfadden is a 59 year old female admitted on 11/9/2024. She has a history of known HFrEF with chronic systolic heart failure (EF 30-35% on echocardiogram 7/19/2024), obstructive sleep apnea, chronic recurrent headaches, GERD, chronic low back pain, HTN who presented to the ED after presyncopal episode this a.m., found to have bradycardia and mild hypotension, later developed left shoulder pain that was responsive to nitroglycerin.  Patient being admitted to internal medicine for observation and further workup.    Presyncope  Bradycardia  Acute left shoulder pain   This a.m. patient had presyncopal episode with diaphoresis while at work.  Presented to the ED with mild hypotension.  Later was noted to have left shoulder pain which responded to nitroglycerin.  Patient endorses taking all blood pressure medications this a.m. aside from torsemide.  She reports intermittent chills over the last 3 days.  Denies any cough.  Reports baseline dyspnea with exertion which is unchanged.  -Received nitroglycerin 0.4 mg in ED with improvement of shoulder pain  -Total of 1 L normal saline given in the ED for hypotension, recurrent after nitroglycerin  -Aspirin 162 mg given in the ED  -Troponin negative X 2  -Nitroglycerin PRN if recurrent right shoulder pain   -COVID, influenza, RSV negative  -UA, CMP, CBC unremarkable  -CXR with Stable borderline cardiomegaly with normal pulmonary vascularity. No focal consolidation, pneumothorax nor pleural effusion   -EKG with nonspecific ST flattening throughout  -BNP low/stable at 193.    Plan:  -Admit to observation  -Telemetry X 24 hours   -Echocardiogram in AM   -Consider stress test   -Trend one additional troponin   -Add on mag   -RVP panel pending     ADDENDUM:   -Repeat Troponin negative/stable  "    HFrEF  [Toprol XL 25 mg daily, Entresto  mg BID, spironolactone 50 mg daily, torsemide 20 mg daily]  -Hold PTA meds in the setting of bradycardia and hypotension     Anxiety  -Continue PTA escitalopram 10 mg daily    Allergies  -HOLD PTA fluticasone daily continue PTA loratadine as patient observation status    GERD   -Continue PTA Rabeprazole 20 mg BID once patient bring in home supple   -Continue PTA Pepcid HS PRN   -Continue PTA reglan PRN     OAB  -Hold PTA solifenacin 5 mg daily     Obesity   -Hold Semaglutide 1 mg once weekly (on Monday)          Observation Goals: -diagnostic tests and consults completed and resulted, -vital signs normal or at patient baseline, -tolerating oral intake to maintain hydration, -adequate pain control on oral analgesics, -dyspnea improved and O2 sats greater than 88% on room air or prior home oxygen levels, Nurse to notify provider when observation goals have been met and patient is ready for discharge.  Diet: Low Saturated Fat Na <2400 mg  DVT Prophylaxis: Pneumatic Compression Devices  Awad Catheter: Not present  Lines: None     Cardiac Monitoring: None  Code Status: Full Code    Clinically Significant Risk Factors Present on Admission                   # Hypertension: Noted on problem list  # Chronic heart failure with reduced ejection fraction: last echo with EF <40%          # Severe Obesity: Estimated body mass index is 43.26 kg/m  as calculated from the following:    Height as of this encounter: 1.626 m (5' 4\").    Weight as of this encounter: 114.3 kg (252 lb).              Disposition Plan     Medically Ready for Discharge: Anticipated Tomorrow         The patient's care was discussed with the Attending Physician, Dr. Monge, Bedside Nurse, Patient, and Patient's Family.    MATTHEW Chung  Hospitalist Service, [facility]  Securely message with [employer] (more info)  Text page via [employer] " Paging/Directory   See signed in provider for up to date coverage information    ______________________________________________________________________    Chief Complaint   Presyncope     History is obtained from the patient and review of medical records.     History of Present Illness   Marleen Mcfadden is a 59 year old female who has a history of known HFrEF with chronic systolic heart failure (EF 30-35% on echocardiogram 7/19/2024), obstructive sleep apnea, chronic recurrent headaches, GERD, chronic low back pain, HTN who presented to the ED after presyncopal episode this a.m., found to have bradycardia and mild hypotension, later developed left shoulder pain that was responsive to nitroglycerin.  Patient being admitted to internal medicine for observation and further workup.  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 at work she started to feel lightheaded, felt presyncopal did not pass out.  She was brought to the ED for evaluation.  She reports she has been has had intermittent chills for the last several days with no fevers.  She notes baseline dyspnea with exertion that is unchanged.  In the ED patient experienced left shoulder pain for which she was given nitroglycerin which was ineffective.  She denies any abdominal pain, GI bleeding, history of blood clots.  She reports chronic bilateral lower extremity edema which is stable.  Denies chest pain, cough, sputum production.      Past Medical History    Past Medical History:   Diagnosis Date    Acute bilateral low back pain with right-sided sciatica 06/02/2016    Allergic rhinitis, cause unspecified     Arthritis     Autoimmune disease (H)     Autoimmune disease NEC     Autoimmune disease- unknown/poss SLE    Calcaneal spur 10/21/2014    Xray 10/17/14     CHF with cardiomyopathy (H)     Chronic low back pain     DDD (degenerative disc disease), lumbar     Depression     Failed total knee arthroplasty, initial encounter (H)  2019    Familial cardiomyopathy (H)     GERD without esophagitis     History of transfusion     Hypertension     Injury of left shoulder, initial encounter 2017    Morbid obesity (H)     Nontraumatic rupture of quadriceps tendon, left 2018    KATIA (obstructive sleep apnea)- moderate-severe (AHI 29) 2021    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     no longer an issue    Peroneus longus tendinitis 2015    Plantar fasciitis 2014    PONV (postoperative nausea and vomiting)     Rotator cuff injury 2017    Sprain of other ligament of left ankle, initial encounter 2017    Status post left knee replacement 2018    TB lung, latent     negative quantiferon gold test  12       Past Surgical History   Past Surgical History:   Procedure Laterality Date    ARTHROPLASTY REVISION KNEE Left 2019    Procedure: REVISION, LEFT TOTAL KNEE  ARTHROPLASTY;  Surgeon: Dev Rocha MD;  Location: [facility];  Service: Orthopedics    ARTHROPLASTY REVISION KNEE Right 2020    Procedure: RIGHT REVISION TOTAL KNEE ARTHROPLASTY;  Surgeon: Dev Rocha MD;  Location: [facility] OR;  Service: Orthopedics    BREAST SURGERY      Breast Reduction    C EXCISE EXCESS SKIN TISSUE,ABDOMEN       SECTION  1989     SECTION  10/1985    COLONOSCOPY N/A 2023    Procedure: Colonoscopy;  Surgeon: Simone Jansen MD;  Location: [facility]    COLONOSCOPY WITH CO2 INSUFFLATION N/A 2021    Procedure: COLONOSCOPY, WITH CO2 INSUFFLATION;  Surgeon: Angela Harrington DO;  Location: [facility] OR    COLONOSCOPY WITH CO2 INSUFFLATION N/A 2022    Procedure: COLONOSCOPY, WITH CO2 INSUFFLATION;  Surgeon: Nando Whitlock MD;  Location: [facility] OR    CRANIECTOMY Right 2021    Procedure: RIGHT MIDDLE FOSSA APPROACH, CSF LEAK REPAIR;  Surgeon: Jocelyn Noe MD;  Location: [facility] OR     CRANIOTOMY MIDDLE FOSSA, EXCISE ACOUSTIC NEUROMA, COMBINED N/A 07/05/2021    Procedure: Right CRANIOTOMY, MIDDLE FOSSA APPROACH, FOR REPAIR OF ENCEPHALOCELE;  Surgeon: Jocelyne Harrell MD;  Location:  OR    CV RIGHT HEART CATH MEASUREMENTS RECORDED N/A 1/10/2023    Procedure: Heart Cath Right Heart Cath;  Surgeon: Slade Hanson MD;  Location:  HEART CARDIAC CATH LAB    CYSTOURETHROSCOPY N/A 08/18/2020    Procedure: CYSTOSCOPY;  Surgeon: Cherelle Willams MD;  Location: [facility];  Service: Gynecology    ESOPHAGOSCOPY, GASTROSCOPY, DUODENOSCOPY (EGD), COMBINED N/A 12/13/2023    Procedure: Esophagoscopy, gastroscopy, duodenoscopy (EGD), combined;  Surgeon: Stu Guadalupe MD;  Location:  GI    GYN SURGERY N/A 08/18/2020    Procedure: MIDURETHRAL SLING, CYSTOSCOPY;  Surgeon: Cherelle Willams MD;  Location: [facility];  Service: Gynecology    HYSTERECTOMY TOTAL ABDOMINAL  2006    for fibroids; reports having blood transfusion after surgery    INSERT DRAIN LUMBAR N/A 07/05/2021    Procedure: Insert drain lumbar;  Surgeon: Jocelyn Noe MD;  Location: [facility]    ORTHOPEDIC SURGERY  1998    Right Knee ACL repair    THYROIDECTOMY  09/09/2013    Procedure: THYROIDECTOMY;  LEFT THYROID LOBECTOMY.  (LIGASURE, RECURRENT LARYNGEAL NERVE MONITOR) ;  Surgeon: Uriah Camargo MD;  Location:  [facility]    THYROIDECTOMY      TOTAL KNEE ARTHROPLASTY Left 10/03/2017    TOTAL KNEE ARTHROPLASTY Right 02/07/2019    Procedure: RIGHT TOTAL KNEE ARTHROPLASTY;  Surgeon: Dev Rocha MD;  Location: [facility];  Service: Orthopedics    TUBAL LIGATION  1989    ZZC EXCIS UTERINE FIBROID,ABD APPRCH  1999       Prior to Admission Medications   Prior to Admission Medications   Prescriptions Last Dose Informant Patient Reported? Taking?   RABEprazole (ACIPHEX) 20 MG EC tablet 11/9/2024 Morning  No Yes   Sig: TAKE 1 TABLET(20 MG) BY MOUTH TWICE DAILY  "  Semaglutide-Weight Management (WEGOVY) 1 MG/0.5ML pen 11/4/2024  No Yes   Sig: Inject 1 mg subcutaneously once a week.   Patient taking differently: Inject 1 mg subcutaneously once a week. Monday's   Vitamin D3 (CHOLECALCIFEROL) 25 mcg (1000 units) tablet 11/9/2024 Morning  No Yes   Sig: Take 1 tablet (25 mcg) by mouth daily   famotidine (PEPCID) 40 MG tablet 11/9/2024 Morning  No Yes   Sig: Take 1 tablet (40 mg) by mouth nightly as needed for heartburn   loratadine (CLARITIN) 10 MG tablet Past Week  No Yes   Sig: Take 1 tablet (10 mg) by mouth daily   magnesium oxide (MAG-OX) 400 MG tablet 11/9/2024 Morning  No Yes   Sig: Take 1 tablet (400 mg) by mouth 2 times daily   metoclopramide (REGLAN) 10 MG tablet 11/9/2024 Morning  No Yes   Sig: Take 1 tablet (10 mg) by mouth 3 times daily as needed (nausea)   metoprolol succinate ER (TOPROL XL) 25 MG 24 hr tablet 11/9/2024 Morning  No Yes   Sig: Take 1 tablet (25 mg) by mouth daily   sacubitril-valsartan (ENTRESTO)  MG per tablet 11/9/2024 Morning  No Yes   Sig: Take 1 tablet by mouth 2 times daily   solifenacin (VESICARE) 5 MG tablet 11/9/2024 Morning  No Yes   Sig: Take 1 tablet (5 mg) by mouth daily at 2 pm   spironolactone (ALDACTONE) 25 MG tablet 11/9/2024 Morning  No Yes   Sig: Take 2 tablets (50 mg) by mouth daily.   torsemide (DEMADEX) 20 MG tablet Past Week  No Yes   Sig: Take 1 tablet (20 mg) by mouth daily.      Facility-Administered Medications: None        Allergies   Allergies   Allergen Reactions    Morphine      EMESIS    Nickel     Sulfa Antibiotics Swelling        Physical Exam     Blood pressure 93/78, pulse 61, temperature 98.3  F (36.8  C), temperature source Oral, resp. rate 20, height 1.626 m (5' 4\"), weight 114.3 kg (252 lb), last menstrual period 10/19/2008, SpO2 98%, not currently breastfeeding.  GENERAL: Alert and oriented x 3. NAD. Sitting at edge of bed.   HEENT: Anicteric sclera. Mucous membranes moist and without lesions.   CV: RRR. " S1, S2. No murmurs appreciated.   RESPIRATORY: Effort normal on RA. Lungs CTAB with no wheezing, rales, rhonchi.   GI: Abdomen soft, obese and non distended, bowel sounds present. No tenderness, rebound, guarding.   MUSCULOSKELETAL: No joint swelling or tenderness. Moves all extremities.   NEUROLOGICAL: No focal deficits.   EXTREMITIES: 1-2+ bilateral lower extremity peripheral edema. Intact bilateral pedal pulses. No calf tenderness.   SKIN: No jaundice. No rashes.         Medical Decision Making       60 MINUTES SPENT BY ME on the date of service doing chart review, history, exam, documentation & further activities per the note.      Data     I have personally reviewed the following data over the past 24 hrs:    6.5  \   12.8   / 288     137 100 15.6 /  98   4.4 27 0.99 (H) \     ALT: 28 AST: 15 AP: 75 TBILI: 0.3   ALB: 4.3 TOT PROTEIN: 7.9 LIPASE: N/A     Trop: <6 BNP: 193     TSH: 4.49 (H) T4: 0.99 A1C: N/A     INR:  0.94 PTT:  27   D-dimer:  N/A Fibrinogen:  N/A       Imaging results reviewed over the past 24 hrs:   Recent Results (from the past 24 hours)   XR Chest 2 Views    Narrative    EXAM: XR CHEST 2 VIEWS  LOCATION: [facility]  DATE: 11/9/2024    INDICATION: Shortness of breath and near syncope.  COMPARISON: 3/25/2024      Impression    IMPRESSION: Stable borderline cardiomegaly with normal pulmonary vascularity. No focal consolidation, pneumothorax nor pleural effusion

## 2024-11-09 NOTE — ED PROVIDER NOTES
[facility] EMERGENCY DEPARTMENT ([facility])    11/09/24      ED PROVIDER NOTE   ED 5   History     Chief Complaint   Patient presents with    Syncope     Patient works at the lab, this morning she felt lightheaded and dizzy and almost fainted     The history is provided by the patient and medical records.     Marleen Mcfadden is a 59 year old female who has history of known HFrEF with chronic systolic heart failure (EF 30-35% on echocardiogram 7/19/24) along with this history of obstructive sleep apnea chronic recurrent headaches etc.  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  Patient occasionally has syncopal episodes was evaluated by physician chronically last episode 6 months ago.  Patient has had a frontal headache for last couple days with no other neuro changes.  No reports of fever but some chills noted.  Patient had noted today was upstairs and eaten breakfast just a yogurt this morning.  Was upstairs and was standing felt lightheaded, felt presyncopal, did not pass out.  She sat down and was brought down here for evaluation.  Here patient does feel somewhat better, some shortness of breath.  No chest pain, no cough, but some chills were noted but this may be chronic.  Patient denies abdominal pain, no GI bleeding chronic swelling of legs without pain.  No history of clots noted.  Now presents for evaluation.    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    Familial cardiomyopathy (H)     GERD without esophagitis     History of  transfusion     Hypertension     Injury of left shoulder, initial encounter 2017    Morbid obesity (H)     Nontraumatic rupture of quadriceps tendon, left 2018    KATIA (obstructive sleep apnea)- moderate-severe (AHI 29) 2021    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     no longer an issue    Peroneus longus tendinitis 2015    Plantar fasciitis 2014    PONV (postoperative nausea and vomiting)     Rotator cuff injury 2017    Sprain of other ligament of left ankle, initial encounter 2017    Status post left knee replacement 2018    TB lung, latent     negative quantiferon gold test  12     Past Surgical History:   Procedure Laterality Date    ARTHROPLASTY REVISION KNEE Left 2019    Procedure: REVISION, LEFT TOTAL KNEE  ARTHROPLASTY;  Surgeon: Dev Rocha MD;  Location: [facility] OR;  Service: Orthopedics    ARTHROPLASTY REVISION KNEE Right 2020    Procedure: RIGHT REVISION TOTAL KNEE ARTHROPLASTY;  Surgeon: Dev Rocha MD;  Location: [facility] OR;  Service: Orthopedics    BREAST SURGERY      Breast Reduction    C EXCISE EXCESS SKIN TISSUE,ABDOMEN       SECTION  1989     SECTION  10/1985    COLONOSCOPY N/A 2023    Procedure: Colonoscopy;  Surgeon: Simone Jansen MD;  Location: [facility]    COLONOSCOPY WITH CO2 INSUFFLATION N/A 2021    Procedure: COLONOSCOPY, WITH CO2 INSUFFLATION;  Surgeon: Angela Harrington DO;  Location: [facility] OR    COLONOSCOPY WITH CO2 INSUFFLATION N/A 2022    Procedure: COLONOSCOPY, WITH CO2 INSUFFLATION;  Surgeon: Nando Whitlock MD;  Location: [facility] OR    CRANIECTOMY Right 2021    Procedure: RIGHT MIDDLE FOSSA APPROACH, CSF LEAK REPAIR;  Surgeon: Jocelyn Noe MD;  Location: [facility] OR    CRANIOTOMY MIDDLE FOSSA, EXCISE ACOUSTIC NEUROMA, COMBINED N/A 2021    Procedure: Right CRANIOTOMY, MIDDLE FOSSA  APPROACH, FOR REPAIR OF ENCEPHALOCELE;  Surgeon: Jocelyne Harrell MD;  Location: [facility]    CV RIGHT HEART CATH MEASUREMENTS RECORDED N/A 1/10/2023    Procedure: Heart Cath Right Heart Cath;  Surgeon: Slade Hanson MD;  Location:  HEART CARDIAC CATH LAB    CYSTOURETHROSCOPY N/A 08/18/2020    Procedure: CYSTOSCOPY;  Surgeon: Cherelle Willams MD;  Location: [facility];  Service: Gynecology    ESOPHAGOSCOPY, GASTROSCOPY, DUODENOSCOPY (EGD), COMBINED N/A 12/13/2023    Procedure: Esophagoscopy, gastroscopy, duodenoscopy (EGD), combined;  Surgeon: Stu Guadalupe MD;  Location:  GI    GYN SURGERY N/A 08/18/2020    Procedure: MIDURETHRAL SLING, CYSTOSCOPY;  Surgeon: Cherelle Willams MD;  Location: [facility];  Service: Gynecology    HYSTERECTOMY TOTAL ABDOMINAL  2006    for fibroids; reports having blood transfusion after surgery    INSERT DRAIN LUMBAR N/A 07/05/2021    Procedure: Insert drain lumbar;  Surgeon: Jocelyn Noe MD;  Location: [facility]    ORTHOPEDIC SURGERY  1998    Right Knee ACL repair    THYROIDECTOMY  09/09/2013    Procedure: THYROIDECTOMY;  LEFT THYROID LOBECTOMY.  (LIGASURE, RECURRENT LARYNGEAL NERVE MONITOR) ;  Surgeon: Uriah Camargo MD;  Location: [facility]    THYROIDECTOMY      TOTAL KNEE ARTHROPLASTY Left 10/03/2017    TOTAL KNEE ARTHROPLASTY Right 02/07/2019    Procedure: RIGHT TOTAL KNEE ARTHROPLASTY;  Surgeon: Dev Rocha MD;  Location: [facility];  Service: Orthopedics    TUBAL LIGATION  1989    ZZC EXCIS UTERINE FIBROID,ABD APPRCH  1999     No current outpatient medications on file.    Allergies   Allergen Reactions    Morphine      EMESIS    Nickel     Sulfa Antibiotics Swelling     Family History  Family History   Problem Relation Age of Onset    Hypertension Father         dec    Diabetes Father         dec    Heart Disease Father         dec    Alcohol/Drug Father     Cardiovascular Father     Heart  "Disease Mother         dec    Alcohol/Drug Mother     Cardiovascular Mother     Heart Disease Daughter         Cardiomyopathy    Cardiovascular Daughter         cardiomyopathy    Colon Cancer Sister     Cardiovascular Son         cardiomyopathy    Diabetes Paternal Grandmother         dec    Hypertension Paternal Grandmother         dec    Cerebrovascular Disease Paternal Grandmother         dec    Cancer Sister         Lupus    Cardiovascular Sister         cardiomyopathy    Heart Disease Sister         heart failure, and kidney failure     Social History   Social History     Tobacco Use    Smoking status: Never    Smokeless tobacco: Never   Vaping Use    Vaping status: Never Used   Substance Use Topics    Alcohol use: Yes     Comment: rare, twice a year, she has a drink little wine 2 days ago    Drug use: No      A medically appropriate review of systems was performed with pertinent positives and negatives noted in the HPI, and all other systems negative.    Physical Exam   BP: 111/66  Pulse: 66  Temp: 97.8  F (36.6  C)  Resp: 16  Height: 162.6 cm (5' 4\")  Weight: 114.3 kg (252 lb)  SpO2: 96 %  Physical Exam  Vitals and nursing note reviewed.   Constitutional:       General: She is in acute distress.      Appearance: Normal appearance. She is well-developed. She is not toxic-appearing.      Comments: Patient pleasant vitally stable here alert and orient x 3 no neurofocal findings noted.  No distress   HENT:      Head: Normocephalic and atraumatic.      Nose: Nose normal.      Mouth/Throat:      Mouth: Mucous membranes are moist.      Pharynx: Oropharynx is clear.   Eyes:      General: No scleral icterus.     Extraocular Movements: Extraocular movements intact.      Conjunctiva/sclera: Conjunctivae normal.      Pupils: Pupils are equal, round, and reactive to light.   Cardiovascular:      Rate and Rhythm: Normal rate and regular rhythm.   Pulmonary:      Effort: Pulmonary effort is normal. No respiratory distress. "      Breath sounds: Normal breath sounds. No wheezing or rales.   Abdominal:      General: Abdomen is flat.      Palpations: Abdomen is soft.      Tenderness: There is no abdominal tenderness. There is no guarding.   Musculoskeletal:         General: Swelling present. No tenderness.      Cervical back: Normal range of motion and neck supple. No rigidity.      Right lower leg: Edema present.      Left lower leg: Edema present.      Comments: Chronic 1-2+ edema bilateral without Homans' sign   Skin:     General: Skin is warm and dry.      Capillary Refill: Capillary refill takes less than 2 seconds.      Findings: No rash.   Neurological:      General: No focal deficit present.      Mental Status: She is alert and oriented to person, place, and time. Mental status is at baseline.   Psychiatric:      Comments: Appropriate here in the ER           ED Course, Procedures, & Data          Records are in epic.  Meds reviewed allergies reviewed.    In the ER we did an EKG EKG showed nonspecific ST flattening noted.  Patient otherwise stable will establish IV check laboratory testing also will get a chest x-ray the shortness of breath I do not suspect anything such as a PE at this point we will check cardiac functions get a COVID influenza RSV also.  Will reassess also to make sure there is no electrolyte disease based on patient's medications.    In the ER EKG was done revealing a sinus rhythm nonspecific ST flattening noted throughout.  Did compare to previous no significant changes noted.    It should be noted we did do 2 more EKGs in the ER as patient had some left shoulder pain that was relieved with nitroglycerin these EKGs without significant changes although just maybe some slight depression of the T waves on the second EKG that then improved.  Patient's troponin x 2 are negative.  BNP negative.  Chest x-ray reviewed by myself reveals no obvious infiltrate no florid heart failure no effusion infiltrate etc.  Patient  blood pressure did decrease in the 90s although heart rate was still 60-70.  Patient is felt generally weak the chest pain was gone after the nitro 5 to cc normal saline bolus given also with patient's history of heart failure with an EF around 30 to 35%.    As noted Trope was negative.  BNP was negative also TSH 4.49.  Sodium 137 potassium 4.4.  Bicarb 27 gap is 10 creatinine 0.99 glucose is 98 LFTs normal limits INR 0.94 urine negative COVID influenza RSV are negative also.  White count 6.5 hemoglobin 12.8.  Hit    Patient here in the ER discussed it with her regarding findings etc. still feels just slightly weak I talked to cardiology who felt this point patient could be admitted as a medicine observation.  I talked to medicine will plan to admit to medicine service for near syncope along with chest pain relieved by nitroglycerin and somebody with history of congestive heart failure.  Patient agrees with plan stable.    Procedures            EKG Interpretation:      Interpreted by Freddy Herr MD  Time reviewed: 832  Symptoms at time of EKG: near syncope   Rhythm: normal sinus   Rate: normal  Axis: normal  Ectopy: none  Conduction: normal  ST Segments/ T Waves: Nonspecific ST-T wave changes  Q Waves: none  Comparison to prior: Unchanged    Clinical Impression: Normal sinus rhythm and nonspecific ST flattening noted.            Results for orders placed or performed during the hospital encounter of 11/09/24   XR Chest 2 Views     Status: None    Narrative    EXAM: XR CHEST 2 VIEWS  LOCATION: [facility]  DATE: 11/9/2024    INDICATION: Shortness of breath and near syncope.  COMPARISON: 3/25/2024      Impression    IMPRESSION: Stable borderline cardiomegaly with normal pulmonary vascularity. No focal consolidation, pneumothorax nor pleural effusion   Partial thromboplastin time     Status: Normal   Result Value Ref Range    aPTT 27 22 - 38 Seconds   INR     Status:  Normal   Result Value Ref Range    INR 0.94 0.85 - 1.15   Comprehensive metabolic panel     Status: Abnormal   Result Value Ref Range    Sodium 137 135 - 145 mmol/L    Potassium 4.4 3.4 - 5.3 mmol/L    Carbon Dioxide (CO2) 27 22 - 29 mmol/L    Anion Gap 10 7 - 15 mmol/L    Urea Nitrogen 15.6 8.0 - 23.0 mg/dL    Creatinine 0.99 (H) 0.51 - 0.95 mg/dL    GFR Estimate 65 >60 mL/min/1.73m2    Calcium 9.6 8.8 - 10.4 mg/dL    Chloride 100 98 - 107 mmol/L    Glucose 98 70 - 99 mg/dL    Alkaline Phosphatase 75 40 - 150 U/L    AST 15 0 - 45 U/L    ALT 28 0 - 50 U/L    Protein Total 7.9 6.4 - 8.3 g/dL    Albumin 4.3 3.5 - 5.2 g/dL    Bilirubin Total 0.3 <=1.2 mg/dL   Magnesium     Status: Normal   Result Value Ref Range    Magnesium 1.9 1.7 - 2.3 mg/dL   Troponin T, High Sensitivity     Status: Normal   Result Value Ref Range    Troponin T, High Sensitivity <6 <=14 ng/L   Nt probnp inpatient (BNP)     Status: Normal   Result Value Ref Range    N terminal Pro BNP Inpatient 193 0 - 900 pg/mL   TSH     Status: Abnormal   Result Value Ref Range    TSH 4.49 (H) 0.30 - 4.20 uIU/mL   UA with Microscopic reflex to Culture     Status: Abnormal    Specimen: Urine, Clean Catch   Result Value Ref Range    Color Urine Straw Colorless, Straw, Light Yellow, Yellow    Appearance Urine Clear Clear    Glucose Urine Negative Negative mg/dL    Bilirubin Urine Negative Negative    Ketones Urine Negative Negative mg/dL    Specific Gravity Urine 1.014 1.003 - 1.035    Blood Urine Negative Negative    pH Urine 6.5 5.0 - 7.0    Protein Albumin Urine Negative Negative mg/dL    Urobilinogen Urine Normal Normal, 2.0 mg/dL    Nitrite Urine Negative Negative    Leukocyte Esterase Urine Negative Negative    Mucus Urine Present (A) None Seen /LPF    RBC Urine <1 <=2 /HPF    WBC Urine 1 <=5 /HPF    Squamous Epithelials Urine 1 <=1 /HPF    Narrative    Urine Culture not indicated   Asymptomatic Influenza A/B, RSV, & SARS-CoV2 PCR (COVID-19) Nasopharyngeal      Status: Normal    Specimen: Nasopharyngeal; Swab   Result Value Ref Range    Influenza A PCR Negative Negative    Influenza B PCR Negative Negative    RSV PCR Negative Negative    SARS CoV2 PCR Negative Negative    Narrative    Testing was performed using the Xpert Xpress CoV2/Flu/RSV Assay on the Cepheid GeneXpert Instrument. This test should be ordered for the detection of SARS-CoV-2, influenza, and RSV viruses in individuals who meet clinical and/or epidemiological criteria. Test performance is unknown in asymptomatic patients. This test is for in vitro diagnostic use under the FDA EUA for laboratories certified under CLIA to perform high or moderate complexity testing. This test has not been FDA cleared or approved. A negative result does not rule out the presence of PCR inhibitors in the specimen or target RNA in concentration below the limit of detection for the assay. If only one viral target is positive but coinfection with multiple targets is suspected, the sample should be re-tested with another FDA cleared, approved, or authorized test, if coinfection would change clinical management. This test was validated by the [facility] [employer]. These laboratories are certified under the Clinical Laboratory Improvement Amendments of 1988 (CLIA-88) as qualified to perform high complexity laboratory testing.   CBC with platelets and differential     Status: None   Result Value Ref Range    WBC Count 6.5 4.0 - 11.0 10e3/uL    RBC Count 4.14 3.80 - 5.20 10e6/uL    Hemoglobin 12.8 11.7 - 15.7 g/dL    Hematocrit 38.6 35.0 - 47.0 %    MCV 93 78 - 100 fL    MCH 30.9 26.5 - 33.0 pg    MCHC 33.2 31.5 - 36.5 g/dL    RDW 13.1 10.0 - 15.0 %    Platelet Count 288 150 - 450 10e3/uL    % Neutrophils 58 %    % Lymphocytes 33 %    % Monocytes 6 %    % Eosinophils 3 %    % Basophils 1 %    % Immature Granulocytes 0 %    NRBCs per 100 WBC 0 <1 /100    Absolute Neutrophils 3.8 1.6 - 8.3 10e3/uL    Absolute Lymphocytes 2.1  0.8 - 5.3 10e3/uL    Absolute Monocytes 0.4 0.0 - 1.3 10e3/uL    Absolute Eosinophils 0.2 0.0 - 0.7 10e3/uL    Absolute Basophils 0.1 0.0 - 0.2 10e3/uL    Absolute Immature Granulocytes 0.0 <=0.4 10e3/uL    Absolute NRBCs 0.0 10e3/uL   Troponin T, High Sensitivity     Status: Normal   Result Value Ref Range    Troponin T, High Sensitivity <6 <=14 ng/L   T4 free     Status: Normal   Result Value Ref Range    Free T4 0.99 0.90 - 1.70 ng/dL   Troponin T, High Sensitivity     Status: Normal   Result Value Ref Range    Troponin T, High Sensitivity <6 <=14 ng/L   EKG 12 lead     Status: None (Preliminary result)   Result Value Ref Range    Systolic Blood Pressure  mmHg    Diastolic Blood Pressure  mmHg    Ventricular Rate 60 BPM    Atrial Rate 60 BPM    LA Interval 164 ms    QRS Duration 104 ms     ms    QTc 406 ms    P Axis 21 degrees    R AXIS -20 degrees    T Axis 26 degrees    Interpretation ECG       Sinus rhythm  Nonspecific ST and T wave abnormality  Abnormal ECG     EKG 12 lead     Status: None (Preliminary result)   Result Value Ref Range    Systolic Blood Pressure  mmHg    Diastolic Blood Pressure  mmHg    Ventricular Rate 49 BPM    Atrial Rate 49 BPM    LA Interval 170 ms    QRS Duration 100 ms     ms    QTc 419 ms    P Axis 73 degrees    R AXIS -24 degrees    T Axis -46 degrees    Interpretation ECG       Sinus bradycardia  Nonspecific ST and T wave abnormality  Abnormal ECG     CBC with platelets differential     Status: None    Narrative    The following orders were created for panel order CBC with platelets differential.  Procedure                               Abnormality         Status                     ---------                               -----------         ------                     CBC with platelets and d...[[id number]]                      Final result                 Please view results for these tests on the individual orders.     Medications   lidocaine 1 % 0.1-1 mL (has no  administration in time range)   lidocaine (LMX4) cream (has no administration in time range)   sodium chloride (PF) 0.9% PF flush 3 mL (3 mLs Intracatheter $Given 11/9/24 1831)   sodium chloride (PF) 0.9% PF flush 3 mL (3 mLs Intracatheter $Given 11/9/24 1424)   famotidine (PEPCID) tablet 40 mg (40 mg Oral $Given 11/9/24 2012)   loratadine (CLARITIN) tablet 10 mg ( Oral Automatically Held 11/12/24 0800)   magnesium oxide (MAG-OX) tablet 400 mg (400 mg Oral $Given 11/9/24 2012)   metoclopramide (REGLAN) tablet 10 mg (10 mg Oral $Given 11/9/24 2012)   metoprolol succinate ER (TOPROL XL) 24 hr tablet 25 mg ( Oral Automatically Held 11/13/24 0800)   RABEprazole (ACIPHEX) EC tablet 20 mg ( Oral Automatically Held 11/12/24 2000)   sacubitril-valsartan (ENTRESTO)  MG per tablet 1 tablet ( Oral Automatically Held 11/12/24 2000)   solifenacin (VESICARE) tablet 5 mg ( Oral Automatically Held 11/12/24 1400)   spironolactone (ALDACTONE) tablet 50 mg ( Oral Automatically Held 11/13/24 0800)   torsemide (DEMADEX) tablet 20 mg ( Oral Automatically Held 11/12/24 0800)   Vitamin D3 (CHOLECALCIFEROL) tablet 25 mcg (has no administration in time range)   senna-docusate (SENOKOT-S/PERICOLACE) 8.6-50 MG per tablet 1 tablet (has no administration in time range)     Or   senna-docusate (SENOKOT-S/PERICOLACE) 8.6-50 MG per tablet 2 tablet (has no administration in time range)   ondansetron (ZOFRAN ODT) ODT tab 4 mg (has no administration in time range)     Or   ondansetron (ZOFRAN) injection 4 mg (has no administration in time range)   acetaminophen (TYLENOL) tablet 650 mg (650 mg Oral $Given 11/9/24 2017)     Or   acetaminophen (TYLENOL) Suppository 650 mg ( Rectal See Alternative 11/9/24 2017)   nitroGLYcerin (NITROSTAT) sublingual tablet 0.4 mg (0.4 mg Sublingual $Given 11/9/24 1123)   acetaminophen (TYLENOL) tablet 1,000 mg (1,000 mg Oral $Given 11/9/24 1053)   sodium chloride 0.9% BOLUS 250 mL (0 mLs Intravenous Stopped 11/9/24  1219)   sodium chloride 0.9% BOLUS 500 mL (500 mLs Intravenous $New Bag 11/9/24 1608)   aspirin (ASA) chewable tablet 162 mg (162 mg Oral $Given 11/9/24 1837)     Labs Ordered and Resulted from Time of ED Arrival to Time of ED Departure   COMPREHENSIVE METABOLIC PANEL - Abnormal       Result Value    Sodium 137      Potassium 4.4      Carbon Dioxide (CO2) 27      Anion Gap 10      Urea Nitrogen 15.6      Creatinine 0.99 (*)     GFR Estimate 65      Calcium 9.6      Chloride 100      Glucose 98      Alkaline Phosphatase 75      AST 15      ALT 28      Protein Total 7.9      Albumin 4.3      Bilirubin Total 0.3     TSH - Abnormal    TSH 4.49 (*)    ROUTINE UA WITH MICROSCOPIC REFLEX TO CULTURE - Abnormal    Color Urine Straw      Appearance Urine Clear      Glucose Urine Negative      Bilirubin Urine Negative      Ketones Urine Negative      Specific Gravity Urine 1.014      Blood Urine Negative      pH Urine 6.5      Protein Albumin Urine Negative      Urobilinogen Urine Normal      Nitrite Urine Negative      Leukocyte Esterase Urine Negative      Mucus Urine Present (*)     RBC Urine <1      WBC Urine 1      Squamous Epithelials Urine 1     PARTIAL THROMBOPLASTIN TIME - Normal    aPTT 27     INR - Normal    INR 0.94     MAGNESIUM - Normal    Magnesium 1.9     TROPONIN T, HIGH SENSITIVITY - Normal    Troponin T, High Sensitivity <6     NT PROBNP INPATIENT - Normal    N terminal Pro BNP Inpatient 193     INFLUENZA A/B, RSV, & SARS-COV2 PCR - Normal    Influenza A PCR Negative      Influenza B PCR Negative      RSV PCR Negative      SARS CoV2 PCR Negative     TROPONIN T, HIGH SENSITIVITY - Normal    Troponin T, High Sensitivity <6     T4 FREE - Normal    Free T4 0.99     CBC WITH PLATELETS AND DIFFERENTIAL    WBC Count 6.5      RBC Count 4.14      Hemoglobin 12.8      Hematocrit 38.6      MCV 93      MCH 30.9      MCHC 33.2      RDW 13.1      Platelet Count 288      % Neutrophils 58      % Lymphocytes 33      %  Monocytes 6      % Eosinophils 3      % Basophils 1      % Immature Granulocytes 0      NRBCs per 100 WBC 0      Absolute Neutrophils 3.8      Absolute Lymphocytes 2.1      Absolute Monocytes 0.4      Absolute Eosinophils 0.2      Absolute Basophils 0.1      Absolute Immature Granulocytes 0.0      Absolute NRBCs 0.0     MAGNESIUM   RESPIRATORY PANEL PCR     XR Chest 2 Views   Final Result   IMPRESSION: Stable borderline cardiomegaly with normal pulmonary vascularity. No focal consolidation, pneumothorax nor pleural effusion      Echo Complete    (Results Pending)          Critical care was not performed.     Medical Decision Making  The patient's presentation was of high complexity (an acute health issue posing potential threat to life or bodily function).    The patient's evaluation involved:  review of external note(s) from 3+ sources (see separate area of note for details)  review of 3+ test result(s) ordered prior to this encounter (see separate area of note for details)  ordering and/or review of 3+ test(s) in this encounter (see separate area of note for details)  discussion of management or test interpretation with another health professional (see separate area of note for details)    The patient's management necessitated high risk (a decision regarding hospitalization).    Assessment & Plan   59-year-old female with syncopal episode has history of heart failure follows cardiology was working this morning felt lightheaded did not pass out at work.  Now presents here to the ER for evaluation EKG x 3 revealed nonspecific ST flattening but nothing hyperacute troponins x 2 are negative BNP negative.  Other chemistry stable.  COVID influenza RSV were negative also.  Patient received 5 or cc normal saline bolus after nitro glycerin resolved some left shoulder pain.  Talk to cardiology will plan admit as medicine observation for evaluation of near syncope along with resolved left shoulder pain in summary with  history of systolic heart failure.  Patient agrees with plan stable.       I have reviewed the nursing notes. I have reviewed the findings, diagnosis, plan and need for follow up with the patient.    Current Discharge Medication List          Final diagnoses:   Near syncope   Acute pain of left shoulder - relieved nitro   Chronic systolic congestive heart failure (H)       Freddy Herr MD  [facility] EMERGENCY DEPARTMENT  11/9/2024    This note was created at least in part by the use of dragon voice dictation system. Inadvertent typographical errors may still exist.  Freddy Herr MD.  Patient evaluated in the emergency department during the COVID-19 pandemic period. Careful attention to patients safety was addressed throughout the evaluation. Evaluation and treatment management was initiated with disposition made efficiently and appropriate as possible to minimize any risk of potential exposure to patient during this evaluation.          Freddy Herr MD  11/09/24 0556

## 2024-11-09 NOTE — ED TRIAGE NOTES
Patient reports she has fainted a couple times before, claims she has woken up laying on the floor at times. Patient has CHF.      Triage Assessment (Adult)       Row Name 11/09/24 0850          Triage Assessment    Airway WDL WDL        Respiratory WDL    Respiratory WDL WDL        Skin Circulation/Temperature WDL    Skin Circulation/Temperature WDL WDL        Cardiac WDL    Cardiac WDL WDL        Peripheral/Neurovascular WDL    Peripheral Neurovascular WDL WDL        Cognitive/Neuro/Behavioral WDL    Cognitive/Neuro/Behavioral WDL WDL

## 2024-11-10 ENCOUNTER — ORDERS ONLY (AUTO-RELEASED) (OUTPATIENT)
Dept: MEDSURG UNIT | Facility: CLINIC | Age: 59
End: 2024-11-10

## 2024-11-10 ENCOUNTER — APPOINTMENT (OUTPATIENT)
Dept: CARDIOLOGY | Facility: CLINIC | Age: 59
End: 2024-11-10
Attending: PHYSICIAN ASSISTANT
Payer: COMMERCIAL

## 2024-11-10 VITALS
HEIGHT: 64 IN | OXYGEN SATURATION: 96 % | TEMPERATURE: 98 F | RESPIRATION RATE: 18 BRPM | BODY MASS INDEX: 43.02 KG/M2 | WEIGHT: 252 LBS | DIASTOLIC BLOOD PRESSURE: 81 MMHG | SYSTOLIC BLOOD PRESSURE: 120 MMHG | HEART RATE: 50 BPM

## 2024-11-10 DIAGNOSIS — R55 NEAR SYNCOPE: ICD-10-CM

## 2024-11-10 LAB
ALBUMIN SERPL BCG-MCNC: 3.5 G/DL (ref 3.5–5.2)
ALP SERPL-CCNC: 59 U/L (ref 40–150)
ALT SERPL W P-5'-P-CCNC: 21 U/L (ref 0–50)
ANION GAP SERPL CALCULATED.3IONS-SCNC: 8 MMOL/L (ref 7–15)
AST SERPL W P-5'-P-CCNC: 15 U/L (ref 0–45)
ATRIAL RATE - MUSE: 49 BPM
ATRIAL RATE - MUSE: 60 BPM
BASOPHILS # BLD AUTO: 0.1 10E3/UL (ref 0–0.2)
BASOPHILS NFR BLD AUTO: 1 %
BILIRUB SERPL-MCNC: 0.2 MG/DL
BUN SERPL-MCNC: 14.2 MG/DL (ref 8–23)
C PNEUM DNA SPEC QL NAA+PROBE: NOT DETECTED
CALCIUM SERPL-MCNC: 8.7 MG/DL (ref 8.8–10.4)
CHLORIDE SERPL-SCNC: 102 MMOL/L (ref 98–107)
CREAT SERPL-MCNC: 0.93 MG/DL (ref 0.51–0.95)
DIASTOLIC BLOOD PRESSURE - MUSE: NORMAL MMHG
DIASTOLIC BLOOD PRESSURE - MUSE: NORMAL MMHG
EGFRCR SERPLBLD CKD-EPI 2021: 70 ML/MIN/1.73M2
EOSINOPHIL # BLD AUTO: 0.3 10E3/UL (ref 0–0.7)
EOSINOPHIL NFR BLD AUTO: 5 %
ERYTHROCYTE [DISTWIDTH] IN BLOOD BY AUTOMATED COUNT: 13.2 % (ref 10–15)
FLUAV H1 2009 PAND RNA SPEC QL NAA+PROBE: NOT DETECTED
FLUAV H1 RNA SPEC QL NAA+PROBE: NOT DETECTED
FLUAV H3 RNA SPEC QL NAA+PROBE: NOT DETECTED
FLUAV RNA SPEC QL NAA+PROBE: NOT DETECTED
FLUBV RNA SPEC QL NAA+PROBE: NOT DETECTED
GLUCOSE SERPL-MCNC: 91 MG/DL (ref 70–99)
HADV DNA SPEC QL NAA+PROBE: NOT DETECTED
HCO3 SERPL-SCNC: 27 MMOL/L (ref 22–29)
HCOV PNL SPEC NAA+PROBE: NOT DETECTED
HCT VFR BLD AUTO: 33.9 % (ref 35–47)
HGB BLD-MCNC: 11.4 G/DL (ref 11.7–15.7)
HMPV RNA SPEC QL NAA+PROBE: NOT DETECTED
HPIV1 RNA SPEC QL NAA+PROBE: NOT DETECTED
HPIV2 RNA SPEC QL NAA+PROBE: NOT DETECTED
HPIV3 RNA SPEC QL NAA+PROBE: NOT DETECTED
HPIV4 RNA SPEC QL NAA+PROBE: NOT DETECTED
IMM GRANULOCYTES # BLD: 0 10E3/UL
IMM GRANULOCYTES NFR BLD: 0 %
INTERPRETATION ECG - MUSE: NORMAL
INTERPRETATION ECG - MUSE: NORMAL
LVEF ECHO: NORMAL
LYMPHOCYTES # BLD AUTO: 2.4 10E3/UL (ref 0.8–5.3)
LYMPHOCYTES NFR BLD AUTO: 41 %
M PNEUMO DNA SPEC QL NAA+PROBE: NOT DETECTED
MCH RBC QN AUTO: 31.8 PG (ref 26.5–33)
MCHC RBC AUTO-ENTMCNC: 33.6 G/DL (ref 31.5–36.5)
MCV RBC AUTO: 95 FL (ref 78–100)
MONOCYTES # BLD AUTO: 0.5 10E3/UL (ref 0–1.3)
MONOCYTES NFR BLD AUTO: 8 %
NEUTROPHILS # BLD AUTO: 2.6 10E3/UL (ref 1.6–8.3)
NEUTROPHILS NFR BLD AUTO: 44 %
NRBC # BLD AUTO: 0 10E3/UL
NRBC BLD AUTO-RTO: 0 /100
P AXIS - MUSE: 21 DEGREES
P AXIS - MUSE: 73 DEGREES
PLATELET # BLD AUTO: 235 10E3/UL (ref 150–450)
POTASSIUM SERPL-SCNC: 4.1 MMOL/L (ref 3.4–5.3)
PR INTERVAL - MUSE: 164 MS
PR INTERVAL - MUSE: 170 MS
PROT SERPL-MCNC: 6.4 G/DL (ref 6.4–8.3)
QRS DURATION - MUSE: 100 MS
QRS DURATION - MUSE: 104 MS
QT - MUSE: 406 MS
QT - MUSE: 464 MS
QTC - MUSE: 406 MS
QTC - MUSE: 419 MS
R AXIS - MUSE: -20 DEGREES
R AXIS - MUSE: -24 DEGREES
RBC # BLD AUTO: 3.58 10E6/UL (ref 3.8–5.2)
RSV RNA SPEC QL NAA+PROBE: NOT DETECTED
RSV RNA SPEC QL NAA+PROBE: NOT DETECTED
RV+EV RNA SPEC QL NAA+PROBE: NOT DETECTED
SODIUM SERPL-SCNC: 137 MMOL/L (ref 135–145)
SYSTOLIC BLOOD PRESSURE - MUSE: NORMAL MMHG
SYSTOLIC BLOOD PRESSURE - MUSE: NORMAL MMHG
T AXIS - MUSE: -46 DEGREES
T AXIS - MUSE: 26 DEGREES
VENTRICULAR RATE- MUSE: 49 BPM
VENTRICULAR RATE- MUSE: 60 BPM
WBC # BLD AUTO: 5.9 10E3/UL (ref 4–11)

## 2024-11-10 PROCEDURE — 999N000208 ECHOCARDIOGRAM COMPLETE

## 2024-11-10 PROCEDURE — 82040 ASSAY OF SERUM ALBUMIN: CPT | Performed by: PHYSICIAN ASSISTANT

## 2024-11-10 PROCEDURE — 82310 ASSAY OF CALCIUM: CPT | Performed by: PHYSICIAN ASSISTANT

## 2024-11-10 PROCEDURE — 99223 1ST HOSP IP/OBS HIGH 75: CPT | Mod: 25 | Performed by: STUDENT IN AN ORGANIZED HEALTH CARE EDUCATION/TRAINING PROGRAM

## 2024-11-10 PROCEDURE — 99239 HOSP IP/OBS DSCHRG MGMT >30: CPT | Performed by: INTERNAL MEDICINE

## 2024-11-10 PROCEDURE — 93306 TTE W/DOPPLER COMPLETE: CPT | Mod: 26 | Performed by: INTERNAL MEDICINE

## 2024-11-10 PROCEDURE — 80053 COMPREHEN METABOLIC PANEL: CPT | Performed by: PHYSICIAN ASSISTANT

## 2024-11-10 PROCEDURE — 250N000013 HC RX MED GY IP 250 OP 250 PS 637: Performed by: PHYSICIAN ASSISTANT

## 2024-11-10 PROCEDURE — 999N000147 HC STATISTIC PT IP EVAL DEFER

## 2024-11-10 PROCEDURE — 85041 AUTOMATED RBC COUNT: CPT | Performed by: PHYSICIAN ASSISTANT

## 2024-11-10 PROCEDURE — G0378 HOSPITAL OBSERVATION PER HR: HCPCS

## 2024-11-10 PROCEDURE — 255N000002 HC RX 255 OP 636: Performed by: INTERNAL MEDICINE

## 2024-11-10 PROCEDURE — 250N000013 HC RX MED GY IP 250 OP 250 PS 637: Performed by: INTERNAL MEDICINE

## 2024-11-10 PROCEDURE — 85004 AUTOMATED DIFF WBC COUNT: CPT | Performed by: PHYSICIAN ASSISTANT

## 2024-11-10 PROCEDURE — 36415 COLL VENOUS BLD VENIPUNCTURE: CPT | Performed by: PHYSICIAN ASSISTANT

## 2024-11-10 PROCEDURE — 85018 HEMOGLOBIN: CPT | Performed by: PHYSICIAN ASSISTANT

## 2024-11-10 RX ORDER — PANTOPRAZOLE SODIUM 40 MG/1
40 TABLET, DELAYED RELEASE ORAL
Status: DISCONTINUED | OUTPATIENT
Start: 2024-11-10 | End: 2024-11-10 | Stop reason: HOSPADM

## 2024-11-10 RX ADMIN — METOCLOPRAMIDE 10 MG: 5 TABLET ORAL at 10:54

## 2024-11-10 RX ADMIN — HUMAN ALBUMIN MICROSPHERES AND PERFLUTREN 6 ML: 10; .22 INJECTION, SOLUTION INTRAVENOUS at 09:46

## 2024-11-10 RX ADMIN — Medication 25 MCG: at 09:50

## 2024-11-10 RX ADMIN — MAGNESIUM OXIDE TAB 400 MG (241.3 MG ELEMENTAL MG) 400 MG: 400 (241.3 MG) TAB at 09:50

## 2024-11-10 RX ADMIN — RABEPRAZOLE SODIUM 20 MG: 20 TABLET, DELAYED RELEASE ORAL at 10:54

## 2024-11-10 ASSESSMENT — ACTIVITIES OF DAILY LIVING (ADL)
ADLS_ACUITY_SCORE: 0

## 2024-11-10 NOTE — PLAN OF CARE
Physical Therapy: Orders received. Chart reviewed and discussed with care team.? Physical Therapy not indicated due to patient independent with all mobility and discharging on this date.? Defer discharge recommendations to medical team.? Will complete orders.

## 2024-11-10 NOTE — CONSULTS
Cardiology Consultation Note   Attending: Dr. Rao.   Date of Service: 11/10/2024      CC:   SOB, diaphoresis    HPI:   Mrs Mcfadden is a 60 yo f with history  of dilated CM with HFrEF EF 32%, history of PVCs, but no documented VT, and history of longstanding dizziness with prior syncopal episodes, who presented to [facility] for episode of SOB with diaphoresis, for which Cardiology is consulted.     She follows with Dr Seaman in clinic. 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 She had a CMR in 2023 revealing LVEF of 32%, LVEDD 6.1cm, RVEF of 45%, and no fibrosis. Additionally, she had a ziopatch monitor that had revealed 9.4% of PVC burden and predominantly sinus rhythm. She saw MsSuzie Al in the interim on 9/11/23 and with Dr. Aragon in EP on 10/10/23 where a primary prevention ICD was discussed. She has had syncopal episodes in the past, one attributed to orthostasis, and one to situational micturition syncope. Last time she saw EP in clinic was in 10/2023 and at that time they had discussed about ICD again, she would think about how she wished to proceed.    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 After that, she started developing left shoulder pain. She denies any concomitant palpitations. She denies any syncopal episodes recently. She has been intermittently feeling lightheadeded at times, but she has not had any sustained palpitations. She does feel skipped beats on occasion. She has not had any episodes of chest pain, but once, with exertion at work, she has had transient jaw pain. This happened a few weeks ago and has not recurreed.      She was taken to the ED and her workup there revealed no arrhythmias, troponin <6 x3 and EKG non ischemic. She had been administered nitroglycerin SL with some resolution of the shoulder pain.  She had repeat TTE that did not reveal any changes from her baseline echo.     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    Familial cardiomyopathy (H)     GERD without esophagitis     History of transfusion     Hypertension     Injury of left shoulder, initial encounter 01/12/2017    Morbid obesity (H)     Nontraumatic rupture of quadriceps tendon, left 06/21/2018    KATIA (obstructive sleep apnea)- moderate-severe (AHI 29) 8/26/2021    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     no longer an issue    Peroneus longus tendinitis 01/02/2015    Plantar fasciitis 11/11/2014    PONV (postoperative nausea and vomiting)     Rotator cuff injury 01/17/2017    Sprain of other ligament of left ankle, initial encounter 01/12/2017    Status post left knee replacement 06/21/2018    TB lung, latent     negative quantiferon gold test  11/5/12     Past Surgical History:   Past Surgical History:   Procedure Laterality Date    ARTHROPLASTY REVISION KNEE Left 01/17/2019    Procedure: REVISION, LEFT TOTAL KNEE  ARTHROPLASTY;  Surgeon: Dev Rocha MD;  Location: [facility];  Service: Orthopedics    ARTHROPLASTY REVISION KNEE Right 09/11/2020    Procedure: RIGHT REVISION TOTAL KNEE ARTHROPLASTY;  Surgeon: Dev Rocha MD;  Location: [facility];  Service: Orthopedics    BREAST SURGERY  2001    Breast Reduction     C EXCISE EXCESS SKIN TISSUE,ABDOMEN       SECTION  1989     SECTION  10/1985    COLONOSCOPY N/A 2023    Procedure: Colonoscopy;  Surgeon: Simone Jansen MD;  Location: [facility]    COLONOSCOPY WITH CO2 INSUFFLATION N/A 2021    Procedure: COLONOSCOPY, WITH CO2 INSUFFLATION;  Surgeon: Angela Harrington DO;  Location: [facility]    COLONOSCOPY WITH CO2 INSUFFLATION N/A 2022    Procedure: COLONOSCOPY, WITH CO2 INSUFFLATION;  Surgeon: Nando Whitlock MD;  Location: [facility]    CRANIECTOMY Right 2021    Procedure: RIGHT MIDDLE FOSSA APPROACH, CSF LEAK REPAIR;  Surgeon: Jocelyn Noe MD;  Location: [facility]    CRANIOTOMY MIDDLE FOSSA, EXCISE ACOUSTIC NEUROMA, COMBINED N/A 2021    Procedure: Right CRANIOTOMY, MIDDLE FOSSA APPROACH, FOR REPAIR OF ENCEPHALOCELE;  Surgeon: Jocelyne Harrell MD;  Location: [facility]    CV RIGHT HEART CATH MEASUREMENTS RECORDED N/A 1/10/2023    Procedure: Heart Cath Right Heart Cath;  Surgeon: Slade Hanson MD;  Location:  HEART CARDIAC CATH LAB    CYSTOURETHROSCOPY N/A 2020    Procedure: CYSTOSCOPY;  Surgeon: Cherelle Willams MD;  Location: [facility];  Service: Gynecology    ESOPHAGOSCOPY, GASTROSCOPY, DUODENOSCOPY (EGD), COMBINED N/A 2023    Procedure: Esophagoscopy, gastroscopy, duodenoscopy (EGD), combined;  Surgeon: Sut Guadalupe MD;  Location: [facility]    GYN SURGERY N/A 2020    Procedure: MIDURETHRAL SLING, CYSTOSCOPY;  Surgeon: Cherelle Willams MD;  Location: [facility] OR;  Service: Gynecology    HYSTERECTOMY TOTAL ABDOMINAL      for fibroids; reports having blood transfusion after surgery    INSERT DRAIN LUMBAR N/A 2021    Procedure: Insert drain lumbar;  Surgeon: Jocelyn Noe MD;  Location: [facility]    ORTHOPEDIC SURGERY      Right Knee ACL repair    THYROIDECTOMY  2013    Procedure: THYROIDECTOMY;  LEFT THYROID LOBECTOMY.   (LIGASURE, RECURRENT LARYNGEAL NERVE MONITOR) ;  Surgeon: Uriah Camargo MD;  Location:  [facility]    THYROIDECTOMY      TOTAL KNEE ARTHROPLASTY Left 10/03/2017    TOTAL KNEE ARTHROPLASTY Right 02/07/2019    Procedure: RIGHT TOTAL KNEE ARTHROPLASTY;  Surgeon: Dev Rocha MD;  Location: [facility];  Service: Orthopedics    TUBAL LIGATION  1989    ZZ[place]CIS UTERINE [place]ROID,[place]D [place]  [place]     Allergies: Per MAR     Allergies   Allergen Reactions    Morphine      EMESIS    Nickel     Sulfa Antibiotics Swelling     Medications:   Per MAR current outpatient cardiovascular medications include:   No medications prior to admission.     Current Outpatient Medications   Medication Sig Dispense Refill    famotidine (PEPCID) 40 MG tablet Take 1 tablet (40 mg) by mouth nightly as needed for heartburn 90 tablet 3    loratadine (CLARITIN) 10 MG tablet Take 1 tablet (10 mg) by mouth daily 90 tablet 3    magnesium oxide (MAG-OX) 400 MG tablet Take 1 tablet (400 mg) by mouth 2 times daily 180 tablet 3    metoclopramide (REGLAN) 10 MG tablet Take 1 tablet (10 mg) by mouth 3 times daily as needed (nausea) 270 tablet 1    metoprolol succinate ER (TOPROL XL) 25 MG 24 hr tablet Take 1 tablet (25 mg) by mouth daily 90 tablet 2    RABEprazole (ACIPHEX) 20 MG EC tablet TAKE 1 TABLET(20 MG) BY MOUTH TWICE DAILY 180 tablet 1    sacubitril-valsartan (ENTRESTO)  MG per tablet Take 1 tablet by mouth 2 times daily 180 tablet 2    Semaglutide-Weight Management (WEGOVY) 1 MG/0.5ML pen Inject 1 mg subcutaneously once a week. Monday's      solifenacin (VESICARE) 5 MG tablet Take 1 tablet (5 mg) by mouth daily at 2 pm 90 tablet 1    spironolactone (ALDACTONE) 25 MG tablet Take 2 tablets (50 mg) by mouth daily. 180 tablet 3    torsemide (DEMADEX) 20 MG tablet Take 1 tablet (20 mg) by mouth daily. 90 tablet 0    Vitamin D3 (CHOLECALCIFEROL) 25 mcg (1000 units) tablet Take 1 tablet (25 mcg) by mouth daily 90 tablet 2     Current  Facility-Administered Medications   Medication Dose Route Frequency Provider Last Rate Last Admin    acetaminophen (TYLENOL) tablet 650 mg  650 mg Oral Q4H PRN Ava Pelayo PA   650 mg at 11/09/24 2017    Or    acetaminophen (TYLENOL) Suppository 650 mg  650 mg Rectal Q4H PRN Ava Pelayo PA        famotidine (PEPCID) tablet 40 mg  40 mg Oral At Bedtime PRN Ava Pelayo PA   40 mg at 11/09/24 2012    lidocaine (LMX4) cream   Topical Q1H PRN Ava Pelayo PA        lidocaine 1 % 0.1-1 mL  0.1-1 mL Other Q1H PRN Ava Pelayo PA        [Held by provider] loratadine (CLARITIN) tablet 10 mg  10 mg Oral Daily Ava Pelayo PA        magnesium oxide (MAG-OX) tablet 400 mg  400 mg Oral BID Ava Pelayo PA   400 mg at 11/10/24 0950    metoclopramide (REGLAN) tablet 10 mg  10 mg Oral TID PRN Ava Pelayo PA   10 mg at 11/10/24 1054    [Held by provider] metoprolol succinate ER (TOPROL XL) 24 hr tablet 25 mg  25 mg Oral Daily Ava Pelayo PA        ondansetron (ZOFRAN ODT) ODT tab 4 mg  4 mg Oral Q6H PRN Ava Pelayo PA        Or    ondansetron (ZOFRAN) injection 4 mg  4 mg Intravenous Q6H PRN Ava Pelayo PA        pantoprazole (PROTONIX) EC tablet 40 mg  40 mg Oral BID Rah Ricardo MD        [Held by provider] sacubitril-valsartan (ENTRESTO)  MG per tablet 1 tablet  1 tablet Oral BID Ava Pelayo PA        senna-docusate (SENOKOT-S/PERICOLACE) 8.6-50 MG per tablet 1 tablet  1 tablet Oral BID PRN Ava Pelayo PA        Or    senna-docusate (SENOKOT-S/PERICOLACE) 8.6-50 MG per tablet 2 tablet  2 tablet Oral BID PRN Ava Pelayo PA        sodium chloride (PF) 0.9% PF flush 3 mL  3 mL Intracatheter Q8H Ava Pelayo PA   3 mL at 11/10/24 0950    sodium chloride (PF) 0.9% PF flush 3 mL  3 mL Intracatheter q1 min prn Ava Pelayo PA   3 mL at 11/09/24 1424    [Held by provider] solifenacin (VESICARE) tablet 5 mg  5 mg Oral Daily Ava Pelayo PA        [Held by provider]  spironolactone (ALDACTONE) tablet 50 mg  50 mg Oral Daily Ava Pelayo PA        [Held by provider] torsemide (DEMADEX) tablet 20 mg  20 mg Oral Daily Ava Pelayo PA        Vitamin D3 (CHOLECALCIFEROL) tablet 25 mcg  25 mcg Oral Daily Ava Pelayo PA   25 mcg at 11/10/24 0950     Current Outpatient Medications   Medication Sig Dispense Refill    famotidine (PEPCID) 40 MG tablet Take 1 tablet (40 mg) by mouth nightly as needed for heartburn 90 tablet 3    loratadine (CLARITIN) 10 MG tablet Take 1 tablet (10 mg) by mouth daily 90 tablet 3    magnesium oxide (MAG-OX) 400 MG tablet Take 1 tablet (400 mg) by mouth 2 times daily 180 tablet 3    metoclopramide (REGLAN) 10 MG tablet Take 1 tablet (10 mg) by mouth 3 times daily as needed (nausea) 270 tablet 1    metoprolol succinate ER (TOPROL XL) 25 MG 24 hr tablet Take 1 tablet (25 mg) by mouth daily 90 tablet 2    RABEprazole (ACIPHEX) 20 MG EC tablet TAKE 1 TABLET(20 MG) BY MOUTH TWICE DAILY 180 tablet 1    sacubitril-valsartan (ENTRESTO)  MG per tablet Take 1 tablet by mouth 2 times daily 180 tablet 2    Semaglutide-Weight Management (WEGOVY) 1 MG/0.5ML pen Inject 1 mg subcutaneously once a week. Monday's      solifenacin (VESICARE) 5 MG tablet Take 1 tablet (5 mg) by mouth daily at 2 pm 90 tablet 1    spironolactone (ALDACTONE) 25 MG tablet Take 2 tablets (50 mg) by mouth daily. 180 tablet 3    torsemide (DEMADEX) 20 MG tablet Take 1 tablet (20 mg) by mouth daily. 90 tablet 0    Vitamin D3 (CHOLECALCIFEROL) 25 mcg (1000 units) tablet Take 1 tablet (25 mcg) by mouth daily 90 tablet 2     Facility-Administered Medications Ordered in Other Encounters   Medication Dose Route Frequency Provider Last Rate Last Admin    sodium chloride (PF) 0.9% PF flush 10 mL  10 mL Intravenous Once Julio Leroy MD         Family History:   Family History   Problem Relation Age of Onset    Hypertension Father         dec    Diabetes Father         dec    Heart Disease  "Father         dec    Alcohol/Drug Father     Cardiovascular Father     Heart Disease Mother         dec    Alcohol/Drug Mother     Cardiovascular Mother     Heart Disease Daughter         Cardiomyopathy    Cardiovascular Daughter         cardiomyopathy    Colon Cancer Sister     Cardiovascular Son         cardiomyopathy    Diabetes Paternal Grandmother         dec    Hypertension Paternal Grandmother         dec    Cerebrovascular Disease Paternal Grandmother         dec    Cancer Sister         Lupus    Cardiovascular Sister         cardiomyopathy    Heart Disease Sister         heart failure, and kidney failure     Social History:   Social History     Tobacco Use    Smoking status: Never    Smokeless tobacco: Never   Substance Use Topics    Alcohol use: Yes     Comment: rare, twice a year, she has a drink little wine 2 days ago       ROS:   A comprehensive 10 point ROS was negative other than as mentioned in HPI.    Physical Examination:     B/P: 120/81, T: 98, P: 50, R: 18    General: Alert and oriented; no acute distress  Neck: JVP is 10 cm  CV: s1 s2 regular; no m/r/g; no pitting edema  Lungs: CTABL, no rhonchi or wheezing  GI: BS+X4; non tender, non distended  Skin: warm, no rashes  Neuro: alert and oriented  Psych: congruent affect    Labs:  Resp: 18 SpO2: 96 % O2 Device: None (Room air)      Arterial Blood Gas: No lab results found in last 7 days.  Vitals:    11/09/24 0840   Weight: 114.3 kg (252 lb)   I/O last 3 completed shifts:  In: 6 [I.V.:6]  Out: -   Recent Labs   Lab 11/10/24  0652 11/09/24  0924    137   POTASSIUM 4.1 4.4   CHLORIDE 102 100   CO2 27 27   ANIONGAP 8 10   GLC 91 98   BUN 14.2 15.6   CR 0.93 0.99*   CARMEN 8.7* 9.6     No components found for: \"URINE\"   Recent Labs   Lab 11/10/24  0652 11/09/24 0924   AST 15 15   ALT 21 28   BILITOTAL 0.2 0.3   ALBUMIN 3.5 4.3   PROTTOTAL 6.4 7.9   ALKPHOS 59 75     Temp: 98  F (36.7  C) Temp src: OralTemp  Min: 98  F (36.7  C)  Max: 98.7  F (37.1 "  C)   Recent Labs   Lab 11/10/24  0652 11/09/24  0924   WBC 5.9 6.5   HGB 11.4* 12.8   HCT 33.9* 38.6   MCV 95 93   RDW 13.2 13.1    288     Recent Labs   Lab 24   INR 0.94   PTT 27     Recent Labs   Lab 11/10/24  0652 11/09/24  0924   GLC 91 98         Imaging:   Recent Results (from the past 24 hours)   Echo Complete   Result Value    LVEF  30-35% (moderately reduced)    [facility]    [id number]  [id number]  [id number]  [id number]^HERON^PO     [facility]  Echocardiography Laboratory  [address] [place] [id number]     Name: BALTAZAR OREILLY  MRN: [id number]  : 1965  Study Date: 11/10/2024 08:58 AM  Age: 59 yrs  Gender: Female  Patient Location: [facility]  Reason For Study: Heart Failure  Ordering Physician: PO SHELBY  Performed By: Erica Camargo RDCS     BSA: 2.2 m2  Height: 64 in  Weight: 252 lb  ______________________________________________________________________________  Procedure  Complete Portable Echo Adult. Contrast Optison. Optison (NDC #2485-6709-66)  given intravenously. Patient was given 6 ml mixture of 3 ml Optison and 6 ml  saline. 3 ml wasted.  ______________________________________________________________________________  Interpretation Summary  Severe left ventricular dilation is present. Left ventricular function is  decreased. The ejection fraction is 30-35% (moderately reduced). Moderate  diffuse hypokinesis is present. Abnormal septal motion from conduction delay  present.  Global right ventricular function is normal.  The inferior vena cava was normal in size with preserved respiratory  variability.  No pericardial effusion is present.  ______________________________________________________________________________  Left Ventricle  Severe left ventricular dilation is present. LVIDd = 6.8 cm. Left ventricular  function is decreased. The ejection fraction is 30-35% (moderately reduced).  Moderate diffuse hypokinesis is  present. Abnormal septal motion from  conduction delay present. There is no thrombus seen in the left ventricle.     Right Ventricle  The right ventricle is normal size. Global right ventricular function is  normal.     Mitral Valve  The mitral valve is normal.     Aortic Valve  Aortic valve is normal in structure and function.     Tricuspid Valve  The tricuspid valve is normal.     Pulmonic Valve  On Doppler interrogation, there is no significant stenosis or regurgitation.     Vessels  The inferior vena cava was normal in size with preserved respiratory  variability.     Pericardium  No pericardial effusion is present.     Compared to Previous Study  This study was compared with the study from 7.2024 . No significant changes  noted.  ______________________________________________________________________________  MMode/2D Measurements & Calculations  IVSd: 0.85 cm     LVIDd: 6.8 cm  LVIDs: 5.8 cm  LVPWd: 0.74 cm  FS: 14.8 %  LV mass(C)d: 228.2 grams  LV mass(C)dI: 105.8 grams/m2  RWT: 0.22     ______________________________________________________________________________  Report approved by: Gustavo España 11/10/2024 12:01 PM               Assessment and Plan:   Mrs Mcfadden is a 58 yo f with history  of dilated CM with HFrEF EF 32%, history of PVCs, but no documented VT, and history of longstanding dizziness with prior syncopal episodes, who presented to [facility] for episode of SOB with diaphoresis, for which Cardiology is consulted.     # HFrEF, NICM, EF 32%   # Familial dilated CM  # SOB, presyncope with diaphoresis  # Episodic shoulder pain    Patient presents with diaphoresis and presyncope and developed transient shoulder pain. She is at high risk for arrhythmias, but no documented VT has been seen on Zio or on telemetry. She has had episodes of sustained supraventricular tachycardias, which could also present this way. Orthostasis would be less likely in this setting, as she had concomitant SOB, which is  not a typical feature of orthostatic syncope. ACS has been ruled out by troponins and EKG and repeat TTE is with stable function. Altogether, this episode would be more worrisome for arrhythmia, however could also consider angina equivalent in this lady.    Recommendations:  - Continue Metoprolol 25 mg daily  - Ziopatch ordered for outpatient, will be mailed out   -Will follow with HF as outpatient - to see [facility] clinic first  - Consider repeating ischemic evaluation as outpatient if she still has episodic shoulder/ jaw pain.  - She is strongly encouraged to see EP for ICD consideration as outpatient.    Thank you for allowing us to participate in the care of this patient.   The patient was discussed w/ Dr. Rao.     Lashell Branch MD PhD  Cardiology fellow  [id number]

## 2024-11-10 NOTE — PROGRESS NOTES
[facility]    Medicine Progress Note - Hospitalist Service, GOLD TEAM 17    Date of Admission:  11/9/2024    Assessment & Plan     Marleen Mcfadden is a 59 year old female admitted on 11/9/2024. She has a history of known HFrEF with chronic systolic heart failure (EF 30-35% on echocardiogram 7/19/2024), obstructive sleep apnea, chronic recurrent headaches, GERD, chronic low back pain, HTN who presented to the ED after presyncopal episode this a.m., found to have bradycardia and mild hypotension, later developed left shoulder pain that was responsive to nitroglycerin.  Patient was  admitted to internal medicine for observation and further workup.    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 Set down, symptoms improved, went to ED and there she developed left shoulder pain 8/10 no change to movement or breathing, ha snot had pains lie  this before. Rated it 8/10 and within minutes was gone post SLNTG  ,         Presyncope  Bradycardia  Acute left shoulder pain   - patient had presyncopal episode with diaphoresis while at work.  Presented to the ED with mild hypotension.  Later was noted to have left shoulder pain which responded to nitroglycerin.  Patient endorses taking all blood pressure medications in  a.m. aside from torsemide.   - has HFrEF, no ICD, differential includes arrhythmia as she is at high risk   - K and Mg ok   - She reports intermittent chills over the last 3 days.  Denies any cough.  Reports baseline dyspnea with exertion which is unchanged.  -Received nitroglycerin 0.4 mg in ED with improvement of shoulder pain  -Total of 1 L normal saline given in the ED for hypotension, recurrent after nitroglycerin, hold on IV fluids  with history HFrEF    -Aspirin  "162 mg given in the ED  -Troponin negative X 2  -COVID, influenza, RSV respiratory panel  negative  -UA, CMP, CBC unremarkable  - heart rate on EKG 49 sinus bradycardia, similar to prior, no acute S T wave changes   - no major events  on  telemetry, there was possible 3-4 beets on telemetry  but ? 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 Symptomatic episodes corresponded to sinus rhythm with PVCs.Low overall PVC burden of 3.9%.\"   - has COR buster 11/21        KATIA  -lea snot use CPAP     SLE  Fibromyalgia   -  follows with rheumatology   -not on medications       Chronic headache  - stable      Anemia  -  Hg usual I in 12s , was 11.4 on admission, no signs of bleed     Anxiety  -Continue PTA escitalopram 10 mg daily     Allergies  -HOLD PTA fluticasone daily continue PTA loratadine as patient observation status     GERD   Constipation  Pelvic floor dysfunction   - sees GI as out patient    -Continue PTA Rabeprazole 20 mg BID once patient bring in home supple   -Continue PTA Pepcid HS PRN   -Continue PTA reglan PRN   - she has asked for these meds      OAB  -Hold PTA solifenacin 5 mg daily      Obesity   -Hold Semaglutide 1 mg once weekly (on Monday)            Observation Goals: -diagnostic tests and consults completed and resulted, -vital signs normal or at patient " "baseline, -tolerating oral intake to maintain hydration, -adequate pain control on oral analgesics, -dyspnea improved and O2 sats greater than 88% on room air or prior home oxygen levels, Nurse to notify provider when observation goals have been met and patient is ready for discharge.  Diet: Low Saturated Fat Na <2400 mg    DVT Prophylaxis: Ambulate every shift  Awad Catheter: Not present  Lines: None     Cardiac Monitoring: None  Code Status: Full Code      Clinically Significant Risk Factors Present on Admission                   # Hypertension: Noted on problem list  # Chronic heart failure with reduced ejection fraction: last echo with EF <40%          # Severe Obesity: Estimated body mass index is 43.26 kg/m  as calculated from the following:    Height as of this encounter: 1.626 m (5' 4\").    Weight as of this encounter: 114.3 kg (252 lb).              Disposition Plan     Medically Ready for Discharge: possibly today depending on cardiology  rec              Shena Reyes MD  Hospitalist Service, GOLD TEAM 17  [facility]  Securely message with [employer] (more info)  Text page via [facility] Paging/Directory   See signed in provider for up to date coverage information  ______________________________________________________________________    Interval History   She is feeling ok, her shortness of breath  is at baseline, not worse, no further near syncopal episodes, no chest pain  no shoulder /arm pain     Physical Exam   Vital Signs: Temp: 98.3  F (36.8  C) Temp src: Oral BP: 93/78 Pulse: 55   Resp: 20 SpO2: 96 % O2 Device: None (Room air)    Weight: 252 lbs 0 oz  General appearence: awake alert  in  no apparent distress      RESPIRATORY: lungs clear to auscultation bilateral, no  crackles   CARDIOVASCULAR:S1 S2 regular rate and rhythm, no rubs gallops or murmurs appreciated  GASTROINTESTINAL:soft, non-distended , non-tender , + bowel sounds,   SKIN: warm and dry, no " mottling noted   NEUROLOGIC; awake alert and oriented, no focal deficits found  EXTREMITIES: no clubbing, cyanosis , tr edema left > right , moves all extremity, good pedal pulses   MUSCULOSKELETAL: without deformity           Data     I have personally reviewed the following data over the past 24 hrs:    5.9  \   11.4 (L)   / 235     137 100 15.6 /  98   4.4 27 0.99 (H) \     ALT: 28 AST: 15 AP: 75 TBILI: 0.3   ALB: 4.3 TOT PROTEIN: 7.9 LIPASE: N/A     Trop: <6 BNP: 193     TSH: 4.49 (H) T4: 0.99 A1C: N/A     INR:  0.94 PTT:  27   D-dimer:  N/A Fibrinogen:  N/A       Imaging results reviewed over the past 24 hrs:   Recent Results (from the past 24 hours)   XR Chest 2 Views    Narrative    EXAM: XR CHEST 2 VIEWS  LOCATION: [facility]  DATE: 11/9/2024    INDICATION: Shortness of breath and near syncope.  COMPARISON: 3/25/2024      Impression    IMPRESSION: Stable borderline cardiomegaly with normal pulmonary vascularity. No focal consolidation, pneumothorax nor pleural effusion

## 2024-11-10 NOTE — DISCHARGE SUMMARY
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    Weight as of this encounter: 114.3 kg (252 lb).       Follow-ups Needed After Discharge     Discharge Disposition   Discharged to home  Condition at discharge: Stable    Hospital Course     Marleen Mcfadden is a 59 year old female admitted on 11/9/2024. She has a history of known HFrEF with chronic systolic heart failure (EF 30-35% on echocardiogram 7/19/2024), obstructive sleep apnea, chronic recurrent headaches, GERD, chronic low back pain, HTN who presented to the ED after presyncopal episode this a.m., found to have bradycardia and mild hypotension, later developed left shoulder pain that was responsive to nitroglycerin.  Patient was  admitted to internal medicine for observation and further workup.     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 Set down, symptoms improved, went to ED and there she developed left shoulder pain 8/10 no change to movement or breathing, ha snot had pains lie  this before. Rated it 8/10 and within minutes was gone post SLNTG  ,            Presyncope  Bradycardia  Acute left shoulder pain   - patient " "had presyncopal episode with diaphoresis while at work.  Presented to the ED with mild hypotension.  Later was noted to have left shoulder pain which responded to nitroglycerin.  Patient endorses taking all blood pressure medications in  a.m. aside from torsemide.   - has HFrEF, no ICD, differential includes arrhythmia as she is at high risk   - K and Mg ok   - She reports intermittent chills over the last 3 days.  Denies any cough.  Reports baseline dyspnea with exertion which is unchanged.  -Received nitroglycerin 0.4 mg in ED with improvement of shoulder pain  -Total of 1 L normal saline given in the ED for hypotension, recurrent after nitroglycerin, hold on IV fluids  with history HFrEF    -Aspirin 162 mg given in the ED  -Troponin negative X 2  -COVID, influenza, RSV respiratory panel  negative  -UA, CMP, CBC unremarkable  - heart rate on EKG 49 sinus bradycardia, similar to prior, no acute S T wave changes   - no major events  on  telemetry, there was possible 3-4 beets on telemetry  but ? 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 Symptomatic episodes corresponded to sinus rhythm with PVCs.Low overall PVC burden of 3.9%.\"   - she has chronic exertional shortness of breath  that could be form her chronic HF or potentially angina equivalent so recommended out patient  ischemic work up    Discussed with  " cardiology , continue with current medications, ok to discharge , will need cardiology  ollow up  this ,  week and repeat ZIO patch and will need ICD as out patient  as well as ischemic work up           KATIA  -does not use CPAP      SLE  Fibromyalgia   -  follows with rheumatology   -not on medications       Chronic headache  - stable       Anemia  -  Hg usual I in 12s , was 11.4 on admission, no signs of bleed      Anxiety  -Continue PTA escitalopram 10 mg daily     Allergies  -continue  fluticasone daily      GERD   Constipation  Pelvic floor dysfunction   - sees GI as out patient    -Continue PTA Rabeprazole 20 mg BID once patient bring in home supple   -Continue PTA Pepcid HS PRN   -Continue PTA reglan PRN   - she has asked for these meds      OAB  -Hold PTA solifenacin 5 mg daily      Obesity   -Hold Semaglutide 1 mg once weekly (on Monday)         Consultations This Hospital Stay   PHYSICAL THERAPY ADULT IP CONSULT  CARDIOLOGY GENERAL ADULT IP CONSULT    Code Status   Full Code    Time Spent on this Encounter   I, Shena Reyes MD, personally saw the patient today and spent greater than 30 minutes discharging this patient.       Shena Reyes MD  [facility] [address]  [address] [place] [id number]  Phone: [phone]  Fax: [phone]  ______________________________________________________________________    Physical Exam   Vital Signs: Temp: 98  F (36.7  C) Temp src: Oral BP: 120/81 Pulse: 50   Resp: 18 SpO2: 96 % O2 Device: None (Room air)    Weight: 252 lbs 0 oz  General appearence: awake alert  in  no apparent distress        RESPIRATORY: lungs clear to auscultation bilateral, no  crackles   CARDIOVASCULAR:S1 S2 regular rate and rhythm, no rubs gallops or murmurs appreciated  GASTROINTESTINAL:soft, non-distended , non-tender , + bowel sounds,   SKIN: warm and dry, no mottling noted   NEUROLOGIC; awake alert and oriented, no focal deficits found  EXTREMITIES: no clubbing, cyanosis , tr edema  left > right , moves all extremity, good pedal pulses   MUSCULOSKELETAL: without deformity            Primary Care Physician   Yanna Matias    Discharge Orders      Adult Cardiology Eval  Referral      Reason for your hospital stay    Near syncope     Activity    Your activity upon discharge: activity as tolerated     Adult [facility] Follow-up and recommended labs and tests    Follow up with primary care provider, Yanna Matias, within 7 days for hospital follow- up.   Follow up  with [facility] this week andyou need repeat ZIO patch, cardiology  will help with arrangement        Appointments on [place] and/or [facility] (with [facility] or [facility] provider or service). Call [phone] if you haven't heard regarding these appointments within 7 days of discharge.     Diet    Follow this diet upon discharge: low salt, fluid restriction as before       Significant Results and Procedures   Most Recent 3 CBC's:  Recent Labs   Lab Test 11/10/24  0652 11/09/24 0924 03/05/24  1355   WBC 5.9 6.5 5.5   HGB 11.4* 12.8 12.1   MCV 95 93 95    288 244     Most Recent 3 BMP's:  Recent Labs   Lab Test 11/10/24  0652 11/09/24 0924 09/30/24  1453    137 137   POTASSIUM 4.1 4.4 4.0   CHLORIDE 102 100 101   CO2 27 27 26   BUN 14.2 15.6 16.8   CR 0.93 0.99* 0.98*   ANIONGAP 8 10 10   CARMEN 8.7* 9.6 8.8   GLC 91 98 90     Most Recent 2 LFT's:  Recent Labs   Lab Test 11/10/24  0652 11/09/24 0924   AST 15 15   ALT 21 28   ALKPHOS 59 75   BILITOTAL 0.2 0.3     Most Recent TSH and T4:  Recent Labs   Lab Test 11/09/24  1425 11/09/24 0924   TSH  --  4.49*   T4 0.99  --    ,   Results for orders placed or performed during the hospital encounter of 11/09/24   XR Chest 2 Views    Narrative    EXAM: XR CHEST 2 VIEWS  LOCATION: [facility]  DATE: 11/9/2024    INDICATION: Shortness of breath and near syncope.  COMPARISON: 3/25/2024      Impression    IMPRESSION:  Stable borderline cardiomegaly with normal pulmonary vascularity. No focal consolidation, pneumothorax nor pleural effusion   Echo Complete     Value    LVEF  30-35% (moderately reduced)    [facility]    [id number]  [id number]  [id number]  [id number]     [facility],[place]  Echocardiography Laboratory  [address] [place] [address]     Name: BALTAZAR OREILLY  MRN: [id number]  : 1965  Study Date: 11/10/2024 08:58 AM  Age: 59 yrs  Gender: Female  Patient Location: [facility]  Reason For Study: Heart Failure  Ordering Physician: PO SHELBY  Performed By: Erica Camargo RDCS     BSA: 2.2 m2  Height: 64 in  Weight: 252 lb  ______________________________________________________________________________  Procedure  Complete Portable Echo Adult. Contrast Optison. Optison (NDC #2233-7566-21)  given intravenously. Patient was given 6 ml mixture of 3 ml Optison and 6 ml  saline. 3 ml wasted.  ______________________________________________________________________________  Interpretation Summary  Severe left ventricular dilation is present. Left ventricular function is  decreased. The ejection fraction is 30-35% (moderately reduced). Moderate  diffuse hypokinesis is present. Abnormal septal motion from conduction delay  present.  Global right ventricular function is normal.  The inferior vena cava was normal in size with preserved respiratory  variability.  No pericardial effusion is present.  ______________________________________________________________________________  Left Ventricle  Severe left ventricular dilation is present. LVIDd = 6.8 cm. Left ventricular  function is decreased. The ejection fraction is 30-35% (moderately reduced).  Moderate diffuse hypokinesis is present. Abnormal septal motion from  conduction delay present. There is no thrombus seen in the left ventricle.     Right Ventricle  The right ventricle is normal size. Global right ventricular function  is  normal.     Mitral Valve  The mitral valve is normal.     Aortic Valve  Aortic valve is normal in structure and function.     Tricuspid Valve  The tricuspid valve is normal.     Pulmonic Valve  On Doppler interrogation, there is no significant stenosis or regurgitation.     Vessels  The inferior vena cava was normal in size with preserved respiratory  variability.     Pericardium  No pericardial effusion is present.     Compared to Previous Study  This study was compared with the study from 7.2024 . No significant changes  noted.  ______________________________________________________________________________  MMode/2D Measurements & Calculations  IVSd: 0.85 cm     LVIDd: 6.8 cm  LVIDs: 5.8 cm  LVPWd: 0.74 cm  FS: 14.8 %  LV mass(C)d: 228.2 grams  LV mass(C)dI: 105.8 grams/m2  RWT: 0.22     ______________________________________________________________________________  Report approved by: Gustavo España 11/10/2024 12:01 PM           *Note: Due to a large number of results and/or encounters for the requested time period, some results have not been displayed. A complete set of results can be found in Results Review.       Discharge Medications   Current Discharge Medication List        CONTINUE these medications which have CHANGED    Details   Semaglutide-Weight Management (WEGOVY) 1 MG/0.5ML pen Inject 1 mg subcutaneously once a week. Monday's    Associated Diagnoses: Severe obesity (BMI 35.0-39.9) with comorbidity (H)           CONTINUE these medications which have NOT CHANGED    Details   famotidine (PEPCID) 40 MG tablet Take 1 tablet (40 mg) by mouth nightly as needed for heartburn  Qty: 90 tablet, Refills: 3    Associated Diagnoses: Gastroesophageal reflux disease without esophagitis      loratadine (CLARITIN) 10 MG tablet Take 1 tablet (10 mg) by mouth daily  Qty: 90 tablet, Refills: 3    Associated Diagnoses: Otalgia, unspecified laterality; Otorrhea, bilateral; Dysfunction of both eustachian tubes;  Chronic rhinitis      magnesium oxide (MAG-OX) 400 MG tablet Take 1 tablet (400 mg) by mouth 2 times daily  Qty: 180 tablet, Refills: 3    Associated Diagnoses: Hypomagnesemia      metoclopramide (REGLAN) 10 MG tablet Take 1 tablet (10 mg) by mouth 3 times daily as needed (nausea)  Qty: 270 tablet, Refills: 1    Associated Diagnoses: Nausea      metoprolol succinate ER (TOPROL XL) 25 MG 24 hr tablet Take 1 tablet (25 mg) by mouth daily  Qty: 90 tablet, Refills: 2    Associated Diagnoses: Chronic systolic congestive heart failure (H)      RABEprazole (ACIPHEX) 20 MG EC tablet TAKE 1 TABLET(20 MG) BY MOUTH TWICE DAILY  Qty: 180 tablet, Refills: 1    Comments: **Patient requests 90 days supply**  Associated Diagnoses: Gastroesophageal reflux disease without esophagitis      sacubitril-valsartan (ENTRESTO)  MG per tablet Take 1 tablet by mouth 2 times daily  Qty: 180 tablet, Refills: 2    Associated Diagnoses: Chronic systolic congestive heart failure (H)      solifenacin (VESICARE) 5 MG tablet Take 1 tablet (5 mg) by mouth daily at 2 pm  Qty: 90 tablet, Refills: 1    Associated Diagnoses: Urinary urgency      spironolactone (ALDACTONE) 25 MG tablet Take 2 tablets (50 mg) by mouth daily.  Qty: 180 tablet, Refills: 3    Associated Diagnoses: Chronic diastolic congestive heart failure (H)      torsemide (DEMADEX) 20 MG tablet Take 1 tablet (20 mg) by mouth daily.  Qty: 90 tablet, Refills: 0    Associated Diagnoses: Chronic systolic congestive heart failure (H)      Vitamin D3 (CHOLECALCIFEROL) 25 mcg (1000 units) tablet Take 1 tablet (25 mcg) by mouth daily  Qty: 90 tablet, Refills: 2    Associated Diagnoses: Low vitamin D level           Allergies   Allergies   Allergen Reactions    Morphine      EMESIS    Nickel     Sulfa Antibiotics Swelling

## 2024-11-10 NOTE — PROGRESS NOTES
11/10/2024    To Whom It May Concern:    Marleen Mcfadden was admitted to [facility] 11/9-11/10/2024 . She may return to work with light duty on 11/13/24 till otherwise recommended by her medical team.        Shena Reyes MD

## 2024-11-10 NOTE — PLAN OF CARE
Shift 0700 to 1330:    Goal Outcome Evaluation:      Plan of Care Reviewed With: patient    Overall Patient Progress: improving    Outcome Evaluation: Cardiology consult complete. Pt discharging this shift.    Pt A&Ox4, denies SOB, CP, nausea, vomiting, diarrhea, constipation, numbness, tingling, and pain. Low saturated fat/low NA diet, takes medications whole with thin liquids. Pt on tele. Independent. Continent of B&B, LBM 11/9. Visible skin intact, refused full skin assessment. R PIV removed this shift.    Pt discharged at 1330 to home, and left with personal belongings. Pt received complete discharge paperwork. Pt was given times of last dose for all medications on discharge medication sheets. Discharge teaching included medication, pain management, and activity restrictions. Pt to follow up with PCP in 7 days. Pt had no further questions at the time of discharge and no unmet needs were identified.

## 2024-11-10 NOTE — PLAN OF CARE
"    VS: BP 93/78 (Cuff Size: Adult Large)   Pulse 55   Temp 98.3  F (36.8  C) (Oral)   Resp 20   Ht 1.626 m (5' 4\")   Wt 114.3 kg (252 lb)   LMP 10/19/2008 (Approximate)   SpO2 96%   BMI 43.26 kg/m        O2: 96% on RA no complaints of SOB or chest pain.   Output: Voiding adequate amounts w/o pain or difficulty to bathroom.   Last BM:    Activity: Independent      Skin: Visible intact   Pain: Denies    CMS: Intact.A&O x4   Dressing: N/A   Diet Low saturated fat Na diet   LDA: R PIV   Equipment: Personal belongings   Plan: C/poc   Additional Info:      "

## 2024-11-10 NOTE — PROGRESS NOTES
" Admission Note    Reason for admission: Observation for presyncope and L shoulder pain  Primary team notified of pt arrival.  Admitted from:  ED  Via: Patient transport  Accompanied by:    Belongings: Placed in closet; valuables sent home with family  Admission Required Doc Completed: Yes  Teaching: Orientation to unit and call light- call light within reach, use of console, meal times, when to call for the RN, and enforced importance of safety.  IV Access: R PIV  Telemetry: Yes  Ht./Wt.: Completed  Code Status verified on armband: Yes/No  2 RN Skin Assessment Completed with: Pt refused full skin assessment but stated \"I do not have any open wounds\".      Pt status:     Temp:  [97.8  F (36.6  C)-98  F (36.7  C)] 98  F (36.7  C)  Pulse:  [49-70] 61  Resp:  [16-20] 20  BP: ()/(46-78) 109/60  SpO2:  [93 %-99 %] 95 %    "

## 2024-11-10 NOTE — PLAN OF CARE
Goal Outcome Evaluation:      Plan of Care Reviewed With: patient    Overall Patient Progress: no changeOverall Patient Progress: no change         End of Shift Summary:       Neuro: Alert and oriented x 4. Afebrile. PRN Tylenol given for headache. Denies dizziness  Pulm/Resp: on room air. No shortness of breath  CV: Denies chest pain. SB 57s - NSR   GI/: on low saturated fat diet. Complaints of heartburn- PRN Famotidine and Reglan given. No bowel movement during the shift  Access: peripheral IV on right upper forearm- saline locked  Skin: refused skin check- patient stated she doesn't have any skin issues  Gtts: none   Drains:none    -Lab collected for Troponin      Plan:  For Echo                                                                                                                                                                                                                                                                                                                      +

## 2024-11-11 ENCOUNTER — PATIENT OUTREACH (OUTPATIENT)
Dept: CARE COORDINATION | Facility: CLINIC | Age: 59
End: 2024-11-11

## 2024-11-11 ENCOUNTER — LAB (OUTPATIENT)
Dept: LAB | Facility: CLINIC | Age: 59
End: 2024-11-11
Payer: COMMERCIAL

## 2024-11-11 ENCOUNTER — OFFICE VISIT (OUTPATIENT)
Dept: CARDIOLOGY | Facility: CLINIC | Age: 59
End: 2024-11-11
Attending: INTERNAL MEDICINE
Payer: COMMERCIAL

## 2024-11-11 VITALS
OXYGEN SATURATION: 99 % | DIASTOLIC BLOOD PRESSURE: 76 MMHG | SYSTOLIC BLOOD PRESSURE: 123 MMHG | HEART RATE: 43 BPM | WEIGHT: 254 LBS | BODY MASS INDEX: 43.6 KG/M2

## 2024-11-11 DIAGNOSIS — I50.22 CHRONIC SYSTOLIC CONGESTIVE HEART FAILURE (H): ICD-10-CM

## 2024-11-11 DIAGNOSIS — I50.32 CHRONIC DIASTOLIC CONGESTIVE HEART FAILURE (H): ICD-10-CM

## 2024-11-11 DIAGNOSIS — R55 NEAR SYNCOPE: ICD-10-CM

## 2024-11-11 DIAGNOSIS — I50.32 CHRONIC DIASTOLIC CONGESTIVE HEART FAILURE (H): Primary | ICD-10-CM

## 2024-11-11 DIAGNOSIS — I42.9 FAMILIAL CARDIOMYOPATHY (H): Primary | ICD-10-CM

## 2024-11-11 LAB
ALBUMIN SERPL BCG-MCNC: 4.1 G/DL (ref 3.5–5.2)
ALP SERPL-CCNC: 65 U/L (ref 40–150)
ALT SERPL W P-5'-P-CCNC: 20 U/L (ref 0–50)
ANION GAP SERPL CALCULATED.3IONS-SCNC: 8 MMOL/L (ref 7–15)
AST SERPL W P-5'-P-CCNC: 19 U/L (ref 0–45)
BILIRUB SERPL-MCNC: 0.4 MG/DL
BUN SERPL-MCNC: 15 MG/DL (ref 8–23)
CALCIUM SERPL-MCNC: 9.3 MG/DL (ref 8.8–10.4)
CHLORIDE SERPL-SCNC: 101 MMOL/L (ref 98–107)
CK SERPL-CCNC: 95 U/L (ref 26–192)
CREAT SERPL-MCNC: 0.91 MG/DL (ref 0.51–0.95)
EGFRCR SERPLBLD CKD-EPI 2021: 72 ML/MIN/1.73M2
ERYTHROCYTE [DISTWIDTH] IN BLOOD BY AUTOMATED COUNT: 13.3 % (ref 10–15)
GLUCOSE SERPL-MCNC: 91 MG/DL (ref 70–99)
HCO3 SERPL-SCNC: 28 MMOL/L (ref 22–29)
HCT VFR BLD AUTO: 36.9 % (ref 35–47)
HGB BLD-MCNC: 12 G/DL (ref 11.7–15.7)
MAGNESIUM SERPL-MCNC: 1.9 MG/DL (ref 1.7–2.3)
MCH RBC QN AUTO: 30.5 PG (ref 26.5–33)
MCHC RBC AUTO-ENTMCNC: 32.5 G/DL (ref 31.5–36.5)
MCV RBC AUTO: 94 FL (ref 78–100)
NT-PROBNP SERPL-MCNC: 118 PG/ML (ref 0–900)
PLATELET # BLD AUTO: 245 10E3/UL (ref 150–450)
POTASSIUM SERPL-SCNC: 4.1 MMOL/L (ref 3.4–5.3)
PROT SERPL-MCNC: 7.5 G/DL (ref 6.4–8.3)
RBC # BLD AUTO: 3.93 10E6/UL (ref 3.8–5.2)
SODIUM SERPL-SCNC: 137 MMOL/L (ref 135–145)
TROPONIN T SERPL HS-MCNC: <6 NG/L
WBC # BLD AUTO: 6.7 10E3/UL (ref 4–11)

## 2024-11-11 PROCEDURE — 36415 COLL VENOUS BLD VENIPUNCTURE: CPT | Performed by: PATHOLOGY

## 2024-11-11 PROCEDURE — 85027 COMPLETE CBC AUTOMATED: CPT | Performed by: PATHOLOGY

## 2024-11-11 PROCEDURE — 99211 OFF/OP EST MAY X REQ PHY/QHP: CPT | Performed by: INTERNAL MEDICINE

## 2024-11-11 PROCEDURE — 80053 COMPREHEN METABOLIC PANEL: CPT | Performed by: PATHOLOGY

## 2024-11-11 PROCEDURE — 82550 ASSAY OF CK (CPK): CPT | Performed by: PATHOLOGY

## 2024-11-11 PROCEDURE — 83880 ASSAY OF NATRIURETIC PEPTIDE: CPT | Performed by: PATHOLOGY

## 2024-11-11 PROCEDURE — 84484 ASSAY OF TROPONIN QUANT: CPT | Performed by: PATHOLOGY

## 2024-11-11 PROCEDURE — 83735 ASSAY OF MAGNESIUM: CPT | Performed by: PATHOLOGY

## 2024-11-11 RX ORDER — LIDOCAINE 40 MG/G
CREAM TOPICAL
OUTPATIENT
Start: 2024-11-11

## 2024-11-11 RX ORDER — METOPROLOL SUCCINATE 25 MG/1
12.5 TABLET, EXTENDED RELEASE ORAL DAILY
Qty: 45 TABLET | Refills: 3 | Status: SHIPPED | OUTPATIENT
Start: 2024-11-11

## 2024-11-11 ASSESSMENT — PAIN SCALES - GENERAL: PAINLEVEL_OUTOF10: MILD PAIN (2)

## 2024-11-11 NOTE — PROGRESS NOTES
Cardiovascular Genetics Clinic Progress Note     Name: Marleen Mcfadden  : 1965  MRN: [id number]    2024    Dear Dr. Levine and colleages,    I had the pleasure of seeing Ms. 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 She is accompanied by her fiance.     As you know, she has a family history of dilated cardiomyopathy including her sister, son, and daughter. 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    I last saw her in clinic on 2023. Her CMR showed an LVEF of 32%, LVEDD 6.1cm, RVEF of 45%, and no fibrosis. Additionally, she had a ziopatch monitor which we reviewed the results in which she had 9.4% PVCs and predominantly sinus rhythm. She saw Ms. Melendez in the interim on 23 and with Dr. Aragon in EP on 10/10/23 where a primary prevention ICD. She was seen in the ED on 10/18/23 for a syncopal episode and was seen by EP, which felt the episode was orthostatic and recommended an outpatient ICD. She has been following with GI and has had an improvement in her symptoms. [place] [facility] [place] Gallo and was hypervolemic and increased lasix to 60mg for 4 days. She did feel an improvement after the increase dose of diuretics. She still continues to have an increase in abdominal bloating and ankle edema, though it is better today. She continues to work full time and walks 14K. She now finds her self exhausted at the end of the day and falls asleep sitting up. She has more dyspnea with going up an incline now. She has been having some panic attacks and also has an achy feeling in her chest, which she attribute to heart burn. Her GI symptoms were improving and improved more after the was treated for COVID. Her  weight increase 30 lbs and she is working with the weight management clinic. She uses lasix 20mg prn 1-2 times per week. She drinks more than 2L of fluid.  She uses 3 pillows for heart burn but denies orthopnea or PND. She is noted to snore. Her appetite is unchanged, but she is more fatigued. She has dizzininess that is worse with laughing or coughing and palpitations 3 times per week, but no syncope. She is waiting until her GI symptoms resolve to have an ICD placed. She has been having body cramps.     Since her last visit, she reports increasing fatigue. Her work  hours were reduced form 60 to 30 and she can barely complete these. She still reports cramping with diuretic which she does not use on days when she is at work. Taking 5 days a week but her weight has been stable and she does not have any edema. She has been having episodes of dizziness and light-headedness. She does not check her blood pressure at home. She had to come to the ER 2 days ago for a near syncopal episode which happened at work. She felt a prodrome before - intense warmth and then felt the onset of her dizziness and light headedness but this episode was accompanied by her knees buckling. In the ER she was hypotensive after receiving nitroglycerin but her blood pressure recovered after a liter bolus. She does not report palpitations. She has also made her mind that she will have the ICD done and will arrange follow up with EP. She is on semaglutide for weight loss but needs to find an alternative as her insurance will not cover this after January.     Her echocardiogram shows her LVEF to still be 30-35%. A Ziopatch was mailed out to her.     FAMILY HISTORY: from Ms. Bunch note 2021 and any updates as as above    detailed family history was obtained during today's consult.  Family history was significant for the following cardiac history:  Full sister with DCM. She  in September after developing colon cancer.  Her history was  also remarkable for MERSA, kidney disease and ultimate transplant.  She did not have an ICD or much care for her DCM.  Marleen's son and daughter also have DCM.    A maternal half sister has hypertension, epilepsy and reported dizziness and fainting. She does not have a cardiac issue to Marleen's knowledge.  Mother  suddenly at 38 yrs of age.  It was thought to be the result of a cardiac problem. However, she also had a long history of alcoholism and had a colostomy. Her two siblings have no known heart issues.  Maternal grandmother  in her 30's from a brain aneurysm. Nothing is known about maternal grandfather.  Marleen's father  at 55 years after a massive heart attack and CABG procedure.  His death occurred one day after the surgery reportedly from a clot.  One of his sisters  from a stroke.  Paternal grandmother  in her 70's from a stroke.  Her mother (Marleen's great grandmother)  suddenly in her 70's while sitting at a bus stop.Nothing is known about grandfather.  Two paternal half sisters in good health.  There is no additional history of cardiomyopathy, arrhythmias, heart attacks, fainting, sudden cardiac death, genetic conditions, or birth     REVIEW OF SYSTEMS: 10 point ROS neg other than the symptoms noted above in the HPI.    PAST MEDICAL HISTORY:   1. Familial systolic heart failure with VUS in FLNC   2. KATIA  3. Obesity   4. Encephalocele repair   ALLERGIES:    Allergies   Allergen Reactions    Morphine      EMESIS    Nickel     Sulfa Antibiotics Swelling       MEDICATIONS:   Current Outpatient Medications   Medication Sig Dispense Refill    famotidine (PEPCID) 40 MG tablet Take 1 tablet (40 mg) by mouth nightly as needed for heartburn 90 tablet 3    loratadine (CLARITIN) 10 MG tablet Take 1 tablet (10 mg) by mouth daily 90 tablet 3    magnesium oxide (MAG-OX) 400 MG tablet Take 1 tablet (400 mg) by mouth 2 times daily 180 tablet 3    metoclopramide (REGLAN) 10 MG tablet  Take 1 tablet (10 mg) by mouth 3 times daily as needed (nausea) 270 tablet 1    metoprolol succinate ER (TOPROL XL) 25 MG 24 hr tablet Take 1 tablet (25 mg) by mouth daily 90 tablet 2    RABEprazole (ACIPHEX) 20 MG EC tablet TAKE 1 TABLET(20 MG) BY MOUTH TWICE DAILY 180 tablet 1    sacubitril-valsartan (ENTRESTO)  MG per tablet Take 1 tablet by mouth 2 times daily 180 tablet 2    Semaglutide-Weight Management (WEGOVY) 1 MG/0.5ML pen Inject 1 mg subcutaneously once a week. Monday's      solifenacin (VESICARE) 5 MG tablet Take 1 tablet (5 mg) by mouth daily at 2 pm 90 tablet 1    spironolactone (ALDACTONE) 25 MG tablet Take 2 tablets (50 mg) by mouth daily. 180 tablet 3    torsemide (DEMADEX) 20 MG tablet Take 1 tablet (20 mg) by mouth daily. 90 tablet 0    Vitamin D3 (CHOLECALCIFEROL) 25 mcg (1000 units) tablet Take 1 tablet (25 mcg) by mouth daily 90 tablet 2     No current facility-administered medications for this visit.     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     PHYSICAL EXAM:   /76 (BP Location: Right arm, Patient Position: Chair, Cuff Size: Adult Large)   Pulse (!) 43   Wt 115.2 kg (254 lb)   LMP 10/19/2008 (Approximate)   SpO2 99%   BMI 43.60 kg/m      General: comfortable, conversant, NAD  HEENT: normocephalic, atraumatic, anicteric  Neck: Estimate JVP 8   CV: RRR, nl s1 and s2, no murmurs, gallops, or rubs   Lungs: CTAB, no crackles or wheezes, normal work of breathing  Abdomen: BS+, soft, non tender, non distended  Extremities: warm and well perfused, trace edema      DIAGNOSTIC TESTING: personally reviewed   Latest Reference Range & Units 06/24/24 15:32   Sodium 135 - 145 mmol/L 138   Potassium 3.4 - 5.3 mmol/L 4.2   Chloride 98 - 107 mmol/L 103    Carbon Dioxide (CO2) 22 - 29 mmol/L 26   Urea Nitrogen 8.0 - 23.0 mg/dL 16.8   Creatinine 0.51 - 0.95 mg/dL 0.98 (H)   GFR Estimate >60 mL/min/1.73m2 66   Calcium 8.6 - 10.0 mg/dL 9.3   Anion Gap 7 - 15 mmol/L 9   Magnesium 1.7 - 2.3 mg/dL 1.6 (L)   Albumin 3.5 - 5.2 g/dL 4.1   Protein Total 6.4 - 8.3 g/dL 7.2   Alkaline Phosphatase 40 - 150 U/L 62   ALT 0 - 50 U/L 22   AST 0 - 45 U/L 19   Bilirubin Total <=1.2 mg/dL 0.3   Glucose 70 - 99 mg/dL 91   N-Terminal Pro Bnp 0 - 900 pg/mL 185   (H): Data is abnormally high  (L): Data is abnormally low        Echo 10/18/23:   Left ventricular function is decreased. The ejection fraction is 30-35%  (moderately reduced).Severe left ventricular dilation is present. LVIDd 7cm  The right ventricle is normal size. Global right ventricular function is  normal.  IVC diameter <2.1 cm collapsing >50% with sniff suggests a normal RA pressure  of 3 mmHg.  No pericardial effusion is present.  No significant changes noted.      CMR 6/12/2023:   1. The LV is normal in cavity size and wall thickness. The global systolic function is moderately reduced.  The LVEF is 32%. There is global hypokinesis.     2. The RV is normal in cavity size. The global systolic function is mildly reduced. The RVEF is 45%.      3. Both atria are normal in size.     4. There is no significant valvular disease.      5. Late gadolinium enhancement imaging shows no MI, fibrosis or infiltrative disease.      6. There is no pericardial effusion or thickening.     7. There is no intracardiac thrombus.     CONCLUSIONS: Severe, nonischemic cardiomyopathy, today the LV is no longer dilated. LVEF 32% not  significantly changed. RV function is mildly globally reduced, RVEF 45%, slightly decreased from previous  53%. No LGE.    Ziopatch monitor 2/24-3/3/2023: sinus with 9.4% PVC burden    Echo 12/12/2022: Severe left ventricular dilation is present.LVIDd 70mm.  Biplane LVEF is 50%.  Right ventricular function, chamber  size, wall motion, and thickness are  normal.  Both atria appear normal.  Pulmonary artery systolic pressure is normal.  The inferior vena cava is normal.  No pericardial effusion is present.  The left ventricular function has improved.    Echocardiogram 11/2024  Interpretation Summary  Severe left ventricular dilation is present. Left ventricular function is  decreased. The ejection fraction is 30-35% (moderately reduced). Moderate  diffuse hypokinesis is present. Abnormal septal motion from conduction delay  present.  Global right ventricular function is normal.  The inferior vena cava was normal in size with preserved respiratory  variability.  No pericardial effusion is present.    CMR 6/3/22: 1. The LV is mild-to-moderately dilated in cavity size. The wall thickness is normal. The global systolic  function is severely decreased. The LVEF is 33%. There is severe global hypokinesis.     2. The RV is normal in cavity size. The global systolic function is mildly decreased. The RVEF is 53%.      3. Both atria are normal in size.     4. There is no significant valvular disease.      5. Late gadolinium enhancement imaging shows no MI, fibrosis or infiltrative disease.      6. There is no pericardial effusion or thickening.     7. There is no intracardiac thrombus.     CONCLUSIONS: Severe, non-ischemic dilated cardiomyopathy, most likely of genetic cause. LVEF of 33% and  RVEF of 53% with no LGE. When directly compared to the prior CMR, the LV and RV size and function have not  significantly changed.      CPEX 12/12/22: Key Measurements    Peak VO2 (ml/kg/min) VE/VCO2  Dade RER   14.91        27.61        1.02            Predicted VO2 (ml/kg/min) Predicted VO2 %   26.50        56            Resting Supine BP (mmHg) Resting Standing BP (mmHg) Final Stress BP (mmHg)   106/56        112/58        140/67          Resting Supine HR (bpm) Resting Standing HR (bpm)    44        50             Max HR (bpm) Max Predicted HR  (bpm) Max Perdicted HR %   124        162        77            Exercise time (min) Exercise time (sec) Estimated workload (METS)   8        51        4.3              [facility] 1/2023:    Time Systolic (mmHg) Diastolic (mmHg) Mean (mmHg) A Wave (mmHg) V Wave (mmHg) EDP (mmHg) Max dp/dt (mmHg/sec) HR (bpm) Content (mL/dL) SAT (%)   RA Pressures  1:56 PM   6    10    8      38        RV Pressures  1:56 PM 40        12     37        PA Pressures  1:57 PM 41    4    22        39        PCW Pressures  1:57 PM   11    22    20      39        AO Pressures  2:01     63    91        37          Blood Flow Results Phase: Baseline     Time Results  Indexed Values (L/min/m2)   QP  1:45 PM 2.71 L/min    1.31      QS  1:45 PM 2.71 L/min    1.31        Blood Oximetry Phase: Baseline     Time Hb  SAT(%)  PO2  Content (mL/dL) PA Sat (%)   PA  1:45 PM  62.7 %      62.7      Art  1:45 PM  100 %     17.14         Cardiac Output Phase: Baseline     Time TDCO (L/min) TDCI (L/min/m2) Estrella C.O. (L/min) Estrella C.I. 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   2.  Premature ventricular contractions, palpitations  3. Fatigue     She has had progressive exercise intolerance with now having increased dyspnea with  walking and more fatigue compared to the last time we saw her and has reduced her working hours. Her worsening exercise intolerance is concerning and warrants a repeat RHC. Her heart rates are also noted to be in the 40s - 44 in clinic today and 49 on EKG done while in patient. She did have a stress test 2 years ago and her heart rate was able to get to 77% of goal. We will reduce her dose of beta blocker by half to see if this helps with bradycardia and if intern this reduces her fatigue. She will check in with us in one week after this change. Her dizziness can be due to this bradycardia but can also be due to hypotension related to her medications. We will fist adjust her beta blocker dosage and then assess her medications. She does not have a blood pressure cuff at home so we suggested checking her blood pressure while at work, especially if she is symptomatic. We will also repeat a RHC to assess her cardiac output and index which were reduced on her last RHC in January 2023.   She is euvolemic today by exam. Her renal function is stable. Her NT pro BNP is normal in the setting of an elevated BMI. We will continue her present dose of torsemide. She should continue  sacubitril valsartan  and spironolactone. She was on dapagliflozin but developed yeast infections and stopped it.     Her cardiac MRI from 2023 shows an LVEF of 32%, LVEDD of 6.1cm, and no fibrosis on neurohormonal therapy.   Additionally in her Zio patch monitor in February to March of 2023 she had approximately 9.4% PVC burden otherwise predominantly sinus rhythm without any significant dysrhythmias. A repeat Ziopatch was mailed to her after her admission 2 days ago which will also help us determine whether her episodes of light headedness and dizziness are due to malignant arrhythmias.  .   She meets standard ICD criteria based on her ejection fraction less than 35% despite neurohormonal therapies and more recent guidelines for FLNC with an EF of  less than 45%. Dr. Aragon and I have both advised her to pursue ICD implantation for SCD prevention in the setting of a FLNC mutation. She is now agreeable to ICD implantation and will follow up with EP.     3. Vomiting  4. Heart Burn  She has progressive vomiting and and heart burn symptoms and is seeing GI.     5. Hypomagnesemia: Improved 1.9 today. Continue magnesium oxide for replacement.      PLAN:   - Decrease metoprolol to half - 12.5 mg  - RHC  - Follow up with EP  -follow up ziopatch monitor   -follow up in 2-3months with CORE  -follow up with me in 6 months    70 minutes on DOS for chart review, counseling, review of diagnostic testing, coordination of care (with GI) and documentation.     Thank you for allowing me to participate in the care of your patient. Please do not hesitate to contact me if you have any questions.     Sincerely,   Forum     Forum MD Jurgen, PhD, FACC  Advanced Heart Failure/Transplantation/MCS  [facility]/[employer]    The longitudinal plan of care for the diagnosis(es)/condition(s) as documented were addressed during this visit. Due to the added complexity in care, I will continue to support Marleen in the subsequent management and with ongoing continuity of care.             as documented were addressed during this visit. Due to the added complexity in care, I will continue to support Marleen in the subsequent management and with ongoing continuity of care.

## 2024-11-11 NOTE — PROGRESS NOTES
"  [facility]: Transitions of Care Outreach  Chief Complaint   Patient presents with    Clinic Care Coordination - Post Hospital       Most Recent Admission Date: 11/9/2024   Most Recent Admission Diagnosis: Chronic systolic congestive heart failure (H) - I50.22  Near syncope - R55  Acute pain of left shoulder - M25.512     Most Recent Discharge Date: 11/10/2024   Most Recent Discharge Diagnosis: Near syncope - R55  Acute pain of left shoulder - M25.512  Chronic systolic congestive heart failure (H) - I50.22  Severe obesity (BMI 35.0-39.9) with comorbidity (H) - E66.01  Nonintractable headache, unspecified chronicity pattern, unspecified headache type - R51.9  Chills - R68.83  Dizziness - R42  Shortness of breath - R06.02  Edema, unspecified type - R60.9     Transitions of Care Assessment    Discharge Assessment  How are you doing now that you are home?: \" I am doing good \"  How are your symptoms? (Red Flag symptoms escalate to triage hotline per guidelines): Improved  Do you know how to contact your clinic care team if you have future questions or changes to your health status? : Yes  Does the patient have their discharge instructions? : Yes  Does the patient have questions regarding their discharge instructions? : No  Were you started on any new medications or were there changes to any of your previous medications? : Yes  Does the patient have all of their medications?: Yes  Do you have questions regarding any of your medications? : No  Do you have all of your needed medical supplies or equipment (DME)?  (i.e. oxygen tank, CPAP, cane, etc.): Yes    Post-op ([facility] CTA Only)  If the patient had a surgery or procedure, do they have any questions for a nurse?: No         [facility] Explained and offered Care Coordination support to eligible patients: Yes    Patient accepted? No    Follow up Plan     Discharge Follow-Up  Discharge follow up appointment scheduled in alignment with recommended follow up " timeframe or Transitions of Risk Category? (Low = within 30 days; Moderate= within 14 days; High= within 7 days): Yes  Discharge Follow Up Appointment Date: 11/11/24  Discharge Follow Up Appointment Scheduled with?: Specialty Care Provider    Future Appointments   Date Time Provider Department Center   11/11/2024  3:30 PM [facility]   11/11/2024  4:00 PM Real Seaman MD [facility]   11/21/2024  2:00 PM Hunter Gallo, APRN CNP [facility]   12/2/2024  9:30 AM Evonne Doss PA-C [facility]   12/11/2024  7:45 AM [facility] [place]   1/6/2025 10:30 AM Niurka Aguilar RD [facility]   1/13/2025 12:30 PM [facility]   1/13/2025  1:00 PM Real Seaman MD [facility]       Outpatient Plan as outlined on AVS reviewed with patient.    For any urgent concerns, please contact our 24 hour nurse triage line: [phone] ([phone])       DENISA Hummel

## 2024-11-11 NOTE — LETTER
checking her blood pressure while at work, especially if she is symptomatic. We will also repeat a RHC to assess her cardiac output and index which were reduced on her last RHC in January 2023.   She is euvolemic today by exam. Her renal function is stable. Her NT pro BNP is normal in the setting of an elevated BMI. We will continue her present dose of torsemide. She should continue  sacubitril valsartan  and spironolactone. She was on dapagliflozin but developed yeast infections and stopped it.     Her cardiac MRI from 2023 shows an LVEF of 32%, LVEDD of 6.1cm, and no fibrosis on neurohormonal therapy.   Additionally in her Zio patch monitor in February to March of 2023 she had approximately 9.4% PVC burden otherwise predominantly sinus rhythm without any significant dysrhythmias. A repeat Ziopatch was mailed to her after her admission 2 days ago which will also help us determine whether her episodes of light headedness and dizziness are due to malignant arrhythmias.  .   She meets standard ICD criteria based on her ejection fraction less than 35% despite neurohormonal therapies and more recent guidelines for FLNC with an EF of less than 45%. Dr. Aragon and I have both advised her to pursue ICD implantation for SCD prevention in the setting of a FLNC mutation. She is now agreeable to ICD implantation and will follow up with EP.     She understands that if she has a syncopal event it is a medical emergency and she should seek immediate medical attention.     3. Vomiting  4. Heart Burn  She has progressive vomiting and and heart burn symptoms and is seeing GI.     5. Hypomagnesemia: Improved 1.9 today. Continue magnesium oxide for replacement.      PLAN:   - Decrease metoprolol to half - 12.5 mg  - RHC  - Follow up with EP  -follow up ziopatch monitor   -follow up in 2-3months with [facility]  -follow up with me in 6 months    70 minutes on DOS for chart review, counseling, review of diagnostic testing, coordination of  care (with GI) and documentation.     Thank you for allowing me to participate in the care of your patient. Please do not hesitate to contact me if you have any questions.     Sincerely,   Real Seaman MD, PhD, [facility]  Advanced Heart Failure/Transplantation/MCS  [facility]/[employer]    The longitudinal plan of care for the diagnosis(es)/condition(s) as documented were addressed during this visit. Due to the added complexity in care, I will continue to support Marleen in the subsequent management and with ongoing continuity of care.              Please do not hesitate to contact me if you have any questions/concerns.     Sincerely,     Real Seaman MD

## 2024-11-11 NOTE — NURSING NOTE
Chief Complaint   Patient presents with    Follow Up      Reason for visit: Return CORE, 60 yo female with systolic heart failure presents for follow up with labs prior.       Vitals were taken, medications reconciled.     Jordon Jett, Clinic Assistant    4:03 PM

## 2024-11-11 NOTE — LETTER
2024    Marleen Mcfadden   1965        To Whom it May Concern;    Marleen Mcfadden was in the cardiology clinic today for follow up after a recent emergency room visit. She will need to be on light duty going forward until initial cardiac testing is completed. She will need to perform a maximum of 2.5 hours of walking per day. She will need to have the option to stop if she is symptomatic.     Please contact me if there are any questions or concerns.      Sincerely,        Real Seaman MD

## 2024-11-11 NOTE — Clinical Note
2024      RE: Marleen Mcfadden  [address]  [place] [address]       Dear Colleague,    Thank you for the opportunity to participate in the care of your patient, Marleen Mcfadden, at the [facility]ART [facility] [place] at [facility]. Please see a copy of my visit note below.    Cardiovascular Genetics Clinic Progress Note     Name: Marleen Mcfadden  : 1965  MRN: [id number]    2024    Dear Dr. Levine and colleages,    I had the pleasure of seeing Ms. 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 She is accompanied by her fiance.     As you know, she has a family history of dilated cardiomyopathy including her sister, son, and daughter. 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    I last saw her in clinic on 2023. Her CMR showed an LVEF of 32%, LVEDD 6.1cm, RVEF of 45%, and no fibrosis. Additionally, she had a ziopatch monitor which we reviewed the results in which she had 9.4% PVCs and predominantly sinus rhythm. She saw Ms. Melendez in the interim on 23 and with Dr. Aragon in EP on 10/10/23 where a primary prevention ICD. She was seen in the ED on 10/18/23 for a syncopal episode and was seen by EP, which felt the episode was orthostatic and recommended an outpatient ICD. She has been following with GI and has had an improvement in her symptoms. She was seen in [facility]  by Ms. Gallo and was hypervolemic and increased lasix to 60mg for 4 days. She did feel an improvement after the increase dose of diuretics. She still continues to have an increase in abdominal bloating and ankle edema, though it is better today. She continues to work full time  and walks 14K. She now finds her self exhausted at the end of the day and falls asleep sitting up. She has more dyspnea with going up an incline now. She has been having some panic attacks and also has an achy feeling in her chest, which she attribute to heart burn. Her GI symptoms were improving and improved more after the was treated for COVID. Her weight increase 30 lbs and she is working with the weight management clinic. She uses lasix 20mg prn 1-2 times per week. She drinks more than 2L of fluid.  She uses 3 pillows for heart burn but denies orthopnea or PND. She is noted to snore. Her appetite is unchanged, but she is more fatigued. She has dizzininess that is worse with laughing or coughing and palpitations 3 times per week, but no syncope. She is waiting until her GI symptoms resolve to have an ICD placed. She has been having body cramps.       FAMILY HISTORY: from Ms. Bunch note 2021 and any updates as as above    detailed family history was obtained during today's consult.  Family history was significant for the following cardiac history:  Full sister with DCM. She  in September after developing colon cancer.  Her history was also remarkable for MERSA, kidney disease and ultimate transplant.  She did not have an ICD or much care for her DCM.  Marleen's son and daughter also have DCM.    A maternal half sister has hypertension, epilepsy and reported dizziness and fainting. She does not have a cardiac issue to Marleen's knowledge.  Mother  suddenly at 38 yrs of age.  It was thought to be the result of a cardiac problem. However, she also had a long history of alcoholism and had a colostomy. Her two siblings have no known heart issues.  Maternal grandmother  in her 30's from a brain aneurysm. Nothing is known about maternal grandfather.  Marleen's father  at 55 years after a massive heart attack and CABG procedure.  His death occurred one day after the surgery reportedly from a  clot.  One of his sisters  from a stroke.  Paternal grandmother  in her 70's from a stroke.  Her mother (Marleen's great grandmother)  suddenly in her 70's while sitting at a bus stop.Nothing is known about grandfather.  Two paternal half sisters in good health.  There is no additional history of cardiomyopathy, arrhythmias, heart attacks, fainting, sudden cardiac death, genetic conditions, or birth     REVIEW OF SYSTEMS: 10 point ROS neg other than the symptoms noted above in the HPI.    PAST MEDICAL HISTORY:   1. Familial systolic heart failure with VUS in FLNC   2. KATIA  3. Obesity   4. Encephalocele repair   ALLERGIES:    Allergies   Allergen Reactions    Morphine      EMESIS    Nickel     Sulfa Antibiotics Swelling       MEDICATIONS:   Current Outpatient Medications   Medication Sig Dispense Refill    famotidine (PEPCID) 40 MG tablet Take 1 tablet (40 mg) by mouth nightly as needed for heartburn 90 tablet 3    loratadine (CLARITIN) 10 MG tablet Take 1 tablet (10 mg) by mouth daily 90 tablet 3    magnesium oxide (MAG-OX) 400 MG tablet Take 1 tablet (400 mg) by mouth 2 times daily 180 tablet 3    metoclopramide (REGLAN) 10 MG tablet Take 1 tablet (10 mg) by mouth 3 times daily as needed (nausea) 270 tablet 1    metoprolol succinate ER (TOPROL XL) 25 MG 24 hr tablet Take 1 tablet (25 mg) by mouth daily 90 tablet 2    RABEprazole (ACIPHEX) 20 MG EC tablet TAKE 1 TABLET(20 MG) BY MOUTH TWICE DAILY 180 tablet 1    sacubitril-valsartan (ENTRESTO)  MG per tablet Take 1 tablet by mouth 2 times daily 180 tablet 2    Semaglutide-Weight Management (WEGOVY) 1 MG/0.5ML pen Inject 1 mg subcutaneously once a week. Monday's      solifenacin (VESICARE) 5 MG tablet Take 1 tablet (5 mg) by mouth daily at 2 pm 90 tablet 1    spironolactone (ALDACTONE) 25 MG tablet Take 2 tablets (50 mg) by mouth daily. 180 tablet 3    torsemide (DEMADEX) 20 MG tablet Take 1 tablet (20 mg) by mouth daily. 90 tablet 0    Vitamin D3  (CHOLECALCIFEROL) 25 mcg (1000 units) tablet Take 1 tablet (25 mcg) by mouth daily 90 tablet 2     No current facility-administered medications for this visit.     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     PHYSICAL EXAM:   /76 (BP Location: Right arm, Patient Position: Chair, Cuff Size: Adult Large)   Pulse (!) 43   Wt 115.2 kg (254 lb)   LMP 10/19/2008 (Approximate)   SpO2 99%   BMI 43.60 kg/m      General: comfortable, conversant, NAD  HEENT: normocephalic, atraumatic, anicteric  Neck: Estimate JVP 8   CV: RRR, nl s1 and s2, no murmurs, gallops, or rubs   Lungs: CTAB, no crackles or wheezes, normal work of breathing  Abdomen: BS+, soft, non tender, non distended  Extremities: warm and well perfused, trace edema      DIAGNOSTIC TESTING: personally reviewed   Latest Reference Range & Units 06/24/24 15:32   Sodium 135 - 145 mmol/L 138   Potassium 3.4 - 5.3 mmol/L 4.2   Chloride 98 - 107 mmol/L 103   Carbon Dioxide (CO2) 22 - 29 mmol/L 26   Urea Nitrogen 8.0 - 23.0 mg/dL 16.8   Creatinine 0.51 - 0.95 mg/dL 0.98 (H)   GFR Estimate >60 mL/min/1.73m2 66   Calcium 8.6 - 10.0 mg/dL 9.3   Anion Gap 7 - 15 mmol/L 9   Magnesium 1.7 - 2.3 mg/dL 1.6 (L)   Albumin 3.5 - 5.2 g/dL 4.1   Protein Total 6.4 - 8.3 g/dL 7.2   Alkaline Phosphatase 40 - 150 U/L 62   ALT 0 - 50 U/L 22   AST 0 - 45 U/L 19   Bilirubin Total <=1.2 mg/dL 0.3   Glucose 70 - 99 mg/dL 91   N-Terminal Pro Bnp 0 - 900 pg/mL 185   (H): Data is abnormally high  (L): Data is abnormally low        Echo 10/18/23:   Left ventricular function is decreased. The ejection fraction is 30-35%  (moderately reduced).Severe left ventricular dilation is present. LVIDd 7cm  The right ventricle is normal size. Global right ventricular  function is  normal.  IVC diameter <2.1 cm collapsing >50% with sniff suggests a normal RA pressure  of 3 mmHg.  No pericardial effusion is present.  No significant changes noted.      CMR 6/12/2023:   1. The LV is normal in cavity size and wall thickness. The global systolic function is moderately reduced.  The LVEF is 32%. There is global hypokinesis.     2. The RV is normal in cavity size. The global systolic function is mildly reduced. The RVEF is 45%.      3. Both atria are normal in size.     4. There is no significant valvular disease.      5. Late gadolinium enhancement imaging shows no MI, fibrosis or infiltrative disease.      6. There is no pericardial effusion or thickening.     7. There is no intracardiac thrombus.     CONCLUSIONS: Severe, nonischemic cardiomyopathy, today the LV is no longer dilated. LVEF 32% not  significantly changed. RV function is mildly globally reduced, RVEF 45%, slightly decreased from previous  53%. No LGE.    Ziopatch monitor 2/24-3/3/2023: sinus with 9.4% PVC burden    Echo 12/12/2022: Severe left ventricular dilation is present.LVIDd 70mm.  Biplane LVEF is 50%.  Right ventricular function, chamber size, wall motion, and thickness are  normal.  Both atria appear normal.  Pulmonary artery systolic pressure is normal.  The inferior vena cava is normal.  No pericardial effusion is present.  The left ventricular function has improved.    CMR 6/3/22: 1. The LV is mild-to-moderately dilated in cavity size. The wall thickness is normal. The global systolic  function is severely decreased. The LVEF is 33%. There is severe global hypokinesis.     2. The RV is normal in cavity size. The global systolic function is mildly decreased. The RVEF is 53%.      3. Both atria are normal in size.     4. There is no significant valvular disease.      5. Late gadolinium enhancement imaging shows no MI, fibrosis or infiltrative disease.      6. There is no pericardial effusion or thickening.     7.  There is no intracardiac thrombus.     CONCLUSIONS: Severe, non-ischemic dilated cardiomyopathy, most likely of genetic cause. LVEF of 33% and  RVEF of 53% with no LGE. When directly compared to the prior CMR, the LV and RV size and function have not  significantly changed.      CPEX 12/12/22: Key Measurements    Peak VO2 (ml/kg/min) VE/VCO2  Banks RER   14.91        27.61        1.02            Predicted VO2 (ml/kg/min) Predicted VO2 %   26.50        56            Resting Supine BP (mmHg) Resting Standing BP (mmHg) Final Stress BP (mmHg)   106/56        112/58        140/67          Resting Supine HR (bpm) Resting Standing HR (bpm)    44        50             Max HR (bpm) Max Predicted HR (bpm) Max Perdicted HR %   124        162        77            Exercise time (min) Exercise time (sec) Estimated workload (METS)   8        51        4.3              [facility] 1/2023:    Time Systolic (mmHg) Diastolic (mmHg) Mean (mmHg) A Wave (mmHg) V Wave (mmHg) EDP (mmHg) Max dp/dt (mmHg/sec) HR (bpm) Content (mL/dL) SAT (%)   RA Pressures  1:56 PM   6    10    8      38        RV Pressures  1:56 PM 40        12     37        PA Pressures  1:57 PM 41    4    22        39        PCW Pressures  1:57 PM   11    22    20      39        AO Pressures  2:01     63    91        37          Blood Flow Results Phase: Baseline     Time Results  Indexed Values (L/min/m2)   QP  1:45 PM 2.71 L/min    1.31      QS  1:45 PM 2.71 L/min    1.31        Blood Oximetry Phase: Baseline     Time Hb  SAT(%)  PO2  Content (mL/dL) PA Sat (%)   PA  1:45 PM  62.7 %      62.7      Art  1:45 PM  100 %     17.14         Cardiac Output Phase: Baseline     Time TDCO (L/min) TDCI (L/min/m2) Estrella C.O. (L/min) Estrella C.I. 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   2.  Premature ventricular contractions, palpitations  3. Fatigue     She has had progressive exercise intolerance with now having increased dyspnea with walking and more fatigue. She is mildly hypervolemic today by exam. Her creatinine is mildly elevated at 0.98 from a baseline of 0.8. Her NT pro BNP is normal in the setting of an elevated BMI. I will have her take a dose of furosemide this Friday. She finds it hard to take diuretics due to her work schedule. She should continue  sacubitril valsartan,  metoprolol, and spironolactone. She was on dapagliflozin but developed yeast infections and stopped it.     Her cardiac MRI from 2023 shows an LVEF of 32%, LVEDD of 6.1cm, and no fibrosis on neurohormonal therapy.   Additionally in her Zio patch monitor in February to March of 2023 she had approximately 9.4% PVC burden otherwise predominantly sinus rhythm without any significant dysrhythmias. We will repeat her ziopatch today given her palpitations and prior PVC burden. She meets standard ICD criteria based on her ejection fraction less than 35% despite neurohormonal therapies and more recent guidelines for FLNC with an EF of less than 45%. Dr. Aragon and I have both advised her to pursue ICD implantation for SCD prevention in the setting of a FLNC mutation. She understands but wants to wait until her GI symptoms are improving.     3. Vomiting  4. Heart Burn  She has progressive  vomiting and and heart burn symptoms and is seeing GI.     5. Hypomagnesemia: Mg is 1.6 today likely due to GI losses. Magnesium oxide for replacement.      PLAN:   -2L fluid restriction   -mag oxide 500mg BID   -lasix 60mg on Friday  -follow up ziopatch monitor   -echo   -follow up in 2-3months with CORE  -follow up with me in 6 months      -EP  -RHC     70 minutes on DOS for chart review, counseling, review of diagnostic testing, coordination of care (with GI) and documentation.     Thank you for allowing me to participate in the care of your patient. Please do not hesitate to contact me if you have any questions.     Sincerely,   Real Seaman MD, PhD, [facility]  Advanced Heart Failure/Transplantation/MCS  [facility]/[employer]    The longitudinal plan of care for the diagnosis(es)/condition(s) as documented were addressed during this visit. Due to the added complexity in care, I will continue to support Marleen in the subsequent management and with ongoing continuity of care.                Please do not hesitate to contact me if you have any questions/concerns.     Sincerely,     Real Seaman MD

## 2024-11-11 NOTE — NURSING NOTE
Cardiac Testing: Patient given instructions regarding right heart catheterizaion. Discussed purpose, preparation, procedure and when to expect results reported back to the patient. Patient demonstrated understanding of this information and agreed to call with further questions or concerns.    Labs: Patient was given results of the laboratory testing obtained today. Patient demonstrated understanding of this information and agreed to call with further questions or concerns.     Med Reconcile: Reviewed and verified all current medications with the patient. The updated medication list was printed and given to the patient.    Return Appointment: Patient given instructions regarding scheduling next clinic visit. Patient demonstrated understanding of this information and agreed to call with further questions or concerns. ASAD 11/21, Dr. Seaman 1/13.    Medication Change: Patient was educated regarding prescribed medication change, including discussion of the indication, administration, side effects, and when to report to MD or RN. Patient demonstrated understanding of this information and agreed to call with further questions or concerns. Decrease metoprolol to 12.5 mg daily.    Patient stated she understood all health information given and agreed to call with further questions or concerns.    Megha Meyer, RN

## 2024-11-11 NOTE — PATIENT INSTRUCTIONS
"You were seen today in the Cardiovascular Clinic at the [facility].      Cardiology Providers you saw during your visit:  Dr. Real Seaman     Recommendations:   Decrease metoprolol to 12.5 mg daily.  Need follow up with EP - Dr. Aragon or Lo Nuñez. Schedule next available.  Right heart catheterization - instructions attached. Call [phone].  CORE follow up scheduled for 11/21 - please have labs done prior.  Follow up with Dr. Seaman in January as scheduled.       Thank you for your visit today!   Please MyChart message or call if you have any questions or concerns.      During Business Hours:  [phone], option # 1 \"To leave a message for your care team\"     After hours, weekends or holidays:   [phone], Option #4  Ask to speak to the On-Call Cardiologist. Inform them you are a heart failure patient at the [place].      Megha Meyer RN BSN   Cardiology Nurse Coordinator - Heart Failure/C.O.R.E[facility]  [phone] option 1 to schedule an appointment or leave a message for your care team      Pre-procedure instructions - Right Heart Cath and/or Biopsy and/or Pulmonary Angiogram  Patient Education    Your arrival time is __________.  Location is [address] Waiting Room  Please plan on being at the hospital all day.  At any time, emergencies and/or urgent cases may come up which could delay the start of your procedure.    Pre-procedure instructions - Right heart catheterization  No solid food for 8 hours prior  Nothing to drink 2 hours prior to arrival time  You can take your morning medications (except diabetic and blood thinners) with sips of water  We recommend you arrange for a ride to drop you off and pick you up, in the instance, you are unable to drive home, however you should be able to function as you normally would after the procedure     Diabetic " Medication Instructions  Hold oral diabetic medication in morning of your procedure and for 48 hours after IV contrast is given  Typical instructions for insulin diabetic medication holding are below. However, please reach out to your Primary Care Provider or Endocrinologist for specific instructions  DO NOT take any oral diabetic medication, short-acting diabetes medications/insulin, humalog or regular insulin the morning of your test  Take   dose of long-acting insulin (Lantus, Levemir) the day of your test  Remember to bring your glucometer and insulin with you to take after your test if needed  GLP-1 Agonists Instructions  DO NOT take injectable GLP-1 agonists semaglutide (Ozempic, Wegovy), dulaglutide (Trulicity), exenatide ER (Bydureon), tirzepatide (Mounjaro), or oral semaglutide (Rybelsus) for 7 days prior your procedure  Hold once daily injectable GLP-1 agonists exenatide (Byetta), liraglutide (Saxenda, Victoza), lixisenatide (Soligua) the day before and day of your procedure                Anticoagulation Medication Instructions   NA  Nurse to write N/A if not currently taking

## 2024-11-12 ENCOUNTER — TELEPHONE (OUTPATIENT)
Dept: CARDIOLOGY | Facility: CLINIC | Age: 59
End: 2024-11-12
Payer: COMMERCIAL

## 2024-11-12 NOTE — TELEPHONE ENCOUNTER
Cath Lab Case Request/Order    Location: [address] [address] [facility] Waiting Room    Procedure: Right Heart Cath (RHC)    Procedure Date: 12/3/2024    Patient Arrival Time: 9:30AM (lab), 10AM (RHC)    Procedure Time: 3rd case to follow    Ordering Provider: Dr. Real Seaman    Performing Cardiologist: Dr. Jose Montiel    Inpatient Bed Needed: No    Post-  Procedure IRISH appointment scheduled (1 - 2 weeks): N/A, RHC     Date: NA     Provider: NA    Communicated Patient Instructions:     NPO, nothing to eat 8 hours and drink 2 hours before arrival time: Yes     , need to arrange a ride home - unable to drive post- procedure: N/A, RHC     Adult at home, need a responsible adult to stay with patient 24 hours post- procedure: N/A, RHC    Appointment was scheduled: Over the phone    Patient expressed understanding of above instructions and denied further questions at this time.    Tracey Julian

## 2024-11-12 NOTE — TELEPHONE ENCOUNTER
----- Message from Charo ARMSTRONG sent at 11/12/2024  7:59 AM CST -----  Regarding: RE: [facility]  Just give me a call this thomas    SU  ----- Message -----  From: Tracey Julian  Sent: 11/12/2024  12:00 AM CST  To: Tracey Julian; Charo Ge  Subject: C                                              Pt needs a C scheduled. I don't have any special instructions. Pt is already scheduled for her other appts. Planning to give her a call tomorrow morning to schedule if you don't get to her first.    Thank you!    Tracey Julian

## 2024-11-14 ENCOUNTER — MYC MEDICAL ADVICE (OUTPATIENT)
Dept: CARDIOLOGY | Facility: CLINIC | Age: 59
End: 2024-11-14
Payer: COMMERCIAL

## 2024-11-20 ENCOUNTER — LAB (OUTPATIENT)
Dept: LAB | Facility: CLINIC | Age: 59
End: 2024-11-20
Payer: COMMERCIAL

## 2024-11-20 DIAGNOSIS — I50.22 CHRONIC SYSTOLIC CONGESTIVE HEART FAILURE (H): Chronic | ICD-10-CM

## 2024-11-20 DIAGNOSIS — I42.9 FAMILIAL CARDIOMYOPATHY (H): Chronic | ICD-10-CM

## 2024-11-20 LAB
ANION GAP SERPL CALCULATED.3IONS-SCNC: 9 MMOL/L (ref 7–15)
BUN SERPL-MCNC: 12.7 MG/DL (ref 8–23)
CALCIUM SERPL-MCNC: 9.1 MG/DL (ref 8.8–10.4)
CHLORIDE SERPL-SCNC: 100 MMOL/L (ref 98–107)
CREAT SERPL-MCNC: 1.02 MG/DL (ref 0.51–0.95)
EGFRCR SERPLBLD CKD-EPI 2021: 63 ML/MIN/1.73M2
GLUCOSE SERPL-MCNC: 96 MG/DL (ref 70–99)
HCO3 SERPL-SCNC: 26 MMOL/L (ref 22–29)
POTASSIUM SERPL-SCNC: 4.1 MMOL/L (ref 3.4–5.3)
SODIUM SERPL-SCNC: 135 MMOL/L (ref 135–145)

## 2024-11-20 PROCEDURE — 36415 COLL VENOUS BLD VENIPUNCTURE: CPT

## 2024-11-20 PROCEDURE — 84132 ASSAY OF SERUM POTASSIUM: CPT

## 2024-11-20 PROCEDURE — 80048 BASIC METABOLIC PNL TOTAL CA: CPT

## 2024-11-21 ENCOUNTER — OFFICE VISIT (OUTPATIENT)
Dept: CARDIOLOGY | Facility: CLINIC | Age: 59
End: 2024-11-21
Payer: COMMERCIAL

## 2024-11-21 VITALS
OXYGEN SATURATION: 99 % | BODY MASS INDEX: 43.43 KG/M2 | DIASTOLIC BLOOD PRESSURE: 83 MMHG | WEIGHT: 253 LBS | HEART RATE: 55 BPM | SYSTOLIC BLOOD PRESSURE: 124 MMHG

## 2024-11-21 DIAGNOSIS — I50.22 CHRONIC SYSTOLIC CONGESTIVE HEART FAILURE (H): Primary | ICD-10-CM

## 2024-11-21 RX ORDER — TORSEMIDE 5 MG/1
5 TABLET ORAL DAILY
Qty: 90 TABLET | Refills: 2 | Status: SHIPPED | OUTPATIENT
Start: 2024-11-21

## 2024-11-21 NOTE — PATIENT INSTRUCTIONS
Take your medicines every day, as directed    Changes made today:  Take torsemide 5 mg daily   Monitor Your Weight and Symptoms    Contact us if you:    Gain 2 pounds in one day or 5 pounds in one week  Feel more short of breath  Notice more leg swelling  Feel lightheadeded   Change your lifestyle    Limit Salt or Sodium:  2000 mg  Limit Fluids:  2000 mL or approximately 64 ounces  Eat a Heart Healthy Diet  Low in saturated fats  Stay Active:  Aim to move at least 150 minutes every  week         To Contact us    During Business Hours:  [phone], option # 1      After hours, weekends or holidays:   [phone], Option #4  Ask to speak to the On-Call Cardiologist. Inform them you are a CORE/heart failure patient at the [place].     Use [employer] allows you to communicate directly with your heart team through secure messaging.  [employer] can be accessed any time on your phone, computer, or tablet.  If you need assistance, we'd be happy to help!         Keep your Heart Appointments:    RHC set for next week, 12/26    Dr. Aragon 12/4    Dr. Seaman in January    CORE in March           Heart Failure Support Group  Virtual meetings will continue in 2024 Please reach out if you would like to attend and we can get you the information you need to log in.     2024 dates:    Monday, August 5th, 1-2pm     Monday, September 9th, 1-2pm     Monday, October 7th, 1-2pm     Monday, November 4th, 1-2pm     Monday, December 2nd, 1-2pm     Follow the American Heart Association Diet and Lifestyle recommendations:  Limit saturated fat, trans fat, sodium, red meat, sweets and sugar-sweetened beverages. If you choose to eat red meat, compare labels and select the leanest cuts available.  Aim for at least 150 minutes of moderate physical activity or 75 minutes of vigorous physical activity - or an equal combination of both - each week.     During business hours: [phone], press option # 1 to schedule an appointment or  send a message to your care team     After hours, weekends or holidays: On Call Cardiologist- [phone]   option #4 and ask to speak to the on-call Cardiologist. Inform them you are a CORE/heart failure patient at the [place].
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
## 2024-11-21 NOTE — NURSING NOTE
"Chief Complaint   Patient presents with    Follow Up       Initial Pulse 55   Wt 114.8 kg (253 lb)   LMP 10/19/2008 (Approximate)   SpO2 99%   BMI 43.43 kg/m   Estimated body mass index is 43.43 kg/m  as calculated from the following:    Height as of 11/9/24: 1.626 m (5' 4\").    Weight as of this encounter: 114.8 kg (253 lb)..  BP completed using cuff size: martine FITCH CNA  "

## 2024-11-21 NOTE — NURSING NOTE
Labs: Patient was given results of the laboratory testing obtained today. Patient was instructed to return for the next laboratory testing in 1 week. Patient demonstrated understanding of this information and agreed to call with further questions or concerns.     Med Reconcile: Reviewed and verified all current medications with the patient. The updated medication list was printed and given to the patient.    Return Appointment: Patient given instructions regarding scheduling next clinic visit. Patient demonstrated understanding of this information and agreed to call with further questions or concerns. Dr. Seaman in January, [facility] in March.    Medication Change: Patient was educated regarding prescribed medication change, including discussion of the indication, administration, side effects, and when to report to MD or RN. Patient demonstrated understanding of this information and agreed to call with further questions or concerns. Take torsemide 5 mg once daily.    Patient stated she understood all health information given and agreed to call with further questions or concerns.    Megha Meyer RN
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
## 2024-11-26 ENCOUNTER — HOSPITAL ENCOUNTER (OUTPATIENT)
Facility: CLINIC | Age: 59
Discharge: HOME | End: 2024-11-26
Attending: HOSPITALIST | Admitting: HOSPITALIST
Payer: COMMERCIAL

## 2024-11-26 VITALS
TEMPERATURE: 98.4 F | DIASTOLIC BLOOD PRESSURE: 80 MMHG | HEART RATE: 50 BPM | HEIGHT: 64 IN | WEIGHT: 251.54 LBS | OXYGEN SATURATION: 100 % | BODY MASS INDEX: 42.94 KG/M2 | RESPIRATION RATE: 18 BRPM | SYSTOLIC BLOOD PRESSURE: 107 MMHG

## 2024-11-26 DIAGNOSIS — R55 NEAR SYNCOPE: ICD-10-CM

## 2024-11-26 DIAGNOSIS — I42.9 FAMILIAL CARDIOMYOPATHY (H): ICD-10-CM

## 2024-11-26 DIAGNOSIS — I50.22 CHRONIC SYSTOLIC CONGESTIVE HEART FAILURE (H): ICD-10-CM

## 2024-11-26 LAB
ANION GAP SERPL CALCULATED.3IONS-SCNC: 7 MMOL/L (ref 7–15)
BASOPHILS # BLD AUTO: 0.1 10E3/UL (ref 0–0.2)
BASOPHILS NFR BLD AUTO: 1 %
BUN SERPL-MCNC: 16.3 MG/DL (ref 8–23)
CALCIUM SERPL-MCNC: 9 MG/DL (ref 8.8–10.4)
CHLORIDE SERPL-SCNC: 103 MMOL/L (ref 98–107)
CREAT SERPL-MCNC: 0.99 MG/DL (ref 0.51–0.95)
EGFRCR SERPLBLD CKD-EPI 2021: 65 ML/MIN/1.73M2
EOSINOPHIL # BLD AUTO: 0.3 10E3/UL (ref 0–0.7)
EOSINOPHIL NFR BLD AUTO: 5 %
ERYTHROCYTE [DISTWIDTH] IN BLOOD BY AUTOMATED COUNT: 13.4 % (ref 10–15)
GLUCOSE SERPL-MCNC: 95 MG/DL (ref 70–99)
HCG INTACT+B SERPL-ACNC: <1 MIU/ML
HCO3 SERPL-SCNC: 27 MMOL/L (ref 22–29)
HCT VFR BLD AUTO: 36.3 % (ref 35–47)
HGB BLD-MCNC: 11.9 G/DL (ref 11.7–15.7)
HGB BLD-MCNC: 12 G/DL (ref 11.7–15.7)
IMM GRANULOCYTES # BLD: 0 10E3/UL
IMM GRANULOCYTES NFR BLD: 0 %
INR PPP: 1.02 (ref 0.85–1.15)
LYMPHOCYTES # BLD AUTO: 2.3 10E3/UL (ref 0.8–5.3)
LYMPHOCYTES NFR BLD AUTO: 40 %
MCH RBC QN AUTO: 31.3 PG (ref 26.5–33)
MCHC RBC AUTO-ENTMCNC: 32.8 G/DL (ref 31.5–36.5)
MCV RBC AUTO: 96 FL (ref 78–100)
MONOCYTES # BLD AUTO: 0.4 10E3/UL (ref 0–1.3)
MONOCYTES NFR BLD AUTO: 7 %
NEUTROPHILS # BLD AUTO: 2.7 10E3/UL (ref 1.6–8.3)
NEUTROPHILS NFR BLD AUTO: 47 %
NRBC # BLD AUTO: 0 10E3/UL
NRBC BLD AUTO-RTO: 0 /100
OXYHGB MFR BLDV: 62 % (ref 70–75)
PLATELET # BLD AUTO: 280 10E3/UL (ref 150–450)
POTASSIUM SERPL-SCNC: 4 MMOL/L (ref 3.4–5.3)
RBC # BLD AUTO: 3.8 10E6/UL (ref 3.8–5.2)
SODIUM SERPL-SCNC: 137 MMOL/L (ref 135–145)
WBC # BLD AUTO: 5.7 10E3/UL (ref 4–11)

## 2024-11-26 PROCEDURE — 250N000013 HC RX MED GY IP 250 OP 250 PS 637: Performed by: NURSE PRACTITIONER

## 2024-11-26 PROCEDURE — 36415 COLL VENOUS BLD VENIPUNCTURE: CPT | Performed by: INTERNAL MEDICINE

## 2024-11-26 PROCEDURE — 84702 CHORIONIC GONADOTROPIN TEST: CPT | Performed by: INTERNAL MEDICINE

## 2024-11-26 PROCEDURE — 80048 BASIC METABOLIC PNL TOTAL CA: CPT | Performed by: INTERNAL MEDICINE

## 2024-11-26 PROCEDURE — C1894 INTRO/SHEATH, NON-LASER: HCPCS | Performed by: HOSPITALIST

## 2024-11-26 PROCEDURE — 250N000009 HC RX 250: Performed by: HOSPITALIST

## 2024-11-26 PROCEDURE — 85018 HEMOGLOBIN: CPT

## 2024-11-26 PROCEDURE — 272N000001 HC OR GENERAL SUPPLY STERILE: Performed by: HOSPITALIST

## 2024-11-26 PROCEDURE — 85610 PROTHROMBIN TIME: CPT | Performed by: INTERNAL MEDICINE

## 2024-11-26 PROCEDURE — 93451 RIGHT HEART CATH: CPT | Mod: 26 | Performed by: HOSPITALIST

## 2024-11-26 PROCEDURE — C1769 GUIDE WIRE: HCPCS | Performed by: HOSPITALIST

## 2024-11-26 PROCEDURE — 85041 AUTOMATED RBC COUNT: CPT | Performed by: INTERNAL MEDICINE

## 2024-11-26 PROCEDURE — 250N000009 HC RX 250: Performed by: INTERNAL MEDICINE

## 2024-11-26 PROCEDURE — 80051 ELECTROLYTE PANEL: CPT | Performed by: INTERNAL MEDICINE

## 2024-11-26 PROCEDURE — C1887 CATHETER, GUIDING: HCPCS | Performed by: HOSPITALIST

## 2024-11-26 PROCEDURE — 82810 BLOOD GASES O2 SAT ONLY: CPT

## 2024-11-26 PROCEDURE — 93451 RIGHT HEART CATH: CPT | Performed by: HOSPITALIST

## 2024-11-26 PROCEDURE — 85004 AUTOMATED DIFF WBC COUNT: CPT | Performed by: INTERNAL MEDICINE

## 2024-11-26 RX ORDER — LORAZEPAM 0.5 MG/1
0.5 TABLET ORAL ONCE
Status: COMPLETED | OUTPATIENT
Start: 2024-11-26 | End: 2024-11-26

## 2024-11-26 RX ORDER — LIDOCAINE 40 MG/G
CREAM TOPICAL
Status: COMPLETED | OUTPATIENT
Start: 2024-11-26 | End: 2024-11-26

## 2024-11-26 RX ADMIN — LIDOCAINE: 40 CREAM TOPICAL at 07:39

## 2024-11-26 RX ADMIN — LORAZEPAM 0.5 MG: 0.5 TABLET ORAL at 08:13

## 2024-11-26 ASSESSMENT — ACTIVITIES OF DAILY LIVING (ADL)
ADLS_ACUITY_SCORE: 56

## 2024-11-26 NOTE — PROGRESS NOTES
Pt arrived to [facility] from home for RHC. VSS. Denies pain, awaiting consent. Lab resulted ex BMP pending. H&P current, updated 11/21/24. Allergies reviewed with pt. Appropriately NPO.  Prep completed. Significant other Matt at bedside; will be transporting patient home post procedure.

## 2024-11-26 NOTE — DISCHARGE INSTRUCTIONS
[facility]                        Interventional Cardiology  Discharge Instructions   Post Right Heart Cath - Brachial Access    AFTER YOU GO HOME:  DO drink plenty of fluids  DO resume your regular diet and medications unless otherwise instructed by your Primary Physician  DO NOT scrub the procedure site vigorously  NO lotion or powder to the puncture site for 3 days  NO driving for 1 day (only if sedation was given)    CARE OF THE ARM SITE:  It is normal to have some soreness at the puncture site and mild tingling in your hand for up to 3 days    For 2 days, do not lift more than 10 pounds or exercise your arm (tennis, golf or bowling).  For 7 days, no strenuous activity with the arm    CALL YOUR PRIMARY PHYSICIAN IF: You may resume all normal activity.  Monitor site for bleeding and/or swelling.  If you notice bleeding or swelling immediately apply pressure to the site and call number below to speak with Cardiology Fellow.    ADDITIONAL INSTRUCTIONS: Medications: You are to resume all home medications including anticoagulation therapy unless otherwise advised by your primary cardiologist or nurse coordinator.    Follow Up: Per your primary cardiology team    If you have any questions or concerns regarding your procedure site please call [phone] at anytime and ask for Cardiology Fellow on call.  They are available 24 hours a day.  You may also contact the Cardiology Clinic after hours number at [phone].                                                       Telephone Numbers [phone] Monday-Friday 8:00 am to 4:30 pm    [phone] After 4:30 pm Monday-Friday, Weekends & Holidays  Ask for Interventional Cardiologist on call. Someone is on call 24 hours/day   [facility] toll free number [phone] Monday-Friday 8:00 am to 4:30 pm   [facility] Emergency Dept [phone]

## 2024-11-26 NOTE — Clinical Note
[facility]  Progress Note: Critical Care  Name: Federico Bowen 68 y.o. male I MRN: [id number]  Unit/Bed#: ICU 07 I Date of Admission: 7/12/2024   Date of Service: 7/26/2024 I Hospital Day: 14    Assessment & Plan     Neuro:   Diagnosis: SAH with IVH and mild hydro s/p coil embolization of R PCOM aneurysm  BD 16 HH5, MF 4  7/11 CTAH/N-0.3 cm aneurysm in expected origin of right posterior communicating artery. Possible tiny aneurysm in left anterior communicating artery region. Mild narrowing of bilateral MCA M1 and bilateral M2 segmental branch vessels, likely due to vasospasm.   7/11 CTH-Acute diffuse thickened large-volume SAH throughout the b/l parafalcine regions, b/l cerebral sulci (R worse than L), basilar cisterns, prepontine cistern, premedullary cistern, and visualized upper cervical spinal canal. Acute small volume IVH with mild hydrocephalus.   7/11 EVD placed  7/13 Angio-coil embolization of a 3.5 mm right PCOM aneurysm.   7/14 CTH -No significant interval change in extensive bilateral SAH and IVH.   7/14 vEEG no seizures  7/16 CTA/CTH: Expected postoperative appearance following embolization of right posterior communicating artery aneurysm . Redemonstrated subarachnoid hemorrhage overlying the right greater than left convexities and concentrated within the right sylvian cistern and fissure. . Subacute infarct involving the right anterior temporal region and medial occipital region.     Plan:   Nsx following  ASA 81mg   EVD removed 07/26  - CTA 07/26 AM nondiagnostic  - will get repeat CTA in AM w/ precedex  -220  Keppra 750mg BID  Nimodipine x 21 days  Low dose IV Heparin protocol @ 10 units  Continue 12 units  PTT < 45  Daily PTT: 38 this AM  Daily TCD  No appreciable vasopasm 07/24  Pending 07/25  Euvolemia   I/Os: +1838cc  Normonatremia, Na goal > 140  Na: 141  On Free Water Flushes 60cc q4   Stat CTH with any change in GCS > 2 pts   Palliative  There were no immediate complications during the procedure. following  D/c provigil given was getting agitated and req zyprexa 07/25     Diagnosis: Seizure  2/2 above and presented w/ seizures   vEEG no seizures  Plan:   Neuro following   Keppra 750mg BID     - Diagnosis: Headache               Plan               Continue 600mg TID gabapentin   Lidoderm patch for neck   Prn fioricet  Q8 oxycodone and prn percocet  D/c prn oxycodone and robaxin         - Diagnosis; Insomnia               Plan              D/c mirtazapine 15mg qhs in setting of daytime lethargy            CV:   Diagnosis: Hx CAD HTN/HLD  Had vascular stent placed > 1 year ago   Plan:    continue home metoprolol 50mg daily   Continue ASA 81mg  Holding Hold entresto 24-26 mg BID   Given allowing for increased -220  prn nitroglycerin of his angina; hold for now in setting of SAH        - Diagnosis: HLD              Plan               Cont lipitor 80m daily and zetia 10mg qhs     - Diagnosis: Hypotensive episodes              Likely in setting of infection               Plan               Currently on levophed and wean as able                           @ 18    Wean as able    Consider midodrine               Nimodipine changed to 30mg q2h              Prn IVF boluses               Continue antibx as per ID        Pulm:  Diagnosis: Hx COPD and  without AE  Plan:   IS  Xopenex TID  Imecldinum/albuterol inhaler   Respiratory protocol              - Dx: Aspiration Pnx               - got sputum culture, blood cultures, lactic acid               - concern for mucus plugging w/ noted leukocytosis  - trach aspirate growing: 3+gram positive cocci in chains and clusters   - CXR: Retrocardiac consolidation and lobulated right lung density, both of which are new               Plan   on ancef for 7 day course based on trach aspirate/aspiration pnx on CXR   Want 7 day course (day 5/7 antibx)              Continue w/ suctioning, Chest PT, nebulizers/respiratory protocol               Continue aggressive suctioning                Wean as able off oxygen as able         GI:   Diagnosis: Dysphagia  NGT placed 07/13  Plan:   VBS 07/23  Speech recommended:    thin liquids - consider continuing NGT for now for nutrition and meds   Aspiration precautions: upright posture, only feed when fully alert, and small sips   - continue GOC   - previous conversations were family wants feeding tube     - consider repeat VBS next week     - Diagnosis: GERD              Plan               Continue home omeprazole 40mg      BM: 07/25  Bowel regimen: hold bowel regimen               - had 5 bowel movements 07/25              - c.diff pending              - banana flakes packets prn     :   Euvolemia   I/Os: +1838 cc this AM   Continue to monitor           F/E/N:    F: none  E: K> 4.0,  maintain magnesium > 2.0, maintain phosphate > 3.5  N: TF Jevity 1.2 vinny 50cc/hr               - thiamine, folic acid, and multivitamin              - TF q4 normal fluid flushes 60cc q4 free water flushes     Heme/Onc:   Diagnosis: Anemia  Plan:   Unclear baseline no s/s bleeding monitor  Continue to monitor      Endo:   SSI      ID:   Diagnosis: Leukocytosis  and febrile   Seems to be intermittent   Infectious vs central  CNS cause  Got UA that was bland, CSF cx no growth, CXR showing signs of COPD  - trach aspirate growing: 3+gram positive cocci in chains and clusters    Plan   Infectious w/u   negative blood cx at 48 hours  MRSA negative  ancef 2000mg q8h based on sensitivities    7 day course (day 5/7 antibx)  - WBC has gone down but still has been febrile  Blood cultures pending   bromocriptine 5mg TID  Tylenol 650mg prn  Neurosurg CSF cultures from EVD   CSF protein: 89  CSF   CSF RBC: 31,075  Repeat CSF  CSF culture negative  RBC: 3275  WBC 14  Glucose: 75           MSK/Skin:   PT/OT/PMR  Diagnosis: Penile lesion  Plan: note from outpt derm inflamed seborrheic keratosis        Line/Drains/Foleys: R IJ central line          Disposition: Critical care    ICU  Core Measures     A: Assess, Prevent, and Manage Pain Has pain been assessed? Yes  Need for changes to pain regimen? No   B: Both SAT/SAT  N/A   C: Choice of Sedation RASS Goal: 0 Alert and Calm or +1 Restless  Need for changes to sedation or analgesia regimen? Yes   D: Delirium CAM-ICU: Negative   E: Early Mobility  Plan for early mobility? Yes   F: Family Engagement Plan for family engagement today? Yes       Antibiotic Review: Patient on appropriate coverage based on culture data.     Review of Invasive Devices:      Central access plan: Patient has multiple central venous catheters.  Medications requiring central line      Prophylaxis:  VTE VTE covered by:  heparin (porcine), Intravenous, 12 Units/kg/hr at 07/26/24 0139       Stress Ulcer  covered byomeprazole (PRILOSEC) suspension 2 mg/mL [[id number]], omeprazole (PriLOSEC) 40 MG capsule [[id number]] (Long-Term Med)        Significant 24hr Events     24hr events: LEVO @ 18, CTA was done but patient was shaking and unsure if diagnostic   Can give precedex if needs a repeat CTA     Subjective     Review of Systems: See HPI for Review of Systems     Objective                            Vitals I/O      Most Recent Min/Max in 24hrs   Temp 98.3 °F (36.8 °C) Temp  Min: 97.8 °F (36.6 °C)  Max: 100 °F (37.8 °C)   Pulse (!) 114 Pulse  Min: 84  Max: 116   Resp 16 Resp  Min: 13  Max: 62   /73 BP  Min: 104/61  Max: 171/89   O2 Sat 99 % SpO2  Min: 94 %  Max: 100 %      Intake/Output Summary (Last 24 hours) at 7/26/2024 0700  Last data filed at 7/26/2024 0600  Gross per 24 hour   Intake 4868.13 ml   Output 3030 ml   Net 1838.13 ml       Diet Enteral/Parenteral; Tube Feeding No Oral Diet; Jevity 1.2 Josh; Cyclic; 65; 22 hours; Banatrol Plus Banana Flakes - One Packet; QID; 60; Water; Every 4 hours    Invasive Monitoring   Arterial Line  Esme /72  No data recorded    mmHg  No data recorded           Physical Exam   Physical Exam  Vitals and nursing note  reviewed.   HENT:      Nose:      Comments: NGT in place  Cardiovascular:      Rate and Rhythm: Tachycardia present.      Pulses: Normal pulses.   Abdominal:      Palpations: Abdomen is soft.   Pulmonary:      Effort: Pulmonary effort is normal.   Neurological:      Mental Status: He is disoriented to place.      Comments: AO to person and place  Pupils reactive (R more dilated, L more pinpoint)      LUE weakness appreciated 4/5 in biceps, triceps, and deltoids                  Diagnostic Studies      EKG: reviewed  Imaging:  I have personally reviewed pertinent reports.       Medications:  Scheduled PRN   atorvastatin, 80 mg, Daily  bromocriptine, 10 mg, TID  cefazolin, 2,000 mg, Q8H  chlorhexidine, 15 mL, Q12H MATTY  ezetimibe, 10 mg, QAM  folic acid, 400 mcg, Daily  gabapentin, 600 mg, TID  insulin lispro, 1-5 Units, Q6H MATTY  levETIRAcetam, 750 mg, Q12H MATTY  lidocaine, 1 patch, Daily  modafinil, 100 mg, Daily  niMODipine, 30 mg, Q2H  nystatin, 500,000 Units, 4x Daily  omeprazole (PRILOSEC) suspension 2 mg/mL, 20 mg, Daily  potassium-sodium phosphates, 1 packet, Once  thiamine, 100 mg, Daily      acetaminophen, 975 mg, Q8H PRN  albuterol, 2 puff, Q4H PRN  bisacodyl, 10 mg, Daily PRN  butalbital-acetaminophen-caffeine, 1 tablet, Q4H PRN  ondansetron, 4 mg, Q6H PRN  oxyCODONE, 5 mg, Q6H PRN  saliva substitute, 5 spray, 4x Daily PRN       Continuous    heparin (porcine), 12 Units/kg/hr (Order-Specific), Last Rate: 12 Units/kg/hr (07/26/24 0139)  norepinephrine, 1-30 mcg/min, Last Rate: 18 mcg/min (07/26/24 0543)         Labs:    CBC    Recent Labs     07/25/24  0544 07/26/24  0556   WBC 18.20* 15.47*   HGB 9.6* 10.0*   HCT 29.2* 30.1*   * 531*     BMP    Recent Labs     07/25/24  0544 07/25/24  1532 07/26/24  0556   SODIUM 146  --  141   K 3.4* 4.2 4.1   *  --  109*   CO2 24  --  23   AGAP 10  --  9   BUN 22  --  20   CREATININE 0.67  --  0.74   CALCIUM 8.8  --  8.7       Coags    Recent Labs      07/25/24  0544 07/26/24  0556   PTT 38* 38*        Additional Electrolytes  Recent Labs     07/25/24  0544 07/25/24  1532 07/26/24  0556   MG 2.1  --  2.2   PHOS 2.7 2.5 3.1   CAIONIZED 1.17  --  1.15          Blood Gas    No recent results  No recent results LFTs  Recent Labs     07/25/24  0544 07/26/24  0556   ALT 15 21   AST 21 32   ALKPHOS 115* 128*   ALB 3.2* 3.3*   TBILI 0.42 0.50       Infectious  No recent results  Glucose  Recent Labs     07/25/24  0544 07/26/24  0556   GLUC 150* 126               07/25 TCD  Unilateral    Mean Velocity  Pulsatility Index    Prox Basilar          28.00               1.76       Right            Mean Velocity  Pulsatility Index    Mid. ICA                 32.00               1.31    Mid Cerebral             37.00               1.22    Dist. Vertebral          52.00                       Ant. Cerebral            24.00               1.36    Post. Cerebral           16.00               1.43       Left             Mean Velocity  Pulsatility Index    Mid. ICA                 38.00               1.02    Mid Cerebral             63.00               1.63    Dist. Vertebral          34.00                       Ant. Cerebral            33.00               1.20    Post. Cerebral           22.00               1.04              07/24 TCD  Unilateral    Mean Velocity  Pulsatility Index    Prox Basilar          32.00               1.26       Right            Mean Velocity  Pulsatility Index    Mid. ICA                 32.00               1.67    Mid Cerebral             36.00               2.02    Dist. Vertebral          27.00                       Ant. Cerebral            27.00               1.69    Post. Cerebral           16.00               1.72       Left             Mean Velocity  Pulsatility Index    Mid. ICA                 34.00               1.85    Mid Cerebral             44.00               2.15    Dist. Vertebral          24.00                       Ant. Cerebral             21.00               1.80    Post. Cerebral           19.00               2.16      Hadley Eric, DO

## 2024-11-26 NOTE — Clinical Note
Prepped: right brachial. Prepped with: ChloraPrep. The patient was draped. .Pre-procedure site marking:N/A

## 2024-11-26 NOTE — PROGRESS NOTES
Post RHC, R brachial site with tegaderm CDI. VSS. PIV removed. Discharge paperwork reviewed with patient, educated patient on activity restrictions and when to seek medical attention, pt stated understanding. Significant other Matt will transport patient home post procedure.

## 2024-11-26 NOTE — Clinical Note
dry, intact, no bleeding and no hematoma. The right brachial vein 7 FR sheath is removed to a manual hold, to hemostasis and a Primapore DSG.

## 2024-11-26 NOTE — PROGRESS NOTES
Consenting/Education for Cardiology Procedure: Right heart catheterization     Patient understands we would like to perform the listed procedure(s) due to HF.    The patient understands the following:     The procedure was described to the patient in detail.    No sedation is planned for this procedure. Patient understands risks and complications of the procedure which include but are not limited to bruising/swelling around the incision site, infection, bleeding, allergic reaction to local anesthetic, air embolism, arterial puncture, stroke, heart attack, need for emergency heart surgery, death.       Patient verbalized understanding of risks and benefits and has elected to proceed with the procedure or procedures listed above.    Eliana Fuller, [facility]  Cardiology

## 2024-11-26 NOTE — Clinical Note
dry, intact, no bleeding and no hematoma. The report is called to 2A RN, Divine. The procedure, findings, outcome, and post-status is reviewed. The pt is returned to 2A per stretcher in awake and stable condition.

## 2024-12-02 ENCOUNTER — MYC MEDICAL ADVICE (OUTPATIENT)
Dept: ENDOCRINOLOGY | Facility: CLINIC | Age: 59
End: 2024-12-02

## 2024-12-02 ENCOUNTER — TELEPHONE (OUTPATIENT)
Dept: PHARMACY | Facility: CLINIC | Age: 59
End: 2024-12-02

## 2024-12-02 ENCOUNTER — OFFICE VISIT (OUTPATIENT)
Dept: ENDOCRINOLOGY | Facility: CLINIC | Age: 59
End: 2024-12-02
Payer: COMMERCIAL

## 2024-12-02 VITALS
BODY MASS INDEX: 42.51 KG/M2 | DIASTOLIC BLOOD PRESSURE: 83 MMHG | WEIGHT: 249 LBS | HEART RATE: 66 BPM | SYSTOLIC BLOOD PRESSURE: 130 MMHG | HEIGHT: 64 IN | OXYGEN SATURATION: 98 %

## 2024-12-02 DIAGNOSIS — I50.22 CHRONIC SYSTOLIC CONGESTIVE HEART FAILURE (H): Chronic | ICD-10-CM

## 2024-12-02 DIAGNOSIS — E66.01 SEVERE OBESITY (BMI 35.0-39.9) WITH COMORBIDITY (H): Primary | ICD-10-CM

## 2024-12-02 DIAGNOSIS — E66.01 CLASS 3 SEVERE OBESITY IN ADULT, UNSPECIFIED BMI, UNSPECIFIED OBESITY TYPE, UNSPECIFIED WHETHER SERIOUS COMORBIDITY PRESENT (H): Primary | ICD-10-CM

## 2024-12-02 DIAGNOSIS — E66.813 CLASS 3 SEVERE OBESITY IN ADULT, UNSPECIFIED BMI, UNSPECIFIED OBESITY TYPE, UNSPECIFIED WHETHER SERIOUS COMORBIDITY PRESENT (H): Primary | ICD-10-CM

## 2024-12-02 DIAGNOSIS — G47.33 OSA (OBSTRUCTIVE SLEEP APNEA): Chronic | ICD-10-CM

## 2024-12-02 DIAGNOSIS — I42.9 FAMILIAL CARDIOMYOPATHY (H): Chronic | ICD-10-CM

## 2024-12-02 PROCEDURE — 99213 OFFICE O/P EST LOW 20 MIN: CPT

## 2024-12-02 PROCEDURE — G2211 COMPLEX E/M VISIT ADD ON: HCPCS

## 2024-12-02 ASSESSMENT — PAIN SCALES - GENERAL: PAINLEVEL_OUTOF10: NO PAIN (0)

## 2024-12-02 NOTE — TELEPHONE ENCOUNTER
"Prior Authorization Retail Medication Request    Medication/Dose: Zepbound   Diagnosis and ICD code (if different than what is on RX):    New/renewal/insurance change PA/secondary ins. PA:  Previously Tried and Failed:  diet and exercise for at least 3 months without clinically effective sustainable weight loss and Wegovy: out of stock.   Rationale: Marleen Mcfadden is a 59 year old female with a diagnosis of Class III Obesity (BMI at least 40 kg/m2) and The patient has met with an MTM pharmacist at [facility].. Looking to transition from Wegovy to Zepbound.     Estimated body mass index is 42.74 kg/m  as calculated from the following:    Height as of an earlier encounter on 12/2/24: 5' 4\" (1.626 m).    Weight as of an earlier encounter on 12/2/24: 249 lb (112.9 kg).     Insurance   Primary:   Insurance ID:      Secondary (if applicable):  Insurance ID:      Pharmacy Information (if different than what is on RX)  Name:    Phone:    Fax:    "

## 2024-12-02 NOTE — PROGRESS NOTES
Return Medical Weight Management Note     Marleen Mcfadden  MRN:  [id number]  :  1965  JANET:  2024    Dear Yanna Matias MD,    I had the pleasure of seeing your patient Marleen Mcfadden. She is a 59 year old female who I am continuing to see for treatment of obesity related to:        2024     7:20 AM   --   I have the following health issues associated with obesity Heart Disease    GERD (Reflux)   I have the following symptoms associated with obesity Lower Extremity Swelling    Fatigue    Groin Rash       Assessment & Plan   Problem List Items Addressed This Visit       Chronic systolic congestive heart failure (H) (Chronic)    Familial cardiomyopathy (H) (Chronic)    KATIA (obstructive sleep apnea)- moderate-severe (AHI 29) (Chronic)     Other Visit Diagnoses       Class 3 severe obesity in adult, unspecified BMI, unspecified obesity type, unspecified whether serious comorbidity present (H)    -  Primary           Plan  Increase wegovy to 1mg, will send in 3 month supply through [facility] pharmacy into the new . Plan in : appeal for wegovy coverage with cardiomyopathy. If not, will start topiramate. Also discussed metformin, Marleen has concerns with possible GI side effects. Briefly discussed bariatric surgery, Marleen is not interested in this tool at this time.   Goals we discussed today:   Explore regular exercise schedule   Follow up with Evonne in 3 months   Dietician appointment scheduled 2025  Keep up the excellent work!         INTERVAL HISTORY:  New MWM with me 24  Overweight onset over the last 5 years since covid lock down. Prior to covid was around 180lbs, felt good at 180lbs. During covid lockdowns was baking a lot during lockdowns.      Current weight is 254lbs, this is highest weight in life.  lost down from 250 to 221 with increasing in exercise and focusing on fruits and vegetables, has since seen weight regain since moving in with fiance.      Past  "strategies to lose weight- p90x, \"eating clean\" (olive oil, vegetables, whole foods, minimal starches).      Comorbidities associated with weight gain include cardiomyopathy (diagnosed 2000), managed through cardiology. KATIA (has tried cpap in the past, did not tolerate it, tried a couple years ago), GERD (pepcid, rabeprazole, reglan, well controlled with these meds).   Additional health issues include anxiety (managed with lexpapro though is taking this as needed, seems to help when she's anxious at night to go to sleep, through pcp).      Motivators for weight loss include improve health, improve comorbities, reduce risks with associated cardiomyopathy.      She is interested in starting a medication as a tool for working towards sustainable weight loss.     Regarding eating patterns and diet, she typically eats 2 meals a day, skips lunch. Craves sweets . Is  able to get full. Struggles to stay full until next meal, will snack sometimes an hour after a meal. Does struggle with portion control, only with sweets.  Does experience food noise, particularly with sweets. Does experience emotional eating (sadness, happiness, celebration). Does a loss of control around eating, particularly with homemade cake or cookies. Denies purging to compensate for overeating.     Eats out/ gets take out 1-2 times  a month. Drinks soda (regular coke) 1-2 times a week, more if it's in the house., juice (grape fruit, krystian d) 4 times a week with breakfast , water. Coffee in the morning with hazelnut coffee mate. ETOH socially, 1 drink per sitting, 2-3 times per month.      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 Loves baking. In the past used to kickbox, used to garden, used to ride bike.Hoping to revisit exercising, struggles with people watching her while she's at the gym.   -started wegovy through [facility] pharmacy ([employer] insurance)     Today in visit 12/2/24  Currently " out of wegovy, wanting to increase to 1mg.   Per chart, was sent via "[employer]" on October 29th, though when Marleen called she reports the pharmacy did not have it.     Will have MTM pharmacist send in 1mg asap.     5lbs weight loss since last visit.     Right heart cath 11/26/24- went well.     [employer] insurance, discussed alternative options. Compounded semaglutide not cost reasonable at this time.   Wt Readings from Last 5 Encounters:   12/04/24 116 kg (255 lb 11.2 oz)   12/02/24 112.9 kg (249 lb)   11/26/24 114.1 kg (251 lb 8.7 oz)   11/21/24 114.8 kg (253 lb)   11/11/24 115.2 kg (254 lb)       Anti-obesity medication history    Current:   Wegovy 0.5mg- No major changes with this med. No nausea, vomiting, no worsening heart burn. Some change in bms, typically goes every day but with wegovy would go every other day.     Past/Failed/contraindicated:       Recent diet changes: working on reducing candy, eating grapes instead of candy.      Protein - chicken, fish, beef, occasional protein shake.     Recent exercise/activity changes: some limitations due to chronic chf, plan to increase exercise now that right heart cath went well.     Recent stressors: no big changes     Recent sleep changes: feels it's improving     Vitamins/Labs: vitamin d wnl 8/2024     CURRENT WEIGHT:   249 lbs 0 oz    Initial Weight (lbs): 254.4 lbs  Last Visits Weight: 114.1 kg (251 lb 8.7 oz)  Cumulative weight loss (lbs): 5.4  Weight Loss Percentage: 2.12%  Waist Circumference (cm): 118 cm        12/2/2024     9:28 AM   Changes and Difficulties   I have made the following changes to my diet since my last visit: decreased sugar intake   With regards to my diet, I am still struggling with: drinking enough water   I have made the following changes to my activity/exercise since my last visit: none   With regards to my activity/exercise, I am still struggling with: getting a home exercise plan             MEDICATIONS:   Current Outpatient Medications  "  Medication Sig Dispense Refill    famotidine (PEPCID) 40 MG tablet Take 1 tablet (40 mg) by mouth nightly as needed for heartburn 90 tablet 3    loratadine (CLARITIN) 10 MG tablet Take 1 tablet (10 mg) by mouth daily 90 tablet 3    magnesium oxide (MAG-OX) 400 MG tablet Take 1 tablet (400 mg) by mouth 2 times daily 180 tablet 3    metoclopramide (REGLAN) 10 MG tablet Take 1 tablet (10 mg) by mouth 3 times daily as needed (nausea) 270 tablet 1    metoprolol succinate ER (TOPROL XL) 25 MG 24 hr tablet Take 0.5 tablets (12.5 mg) by mouth daily. 45 tablet 3    RABEprazole (ACIPHEX) 20 MG EC tablet TAKE 1 TABLET(20 MG) BY MOUTH TWICE DAILY 180 tablet 1    sacubitril-valsartan (ENTRESTO)  MG per tablet Take 1 tablet by mouth 2 times daily 180 tablet 2    solifenacin (VESICARE) 5 MG tablet Take 1 tablet (5 mg) by mouth daily at 2 pm 90 tablet 1    spironolactone (ALDACTONE) 25 MG tablet Take 2 tablets (50 mg) by mouth daily. 180 tablet 3    torsemide (DEMADEX) 5 MG tablet Take 1 tablet (5 mg) by mouth daily. 90 tablet 2    Vitamin D3 (CHOLECALCIFEROL) 25 mcg (1000 units) tablet Take 1 tablet (25 mcg) by mouth daily 90 tablet 2    tirzepatide-Weight Management (ZEPBOUND) 2.5 MG/0.5ML prefilled pen Inject 0.5 mLs (2.5 mg) subcutaneously every 7 days. 2 mL 0           12/2/2024     9:28 AM   Weight Loss Medication History Reviewed With Patient   Which weight loss medications are you currently taking on a regular basis? Wegovy   Are you having any side effects from the weight loss medication that we have prescribed you? No               10/25/2010    12:00 PM 3/29/2011     1:00 PM   CORNELIA Score (Last Two)   CORNELIA Raw Score 48 45   Activation Score 80 73.1   CORNELIA Level 4 4         PHYSICAL EXAM:  Objective    /83 (BP Location: Left arm, Patient Position: Sitting, Cuff Size: Adult Large)   Pulse 66   Ht 1.626 m (5' 4\")   Wt 112.9 kg (249 lb)   LMP 10/19/2008 (Approximate)   SpO2 98%   BMI 42.74 kg/m      /83 " "(BP Location: Left arm, Patient Position: Sitting, Cuff Size: Adult Large)   Pulse 66   Ht 1.626 m (5' 4\")   Wt 112.9 kg (249 lb)   LMP 10/19/2008 (Approximate)   SpO2 98%   BMI 42.74 kg/m    Body mass index is 42.74 kg/m .  GENERAL: alert and no distress  EYES: Eyes grossly normal to inspection.  No discharge or erythema, or obvious scleral/conjunctival abnormalities.  RESP: No audible wheeze, cough, or visible cyanosis.    SKIN: Visible skin clear. No significant rash, abnormal pigmentation or lesions.  NEURO: Cranial nerves grossly intact.  Mentation and speech appropriate for age.  PSYCH: Appropriate affect, tone, and pace of words        Sincerely,    Evonne Doss PA-C      23 minutes spent by me on the date of the encounter doing chart review, history and exam, documentation and further activities per the note    The longitudinal plan of care for the diagnosis(es)/condition(s) as documented were addressed during this visit. Due to the added complexity in care, I will continue to support Marleen  in the subsequent management and with ongoing continuity of care.   "

## 2024-12-02 NOTE — LETTER
2024       RE: Marleen Mcfadden  [address]  [place] [address]     Dear Colleague,    Thank you for referring your patient, Marleen Mcfadden, to the [facility] WEIGHT MANAGEMENT CLINIC [place] [facility]. Please see a copy of my visit note below.      Return Medical Weight Management Note     Marleen Mcfadden  MRN:  [id number]  :  1965  JANET:  2024    Dear Yanna Matias MD,    I had the pleasure of seeing your patient Marleen Mcfadden. She is a 59 year old female who I am continuing to see for treatment of obesity related to:        2024     7:20 AM   --   I have the following health issues associated with obesity Heart Disease    GERD (Reflux)   I have the following symptoms associated with obesity Lower Extremity Swelling    Fatigue    Groin Rash       Assessment & Plan  Problem List Items Addressed This Visit       Chronic systolic congestive heart failure (H) (Chronic)    Familial cardiomyopathy (H) (Chronic)    KATIA (obstructive sleep apnea)- moderate-severe (AHI 29) (Chronic)     Other Visit Diagnoses       Class 3 severe obesity in adult, unspecified BMI, unspecified obesity type, unspecified whether serious comorbidity present (H)    -  Primary           Plan  Increase wegovy to 1mg, will send in 3 month supply through [facility] pharmacy into the new . Plan in : appeal for wegovy coverage with cardiomyopathy. If not, will start topiramate. Also discussed metformin, Marleen has concerns with possible GI side effects. Briefly discussed bariatric surgery, Marleen is not interested in this tool at this time.   Goals we discussed today:   Explore regular exercise schedule   Follow up with Evonne in 3 months   Dietician appointment scheduled 2025  Keep up the excellent work!         INTERVAL HISTORY:  New MWM with me 24  Overweight onset over the last 5 years since covid lock down. Prior to  "covid was around 180lbs, felt good at 180lbs. During covid lockdowns was baking a lot during lockdowns.      Current weight is 254lbs, this is highest weight in life. 2023 lost down from 250 to 221 with increasing in exercise and focusing on fruits and vegetables, has since seen weight regain since moving in with fiance.      Past strategies to lose weight- p90x, \"eating clean\" (olive oil, vegetables, whole foods, minimal starches).      Comorbidities associated with weight gain include cardiomyopathy (diagnosed 2000), managed through cardiology. KATIA (has tried cpap in the past, did not tolerate it, tried a couple years ago), GERD (pepcid, rabeprazole, reglan, well controlled with these meds).   Additional health issues include anxiety (managed with lexpapro though is taking this as needed, seems to help when she's anxious at night to go to sleep, through pcp).      Motivators for weight loss include improve health, improve comorbities, reduce risks with associated cardiomyopathy.      She is interested in starting a medication as a tool for working towards sustainable weight loss.     Regarding eating patterns and diet, she typically eats 2 meals a day, skips lunch. Craves sweets . Is  able to get full. Struggles to stay full until next meal, will snack sometimes an hour after a meal. Does struggle with portion control, only with sweets.  Does experience food noise, particularly with sweets. Does experience emotional eating (sadness, happiness, celebration). Does a loss of control around eating, particularly with homemade cake or cookies. Denies purging to compensate for overeating.     Eats out/ gets take out 1-2 times  a month. Drinks soda (regular coke) 1-2 times a week, more if it's in the house., juice (grape fruit, krystian d) 4 times a week with breakfast , water. Coffee in the morning with hazelnut coffee mate. ETOH socially, 1 drink per sitting, 2-3 times per month.      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 Loves baking. In the past used to kickbox, used to garden, used to ride bike.Hoping to revisit exercising, struggles with people watching her while she's at the gym.   -started wegovy through [facility] pharmacy ([employer] [facility])     Today in visit 12/2/24  Currently out of wegovy, wanting to increase to 1mg.   Per chart, was sent via [facility] on October 29th, though when Marleen called she reports the pharmacy did not have it.     Will have [facility] pharmacist send in 1mg asap.     5lbs weight loss since last visit.     Right heart cath 11/26/24- went well.     [employer] insurance, discussed alternative options. Compounded semaglutide not cost reasonable at this time.   Wt Readings from Last 5 Encounters:   12/04/24 116 kg (255 lb 11.2 oz)   12/02/24 112.9 kg (249 lb)   11/26/24 114.1 kg (251 lb 8.7 oz)   11/21/24 114.8 kg (253 lb)   11/11/24 115.2 kg (254 lb)       Anti-obesity medication history    Current:   Wegovy 0.5mg- No major changes with this med. No nausea, vomiting, no worsening heart burn. Some change in bms, typically goes every day but with wegovy would go every other day.     Past/Failed/contraindicated:       Recent diet changes: working on reducing candy, eating grapes instead of candy.      Protein - chicken, fish, beef, occasional protein shake.     Recent exercise/activity changes: some limitations due to chronic chf, plan to increase exercise now that right heart cath went well.     Recent stressors: no big changes     Recent sleep changes: feels it's improving     Vitamins/Labs: vitamin d wnl 8/2024     CURRENT WEIGHT:   249 lbs 0 oz    Initial Weight (lbs): 254.4 lbs  Last Visits Weight: 114.1 kg (251 lb 8.7 oz)  Cumulative weight loss (lbs): 5.4  Weight Loss Percentage: 2.12%  Waist Circumference (cm): 118 cm        12/2/2024     9:28 AM   Changes and Difficulties   I have made the following changes to my diet since my last  visit: decreased sugar intake   With regards to my diet, I am still struggling with: drinking enough water   I have made the following changes to my activity/exercise since my last visit: none   With regards to my activity/exercise, I am still struggling with: getting a home exercise plan             MEDICATIONS:   Current Outpatient Medications   Medication Sig Dispense Refill     famotidine (PEPCID) 40 MG tablet Take 1 tablet (40 mg) by mouth nightly as needed for heartburn 90 tablet 3     loratadine (CLARITIN) 10 MG tablet Take 1 tablet (10 mg) by mouth daily 90 tablet 3     magnesium oxide (MAG-OX) 400 MG tablet Take 1 tablet (400 mg) by mouth 2 times daily 180 tablet 3     metoclopramide (REGLAN) 10 MG tablet Take 1 tablet (10 mg) by mouth 3 times daily as needed (nausea) 270 tablet 1     metoprolol succinate ER (TOPROL XL) 25 MG 24 hr tablet Take 0.5 tablets (12.5 mg) by mouth daily. 45 tablet 3     RABEprazole (ACIPHEX) 20 MG EC tablet TAKE 1 TABLET(20 MG) BY MOUTH TWICE DAILY 180 tablet 1     sacubitril-valsartan (ENTRESTO)  MG per tablet Take 1 tablet by mouth 2 times daily 180 tablet 2     solifenacin (VESICARE) 5 MG tablet Take 1 tablet (5 mg) by mouth daily at 2 pm 90 tablet 1     spironolactone (ALDACTONE) 25 MG tablet Take 2 tablets (50 mg) by mouth daily. 180 tablet 3     torsemide (DEMADEX) 5 MG tablet Take 1 tablet (5 mg) by mouth daily. 90 tablet 2     Vitamin D3 (CHOLECALCIFEROL) 25 mcg (1000 units) tablet Take 1 tablet (25 mcg) by mouth daily 90 tablet 2     tirzepatide-Weight Management (ZEPBOUND) 2.5 MG/0.5ML prefilled pen Inject 0.5 mLs (2.5 mg) subcutaneously every 7 days. 2 mL 0           12/2/2024     9:28 AM   Weight Loss Medication History Reviewed With Patient   Which weight loss medications are you currently taking on a regular basis? Wegovy   Are you having any side effects from the weight loss medication that we have prescribed you? No               10/25/2010    12:00 PM  "3/29/2011     1:00 PM   CORNELIA Score (Last Two)   CORNELIA Raw Score 48 45   Activation Score 80 73.1   CORNELIA Level 4 4         PHYSICAL EXAM:  Objective   /83 (BP Location: Left arm, Patient Position: Sitting, Cuff Size: Adult Large)   Pulse 66   Ht 1.626 m (5' 4\")   Wt 112.9 kg (249 lb)   LMP 10/19/2008 (Approximate)   SpO2 98%   BMI 42.74 kg/m      /83 (BP Location: Left arm, Patient Position: Sitting, Cuff Size: Adult Large)   Pulse 66   Ht 1.626 m (5' 4\")   Wt 112.9 kg (249 lb)   LMP 10/19/2008 (Approximate)   SpO2 98%   BMI 42.74 kg/m    Body mass index is 42.74 kg/m .  GENERAL: alert and no distress  EYES: Eyes grossly normal to inspection.  No discharge or erythema, or obvious scleral/conjunctival abnormalities.  RESP: No audible wheeze, cough, or visible cyanosis.    SKIN: Visible skin clear. No significant rash, abnormal pigmentation or lesions.  NEURO: Cranial nerves grossly intact.  Mentation and speech appropriate for age.  PSYCH: Appropriate affect, tone, and pace of words        Sincerely,    Evonne Doss PA-C      23 minutes spent by me on the date of the encounter doing chart review, history and exam, documentation and further activities per the note    The longitudinal plan of care for the diagnosis(es)/condition(s) as documented were addressed during this visit. Due to the added complexity in care, I will continue to support Marleen  in the subsequent management and with ongoing continuity of care.       Again, thank you for allowing me to participate in the care of your patient.      Sincerely,    Evonne Doss PA-C    "

## 2024-12-02 NOTE — NURSING NOTE
"(   Chief Complaint   Patient presents with    Follow Up     Return MW    )    ( Weight: 112.9 kg (249 lb) )  ( Height: 162.6 cm (5' 4\") )  ( BMI (Calculated): 42.74 )  (   )  (   )  (   )  (   )  ( Waist Circumference (cm): 118 cm )  (   )    ( BP: 130/83 )  (   )  (   )  (   )  ( Pulse: 66 )  (   )  ( SpO2: 98 % )    (   Patient Active Problem List   Diagnosis    Leiomyoma of uterus    Allergic rhinitis    Anemia    Knee pain    Thyroid nodule    Pain in joint involving ankle and foot    CARDIOVASCULAR SCREENING; LDL GOAL LESS THAN 160    Shaina's nodes    Familial cardiomyopathy (H)    Degenerative disc disease at L5-S1 level    Chronic bilateral low back pain with right-sided sciatica    Chronic bilateral low back pain with left-sided sciatica    Chronic systolic congestive heart failure (H)    Left shoulder pain    Primary osteoarthritis of both knees    Gastroesophageal reflux disease with esophagitis    Moderate major depression (H)    Dysfunction of both eustachian tubes    Chronic rhinitis    Essential hypertension    CSF otorrhea    KATIA (obstructive sleep apnea)- moderate-severe (AHI 29)    Family history of colon cancer    Diverticulitis    Herpes simplex of female genitalia    Symptomatic bradycardia    Severe obesity (BMI 35.0-39.9) with comorbidity (H)    Near syncope    Acute pain of left shoulder    )  (   Current Outpatient Medications   Medication Sig Dispense Refill    famotidine (PEPCID) 40 MG tablet Take 1 tablet (40 mg) by mouth nightly as needed for heartburn 90 tablet 3    loratadine (CLARITIN) 10 MG tablet Take 1 tablet (10 mg) by mouth daily 90 tablet 3    magnesium oxide (MAG-OX) 400 MG tablet Take 1 tablet (400 mg) by mouth 2 times daily 180 tablet 3    metoclopramide (REGLAN) 10 MG tablet Take 1 tablet (10 mg) by mouth 3 times daily as needed (nausea) 270 tablet 1    metoprolol succinate ER (TOPROL XL) 25 MG 24 hr tablet Take 0.5 tablets (12.5 mg) by mouth daily. 45 tablet 3    " RABEprazole (ACIPHEX) 20 MG EC tablet TAKE 1 TABLET(20 MG) BY MOUTH TWICE DAILY 180 tablet 1    sacubitril-valsartan (ENTRESTO)  MG per tablet Take 1 tablet by mouth 2 times daily 180 tablet 2    Semaglutide-Weight Management (WEGOVY) 1 MG/0.5ML pen Inject 1 mg subcutaneously once a week. Monday's      solifenacin (VESICARE) 5 MG tablet Take 1 tablet (5 mg) by mouth daily at 2 pm 90 tablet 1    spironolactone (ALDACTONE) 25 MG tablet Take 2 tablets (50 mg) by mouth daily. 180 tablet 3    torsemide (DEMADEX) 5 MG tablet Take 1 tablet (5 mg) by mouth daily. 90 tablet 2    Vitamin D3 (CHOLECALCIFEROL) 25 mcg (1000 units) tablet Take 1 tablet (25 mcg) by mouth daily 90 tablet 2    )  ( Diabetes Eval:    )    ( Pain Eval:  No Pain (0) )    ( Wound Eval:       )    (   History   Smoking Status    Never   Smokeless Tobacco    Never    )    ( Signed By:  Nidhi Yun; December 2, 2024; 9:25 AM )

## 2024-12-02 NOTE — TELEPHONE ENCOUNTER
Prior Authorization Approval    Medication: ZEPBOUND 2.5 MG/0.5ML SC SOAJ  Authorization Effective Date:    Authorization Expiration Date: 12/31/2024  Approved Dose/Quantity: uud  Reference #: Key: [id number]   Insurance Company: [employer] - Phone [phone] Fax [phone]  Expected CoPay: $    CoPay Card Available:      Financial Assistance Needed:     Which Pharmacy is filling the prescription: [facility] - [place], [place] - [address]  Pharmacy Notified: Yes

## 2024-12-02 NOTE — TELEPHONE ENCOUNTER
PA Initiation    Medication: ZEPBOUND 2.5 MG/0.5ML SC SOAJ  Insurance Company: [employer] - Phone [phone] Fax [phone]  Pharmacy Filling the Rx: [facility] - [place], [place] - [facility] [address]  Filling Pharmacy Phone: [phone]  Filling Pharmacy Fax: [phone]  Start Date: 12/2/2024     Key: [id number]

## 2024-12-02 NOTE — TELEPHONE ENCOUNTER
RX for Zepbound 2.5 mg approved and sent to pharmacy.     Lauren Bloch, PharmD, BCACP   Medication Therapy Management Pharmacist   [facility] Weight Management [facility]

## 2024-12-02 NOTE — PATIENT INSTRUCTIONS
"Thank you for allowing us the privilege of caring for you. We hope we provided you with the excellent service you deserve.   Please let us know if there is anything else we can do for you so that we can be sure you are completely satisfied with your care experience.    To ensure the quality of our services you may be receiving a patient satisfaction survey from an independent patient satisfaction monitoring company.    The greatest compliment you can give is a \"Likely to Recommend\"    Your visit was with Evonne Doss PA-C today.    Instructions per today's visit:     Kory Mcfadden, it was great to visit with you today.  Here is a review of our visit.  If our clinic scheduler is not able to reach you please call [phone] to schedule your next appointments.    Plan  Increase wegovy to 1mg, will send in 3 month supply through [facility] pharmacy into the new year. Plan in 2025: appeal for wegovy coverage with cardiomyopathy. If not, will start topiramate. Also discussed metformin, Marleen has concerns with possible GI side effects. Briefly discussed bariatric surgery, Marleen is not interested in this tool at this time.   Goals we discussed today:   Explore regular exercise schedule   Follow up with Evonne in 3 months   Dietician appointment scheduled 1/6/2025  Keep up the excellent work!       Information about Video Visits with [facility] [employer] [place]: video visit information  _________________________________________________________________________________________________________________________________________________________  If you are asked by your clinic team to have your blood pressure checked:  [facility] do offer several locations for blood pressure checks. Please follow the below link to schedule an appointment. Scheduling an appointment at the pharmacy for a blood pressure check is now preferred.    Appointment Plus " ([email or web address])  _________________________________________________________________________________________________________________________________________________________  Important contact and scheduling information:  Please call our contact center at [phone] to schedule your next appointments.  To find a lab location near you, please call [phone].  For any nursing questions or concerns call Perlita Meraz LPN at [phone] or Zuleika Sotomayor RN at [phone]  Please call during clinic hours Monday through Friday 8:00a - 4:00p if you have questions or you can contact us via [employer] at anytime and we will reply during clinic hours.    Lab results will be communicated through My Chart or letter (if My Chart not used). Please call the clinic if you have not received communication after 1 week or if you have any questions.?  Clinic Fax: [phone]    _________________________________________________________________________________________________________________________________________________________  Meal Replacement Products:    Here is the link to our new e-store where you can purchase our meal replacement products    [facility] E-Store  [facility]."[employer]/store    The one week starter kit is a great way to sample a variety of products and see what works for you.    If you want more information about the product go to: [employer] Meals  [employer]    If you are an employee or [facility] or [facility] please contact your care team for a 10% estore discount    Free Shipping for orders over $75     Benefits of meal replacements products:    Portion and calorie control  Improved nutrition  Structured eating  Simplified food choices  Avoid contact with trigger foods  _________________________________________________________________________________________________________________________________________________________  Interested in working with a health  ?  Health coaches work with you to improve your overall health and wellbeing.  They look at the whole person, and may involve discussion of different areas of life, including, but not limited to the four pillars of health (sleep, exercise, nutrition, and stress management). Discuss with your care team if you would like to start working a health .  Health Coaching-3 Pack: Schedule by calling [phone]    $99 for three health coaching visits    Visits may be done in person or via phone    Coaching is a partnership between the  and the client; Coaches do not prescribe or diagnose    Coaching helps inspire the client to reach his/her personal goals   _________________________________________________________________________________________________________________________________________________________  24 Week Healthy Lifestyle Plan:    Our mission in the 24-week Healthy Lifestyle Plan is to provide you with individualized care by giving you the tools, education and support you need to lose weight and maintain a healthy lifestyle. In your 24-week journey, you ll be supported by a dedicated weight loss team that includes registered dietitians, medical weight management providers, health coaches, and nurses -- all with special expertise in weight loss -- to help you every step of the way.     Monthly meetings with your registered dietician or medical weight management provider help to review your progress, update your care plan, and make any adjustments needed to ensure success. Between these visits, weekly and bi-weekly health  visits will help you focus on the four pillars of weight loss -- stress, sleep, nutrition, and exercise -- and how you can best adapt each to achieve sustainable weight loss results.    In addition, you will be given exclusive access to online wellbeing classes through [employer].  Your initial visit will be with a medical weight management provider who will help to  understand your weight loss goals and ensure this program is the right fit for you. Please let our team know if you are interested in the 24 week plan by sending a message to your care team or calling [phone] to schedule.  _________________________________________________________________________________________________________________________________________________________  __________  [facility] Get Moving Program  Our team of physical therapists is trained to help you understand and take control of your condition. They will perform a thorough evaluation to determine your ability for activity and develop a customized plan to fit your goals and physical ability.  Scheduling: Unsure if the Get Moving program is right for you? Discuss the program with your medical provider or diabetes educator. You can also call us at [phone] to ask questions or schedule an appointment.   [facility] Get Moving Program  ____________________________________________________________________________________________________________________________________________________________________________  [facility] Diabetes Prevention Program (DPP)  If you have prediabetes and Medicare please contact us via idio to learn more about the Diabetes Prevention Program (DPP)  Program Details:   [employer] [facility] offers the year-long Diabetes Prevention Program (DPP). The program helps you to make lifestyle changes that prevent or delay type 2 diabetes by supporting healthy eating, increased physical activity, stress reduction and use of coping skills.   On average, previous [facility] DPP cohorts have lost and maintained at least 5% of their starting weight throughout the program and averaged more than 150 minutes of physical activity per week.  Participants meet weekly for one-hour group sessions over sixteen weeks, every other week for the next 8 weeks, and monthly for the last six months.   A year-long  maintenance program is also available for participants who complete the first year.   Location & Cost:   During the COVID-19 Public Health Emergency, the program is offered virtually. When in-person classes can resume, they will be held at [facility].  For people with Medicare, the program is covered in full. A self-pay option will also be available for those with non-Medicare insurance plans.   ______________________________________________________________________________________________________________________________________________________________________________________________________________________________    To work with a Behavioral Health Psychologist:    Call to schedule:    Tom Nguyen - [phone]  Edgard Mojica - [phone]  Erica Herr - [phone]  Riya Sanchez - [phone]   Bobbi Gibson PhD (cannot accept Medicare) [phone]        Thank you,   [facility] Comprehensive Weight Management Team

## 2024-12-02 NOTE — TELEPHONE ENCOUNTER
Wegovy out of stock, switching to Zepbound 2.5 mg.     Lauren Bloch, PharmD, BCACP   Medication Therapy Management Pharmacist   [facility] Weight Management [facility]

## 2024-12-03 NOTE — PROGRESS NOTES
CV GENETICS ELECTROPHYSIOLOGY CLINIC VISIT    Assessment/Recommendations   Assessment/Plan:    Ms. 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     Familial FLNC NICM LVEF 35-40%, NYHA I:   1. ACEi/ARB: Continue Entresto.  2. BB: Continue Toprol XL   3. Aldosterone antagonist: Continue Spironolactone.  4. SCD prophylaxis:  FLNC was reclassified as pathogenic and last CMR showed LVEF 32%.  We had previously recommended ICD. We again discussed we recommend ICD dual-chamber for bradycardia and only on low-dose beta-blocker. We discussed with the patient the rationale for ICD placement, alternative therapies,  technical aspects of the surgical procedure, risks/benefits of therapy and need for long-term follow-up in the Device Clinic. She states she is agreeable to proceed.       Follow up 3 months post device implant.        History of Present Illness/Subjective    Ms. Marleen Mcfadden is a 59 year old female who comes in today for EP consultation of genetic CM, carol.    Ms. 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     She has a family history of DCM. Her sister was diagnosed and lead to family screening. 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 Her most recent echo showed LVEF 35-40% with moderate/severe LV dilation. Her son and daughter have DCM. Patient has been following with Dr. Kolb since 2006. She has been on GDMT. More recently, she was having episodes of lightheadedness. She was playing pool and she stood up and fell back and went down on the ground. Her fiance said she was still awake the whole time, she does not remember that, only once, she was able to stand up immediately, and went to the chair. No orthostatic dizziness otherwize, she reports having a syncope episode 3 years ago after laughing very hard.    We saw her  last time back in 2023. At that time, she presented for evaluation of  syncopal episode. She reported passing out while standing up from kneeling position in the context of 3-4 episodes of emesis. That was her second episode. First episode was earlier that year; she reported she was playing pool, was shooting the ball, stood up and became lightheaded and passed out. 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 During that encounter Discussed we recommended ICD dual-chamber for bradycardia and only on low-dose beta-blocker. We discussed with the patient the rationale for ICD placement, alternative therapies,  technical aspects of the surgical procedure, risks/benefits of therapy and need for long-term follow-up in the Device Clinic.     EP 2024: She presents today for follow up. She reports feeling well. She has some limitations when she walks or exerts herself. No admissions for HF. She denies chest discomfort, palpitations, abdominal fullness/bloating or peripheral edema, shortness of breath, paroxysmal nocturnal dyspnea, orthopnea, lightheadedness, dizziness, pre-syncope, or syncope. Current cardiac medications include: Torsemide, Toprol XL, Spironolactone, and Entresto.          Family History       Family history was significant for the following cardiac history:  Full sister with DCM. She  in September after developing colon cancer.  Her history was also remarkable for MERSA, kidney disease and ultimate transplant.  She did not have an ICD or much care for her DCM.  Marleen's son and daughter also have DCM.    A maternal half sister has hypertension, epilepsy and reported dizziness and fainting. She does not have a cardiac issue to Marleen's knowledge.  Mother  suddenly at 38 yrs of age.  It was thought to be the result of a cardiac problem. However, she also had a long history of alcoholism and had a colostomy. Her two siblings have no known  heart issues.  Maternal grandmother  in her 30's from a brain aneurysm. Nothing is known about maternal grandfather.  Marleen's father  at 55 years after a massive heart attack and CABG procedure.  His death occurred one day after the surgery reportedly from a clot.  One of his sisters  from a stroke.  Paternal grandmother  in her 70's from a stroke.  Her mother (Marleen's great grandmother)  suddenly in her 70's while sitting at a bus stop.Nothing is known about grandfather.  Two paternal half sisters in good health.  I have reviewed and updated the patient's Past Medical History, Social History, Family History and Medication List.     Cardiographics (Personally Reviewed) :   2024  Severe left ventricular dilation is present. Left ventricular function is  decreased. The ejection fraction is 30-35% (moderately reduced). Moderate  diffuse hypokinesis is present. Abnormal septal motion from conduction delay  present.  Global right ventricular function is normal.  The inferior vena cava was normal in size with preserved respiratory  variability.  No pericardial effusion is present.    [facility]   Baseline hemodynamics on room air: BP: 131/63 (90) mmHg, HR 53 bpm, SpO2 95%.   Pressures: RA: 12/9 (8), RV: 37/9, PA: 37/16 (24), PCW: 18/19 (15) mmHg.  Saturations: PA sat: 63%.  Calculations :Estrella CO/CI: 6.7 L/min/ 3.1 L/min/m2, PVR: 1.3 DOZIER.     23 Echo:   Interpretation Summary  Moderate to severe left ventricular dilation is present. Left ventricular  function is decreased. The ejection fraction is 35-40% (moderately reduced).  Biplane LVEF is 37%.  The right ventricle is normal size. Global right ventricular function is normal.  No pericardial effusion is present.  This study was compared with the study from 22: LVEF appears similar or at most minimally lower and appears to hve been overestimated on the prior study.    1/10/23 RHC:  Conclusion    Right sided filling pressures  are normal.  Mild elevated pulmonary hypertension.  Left sided filling pressures are normal.  Left ventricular filling pressures are normal.  Normal cardiac output level.         6/3/22 CMR:  1. The LV is mild-to-moderately dilated in cavity size. The wall thickness is normal. The global systolic function is severely decreased. The LVEF is 33%. There is severe global hypokinesis.  2. The RV is normal in cavity size. The global systolic function is mildly decreased. The RVEF is 53%.   3. Both atria are normal in size.  4. There is no significant valvular disease.   5. Late gadolinium enhancement imaging shows no MI, fibrosis or infiltrative disease.   6. There is no pericardial effusion or thickening.  7. There is no intracardiac thrombus.  CONCLUSIONS: Severe, non-ischemic dilated cardiomyopathy, most likely of genetic cause. LVEF of 33% and RVEF of 53% with no LGE. When directly compared to the prior CMR, the LV and RV size and function have not significantly changed.       Physical Examination   /79 (BP Location: Right arm, Patient Position: Chair, Cuff Size: Adult Large)   Pulse 60   Wt 116 kg (255 lb 11.2 oz)   LMP 10/19/2008 (Approximate)   SpO2 99%   BMI 43.89 kg/m    Wt Readings from Last 3 Encounters:   12/02/24 112.9 kg (249 lb)   11/26/24 114.1 kg (251 lb 8.7 oz)   11/21/24 114.8 kg (253 lb)     General Appearance:   Alert, well-appearing and in no acute distress.   HEENT: Atraumatic, normocephalic. PERRL.  MMM.   Chest/Lungs:   Respirations unlabored.  Lungs are clear to auscultation.   Cardiovascular:   Regular rate and rhythm.  S1/S2. No murmur.    Abdomen:  Soft, nontender, nondistended.   Extremities: No cyanosis or clubbing. No edema.    Musculoskeletal: Moves all extremities.  Gait normal.   Skin: Warm, dry, intact.    Neurologic: Mood and affect are appropriate.  Alert and oriented to person, place, time, and situation.          Medications  Allergies   Current Outpatient Medications    Medication Sig Dispense Refill    famotidine (PEPCID) 40 MG tablet Take 1 tablet (40 mg) by mouth nightly as needed for heartburn 90 tablet 3    loratadine (CLARITIN) 10 MG tablet Take 1 tablet (10 mg) by mouth daily 90 tablet 3    magnesium oxide (MAG-OX) 400 MG tablet Take 1 tablet (400 mg) by mouth 2 times daily 180 tablet 3    metoclopramide (REGLAN) 10 MG tablet Take 1 tablet (10 mg) by mouth 3 times daily as needed (nausea) 270 tablet 1    metoprolol succinate ER (TOPROL XL) 25 MG 24 hr tablet Take 0.5 tablets (12.5 mg) by mouth daily. 45 tablet 3    RABEprazole (ACIPHEX) 20 MG EC tablet TAKE 1 TABLET(20 MG) BY MOUTH TWICE DAILY 180 tablet 1    sacubitril-valsartan (ENTRESTO)  MG per tablet Take 1 tablet by mouth 2 times daily 180 tablet 2    solifenacin (VESICARE) 5 MG tablet Take 1 tablet (5 mg) by mouth daily at 2 pm 90 tablet 1    spironolactone (ALDACTONE) 25 MG tablet Take 2 tablets (50 mg) by mouth daily. 180 tablet 3    tirzepatide-Weight Management (ZEPBOUND) 2.5 MG/0.5ML prefilled pen Inject 0.5 mLs (2.5 mg) subcutaneously every 7 days. 2 mL 0    torsemide (DEMADEX) 5 MG tablet Take 1 tablet (5 mg) by mouth daily. 90 tablet 2    Vitamin D3 (CHOLECALCIFEROL) 25 mcg (1000 units) tablet Take 1 tablet (25 mcg) by mouth daily 90 tablet 2    Allergies   Allergen Reactions    Morphine      EMESIS    Nickel     Sulfa Antibiotics Swelling         Lab Results (Personally Reviewed)    Chemistry/lipid CBC Cardiac Enzymes/BNP/TSH/INR   Lab Results   Component Value Date    BUN 16.3 11/26/2024     11/26/2024    CO2 27 11/26/2024     Creatinine   Date Value Ref Range Status   11/26/2024 0.99 (H) 0.51 - 0.95 mg/dL Final   07/11/2021 0.79 0.52 - 1.04 mg/dL Final       Lab Results   Component Value Date    CHOL 185 08/21/2024    HDL 56 08/21/2024     (H) 08/21/2024      Lab Results   Component Value Date    WBC 5.7 11/26/2024    HGB 12.0 11/26/2024    HCT 36.3 11/26/2024    MCV 96 11/26/2024      11/26/2024    Lab Results   Component Value Date    TSH 4.49 (H) 11/09/2024    INR 1.02 11/26/2024        The patient states understanding and is agreeable with the plan.   Abdelrahman Aragon MD [facility]  Cardiology - Electrophysiology      Total time spent on patient visit, reviewing notes, imaging, labs, orders, and completing necessary documentation: 45 minutes.

## 2024-12-04 ENCOUNTER — OFFICE VISIT (OUTPATIENT)
Dept: CARDIOLOGY | Facility: CLINIC | Age: 59
End: 2024-12-04
Attending: STUDENT IN AN ORGANIZED HEALTH CARE EDUCATION/TRAINING PROGRAM
Payer: COMMERCIAL

## 2024-12-04 VITALS
HEART RATE: 60 BPM | WEIGHT: 255.7 LBS | DIASTOLIC BLOOD PRESSURE: 79 MMHG | BODY MASS INDEX: 43.89 KG/M2 | SYSTOLIC BLOOD PRESSURE: 121 MMHG | OXYGEN SATURATION: 99 %

## 2024-12-04 DIAGNOSIS — I50.22 CHRONIC SYSTOLIC CONGESTIVE HEART FAILURE (H): Chronic | ICD-10-CM

## 2024-12-04 DIAGNOSIS — R55 NEAR SYNCOPE: ICD-10-CM

## 2024-12-04 PROCEDURE — 99215 OFFICE O/P EST HI 40 MIN: CPT | Performed by: INTERNAL MEDICINE

## 2024-12-04 PROCEDURE — 99213 OFFICE O/P EST LOW 20 MIN: CPT | Performed by: INTERNAL MEDICINE

## 2024-12-04 ASSESSMENT — PAIN SCALES - GENERAL: PAINLEVEL_OUTOF10: NO PAIN (0)

## 2024-12-04 NOTE — LETTER
12/4/2024      RE: Marleen Mcfadden  [address]  [place] [address]       Dear Colleague,    Thank you for the opportunity to participate in the care of your patient, Marleen Mcfadden, at the [facility] [place] [facility]. Please see a copy of my visit note below.        CV GENETICS ELECTROPHYSIOLOGY CLINIC VISIT    Assessment/Recommendations   Assessment/Plan:    Ms. 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     Familial FLNC NICM LVEF 35-40%, NYHA I:   1. ACEi/ARB: Continue Entresto.  2. BB: Continue Toprol XL   3. Aldosterone antagonist: Continue Spironolactone.  4. SCD prophylaxis:  FLNC was reclassified as pathogenic and last CMR showed LVEF 32%.  We had previously recommended ICD. We again discussed we recommend ICD dual-chamber for bradycardia and only on low-dose beta-blocker. We discussed with the patient the rationale for ICD placement, alternative therapies,  technical aspects of the surgical procedure, risks/benefits of therapy and need for long-term follow-up in the Device Clinic. She states she is agreeable to proceed.       Follow up 3 months post device implant.        History of Present Illness/Subjective    Ms. Marleen Mcfadden is a 59 year old female who comes in today for EP consultation of genetic CM, carol.    Ms. 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     She has a family history of DCM. Her sister was diagnosed and lead to family screening. 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 Her most recent echo showed LVEF 35-40% with moderate/severe LV dilation. Her son and daughter have DCM. Patient has been following with Dr. Kolb since 2006. She has been on GDMT. More recently, she was having episodes of lightheadedness. She was  playing pool and she stood up and fell back and went down on the ground. Her fiance said she was still awake the whole time, she does not remember that, only once, she was able to stand up immediately, and went to the chair. No orthostatic dizziness otherwize, she reports having a syncope episode 3 years ago after laughing very hard.    We saw her last time back in 2023. At that time, she presented for evaluation of  syncopal episode. She reported passing out while standing up from kneeling position in the context of 3-4 episodes of emesis. That was her second episode. First episode was earlier that year; she reported she was playing pool, was shooting the ball, stood up and became lightheaded and passed out. 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 During that encounter Discussed we recommended ICD dual-chamber for bradycardia and only on low-dose beta-blocker. We discussed with the patient the rationale for ICD placement, alternative therapies,  technical aspects of the surgical procedure, risks/benefits of therapy and need for long-term follow-up in the Device Clinic.     EP 2024: She presents today for follow up. She reports feeling well. She has some limitations when she walks or exerts herself. No admissions for HF. She denies chest discomfort, palpitations, abdominal fullness/bloating or peripheral edema, shortness of breath, paroxysmal nocturnal dyspnea, orthopnea, lightheadedness, dizziness, pre-syncope, or syncope. Current cardiac medications include: Torsemide, Toprol XL, Spironolactone, and Entresto.          Family History       Family history was significant for the following cardiac history:  Full sister with DCM. She  in September after developing colon cancer.  Her history was also remarkable for MERSA, kidney disease and ultimate transplant.  She did not have an ICD or much care for her DCM.  Marleen's son and daughter also have DCM.     A maternal half sister has hypertension, epilepsy and reported dizziness and fainting. She does not have a cardiac issue to Marleen's knowledge.  Mother  suddenly at 38 yrs of age.  It was thought to be the result of a cardiac problem. However, she also had a long history of alcoholism and had a colostomy. Her two siblings have no known heart issues.  Maternal grandmother  in her 30's from a brain aneurysm. Nothing is known about maternal grandfather.  Marleen's father  at 55 years after a massive heart attack and CABG procedure.  His death occurred one day after the surgery reportedly from a clot.  One of his sisters  from a stroke.  Paternal grandmother  in her 70's from a stroke.  Her mother (Marleen's great grandmother)  suddenly in her 70's while sitting at a bus stop.Nothing is known about grandfather.  Two paternal half sisters in good health.  I have reviewed and updated the patient's Past Medical History, Social History, Family History and Medication List.     Cardiographics (Personally Reviewed) :   2024  Severe left ventricular dilation is present. Left ventricular function is  decreased. The ejection fraction is 30-35% (moderately reduced). Moderate  diffuse hypokinesis is present. Abnormal septal motion from conduction delay  present.  Global right ventricular function is normal.  The inferior vena cava was normal in size with preserved respiratory  variability.  No pericardial effusion is present.    [facility]   Baseline hemodynamics on room air: BP: 131/63 (90) mmHg, HR 53 bpm, SpO2 95%.   Pressures: RA: 12/9 (8), RV: 37/9, PA: 37/16 (24), PCW: 18/19 (15) mmHg.  Saturations: PA sat: 63%.  Calculations :Estrella CO/CI: 6.7 L/min/ 3.1 L/min/m2, PVR: 1.3 DOZIER.     23 Echo:   Interpretation Summary  Moderate to severe left ventricular dilation is present. Left ventricular  function is decreased. The ejection fraction is 35-40% (moderately reduced).  Biplane LVEF is  37%.  The right ventricle is normal size. Global right ventricular function is normal.  No pericardial effusion is present.  This study was compared with the study from 12/12/22: LVEF appears similar or at most minimally lower and appears to hve been overestimated on the prior study.    1/10/23 RHC:  Conclusion    Right sided filling pressures are normal.  Mild elevated pulmonary hypertension.  Left sided filling pressures are normal.  Left ventricular filling pressures are normal.  Normal cardiac output level.         6/3/22 CMR:  1. The LV is mild-to-moderately dilated in cavity size. The wall thickness is normal. The global systolic function is severely decreased. The LVEF is 33%. There is severe global hypokinesis.  2. The RV is normal in cavity size. The global systolic function is mildly decreased. The RVEF is 53%.   3. Both atria are normal in size.  4. There is no significant valvular disease.   5. Late gadolinium enhancement imaging shows no MI, fibrosis or infiltrative disease.   6. There is no pericardial effusion or thickening.  7. There is no intracardiac thrombus.  CONCLUSIONS: Severe, non-ischemic dilated cardiomyopathy, most likely of genetic cause. LVEF of 33% and RVEF of 53% with no LGE. When directly compared to the prior CMR, the LV and RV size and function have not significantly changed.       Physical Examination   /79 (BP Location: Right arm, Patient Position: Chair, Cuff Size: Adult Large)   Pulse 60   Wt 116 kg (255 lb 11.2 oz)   LMP 10/19/2008 (Approximate)   SpO2 99%   BMI 43.89 kg/m    Wt Readings from Last 3 Encounters:   12/02/24 112.9 kg (249 lb)   11/26/24 114.1 kg (251 lb 8.7 oz)   11/21/24 114.8 kg (253 lb)     General Appearance:   Alert, well-appearing and in no acute distress.   HEENT: Atraumatic, normocephalic. PERRL.  MMM.   Chest/Lungs:   Respirations unlabored.  Lungs are clear to auscultation.   Cardiovascular:   Regular rate and rhythm.  S1/S2. No murmur.     Abdomen:  Soft, nontender, nondistended.   Extremities: No cyanosis or clubbing. No edema.    Musculoskeletal: Moves all extremities.  Gait normal.   Skin: Warm, dry, intact.    Neurologic: Mood and affect are appropriate.  Alert and oriented to person, place, time, and situation.          Medications  Allergies   Current Outpatient Medications   Medication Sig Dispense Refill     famotidine (PEPCID) 40 MG tablet Take 1 tablet (40 mg) by mouth nightly as needed for heartburn 90 tablet 3     loratadine (CLARITIN) 10 MG tablet Take 1 tablet (10 mg) by mouth daily 90 tablet 3     magnesium oxide (MAG-OX) 400 MG tablet Take 1 tablet (400 mg) by mouth 2 times daily 180 tablet 3     metoclopramide (REGLAN) 10 MG tablet Take 1 tablet (10 mg) by mouth 3 times daily as needed (nausea) 270 tablet 1     metoprolol succinate ER (TOPROL XL) 25 MG 24 hr tablet Take 0.5 tablets (12.5 mg) by mouth daily. 45 tablet 3     RABEprazole (ACIPHEX) 20 MG EC tablet TAKE 1 TABLET(20 MG) BY MOUTH TWICE DAILY 180 tablet 1     sacubitril-valsartan (ENTRESTO)  MG per tablet Take 1 tablet by mouth 2 times daily 180 tablet 2     solifenacin (VESICARE) 5 MG tablet Take 1 tablet (5 mg) by mouth daily at 2 pm 90 tablet 1     spironolactone (ALDACTONE) 25 MG tablet Take 2 tablets (50 mg) by mouth daily. 180 tablet 3     tirzepatide-Weight Management (ZEPBOUND) 2.5 MG/0.5ML prefilled pen Inject 0.5 mLs (2.5 mg) subcutaneously every 7 days. 2 mL 0     torsemide (DEMADEX) 5 MG tablet Take 1 tablet (5 mg) by mouth daily. 90 tablet 2     Vitamin D3 (CHOLECALCIFEROL) 25 mcg (1000 units) tablet Take 1 tablet (25 mcg) by mouth daily 90 tablet 2    Allergies   Allergen Reactions     Morphine      EMESIS     Nickel      Sulfa Antibiotics Swelling         Lab Results (Personally Reviewed)    Chemistry/lipid CBC Cardiac Enzymes/BNP/TSH/INR   Lab Results   Component Value Date    BUN 16.3 11/26/2024     11/26/2024    CO2 27 11/26/2024     Creatinine    Date Value Ref Range Status   11/26/2024 0.99 (H) 0.51 - 0.95 mg/dL Final   07/11/2021 0.79 0.52 - 1.04 mg/dL Final       Lab Results   Component Value Date    CHOL 185 08/21/2024    HDL 56 08/21/2024     (H) 08/21/2024      Lab Results   Component Value Date    WBC 5.7 11/26/2024    HGB 12.0 11/26/2024    HCT 36.3 11/26/2024    MCV 96 11/26/2024     11/26/2024    Lab Results   Component Value Date    TSH 4.49 (H) 11/09/2024    INR 1.02 11/26/2024        The patient states understanding and is agreeable with the plan.   Abdelrahman Aragon MD [facility]  Cardiology - Electrophysiology      Total time spent on patient visit, reviewing notes, imaging, labs, orders, and completing necessary documentation: 45 minutes.                    Please do not hesitate to contact me if you have any questions/concerns.     Sincerely,     Abdelrahman Aragon MD

## 2024-12-04 NOTE — PATIENT INSTRUCTIONS
Thank you for visiting the Adult Congenital and Cardiovascular Genetics Clinic at the [facility].    Cardiology Providers you saw during your visit:  Abdelrahman Aragon MD    Diagnosis:  Familial cardiomyopathy,    Results:  Abdelrahman Aragon MD reviewed the results of your Right heart catheterization, echocardiogram and zio patch testing today in clinic.    Recommendations for you:    Arrange for dual chamber Implantable Cardioverter-Defibrillator (ICD) implant, [employer]. EP  will reach out to you to help schedule.    You are scheduled for an Implantable Cardioverter-Defibrillator (ICD) implant at [facility]. The hospital is located at [address] [place] [address] [place].  If you need to cancel this procedure, please call [phone].       Visitor Policy: Two visitors.      Date:______  Time: _______________To the [facility] Waiting Room at the [facility]    1. Please review the attached instructions on showering before your procedure at the end of this letter.  2. Your history and physical will be completed by our advanced practice provider when you arrive.  3. Do not eat for 8 hours prior to arrival, you can drink water up until 2 hours prior.  4. Medications to continue:  - Anticoagulant (_none_).  - Take all meds as prescribed, except for those noted below.  5. Medications to hold:    - Morning of: diuretic; Torsemide and sprinolactone   - 1 week prior, if weekly dosing: [ tirzepatide (Mounjaro)].   6. You will likely discharge the same day and need a .    ICD Educational Tool Kit provided today:   ICD Decision Making Tool  https://www.patientdecisionaid.org/wp-content/uploads/2024/08/2024.08.13-ICD-HF-_website.pdf    Post-Procedure Instructions  Wound Care  Keep your incision (surgery wound) dry for 3 days.  After 3 days, you may remove the outer bandage.  Keep the strips of tape on.  They will be removed at your clinic visit.  Check for  signs of infection each day.  These include increased redness, swelling, drainage or a fever over 101 F (38.3 C).  Call us immediately if you see any of   these signs.  If there are no signs of infection, you may shower in 3 days.  Do not submerge the incision (in a bath tub, hot tub, or swimming pool) until fully healed.  Pain  You may have mild to moderate pain for 3 to 5 days.  Take acetaminophen (Tylenol) or ibuprofen (Advil) for the pain.  Call us if the pain is severe or lasts more than 5 days.  Activity  You should slowly go back to your normal activities after 24 hours.  Healing will take 4 to 6 weeks.  No driving for 3 days  Avoid climbing a ladder alone.  It is best to stay within 4 feet of the ground.  Avoid anything that may cause rough contact or a hard hit to your chest.  This includes football, hockey, and other contact sports.  Do not go swimming or boating alone.    FOR ATLEAST 4 WEEKS:  Do not raise your affected arm above your shoulder.  Do not use your affected arm to push, pull, or lift anything over 10 pounds.  Avoid repetitive upper body activities for 6 weeks (ie: golf, swimming, and weight lifting)      Follow Up Appointments Date & Time   7-10 day incision check with device clinic    3 month follow up visit with device check & provider            If you have further questions, please utilize [facility] to contact us. If you do not have MyChart, please contact us as below.    Michael Deleon, RN, BSN, PHN  RN Care Coordinator  [facility]  Phone: [phone]  Fax: [phone]      Nathaly SEGUNDO Procedure   [phone]    Device Clinic (Pacemakers, Defibrillators, Loop Recorders)  [phone]        Showering Before Surgery   Your surgeon has asked you to take 2 showers before surgery.  Why is this important?  It is normal for bacteria (germs) to be on your skin. The skin protects us from these germs. When you have surgery, we cut the skin. Sometimes germs get into the cuts and cause  infection (illness caused by germs). By following the instructions below and using special soap, you will lower the number of germs on your skin. This decreases your chance of infection.  Special soap  Buy or get 8 ounces of antiseptic surgical soap called 4% CHG. Common name brands of this soap are Hibiclens and Exidine.   You can find it at your local pharmacy, clinic or retail store. If you have trouble, ask your pharmacist to help you find the right substitute.   A note about shaving:  Do not shave within 12 inches of your incision (surgical cut) area for at least 3 days before surgery. Shaving can make small cuts in the skin. This puts you at a higher risk of infection.  Items you will need for each shower:   1 newly washed towel   4 ounces of one of the above soaps  Follow these instructions:  The evening before surgery   1. Wash your hair and body with your regular shampoo and soap. Make sure you rinse the shampoo and soap from your hair and body.   2. Using clean hands, apply about 2 ounces of soap gently on your skin from the neck to your toes. Use on your groin area last. Do not use this soap on your face or head. If you get any soap in your eyes, ears or mouth, rinse right away.   3. Repeat step 2. It is very important to let the soap stay on your skin for at least 1 minute.   4. Rinse well and dry off using a clean towel.If you feel any tingling, itching or other irritation, rinse right away. It is normal to feel some coolness on the skin after using the antiseptic soap. Your skin may feel a bit dry after the shower, but do not use any lotions, creams or moisturizers. Do not use hair spray or other products in your hair.  5. Dress in freshly washed clothes or pajamas. Use fresh pillowcases and sheets on your bed.  The morning of surgery  1. Wash your hair and body with your regular shampoo and soap. Make sure you rinse the shampoo and soap from your hair and body.   2. Using clean hands, apply about 2  ounces of soap gently on your skin from the neck to your toes. Use on your groin area last. Do not use this soap on your face or head. If you get any soap in your eyes, ears or mouth, rinse right away.   3. Repeat step 2. It is very important to let the soap stay on your skin for at least 1 minute.   4. Rinse well and dry off using a clean towel.If you feel any tingling, itching or other irritation, rinse right away. It is normal to feel some coolness on the skin after using the antiseptic soap. Your skin may feel a bit dry after the shower, but do not use any lotions, creams or moisturizers. Do not use hair spray or other products in your hair.  5. Dress in clean clothes.  If you have any questions about showering or an allergy to CHG soap, please call the Preadmissions Nursing Department at the hospital where you are having your surgery.  [facility]: [phone]  [facility]: [phone]  [place] [facility]: [phone] or [phone]  [facility]: [phone].  [facility]: [phone]  [place]: [phone]  [facility]): [phone]  [facility] ([facility]): [phone]  This phone number will be answered between the hours of 8:00 a.m. and 6:30 p.m. Monday through Friday.         General Cardiac Recommendations:  Continue to eat a heart healthy, low salt diet.  Continue to get 20-30 minutes of aerobic activity, 4-5 days per week.  Examples of aerobic activity include walking, running, swimming, cycling, etc.  Continue to observe good oral hygiene, with regular dental visits.      SBE prophylaxis:   Yes____  No_x___    Exercise restrictions:   Yes__X__  No____         If yes, list restrictions:  Must be allowed to rest if fatigued or SOB      FASTING CHOLESTEROL was checked in the last 5 years YES__x__  NO____ (2024)  If no, please follow up with your primary care physician. You should have a  "cholesterol screening every 5 years.      Follow-up:  Follow up with EP IRISH after ICD.     If you have questions or concerns please contact us at:    Michael Deleon RN, BSN     Alysa Alvarez (Scheduling)  Nurse Care Coordinator     Clinic   [facility]      Adult Congenital and CV Genetic  [facility]   [facility]  (P) [phone]     (P) [phone]  (F) [phone]     (F) [phone]        For after hours urgent needs, call [phone] and ask to speak to the \"On-Call Cardiologist.\"      For emergencies call 201.    [facility]   Clinics and Surgery Center  Mail Code [id number]  [address] [place]  [address]   "

## 2024-12-04 NOTE — NURSING NOTE
Chief Complaint   Patient presents with    Follow Up     Return genetic heart       Vitals were take, medications reconciled     Christopher England, EMT    11:45 AM

## 2024-12-05 ENCOUNTER — CARE COORDINATION (OUTPATIENT)
Dept: CARDIOLOGY | Facility: CLINIC | Age: 59
End: 2024-12-05

## 2024-12-05 DIAGNOSIS — Z95.810 ICD (IMPLANTABLE CARDIOVERTER-DEFIBRILLATOR) IN PLACE: ICD-10-CM

## 2024-12-05 DIAGNOSIS — R55 SYNCOPE, UNSPECIFIED SYNCOPE TYPE: ICD-10-CM

## 2024-12-05 DIAGNOSIS — R55 NEAR SYNCOPE: Primary | ICD-10-CM

## 2024-12-05 DIAGNOSIS — I42.9 FAMILIAL CARDIOMYOPATHY (H): ICD-10-CM

## 2024-12-05 DIAGNOSIS — I50.32 CHRONIC DIASTOLIC CONGESTIVE HEART FAILURE (H): ICD-10-CM

## 2024-12-05 DIAGNOSIS — I50.22 CHRONIC SYSTOLIC CONGESTIVE HEART FAILURE (H): ICD-10-CM

## 2024-12-05 DIAGNOSIS — I42.0 DILATED CARDIOMYOPATHY (H): ICD-10-CM

## 2024-12-05 LAB — CV ZIO PRELIM RESULTS: NORMAL

## 2024-12-05 RX ORDER — CEFAZOLIN SODIUM 2 G/50ML
2 SOLUTION INTRAVENOUS
OUTPATIENT
Start: 2024-12-05

## 2024-12-05 RX ORDER — SODIUM CHLORIDE 9 MG/ML
INJECTION, SOLUTION INTRAVENOUS CONTINUOUS
Status: CANCELLED | OUTPATIENT
Start: 2024-12-05

## 2024-12-05 RX ORDER — SODIUM CHLORIDE 9 MG/ML
INJECTION, SOLUTION INTRAVENOUS CONTINUOUS
OUTPATIENT
Start: 2024-12-05

## 2024-12-05 RX ORDER — LIDOCAINE 40 MG/G
CREAM TOPICAL
OUTPATIENT
Start: 2024-12-05

## 2024-12-05 RX ORDER — LIDOCAINE 40 MG/G
CREAM TOPICAL
Status: CANCELLED | OUTPATIENT
Start: 2024-12-05

## 2024-12-12 ENCOUNTER — MYC MEDICAL ADVICE (OUTPATIENT)
Dept: ENDOCRINOLOGY | Facility: CLINIC | Age: 59
End: 2024-12-12
Payer: COMMERCIAL

## 2024-12-12 ENCOUNTER — TELEPHONE (OUTPATIENT)
Dept: CARDIOLOGY | Facility: CLINIC | Age: 59
End: 2024-12-12
Payer: COMMERCIAL

## 2024-12-12 DIAGNOSIS — E66.01 SEVERE OBESITY (BMI 35.0-39.9) WITH COMORBIDITY (H): ICD-10-CM

## 2024-12-12 NOTE — TELEPHONE ENCOUNTER
EP Scheduling called the patient to schedule ICD Implant. The number [phone] was left for the patient to return the call and schedule the procedure.    Nathaly Tavera  Periop Electrophysiology   [phone]

## 2024-12-13 ENCOUNTER — MYC MEDICAL ADVICE (OUTPATIENT)
Dept: PHARMACY | Facility: CLINIC | Age: 59
End: 2024-12-13
Payer: COMMERCIAL

## 2024-12-15 ENCOUNTER — MYC REFILL (OUTPATIENT)
Dept: FAMILY MEDICINE | Facility: CLINIC | Age: 59
End: 2024-12-15
Payer: COMMERCIAL

## 2024-12-15 ENCOUNTER — MYC REFILL (OUTPATIENT)
Dept: GASTROENTEROLOGY | Facility: CLINIC | Age: 59
End: 2024-12-15
Payer: COMMERCIAL

## 2024-12-15 DIAGNOSIS — R39.15 URINARY URGENCY: ICD-10-CM

## 2024-12-15 DIAGNOSIS — K21.9 GASTROESOPHAGEAL REFLUX DISEASE WITHOUT ESOPHAGITIS: ICD-10-CM

## 2024-12-15 DIAGNOSIS — R11.0 NAUSEA: ICD-10-CM

## 2024-12-17 RX ORDER — RABEPRAZOLE SODIUM 20 MG/1
20 TABLET, DELAYED RELEASE ORAL 2 TIMES DAILY
Qty: 180 TABLET | Refills: 0 | Status: SHIPPED | OUTPATIENT
Start: 2024-12-17

## 2024-12-17 RX ORDER — SOLIFENACIN SUCCINATE 5 MG/1
5 TABLET, FILM COATED ORAL
Qty: 90 TABLET | Refills: 1 | OUTPATIENT
Start: 2024-12-17

## 2024-12-17 RX ORDER — METOCLOPRAMIDE 10 MG/1
10 TABLET ORAL 3 TIMES DAILY PRN
Qty: 270 TABLET | Refills: 0 | Status: SHIPPED | OUTPATIENT
Start: 2024-12-17

## 2024-12-17 NOTE — TELEPHONE ENCOUNTER
metoclopramide (REGLAN) 10 MG tablet 270 tablet 1 6/11/2024 12/8/2024     Last Office Visit : 6/11/24  Future Office visit:  2/4/25    Refill per protocol

## 2024-12-17 NOTE — TELEPHONE ENCOUNTER
RABEprazole (ACIPHEX) 20 MG EC tablet 180 tablet 1 5/13/2024     Last Office Visit : 6/11/24  Future Office visit:  2/4/25  Refilled per protocol

## 2024-12-17 NOTE — TELEPHONE ENCOUNTER
increase Zepbound 5 mg weekly. CPA with Evonne Doss PA-C.     Sam Spence CNP as one of the credentialed prescriber of the [employer] [facility] Weight Mgmt Program      Signed Prescriptions:                        Disp   Refills    tirzepatide-Weight Management (ZEPBOUND) 5*6 mL   0        Sig: Inject 0.5 mLs (5 mg) subcutaneously every 7 days.  Authorizing Provider: SAM SPENCE  Ordering User: BLOCH, LAUREN T

## 2024-12-25 NOTE — PROGRESS NOTES
"Clinic Care Coordination Contact    Situation: Patient chart reviewed by care coordinator.    Background: Patient enrolled to [facility] 9/19/2019.  Patient working with a  for disability.  Enrolled to [facility] RN for health goals related to medication compliance and daily weights.    Assessment: Spoke with patient today.  She stated she is \"doing well\".  She is going to the [facility] on Monday.  She is weighing herself on a regular basis.  She has a cardiology care coordinator.  She has Lasix 20 mg ordered PRN.  Has not needed to take it as she feels her breathing has improved since starting the entresto.  Denies swelling or edema.    Plan/Recommendations:  Patient to continue to f/u with her cardiology care manager.  No further ccc f/u needed.  Patient agreeable to the plan.          "
Home

## 2025-01-02 ENCOUNTER — TELEPHONE (OUTPATIENT)
Dept: ENDOCRINOLOGY | Facility: CLINIC | Age: 60
End: 2025-01-02
Payer: COMMERCIAL

## 2025-01-02 NOTE — TELEPHONE ENCOUNTER
Weight loss medications will no longer be covered under pt's insurance for the new year and there is no PA/appeal available.

## 2025-01-13 ENCOUNTER — LAB (OUTPATIENT)
Dept: LAB | Facility: CLINIC | Age: 60
End: 2025-01-13
Attending: INTERNAL MEDICINE
Payer: COMMERCIAL

## 2025-01-13 ENCOUNTER — OFFICE VISIT (OUTPATIENT)
Dept: CARDIOLOGY | Facility: CLINIC | Age: 60
End: 2025-01-13
Attending: INTERNAL MEDICINE
Payer: COMMERCIAL

## 2025-01-13 VITALS
WEIGHT: 252 LBS | HEART RATE: 64 BPM | SYSTOLIC BLOOD PRESSURE: 114 MMHG | DIASTOLIC BLOOD PRESSURE: 71 MMHG | OXYGEN SATURATION: 98 % | BODY MASS INDEX: 43.26 KG/M2

## 2025-01-13 DIAGNOSIS — I50.22 CHRONIC SYSTOLIC CONGESTIVE HEART FAILURE (H): Primary | ICD-10-CM

## 2025-01-13 DIAGNOSIS — I50.22 CHRONIC SYSTOLIC CONGESTIVE HEART FAILURE (H): ICD-10-CM

## 2025-01-13 LAB
ALBUMIN SERPL BCG-MCNC: 4.1 G/DL (ref 3.5–5.2)
ALP SERPL-CCNC: 67 U/L (ref 40–150)
ALT SERPL W P-5'-P-CCNC: 25 U/L (ref 0–50)
ANION GAP SERPL CALCULATED.3IONS-SCNC: 9 MMOL/L (ref 7–15)
AST SERPL W P-5'-P-CCNC: 20 U/L (ref 0–45)
BILIRUB SERPL-MCNC: 0.3 MG/DL
BUN SERPL-MCNC: 11.5 MG/DL (ref 8–23)
CALCIUM SERPL-MCNC: 8.8 MG/DL (ref 8.8–10.4)
CHLORIDE SERPL-SCNC: 103 MMOL/L (ref 98–107)
CREAT SERPL-MCNC: 0.91 MG/DL (ref 0.51–0.95)
EGFRCR SERPLBLD CKD-EPI 2021: 72 ML/MIN/1.73M2
GLUCOSE SERPL-MCNC: 86 MG/DL (ref 70–99)
HCO3 SERPL-SCNC: 26 MMOL/L (ref 22–29)
MAGNESIUM SERPL-MCNC: 2 MG/DL (ref 1.7–2.3)
NT-PROBNP SERPL-MCNC: 62 PG/ML (ref 0–900)
POTASSIUM SERPL-SCNC: 4.5 MMOL/L (ref 3.4–5.3)
PROT SERPL-MCNC: 7.5 G/DL (ref 6.4–8.3)
SODIUM SERPL-SCNC: 138 MMOL/L (ref 135–145)

## 2025-01-13 PROCEDURE — 99213 OFFICE O/P EST LOW 20 MIN: CPT | Performed by: INTERNAL MEDICINE

## 2025-01-13 PROCEDURE — 80053 COMPREHEN METABOLIC PANEL: CPT | Performed by: PATHOLOGY

## 2025-01-13 PROCEDURE — G2211 COMPLEX E/M VISIT ADD ON: HCPCS | Performed by: INTERNAL MEDICINE

## 2025-01-13 PROCEDURE — 83735 ASSAY OF MAGNESIUM: CPT | Performed by: PATHOLOGY

## 2025-01-13 PROCEDURE — 83880 ASSAY OF NATRIURETIC PEPTIDE: CPT | Performed by: PATHOLOGY

## 2025-01-13 PROCEDURE — 99215 OFFICE O/P EST HI 40 MIN: CPT | Performed by: INTERNAL MEDICINE

## 2025-01-13 PROCEDURE — 36415 COLL VENOUS BLD VENIPUNCTURE: CPT | Performed by: PATHOLOGY

## 2025-01-13 ASSESSMENT — PAIN SCALES - GENERAL: PAINLEVEL_OUTOF10: NO PAIN (0)

## 2025-01-13 NOTE — NURSING NOTE
Chief Complaint   Patient presents with    Follow Up     RETURN HEART FAILURE - [facility] labs       Vitals were taken and medications reconciled.    Félix Giraldo, EMT  12:44 PM

## 2025-01-13 NOTE — LETTER
2025      RE: Marleen Mcfadden  [address]  [place] [address]       Dear Colleague,    Thank you for the opportunity to participate in the care of your patient, Marleen Mcfadden, at the [facility] [place] at [facility]. Please see a copy of my visit note below.    Cardiovascular Genetics Clinic Progress Note     Name: Marelen Mcfadden  : 1965  MRN: [id number]    2025    Dear Dr. Levine and colleages,    I had the pleasure of seeing Ms. 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 She is accompanied by her fiance.     As you know, she has a family history of dilated cardiomyopathy including her sister, son, and daughter. 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    I last saw her in clinic on 2024.She had a RHC in 2024 which showed RA: 12/9 (8), RV: 37/9, PA: 37/16 (24), PCW: 18/19 (15) mmHg, Estrella CO/CI: 6.7 L/min/ 3.1 L/min/m2, PVR: 1.3 DOZIER, and /63. Her ziopatch monitor showed an average HR of 69, one run of 5 beats of VT and 6 SVTs.    She followed up with Dr. Aragon from  in 2024 and is proceeding with ICD implantation in April. She feels she is needing a slower pace even with 2.5 hr per week but reduced income and wants to try 4 hrs week. She has been having lightheaded episodes a few times a week and her blood pressure has been  128-130/76-78 during these episodes. Her weight is stable and she has not needed any additional diuretics. She continues to use 3 pillows for GERD and does not have PND. She has had some edema in her legs. She has dyspnea when walking in [place]. She  has also had palpitations a few times a week. No presyncope or syncope.  Her appetite is good and her energy levels are reduced. She continues to work towards weight loss.       FAMILY HISTORY: from Ms. Bunch note 2021 and any updates as as above    detailed family history was obtained during today's consult.  Family history was significant for the following cardiac history:  Full sister with DCM. She  in September after developing colon cancer.  Her history was also remarkable for MERSA, kidney disease and ultimate transplant.  She did not have an ICD or much care for her DCM.  Marleen's son and daughter also have DCM.    A maternal half sister has hypertension, epilepsy and reported dizziness and fainting. She does not have a cardiac issue to Marleen's knowledge.  Mother  suddenly at 38 yrs of age.  It was thought to be the result of a cardiac problem. However, she also had a long history of alcoholism and had a colostomy. Her two siblings have no known heart issues.  Maternal grandmother  in her 30's from a brain aneurysm. Nothing is known about maternal grandfather.  Marleen's father  at 55 years after a massive heart attack and CABG procedure.  His death occurred one day after the surgery reportedly from a clot.  One of his sisters  from a stroke.  Paternal grandmother  in her 70's from a stroke.  Her mother (Marleen's great grandmother)  suddenly in her 70's while sitting at a bus stop.Nothing is known about grandfather.  Two paternal half sisters in good health.  There is no additional history of cardiomyopathy, arrhythmias, heart attacks, fainting, sudden cardiac death, genetic conditions, or birth     REVIEW OF SYSTEMS: 10 point ROS neg other than the symptoms noted above in the HPI.    PAST MEDICAL HISTORY:   1. Familial systolic heart failure with VUS in FLNC   2. KATIA  3. Obesity   4. Encephalocele repair   ALLERGIES:    Allergies   Allergen Reactions      Morphine      EMESIS     Nickel      Sulfa Antibiotics Swelling       MEDICATIONS:   Current Outpatient Medications   Medication Sig Dispense Refill     famotidine (PEPCID) 40 MG tablet Take 1 tablet (40 mg) by mouth nightly as needed for heartburn 90 tablet 3     loratadine (CLARITIN) 10 MG tablet Take 1 tablet (10 mg) by mouth daily 90 tablet 3     magnesium oxide (MAG-OX) 400 MG tablet Take 1 tablet (400 mg) by mouth 2 times daily 180 tablet 3     metoclopramide (REGLAN) 10 MG tablet Take 1 tablet (10 mg) by mouth 3 times daily as needed (nausea). For more refills,PLEASE KEEP scheduled appointment on 2/4/25 270 tablet 0     metoprolol succinate ER (TOPROL XL) 25 MG 24 hr tablet Take 0.5 tablets (12.5 mg) by mouth daily. 45 tablet 3     RABEprazole (ACIPHEX) 20 MG EC tablet Take 1 tablet (20 mg) by mouth 2 times daily. 180 tablet 0     sacubitril-valsartan (ENTRESTO)  MG per tablet Take 1 tablet by mouth 2 times daily 180 tablet 2     solifenacin (VESICARE) 5 MG tablet Take 1 tablet (5 mg) by mouth daily at 2 pm. 90 tablet 1     spironolactone (ALDACTONE) 25 MG tablet Take 2 tablets (50 mg) by mouth daily. 180 tablet 3     tirzepatide-Weight Management (ZEPBOUND) 5 MG/0.5ML prefilled pen Inject 0.5 mLs (5 mg) subcutaneously every 7 days. 6 mL 0     torsemide (DEMADEX) 5 MG tablet Take 1 tablet (5 mg) by mouth daily. 90 tablet 2     Vitamin D3 (CHOLECALCIFEROL) 25 mcg (1000 units) tablet Take 1 tablet (25 mcg) by mouth daily. 90 tablet 2     No current facility-administered medications for this visit.     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     PHYSICAL EXAM:   /71 (BP Location: Right arm, Patient Position: Chair, Cuff Size: Adult Large)   Pulse 64   Wt 114.3 kg  (252 lb)   LMP 10/19/2008 (Approximate)   SpO2 98%   BMI 43.26 kg/m    General: comfortable, conversant, NAD  HEENT: normocephalic, atraumatic, anicteric  Neck: Estimate JVP 7   CV: RRR, nl s1 and s2, no murmurs, gallops, or rubs   Lungs: CTAB, no crackles or wheezes, normal work of breathing  Abdomen: BS+, soft, non tender, non distended  Extremities: warm and well perfused, trace edema      DIAGNOSTIC TESTING: personally reviewed     Latest Reference Range & Units 01/13/25 12:29   Sodium 135 - 145 mmol/L 138   Potassium 3.4 - 5.3 mmol/L 4.5   Chloride 98 - 107 mmol/L 103   Carbon Dioxide (CO2) 22 - 29 mmol/L 26   Urea Nitrogen 8.0 - 23.0 mg/dL 11.5   Creatinine 0.51 - 0.95 mg/dL 0.91   GFR Estimate >60 mL/min/1.73m2 72   Calcium 8.8 - 10.4 mg/dL 8.8   Anion Gap 7 - 15 mmol/L 9   Magnesium 1.7 - 2.3 mg/dL 2.0   Albumin 3.5 - 5.2 g/dL 4.1   Protein Total 6.4 - 8.3 g/dL 7.5   Alkaline Phosphatase 40 - 150 U/L 67   ALT 0 - 50 U/L 25   AST 0 - 45 U/L 20     Echo 11/2024: Severe left ventricular dilation is present. Left ventricular function is  decreased. The ejection fraction is 30-35% (moderately reduced). Moderate  diffuse hypokinesis is present. Abnormal septal motion from conduction delay  present.  Global right ventricular function is normal.  The inferior vena cava was normal in size with preserved respiratory  variability.  No pericardial effusion is present    [facility] 11/2024: Baseline hemodynamics on room air: BP: 131/63 (90) mmHg, HR 53 bpm, SpO2 95%.   Pressures: RA: 12/9 (8), RV: 37/9, PA: 37/16 (24), PCW: 18/19 (15) mmHg.  Saturations: PA sat: 63%.  Calculations :Estrella CO/CI: 6.7 L/min/ 3.1 L/min/m2, PVR: 1.3 DOZIER.       Zio 12/2024: Patient had a min HR of 43 bpm, max HR of 156 bpm, and avg HR of 69 bpm. Predominant underlying rhythm was Sinus Rhythm. 1 run of Ventricular Tachycardia occurred lasting 5 beats with a max rate of 146 bpm (avg 133 bpm). 6 Supraventricular Tachycardia runs occurred, the run with  the fastest interval lasting 7 beats with a max rate of 156 bpm, the longest lasting 8 beats with an avg rate of 111 bpm. Isolated SVEs were rare (<1.0%), SVE Couplets were rare (<1.0%), and SVE Triplets were rare (<1.0%). Isolated VEs were rare (<1.0%, 5922), VE Couplets were rare (<1.0%, 271), and VE Triplets were rare (<1.0%, 45). Ventricular Bigeminy and Trigeminy were present.       CMR 6/12/2023:   1. The LV is normal in cavity size and wall thickness. The global systolic function is moderately reduced.  The LVEF is 32%. There is global hypokinesis.     2. The RV is normal in cavity size. The global systolic function is mildly reduced. The RVEF is 45%.      3. Both atria are normal in size.     4. There is no significant valvular disease.      5. Late gadolinium enhancement imaging shows no MI, fibrosis or infiltrative disease.      6. There is no pericardial effusion or thickening.     7. There is no intracardiac thrombus.     CONCLUSIONS: Severe, nonischemic cardiomyopathy, today the LV is no longer dilated. LVEF 32% not  significantly changed. RV function is mildly globally reduced, RVEF 45%, slightly decreased from previous  53%. No LGE.    Ziopatch monitor 2/24-3/3/2023: sinus with 9.4% PVC burden    Echo 12/12/2022: Severe left ventricular dilation is present.LVIDd 70mm.  Biplane LVEF is 50%.  Right ventricular function, chamber size, wall motion, and thickness are  normal.  Both atria appear normal.  Pulmonary artery systolic pressure is normal.  The inferior vena cava is normal.  No pericardial effusion is present.  The left ventricular function has improved.    CMR 6/3/22: 1. The LV is mild-to-moderately dilated in cavity size. The wall thickness is normal. The global systolic  function is severely decreased. The LVEF is 33%. There is severe global hypokinesis.     2. The RV is normal in cavity size. The global systolic function is mildly decreased. The RVEF is 53%.      3. Both atria are normal in  size.     4. There is no significant valvular disease.      5. Late gadolinium enhancement imaging shows no MI, fibrosis or infiltrative disease.      6. There is no pericardial effusion or thickening.     7. There is no intracardiac thrombus.     CONCLUSIONS: Severe, non-ischemic dilated cardiomyopathy, most likely of genetic cause. LVEF of 33% and  RVEF of 53% with no LGE. When directly compared to the prior CMR, the LV and RV size and function have not  significantly changed.      CPEX 12/12/22: Key Measurements    Peak VO2 (ml/kg/min) VE/VCO2  [place] RER   14.91        27.61        1.02            Predicted VO2 (ml/kg/min) Predicted VO2 %   26.50        56            Resting Supine BP (mmHg) Resting Standing BP (mmHg) Final Stress BP (mmHg)   106/56        112/58        140/67          Resting Supine HR (bpm) Resting Standing HR (bpm)    44        50             Max HR (bpm) Max Predicted HR (bpm) Max Perdicted HR %   124        162        77            Exercise time (min) Exercise time (sec) Estimated workload (METS)   8        51        4.3              [facility] 1/2023:    Time Systolic (mmHg) Diastolic (mmHg) Mean (mmHg) A Wave (mmHg) V Wave (mmHg) EDP (mmHg) Max dp/dt (mmHg/sec) HR (bpm) Content (mL/dL) SAT (%)   RA Pressures  1:56 PM   6    10    8      38        RV Pressures  1:56 PM 40        12     37        PA Pressures  1:57 PM 41    4    22        39        PCW Pressures  1:57 PM   11    22    20      39        AO Pressures  2:01     63    91        37          Blood Flow Results Phase: Baseline     Time Results  Indexed Values (L/min/m2)   QP  1:45 PM 2.71 L/min    1.31      QS  1:45 PM 2.71 L/min    1.31        Blood Oximetry Phase: Baseline     Time Hb  SAT(%)  PO2  Content (mL/dL) PA Sat (%)   PA  1:45 PM  62.7 %      62.7      Art  1:45 PM  100 %     17.14         Cardiac Output Phase: Baseline     Time TDCO (L/min) TDCI (L/min/m2) Estrella C.O. (L/min) Estrella C.I. 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   2.  Premature ventricular contractions, palpitations  3. Fatigue   4. VT    She has had progressive exercise intolerance with now having increased dyspnea with walking and more fatigue compared to the last time we saw her despite reduced hours. Her RHC shows largely normal filling pressures and index at rest after decreasing her beta blocker due to bradycardia. I would like to repeat a CPEX to reassess her functional capacity. She is euvolemic and well compensated on exam. Her end organ function is within normal limits. Her NT pro BNP Is normal in the setting of a elevated BMI. Continue her present dose of torsemide,  sacubitril valsartan, metoprolol,  and spironolactone. She was on dapagliflozin but developed yeast infections and stopped it.     Her echo shows an LVEF of 30-35% with LVEDD of 6.8cm. She has also had a run of VT on zio. She meets standard ICD criteria based on her ejection fraction less than 35% despite neurohormonal therapies for over a year and more recent guidelines for FLNC with an EF of less than 45%. She is moving forward with ICD implantation with Dr. Aragon.     She understands that if she has a syncopal event it is a medical emergency and she should  seek immediate medical attention.     5. Vomiting  6. Heart Burn  Her GI symptoms have improved and she has been seeing GI.      7. Hypomagnesemia: Improved 2.0 today. Continue magnesium oxide for replacement.      PLAN:   -FMLA paperwork   -Work Letter   - CPEX  -ICD implantation   -follow up in 2-3months with [facility]  -follow up with me in 6 months    65 minutes on DOS for chart review, counseling, review of diagnostic testing, coordination of care and, FLMA paperwork and letter, and  documentation.     Thank you for allowing me to participate in the care of your patient. Please do not hesitate to contact me if you have any questions.     Sincerely,   Real Seaman MD, PhD, [facility]  Advanced Heart Failure/Transplantation/MCS  [facility]    The longitudinal plan of care for the diagnosis(es)/condition(s) as documented were addressed during this visit. Due to the added complexity in care, I will continue to support Marleen in the subsequent management and with ongoing continuity of care.              Please do not hesitate to contact me if you have any questions/concerns.     Sincerely,     Real Semaan MD

## 2025-01-13 NOTE — LETTER
2025    Marleen Mcfadden   1965        To Whom it May Concern;    Marleen Mcfadden in under my care in the cardiology clinic at the [facility]. She will need to perform a maximum of 4 hours of walking per day and work shifts should be a maximum of 4 hours. She will need to have the option to stop if she is symptomatic. Marleen will need to have  off of work for doctor's appointments.     Please contact me if there are any questions or concerns.       Sincerely,        Real Seaman MD

## 2025-01-13 NOTE — PROGRESS NOTES
Cardiovascular Genetics Clinic Progress Note     Name: Marleen Mcfadden  : 1965  MRN: [id number]    2025    Dear Dr. Levine and colleages,    I had the pleasure of seeing Ms. 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 She is accompanied by her fiance.     As you know, she has a family history of dilated cardiomyopathy including her sister, son, and daughter. 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    I last saw her in clinic on 2024.She had a RHC in 2024 which showed RA: 12/9 (8), RV: 37/9, PA: 37/16 (24), PCW: 18/19 (15) mmHg, Estrella CO/CI: 6.7 L/min/ 3.1 L/min/m2, PVR: 1.3 DOZIER, and /63. Her ziopatch monitor showed an average HR of 69, one run of 5 beats of VT and 6 SVTs.    She followed up with Dr. Aragon from  in 2024 and is proceeding with ICD implantation in April. She feels she is needing a slower pace even with 2.5 hr per week but reduced income and wants to try 4 hrs week. She has been having lightheaded episodes a few times a week and her blood pressure has been  128-130/76-78 during these episodes. Her weight is stable and she has not needed any additional diuretics. She continues to use 3 pillows for GERD and does not have PND. She has had some edema in her legs. She has dyspnea when walking in the tunnels in the [employer]. She has also had palpitations a few times a week. No presyncope or syncope.  Her appetite is good and her energy levels are reduced. She continues to work towards weight loss.       FAMILY HISTORY: from Ms. Bunch note 2021 and any updates as as above    detailed family history was obtained during today's consult.  Family history was significant for  the following cardiac history:  Full sister with DCM. She  in September after developing colon cancer.  Her history was also remarkable for MERSA, kidney disease and ultimate transplant.  She did not have an ICD or much care for her DCM.  Marleen's son and daughter also have DCM.    A maternal half sister has hypertension, epilepsy and reported dizziness and fainting. She does not have a cardiac issue to Marleen's knowledge.  Mother  suddenly at 38 yrs of age.  It was thought to be the result of a cardiac problem. However, she also had a long history of alcoholism and had a colostomy. Her two siblings have no known heart issues.  Maternal grandmother  in her 30's from a brain aneurysm. Nothing is known about maternal grandfather.  Marleen's father  at 55 years after a massive heart attack and CABG procedure.  His death occurred one day after the surgery reportedly from a clot.  One of his sisters  from a stroke.  Paternal grandmother  in her 70's from a stroke.  Her mother (Marleen's great grandmother)  suddenly in her 70's while sitting at a bus stop.Nothing is known about grandfather.  Two paternal half sisters in good health.  There is no additional history of cardiomyopathy, arrhythmias, heart attacks, fainting, sudden cardiac death, genetic conditions, or birth     REVIEW OF SYSTEMS: 10 point ROS neg other than the symptoms noted above in the HPI.    PAST MEDICAL HISTORY:   1. Familial systolic heart failure with VUS in [facility]   2. KATIA  3. Obesity   4. Encephalocele repair   ALLERGIES:    Allergies   Allergen Reactions    Morphine      EMESIS    Nickel     Sulfa Antibiotics Swelling       MEDICATIONS:   Current Outpatient Medications   Medication Sig Dispense Refill    famotidine (PEPCID) 40 MG tablet Take 1 tablet (40 mg) by mouth nightly as needed for heartburn 90 tablet 3    loratadine (CLARITIN) 10 MG tablet Take 1 tablet (10 mg) by mouth daily 90 tablet 3    magnesium  oxide (MAG-OX) 400 MG tablet Take 1 tablet (400 mg) by mouth 2 times daily 180 tablet 3    metoclopramide (REGLAN) 10 MG tablet Take 1 tablet (10 mg) by mouth 3 times daily as needed (nausea). For more refills,PLEASE KEEP scheduled appointment on 2/4/25 270 tablet 0    metoprolol succinate ER (TOPROL XL) 25 MG 24 hr tablet Take 0.5 tablets (12.5 mg) by mouth daily. 45 tablet 3    RABEprazole (ACIPHEX) 20 MG EC tablet Take 1 tablet (20 mg) by mouth 2 times daily. 180 tablet 0    sacubitril-valsartan (ENTRESTO)  MG per tablet Take 1 tablet by mouth 2 times daily 180 tablet 2    solifenacin (VESICARE) 5 MG tablet Take 1 tablet (5 mg) by mouth daily at 2 pm. 90 tablet 1    spironolactone (ALDACTONE) 25 MG tablet Take 2 tablets (50 mg) by mouth daily. 180 tablet 3    tirzepatide-Weight Management (ZEPBOUND) 5 MG/0.5ML prefilled pen Inject 0.5 mLs (5 mg) subcutaneously every 7 days. 6 mL 0    torsemide (DEMADEX) 5 MG tablet Take 1 tablet (5 mg) by mouth daily. 90 tablet 2    Vitamin D3 (CHOLECALCIFEROL) 25 mcg (1000 units) tablet Take 1 tablet (25 mcg) by mouth daily. 90 tablet 2     No current facility-administered medications for this visit.     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     PHYSICAL EXAM:   /71 (BP Location: Right arm, Patient Position: Chair, Cuff Size: Adult Large)   Pulse 64   Wt 114.3 kg (252 lb)   LMP 10/19/2008 (Approximate)   SpO2 98%   BMI 43.26 kg/m    General: comfortable, conversant, NAD  HEENT: normocephalic, atraumatic, anicteric  Neck: Estimate JVP 7   CV: RRR, nl s1 and s2, no murmurs, gallops, or rubs   Lungs: CTAB, no crackles or wheezes, normal work of breathing  Abdomen: BS+, soft, non tender, non distended  Extremities: warm and well  perfused, trace edema      DIAGNOSTIC TESTING: personally reviewed     Latest Reference Range & Units 01/13/25 12:29   Sodium 135 - 145 mmol/L 138   Potassium 3.4 - 5.3 mmol/L 4.5   Chloride 98 - 107 mmol/L 103   Carbon Dioxide (CO2) 22 - 29 mmol/L 26   Urea Nitrogen 8.0 - 23.0 mg/dL 11.5   Creatinine 0.51 - 0.95 mg/dL 0.91   GFR Estimate >60 mL/min/1.73m2 72   Calcium 8.8 - 10.4 mg/dL 8.8   Anion Gap 7 - 15 mmol/L 9   Magnesium 1.7 - 2.3 mg/dL 2.0   Albumin 3.5 - 5.2 g/dL 4.1   Protein Total 6.4 - 8.3 g/dL 7.5   Alkaline Phosphatase 40 - 150 U/L 67   ALT 0 - 50 U/L 25   AST 0 - 45 U/L 20     Echo 11/2024: Severe left ventricular dilation is present. Left ventricular function is  decreased. The ejection fraction is 30-35% (moderately reduced). Moderate  diffuse hypokinesis is present. Abnormal septal motion from conduction delay  present.  Global right ventricular function is normal.  The inferior vena cava was normal in size with preserved respiratory  variability.  No pericardial effusion is present    RH 11/2024: Baseline hemodynamics on room air: BP: 131/63 (90) mmHg, HR 53 bpm, SpO2 95%.   Pressures: RA: 12/9 (8), RV: 37/9, PA: 37/16 (24), PCW: 18/19 (15) mmHg.  Saturations: PA sat: 63%.  Calculations :Estrella CO/CI: 6.7 L/min/ 3.1 L/min/m2, PVR: 1.3 DOZIER.       Zio 12/2024: Patient had a min HR of 43 bpm, max HR of 156 bpm, and avg HR of 69 bpm. Predominant underlying rhythm was Sinus Rhythm. 1 run of Ventricular Tachycardia occurred lasting 5 beats with a max rate of 146 bpm (avg 133 bpm). 6 Supraventricular Tachycardia runs occurred, the run with the fastest interval lasting 7 beats with a max rate of 156 bpm, the longest lasting 8 beats with an avg rate of 111 bpm. Isolated SVEs were rare (<1.0%), SVE Couplets were rare (<1.0%), and SVE Triplets were rare (<1.0%). Isolated VEs were rare (<1.0%, 5922), VE Couplets were rare (<1.0%, 271), and VE Triplets were rare (<1.0%, 45). Ventricular Bigeminy and Trigeminy  were present.       CMR 6/12/2023:   1. The LV is normal in cavity size and wall thickness. The global systolic function is moderately reduced.  The LVEF is 32%. There is global hypokinesis.     2. The RV is normal in cavity size. The global systolic function is mildly reduced. The RVEF is 45%.      3. Both atria are normal in size.     4. There is no significant valvular disease.      5. Late gadolinium enhancement imaging shows no MI, fibrosis or infiltrative disease.      6. There is no pericardial effusion or thickening.     7. There is no intracardiac thrombus.     CONCLUSIONS: Severe, nonischemic cardiomyopathy, today the LV is no longer dilated. LVEF 32% not  significantly changed. RV function is mildly globally reduced, RVEF 45%, slightly decreased from previous  53%. No LGE.    Ziopatch monitor 2/24-3/3/2023: sinus with 9.4% PVC burden    Echo 12/12/2022: Severe left ventricular dilation is present.LVIDd 70mm.  Biplane LVEF is 50%.  Right ventricular function, chamber size, wall motion, and thickness are  normal.  Both atria appear normal.  Pulmonary artery systolic pressure is normal.  The inferior vena cava is normal.  No pericardial effusion is present.  The left ventricular function has improved.    CMR 6/3/22: 1. The LV is mild-to-moderately dilated in cavity size. The wall thickness is normal. The global systolic  function is severely decreased. The LVEF is 33%. There is severe global hypokinesis.     2. The RV is normal in cavity size. The global systolic function is mildly decreased. The RVEF is 53%.      3. Both atria are normal in size.     4. There is no significant valvular disease.      5. Late gadolinium enhancement imaging shows no MI, fibrosis or infiltrative disease.      6. There is no pericardial effusion or thickening.     7. There is no intracardiac thrombus.     CONCLUSIONS: Severe, non-ischemic dilated cardiomyopathy, most likely of genetic cause. LVEF of 33% and  RVEF of 53% with no  LGE. When directly compared to the prior CMR, the LV and RV size and function have not  significantly changed.      CPEX 12/12/22: Key Measurements    Peak VO2 (ml/kg/min) VE/VCO2  [place] RER   14.91        27.61        1.02            Predicted VO2 (ml/kg/min) Predicted VO2 %   26.50        56            Resting Supine BP (mmHg) Resting Standing BP (mmHg) Final Stress BP (mmHg)   106/56        112/58        140/67          Resting Supine HR (bpm) Resting Standing HR (bpm)    44        50             Max HR (bpm) Max Predicted HR (bpm) Max Perdicted HR %   124        162        77            Exercise time (min) Exercise time (sec) Estimated workload (METS)   8        51        4.3              [facility] 1/2023:    Time Systolic (mmHg) Diastolic (mmHg) Mean (mmHg) A Wave (mmHg) V Wave (mmHg) EDP (mmHg) Max dp/dt (mmHg/sec) HR (bpm) Content (mL/dL) SAT (%)   RA Pressures  1:56 PM   6    10    8      38        RV Pressures  1:56 PM 40        12     37        PA Pressures  1:57 PM 41    4    22        39        PCW Pressures  1:57 PM   11    22    20      39        AO Pressures  2:01     63    91        37          Blood Flow Results Phase: Baseline     Time Results  Indexed Values (L/min/m2)   QP  1:45 PM 2.71 L/min    1.31      QS  1:45 PM 2.71 L/min    1.31        Blood Oximetry Phase: Baseline     Time Hb  SAT(%)  PO2  Content (mL/dL) PA Sat (%)   PA  1:45 PM  62.7 %      62.7      Art  1:45 PM  100 %     17.14         Cardiac Output Phase: Baseline     Time TDCO (L/min) TDCI (L/min/m2) Estrella C.O. (L/min) Estrella C.I. 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   2.  Premature ventricular contractions, palpitations  3. Fatigue   4. VT    She has had progressive exercise intolerance with now having increased dyspnea with walking and more fatigue compared to the last time we saw her despite reduced hours. Her RHC shows largely normal filling pressures and index at rest after decreasing her beta blocker due to bradycardia. I would like to repeat a CPEX to reassess her functional capacity. She is euvolemic and well compensated on exam. Her end organ function is within normal limits. Her NT pro BNP Is normal in the setting of a elevated BMI. Continue her present dose of torsemide,  sacubitril valsartan, metoprolol,  and spironolactone. She was on dapagliflozin but developed yeast infections and stopped it.     Her echo shows an LVEF of 30-35% with LVEDD of 6.8cm. She has also had a run of VT on zio. She meets standard ICD criteria based on her ejection fraction less than 35% despite neurohormonal therapies for over a year and more recent guidelines for FLNC with an EF of less than 45%. She is moving forward with ICD implantation with Dr. Aragon.     She understands that if she has a syncopal event it is a medical emergency and she should seek immediate medical attention.     5. Vomiting  6. Heart Burn  Her GI symptoms have improved and she has been seeing GI.      7. Hypomagnesemia: Improved 2.0 today. Continue magnesium oxide for replacement.      PLAN:   -[facility] paperwork   -Work Letter   - CPEX  -ICD implantation   -follow up in 2-3months with [facility]  -follow up with me in 6 months    65 minutes on DOS for  chart review, counseling, review of diagnostic testing, coordination of care and, FLMA paperwork and letter, and  documentation.     Thank you for allowing me to participate in the care of your patient. Please do not hesitate to contact me if you have any questions.     Sincerely,   [place]     Real Seaman MD, PhD, [facility]  Advanced Heart Failure/Transplantation/MCS  [facility]    The longitudinal plan of care for the diagnosis(es)/condition(s) as documented were addressed during this visit. Due to the added complexity in care, I will continue to support Marleen in the subsequent management and with ongoing continuity of care.

## 2025-01-13 NOTE — LETTER
2025       RE: Marleen Mcfadden  [address]  [place] [address]     Dear Colleague,    Thank you for referring your patient, Marleen Mcfadden, to the [facility] at [facility]. Please see a copy of my visit note below.    Cardiovascular Genetics Clinic Progress Note     Name: Marleen Mcfadden  : 1965  MRN: [id number]    2025    Dear Dr. Levine and colleages,    I had the pleasure of seeing Ms. 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 She is accompanied by her fiance.     As you know, she has a family history of dilated cardiomyopathy including her sister, son, and daughter. 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    I last saw her in clinic on 2024.She had a RHC in 2024 which showed RA: 12/9 (8), RV: 37/9, PA: 37/16 (24), PCW: 18/19 (15) mmHg, Estrella CO/CI: 6.7 L/min/ 3.1 L/min/m2, PVR: 1.3 DOZIER, and /63. She followed up with Dr. Aragon from  in 2024 and is proceeding with ICD implantation in April.       Still having lightheaded episodes, a few times a week and 128-130/76-78      She feels she is needing a slower pace even with 2.5 hr per week but reduced income and wants to try 4 hrs week    Weight stable and no need for extra diuretics  3 pillopws for sleep stable  No PND  Still having reflux  Edema in legs has not done   More dyspnea when walking in tunnels in the hospital  Yes palpitations a few times a week  Appetite is good  Energy levels reduced       Ziopatch: Patient had a min HR of 43 bpm, max HR of 156 bpm, and avg HR of 69 bpm. Predominant underlying rhythm was Sinus Rhythm. 1 run  of Ventricular Tachycardia occurred lasting 5 beats with a max rate of 146 bpm (avg 133 bpm). 6 Supraventricular Tachycardia runs occurred, the run with the fastest interval lasting 7 beats with a max rate of 156 bpm, the longest lasting 8 beats with an avg rate of 111 bpm. Isolated SVEs were rare (<1.0%), SVE Couplets were rare (<1.0%), and SVE Triplets were rare (<1.0%). Isolated VEs were rare (<1.0%, 5922), VE Couplets were rare (<1.0%, 271), and VE Triplets were rare (<1.0%, 45). Ventricular Bigeminy and Trigeminy were present.      No significant sustained arrhythmias recorded.  Symptomatic episodes corresponded mostly sinus rhythm with or without PVCs.  Low overall PVC burden less than 1%        Her CMR showed an LVEF of 32%, LVEDD 6.1cm, RVEF of 45%, and no fibrosis. Additionally, she had a ziopatch monitor which we reviewed the results in which she had 9.4% PVCs and predominantly sinus rhythm. She saw Ms. Melendez in the interim on 9/11/23 and with Dr. Aragon in EP on 10/10/23 where a primary prevention ICD. She was seen in the ED on 10/18/23 for a syncopal episode and was seen by EP, which felt the episode was orthostatic and recommended an outpatient ICD. She has been following with GI and has had an improvement in her symptoms. She was seen in [facility] 4/4 by Ms. Gallo and was hypervolemic and increased lasix to 60mg for 4 days. She did feel an improvement after the increase dose of diuretics. She still continues to have an increase in abdominal bloating and ankle edema, though it is better today. She continues to work full time and walks 14K. She now finds her self exhausted at the end of the day and falls asleep sitting up. She has more dyspnea with going up an incline now. She has been having some panic attacks and also has an achy feeling in her chest, which she attribute to heart burn. Her GI symptoms were improving and improved more after the was treated for COVID. Her weight increase 30 lbs and she is  working with the weight management clinic. She uses lasix 20mg prn 1-2 times per week. She drinks more than 2L of fluid.  She uses 3 pillows for heart burn but denies orthopnea or PND. She is noted to snore. Her appetite is unchanged, but she is more fatigued. She has dizzininess that is worse with laughing or coughing and palpitations 3 times per week, but no syncope. She is waiting until her GI symptoms resolve to have an ICD placed. She has been having body cramps.       Since her last visit, she reports increasing fatigue. Her work  hours were reduced form 60 to 30 and she can barely complete these. She still reports cramping with diuretic which she does not use on days when she is at work. Taking 5 days a week but her weight has been stable and she does not have any edema. She has been having episodes of dizziness and light-headedness. She does not check her blood pressure at home. She had to come to the ER 2 days ago for a near syncopal episode which happened at work. She felt a prodrome before - intense warmth and then felt the onset of her dizziness and light headedness but this episode was accompanied by her knees buckling. In the ER she was hypotensive after receiving nitroglycerin but her blood pressure recovered after a liter bolus. She does not report palpitations. She has also made her mind that she will have the ICD done and will arrange follow up with EP. She is on semaglutide for weight loss but needs to find an alternative as her insurance will not cover this after January.     Her echocardiogram shows her LVEF to still be 30-35%. A Ziopatch was mailed out to her.     FAMILY HISTORY: from Ms. Bunch note 2021 and any updates as as above    detailed family history was obtained during today's consult.  Family history was significant for the following cardiac history:  Full sister with DCM. She  in September after developing colon cancer.  Her history was also remarkable for MERSA, kidney  disease and ultimate transplant.  She did not have an ICD or much care for her DCM.  Marleen's son and daughter also have DCM.    A maternal half sister has hypertension, epilepsy and reported dizziness and fainting. She does not have a cardiac issue to Marleen's knowledge.  Mother  suddenly at 38 yrs of age.  It was thought to be the result of a cardiac problem. However, she also had a long history of alcoholism and had a colostomy. Her two siblings have no known heart issues.  Maternal grandmother  in her 30's from a brain aneurysm. Nothing is known about maternal grandfather.  Marleen's father  at 55 years after a massive heart attack and CABG procedure.  His death occurred one day after the surgery reportedly from a clot.  One of his sisters  from a stroke.  Paternal grandmother  in her 70's from a stroke.  Her mother (Marleen's great grandmother)  suddenly in her 70's while sitting at a bus stop.Nothing is known about grandfather.  Two paternal half sisters in good health.  There is no additional history of cardiomyopathy, arrhythmias, heart attacks, fainting, sudden cardiac death, genetic conditions, or birth     REVIEW OF SYSTEMS: 10 point ROS neg other than the symptoms noted above in the HPI.    PAST MEDICAL HISTORY:   1. Familial systolic heart failure with VUS in [facility]   2. KATIA  3. Obesity   4. Encephalocele repair   ALLERGIES:    Allergies   Allergen Reactions    Morphine      EMESIS    Nickel     Sulfa Antibiotics Swelling       MEDICATIONS:   Current Outpatient Medications   Medication Sig Dispense Refill    famotidine (PEPCID) 40 MG tablet Take 1 tablet (40 mg) by mouth nightly as needed for heartburn 90 tablet 3    loratadine (CLARITIN) 10 MG tablet Take 1 tablet (10 mg) by mouth daily 90 tablet 3    magnesium oxide (MAG-OX) 400 MG tablet Take 1 tablet (400 mg) by mouth 2 times daily 180 tablet 3    metoclopramide (REGLAN) 10 MG tablet Take 1 tablet (10 mg) by mouth 3  times daily as needed (nausea). For more refills,PLEASE KEEP scheduled appointment on 2/4/25 270 tablet 0    metoprolol succinate ER (TOPROL XL) 25 MG 24 hr tablet Take 0.5 tablets (12.5 mg) by mouth daily. 45 tablet 3    RABEprazole (ACIPHEX) 20 MG EC tablet Take 1 tablet (20 mg) by mouth 2 times daily. 180 tablet 0    sacubitril-valsartan (ENTRESTO)  MG per tablet Take 1 tablet by mouth 2 times daily 180 tablet 2    solifenacin (VESICARE) 5 MG tablet Take 1 tablet (5 mg) by mouth daily at 2 pm. 90 tablet 1    spironolactone (ALDACTONE) 25 MG tablet Take 2 tablets (50 mg) by mouth daily. 180 tablet 3    tirzepatide-Weight Management (ZEPBOUND) 5 MG/0.5ML prefilled pen Inject 0.5 mLs (5 mg) subcutaneously every 7 days. 6 mL 0    torsemide (DEMADEX) 5 MG tablet Take 1 tablet (5 mg) by mouth daily. 90 tablet 2    Vitamin D3 (CHOLECALCIFEROL) 25 mcg (1000 units) tablet Take 1 tablet (25 mcg) by mouth daily. 90 tablet 2     No current facility-administered medications for this visit.     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     PHYSICAL EXAM:   /71 (BP Location: Right arm, Patient Position: Chair, Cuff Size: Adult Large)   Pulse 64   Wt 114.3 kg (252 lb)   LMP 10/19/2008 (Approximate)   SpO2 98%   BMI 43.26 kg/m    General: comfortable, conversant, NAD  HEENT: normocephalic, atraumatic, anicteric  Neck: Estimate JVP 8   CV: RRR, nl s1 and s2, no murmurs, gallops, or rubs   Lungs: CTAB, no crackles or wheezes, normal work of breathing  Abdomen: BS+, soft, non tender, non distended  Extremities: warm and well perfused, trace edema      DIAGNOSTIC TESTING: personally reviewed     Latest Reference Range & Units 01/13/25 12:29   Sodium 135 - 145 mmol/L 138   Potassium  3.4 - 5.3 mmol/L 4.5   Chloride 98 - 107 mmol/L 103   Carbon Dioxide (CO2) 22 - 29 mmol/L 26   Urea Nitrogen 8.0 - 23.0 mg/dL 11.5   Creatinine 0.51 - 0.95 mg/dL 0.91   GFR Estimate >60 mL/min/1.73m2 72   Calcium 8.8 - 10.4 mg/dL 8.8   Anion Gap 7 - 15 mmol/L 9   Magnesium 1.7 - 2.3 mg/dL 2.0   Albumin 3.5 - 5.2 g/dL 4.1   Protein Total 6.4 - 8.3 g/dL 7.5   Alkaline Phosphatase 40 - 150 U/L 67   ALT 0 - 50 U/L 25   AST 0 - 45 U/L 20       Echo 11/11/24: Interpretation Summary  Severe left ventricular dilation is present. Left ventricular function is  decreased. The ejection fraction is 30-35% (moderately reduced). Moderate  diffuse hypokinesis is present. Abnormal septal motion from conduction delay  present.  Global right ventricular function is normal.  The inferior vena cava was normal in size with preserved respiratory  variability.  No pericardial effusion is present.  ______________________________________________________________________________  Left Ventricle  Severe left ventricular dilation is present. LVIDd = 6.8 cm. Left ventricular  function is decreased. The ejection fraction is 30-35% (moderately reduced).  Moderate diffuse hypokinesis is present. Abnormal septal motion from  conduction delay present. There is no thrombus seen in the left ventricle.     Right Ventricle  The right ventricle is normal size. Global right ventricular function is  normal.      Echo 10/18/23:   Left ventricular function is decreased. The ejection fraction is 30-35%  (moderately reduced).Severe left ventricular dilation is present. LVIDd 7cm  The right ventricle is normal size. Global right ventricular function is  normal.  IVC diameter <2.1 cm collapsing >50% with sniff suggests a normal RA pressure  of 3 mmHg.  No pericardial effusion is present.  No significant changes noted.      CMR 6/12/2023:   1. The LV is normal in cavity size and wall thickness. The global systolic function is moderately reduced.  The LVEF is 32%.  There is global hypokinesis.     2. The RV is normal in cavity size. The global systolic function is mildly reduced. The RVEF is 45%.      3. Both atria are normal in size.     4. There is no significant valvular disease.      5. Late gadolinium enhancement imaging shows no MI, fibrosis or infiltrative disease.      6. There is no pericardial effusion or thickening.     7. There is no intracardiac thrombus.     CONCLUSIONS: Severe, nonischemic cardiomyopathy, today the LV is no longer dilated. LVEF 32% not  significantly changed. RV function is mildly globally reduced, RVEF 45%, slightly decreased from previous  53%. No LGE.    Ziopatch monitor 2/24-3/3/2023: sinus with 9.4% PVC burden    Echo 12/12/2022: Severe left ventricular dilation is present.LVIDd 70mm.  Biplane LVEF is 50%.  Right ventricular function, chamber size, wall motion, and thickness are  normal.  Both atria appear normal.  Pulmonary artery systolic pressure is normal.  The inferior vena cava is normal.  No pericardial effusion is present.  The left ventricular function has improved.    CMR 6/3/22: 1. The LV is mild-to-moderately dilated in cavity size. The wall thickness is normal. The global systolic  function is severely decreased. The LVEF is 33%. There is severe global hypokinesis.     2. The RV is normal in cavity size. The global systolic function is mildly decreased. The RVEF is 53%.      3. Both atria are normal in size.     4. There is no significant valvular disease.      5. Late gadolinium enhancement imaging shows no MI, fibrosis or infiltrative disease.      6. There is no pericardial effusion or thickening.     7. There is no intracardiac thrombus.     CONCLUSIONS: Severe, non-ischemic dilated cardiomyopathy, most likely of genetic cause. LVEF of 33% and  RVEF of 53% with no LGE. When directly compared to the prior CMR, the LV and RV size and function have not  significantly changed.      CPEX 12/12/22: Key Measurements    Peak VO2  (ml/kg/min) VE/VCO2  Clinch RER   14.91        27.61        1.02            Predicted VO2 (ml/kg/min) Predicted VO2 %   26.50        56            Resting Supine BP (mmHg) Resting Standing BP (mmHg) Final Stress BP (mmHg)   106/56        112/58        140/67          Resting Supine HR (bpm) Resting Standing HR (bpm)    44        50             Max HR (bpm) Max Predicted HR (bpm) Max Perdicted HR %   124        162        77            Exercise time (min) Exercise time (sec) Estimated workload (METS)   8        51        4.3              [facility] 1/2023:    Time Systolic (mmHg) Diastolic (mmHg) Mean (mmHg) A Wave (mmHg) V Wave (mmHg) EDP (mmHg) Max dp/dt (mmHg/sec) HR (bpm) Content (mL/dL) SAT (%)   RA Pressures  1:56 PM   6    10    8      38        RV Pressures  1:56 PM 40        12     37        PA Pressures  1:57 PM 41    4    22        39        PCW Pressures  1:57 PM   11    22    20      39        AO Pressures  2:01     63    91        37          Blood Flow Results Phase: Baseline     Time Results  Indexed Values (L/min/m2)   QP  1:45 PM 2.71 L/min    1.31      QS  1:45 PM 2.71 L/min    1.31        Blood Oximetry Phase: Baseline     Time Hb  SAT(%)  PO2  Content (mL/dL) PA Sat (%)   PA  1:45 PM  62.7 %      62.7      Art  1:45 PM  100 %     17.14         Cardiac Output Phase: Baseline     Time TDCO (L/min) TDCI (L/min/m2) Estrella C.O. (L/min) Estrella C.I. (L/min/m2) Estrella HR (bpm)   Cardiac Output Results  1:45 PM   2.71    1.31         Resistance Results Phase: Baseline     Time PVR  SVR  TPR  TVR  PVR/SVR  TPR/TVR    Resistance Results (Metric)  1:45 .77 dsc-5    2509.59 dsc-5    649.54 dsc-5    2686.74 dsc-5    0.13    0.24      Resistance Results (Wood)  1:45 PM 4.06 DOZIER    31.38 DOZIER    8.12 DOZIER    33.59 DOZIER    0.13    0.24        Stoke Volume Results Phase: Baseline      ASSESSMENT AND PLAN: 59 year-old woman with chronic systolic heart failure due to a famililal cardiomyopathy with a likely pathogenic  variant in FLNC who presents for routine follow up       1.  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   2.  Premature ventricular contractions, palpitations  3. Fatigue     She has had progressive exercise intolerance with now having increased dyspnea with walking and more fatigue compared to the last time we saw her and has reduced her working hours. Her worsening exercise intolerance is concerning and warrants a repeat RHC. Her heart rates are also noted to be in the 40s - 44 in clinic today and 49 on EKG done while in patient. She did have a stress test 2 years ago and her heart rate was able to get to 77% of goal. We will reduce her dose of beta blocker by half to see if this helps with bradycardia and if intern this reduces her fatigue. She will check in with us in one week after this change. Her dizziness can be due to this bradycardia but can also be due to hypotension related to her medications. We will fist adjust her beta blocker dosage and then assess her medications. She does not have a blood pressure cuff at home so we suggested checking her blood pressure while at work, especially if she is symptomatic. We will also repeat a RHC to assess her cardiac output and index which were reduced on her last RHC in January 2023.   She is euvolemic today by exam. Her renal function is stable. Her NT pro BNP is normal in the setting of an elevated BMI. We will continue her present dose of torsemide. She should continue  sacubitril valsartan  and spironolactone. She was on dapagliflozin but developed yeast infections and stopped it.     Her cardiac MRI from 2023 shows an LVEF of 32%, LVEDD of 6.1cm, and no fibrosis on neurohormonal therapy.   Additionally in her Zio patch monitor in February to March of 2023 she had approximately 9.4% PVC burden otherwise predominantly sinus rhythm without any  significant dysrhythmias. A repeat Ziopatch was mailed to her after her admission 2 days ago which will also help us determine whether her episodes of light headedness and dizziness are due to malignant arrhythmias.  .   She meets standard ICD criteria based on her ejection fraction less than 35% despite neurohormonal therapies and more recent guidelines for FLNC with an EF of less than 45%. Dr. Aragon and I have both advised her to pursue ICD implantation for SCD prevention in the setting of a FLNC mutation. She is now agreeable to ICD implantation and will follow up with EP.     She understands that if she has a syncopal event it is a medical emergency and she should seek immediate medical attention.     3. Vomiting  4. Heart Burn  She has progressive vomiting and and heart burn symptoms and is seeing GI.     5. Hypomagnesemia: Improved 1.9 today. Continue magnesium oxide for replacement.      PLAN:   -FMLA paperwork to faciliate   -Monday off for Dr visits and up to 4 hrs shift max of walkking letter  - CPEX  -ICD implantation   -follow up in 2-3months with [facility]  -follow up with me in 6 months    70 minutes on DOS for chart review, counseling, review of diagnostic testing, coordination of care and documentation.     Thank you for allowing me to participate in the care of your patient. Please do not hesitate to contact me if you have any questions.     Sincerely,   Real Seaman MD, PhD, [facility]  Advanced Heart Failure/Transplantation/MCS  [facility]    The longitudinal plan of care for the diagnosis(es)/condition(s) as documented were addressed during this visit. Due to the added complexity in care, I will continue to support Marleen in the subsequent management and with ongoing continuity of care.                Again, thank you for allowing me to participate in the care of your patient.      Sincerely,    Real Seaman MD

## 2025-01-13 NOTE — LETTER
January 13, 2025    RE:  Marleen Mcfadden  [address] [place]  [facility] [place] [address]            To whom it may concern:    Marleen Mcfadden is under my professional care for Chronic systolic congestive heart failure (H). She was in the clinic on 1/13/25 for an appointment and related testing. She will need to be off work 1/14/25 for symptoms related to her heart failure, including lightheadedness and heart palpitations.     She will need to perform a maximum of 4 hours of walking per day during each work shift. She will need to have the option to stop and rest if she is symptomatic. Marleen will need to have Mondays off of work for doctor's appointments.     Please contact me if there are any questions or concerns.         Sincerely,        Real Seaman MD

## 2025-01-13 NOTE — PATIENT INSTRUCTIONS
"You were seen today in the Cardiovascular Clinic at the [facility].      Cardiology Providers you saw during your visit:  Dr. Real Seaman     Recommendations:   FMLA paperwork.  Letter - Mondays off for doctor visits and up to 4 hour shifts maximum.  Cardiopulmonary stress test - call [phone] if you need to reschedule.  Follow up with [facility] ([place]) on 3/13 as scheduled.  Follow up with Dr. Seaman in 6 months with labs prior.       Thank you for your visit today!   Please MyChart message or call if you have any questions or concerns.      During Business Hours:  [phone], option # 1 \"To leave a message for your care team\"     After hours, weekends or holidays:   [phone], Option #4  Ask to speak to the On-Call Cardiologist. Inform them you are a heart failure patient at the [place].      Megha Meyer RN BSN   Cardiology Nurse Coordinator - Heart Failure/C.O.R.E. [facility]  [phone] option 1 to schedule an appointment or leave a message for your care team      CardioPulmonary Stress Test (CPX)  CPX is a maximal (meaning you exercise to exhaustion, not to achieve a heart rate) exercise test where we measure how well your heart/body uses oxygen or energy. It is the gold standard for measuring functional capacity and helps us differentiate limitations due to lungs, heart, or fitness.   A CPX is NOT a typical stress test. You will NOT be asked to hold your Beta Blocker medication.     You will be scheduled for a CardioPulmonary Stress Test at the [facility] ([address], [facility] [address], [phone]).       Follow these instructions:    1. Report to the GOLD waiting room in the [facility] on: __________    2. Nothing to eat for 3 hours prior to your test. You may have clear liquids up to the time of your test    3. Please wear loose, two-piece clothing and comfortable, rubber soled shoes for walking     For Patients " with Diabetes: Your RN Coordinator will give you instructions on adjusting your diabetic medications for the day of your test                           If you have questions please contact your diabetic care team                           Remember to  bring your glucometer & insulin with you to take after your test if needed

## 2025-01-13 NOTE — NURSING NOTE
Cardiac Testing: Patient given instructions regarding cardiopulmonary stress test. Discussed purpose, preparation, procedure and when to expect results reported back to the patient. Patient demonstrated understanding of this information and agreed to call with further questions or concerns.    Labs: Patient was given results of the laboratory testing obtained today. Patient demonstrated understanding of this information and agreed to call with further questions or concerns.     Med Reconcile: Reviewed and verified all current medications with the patient. The updated medication list was printed and given to the patient.    Return Appointment: Patient given instructions regarding scheduling next clinic visit. Patient demonstrated understanding of this information and agreed to call with further questions or concerns. ASAD in March, Dr. Seaman in July.    Patient stated she understood all health information given and agreed to call with further questions or concerns.    Megha Meyer RN

## 2025-01-26 ENCOUNTER — HEALTH MAINTENANCE LETTER (OUTPATIENT)
Age: 60
End: 2025-01-26

## 2025-01-27 ENCOUNTER — HOSPITAL ENCOUNTER (OUTPATIENT)
Dept: CARDIOLOGY | Facility: CLINIC | Age: 60
Discharge: HOME | End: 2025-01-27
Attending: INTERNAL MEDICINE | Admitting: INTERNAL MEDICINE
Payer: COMMERCIAL

## 2025-01-27 VITALS — WEIGHT: 253.75 LBS | HEIGHT: 64 IN | BODY MASS INDEX: 43.32 KG/M2

## 2025-01-27 DIAGNOSIS — I50.22 CHRONIC SYSTOLIC CONGESTIVE HEART FAILURE (H): ICD-10-CM

## 2025-01-27 PROCEDURE — 94621 CARDIOPULM EXERCISE TESTING: CPT

## 2025-01-28 LAB
CARDIOPULMONARY BLOOD PRESSURE REST: NORMAL MMHG
CARDIOPULMONARY BREATHING RESERVE REST: 80.8
CARDIOPULMONARY BREATHING RESERVE V02MAX: 69
CARDIOPULMONARY CO2 OUTPUT REST: 346 ML/MIN
CARDIOPULMONARY CO2 OUTPUT VO2MAX: 713 ML/MIN
CARDIOPULMONARY FEV 1.0 (L) ACTUAL: 1.98
CARDIOPULMONARY FEV 1.0 (L) PRECENT: 77 %
CARDIOPULMONARY FEV 1.0 (L) PREDICTED: 2.56
CARDIOPULMONARY FEV 1.0 FVC (%) ACTUAL: 87.4
CARDIOPULMONARY FEV 1.0 FVC (%) PERCENT: 112 %
CARDIOPULMONARY FEV 1.0 FVC (%) PREDICTED: 78.1
CARDIOPULMONARY FUNCTIONAL CAPACITY MAX ML/KG/MIN: 7.8 ML/KG/MIN
CARDIOPULMONARY FUNCTIONAL CAPACITY PERCENT: 30 %
CARDIOPULMONARY FUNCTIONAL CAPACITY PREDICTED: 26 ML/KG/MIN
CARDIOPULMONARY FVC (L) ACTUAL: 2.27
CARDIOPULMONARY FVC (L) PERCENT: 69 %
CARDIOPULMONARY FVC (L) PREDICTED: 3.31
CARDIOPULMONARY HEART RATE REST: 65 BPM
CARDIOPULMONARY MET'S REST: 0.8
CARDIOPULMONARY MINUTE VENTILATION REST: 13.4 L/MIN
CARDIOPULMONARY MINUTE VENTILATION VO2MAX: 21.3 L/MIN
CARDIOPULMONARY MYOCARDIAC O2 DEMAND MAX: NORMAL
CARDIOPULMONARY OXYGEN CONSUMPTION REST: 2.7 ML/KG/MIN
CARDIOPULMONARY OXYGEN CONSUMPTION VO2MAX: 7.8 ML/KG/MIN
CARDIOPULMONARY OXYGEN PULSE REST: 5 ML/BEAT
CARDIOPULMONARY OXYGEN PULSE VO2MAX: 9.3 ML/BEAT
CARDIOPULMONARY OXYGEN SATURATION- OXIMETRY REST: 100 %
CARDIOPULMONARY OXYGEN SATURATION- OXIMETRY VO2MAX: 97 %
CARDIOPULMONARY PET C02 REST: 31
CARDIOPULMONARY PET C02 VO2MAX: 38
CARDIOPULMONARY PET02 REST: 114
CARDIOPULMONARY PET02 V02 MAX: 94
CARDIOPULMONARY RER: 0.78
CARDIOPULMONARY RESPIRALORY EXCHANGE RATIO VO2MAX: 0.78
CARDIOPULMONARY RESPIRALORY EXCHANGE RATIO: 1.11
CARDIOPULMONARY RESPIRATORY RATE REST: 24 BR/MIN
CARDIOPULMONARY RESPIRATORY RATE VO2MAX: 27 BR/MIN
CARDIOPULMONARY STRESS BASE 1 BP MMHG: NORMAL MMHG
CARDIOPULMONARY STRESS BASE 1 BPA: 78 BPM
CARDIOPULMONARY STRESS BASE 1 SPO2: 100 % SPO2
CARDIOPULMONARY STRESS BASE 1 TIME SEC: 0 SEC
CARDIOPULMONARY STRESS BASE 1 TIME: 1 MINS
CARDIOPULMONARY STRESS BASE 2 BP MMHG: NORMAL MMHG
CARDIOPULMONARY STRESS BASE 2 BPA: 66 BPM
CARDIOPULMONARY STRESS BASE 2 SPO2: 100 % SPO2
CARDIOPULMONARY STRESS BASE 2 TIME SEC: 0 SEC
CARDIOPULMONARY STRESS BASE 2 TIME: 3 MINS
CARDIOPULMONARY STRESS BASE 3 BP MMHG: NORMAL MMHG
CARDIOPULMONARY STRESS BASE 3 BPA: 65 BPM
CARDIOPULMONARY STRESS BASE 3 TIME SEC: 0 SEC
CARDIOPULMONARY STRESS BASE 3 TIME: 5 MINS
CARDIOPULMONARY STRESS PHASE 1 BP MMHG: NORMAL MMHG
CARDIOPULMONARY STRESS PHASE 1 BPM: 85 BPM
CARDIOPULMONARY STRESS PHASE 1 SPO2: 97 % SPO2
CARDIOPULMONARY STRESS PHASE 1 TIME SEC: 0 SEC
CARDIOPULMONARY STRESS PHASE 1 TIME: 2 MINS
CARDIOPULMONARY STRESS PHASE 2 BP MMHG: NORMAL MMHG
CARDIOPULMONARY STRESS PHASE 2 BPM: 96 BPM
CARDIOPULMONARY STRESS PHASE 2 SPO2: 97 % SPO2
CARDIOPULMONARY STRESS PHASE 2 TIME SEC: 33 SEC
CARDIOPULMONARY STRESS PHASE 2 TIME: 3 MINS
CARDIOPULMONARY SVC (L) ACTUAL: 2.29
CARDIOPULMONARY SVC (L) PERCENT: 69 %
CARDIOPULMONARY SVC (L) PREDICTED: 3.31
CARDIOPULMONARY TIDAL VOLUME REST: 567 ML
CARDIOPULMONARY TIDAL VOLUME VO2MAX: 787 ML
CARDIOPULMONARY VE/VCO2 SLOPE: 24.29
CARDIOPULMONARY VENTILATORY EQUIVALENT 02 REST: 43
CARDIOPULMONARY VENTILATORY EQUIVALENT 02 V02: 23
CARDIOPULMONARY VENTILATORY EQUIVALENT C02 REST: 39
CARDIOPULMONARY VENTILATORY EQUIVALENT C02 SLOPE VO2MAX: 24.29
CARDIOPULMONARY VENTILATORY EQUIVALENT C02 VO2MAX: 30
CV STRESS MAX HR HE: 96
PREDICTED VO2MAX: 7.8
RATED PERCEIVED EXERTION: 13
STRESS ANGINA INDEX: 0
STRESS ECHO BASELINE BP: NORMAL MMHG
STRESS ECHO BASELINE HR: 58 BPM
STRESS ECHO CALCULATED PERCENT HR: 60 %
STRESS ECHO LAST STRESS BP: NORMAL MMHG
STRESS ECHO POST ESTIMATED WORKLOAD: 2.2 METS
STRESS ECHO POST EXERCISE DUR MIN: 3 MIN
STRESS ECHO POST EXERCISE DUR SEC: 33 SEC
STRESS ECHO TARGET HR: 160

## 2025-02-04 ENCOUNTER — OFFICE VISIT (OUTPATIENT)
Dept: GASTROENTEROLOGY | Facility: CLINIC | Age: 60
End: 2025-02-04
Payer: COMMERCIAL

## 2025-02-04 VITALS
OXYGEN SATURATION: 97 % | SYSTOLIC BLOOD PRESSURE: 108 MMHG | BODY MASS INDEX: 43.23 KG/M2 | DIASTOLIC BLOOD PRESSURE: 76 MMHG | HEART RATE: 73 BPM | WEIGHT: 252 LBS

## 2025-02-04 DIAGNOSIS — K21.9 GASTROESOPHAGEAL REFLUX DISEASE WITHOUT ESOPHAGITIS: Primary | ICD-10-CM

## 2025-02-04 DIAGNOSIS — M62.89 PELVIC FLOOR DYSFUNCTION: ICD-10-CM

## 2025-02-04 DIAGNOSIS — K59.00 CONSTIPATION, UNSPECIFIED CONSTIPATION TYPE: ICD-10-CM

## 2025-02-04 DIAGNOSIS — R11.0 NAUSEA: ICD-10-CM

## 2025-02-04 PROCEDURE — 99213 OFFICE O/P EST LOW 20 MIN: CPT | Performed by: PHYSICIAN ASSISTANT

## 2025-02-04 RX ORDER — METOCLOPRAMIDE 10 MG/1
10 TABLET ORAL 3 TIMES DAILY PRN
Qty: 270 TABLET | Refills: 1 | Status: SHIPPED | OUTPATIENT
Start: 2025-02-04 | End: 2025-08-03

## 2025-02-04 RX ORDER — RABEPRAZOLE SODIUM 20 MG/1
20 TABLET, DELAYED RELEASE ORAL 2 TIMES DAILY
Qty: 60 TABLET | Refills: 5 | Status: SHIPPED | OUTPATIENT
Start: 2025-02-04 | End: 2025-08-03

## 2025-02-04 ASSESSMENT — PAIN SCALES - GENERAL: PAINLEVEL_OUTOF10: NO PAIN (0)
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
## 2025-02-04 NOTE — NURSING NOTE
Chief Complaint   Patient presents with    Follow Up     She requests these members of her care team be copied on today's visit information:  PCP: Yanna Matias Y    Referring Provider:  Referred Self, MD  No address on file    Vitals:    02/04/25 1206   BP: 108/76   Pulse: 73   SpO2: 97%   Weight: 114.3 kg (252 lb)     Body mass index is 43.23 kg/m .    Do you have a history of colon cancer in your immediate family? YES    If yes who: sister     And what age were they diagnosed: 57    Medications were reconciled.    Does patient need any medication refills at today's visit? Metoclopramide and RABEprazole Sodium    Cassie Rondon, Clinical Assistant

## 2025-02-04 NOTE — PATIENT INSTRUCTIONS
It was a pleasure meeting with you today and discussing your healthcare plan. Below is a summary of what we covered:    --continue aciphex 20mg, two times a day.  --continue reglan 10mg, three times a day -- please take a break for a week or two, every 2-3 months.   --continue pepcid 40mg at bedtime.   --could consider taking colace (stool softener) 100mg daily or two times a day.     Follow up in clinic in 6 months.      Please see below for any additional questions and scheduling guidelines.    Sign up for [facility]: [facility] patient portal serves as a secure platform for accessing your medical records from the [facility]. Additionally, [facility] facilitates easy, timely, and secure messaging with your care team. If you have not signed up, you may do so by using the provided code or calling [phone].    Coordinating your care after your visit:  There are multiple options for scheduling your follow-up care based on your provider's recommendation.    How do I schedule a follow-up clinic appointment:   After your appointment, you may receive scheduling assistance with the Clinic Coordinators by having a seat in the waiting room and a Clinic Coordinator will call you up to schedule.  Virtual visits or after you leave the clinic:  Your provider has placed a follow-up order in the [facility] portal for scheduling your return appointment. A member of the scheduling team will contact you to schedule.  [employer] Scheduling: Timely scheduling through [facility] is advised to ensure appointment availability.   Call to schedule: You may schedule your follow-up appointment(s) by calling [phone], option 1.    How do I schedule my endoscopy or colonoscopy procedure:  If a procedure, such as a colonoscopy or upper endoscopy was ordered by your provider, the scheduling team will contact you to schedule this procedure. Or you may choose to call to schedule at   [phone], option 2.  Please allow 20-30 minutes when  scheduling a procedure.    How do I get my blood work done? To get your blood work done, you need to schedule a lab appointment at an [facility]. There are multiple ways to schedule:   At the clinic: The Clinic Coordinator you meet after your visit can help you schedule a lab appointment.   [employer] scheduling: [employer] offers online lab scheduling at all [facility] laboratory locations.   Call to schedule: You can call [phone] to schedule your lab appointment.    How do I schedule my imaging study: To schedule imaging studies, such as CT scans, ultrasounds, MRIs, or X-rays, contact Imaging Services at [phone].    How do I schedule a referral to another doctor: If your provider recommended a referral to another specialist(s), the referral order was placed by your provider. You will receive a phone call to schedule this referral, or you may choose to call the number attached to the referral to self-schedule.    For Post-Visit Question(s):  For any inquiries following today's visit:  Please utilize [employer] messaging and allow 48 hours for reply or contact the Call Center during normal business hours at [phone], option 3.  For Emergent After-hours questions, contact the On-Call GI Fellow through the [facility]  at [phone].  In addition, you may contact your Nurse directly using the provided contact information.    Test Results:  Test results will be accessible via [employer] in compliance with the 21st Century Cures Act. This means that your results will be available to you at the same time as your provider. Often you may see your results before your provider does. Results are reviewed by staff within two weeks with communication follow-up. Results may be released in the patient portal prior to your care team review.    Prescription Refill(s):  Medication prescribed by your provider will be addressed during your visit. For future refills, please coordinate with  your pharmacy. If you have not had a recent clinic visit or routine labs, for your safety, your provider may not be able to refill your prescription.
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
## 2025-02-05 ENCOUNTER — LAB (OUTPATIENT)
Dept: LAB | Facility: CLINIC | Age: 60
End: 2025-02-05
Payer: COMMERCIAL

## 2025-02-05 DIAGNOSIS — K21.9 GASTROESOPHAGEAL REFLUX DISEASE WITHOUT ESOPHAGITIS: ICD-10-CM

## 2025-02-05 LAB
CALCIUM SERPL-MCNC: 9.3 MG/DL (ref 8.8–10.4)
FERRITIN SERPL-MCNC: 205 NG/ML (ref 11–328)
IRON BINDING CAPACITY (ROCHE): 289 UG/DL (ref 240–430)
IRON SATN MFR SERPL: 24 % (ref 15–46)
IRON SERPL-MCNC: 68 UG/DL (ref 37–145)
PHOSPHATE SERPL-MCNC: 3.2 MG/DL (ref 2.5–4.5)
VIT B12 SERPL-MCNC: 751 PG/ML (ref 232–1245)
VIT D+METAB SERPL-MCNC: 29 NG/ML (ref 20–50)

## 2025-02-05 PROCEDURE — 84100 ASSAY OF PHOSPHORUS: CPT

## 2025-02-05 PROCEDURE — 83550 IRON BINDING TEST: CPT

## 2025-02-05 PROCEDURE — 84630 ASSAY OF ZINC: CPT

## 2025-02-05 PROCEDURE — 82728 ASSAY OF FERRITIN: CPT

## 2025-02-05 PROCEDURE — 36415 COLL VENOUS BLD VENIPUNCTURE: CPT

## 2025-02-05 PROCEDURE — 82310 ASSAY OF CALCIUM: CPT

## 2025-02-05 PROCEDURE — 82607 VITAMIN B-12: CPT

## 2025-02-05 PROCEDURE — 83540 ASSAY OF IRON: CPT

## 2025-02-05 PROCEDURE — 82306 VITAMIN D 25 HYDROXY: CPT

## 2025-02-06 LAB — ZINC SERPL-MCNC: 77 UG/DL

## 2025-02-10 ENCOUNTER — TELEPHONE (OUTPATIENT)
Dept: CARDIOLOGY | Facility: CLINIC | Age: 60
End: 2025-02-10
Payer: COMMERCIAL

## 2025-02-10 NOTE — TELEPHONE ENCOUNTER
Patient Contacted for the patient to call back and schedule the following:    Appointment type: Return Heart Failure  Provider: Dr. Seaman  Return date: 07/14  Specialty phone number: [phone] opt 1  Additional appointment(s) needed: Labs  Additonal Notes: N/A

## 2025-02-11 ENCOUNTER — OFFICE VISIT (OUTPATIENT)
Dept: FAMILY MEDICINE | Facility: CLINIC | Age: 60
End: 2025-02-11
Payer: COMMERCIAL

## 2025-02-11 VITALS
HEART RATE: 65 BPM | DIASTOLIC BLOOD PRESSURE: 81 MMHG | BODY MASS INDEX: 43.11 KG/M2 | SYSTOLIC BLOOD PRESSURE: 121 MMHG | TEMPERATURE: 96.4 F | OXYGEN SATURATION: 97 % | WEIGHT: 251.25 LBS

## 2025-02-11 DIAGNOSIS — M25.512 CHRONIC LEFT SHOULDER PAIN: Primary | ICD-10-CM

## 2025-02-11 DIAGNOSIS — G89.29 CHRONIC LEFT SHOULDER PAIN: Primary | ICD-10-CM

## 2025-02-11 DIAGNOSIS — I50.22 CHRONIC SYSTOLIC CONGESTIVE HEART FAILURE (H): Chronic | ICD-10-CM

## 2025-02-11 DIAGNOSIS — M17.0 PRIMARY OSTEOARTHRITIS OF BOTH KNEES: Chronic | ICD-10-CM

## 2025-02-11 DIAGNOSIS — E66.01 CLASS 3 SEVERE OBESITY DUE TO EXCESS CALORIES WITH SERIOUS COMORBIDITY AND BODY MASS INDEX (BMI) OF 40.0 TO 44.9 IN ADULT (H): ICD-10-CM

## 2025-02-11 DIAGNOSIS — E66.813 CLASS 3 SEVERE OBESITY DUE TO EXCESS CALORIES WITH SERIOUS COMORBIDITY AND BODY MASS INDEX (BMI) OF 40.0 TO 44.9 IN ADULT (H): ICD-10-CM

## 2025-02-11 PROCEDURE — 99214 OFFICE O/P EST MOD 30 MIN: CPT | Performed by: PHYSICIAN ASSISTANT

## 2025-02-11 RX ORDER — CYCLOBENZAPRINE HCL 10 MG
5-10 TABLET ORAL 3 TIMES DAILY PRN
Qty: 21 TABLET | Refills: 1 | Status: SHIPPED | OUTPATIENT
Start: 2025-02-11

## 2025-02-11 ASSESSMENT — PATIENT HEALTH QUESTIONNAIRE - PHQ9
SUM OF ALL RESPONSES TO PHQ QUESTIONS 1-9: 2
10. IF YOU CHECKED OFF ANY PROBLEMS, HOW DIFFICULT HAVE THESE PROBLEMS MADE IT FOR YOU TO DO YOUR WORK, TAKE CARE OF THINGS AT HOME, OR GET ALONG WITH OTHER PEOPLE: NOT DIFFICULT AT ALL
SUM OF ALL RESPONSES TO PHQ QUESTIONS 1-9: 2

## 2025-02-11 ASSESSMENT — PAIN SCALES - GENERAL: PAINLEVEL_OUTOF10: MODERATE PAIN (6)

## 2025-02-11 NOTE — LETTER
February 11, 2025      Marleen Mcfadden  [address]  [facility] [place] [address]        To Whom It May Concern:    Marleen IRVIN Mcfadden  was seen on 02/11/2025.  Please excuse associated absence.        Sincerely,        Jessica Roberts PA-C    Electronically signed

## 2025-02-11 NOTE — PROGRESS NOTES
"  Assessment & Plan     Chronic left shoulder pain  May have arthritic component and concern for rotator cuff as well based on exam today.  Regardless would recommend starting physical therapy and following up with orthopedics.  Can continue naproxen, tolerating it okay as well as heat.  Discussed potentially adding on cyclobenzaprine and risk versus benefits, patient would like to try.  Sent to pharmacy.  No driving after use.  Work note given.  May need restrictions if ongoing.  Works as phlebotomist.  - Physical Therapy  Referral; Future  - Orthopedic  Referral; Future  - cyclobenzaprine (FLEXERIL) 10 MG tablet; Take 0.5-1 tablets (5-10 mg) by mouth 3 times daily as needed for muscle spasms.    Class 3 severe obesity due to excess calories with serious comorbidity and body mass index (BMI) of 40.0 to 44.9 in adult (H)  Known condition taken into account for medical decision making, no current exacerbation or new concerns.       Chronic systolic congestive heart failure (H)  Follows with cardiology.  Would be cautious on oral steroids, regardless with current pain likely not needed    Primary osteoarthritis of both knees  Arthritis in other joints, likely some arthritis in the shoulder.          BMI  Estimated body mass index is 43.11 kg/m  as calculated from the following:    Height as of 1/27/25: 1.626 m (5' 4.02\").    Weight as of this encounter: 114 kg (251 lb 4 oz).             Dinorah Hawley is a 59 year old, presenting for the following health issues:  Shoulder Pain      2/11/2025    11:11 AM   Additional Questions   Roomed by Shazia     Shoulder Pain    History of Present Illness       Reason for visit:  Left shoulder pain  Symptom onset:  More than a month  Symptoms include:  Pain  Symptom intensity:  Moderate  Symptom progression:  Worsening  Had these symptoms before:  Yes  Has tried/received treatment for these symptoms:  Yes  Previous treatment was successful:  No  What makes " it worse:  No  What makes it better:  Heat   She is taking medications regularly.       Patient is left-handed.  Has been dealing with left shoulder pain for more than a month.  Was worse today and had to call into work, will need a work note.  Pain is throughout her shoulder joint but more significant anteriorly.  Gets more of an ache, but does get radiating pain down her arm at times.  Has baseline weakness in her hands, unsure if it is change.  Shoulder does feel weak with lifting.  No recent injury.  Did have distant injury and did some physical therapy at that time, has not had issues up until recently.  No new overuse or injury.  Using naproxen which sometimes offers relief.  Has been utilizing heat as well which is helpful.              Objective    /81 (BP Location: Left arm, Patient Position: Sitting, Cuff Size: Adult Large)   Pulse 65   Temp (!) 96.4  F (35.8  C) (Temporal)   Wt 114 kg (251 lb 4 oz)   LMP 10/19/2008 (Approximate)   SpO2 97%   BMI 43.11 kg/m    Body mass index is 43.11 kg/m .  Physical Exam   GENERAL: alert and no distress  NECK: No midline spinal tenderness.  Range of motion intact.  MS: Tenderness to anterior shoulder as well as shoulder joint diffusely, no point tenderness.  Active and passive range of motion reduced secondary to pain.  Abduction to about 90 degrees.  Positive Taveras, however does have pain with abduction, internal and external rotation throughout exam.  No tenderness to left low, wrist, hand.  Right side used as comparison unremarkable.    SKIN: no suspicious lesions or rashes  NEUROVASCULAR:  strength weak bilaterally, slightly worse on the left.  Distal sensation intact.  Cap refill less than 2 seconds.  Radial pulses 2+ bilaterally.            Signed Electronically by: Jessica Roberts PA-C

## 2025-02-11 NOTE — PATIENT INSTRUCTIONS
At [facility], we strive to deliver an exceptional experience to you, every time we see you. If you receive a survey, please let us know what we are doing well and/or what we could improve upon, as we do value your feedback.  If you have MyChart, you can expect to receive results automatically within 24 hours of their completion.  Your provider will send a note interpreting your results as well.   If you do not have MyChart, you should receive your results in about a week by mail.    Your care team:                            Family Medicine Internal Medicine   MD Devin Mahmood, MD Lucille Schultz, MD Darius Hassan, MD Kellie Gardner, PADorisC    Dallin Dickerson, MD Pediatrics   Mary Ortiz, MD Clary Galo, MD Irais Monroy, APRN CNP Belle Lentz APRN CNP   MD Jodi Schaefer, MD Blanka Ling, CNP     Ezekiel Carbajal, CNP Same-Day Provider (No follow-up visits)   PITO Wood, DNP Blossom Anderson, PITO Waterman, FNP, BC LUBNA AlanisC     Clinic hours: Monday - Thursday 7 am-6 pm; Fridays 7 am-5 pm.   Urgent care: Monday - Friday 10 am- 8 pm; Saturday and Sunday 9 am-5 pm.    Clinic: [phone]       [facility]: Monday - Thursday 8 am - 7 pm; Friday 8 am - 6 pm  [facility]: [phone]

## 2025-02-12 ENCOUNTER — PATIENT OUTREACH (OUTPATIENT)
Dept: CARE COORDINATION | Facility: CLINIC | Age: 60
End: 2025-02-12
Payer: COMMERCIAL

## 2025-02-20 ENCOUNTER — TELEPHONE (OUTPATIENT)
Dept: GASTROENTEROLOGY | Facility: CLINIC | Age: 60
End: 2025-02-20
Payer: COMMERCIAL

## 2025-02-20 NOTE — TELEPHONE ENCOUNTER
Prior Authorization Retail Medication Request    Medication/Dose: RABEprazole (ACIPHEX) 20 MG EC tablet  Diagnosis and ICD code (if different than what is on RX):  Gastroesophageal reflux disease without esophagitis [K21.9]   New/renewal/insurance change PA/secondary ins. PA: Renewal  Previously Tried and Failed:    Rationale:      Insurance   Primary:   Insurance ID:      Secondary (if applicable):  Insurance ID:      Pharmacy Information (if different than what is on RX)  Name:    Phone:    Fax:

## 2025-02-21 ENCOUNTER — MYC MEDICAL ADVICE (OUTPATIENT)
Dept: FAMILY MEDICINE | Facility: CLINIC | Age: 60
End: 2025-02-21
Payer: COMMERCIAL

## 2025-02-21 DIAGNOSIS — R39.15 URINARY URGENCY: ICD-10-CM

## 2025-02-22 NOTE — TELEPHONE ENCOUNTER
PA Initiation    Medication: RABEPRAZOLE SODIUM 20 MG PO [facility]  Insurance Company: [employer] - Phone [phone] Fax [phone]  Pharmacy Filling the Rx: [id number] - [place] - [address] AT [facility] [address]  Filling Pharmacy Phone:    Filling Pharmacy Fax:    Start Date: 2/22/2025

## 2025-02-24 NOTE — TELEPHONE ENCOUNTER
Prior Authorization Approval    Medication: RABEPRAZOLE SODIUM 20 MG PO [facility]  Authorization Effective Date: 2/22/2025  Authorization Expiration Date: 2/21/2026  Approved Dose/Quantity: ud  Reference #:     Insurance Company: [employer] [phone] Fax [phone]  Expected CoPay: $15  CoPay Card Available: No    Financial Assistance Needed: n/a  Which Pharmacy is filling the prescription: [id number] - [place], [place] - [address] AT [address]  Pharmacy Notified: yes  Patient Notified: no

## 2025-02-26 ENCOUNTER — MYC MEDICAL ADVICE (OUTPATIENT)
Dept: ENDOCRINOLOGY | Facility: CLINIC | Age: 60
End: 2025-02-26
Payer: COMMERCIAL

## 2025-02-26 DIAGNOSIS — E66.01 CLASS 3 SEVERE OBESITY IN ADULT, UNSPECIFIED BMI, UNSPECIFIED OBESITY TYPE, UNSPECIFIED WHETHER SERIOUS COMORBIDITY PRESENT (H): Primary | ICD-10-CM

## 2025-02-26 DIAGNOSIS — E66.813 CLASS 3 SEVERE OBESITY IN ADULT, UNSPECIFIED BMI, UNSPECIFIED OBESITY TYPE, UNSPECIFIED WHETHER SERIOUS COMORBIDITY PRESENT (H): Primary | ICD-10-CM

## 2025-02-26 RX ORDER — SOLIFENACIN SUCCINATE 5 MG/1
5 TABLET, FILM COATED ORAL
Qty: 90 TABLET | Refills: 0 | Status: SHIPPED | OUTPATIENT
Start: 2025-02-26

## 2025-02-26 NOTE — TELEPHONE ENCOUNTER
Sent one remaining refill on file to pharmacy. Writer replied to patient via [employer].  LESA Anderson, BSN, PHN, AMB-BC (she/her)  [facility] RN

## 2025-02-27 RX ORDER — TIRZEPATIDE 7.5 MG/.5ML
7.5 INJECTION, SOLUTION SUBCUTANEOUS
Qty: 2 ML | Refills: 0 | Status: SHIPPED | OUTPATIENT
Start: 2025-02-27

## 2025-03-05 DIAGNOSIS — I50.22 CHRONIC SYSTOLIC CONGESTIVE HEART FAILURE (H): Primary | ICD-10-CM

## 2025-03-07 NOTE — PROGRESS NOTES
Marleen Mcfadden  :  1965  DOS: 3/8/2025  MRN: [id number]  PCP: Yanna Matias Y    Sports Medicine Clinic Visit      HPI  Marleen Mcfadden is a 59 year old left handed female who is seen in consultation at the request of  No ref. provider found  M.D. presenting with left shoulder pain    - Mechanism of Injury:    - No recent injury.  Did have an injury long ago and did some physical therapy that was helpful.  - Pertinent history and prior evaluations:    - Visit with Jessica Roberts PA-C on 2025 and mention chronic left shoulder pain.  Concern for arthritis and rotator cuff injury.  Recommended starting with physical therapy and follow-up with orthopedics.  Recommend continuing naproxen and added cyclobenzaprine.     - Pain Character:    - Location: Anterolateral left shoulder  - Character: Aching, intermittently shooting down the arm  - Duration: 1+ months  - Course:  worsening  - Endorses:    - Pain as described above, baseline weakness of her hands, antalgic weakness at the left shoulder.  - Denies:    - clicking/popping, numbness, radicular shooting pain, numbness, tingling.  - Alleviating factors:    - Naproxen, rest, activity modifications, heat.  - Aggravating factors:    - sleeping position, lifting, abduction, rotation    - Patient Goals:    - get a formal diagnosis, understand the cause of the symptoms  - Social History:   - Employed as a phlebotomist.      Review of Systems  Musculoskeletal: as above  Remainder of review of systems is negative including constitutional, CV, pulmonary, GI, Skin and Neurologic except as noted in HPI or medical history.    Past Medical History:   Diagnosis Date    Acute bilateral low back pain with right-sided sciatica 2016    Allergic rhinitis, cause unspecified     Arthritis     Autoimmune disease     Autoimmune disease NEC     Autoimmune disease- unknown/poss SLE    Calcaneal spur 10/21/2014    Xray 10/17/14     CHF with cardiomyopathy (H)      Chronic low back pain     DDD (degenerative disc disease), lumbar     Depression     Failed total knee arthroplasty, initial encounter 2019    Familial cardiomyopathy (H)     GERD without esophagitis     History of transfusion     Hypertension     Injury of left shoulder, initial encounter 2017    Morbid obesity (H)     Nontraumatic rupture of quadriceps tendon, left 2018    KATIA (obstructive sleep apnea)- moderate-severe (AHI 29) 2021    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     no longer an issue    Peroneus longus tendinitis 2015    Plantar fasciitis 2014    PONV (postoperative nausea and vomiting)     Rotator cuff injury 2017    Sprain of other ligament of left ankle, initial encounter 2017    Status post left knee replacement 2018    TB lung, latent     negative quantiferon gold test  12     Past Surgical History:   Procedure Laterality Date    ARTHROPLASTY REVISION KNEE Left 2019    Procedure: REVISION, LEFT TOTAL KNEE  ARTHROPLASTY;  Surgeon: Dev Rocha MD;  Location: [facility];  Service: Orthopedics    ARTHROPLASTY REVISION KNEE Right 2020    Procedure: RIGHT REVISION TOTAL KNEE ARTHROPLASTY;  Surgeon: Dev Rocha MD;  Location: [facility] OR;  Service: Orthopedics    BREAST SURGERY      Breast Reduction    C EXCISE EXCESS SKIN TISSUE,ABDOMEN       SECTION  1989     SECTION  10/1985    COLONOSCOPY N/A 2023    Procedure: Colonoscopy;  Surgeon: Simone Jansen MD;  Location: [facility]    COLONOSCOPY WITH CO2 INSUFFLATION N/A 2021    Procedure: COLONOSCOPY, WITH CO2 INSUFFLATION;  Surgeon: Angela Harrington DO;  Location: [facility] OR    COLONOSCOPY WITH CO2 INSUFFLATION N/A 2022    Procedure: COLONOSCOPY, WITH CO2 INSUFFLATION;  Surgeon: Nando Whitlock MD;  Location: [facility] OR    CRANIECTOMY Right 2021    Procedure: RIGHT  MIDDLE FOSSA APPROACH, CSF LEAK REPAIR;  Surgeon: Jocelyn Noe MD;  Location:  OR    CRANIOTOMY MIDDLE FOSSA, EXCISE ACOUSTIC NEUROMA, COMBINED N/A 07/05/2021    Procedure: Right CRANIOTOMY, MIDDLE FOSSA APPROACH, FOR REPAIR OF ENCEPHALOCELE;  Surgeon: Jocelyne Harrell MD;  Location:  OR    CV RIGHT HEART CATH MEASUREMENTS RECORDED N/A 1/10/2023    Procedure: Heart Cath Right Heart Cath;  Surgeon: Slade Hanson MD;  Location:  HEART CARDIAC CATH LAB    CV RIGHT HEART CATH MEASUREMENTS RECORDED N/A 11/26/2024    Procedure: Heart Cath Right Heart Cath;  Surgeon: Yeo, Ilhwan, MD;  Location:  HEART CARDIAC CATH LAB    CYSTOURETHROSCOPY N/A 08/18/2020    Procedure: CYSTOSCOPY;  Surgeon: Cherelle Willams MD;  Location: [facility];  Service: Gynecology    ESOPHAGOSCOPY, GASTROSCOPY, DUODENOSCOPY (EGD), COMBINED N/A 12/13/2023    Procedure: Esophagoscopy, gastroscopy, duodenoscopy (EGD), combined;  Surgeon: Stu Guadalupe MD;  Location:  GI    GYN SURGERY N/A 08/18/2020    Procedure: MIDURETHRAL SLING, CYSTOSCOPY;  Surgeon: Cherelle Willams MD;  Location: [facility];  Service: Gynecology    HYSTERECTOMY TOTAL ABDOMINAL  2006    for fibroids; reports having blood transfusion after surgery    INSERT DRAIN LUMBAR N/A 07/05/2021    Procedure: Insert drain lumbar;  Surgeon: Jocelyn Noe MD;  Location:  OR    ORTHOPEDIC SURGERY  1998    Right Knee ACL repair    THYROIDECTOMY  09/09/2013    Procedure: THYROIDECTOMY;  LEFT THYROID LOBECTOMY.  (LIGASURE, RECURRENT LARYNGEAL NERVE MONITOR) ;  Surgeon: Uriah Camargo MD;  Location:  [facility]    THYROIDECTOMY      TOTAL KNEE ARTHROPLASTY Left 10/03/2017    TOTAL KNEE ARTHROPLASTY Right 02/07/2019    Procedure: RIGHT TOTAL KNEE ARTHROPLASTY;  Surgeon: Dev Rocha MD;  Location: [facility];  Service: Orthopedics    TUBAL LIGATION  1989    ZZC EXCIS UTERINE FIBROID,ABD  [facility]  1999     Family History   Problem Relation Age of Onset    Hypertension Father         dec    Diabetes Father         dec    Heart Disease Father         dec    Alcohol/Drug Father     Cardiovascular Father     Heart Disease Mother         dec    Alcohol/Drug Mother     Cardiovascular Mother     Heart Disease Daughter         Cardiomyopathy    Cardiovascular Daughter         cardiomyopathy    Colon Cancer Sister     Cardiovascular Son         cardiomyopathy    Diabetes Paternal Grandmother         dec    Hypertension Paternal Grandmother         dec    Cerebrovascular Disease Paternal Grandmother         dec    Cancer Sister         Lupus    Cardiovascular Sister         cardiomyopathy    Heart Disease Sister         heart failure, and kidney failure         Objective  LMP 10/19/2008 (Approximate)     General: healthy, alert and in no acute distress.    HEENT: no scleral icterus or conjunctival erythema.   Skin: no suspicious lesions or rash. No jaundice.   CV: regular rhythm by palpation, 2+ distal pulses.  Resp: normal respiratory effort without conversational dyspnea.   Psych: normal mood and affect.    Gait: nonantalgic, appropriate coordination and balance.     Neuro:        - Sensation to light touch:    - Intact throughout the BUE including all peripheral nerve distributions.         - MSR:       RUE  LUE  - Biceps  2+ 2+  - Brachioradialis 2+ 2+  - Triceps  2+ 2+       - Special tests:   - Spurling's:  Neg     MSK - Shoulder:       - Inspection:    - No significant swelling, erythema, warmth, ecchymosis, lesion, or atrophy noted.        - ROM:    - Full AROM/PROM with pain during shoulder abduction, flexion, rotation       - Palpation:    - TTP at the anterior shoulder, subacromial space.   - NTTP elsewhere.        - Strength:  (*antalgic)  - Shoulder Abduction   5-*    - Shoulder Flexion   5-*    - Shoulder Internal Rotation  5-*    - Shoulder External Rotation  5-*             - Special tests:         - Taveras: Positive   - Neers: Positive   - Empty can: Positive for pain and weakness    - [place]:  Neg    - Scarf:  Neg    - Speeds:  Neg    - Yergason:  Neg     Radiology  I independently reviewed the available relevant imaging in the chart with my interpretations as above in HPI.     I independently reviewed today's new relevant imaging, with the following interpretation:  - XR L shoulder 3/8/2025 shows mild enthesopathy at the greater tuberosity of the humeral head, no major degenerative changes, no acute fractures or dislocations.      Assessment  1. Tendinopathy of rotator cuff, left        Plan  Marleen Mcfadden is a pleasant 59 year old female that presents with chronic left shoulder pain.  She describes pain in the anterolateral shoulder that hurts worse with sharp pain during shoulder abduction and rotation, flexion.  She does have some achy deep joint pain as well.  On exam, she does have positive impingement testing and antalgic weakness in rotator cuff testing.  No concern for complete rotator cuff tear.  Radiographs reassuring for no severe degenerative changes in the joint. History and physical exam appear most consistent with rotator cuff tendinopathy of the left shoulder.     We discussed the nature of the condition and available treatment options, and mutually agreed upon the following plan:    - Imaging:          - Reviewed and independently interpreted the relevant imaging in the chart, including any imaging ordered for today's clinic.  - Reviewed results and images with patient.   - Medications:          - Discussed pharmacologic options for pain relief.   - May use NSAIDs (Ibuprofen, Naproxen) or Acetaminophen (Tylenol) as needed for pain control.   - Do not take these if previously advised to avoid them for other medical conditions.  - May also use topical medications such as lidocaine, IcyHot, BioFreeze, or Voltaren gel as needed for pain control.    - Voltaren gel is an  anti-inflammatory cream that may be used up to 4 times per day over the painful area.   - Injections:          - Discussed possible injection options and alternatives.    - Injection options include: Corticosteroid injection of the subacromial/subdeltoid bursa.    - Deferred injections today and will consider them in the future as needed.   - Therapy:          - Discussed the benefits of therapy vs home exercise program for optimization of range of motion, flexibility, strength, stability and function.   - She is starting physical therapy next week.  - Modalities:          - May use ice, heat, massage or other modalities as needed.   - Activity:          - Encouraged to remain active and participate in regular activities as symptoms allow.   Avoid or modify exacerbating activities as needed.  - Follow up:          - As needed for re-evaluation and update to treatment plan.  - May follow up sooner for new/worsening symptoms.  - May contact clinic by phone or MyChart for questions or concerns.       Christopher Siu DO, ANTOINEM  [facility] - Sports Medicine  [facility] - Department of Orthopedic Surgery       Disclaimer:  This note was prepared and written using Dragon Medical dictation software. As a result, there may be errors in the script that have gone undetected. Please consider this when interpreting the information in this note.

## 2025-03-08 ENCOUNTER — OFFICE VISIT (OUTPATIENT)
Dept: ORTHOPEDICS | Facility: CLINIC | Age: 60
End: 2025-03-08
Payer: COMMERCIAL

## 2025-03-08 ENCOUNTER — ANCILLARY PROCEDURE (OUTPATIENT)
Dept: GENERAL RADIOLOGY | Facility: CLINIC | Age: 60
End: 2025-03-08
Attending: STUDENT IN AN ORGANIZED HEALTH CARE EDUCATION/TRAINING PROGRAM
Payer: COMMERCIAL

## 2025-03-08 DIAGNOSIS — M67.912 TENDINOPATHY OF ROTATOR CUFF, LEFT: Primary | ICD-10-CM

## 2025-03-08 DIAGNOSIS — M25.512 LEFT SHOULDER PAIN: ICD-10-CM

## 2025-03-08 PROCEDURE — 73030 X-RAY EXAM OF SHOULDER: CPT | Mod: LT | Performed by: RADIOLOGY

## 2025-03-08 PROCEDURE — 99204 OFFICE O/P NEW MOD 45 MIN: CPT | Performed by: STUDENT IN AN ORGANIZED HEALTH CARE EDUCATION/TRAINING PROGRAM

## 2025-03-08 NOTE — LETTER
3/8/2025      Marleen Mcfadden  [address]  [place] [address]      Dear Colleague,    Thank you for referring your patient, Marleen Mcfadden, to the [facility] [place]. Please see a copy of my visit note below.    Marleen Mcfadden  :  1965  DOS: 3/8/2025  MRN: [id number]  PCP: Yanna Matias Y    Sports Medicine Clinic Visit      HPI  Marleen Mcfadden is a 59 year old left handed female who is seen in consultation at the request of  No ref. provider found  M.D. presenting with left shoulder pain    - Mechanism of Injury:    - No recent injury.  Did have an injury long ago and did some physical therapy that was helpful.  - Pertinent history and prior evaluations:    - Visit with Jessica Roberts PA-C on 2025 and mention chronic left shoulder pain.  Concern for arthritis and rotator cuff injury.  Recommended starting with physical therapy and follow-up with orthopedics.  Recommend continuing naproxen and added cyclobenzaprine.     - Pain Character:    - Location: Anterolateral left shoulder  - Character: Aching, intermittently shooting down the arm  - Duration: 1+ months  - Course:  worsening  - Endorses:    - Pain as described above, baseline weakness of her hands, antalgic weakness at the left shoulder.  - Denies:    - clicking/popping, numbness, radicular shooting pain, numbness, tingling.  - Alleviating factors:    - Naproxen, rest, activity modifications, heat.  - Aggravating factors:    - sleeping position, lifting, abduction, rotation    - Patient Goals:    - get a formal diagnosis, understand the cause of the symptoms  - Social History:   - Employed as a phlebotomist.      Review of Systems  Musculoskeletal: as above  Remainder of review of systems is negative including constitutional, CV, pulmonary, GI, Skin and Neurologic except as noted in HPI or medical history.    Past Medical History:   Diagnosis Date     Acute bilateral low back pain  with right-sided sciatica 2016     Allergic rhinitis, cause unspecified      Arthritis      Autoimmune disease      Autoimmune disease NEC     Autoimmune disease- unknown/poss SLE     Calcaneal spur 10/21/2014    Xray 10/17/14      CHF with cardiomyopathy (H)      Chronic low back pain      DDD (degenerative disc disease), lumbar      Depression      Failed total knee arthroplasty, initial encounter 2019     Familial cardiomyopathy (H)      GERD without esophagitis      History of transfusion      Hypertension      Injury of left shoulder, initial encounter 2017     Morbid obesity (H)      Nontraumatic rupture of quadriceps tendon, left 2018     KATIA (obstructive sleep apnea)- moderate-severe (AHI 29) 2021     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     no longer an issue     Peroneus longus tendinitis 2015     Plantar fasciitis 2014     PONV (postoperative nausea and vomiting)      Rotator cuff injury 2017     Sprain of other ligament of left ankle, initial encounter 2017     Status post left knee replacement 2018     TB lung, latent     negative quantiferon gold test  12     Past Surgical History:   Procedure Laterality Date     ARTHROPLASTY REVISION KNEE Left 2019    Procedure: REVISION, LEFT TOTAL KNEE  ARTHROPLASTY;  Surgeon: Dev Rocha MD;  Location: [facility];  Service: Orthopedics     ARTHROPLASTY REVISION KNEE Right 2020    Procedure: RIGHT REVISION TOTAL KNEE ARTHROPLASTY;  Surgeon: Dev Rocha MD;  Location: [facility];  Service: Orthopedics     BREAST SURGERY      Breast Reduction     C EXCISE EXCESS SKIN TISSUE,ABDOMEN        SECTION  1989      SECTION  10/1985     COLONOSCOPY N/A 2023    Procedure: Colonoscopy;  Surgeon: Simone Jansen MD;  [facility]     COLONOSCOPY WITH CO2 INSUFFLATION N/A 2021     Procedure: COLONOSCOPY, WITH CO2 INSUFFLATION;  Surgeon: Angela Harrington DO;  Location: [facility]     COLONOSCOPY WITH CO2 INSUFFLATION N/A 4/28/2022    Procedure: COLONOSCOPY, WITH CO2 INSUFFLATION;  Surgeon: Nando Whitlock MD;  Location: [facility]     CRANIECTOMY Right 07/08/2021    Procedure: RIGHT MIDDLE FOSSA APPROACH, CSF LEAK REPAIR;  Surgeon: Jocelyn Noe MD;  [facility]     CRANIOTOMY MIDDLE FOSSA, EXCISE ACOUSTIC NEUROMA, COMBINED N/A 07/05/2021    Procedure: Right CRANIOTOMY, MIDDLE FOSSA APPROACH, FOR REPAIR OF ENCEPHALOCELE;  Surgeon: Jocelyne Harrell MD;  Location: [facility]     CV RIGHT HEART CATH MEASUREMENTS RECORDED N/A 1/10/2023    Procedure: Heart Cath Right Heart Cath;  Surgeon: Slade Hanson MD;  [facility]     CV RIGHT HEART CATH MEASUREMENTS RECORDED N/A 11/26/2024    Procedure: Heart Cath Right Heart Cath;  Surgeon: Yeo, Ilhwan, MD;  [facility]     CYSTOURETHROSCOPY N/A 08/18/2020    Procedure: CYSTOSCOPY;  Surgeon: Cherelle Willams MD;  Location: [facility] OR;  Service: Gynecology     ESOPHAGOSCOPY, GASTROSCOPY, DUODENOSCOPY (EGD), COMBINED N/A 12/13/2023    Procedure: Esophagoscopy, gastroscopy, duodenoscopy (EGD), combined;  Surgeon: Stu Guadalupe MD;  [facility]     GYN SURGERY N/A 08/18/2020    Procedure: MIDURETHRAL SLING, CYSTOSCOPY;  Surgeon: Cherelle Willams MD;  Location: [facility];  Service: Gynecology     HYSTERECTOMY TOTAL ABDOMINAL  2006    for fibroids; reports having blood transfusion after surgery     INSERT DRAIN LUMBAR N/A 07/05/2021    Procedure: Insert drain lumbar;  Surgeon: Jocelyn Noe MD;  [facility]     ORTHOPEDIC SURGERY  1998    Right Knee ACL repair     THYROIDECTOMY  09/09/2013    Procedure: THYROIDECTOMY;  LEFT THYROID LOBECTOMY.  (LIGASURE, RECURRENT LARYNGEAL NERVE MONITOR) ;  Surgeon: Uriah Camargo MD;   Location:  [facility]     THYROIDECTOMY       TOTAL KNEE ARTHROPLASTY Left 10/03/2017     TOTAL KNEE ARTHROPLASTY Right 02/07/2019    Procedure: RIGHT TOTAL KNEE ARTHROPLASTY;  Surgeon: Dev Rocha MD;  Location: [facility] OR;  Service: Orthopedics     TUBAL LIGATION  1989     ZZC EXCIS UTERINE FIBROID,ABD APPRCH  1999     Family History   Problem Relation Age of Onset     Hypertension Father         dec     Diabetes Father         dec     Heart Disease Father         dec     Alcohol/Drug Father      Cardiovascular Father      Heart Disease Mother         dec     Alcohol/Drug Mother      Cardiovascular Mother      Heart Disease Daughter         Cardiomyopathy     Cardiovascular Daughter         cardiomyopathy     Colon Cancer Sister      Cardiovascular Son         cardiomyopathy     Diabetes Paternal Grandmother         dec     Hypertension Paternal Grandmother         dec     Cerebrovascular Disease Paternal Grandmother         dec     Cancer Sister         Lupus     Cardiovascular Sister         cardiomyopathy     Heart Disease Sister         heart failure, and kidney failure         Objective  LMP 10/19/2008 (Approximate)     General: healthy, alert and in no acute distress.    HEENT: no scleral icterus or conjunctival erythema.   Skin: no suspicious lesions or rash. No jaundice.   CV: regular rhythm by palpation, 2+ distal pulses.  Resp: normal respiratory effort without conversational dyspnea.   Psych: normal mood and affect.    Gait: nonantalgic, appropriate coordination and balance.     Neuro:        - Sensation to light touch:    - Intact throughout the BUE including all peripheral nerve distributions.         - MSR:       RUE  LUE  - Biceps  2+ 2+  - Brachioradialis 2+ 2+  - Triceps  2+ 2+       - Special tests:   - Spurling's:  Neg     MSK - Shoulder:       - Inspection:    - No significant swelling, erythema, warmth, ecchymosis, lesion, or atrophy noted.        - ROM:    - Full AROM/PROM with  pain during shoulder abduction, flexion, rotation       - Palpation:    - TTP at the anterior shoulder, subacromial space.   - NTTP elsewhere.        - Strength:  (*antalgic)  - Shoulder Abduction   5-*    - Shoulder Flexion   5-*    - Shoulder Internal Rotation  5-*    - Shoulder External Rotation  5-*             - Special tests:        - [place]aver[place]: Positive   - Neers: Positive   - Empty can: Positive for pain and weakness    - [place]:  Neg    - Scarf:  Neg    - Speeds:  Neg    - Yergason:  Neg     Radiology  I independently reviewed the available relevant imaging in the chart with my interpretations as above in HPI.     I independently reviewed today's new relevant imaging, with the following interpretation:  - XR L shoulder 3/8/2025 shows mild enthesopathy at the greater tuberosity of the humeral head, no major degenerative changes, no acute fractures or dislocations.      Assessment  1. Tendinopathy of rotator cuff, left        Plan  Marleen Mcfadden is a pleasant 59 year old female that presents with chronic left shoulder pain.  She describes pain in the anterolateral shoulder that hurts worse with sharp pain during shoulder abduction and rotation, flexion.  She does have some achy deep joint pain as well.  On exam, she does have positive impingement testing and antalgic weakness in rotator cuff testing.  No concern for complete rotator cuff tear.  Radiographs reassuring for no severe degenerative changes in the joint. History and physical exam appear most consistent with rotator cuff tendinopathy of the left shoulder.     We discussed the nature of the condition and available treatment options, and mutually agreed upon the following plan:    - Imaging:          - Reviewed and independently interpreted the relevant imaging in the chart, including any imaging ordered for today's clinic.  - Reviewed results and images with patient.   - Medications:          - Discussed pharmacologic options for pain relief.   -  May use NSAIDs (Ibuprofen, Naproxen) or Acetaminophen (Tylenol) as needed for pain control.   - Do not take these if previously advised to avoid them for other medical conditions.  - May also use topical medications such as lidocaine, IcyHot, BioFreeze, or Voltaren gel as needed for pain control.    - Voltaren gel is an anti-inflammatory cream that may be used up to 4 times per day over the painful area.   - Injections:          - Discussed possible injection options and alternatives.    - Injection options include: Corticosteroid injection of the subacromial/subdeltoid bursa.    - Deferred injections today and will consider them in the future as needed.   - Therapy:          - Discussed the benefits of therapy vs home exercise program for optimization of range of motion, flexibility, strength, stability and function.   - She is starting physical therapy next week.  - Modalities:          - May use ice, heat, massage or other modalities as needed.   - Activity:          - Encouraged to remain active and participate in regular activities as symptoms allow.   Avoid or modify exacerbating activities as needed.  - Follow up:          - As needed for re-evaluation and update to treatment plan.  - May follow up sooner for new/worsening symptoms.  - May contact clinic by phone or MyChart for questions or concerns.       Christopher Siu DO, CAQSM  [facility] - Sports Medicine  [facility] - Department of Orthopedic Surgery       Disclaimer:  This note was prepared and written using Dragon Medical dictation software. As a result, there may be errors in the script that have gone undetected. Please consider this when interpreting the information in this note.        Again, thank you for allowing me to participate in the care of your patient.        Sincerely,        Christopher Siu DO    Electronically signed

## 2025-03-12 ENCOUNTER — LAB (OUTPATIENT)
Dept: LAB | Facility: CLINIC | Age: 60
End: 2025-03-12
Payer: COMMERCIAL

## 2025-03-12 DIAGNOSIS — I50.22 CHRONIC SYSTOLIC CONGESTIVE HEART FAILURE (H): ICD-10-CM

## 2025-03-12 LAB
ANION GAP SERPL CALCULATED.3IONS-SCNC: 13 MMOL/L (ref 7–15)
BUN SERPL-MCNC: 13 MG/DL (ref 8–23)
CALCIUM SERPL-MCNC: 9 MG/DL (ref 8.8–10.4)
CHLORIDE SERPL-SCNC: 104 MMOL/L (ref 98–107)
CREAT SERPL-MCNC: 0.91 MG/DL (ref 0.51–0.95)
EGFRCR SERPLBLD CKD-EPI 2021: 72 ML/MIN/1.73M2
GLUCOSE SERPL-MCNC: 91 MG/DL (ref 70–99)
HCO3 SERPL-SCNC: 23 MMOL/L (ref 22–29)
POTASSIUM SERPL-SCNC: 4.3 MMOL/L (ref 3.4–5.3)
SODIUM SERPL-SCNC: 140 MMOL/L (ref 135–145)

## 2025-03-12 PROCEDURE — 36415 COLL VENOUS BLD VENIPUNCTURE: CPT

## 2025-03-12 PROCEDURE — 80048 BASIC METABOLIC PNL TOTAL CA: CPT

## 2025-03-13 ENCOUNTER — OFFICE VISIT (OUTPATIENT)
Dept: CARDIOLOGY | Facility: CLINIC | Age: 60
End: 2025-03-13
Payer: COMMERCIAL

## 2025-03-13 VITALS
HEART RATE: 59 BPM | SYSTOLIC BLOOD PRESSURE: 115 MMHG | OXYGEN SATURATION: 98 % | BODY MASS INDEX: 42.89 KG/M2 | WEIGHT: 250 LBS | DIASTOLIC BLOOD PRESSURE: 75 MMHG

## 2025-03-13 DIAGNOSIS — G47.33 OSA (OBSTRUCTIVE SLEEP APNEA): ICD-10-CM

## 2025-03-13 DIAGNOSIS — I50.22 CHRONIC SYSTOLIC CONGESTIVE HEART FAILURE (H): Primary | ICD-10-CM

## 2025-03-13 DIAGNOSIS — I42.9 FAMILIAL CARDIOMYOPATHY (H): ICD-10-CM

## 2025-03-13 DIAGNOSIS — I10 ESSENTIAL HYPERTENSION: ICD-10-CM

## 2025-03-13 DIAGNOSIS — I49.3 PVC'S (PREMATURE VENTRICULAR CONTRACTIONS): ICD-10-CM

## 2025-03-13 NOTE — NURSING NOTE
"Chief Complaint   Patient presents with    CORE       Initial /75 (BP Location: Left arm, Patient Position: Chair, Cuff Size: Adult Large)   Pulse 59   Wt 113.4 kg (250 lb)   LMP 10/19/2008 (Approximate)   SpO2 98%   BMI 42.89 kg/m   Estimated body mass index is 42.89 kg/m  as calculated from the following:    Height as of 1/27/25: 1.626 m (5' 4.02\").    Weight as of this encounter: 113.4 kg (250 lb)..  BP completed using cuff size: martine Maria, Visit Facilitator    "
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
## 2025-03-13 NOTE — PATIENT INSTRUCTIONS
Take your medicines every day, as directed    Changes made today:  No medication changes   Monitor Your Weight and Symptoms    Contact us if you:    Gain 2 pounds in one day or 5 pounds in one week  Feel more short of breath  Notice more leg swelling  Feel lightheadeded   Change your lifestyle    Limit Salt or Sodium:  2000 mg  Limit Fluids:  2000 mL or approximately 64 ounces  Eat a Heart Healthy Diet  Low in saturated fats  Stay Active:  Aim to move at least 150 minutes every  week         To Contact us    During Business Hours:  [phone], option # 1      After hours, weekends or holidays:   [phone], Option #4  Ask to speak to the On-Call Cardiologist. Inform them you are a CORE/heart failure patient at the [facility].     Use [employer] allows you to communicate directly with your heart team through secure messaging.  [employer]ett can be accessed any time on your phone, computer, or tablet.  If you need assistance, we'd be happy to help!         Keep your Heart Appointments:    CORE in September         Heart Failure Support Group  Virtual meetings will continue in 2025 Please reach out if you would like to attend and we can get you the information you need to log in.         Follow the American Heart Association Diet and Lifestyle recommendations:  Limit saturated fat, trans fat, sodium, red meat, sweets and sugar-sweetened beverages. If you choose to eat red meat, compare labels and select the leanest cuts available.  Aim for at least 150 minutes of moderate physical activity or 75 minutes of vigorous physical activity - or an equal combination of both - each week.     During business hours: [phone], press option # 1 to schedule an appointment or send a message to your care team     After hours, weekends or holidays: On Call Cardiologist- [phone]   option #4 and ask to speak to the on-call Cardiologist. Inform them you are a CORE/heart failure patient at the [facility].

## 2025-03-17 ENCOUNTER — THERAPY VISIT (OUTPATIENT)
Dept: PHYSICAL THERAPY | Facility: CLINIC | Age: 60
End: 2025-03-17
Attending: PHYSICIAN ASSISTANT
Payer: COMMERCIAL

## 2025-03-17 ENCOUNTER — MYC MEDICAL ADVICE (OUTPATIENT)
Dept: GASTROENTEROLOGY | Facility: CLINIC | Age: 60
End: 2025-03-17

## 2025-03-17 DIAGNOSIS — M25.512 CHRONIC LEFT SHOULDER PAIN: ICD-10-CM

## 2025-03-17 DIAGNOSIS — G89.29 CHRONIC LEFT SHOULDER PAIN: ICD-10-CM

## 2025-03-17 PROCEDURE — 97110 THERAPEUTIC EXERCISES: CPT | Mod: GP | Performed by: PHYSICAL THERAPIST

## 2025-03-17 PROCEDURE — 97161 PT EVAL LOW COMPLEX 20 MIN: CPT | Mod: GP | Performed by: PHYSICAL THERAPIST

## 2025-03-17 ASSESSMENT — ACTIVITIES OF DAILY LIVING (ADL)
PUTTING_ON_YOUR_PANTS: 3
PLACING_AN_OBJECT_ON_A_HIGH_SHELF: 5
PUTTING_ON_AN_UNDERSHIRT_OR_A_PULLOVER_SWEATER: 5
WHEN_LYING_ON_THE_INVOLVED_SIDE: 10
PUSHING_WITH_THE_INVOLVED_ARM: 5
CARRYING_A_HEAVY_OBJECT_OF_10_POUNDS: 5
AT_ITS_WORST?: 10
REMOVING_SOMETHING_FROM_YOUR_BACK_POCKET: 5
WASHING_YOUR_HAIR?: 5
PLEASE_INDICATE_YOR_PRIMARY_REASON_FOR_REFERRAL_TO_THERAPY:: SHOULDER
WASHING_YOUR_BACK: 7
REACHING_FOR_SOMETHING_ON_A_HIGH_SHELF: 5

## 2025-03-17 NOTE — PROGRESS NOTES
PHYSICAL THERAPY EVALUATION  Type of Visit: Evaluation              Subjective   Patient reports the onset of left shoulder pain 3 months ago without inciting event. She reports pain is located over the front of her left shoulder and on the outside (lateral aspect) her the left shoulder.  She reports pain immediately upon waking, when reaching behind her back, reaching overhead. She reports feeling weakness throughout her left shoulder. She reports occasional numbness in her left upper extremity when she is sitting for long periods.    Patient denies neck pain.           Presenting condition or subjective complaint: shoulder  Date of onset:      Relevant medical history: Heart problems   Dates & types of surgery: tk right and left, brain sugery,hestorectomy, , breast reduction    Prior diagnostic imaging/testing results: X-ray     Prior therapy history for the same diagnosis, illness or injury: No      Living Environment  Social support: With a significant other or spouse   Type of home: House   Stairs to enter the home: Yes 3 Is there a railing: Yes     Ramp: No   Stairs inside the home: Yes 13 Is there a railing: Yes     Help at home: None  Equipment owned:       Employment: Yes phlebotomist  Hobbies/Interests:      Patient goals for therapy: be pain free    Pain assessment: Location: left anteriolateral shoulder/Ratin/10     Objective   SHOULDER EVALUATION  PAIN: Pain Level with Use: 7/10  INTEGUMENTARY (edema, incisions): WNL  POSTURE: Standing Posture: Rounded shoulders, Forward head  Sitting Posture: Rounded shoulders, Forward head    ROM:  Left shoulder AROM flexion 145 deg, abduction 80 deg, ER 45 deg with arm adducted, IR L1. Pain reported at end range of all left shoulder AROM    STRENGTH:  grossly 4/5 throughout left shoulder  FLEXIBILITY: WNL  SPECIAL TESTS:  +Neers, +Taveras Quirino, +Empty Can. - scapular assistance testing  PALPATION:  tenderness over proximal long head bicep  tendon  JOINT MOBILITY: WNL  CERVICAL SCREEN: WNL    Assessment & Plan   CLINICAL IMPRESSIONS  Medical Diagnosis:      Treatment Diagnosis:     Impression/Assessment: Patient is a 59 year old female with left shoulder complaints.  The following significant findings have been identified: Pain, Decreased ROM/flexibility, Decreased strength, Decreased activity tolerance, and Impaired posture. These impairments interfere with their ability to perform self care tasks, work tasks, recreational activities, household chores, and driving  as compared to previous level of function.     Clinical Decision Making (Complexity):  Clinical Presentation: Stable/Uncomplicated  Clinical Presentation Rationale: based on medical and personal factors listed in PT evaluation  Clinical Decision Making (Complexity): Low complexity    PLAN OF CARE  Treatment Interventions:  Interventions: Manual Therapy, Neuromuscular Re-education, Therapeutic Activity, Therapeutic Exercise    Long Term Goals            Frequency of Treatment:    Duration of Treatment:      Recommended Referrals to Other Professionals:   Education Assessment:        Risks and benefits of evaluation/treatment have been explained.   Patient/Family/caregiver agrees with Plan of Care.     Evaluation Time:             Signing Clinician: Sonido Swartz, PT

## 2025-03-20 ENCOUNTER — OFFICE VISIT (OUTPATIENT)
Dept: FAMILY MEDICINE | Facility: CLINIC | Age: 60
End: 2025-03-20
Payer: COMMERCIAL

## 2025-03-20 ENCOUNTER — ANCILLARY PROCEDURE (OUTPATIENT)
Dept: GENERAL RADIOLOGY | Facility: CLINIC | Age: 60
End: 2025-03-20
Attending: PHYSICIAN ASSISTANT
Payer: COMMERCIAL

## 2025-03-20 VITALS
HEART RATE: 57 BPM | SYSTOLIC BLOOD PRESSURE: 111 MMHG | TEMPERATURE: 96.8 F | OXYGEN SATURATION: 99 % | WEIGHT: 252.25 LBS | BODY MASS INDEX: 43.28 KG/M2 | DIASTOLIC BLOOD PRESSURE: 78 MMHG

## 2025-03-20 DIAGNOSIS — Z87.19 H/O DIVERTICULITIS OF COLON: ICD-10-CM

## 2025-03-20 DIAGNOSIS — R19.7 DIARRHEA, UNSPECIFIED TYPE: ICD-10-CM

## 2025-03-20 DIAGNOSIS — R79.89 ABNORMAL TSH: ICD-10-CM

## 2025-03-20 DIAGNOSIS — J06.9 UPPER RESPIRATORY TRACT INFECTION, UNSPECIFIED TYPE: ICD-10-CM

## 2025-03-20 DIAGNOSIS — E66.01 MORBID OBESITY WITH BMI OF 40.0-44.9, ADULT (H): ICD-10-CM

## 2025-03-20 DIAGNOSIS — J18.9 PNEUMONIA OF RIGHT LOWER LOBE DUE TO INFECTIOUS ORGANISM: ICD-10-CM

## 2025-03-20 DIAGNOSIS — R10.32 LLQ ABDOMINAL PAIN: Primary | ICD-10-CM

## 2025-03-20 DIAGNOSIS — I50.22 CHRONIC SYSTOLIC CONGESTIVE HEART FAILURE (H): ICD-10-CM

## 2025-03-20 DIAGNOSIS — R11.0 NAUSEA: ICD-10-CM

## 2025-03-20 LAB
ALBUMIN UR-MCNC: NEGATIVE MG/DL
APPEARANCE UR: CLEAR
BILIRUB UR QL STRIP: NEGATIVE
COLOR UR AUTO: YELLOW
DEPRECATED S PYO AG THROAT QL EIA: NEGATIVE
ERYTHROCYTE [DISTWIDTH] IN BLOOD BY AUTOMATED COUNT: 13.3 % (ref 10–15)
EST. AVERAGE GLUCOSE BLD GHB EST-MCNC: 100 MG/DL
FLUAV RNA SPEC QL NAA+PROBE: NEGATIVE
FLUBV RNA RESP QL NAA+PROBE: NEGATIVE
GLUCOSE UR STRIP-MCNC: NEGATIVE MG/DL
HBA1C MFR BLD: 5.1 % (ref 0–5.6)
HCT VFR BLD AUTO: 35.5 % (ref 35–47)
HGB BLD-MCNC: 11.6 G/DL (ref 11.7–15.7)
HGB UR QL STRIP: NEGATIVE
KETONES UR STRIP-MCNC: NEGATIVE MG/DL
LEUKOCYTE ESTERASE UR QL STRIP: NEGATIVE
MCH RBC QN AUTO: 30.2 PG (ref 26.5–33)
MCHC RBC AUTO-ENTMCNC: 32.7 G/DL (ref 31.5–36.5)
MCV RBC AUTO: 92 FL (ref 78–100)
NITRATE UR QL: NEGATIVE
PH UR STRIP: 5.5 [PH] (ref 5–7)
PLATELET # BLD AUTO: 259 10E3/UL (ref 150–450)
RBC # BLD AUTO: 3.84 10E6/UL (ref 3.8–5.2)
RBC #/AREA URNS AUTO: ABNORMAL /HPF
RSV RNA SPEC NAA+PROBE: NEGATIVE
S PYO DNA THROAT QL NAA+PROBE: NOT DETECTED
SARS-COV-2 RNA RESP QL NAA+PROBE: NEGATIVE
SP GR UR STRIP: 1.01 (ref 1–1.03)
SQUAMOUS #/AREA URNS AUTO: ABNORMAL /LPF
UROBILINOGEN UR STRIP-ACNC: 0.2 E.U./DL
WBC # BLD AUTO: 6 10E3/UL (ref 4–11)
WBC #/AREA URNS AUTO: ABNORMAL /HPF

## 2025-03-20 RX ORDER — AZITHROMYCIN 250 MG/1
TABLET, FILM COATED ORAL
Qty: 6 TABLET | Refills: 0 | Status: SHIPPED | OUTPATIENT
Start: 2025-03-20 | End: 2025-03-25

## 2025-03-20 RX ORDER — DEXTROMETHORPHAN HBR, GUAIFENESIN 60; 1200 MG/1; MG/1
1 TABLET, EXTENDED RELEASE ORAL 2 TIMES DAILY
Qty: 28 TABLET | Refills: 0 | Status: SHIPPED | OUTPATIENT
Start: 2025-03-20

## 2025-03-20 RX ORDER — ONDANSETRON 4 MG/1
4 TABLET, ORALLY DISINTEGRATING ORAL EVERY 8 HOURS PRN
Qty: 12 TABLET | Refills: 0 | Status: SHIPPED | OUTPATIENT
Start: 2025-03-20

## 2025-03-20 ASSESSMENT — PAIN SCALES - GENERAL: PAINLEVEL_OUTOF10: SEVERE PAIN (7)

## 2025-03-20 ASSESSMENT — ENCOUNTER SYMPTOMS
VOMITING: 1
HEADACHES: 1

## 2025-03-20 NOTE — PROGRESS NOTES
Assessment & Plan   Problem List Items Addressed This Visit          Digestive    Morbid obesity with BMI of 40.0-44.9, adult (H)       Circulatory    Chronic systolic congestive heart failure (H) (Chronic)    Relevant Orders    BNP-N terminal pro     Other Visit Diagnoses       LLQ abdominal pain    -  Primary    Relevant Orders    Comprehensive metabolic panel (BMP + Alb, Alk Phos, ALT, AST, Total. Bili, TP)    CBC with platelets (Completed)    CT Abdomen Pelvis w Contrast    UA with Microscopic - lab collect (Completed)    Urine Culture Aerobic Bacterial - lab collect    Urine Microscopic Exam (Completed)    Diarrhea, unspecified type        Relevant Orders    CT Abdomen Pelvis w Contrast    Enteric Bacteria and Virus Panel by QUIRINO Stool    Upper respiratory tract infection, unspecified type        Relevant Medications    Dextromethorphan-Guaifenesin  MG TB12    Other Relevant Orders    XR Chest 2 Views (Completed)    Influenza A/B, RSV and SARS-CoV2 PCR (COVID-19) Nose    Streptococcus A Rapid Screen w/Reflex to PCR - Clinic Collect (Completed)    Group A Streptococcus PCR Throat Swab    Nausea        Relevant Medications    ondansetron (ZOFRAN ODT) 4 MG ODT tab    Other Relevant Orders    Hemoglobin A1c (Completed)    Abnormal TSH        Relevant Orders    TSH with free T4 reflex    H/O diverticulitis of colon        Pneumonia of right lower lobe due to infectious organism        Relevant Medications    Dextromethorphan-Guaifenesin  MG TB12    azithromycin (ZITHROMAX) 250 MG tablet           URI- patient reports that she has been exposed to Covid, RSV and Influenza multiple times in the last 2 weeks   Take Mucinex DM 1 tablet twice a day for cough and mucous, Tylenol or patient's prefers Naproxen as needed for headache and body aches  Rest  Drink water   Nausea- Zofran 4 mg every 8 hours as needed   Push fluids to prevent dehydration  CHF- no pitting edema on exam, last cardiology follow up was  "in January 2025.   Chest xray         BMI  Estimated body mass index is 43.28 kg/m  as calculated from the following:    Height as of 1/27/25: 1.626 m (5' 4.02\").    Weight as of this encounter: 114.4 kg (252 lb 4 oz).   Weight management plan: patient sees primary care provider           Dinorah   Marleen is a 59 year old, presenting for the following health issues:  Headache, Generalized Body Aches, and Vomiting        3/20/2025    10:49 AM   Additional Questions   Roomed by Shazia   Accompanied by Spouse     Headache   Associated symptoms include vomiting.   Vomiting   Associated symptoms include headaches.           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  Recent use of antibiotics: No  Recent travels: No  Recent medication-new or changes(Rx or OTC): No  Precipitating or alleviating factors: None  Therapies tried and outcome: none    Acute Illness  Acute illness concerns: cold symptoms  Onset/Duration: 1 week  Symptoms:  Fever: No  Chills/Sweats: YES  Headache (location?): YES  Sinus Pressure: YES  Conjunctivitis:  No  Ear Pain: YES- pressure  Rhinorrhea: YES  Congestion: YES  Sore Throat: YES  Cough: YES-productive of yellow sputum  Wheeze: No  Decreased Appetite: No  Nausea: YES  Vomiting: YES  Diarrhea: YES  Dysuria/Freq.: No  Dysuria or Hematuria: No  Fatigue/Achiness: YES  Sick/Strep Exposure: YES- covid, RSV, pneumonia  Therapies tried and outcome: Naproxen       Review of Systems  Constitutional, HEENT, cardiovascular, pulmonary, gi and gu systems are negative, except as otherwise noted.      Objective    /78 (BP Location: " Left arm, Patient Position: Sitting, Cuff Size: Adult Large)   Pulse 57   Temp 96.8  F (36  C) (Temporal)   Wt 114.4 kg (252 lb 4 oz)   LMP 10/19/2008 (Approximate)   SpO2 99%   BMI 43.28 kg/m    Body mass index is 43.28 kg/m .  Physical Exam   GENERAL: alert and no distress  EYES: Eyes grossly normal to inspection, PERRL and conjunctivae and sclerae normal  HENT: normal cephalic/atraumatic, ear canals and TM's normal, nose and mouth without ulcers or lesions, oropharynx clear, and oral mucous membranes moist  NECK: no adenopathy, no asymmetry, masses, or scars  RESP: lungs clear to auscultation - no rales, rhonchi or wheezes  CV: regular rate and rhythm, normal S1 S2, no S3 or S4, no murmur, click or rub, no peripheral edema  ABDOMEN: soft, nontender, no hepatosplenomegaly, no masses and bowel sounds normal  MS: no gross musculoskeletal defects noted, no edema  SKIN: no suspicious lesions or rashes  NEURO: Normal strength and tone, mentation intact and speech normal  PSYCH: mentation appears normal, affect normal/bright    Results for orders placed or performed in visit on 03/20/25 (from the past 24 hours)   Streptococcus A Rapid Screen w/Reflex to PCR - Clinic Collect    Specimen: Throat; Swab   Result Value Ref Range    Group A Strep antigen Negative Negative   CBC with platelets   Result Value Ref Range    WBC Count 6.0 4.0 - 11.0 10e3/uL    RBC Count 3.84 3.80 - 5.20 10e6/uL    Hemoglobin 11.6 (L) 11.7 - 15.7 g/dL    Hematocrit 35.5 35.0 - 47.0 %    MCV 92 78 - 100 fL    MCH 30.2 26.5 - 33.0 pg    MCHC 32.7 31.5 - 36.5 g/dL    RDW 13.3 10.0 - 15.0 %    Platelet Count 259 150 - 450 10e3/uL   UA with Microscopic - lab collect   Result Value Ref Range    Color Urine Yellow Colorless, Straw, Light Yellow, Yellow    Appearance Urine Clear Clear    Glucose Urine Negative Negative mg/dL    Bilirubin Urine Negative Negative    Ketones Urine Negative Negative mg/dL    Specific Gravity Urine 1.015 1.003 - 1.035     Blood Urine Negative Negative    pH Urine 5.5 5.0 - 7.0    Protein Albumin Urine Negative Negative mg/dL    Urobilinogen Urine 0.2 0.2, 1.0 E.U./dL    Nitrite Urine Negative Negative    Leukocyte Esterase Urine Negative Negative   Hemoglobin A1c   Result Value Ref Range    Estimated Average Glucose 100 <117 mg/dL    Hemoglobin A1C 5.1 0.0 - 5.6 %   Urine Microscopic Exam   Result Value Ref Range    RBC Urine None Seen 0-2 /HPF /HPF    WBC Urine None Seen 0-5 /HPF /HPF    Squamous Epithelials Urine Few (A) None Seen /LPF     *Note: Due to a large number of results and/or encounters for the requested time period, some results have not been displayed. A complete set of results can be found in Results Review.       Diagnostic Test Results:  CXR - no acute effusion. Awaiting radiology report                                                                  IMPRESSION:   1. Slightly increased haziness is present at the right lung base, which may reflect atelectasis or pneumonia in the appropriate setting. No pleural effusions. No pneumothorax.   2. Normal cardiomediastinal silhouette.      Signed Electronically by: Alix Gardner PA-C

## 2025-03-20 NOTE — Clinical Note
3/20/2025    Marleen Mcfadden   1965        To Whom it May Concern;    Please excuse Marleen Mcfadden from work/school for a healthcare visit on Mar 20, 2025.    Sincerely,        Alix Gardner PA-C

## 2025-03-20 NOTE — PATIENT INSTRUCTIONS
At [facility], we strive to deliver an exceptional experience to you, every time we see you. If you receive a survey, please let us know what we are doing well and/or what we could improve upon, as we do value your feedback.  If you have MyChart, you can expect to receive results automatically within 24 hours of their completion.  Your provider will send a note interpreting your results as well.   If you do not have MyChart, you should receive your results in about a week by mail.    Your care team:                            Family Medicine Internal Medicine   MD Devin Mahmood, MD Lucille Schultz, MD Darius Hassan, MD Kellie Gardner, PADorisC    Dallin Dickerson, MD Pediatrics   Mary Ortiz, MD Clary Galo, MD Irais Monroy, APRN CNP Belle Lentz APRN CNP   MD Jodi Schaefer, MD Blanka Ling, CNP     Ezekiel Carbajal, CNP Same-Day Provider (No follow-up visits)   PITO Wood, DNP Blossom Anderson, PITO Waterman, FNP, BC LUBNA AlanisC     Clinic hours: Monday - Thursday 7 am-6 pm; Fridays 7 am-5 pm.   Urgent care: Monday - Friday 10 am- 8 pm; Saturday and Sunday 9 am-5 pm.    Clinic: [phone]       [facility]: Monday - Thursday 8 am - 7 pm; Friday 8 am - 6 pm  [facility]: [phone]

## 2025-03-20 NOTE — LETTER
March 20, 2025      [place]leen Mcfadden  [address] [place] [address]        To Whom It May Concern:    Marleen Mcfadden  was seen on 3/20/25.  Please excuse her  until 3/24/25 due to illness.        Sincerely,        Alix Gardner PA-C    Electronically signed

## 2025-03-22 LAB — BACTERIA UR CULT: NORMAL

## 2025-03-24 ENCOUNTER — MYC MEDICAL ADVICE (OUTPATIENT)
Dept: FAMILY MEDICINE | Facility: CLINIC | Age: 60
End: 2025-03-24
Payer: COMMERCIAL

## 2025-03-24 ENCOUNTER — TELEPHONE (OUTPATIENT)
Dept: GASTROENTEROLOGY | Facility: CLINIC | Age: 60
End: 2025-03-24
Payer: COMMERCIAL

## 2025-03-24 DIAGNOSIS — J18.9 PNEUMONIA OF RIGHT LOWER LOBE DUE TO INFECTIOUS ORGANISM: Primary | ICD-10-CM

## 2025-03-24 NOTE — TELEPHONE ENCOUNTER
Patient wondering about order for repeat chest x-ray. Last x-ray was completed on 3/20/25.         Routing to provider to review/advise.       Jessica TOMLINN,  RN  [facility]

## 2025-03-24 NOTE — TELEPHONE ENCOUNTER
Call back placed to [facility] to clarify referral. Jevon Campuzano PA-C would like a consult and evaluation.   Left message with call back number.

## 2025-03-24 NOTE — TELEPHONE ENCOUNTER
Health Call Center    Phone Message    May a detailed message be left on voicemail: yes     Reason for Call: Alejandra with [facility] called for clarification on the referral they received. They are wondering if this is for testing only or if the provider wanted the patient to have a consult also? Please contact the [facility] at [phone]. Can speak with anyone.    Action Taken: Message routed to:  Adult Clinics: Gastroenterology (GI) p [id number]    Travel Screening: Not Applicable

## 2025-03-24 NOTE — TELEPHONE ENCOUNTER
Spoke to [facility] who states the referral was checked for testing only. Will reach out to patient to offer consult.

## 2025-03-25 ENCOUNTER — MEDICAL CORRESPONDENCE (OUTPATIENT)
Dept: HEALTH INFORMATION MANAGEMENT | Facility: CLINIC | Age: 60
End: 2025-03-25
Payer: COMMERCIAL

## 2025-03-27 ENCOUNTER — ANCILLARY PROCEDURE (OUTPATIENT)
Dept: GENERAL RADIOLOGY | Facility: CLINIC | Age: 60
End: 2025-03-27
Attending: PHYSICIAN ASSISTANT
Payer: COMMERCIAL

## 2025-03-27 ENCOUNTER — VIRTUAL VISIT (OUTPATIENT)
Dept: PHARMACY | Facility: CLINIC | Age: 60
End: 2025-03-27
Payer: COMMERCIAL

## 2025-03-27 VITALS — BODY MASS INDEX: 43.02 KG/M2 | WEIGHT: 252 LBS | HEIGHT: 64 IN

## 2025-03-27 DIAGNOSIS — R39.15 URINARY URGENCY: ICD-10-CM

## 2025-03-27 DIAGNOSIS — J18.9 PNEUMONIA OF RIGHT LOWER LOBE DUE TO INFECTIOUS ORGANISM: ICD-10-CM

## 2025-03-27 DIAGNOSIS — E66.9 OBESITY: ICD-10-CM

## 2025-03-27 DIAGNOSIS — E83.42 HYPOMAGNESEMIA: ICD-10-CM

## 2025-03-27 DIAGNOSIS — G89.29 CHRONIC LEFT SHOULDER PAIN: ICD-10-CM

## 2025-03-27 DIAGNOSIS — Z78.9 TAKES DIETARY SUPPLEMENTS: ICD-10-CM

## 2025-03-27 DIAGNOSIS — R73.03 PREDIABETES: Primary | ICD-10-CM

## 2025-03-27 DIAGNOSIS — R11.0 NAUSEA: ICD-10-CM

## 2025-03-27 DIAGNOSIS — I50.22 CHRONIC SYSTOLIC CONGESTIVE HEART FAILURE (H): Chronic | ICD-10-CM

## 2025-03-27 DIAGNOSIS — M25.512 CHRONIC LEFT SHOULDER PAIN: ICD-10-CM

## 2025-03-27 DIAGNOSIS — J30.89 NON-SEASONAL ALLERGIC RHINITIS, UNSPECIFIED TRIGGER: ICD-10-CM

## 2025-03-27 DIAGNOSIS — G47.33 OSA (OBSTRUCTIVE SLEEP APNEA): Chronic | ICD-10-CM

## 2025-03-27 DIAGNOSIS — K21.00 GASTROESOPHAGEAL REFLUX DISEASE WITH ESOPHAGITIS WITHOUT HEMORRHAGE: Chronic | ICD-10-CM

## 2025-03-27 RX ORDER — METFORMIN HYDROCHLORIDE 500 MG/1
500 TABLET, EXTENDED RELEASE ORAL
Qty: 30 TABLET | Refills: 2 | Status: SHIPPED | OUTPATIENT
Start: 2025-03-27

## 2025-03-27 ASSESSMENT — PAIN SCALES - GENERAL: PAINLEVEL_OUTOF10: NO PAIN (0)

## 2025-03-27 NOTE — Clinical Note
Kory Chaudhry- patient may be starting bupropion through weight management which has an interaction with metoclopramide. Would you be okay with her reducing metoclopramide to 5 mg 3 times daily when she starts bupropion? If it's as helpful, could taper back up to 10 mg 3 times daily (max dose 30g/day for GERD if on bupropion). She is able to split her tablets in half. Thank you! Laureen Garcia, PharmD, [facility] Medication Therapy Management Pharmacist

## 2025-03-27 NOTE — NURSING NOTE
Current patient location: [address]  [facility] [address]    Is the patient currently in the state of [place]? YES    Visit mode: TELEPHONE    If the visit is dropped, the patient can be reconnected by:TELEPHONE VISIT: Phone number:   Telephone Information:   Mobile [phone]       Will anyone else be joining the visit? NO  (If patient encounters technical issues they should call [phone]    Are changes needed to the allergy or medication list? No  Pt states all meds current/recent visit and declines further review.    Are refills needed on medications prescribed by this physician? Discuss with provider    Rooming Documentation:  Questionnaire(s) not pre-assigned    Reason for visit: Medication Therapy Management    Dario ARREDONDO

## 2025-03-27 NOTE — PATIENT INSTRUCTIONS
"  Recommendations from today's MTM visit:                                                      Start metformin  mg once daily   Hold the day of surgery or per pre-op instructions   Start Contrave after upcoming surgery if covered on insurance (will need to do a prior authorization and appeal)  Dosing instructions:   Week 1: Take 1 tablet by mouth once daily in AM   Week 2: if tolerating without issues, increase to 1 tablet in the morning and 1 tablet in early PM   Week 3: if tolerating without issues, increase to 2 tablets in the morning and 1 tablet in early PM   Week 4 & after: if tolerating without issues, increase to 2 tablets in the morning and 2 tablets in early PM.    Take with food to better tolerate the medication.   Bupropion (in Contrave) interacts with metoclopramide. Reduce metoclopramide to 5 mg (1/23 tablet) 3 times daily when you start bupropion to find a good balance of getting benefit from metoclopramide while reducing risk for side effects. Will discuss this with GI.   Schedule registered dietitian follow-up   Weight management clinic scheduling line: [phone]    Follow-up: 2 months with medication therapy management, 3 months with weight management    It was great speaking with you today.  I value your experience and would be very thankful for your time in providing feedback in our clinic survey. In the next few days, you may receive an email or text message from [employer] with a link to a survey related to your  clinical pharmacist.\"     To schedule another MTM appointment, please call the clinic directly or you may call the MTM scheduling line at [phone].    My Clinical Pharmacist's contact information:                                                      Please feel free to contact me with any questions or concerns you have.      Laureen Garcia, PharmD, AAHIVP  Medication Therapy Management Pharmacist           CONTRAVE (bupropion and naltrexone)    Contrave is specifically " prescribed for Weight Management. It is a combination of two medications, Naltrexone and Bupropion (Wellbutrin). Contrave is specifically formulated to help control hunger and cravings. It targets 2 key areas of the brain:    Mesolimbic reward system: Involved with feeling pleasure during rewarding experiences like eating. This may cause intense cravings, making food a source of comfort.     2. Hypothalamus: Regulates hunger and satiety signals. This part of the brain balances the body's energy needs and can be influenced by emotional states, potentially affecting eating behavior.     Watch a video on how Contrave works: [email or web address]://[email or web address]/how-contrave-works/    For some of our patients the medication works right away. Other patients don't feel much of a change but find they've lost weight. Like all weight loss medications, Contrave works best when you help it work. This means:  1. Having less tempting high calorie (fattening) food around the house or office (for people with strong cravings this is very important)   2. Staying away from situations or people that may trigger your cravings.   3. Eating out only one time or less each week.  4. Eating your meals at a table with the TV or computer off.    Dosing instructions:   Week 1: Take 1 tablet by mouth once daily in AM   Week 2: if tolerating without issues, increase to 1 tablet in the morning and 1 tablet in early PM   Week 3: if tolerating without issues, increase to 2 tablets in the morning and 1 tablet in early PM   Week 4 & after: if tolerating without issues, increase to 2 tablets in the morning and 2 tablets in early PM.    Take with food to better tolerate the medication.     Savings Card and Lower Cost Option(s):   If insurance covers Contrave, patients may pay as low as $20 with savings card. If patients don t have insurance or if insurance doesn t cover Contrave, they will pay no more than $199 with savings card.   Savings card is available at:  [email or web address]/savings-[email or web address]-support/    Otherwise, can can Contrave for $99 per month through [facility] [place]  [employer] [facility] where their mail order pharmacy is utilized. Through this program, RX is sent to one of their mail order pharmacies, then RX mailed to your home.     Side Effects:   The most common side effect include: nausea; constipation; headache; vomiting; dizziness; insomnia, diarrhea; trouble sleeping; and dry mouth.     WARNING: This medication blocks the action of opioid type pain medications. If you routinely take any medication like Codeine, Oxycontin, Percocet, Morphine, Dilaudid or Methodone, you cannot take Contrave. Please talk to your Weight Management care team if you will be prescribed one of these agents and are on Contrave.     Prior Authorization Process for Weight Management Medications: You are being prescribed a medication that will likely need to undergo a prior authorization.  A prior authorization is when the clinic needs to fill out a form that is sent to your insurance company to obtain coverage for that medication. The prescription will NOT automatically go to your pharmacy today if it needs a Prior Authorization. If the medication prior authorization is approved, the care team will contact you and the prescription will be released to your pharmacy. If denied, we will work to try and appeal the prior authorization if possible. The initial prior authorization process takes up to 5-10 business days and appeals can take up to 30 days. If you do not hear from us at the end of that time, you may contact the clinic.    Upcoming Surgery? If you are planning on having an elective surgery, you need to taper down and off Contrave 1 week prior to surgery. Talk to your Weight Management team as to how to taper down and off Contrave before surgery as tapering plan will depend on how long you have been on Contrave and at what dose you are currently taking.     For any  questions or concerns please send a [employer] message to our team or call our weight management call center at [phone] during regular business hours. For questions during evenings or weekends your messages will be addressed during the next business day.  For emergencies please call 911 or seek immediate medical care.     In order to get refills of this or any medication we prescribe you must be seen in the medical weight mgmt clinic every 2-4 months. Please have your pharmacy fax a refill request to [phone].        Metformin    Theory & Mechanism of Action    Metformin, primarily used for type 2 diabetes, has been explored for weight loss due to its effects on insulin resistance, appetite, and metabolism. It works by:  Improving Insulin Sensitivity - Reduces hepatic glucose production and enhances peripheral glucose uptake, lowering insulin levels and fat storage.  Decreasing Appetite - Influences hypothalamic pathways and increases GLP-1 secretion, promoting satiety.  Altering Gut Microbiota - Enhances metabolism and energy regulation.  Reducing Lipogenesis & Increasing Fat Oxidation - Prevents fat accumulation and promotes fat breakdown.  Efficacy    Typically results in modest weight loss (2-5%), with more significant effects in individuals with insulin resistance, PCOS, or prediabetes.  Works best when combined with diet and exercise rather than as a standalone treatment.    Side Effects:  Gastrointestinal (most common): Nausea, diarrhea, bloating.  Vitamin B12 Deficiency: Possible with long-term use.  Lactic Acidosis (Very rare but serious): Risk increases with renal or hepatic impairment.  Mild Fatigue/Dizziness: Due to blood sugar changes. If you start to experience  symptoms related to low blood sugar (shaky dizzy sweaty), please reach out and correct with treating with 15 grams of quick sugar to resolve.     Side-effects. Metformin is generally well tolerated. The main side-effects we see  are:   Diarrhea, nausea and stomach upset - this tends to subside over time as your body gets  used to the medication. Taking this medications with food will lower these side effects.    Please refer to the pharmacy insert for more information on side-effects. Please call the clinic should you have more questions regarding this medication.      In order to get refills of this or any medication we prescribe you must be seen in the medical weight mgmt clinic every 2-3 months. Please have your pharmacy fax a refill request to [phone].

## 2025-03-27 NOTE — Clinical Note
Hi! [employer] employee. Pt of [employer]. Would be helpful to have second opinion on new med start. Has to stop GLP-1 RA. Cash not affordable. Any concerns with starting Contrave with HFrEF? Blood pressure and mood both controlled. Not on opioids. Will hold for upcoming procedure. Let me know if you have any concerns. Thank you!- Laureen

## 2025-03-27 NOTE — PROGRESS NOTES
Medication Therapy Management (MTM) Encounter    ASSESSMENT:                            Medication Adherence/Access: no issues    Weight Management /obstructive sleep apnea/hx pre-diabetes:   Discussed other weight management options today:   Weight Loss Medication Options:   Phentermine or other stimulant: contraindicated due to heart failure  Topiramate: hesitant to use due to risk for paresthesias which she already has. CNS depressing effect with metoclopramide. Could increase potassium wasting with torsemide- monitor potassium   Qsymia: no - see above  Contrave: interaction with bupropion and metoclopramide due to strong CYP2D6 inhibition. Max recommended metoclopramide dose for GERD is 30 mg/day to reduce risk for extrapyramidal side effects. Consider reducing metoclopramide dose 50% if she starts bupropion.   Naltrexone: no contraindications. Has upcoming surgery.   GLP1 agonist: not covered. cash options too expensive  Metformin: could try - had one A1c in pre-diabetes range 4 years ago    She's hesitant about all of these options due to the side effect risks but would also like to take something that will be effective for weight loss. May benefit from starting metformin to help potential underlying insulin resistance. She'd like to try Contrave over topiramate. Contrave may be helpful for reducing sweets/sugar cravings. Contrave will require a prior auth and appeal. Wait to start Contrave until after upcoming surgery since it contains naltrexone which reverses the effects of opioids. Patient agreeable to reducing metoclopramide dose to 5 mg 3 times daily. She can split her tablets in half. Will discuss with GI as well. She'll hold metformin prior to surgery as well.     GERD/nausea:  Stable. Follow GI plan of care    Heart Failure   HFrEF (</=40%)   stable    Hypomagnesemia:   stable    Urinary urgency:   stable    Allergy   stable    Shoulder pain:   stable    Supplements   stable    PLAN:                             Start metformin  mg once daily   Hold the day of surgery or per pre-op instructions   Start Contrave after upcoming surgery if covered on insurance (will need to do a prior authorization and appeal)  Dosing instructions:   Week 1: Take 1 tablet by mouth once daily in AM   Week 2: if tolerating without issues, increase to 1 tablet in the morning and 1 tablet in early PM   Week 3: if tolerating without issues, increase to 2 tablets in the morning and 1 tablet in early PM   Week 4 & after: if tolerating without issues, increase to 2 tablets in the morning and 2 tablets in early PM.    Take with food to better tolerate the medication.   Bupropion (in Contrave) interacts with metoclopramide. Reduce metoclopramide to 5 mg (1/23 tablet) 3 times daily when you start bupropion to find a good balance of getting benefit from metoclopramide while reducing risk for side effects. Will discuss this with GI.   Schedule registered dietitian follow-up   Weight management clinic scheduling line: [phone]    Follow-up: 2 months with medication therapy management, 3 months with weight management    SUBJECTIVE/OBJECTIVE:                          Marleen Mcfadden is a 59 year old female seen for an initial visit. She was referred to me from Evonne Doss PA-C. Last saw Ambika Nuno.     Reason for visit: Zepbound follow-up.    Allergies/ADRs: Reviewed in chart  Past Medical History: Reviewed in chart  Tobacco: She reports that she has never smoked. She has never been exposed to tobacco smoke. She has never used smokeless tobacco.  Alcohol: occasional glass of wine 2-3 days per week   Medication Adherence/Access: takes medications 2 times daily - 4:30 am and 4:30 pm.  No longer taking Dextromethorphen-guaifenesin or ondansetron (Prescribed for pneumonia)    Weight Management /obstructive sleep apnea/hx pre-diabetes:   Zepbound 5 mg once weekly - last dose was on Monday     Following with Evonne Doss PA-C  "for medical weight management. Has some constipation but thinks it's due to something else. Takes docusate as needed which is helpful. Notices getting full faster and decreased appetite on Zepbound but hasn't noticed weight loss. Wasn't able to taper up on Zepbound due to insurance not covering in 2025.    Nutrition/Eating Habits: breakfast: yogurt and then coffee and egg at work. Dinner: salad with chicken or fish with vegetable and potato. Weakness is sugar cravings and sweets. Likes to make cookies.  Water: 32-48 ounces of water per day. Sometimes adds crystal lite. Following with registered dietitian.  Exercise/Activity: walks a lot at work. Works as a phlebotomist.   Medications Tried/Failed:  None.   No history of kidney stones, glaucoma, palpitations. Mood and anxiety is stable.     Initial Consult Weight: 254 lbs (8/23/2024)     Current weight today: 252 lbs 0 oz    Wt Readings from Last 4 Encounters:   03/27/25 252 lb (114.3 kg)   03/20/25 252 lb 4 oz (114.4 kg)   03/13/25 250 lb (113.4 kg)   02/11/25 251 lb 4 oz (114 kg)     Estimated body mass index is 43.26 kg/m  as calculated from the following:    Height as of this encounter: 5' 4\" (1.626 m).    Weight as of this encounter: 252 lb (114.3 kg).    GERD  /nausea:   Rabeprazole 20 mg 2 times daily   Famotidine 40 mg at night as needed   Metoclopramide 10 mg 3 times daily   Patient feels that current regimen is effective. Following with GI. Should take breaks from the reglan for 1-2 weeks every 2-3 months per GI.        Heart Failure   HFrEF (</=40%)   Beta Blocker: metoprolol XL 12.5 mg once daily   ACEi/ARB/ARNI: Entresto  mg 2 times daily   SGLT2i: not taking  MRA: spironolactone 50 mg once daily   Diuretic(s): torsemide 5 mg once daily   Patient reports tingling in hands and feet. Some swelling in legs but weight has been stable.  Patient self-monitors blood pressure.  Home BP monitoring 112/68, 111/76       Hypomagnesemia:   Magnesium oxide 400 " "mg 2 times daily   No side effects    Urinary urgency:   Vesicare 5 mg once daily   This is helpful. No side effects.     Allergy   loratadine 10 mg once daily as needed   Not currently needing this.   Patient feels that current therapy is effective.        Shoulder pain:   Cyclobenzaprine - hasn't been needing  Diclofenac gel- not covered on insurance  No current pain concerns. Following with sports medicine.      Supplements   Vitamin D 1000 units once daily   No reported issues at this time.          Today's Vitals: Ht 5' 4\" (1.626 m)   Wt 252 lb (114.3 kg)   LMP 10/19/2008 (Approximate)   BMI 43.26 kg/m    ----------------    I spent 40 minutes with this patient today. All changes were made via collaborative practice agreement with Evonne Doss PA-C.     A summary of these recommendations was sent via [employer].    Telemedicine Visit Details  The patient's medications can be safely assessed via a telemedicine encounter.  Type of service:  Telephone visit  Originating Location (pt. Location): Home    Distant Location (provider location):  Off-site  Start Time: 1:32 PM  End Time: 2:12 PM     Medication Therapy Recommendations  Obesity   1 Current Medication: ZEPBOUND 7.5 MG/0.5ML prefilled pen (Discontinued)   Current Medication Sig: Inject 0.5 mLs (7.5 mg) subcutaneously every 7 days.   Rationale: Cannot afford medication product - Cost - Adherence   Recommendation: Change Medication - metFORMIN 500 MG 24 hr tablet   Status: Accepted per CPA   Identified Date: 3/27/2025 Completed Date: 3/27/2025         2 Current Medication: ZEPBOUND 7.5 MG/0.5ML prefilled pen (Discontinued)   Current Medication Sig: Inject 0.5 mLs (7.5 mg) subcutaneously every 7 days.   Rationale: Cannot afford medication product - Cost - Adherence   Recommendation: Change Medication - Contrave 8-90 MG Tb12   Status: Contact Provider - Awaiting Response   Identified Date: 3/27/2025              "

## 2025-03-31 ENCOUNTER — TELEPHONE (OUTPATIENT)
Dept: ENDOCRINOLOGY | Facility: CLINIC | Age: 60
End: 2025-03-31

## 2025-03-31 DIAGNOSIS — E66.9 OBESITY: Primary | ICD-10-CM

## 2025-03-31 DIAGNOSIS — G47.33 OSA (OBSTRUCTIVE SLEEP APNEA): ICD-10-CM

## 2025-03-31 NOTE — TELEPHONE ENCOUNTER
"Prior Authorization Retail Medication Request    Medication/Dose: Contrave 8-90 mg every 12 hours  Diagnosis and ICD code (if different than what is on RX): G47.33   New/renewal/insurance change PA/secondary ins. PA:  Previously Tried and Failed:  diet and exercise for at least 3 months without clinically effective sustainable weight loss and Zepbound: wasn't able to taper past 5 mg/week before insurance stopped covering it. Tolerated Zepbound well.  Rationale: Marleen Mcfadden is a 59 year old female with a diagnosis of Class III Obesity (BMI at least 40 kg/m2) and severe Obstructive Sleep Apnea .     Estimated body mass index is 43.26 kg/m  as calculated from the following:    Height as of 3/27/25: 5' 4\" (1.626 m).    Weight as of 3/27/25: 252 lb (114.3 kg).     Insurance   [facility]  Insurance ID:  [id number]     "

## 2025-04-01 NOTE — TELEPHONE ENCOUNTER
PA Initiation    Medication: CONTRAVE 8-90 MG PO TB12  Insurance Company: [employer] - Phone [phone] Fax [phone]  Pharmacy Filling the Rx: [id number] - [place] - [address] [facility] [address]  Filling Pharmacy Phone: [phone]  Filling Pharmacy Fax:    Start Date: 3/31/2025    PT could try getting Zepbound for KATIA dx?

## 2025-04-02 ENCOUNTER — TELEPHONE (OUTPATIENT)
Dept: CARDIOLOGY | Facility: CLINIC | Age: 60
End: 2025-04-02
Payer: COMMERCIAL

## 2025-04-02 NOTE — TELEPHONE ENCOUNTER
Called to discuss about biopsy results but no answer so left a voicemail indicating normal biopsies.      Warren Medeiros MD  [facility]  11/07/22 5:29 PM Spoke with patient regarding information and instructions for upcoming ICD implant on verified date of procedure: 4/11/25 starting at 12:00 pm. Patient was given instructions regarding ICD implant. Discussed purpose, preparation, and procedure with patient. Patient received an instruction letter today for reference. Patient verbalized understanding of information reviewed for pre and post procedure and agreed to call with further questions or concerns.     Marleen also asked if she can send in paperwork for [facility] for procedure. 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 She will need to take time off due to activity restrictions post surgery that prevents lifting, pushing or pulling of 10 labs or more. Informed patient writer can assist with this. She plans on send in through [employer] or emailing it later this week. All questions answered at this time.     Michael Deleon RN       Dear Marleen,     You are scheduled for an Implantable Cardioverter-Defibrillator (ICD) implant at [facility]. The hospital is located at [address] [place] [address] on the [place] of the [place].  If you need to cancel this procedure, please call [phone].       Visitor Policy: Two visitors.     Date:_April 11, 2025_____  Time: ____12pm______To the [facility] Waiting Room at the [facility]     1. Please review the attached instructions on showering before your procedure at the end of this letter.  2. Your history and physical will be completed by our advanced practice provider when you arrive.  3. Do not eat for 8 hours prior to arrival, you can drink water up until 2 hours prior.  4. Medications to continue:  - Anticoagulant (_none_).  - Take all meds as prescribed, except for those noted below.  5. Medications to hold:               - Morning of: diuretic; Torsemide and sprinolactone              - 1 week prior, if weekly dosing: [ tirzepatide  (Mounjaro)].   6. You will likely discharge the same day and need a .     ICD Educational Tool Kit provided today:   ICD Decision Making Tool  https://www.patientdecisionaid.org/wp-content/uploads/2024/08/2024.08.13-ICD-HF-_website.pdf       Post-Procedure Instructions  Wound Care  Keep your incision (surgery wound) dry for 3 days.  After 3 days, you may remove the outer bandage.  Keep the strips of tape on.  They will be removed at your clinic visit.  Check for signs of infection each day.  These include increased redness, swelling, drainage or a fever over 101 F (38.3 C).  Call us immediately if you see any of   these signs.  If there are no signs of infection, you may shower in 3 days.  Do not submerge the incision (in a bath tub, hot tub, or swimming pool) until fully healed.  Pain  You may have mild to moderate pain for 3 to 5 days.  Take acetaminophen (Tylenol) or ibuprofen (Advil) for the pain.  Call us if the pain is severe or lasts more than 5 days.  Activity  You should slowly go back to your normal activities after 24 hours.  Healing will take 4 to 6 weeks.  No driving for 3 days  Avoid climbing a ladder alone.  It is best to stay within 4 feet of the ground.  Avoid anything that may cause rough contact or a hard hit to your chest.  This includes football, hockey, and other contact sports.  Do not go swimming or boating alone.    FOR ATLEAST 4 WEEKS:  Do not raise your affected arm above your shoulder.  Do not use your affected arm to push, pull, or lift anything over 10 pounds.  Avoid repetitive upper body activities for 6 weeks (ie: golf, swimming, and weight lifting)      Follow Up Appointments Date & Time   7-10 day incision check with device clinic  April 21, 2025  at 10 am   3 month follow up visit with device check & provider  July 16, 2025 10:30 device and 11am with Mireya Phan PA-C       If you have further questions, please utilize [employer] to contact us. If you do not have MyChart,  please contact us as below.     Michael Deleon, RN, BSN, PHN  RN Care Coordinator  [facility]  Phone: [phone]  Fax: [phone]     [facility]DO Procedure   [phone]     Device Clinic (Pacemakers, Defibrillators, Loop Recorders)  [phone]                                       Showering Before Surgery   Your surgeon has asked you to take 2 showers before surgery.  Why is this important?  It is normal for bacteria (germs) to be on your skin. The skin protects us from these germs. When you have surgery, we cut the skin. Sometimes germs get into the cuts and cause infection (illness caused by germs). By following the instructions below and using special soap, you will lower the number of germs on your skin. This decreases your chance of infection.  Special soap  Buy or get 8 ounces of antiseptic surgical soap called 4% CHG. Common name brands of this soap are Hibiclens and Exidine.   You can find it at your local pharmacy, clinic or retail store. If you have trouble, ask your pharmacist to help you find the right substitute.   A note about shaving:  Do not shave within 12 inches of your incision (surgical cut) area for at least 3 days before surgery. Shaving can make small cuts in the skin. This puts you at a higher risk of infection.  Items you will need for each shower:   1 newly washed towel   4 ounces of one of the above soaps  Follow these instructions:  The evening before surgery   1. Wash your hair and body with your regular shampoo and soap. Make sure you rinse the shampoo and soap from your hair and body.   2. Using clean hands, apply about 2 ounces of soap gently on your skin from the neck to your toes. Use on your groin area last. Do not use this soap on your face or head. If you get any soap in your eyes, ears or mouth, rinse right away.   3. Repeat step 2. It is very important to let the soap stay on your skin for at least 1 minute.   4. Rinse well and dry off using a clean towel.If  you feel any tingling, itching or other irritation, rinse right away. It is normal to feel some coolness on the skin after using the antiseptic soap. Your skin may feel a bit dry after the shower, but do not use any lotions, creams or moisturizers. Do not use hair spray or other products in your hair.  5. Dress in freshly washed clothes or pajamas. Use fresh pillowcases and sheets on your bed.  The morning of surgery  1. Wash your hair and body with your regular shampoo and soap. Make sure you rinse the shampoo and soap from your hair and body.   2. Using clean hands, apply about 2 ounces of soap gently on your skin from the neck to your toes. Use on your groin area last. Do not use this soap on your face or head. If you get any soap in your eyes, ears or mouth, rinse right away.   3. Repeat step 2. It is very important to let the soap stay on your skin for at least 1 minute.   4. Rinse well and dry off using a clean towel.If you feel any tingling, itching or other irritation, rinse right away. It is normal to feel some coolness on the skin after using the antiseptic soap. Your skin may feel a bit dry after the shower, but do not use any lotions, creams or moisturizers. Do not use hair spray or other products in your hair.  5. Dress in clean clothes.  If you have any questions about showering or an allergy to CHG soap, please call the Preadmissions Nursing Department at the hospital where you are having your surgery.  [facility]: [phone]  [facility]: [phone]  [place]: [phone] or [phone]  [facility]: [phone].  [facility]: [phone]  [place]: [phone]  [facility] ([place]): [phone]  [facility] ([facility]): [phone]  This phone number will be answered between the hours of 8:00 a.m. and 6:30 p.m. Monday through Friday.  The [facility]  represents a collaboration between [facility] and [facility].

## 2025-04-03 ENCOUNTER — OFFICE VISIT (OUTPATIENT)
Dept: FAMILY MEDICINE | Facility: CLINIC | Age: 60
End: 2025-04-03
Payer: COMMERCIAL

## 2025-04-03 VITALS
SYSTOLIC BLOOD PRESSURE: 120 MMHG | OXYGEN SATURATION: 99 % | RESPIRATION RATE: 16 BRPM | HEART RATE: 59 BPM | BODY MASS INDEX: 43.19 KG/M2 | DIASTOLIC BLOOD PRESSURE: 79 MMHG | WEIGHT: 253 LBS | TEMPERATURE: 97 F | HEIGHT: 64 IN

## 2025-04-03 DIAGNOSIS — H65.92 OME (OTITIS MEDIA WITH EFFUSION), LEFT: Primary | ICD-10-CM

## 2025-04-03 ASSESSMENT — PAIN SCALES - GENERAL: PAINLEVEL_OUTOF10: MODERATE PAIN (6)

## 2025-04-03 NOTE — PROGRESS NOTES
"  Assessment & Plan     OME (otitis media with effusion), left      Patient Instructions   Ear pain - Flonase 2 sprays in each nostril once daily for 7 to 10 days. Restart Claritin for allergies.  For the ear itching - you can use a small amount of over the counter hydrocortisone twice daily for 5 to 7 days.     Establish -   Dr. Dominguez Matias     Dinorah Hawley is a 59 year old, presenting for the following health issues:  Ear Problem (Ear Ache - left ear)      4/3/2025     7:45 AM   Additional Questions   Roomed by Kelley Childers   Accompanied by Spouse     Ear Problem    History of Present Illness       Reason for visit:  Ear ach and crusting build up in ears and around nose  Symptom onset:  1-3 days ago  Symptoms include:  Ear pain  Symptom intensity:  Mild  Symptom progression:  Staying the same  Had these symptoms before:  No  What makes it worse:  Laying on left side  What makes it better:  No   She is taking medications regularly.      Left ear ache for a few days which has gotten worse.   She has rhinorrhea (clear) after pneumonia infection which has slightly improved. She can feel clogging and popping sensation in the left ear.   No fever, chills, sinus pressure, teeth pain, cough or shortness of breath.   No recent travel.   Sick contacts at work.           Objective    /79 (BP Location: Right arm, Patient Position: Sitting, Cuff Size: Adult Large)   Pulse 59   Temp 97  F (36.1  C) (Temporal)   Resp 16   Ht 1.626 m (5' 4\")   Wt 114.8 kg (253 lb)   LMP 10/19/2008 (Approximate)   SpO2 99%   BMI 43.43 kg/m    Body mass index is 43.43 kg/m .  Physical Exam               Signed Electronically by: Yanna Matias MD    "

## 2025-04-03 NOTE — H&P
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 She has a family history of DCM. Her sister was diagnosed and lead to family screening. 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 Her son and daughter have DCM. Patient has been following with Dr. Kolb since 2006. She has been on GDMT. She has a history of dizziness and syncopal episodes. CMR 6/2023 with Severe, nonischemic cardiomyopathy, today the LV is no longer dilated. LVEF 32% not significantly changed. RV function is mildly globally reduced, RVEF 45%, slightly decreased from previous 53%. No LGE. Her most recent echo showed LVEF 30-35% with moderate/severe LV dilation. Zio patch from 11/2024 showed no significant sustained arrhythmias. She was seen in EP clinic 12/4/2024 to discuss ICD implant given bradycardia only on low dose beta-blocker and reduced EF. The procedure and risks were discussed with the patient and she was agreeable to proceeding. She presents today for dual chamber ICD implant.           Active problem list:     Patient Active Problem List    Diagnosis Date Noted    Near syncope 11/09/2024     Priority: Medium    Acute pain of left shoulder 11/09/2024     Priority: Medium    Morbid obesity with BMI of 40.0-44.9, adult (H) 03/24/2023     Priority: Medium    Symptomatic bradycardia 02/17/2023     Priority: Medium    Herpes simplex of female genitalia 04/22/2022     Priority: Medium    Diverticulitis 02/21/2022     Priority: Medium    Family history of colon cancer 01/31/2022     Priority: Medium    KATIA (obstructive sleep apnea)- moderate-severe (AHI 29) 08/26/2021      Priority: Medium     8/23/2021 [facility] Sleep Study (246.0 lbs) - AHI 29.5, RDI 32.0, Supine AHI 21.0, REM AHI 60.4, Low O2 70.3%, Time Spent <=88% 25.4 minutes / Time Spent <=89% 30.7 minutes. Hypoventilation was not suggested with a maximum change from 40 to 47 mmHg      CSF otorrhea 04/26/2021     Priority: Medium     S/p 7/5/21 Right middle fossa approach for repair of encephalocele and CSF leak; lumbar drain placement. S/p 7/8/21 Redo right middle fossa approach for repair of encephalocele and CSF leak         Essential hypertension 08/27/2020     Priority: Medium    Dysfunction of both eustachian tubes 06/19/2020     Priority: Medium    Chronic rhinitis 06/19/2020     Priority: Medium    Moderate major depression (H) 09/20/2019     Priority: Medium    Gastroesophageal reflux disease with esophagitis 01/30/2019     Priority: Medium    Primary osteoarthritis of both knees 01/08/2019     Priority: Medium    Left shoulder pain 01/17/2017     Priority: Medium    Chronic systolic congestive heart failure (H) 10/27/2016     Priority: Medium     ECHO 4/2020: LVIDd 68mm. The Ejection Fraction is estimated at 40-45%. Right ventricular function, chamber size, wall motion, and thickness are normal. Pulmonary artery systolic pressure cannot be assessed.  The inferior vena cava is normal. Mild improvement in LV function since previous study.      Chronic bilateral low back pain with right-sided sciatica 07/07/2016     Priority: Medium    Chronic bilateral low back pain with left-sided sciatica 07/07/2016     Priority: Medium    Degenerative disc disease at L5-S1 level 06/02/2016     Priority: Medium    Familial cardiomyopathy (H) 10/21/2015     Priority: Medium     Cardiomyopathy- dx'd 1999 famial idiopathic.       Shaina's nodes 06/16/2015     Priority: Medium    CARDIOVASCULAR SCREENING; LDL GOAL LESS THAN 160 11/11/2014     Priority: Medium    Pain in joint involving ankle and foot 06/16/2014     Priority:  Medium    Thyroid nodule 09/03/2013     Priority: Medium    Knee pain 12/20/2012     Priority: Medium    Anemia      Priority: Medium    Allergic rhinitis      Priority: Medium    Leiomyoma of uterus 10/25/2006     Priority: Medium            Medications (include herbals and vitamins):      Current Facility-Administered Medications   Medication Dose Route Frequency Provider Last Rate Last Admin    ceFAZolin (ANCEF) 2 g in dextrose 50 mL intermittent infusion  2 g Intravenous Pre-Op/Pre-procedure x 1 dose Abdelrahman Aragon MD        lidocaine (LMX4) cream   Topical Q1H PRN Abdelrahman Aragon MD        lidocaine 1 % 0.1-1 mL  0.1-1 mL Other Q1H PRN Abdelrahman Aragon MD        sodium chloride (PF) 0.9% PF flush 3 mL  3 mL Intracatheter Q8H Abdelrahman Aragon MD        sodium chloride (PF) 0.9% PF flush 3 mL  3 mL Intracatheter Q1H PRN Abdelrahman Aragon MD        sodium chloride (PF) 0.9% PF flush 3 mL  3 mL Intracatheter q1 min prn Abdelrahman Aragon MD        sodium chloride 0.9 % infusion   Intravenous Continuous Abdelrahman Aragon MD         Facility-Administered Medications Ordered in Other Encounters   Medication Dose Route Frequency Provider Last Rate Last Admin    sodium chloride (PF) 0.9% PF flush 10 mL  10 mL Intravenous Once Julio Leroy MD               Medication List      There are no discharge medications for this visit.              Allergies:      Allergies   Allergen Reactions    Morphine      EMESIS    Nickel     Sulfa Antibiotics Swelling             Social History:     Social History     Tobacco Use    Smoking status: Never     Passive exposure: Never    Smokeless tobacco: Never   Substance Use Topics    Alcohol use: Yes     Comment: rare, twice a year, she has a drink little wine 2 days ago            Physical Exam:   All vitals have been reviewed  Patient Vitals for the past 8 hrs:   BP Temp Pulse Resp SpO2 Weight   04/11/25 1310 129/75 98.5  F (36.9  C) 63 19 98 % 114.3 kg (252 lb)     No intake/output data  recorded.  Airway assessment:   Patient is able to open mouth wide  Patient is able to stick out tongue}      ENT:   normocephalic, without obvious abnormality     Neck:   supple, symmetrical, trachea midline     Lungs:   No increased work of breathing, good air exchange     Cardiovascular:   normal S1 and S2 and no edema             Lab / Radiology Results:     Lab Results   Component Value Date    WBC 6.0 03/20/2025    WBC 8.2 07/11/2021    RBC 3.84 03/20/2025    RBC 3.32 07/11/2021    HGB 11.6 03/20/2025    HGB 10.2 07/11/2021    HCT 35.5 03/20/2025    HCT 32.0 07/11/2021    MCV 92 03/20/2025    MCV 96 07/11/2021    RDW 13.3 03/20/2025    RDW 13.4 07/11/2021     03/20/2025     07/11/2021      Lab Results   Component Value Date    WBC 6.0 03/20/2025    WBC 8.2 07/11/2021     Lab Results   Component Value Date     03/20/2025     07/11/2021     Lab Results   Component Value Date    HGB 11.6 03/20/2025    HGB 10.2 07/11/2021    HCT 35.5 03/20/2025    HCT 32.0 07/11/2021     Lab Results   Component Value Date     03/20/2025     07/11/2021    CO2 20 03/20/2025    CO2 26 01/09/2023    CO2 30 07/11/2021    BUN 13.8 03/20/2025    BUN 16 01/09/2023    BUN 14 07/11/2021     Lab Results   Component Value Date     03/20/2025     07/11/2021    CO2 20 03/20/2025    CO2 26 01/09/2023    CO2 30 07/11/2021    BUN 13.8 03/20/2025    BUN 16 01/09/2023    BUN 14 07/11/2021       Lab Results   Component Value Date    TSH 4.13 03/20/2025    TSH 2.10 06/03/2020             Plan:   Patient's active problems diagnostically and therapeutically optimized for the planned procedure. Patient here for ICD implant. Procedure explained in detail to patient including indications, risks, and benefits. Patient states understanding and wishes to procced.     Mireya Phan PA-C  [facility]  Electrophysiology Consult Service  Pager #[phone]

## 2025-04-03 NOTE — PATIENT INSTRUCTIONS
Ear pain - Flonase 2 sprays in each nostril once daily for 7 to 10 days. Restart Claritin for allergies.  For the ear itching - you can use a small amount of over the counter hydrocortisone twice daily for 5 to 7 days.     Establish -   Dr. Dominguez Matias

## 2025-04-04 ENCOUNTER — MYC MEDICAL ADVICE (OUTPATIENT)
Dept: FAMILY MEDICINE | Facility: CLINIC | Age: 60
End: 2025-04-04
Payer: COMMERCIAL

## 2025-04-04 DIAGNOSIS — J18.9 PNEUMONIA OF RIGHT LOWER LOBE DUE TO INFECTIOUS ORGANISM: Primary | ICD-10-CM

## 2025-04-04 NOTE — TELEPHONE ENCOUNTER
Intake, UZMA and form placed in provider's bin to address.      Copy of form in TC copy bin if needed.

## 2025-04-04 NOTE — TELEPHONE ENCOUNTER
Forms/Letter Request    Type of form/letter: FMLA - Unknown      Is Release of Information needed?: Yes  Was an UZMA obtained?  No    Do we have the form/letter: Yes:     Who is the form from? [employer] (if other please explain)    Where did/will the form come from? form was faxed in    When is form/letter needed by: ASAP    How would you like the form/letter returned: Fax : [phone]    Patient Notified form requests are processed in 5-7 business days:Yes    Could we send this information to you in [employer] or would you prefer to receive a phone call?:   Patient would prefer a phone call   Okay to leave a detailed message?: Yes at Cell number on file:    Telephone Information:   Mobile [phone]

## 2025-04-04 NOTE — TELEPHONE ENCOUNTER
The patient came in with a filled out UZMA it is scanned in the chat. The patient filled out and brought a form from [facility] because she just wanted to make sure that we had the right paperwork. All forms are together and on the dummy.

## 2025-04-08 NOTE — TELEPHONE ENCOUNTER
Form faxed to [facility] [phone] on 4/8/2025 at 5:01pm. Copy placed in abstract and original in TC bin. UZMA scanned into chart, copy in abstract and original in TC copy bin.

## 2025-04-10 ENCOUNTER — ANCILLARY PROCEDURE (OUTPATIENT)
Dept: GENERAL RADIOLOGY | Facility: CLINIC | Age: 60
End: 2025-04-10
Attending: PHYSICIAN ASSISTANT
Payer: COMMERCIAL

## 2025-04-10 DIAGNOSIS — J18.9 PNEUMONIA OF RIGHT LOWER LOBE DUE TO INFECTIOUS ORGANISM: ICD-10-CM

## 2025-04-11 ENCOUNTER — APPOINTMENT (OUTPATIENT)
Dept: MEDSURG UNIT | Facility: CLINIC | Age: 60
End: 2025-04-11
Attending: INTERNAL MEDICINE
Payer: COMMERCIAL

## 2025-04-11 ENCOUNTER — APPOINTMENT (OUTPATIENT)
Dept: GENERAL RADIOLOGY | Facility: CLINIC | Age: 60
End: 2025-04-11
Payer: COMMERCIAL

## 2025-04-11 ENCOUNTER — HOSPITAL ENCOUNTER (OUTPATIENT)
Facility: CLINIC | Age: 60
Discharge: HOME | End: 2025-04-11
Attending: INTERNAL MEDICINE | Admitting: INTERNAL MEDICINE
Payer: COMMERCIAL

## 2025-04-11 ENCOUNTER — APPOINTMENT (OUTPATIENT)
Dept: LAB | Facility: CLINIC | Age: 60
End: 2025-04-11
Attending: INTERNAL MEDICINE
Payer: COMMERCIAL

## 2025-04-11 VITALS
WEIGHT: 252 LBS | DIASTOLIC BLOOD PRESSURE: 87 MMHG | OXYGEN SATURATION: 97 % | HEART RATE: 89 BPM | SYSTOLIC BLOOD PRESSURE: 130 MMHG | TEMPERATURE: 98.5 F | BODY MASS INDEX: 43.26 KG/M2 | RESPIRATION RATE: 21 BRPM

## 2025-04-11 DIAGNOSIS — I42.9 FAMILIAL CARDIOMYOPATHY (H): ICD-10-CM

## 2025-04-11 DIAGNOSIS — R55 SYNCOPE, UNSPECIFIED SYNCOPE TYPE: ICD-10-CM

## 2025-04-11 DIAGNOSIS — Z95.810 S/P ICD (INTERNAL CARDIAC DEFIBRILLATOR) PROCEDURE: Primary | ICD-10-CM

## 2025-04-11 DIAGNOSIS — I42.0 DILATED CARDIOMYOPATHY (H): ICD-10-CM

## 2025-04-11 DIAGNOSIS — I50.32 CHRONIC DIASTOLIC CONGESTIVE HEART FAILURE (H): ICD-10-CM

## 2025-04-11 DIAGNOSIS — I50.22 CHRONIC SYSTOLIC CONGESTIVE HEART FAILURE (H): ICD-10-CM

## 2025-04-11 LAB
ANION GAP SERPL CALCULATED.3IONS-SCNC: 11 MMOL/L (ref 7–15)
BUN SERPL-MCNC: 13.4 MG/DL (ref 8–23)
CALCIUM SERPL-MCNC: 8.8 MG/DL (ref 8.8–10.4)
CHLORIDE SERPL-SCNC: 102 MMOL/L (ref 98–107)
CREAT SERPL-MCNC: 0.9 MG/DL (ref 0.51–0.95)
EGFRCR SERPLBLD CKD-EPI 2021: 73 ML/MIN/1.73M2
ERYTHROCYTE [DISTWIDTH] IN BLOOD BY AUTOMATED COUNT: 13.6 % (ref 10–15)
GLUCOSE SERPL-MCNC: 91 MG/DL (ref 70–99)
HCO3 SERPL-SCNC: 24 MMOL/L (ref 22–29)
HCT VFR BLD AUTO: 35.6 % (ref 35–47)
HGB BLD-MCNC: 11.7 G/DL (ref 11.7–15.7)
MCH RBC QN AUTO: 30.5 PG (ref 26.5–33)
MCHC RBC AUTO-ENTMCNC: 32.9 G/DL (ref 31.5–36.5)
MCV RBC AUTO: 93 FL (ref 78–100)
PLATELET # BLD AUTO: 264 10E3/UL (ref 150–450)
POTASSIUM SERPL-SCNC: 4.1 MMOL/L (ref 3.4–5.3)
RBC # BLD AUTO: 3.83 10E6/UL (ref 3.8–5.2)
SODIUM SERPL-SCNC: 137 MMOL/L (ref 135–145)
WBC # BLD AUTO: 6 10E3/UL (ref 4–11)

## 2025-04-11 PROCEDURE — 999N000142 HC STATISTIC PROCEDURE PREP ONLY

## 2025-04-11 PROCEDURE — 93005 ELECTROCARDIOGRAM TRACING: CPT

## 2025-04-11 PROCEDURE — 33249 INSJ/RPLCMT DEFIB W/LEAD(S): CPT | Performed by: INTERNAL MEDICINE

## 2025-04-11 PROCEDURE — 99152 MOD SED SAME PHYS/QHP 5/>YRS: CPT | Performed by: INTERNAL MEDICINE

## 2025-04-11 PROCEDURE — 999N000054 HC STATISTIC EKG NON-CHARGEABLE

## 2025-04-11 PROCEDURE — 36415 COLL VENOUS BLD VENIPUNCTURE: CPT | Performed by: INTERNAL MEDICINE

## 2025-04-11 PROCEDURE — C1779 LEAD, PMKR, TRANSVENOUS VDD: HCPCS | Performed by: INTERNAL MEDICINE

## 2025-04-11 PROCEDURE — 85041 AUTOMATED RBC COUNT: CPT | Performed by: INTERNAL MEDICINE

## 2025-04-11 PROCEDURE — 250N000011 HC RX IP 250 OP 636: Mod: JZ | Performed by: INTERNAL MEDICINE

## 2025-04-11 PROCEDURE — 999N000065 XR CHEST PORT 1 VIEW

## 2025-04-11 PROCEDURE — 370N000017 HC ANESTHESIA TECHNICAL FEE, PER MIN

## 2025-04-11 PROCEDURE — 250N000011 HC RX IP 250 OP 636: Performed by: INTERNAL MEDICINE

## 2025-04-11 PROCEDURE — 99153 MOD SED SAME PHYS/QHP EA: CPT | Performed by: INTERNAL MEDICINE

## 2025-04-11 PROCEDURE — C1898 LEAD, PMKR, OTHER THAN TRANS: HCPCS | Performed by: INTERNAL MEDICINE

## 2025-04-11 PROCEDURE — 71045 X-RAY EXAM CHEST 1 VIEW: CPT | Mod: 26 | Performed by: RADIOLOGY

## 2025-04-11 PROCEDURE — 85018 HEMOGLOBIN: CPT | Performed by: INTERNAL MEDICINE

## 2025-04-11 PROCEDURE — 250N000009 HC RX 250: Performed by: INTERNAL MEDICINE

## 2025-04-11 PROCEDURE — 272N000001 HC OR GENERAL SUPPLY STERILE: Performed by: INTERNAL MEDICINE

## 2025-04-11 PROCEDURE — C1721 AICD, DUAL CHAMBER: HCPCS | Performed by: INTERNAL MEDICINE

## 2025-04-11 PROCEDURE — 999N000132 HC STATISTIC PP CARE STAGE 1

## 2025-04-11 PROCEDURE — C1894 INTRO/SHEATH, NON-LASER: HCPCS | Performed by: INTERNAL MEDICINE

## 2025-04-11 PROCEDURE — 250N000013 HC RX MED GY IP 250 OP 250 PS 637

## 2025-04-11 PROCEDURE — 80048 BASIC METABOLIC PNL TOTAL CA: CPT | Performed by: INTERNAL MEDICINE

## 2025-04-11 PROCEDURE — 82310 ASSAY OF CALCIUM: CPT | Performed by: INTERNAL MEDICINE

## 2025-04-11 DEVICE — DEFIB ICD COBALT XT IMPLANTABLE DDPA2D4: Type: IMPLANTABLE DEVICE | Status: FUNCTIONAL

## 2025-04-11 DEVICE — LEAD SPRINT QUATTRO SECURE S 55 6935M55: Type: IMPLANTABLE DEVICE | Status: FUNCTIONAL

## 2025-04-11 DEVICE — IMP LEAD PACING BIPOLAR CAPSUREFIX NOVUS 45CM 5076-45: Type: IMPLANTABLE DEVICE | Status: FUNCTIONAL

## 2025-04-11 RX ORDER — NALOXONE HYDROCHLORIDE 0.4 MG/ML
0.2 INJECTION, SOLUTION INTRAMUSCULAR; INTRAVENOUS; SUBCUTANEOUS
Status: DISCONTINUED | OUTPATIENT
Start: 2025-04-11 | End: 2025-04-11 | Stop reason: HOSPADM

## 2025-04-11 RX ORDER — NALOXONE HYDROCHLORIDE 0.4 MG/ML
0.4 INJECTION, SOLUTION INTRAMUSCULAR; INTRAVENOUS; SUBCUTANEOUS
Status: DISCONTINUED | OUTPATIENT
Start: 2025-04-11 | End: 2025-04-11 | Stop reason: HOSPADM

## 2025-04-11 RX ORDER — LIDOCAINE HYDROCHLORIDE 20 MG/ML
INJECTION, SOLUTION INFILTRATION; PERINEURAL
Status: DISCONTINUED | OUTPATIENT
Start: 2025-04-11 | End: 2025-04-11 | Stop reason: HOSPADM

## 2025-04-11 RX ORDER — CEFAZOLIN SODIUM 2 G/50ML
2 SOLUTION INTRAVENOUS
Status: DISCONTINUED | OUTPATIENT
Start: 2025-04-11 | End: 2025-04-11 | Stop reason: HOSPADM

## 2025-04-11 RX ORDER — ACETAMINOPHEN 325 MG/1
650 TABLET ORAL EVERY 4 HOURS PRN
Status: DISCONTINUED | OUTPATIENT
Start: 2025-04-11 | End: 2025-04-11 | Stop reason: HOSPADM

## 2025-04-11 RX ORDER — FENTANYL CITRATE 50 UG/ML
INJECTION, SOLUTION INTRAMUSCULAR; INTRAVENOUS
Status: DISCONTINUED | OUTPATIENT
Start: 2025-04-11 | End: 2025-04-11 | Stop reason: HOSPADM

## 2025-04-11 RX ORDER — CEPHALEXIN 500 MG/1
500 TABLET, FILM COATED ORAL 3 TIMES DAILY
Qty: 15 TABLET | Refills: 0 | Status: SHIPPED | OUTPATIENT
Start: 2025-04-11 | End: 2025-04-16

## 2025-04-11 RX ORDER — SODIUM CHLORIDE 9 MG/ML
INJECTION, SOLUTION INTRAVENOUS CONTINUOUS
Status: DISCONTINUED | OUTPATIENT
Start: 2025-04-11 | End: 2025-04-11 | Stop reason: HOSPADM

## 2025-04-11 RX ORDER — IOPAMIDOL 755 MG/ML
INJECTION, SOLUTION INTRAVASCULAR
Status: DISCONTINUED | OUTPATIENT
Start: 2025-04-11 | End: 2025-04-11 | Stop reason: HOSPADM

## 2025-04-11 RX ORDER — CEFAZOLIN SODIUM 2 G/100ML
INJECTION, SOLUTION INTRAVENOUS CONTINUOUS PRN
Status: COMPLETED | OUTPATIENT
Start: 2025-04-11 | End: 2025-04-11

## 2025-04-11 RX ORDER — LIDOCAINE 40 MG/G
CREAM TOPICAL
Status: DISCONTINUED | OUTPATIENT
Start: 2025-04-11 | End: 2025-04-11 | Stop reason: HOSPADM

## 2025-04-11 RX ADMIN — ACETAMINOPHEN 650 MG: 325 TABLET, FILM COATED ORAL at 19:12

## 2025-04-11 ASSESSMENT — ACTIVITIES OF DAILY LIVING (ADL)
ADLS_ACUITY_SCORE: 56

## 2025-04-11 NOTE — PROGRESS NOTES
Arrived from home for a ICD placement.  VSS.  Denies pain.  PIV placed.  H&P current.  Consent obtained.  Will be ready for procedure when labs are resulted.

## 2025-04-11 NOTE — Clinical Note
dry, intact, no bleeding and no hematoma. Pacemaker pocket in left upper chest sutured and steristrips added with primapore dressing.  CDI.

## 2025-04-11 NOTE — Clinical Note
Arrhythmia Type: atrial fibrillation.   Method of Cardioversion: synchronous.   The arrhythmia was terminated.   Energy shock delivered: 200 joules.   Time shock delivered: 17:04 CDT.   Post cardioversion rhythm: sinus rhythm.   No early return to atrial fibrillation (ERAF). No immediate return to atrial fibrillation (IRAF).

## 2025-04-11 NOTE — PRE-PROCEDURE
GENERAL PRE-PROCEDURE:   Procedure:  Defibrillator Implant  Date/Time:  4/11/2025 2:53 PM    Verbal consent obtained?: Yes    Written consent obtained?: Yes    Risks and benefits: Risks, benefits and alternatives were discussed    DC Plan: Appropriate discharge home plan in place for patients who are going home after procedure   Consent given by:  Patient  Patient states understanding of procedure being performed: Yes    Patient's understanding of procedure matches consent: Yes    Procedure consent matches procedure scheduled: Yes    Expected level of sedation:  Moderate  Appropriately NPO:  Yes  ASA Class:  3  Mallampati  :  Grade 2- soft palate, base of uvula, tonsillar pillars, and portion of posterior pharyngeal wall visible  Lungs:  Lungs clear with good breath sounds bilaterally  Heart:  RRR  History & Physical reviewed:  History and physical reviewed and no updates needed  Statement of review:  I have reviewed the lab findings, diagnostic data, medications, and the plan for sedation

## 2025-04-11 NOTE — PROGRESS NOTES
Returned from CCL s/p ICD placement.  VSS.  Groggy.  Left chest site covered with a Primapore, CDI.  C/o soreness at chest.  Device representative at bedside now.  Portable CXR taken.  Family at bedside. NSR in the 80's.

## 2025-04-11 NOTE — DISCHARGE INSTRUCTIONS
Home Care after an ICD implant    Wound care:  Keep your incision (surgery wound) dry for 3 days.  After 3 days, you may remove the outer bandage.  Keep the strips of tape on.  They will be removed at your clinic visit.  Check for signs of infection each day.  These include increased redness, swelling, drainage or a fever over 101 F (38.3 C).  Call us immediately if you see any of these signs.  If there are no signs of infection, you may shower in 3 days.  Do not submerge the incision (in a bath tub, hot tub, or swimming pool) until fully healed.  Pain:   You may have mild to moderate pain for 3 to 5 days.  Take acetaminophen (Tylenol) or ibuprofen (Advil) for the pain.  Call us if the pain is severe or lasts more than 5 days.    Activity:  You should slowly go back to your normal activities after 24 hours.  Healing will take 4 to 6 weeks.  No driving for 3 days  Avoid climbing a ladder alone.  It is best to stay within 4 feet of the ground.  Avoid anything that may cause rough contact or a hard hit to your chest.  This includes football, hockey, and other contact sports.  Do not go swimming or boating alone.      For at least 4 weeks:  Do not raise your affected arm above your shoulder.  Do not use your affected arm to push, pull, or lift anything over 10 pounds.  Avoid repetitive upper body activities for 6 weeks (ie: golf, swimming, and weight lifting)    Telling others about your device:  Before you have any medical tests or treatments, tell the doctors, dentists, and other care providers about your device.  There are a few tests and treatments that may interfere with your device.  (These include MRI, radiation therapy, electrocautery, and others.)  Your care team may need to take special steps to keep you safe.  Before you leave the hospital, you will receive a temporary ID card.  A permanent card will be mailed to you about 6 to 8 weeks later.  Always carry the ID card with you.  It has important details  about your device.  You should also get a MedicAlert ID.  Please ask us for a MedicAlert brochure, or go to [email or web address].    Safety near electrical equipment:  All of these are safe to use when in good repair:  Microwaves  Radios  Cordless phone  Remote controls  Small electrical tools  Cell phones: Keep cell phones at least 6 inches from your device.  Do not carry the phone in a pocket near your device.  Security hess: It is okay to walk through security hess at the airports and department stores.  Tell airport security that you have a defibrillator.  They should keep the screening wand at least 6 inches from your device.  Full-body scanners are safe.    Avoid the following:   MRI tests in the hospital unless you have a MRI safe defibrillator.   Arc welding, chain saws and high-powered industrial or commercial tools.   Power lines, power plants and large power generators.   Electric body fat scales.   Magnetic mattress pads or pillow.    What to do after a shock from your ICD:  Stop what you re doing and rest.  If you feel fine before and after the shock, call the device clinic.  If you feel unwell or receive more than one shock, call 911 or go to the emergency room.  A shock could mean that your condition has changed and you may need to see a doctor.    Follow-Up Visits:  Return to the clinic in 7 to 10 days to have your device and wound checked.    Device follow-up after your initial clinic visit will take place every 3 months and as needed until your battery reaches end of service. The device follow up will take place in person or remotely utilizing your home monitor.    Questions?  Please call [facility]   Device Nurse:          Business Hours:  [phone]    After Hours:  [phone]   Choose option 4, then ask for the on-call device nurse at job code [id number].    Your next device clinic appointment is scheduled on:    April 21, 2025 at 10:00 am                                          [facility]  [facility] - [address]  [address] [place]  [address]

## 2025-04-11 NOTE — TELEPHONE ENCOUNTER
Prior Authorization Approval    Medication: CONTRAVE 8-90 MG PO TB12  Authorization Effective Date: 4/11/2025  Authorization Expiration Date: 4/10/2026  Approved Dose/Quantity: 120 for 30 days  Insurance Company: [employer] - Phone [phone] Fax [phone]  Which Pharmacy is filling the prescription: [id number] - [place], [place] - [address] AT [address]  Pharmacy Notified: yes  Patient Notified: yes

## 2025-04-11 NOTE — Clinical Note
Lab results reviewed and abnormals discussed with physician. Finasteride Counseling:  I discussed with the patient the risks of use of finasteride including but not limited to decreased libido, decreased ejaculate volume, gynecomastia, and depression. Women should not handle medication.  All of the patient's questions and concerns were addressed. Finasteride Male Counseling: Finasteride Counseling:  I discussed with the patient the risks of use of finasteride including but not limited to decreased libido, decreased ejaculate volume, gynecomastia, and depression. Women should not handle medication.  All of the patient's questions and concerns were addressed.

## 2025-04-12 NOTE — PROGRESS NOTES
D/I/A:  Pt is tolerating liquids and foods, ambulating, urinating and pt has a  - significant other.  A+O x4 and making needs known.  L upper chest incision stable; no bleeding or hematoma.  CMS intact.  VSS.  SR on monitor.  Per provider, J. Reyes, CXR with no acute concerns, pt OK to discharge.  Provider also spoke with pt and family over the phone.  IV access removed.  Education completed and outlined in AVS by previous nurse.  Questions answered prior to discharge.  Medications picked up from Discharge pharmacy.  Belongings returned to pt at discharge.    P:  Discharged to self care.  Pt to follow up with appts as per discharge instructions.  Pt brought out to front lobby via wheelchair, accompanied by nurse and family.

## 2025-04-14 ENCOUNTER — TELEPHONE (OUTPATIENT)
Dept: CARDIOLOGY | Facility: CLINIC | Age: 60
End: 2025-04-14
Payer: COMMERCIAL

## 2025-04-14 LAB
ATRIAL RATE - MUSE: 52 BPM
ATRIAL RATE - MUSE: 83 BPM
DIASTOLIC BLOOD PRESSURE - MUSE: NORMAL MMHG
DIASTOLIC BLOOD PRESSURE - MUSE: NORMAL MMHG
INTERPRETATION ECG - MUSE: NORMAL
INTERPRETATION ECG - MUSE: NORMAL
P AXIS - MUSE: 38 DEGREES
P AXIS - MUSE: 45 DEGREES
PR INTERVAL - MUSE: 160 MS
PR INTERVAL - MUSE: 192 MS
QRS DURATION - MUSE: 100 MS
QRS DURATION - MUSE: 110 MS
QT - MUSE: 392 MS
QT - MUSE: 490 MS
QTC - MUSE: 455 MS
QTC - MUSE: 460 MS
R AXIS - MUSE: -20 DEGREES
R AXIS - MUSE: -36 DEGREES
SYSTOLIC BLOOD PRESSURE - MUSE: NORMAL MMHG
SYSTOLIC BLOOD PRESSURE - MUSE: NORMAL MMHG
T AXIS - MUSE: 37 DEGREES
T AXIS - MUSE: 56 DEGREES
VENTRICULAR RATE- MUSE: 52 BPM
VENTRICULAR RATE- MUSE: 83 BPM

## 2025-04-14 NOTE — TELEPHONE ENCOUNTER
Patient left a voicemail saying that she would like a call back from the nurse to discuss some paperwork.

## 2025-04-14 NOTE — TELEPHONE ENCOUNTER
Patient called again requesting to speak with nurse regarding some paperwork.    Writer let her know that it was being worked on.

## 2025-04-15 ENCOUNTER — TELEPHONE (OUTPATIENT)
Dept: CARDIOLOGY | Facility: CLINIC | Age: 60
End: 2025-04-15
Payer: COMMERCIAL

## 2025-04-15 NOTE — TELEPHONE ENCOUNTER
Patient left a voicemail requesting that the nurse call her back ASAP regarding the paperwork for short term disability.

## 2025-04-15 NOTE — TELEPHONE ENCOUNTER
Called and spoke with Marleen to let her know both forms for Accommodation Request - [facility] and Short term disability claim - [employer] have been completed and emailed to patient.     Marleen asked if both forms can be faxed to appropriate

## 2025-04-21 ENCOUNTER — ANCILLARY PROCEDURE (OUTPATIENT)
Dept: CARDIOLOGY | Facility: CLINIC | Age: 60
End: 2025-04-21
Attending: INTERNAL MEDICINE
Payer: COMMERCIAL

## 2025-04-21 DIAGNOSIS — I42.0 DILATED CARDIOMYOPATHY (H): ICD-10-CM

## 2025-04-21 DIAGNOSIS — I42.9 FAMILIAL CARDIOMYOPATHY (H): ICD-10-CM

## 2025-04-21 DIAGNOSIS — Z95.810 ICD (IMPLANTABLE CARDIOVERTER-DEFIBRILLATOR) IN PLACE: ICD-10-CM

## 2025-04-21 LAB
MDC_IDC_LEAD_CONNECTION_STATUS: NORMAL
MDC_IDC_LEAD_CONNECTION_STATUS: NORMAL
MDC_IDC_LEAD_IMPLANT_DT: NORMAL
MDC_IDC_LEAD_IMPLANT_DT: NORMAL
MDC_IDC_LEAD_LOCATION: NORMAL
MDC_IDC_LEAD_LOCATION: NORMAL
MDC_IDC_LEAD_LOCATION_DETAIL_1: NORMAL
MDC_IDC_LEAD_LOCATION_DETAIL_1: NORMAL
MDC_IDC_LEAD_MFG: NORMAL
MDC_IDC_LEAD_MFG: NORMAL
MDC_IDC_LEAD_MODEL: NORMAL
MDC_IDC_LEAD_MODEL: NORMAL
MDC_IDC_LEAD_POLARITY_TYPE: NORMAL
MDC_IDC_LEAD_POLARITY_TYPE: NORMAL
MDC_IDC_LEAD_SERIAL: NORMAL
MDC_IDC_LEAD_SERIAL: NORMAL
MDC_IDC_LEAD_SPECIAL_FUNCTION: NORMAL
MDC_IDC_LEAD_SPECIAL_FUNCTION: NORMAL
MDC_IDC_MSMT_BATTERY_DTM: NORMAL
MDC_IDC_MSMT_BATTERY_REMAINING_LONGEVITY: 151 MO
MDC_IDC_MSMT_BATTERY_RRT_TRIGGER: NORMAL
MDC_IDC_MSMT_BATTERY_VOLTAGE: 3.18 V
MDC_IDC_MSMT_CAP_CHARGE_ENERGY: 40 J
MDC_IDC_MSMT_CAP_CHARGE_TIME: 0 S
MDC_IDC_MSMT_CAP_CHARGE_TYPE: NORMAL
MDC_IDC_MSMT_LEADCHNL_RA_IMPEDANCE_VALUE: 437 OHM
MDC_IDC_MSMT_LEADCHNL_RA_PACING_THRESHOLD_AMPLITUDE: 0.5 V
MDC_IDC_MSMT_LEADCHNL_RA_PACING_THRESHOLD_AMPLITUDE: 0.75 V
MDC_IDC_MSMT_LEADCHNL_RA_PACING_THRESHOLD_PULSEWIDTH: 0.4 MS
MDC_IDC_MSMT_LEADCHNL_RA_PACING_THRESHOLD_PULSEWIDTH: 0.4 MS
MDC_IDC_MSMT_LEADCHNL_RA_SENSING_INTR_AMPL: 3.3 MV
MDC_IDC_MSMT_LEADCHNL_RV_IMPEDANCE_VALUE: 304 OHM
MDC_IDC_MSMT_LEADCHNL_RV_IMPEDANCE_VALUE: 380 OHM
MDC_IDC_MSMT_LEADCHNL_RV_PACING_THRESHOLD_AMPLITUDE: 1 V
MDC_IDC_MSMT_LEADCHNL_RV_PACING_THRESHOLD_AMPLITUDE: 1 V
MDC_IDC_MSMT_LEADCHNL_RV_PACING_THRESHOLD_PULSEWIDTH: 0.4 MS
MDC_IDC_MSMT_LEADCHNL_RV_PACING_THRESHOLD_PULSEWIDTH: 0.4 MS
MDC_IDC_MSMT_LEADCHNL_RV_SENSING_INTR_AMPL: 12.6 MV
MDC_IDC_PG_IMPLANT_DTM: NORMAL
MDC_IDC_PG_MFG: NORMAL
MDC_IDC_PG_MODEL: NORMAL
MDC_IDC_PG_SERIAL: NORMAL
MDC_IDC_PG_TYPE: NORMAL
MDC_IDC_SESS_CLINIC_NAME: NORMAL
MDC_IDC_SESS_DTM: NORMAL
MDC_IDC_SESS_TYPE: NORMAL
MDC_IDC_SET_BRADY_AT_MODE_SWITCH_RATE: 171 {BEATS}/MIN
MDC_IDC_SET_BRADY_LOWRATE: 50 {BEATS}/MIN
MDC_IDC_SET_BRADY_MAX_SENSOR_RATE: 130 {BEATS}/MIN
MDC_IDC_SET_BRADY_MAX_TRACKING_RATE: 130 {BEATS}/MIN
MDC_IDC_SET_BRADY_MODE: NORMAL
MDC_IDC_SET_BRADY_PAV_DELAY_LOW: 210 MS
MDC_IDC_SET_BRADY_SAV_DELAY_LOW: 190 MS
MDC_IDC_SET_LEADCHNL_RA_PACING_AMPLITUDE: 3.5 V
MDC_IDC_SET_LEADCHNL_RA_PACING_ANODE_ELECTRODE_1: NORMAL
MDC_IDC_SET_LEADCHNL_RA_PACING_ANODE_LOCATION_1: NORMAL
MDC_IDC_SET_LEADCHNL_RA_PACING_CAPTURE_MODE: NORMAL
MDC_IDC_SET_LEADCHNL_RA_PACING_CATHODE_ELECTRODE_1: NORMAL
MDC_IDC_SET_LEADCHNL_RA_PACING_CATHODE_LOCATION_1: NORMAL
MDC_IDC_SET_LEADCHNL_RA_PACING_POLARITY: NORMAL
MDC_IDC_SET_LEADCHNL_RA_PACING_PULSEWIDTH: 0.4 MS
MDC_IDC_SET_LEADCHNL_RA_SENSING_ANODE_ELECTRODE_1: NORMAL
MDC_IDC_SET_LEADCHNL_RA_SENSING_ANODE_LOCATION_1: NORMAL
MDC_IDC_SET_LEADCHNL_RA_SENSING_CATHODE_ELECTRODE_1: NORMAL
MDC_IDC_SET_LEADCHNL_RA_SENSING_CATHODE_LOCATION_1: NORMAL
MDC_IDC_SET_LEADCHNL_RA_SENSING_POLARITY: NORMAL
MDC_IDC_SET_LEADCHNL_RA_SENSING_SENSITIVITY: 0.3 MV
MDC_IDC_SET_LEADCHNL_RV_PACING_AMPLITUDE: 3.5 V
MDC_IDC_SET_LEADCHNL_RV_PACING_ANODE_ELECTRODE_1: NORMAL
MDC_IDC_SET_LEADCHNL_RV_PACING_ANODE_LOCATION_1: NORMAL
MDC_IDC_SET_LEADCHNL_RV_PACING_CAPTURE_MODE: NORMAL
MDC_IDC_SET_LEADCHNL_RV_PACING_CATHODE_ELECTRODE_1: NORMAL
MDC_IDC_SET_LEADCHNL_RV_PACING_CATHODE_LOCATION_1: NORMAL
MDC_IDC_SET_LEADCHNL_RV_PACING_POLARITY: NORMAL
MDC_IDC_SET_LEADCHNL_RV_PACING_PULSEWIDTH: 0.4 MS
MDC_IDC_SET_LEADCHNL_RV_SENSING_ANODE_ELECTRODE_1: NORMAL
MDC_IDC_SET_LEADCHNL_RV_SENSING_ANODE_LOCATION_1: NORMAL
MDC_IDC_SET_LEADCHNL_RV_SENSING_CATHODE_ELECTRODE_1: NORMAL
MDC_IDC_SET_LEADCHNL_RV_SENSING_CATHODE_LOCATION_1: NORMAL
MDC_IDC_SET_LEADCHNL_RV_SENSING_POLARITY: NORMAL
MDC_IDC_SET_LEADCHNL_RV_SENSING_SENSITIVITY: 0.3 MV
MDC_IDC_SET_ZONE_DETECTION_BEATS_DENOMINATOR: 16 {BEATS}
MDC_IDC_SET_ZONE_DETECTION_BEATS_DENOMINATOR: 32 {BEATS}
MDC_IDC_SET_ZONE_DETECTION_BEATS_DENOMINATOR: 40 {BEATS}
MDC_IDC_SET_ZONE_DETECTION_BEATS_NUMERATOR: 16 {BEATS}
MDC_IDC_SET_ZONE_DETECTION_BEATS_NUMERATOR: 30 {BEATS}
MDC_IDC_SET_ZONE_DETECTION_BEATS_NUMERATOR: 32 {BEATS}
MDC_IDC_SET_ZONE_DETECTION_INTERVAL: 300 MS
MDC_IDC_SET_ZONE_DETECTION_INTERVAL: 350 MS
MDC_IDC_SET_ZONE_DETECTION_INTERVAL: 350 MS
MDC_IDC_SET_ZONE_DETECTION_INTERVAL: 360 MS
MDC_IDC_SET_ZONE_STATUS: NORMAL
MDC_IDC_SET_ZONE_TYPE: NORMAL
MDC_IDC_SET_ZONE_VENDOR_TYPE: NORMAL
MDC_IDC_STAT_AT_BURDEN_PERCENT: 0 %
MDC_IDC_STAT_AT_DTM_END: NORMAL
MDC_IDC_STAT_AT_DTM_START: NORMAL
MDC_IDC_STAT_BRADY_AP_VP_PERCENT: 0.01 %
MDC_IDC_STAT_BRADY_AP_VS_PERCENT: 16.23 %
MDC_IDC_STAT_BRADY_AS_VP_PERCENT: 0.04 %
MDC_IDC_STAT_BRADY_AS_VS_PERCENT: 83.73 %
MDC_IDC_STAT_BRADY_DTM_END: NORMAL
MDC_IDC_STAT_BRADY_DTM_START: NORMAL
MDC_IDC_STAT_BRADY_RA_PERCENT_PACED: 16.36 %
MDC_IDC_STAT_BRADY_RV_PERCENT_PACED: 0.04 %
MDC_IDC_STAT_EPISODE_RECENT_COUNT: 0
MDC_IDC_STAT_EPISODE_RECENT_COUNT_DTM_END: NORMAL
MDC_IDC_STAT_EPISODE_RECENT_COUNT_DTM_START: NORMAL
MDC_IDC_STAT_EPISODE_TOTAL_COUNT: 0
MDC_IDC_STAT_EPISODE_TOTAL_COUNT_DTM_END: NORMAL
MDC_IDC_STAT_EPISODE_TOTAL_COUNT_DTM_START: NORMAL
MDC_IDC_STAT_EPISODE_TYPE: NORMAL
MDC_IDC_STAT_TACHYTHERAPY_ATP_DELIVERED_RECENT: 0
MDC_IDC_STAT_TACHYTHERAPY_ATP_DELIVERED_TOTAL: 0
MDC_IDC_STAT_TACHYTHERAPY_RECENT_DTM_END: NORMAL
MDC_IDC_STAT_TACHYTHERAPY_RECENT_DTM_START: NORMAL
MDC_IDC_STAT_TACHYTHERAPY_SHOCKS_ABORTED_RECENT: 0
MDC_IDC_STAT_TACHYTHERAPY_SHOCKS_ABORTED_TOTAL: 0
MDC_IDC_STAT_TACHYTHERAPY_SHOCKS_DELIVERED_RECENT: 0
MDC_IDC_STAT_TACHYTHERAPY_SHOCKS_DELIVERED_TOTAL: 0
MDC_IDC_STAT_TACHYTHERAPY_TOTAL_DTM_END: NORMAL
MDC_IDC_STAT_TACHYTHERAPY_TOTAL_DTM_START: NORMAL

## 2025-04-21 PROCEDURE — 93283 PRGRMG EVAL IMPLANTABLE DFB: CPT | Performed by: INTERNAL MEDICINE

## 2025-04-21 NOTE — PATIENT INSTRUCTIONS
It was a pleasure to see you in clinic today.  Please do not hesitate to call with any questions or concerns.  We look forward to seeing you in clinic at your next device check in 3 months.    Waleska Astudillo, RN, MS, CCRN  Electrophysiology Nurse Clinician  [facility]    During Business Hours Please Call:  [phone]  After Hours Please Call:  [phone] - select option #4 and ask for job code [id number]

## 2025-05-27 DIAGNOSIS — R39.15 URINARY URGENCY: ICD-10-CM

## 2025-05-27 RX ORDER — SOLIFENACIN SUCCINATE 5 MG/1
5 TABLET, FILM COATED ORAL
Qty: 90 TABLET | Refills: 0 | OUTPATIENT
Start: 2025-05-27

## 2025-06-02 ENCOUNTER — VIRTUAL VISIT (OUTPATIENT)
Dept: PHARMACY | Facility: CLINIC | Age: 60
End: 2025-06-02
Payer: COMMERCIAL

## 2025-06-02 VITALS — WEIGHT: 252 LBS | BODY MASS INDEX: 43.02 KG/M2 | HEIGHT: 64 IN

## 2025-06-02 DIAGNOSIS — R73.03 PREDIABETES: ICD-10-CM

## 2025-06-02 DIAGNOSIS — R11.0 NAUSEA: ICD-10-CM

## 2025-06-02 DIAGNOSIS — E66.9 OBESITY: Primary | ICD-10-CM

## 2025-06-02 DIAGNOSIS — G47.33 OSA (OBSTRUCTIVE SLEEP APNEA): ICD-10-CM

## 2025-06-02 DIAGNOSIS — K59.00 CONSTIPATION, UNSPECIFIED CONSTIPATION TYPE: ICD-10-CM

## 2025-06-02 DIAGNOSIS — K21.00 GASTROESOPHAGEAL REFLUX DISEASE WITH ESOPHAGITIS WITHOUT HEMORRHAGE: ICD-10-CM

## 2025-06-02 RX ORDER — METFORMIN HYDROCHLORIDE 500 MG/1
TABLET, EXTENDED RELEASE ORAL
Qty: 270 TABLET | Refills: 1 | Status: SHIPPED | OUTPATIENT
Start: 2025-06-02

## 2025-06-02 RX ORDER — NALTREXONE HYDROCHLORIDE AND BUPROPION HYDROCHLORIDE 8; 90 MG/1; MG/1
2 TABLET, EXTENDED RELEASE ORAL 2 TIMES DAILY
Qty: 120 TABLET | Refills: 2 | Status: SHIPPED | OUTPATIENT
Start: 2025-06-02

## 2025-06-02 ASSESSMENT — PAIN SCALES - GENERAL: PAINLEVEL_OUTOF10: NO PAIN (0)

## 2025-06-02 NOTE — PROGRESS NOTES
Medication Therapy Management (MTM) Encounter    ASSESSMENT:                            Medication Adherence/Access: No issues identified.    Weight Management   /obstructive sleep apnea/hx pre-diabetes:   Weight loss is not progressing. Weight has been stable. Ongoing constipation but otherwise tolerating medications well. Reports she's still having sugar cravings so unsure if Contrave has been helpful.     Weight Loss Medication Options:   Phentermine/Qsymia or other stimulant: contraindicated due to heart failure  Topiramate: hesitant to use due to risk for paresthesias which she already has. Philippe fog at baseline. CNS depressing effect with metoclopramide. Could increase potassium wasting with torsemide but she's just taking torsemide as needed.   Contrave: interaction with bupropion and metoclopramide due to strong CYP2D6 inhibition. Max recommended metoclopramide dose for GERD is 30 mg/day to reduce risk for extrapyramidal side effects.  Naltrexone: no contraindications.  GLP1 agonist: not covered. cash options too expensive, even sublingual semaglutide   Metformin: could increase the dose    She'd like to try increasing the metformin dose to see if it's helpful. Will consider topiramate as an alternative option. Would start low dose with cautious taper due to brain fog at baseline.    Constipation:   Discussed starting Miralax but she'd like to try increasing dietary fiber like flax or angel seeds first. Would benefit from increasing water intake and docusate dose.    GERD /nausea:   Stable    PLAN:                            Increase metformin to 1 tablet 2 times daily for 7 days, then take 1 tablet in the morning and 2 tablets with dinner   Continue Contrave 2 tablets 2 times daily   Increase docusate to 2 tablets once daily   Increase dietary fiber and water intake to help with constipation    Follow-up: Evonne Doss PA-C on 6/30, medication therapy management in 2 months     SUBJECTIVE/OBJECTIVE:        "                   Marleen Mcfadden is a 59 year old female seen for a follow-up visit.       Reason for visit: weight management follow-up.    Allergies/ADRs: Reviewed in chart  Past Medical History: Reviewed in chart  Tobacco: She reports that she has never smoked. She has never been exposed to tobacco smoke. She has never used smokeless tobacco.  Alcohol: occasional glass of wine 2-3 days per week   Medication Adherence/Access: takes medications 2 times daily - 4:30 am and 4:30 pm.     Weight Management   /obstructive sleep apnea/hx pre-diabetes:   Metformin  once daily   Contrave 2 tablets 2 times daily     Following with Evonne Doss PA-C for medical weight management. Still has sugar cravings. No change in appetite. Has constipation.     Nutrition/Eating Habits: breakfast: yogurt and then coffee and egg at work. Dinner: salad with chicken or fish with vegetable and potato. Weakness is sugar cravings and sweets. Likes to make cookies.  Water: 32-48 ounces of water per day. Sometimes adds crystal lite. Has met with registered dietitian.  Exercise/Activity: walks a lot at work. Works as a phlebotomist.     Medications Tried/Failed:  Zepbound 5 mg/week -stopped getting covered  No history of kidney stones, glaucoma, palpitations, seizures, heart attack, stroke. Mood and anxiety is stable.     Initial Consult Weight: 254 lbs (8/23/2024)     Current weight today: 252 lbs 0 oz  Cumulative Weight Loss: -2 lb, -0.7% from baseline    Wt Readings from Last 4 Encounters:   06/02/25 252 lb (114.3 kg)   04/11/25 252 lb (114.3 kg)   04/03/25 253 lb (114.8 kg)   03/27/25 252 lb (114.3 kg)     Estimated body mass index is 43.26 kg/m  as calculated from the following:    Height as of this encounter: 5' 4\" (1.626 m).    Weight as of this encounter: 252 lb (114.3 kg).      Constipation   Docusate 50 mg once daily   Bowel movement every 2 days. Used to have daily bowel movement. Bowel movements are hard/painful.    " "    GERD /nausea:   Rabeprazole 20 mg 2 times daily   Famotidine 40 mg at night as needed   Metoclopramide 10 mg 3 times daily   Patient feels that current regimen is effective. Didn't reduce metoclopramide dose 50% when starting bupropion (in Contrave) but reports tolerating metoclopramide still. Following with GI. Should take breaks from the reglan for 1-2 weeks every 2-3 months per GI.          Today's Vitals: Ht 5' 4\" (1.626 m)   Wt 252 lb (114.3 kg)   LMP 10/19/2008 (Approximate)   BMI 43.26 kg/m    ----------------      I spent 20 minutes with this patient today. All changes were made via collaborative practice agreement with Evonne Doss PA-C.     A summary of these recommendations was sent via [employer].    Telemedicine Visit Details  The patient's medications can be safely assessed via a telemedicine encounter.  Type of service:  Telephone visit  Originating Location (pt. Location): Home    Distant Location (provider location):  Off-site  Start Time: 1:40 PM  End Time: 2 pm     Medication Therapy Recommendations  Constipation, unspecified constipation type   1 Current Medication: docusate sodium (COLACE) 50 MG capsule   Current Medication Sig: Take 50 mg by mouth 2 times daily as needed for constipation.   Rationale: Dose too low - Dosage too low - Effectiveness   Recommendation: Increase Dose - DOCUSATE CALCIUM   Status: Patient Agreed - Adherence/Education   Identified Date: 6/2/2025 Completed Date: 6/2/2025         Prediabetes   1 Current Medication: metFORMIN (GLUCOPHAGE XR) 500 MG 24 hr tablet   Current Medication Sig: Take 1 tablet 2 times daily with food for 7 days, then increase to 1 tablet with breakfast and 2 tablets with dinner.   Rationale: Dose too low - Dosage too low - Effectiveness   Recommendation: Increase Dose - metFORMIN 500 MG 24 hr tablet   Status: Accepted per CPA   Identified Date: 6/2/2025 Completed Date: 6/2/2025              "

## 2025-06-02 NOTE — NURSING NOTE
Current patient location: home     Is the patient currently in the state of [place]? YES    Visit mode: TELEPHONE    If the visit is dropped, the patient can be reconnected by:TELEPHONE VISIT: Phone number: [phone]    Will anyone else be joining the visit? NO  (If patient encounters technical issues they should call [phone]    Are changes needed to the allergy or medication list? Pt stated no med changes    Are refills needed on medications prescribed by this physician? NO    Rooming Documentation:  Not applicable      Reason for visit: Medication Therapy Management    Wt other than 24 hrs:    Pain more than one location:  no  Ivette ARREDONDO

## 2025-06-02 NOTE — PATIENT INSTRUCTIONS
"Recommendations from today's MTM visit:                                                      Increase metformin to 1 tablet 2 times daily for 7 days, then take 1 tablet in the morning and 2 tablets with dinner   Continue Contrave 2 tablets 2 times daily   Increase docusate to 2 tablets once daily   Increase dietary fiber and water intake to help with constipation    Follow-up: Evonne Doss PA-C on 6/30, medication therapy management in 2 months     It was great speaking with you today.  I value your experience and would be very thankful for your time in providing feedback in our clinic survey. In the next few days, you may receive an email or text message from [employer] with a link to a survey related to your  clinical pharmacist.\"     To schedule another MTM appointment, please call the clinic directly or you may call the MTM scheduling line at [phone].    My Clinical Pharmacist's contact information:                                                      Please feel free to contact me with any questions or concerns you have.      Laureen Garcia, PharmD, AAHIVP  Medication Therapy Management Pharmacist      "

## 2025-06-27 NOTE — PROGRESS NOTES
"  Return Medical Weight Management Note     Marleen Mcfadden  MRN:  [id number]  :  1965  JANET:  2025    Dear Yanna Matias MD,    I had the pleasure of seeing your patient Marleen Mcfadden. She is a 59 year old female who I am continuing to see for treatment of obesity related to:        2024     7:20 AM   --   I have the following health issues associated with obesity Heart Disease    GERD (Reflux)   I have the following symptoms associated with obesity Lower Extremity Swelling    Fatigue    Groin Rash       Assessment & Plan   Problem List Items Addressed This Visit    None  Visit Diagnoses         Obesity        Relevant Medications    metFORMIN (GLUCOPHAGE XR) 500 MG 24 hr tablet      Prediabetes        Relevant Medications    metFORMIN (GLUCOPHAGE XR) 500 MG 24 hr tablet           Plan  Start Metformin 500mg - take 1 tablet with a meal once daily for 7 days, then increase to 2 tablets with a meal once daily. As long as tolerating can increase to 1 tablet in the morning and 2 at night.   Goals we discussed today:   10 minutes of rowing machine 2x a week   Protein rich snack at lunch   Follow up with Evonne in 3 months   Dietician appointment to be scheduled asap  Could consider following up with sleep medicine to discuss management of sleep apnea- Marleen ricks for today   Keep up the excellent work!         INTERVAL HISTORY:  New MWM with me 24  Overweight onset over the last 5 years since covid lock down. Prior to covid was around 180lbs, felt good at 180lbs. During covid lockdowns was baking a lot during lockdowns.      Current weight is 254lbs, this is highest weight in life.  lost down from 250 to 221 with increasing in exercise and focusing on fruits and vegetables, has since seen weight regain since moving in with fiance.      Past strategies to lose weight- p90x, \"eating clean\" (olive oil, vegetables, whole foods, minimal starches).      Comorbidities associated " with weight gain include cardiomyopathy (diagnosed 2000), managed through cardiology. KATIA (has tried cpap in the past, did not tolerate it, tried a couple years ago), GERD (pepcid, rabeprazole, reglan, well controlled with these meds).   Additional health issues include anxiety (managed with lexpapro though is taking this as needed, seems to help when she's anxious at night to go to sleep, through pcp).      Motivators for weight loss include improve health, improve comorbities, reduce risks with associated cardiomyopathy.      She is interested in starting a medication as a tool for working towards sustainable weight loss.     Regarding eating patterns and diet, she typically eats 2 meals a day, skips lunch. Craves sweets . Is  able to get full. Struggles to stay full until next meal, will snack sometimes an hour after a meal. Does struggle with portion control, only with sweets.  Does experience food noise, particularly with sweets. Does experience emotional eating (sadness, happiness, celebration). Does a loss of control around eating, particularly with homemade cake or cookies. Denies purging to compensate for overeating.     Eats out/ gets take out 1-2 times  a month. Drinks soda (regular coke) 1-2 times a week, more if it's in the house., juice (grape fruit, krystian d) 4 times a week with breakfast , water. Coffee in the morning with hazelnut coffee mate. ETOH socially, 1 drink per sitting, 2-3 times per month.      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 Loves baking. In the past used to kickbox, used to garden, used to ride bike.Hoping to revisit exercising, struggles with people watching her while she's at the gym.   -started wegovy through [facility] ([employer] [facility])      Last seen by me 12/2/24  Currently out of wegovy, wanting to increase to 1mg.   Per chart, was sent via [employer] on October 29th, though when Marleen called she  reports the pharmacy did not have it.      Will have [facility] pharmacist send in 1mg asap.      5lbs weight loss since last visit.      Right heart cath 11/26/24- went well.      [employer] insurance, discussed alternative options. Compounded semaglutide not cost reasonable at this time.   -started metformin, contrave through [facility] pharmacy as wegovy no longer covered   -stopped metformin due to intolerable sweating     Today in visit 6/30/25   -reports she didn't see any improvement in sweating since stopping metformin, wonders if it's instead related to contrave   -has noticed sleep disturbances for the last 1.5 months, is punching/kicking/yelling in her sleep, but more recently has not been able to sleep through the night- needed benadryl to fall asleep.   Stopped contrave 3 days ago, has seen reduction in sweating but has not seen improvement in sleep quality since stopping.     -is open to restarting metformin and stopping contrave to see if helpful for sleep behaviors   -briefly discussed bariatric surgery, Marleen declines this for now.     Feeling alright with her pacemaker- no longer able to do tanita with pacemaker present.     Wt Readings from Last 5 Encounters:   06/30/25 114 kg (251 lb 6.4 oz)   06/02/25 114.3 kg (252 lb)   04/11/25 114.3 kg (252 lb)   04/03/25 114.8 kg (253 lb)   03/27/25 114.3 kg (252 lb)       Anti-obesity medication history    Current:   None     Past/Failed/contraindicated:   Contrave- was seeing sleep disturbances, stopped 3 days ago. Sweating has improved since stopping.   Metformin- stopped initially as she thought this was causing the sweating.       Recent diet changes:   Meal recall:   B- pugh and eggs with grits and fruit OR yogurt with flax seed. Coffee with creamer.   Typically skips lunch   D: salmon with broccoli and rice/potato OR chicken with vegetable and carb OR shrimp with vegetable and carb.   Snack: popcorn.     Drinking mostly water throughout. Pop when she has pizza- less  than once a week.   ETOH occasionally, glass of wine maybe twice a month.     Recent exercise/activity changes: walking at work as phlebotomist.     Sleep: has sleep apnea, does not have a cpap- did not tolerate it due to discomfort.     Vitamins/Labs: none needed today, cmp wnl as of 3/20/25        CURRENT WEIGHT:   251 lbs 6.4 oz    Initial Weight (lbs): 254.4 lbs  Last Visits Weight: 112.9 kg (249 lb)  Cumulative weight loss (lbs): 3  Weight Loss Percentage: 1.18%  Waist Circumference (cm): 118.5 cm        6/30/2025     2:01 PM   Changes and Difficulties   I have made the following changes to my diet since my last visit: limited sweets   With regards to my diet, I am still struggling with: sweets   I have made the following changes to my activity/exercise since my last visit: none   With regards to my activity/exercise, I am still struggling with: i walk 10 hours a day at work finding it hard to excerise at home after work             MEDICATIONS:   Current Outpatient Medications   Medication Sig Dispense Refill    docusate sodium (COLACE) 50 MG capsule Take 50 mg by mouth 2 times daily as needed for constipation.      famotidine (PEPCID) 40 MG tablet Take 1 tablet (40 mg) by mouth nightly as needed for heartburn 90 tablet 3    loratadine (CLARITIN) 10 MG tablet Take 1 tablet (10 mg) by mouth daily 90 tablet 3    magnesium oxide (MAG-OX) 400 MG tablet Take 1 tablet (400 mg) by mouth 2 times daily 180 tablet 3    metFORMIN (GLUCOPHAGE XR) 500 MG 24 hr tablet Take 1 tablet 2 times daily with food for 7 days, then increase to 1 tablet with breakfast and 2 tablets with dinner. 270 tablet 1    metoclopramide (REGLAN) 10 MG tablet Take 1 tablet (10 mg) by mouth 3 times daily as needed (nausea). For more refills,PLEASE KEEP scheduled appointment on 2/4/25 270 tablet 1    metoprolol succinate ER (TOPROL XL) 25 MG 24 hr tablet Take 0.5 tablets (12.5 mg) by mouth daily. 45 tablet 3    RABEprazole (ACIPHEX) 20 MG EC tablet  "Take 1 tablet (20 mg) by mouth 2 times daily. 60 tablet 5    sacubitril-valsartan (ENTRESTO)  MG per tablet Take 1 tablet by mouth 2 times daily. 180 tablet 3    solifenacin (VESICARE) 5 MG tablet Take 1 tablet (5 mg) by mouth daily at 2 pm. 90 tablet 0    spironolactone (ALDACTONE) 25 MG tablet Take 2 tablets (50 mg) by mouth daily. 180 tablet 3    torsemide (DEMADEX) 5 MG tablet Take 1 tablet (5 mg) by mouth daily. 90 tablet 2    Vitamin D3 (CHOLECALCIFEROL) 25 mcg (1000 units) tablet Take 1 tablet (25 mcg) by mouth daily. 90 tablet 2    cyclobenzaprine (FLEXERIL) 10 MG tablet Take 0.5-1 tablets (5-10 mg) by mouth 3 times daily as needed for muscle spasms. (Patient not taking: Reported on 6/30/2025) 21 tablet 1    diclofenac (VOLTAREN) 1 % topical gel Apply 2 g topically 4 times daily. (Patient not taking: Reported on 6/2/2025) 150 g 2           6/30/2025     2:01 PM   Weight Loss Medication History Reviewed With Patient   Which weight loss medications are you currently taking on a regular basis? Contrave (naltrexone/bupropion)    Metformin   If you are not taking a weight loss medication that was prescribed to you, please indicate why: I did not like the side effects   Are you having any side effects from the weight loss medication that we have prescribed you? Yes   If you are having side effects please describe:  sweating and loss of sleep               10/25/2010    12:00 PM 3/29/2011     1:00 PM   CORNELIA Score (Last Two)   CORNELIA Raw Score 48  45    Activation Score 80  73.1    CORNELIA Level 4  4        Data saved with a previous flowsheet row definition         PHYSICAL EXAM:  Objective    /82 (BP Location: Left arm, Patient Position: Chair, Cuff Size: Adult Large)   Pulse 79   Ht 1.626 m (5' 4\")   Wt 114 kg (251 lb 6.4 oz)   LMP 10/19/2008 (Approximate)   SpO2 98%   BMI 43.15 kg/m      /82 (BP Location: Left arm, Patient Position: Chair, Cuff Size: Adult Large)   Pulse 79   Ht 1.626 m " "(5' 4\")   Wt 114 kg (251 lb 6.4 oz)   LMP 10/19/2008 (Approximate)   SpO2 98%   BMI 43.15 kg/m    Body mass index is 43.15 kg/m .  GENERAL: alert and no distress  EYES: Eyes grossly normal to inspection.  No discharge or erythema, or obvious scleral/conjunctival abnormalities.  RESP: No audible wheeze, cough, or visible cyanosis.    SKIN: Visible skin clear. No significant rash, abnormal pigmentation or lesions.  NEURO: Cranial nerves grossly intact.  Mentation and speech appropriate for age.  PSYCH: Appropriate affect, tone, and pace of words        Sincerely,    LUBNA AbdiC      18 minutes spent by me on the date of the encounter doing chart review, history and exam, documentation and further activities per the note    The longitudinal plan of care for the diagnosis(es)/condition(s) as documented were addressed during this visit. Due to the added complexity in care, I will continue to support Marleen in the subsequent management and with ongoing continuity of care.   "

## 2025-06-30 ENCOUNTER — OFFICE VISIT (OUTPATIENT)
Dept: ENDOCRINOLOGY | Facility: CLINIC | Age: 60
End: 2025-06-30
Payer: COMMERCIAL

## 2025-06-30 VITALS
HEART RATE: 79 BPM | WEIGHT: 251.4 LBS | BODY MASS INDEX: 42.92 KG/M2 | DIASTOLIC BLOOD PRESSURE: 82 MMHG | HEIGHT: 64 IN | OXYGEN SATURATION: 98 % | SYSTOLIC BLOOD PRESSURE: 120 MMHG

## 2025-06-30 DIAGNOSIS — R73.03 PREDIABETES: ICD-10-CM

## 2025-06-30 DIAGNOSIS — E66.813 CLASS 3 SEVERE OBESITY WITH SERIOUS COMORBIDITY AND BODY MASS INDEX (BMI) OF 40.0 TO 44.9 IN ADULT, UNSPECIFIED OBESITY TYPE (H): Primary | ICD-10-CM

## 2025-06-30 PROCEDURE — 1126F AMNT PAIN NOTED NONE PRSNT: CPT

## 2025-06-30 PROCEDURE — 3074F SYST BP LT 130 MM HG: CPT

## 2025-06-30 PROCEDURE — 99212 OFFICE O/P EST SF 10 MIN: CPT | Mod: 24

## 2025-06-30 PROCEDURE — G2211 COMPLEX E/M VISIT ADD ON: HCPCS

## 2025-06-30 PROCEDURE — 3079F DIAST BP 80-89 MM HG: CPT

## 2025-06-30 RX ORDER — METFORMIN HYDROCHLORIDE 500 MG/1
TABLET, EXTENDED RELEASE ORAL
Qty: 270 TABLET | Refills: 1 | Status: SHIPPED | OUTPATIENT
Start: 2025-06-30

## 2025-06-30 ASSESSMENT — PAIN SCALES - GENERAL: PAINLEVEL_OUTOF10: NO PAIN (0)

## 2025-06-30 NOTE — NURSING NOTE
"Chief Complaint   Patient presents with    Follow Up     Return [facility]       Vitals:    06/30/25 1421   BP: 120/82   BP Location: Left arm   Patient Position: Chair   Cuff Size: Adult Large   Pulse: 79   SpO2: 98%   Weight: 114 kg (251 lb 6.4 oz)   Height: 1.626 m (5' 4\")       Body mass index is 43.15 kg/m .                         "

## 2025-06-30 NOTE — PATIENT INSTRUCTIONS
"Thank you for allowing us the privilege of caring for you. We hope we provided you with the excellent service you deserve.   Please let us know if there is anything else we can do for you so that we can be sure you are completely satisfied with your care experience.    To ensure the quality of our services you may be receiving a patient satisfaction survey from an independent patient satisfaction monitoring company.    The greatest compliment you can give is a \"Likely to Recommend\"    Your visit was with Evonne Doss PA-C today.    Instructions per today's visit:     Kory Mcfadden, it was great to visit with you today.  Here is a review of our visit.  If our clinic scheduler is not able to reach you please call [phone] to schedule your next appointments.    Plan  Start Metformin 500mg - take 1 tablet with a meal once daily for 7 days, then increase to 2 tablets with a meal once daily. As long as tolerating can increase to 1 tablet in the morning and 2 at night.   Goals we discussed today:   10 minutes of rowing machine 2x a week   Protein rich snack at lunch   Follow up with Evonne in 3 months   Dietician appointment to be scheduled asap  Could consider following up with sleep medicine to discuss management of sleep apnea- Marleen ricks for today   Keep up the excellent work!       Information about Video Visits with [facility]: video visit information  _________________________________________________________________________________________________________________________________________________________  If you are asked by your clinic team to have your blood pressure checked:  [facility] do offer several locations for blood pressure checks. Please follow the below link to schedule an appointment. Scheduling an appointment at the pharmacy for a blood pressure check is now preferred.    Appointment Plus " (Appvance)  _________________________________________________________________________________________________________________________________________________________  Important contact and scheduling information:  Please call our contact center at [phone] to schedule your next appointments.  To find a lab location near you, please call [phone].  For any nursing questions or concerns call Perlita Meraz LPN at [phone] or Zuleika Sotomayor RN at [phone]  Please call during clinic hours Monday through Friday 8:00a - 4:00p if you have questions or you can contact us via [employer] at anytime and we will reply during clinic hours.    Lab results will be communicated through My Chart or letter (if My Chart not used). Please call the clinic if you have not received communication after 1 week or if you have any questions.?  Clinic Fax: [phone]    Work with [facility] !  Virtual Sessions are Available through [facility]    To learn more, call to schedule an appointment: [phone]     What is Health Coaching?  Do you know what you are supposed to do, but you just aren't doing it?  Then, HEALTH COACHING may help you!   Get unstuck and move forward with the support of a professionally trained [facility]-HWC (National Board-Certified Health and ) who uses evidence-based approaches to help you move forward with healthy lifestyle changes in the areas of weight loss, stress management and overall well-being.    Health Coaches help you identify goals that will work best for you. Health Coaches provide support and encouragement with overcoming barriers and help you to find inspiration and motivation to lead a healthy lifestyle.    Health Coaching 3-Pack; Three, 30-minute Health Coaching Visits, for $135  Health Coaching 6-Pack; Six, 30-minute Health coaching visits, for $250  Visits are done virtually (phone or video)  This is a self pay service; we do not  accept insurance for asif coaching.    ____________________________________________________________________  [facility]  Healthy Lifestyle Group    Healthy Lifestyle Group  This is a 60 minute virtual coaching group for those who want to lead a healthier lifestyle. Come together to set goals and overcome barriers in a supportive group environment. We will address the four pillars of health--nutrition, exercise, sleep and emotional well-being.  This group is highly recommended for those who are participating in the 24 week Healthy Lifestyle Plan and our Health Coaching sessions.    WHEN: This group meets the first Friday of the month, 12:30 PM - 1:30 PM online, via a zoom meeting.      FACILITATOR: Led by National Board Certified Health and , Cherelle Vega [facility].    TO REGISTER: Please call the Call Center at [phone] to register. You will get an appointment to attend in [facility]. Fifteen minutes prior to the meeting, complete the e-check in and you will get the link to join the meeting.  There is no charge to attend this group and space is limited.      _________________________________________________________________________________________________________________________________________________________  __________  [facility] Get Moving Program  Our team of physical therapists is trained to help you understand and take control of your condition. They will perform a thorough evaluation to determine your ability for activity and develop a customized plan to fit your goals and physical ability.  Scheduling: Unsure if the Get Moving program is right for you? Discuss the program with your medical provider or diabetes educator. You can also call us at [phone] to ask questions or schedule an appointment.   [facility] Get Moving  Program  ____________________________________________________________________________________________________________________________________________________________________________        Thank you,   [facility] Team

## 2025-06-30 NOTE — LETTER
2025       RE: Marleen Mcfadden  [address]  [place] [address]     Dear Colleague,    Thank you for referring your patient, Marleen Mcfadden, to the [facility] [place] [facility]. Please see a copy of my visit note below.      Return Medical Weight Management Note     Marleen Mcfadden  MRN:  [id number]  :  1965  JANET:  2025    Dear Yanna Matias MD,    I had the pleasure of seeing your patient Marleen Mcfadden. She is a 59 year old female who I am continuing to see for treatment of obesity related to:        2024     7:20 AM   --   I have the following health issues associated with obesity Heart Disease    GERD (Reflux)   I have the following symptoms associated with obesity Lower Extremity Swelling    Fatigue    Groin Rash       Assessment & Plan  Problem List Items Addressed This Visit    None  Visit Diagnoses         Obesity        Relevant Medications    metFORMIN (GLUCOPHAGE XR) 500 MG 24 hr tablet      Prediabetes        Relevant Medications    metFORMIN (GLUCOPHAGE XR) 500 MG 24 hr tablet           Plan  Start Metformin 500mg - take 1 tablet with a meal once daily for 7 days, then increase to 2 tablets with a meal once daily. As long as tolerating can increase to 1 tablet in the morning and 2 at night.   Goals we discussed today:   10 minutes of rowing machine 2x a week   Protein rich snack at lunch   Follow up with Evonne in 3 months   Dietician appointment to be scheduled asap  Could consider following up with sleep medicine to discuss management of sleep apnea- Marleen declines for today   Keep up the excellent work!         INTERVAL HISTORY:  New MWM with me 24  Overweight onset over the last 5 years since covid lock down. Prior to covid was around 180lbs, felt good at 180lbs. During covid lockdowns was baking a lot during lockdowns.      Current weight is 254lbs,  "this is highest weight in life. 2023 lost down from 250 to 221 with increasing in exercise and focusing on fruits and vegetables, has since seen weight regain since moving in with [place].      Past strategies to lose weight- p90x, \"eating clean\" (olive oil, vegetables, whole foods, minimal starches).      Comorbidities associated with weight gain include cardiomyopathy (diagnosed 2000), managed through cardiology. KATIA (has tried cpap in the past, did not tolerate it, tried a couple years ago), GERD (pepcid, rabeprazole, reglan, well controlled with these meds).   Additional health issues include anxiety (managed with lexpapro though is taking this as needed, seems to help when she's anxious at night to go to sleep, through pcp).      Motivators for weight loss include improve health, improve comorbities, reduce risks with associated cardiomyopathy.      She is interested in starting a medication as a tool for working towards sustainable weight loss.     Regarding eating patterns and diet, she typically eats 2 meals a day, skips lunch. Craves sweets . Is  able to get full. Struggles to stay full until next meal, will snack sometimes an hour after a meal. Does struggle with portion control, only with sweets.  Does experience food noise, particularly with sweets. Does experience emotional eating (sadness, happiness, celebration). Does a loss of control around eating, particularly with homemade cake or cookies. Denies purging to compensate for overeating.     Eats out/ gets take out 1-2 times  a month. Drinks soda (regular coke) 1-2 times a week, more if it's in the house., juice (grape fruit, krystian d) 4 times a week with breakfast , water. Coffee in the morning with hazelnut coffee mate. ETOH socially, 1 drink per sitting, 2-3 times per month.      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 Loves baking. In the past used to kickbox, used to garden, " used to ride bike.Hoping to revisit exercising, struggles with people watching her while she's at the gym.   -started wegovy through [facility] ([facility])      Last seen by me 12/2/24  Currently out of wegovy, wanting to increase to 1mg.   Per chart, was sent via [employer] on October 29th, though when Marleen called she reports the pharmacy did not have it.      Will have [facility] pharmacist send in 1mg asap.      5lbs weight loss since last visit.      Right heart cath 11/26/24- went well.      [employer] insurance, discussed alternative options. Compounded semaglutide not cost reasonable at this time.   -started metformin, contrave through [facility] pharmacy as wegovy no longer covered   -stopped metformin due to intolerable sweating     Today in visit 6/30/25   -reports she didn't see any improvement in sweating since stopping metformin, wonders if it's instead related to contrave   -has noticed sleep disturbances for the last 1.5 months, is punching/kicking/yelling in her sleep, but more recently has not been able to sleep through the night- needed benadryl to fall asleep.   Stopped contrave 3 days ago, has seen reduction in sweating but has not seen improvement in sleep quality since stopping.     -is open to restarting metformin and stopping contrave to see if helpful for sleep behaviors   -briefly discussed bariatric surgery, Marleen declines this for now.     Feeling alright with her pacemaker- no longer able to do tanita with pacemaker present.     Wt Readings from Last 5 Encounters:   06/30/25 114 kg (251 lb 6.4 oz)   06/02/25 114.3 kg (252 lb)   04/11/25 114.3 kg (252 lb)   04/03/25 114.8 kg (253 lb)   03/27/25 114.3 kg (252 lb)       Anti-obesity medication history    Current:   None     Past/Failed/contraindicated:   Contrave- was seeing sleep disturbances, stopped 3 days ago. Sweating has improved since stopping.   Metformin- stopped initially as she thought this was causing the sweating.       Recent diet  changes:   Meal recall:   B- pugh and eggs with grits and fruit OR yogurt with flax seed. Coffee with creamer.   Typically skips lunch   D: salmon with broccoli and rice/potato OR chicken with vegetable and carb OR shrimp with vegetable and carb.   Snack: popcorn.     Drinking mostly water throughout. Pop when she has pizza- less than once a week.   ETOH occasionally, glass of wine maybe twice a month.     Recent exercise/activity changes: walking at work as phlebotomist.     Sleep: has sleep apnea, does not have a cpap- did not tolerate it due to discomfort.     Vitamins/Labs: none needed today, cmp wnl as of 3/20/25        CURRENT WEIGHT:   251 lbs 6.4 oz    Initial Weight (lbs): 254.4 lbs  Last Visits Weight: 112.9 kg (249 lb)  Cumulative weight loss (lbs): 3  Weight Loss Percentage: 1.18%  Waist Circumference (cm): 118.5 cm        6/30/2025     2:01 PM   Changes and Difficulties   I have made the following changes to my diet since my last visit: limited sweets   With regards to my diet, I am still struggling with: sweets   I have made the following changes to my activity/exercise since my last visit: none   With regards to my activity/exercise, I am still struggling with: i walk 10 hours a day at work finding it hard to excerise at home after work             MEDICATIONS:   Current Outpatient Medications   Medication Sig Dispense Refill     docusate sodium (COLACE) 50 MG capsule Take 50 mg by mouth 2 times daily as needed for constipation.       famotidine (PEPCID) 40 MG tablet Take 1 tablet (40 mg) by mouth nightly as needed for heartburn 90 tablet 3     loratadine (CLARITIN) 10 MG tablet Take 1 tablet (10 mg) by mouth daily 90 tablet 3     magnesium oxide (MAG-OX) 400 MG tablet Take 1 tablet (400 mg) by mouth 2 times daily 180 tablet 3     metFORMIN (GLUCOPHAGE XR) 500 MG 24 hr tablet Take 1 tablet 2 times daily with food for 7 days, then increase to 1 tablet with breakfast and 2 tablets with dinner. 270  tablet 1     metoclopramide (REGLAN) 10 MG tablet Take 1 tablet (10 mg) by mouth 3 times daily as needed (nausea). For more refills,PLEASE KEEP scheduled appointment on 2/4/25 270 tablet 1     metoprolol succinate ER (TOPROL XL) 25 MG 24 hr tablet Take 0.5 tablets (12.5 mg) by mouth daily. 45 tablet 3     RABEprazole (ACIPHEX) 20 MG EC tablet Take 1 tablet (20 mg) by mouth 2 times daily. 60 tablet 5     sacubitril-valsartan (ENTRESTO)  MG per tablet Take 1 tablet by mouth 2 times daily. 180 tablet 3     solifenacin (VESICARE) 5 MG tablet Take 1 tablet (5 mg) by mouth daily at 2 pm. 90 tablet 0     spironolactone (ALDACTONE) 25 MG tablet Take 2 tablets (50 mg) by mouth daily. 180 tablet 3     torsemide (DEMADEX) 5 MG tablet Take 1 tablet (5 mg) by mouth daily. 90 tablet 2     Vitamin D3 (CHOLECALCIFEROL) 25 mcg (1000 units) tablet Take 1 tablet (25 mcg) by mouth daily. 90 tablet 2     cyclobenzaprine (FLEXERIL) 10 MG tablet Take 0.5-1 tablets (5-10 mg) by mouth 3 times daily as needed for muscle spasms. (Patient not taking: Reported on 6/30/2025) 21 tablet 1     diclofenac (VOLTAREN) 1 % topical gel Apply 2 g topically 4 times daily. (Patient not taking: Reported on 6/2/2025) 150 g 2           6/30/2025     2:01 PM   Weight Loss Medication History Reviewed With Patient   Which weight loss medications are you currently taking on a regular basis? Contrave (naltrexone/bupropion)    Metformin   If you are not taking a weight loss medication that was prescribed to you, please indicate why: I did not like the side effects   Are you having any side effects from the weight loss medication that we have prescribed you? Yes   If you are having side effects please describe:  sweating and loss of sleep               10/25/2010    12:00 PM 3/29/2011     1:00 PM   CORNELIA Score (Last Two)   CORNELIA Raw Score 48  45    Activation Score 80  73.1    CORNELIA Level 4  4        Data saved with a previous flowsheet row definition  "        PHYSICAL EXAM:  Objective   /82 (BP Location: Left arm, Patient Position: Chair, Cuff Size: Adult Large)   Pulse 79   Ht 1.626 m (5' 4\")   Wt 114 kg (251 lb 6.4 oz)   LMP 10/19/2008 (Approximate)   SpO2 98%   BMI 43.15 kg/m      /82 (BP Location: Left arm, Patient Position: Chair, Cuff Size: Adult Large)   Pulse 79   Ht 1.626 m (5' 4\")   Wt 114 kg (251 lb 6.4 oz)   LMP 10/19/2008 (Approximate)   SpO2 98%   BMI 43.15 kg/m    Body mass index is 43.15 kg/m .  GENERAL: alert and no distress  EYES: Eyes grossly normal to inspection.  No discharge or erythema, or obvious scleral/conjunctival abnormalities.  RESP: No audible wheeze, cough, or visible cyanosis.    SKIN: Visible skin clear. No significant rash, abnormal pigmentation or lesions.  NEURO: Cranial nerves grossly intact.  Mentation and speech appropriate for age.  PSYCH: Appropriate affect, tone, and pace of words        Sincerely,    Evonne Doss PA-C      18 minutes spent by me on the date of the encounter doing chart review, history and exam, documentation and further activities per the note    The longitudinal plan of care for the diagnosis(es)/condition(s) as documented were addressed during this visit. Due to the added complexity in care, I will continue to support Marleen in the subsequent management and with ongoing continuity of care.     Again, thank you for allowing me to participate in the care of your patient.      Sincerely,    Evonne Doss PA-C    "

## 2025-07-06 PROBLEM — R73.03 PREDIABETES: Status: ACTIVE | Noted: 2025-07-06

## 2025-07-06 PROBLEM — E66.813 CLASS 3 SEVERE OBESITY WITH SERIOUS COMORBIDITY AND BODY MASS INDEX (BMI) OF 40.0 TO 44.9 IN ADULT, UNSPECIFIED OBESITY TYPE (H): Status: ACTIVE | Noted: 2025-07-06

## 2025-07-10 DIAGNOSIS — I50.22 CHRONIC SYSTOLIC CONGESTIVE HEART FAILURE (H): Primary | ICD-10-CM

## 2025-07-10 NOTE — PROGRESS NOTES
ELECTROPHYSIOLOGY CLINIC VISIT    Assessment/Recommendations   Assessment/Plan:    Ms. 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      Familial FLNC NICM LVEF 30-35%, NYHA I:   1. ACEi/ARB: Continue Entresto.  2. BB: Continue Toprol XL   3. Aldosterone antagonist: Continue Spironolactone.  4. SGLT2i: Not currently on.  5. SCD prophylaxis:  FLNC was reclassified as pathogenic and last CMR showed LVEF 32%. ICD dual-chamber recommended for bradycardia and only on low-dose beta-blocker. Underwent ICD implant 4/11/25. Pocket site CDI. Device interrogation today with normal device function, stable lead parameters, AP 18.6%,  <0.1%, AF burden <0.1% (1 second episode of AF), no ventricular arrhythmias.    - Continue routine device interrogations  6. Fluid Status: euvolemic on exam     Follow up in one year with a device check prior, or sooner as needed.        History of Present Illness/Subjective    Ms. Marleen Mcfadden is a 60 year old female who comes in today for EP follow-up of familial CM s/p ICD (4/11/25).    Ms. 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      She has a family history of DCM. Her sister was diagnosed and lead to family screening. 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 Her most recent echo showed LVEF 35-40% with moderate/severe LV dilation. Her son and daughter have DCM. Patient has been following with Dr. Kolb since 2006. She has been on GDMT. More recently, she was having episodes of lightheadedness. She was playing pool and she stood up and fell back and went down on the ground. Her fiance said she was still awake the whole time, she does not remember that, only once, she was able to stand up immediately, and went to the chair. No orthostatic  dizziness otherwize, she reports having a syncope episode 3 years ago after laughing very hard.     We saw her last time back in October 2023. At that time, she presented for evaluation of  syncopal episode. She reported passing out while standing up from kneeling position in the context of 3-4 episodes of emesis. That was her second episode. First episode was earlier that year; she reported she was playing pool, was shooting the ball, stood up and became lightheaded and passed out. 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 During that encounter Discussed we recommended ICD dual-chamber for bradycardia and only on low-dose beta-blocker. We discussed with the patient the rationale for ICD placement, alternative therapies,  technical aspects of the surgical procedure, risks/benefits of therapy and need for long-term follow-up in the Device Clinic.      EP 12/04/2024: She presents today for follow up. She reports feeling well. She has some limitations when she walks or exerts herself. No admissions for HF. She denies chest discomfort, palpitations, abdominal fullness/bloating or peripheral edema, shortness of breath, paroxysmal nocturnal dyspnea, orthopnea, lightheadedness, dizziness, pre-syncope, or syncope. Current cardiac medications include: Torsemide, Toprol XL, Spironolactone, and Entresto.    She presents today in follow up. She reports feeling well. She denies issues with her device since implant. No shocks. Reported there had been a stitch poking out, but it fell out. Pocket site healed well. Denies erythema, edema, or drainage from pocket site. Reports occasional lightheadedness with positional change, but otherwise denies chest discomfort, palpitations, abdominal fullness/bloating or peripheral edema, shortness of breath, paroxysmal nocturnal dyspnea, orthopnea, dizziness, pre-syncope, or syncope. 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 She states she used to feel SOB occasionally while in the hospital, but this has improved after getting the device. Reports BP at home usually 130s/80s. Device interrogation shows normal device function, stable lead parameters, AP 18.6%,  <0.1%, AF burden <0.1% (1 second episode of AF), no ventricular arrhythmias. Current cardiac medications include: metoprolol XL 25 mg, entresto  mg BID, spironolactone 50 mg, torsemide 5 mg.     I have reviewed and updated the patient's Past Medical History, Social History, Family History and Medication List.        Family History   Family history was significant for the following cardiac history:  Full sister with DCM. She  in September after developing colon cancer.  Her history was also remarkable for MERSA, kidney disease and ultimate transplant.  She did not have an ICD or much care for her DCM.  Marleen's son and daughter also have DCM.    A maternal half sister has hypertension, epilepsy and reported dizziness and fainting. She does not have a cardiac issue to Marleen's knowledge.  Mother  suddenly at 38 yrs of age.  It was thought to be the result of a cardiac problem. However, she also had a long history of alcoholism and had a colostomy. Her two siblings have no known heart issues.  Maternal grandmother  in her 30's from a brain aneurysm. Nothing is known about maternal grandfather.  Marleen's father  at 55 years after a massive heart attack and CABG procedure.  His death occurred one day after the surgery reportedly from a clot.  One of his sisters  from a stroke.  Paternal grandmother  in her 70's from a stroke.  Her mother (Marleen's great grandmother)  suddenly in her 70's while sitting at a bus stop.Nothing is known about grandfather.  Two paternal half sisters in good health.  I have reviewed and updated the patient's Past Medical History, Social History, Family History and  Medication List.        Cardiographics (Personally Reviewed) :   11/10/24 Echo:  Severe left ventricular dilation is present. Left ventricular function is  decreased. The ejection fraction is 30-35% (moderately reduced). Moderate  diffuse hypokinesis is present. Abnormal septal motion from conduction delay  present.  Global right ventricular function is normal.  The inferior vena cava was normal in size with preserved respiratory  variability.  No pericardial effusion is present.     RHC 12/4/24  Baseline hemodynamics on room air: BP: 131/63 (90) mmHg, HR 53 bpm, SpO2 95%.   Pressures: RA: 12/9 (8), RV: 37/9, PA: 37/16 (24), PCW: 18/19 (15) mmHg.  Saturations: PA sat: 63%.  Calculations :Estrella CO/CI: 6.7 L/min/ 3.1 L/min/m2, PVR: 1.3 DOZIER.      2/17/23 Echo:   Interpretation Summary  Moderate to severe left ventricular dilation is present. Left ventricular  function is decreased. The ejection fraction is 35-40% (moderately reduced).  Biplane LVEF is 37%.  The right ventricle is normal size. Global right ventricular function is normal.  No pericardial effusion is present.  This study was compared with the study from 12/12/22: LVEF appears similar or at most minimally lower and appears to hve been overestimated on the prior study.     1/10/23 RHC:  Right sided filling pressures are normal.  Mild elevated pulmonary hypertension.  Left sided filling pressures are normal.  Left ventricular filling pressures are normal.  Normal cardiac output level.     6/3/22 CMR:  1. The LV is mild-to-moderately dilated in cavity size. The wall thickness is normal. The global systolic function is severely decreased. The LVEF is 33%. There is severe global hypokinesis.  2. The RV is normal in cavity size. The global systolic function is mildly decreased. The RVEF is 53%.   3. Both atria are normal in size.  4. There is no significant valvular disease.   5. Late gadolinium enhancement imaging shows no MI, fibrosis or infiltrative disease.    6. There is no pericardial effusion or thickening.  7. There is no intracardiac thrombus.  CONCLUSIONS: Severe, non-ischemic dilated cardiomyopathy, most likely of genetic cause. LVEF of 33% and RVEF of 53% with no LGE. When directly compared to the prior CMR, the LV and RV size and function have not significantly changed.       Physical Examination   /80 (BP Location: Right arm, Patient Position: Chair, Cuff Size: Adult Regular)   Pulse 72   Wt 116.2 kg (256 lb 3.2 oz)   LMP 10/19/2008 (Approximate)   SpO2 99%   BMI 43.98 kg/m    Wt Readings from Last 3 Encounters:   07/16/25 116.2 kg (256 lb 3.2 oz)   06/30/25 114 kg (251 lb 6.4 oz)   06/02/25 114.3 kg (252 lb)     General Appearance:   Alert, well-appearing and in no acute distress.   HEENT: Atraumatic, normocephalic.    Chest/Lungs:   Respirations unlabored.  Lungs are clear to auscultation.   Cardiovascular:   Regular rate and rhythm.  S1/S2. No murmur.    Abdomen:  Soft, nontender, nondistended.   Extremities: No cyanosis or clubbing. No edema.     Musculoskeletal: Moves all extremities.     Skin: Warm, dry, intact.    Neurologic: Mood and affect are appropriate.  Alert and oriented to person, place, time, and situation.          Medications  Allergies   Current Outpatient Medications   Medication Sig Dispense Refill    cyclobenzaprine (FLEXERIL) 10 MG tablet Take 0.5-1 tablets (5-10 mg) by mouth 3 times daily as needed for muscle spasms. 21 tablet 1    diclofenac (VOLTAREN) 1 % topical gel Apply 2 g topically 4 times daily. 150 g 2    docusate sodium (COLACE) 50 MG capsule Take 50 mg by mouth 2 times daily as needed for constipation.      famotidine (PEPCID) 40 MG tablet Take 1 tablet (40 mg) by mouth nightly as needed for heartburn 90 tablet 3    loratadine (CLARITIN) 10 MG tablet Take 1 tablet (10 mg) by mouth daily 90 tablet 3    magnesium oxide (MAG-OX) 400 MG tablet Take 1 tablet (400 mg) by mouth 2 times daily 180 tablet 3    metFORMIN  (GLUCOPHAGE XR) 500 MG 24 hr tablet Take 1 tablet 2 times daily with food for 7 days, then increase to 1 tablet with breakfast and 2 tablets with dinner. 270 tablet 1    metoclopramide (REGLAN) 10 MG tablet Take 1 tablet (10 mg) by mouth 3 times daily as needed (nausea). For more refills,PLEASE KEEP scheduled appointment on 2/4/25 270 tablet 1    metoprolol succinate ER (TOPROL XL) 25 MG 24 hr tablet Take 0.5 tablets (12.5 mg) by mouth daily. 45 tablet 3    RABEprazole (ACIPHEX) 20 MG EC tablet Take 1 tablet (20 mg) by mouth 2 times daily. 60 tablet 5    sacubitril-valsartan (ENTRESTO)  MG per tablet Take 1 tablet by mouth 2 times daily. 180 tablet 3    semaglutide-weight management (WEGOVY) 0.25 MG/0.5ML pen Inject 0.5 mLs (0.25 mg) subcutaneously once a week. For 4 weeks 2 mL 0    Semaglutide-Weight Management (WEGOVY) 0.5 MG/0.5ML pen Inject 0.5 mg subcutaneously once a week. After completing 4 weeks of 0.25mg dose 2 mL 1    solifenacin (VESICARE) 5 MG tablet Take 1 tablet (5 mg) by mouth daily at 2 pm. 90 tablet 0    spironolactone (ALDACTONE) 25 MG tablet Take 2 tablets (50 mg) by mouth daily. 180 tablet 3    torsemide (DEMADEX) 5 MG tablet Take 1 tablet (5 mg) by mouth daily. 90 tablet 2    Vitamin D3 (CHOLECALCIFEROL) 25 mcg (1000 units) tablet Take 1 tablet (25 mcg) by mouth daily. 90 tablet 2    Allergies   Allergen Reactions    Morphine      EMESIS    Nickel     Sulfa Antibiotics Swelling         Lab Results (Personally Reviewed)    Chemistry/lipid CBC Cardiac Enzymes/BNP/TSH/INR   Lab Results   Component Value Date    BUN 11.4 07/14/2025     07/14/2025    CO2 26 07/14/2025     Creatinine   Date Value Ref Range Status   07/14/2025 0.85 0.51 - 0.95 mg/dL Final   07/11/2021 0.79 0.52 - 1.04 mg/dL Final       Lab Results   Component Value Date    CHOL 185 08/21/2024    HDL 56 08/21/2024     (H) 08/21/2024      Lab Results   Component Value Date    WBC 6.0 04/11/2025    HGB 11.7 04/11/2025     HCT 35.6 04/11/2025    MCV 93 04/11/2025     04/11/2025    Lab Results   Component Value Date    TSH 4.13 03/20/2025    INR 1.02 11/26/2024        The patient states understanding and is agreeable with the plan.     Mireya Phan PA-C  [facility]  Electrophysiology Consult Service  Pager #[phone]    I spent a total of 15 minutes face to face with Marleen Mcfadden during today's office visit. I have spent an additional 15 minutes today on chart review and documentation.

## 2025-07-14 ENCOUNTER — OFFICE VISIT (OUTPATIENT)
Dept: CARDIOLOGY | Facility: CLINIC | Age: 60
End: 2025-07-14
Attending: INTERNAL MEDICINE
Payer: COMMERCIAL

## 2025-07-14 ENCOUNTER — LAB (OUTPATIENT)
Dept: LAB | Facility: CLINIC | Age: 60
End: 2025-07-14
Attending: INTERNAL MEDICINE
Payer: COMMERCIAL

## 2025-07-14 VITALS — OXYGEN SATURATION: 97 % | HEART RATE: 60 BPM | DIASTOLIC BLOOD PRESSURE: 82 MMHG | SYSTOLIC BLOOD PRESSURE: 125 MMHG

## 2025-07-14 DIAGNOSIS — I50.22 CHRONIC SYSTOLIC CONGESTIVE HEART FAILURE (H): ICD-10-CM

## 2025-07-14 LAB
ALBUMIN SERPL BCG-MCNC: 4 G/DL (ref 3.5–5.2)
ALP SERPL-CCNC: 70 U/L (ref 40–150)
ALT SERPL W P-5'-P-CCNC: 32 U/L (ref 0–50)
ANION GAP SERPL CALCULATED.3IONS-SCNC: 9 MMOL/L (ref 7–15)
AST SERPL W P-5'-P-CCNC: 26 U/L (ref 0–45)
BILIRUB SERPL-MCNC: 0.3 MG/DL
BUN SERPL-MCNC: 11.4 MG/DL (ref 8–23)
CALCIUM SERPL-MCNC: 9 MG/DL (ref 8.8–10.4)
CHLORIDE SERPL-SCNC: 105 MMOL/L (ref 98–107)
CREAT SERPL-MCNC: 0.85 MG/DL (ref 0.51–0.95)
EGFRCR SERPLBLD CKD-EPI 2021: 78 ML/MIN/1.73M2
GLUCOSE SERPL-MCNC: 100 MG/DL (ref 70–99)
HCO3 SERPL-SCNC: 26 MMOL/L (ref 22–29)
MAGNESIUM SERPL-MCNC: 1.9 MG/DL (ref 1.7–2.3)
NT-PROBNP SERPL-MCNC: 125 PG/ML (ref 0–226)
POTASSIUM SERPL-SCNC: 4.3 MMOL/L (ref 3.4–5.3)
PROT SERPL-MCNC: 7 G/DL (ref 6.4–8.3)
SODIUM SERPL-SCNC: 140 MMOL/L (ref 135–145)

## 2025-07-14 PROCEDURE — 36415 COLL VENOUS BLD VENIPUNCTURE: CPT | Performed by: PATHOLOGY

## 2025-07-14 PROCEDURE — 99213 OFFICE O/P EST LOW 20 MIN: CPT | Performed by: INTERNAL MEDICINE

## 2025-07-14 PROCEDURE — 80053 COMPREHEN METABOLIC PANEL: CPT | Performed by: PATHOLOGY

## 2025-07-14 PROCEDURE — 99215 OFFICE O/P EST HI 40 MIN: CPT | Performed by: INTERNAL MEDICINE

## 2025-07-14 PROCEDURE — G2211 COMPLEX E/M VISIT ADD ON: HCPCS | Performed by: INTERNAL MEDICINE

## 2025-07-14 PROCEDURE — 83880 ASSAY OF NATRIURETIC PEPTIDE: CPT | Performed by: PATHOLOGY

## 2025-07-14 PROCEDURE — 3079F DIAST BP 80-89 MM HG: CPT | Performed by: INTERNAL MEDICINE

## 2025-07-14 PROCEDURE — 83735 ASSAY OF MAGNESIUM: CPT | Performed by: PATHOLOGY

## 2025-07-14 PROCEDURE — 1126F AMNT PAIN NOTED NONE PRSNT: CPT | Performed by: INTERNAL MEDICINE

## 2025-07-14 PROCEDURE — 3074F SYST BP LT 130 MM HG: CPT | Performed by: INTERNAL MEDICINE

## 2025-07-14 RX ORDER — LIDOCAINE 40 MG/G
CREAM TOPICAL
OUTPATIENT
Start: 2025-07-14

## 2025-07-14 ASSESSMENT — PAIN SCALES - GENERAL: PAINLEVEL_OUTOF10: NO PAIN (0)

## 2025-07-14 NOTE — NURSING NOTE
Cardiac Testing: Patient given instructions regarding cardiopulmonary stress test. Discussed purpose, preparation, procedure and when to expect results reported back to the patient. Patient demonstrated understanding of this information and agreed to call with further questions or concerns.    Labs: Patient was given results of the laboratory testing obtained today. Patient demonstrated understanding of this information and agreed to call with further questions or concerns.     Med Reconcile: Reviewed and verified all current medications with the patient. The updated medication list was printed and given to the patient.    Return Appointment: Patient given instructions regarding scheduling next clinic visit. Patient demonstrated understanding of this information and agreed to call with further questions or concerns. ASAD in 2 months, Dr. Seaman in 6 months.    Right Heart Catheterization: Patient was instructed regarding right heart catheterization, including discussion of the procedure, preparation, intra-procedural steps, and recovery at home. Patient demonstrated understanding of this information and agreed to call with further questions or concerns.    Patient stated she understood all health information given and agreed to call with further questions or concerns.    Megha Meyer RN

## 2025-07-14 NOTE — PROGRESS NOTES
Cardiovascular [facility]etics Clinic Progress Note     Name: Marleen Mcfadden  : 1965  MRN: [id number]    2025     Dear Dr. Levine and colleages,    I had the pleasure of seeing Ms. 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 She is accompanied by her fiance.     As you know, she has a family history of dilated cardiomyopathy including her sister, son, and daughter. 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    I last saw her in clinic on 2025. She had a RHC in 2024 which showed RA: 12/9 (8), RV: 37/9, PA: 37/16 (24), PCW: 18/19 (15) mmHg, Estrella CO/CI: 6.7 L/min/ 3.1 L/min/m2, PVR: 1.3 DOZIER, and /63. Her ziopatch monitor showed an average HR of 69, one run of 5 beats of VT and 6 SVTs. She exercise for 3 min and 33 seconds and did not achieve anerobic threshold. Her peak V02 was 7.8 (30% predicted) with a slope of 24. She underwent ICD implantation in 2025 and her device check showed no VT. She is following up in weight management clinic and she is on metformin due to a number of contraindications to other weight loss medications and that GLP1 is cost prohibitive.     She is currently working 4 days per week post ICD, but she is very fatigued and is getting lightheaded during her shifts. She currently works 10 hr days in which she is on her feet most of the time. She has not been able to have 3 days per week accommodated. She has not had significant chest pain.  She is frequently needing to take breaks due to dyspnea while walking. Her weight has remained stable on metformin. She has edema in her legs which improves when she wakes up in the morning.  She takes torsemide 2 times a week when she is not  working. She has not had worse orthopnea (uses 3 pillow for GERD) or PND. She has not yet seen sleep medicine for central vs obstructive sleep apnea evaluation. She has palpitations regularly but feels better that she has the ICD. No presyncope or syncope. She has a good appetite but her energy levels are reduced.      FAMILY HISTORY: from Ms. Bunch note 2021 and any updates as as above    detailed family history was obtained during today's consult.  Family history was significant for the following cardiac history:  Full sister with DCM. She  in September after developing colon cancer.  Her history was also remarkable for MERSA, kidney disease and ultimate transplant.  She did not have an ICD or much care for her DCM.  Marleen's son and daughter also have DCM.    A maternal half sister has hypertension, epilepsy and reported dizziness and fainting. She does not have a cardiac issue to Marleen's knowledge.  Mother  suddenly at 38 yrs of age.  It was thought to be the result of a cardiac problem. However, she also had a long history of alcoholism and had a colostomy. Her two siblings have no known heart issues.  Maternal grandmother  in her 30's from a brain aneurysm. Nothing is known about maternal grandfather.  Marleen's father  at 55 years after a massive heart attack and CABG procedure.  His death occurred one day after the surgery reportedly from a clot.  One of his sisters  from a stroke.  Paternal grandmother  in her 70's from a stroke.  Her mother (Marleen's great grandmother)  suddenly in her 70's while sitting at a bus stop.Nothing is known about grandfather.  Two paternal half sisters in good health.  There is no additional history of cardiomyopathy, arrhythmias, heart attacks, fainting, sudden cardiac death, genetic conditions, or birth     REVIEW OF SYSTEMS: 10 point ROS neg other than the symptoms noted above in the HPI.    PAST MEDICAL HISTORY:   1. Familial  systolic heart failure with VUS in FLNC   2. KATIA  3. Obesity   4. Encephalocele repair   ALLERGIES:    Allergies   Allergen Reactions    Morphine      EMESIS    Nickel     Sulfa Antibiotics Swelling       MEDICATIONS:   Current Outpatient Medications   Medication Sig Dispense Refill    cyclobenzaprine (FLEXERIL) 10 MG tablet Take 0.5-1 tablets (5-10 mg) by mouth 3 times daily as needed for muscle spasms. (Patient not taking: Reported on 6/30/2025) 21 tablet 1    diclofenac (VOLTAREN) 1 % topical gel Apply 2 g topically 4 times daily. (Patient not taking: Reported on 6/2/2025) 150 g 2    docusate sodium (COLACE) 50 MG capsule Take 50 mg by mouth 2 times daily as needed for constipation.      famotidine (PEPCID) 40 MG tablet Take 1 tablet (40 mg) by mouth nightly as needed for heartburn 90 tablet 3    loratadine (CLARITIN) 10 MG tablet Take 1 tablet (10 mg) by mouth daily 90 tablet 3    magnesium oxide (MAG-OX) 400 MG tablet Take 1 tablet (400 mg) by mouth 2 times daily 180 tablet 3    metFORMIN (GLUCOPHAGE XR) 500 MG 24 hr tablet Take 1 tablet 2 times daily with food for 7 days, then increase to 1 tablet with breakfast and 2 tablets with dinner. 270 tablet 1    metoclopramide (REGLAN) 10 MG tablet Take 1 tablet (10 mg) by mouth 3 times daily as needed (nausea). For more refills,PLEASE KEEP scheduled appointment on 2/4/25 270 tablet 1    metoprolol succinate ER (TOPROL XL) 25 MG 24 hr tablet Take 0.5 tablets (12.5 mg) by mouth daily. 45 tablet 3    RABEprazole (ACIPHEX) 20 MG EC tablet Take 1 tablet (20 mg) by mouth 2 times daily. 60 tablet 5    sacubitril-valsartan (ENTRESTO)  MG per tablet Take 1 tablet by mouth 2 times daily. 180 tablet 3    solifenacin (VESICARE) 5 MG tablet Take 1 tablet (5 mg) by mouth daily at 2 pm. 90 tablet 0    spironolactone (ALDACTONE) 25 MG tablet Take 2 tablets (50 mg) by mouth daily. 180 tablet 3    torsemide (DEMADEX) 5 MG tablet Take 1 tablet (5 mg) by mouth daily. 90 tablet 2     Vitamin D3 (CHOLECALCIFEROL) 25 mcg (1000 units) tablet Take 1 tablet (25 mcg) by mouth daily. 90 tablet 2     No current facility-administered medications for this visit.     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     PHYSICAL EXAM:   /82 (BP Location: Right arm, Patient Position: Chair, Cuff Size: Adult Large)   Pulse 60   LMP 10/19/2008 (Approximate)   SpO2 97%   General: comfortable, conversant, NAD  HEENT: normocephalic, atraumatic, anicteric  Neck: Estimate JVP -7-8   CV: RRR, nl s1 and s2, no murmurs, gallops, or rubs   Lungs: CTAB, no crackles or wheezes, normal work of breathing  Abdomen: BS+, soft, non tender, non distended  Extremities: warm and well perfused, trace edema      DIAGNOSTIC TESTING: personally reviewed   Latest Reference Range & Units 07/14/25 15:48   Sodium 135 - 145 mmol/L 140   Potassium 3.4 - 5.3 mmol/L 4.3   Chloride 98 - 107 mmol/L 105   Carbon Dioxide (CO2) 22 - 29 mmol/L 26   Urea Nitrogen 8.0 - 23.0 mg/dL 11.4   Creatinine 0.51 - 0.95 mg/dL 0.85   GFR Estimate >60 mL/min/1.73m2 78   Calcium 8.8 - 10.4 mg/dL 9.0   Anion Gap 7 - 15 mmol/L 9   Magnesium 1.7 - 2.3 mg/dL 1.9   Albumin 3.5 - 5.2 g/dL 4.0   Protein Total 6.4 - 8.3 g/dL 7.0   Alkaline Phosphatase 40 - 150 U/L 70   ALT 0 - 50 U/L 32   AST 0 - 45 U/L 26   Bilirubin Total <=1.2 mg/dL 0.3   Glucose 70 - 99 mg/dL 100 (H)   N-Terminal Pro Bnp 0 - 226 pg/mL 125   (H): Data is abnormally high    CPEX 2025:   Peak VO2 (ml/kg/min) VE/VCO2  [place] RER   7.80       24.29       0.78           Predicted VO2 (ml/kg/min) Predicted VO2 %   26.00       30           Resting Supine BP (mmHg) Resting Standing BP (mmHg) Final Stress BP (mmHg)   121/66       124/74       138/78         Resting Supine  HR (bpm) Resting Standing HR (bpm)    58       65            Max HR (bpm) Max Predicted HR (bpm) Max Perdicted HR %   96       160       60           Exercise time (min) Exercise time (sec) Estimated workload (METS)   3       33       2.2               Echo 11/2024: Severe left ventricular dilation is present. Left ventricular function is  decreased. The ejection fraction is 30-35% (moderately reduced). Moderate  diffuse hypokinesis is present. Abnormal septal motion from conduction delay  present.  Global right ventricular function is normal.  The inferior vena cava was normal in size with preserved respiratory  variability.  No pericardial effusion is present    RHC 11/2024: Baseline hemodynamics on room air: BP: 131/63 (90) mmHg, HR 53 bpm, SpO2 95%.   Pressures: RA: 12/9 (8), RV: 37/9, PA: 37/16 (24), PCW: 18/19 (15) mmHg.  Saturations: PA sat: 63%.  Calculations :Estrella CO/CI: 6.7 L/min/ 3.1 L/min/m2, PVR: 1.3 DOZIER.       Zio 12/2024: Patient had a min HR of 43 bpm, max HR of 156 bpm, and avg HR of 69 bpm. Predominant underlying rhythm was Sinus Rhythm. 1 run of Ventricular Tachycardia occurred lasting 5 beats with a max rate of 146 bpm (avg 133 bpm). 6 Supraventricular Tachycardia runs occurred, the run with the fastest interval lasting 7 beats with a max rate of 156 bpm, the longest lasting 8 beats with an avg rate of 111 bpm. Isolated SVEs were rare (<1.0%), SVE Couplets were rare (<1.0%), and SVE Triplets were rare (<1.0%). Isolated VEs were rare (<1.0%, 5922), VE Couplets were rare (<1.0%, 271), and VE Triplets were rare (<1.0%, 45). Ventricular Bigeminy and Trigeminy were present.       CMR 6/12/2023:   1. The LV is normal in cavity size and wall thickness. The global systolic function is moderately reduced.  The LVEF is 32%. There is global hypokinesis.     2. The RV is normal in cavity size. The global systolic function is mildly reduced. The RVEF is 45%.      3. Both atria are normal in size.     4. There  is no significant valvular disease.      5. Late gadolinium enhancement imaging shows no MI, fibrosis or infiltrative disease.      6. There is no pericardial effusion or thickening.     7. There is no intracardiac thrombus.     CONCLUSIONS: Severe, nonischemic cardiomyopathy, today the LV is no longer dilated. LVEF 32% not  significantly changed. RV function is mildly globally reduced, RVEF 45%, slightly decreased from previous  53%. No LGE.    Ziopatch monitor 2/24-3/3/2023: sinus with 9.4% PVC burden    Echo 12/12/2022: Severe left ventricular dilation is present.LVIDd 70mm.  Biplane LVEF is 50%.  Right ventricular function, chamber size, wall motion, and thickness are  normal.  Both atria appear normal.  Pulmonary artery systolic pressure is normal.  The inferior vena cava is normal.  No pericardial effusion is present.  The left ventricular function has improved.    CMR 6/3/22: 1. The LV is mild-to-moderately dilated in cavity size. The wall thickness is normal. The global systolic  function is severely decreased. The LVEF is 33%. There is severe global hypokinesis.     2. The RV is normal in cavity size. The global systolic function is mildly decreased. The RVEF is 53%.      3. Both atria are normal in size.     4. There is no significant valvular disease.      5. Late gadolinium enhancement imaging shows no MI, fibrosis or infiltrative disease.      6. There is no pericardial effusion or thickening.     7. There is no intracardiac thrombus.     CONCLUSIONS: Severe, non-ischemic dilated cardiomyopathy, most likely of genetic cause. LVEF of 33% and  RVEF of 53% with no LGE. When directly compared to the prior CMR, the LV and RV size and function have not  significantly changed.      CPEX 12/12/22: Key Measurements    Peak VO2 (ml/kg/min) VE/VCO2  [place] RER   14.91        27.61        1.02            Predicted VO2 (ml/kg/min) Predicted VO2 %   26.50        56            Resting Supine BP (mmHg) Resting Standing  BP (mmHg) Final Stress BP (mmHg)   106/56        112/58        140/67          Resting Supine HR (bpm) Resting Standing HR (bpm)    44        50             Max HR (bpm) Max Predicted HR (bpm) Max Perdicted HR %   124        162        77            Exercise time (min) Exercise time (sec) Estimated workload (METS)   8        51        4.3              [facility] 1/2023:    Time Systolic (mmHg) Diastolic (mmHg) Mean (mmHg) A Wave (mmHg) V Wave (mmHg) EDP (mmHg) Max dp/dt (mmHg/sec) HR (bpm) Content (mL/dL) SAT (%)   RA Pressures  1:56 PM   6    10    8      38        RV Pressures  1:56 PM 40        12     37        PA Pressures  1:57 PM 41    4    22        39        PCW Pressures  1:57 PM   11    22    20      39        AO Pressures  2:01     63    91        37          Blood Flow Results Phase: Baseline     Time Results  Indexed Values (L/min/m2)   QP  1:45 PM 2.71 L/min    1.31      QS  1:45 PM 2.71 L/min    1.31        Blood Oximetry Phase: Baseline     Time Hb  SAT(%)  PO2  Content (mL/dL) PA Sat (%)   PA  1:45 PM  62.7 %      62.7      Art  1:45 PM  100 %     17.14         Cardiac Output Phase: Baseline     Time TDCO (L/min) TDCI (L/min/m2) Estrella C.O. (L/min) Estrella C.I. 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  Premature ventricular contractions, palpitations   3. Fatigue   4. VT s/p secondary ICD implantation 4/2025    She has had a cardiac index of 1.3 and VT associated with FLNC cardiomyopathy. Her RHC in November 2024 showed improved filling pressures at rest. She did not achieve RER with the CPEX in Jan 2025 due to needing to stop due to lightheadedness. Her prior CPEX in 2023 she had a peak V02 of 14.9 (56% predicted) with an RER of 1.02. She has progressive exercise intolerance. Additionally, her LVEF decreased to 30-35% with an LVEDD of 6.8cm. She now has a secondary prevention ICD due to VT. With FLNC has a high risk of SCD due to ventricular arrhythmias and I am concerned about rapid disease progression if she has VT. Ideally, transplant would be the best option should she need it especially given the arrhythmic risk and LVAD. Currently, her BMI is above the cut off of 37. We discussed the importance of weight loss which she is consistently pursuing in the weight management clinic.     ACEi/ARB/ARNi/afterload reduction: Entresto 97-103mg BID  BB: Toprol 12.5mg daily  Aldosterone antagonist: spironolactone 50mg daily  SGLT2i: dapagliflozin 10mg daliy- not taking any due to increased vaginal discharge  SCD prophylaxis: secondary prevention ICD  4/11/25  NSAID use: contraindicated    5. Vomiting  6. Heart Burn  Her GI symptoms have improved on reglan and she has been seeing GI. She feels like her symptoms flared after she stopped reglan. She will follow up with GI.     7. Hypomagnesemia: Improved 2.0 today. Continue magnesium oxide for replacement.    8. Obesity. Her BMI is 43. For transplant the BMI requirement is 37 or lower. She would need to be 215 lbs which requires a 30 lbs weight loss. It is excellent that she is following up regularly in weight management clinic. She however has a number of contraindications to weight management medication and GLP-1 RA  are cost prohibitive for her. She is on metformin but has not not significant weight. We discussed consideration of bariatric surgery, which is not an appealing option for her. She will discuss if her weight management physician may be able to appeal to insurance given the possible need for transplantation.     9. KATIA evaluation: She has chronic systolic heart failure and obesity with associated snoring and fatigue. I would like her to see sleep medicine for central vs obstructive sleep apnea. We discussed that addressing apnea may also help with weight loss.       PLAN:   -Sleep medicine referral  -ICD check  -follow up in 2-3months with [facility]  -CPEX and RHC   -6 months with me       45 minutes on DOS for chart review, counseling, review of diagnostic testing, coordination of care and, letter, and  documentation.     The longitudinal plan of care for the diagnosis(es)/condition(s) as documented were addressed during this visit. Due to the added complexity in care, I will continue to support Marleen in the subsequent management and with ongoing continuity of care.    Thank you for allowing me to participate in the care of your patient. Please do not hesitate to contact me if you have any questions.     Sincerely,   [place]     Real Seaman MD, PhD, FACC  Advanced Heart Failure/Transplantation/[facility]/[employer]

## 2025-07-14 NOTE — NURSING NOTE
Chief Complaint   Patient presents with    Follow Up     Return heart failure, 60 year old female with history of Familial cardiomyopathy, systolic HF, EF 35% presents for follow up with labs prior     Vitals were taken and medications reconciled.    Marcos Hoskins, RADHA  4:47 PM

## 2025-07-14 NOTE — LETTER
2025      RE: Marleen Mcfadden  [address]  [place] [address]       Dear Colleague,    Thank you for the opportunity to participate in the care of your patient, Marleen Mcfadden, at the [facility] HEART [facility] [place] at [facility]. Please see a copy of my visit note below.    Cardiovascular Genetics Clinic Progress Note     Name: Marleen Mcfadden  : 1965  MRN: [id number]    2025     Dear Dr. Levine and colleages,    I had the pleasure of seeing Ms. 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 She is accompanied by her fiance.     As you know, she has a family history of dilated cardiomyopathy including her sister, son, and daughter. 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    I last saw her in clinic on NJ2025. She had a RHC in 2024 which showed RA: 12/9 (8), RV: 37/9, PA: 37/16 (24), PCW: 18/19 (15) mmHg, Estrella CO/CI: 6.7 L/min/ 3.1 L/min/m2, PVR: 1.3 DOZIER, and /63. Her ziopatch monitor showed an average HR of 69, one run of 5 beats of VT and 6 SVTs. She exercise for 3 min and 33 seconds and did not achieve anerobic threshold. Her peak V02 was 7.8 (30% predicted) with a slope of 24. She underwent ICD implantation in 2025 and her device check showed no VT. She is following up in weight management clinic and she is on metformin due to a number of contraindications to other weight loss medications and that GLP1 is cost prohibitive.     She is currently working 4 days per week post ICD, but she is very fatigued and is getting lightheaded during her shifts. She currently works 10 hr days in which she is on her feet most of the  time. She has not been able to have 3 days per week accommodated. She has not had significant chest pain.  She is frequently needing to take breaks due to dyspnea while walking. Her weight has remained stable on metformin. She has edema in her legs which improves when she wakes up in the morning.  She takes torsemide 2 times a week when she is not working. She has not had worse orthopnea (uses 3 pillow for GERD) or PND. She has not yet seen sleep medicine for central vs obstructive sleep apnea evaluation. She has palpitations regularly but feels better that she has the ICD. No presyncope or syncope. She has a good appetite but her energy levels are reduced.      FAMILY HISTORY: from Ms. Bunch note 2021 and any updates as as above    detailed family history was obtained during today's consult.  Family history was significant for the following cardiac history:  Full sister with DCM. She  in September after developing colon cancer.  Her history was also remarkable for MERSA, kidney disease and ultimate transplant.  She did not have an ICD or much care for her DCM.  Marleen's son and daughter also have DCM.    A maternal half sister has hypertension, epilepsy and reported dizziness and fainting. She does not have a cardiac issue to Marleen's knowledge.  Mother  suddenly at 38 yrs of age.  It was thought to be the result of a cardiac problem. However, she also had a long history of alcoholism and had a colostomy. Her two siblings have no known heart issues.  Maternal grandmother  in her 30's from a brain aneurysm. Nothing is known about maternal grandfather.  Marleen's father  at 55 years after a massive heart attack and CABG procedure.  His death occurred one day after the surgery reportedly from a clot.  One of his sisters  from a stroke.  Paternal grandmother  in her 70's from a stroke.  Her mother (Marleen's great grandmother)  suddenly in her 70's while sitting at a bus  stop.Nothing is known about grandfather.  Two paternal half sisters in good health.  There is no additional history of cardiomyopathy, arrhythmias, heart attacks, fainting, sudden cardiac death, genetic conditions, or birth     REVIEW OF SYSTEMS: 10 point ROS neg other than the symptoms noted above in the HPI.    PAST MEDICAL HISTORY:   1. Familial systolic heart failure with VUS in [facility]   2. KATIA  3. Obesity   4. Encephalocele repair   ALLERGIES:    Allergies   Allergen Reactions     Morphine      EMESIS     Nickel      Sulfa Antibiotics Swelling       MEDICATIONS:   Current Outpatient Medications   Medication Sig Dispense Refill     cyclobenzaprine (FLEXERIL) 10 MG tablet Take 0.5-1 tablets (5-10 mg) by mouth 3 times daily as needed for muscle spasms. (Patient not taking: Reported on 6/30/2025) 21 tablet 1     diclofenac (VOLTAREN) 1 % topical gel Apply 2 g topically 4 times daily. (Patient not taking: Reported on 6/2/2025) 150 g 2     docusate sodium (COLACE) 50 MG capsule Take 50 mg by mouth 2 times daily as needed for constipation.       famotidine (PEPCID) 40 MG tablet Take 1 tablet (40 mg) by mouth nightly as needed for heartburn 90 tablet 3     loratadine (CLARITIN) 10 MG tablet Take 1 tablet (10 mg) by mouth daily 90 tablet 3     magnesium oxide (MAG-OX) 400 MG tablet Take 1 tablet (400 mg) by mouth 2 times daily 180 tablet 3     metFORMIN (GLUCOPHAGE XR) 500 MG 24 hr tablet Take 1 tablet 2 times daily with food for 7 days, then increase to 1 tablet with breakfast and 2 tablets with dinner. 270 tablet 1     metoclopramide (REGLAN) 10 MG tablet Take 1 tablet (10 mg) by mouth 3 times daily as needed (nausea). For more refills,PLEASE KEEP scheduled appointment on 2/4/25 270 tablet 1     metoprolol succinate ER (TOPROL XL) 25 MG 24 hr tablet Take 0.5 tablets (12.5 mg) by mouth daily. 45 tablet 3     RABEprazole (ACIPHEX) 20 MG EC tablet Take 1 tablet (20 mg) by mouth 2 times daily. 60 tablet 5      sacubitril-valsartan (ENTRESTO)  MG per tablet Take 1 tablet by mouth 2 times daily. 180 tablet 3     solifenacin (VESICARE) 5 MG tablet Take 1 tablet (5 mg) by mouth daily at 2 pm. 90 tablet 0     spironolactone (ALDACTONE) 25 MG tablet Take 2 tablets (50 mg) by mouth daily. 180 tablet 3     torsemide (DEMADEX) 5 MG tablet Take 1 tablet (5 mg) by mouth daily. 90 tablet 2     Vitamin D3 (CHOLECALCIFEROL) 25 mcg (1000 units) tablet Take 1 tablet (25 mcg) by mouth daily. 90 tablet 2     No current facility-administered medications for this visit.     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     PHYSICAL EXAM:   /82 (BP Location: Right arm, Patient Position: Chair, Cuff Size: Adult Large)   Pulse 60   LMP 10/19/2008 (Approximate)   SpO2 97%   General: comfortable, conversant, NAD  HEENT: normocephalic, atraumatic, anicteric  Neck: Estimate JVP -7-8   CV: RRR, nl s1 and s2, no murmurs, gallops, or rubs   Lungs: CTAB, no crackles or wheezes, normal work of breathing  Abdomen: BS+, soft, non tender, non distended  Extremities: warm and well perfused, trace edema      DIAGNOSTIC TESTING: personally reviewed   Latest Reference Range & Units 07/14/25 15:48   Sodium 135 - 145 mmol/L 140   Potassium 3.4 - 5.3 mmol/L 4.3   Chloride 98 - 107 mmol/L 105   Carbon Dioxide (CO2) 22 - 29 mmol/L 26   Urea Nitrogen 8.0 - 23.0 mg/dL 11.4   Creatinine 0.51 - 0.95 mg/dL 0.85   GFR Estimate >60 mL/min/1.73m2 78   Calcium 8.8 - 10.4 mg/dL 9.0   Anion Gap 7 - 15 mmol/L 9   Magnesium 1.7 - 2.3 mg/dL 1.9   Albumin 3.5 - 5.2 g/dL 4.0   Protein Total 6.4 - 8.3 g/dL 7.0   Alkaline Phosphatase 40 - 150 U/L 70   ALT 0 - 50 U/L 32   AST 0 - 45 U/L 26   Bilirubin Total <=1.2 mg/dL 0.3   Glucose 70 -  99 mg/dL 100 (H)   N-Terminal Pro Bnp 0 - 226 pg/mL 125   (H): Data is abnormally high    CPEX 2025:   Peak VO2 (ml/kg/min) VE/VCO2  [place] RER   7.80       24.29       0.78           Predicted VO2 (ml/kg/min) Predicted VO2 %   26.00       30           Resting Supine BP (mmHg) Resting Standing BP (mmHg) Final Stress BP (mmHg)   121/66       124/74       138/78         Resting Supine HR (bpm) Resting Standing HR (bpm)    58       65            Max HR (bpm) Max Predicted HR (bpm) Max Perdicted HR %   96       160       60           Exercise time (min) Exercise time (sec) Estimated workload (METS)   3       33       2.2               Echo 11/2024: Severe left ventricular dilation is present. Left ventricular function is  decreased. The ejection fraction is 30-35% (moderately reduced). Moderate  diffuse hypokinesis is present. Abnormal septal motion from conduction delay  present.  Global right ventricular function is normal.  The inferior vena cava was normal in size with preserved respiratory  variability.  No pericardial effusion is present    RHC 11/2024: Baseline hemodynamics on room air: BP: 131/63 (90) mmHg, HR 53 bpm, SpO2 95%.   Pressures: RA: 12/9 (8), RV: 37/9, PA: 37/16 (24), PCW: 18/19 (15) mmHg.  Saturations: PA sat: 63%.  Calculations :Estrella CO/CI: 6.7 L/min/ 3.1 L/min/m2, PVR: 1.3 DOZIER.       Zio 12/2024: Patient had a min HR of 43 bpm, max HR of 156 bpm, and avg HR of 69 bpm. Predominant underlying rhythm was Sinus Rhythm. 1 run of Ventricular Tachycardia occurred lasting 5 beats with a max rate of 146 bpm (avg 133 bpm). 6 Supraventricular Tachycardia runs occurred, the run with the fastest interval lasting 7 beats with a max rate of 156 bpm, the longest lasting 8 beats with an avg rate of 111 bpm. Isolated SVEs were rare (<1.0%), SVE Couplets were rare (<1.0%), and SVE Triplets were rare (<1.0%). Isolated VEs were rare (<1.0%, 5922), VE Couplets were rare (<1.0%, 271), and VE Triplets were rare (<1.0%,  45). Ventricular Bigeminy and Trigeminy were present.       CMR 6/12/2023:   1. The LV is normal in cavity size and wall thickness. The global systolic function is moderately reduced.  The LVEF is 32%. There is global hypokinesis.     2. The RV is normal in cavity size. The global systolic function is mildly reduced. The RVEF is 45%.      3. Both atria are normal in size.     4. There is no significant valvular disease.      5. Late gadolinium enhancement imaging shows no MI, fibrosis or infiltrative disease.      6. There is no pericardial effusion or thickening.     7. There is no intracardiac thrombus.     CONCLUSIONS: Severe, nonischemic cardiomyopathy, today the LV is no longer dilated. LVEF 32% not  significantly changed. RV function is mildly globally reduced, RVEF 45%, slightly decreased from previous  53%. No LGE.    Ziopatch monitor 2/24-3/3/2023: sinus with 9.4% PVC burden    Echo 12/12/2022: Severe left ventricular dilation is present.LVIDd 70mm.  Biplane LVEF is 50%.  Right ventricular function, chamber size, wall motion, and thickness are  normal.  Both atria appear normal.  Pulmonary artery systolic pressure is normal.  The inferior vena cava is normal.  No pericardial effusion is present.  The left ventricular function has improved.    CMR 6/3/22: 1. The LV is mild-to-moderately dilated in cavity size. The wall thickness is normal. The global systolic  function is severely decreased. The LVEF is 33%. There is severe global hypokinesis.     2. The RV is normal in cavity size. The global systolic function is mildly decreased. The RVEF is 53%.      3. Both atria are normal in size.     4. There is no significant valvular disease.      5. Late gadolinium enhancement imaging shows no MI, fibrosis or infiltrative disease.      6. There is no pericardial effusion or thickening.     7. There is no intracardiac thrombus.     CONCLUSIONS: Severe, non-ischemic dilated cardiomyopathy, most likely of genetic  cause. LVEF of 33% and  RVEF of 53% with no LGE. When directly compared to the prior CMR, the LV and RV size and function have not  significantly changed.      CPEX 12/12/22: Key Measurements    Peak VO2 (ml/kg/min) VE/VCO2  [place] RER   14.91        27.61        1.02            Predicted VO2 (ml/kg/min) Predicted VO2 %   26.50        56            Resting Supine BP (mmHg) Resting Standing BP (mmHg) Final Stress BP (mmHg)   106/56        112/58        140/67          Resting Supine HR (bpm) Resting Standing HR (bpm)    44        50             Max HR (bpm) Max Predicted HR (bpm) Max Perdicted HR %   124        162        77            Exercise time (min) Exercise time (sec) Estimated workload (METS)   8        51        4.3              [facility] 1/2023:    Time Systolic (mmHg) Diastolic (mmHg) Mean (mmHg) A Wave (mmHg) V Wave (mmHg) EDP (mmHg) Max dp/dt (mmHg/sec) HR (bpm) Content (mL/dL) SAT (%)   RA Pressures  1:56 PM   6    10    8      38        RV Pressures  1:56 PM 40        12     37        PA Pressures  1:57 PM 41    4    22        39        PCW Pressures  1:57 PM   11    22    20      39        AO Pressures  2:01     63    91        37          Blood Flow Results Phase: Baseline     Time Results  Indexed Values (L/min/m2)   QP  1:45 PM 2.71 L/min    1.31      QS  1:45 PM 2.71 L/min    1.31        Blood Oximetry Phase: Baseline     Time Hb  SAT(%)  PO2  Content (mL/dL) PA Sat (%)   PA  1:45 PM  62.7 %      62.7      Art  1:45 PM  100 %     17.14         Cardiac Output Phase: Baseline     Time TDCO (L/min) TDCI (L/min/m2) Estrella C.O. (L/min) Estrella C.I. 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  Premature ventricular contractions, palpitations   3. Fatigue   4. VT s/p secondary ICD implantation 4/2025    She has had a cardiac index of 1.3 and VT associated with FLNC cardiomyopathy. Her RHC in November 2024 showed improved filling pressures at rest. She did not achieve RER with the CPEX in Jan 2025 due to needing to stop due to lightheadedness. Her prior CPEX in 2023 she had a peak V02 of 14.9 (56% predicted) with an RER of 1.02. She has progressive exercise intolerance. Additionally, her LVEF decreased to 30-35% with an LVEDD of 6.8cm. She now has a secondary prevention ICD due to VT. With FLNC has a high risk of SCD due to ventricular arrhythmias and I am concerned about rapid disease progression if she has VT. Ideally, transplant would be the best option should she need it especially given the arrhythmic risk and LVAD. Currently, her BMI is above the cut off of 37. We discussed the importance of weight loss which she is consistently pursuing in the weight management clinic.     ACEi/ARB/ARNi/afterload reduction: Entresto 97-103mg BID  BB: Toprol 12.5mg daily  Aldosterone antagonist: spironolactone 50mg daily  SGLT2i: dapagliflozin 10mg daliy- not taking any due to increased vaginal discharge  SCD prophylaxis: secondary prevention ICD  4/11/25  NSAID use: contraindicated    5. Vomiting  6. Heart Burn  Her GI symptoms have improved on reglan and she has been seeing GI. She feels like her symptoms flared after she stopped reglan. She will follow up with GI.     7.  Hypomagnesemia: Improved 2.0 today. Continue magnesium oxide for replacement.    8. Obesity. Her BMI is 43. For transplant the BMI requirement is 37 or lower. She would need to be 215 lbs which requires a 30 lbs weight loss. It is excellent that she is following up regularly in weight management clinic. She however has a number of contraindications to weight management medication and GLP-1 RA are cost prohibitive for her. She is on metformin but has not not significant weight. We discussed consideration of bariatric surgery, which is not an appealing option for her. She will discuss if her weight management physician may be able to appeal to insurance given the possible need for transplantation.     9. KATIA evaluation: She has chronic systolic heart failure and obesity with associated snoring and fatigue. I would like her to see sleep medicine for central vs obstructive sleep apnea. We discussed that addressing apnea may also help with weight loss.       PLAN:   -Sleep medicine referral  -ICD check  -follow up in 2-3months with [facility]  -CPEX and RHC   -6 months with me       45 minutes on DOS for chart review, counseling, review of diagnostic testing, coordination of care and, letter, and  documentation.     The longitudinal plan of care for the diagnosis(es)/condition(s) as documented were addressed during this visit. Due to the added complexity in care, I will continue to support Marleen in the subsequent management and with ongoing continuity of care.    Thank you for allowing me to participate in the care of your patient. Please do not hesitate to contact me if you have any questions.     Sincerely,   Real Seaman MD, PhD, [facility]  Advanced Heart Failure/Transplantation/[facility]/Go Vocab              Please do not hesitate to contact me if you have any questions/concerns.     Sincerely,     Real Seaman MD

## 2025-07-14 NOTE — LETTER
July 14, 2025    RE:  Marleen Mcfadden  [address]  [place] [facility] [place] [address]            To whom it may concern:      Marleen Mcfadden is under my professional care for Chronic systolic congestive heart failure. She was in the clinic on 7/14/25 for an appointment and related testing.     She will need to perform a maximum of 4 hours of walking per day during each work shift. She will need to have the option to stop and rest if she is symptomatic. Marleen will need to have Mondays off of work for doctor's appointments.     Due to the increase in her functional limitations she is currently having, she should be working no more than 30 hours per week. Please contact me with any questions.     Please contact me if there are any questions or concerns.      Sincerely,      Real Seaman MD, PhD, FACC  Advanced Heart Failure/Transplantation/[facility]  [facility]/[place]

## 2025-07-14 NOTE — PATIENT INSTRUCTIONS
"You were seen today in the Cardiovascular Clinic at the [facility].      Cardiology Providers you saw during your visit:  Dr. Real Seaman     Recommendations:   Sleep medicine referral.  7/16 EP with Mireya device check prior.  Cardiopulmonary Stress Test & Right Heart Catheterization - I will have our schedulers call you to make this appointment.  [facility] in [place] 9/18 (with labs the day prior).  Follow up with Dr. Seaman in 6 months with labs prior.       Thank you for your visit today!   Please MyChart message or call if you have any questions or concerns.      During Business Hours:  [phone], option # 1 \"To leave a message for your care team\"     After hours, weekends or holidays:   [phone], Option #4  Ask to speak to the On-Call Cardiologist. Inform them you are a heart failure patient at the [facility].      Megha Meyer RN BSN   Cardiology Nurse Coordinator - Heart Failure/[facility]R.E[facility]  [phone] option 1 to schedule an appointment or leave a message for your care team    Pre-procedure instructions - Right Heart Cath and/or Biopsy and/or Pulmonary Angiogram  Patient Education    Your arrival time is __________.  Location is [address] Waiting Room  Please plan on being at the hospital all day. If you are on dialysis, DO NOT schedule on a dialysis day.  At any time, emergencies and/or urgent cases may come up which could delay the start of your procedure.    Pre-procedure instructions - Right heart catheterization  No solid food for 8 hours prior  Nothing to drink 2 hours prior to arrival time  You can take your morning medications (except diabetic and blood thinners) with sips of water  We recommend you arrange for a ride to drop you off and pick you up, in the instance, you are unable to drive home, however you should be able to function as you " normally would after the procedure     Diabetic Medication Instructions  Hold oral diabetic medication in morning of your procedure and for 48 hours after IV contrast is given  Typical instructions for insulin diabetic medication holding are below. However, please reach out to your Primary Care Provider or Endocrinologist for specific instructions  DO NOT take any oral diabetic medication, short-acting diabetes medications/insulin, humalog or regular insulin the morning of your test  Take   dose of long-acting insulin (Lantus, Levemir) the day of your test  Remember to bring your glucometer and insulin with you to take after your test if needed  GLP-1 Agonists Instructions  DO NOT take injectable GLP-1 agonists semaglutide (Ozempic, Wegovy), dulaglutide (Trulicity), exenatide ER (Bydureon), tirzepatide (Mounjaro), or oral semaglutide (Rybelsus) for 7 days prior your procedure  Hold once daily injectable GLP-1 agonists exenatide (Byetta), liraglutide (Saxenda, Victoza), lixisenatide (Soligua) the day before and day of your procedure                Anticoagulation Medication Instructions   NA  Nurse to write N/A if not currently taking    CardioPulmonary Stress Test (CPX)  CPX is a maximal (meaning you exercise to exhaustion, not to achieve a heart rate) exercise test where we measure how well your heart/body uses oxygen or energy. It is the gold standard for measuring functional capacity and helps us differentiate limitations due to lungs, heart, or fitness.   A CPX is NOT a typical stress test. You will NOT be asked to hold your Beta Blocker medication.     You will be scheduled for a CardioPulmonary Stress Test at the [facility] ([address], [place] [address], [phone]).       Follow these instructions:    1. Report to the GOLD waiting room in the [facility] on: __________    2. Nothing to eat for 3 hours prior to your test. You may have clear liquids up to the time of your  test    3. Please wear loose, two-piece clothing and comfortable, rubber soled shoes for walking     For Patients with Diabetes: Your RN Coordinator will give you instructions on adjusting your diabetic medications for the day of your test                           If you have questions please contact your diabetic care team                           Remember to  bring your glucometer & insulin with you to take after your test if needed

## 2025-07-15 ENCOUNTER — TELEPHONE (OUTPATIENT)
Dept: ENDOCRINOLOGY | Facility: CLINIC | Age: 60
End: 2025-07-15
Payer: COMMERCIAL

## 2025-07-15 ENCOUNTER — PATIENT OUTREACH (OUTPATIENT)
Dept: CARE COORDINATION | Facility: CLINIC | Age: 60
End: 2025-07-15
Payer: COMMERCIAL

## 2025-07-15 DIAGNOSIS — I50.22 CHRONIC SYSTOLIC CONGESTIVE HEART FAILURE (H): ICD-10-CM

## 2025-07-15 DIAGNOSIS — I42.9 FAMILIAL CARDIOMYOPATHY (H): Primary | ICD-10-CM

## 2025-07-15 DIAGNOSIS — G47.33 OSA (OBSTRUCTIVE SLEEP APNEA): ICD-10-CM

## 2025-07-15 RX ORDER — SEMAGLUTIDE 0.5 MG/.5ML
0.5 INJECTION, SOLUTION SUBCUTANEOUS WEEKLY
Qty: 2 ML | Refills: 1 | Status: SHIPPED | OUTPATIENT
Start: 2025-07-15

## 2025-07-15 NOTE — TELEPHONE ENCOUNTER
The prescribed drug/drug class Wegovy is classified as a Plan Exclusion, meaning it is not covered under the plan for any indication.  Consequently, the liaison team will not be submitting a prior authorization for this drug or any drug within this drug class. The order will be sent to the patient's preferred pharmacy, and the patient will need to pay out of pocket.     Product/Service Not Covered - Plan/Benefit   DRUG NOT COVERED          Thank you,     Nina Reyes, [facility]  Clinic Liaison  [facility]

## 2025-07-16 ENCOUNTER — OFFICE VISIT (OUTPATIENT)
Dept: CARDIOLOGY | Facility: CLINIC | Age: 60
End: 2025-07-16
Payer: COMMERCIAL

## 2025-07-16 ENCOUNTER — ANCILLARY PROCEDURE (OUTPATIENT)
Dept: CARDIOLOGY | Facility: CLINIC | Age: 60
End: 2025-07-16
Attending: INTERNAL MEDICINE
Payer: COMMERCIAL

## 2025-07-16 VITALS
HEART RATE: 72 BPM | DIASTOLIC BLOOD PRESSURE: 80 MMHG | SYSTOLIC BLOOD PRESSURE: 139 MMHG | OXYGEN SATURATION: 99 % | WEIGHT: 256.2 LBS | BODY MASS INDEX: 43.98 KG/M2

## 2025-07-16 DIAGNOSIS — Z95.810 ICD (IMPLANTABLE CARDIOVERTER-DEFIBRILLATOR) IN PLACE: ICD-10-CM

## 2025-07-16 DIAGNOSIS — I42.0 DILATED CARDIOMYOPATHY (H): ICD-10-CM

## 2025-07-16 DIAGNOSIS — Z95.810 AUTOMATIC IMPLANTABLE CARDIOVERTER-DEFIBRILLATOR IN SITU: Primary | ICD-10-CM

## 2025-07-16 DIAGNOSIS — I42.9 FAMILIAL CARDIOMYOPATHY (H): ICD-10-CM

## 2025-07-16 LAB
MDC_IDC_LEAD_CONNECTION_STATUS: NORMAL
MDC_IDC_LEAD_CONNECTION_STATUS: NORMAL
MDC_IDC_LEAD_IMPLANT_DT: NORMAL
MDC_IDC_LEAD_IMPLANT_DT: NORMAL
MDC_IDC_LEAD_LOCATION: NORMAL
MDC_IDC_LEAD_LOCATION: NORMAL
MDC_IDC_LEAD_LOCATION_DETAIL_1: NORMAL
MDC_IDC_LEAD_LOCATION_DETAIL_1: NORMAL
MDC_IDC_LEAD_MFG: NORMAL
MDC_IDC_LEAD_MFG: NORMAL
MDC_IDC_LEAD_MODEL: NORMAL
MDC_IDC_LEAD_MODEL: NORMAL
MDC_IDC_LEAD_POLARITY_TYPE: NORMAL
MDC_IDC_LEAD_POLARITY_TYPE: NORMAL
MDC_IDC_LEAD_SERIAL: NORMAL
MDC_IDC_LEAD_SERIAL: NORMAL
MDC_IDC_LEAD_SPECIAL_FUNCTION: NORMAL
MDC_IDC_LEAD_SPECIAL_FUNCTION: NORMAL
MDC_IDC_MSMT_BATTERY_DTM: NORMAL
MDC_IDC_MSMT_BATTERY_REMAINING_LONGEVITY: 150 MO
MDC_IDC_MSMT_BATTERY_RRT_TRIGGER: NORMAL
MDC_IDC_MSMT_BATTERY_STATUS: NORMAL
MDC_IDC_MSMT_BATTERY_VOLTAGE: 3.14 V
MDC_IDC_MSMT_CAP_CHARGE_DTM: NORMAL
MDC_IDC_MSMT_CAP_CHARGE_ENERGY: 18 J
MDC_IDC_MSMT_CAP_CHARGE_TIME: 3.7 S
MDC_IDC_MSMT_CAP_CHARGE_TYPE: NORMAL
MDC_IDC_MSMT_LEADCHNL_RA_IMPEDANCE_VALUE: 475 OHM
MDC_IDC_MSMT_LEADCHNL_RA_PACING_THRESHOLD_AMPLITUDE: 0.75 V
MDC_IDC_MSMT_LEADCHNL_RA_PACING_THRESHOLD_PULSEWIDTH: 0.4 MS
MDC_IDC_MSMT_LEADCHNL_RA_SENSING_INTR_AMPL: 3.5 MV
MDC_IDC_MSMT_LEADCHNL_RV_IMPEDANCE_VALUE: 304 OHM
MDC_IDC_MSMT_LEADCHNL_RV_IMPEDANCE_VALUE: 399 OHM
MDC_IDC_MSMT_LEADCHNL_RV_PACING_THRESHOLD_AMPLITUDE: 0.75 V
MDC_IDC_MSMT_LEADCHNL_RV_PACING_THRESHOLD_PULSEWIDTH: 0.4 MS
MDC_IDC_MSMT_LEADCHNL_RV_SENSING_INTR_AMPL: 13.8 MV
MDC_IDC_PG_IMPLANT_DTM: NORMAL
MDC_IDC_PG_MFG: NORMAL
MDC_IDC_PG_MODEL: NORMAL
MDC_IDC_PG_SERIAL: NORMAL
MDC_IDC_PG_TYPE: NORMAL
MDC_IDC_SESS_CLINIC_NAME: NORMAL
MDC_IDC_SESS_DTM: NORMAL
MDC_IDC_SESS_TYPE: NORMAL
MDC_IDC_SET_BRADY_AT_MODE_SWITCH_RATE: 171 {BEATS}/MIN
MDC_IDC_SET_BRADY_LOWRATE: 50 {BEATS}/MIN
MDC_IDC_SET_BRADY_MAX_SENSOR_RATE: 130 {BEATS}/MIN
MDC_IDC_SET_BRADY_MAX_TRACKING_RATE: 130 {BEATS}/MIN
MDC_IDC_SET_BRADY_MODE: NORMAL
MDC_IDC_SET_BRADY_PAV_DELAY_LOW: 210 MS
MDC_IDC_SET_BRADY_SAV_DELAY_LOW: 190 MS
MDC_IDC_SET_LEADCHNL_RA_PACING_AMPLITUDE: 1.5 V
MDC_IDC_SET_LEADCHNL_RA_PACING_ANODE_ELECTRODE_1: NORMAL
MDC_IDC_SET_LEADCHNL_RA_PACING_ANODE_LOCATION_1: NORMAL
MDC_IDC_SET_LEADCHNL_RA_PACING_CAPTURE_MODE: NORMAL
MDC_IDC_SET_LEADCHNL_RA_PACING_CATHODE_ELECTRODE_1: NORMAL
MDC_IDC_SET_LEADCHNL_RA_PACING_CATHODE_LOCATION_1: NORMAL
MDC_IDC_SET_LEADCHNL_RA_PACING_POLARITY: NORMAL
MDC_IDC_SET_LEADCHNL_RA_PACING_PULSEWIDTH: 0.4 MS
MDC_IDC_SET_LEADCHNL_RA_SENSING_ANODE_ELECTRODE_1: NORMAL
MDC_IDC_SET_LEADCHNL_RA_SENSING_ANODE_LOCATION_1: NORMAL
MDC_IDC_SET_LEADCHNL_RA_SENSING_CATHODE_ELECTRODE_1: NORMAL
MDC_IDC_SET_LEADCHNL_RA_SENSING_CATHODE_LOCATION_1: NORMAL
MDC_IDC_SET_LEADCHNL_RA_SENSING_POLARITY: NORMAL
MDC_IDC_SET_LEADCHNL_RA_SENSING_SENSITIVITY: 0.3 MV
MDC_IDC_SET_LEADCHNL_RV_PACING_AMPLITUDE: 2 V
MDC_IDC_SET_LEADCHNL_RV_PACING_ANODE_ELECTRODE_1: NORMAL
MDC_IDC_SET_LEADCHNL_RV_PACING_ANODE_LOCATION_1: NORMAL
MDC_IDC_SET_LEADCHNL_RV_PACING_CAPTURE_MODE: NORMAL
MDC_IDC_SET_LEADCHNL_RV_PACING_CATHODE_ELECTRODE_1: NORMAL
MDC_IDC_SET_LEADCHNL_RV_PACING_CATHODE_LOCATION_1: NORMAL
MDC_IDC_SET_LEADCHNL_RV_PACING_POLARITY: NORMAL
MDC_IDC_SET_LEADCHNL_RV_PACING_PULSEWIDTH: 0.4 MS
MDC_IDC_SET_LEADCHNL_RV_SENSING_ANODE_ELECTRODE_1: NORMAL
MDC_IDC_SET_LEADCHNL_RV_SENSING_ANODE_LOCATION_1: NORMAL
MDC_IDC_SET_LEADCHNL_RV_SENSING_CATHODE_ELECTRODE_1: NORMAL
MDC_IDC_SET_LEADCHNL_RV_SENSING_CATHODE_LOCATION_1: NORMAL
MDC_IDC_SET_LEADCHNL_RV_SENSING_POLARITY: NORMAL
MDC_IDC_SET_LEADCHNL_RV_SENSING_SENSITIVITY: 0.3 MV
MDC_IDC_SET_ZONE_DETECTION_BEATS_DENOMINATOR: 16 {BEATS}
MDC_IDC_SET_ZONE_DETECTION_BEATS_DENOMINATOR: 32 {BEATS}
MDC_IDC_SET_ZONE_DETECTION_BEATS_DENOMINATOR: 40 {BEATS}
MDC_IDC_SET_ZONE_DETECTION_BEATS_NUMERATOR: 16 {BEATS}
MDC_IDC_SET_ZONE_DETECTION_BEATS_NUMERATOR: 30 {BEATS}
MDC_IDC_SET_ZONE_DETECTION_BEATS_NUMERATOR: 32 {BEATS}
MDC_IDC_SET_ZONE_DETECTION_INTERVAL: 300 MS
MDC_IDC_SET_ZONE_DETECTION_INTERVAL: 350 MS
MDC_IDC_SET_ZONE_DETECTION_INTERVAL: 350 MS
MDC_IDC_SET_ZONE_DETECTION_INTERVAL: 360 MS
MDC_IDC_SET_ZONE_STATUS: NORMAL
MDC_IDC_SET_ZONE_TYPE: NORMAL
MDC_IDC_SET_ZONE_VENDOR_TYPE: NORMAL
MDC_IDC_STAT_AT_BURDEN_PERCENT: 0 %
MDC_IDC_STAT_AT_DTM_END: NORMAL
MDC_IDC_STAT_AT_DTM_START: NORMAL
MDC_IDC_STAT_BRADY_AP_VP_PERCENT: 0.01 %
MDC_IDC_STAT_BRADY_AP_VS_PERCENT: 18.44 %
MDC_IDC_STAT_BRADY_AS_VP_PERCENT: 0.03 %
MDC_IDC_STAT_BRADY_AS_VS_PERCENT: 81.52 %
MDC_IDC_STAT_BRADY_DTM_END: NORMAL
MDC_IDC_STAT_BRADY_DTM_START: NORMAL
MDC_IDC_STAT_BRADY_RA_PERCENT_PACED: 18.56 %
MDC_IDC_STAT_BRADY_RV_PERCENT_PACED: 0.04 %
MDC_IDC_STAT_EPISODE_RECENT_COUNT: 0
MDC_IDC_STAT_EPISODE_RECENT_COUNT_DTM_END: NORMAL
MDC_IDC_STAT_EPISODE_RECENT_COUNT_DTM_START: NORMAL
MDC_IDC_STAT_EPISODE_TOTAL_COUNT: 0
MDC_IDC_STAT_EPISODE_TOTAL_COUNT_DTM_END: NORMAL
MDC_IDC_STAT_EPISODE_TOTAL_COUNT_DTM_START: NORMAL
MDC_IDC_STAT_EPISODE_TYPE: NORMAL
MDC_IDC_STAT_TACHYTHERAPY_ATP_DELIVERED_RECENT: 0
MDC_IDC_STAT_TACHYTHERAPY_ATP_DELIVERED_TOTAL: 0
MDC_IDC_STAT_TACHYTHERAPY_RECENT_DTM_END: NORMAL
MDC_IDC_STAT_TACHYTHERAPY_RECENT_DTM_START: NORMAL
MDC_IDC_STAT_TACHYTHERAPY_SHOCKS_ABORTED_RECENT: 0
MDC_IDC_STAT_TACHYTHERAPY_SHOCKS_ABORTED_TOTAL: 0
MDC_IDC_STAT_TACHYTHERAPY_SHOCKS_DELIVERED_RECENT: 0
MDC_IDC_STAT_TACHYTHERAPY_SHOCKS_DELIVERED_TOTAL: 0
MDC_IDC_STAT_TACHYTHERAPY_TOTAL_DTM_END: NORMAL
MDC_IDC_STAT_TACHYTHERAPY_TOTAL_DTM_START: NORMAL

## 2025-07-16 PROCEDURE — 3079F DIAST BP 80-89 MM HG: CPT

## 2025-07-16 PROCEDURE — 93283 PRGRMG EVAL IMPLANTABLE DFB: CPT | Performed by: INTERNAL MEDICINE

## 2025-07-16 PROCEDURE — 99214 OFFICE O/P EST MOD 30 MIN: CPT | Mod: 25

## 2025-07-16 PROCEDURE — 99213 OFFICE O/P EST LOW 20 MIN: CPT

## 2025-07-16 PROCEDURE — 1126F AMNT PAIN NOTED NONE PRSNT: CPT

## 2025-07-16 PROCEDURE — 3075F SYST BP GE 130 - 139MM HG: CPT

## 2025-07-16 ASSESSMENT — PAIN SCALES - GENERAL: PAINLEVEL_OUTOF10: NO PAIN (0)

## 2025-07-16 NOTE — PATIENT INSTRUCTIONS
You were seen in the Electrophysiology Clinic today by: Mireya Phan PA-C    Plan:   Medication Changes: None    Follow up Visit: 1 year, or sooner as needed.     Device Follow up: Remote check in 3 months.     Further Instructions: Continue routine device interrogations.       If you have further questions, please utilize Z[employer]ante to contact us.     Your Care Team:    EP Cardiology   Telephone Number     Nurse Line  Ryan Stiles RN    [phone]     For scheduling appointments:   Michaela   [phone]   For procedure scheduling:    Nathaly Tavera     [phone]   For the Device Clinic (Pacemakers, ICDs, Loop Recorders)    During business hours: [phone]  After business hours:   [phone]- select option 4 and ask for job code [id number].       On-call cardiologist for after hours or on weekends:   [phone], option #4, and ask to speak to the on-call cardiologist.     Cardiovascular Clinic:   [address] [place] [address]. [place] [address]      As always, Thank you for trusting us with your health care needs!

## 2025-07-16 NOTE — NURSING NOTE
Chief Complaint   Patient presents with    Follow Up     RETURN EP     Vitals were taken and medications reconciled.    Simba Chapman, RADHA  10:54 AM

## 2025-07-16 NOTE — LETTER
7/16/2025      RE: Marleen Mcfadden  [address]  [place] [address]       Dear Colleague,    Thank you for the opportunity to participate in the care of your patient, Marleen Mcfadden, at the [facility] [place] [facility]. Please see a copy of my visit note below.        ELECTROPHYSIOLOGY CLINIC VISIT    Assessment/Recommendations   Assessment/Plan:    Ms. 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      Familial FLNC NICM LVEF 30-35%, NYHA I:   1. ACEi/ARB: Continue Entresto.  2. BB: Continue Toprol XL   3. Aldosterone antagonist: Continue Spironolactone.  4. SGLT2i: Not currently on.  5. SCD prophylaxis:  FLNC was reclassified as pathogenic and last CMR showed LVEF 32%. ICD dual-chamber recommended for bradycardia and only on low-dose beta-blocker. Underwent ICD implant 4/11/25. Pocket site CDI. Device interrogation today with normal device function, stable lead parameters, AP 18.6%,  <0.1%, AF burden <0.1% (1 second episode of AF), no ventricular arrhythmias.    - Continue routine device interrogations  6. Fluid Status: euvolemic on exam     Follow up in one year with a device check prior, or sooner as needed.        History of Present Illness/Subjective    Ms. Marleen Mcfadden is a 60 year old female who comes in today for EP follow-up of familial CM s/p ICD (4/11/25).    Ms. 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      She has a family history of DCM. Her sister was diagnosed and lead to family screening. 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 Her most recent echo showed LVEF 35-40% with moderate/severe LV dilation. Her son and daughter have DCM. Patient has been following with   [facility] since 2006. She has been on GDMT. More recently, she was having episodes of lightheadedness. She was playing pool and she stood up and fell back and went down on the ground. Her fiance said she was still awake the whole time, she does not remember that, only once, she was able to stand up immediately, and went to the chair. No orthostatic dizziness otherwize, she reports having a syncope episode 3 years ago after laughing very hard.     We saw her last time back in October 2023. At that time, she presented for evaluation of  syncopal episode. She reported passing out while standing up from kneeling position in the context of 3-4 episodes of emesis. That was her second episode. First episode was earlier that year; she reported she was playing pool, was shooting the ball, stood up and became lightheaded and passed out. 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 During that encounter Discussed we recommended ICD dual-chamber for bradycardia and only on low-dose beta-blocker. We discussed with the patient the rationale for ICD placement, alternative therapies,  technical aspects of the surgical procedure, risks/benefits of therapy and need for long-term follow-up in the Device Clinic.      EP 12/04/2024: She presents today for follow up. She reports feeling well. She has some limitations when she walks or exerts herself. No admissions for HF. She denies chest discomfort, palpitations, abdominal fullness/bloating or peripheral edema, shortness of breath, paroxysmal nocturnal dyspnea, orthopnea, lightheadedness, dizziness, pre-syncope, or syncope. Current cardiac medications include: Torsemide, Toprol XL, Spironolactone, and Entresto.    She presents today in follow up. She reports feeling well. She denies issues with her device since implant. No shocks. Reported there had been a stitch poking out, but it fell out. Pocket site healed well. Denies erythema, edema, or drainage  from pocket site. Reports occasional lightheadedness with positional change, but otherwise denies chest discomfort, palpitations, abdominal fullness/bloating or peripheral edema, shortness of breath, paroxysmal nocturnal dyspnea, orthopnea, dizziness, pre-syncope, or syncope. 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 She states she used to feel SOB occasionally while in the hospital, but this has improved after getting the device. Reports BP at home usually 130s/80s. Device interrogation shows normal device function, stable lead parameters, AP 18.6%,  <0.1%, AF burden <0.1% (1 second episode of AF), no ventricular arrhythmias. Current cardiac medications include: metoprolol XL 25 mg, entresto  mg BID, spironolactone 50 mg, torsemide 5 mg.     I have reviewed and updated the patient's Past Medical History, Social History, Family History and Medication List.        Family History   Family history was significant for the following cardiac history:  Full sister with DCM. She  in September after developing colon cancer.  Her history was also remarkable for MERSA, kidney disease and ultimate transplant.  She did not have an ICD or much care for her DCM.  Marleen's son and daughter also have DCM.    A maternal half sister has hypertension, epilepsy and reported dizziness and fainting. She does not have a cardiac issue to Marleen's knowledge.  Mother  suddenly at 38 yrs of age.  It was thought to be the result of a cardiac problem. However, she also had a long history of alcoholism and had a colostomy. Her two siblings have no known heart issues.  Maternal grandmother  in her 30's from a brain aneurysm. Nothing is known about maternal grandfather.  Marleen's father  at 55 years after a massive heart attack and CABG procedure.  His death occurred one day after the surgery reportedly from a clot.  One of his sisters  from a  stroke.  Paternal grandmother  in her 70's from a stroke.  Her mother (Marleen's great grandmother)  suddenly in her 70's while sitting at a bus stop.Nothing is known about grandfather.  Two paternal half sisters in good health.  I have reviewed and updated the patient's Past Medical History, Social History, Family History and Medication List.        Cardiographics (Personally Reviewed) :   11/10/24 Echo:  Severe left ventricular dilation is present. Left ventricular function is  decreased. The ejection fraction is 30-35% (moderately reduced). Moderate  diffuse hypokinesis is present. Abnormal septal motion from conduction delay  present.  Global right ventricular function is normal.  The inferior vena cava was normal in size with preserved respiratory  variability.  No pericardial effusion is present.     RHC 24  Baseline hemodynamics on room air: BP: 131/63 (90) mmHg, HR 53 bpm, SpO2 95%.   Pressures: RA: 12/9 (8), RV: 37/9, PA: 37/16 (24), PCW: 18/19 (15) mmHg.  Saturations: PA sat: 63%.  Calculations :Estrella CO/CI: 6.7 L/min/ 3.1 L/min/m2, PVR: 1.3 DOZIER.      23 Echo:   Interpretation Summary  Moderate to severe left ventricular dilation is present. Left ventricular  function is decreased. The ejection fraction is 35-40% (moderately reduced).  Biplane LVEF is 37%.  The right ventricle is normal size. Global right ventricular function is normal.  No pericardial effusion is present.  This study was compared with the study from 22: LVEF appears similar or at most minimally lower and appears to hve been overestimated on the prior study.     1/10/23 RHC:  Right sided filling pressures are normal.  Mild elevated pulmonary hypertension.  Left sided filling pressures are normal.  Left ventricular filling pressures are normal.  Normal cardiac output level.     6/3/22 CMR:  1. The LV is mild-to-moderately dilated in cavity size. The wall thickness is normal. The global systolic function is severely  decreased. The LVEF is 33%. There is severe global hypokinesis.  2. The RV is normal in cavity size. The global systolic function is mildly decreased. The RVEF is 53%.   3. Both atria are normal in size.  4. There is no significant valvular disease.   5. Late gadolinium enhancement imaging shows no MI, fibrosis or infiltrative disease.   6. There is no pericardial effusion or thickening.  7. There is no intracardiac thrombus.  CONCLUSIONS: Severe, non-ischemic dilated cardiomyopathy, most likely of genetic cause. LVEF of 33% and RVEF of 53% with no LGE. When directly compared to the prior CMR, the LV and RV size and function have not significantly changed.       Physical Examination   /80 (BP Location: Right arm, Patient Position: Chair, Cuff Size: Adult Regular)   Pulse 72   Wt 116.2 kg (256 lb 3.2 oz)   LMP 10/19/2008 (Approximate)   SpO2 99%   BMI 43.98 kg/m    Wt Readings from Last 3 Encounters:   07/16/25 116.2 kg (256 lb 3.2 oz)   06/30/25 114 kg (251 lb 6.4 oz)   06/02/25 114.3 kg (252 lb)     General Appearance:   Alert, well-appearing and in no acute distress.   HEENT: Atraumatic, normocephalic.    Chest/Lungs:   Respirations unlabored.  Lungs are clear to auscultation.   Cardiovascular:   Regular rate and rhythm.  S1/S2. No murmur.    Abdomen:  Soft, nontender, nondistended.   Extremities: No cyanosis or clubbing. No edema.     Musculoskeletal: Moves all extremities.     Skin: Warm, dry, intact.    Neurologic: Mood and affect are appropriate.  Alert and oriented to person, place, time, and situation.          Medications  Allergies   Current Outpatient Medications   Medication Sig Dispense Refill     cyclobenzaprine (FLEXERIL) 10 MG tablet Take 0.5-1 tablets (5-10 mg) by mouth 3 times daily as needed for muscle spasms. 21 tablet 1     diclofenac (VOLTAREN) 1 % topical gel Apply 2 g topically 4 times daily. 150 g 2     docusate sodium (COLACE) 50 MG capsule Take 50 mg by mouth 2 times daily as  needed for constipation.       famotidine (PEPCID) 40 MG tablet Take 1 tablet (40 mg) by mouth nightly as needed for heartburn 90 tablet 3     loratadine (CLARITIN) 10 MG tablet Take 1 tablet (10 mg) by mouth daily 90 tablet 3     magnesium oxide (MAG-OX) 400 MG tablet Take 1 tablet (400 mg) by mouth 2 times daily 180 tablet 3     metFORMIN (GLUCOPHAGE XR) 500 MG 24 hr tablet Take 1 tablet 2 times daily with food for 7 days, then increase to 1 tablet with breakfast and 2 tablets with dinner. 270 tablet 1     metoclopramide (REGLAN) 10 MG tablet Take 1 tablet (10 mg) by mouth 3 times daily as needed (nausea). For more refills,PLEASE KEEP scheduled appointment on 2/4/25 270 tablet 1     metoprolol succinate ER (TOPROL XL) 25 MG 24 hr tablet Take 0.5 tablets (12.5 mg) by mouth daily. 45 tablet 3     RABEprazole (ACIPHEX) 20 MG EC tablet Take 1 tablet (20 mg) by mouth 2 times daily. 60 tablet 5     sacubitril-valsartan (ENTRESTO)  MG per tablet Take 1 tablet by mouth 2 times daily. 180 tablet 3     semaglutide-weight management (WEGOVY) 0.25 MG/0.5ML pen Inject 0.5 mLs (0.25 mg) subcutaneously once a week. For 4 weeks 2 mL 0     Semaglutide-Weight Management (WEGOVY) 0.5 MG/0.5ML pen Inject 0.5 mg subcutaneously once a week. After completing 4 weeks of 0.25mg dose 2 mL 1     solifenacin (VESICARE) 5 MG tablet Take 1 tablet (5 mg) by mouth daily at 2 pm. 90 tablet 0     spironolactone (ALDACTONE) 25 MG tablet Take 2 tablets (50 mg) by mouth daily. 180 tablet 3     torsemide (DEMADEX) 5 MG tablet Take 1 tablet (5 mg) by mouth daily. 90 tablet 2     Vitamin D3 (CHOLECALCIFEROL) 25 mcg (1000 units) tablet Take 1 tablet (25 mcg) by mouth daily. 90 tablet 2    Allergies   Allergen Reactions     Morphine      EMESIS     Nickel      Sulfa Antibiotics Swelling         Lab Results (Personally Reviewed)    Chemistry/lipid CBC Cardiac Enzymes/BNP/TSH/INR   Lab Results   Component Value Date    BUN 11.4 07/14/2025      07/14/2025    CO2 26 07/14/2025     Creatinine   Date Value Ref Range Status   07/14/2025 0.85 0.51 - 0.95 mg/dL Final   07/11/2021 0.79 0.52 - 1.04 mg/dL Final       Lab Results   Component Value Date    CHOL 185 08/21/2024    HDL 56 08/21/2024     (H) 08/21/2024      Lab Results   Component Value Date    WBC 6.0 04/11/2025    HGB 11.7 04/11/2025    HCT 35.6 04/11/2025    MCV 93 04/11/2025     04/11/2025    Lab Results   Component Value Date    TSH 4.13 03/20/2025    INR 1.02 11/26/2024        The patient states understanding and is agreeable with the plan.     Mireya Phan PA-C  [facility]  Electrophysiology Consult Service  Pager #[phone]    I spent a total of 15 minutes face to face with Marleen Mcfadden during today's office visit. I have spent an additional 15 minutes today on chart review and documentation.                   Please do not hesitate to contact me if you have any questions/concerns.     Sincerely,     Mireya Phan PA-C

## 2025-07-17 ENCOUNTER — PATIENT OUTREACH (OUTPATIENT)
Dept: CARE COORDINATION | Facility: CLINIC | Age: 60
End: 2025-07-17
Payer: COMMERCIAL

## 2025-07-18 NOTE — PROGRESS NOTES
"Video-Visit Details    Type of service:  Video Visit    Video Start Time: 10:34 AM   Video End Time: 10:55 AM     Originating Location (pt. Location): Home    Distant Location (provider location):  Offsite (providers home) [facility] [place]     Platform used for Video Visit: [employer]    Return Weight Management Nutrition Consultation    Marleen Mcfadden is a 60 year old female presents today for return weight management nutrition consultation.  Patient referred by MATTHEW Elizabeth on 2024.    Patient with Co-morbidities of obesity includin/16/2024     7:20 AM   --   I have the following health issues associated with obesity Heart Disease    GERD (Reflux)   I have the following symptoms associated with obesity Lower Extremity Swelling    Fatigue    Groin Rash     Anthropometrics:  Initial consult weight: 254 lb on 24  Estimated body mass index is 43.23 kg/m  as calculated from the following:    Height as of this encounter: 1.626 m (5' 4.02\").    Weight as of this encounter: 114.3 kg (252 lb).  Current Weight: 252 lb per patient report     Medications for Weight Loss:  Wegovy - no longer on this  Metformin - causing diarrhea     NUTRITION HISTORY  Food allergies: NKFA  Food intolerances: None   Vitamin/Mineral Supplements: Vitamin D   Previous methods of diet modification for weight loss: eating clean (olive oil, vegetables, whole foods, minimal starches)   RD before: None     Goals discussed with MATTHEW Elizabeth:   Cut out sugary drinks (sugar free coffee creamer, non-sugar pop, cutting out juice)   Exercise 3x a week   7 hours of sleep per night minimum (consider black out curtains to help with darkness in room    Typically eats 2 meals a day, skips lunch. Craves sweets . Is  able to get full. Struggles to stay full until next meal, will snack sometimes an hour after a meal. Does struggle with portion control, only with sweets.  Does experience " food noise, particularly with sweets. Does experience emotional eating (sadness, happiness, celebration). Does a loss of control around eating, particularly with homemade cake or cookies. Denies purging to compensate for overeating.    Patient would like to get an exercise plan started.     Diet recall:   Breakfast - yogurt and flaxseed.   Hydration: Drinks soda (regular coke) 1-2 times a week, more if it's in the house., juice (grape fruit, krystian d) 4 times a week with breakfast , water. Coffee in the morning with hazelnut coffee mate. ETOH socially, 1 drink per sitting, 2-3 times per month.     September 2024: Switched coffee creamer to zero sugar, doesn't love the taste of it but is making do. Has been limiting sugary drinks or doing a zero sugar option.  Purchased [employer] protein shakes. Usually drinks 1 for breakfast. Sometimes will skip lunch. Dinner has been a protein + vegetable + carb. Notices she is not needing a snack. Has been dealing with constipation. To help with this is trying to focus on more fiber, more water and drinking prune juice.  Continues to do good with minimizing alcohol.    Discussed having protein shake during the time she is skipping a meal.     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     October 2024: Has been working on increasing her fruits and vegetable consumption. Eating 2 meals a day, tries to get 3 in if she has time with work. Yogurt in the morning OR 2 boiled eggs with 2 turkey sausage, Lunch - something with protein. Has a protein shake in her purse if she isn't able to take a true lunch break. Dinner will have a protein and a vegetable, water 48-64 oz  daily. Continues to get in a lot of activity throughout the day with her job and being on her feet walking around.     July 2025: No longer on Wegovy since last RD visit. Reports she is still struggling with sweet cravings. Doesn't bake anymore. Buys miniature candies.  Getting in 2 meals a day. Typically eats breakfast and dinner.     Breakfast - Yogurt and flaxseed OR eggs, sausage, grits OR breakfast sandwich   Snack - fruit OR maybe a cheese stick. Does not have time to eat lunch while at work.   Dinner - protein, potato/rice, and vegetable.     Hydration - doing better with this, drinks water and 1 coffee, an oatmilk late a day.     Physical activity - on her feet at work, wants to start doing the row machine for 10 minutes 2x a week.     Progress Towards Previous Goals  1) Aim for  grams of protein daily. - not met  2) Continue to incorporate good amounts of fiber containing foods in diet. - didn't discuss   3) Aim for 48-64 oz of water per day. - met        8/16/2024     7:20 AM   Diet Recall Review with Patient   If you do eat breakfast, what types of food do you eat? pugh sausage eggs yogurt toast grits fruit english muffin   If you do eat supper, what types of food do you typically eat? beef, chicken, fish, rice,salad greens, yuri greens, yams, mac&cheese, pasta,veggies   If you do snack, what types of food do you typically eat? cake cookies candy chips fruit nuts   How many glasses of juice do you drink in a typical day? 1   How many of glasses of milk do you drink in a typical day? 0   How many 8oz glasses of sugar containing drinks such as Niels-Aid/sweet tea do you drink in a day? 0   How many cans/bottles of sugar pop/soda/tea/sports drinks do you drink in a day? 1   How many cans/bottles of diet pop/soda/tea or sports drink do you drink in a day? 0   How often do you have a drink of alcohol? Monthly or Less   If you do drink, how many drinks might you have in a day? 1 or 2           8/16/2024     7:20 AM   Eating Habits   Generally, my meals include foods like these bread, pasta, rice, potatoes, corn, crackers, sweet dessert, pop, or juice Almost Everyday   Eat at a buffet or sit-down restaurant Less Than Weekly   Rarely sit down for a meal but snack or  graze throughout Never   Eat in the middle of the night or wake up at night to eat Never   Worry about not having enough food to eat Never   I eat when I am depressed Less Than Weekly   I finish all the food on my plate even if I am already full Almost Everyday   I eat when I am preparing the meal Less Than Weekly   What foods, if any, do you crave? Sweets/Candy/Chocolate         8/16/2024     7:20 AM   Amount of Food   I feel out of control when eating Never   I eat a large amount of food, like a loaf of bread, a box of cookies, a pint/quart of ice cream, all at once Never   I eat a large amount of food even when I am not hungry Never   I eat rapidly Everyday   I eat alone because I feel embarrassed and do not want others to see how much I have eaten Never   I eat until I am uncomfortably full Never   I feel bad, disgusted, or guilty after I overeat Never     Physical Activity:  On her feet all day during work shifts, walks throughout the hospital.         8/16/2024     7:20 AM   Activity/Exercise History   How much of a typical 12 hour day do you spend sitting? Less Than Half the Day   How much of a typical 12 hour day do you spend lying down? Less Than Half the Day   How much of a typical day do you spend walking/standing? Most of the Day   How many hours (not including work) do you spend on the TV/Video Games/Computer/Tablet/Phone? 2-3 Hours   How many times a week are you active for the purpose of exercise? Never   What keeps you from being more active? Lack of Time    Too tired     Nutrition Prescription  Recommended energy/nutrient modification.    Nutrition Diagnosis  Obesity r/t long history of positive energy balance aeb BMI >30 - improving.    Nutrition Intervention  Materials/education provided on hypocaloric diet for weight loss.  Reviewed progress towards previous goals.  Discussed potential barriers and strategies to overcome.   Encouraged increased physical activity with emphasis on strengthening as  "able.   Patient demonstrates understanding.  Co-developed goals to work towards.   Provided pt with list of goals and resources below via [employer].     Expected Engagement: good    Nutrition Goals  1) Try to have a small meal or protein rich snack midday. Aim for  grams of protein daily.   2) Try to reduce sugar in oatmilk coffee drink. Could change to sugar free flavoring or reduce how many pumps of flavoring.   3) Aim for 2 days a week of 10 minutes of rowing machine.     Protein Sources   [email or web address]      Quick/Easy Protein Sources:  Hard boiled eggs  Part-skim cheese sticks  Baby Bell cheese rounds  Low-fat/low-sugar Greek yogurt  Low-fat cottage cheese  Lean deli meat (chicken/turkey/ham)  Roasted chickpeas or lentils  Nuts   Turkey meat stick  Protein shake/bar  \"P3\" snack (cheese, nuts, deli meat)  [employer] \"Protein Bread\"   \"Egglife\" egg white wrap    Tuna/salmon can/packet     Faster meal component ideas:   Protein   Crockpot chicken  Rotisserie chicken  Deli meat  Ready made meat from store ([employer])  Cottage cheese  Greek yogurt  String cheese  Scrambled/hard boiled eggs  Canned tuna/chicken/salmon (or pouches)  Frozen, already cooked shrimp  Nuts/Seeds (handful)  Nut Butter (2 tbsp)  Grains  Minute grain cups (lentils, rice, quinoa)   90 second grain pouches  Whole grain bread (i.e. Viktor bread, etc.)  Carb balance tortillas  Quick oats  Whole wheat crackers  Fruit  Apples  Bananas  Peaches  Pears  Plums  Grapes  Berries (strawberries, blueberries, raspberries, blackberries)  Cherries  Frozen fruit  Fruit canned in 100% fruit juice  Vegetables  Mini cucumbers  Baby carrots  Mini bell peppers  Snap peas  Pre-cut vegetables (i.e. broccoli, brussels sprouts, etc.)  Frozen vegetables (steam in bag)  Canned vegetables (recommend low sodium options)  Other   Hemp hearts, angel, and other seeds      Follow-Up: as needed.     Time spent with patient: 21 minutes.  Niurka " GRACIA Aguilar, LD

## 2025-07-21 ENCOUNTER — VIRTUAL VISIT (OUTPATIENT)
Dept: ENDOCRINOLOGY | Facility: CLINIC | Age: 60
End: 2025-07-21
Payer: COMMERCIAL

## 2025-07-21 VITALS — BODY MASS INDEX: 43.02 KG/M2 | HEIGHT: 64 IN | WEIGHT: 252 LBS

## 2025-07-21 DIAGNOSIS — Z71.3 NUTRITIONAL COUNSELING: Primary | ICD-10-CM

## 2025-07-21 DIAGNOSIS — E66.813 CLASS 3 SEVERE OBESITY WITH SERIOUS COMORBIDITY AND BODY MASS INDEX (BMI) OF 40.0 TO 44.9 IN ADULT, UNSPECIFIED OBESITY TYPE (H): ICD-10-CM

## 2025-07-21 PROCEDURE — 99207 PR NO CHARGE LOS: CPT | Mod: 95

## 2025-07-21 PROCEDURE — 97803 MED NUTRITION INDIV SUBSEQ: CPT | Mod: 95

## 2025-07-21 NOTE — NURSING NOTE
Current patient location: [address] [place] [id number]    Is the patient currently in the state of [place]? YES    Visit mode:VIDEO    If the visit is dropped, the patient can be reconnected by: VIDEO VISIT: Text to cell phone:   Telephone Information:   Mobile [phone]       Will anyone else be joining the visit? NO  (If patient encounters technical issues they should call [phone]    Are changes needed to the allergy or medication list? Pt stated no changes to allergies and Pt stated no med changes    Are refills needed on medications prescribed by this physician? NO    Reason for visit: RECHECK    Cassie ARREDONDO

## 2025-07-21 NOTE — LETTER
"2025       RE: Marleen Mcfadden  [address]  [facility] [id number]     Dear Colleague,    Thank you for referring your patient, Marleen Mcfadden, to the [facility]. Please see a copy of my visit note below.    Video-Visit Details    Type of service:  Video Visit    Video Start Time: 10:34 AM   Video End Time: 10:55 AM     Originating Location (pt. Location): Home    Distant Location (provider location):  Offsite (providers home) [facility]     Platform used for Video Visit: DigePrint    Return Weight Management Nutrition Consultation    Marleen Mcfadden is a 60 year old female presents today for return weight management nutrition consultation.  Patient referred by MATTHEW Elizabeth on 2024.    Patient with Co-morbidities of obesity includin/16/2024     7:20 AM   --   I have the following health issues associated with obesity Heart Disease    GERD (Reflux)   I have the following symptoms associated with obesity Lower Extremity Swelling    Fatigue    Groin Rash     Anthropometrics:  Initial consult weight: 254 lb on 24  Estimated body mass index is 43.23 kg/m  as calculated from the following:    Height as of this encounter: 1.626 m (5' 4.02\").    Weight as of this encounter: 114.3 kg (252 lb).  Current Weight: 252 lb per patient report     Medications for Weight Loss:  Wegovy - no longer on this  Metformin - causing diarrhea     NUTRITION HISTORY  Food allergies: NKFA  Food intolerances: None   Vitamin/Mineral Supplements: Vitamin D   Previous methods of diet modification for weight loss: eating clean (olive oil, vegetables, whole foods, minimal starches)   RD before: None     Goals discussed with MATTHEW Elizabeth:   Cut out sugary drinks (sugar free coffee creamer, non-sugar pop, cutting out juice)   Exercise 3x a week   7 " hours of sleep per night minimum (consider black out curtains to help with darkness in room    Typically eats 2 meals a day, skips lunch. Craves sweets . Is  able to get full. Struggles to stay full until next meal, will snack sometimes an hour after a meal. Does struggle with portion control, only with sweets.  Does experience food noise, particularly with sweets. Does experience emotional eating (sadness, happiness, celebration). Does a loss of control around eating, particularly with homemade cake or cookies. Denies purging to compensate for overeating.    Patient would like to get an exercise plan started.     Diet recall:   Breakfast - yogurt and flaxseed.   Hydration: Drinks soda (regular coke) 1-2 times a week, more if it's in the house., juice (grape fruit, krystian d) 4 times a week with breakfast , water. Coffee in the morning with hazelnut coffee mate. ETOH socially, 1 drink per sitting, 2-3 times per month.     September 2024: Switched coffee creamer to zero sugar, doesn't love the taste of it but is making do. Has been limiting sugary drinks or doing a zero sugar option.  Purchased EpicTopic protein shakes. Usually drinks 1 for breakfast. Sometimes will skip lunch. Dinner has been a protein + vegetable + carb. Notices she is not needing a snack. Has been dealing with constipation. To help with this is trying to focus on more fiber, more water and drinking prune juice.  Continues to do good with minimizing alcohol.    Discussed having protein shake during the time she is skipping a meal.     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     October 2024: Has been working on increasing her fruits and vegetable consumption. Eating 2 meals a day, tries to get 3 in if she has time with work. Yogurt in the morning OR 2 boiled eggs with 2 turkey sausage, Lunch - something with protein. Has a protein shake in her purse if she isn't able to take a true lunch break.  Dinner will have a protein and a vegetable, water 48-64 oz  daily. Continues to get in a lot of activity throughout the day with her job and being on her feet walking around.     July 2025: No longer on Wegovy since last RD visit. Reports she is still struggling with sweet cravings. Doesn't bake anymore. Buys miniature candies. Getting in 2 meals a day. Typically eats breakfast and dinner.     Breakfast - Yogurt and flaxseed OR eggs, sausage, grits OR breakfast sandwich   Snack - fruit OR maybe a cheese stick. Does not have time to eat lunch while at work.   Dinner - protein, potato/rice, and vegetable.     Hydration - doing better with this, drinks water and 1 coffee, an oatmilk late a day.     Physical activity - on her feet at work, wants to start doing the row machine for 10 minutes 2x a week.     Progress Towards Previous Goals  1) Aim for  grams of protein daily. - not met  2) Continue to incorporate good amounts of fiber containing foods in diet. - didn't discuss   3) Aim for 48-64 oz of water per day. - met        8/16/2024     7:20 AM   Diet Recall Review with Patient   If you do eat breakfast, what types of food do you eat? pugh sausage eggs yogurt toast grits fruit english muffin   If you do eat supper, what types of food do you typically eat? beef, chicken, fish, rice,salad greens, yuri greens, yams, mac&cheese, pasta,veggies   If you do snack, what types of food do you typically eat? cake cookies candy chips fruit nuts   How many glasses of juice do you drink in a typical day? 1   How many of glasses of milk do you drink in a typical day? 0   How many 8oz glasses of sugar containing drinks such as Niels-Aid/sweet tea do you drink in a day? 0   How many cans/bottles of sugar pop/soda/tea/sports drinks do you drink in a day? 1   How many cans/bottles of diet pop/soda/tea or sports drink do you drink in a day? 0   How often do you have a drink of alcohol? Monthly or Less   If you do drink, how  many drinks might you have in a day? 1 or 2           8/16/2024     7:20 AM   Eating Habits   Generally, my meals include foods like these bread, pasta, rice, potatoes, corn, crackers, sweet dessert, pop, or juice Almost Everyday   Eat at a buffet or sit-down restaurant Less Than Weekly   Rarely sit down for a meal but snack or graze throughout Never   Eat in the middle of the night or wake up at night to eat Never   Worry about not having enough food to eat Never   I eat when I am depressed Less Than Weekly   I finish all the food on my plate even if I am already full Almost Everyday   I eat when I am preparing the meal Less Than Weekly   What foods, if any, do you crave? Sweets/Candy/Chocolate         8/16/2024     7:20 AM   Amount of Food   I feel out of control when eating Never   I eat a large amount of food, like a loaf of bread, a box of cookies, a pint/quart of ice cream, all at once Never   I eat a large amount of food even when I am not hungry Never   I eat rapidly Everyday   I eat alone because I feel embarrassed and do not want others to see how much I have eaten Never   I eat until I am uncomfortably full Never   I feel bad, disgusted, or guilty after I overeat Never     Physical Activity:  On her feet all day during work shifts, walks throughout the hospital.         8/16/2024     7:20 AM   Activity/Exercise History   How much of a typical 12 hour day do you spend sitting? Less Than Half the Day   How much of a typical 12 hour day do you spend lying down? Less Than Half the Day   How much of a typical day do you spend walking/standing? Most of the Day   How many hours (not including work) do you spend on the TV/Video Games/Computer/Tablet/Phone? 2-3 Hours   How many times a week are you active for the purpose of exercise? Never   What keeps you from being more active? Lack of Time    Too tired     Nutrition Prescription  Recommended energy/nutrient modification.    Nutrition Diagnosis  Obesity r/t long  "history of positive energy balance aeb BMI >30 - improving.    Nutrition Intervention  Materials/education provided on hypocaloric diet for weight loss.  Reviewed progress towards previous goals.  Discussed potential barriers and strategies to overcome.   Encouraged increased physical activity with emphasis on strengthening as able.   Patient demonstrates understanding.  Co-developed goals to work towards.   Provided pt with list of goals and resources below via [employer].     Expected Engagement: good    Nutrition Goals  1) Try to have a small meal or protein rich snack midday. Aim for  grams of protein daily.   2) Try to reduce sugar in oatmilk coffee drink. Could change to sugar free flavoring or reduce how many pumps of flavoring.   3) Aim for 2 days a week of 10 minutes of rowing machine.     Protein Sources   [email or web address]      Quick/Easy Protein Sources:  Hard boiled eggs  Part-skim cheese sticks  Baby Bell cheese rounds  Low-fat/low-sugar Greek yogurt  Low-fat cottage cheese  Lean deli meat (chicken/turkey/ham)  Roasted chickpeas or lentils  Nuts   Turkey meat stick  Protein shake/bar  \"P3\" snack (cheese, nuts, deli meat)  Aldi's \"Protein Bread\"   \"Egglife\" egg white wrap    Tuna/salmon can/packet     Faster meal component ideas:   Protein   Crockpot chicken  Rotisserie chicken  Deli meat  Ready made meat from store ("[employer]'Paga, Good & Gather)  Cottage cheese  Greek yogurt  String cheese  Scrambled/hard boiled eggs  Canned tuna/chicken/salmon (or pouches)  Frozen, already cooked shrimp  Nuts/Seeds (handful)  Nut Butter (2 tbsp)  Grains  Minute grain cups (lentils, rice, quinoa)   90 second grain pouches  Whole grain bread (i.e. Viktor bread, etc.)  Carb balance tortillas  Quick oats  Whole wheat crackers  Fruit  Apples  Bananas  Peaches  Pears  Plums  Grapes  Berries (strawberries, blueberries, raspberries, blackberries)  Cherries  Frozen fruit  Fruit canned in 100% fruit " juice  Vegetables  Mini cucumbers  Baby carrots  Mini bell peppers  Snap peas  Pre-cut vegetables (i.e. broccoli, brussels sprouts, etc.)  Frozen vegetables (steam in bag)  Canned vegetables (recommend low sodium options)  Other   Hemp hearts, angel, and other seeds      Follow-Up: as needed.     Time spent with patient: 21 minutes.  Niurka Aguilar RD, LD      Again, thank you for allowing me to participate in the care of your patient.      Sincerely,    Niurka Aguilar RD

## 2025-07-21 NOTE — PATIENT INSTRUCTIONS
"Kory Hawley,     Follow-up with RD as needed.     Thank you,    Niurka Aguilar, GRACIA, LD  If you would like to schedule or reschedule an appointment with the RD, please call [phone]    Nutrition Goals  1) Try to have a small meal or protein rich snack midday. Aim for  grams of protein daily.   2) Try to reduce sugar in oatmilk coffee drink. Could change to sugar free flavoring or reduce how many pumps of flavoring.   3) Aim for 2 days a week of 10 minutes of rowing machine.     Protein Sources   [email or web address]      Quick/Easy Protein Sources:  Hard boiled eggs  Part-skim cheese sticks  Baby Bell cheese rounds  Low-fat/low-sugar Greek yogurt  Low-fat cottage cheese  Lean deli meat (chicken/turkey/ham)  Roasted chickpeas or lentils  Nuts   Turkey meat stick  Protein shake/bar  \"P3\" snack (cheese, nuts, deli meat)  Aldi's \"Protein Bread\"   \"Egglife\" egg white wrap    Tuna/salmon can/packet     Faster meal component ideas:   Protein   Crockpot chicken  Rotisserie chicken  Deli meat  Ready made meat from store ([employer], Good & Gather)  Cottage cheese  Greek yogurt  String cheese  Scrambled/hard boiled eggs  Canned tuna/chicken/salmon (or pouches)  Frozen, already cooked shrimp  Nuts/Seeds (handful)  Nut Butter (2 tbsp)  Grains  Minute grain cups (lentils, rice, quinoa)   90 second grain pouches  Whole grain bread (i.e. Viktor bread, etc.)  Carb balance tortillas  Quick oats  Whole wheat crackers  Fruit  Apples  Bananas  Peaches  Pears  Plums  Grapes  Berries (strawberries, blueberries, raspberries, blackberries)  Cherries  Frozen fruit  Fruit canned in 100% fruit juice  Vegetables  Mini cucumbers  Baby carrots  Mini bell peppers  Snap peas  Pre-cut vegetables (i.e. broccoli, brussels sprouts, etc.)  Frozen vegetables (steam in bag)  Canned vegetables (recommend low sodium options)  Other   Hemp hearts, angel, and other seeds      COMPREHENSIVE WEIGHT MANAGEMENT PROGRAM  VIRTUAL SUPPORT " GROUPS    At [facility], our Comprehensive Weight Management program offers on-line support groups for patients who are working on weight loss and considering, preparing for, or have had weight loss surgery.     There is no cost for this opportunity.  You are invited to attend the?Virtual Support Groups?provided by any of the following locations:    [facility] via Microsoft Teams with Yasmeen Diallo RD, RN  2.   [place] via Med fusion with Dominguez Earl, PhD, LP  3.   [place] via Med fusion with Cherelle Leiva RN  4.   [facility] via a Zoom Meeting with MUSTAPHA Powers    The following Support Group information can also be found on our website:  [email or web address]      [facility]   WEIGHT LOSS SURGERY SUPPORT GROUP  The support group is a patient-lead forum that meets monthly to share experiences, encouragement and education. It is open to those who have had weight loss surgery, are scheduled for surgery, or are considering surgery.   WHEN: 3rd Wednesday of each month from 5:00PM - 6:00PM using Microsoft Teams.   FACILITATOR: Led by Yasmeen Savage RD, VINICIO, RN, the program's Clinical Coordinator.   TO REGISTER: Please contact the clinic via DEUS or call the nurse line directly at [phone] to inform our staff that you would like an invite sent to you and to let us know the email you would like the invite sent to. Prior to the meeting, a link with directions on how to join the meeting will be sent to you.    2025 Meetings, 3rd Wednesday, 5:00pm - 6:00pm    January 15: Let's Talk  February19: Let's Talk  March 19: Speaker: Ayan Trinidad RD, LD  April 16: Let's Talk  May 21: Speaker: Vicki Eden RD, LD  June18: Let's Talk  July 16: Let's Talk  August 20: Let's Talk  September 17: No meeting.  October 15: Speaker: Lo Larry PsyD, LP  November 19: Let's Talk  December 17: Let's  Talk    [facility] BARIATRIC CARE SUPPORT GROUP  This is open to all pre- and post- operative bariatric surgery patients as well as their support system.   WHEN: 3rd Tuesday of each month from 6:30 PM - 8:00 PM using Microsoft Teams.   FACILITATOR: Led by Dominguez Earl, Ph.D who is a Licensed Psychologist with the [facility] Comprehensive Weight Management Program.   TO REGISTER: Please send an email to Dominguez Earl, Ph.D., LP [email or web address]?if you would like an invitation to the group. Prior to the meeting, a link with directions on how to join the meeting will be sent to you.    2025 Meetings, 3rd Tuesday January 21st: Open Forum  February 18th: Medications and Bariatric Surgery  Speaker: Aniyah De Anda, [place] Resident  March 18th: Open Forum  April 15th: Genetics of Obesity as well as Q&A, Speaker: Thuy Vanessa MD, [facility] Comprehensive Weight Management Program.  May 20th: Open Forum  June 17th: Nutritional Labeling, Speaker: HELENA  July 15th: Open Forum  August 19th: Open Forum  September 16th: Open Forum  October 21st: Open Forum  November 18th: Holiday Eating, Speaker: TBD  December 16th: Open Forum    [facility] POST-OPERATIVE BARIATRIC SURGERY SUPPORT GROUP  This is a support group for [facility] bariatric patients (and those external to [facility]) who have had bariatric surgery and are at least 3 months post-surgery.  WHEN: 4th Thursday of the month from 11:00 AM - 12:00 PM using Microsoft Teams.   FACILITATOR: Led by Certified Bariatric Nurse, Cherelle Leiva RN, CBN.   TO REGISTER: Please send an email to Cherelle at [email or web address] if you would like an invitation to the group.  Prior to the meeting, a link with directions on how to join the meeting will be sent to you.    2025 Meetings, 4th Thursday, 11:00am - 12:pm  January  23  February 27  March 27  April 24  May 22  Esther 26  July 24  August 28  September 25  October 23  November 27: Thanksgiving Day, No meeting.      December 25: Luz Day, No meeting.        [facility]   HEALTHY LIFESTYLE COACHING GROUP  This is a 60 minute virtual coaching group for those who want to lead a healthier lifestyle. Come together to set goals and overcome barriers in a supportive group environment. We will address the four pillars of health: nutrition, exercise, sleep, and emotional well-being.   WHEN: 1st Friday of the month, 12:30 PM - 1:30 PM   using a Zoom meeting.     FACILITATOR: Led by National Board-Certified Health and , Cherelle Vega [facility].  TO REGISTER: Please call the [employer] at [phone] to register. You will get an appointment to attend in [facility]. Fifteen minutes prior to the meeting, complete the e-check in and you will get the link to join the meeting.  There is no charge to attend this group and space is limited.  Please register for each month you wish to attend    2025 Meetings, 1st Friday, 12:30pm-1:30pm  January 3  February 7  March 7: No meeting.  April 4  May 2  Esther 6  July 4: Fourth of July Holiday, No meeting.    August 1  September 5  October 3  November 7  December 5

## 2025-07-30 ENCOUNTER — MYC REFILL (OUTPATIENT)
Dept: CARDIOLOGY | Facility: CLINIC | Age: 60
End: 2025-07-30
Payer: COMMERCIAL

## 2025-07-30 DIAGNOSIS — E83.42 HYPOMAGNESEMIA: ICD-10-CM

## 2025-07-31 ENCOUNTER — CARE COORDINATION (OUTPATIENT)
Dept: CARDIOLOGY | Facility: CLINIC | Age: 60
End: 2025-07-31
Payer: COMMERCIAL

## 2025-07-31 RX ORDER — MAGNESIUM OXIDE 400 MG/1
400 TABLET ORAL 2 TIMES DAILY
Qty: 180 TABLET | Refills: 3 | Status: SHIPPED | OUTPATIENT
Start: 2025-07-31

## 2025-07-31 NOTE — PROGRESS NOTES
I called Toyin to review instructions for her upcoming procedures.    Pre-procedure instructions - Right Heart Cath and/or Biopsy and/or Pulmonary Angiogram  Patient Education    Your arrival time is 8:00 AM.  Location is [address] Waiting Room  Please plan on being at the hospital all day. If you are on dialysis, DO NOT schedule on a dialysis day.  At any time, emergencies and/or urgent cases may come up which could delay the start of your procedure.    Pre-procedure instructions - Right heart catheterization  No solid food for 8 hours prior  Nothing to drink 2 hours prior to arrival time  You can take your morning medications (except diabetic and blood thinners) with sips of water  We recommend you arrange for a ride to drop you off and pick you up, in the instance, you are unable to drive home, however you should be able to function as you normally would after the procedure     Diabetic Medication Instructions  Hold oral diabetic medication in morning of your procedure and for 48 hours after IV contrast is given  Typical instructions for insulin diabetic medication holding are below. However, please reach out to your Primary Care Provider or Endocrinologist for specific instructions  DO NOT take any oral diabetic medication, short-acting diabetes medications/insulin, humalog or regular insulin the morning of your test  Take   dose of long-acting insulin (Lantus, Levemir) the day of your test  Remember to bring your glucometer and insulin with you to take after your test if needed  GLP-1 Agonists Instructions  DO NOT take injectable GLP-1 agonists semaglutide (Ozempic, Wegovy), dulaglutide (Trulicity), exenatide ER (Bydureon), tirzepatide (Mounjaro), or oral semaglutide (Rybelsus) for 7 days prior your procedure  Hold once daily injectable GLP-1 agonists exenatide (Byetta), liraglutide (Saxenda, Victoza), lixisenatide  (Soligua) the day before and day of your procedure                Anticoagulation Medication Instructions   NA  Nurse to write N/A if not currently taking    You will need to follow up with one of our cardiology APPs 1-2 weeks after your procedure. If you need help scheduling or rescheduling your appointment, please call [phone]

## 2025-08-04 ENCOUNTER — HOSPITAL ENCOUNTER (OUTPATIENT)
Dept: CARDIOLOGY | Facility: CLINIC | Age: 60
Discharge: HOME | End: 2025-08-04
Attending: INTERNAL MEDICINE | Admitting: INTERNAL MEDICINE
Payer: COMMERCIAL

## 2025-08-04 ENCOUNTER — MYC MEDICAL ADVICE (OUTPATIENT)
Dept: CARDIOLOGY | Facility: CLINIC | Age: 60
End: 2025-08-04

## 2025-08-04 ENCOUNTER — HOSPITAL ENCOUNTER (OUTPATIENT)
Facility: CLINIC | Age: 60
Discharge: HOME | End: 2025-08-04
Attending: INTERNAL MEDICINE | Admitting: INTERNAL MEDICINE
Payer: COMMERCIAL

## 2025-08-04 VITALS
DIASTOLIC BLOOD PRESSURE: 80 MMHG | TEMPERATURE: 97.8 F | BODY MASS INDEX: 43.62 KG/M2 | HEIGHT: 64 IN | OXYGEN SATURATION: 99 % | WEIGHT: 255.5 LBS | RESPIRATION RATE: 16 BRPM | HEART RATE: 51 BPM | SYSTOLIC BLOOD PRESSURE: 128 MMHG

## 2025-08-04 VITALS — WEIGHT: 254.85 LBS | BODY MASS INDEX: 43.51 KG/M2 | HEIGHT: 64 IN

## 2025-08-04 DIAGNOSIS — I50.22 CHRONIC SYSTOLIC CONGESTIVE HEART FAILURE (H): ICD-10-CM

## 2025-08-04 LAB
ANION GAP SERPL CALCULATED.3IONS-SCNC: 8 MMOL/L (ref 7–15)
BASOPHILS # BLD AUTO: 0.1 10E3/UL (ref 0–0.2)
BASOPHILS NFR BLD AUTO: 1 %
BUN SERPL-MCNC: 13.6 MG/DL (ref 8–23)
CALCIUM SERPL-MCNC: 9.4 MG/DL (ref 8.8–10.4)
CHLORIDE SERPL-SCNC: 103 MMOL/L (ref 98–107)
CREAT SERPL-MCNC: 0.85 MG/DL (ref 0.51–0.95)
EGFRCR SERPLBLD CKD-EPI 2021: 78 ML/MIN/1.73M2
EOSINOPHIL # BLD AUTO: 0.3 10E3/UL (ref 0–0.7)
EOSINOPHIL NFR BLD AUTO: 6 %
ERYTHROCYTE [DISTWIDTH] IN BLOOD BY AUTOMATED COUNT: 13.7 % (ref 10–15)
GLUCOSE SERPL-MCNC: 111 MG/DL (ref 70–99)
HCO3 SERPL-SCNC: 27 MMOL/L (ref 22–29)
HCT VFR BLD AUTO: 35.7 % (ref 35–47)
HGB BLD-MCNC: 11.4 G/DL (ref 11.7–15.7)
HGB BLD-MCNC: 12.2 G/DL (ref 11.7–15.7)
IMM GRANULOCYTES # BLD: 0 10E3/UL
IMM GRANULOCYTES NFR BLD: 0 %
INR PPP: 0.98 (ref 0.85–1.15)
LYMPHOCYTES # BLD AUTO: 2.4 10E3/UL (ref 0.8–5.3)
LYMPHOCYTES NFR BLD AUTO: 44 %
MCH RBC QN AUTO: 29.8 PG (ref 26.5–33)
MCHC RBC AUTO-ENTMCNC: 31.9 G/DL (ref 31.5–36.5)
MCV RBC AUTO: 93 FL (ref 78–100)
MONOCYTES # BLD AUTO: 0.4 10E3/UL (ref 0–1.3)
MONOCYTES NFR BLD AUTO: 8 %
NEUTROPHILS # BLD AUTO: 2.2 10E3/UL (ref 1.6–8.3)
NEUTROPHILS NFR BLD AUTO: 41 %
NRBC # BLD AUTO: 0 10E3/UL
NRBC BLD AUTO-RTO: 0 /100
OXYHGB MFR BLDV: 61 % (ref 70–75)
PLATELET # BLD AUTO: 258 10E3/UL (ref 150–450)
POTASSIUM SERPL-SCNC: 4.3 MMOL/L (ref 3.4–5.3)
PROTHROMBIN TIME: 13.3 SECONDS (ref 11.8–14.8)
RBC # BLD AUTO: 3.83 10E6/UL (ref 3.8–5.2)
SODIUM SERPL-SCNC: 138 MMOL/L (ref 135–145)
WBC # BLD AUTO: 5.5 10E3/UL (ref 4–11)

## 2025-08-04 PROCEDURE — C1751 CATH, INF, PER/CENT/MIDLINE: HCPCS | Performed by: INTERNAL MEDICINE

## 2025-08-04 PROCEDURE — 250N000009 HC RX 250: Performed by: INTERNAL MEDICINE

## 2025-08-04 PROCEDURE — 94621 CARDIOPULM EXERCISE TESTING: CPT

## 2025-08-04 PROCEDURE — 80048 BASIC METABOLIC PNL TOTAL CA: CPT | Performed by: INTERNAL MEDICINE

## 2025-08-04 PROCEDURE — 85004 AUTOMATED DIFF WBC COUNT: CPT | Performed by: INTERNAL MEDICINE

## 2025-08-04 PROCEDURE — 94621 CARDIOPULM EXERCISE TESTING: CPT | Mod: 26 | Performed by: INTERNAL MEDICINE

## 2025-08-04 PROCEDURE — 85610 PROTHROMBIN TIME: CPT | Performed by: INTERNAL MEDICINE

## 2025-08-04 PROCEDURE — 82810 BLOOD GASES O2 SAT ONLY: CPT

## 2025-08-04 PROCEDURE — 85018 HEMOGLOBIN: CPT | Mod: 91

## 2025-08-04 PROCEDURE — 93451 RIGHT HEART CATH: CPT | Performed by: INTERNAL MEDICINE

## 2025-08-04 PROCEDURE — 272N000001 HC OR GENERAL SUPPLY STERILE: Performed by: INTERNAL MEDICINE

## 2025-08-04 PROCEDURE — 36415 COLL VENOUS BLD VENIPUNCTURE: CPT | Performed by: INTERNAL MEDICINE

## 2025-08-04 RX ORDER — LIDOCAINE 40 MG/G
CREAM TOPICAL
Status: COMPLETED | OUTPATIENT
Start: 2025-08-04 | End: 2025-08-04

## 2025-08-04 RX ADMIN — LIDOCAINE: 40 CREAM TOPICAL at 10:47

## 2025-08-04 ASSESSMENT — ACTIVITIES OF DAILY LIVING (ADL)
ADLS_ACUITY_SCORE: 57

## 2025-08-05 ENCOUNTER — OFFICE VISIT (OUTPATIENT)
Dept: GASTROENTEROLOGY | Facility: CLINIC | Age: 60
End: 2025-08-05
Attending: PHYSICIAN ASSISTANT
Payer: COMMERCIAL

## 2025-08-05 VITALS
HEART RATE: 85 BPM | BODY MASS INDEX: 43.6 KG/M2 | WEIGHT: 254 LBS | DIASTOLIC BLOOD PRESSURE: 78 MMHG | SYSTOLIC BLOOD PRESSURE: 115 MMHG | OXYGEN SATURATION: 99 %

## 2025-08-05 DIAGNOSIS — K21.9 GASTROESOPHAGEAL REFLUX DISEASE WITHOUT ESOPHAGITIS: ICD-10-CM

## 2025-08-05 DIAGNOSIS — R11.0 NAUSEA: ICD-10-CM

## 2025-08-05 PROCEDURE — 99213 OFFICE O/P EST LOW 20 MIN: CPT | Performed by: PHYSICIAN ASSISTANT

## 2025-08-05 PROCEDURE — 3078F DIAST BP <80 MM HG: CPT | Performed by: PHYSICIAN ASSISTANT

## 2025-08-05 PROCEDURE — 3074F SYST BP LT 130 MM HG: CPT | Performed by: PHYSICIAN ASSISTANT

## 2025-08-05 RX ORDER — RABEPRAZOLE SODIUM 20 MG/1
20 TABLET, DELAYED RELEASE ORAL 2 TIMES DAILY
Qty: 60 TABLET | Refills: 5 | Status: SHIPPED | OUTPATIENT
Start: 2025-08-05 | End: 2026-02-01

## 2025-08-05 RX ORDER — METOCLOPRAMIDE 10 MG/1
10 TABLET ORAL 3 TIMES DAILY PRN
Qty: 90 TABLET | Refills: 5 | Status: SHIPPED | OUTPATIENT
Start: 2025-08-05 | End: 2026-02-01

## 2025-08-06 LAB
CARDIOPULMONARY BLOOD PRESSURE REST: NORMAL MMHG
CARDIOPULMONARY BREATHING RESERVE REST: 78.2
CARDIOPULMONARY BREATHING RESERVE V02MAX: 61
CARDIOPULMONARY CO2 OUTPUT REST: 343 ML/MIN
CARDIOPULMONARY CO2 OUTPUT VO2MAX: 870 ML/MIN
CARDIOPULMONARY FEV 1.0 (L) ACTUAL: 1.87
CARDIOPULMONARY FEV 1.0 (L) PRECENT: 74 %
CARDIOPULMONARY FEV 1.0 (L) PREDICTED: 2.53
CARDIOPULMONARY FEV 1.0 FVC (%) ACTUAL: 87.6
CARDIOPULMONARY FEV 1.0 FVC (%) PERCENT: 112 %
CARDIOPULMONARY FEV 1.0 FVC (%) PREDICTED: 77.9
CARDIOPULMONARY FUNCTIONAL CAPACITY MAX ML/KG/MIN: 8.51 ML/KG/MIN
CARDIOPULMONARY FUNCTIONAL CAPACITY PERCENT: 33 %
CARDIOPULMONARY FUNCTIONAL CAPACITY PREDICTED: 25.8 ML/KG/MIN
CARDIOPULMONARY FVC (L) ACTUAL: 2.14
CARDIOPULMONARY FVC (L) PERCENT: 65 %
CARDIOPULMONARY FVC (L) PREDICTED: 3.28
CARDIOPULMONARY HEART RATE REST: 58 BPM
CARDIOPULMONARY MET'S REST: 0.7
CARDIOPULMONARY MINUTE VENTILATION REST: 14.3 L/MIN
CARDIOPULMONARY MINUTE VENTILATION VO2MAX: 25.6 L/MIN
CARDIOPULMONARY MYOCARDIAC O2 DEMAND MAX: NORMAL
CARDIOPULMONARY OXYGEN CONSUMPTION REST: 2.4 ML/KG/MIN
CARDIOPULMONARY OXYGEN PULSE REST: 5 ML/BEAT
CARDIOPULMONARY OXYGEN PULSE VO2MAX: 7.9 ML/BEAT
CARDIOPULMONARY OXYGEN SATURATION- OXIMETRY REST: 99 %
CARDIOPULMONARY OXYGEN SATURATION- OXIMETRY VO2MAX: 97 %
CARDIOPULMONARY PET C02 REST: 29
CARDIOPULMONARY PET C02 VO2MAX: 39
CARDIOPULMONARY PET02 REST: 122
CARDIOPULMONARY PET02 V02 MAX: 100
CARDIOPULMONARY RER: 0.81
CARDIOPULMONARY RESPIRALORY EXCHANGE RATIO VO2MAX: 0.81
CARDIOPULMONARY RESPIRALORY EXCHANGE RATIO: 1.25
CARDIOPULMONARY RESPIRATORY RATE REST: 22 BR/MIN
CARDIOPULMONARY RESPIRATORY RATE VO2MAX: 31 BR/MIN
CARDIOPULMONARY STRESS BASE 1 BP MMHG: NORMAL MMHG
CARDIOPULMONARY STRESS BASE 1 BPA: 105 BPM
CARDIOPULMONARY STRESS BASE 1 SPO2: 98 % SPO2
CARDIOPULMONARY STRESS BASE 1 TIME SEC: 0 SEC
CARDIOPULMONARY STRESS BASE 1 TIME: 1 MINS
CARDIOPULMONARY STRESS BASE 2 BP MMHG: NORMAL MMHG
CARDIOPULMONARY STRESS BASE 2 BPA: 86 BPM
CARDIOPULMONARY STRESS BASE 2 SPO2: 99 % SPO2
CARDIOPULMONARY STRESS BASE 2 TIME SEC: 0 SEC
CARDIOPULMONARY STRESS BASE 2 TIME: 3 MINS
CARDIOPULMONARY STRESS BASE 3 BP MMHG: NORMAL MMHG
CARDIOPULMONARY STRESS BASE 3 BPA: 74 BPM
CARDIOPULMONARY STRESS BASE 3 SPO2: 97 % SPO2
CARDIOPULMONARY STRESS BASE 3 TIME SEC: 0 SEC
CARDIOPULMONARY STRESS BASE 3 TIME: 5 MINS
CARDIOPULMONARY STRESS PHASE 1 BP MMHG: NORMAL MMHG
CARDIOPULMONARY STRESS PHASE 1 BPM: 95 BPM
CARDIOPULMONARY STRESS PHASE 1 SPO2: 97 % SPO2
CARDIOPULMONARY STRESS PHASE 1 TIME SEC: 0 SEC
CARDIOPULMONARY STRESS PHASE 1 TIME: 2 MINS
CARDIOPULMONARY STRESS PHASE 2 BP MMHG: NORMAL MMHG
CARDIOPULMONARY STRESS PHASE 2 BPM: 120 BPM
CARDIOPULMONARY STRESS PHASE 2 SPO2: 97 % SPO2
CARDIOPULMONARY STRESS PHASE 2 TIME SEC: 0 SEC
CARDIOPULMONARY STRESS PHASE 2 TIME: 4 MINS
CARDIOPULMONARY STRESS PHASE 3 BPM: 125 BPM
CARDIOPULMONARY STRESS PHASE 3 TIME SEC: 3 SEC
CARDIOPULMONARY STRESS PHASE 3 TIME: 5 MINS
CARDIOPULMONARY SVC (L) ACTUAL: 2.24
CARDIOPULMONARY SVC (L) PERCENT: 68 %
CARDIOPULMONARY SVC (L) PREDICTED: 3.28
CARDIOPULMONARY TIDAL VOLUME REST: 638 ML
CARDIOPULMONARY TIDAL VOLUME VO2MAX: 832 ML
CARDIOPULMONARY VE/VCO2 SLOPE: 24.33
CARDIOPULMONARY VENTILATORY EQUIVALENT 02 REST: 52
CARDIOPULMONARY VENTILATORY EQUIVALENT 02 V02: 24
CARDIOPULMONARY VENTILATORY EQUIVALENT C02 REST: 42
CARDIOPULMONARY VENTILATORY EQUIVALENT C02 SLOPE VO2MAX: 24.33
CARDIOPULMONARY VENTILATORY EQUIVALENT C02 VO2MAX: 29
CV STRESS MAX HR HE: 125
PREDICTED VO2MAX: 8.5
RATED PERCEIVED EXERTION: 15
STRESS ANGINA INDEX: 0
STRESS ECHO BASELINE BP: NORMAL MMHG
STRESS ECHO BASELINE HR: 51 BPM
STRESS ECHO CALCULATED PERCENT HR: 79 %
STRESS ECHO LAST STRESS BP: NORMAL MMHG
STRESS ECHO POST ESTIMATED WORKLOAD: 2.4 METS
STRESS ECHO POST EXERCISE DUR MIN: 5 MIN
STRESS ECHO POST EXERCISE DUR SEC: 3 SEC
STRESS ECHO TARGET HR: 159

## 2025-08-11 ENCOUNTER — VIRTUAL VISIT (OUTPATIENT)
Dept: PHARMACY | Facility: CLINIC | Age: 60
End: 2025-08-11
Payer: COMMERCIAL

## 2025-08-11 VITALS — BODY MASS INDEX: 43.6 KG/M2 | WEIGHT: 254 LBS

## 2025-08-11 DIAGNOSIS — E83.42 HYPOMAGNESEMIA: ICD-10-CM

## 2025-08-11 DIAGNOSIS — G47.33 OSA (OBSTRUCTIVE SLEEP APNEA): ICD-10-CM

## 2025-08-11 DIAGNOSIS — K21.00 GASTROESOPHAGEAL REFLUX DISEASE WITH ESOPHAGITIS WITHOUT HEMORRHAGE: ICD-10-CM

## 2025-08-11 DIAGNOSIS — R11.0 NAUSEA: ICD-10-CM

## 2025-08-11 DIAGNOSIS — E66.813 CLASS 3 SEVERE OBESITY WITH SERIOUS COMORBIDITY AND BODY MASS INDEX (BMI) OF 40.0 TO 44.9 IN ADULT, UNSPECIFIED OBESITY TYPE (H): Primary | ICD-10-CM

## 2025-08-11 DIAGNOSIS — I50.22 CHRONIC SYSTOLIC CONGESTIVE HEART FAILURE (H): ICD-10-CM

## 2025-08-11 DIAGNOSIS — E78.5 HYPERLIPIDEMIA LDL GOAL <100: ICD-10-CM

## 2025-08-11 DIAGNOSIS — R73.03 PREDIABETES: ICD-10-CM

## 2025-08-11 DIAGNOSIS — K59.00 CONSTIPATION, UNSPECIFIED CONSTIPATION TYPE: ICD-10-CM

## 2025-09-03 ENCOUNTER — MYC REFILL (OUTPATIENT)
Dept: FAMILY MEDICINE | Facility: CLINIC | Age: 60
End: 2025-09-03
Payer: COMMERCIAL

## 2025-09-03 DIAGNOSIS — R39.15 URINARY URGENCY: ICD-10-CM

## 2025-09-03 RX ORDER — SOLIFENACIN SUCCINATE 5 MG/1
5 TABLET, FILM COATED ORAL
Qty: 90 TABLET | Refills: 0 | Status: CANCELLED | OUTPATIENT
Start: 2025-09-03

## 2025-09-04 RX ORDER — SOLIFENACIN SUCCINATE 5 MG/1
TABLET, FILM COATED ORAL
Qty: 90 TABLET | Refills: 0 | Status: SHIPPED | OUTPATIENT
Start: 2025-09-04

## (undated) DEVICE — RX VISTASEAL FIBRIN SEALANT W/THROMBIN 10ML VST10

## (undated) DEVICE — TUBING SUCTION MEDI-VAC SOFT 3/16"X20' N520A

## (undated) DEVICE — DRAPE SHEET HALF 40X60" 9358

## (undated) DEVICE — PREP CHLORAPREP 26ML TINTED ORANGE  260815

## (undated) DEVICE — CATH TRAY FOLEY SURESTEP 16FR W/URNE MTR STLK LATEX A303316A

## (undated) DEVICE — NIM PROBE PRASS INCREMENTING TIP 8225825

## (undated) DEVICE — ELECTRODE DEFIB CADENCE 22550R

## (undated) DEVICE — RX BACITRACIN OINTMENT 0.9G 1/32OZ CUR001109

## (undated) DEVICE — KIT MICROINTRODUCER VASCULAR VSI 4FRX0.018INX40CM 7195X

## (undated) DEVICE — ESU CORD BIPOLAR AND IRR TUBING AESCULAP US355

## (undated) DEVICE — SPONGE COTTONOID 1/4X1/4" 20-01S

## (undated) DEVICE — SU PROLENE 7-0 BV-1DA 24" 8702H

## (undated) DEVICE — ESU GROUND PAD ADULT W/CORD E7507

## (undated) DEVICE — DRAPE IOBAN INCISE 13X13" 6640EZ

## (undated) DEVICE — DRAPE POUCH INSTRUMENT 1018

## (undated) DEVICE — PIN SKULL MAYFIELD ADULT TITANIUM 3/PK A1120

## (undated) DEVICE — Device

## (undated) DEVICE — KIT RIGHT HEART CATH 60130719

## (undated) DEVICE — SU PROLENE 6-0 BV-1 DA 24" 8805H

## (undated) DEVICE — DECANTER VIAL 2006S

## (undated) DEVICE — DRSG PRIMAPORE 03 1/8X6" 66000318

## (undated) DEVICE — PREP POVIDONE IODINE SCRUB 7.5% 4OZ APL82212

## (undated) DEVICE — INTRO SHEATH 7FRX10CM PINNACLE RSS702

## (undated) DEVICE — DRAPE STERI TOWEL SM 1000

## (undated) DEVICE — DRAPE CRANIOTOMY W/POUCH 9450

## (undated) DEVICE — PAD CHUX UNDERPAD 23X24" 7136

## (undated) DEVICE — CATH ANGIO WEDGE PRESSURE 5FRX110CM DL AI-07124

## (undated) DEVICE — KIT ENDO FIRST STEP DISINFECTANT 200ML W/POUCH EP-4

## (undated) DEVICE — DECANTER BAG 2002S

## (undated) DEVICE — SPONGE COTTONOID 1X3" 20-10S

## (undated) DEVICE — SU VICRYL 0 CT-1 CR 8X18" J740D

## (undated) DEVICE — SOL NACL 0.9% IRRIG 3000ML BAG 2B7477

## (undated) DEVICE — SHTH INTRO 0.021IN ID 5FR DIA 10CML HYDRCT RADL 21GA

## (undated) DEVICE — DRAPE MAYO STAND 23X54 8337

## (undated) DEVICE — SYR 10ML LL W/O NDL 302995

## (undated) DEVICE — DRAPE EYE SHEET 8441

## (undated) DEVICE — STPL SKIN 35W APPOSE 8886803712

## (undated) DEVICE — WIPES FOLEY CARE SURESTEP PROVON DFC100

## (undated) DEVICE — SU VICRYL 2-0 CT-2 27" UND J269H

## (undated) DEVICE — BUR STRK ROUND DIAMOND 3.0MM COARSE 5820-013-330

## (undated) DEVICE — KIT ACCESSORY LUMBAR DRAIN 910121

## (undated) DEVICE — PACK HEART RIGHT CUSTOM SAN32RHF18

## (undated) DEVICE — SU VICRYL 2-0 CT-2 CR 8X18" J726D

## (undated) DEVICE — DRSG ADAPTIC 3X3" 6112

## (undated) DEVICE — DRSG MEDIPORE 3 1/2X13 3/4" 3573

## (undated) DEVICE — BUR STRK ROUND DIAMOND 2.0MM COARSE 5820-013-320

## (undated) DEVICE — SPONGE SURGIFOAM 01GM POWDER 1978

## (undated) DEVICE — ESU ELEC BLADE 2.75" COATED/INSULATED E1455

## (undated) DEVICE — ESU HOLSTER PLASTIC DISP E2400

## (undated) DEVICE — SU PROLENE 6-0 BV-1DA 18" 8709H

## (undated) DEVICE — DRSG GAUZE 4X4" TRAY 6939

## (undated) DEVICE — SOL NACL 0.9% IRRIG 1000ML BOTTLE 07138-09

## (undated) DEVICE — STRAP UNIVERSAL POSITIONING 2-PIECE 4X47X76" 91-287

## (undated) DEVICE — DRAPE GLASSCOCK 3707

## (undated) DEVICE — BUR STRK 1.1MM STRAIGHT ROUTER 5820-070-011

## (undated) DEVICE — ESU CORD BIPOLAR GREEN 10-4000

## (undated) DEVICE — LINEN TOWEL PACK X6 WHITE 5487

## (undated) DEVICE — LINEN TOWEL PACK X30 5481

## (undated) DEVICE — SURGICEL HEMOSTAT 4X8" 1952

## (undated) DEVICE — SPONGE COTTONOID 1/2X1 1/2" 20-06S

## (undated) DEVICE — GLOVE PROTEXIS MICRO 7.0  2D73PM70

## (undated) DEVICE — SHEATH PRELUDE SNAP 13CM 9FR

## (undated) DEVICE — SYR 10ML FINGER CONTROL W/O NDL 309695

## (undated) DEVICE — SOL WATER IRRIG 1000ML BOTTLE 2F7114

## (undated) DEVICE — SOL RINGERS LACTATED 1000ML BAG 07953-09

## (undated) DEVICE — SU PROLENE 3-0 SHDA 48" 8534H

## (undated) DEVICE — SPONGE COTTONOID 1/2X3" 20-07S

## (undated) DEVICE — SPONGE SURGIFOAM 100 1974

## (undated) DEVICE — GLOVE PROTEXIS POWDER FREE SMT 6.5  2D72PT65X

## (undated) DEVICE — WIRE GUIDE 0.025"X150CM TERUMO GLIDEWIRE ANG GR2504

## (undated) DEVICE — SOL WATER IRRIG 1000ML BOTTLE 07139-09

## (undated) DEVICE — BUR STRK 2.3MM TAPERED ROUTER - FA2 5407-FA2-023

## (undated) DEVICE — SU ETHILON 3-0 PS-1 18" 1663H

## (undated) DEVICE — PREP POVIDONE IODINE SOLUTION 10% 4OZ

## (undated) DEVICE — FORCEP ENDOMYOCARDIAL BIOPSY STRAIGHT 6FRX50CM 190060

## (undated) DEVICE — LABEL MEDICATION SYSTEM 3303-P

## (undated) DEVICE — SHUNT BECKER EXTERNAL DRAIN 46128

## (undated) DEVICE — PREP CHLORAPREP CLEAR 3ML 260400

## (undated) DEVICE — CABLE PACING ALLIGATOR CLIP 12FT 5833SL

## (undated) DEVICE — PREP SKIN SCRUB TRAY 4461A

## (undated) DEVICE — SHEATH PRELUDE SNAP 7FRX13CM PLS-1007

## (undated) DEVICE — DRAPE MICROSCOPE LEICA 54X150" AR8033650

## (undated) DEVICE — SUCTION MANIFOLD NEPTUNE 2 SYS 4 PORT 0702-020-000

## (undated) DEVICE — PACK CRANIOTOMY

## (undated) DEVICE — NDL ANGIOCATH 14GA 1.25" 4048

## (undated) DEVICE — SYR 30ML LL W/O NDL 302832

## (undated) DEVICE — PACK PACEMAKER CUSTOM UMMC SAN15UPM36

## (undated) DEVICE — SPONGE COTTONOID 1/2X1/2" 20-04S

## (undated) DEVICE — DRAPE SHEET REV FOLD 3/4 9349

## (undated) DEVICE — STPL SKIN 35W ROTATING HEAD PRW35

## (undated) DEVICE — BUR STRK CARBIDE ROUND 4.0MM 5820-110-040C

## (undated) RX ORDER — FENTANYL CITRATE 50 UG/ML
INJECTION, SOLUTION INTRAMUSCULAR; INTRAVENOUS
Status: DISPENSED
Start: 2025-04-11

## (undated) RX ORDER — CEFAZOLIN SODIUM 2 G/50ML
SOLUTION INTRAVENOUS
Status: DISPENSED
Start: 2025-04-11

## (undated) RX ORDER — LORAZEPAM 0.5 MG/1
TABLET ORAL
Status: DISPENSED
Start: 2024-11-26

## (undated) RX ORDER — FENTANYL CITRATE 50 UG/ML
INJECTION, SOLUTION INTRAMUSCULAR; INTRAVENOUS
Status: DISPENSED
Start: 2023-09-28

## (undated) RX ORDER — LIDOCAINE 40 MG/G
CREAM TOPICAL
Status: DISPENSED
Start: 2025-08-04

## (undated) RX ORDER — FENTANYL CITRATE 50 UG/ML
INJECTION, SOLUTION INTRAMUSCULAR; INTRAVENOUS
Status: DISPENSED
Start: 2021-07-08

## (undated) RX ORDER — ACETAMINOPHEN 500 MG
TABLET ORAL
Status: DISPENSED
Start: 2021-07-05

## (undated) RX ORDER — FENTANYL CITRATE 50 UG/ML
INJECTION, SOLUTION INTRAMUSCULAR; INTRAVENOUS
Status: DISPENSED
Start: 2023-12-13

## (undated) RX ORDER — HYDROMORPHONE HYDROCHLORIDE 1 MG/ML
INJECTION, SOLUTION INTRAMUSCULAR; INTRAVENOUS; SUBCUTANEOUS
Status: DISPENSED
Start: 2021-07-05

## (undated) RX ORDER — SODIUM CHLORIDE 9 MG/ML
INJECTION, SOLUTION INTRAVENOUS
Status: DISPENSED
Start: 2021-07-05

## (undated) RX ORDER — ACETAMINOPHEN 325 MG/1
TABLET ORAL
Status: DISPENSED
Start: 2021-07-05

## (undated) RX ORDER — PROPOFOL 10 MG/ML
INJECTION, EMULSION INTRAVENOUS
Status: DISPENSED
Start: 2021-07-08

## (undated) RX ORDER — FENTANYL CITRATE-0.9 % NACL/PF 10 MCG/ML
PLASTIC BAG, INJECTION (ML) INTRAVENOUS
Status: DISPENSED
Start: 2021-07-08

## (undated) RX ORDER — LIDOCAINE 40 MG/G
CREAM TOPICAL
Status: DISPENSED
Start: 2024-11-26

## (undated) RX ORDER — DIAZEPAM 5 MG
TABLET ORAL
Status: DISPENSED
Start: 2020-01-03

## (undated) RX ORDER — LIDOCAINE HYDROCHLORIDE 20 MG/ML
INJECTION, SOLUTION INFILTRATION; PERINEURAL
Status: DISPENSED
Start: 2025-04-11

## (undated) RX ORDER — LIDOCAINE HYDROCHLORIDE AND EPINEPHRINE 10; 10 MG/ML; UG/ML
INJECTION, SOLUTION INFILTRATION; PERINEURAL
Status: DISPENSED
Start: 2021-07-05

## (undated) RX ORDER — FENTANYL CITRATE 50 UG/ML
INJECTION, SOLUTION INTRAMUSCULAR; INTRAVENOUS
Status: DISPENSED
Start: 2021-02-23

## (undated) RX ORDER — LIDOCAINE 40 MG/G
CREAM TOPICAL
Status: DISPENSED
Start: 2023-01-10

## (undated) RX ORDER — BUPIVACAINE HYDROCHLORIDE AND EPINEPHRINE 5; 5 MG/ML; UG/ML
INJECTION, SOLUTION PERINEURAL
Status: DISPENSED
Start: 2021-07-08

## (undated) RX ORDER — HYDROMORPHONE HYDROCHLORIDE 1 MG/ML
INJECTION, SOLUTION INTRAMUSCULAR; INTRAVENOUS; SUBCUTANEOUS
Status: DISPENSED
Start: 2021-07-08

## (undated) RX ORDER — CEFAZOLIN SODIUM 2 G/100ML
INJECTION, SOLUTION INTRAVENOUS
Status: DISPENSED
Start: 2021-07-05

## (undated) RX ORDER — FENTANYL CITRATE 50 UG/ML
INJECTION, SOLUTION INTRAMUSCULAR; INTRAVENOUS
Status: DISPENSED
Start: 2021-07-05

## (undated) RX ORDER — ONDANSETRON 2 MG/ML
INJECTION INTRAMUSCULAR; INTRAVENOUS
Status: DISPENSED
Start: 2023-12-13

## (undated) RX ORDER — LIDOCAINE HYDROCHLORIDE 20 MG/ML
JELLY TOPICAL
Status: DISPENSED
Start: 2024-03-11

## (undated) RX ORDER — LIDOCAINE HYDROCHLORIDE 20 MG/ML
INJECTION, SOLUTION EPIDURAL; INFILTRATION; INTRACAUDAL; PERINEURAL
Status: DISPENSED
Start: 2021-07-08

## (undated) RX ORDER — ACETAMINOPHEN 325 MG/1
TABLET ORAL
Status: DISPENSED
Start: 2025-04-11

## (undated) RX ORDER — EPHEDRINE SULFATE 50 MG/ML
INJECTION, SOLUTION INTRAMUSCULAR; INTRAVENOUS; SUBCUTANEOUS
Status: DISPENSED
Start: 2021-07-08

## (undated) RX ORDER — ESMOLOL HYDROCHLORIDE 10 MG/ML
INJECTION INTRAVENOUS
Status: DISPENSED
Start: 2021-07-08

## (undated) RX ORDER — CEFAZOLIN SODIUM 1 G/3ML
INJECTION, POWDER, FOR SOLUTION INTRAMUSCULAR; INTRAVENOUS
Status: DISPENSED
Start: 2025-04-11

## (undated) RX ORDER — DIPHENHYDRAMINE HYDROCHLORIDE 50 MG/ML
INJECTION INTRAMUSCULAR; INTRAVENOUS
Status: DISPENSED
Start: 2021-02-23

## (undated) RX ORDER — ONDANSETRON 2 MG/ML
INJECTION INTRAMUSCULAR; INTRAVENOUS
Status: DISPENSED
Start: 2021-07-05

## (undated) RX ORDER — CEFAZOLIN SODIUM 1 G/3ML
INJECTION, POWDER, FOR SOLUTION INTRAMUSCULAR; INTRAVENOUS
Status: DISPENSED
Start: 2021-07-08